# Patient Record
Sex: FEMALE | Race: ASIAN | NOT HISPANIC OR LATINO | Employment: STUDENT | URBAN - METROPOLITAN AREA
[De-identification: names, ages, dates, MRNs, and addresses within clinical notes are randomized per-mention and may not be internally consistent; named-entity substitution may affect disease eponyms.]

---

## 2017-02-03 ASSESSMENT — ENCOUNTER SYMPTOMS
HALLUCINATIONS: 0
WEIGHT LOSS: 0
POLYDIPSIA: 0
BLOATING: 1
HEMOPTYSIS: 0
COUGH DISTURBING SLEEP: 0
NAUSEA: 1
FEVER: 0
DYSURIA: 0
POSTURAL DYSPNEA: 0
RECTAL BLEEDING: 0
WHEEZING: 0
WEIGHT GAIN: 0
HEMATURIA: 0
VOMITING: 0
DYSPNEA ON EXERTION: 0
DECREASED LIBIDO: 0
RECTAL PAIN: 0
SNORES LOUDLY: 1
SHORTNESS OF BREATH: 0
BLOOD IN STOOL: 0
FLANK PAIN: 0
DECREASED APPETITE: 0
SPUTUM PRODUCTION: 0
FATIGUE: 1
BOWEL INCONTINENCE: 0
ABDOMINAL PAIN: 1
HOT FLASHES: 1
POLYPHAGIA: 0
COUGH: 0
INCREASED ENERGY: 0
NIGHT SWEATS: 1
ALTERED TEMPERATURE REGULATION: 1
HEARTBURN: 0
DIARRHEA: 0
CONSTIPATION: 1
CHILLS: 0
RESPIRATORY PAIN: 0
JAUNDICE: 0
DIFFICULTY URINATING: 0

## 2017-02-06 ENCOUNTER — OFFICE VISIT (OUTPATIENT)
Dept: GASTROENTEROLOGY | Facility: CLINIC | Age: 32
End: 2017-02-06

## 2017-02-06 VITALS
HEIGHT: 69 IN | SYSTOLIC BLOOD PRESSURE: 107 MMHG | WEIGHT: 150 LBS | DIASTOLIC BLOOD PRESSURE: 74 MMHG | BODY MASS INDEX: 22.22 KG/M2 | OXYGEN SATURATION: 98 % | HEART RATE: 77 BPM

## 2017-02-06 DIAGNOSIS — N80.9 ENDOMETRIOSIS: ICD-10-CM

## 2017-02-06 DIAGNOSIS — K58.9 IRRITABLE BOWEL SYNDROME, UNSPECIFIED TYPE: Primary | ICD-10-CM

## 2017-02-06 LAB
CRP SERPL-MCNC: <2.9 MG/L (ref 0–8)
DEPRECATED CALCIDIOL+CALCIFEROL SERPL-MC: 22 UG/L (ref 20–75)
FERRITIN SERPL-MCNC: 41 NG/ML (ref 12–150)
FOLATE SERPL-MCNC: 40.3 NG/ML
IRON SATN MFR SERPL: 29 % (ref 15–46)
IRON SERPL-MCNC: 98 UG/DL (ref 35–180)
TIBC SERPL-MCNC: 344 UG/DL (ref 240–430)
TSH SERPL DL<=0.05 MIU/L-ACNC: 1.72 MU/L (ref 0.4–4)
VIT B12 SERPL-MCNC: 650 PG/ML (ref 193–986)

## 2017-02-06 ASSESSMENT — PAIN SCALES - GENERAL: PAINLEVEL: NO PAIN (0)

## 2017-02-06 NOTE — NURSING NOTE
"Chief Complaint   Patient presents with     Consult     abd pain for over a  year       Filed Vitals:    02/06/17 1108   BP: 107/74   Pulse: 77   Height: 1.753 m (5' 9\")   Weight: 68.04 kg (150 lb)   SpO2: 98%       Body mass index is 22.14 kg/(m^2).                           "

## 2017-02-06 NOTE — PATIENT INSTRUCTIONS
1. Labs today to check nutritional markers    2. Get records from [place]. Colonoscopy and celiac blood work    3. Take Citrucel (fiber powder). 1 tablespoon daily for 1 week, then 2 tablespoons daily for 1 week then 3 tablespoons daily. If this does not work better than the pills you can switch to the pills    4. Take miralax 1 capful daily to have 1-2 BMs daily. You can increase up to 3 capfuls daily if needed. Make sure to increase or decrease dose as needed to have the desire effect. If you have diarrhea then decrease the dose.    5. You can take a probiotic daily - Culturelle or Align are good options    6. Keep a food and symptom diary for 2 weeks and see nutritionist to discuss LOW FODMAP diet    7. Schedule appointment with Amna Ashford.    Follow up in 4-5 months or sooner if needed    Call with questions

## 2017-02-06 NOTE — Clinical Note
2/6/2017       RE: Kasandra Lau  [address]    [facility] [address]     Dear Colleague,    Thank you for referring your patient, Kasandra Lau, to the [facility] at [facility]. Please see a copy of my visit note below.    REFERRING PROVIDER:  Tonja Ferrer of OB/GYN.      REASON FOR REFERRAL:  Abdominal pain and constipation.      CHIEF COMPLAINT:  Abdominal pain and constipation.      HISTORY OF PRESENT ILLNESS:  Kasandra Lau is a very pleasant 31-year-old female with a history of migraines, depression, and endometriosis who is here today to be evaluated for left-sided abdominal pain and constipation.  The patient reports she has had these symptoms on and off for years.  She has been followed by Gynecology for endometriosis.  In the fall she underwent an exploratory laparoscopy which found an endometrioma on her ovaries.  Reportedly there was some scar tissue on her colon as well from the endometriosis.  This was resected.  Since that surgery, she has had much improved abdominal discomfort.  Prior to that surgery, she was having constant abdominal pain despite going on birth control and stopping her cycles.  After this surgery, her pain has been more attributed and related to dietary changes.      The patient notes that she has definitely noticed that her symptoms are related to ingesting milk products and some legumes.  She has been trying to follow the low FODMAP diet on her own and this has made for definite improvement.  She also notes that she is definitely constipated.  She has about 3 bowel movements per week on average.  When she does not have a bowel movement, she notes that her abdominal discomfort is worse.  When she has a bowel movement, it is improved.  She has been taking some fiber.  She is taking 2 fiber pills (unknown type) a day and this has helped.  She tried taking some MiraLax a little bit in the past, but she did not  really like the taste.  She is willing to try this again.      She has no blood in her stool.  She has no weight loss.  She has no extraintestinal manifestations suggestive of Crohn's disease or ulcerative colitis.  She has no fevers, chills or sweats.  She underwent a colonoscopy in [place], which she states was normal.  We are working to get these records.  She also says that she was having some blood work for celiac disease.  She notes no changes in her symptoms.  Her blood tests were negative.  She is not aware of ever undergoing an upper endoscopy.      She is concerned she has IBS and she just wants to work at this.      REVIEW OF SYSTEMS:  A complete review of systems was performed.  Pertinent positives and negatives are as stated above in the HPI.  The remainder of a complete review of systems is unremarkable.      PAST MEDICAL HISTORY:   1.  Endometriosis.   2.  Depression and anxiety.  She is on Prozac with lorazepam as needed.  She is not currently following with any therapist at this time.   3.  Iron deficiency anemia.  She had some iron tests that could be consistent with mild iron deficiency with low sat.  Her ferritin was normal at 50.  She was put on iron supplement.  I do note that her iron studies were checked after her surgery and after she had vaginal bleeding for 1 week.  Her B12 was borderline low and her vitamin D was also low.   4.  Migraines.  She follows with Neurology.  She is on Topamax.      PAST SURGICAL HISTORY:  Exploratory laparotomy for endometriosis as above.      FAMILY HISTORY:  History of diabetes.  No history of Crohn's disease, ulcerative colitis, colon cancer or colon polyps.  No history of other GI illnesses or other issues.      SOCIAL HISTORY:  She is a PhD student, studying human geography.  She rarely smokes tobacco.  She has not smoked marijuana since leaving [place] where she was studying previously.  She does not drink alcohol as this makes her symptoms worse.       PHYSICAL EXAMINATION:   VITAL SIGNS:  Weight 150 pounds, height 5 feet 9 inches, blood pressure 107/74, pulse 77, satting at 98% on room air.   GENERAL:  She is pleasant, in no acute distress.   HEENT:  Head is atraumatic, normocephalic.  Sclerae are anicteric without injection.  Oropharynx is clear with moist mucous membranes.   NECK:  Supple.  There is no lymphadenopathy, no thyromegaly.   LUNGS:  Clear to auscultation.   HEART:  Normal rate.   ABDOMEN:  Soft, nontender, nondistended.  Well-healed laparoscopic abdominal scars.  No rebound or guarding.   EXTREMITIES:  No clubbing, cyanosis or edema.   SKIN:  No evidence of rash.   JOINTS:  No evidence of synovitis.   NEUROLOGIC:  Awake, alert and oriented x3 with no focal deficits.      LABORATORY DATA:  Reviewed in Epic.  Hemoglobin normal, nutritional markers as well as LFTs and basic metabolic are normal.      ASSESSMENT AND PLAN:  Kasandra Lau is a very pleasant 31-year-old female with a history of endometriosis and depression who is here today to discuss abdominal pain and constipation.       1.  I believe she has constipation predominant irritable bowel syndrome.  Her labs have been unremarkable.  She has had an outside colonoscopy (will work to get the records), but per her report this has been normal.  I appreciate no danger signs or red flags.  I do note that she was mildly iron deficient, but this was checked after her surgery.  We will recheck some of her nutritional markers here today.  She has already identified that the low FODMAP diet helps her.  We will refer her to Nutrition to discuss how to do this in an organized and safe manner.  We will also have her keep a food and symptom diary leading up to that appointment.  She will take some fiber and a probiotic.  She will titrate up the fiber as well.  In addition, she can titrate the MiraLax to help her have 1-2 soft bowel movements per day.  Finally, we discussed that depression and anxiety can  definitely contribute to GI symptoms and she is very open to this concept.  She will meet with our GI therapist, Dr. Arce, to talk about this interaction more and talk about potential coping mechanisms and therapy that can help.  I think the patient will do very well with these recommendations and she will follow up in 4-5 months to see how she is doing.  If there are any concerning findings on labs, we will consider further evaluation if needed.     2.  Endometriosis.  Certainly this could be contributing to some of her abdominal symptoms and she could have some scar tissue that is contributing as well.  Currently, this is well managed and I do not think she needs any further evaluation at this time.  She can continue to follow up with her gynecologist, Dr. Ferrer, for any needs regarding her endometriosis in the future.      Thank you very much for the opportunity to take part in the care of this patient.  Please do not hesitate to call with questions.      cc:  MARGARITO Dalal MD             D: 2017 11:45   T: 2017 13:03   MT: JULISA      Name:     HECTOR AVILES   MRN:      [id number]        Account:      [id number]   :      1985           Service Date: 2017      Document: [id number]      Note dictated. Job code [id number].

## 2017-02-06 NOTE — NURSING NOTE
After visit summary printed. Follow up scheduled. Will contact nutrition for an appointment. JARON signed.

## 2017-02-06 NOTE — MR AVS SNAPSHOT
After Visit Summary   2/6/2017    Kasandra Lau    MRN: [id number]           Patient Information     Date Of Birth          1985        Visit Information        Provider Department      2/6/2017 10:40 AM Sheldon Irene MD [facility] IBD        Today's Diagnoses     Irritable bowel syndrome, unspecified type    -  1       Care Instructions    1. Labs today to check nutritional markers    2. Get records from [place]. Colonoscopy and celiac blood work    3. Take Citrucel (fiber powder). 1 tablespoon daily for 1 week, then 2 tablespoons daily for 1 week then 3 tablespoons daily. If this does not work better than the pills you can switch to the pills    4. Take miralax 1 capful daily to have 1-2 BMs daily. You can increase up to 3 capfuls daily if needed. Make sure to increase or decrease dose as needed to have the desire effect. If you have diarrhea then decrease the dose.    5. You can take a probiotic daily - Culturelle or Align are good options    6. Keep a food and symptom diary for 2 weeks and see nutritionist to discuss LOW FODMAP diet    7. Schedule appointment with Amna Ashford.    Follow up in 4-5 months or sooner if needed    Call with questions        Follow-ups after your visit        Additional Services     NUTRITION REFERRAL       Your provider has referred you to: [facility]: [facility] (on call location)  - [place] [phone]   [email or web address]    Please be aware that coverage of these services is subject to the terms and limitations of your health insurance plan.  Call member services at your health plan with any benefit or coverage questions.      Please bring the following with you to your appointment:    (1) This referral request  (2) Any documents given to you regarding this referral  (3) Any specific questions you have about diet and/or food choices            NUTRITION REFERRAL       Your provider has  referred you to: [facility]: [facility] (on call location)  - [place] [phone]   [email or web address]    Please be aware that coverage of these services is subject to the terms and limitations of your health insurance plan.  Call member services at your health plan with any benefit or coverage questions.      Please bring the following to your appointment:      >>   This referral request   >>   Any documents given to you for this referral  >>   Any specific questions you have about diet or food choices                  Your next 10 appointments already scheduled     Feb 06, 2017 12:00 PM   LAB with  LAB   [facility] ([facility])    [address] [place] [address]  [address] [facility] [address] [phone]           Patient must bring picture ID.  Patient should be prepared to give a urine specimen  Please do not eat 10-12 hours before your appointment if you are coming in fasting for labs on lipids, cholesterol, or glucose (sugar).  Pregnant women should follow their Care Team instructions. Water with medications is okay. Do not drink coffee or other fluids.   If you have concerns about taking  your medications, please ask at office or if scheduling via [employer], send a message by clicking on Secure Messaging, Message Your Care Team.            May 19, 2017  1:45 PM   (Arrive by 1:30 PM)   New Patient Visit with Jevon Loyd MD   [facility]rology ([facility])    [address] [place] [address]  [address] [facility] [address] [phone]            May 23, 2017 11:20 AM   (Arrive by 11:05 AM)   Return Visit with Sheldon Irene MD   [facility]enter[facility] IBD ([facility])    [address] [place] [address]  [facility] [facility] [address] [phone]              Who to contact     Please call your clinic at [phone] to:    Ask questions about your health    Make or  "cancel appointments    Discuss your medicines    Learn about your test results    Speak to your doctor   If you have compliments or concerns about an experience at your clinic, or if you wish to file a complaint, please contact [facility] Patient Relations at [phone] or email us at [email or web address]         Additional Information About Your Visit        [employer] Information     [employer] gives you secure access to your electronic health record. If you see a primary care provider, you can also send messages to your care team and make appointments. If you have questions, please call your primary care clinic.  If you do not have a primary care provider, please call [phone] and they will assist you.      [employer] is an electronic gateway that provides easy, online access to your medical records. With [employer], you can request a clinic appointment, read your test results, renew a prescription or communicate with your care team.     To access your existing account, please contact your [facility] or call [phone] for assistance.        Care EveryWhere ID     This is your Care EveryWhere ID. This could be used by other organizations to access your [facility] medical records  [id number]        Your Vitals Were     Pulse Height BMI (Body Mass Index) Pulse Oximetry Last Period       77 1.753 m (5' 9\") 22.14 kg/m2 98% 09/13/2016        Blood Pressure from Last 3 Encounters:   02/06/17 107/74   11/23/16 110/77   11/19/16 108/68    Weight from Last 3 Encounters:   02/06/17 68.04 kg (150 lb)   11/23/16 68.992 kg (152 lb 1.6 oz)   11/09/16 66.4 kg (146 lb 6.2 oz)              We Performed the Following     Ferritin     Folate     Iron and iron binding capacity     NUTRITION REFERRAL     NUTRITION REFERRAL     TSH     Vitamin B12     Vitamin D Deficiency        Primary Care Provider Office Phone # Fax #    Roxy Rios [phone] " [phone]       [address] [facility] [address]        Thank you!     Thank you for choosing [facility] IBD  for your care. Our goal is always to provide you with excellent care. Hearing back from our patients is one way we can continue to improve our services. Please take a few minutes to complete the written survey that you may receive in the mail after your visit with us. Thank you!             Your Updated Medication List - Protect others around you: Learn how to safely use, store and throw away your medicines at www.disposemymeds.org.          This list is accurate as of: 2/6/17 11:36 AM.  Always use your most recent med list.                   Brand Name Dispense Instructions for use    FLUOXETINE HCL PO      Take 60 mg by mouth At Bedtime       leuprolide 11.25 MG kit    LUPRON DEPOT    1 each    Inject 11.25 mg into the muscle every 3 months       LORAZEPAM PO      Take 1 mg by mouth every 8 hours as needed       TOPIRAMATE PO      Take 100 mg by mouth every evening       TYLENOL PO      Take 650 mg by mouth every 4 hours as needed for mild pain or fever

## 2017-02-06 NOTE — PROGRESS NOTES
REFERRING PROVIDER:  Tonja Ferrer of OB/GYN.      REASON FOR REFERRAL:  Abdominal pain and constipation.      CHIEF COMPLAINT:  Abdominal pain and constipation.      HISTORY OF PRESENT ILLNESS:  Kasandra Lau is a very pleasant 31-year-old female with a history of migraines, depression, and endometriosis who is here today to be evaluated for left-sided abdominal pain and constipation.  The patient reports she has had these symptoms on and off for years.  She has been followed by Gynecology for endometriosis.  In the fall she underwent an exploratory laparoscopy which found an endometrioma on her ovaries.  Reportedly there was some scar tissue on her colon as well from the endometriosis.  This was resected.  Since that surgery, she has had much improved abdominal discomfort.  Prior to that surgery, she was having constant abdominal pain despite going on birth control and stopping her cycles.  After this surgery, her pain has been more attributed and related to dietary changes.      The patient notes that she has definitely noticed that her symptoms are related to ingesting milk products and some legumes.  She has been trying to follow the low FODMAP diet on her own and this has made for definite improvement.  She also notes that she is definitely constipated.  She has about 3 bowel movements per week on average.  When she does not have a bowel movement, she notes that her abdominal discomfort is worse.  When she has a bowel movement, it is improved.  She has been taking some fiber.  She is taking 2 fiber pills (unknown type) a day and this has helped.  She tried taking some MiraLax a little bit in the past, but she did not really like the taste.  She is willing to try this again.      She has no blood in her stool.  She has no weight loss.  She has no extraintestinal manifestations suggestive of Crohn's disease or ulcerative colitis.  She has no fevers, chills or sweats.  She underwent a colonoscopy in [place],  which she states was normal.  We are working to get these records.  She also says that she was having some blood work for celiac disease.  She notes no changes in her symptoms.  Her blood tests were negative.  She is not aware of ever undergoing an upper endoscopy.      She is concerned she has IBS and she just wants to work at this.      REVIEW OF SYSTEMS:  A complete review of systems was performed.  Pertinent positives and negatives are as stated above in the HPI.  The remainder of a complete review of systems is unremarkable.      PAST MEDICAL HISTORY:   1.  Endometriosis.   2.  Depression and anxiety.  She is on Prozac with lorazepam as needed.  She is not currently following with any therapist at this time.   3.  Iron deficiency anemia.  She had some iron tests that could be consistent with mild iron deficiency with low sat.  Her ferritin was normal at 50.  She was put on iron supplement.  I do note that her iron studies were checked after her surgery and after she had vaginal bleeding for 1 week.  Her B12 was borderline low and her vitamin D was also low.   4.  Migraines.  She follows with Neurology.  She is on Topamax.      PAST SURGICAL HISTORY:  Exploratory laparotomy for endometriosis as above.      FAMILY HISTORY:  History of diabetes.  No history of Crohn's disease, ulcerative colitis, colon cancer or colon polyps.  No history of other GI illnesses or other issues.      SOCIAL HISTORY:  She is a PhD student, studying human geography.  She rarely smokes tobacco.  She has not smoked marijuana since leaving [place] where she was studying previously.  She does not drink alcohol as this makes her symptoms worse.      PHYSICAL EXAMINATION:   VITAL SIGNS:  Weight 150 pounds, height 5 feet 9 inches, blood pressure 107/74, pulse 77, satting at 98% on room air.   GENERAL:  She is pleasant, in no acute distress.   HEENT:  Head is atraumatic, normocephalic.  Sclerae are anicteric without injection.  Oropharynx is  clear with moist mucous membranes.   NECK:  Supple.  There is no lymphadenopathy, no thyromegaly.   LUNGS:  Clear to auscultation.   HEART:  Normal rate.   ABDOMEN:  Soft, nontender, nondistended.  Well-healed laparoscopic abdominal scars.  No rebound or guarding.   EXTREMITIES:  No clubbing, cyanosis or edema.   SKIN:  No evidence of rash.   JOINTS:  No evidence of synovitis.   NEUROLOGIC:  Awake, alert and oriented x3 with no focal deficits.      LABORATORY DATA:  Reviewed in Epic.  Hemoglobin normal, nutritional markers as well as LFTs and basic metabolic are normal.      ASSESSMENT AND PLAN:  Kasandra Lau is a very pleasant 31-year-old female with a history of endometriosis and depression who is here today to discuss abdominal pain and constipation.       1.  I believe she has constipation predominant irritable bowel syndrome.  Her labs have been unremarkable.  She has had an outside colonoscopy (will work to get the records), but per her report this has been normal.  I appreciate no danger signs or red flags.  I do note that she was mildly iron deficient, but this was checked after her surgery.  We will recheck some of her nutritional markers here today.  She has already identified that the low FODMAP diet helps her.  We will refer her to Nutrition to discuss how to do this in an organized and safe manner.  We will also have her keep a food and symptom diary leading up to that appointment.  She will take some fiber and a probiotic.  She will titrate up the fiber as well.  In addition, she can titrate the MiraLax to help her have 1-2 soft bowel movements per day.  Finally, we discussed that depression and anxiety can definitely contribute to GI symptoms and she is very open to this concept.  She will meet with our GI therapist, Dr. Arce, to talk about this interaction more and talk about potential coping mechanisms and therapy that can help.  I think the patient will do very well with these recommendations and  she will follow up in 4-5 months to see how she is doing.  If there are any concerning findings on labs, we will consider further evaluation if needed.     2.  Endometriosis.  Certainly this could be contributing to some of her abdominal symptoms and she could have some scar tissue that is contributing as well.  Currently, this is well managed and I do not think she needs any further evaluation at this time.  She can continue to follow up with her gynecologist, Dr. Ferrer, for any needs regarding her endometriosis in the future.      Thank you very much for the opportunity to take part in the care of this patient.  Please do not hesitate to call with questions.      cc:  MARGARITO Dalal MD             D: 2017 11:45   T: 2017 13:03   MT: JULISA      Name:     HECTOR AVILES   MRN:      [id number]        Account:      [id number]   :      1985           Service Date: 2017      Document: [id number]

## 2017-02-06 NOTE — PROGRESS NOTES
Note dictated. Job code [id number].    Sheldon Irene MD    [facility]  Division of Gastroenterology, Hepatology and Nutrition    Answers for HPI/ROS submitted by the patient on 2/3/2017   General Symptoms: Yes  Skin Symptoms: No  HENT Symptoms: No  EYE SYMPTOMS: No  HEART SYMPTOMS: No  LUNG SYMPTOMS: Yes  INTESTINAL SYMPTOMS: Yes  URINARY SYMPTOMS: Yes  GYNECOLOGIC SYMPTOMS: Yes  BREAST SYMPTOMS: No  SKELETAL SYMPTOMS: No  BLOOD SYMPTOMS: No  NERVOUS SYSTEM SYMPTOMS: No  MENTAL HEALTH SYMPTOMS: No  Fever: No  Loss of appetite: No  Weight loss: No  Weight gain: No  Fatigue: Yes  Night sweats: Yes  Chills: No  Increased stress: Yes  Excessive hunger: No  Excessive thirst: No  Feeling hot or cold when others believe the temperature is normal: Yes  Loss of height: No  Post-operative complications: No  Surgical site pain: No  Hallucinations: No  Change in or Loss of Energy: No  Hyperactivity: No  Confusion: No  Cough: No  Sputum or phlegm: No  Coughing up blood: No  Difficulty breating or shortness of breath: No  Snoring: Yes  Wheezing: No  Difficulty breathing on exertion: No  Respiratory pain: No  Nighttime Cough: No  Difficulty breathing when lying flat: No  Heart burn or indigestion: No  Nausea: Yes  Vomiting: No  Abdominal pain: Yes  Bloating: Yes  Constipation: Yes  Diarrhea: No  Blood in stool: No  Black stools: No  Rectal or Anal pain: No  Fecal incontinence: No  Rectal bleeding: No  Yellowing of skin or eyes: No  Vomit with blood: No  Change in stools: No  Hemorrhoids: No  Trouble holding urine or incontinence: No  Pain or burning: No  Trouble starting or stopping: No  Increased frequency of urination: Yes  Blood in urine: No  Decreased frequency of urination: No  Frequent nighttime urination: Yes  Flank pain: No  Difficulty emptying bladder: No  Bleeding or spotting between periods: No  Heavy or painful periods: No  Irregular periods: No  Vaginal discharge: No  Hot flashes:  Yes  Vaginal dryness: Yes  Genital ulcers: No  Reduced libido: No  Painful intercourse: No  Difficulty with sexual arousal: No  Post-menopausal bleeding: No

## 2017-02-23 ENCOUNTER — DOCUMENTATION ONLY (OUTPATIENT)
Dept: GASTROENTEROLOGY | Facility: CLINIC | Age: 32
End: 2017-02-23

## 2017-02-27 ASSESSMENT — ENCOUNTER SYMPTOMS
JAUNDICE: 0
NAUSEA: 1
DIARRHEA: 0
RECTAL PAIN: 0
VOMITING: 0
CONSTIPATION: 1
RECTAL BLEEDING: 0
HEMATURIA: 0
BLOOD IN STOOL: 0
BLOATING: 1
DIFFICULTY URINATING: 0
FLANK PAIN: 0
BOWEL INCONTINENCE: 0
HEARTBURN: 0
DYSURIA: 0
ABDOMINAL PAIN: 1

## 2017-02-28 ENCOUNTER — OFFICE VISIT (OUTPATIENT)
Dept: GASTROENTEROLOGY | Facility: CLINIC | Age: 32
End: 2017-02-28

## 2017-02-28 DIAGNOSIS — K58.1 IRRITABLE BOWEL SYNDROME WITH CONSTIPATION: ICD-10-CM

## 2017-02-28 DIAGNOSIS — F41.1 GAD (GENERALIZED ANXIETY DISORDER): Primary | ICD-10-CM
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
## 2017-02-28 NOTE — MR AVS SNAPSHOT
After Visit Summary   2/28/2017    Kasandra Lau    MRN: [id number]           Patient Information     Date Of Birth          1985        Visit Information        Provider Department      2/28/2017 2:00 PM Amna Ashford, PhD  Health Gastroenterology and IBD        Today's Diagnoses     VIC (generalized anxiety disorder)    -  1    Irritable bowel syndrome with constipation           Follow-ups after your visit        Your next 10 appointments already scheduled     Mar 27, 2017  4:00 PM CDT   (Arrive by 3:45 PM)   Return Visit with Amna Ashford,    [facility] ([facility])    [address] [place] [address]  [address] [facility] [id number] [phone]            May 19, 2017  1:45 PM CDT   (Arrive by 1:30 PM)   New Patient Visit with Jevon Loyd MD   [facility] ([facility])    [address] [place] [address]  [address] [facility] [id number] [phone]            May 23, 2017 11:20 AM CDT   (Arrive by 11:05 AM)   Return Visit with Sheldon Irene MD   [facility] ([facility])    [address] [place] [address]  [address] [facility] [id number] [phone]              Who to contact     Please call your clinic at [phone] to:    Ask questions about your health    Make or cancel appointments    Discuss your medicines    Learn about your test results    Speak to your doctor   If you have compliments or concerns about an experience at your clinic, or if you wish to file a complaint, please contact [facility] Patient Relations at [phone] or email us at [email or web address]         Additional Information About Your Visit        MyChart Information     [employer] gives you secure access to your electronic health record. If you see a primary care provider, you can also send messages to your care team and make appointments.  If you have questions, please call your primary care clinic.  If you do not have a primary care provider, please call [phone] and they will assist you.      [employer] is an electronic gateway that provides easy, online access to your medical records. With [employer], you can request a clinic appointment, read your test results, renew a prescription or communicate with your care team.     To access your existing account, please contact your [facility] or call [phone] for assistance.        Care EveryWhere ID     This is your Care EveryWhere ID. This could be used by other organizations to access your [place] medical records  [id number]        Your Vitals Were     Last Period                   09/13/2016            Blood Pressure from Last 3 Encounters:   02/06/17 107/74   11/23/16 110/77   11/19/16 108/68    Weight from Last 3 Encounters:   02/06/17 68 kg (150 lb)   11/23/16 69 kg (152 lb 1.6 oz)   11/09/16 66.4 kg (146 lb 6.2 oz)              Today, you had the following     No orders found for display       Primary Care Provider Office Phone # Fax #    [facility] [phone] [address] [facility] [address]        Thank you!     Thank you for choosing [facility]  for your care. Our goal is always to provide you with excellent care. Hearing back from our patients is one way we can continue to improve our services. Please take a few minutes to complete the written survey that you may receive in the mail after your visit with us. Thank you!             Your Updated Medication List - Protect others around you: Learn how to safely use, store and throw away your medicines at www.disposemymeds.org.          This list is accurate as of: 2/28/17 11:59 PM.  Always use your most recent med list.                   Brand Name Dispense Instructions for use    FLUOXETINE HCL PO      Take 60 mg by mouth At Bedtime        leuprolide 11.25 MG kit    LUPRON DEPOT    1 each    Inject 11.25 mg into the muscle every 3 months       LORAZEPAM PO      Take 1 mg by mouth every 8 hours as needed       TOPIRAMATE PO      Take 100 mg by mouth every evening       TYLENOL PO      Take 650 mg by mouth every 4 hours as needed for mild pain or fever

## 2017-03-28 ENCOUNTER — OFFICE VISIT (OUTPATIENT)
Dept: OBGYN | Facility: CLINIC | Age: 32
End: 2017-03-28
Attending: OBSTETRICS & GYNECOLOGY
Payer: COMMERCIAL

## 2017-03-28 ENCOUNTER — TELEPHONE (OUTPATIENT)
Dept: GASTROENTEROLOGY | Facility: CLINIC | Age: 32
End: 2017-03-28

## 2017-03-28 VITALS — HEIGHT: 69 IN | HEART RATE: 68 BPM | SYSTOLIC BLOOD PRESSURE: 104 MMHG | DIASTOLIC BLOOD PRESSURE: 70 MMHG

## 2017-03-28 DIAGNOSIS — N80.9 ENDOMETRIOSIS: Primary | ICD-10-CM

## 2017-03-28 PROCEDURE — 25000128 H RX IP 250 OP 636: Mod: ZF

## 2017-03-28 PROCEDURE — 99212 OFFICE O/P EST SF 10 MIN: CPT | Mod: 25

## 2017-03-28 PROCEDURE — 96372 THER/PROPH/DIAG INJ SC/IM: CPT | Mod: ZF

## 2017-03-28 RX ORDER — ETONOGESTREL AND ETHINYL ESTRADIOL VAGINAL RING .015; .12 MG/D; MG/D
1 RING VAGINAL
COMMUNITY
End: 2017-12-12

## 2017-03-28 RX ORDER — ACETAMINOPHEN AND CODEINE PHOSPHATE 120; 12 MG/5ML; MG/5ML
1 SOLUTION ORAL DAILY
Qty: 84 TABLET | Refills: 0 | Status: SHIPPED | OUTPATIENT
Start: 2017-03-28 | End: 2017-07-14

## 2017-03-28 NOTE — PATIENT INSTRUCTIONS
Start progestin only pill now.  Take at same time daily.   On 7.1 Start Nuva Ring and use in continuous fashion.

## 2017-03-28 NOTE — PROGRESS NOTES
"32 yo P0 her for follow up after first does of Lupron on 11.23.      Hot flashes, uses astroglide for sexual activity, difficulty multi tasking, recall, foggy.  Interested in add back therapy.     Belly pain is a lot better. IBS- constipation sub type.  Following low FODMAP diet, Miralax    Diagnosed with endometriosis via lpsy 11/6  First Depo Lupron  GI consult. DX IBS- consitpation type, fiber, probiotics    Past Medical History:   Diagnosis Date     Depression (emotion)      Migraine      Panic disorder      Past Surgical History:   Procedure Laterality Date     COLONOSCOPY       LAPAROSCOPIC ABLATION ENDOMETRIOSIS N/A 11/9/2016    Procedure: LAPAROSCOPIC ABLATION ENDOMETRIOSIS;  Surgeon: Tonja Ferrer MD;  [facility]     LAPAROSCOPIC CYSTECTOMY OVARIAN (BENIGN) Left 11/9/2016    Procedure: LAPAROSCOPIC CYSTECTOMY OVARIAN (BENIGN);  Surgeon: Tonja Ferrer MD;  [facility]     LAPAROSCOPIC TUBAL DYE STUDY Left 11/9/2016    Procedure: LAPAROSCOPIC TUBAL DYE STUDY;  Surgeon: Tonja Ferrer MD;  [facility]     LAPAROSCOPY OPERATIVE ADULT N/A 11/9/2016    Procedure: LAPAROSCOPY OPERATIVE ADULT;  Surgeon: Tonja Ferrer MD;  [facility]     ORTHOPEDIC SURGERY      left wrist surgery     O: /70 (BP Location: Left arm, Patient Position: Chair, Cuff Size: Adult Large)  Pulse 68  Ht 1.753 m (5' 9\")  Breastfeeding? No   Gen:appears well    A:  Endometriosis- controlled with Lupron  P  Second dose Lupron 11.25 today.  In 3 months stop Jo.  And Resume Nuva Ring.    Michelle Gary MD          "

## 2017-03-28 NOTE — NURSING NOTE
Chief Complaint   Patient presents with     Follow Up For     Follow up 3 months Lupron injection. Pt states she stopped having abd pain; is having hot flashes, vaginal dryness, memory problems.       See KENNY Wu 3/28/2017

## 2017-03-28 NOTE — MR AVS SNAPSHOT
After Visit Summary   3/28/2017    Kasandra Lau    MRN: [id number]           Patient Information     Date Of Birth          1985        Visit Information        Provider Department      3/28/2017 12:45 PM Michelle Gary MD [facility]        Today's Diagnoses     Endometriosis    -  1      Care Instructions    Start progestin only pill now.  Take at same time daily.   On 7.1 Start Nuva Ring and us in continuous fashion.         Follow-ups after your visit        Your next 10 appointments already scheduled     May 19, 2017  1:45 PM CDT   (Arrive by 1:30 PM)   New Patient Visit with Jevon Loyd MD   [facility]rology ([facility])    [address] [place] [address]  3rd [facility] [address] [phone]            May 23, 2017 11:20 AM CDT   (Arrive by 11:05 AM)   Return Visit with Sheldon Irene MD   [facility] IBD ([facility])    [address] [place] [address]  4th [facility] [address] [phone]              Who to contact     Please call your clinic at [phone] to:    Ask questions about your health    Make or cancel appointments    Discuss your medicines    Learn about your test results    Speak to your doctor   If you have compliments or concerns about an experience at your clinic, or if you wish to file a complaint, please contact [facility] Patient Relations at [phone] or email us at [email or web address]         Additional Information About Your Visit        MyChart Information     [employer] gives you secure access to your electronic health record. If you see a primary care provider, you can also send messages to your care team and make appointments. If you have questions, please call your primary care clinic.  If you do not have a primary care provider, please call [phone] and they will assist you.      [employer] is  "an electronic gateway that provides easy, online access to your medical records. With [employer], you can request a clinic appointment, read your test results, renew a prescription or communicate with your care team.     To access your existing account, please contact your [facility] or call [phone] for assistance.        Care EveryWhere ID     This is your Care EveryWhere ID. This could be used by other organizations to access your [facility] medical records  [id number]        Your Vitals Were     Pulse Height Breastfeeding?             68 1.753 m (5' 9\") No          Blood Pressure from Last 3 Encounters:   03/28/17 104/70   02/06/17 107/74   11/23/16 110/77    Weight from Last 3 Encounters:   02/06/17 68 kg (150 lb)   11/23/16 69 kg (152 lb 1.6 oz)   11/09/16 66.4 kg (146 lb 6.2 oz)              Today, you had the following     No orders found for display         Today's Medication Changes          These changes are accurate as of: 3/28/17  1:20 PM.  If you have any questions, ask your nurse or doctor.               Start taking these medicines.        Dose/Directions    norethindrone 0.35 MG per tablet   Commonly known as:  MICRONOR   Used for:  Endometriosis   Started by:  Michelle Gary MD        Dose:  1 tablet   Take 1 tablet (0.35 mg) by mouth daily   Quantity:  84 tablet   Refills:  0         These medicines have changed or have updated prescriptions.        Dose/Directions    * leuprolide 11.25 MG kit   Commonly known as:  LUPRON DEPOT   This may have changed:  Another medication with the same name was added. Make sure you understand how and when to take each.   Used for:  Endometriosis   Changed by:  Tonja Ferrer MD        Dose:  11.25 mg   Inject 11.25 mg into the muscle every 3 months   Quantity:  1 each   Refills:  1       * leuprolide 11.25 MG kit   Commonly known as:  LUPRON DEPOT   This may have changed:  You were already taking a medication with " the same name, and this prescription was added. Make sure you understand how and when to take each.   Used for:  Endometriosis   Changed by:  Michelle Gary MD        Dose:  11.25 mg   Inject 11.25 mg into the muscle every 3 months   Quantity:  1 each   Refills:  1       * Notice:  This list has 2 medication(s) that are the same as other medications prescribed for you. Read the directions carefully, and ask your doctor or other care provider to review them with you.         Where to get your medicines      These medications were sent to [place] [address] [place] [address] [place] [address]  [address] [address]     Phone:  [phone]     norethindrone 0.35 MG per tablet         Some of these will need a paper prescription and others can be bought over the counter.  Ask your nurse if you have questions.     You don't need a prescription for these medications     leuprolide 11.25 MG kit                Primary Care Provider Office Phone # Fax #    [facility] [phone] [address] [facility] [address]        Thank you!     Thank you for choosing [facility]  for your care. Our goal is always to provide you with excellent care. Hearing back from our patients is one way we can continue to improve our services. Please take a few minutes to complete the written survey that you may receive in the mail after your visit with us. Thank you!             Your Updated Medication List - Protect others around you: Learn how to safely use, store and throw away your medicines at www.disposemymeds.org.          This list is accurate as of: 3/28/17  1:20 PM.  Always use your most recent med list.                   Brand Name Dispense Instructions for use    FLUOXETINE HCL PO      Take 60 mg by mouth At Bedtime       * leuprolide 11.25 MG kit    LUPRON DEPOT    1 each    Inject 11.25 mg into the muscle every 3 months       *  leuprolide 11.25 MG kit    LUPRON DEPOT    1 each    Inject 11.25 mg into the muscle every 3 months       LORAZEPAM PO      Take 1 mg by mouth every 8 hours as needed       norethindrone 0.35 MG per tablet    MICRONOR    84 tablet    Take 1 tablet (0.35 mg) by mouth daily       NUVARING 0.12-0.015 MG/24HR vaginal ring   Generic drug:  etonogestrel-ethinyl estradiol      Place 1 each vaginally every 28 days       TOPIRAMATE PO      Take 100 mg by mouth every evening       TYLENOL PO      Take 650 mg by mouth every 4 hours as needed for mild pain or fever       * Notice:  This list has 2 medication(s) that are the same as other medications prescribed for you. Read the directions carefully, and ask your doctor or other care provider to review them with you.

## 2017-03-28 NOTE — LETTER
"3/28/2017       RE: Kasandra Lau  [address]    [facility] [address]     Dear Colleague,    Thank you for referring your patient, Kasandra Lau, to the [facility] at [facility]. Please see a copy of my visit note below.    30 yo P0 her for follow up after first does of Lupron on 11.23.      Hot flashes, uses astroglide for sexual activity, difficulty multi tasking, recall, foggy.  Interested in add back therapy.     Belly pain is a lot better. IBS- constipation sub type.  Following low FODMAP diet, Miralax    Diagnosed with endometriosis via lpsy 11/6  First Depo Lupron  GI consult. DX IBS- consitpation type, fiber, probiotics    Past Medical History:   Diagnosis Date     Depression (emotion)      Migraine      Panic disorder      Past Surgical History:   Procedure Laterality Date     COLONOSCOPY       LAPAROSCOPIC ABLATION ENDOMETRIOSIS N/A 11/9/2016    Procedure: LAPAROSCOPIC ABLATION ENDOMETRIOSIS;  Surgeon: Tonja Ferrer MD;  Location: [facility] OR     LAPAROSCOPIC CYSTECTOMY OVARIAN (BENIGN) Left 11/9/2016    Procedure: LAPAROSCOPIC CYSTECTOMY OVARIAN (BENIGN);  Surgeon: Tonja Ferrer MD;  Location: [facility] OR     LAPAROSCOPIC TUBAL DYE STUDY Left 11/9/2016    Procedure: LAPAROSCOPIC TUBAL DYE STUDY;  Surgeon: Tonja Ferrer MD;  Location: [facility] OR     LAPAROSCOPY OPERATIVE ADULT N/A 11/9/2016    Procedure: LAPAROSCOPY OPERATIVE ADULT;  Surgeon: Tonja Ferrer MD;  Location: [facility] OR     ORTHOPEDIC SURGERY      left wrist surgery     O: /70 (BP Location: Left arm, Patient Position: Chair, Cuff Size: Adult Large)  Pulse 68  Ht 1.753 m (5' 9\")  Breastfeeding? No   Gen:appears well    A:  Endometriosis- controlled with Lupron  P  Second dose Lupron 11.25 today.  In 3 months stop Jo.  And Resume Nuva Ring.    Michelle Gary MD            "

## 2017-04-10 ENCOUNTER — OFFICE VISIT (OUTPATIENT)
Dept: SLEEP MEDICINE | Facility: CLINIC | Age: 32
End: 2017-04-10
Attending: NURSE PRACTITIONER
Payer: COMMERCIAL

## 2017-04-10 VITALS
OXYGEN SATURATION: 99 % | HEART RATE: 74 BPM | WEIGHT: 156 LBS | RESPIRATION RATE: 14 BRPM | SYSTOLIC BLOOD PRESSURE: 110 MMHG | BODY MASS INDEX: 23.11 KG/M2 | DIASTOLIC BLOOD PRESSURE: 74 MMHG | HEIGHT: 69 IN

## 2017-04-10 DIAGNOSIS — R19.8 TEETH CLENCHING: ICD-10-CM

## 2017-04-10 DIAGNOSIS — R06.83 SNORING: Primary | ICD-10-CM

## 2017-04-10 DIAGNOSIS — J31.0 OTHER RHINITIS: ICD-10-CM

## 2017-04-10 DIAGNOSIS — G47.8 UNHEALTHY SLEEP HABIT: ICD-10-CM

## 2017-04-10 DIAGNOSIS — G25.81 RESTLESS LEGS SYNDROME (RLS): ICD-10-CM

## 2017-04-10 PROCEDURE — 99211 OFF/OP EST MAY X REQ PHY/QHP: CPT | Mod: ZF

## 2017-04-10 RX ORDER — ZOLPIDEM TARTRATE 5 MG/1
TABLET ORAL
Qty: 1 TABLET | Refills: 0 | Status: SHIPPED | OUTPATIENT
Start: 2017-04-10 | End: 2017-07-14

## 2017-04-10 NOTE — NURSING NOTE
"Chief Complaint   Patient presents with     Consult     Discuss snoring       Initial Ht 1.753 m (5' 9\")  Wt 70.8 kg (156 lb)  BMI 23.04 kg/m2 Estimated body mass index is 23.04 kg/(m^2) as calculated from the following:    Height as of this encounter: 1.753 m (5' 9\").    Weight as of this encounter: 70.8 kg (156 lb).  Medication Reconciliation: complete     Neck circumference: 13 inches.  33 cm    ASH Cardenas          "

## 2017-04-10 NOTE — PATIENT INSTRUCTIONS
"Use saline spray product (ocean complete or arm and hammer are easy to use) in shower where nose naturally opens up. Use another time of day with continue congestion over the sink.    Consider flonase 2 sprays/nostril/day      If you need to cancel your sleep study, please call as soon as possible.  If you were prescribed a sleep aid, bring that to the sleep lab.    Avoid spending time in bed \"outside of sleep zone\"    Please remember: do not drive if sleepy  Prior to bedtime with sleepiness: to avoid sleeping prior to bedtime-mulitask, take short walk, drink water, etcc....      MY TREATMENT INFORMATION FOR SLEEP APNEA-  Kasandra Lau    NP: Rena Jannie [facility] :      MY CONTACT NUMBER: [phone]      If I haven't had a sleep study yet, what can I expect?  A personal story from [employer]  https://www.IronGate.com/watch?v=AxPLmlRpnCs    Am I having a home sleep study?  Here is a video in case you get home and want to make sure you have done it correctly  https://www.IronGate.com/watch?v=FWT8V5qBls5&feature=youtu.be    Frequently asked questions:  1. What is Obstructive Sleep Apnea (NIKOLAI)? NIKOLAI is the most common type of sleep apnea. Apnea literally means, \"without breath.\" It is characterized by repetitive pauses in breathing, despite continued effort to breathe, and is usually associated with a reduction in blood oxygen saturation. Apneas can last 10 to over 60 seconds. It is caused by narrowing or collapse of the upper airway as muscles relax during sleep. Severity of sleep apnea is determined by frequency of breathing events and their effect on your sleep and oxygen levels determined during sleep testing.   2. What are the consequences of NIKOLAI? Symptoms include: daytime sleepiness- possibly increasing the risk of falling asleep while driving, unrefreshing/restless sleep, snoring, insomnia, waking frequently to urinate, waking with heartburn or reflux, reduced concentration and memory, and morning " headaches. Other health consequences may include development of high blood pressure and other cardiovascular disease in persons who are susceptible. Untreated NIKOLAI  can contribute to heart disease, stroke and diabetes.   3. What are the treatment options? In most situations, sleep apnea is a lifelong disease that must be managed with daily therapy. Medications are not effective for sleep apnea and surgery is generally not performed until other therapies have been tried. Therapy is usually tailored to the individual patient based on many factors including your wishes as well as severity of sleep apnea and severity of obesity. Continuous Positive Airway (CPAP) is the most reliable treatment. An oral device to hold your jaw forward is usually the next most reliable option. Other options include postioning devices (to keep you off your back), weight loss, and surgery including a tongue pacing device. There is more detail about some of these options below.            1. CPAP-  WHAT DOES IT DO AND HOW CAN I LEARN TO WEAR IT?                               BEFORE I START, CAN I WATCH A MOVIE TO GET A PLAN ON HOW TO USE CPAP?  https://www.Nabto.com/watch?u=y5O62ba003S      Continuous positive airway pressure, or CPAP, is the most effective treatment for obstructive sleep apnea. It works by blowing room air, through a mask, to hold your throat open. A decision to use CPAP is a major step forward in the pursuit of a healthier life. The successful use of CPAP will help you breathe easier, sleep better and live healthier. You can choose CPAP equipment from any durable medical equipment provider that meets your needs.  Using CPAP can be a positive experience if you keep these cisse points in mind:  1. Commitment  CPAP is not a quick fix for your problem. It involves a long-term commitment to improve your sleep and your health.    2. Communication  Stay in close communication with both your sleep doctor and your CPAP supplier. Ask  "lots of questions and seek help when you need it.    3. Consistency  Use CPAP all night, every night and for every nap. You will receive the maximum health benefits from CPAP when you use it every time that you sleep. This will also make it easier for your body to adjust to the treatment.    4. Correction  The first machine and mask that you try may not be the best ones for you. Work with your sleep doctor and your CPAP supplier to make corrections to your equipment selection. Ask about trying a different type of machine or mask if you have ongoing problems. Make sure that your mask is a good fit and learn to use your equipment properly.    5. Challenge  Tell a family member or close friend to ask you each morning if you used your CPAP the previous night. Have someone to challenge you to give it your best effort.    6. Connection   Your adjustment to CPAP will be easier if you are able to connect with others who use the same treatment. Ask your sleep doctor if there is a support group in your area for people who have sleep apnea, or look for one on the Internet.  7. Comfort   Increase your level of comfort by using a saline spray, decongestant or heated humidifier if CPAP irritates your nose, mouth or throat. Use your unit's \"ramp\" setting to slowly get used to the air pressure level. There may be soft pads you can buy that will fit over your mask straps. Look on www.CPAP.com for accessories that can help make CPAP use more comfortable.  8. Cleaning   Clean your mask, tubing and headgear on a regular basis. Put this time in your schedule so that you don't forget to do it. Check and replace the filters for your CPAP unit and humidifier.    9. Completion   Although you are never finished with CPAP therapy, you should reward yourself by celebrating the completion of your first month of treatment. Expect this first month to be your hardest period of adjustment. It will involve some trial and error as you find the " machine, mask and pressure settings that are right for you.    10. Continuation  After your first month of treatment, continue to make a daily commitment to use your CPAP all night, every night and for every nap.    CPAP-Tips to starting with success:  Begin using your CPAP for short periods of time during the day while you watch TV or read.    Use CPAP every night and for every nap. Using it less often reduces the health benefits and makes it harder for your body to get used to it.    Make small adjustments to your mask, tubing, straps and headgear until you get the right fit. Tightening the mask may actually worsen the leak.  If it leaves significant marks on your face or irritates the bridge of your nose, it may not be the best mask for you.  Speak with the person who supplied the mask and consider trying other masks. Insurances will allow you to try different masks during the first month of starting CPAP.  Insurance also covers a new mask, hose and filter about every 6 months.    Use a saline nasal spray to ease mild nasal congestion. Neti-Pot or saline nasal rinses may also help. Nasal gel sprays can help reduce nasal dryness.  Biotene mouthwash can be helpful to protect your teeth if you experience frequent dry mouth.  Dry mouth may be a sign of air escaping out of your mouth or out of the mask in the case of a full face mask.  Speak with your provider if you expect that is the case.     Take a nasal decongestant to relieve more severe nasal or sinus congestion.  Do not use Afrin (oxymetazoline) nasal spray more than 3 days in a row.  Speak with your sleep doctor if your nasal congestion is chronic.    Use a heated humidifier that fits your CPAP model to enhance your breathing comfort. Adjust the heat setting up if you get a dry nose or throat, down if you get condensation in the hose or mask.  Position the CPAP lower than you so that any condensation in the hose drains back into the machine rather than  towards the mask.    Try a system that uses nasal pillows if traditional masks give you problems.    Clean your mask, tubing and headgear once a week. Make sure the equipment dries fully.    Regularly check and replace the filters for your CPAP unit and humidifier.    Work closely with your sleep provider and your CPAP supplier to make sure that you have the machine, mask and air pressure setting that works best for you. It is better to stop using it and call your provider to solve problems than to lay awake all night frustrated with the device.    BESIDES CPAP, WHAT OTHER THERAPIES ARE THERE?      Positioning Device  Positioning devices are generally used when sleep apnea is mild and only occurs on your back.This example shows a pillow that straps around the waist. It may be appropriate for those whose sleep study shows milder sleep apnea that occurs primarily when lying flat on one's back. Preliminary studies have shown benefit but effectiveness at home may need to be verified by a home sleep test. These devices are generally not covered by medical insurance.                      Oral Appliance  What is oral appliance therapy?  An oral appliance is a small acrylic device that fits over the upper and lower teeth or tongue (similar to an orthodontic retainer or a mouth guard). This device slightly advances the lower jaw or tongue, which moves the base of the tongue forward, opens the airway, improves breathing and can effectively treat snoring and obstructive sleep apnea sleep apnea. The appliance is fabricated and customized by a qualified dentist with experience in treating snoring and sleep apnea. Oral appliances are usually well tolerated and have relatively high compliance by patients1, 2, 3.  When is an oral appliance indicated?  Oral appliance therapy is recommended as a first-line treatment for patients with primary snoring, mild sleep apnea, and for patients with moderate sleep apnea who prefer appliance  therapy to use of CPAP4, 5. Severity of sleep apnea is determined by sleep testing and is based on the number of respiratory events per hour of sleep.   How successful is oral appliance therapy?  The success rate of oral appliance therapy in patients with mild sleep apnea is 75-80% while in patients with moderate sleep apnea it is 50-70%. The chance of success in patients with severe sleep apnea is 40-50%. The research also shows that oral appliances have a beneficial effect on the cardiovascular health of NIKOLAI patients at the same magnitude as CPAP therapy7.  Oral appliances should be a second-line treatment in cases of severe sleep apnea, but if not completely successful then a combination therapy utilizing CPAP plus oral appliance therapy may be effective. Oral appliances tend to be effective in a broad range of patients although studies show that the patients who have the highest success are females, younger patients, those with milder disease, and less severe obesity. 3, 6.   The chances of success are lower in patients who have more severe NIKOLAI, are older, and those who are morbidly obese.     Example of an oral appliance   Finding a dentist that practices dental sleep medicine  Specific training is available through the American Academy of Dental Sleep Medicine for dentists interested in working in the field of sleep. To find a dentist who is educated in the field of sleep and the use of oral appliances, near you, visit the Web site of the American Academy of Dental Sleep Medicine; also see   http://www.accpstorage.org/newOrganization/patients/oralAppliances.pdf  To search for a dentist certified in these practices:  Http://aadsm.org/FindADentist.aspx?1  1. Sarai et al. Objectively measured vs self-reported compliance during oral appliance therapy for sleep-disordered breathing. Chest 2013; 144(5): 7706-7880.  2. Rhianna et al. Objective measurement of compliance during oral appliance therapy for  sleep-disordered breathing. Thorax 2013; 68(1): 91-96.  3. Agnes et al. Mandibular advancement devices in 620 men and women with NIKOLAI and snoring: tolerability and predictors of treatment success. Chest 2004; 125: 2697-7440.  4. Candida et al. Oral appliances for snoring and NIKOLAI: a review. Sleep 2006; 29: 244-262.  5. Asia et al. Oral appliance treatment for NIKOLAI: an update. J Clin Sleep Med 2014; 10(2): 215-227.  6. Dominique et al. Predictors of OSAH treatment outcome. J Dent Res 2007; 86: 1423-9642.      Weight Loss:    Weight management is a personal decision.  If you are interested in exploring weight loss strategies, the following discussion covers the impact on weight loss on sleep apnea and the approaches that may be successful.    Weight loss decreases severity of sleep apnea in most people with obesity. For those with mild obesity who have developed snoring with weight gain, even 15-30 pound weight loss can improve and occasionally eliminate sleep apnea.  Structured and life-long dietary and health habits are necessary to lose weight and keep healthier weight levels.     Though there may be significant health benefits from weight loss, long-term weight loss is very difficult to achieve- studies show success with dietary management in less than 10% of people. In addition, substantial weight loss may require years of dietary control and may be difficult if patients have severe obesity. In these cases, surgical management may be considered.  Finally, older individuals who have tolerated obesity without health complications may be less likely to benefit from weight loss strategies.    Your BMI is Body mass index is 23.04 kg/(m^2).  Body mass index (BMI) is one way to tell whether you are at a healthy weight, overweight, or obese. It measures your weight in relation to your height.  A BMI of 18.5 to 24.9 is in the healthy range. A person with a BMI of 25 to 29.9 is considered overweight, and someone  with a BMI of 30 or greater is considered obese. More than two-thirds of American adults are considered overweight or obese.  Being overweight or obese increases the risk for further weight gain. Excess weight may lead to heart disease and diabetes.  Creating and following plans for healthy eating and physical activity may help you improve your health.  Weight control is part of healthy lifestyle and includes exercise, emotional health, and healthy eating habits. Careful eating habits lifelong are the mainstay of weight control. Though there are significant health benefits from weight loss, long-term weight loss with diet alone may be very difficult to achieve- studies show long-term success with dietary management in less than 10% of people. Attaining a healthy weight may be especially difficult to achieve in those with severe obesity. In some cases, medications, devices and surgical management might be considered.  What can you do?  If you are overweight or obese and are interested in methods for weight loss, you should discuss this with your provider.     Consider reducing daily calorie intake by 500 calories.     Keep a food journal.     Avoiding skipping meals, consider cutting portions instead.    Diet combined with exercise helps maintain muscle while optimizing fat loss. Strength training is particularly important for building and maintaining muscle mass. Exercise helps reduce stress, increase energy, and improves fitness. Increasing exercise without diet control, however, may not burn enough calories to loose weight.       Start walking three days a week 10-20 minutes at a time    Work towards walking thirty minutes five days a week     Eventually, increase the speed of your walking for 1-2 minutes at time    In addition, we recommend that you review healthy lifestyles and methods for weight loss available through the National Institutes of Health patient information  sites:  http://win.niddk.nih.gov/publications/index.htm    And look into health and wellness programs that may be available through your health insurance provider, employer, local community center, or garret club.        Surgery:    Upper Airway Surgery for NIKOLAI  Surgery for NIKOLAI is a second-line treatment option in the management of sleep apnea.  Surgery should be considered for patients who are having a difficult time tolerating CPAP.    Surgery for NIKOLAI is directed at areas that are responsible for narrowing or complete obstruction of the airway during sleep.  There are a wide range of procedures available to enlarge and/or stabilize the airway to prevent blockage of breathing in the three major areas where it can occur: the palate, tongue, and nasal regions.  Successful surgical treatment depends on the accurate identification of the factors responsible for obstructive sleep apnea in each person.  A personalized approach is required because there is no single treatment that works well for everyone.  Because of anatomic variation, consultation with an examination by a sleep surgeon is a critical first step in determining what surgical options are best for each patient.  In some cases, examination during sedation may be recommended in order to guide the selection of procedures.  Patients will be counseled about risks and benefits as well as the typical recovery course after surgery. Surgery is typically not a cure for a person s NIKOLAI.  However, surgery will often significantly improve one s NIKOLAI severity (termed  success rate ).  Even in the absence of a cure, surgery will decrease the cardiovascular risk associated with OSA7; improve overall quality of life8 (sleepiness, functionality, sleep quality, etc).          Palate Procedures:  Patients with NIKOLAI often have narrowing of their airway in the region of their tonsils and uvula.  The goals of palate procedures are to widen the airway in this region as well as to help  the tissues resist collapse.  Modern palate procedure techniques focus on tissue conservation and soft tissue rearrangement, rather than tissue removal.  Often the uvula is preserved in this procedure. Residual sleep apnea is common in patient after pharyngoplasty with an average reduction in sleep apnea events of 33%2.      Tongue Procedures:  While patients are awake, the muscles that surround the throat are active and keep this region open for breathing. These muscles relax during sleep, allowing the tongue and other structures to collapse and block breathing.  There are several different tongue procedures available.  Selection of a tongue base procedure depends on characteristics seen on physical exam.  Generally, procedures are aimed at removing bulky tissues in this area or preventing the back of the tongue from falling back during sleep.  Success rates for tongue surgery range from 50-62%3.    Hypoglossal Nerve Stimulation:  Hypoglossal nerve stimulation has recently received approval from the [place] Food and Drug Administration for the treatment of obstructive sleep apnea.  This is based on research showing that the system was safe and effective in treating sleep apnea6.  Results showed that the median AHI score decreased 68%, from 29.3 to 9.0. This therapy uses an implant system that senses breathing patterns and delivers mild stimulation to airway muscles, which keeps the airway open during sleep.  The system consists of three fully implanted components: a small generator (similar in size to a pacemaker), a breathing sensor, and a stimulation lead.  Using a small handheld remote, a patient turns the therapy on before bed and off upon awakening.    Candidates for this device must be greater than 22 years of age, have moderate to severe NIKOLAI (AHI between 20-65), BMI less than 32, have tried CPAP/oral appliance without success, and have appropriate upper airway anatomy (determined by a sleep endoscopy  performed by Dr. Sue).    Hypoglossal Nerve Stimulation Pathway:    The sleep surgeon s office will work with the patient through the insurance prior-authorization process (including communications and appeals).    Nasal Procedures:  Nasal obstruction can interfere with nasal breathing during the day and night.  Studies have shown that relief of nasal obstruction can improve the ability of some patients to tolerate positive airway pressure therapy for obstructive sleep apnea1.  Treatment options include medications such as nasal saline, topical corticosteroid and antihistamine sprays, and oral medications such as antihistamines or decongestants. Non-surgical treatments can include external nasal dilators for selected patients. If these are not successful by themselves, surgery can improve the nasal airway either alone or in combination with these other options.      Combination Procedures:  Combination of surgical procedures and other treatments may be recommended, particularly if patients have more than one area of narrowing or persistent positional disease.  The success rate of combination surgery ranges from 66-80%2,3.      1. Ana MERRILL. The Role of the Nose in Snoring and Obstructive Sleep Apnoea: An Update.  Eur Arch Otorhinolaryngol. 2011; 268: 1365-73.  2.  Gemma SM; Sravan JA; Delio JR; Pallanch JF; Eleonora MB; Aimee SG; Ava ALEXANDER. Surgical modifications of the upper airway for obstructive sleep apnea in adults: a systematic review and meta-analysis. SLEEP 2010;33(10):8706-6789. David COOL. Hypopharyngeal surgery in obstructive sleep apnea: an evidence-based medicine review.  Arch Otolaryngol Head Neck Surg. 2006 Feb;132(2):206-13.  3. Dao YH1, Marzena Y, Pranay HERMAN. The efficacy of anatomically based multilevel surgery for obstructive sleep apnea. Otolaryngol Head Neck Surg. 2003 Oct;129(4):327-35.  4. David COOL, Goldberg A. Hypopharyngeal Surgery in Obstructive Sleep Apnea: An Evidence-Based Medicine  Review. Arch Otolaryngol Head Neck Surg. 2006 Feb;132(2):206-13.  5. Vianca PJ et al. Upper-Airway Stimulation for Obstructive Sleep Apnea.  N Engl J Med. 2014 Jan 9;370(2):139-49.  6. Shelly Y et al. Increased Incidence of Cardiovascular Disease in Middle-aged Men with Obstructive Sleep Apnea. Am J Respir Crit Care Med; 2002 166: 159-165  7. Garcia EM et al. Studying Life Effects and Effectiveness of Palatopharyngoplasty (SLEEP) study: Subjective Outcomes of Isolated Uvulopalatopharyngoplasty. Otolaryngol Head Neck Surg. 2011; 144: 623-631.              Your BMI is Body mass index is 23.04 kg/(m^2).  Weight management is a personal decision.  If you are interested in exploring weight loss strategies, the following discussion covers the approaches that may be successful. Body mass index (BMI) is one way to tell whether you are at a healthy weight, overweight, or obese. It measures your weight in relation to your height.  A BMI of 18.5 to 24.9 is in the healthy range. A person with a BMI of 25 to 29.9 is considered overweight, and someone with a BMI of 30 or greater is considered obese. More than two-thirds of American adults are considered overweight or obese.  Being overweight or obese increases the risk for further weight gain. Excess weight may lead to heart disease and diabetes.  Creating and following plans for healthy eating and physical activity may help you improve your health.  Weight control is part of healthy lifestyle and includes exercise, emotional health, and healthy eating habits. Careful eating habits lifelong are the mainstay of weight control. Though there are significant health benefits from weight loss, long-term weight loss with diet alone may be very difficult to achieve- studies show long-term success with dietary management in less than 10% of people. Attaining a healthy weight may be especially difficult to achieve in those with severe obesity. In some cases, medications, devices and  surgical management might be considered.  What can you do?  If you are overweight or obese and are interested in methods for weight loss, you should discuss this with your provider.     Consider reducing daily calorie intake by 500 calories.     Keep a food journal.     Avoiding skipping meals, consider cutting portions instead.    Diet combined with exercise helps maintain muscle while optimizing fat loss. Strength training is particularly important for building and maintaining muscle mass. Exercise helps reduce stress, increase energy, and improves fitness. Increasing exercise without diet control, however, may not burn enough calories to loose weight.       Start walking three days a week 10-20 minutes at a time    Work towards walking thirty minutes five days a week     Eventually, increase the speed of your walking for 1-2 minutes at time    In addition, we recommend that you review healthy lifestyles and methods for weight loss available through the [employer] patient information sites:  http://win.niddk.nih.gov/publications/index.htm    And look into health and wellness programs that may be available through your health insurance provider, employer, local community center, or garret club.

## 2017-04-10 NOTE — MR AVS SNAPSHOT
"              After Visit Summary   4/10/2017    Kasandra Lau    MRN: [id number]           Patient Information     Date Of Birth          1985        Visit Information        Provider Department      4/10/2017 2:00 PM Rena Giraldo APRN [facility], [place], Sleep Study        Care Instructions    Use saline spray product (ocean complete or arm and hammer are easy to use) in shower where nose naturally opens up. Use another time of day with continue congestion over the sink.    Consider flonase 2 sprays/nostril/day      If you need to cancel your sleep study, please call as soon as possible.  If you were prescribed a sleep aid, bring that to the sleep lab.    Avoid spending time in bed \"outside of sleep zone\"    Please remember: do not drive if sleepy      MY TREATMENT INFORMATION FOR SLEEP APNEA-  Kasandra Judi    NP: Rena Giraldo  SLEEP CENTER :      MY CONTACT NUMBER: [phone]      If I haven't had a sleep study yet, what can I expect?  A personal story from [employer]  https://www.[employer].com/watch?v=AxPLmlRpnCs    Am I having a home sleep study?  Here is a video in case you get home and want to make sure you have done it correctly  https://www.[employer].com/watch?v=NZT8D6pFno6&feature=youtu.be    Frequently asked questions:  1. What is Obstructive Sleep Apnea (NIKOLAI)? NIKOLAI is the most common type of sleep apnea. Apnea literally means, \"without breath.\" It is characterized by repetitive pauses in breathing, despite continued effort to breathe, and is usually associated with a reduction in blood oxygen saturation. Apneas can last 10 to over 60 seconds. It is caused by narrowing or collapse of the upper airway as muscles relax during sleep. Severity of sleep apnea is determined by frequency of breathing events and their effect on your sleep and oxygen levels determined during sleep testing.   2. What are the consequences of NIKOLAI? Symptoms include: daytime sleepiness- possibly increasing the risk of " falling asleep while driving, unrefreshing/restless sleep, snoring, insomnia, waking frequently to urinate, waking with heartburn or reflux, reduced concentration and memory, and morning headaches. Other health consequences may include development of high blood pressure and other cardiovascular disease in persons who are susceptible. Untreated NIKOLAI  can contribute to heart disease, stroke and diabetes.   3. What are the treatment options? In most situations, sleep apnea is a lifelong disease that must be managed with daily therapy. Medications are not effective for sleep apnea and surgery is generally not performed until other therapies have been tried. Therapy is usually tailored to the individual patient based on many factors including your wishes as well as severity of sleep apnea and severity of obesity. Continuous Positive Airway (CPAP) is the most reliable treatment. An oral device to hold your jaw forward is usually the next most reliable option. Other options include postioning devices (to keep you off your back), weight loss, and surgery including a tongue pacing device. There is more detail about some of these options below.            1. CPAP-  WHAT DOES IT DO AND HOW CAN I LEARN TO WEAR IT?                               BEFORE I START, CAN I WATCH A MOVIE TO GET A PLAN ON HOW TO USE CPAP?  https://www.[employer].com/watch?r=u4I56be292A      Continuous positive airway pressure, or CPAP, is the most effective treatment for obstructive sleep apnea. It works by blowing room air, through a mask, to hold your throat open. A decision to use CPAP is a major step forward in the pursuit of a healthier life. The successful use of CPAP will help you breathe easier, sleep better and live healthier. You can choose CPAP equipment from any durable medical equipment provider that meets your needs.  Using CPAP can be a positive experience if you keep these cisse points in mind:  1. Commitment  CPAP is not a quick fix for your  "problem. It involves a long-term commitment to improve your sleep and your health.    2. Communication  Stay in close communication with both your sleep doctor and your CPAP supplier. Ask lots of questions and seek help when you need it.    3. Consistency  Use CPAP all night, every night and for every nap. You will receive the maximum health benefits from CPAP when you use it every time that you sleep. This will also make it easier for your body to adjust to the treatment.    4. Correction  The first machine and mask that you try may not be the best ones for you. Work with your sleep doctor and your CPAP supplier to make corrections to your equipment selection. Ask about trying a different type of machine or mask if you have ongoing problems. Make sure that your mask is a good fit and learn to use your equipment properly.    5. Challenge  Tell a family member or close friend to ask you each morning if you used your CPAP the previous night. Have someone to challenge you to give it your best effort.    6. Connection   Your adjustment to CPAP will be easier if you are able to connect with others who use the same treatment. Ask your sleep doctor if there is a support group in your area for people who have sleep apnea, or look for one on the Internet.  7. Comfort   Increase your level of comfort by using a saline spray, decongestant or heated humidifier if CPAP irritates your nose, mouth or throat. Use your unit's \"ramp\" setting to slowly get used to the air pressure level. There may be soft pads you can buy that will fit over your mask straps. Look on www.CPAP.com for accessories that can help make CPAP use more comfortable.  8. Cleaning   Clean your mask, tubing and headgear on a regular basis. Put this time in your schedule so that you don't forget to do it. Check and replace the filters for your CPAP unit and humidifier.    9. Completion   Although you are never finished with CPAP therapy, you should reward yourself " by celebrating the completion of your first month of treatment. Expect this first month to be your hardest period of adjustment. It will involve some trial and error as you find the machine, mask and pressure settings that are right for you.    10. Continuation  After your first month of treatment, continue to make a daily commitment to use your CPAP all night, every night and for every nap.    CPAP-Tips to starting with success:  Begin using your CPAP for short periods of time during the day while you watch TV or read.    Use CPAP every night and for every nap. Using it less often reduces the health benefits and makes it harder for your body to get used to it.    Make small adjustments to your mask, tubing, straps and headgear until you get the right fit. Tightening the mask may actually worsen the leak.  If it leaves significant marks on your face or irritates the bridge of your nose, it may not be the best mask for you.  Speak with the person who supplied the mask and consider trying other masks. Insurances will allow you to try different masks during the first month of starting CPAP.  Insurance also covers a new mask, hose and filter about every 6 months.    Use a saline nasal spray to ease mild nasal congestion. Neti-Pot or saline nasal rinses may also help. Nasal gel sprays can help reduce nasal dryness.  Biotene mouthwash can be helpful to protect your teeth if you experience frequent dry mouth.  Dry mouth may be a sign of air escaping out of your mouth or out of the mask in the case of a full face mask.  Speak with your provider if you expect that is the case.     Take a nasal decongestant to relieve more severe nasal or sinus congestion.  Do not use Afrin (oxymetazoline) nasal spray more than 3 days in a row.  Speak with your sleep doctor if your nasal congestion is chronic.    Use a heated humidifier that fits your CPAP model to enhance your breathing comfort. Adjust the heat setting up if you get a dry  nose or throat, down if you get condensation in the hose or mask.  Position the CPAP lower than you so that any condensation in the hose drains back into the machine rather than towards the mask.    Try a system that uses nasal pillows if traditional masks give you problems.    Clean your mask, tubing and headgear once a week. Make sure the equipment dries fully.    Regularly check and replace the filters for your CPAP unit and humidifier.    Work closely with your sleep provider and your CPAP supplier to make sure that you have the machine, mask and air pressure setting that works best for you. It is better to stop using it and call your provider to solve problems than to lay awake all night frustrated with the device.    BESIDES CPAP, WHAT OTHER THERAPIES ARE THERE?      Positioning Device  Positioning devices are generally used when sleep apnea is mild and only occurs on your back.This example shows a pillow that straps around the waist. It may be appropriate for those whose sleep study shows milder sleep apnea that occurs primarily when lying flat on one's back. Preliminary studies have shown benefit but effectiveness at home may need to be verified by a home sleep test. These devices are generally not covered by medical insurance.                      Oral Appliance  What is oral appliance therapy?  An oral appliance is a small acrylic device that fits over the upper and lower teeth or tongue (similar to an orthodontic retainer or a mouth guard). This device slightly advances the lower jaw or tongue, which moves the base of the tongue forward, opens the airway, improves breathing and can effectively treat snoring and obstructive sleep apnea sleep apnea. The appliance is fabricated and customized by a qualified dentist with experience in treating snoring and sleep apnea. Oral appliances are usually well tolerated and have relatively high compliance by patients1, 2, 3.  When is an oral appliance indicated?  Oral  appliance therapy is recommended as a first-line treatment for patients with primary snoring, mild sleep apnea, and for patients with moderate sleep apnea who prefer appliance therapy to use of CPAP4, 5. Severity of sleep apnea is determined by sleep testing and is based on the number of respiratory events per hour of sleep.   How successful is oral appliance therapy?  The success rate of oral appliance therapy in patients with mild sleep apnea is 75-80% while in patients with moderate sleep apnea it is 50-70%. The chance of success in patients with severe sleep apnea is 40-50%. The research also shows that oral appliances have a beneficial effect on the cardiovascular health of NIKOLAI patients at the same magnitude as CPAP therapy7.  Oral appliances should be a second-line treatment in cases of severe sleep apnea, but if not completely successful then a combination therapy utilizing CPAP plus oral appliance therapy may be effective. Oral appliances tend to be effective in a broad range of patients although studies show that the patients who have the highest success are females, younger patients, those with milder disease, and less severe obesity. 3, 6.   The chances of success are lower in patients who have more severe NIKOLAI, are older, and those who are morbidly obese.     Example of an oral appliance   Finding a dentist that practices dental sleep medicine  Specific training is available through the American Academy of Dental Sleep Medicine for dentists interested in working in the field of sleep. To find a dentist who is educated in the field of sleep and the use of oral appliances, near you, visit the Web site of the American Academy of Dental Sleep Medicine; also see   http://www.accpstorage.org/newOrganization/patients/oralAppliances.pdf  To search for a dentist certified in these practices:  Http://aadsm.org/FindADentist.aspx?1  1. Sarai et al. Objectively measured vs self-reported compliance during oral  appliance therapy for sleep-disordered breathing. Chest 2013; 144(5): 1011-4741.  2. Rhianna, et al. Objective measurement of compliance during oral appliance therapy for sleep-disordered breathing. Thorax 2013; 68(1): 91-96.  3. Agnes et al. Mandibular advancement devices in 620 men and women with NIKOLAI and snoring: tolerability and predictors of treatment success. Chest 2004; 125: 2317-7101.  4. Candida et al. Oral appliances for snoring and NIKOLAI: a review. Sleep 2006; 29: 244-262.  5. Asia et al. Oral appliance treatment for NIKOLAI: an update. J Clin Sleep Med 2014; 10(2): 215-227.  6. Dominique et al. Predictors of OSAH treatment outcome. J Dent Res 2007; 86: 4906-1880.      Weight Loss:    Weight management is a personal decision.  If you are interested in exploring weight loss strategies, the following discussion covers the impact on weight loss on sleep apnea and the approaches that may be successful.    Weight loss decreases severity of sleep apnea in most people with obesity. For those with mild obesity who have developed snoring with weight gain, even 15-30 pound weight loss can improve and occasionally eliminate sleep apnea.  Structured and life-long dietary and health habits are necessary to lose weight and keep healthier weight levels.     Though there may be significant health benefits from weight loss, long-term weight loss is very difficult to achieve- studies show success with dietary management in less than 10% of people. In addition, substantial weight loss may require years of dietary control and may be difficult if patients have severe obesity. In these cases, surgical management may be considered.  Finally, older individuals who have tolerated obesity without health complications may be less likely to benefit from weight loss strategies.    Your BMI is Body mass index is 23.04 kg/(m^2).  Body mass index (BMI) is one way to tell whether you are at a healthy weight, overweight, or  obese. It measures your weight in relation to your height.  A BMI of 18.5 to 24.9 is in the healthy range. A person with a BMI of 25 to 29.9 is considered overweight, and someone with a BMI of 30 or greater is considered obese. More than two-thirds of American adults are considered overweight or obese.  Being overweight or obese increases the risk for further weight gain. Excess weight may lead to heart disease and diabetes.  Creating and following plans for healthy eating and physical activity may help you improve your health.  Weight control is part of healthy lifestyle and includes exercise, emotional health, and healthy eating habits. Careful eating habits lifelong are the mainstay of weight control. Though there are significant health benefits from weight loss, long-term weight loss with diet alone may be very difficult to achieve- studies show long-term success with dietary management in less than 10% of people. Attaining a healthy weight may be especially difficult to achieve in those with severe obesity. In some cases, medications, devices and surgical management might be considered.  What can you do?  If you are overweight or obese and are interested in methods for weight loss, you should discuss this with your provider.     Consider reducing daily calorie intake by 500 calories.     Keep a food journal.     Avoiding skipping meals, consider cutting portions instead.    Diet combined with exercise helps maintain muscle while optimizing fat loss. Strength training is particularly important for building and maintaining muscle mass. Exercise helps reduce stress, increase energy, and improves fitness. Increasing exercise without diet control, however, may not burn enough calories to loose weight.       Start walking three days a week 10-20 minutes at a time    Work towards walking thirty minutes five days a week     Eventually, increase the speed of your walking for 1-2 minutes at time    In addition, we  recommend that you review healthy lifestyles and methods for weight loss available through the National Institutes of Health patient information sites:  http://win.niddk.nih.gov/publications/index.htm    And look into health and wellness programs that may be available through your health insurance provider, employer, local community center, or garret club.        Surgery:    Upper Airway Surgery for NIKOLAI  Surgery for NIKOLAI is a second-line treatment option in the management of sleep apnea.  Surgery should be considered for patients who are having a difficult time tolerating CPAP.    Surgery for NIKOLAI is directed at areas that are responsible for narrowing or complete obstruction of the airway during sleep.  There are a wide range of procedures available to enlarge and/or stabilize the airway to prevent blockage of breathing in the three major areas where it can occur: the palate, tongue, and nasal regions.  Successful surgical treatment depends on the accurate identification of the factors responsible for obstructive sleep apnea in each person.  A personalized approach is required because there is no single treatment that works well for everyone.  Because of anatomic variation, consultation with an examination by a sleep surgeon is a critical first step in determining what surgical options are best for each patient.  In some cases, examination during sedation may be recommended in order to guide the selection of procedures.  Patients will be counseled about risks and benefits as well as the typical recovery course after surgery. Surgery is typically not a cure for a person s NIKOLAI.  However, surgery will often significantly improve one s NIKOLAI severity (termed  success rate ).  Even in the absence of a cure, surgery will decrease the cardiovascular risk associated with OSA7; improve overall quality of life8 (sleepiness, functionality, sleep quality, etc).          Palate Procedures:  Patients with NIKOLAI often have narrowing of  their airway in the region of their tonsils and uvula.  The goals of palate procedures are to widen the airway in this region as well as to help the tissues resist collapse.  Modern palate procedure techniques focus on tissue conservation and soft tissue rearrangement, rather than tissue removal.  Often the uvula is preserved in this procedure. Residual sleep apnea is common in patient after pharyngoplasty with an average reduction in sleep apnea events of 33%2.      Tongue Procedures:  While patients are awake, the muscles that surround the throat are active and keep this region open for breathing. These muscles relax during sleep, allowing the tongue and other structures to collapse and block breathing.  There are several different tongue procedures available.  Selection of a tongue base procedure depends on characteristics seen on physical exam.  Generally, procedures are aimed at removing bulky tissues in this area or preventing the back of the tongue from falling back during sleep.  Success rates for tongue surgery range from 50-62%3.    Hypoglossal Nerve Stimulation:  Hypoglossal nerve stimulation has recently received approval from the [place] Food and Drug Administration for the treatment of obstructive sleep apnea.  This is based on research showing that the system was safe and effective in treating sleep apnea6.  Results showed that the median AHI score decreased 68%, from 29.3 to 9.0. This therapy uses an implant system that senses breathing patterns and delivers mild stimulation to airway muscles, which keeps the airway open during sleep.  The system consists of three fully implanted components: a small generator (similar in size to a pacemaker), a breathing sensor, and a stimulation lead.  Using a small handheld remote, a patient turns the therapy on before bed and off upon awakening.    Candidates for this device must be greater than 22 years of age, have moderate to severe NIKOLAI (AHI between  20-65), BMI less than 32, have tried CPAP/oral appliance without success, and have appropriate upper airway anatomy (determined by a sleep endoscopy performed by Dr. Sue).    Hypoglossal Nerve Stimulation Pathway:    The sleep surgeon s office will work with the patient through the insurance prior-authorization process (including communications and appeals).    Nasal Procedures:  Nasal obstruction can interfere with nasal breathing during the day and night.  Studies have shown that relief of nasal obstruction can improve the ability of some patients to tolerate positive airway pressure therapy for obstructive sleep apnea1.  Treatment options include medications such as nasal saline, topical corticosteroid and antihistamine sprays, and oral medications such as antihistamines or decongestants. Non-surgical treatments can include external nasal dilators for selected patients. If these are not successful by themselves, surgery can improve the nasal airway either alone or in combination with these other options.      Combination Procedures:  Combination of surgical procedures and other treatments may be recommended, particularly if patients have more than one area of narrowing or persistent positional disease.  The success rate of combination surgery ranges from 66-80%2,3.      1. Ana MERRILL. The Role of the Nose in Snoring and Obstructive Sleep Apnoea: An Update.  Eur Arch Otorhinolaryngol. 2011; 268: 1365-73.  2.  Capmily SM; Sravan JA; Delio JR; Pallanch JF; Eleonora MB; Aimee SG; Ava WASHINGTOND. Surgical modifications of the upper airway for obstructive sleep apnea in adults: a systematic review and meta-analysis. SLEEP 2010;33(10):7912-9012. David COOL. Hypopharyngeal surgery in obstructive sleep apnea: an evidence-based medicine review.  Arch Otolaryngol Head Neck Surg. 2006 Feb;132(2):206-13.  3. Dao YH1, Marzena Y, Pranay HERMAN. The efficacy of anatomically based multilevel surgery for obstructive sleep apnea.  Otolaryngol Head Neck Surg. 2003 Oct;129(4):327-35.  4. Kezirian E, Goldberg A. Hypopharyngeal Surgery in Obstructive Sleep Apnea: An Evidence-Based Medicine Review. Arch Otolaryngol Head Neck Surg. 2006 Feb;132(2):206-13.  5. Vianca POLK et al. Upper-Airway Stimulation for Obstructive Sleep Apnea.  N Engl J Med. 2014 Jan 9;370(2):139-49.  6. Shelly Y et al. Increased Incidence of Cardiovascular Disease in Middle-aged Men with Obstructive Sleep Apnea. Am J Respir Crit Care Med; 2002 166: 159-165  7. Garcia EM et al. Studying Life Effects and Effectiveness of Palatopharyngoplasty (SLEEP) study: Subjective Outcomes of Isolated Uvulopalatopharyngoplasty. Otolaryngol Head Neck Surg. 2011; 144: 623-631.              Your BMI is Body mass index is 23.04 kg/(m^2).  Weight management is a personal decision.  If you are interested in exploring weight loss strategies, the following discussion covers the approaches that may be successful. Body mass index (BMI) is one way to tell whether you are at a healthy weight, overweight, or obese. It measures your weight in relation to your height.  A BMI of 18.5 to 24.9 is in the healthy range. A person with a BMI of 25 to 29.9 is considered overweight, and someone with a BMI of 30 or greater is considered obese. More than two-thirds of American adults are considered overweight or obese.  Being overweight or obese increases the risk for further weight gain. Excess weight may lead to heart disease and diabetes.  Creating and following plans for healthy eating and physical activity may help you improve your health.  Weight control is part of healthy lifestyle and includes exercise, emotional health, and healthy eating habits. Careful eating habits lifelong are the mainstay of weight control. Though there are significant health benefits from weight loss, long-term weight loss with diet alone may be very difficult to achieve- studies show long-term success with dietary management in less than  10% of people. Attaining a healthy weight may be especially difficult to achieve in those with severe obesity. In some cases, medications, devices and surgical management might be considered.  What can you do?  If you are overweight or obese and are interested in methods for weight loss, you should discuss this with your provider.     Consider reducing daily calorie intake by 500 calories.     Keep a food journal.     Avoiding skipping meals, consider cutting portions instead.    Diet combined with exercise helps maintain muscle while optimizing fat loss. Strength training is particularly important for building and maintaining muscle mass. Exercise helps reduce stress, increase energy, and improves fitness. Increasing exercise without diet control, however, may not burn enough calories to loose weight.       Start walking three days a week 10-20 minutes at a time    Work towards walking thirty minutes five days a week     Eventually, increase the speed of your walking for 1-2 minutes at time    In addition, we recommend that you review healthy lifestyles and methods for weight loss available through the National Institutes of Health patient information sites:  http://win.niddk.nih.gov/publications/index.htm    And look into health and wellness programs that may be available through your health insurance provider, employer, local community center, or garret club.                Follow-ups after your visit        Your next 10 appointments already scheduled     May 19, 2017  1:45 PM CDT   (Arrive by 1:30 PM)   New Patient Visit with Jevon Loyd MD   [facility] Neurology ([facility])    [address] [place] [id number]   [phone]            May 23, 2017 11:20 AM CDT   (Arrive by 11:05 AM)   Return Visit with Sheldon Irene MD   [facility] Gastroenterology and IBD ([facility])    [address] [place] [address]  "[id number] [phone]              Who to contact     If you have questions or need follow up information about today's clinic visit or your schedule please [facility] directly at [phone].  Normal or non-critical lab and imaging results will be communicated to you by MyChart, letter or phone within 4 business days after the clinic has received the results. If you do not hear from us within 7 days, please contact the clinic through Tindiehart or phone. If you have a critical or abnormal lab result, we will notify you by phone as soon as possible.  Submit refill requests through Mango-Mate or call your pharmacy and they will forward the refill request to us. Please allow 3 business days for your refill to be completed.          Additional Information About Your Visit        [employer] Information     Mango-Mate gives you secure access to your electronic health record. If you see a primary care provider, you can also send messages to your care team and make appointments. If you have questions, please call your primary care clinic.  If you do not have a primary care provider, please call [phone] and they will assist you.        Care EveryWhere ID     This is your Care EveryWhere ID. This could be used by other organizations to access your [place] medical records  [id number]        Your Vitals Were     Pulse Respirations Height Pulse Oximetry BMI (Body Mass Index)       74 14 1.753 m (5' 9\") 99% 23.04 kg/m2        Blood Pressure from Last 3 Encounters:   04/10/17 110/74   03/28/17 104/70   02/06/17 107/74    Weight from Last 3 Encounters:   04/10/17 70.8 kg (156 lb)   02/06/17 68 kg (150 lb)   11/23/16 69 kg (152 lb 1.6 oz)              Today, you had the following     No orders found for display       Primary Care Provider Office Phone # Fax #    [facility] [phone] [address]  [facility] [address]        Thank you!     Thank you for choosing " [facility] [place], SLEEP STUDY  for your care. Our goal is always to provide you with excellent care. Hearing back from our patients is one way we can continue to improve our services. Please take a few minutes to complete the written survey that you may receive in the mail after your visit with us. Thank you!             Your Updated Medication List - Protect others around you: Learn how to safely use, store and throw away your medicines at www.disposemymeds.org.          This list is accurate as of: 4/10/17  3:12 PM.  Always use your most recent med list.                   Brand Name Dispense Instructions for use    FLUOXETINE HCL PO      Take 60 mg by mouth At Bedtime       leuprolide 11.25 MG kit    LUPRON DEPOT    1 each    Inject 11.25 mg into the muscle every 3 months       LORAZEPAM PO      Take 1 mg by mouth every 8 hours as needed       norethindrone 0.35 MG per tablet    MICRONOR    84 tablet    Take 1 tablet (0.35 mg) by mouth daily       NUVARING 0.12-0.015 MG/24HR vaginal ring   Generic drug:  etonogestrel-ethinyl estradiol      Place 1 each vaginally every 28 days       TOPIRAMATE PO      Take 100 mg by mouth every evening       TYLENOL PO      Take 650 mg by mouth every 4 hours as needed for mild pain or fever

## 2017-04-11 NOTE — PROGRESS NOTES
DATE OF VISIT:  04/10/2017.      [facility]      CHIEF COMPLAINT:  Kasandra Lau self-refers for problems with chronic fatigue, waking up with headaches, dizziness, migraines, taking naps in the middle of the day.  Bed partner reports snoring and witnessed apneas.  This has probably been going on a year or more.  She is also comorbid with IBS, migraines, depression/anxiety and panic attacks.      SLEEP SCHEDULE:  Work day enters bed somewhere between 7:00 and 8:00 at night with a bedtime anywhere between 11:00 and 12:00 on most nights, on a rare occasion 1:00 a.m.  Work day exits bed between 7:45Aor 9:00 or 10:00 a.m. on other days of the work week.  On weekends, enters bed between 10:00 and 11:00 p.m., asleep by 12:00, and exits bed somewhere between 10:00-11:00 a.m. and noon at the latest.  She often may doze before bedtime while she is reading, doing work from bed, and might be on a phone or computer.  When she is done with her work or reading, she will consider that period of time to be a downtime before she enters sleep.  It may take her about an hour to fall asleep.  She wakes up 2-3 times a night and may miss 30-40 minutes to refall back asleep.  She wakes up by her phone and does not check it throughout the night.  She wakes up to urinate, will lie in bed, go to the bathroom.  Total sleep time anywhere 8-9 hours on work nights and 10 hours on weekends.  Will nap 3-4 times a week for about 2 hours.  When she does awaken in the morning she will have a morning headache.  She feels that some strengths in her schedule are that, except for 1 day a week, she has a more relaxed schedule than in the past and she can schedule things on her own time.  However, naps do come unplanned and quality of sleep is not there.  She reports some restless legs 2-3 times a night.  She will get up, stretch, bathe them, and that usually helps.  A couple times a month some nightmares without  daytime consequences.      She is known to clench her teeth and has seen a dentist and an oral surgeon.  She wishes to have braces; however, a referral to an oral surgeon was recommended to see if there was a surgery that could be performed prior to braces being applied.  She does have a class III jaw with an underbite.  Insurance would not cover the potential surgery that she may need.  Snoring is present every night of the week, 2/7 nights it can be loud enough to be heard through doors.  She does wake up with snort or gasping arousals and witnessed apneas are present.  Has trouble breathing through her nose.  Has allergies year around, does use saline, used Flonase without success.  Sleeps on back and sides.  Did talk with bed partner and her apneas were worse supine.  No signs of orexin deficiency.      SOCIAL AND PERSONAL HISTORY:  She is , lives currently by herself, is working on her fellowship in human geography.  On 15/30 days mental health not good, 20/30 days tired and fatigued.  Sleep environment:  Noise and pets can awaken her from bed and on occasion her bed partner, if present.      FAMILY HISTORY:  Of snoring.  Her mother does have the same jaw alignment and has excessively loud snoring as well.      SUBSTANCES THAT AFFECT SLEEP:  Will have an occasional alcoholic drink, maybe 2 per week.  No marijuana use.  Does take melatonin 6 mg to assist with falling asleep.  Caffeine is in the morning and she does exercise.      Her [place] Sleepiness score today is 15/24.      She reports 20/30 days troubled by tiredness or fatigue, 15 out of 30 days depression, anxiety and panic are issues.  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      REVIEW OF SYSTEMS:  A 14-point review of systems completed and negative with the exception of the following:  Night sweats, she does find that when she wakes up with a gasping arousal she has broken into a  sweat.   NERVOUS SYSTEM:  Headaches, weakness in arms and legs, numbness in arms and legs.   DIGESTIVE:  Nausea, vomiting, diarrhea, constipation, and belly pain, recently got diagnosed with IBS.   URINARY TRACT:  Urinates more than normal.   GYNECOLOGIC:  Irregular periods.   MENTAL HEALTH:  Depression, anxiety and other mental health conditions.      Allergies:    No Known Allergies    Medications:    Current Outpatient Prescriptions   Medication Sig Dispense Refill     zolpidem (AMBIEN) 5 MG tablet Take tablet by mouth 15 minutes prior to sleep, for Sleep Study 1 tablet 0     etonogestrel-ethinyl estradiol (NUVARING) 0.12-0.015 MG/24HR vaginal ring Place 1 each vaginally every 28 days       norethindrone (MICRONOR) 0.35 MG per tablet Take 1 tablet (0.35 mg) by mouth daily 84 tablet 0     leuprolide (LUPRON DEPOT) 11.25 MG injection Inject 11.25 mg into the muscle every 3 months 1 each 1     Acetaminophen (TYLENOL PO) Take 650 mg by mouth every 4 hours as needed for mild pain or fever       LORAZEPAM PO Take 1 mg by mouth every 8 hours as needed        TOPIRAMATE PO Take 100 mg by mouth every evening        FLUOXETINE HCL PO Take 60 mg by mouth At Bedtime          Problem List:  Patient Active Problem List    Diagnosis Date Noted     Vitamin D deficiency 11/19/2016     Priority: Medium     Menorrhagia with regular cycle 11/19/2016     Priority: Medium     Migraine without aura and without status migrainosus, not intractable 11/19/2016     Priority: Medium     Iron deficiency anemia due to chronic blood loss 11/19/2016     Priority: Medium     Tired 11/19/2016     Priority: Medium     Pelvic pain in female 08/25/2016     Priority: Medium        Past Medical/Surgical History:  Past Medical History:   Diagnosis Date     Depression (emotion)      Migraine      Panic disorder      Past Surgical History:   Procedure Laterality Date     COLONOSCOPY       LAPAROSCOPIC ABLATION ENDOMETRIOSIS N/A 11/9/2016    Procedure:  "LAPAROSCOPIC ABLATION ENDOMETRIOSIS;  Surgeon: Tonja Ferrer MD;  [facility]: [facility]     LAPAROSCOPIC CYSTECTOMY OVARIAN (BENIGN) Left 11/9/2016    Procedure: LAPAROSCOPIC CYSTECTOMY OVARIAN (BENIGN);  Surgeon: Tonja Ferrer MD;  [facility] OR     LAPAROSCOPIC TUBAL DYE STUDY Left 11/9/2016    Procedure: LAPAROSCOPIC TUBAL DYE STUDY;  Surgeon: Tonja Ferrer MD;  [facility]     LAPAROSCOPY OPERATIVE ADULT N/A 11/9/2016    Procedure: LAPAROSCOPY OPERATIVE ADULT;  Surgeon: Tonja Ferrer MD;  [facility] OR     ORTHOPEDIC SURGERY      left wrist surgery           Physical Examination:  Vitals: /74 (BP Location: Right arm, Cuff Size: Adult Regular)  Pulse 74  Resp 14  Ht 1.753 m (5' 9\")  Wt 70.8 kg (156 lb)  SpO2 99%  BMI 23.04 kg/m2  BMI= Body mass index is 23.04 kg/(m^2).    Neck Cir (cm): 33 cm    [place] Total Score 4/10/2017   Total score - [place] 15       GENERAL APPEARANCE: alert and no distress  EYES: Eyes grossly normal to inspection  HENT: oropharynx crowded, uvula broad , soft palate dependent with redundant tissue, tongue base enlarged and nares mildly congested  NECK: thyroid normal to palpation  RESP: lungs clear to auscultation - no rales, rhonchi or wheezes  CV: regular rates and rhythm, normal S1 S2, no S3 or S4 and no murmur, click or rub  Extremities are without clubbing, edema  Musculoskeletal:Moves without difficulty  Mallampati Class: IV.  Tonsillar Stage: 1  hidden by pillars.  Dental: class III jawline with underbite  Skin: without facial rash     ASSESSMENT AND PLAN:  It is my impression that Ms. Lau has multiple concerns with regard to her sleep.  Not only does she have habits which likely do make sleep continuity and quality a concern, but with her class III jawline and a STOP-Bang score of 3, a low but present pretest probability for obstructive sleep apnea.  With her wish to have braces, she should be evaluated for sleep disordered " breathing. The following plan of care has been developed:   1.  Snoring with sleep fragmentation and witnessed apneas in the setting of an elevated [place] Sleepiness Score:  I have suggested she have an in-lab polysomnogram due to the improved sensitivity of this test in the setting of prolonged wakeups during the night limiting total sleep time.  Limited total sleep time would reduce identifying severity of possible sleep apnea.  Once she has had an evaluation, we will discuss options or modalities for treatment.  It would also be reasonable for her to see our sleep Ear, Nose and Throat Surgery for possible screening as to whether she is a surgical candidate.  She is open to most modalities of treatment.  I will see her back in clinic following her polysomnogram.   2.  Restless legs syndrome:  I have ordered a ferritin level.  We did discuss that this is likely related to her Prozac and she may benefit from doing nonpharmacologic measures on a regular basis most days so that she would have improved ability to initiate sleep.   3.  Sleep habits:  She is spending a significant amount of time in bed doing school work and also utilizing it as a place to relax and rest.  This likely has caused some confusion between the stimulus of the bed for sleep.  Until this behavior is corrected, it is difficult to determine whether other differential diagnosis are present such as insomnia or a circadian alteration.  Will do that down the road.  I have given her measures to improve this by being up and out of bed if she is not within her sleep zone.   4.  Rhinitis:  She is treating that and is happy with her response to saline.   5.  Teeth clenching:  Causing significant migraines and the plan for treating this likely will involve multiple providers to address both jaw and teeth alignment.      Thank you for allowing me to participate in this kind woman's care.      Time spent with patient 60 minutes, of which greater than 50% was  spent in counseling, education and coordination of care.         CARLEY ALBERT, JASVIR, CNP             D: 04/10/2017 16:07   T: 2017 04:51   MT: thong      Name:     HECTOR AVILES   MRN:      [id number]        Account:      [id number]   :      1985           Visit Date:   04/10/2017      Document: [id number]

## 2017-04-16 ENCOUNTER — THERAPY VISIT (OUTPATIENT)
Dept: SLEEP MEDICINE | Facility: CLINIC | Age: 32
End: 2017-04-16
Attending: NURSE PRACTITIONER
Payer: COMMERCIAL

## 2017-04-16 DIAGNOSIS — R06.83 SNORING: ICD-10-CM

## 2017-04-16 DIAGNOSIS — G25.81 RESTLESS LEGS SYNDROME (RLS): ICD-10-CM

## 2017-04-16 PROCEDURE — 95810 POLYSOM 6/> YRS 4/> PARAM: CPT | Mod: ZF

## 2017-04-16 NOTE — MR AVS SNAPSHOT
After Visit Summary   4/16/2017    Kasandra Lau    MRN: [id number]           Patient Information     Date Of Birth          1985        Visit Information        Provider Department      4/16/2017 8:00 PM SLEEP STUDY [facility]        Today's Diagnoses     Snoring        Restless legs syndrome (RLS)          Care Instructions    [facility]    1. Your sleep study will be reviewed by a sleep physician within the next few days.     2. Please follow up in the sleep clinic as scheduled, or, make an appointment with your sleep provider to be seen within two weeks to discuss the results of the sleep study.    3. If you have any questions or problems with your treatment plan, please contact your sleep clinic provider at [phone] to further manage your condition.    4. Please review your attached medication list, and, at your follow-up appointment advise your sleep clinic provider about any changes.    5. Go to [email or web address] for more information about your sleep problems.    MONE Barcenas  April 17, 2017                Follow-ups after your visit        Your next 10 appointments already scheduled     May 19, 2017  1:45 PM CDT   (Arrive by 1:30 PM)   New Patient Visit with Jevon Loyd MD   [facility] Neurology ([facility])    [address] [place] [id number] [phone]            May 23, 2017 11:20 AM CDT   (Arrive by 11:05 AM)   Return Visit with Sheldon Irene MD   [facility] Gastroenterology and IBD ([facility])    [address] [place] [id number] [phone]              Who to contact     If you have questions or need follow up information about today's clinic visit or your schedule please contact [facility] directly at [phone].  Normal or non-critical lab and imaging results will  be communicated to you by Digital Signal[employer], letter or phone within 4 business days after the clinic has received the results. If you do not hear from us within 7 days, please contact the clinic through [email or web address] or phone. If you have a critical or abnormal lab result, we will notify you by phone as soon as possible.  Submit refill requests through [email or web address] or call your pharmacy and they will forward the refill request to us. Please allow 3 business days for your refill to be completed.          Additional Information About Your Visit        [email or web address] Information     [email or web address] gives you secure access to your electronic health record. If you see a primary care provider, you can also send messages to your care team and make appointments. If you have questions, please call your primary care clinic.  If you do not have a primary care provider, please call [phone] and they will assist you.        Care EveryWhere ID     This is your Care EveryWhere ID. This could be used by other organizations to access your [place] medical records  [id number]         Blood Pressure from Last 3 Encounters:   04/10/17 110/74   03/28/17 104/70   02/06/17 107/74    Weight from Last 3 Encounters:   04/10/17 70.8 kg (156 lb)   02/06/17 68 kg (150 lb)   11/23/16 69 kg (152 lb 1.6 oz)              We Performed the Following     Comprehensive Sleep Study        Primary Care Provider Office Phone # Fax #    [facility] [phone] [address] [facility] [address]        Thank you!     Thank you for choosing [facility], SLEEP STUDY  for your care. Our goal is always to provide you with excellent care. Hearing back from our patients is one way we can continue to improve our services. Please take a few minutes to complete the written survey that you may receive in the mail after your visit with us. Thank you!             Your Updated Medication List - Protect others around you: Learn how to safely use, store and  throw away your medicines at www.disposemymeds.org.          This list is accurate as of: 4/16/17 11:59 PM.  Always use your most recent med list.                   Brand Name Dispense Instructions for use    FLUOXETINE HCL PO      Take 60 mg by mouth At Bedtime       leuprolide 11.25 MG kit    LUPRON DEPOT (3-MONTH)    1 each    Inject 11.25 mg into the muscle every 3 months       LORAZEPAM PO      Take 1 mg by mouth every 8 hours as needed       norethindrone 0.35 MG per tablet    MICRONOR    84 tablet    Take 1 tablet (0.35 mg) by mouth daily       NUVARING 0.12-0.015 MG/24HR vaginal ring   Generic drug:  etonogestrel-ethinyl estradiol      Place 1 each vaginally every 28 days       TOPIRAMATE PO      Take 100 mg by mouth every evening       TYLENOL PO      Take 650 mg by mouth every 4 hours as needed for mild pain or fever       zolpidem 5 MG tablet    AMBIEN    1 tablet    Take tablet by mouth 15 minutes prior to sleep, for Sleep Study

## 2017-04-17 NOTE — PROCEDURES
" SLEEP STUDY INTERPRETATION  POLYSOMNOGRAPHY REPORT      Patient: Kasandra Lau  Date of Birth: [birth year]  Study Date: 4/16/17  MRN: [id number]  Referring Provider: Self  Ordering Provider: Rena Giraldo CNP, APRN    Indications for Polysomnography: The patient is a 32 y old female who is 5' 9\" and weighs 156.0 lbs.  Her BMI is 23.1, [place] sleepiness scale 15.0 and neck size is 35cm.  Relevant medical history includes iron deficiency, migraines, fatigue. A diagnostic polysomnogram was performed to evaluate for sleep apnea.    Polysomnogram Data:  A full night polysomnogram recorded the standard physiologic parameters including EEG, EOG, EMG, ECG, nasal and oral airflow.  Respiratory parameters of chest and abdominal movements were recorded with respiratory inductance plethysmography.  Oxygen saturation was recorded by pulse oximetry.      Sleep Architecture: Prolonged wakefulness at 2am.   The total recording time of the polysomnogram was 481.3 minutes.  The total sleep time was 405.5 minutes.  Sleep latency was normal at 24.4 minutes with zolpidem.   REM latency was 327.0 minutes.  Arousal index was normal at 20.0 arousals per hour.  Sleep efficiency was mildly decreased at 84.3%.  Wake after sleep onset was 50.5 minutes.  The patient spent 8.8% of total sleep time in Stage N1, 71.6% in Stage N2, 12.9% in Stages N3, and 6.7% in REM.  Time in REM supine was 27.0 minutes.    Respiration: Snoring without significant sleep apnea.     Events - The polysomnogram revealed a presence of no apneas.   There were 1 hypopneas resulting in a hypopnea index of 0.1 events per hour.  The combined apnea/hypopnea index was 0.1 events per hour and included monitoring during supine stage R.  The REM AHI was 2.2 events per hour.  The supine AHI was 0.2 events per hour.  The RERA index was 0.3 events per hour.   The RDI was 0.4 events per hour.    Snoring - was reported as mild\moderate    Respiratory rate and pattern - was notable for " normal respiratory rate and pattern.    Sustained Sleep Associated Hypoventilation - Transcutaneous carbon dioxide monitoring was not used, however significant hypoventilation was not suggested by oximetry.    Sleep Associated Hypoxemia - (Greater than 5 minutes O2 sat below 89%) was not present.  Baseline oxygen saturation was 97.3%. Lowest oxygen saturation was 93.1%.  Time spent less than or equal to 88% was 0 minutes.  Time spent less than or equal to 89% was 0 minutes.  0.4 0.3 0.1     Movement Activity: No abnormal sleep behaviors.     Periodic Limb Activity - There were 82 PLMs during the entire study. The PLM index was 12.1 movements per hour.  The PLM Arousal Index was 1.9 per hour.    REM EMG Activity - Excessive transient / sustained muscle activity was / was not present.    Nocturnal Behavior - Abnormal sleep related behaviors were not noted.    Bruxism - None apparent.    Cardiac Summary: Normal sinus rhythm.   The average pulse rate was 62.7 bpm.  The minimum pulse rate was 48.9 bpm while the maximum pulse rate was 103.8 bpm. The rhythm is normal sinus. Arrhythmias were not noted.      Assessment:     Prolonged awakening.    Snoring without sleep apnea.     Subjective hypersomnolence without sleep disruption.     Recommendations:    Suggest optimizing sleep schedule and avoiding sleep deprivation.    Consider further evaluation hypersomnolence including sleep schedule and timing.                   _____________________________________   Electronically-Signed by Serjio Sears 17:10 4/17/17

## 2017-04-17 NOTE — PATIENT INSTRUCTIONS
[facility]    1. Your sleep study will be reviewed by a sleep physician within the next few days.     2. Please follow up in the sleep clinic as scheduled, or, make an appointment with your sleep provider to be seen within two weeks to discuss the results of the sleep study.    3. If you have any questions or problems with your treatment plan, please contact your sleep clinic provider at [phone] to further manage your condition.    4. Please review your attached medication list, and, at your follow-up appointment advise your sleep clinic provider about any changes.    5. Go to [email or web address] for more information about your sleep problems.    Susan Romero, RPSGT  April 17, 2017

## 2017-05-09 ENCOUNTER — OFFICE VISIT (OUTPATIENT)
Dept: SLEEP MEDICINE | Facility: CLINIC | Age: 32
End: 2017-05-09
Attending: NURSE PRACTITIONER
Payer: COMMERCIAL

## 2017-05-09 VITALS
OXYGEN SATURATION: 98 % | WEIGHT: 158 LBS | HEIGHT: 69 IN | RESPIRATION RATE: 16 BRPM | HEART RATE: 65 BPM | BODY MASS INDEX: 23.4 KG/M2

## 2017-05-09 DIAGNOSIS — G47.21 CIRCADIAN RHYTHM SLEEP DISORDER, DELAYED SLEEP PHASE TYPE: Primary | ICD-10-CM

## 2017-05-09 DIAGNOSIS — G47.8 UNHEALTHY SLEEP HABIT: ICD-10-CM

## 2017-05-09 PROCEDURE — 99211 OFF/OP EST MAY X REQ PHY/QHP: CPT | Mod: ZF

## 2017-05-09 NOTE — PATIENT INSTRUCTIONS
"Sleep habits: She is spending a significant amount of time in bed doing school work and also utilizing it as a place to relax and rest. Avoid spending time in bed not sleeping, especially > 30 mins.  If awake and not able to fall asleep for >30 mins, up and out of bed doing something \"boring\" in low light till drowsy again.    With napping, keep brief and early in day.    Sleep zone: wakeup time similar throughout the week    Call if you continue to have further problems or wish to work on insomnia if sleep quality remains low despite these measures above    Positioning Device  Positioning devices are generally used when sleep apnea is mild and only occurs on your back.This example shows a pillow that straps around the waist. It may be appropriate for those whose sleep study shows milder sleep apnea that occurs primarily when lying flat on one's back. Preliminary studies have shown benefit but effectiveness at home may need to be verified by a home sleep test. These devices are generally not covered by medical insurance.      Snore bumper pillow on amazon, ebay or backpack w/ chest strap stuffed w/ pillow or t shirt 2 / pockets sew in tennis balls      Your BMI is Body mass index is 23.33 kg/(m^2).  Weight management is a personal decision.  If you are interested in exploring weight loss strategies, the following discussion covers the approaches that may be successful. Body mass index (BMI) is one way to tell whether you are at a healthy weight, overweight, or obese. It measures your weight in relation to your height.  A BMI of 18.5 to 24.9 is in the healthy range. A person with a BMI of 25 to 29.9 is considered overweight, and someone with a BMI of 30 or greater is considered obese. More than two-thirds of American adults are considered overweight or obese.  Being overweight or obese increases the risk for further weight gain. Excess weight may lead to heart disease and diabetes.  Creating and following plans for " healthy eating and physical activity may help you improve your health.  Weight control is part of healthy lifestyle and includes exercise, emotional health, and healthy eating habits. Careful eating habits lifelong are the mainstay of weight control. Though there are significant health benefits from weight loss, long-term weight loss with diet alone may be very difficult to achieve- studies show long-term success with dietary management in less than 10% of people. Attaining a healthy weight may be especially difficult to achieve in those with severe obesity. In some cases, medications, devices and surgical management might be considered.  What can you do?  If you are overweight or obese and are interested in methods for weight loss, you should discuss this with your provider.     Consider reducing daily calorie intake by 500 calories.     Keep a food journal.     Avoiding skipping meals, consider cutting portions instead.    Diet combined with exercise helps maintain muscle while optimizing fat loss. Strength training is particularly important for building and maintaining muscle mass. Exercise helps reduce stress, increase energy, and improves fitness. Increasing exercise without diet control, however, may not burn enough calories to loose weight.       Start walking three days a week 10-20 minutes at a time    Work towards walking thirty minutes five days a week     Eventually, increase the speed of your walking for 1-2 minutes at time    In addition, we recommend that you review healthy lifestyles and methods for weight loss available through the National Institutes of Health patient information sites:  http://win.niddk.nih.gov/publications/index.htm    And look into health and wellness programs that may be available through your health insurance provider, employer, local community center, or [employer].

## 2017-05-09 NOTE — MR AVS SNAPSHOT
"              After Visit Summary   5/9/2017    Kasandra Lau    MRN: [id number]           Patient Information     Date Of Birth          1985        Visit Information        Provider Department      5/9/2017 1:00 PM Rena Giraldo APRN [facility], [place], Sleep Study        Care Instructions    Sleep habits: She is spending a significant amount of time in bed doing school work and also utilizing it as a place to relax and rest. Avoid spending time in bed not sleeping, especially > 30 mins.  If awake and not able to fall asleep for >30 mins, up and out of bed doing something \"boring\" in low light till drowsy again.    With napping, keep brief and early in day.    Sleep zone: wakeup time similar throughout the week    Call if you continue to have further problems or wish to work on insomnia if sleep quality remains low despite these measures above    Positioning Device  Positioning devices are generally used when sleep apnea is mild and only occurs on your back.This example shows a pillow that straps around the waist. It may be appropriate for those whose sleep study shows milder sleep apnea that occurs primarily when lying flat on one's back. Preliminary studies have shown benefit but effectiveness at home may need to be verified by a home sleep test. These devices are generally not covered by medical insurance.      Snore bumper pillow on amazon, ebay or backpack w/ chest strap stuffed w/ pillow or t shirt 2 / pockets sew in tennis balls      Your BMI is Body mass index is 23.33 kg/(m^2).  Weight management is a personal decision.  If you are interested in exploring weight loss strategies, the following discussion covers the approaches that may be successful. Body mass index (BMI) is one way to tell whether you are at a healthy weight, overweight, or obese. It measures your weight in relation to your height.  A BMI of 18.5 to 24.9 is in the healthy range. A person with a BMI of 25 to 29.9 is considered " overweight, and someone with a BMI of 30 or greater is considered obese. More than two-thirds of American adults are considered overweight or obese.  Being overweight or obese increases the risk for further weight gain. Excess weight may lead to heart disease and diabetes.  Creating and following plans for healthy eating and physical activity may help you improve your health.  Weight control is part of healthy lifestyle and includes exercise, emotional health, and healthy eating habits. Careful eating habits lifelong are the mainstay of weight control. Though there are significant health benefits from weight loss, long-term weight loss with diet alone may be very difficult to achieve- studies show long-term success with dietary management in less than 10% of people. Attaining a healthy weight may be especially difficult to achieve in those with severe obesity. In some cases, medications, devices and surgical management might be considered.  What can you do?  If you are overweight or obese and are interested in methods for weight loss, you should discuss this with your provider.     Consider reducing daily calorie intake by 500 calories.     Keep a food journal.     Avoiding skipping meals, consider cutting portions instead.    Diet combined with exercise helps maintain muscle while optimizing fat loss. Strength training is particularly important for building and maintaining muscle mass. Exercise helps reduce stress, increase energy, and improves fitness. Increasing exercise without diet control, however, may not burn enough calories to loose weight.       Start walking three days a week 10-20 minutes at a time    Work towards walking thirty minutes five days a week     Eventually, increase the speed of your walking for 1-2 minutes at time    In addition, we recommend that you review healthy lifestyles and methods for weight loss available through the National Institutes of Health patient information  sites:  http://win.niddk.nih.gov/publications/index.htm    And look into health and wellness programs that may be available through your health insurance provider, employer, local community center, or [employer].                Follow-ups after your visit        Follow-up notes from your care team     Return if symptoms worsen or fail to improve.      Your next 10 appointments already scheduled     May 19, 2017  1:45 PM CDT   (Arrive by 1:30 PM)   New Patient Visit with Jevon Loyd MD   [facility]    [address] [place] [id number] [phone]            May 23, 2017 11:20 AM CDT   (Arrive by 11:05 AM)   Return Visit with Sheldon Irene MD   [facility] Gastroenterology and IBD ([facility])    [address] [place] [id number] [phone]              Who to contact     If you have questions or need follow up information about today's clinic visit or your schedule please contact [facility], SLEEP STUDY directly at [phone].  Normal or non-critical lab and imaging results will be communicated to you by MyChart, letter or phone within 4 business days after the clinic has received the results. If you do not hear from us within 7 days, please contact the clinic through [email or web address] or phone. If you have a critical or abnormal lab result, we will notify you by phone as soon as possible.  Submit refill requests through [facility] or call your pharmacy and they will forward the refill request to us. Please allow 3 business days for your refill to be completed.          Additional Information About Your Visit        MyChart Information     [facility] gives you secure access to your electronic health record. If you see a primary care provider, you can also send messages to your care team and make appointments. If you have questions, please call your primary care clinic.  If you do  "not have a primary care provider, please call [phone] and they will assist you.        Care EveryWhere ID     This is your Care EveryWhere ID. This could be used by other organizations to access your [facility] medical records  [id number]        Your Vitals Were     Pulse Respirations Height Pulse Oximetry BMI (Body Mass Index)       65 16 1.753 m (5' 9\") 98% 23.33 kg/m2        Blood Pressure from Last 3 Encounters:   04/10/17 110/74   03/28/17 104/70   02/06/17 107/74    Weight from Last 3 Encounters:   05/09/17 71.7 kg (158 lb)   04/10/17 70.8 kg (156 lb)   02/06/17 68 kg (150 lb)              Today, you had the following     No orders found for display       Primary Care Provider Office Phone # Fax #    [facility] [phone] [address] [facility] [address]        Thank you!     Thank you for choosing [facility], SLEEP STUDY  for your care. Our goal is always to provide you with excellent care. Hearing back from our patients is one way we can continue to improve our services. Please take a few minutes to complete the written survey that you may receive in the mail after your visit with us. Thank you!             Your Updated Medication List - Protect others around you: Learn how to safely use, store and throw away your medicines at www.disposemymeds.org.          This list is accurate as of: 5/9/17  1:55 PM.  Always use your most recent med list.                   Brand Name Dispense Instructions for use    FLUOXETINE HCL PO      Take 60 mg by mouth At Bedtime       leuprolide 11.25 MG kit    LUPRON DEPOT (3-MONTH)    1 each    Inject 11.25 mg into the muscle every 3 months       LORAZEPAM PO      Take 1 mg by mouth every 8 hours as needed       norethindrone 0.35 MG per tablet    MICRONOR    84 tablet    Take 1 tablet (0.35 mg) by mouth daily       NUVARING 0.12-0.015 MG/24HR vaginal ring   Generic drug:  etonogestrel-ethinyl estradiol      Place 1 each " vaginally every 28 days       TOPIRAMATE PO      Take 100 mg by mouth every evening       TYLENOL PO      Take 650 mg by mouth every 4 hours as needed for mild pain or fever       zolpidem 5 MG tablet    AMBIEN    1 tablet    Take tablet by mouth 15 minutes prior to sleep, for Sleep Study

## 2017-05-10 PROBLEM — G47.21 CIRCADIAN RHYTHM SLEEP DISORDER, DELAYED SLEEP PHASE TYPE: Status: ACTIVE | Noted: 2017-05-10

## 2017-05-10 PROBLEM — G47.8 UNHEALTHY SLEEP HABIT: Status: ACTIVE | Noted: 2017-05-10

## 2017-05-10 NOTE — PROGRESS NOTES
"Chief Complaint   Patient presents with     RECHECK     Follow up PSG results       Kasandra Lau is a 32 year old female who returns to [facility], [place], SLEEP STUDY for review of sleep testing results. She presented with symptoms suggestive of obstructive sleep apnea.    Estimated body mass index is 23.33 kg/(m^2) as calculated from the following:    Height as of this encounter: 1.753 m (5' 9\").    Weight as of this encounter: 71.7 kg (158 lb).  [place] Sleepiness Scale: 9/24  No Data Recorded    Polysomnography - Test date 4/16/17      Sleep latency 24.4 minutes with Zolpidem.  REM achieved with latency of 327 minutes.  Sleep efficiency 84.3 %. Total sleep time 405.5 minutes.    AHI 0.1, REM AHI 2.2 without hypoxemia.        Kasandra Lau reports that she slept Fair .     Results were reviewed in detail today with Kasandra and a copy given to her for her records.    Reviewed by team: Allergies  Meds       Reviewed by provider:          Problem List:  Patient Active Problem List    Diagnosis Date Noted     Vitamin D deficiency 11/19/2016     Priority: Medium     Menorrhagia with regular cycle 11/19/2016     Priority: Medium     Migraine without aura and without status migrainosus, not intractable 11/19/2016     Priority: Medium     Iron deficiency anemia due to chronic blood loss 11/19/2016     Priority: Medium     Tired 11/19/2016     Priority: Medium     Pelvic pain in female 08/25/2016     Priority: Medium        Pulse 65  Resp 16  Ht 1.753 m (5' 9\")  Wt 71.7 kg (158 lb)  SpO2 98%  BMI 23.33 kg/m2    Impression/Plan:  No sleep apnea, fatigue, headaches largest complaint are likely due to irregular sleep schedule with PHD program in the setting of DSPD and likely related to stress.    1. Delayed sleep phase disorder  2. Sleep habits     Treatment options discussed today including Phase advancing sleep schedule to meet earliest wakeup requirements, sleep habits including spending time in bed doing school work to " occur elsewhere to improve sleep quality, and increasing sleep pressure to improve night time rest.  She will follow up with me as needed.     Twenty-five minutes spent with patient, all of which were spent face-to-face counseling, consulting, coordinating plan of care.      JASVIR Chavez CNP

## 2017-05-16 ENCOUNTER — TELEPHONE (OUTPATIENT)
Dept: GASTROENTEROLOGY | Facility: CLINIC | Age: 32
End: 2017-05-16

## 2017-05-23 ENCOUNTER — OFFICE VISIT (OUTPATIENT)
Dept: GASTROENTEROLOGY | Facility: CLINIC | Age: 32
End: 2017-05-23

## 2017-05-23 VITALS
BODY MASS INDEX: 24.41 KG/M2 | TEMPERATURE: 98 F | SYSTOLIC BLOOD PRESSURE: 110 MMHG | OXYGEN SATURATION: 97 % | HEIGHT: 68 IN | DIASTOLIC BLOOD PRESSURE: 71 MMHG | WEIGHT: 161.1 LBS | HEART RATE: 85 BPM

## 2017-05-23 DIAGNOSIS — K58.1 IRRITABLE BOWEL SYNDROME WITH CONSTIPATION: Primary | ICD-10-CM

## 2017-05-23 DIAGNOSIS — F43.20 ADJUSTMENT DISORDER, UNSPECIFIED TYPE: ICD-10-CM

## 2017-05-23 DIAGNOSIS — N80.9 ENDOMETRIOSIS: ICD-10-CM

## 2017-05-23 ASSESSMENT — ENCOUNTER SYMPTOMS
NERVOUS/ANXIOUS: 1
BOWEL INCONTINENCE: 0
JAUNDICE: 0
HEARTBURN: 0
DYSURIA: 0
BLOATING: 1
DEPRESSION: 0
BLOOD IN STOOL: 0
DIFFICULTY URINATING: 0
CONSTIPATION: 1
NAUSEA: 1
FLANK PAIN: 0
DIARRHEA: 1
INSOMNIA: 1
RECTAL BLEEDING: 0
DECREASED CONCENTRATION: 1
HEMATURIA: 0
VOMITING: 0
RECTAL PAIN: 0
PANIC: 1
ABDOMINAL PAIN: 1

## 2017-05-23 ASSESSMENT — PAIN SCALES - GENERAL: PAINLEVEL: MILD PAIN (2)

## 2017-05-23 NOTE — LETTER
5/23/2017       RE: Kasandra Lau  [address]     [facility] [address]     Dear Colleague,    Thank you for referring your patient, Kasandra Lau, to the [facility] GASTROENTEROLOGY AND IBD at [facility]. Please see a copy of my visit note below.    Note dictated. Job code [id number].    OUTPATIENT GI FOLLOWUP       REASON FOR VISIT:  Follow up for IBS with constipation.      CHIEF COMPLAINT:  The same.       HISTORY OF PRESENT ILLNESS:  Kasandra Lau is a very pleasant 32-year-old female with a history of migraines, depression and endometriosis, as well as years of IBS and constipation, who is here today to follow up in clinic.  I direct you to my initial consult note dated 02/06/2017 for full details.      At that visit, we discussed her history, and then discussed that much of her left lower quadrant abdominal pain was related to constipation and improved with bowel movements.  To that end, we reconciled to improve her bowel regimen and her regularity.  We discussed taking fiber regularly, and we talked about switching from a fiber pill to the Citrucel powder.  We also talked about titrating up the MiraLax.  We obtained her records from the colonoscopy done in [place], and this was normal.  Our hope was that if we could improve her constipation, it would improve her discomfort.      Today, the patient is here to follow up in clinic.  She reports overall she is doing improved.  She has not started the Citrucel, but is taking what she thinks is a fiber pill at night.  She is taking the MiraLax once to twice a day, and she is actually improved her bowel movements from every 3 days down to every 1-2 days.  She definitely thinks this has helped with her left lower quadrant abdominal pain.  She does get some left lower back pain when she has not had a bowel movement a while, and this also improves when she is having bowel movements.  She is hopeful that she can  have bowel movements even more regularly to try to help with this discomfort.  She is not having any blood in her stool.  Her weight is stable.      She did see Dr. Ashford, but did not follow up with her because of business with school.  Likewise, she missed her appointment with the nutritionist, and she is interested in doing this.  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      REVIEW OF SYSTEMS:  A complete review of systems is performed.  Pertinent positives and negatives are as stated above in the HPI.  The remainder of a complete review of systems is unremarkable.      PAST MEDICAL HISTORY:   1.  Endometriosis.   2.  Depression and anxiety.   3.  History of iron-deficiency anemia, which is thought to be related to heavy menses.   4.  Migraines.   5.  IBS with constipation.      FAMILY HISTORY:  History of diabetes.  No history of Crohn's, ulcerative colitis, colon cancer or colon polyps.  No history of other GI illnesses or other issues.      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  She does not smoke tobacco regularly.  She has not smoked marijuana since leaving [place].  She does not drink alcohol.      PHYSICAL EXAMINATION:   VITAL SIGNS:  Blood pressure 110/71, pulse 85, temperature 98, weight 161 pounds.  Height is 5 feet 8 inches.  BMI is 24, she is satting 97% on room air.   GENERAL:  She is pleasant, in no acute distress.   HEENT:  Head is atraumatic, normocephalic.  Sclerae are anicteric without injection.  Oropharynx is clear with moist mucous membranes.   NECK:  Supple.  There is no lymphadenopathy, no thyromegaly.   RESPIRATIONS:  Breathing comfortably.   HEART:  Normal rate.   ABDOMEN:  Soft, nontender and nondistended.  Well-healed laparoscopic abdominal scars.      LABORATORY DATA:  Reviewed in Epic.      ASSESSMENT AND PLAN:  Kasandra Lau is a very pleasant 32-year-old female with a  history of endometriosis, depression and anxiety, as well as constipation-predominant IBS.  Here today to follow up.     1.  Constipation-predominant IBS.  I do believe this is explaining much of her lower quadrant pain, as well as low back pain. This is supported by the fact that as we have improved her bowel regimen, these symptoms have improved.  I do think that there is room for further improvement.  I again reiterated that fiber is usually the backbone of treatment for this.  She has not been taking the fiber regularly.  I recommend she take the Citrucel 1 tablespoon and titrate up to 2-3 tablespoons per day.  On top of that, she can take the MiraLax twice a day with the goal of having 1-2 soft bowel movements per day.  If she is having diarrhea with this much MiraLax, she can back off.  If this much MiraLax is not effective, she can even go up to 3-4 capfuls of MiraLax daily.      She is interested in seeing the [facility] to be evaluated for pelvic floor dysfunction, and I think this is reasonable, so we will get this setup.  We will re-establish her with Dr. Ashford as she is interested in discussing potential cognitive behavioral therapies, and we will reschedule her for her appointment with the nutritionist to discuss a low-FODMAP diet.      We will follow up in 6 months or sooner if needed.     2.  Endometriosis.  She will continue to follow up with her OB/GYN as she has been.      Thank you very much for the opportunity to take part in the care of this patient.  Please do not hesitate to call with questions.  She will follow up in 6 months.         TIANNA GARCIA MD

## 2017-05-23 NOTE — PATIENT INSTRUCTIONS
1. Keep up the great work!    2. Take citrucel fiber 1 tablespoon in glass of water daily for 7 days. Then increase to 2 tablespoons daily    3. Take miralax twice daily. If this helps you have 1-2 soft BMs then continue. If still not having enough stools you can increase to 3 times daily. If you get diarrhea you can decrease the dose. The idea is to adjust the miralax to the desired effect.    4. Schedule nutrition appointment    5. Follow up with Dr. Ashford    6. Referral to pelvic floor center    Follow up in 6 months   Dr. Irene  Surgery Clinic  4th floor   [address] [place] [address]     For questions regarding your care Monday through Friday, contact the RN GI care coordinator,  Call Sandy Rodríguez at  [phone] option 3 and leave a voicemail. Your call will be  returned same day, or if consultation is needed with the provider, it may be following business day - or you may send a My Chart message.    For medication refills (prescribed by the GI clinic), contact your pharmacy.    For appointment rescheduling/cancellation, contact [phone]     After hours, or if you have an immediate GI concern and cannot wait for a return call, contact the GI Fellow at [phone] and select option #4.

## 2017-05-23 NOTE — PROGRESS NOTES
Note dictated. Job code [id number].    Sheldon Irene MD    [facility]  Division of Gastroenterology, Hepatology and Nutrition    Answers for HPI/ROS submitted by the patient on 5/23/2017   General Symptoms: No  Skin Symptoms: No  HENT Symptoms: No  EYE SYMPTOMS: No  HEART SYMPTOMS: No  LUNG SYMPTOMS: No  INTESTINAL SYMPTOMS: Yes  URINARY SYMPTOMS: Yes  GYNECOLOGIC SYMPTOMS: No  BREAST SYMPTOMS: No  SKELETAL SYMPTOMS: No  BLOOD SYMPTOMS: No  NERVOUS SYSTEM SYMPTOMS: No  MENTAL HEALTH SYMPTOMS: Yes  Heart burn or indigestion: No  Nausea: Yes  Vomiting: No  Abdominal pain: Yes  Bloating: Yes  Constipation: Yes  Diarrhea: Yes  Blood in stool: No  Black stools: No  Rectal or Anal pain: No  Fecal incontinence: No  Rectal bleeding: No  Yellowing of skin or eyes: No  Vomit with blood: No  Change in stools: No  Hemorrhoids: No  Trouble holding urine or incontinence: No  Pain or burning: No  Trouble starting or stopping: No  Increased frequency of urination: No  Blood in urine: No  Decreased frequency of urination: No  Frequent nighttime urination: Yes  Flank pain: No  Difficulty emptying bladder: No  Nervous or Anxious: Yes  Depression: No  Trouble sleeping: Yes  Trouble thinking or concentrating: Yes  Mood changes: No  Panic attacks: Yes

## 2017-05-23 NOTE — NURSING NOTE
"Chief Complaint   Patient presents with     RECHECK     f/u       Vitals:    05/23/17 1135   BP: 110/71   BP Location: Left arm   Patient Position: Chair   Cuff Size: Adult Regular   Pulse: 85   Temp: 98  F (36.7  C)   SpO2: 97%   Weight: 161 lb 1.6 oz   Height: 5' 8\"       Body mass index is 24.5 kg/(m^2).      Leon Ignacio MA                          "

## 2017-05-23 NOTE — NURSING NOTE
Printed after visit summary given to pt.  Pt has follow up appt with Dr. Irene, nutrition and Dr. Amna Ashford. Will send a  pelvic referral when progress notes are complete. i

## 2017-05-23 NOTE — PROGRESS NOTES
OUTPATIENT GI FOLLOWUP       REASON FOR VISIT:  Follow up for IBS with constipation.      CHIEF COMPLAINT:  The same.       HISTORY OF PRESENT ILLNESS:  Kasandra Lau is a very pleasant 32-year-old female with a history of migraines, depression and endometriosis, as well as years of IBS and constipation, who is here today to follow up in clinic.  I direct you to my initial consult note dated 02/06/2017 for full details.      At that visit, we discussed her history, and then discussed that much of her left lower quadrant abdominal pain was related to constipation and improved with bowel movements.  To that end, we reconciled to improve her bowel regimen and her regularity.  We discussed taking fiber regularly, and we talked about switching from a fiber pill to the Citrucel powder.  We also talked about titrating up the MiraLax.  We obtained her records from the colonoscopy done in [place], and this was normal.  Our hope was that if we could improve her constipation, it would improve her discomfort.      Today, the patient is here to follow up in clinic.  She reports overall she is doing improved.  She has not started the Citrucel, but is taking what she thinks is a fiber pill at night.  She is taking the MiraLax once to twice a day, and she is actually improved her bowel movements from every 3 days down to every 1-2 days.  She definitely thinks this has helped with her left lower quadrant abdominal pain.  She does get some left lower back pain when she has not had a bowel movement a while, and this also improves when she is having bowel movements.  She is hopeful that she can have bowel movements even more regularly to try to help with this discomfort.  She is not having any blood in her stool.  Her weight is stable.      She did see Dr. Ashford, but did not follow up with her because of business with school.  Likewise, she missed her appointment with the nutritionist, and she is interested in doing this.  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      REVIEW OF SYSTEMS:  A complete review of systems is performed.  Pertinent positives and negatives are as stated above in the HPI.  The remainder of a complete review of systems is unremarkable.      PAST MEDICAL HISTORY:   1.  Endometriosis.   2.  Depression and anxiety.   3.  History of iron-deficiency anemia, which is thought to be related to heavy menses.   4.  Migraines.   5.  IBS with constipation.      FAMILY HISTORY:  History of diabetes.  No history of Crohn's, ulcerative colitis, colon cancer or colon polyps.  No history of other GI illnesses or other issues.      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  She does not smoke tobacco regularly.  She has not smoked marijuana since leaving [place].  She does not drink alcohol.      PHYSICAL EXAMINATION:   VITAL SIGNS:  Blood pressure 110/71, pulse 85, temperature 98, weight 161 pounds.  Height is 5 feet 8 inches.  BMI is 24, she is satting 97% on room air.   GENERAL:  She is pleasant, in no acute distress.   HEENT:  Head is atraumatic, normocephalic.  Sclerae are anicteric without injection.  Oropharynx is clear with moist mucous membranes.   NECK:  Supple.  There is no lymphadenopathy, no thyromegaly.   RESPIRATIONS:  Breathing comfortably.   HEART:  Normal rate.   ABDOMEN:  Soft, nontender and nondistended.  Well-healed laparoscopic abdominal scars.      LABORATORY DATA:  Reviewed in Epic.      ASSESSMENT AND PLAN:  Kasandra Lau is a very pleasant 32-year-old female with a history of endometriosis, depression and anxiety, as well as constipation-predominant IBS.  Here today to follow up.     1.  Constipation-predominant IBS.  I do believe this is explaining much of her lower quadrant pain, as well as low back pain. This is supported by the fact that as we have improved her bowel regimen, these symptoms have improved.   I do think that there is room for further improvement.  I again reiterated that fiber is usually the backbone of treatment for this.  She has not been taking the fiber regularly.  I recommend she take the Citrucel 1 tablespoon and titrate up to 2-3 tablespoons per day.  On top of that, she can take the MiraLax twice a day with the goal of having 1-2 soft bowel movements per day.  If she is having diarrhea with this much MiraLax, she can back off.  If this much MiraLax is not effective, she can even go up to 3-4 capfuls of MiraLax daily.      She is interested in seeing the [facility] to be evaluated for pelvic floor dysfunction, and I think this is reasonable, so we will get this setup.  We will re-establish her with Dr. Ashford as she is interested in discussing potential cognitive behavioral therapies, and we will reschedule her for her appointment with the nutritionist to discuss a low-FODMAP diet.      We will follow up in 6 months or sooner if needed.     2.  Endometriosis.  She will continue to follow up with her OB/GYN as she has been.      Thank you very much for the opportunity to take part in the care of this patient.  Please do not hesitate to call with questions.  She will follow up in 6 months.         TIANNA GARCIA MD             D: 2017 12:27   T: 2017 16:17   MT: JADE      Name:     HECTOR AVILES   MRN:      [id number]        Account:      [id number]   :      1985           Service Date: 2017      Document: [id number]

## 2017-05-23 NOTE — MR AVS SNAPSHOT
After Visit Summary   5/23/2017    Kasandra Lau    MRN: [id number]           Patient Information     Date Of Birth          1985        Visit Information        Provider Department      5/23/2017 11:20 AM Sheldon Irene MD [facility] Gastroenterology and IBD        Care Instructions    1. Keep up the great work!    2. Take citrucel fiber 1 tablespoon in glass of water daily for 7 days. Then increase to 2 tablespoons daily    3. Take miralax twice daily. If this helps you have 1-2 soft BMs then continue. If still not having enough stools you can increase to 3 times daily. If you get diarrhea you can decrease the dose. The idea is to adjust the miralax to the desired effect.    4. Schedule nutrition appointment    5. Follow up with Dr. Ashford    6. Referral to pelvic floor center    Follow up in 6 months   Dr. Irene  Sur[facility] Clinic  4th floor   [address] [place] [address]     For questions regarding your care Monday through Friday, contact the RN GI care coordinator,  Call Sandy Rodríguez at  [phone] option 3 and leave a voicemail. Your call will be  returned same day, or if consultation is needed with the provider, it may be following business day - or you may send a My Chart message.    For medication refills (prescribed by the GI clinic), contact your pharmacy.    For appointment rescheduling/cancellation, contact [phone]     After hours, or if you have an immediate GI concern and cannot wait for a return call, contact the GI Fellow at [phone] and select option #4.              Follow-ups after your visit        Your next 10 appointments already scheduled     May 23, 2017  1:15 PM CDT   LAB with [facility] ([facility])    [address] [place] [address]  1st Floor  [facility] [address] [phone]           Patient must bring picture ID.  Patient should be prepared to give a urine specimen  Please do not eat 10-12 hours before your appointment if you  are coming in fasting for labs on lipids, cholesterol, or glucose (sugar).  Pregnant women should follow their Care Team instructions. Water with medications is okay. Do not drink coffee or other fluids.   If you have concerns about taking  your medications, please ask at office or if scheduling via [employer], send a message by clicking on Secure Messaging, Message Your Care Team.            Jun 02, 2017 11:30 AM CDT   NUTRITION VISIT with Pam Haney RD   [facility] Gastroenterology and IBD ([facility])    [address] [place] [id number] [phone]            Jun 05, 2017  5:00 PM CDT   (Arrive by 4:45 PM)   Return Visit with Amna Ashford PhD   [facility] Gastroenterology and IBD ([facility])    [address] [place] [id number] [phone]            Jul 14, 2017  1:45 PM CDT   (Arrive by 1:30 PM)   Return Visit with Jevon Loyd MD   [facility] Neurology ([facility])    [address] [place] [id number] [phone]            Nov 13, 2017 11:20 AM CST   (Arrive by 11:05 AM)   Return Visit with Shedlon Irene MD   [facility]enter[facility] IBD ([facility])    [address] [place] [id number] [phone]              Who to contact     Please call your clinic at [phone] to:    Ask questions about your health    Make or cancel appointments    Discuss your medicines    Learn about your test results    Speak to your doctor   If you have compliments or concerns about an experience at your clinic, or if you wish to file a complaint, please contact [facility] Patient Relations at [phone] or email us at [email or web address]         Additional Information About Your Visit        [employer] Information     [employer] gives you secure access to your  "electronic health record. If you see a primary care provider, you can also send messages to your care team and make appointments. If you have questions, please call your primary care clinic.  If you do not have a primary care provider, please call [phone] and they will assist you.      [employer] is an electronic gateway that provides easy, online access to your medical records. With [employer], you can request a clinic appointment, read your test results, renew a prescription or communicate with your care team.     To access your existing account, please contact your [facility] or call [phone] for assistance.        Care EveryWhere ID     This is your Care EveryWhere ID. This could be used by other organizations to access your [facility] medical records  [id number]        Your Vitals Were     Pulse Temperature Height Pulse Oximetry BMI (Body Mass Index)       85 98  F (36.7  C) 1.727 m (5' 8\") 97% 24.5 kg/m2        Blood Pressure from Last 3 Encounters:   05/23/17 110/71   04/10/17 110/74   03/28/17 104/70    Weight from Last 3 Encounters:   05/23/17 73.1 kg (161 lb 1.6 oz)   05/09/17 71.7 kg (158 lb)   04/10/17 70.8 kg (156 lb)              Today, you had the following     No orders found for display       Primary Care Provider Office Phone # Fax #    Churchillkerline Rios [phone] [address] [facility] [address]        Thank you!     Thank you for choosing [facility] GASTROENTEROLOGY [facility] IBD  for your care. Our goal is always to provide you with excellent care. Hearing back from our patients is one way we can continue to improve our services. Please take a few minutes to complete the written survey that you may receive in the mail after your visit with us. Thank you!             Your Updated Medication List - Protect others around you: Learn how to safely use, store and throw away your medicines at www.disposemymeds.org.          This list is " accurate as of: 5/23/17 12:11 PM.  Always use your most recent med list.                   Brand Name Dispense Instructions for use    FLUOXETINE HCL PO      Take 60 mg by mouth At Bedtime       leuprolide 11.25 MG kit    LUPRON DEPOT (3-MONTH)    1 each    Inject 11.25 mg into the muscle every 3 months       LORAZEPAM PO      Take 1 mg by mouth every 8 hours as needed       norethindrone 0.35 MG per tablet    MICRONOR    84 tablet    Take 1 tablet (0.35 mg) by mouth daily       NUVARING 0.12-0.015 MG/24HR vaginal ring   Generic drug:  etonogestrel-ethinyl estradiol      Place 1 each vaginally every 28 days       TOPIRAMATE PO      Take 100 mg by mouth every evening       TYLENOL PO      Take 650 mg by mouth every 4 hours as needed for mild pain or fever       zolpidem 5 MG tablet    AMBIEN    1 tablet    Take tablet by mouth 15 minutes prior to sleep, for Sleep Study

## 2017-06-02 ENCOUNTER — ALLIED HEALTH/NURSE VISIT (OUTPATIENT)
Dept: GASTROENTEROLOGY | Facility: CLINIC | Age: 32
End: 2017-06-02

## 2017-06-02 DIAGNOSIS — K58.9 IRRITABLE BOWEL SYNDROME: ICD-10-CM

## 2017-06-02 DIAGNOSIS — K58.1 IRRITABLE BOWEL SYNDROME WITH CONSTIPATION: Primary | ICD-10-CM

## 2017-06-02 NOTE — PATIENT INSTRUCTIONS
-Per Dr. Irene visit/instructions on 5/23, can do up to 3-4 capfuls of miralax per day if 1 capful is not effective with goal of having 1-2 soft bowel movements per day  -Follow low FODMAP diet more consistently for at least 2-3 weeks  -Recommend keeping daily food journals for at least those 2-3 weeks  -Recommend meal planning and preparing foods/meals ahead of time.to ensure you are eating low FODMAP foods and meals  -After the 2-3 weeks of following more strictly, can slowly add back in your favorite higher FODMAP foods to see if you tolerate them  -Continue to avoid those that you know cause IBS symptoms such as garlic, onions, avocado, mushrooms, milk, yogurt and ice cream.  -Recommend avoiding/limited carbonated water and change to plain (still) water. Can add Low FODMAP fruit to water to make homemade infused water.  -If you would like to schedule a follow up visit with the Registered Dietitian. Please call [phone] to schedule.

## 2017-06-02 NOTE — PROGRESS NOTES
"[facility] Outpatient Medical Nutrition Therapy     Time Spent:  60 minutes  Session Type:  Individual Session  Referring Physician:  Dr. Sheldon Irene  Reason for RD Visit:   IBS with constipation     Nutrition Assessment:  Pt here for initial visit with RD. Patient is a 32 y.o female with hx of IBS with constipation, vitamin D deficiency, iron deficiency anemia, endometriosis, depression and anxiety. Pt met with Dr. Irene on 5/23. Has been taking 2T citrucel and 1 capful of miralax twice per day in 8 oz of grapefruit or orange juice. Overall drinking ~32 oz juice per day with medications. 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 Does try to follow a lower FODMAP diet and does use a cell phone tomi that scans and alerts pt to high and low FODMAP foods after scanning. Reported that she does not follow this diet strictly but is aware of some of the foods that bother her such as mushrooms, avocado, garlic, onions, liquid milk, ice cream and yogurt. Does tolerate cheese. Stated that beer bothers her if she drinks too much. Will have a glass of wine or one beer twice per week.  Drinks hot tea daily without issues. Will have coffee only occasionally but if drinks regularly, then she does not tolerate. Currently taking 2T citrucel per day and 1 capful miralax twice per day. Has a BM every other day. On days where she does not have a BM does have gas/pain. Stated that she has ongoing IBS symptoms consistently each day. Stated that in the past she tried following a gluten free diet and did feel better. Pt stated that her celiac labs were negative. Per MD note/instructions on 5/23 visit, ok for pt to take 3-4 capfuls of miralax if 2 capfuls are not effective.     Height:   Ht Readings from Last 1 Encounters:   05/23/17 1.727 m (5' 8\")     Weight:   Wt Readings from Last 8 Encounters:   05/23/17 73.1 kg (161 lb 1.6 oz)   05/09/17 71.7 kg (158 lb)   04/10/17 70.8 kg " (156 lb)   02/06/17 68 kg (150 lb)   11/23/16 69 kg (152 lb 1.6 oz)   11/09/16 66.4 kg (146 lb 6.2 oz)   08/25/16 71.5 kg (157 lb 11.2 oz)      BMI: 24.55    Diet Recall:  (yesterday OR other usual meals)  Meal Food    Breakfast Leftover pasta with pesto, avocado, mushroom and tomatoes and corn and ground turkey OR usually 2 poptart   Lunch 2 poptarts OR salad with mozzarella OR roast beef or ham  Or egg salad sandwich OR leftover   Dinner Stir sanford rice with tofu, seaweed, carrots and edamame with tamari sauce and breaded eggplant   Snacks Orange OR does not usually snack   Beverages 4 c Grapefruit juice or orange pineapple juice, 24 oz unsweetened carbonated water and 40-48 oz water, 2-3 c hot tea with sugar. Cup coffee (but does not usually drink coffee)     Alcohol intake: 2x/week has 1 glass wine or beer  Frequency of eating/taking out meals: take out 2x/week Grenadian or Moroccan food or burger or fish and chips or meat and 2 veg servings     Labs:  On 11/19/2016 decreased vitamin D (13). Others reviewed.  Pertinent Medications/vitamin and mineral supplements:   Reviewed  Food Allergies:  NKFA. Pt reported does not tolerate liquid milk, ice cream or yogurt.  Physical Activity:  Climbing 1x/week and dog walking daily for a total of 30-60 minutes.  Nutrition Prescription:   1829 calories (~25 kcals/kg), ~73g protein (1g/kg), 0353-6697 (~25-30 ml/kg).    Nutrition Diagnosis:    Knowledge and beliefs deficit related to lack of complete recall of previous education as evidenced by pt reports and interest in diet education review.    Nutrition Intervention:    1. Nutrition Education: Provided nutrition education on general digestion of carbohydrates, FODMAP (Fermentable Oligo-saccharides, Di-saccharides, Mono-saccharides And Polyols) foods, impact these short chain carbohydrates have on GI tract, Gut microbiota and its effects on fiber. Reviewed FODMAP diet guidelines, including length of the diet,  the different phases  of the diet plan with elimination phase and discussed recommendation and schedule for adding foods back in. Discussed high and low FODMAP foods. Explained that wheat, barley and rye are higher FODMAP foods and these foods also contain gluten. Explained the difference between gluten and FODMAPs. Advised pt to keep daily food journal to identify the threshold for High FODMAP foods and/or tolerance or intolerance to foods. Suggested patient plan meals ahead of time and plan at least a 2-week meal plan. Discussed adequate hydration. Discussed potential issues of carbonated water causing gas. Recommended avoiding/limiting carbonated water and changing to plain non carbonated water. Can add Low FODMAP fruit to water to make homemade infused water. Provided diet education handouts for Low FODMAP. Patient expressed understanding of diet education and interested in focusing on highlighted goals below.    See all recommendations under Goals below.    Goals:  -Follow low FODMAP diet more consistently for at least 2-3 weeks  -Keep daily food journals for at least those 2-3 weeks  -Plan and prepare foods/meals ahead of time.to ensure you are eating low FODMAP foods and meals for at least those 2-3 weeks   -After the 2-3 weeks of following diet more strictly, slowly add back in your favorite higher FODMAP foods to see if you tolerate them  -Continue to avoid those that you know cause IBS symptoms such as garlic, onions, avocado, mushrooms, milk, yogurt and ice cream and cabbage.  -Recommend avoiding/limiting carbonated water and change to plain (still) water to get a total of at least 64 oz per day.    Nutrition Monitoring and Evaluation: Will monitor adherence to nutrition recommendations if any future RD visits.     Further Medical Nutrition Therapy:  PRN  Next Appointment (if applicable):  PRN  Patient was encouraged to call/contact RD with any further questions.    Pam Haney, MS, RD, LD

## 2017-06-02 NOTE — MR AVS SNAPSHOT
After Visit Summary   6/2/2017    Kasandra Lau    MRN: [id number]           Patient Information     Date Of Birth          1985        Visit Information        Provider Department      6/2/2017 11:30 AM Pam Haney RD [facility]        Care Instructions    -Per Dr. Irene visit on 5/23, can do up to 3-4 capfuls of miralax per day if 1 capful is not effective with goal of having 1-2 soft bowel movements per day  -Follow low FODMAP diet more consistently for at least 2-3 weeks  -Recommend keeping daily food journals for at least those 2-3 weeks  -Recommend meal planning and preparing foods/meals ahead of time.to ensure you are eating low FODMAP foods and meals  -After the 2-3 weeks of following more strictly, can slowly add back in your favorite higher FODMAP foods to see if you tolerate them  -Continue to avoid those that you know cause IBS symptoms such as garlic, onions, avocado, mushrooms, milk, yogurt and ice cream and cabbage.  -Recommend avoiding/limited carbonated water and change to plain (still) water. Can add Low FODMAP fruit to water to make homemade infused water.          Follow-ups after your visit        Your next 10 appointments already scheduled     Jun 05, 2017  5:00 PM CDT   (Arrive by 4:45 PM)   Return Visit with Amna Ashford, PhD   [facility] ([facility])    [address] [place] [id number]   [phone]            Jul 14, 2017  1:45 PM CDT   (Arrive by 1:30 PM)   Return Visit with Jevon Loyd MD   [facility] Neurology ([facility])    [address] [place] [id number]   [phone]            Nov 13, 2017 11:20 AM CST   (Arrive by 11:05 AM)   Return Visit with Sheldon Irene MD   [facility] ([facility])    [address] [place] [address]  [id number] [phone]              Who to contact     Please call your clinic at [phone] to:    Ask questions about your health    Make or cancel appointments    Discuss your medicines    Learn about your test results    Speak to your doctor   If you have compliments or concerns about an experience at your clinic, or if you wish to file a complaint, please contact [facility] Patient Relations at [phone] or email us at [email or web address]         Additional Information About Your Visit        RealOpshart Information     SLEDVisiont gives you secure access to your electronic health record. If you see a primary care provider, you can also send messages to your care team and make appointments. If you have questions, please call your primary care clinic.  If you do not have a primary care provider, please call [phone] and they will assist you.      Canatu is an electronic gateway that provides easy, online access to your medical records. With Canatu, you can request a clinic appointment, read your test results, renew a prescription or communicate with your care team.     To access your existing account, please contact your [facility] or call [phone] for assistance.        Care EveryWhere ID     This is your Care EveryWhere ID. This could be used by other organizations to access your [place] medical records  [id number]         Blood Pressure from Last 3 Encounters:   05/23/17 110/71   04/10/17 110/74   03/28/17 104/70    Weight from Last 3 Encounters:   05/23/17 73.1 kg (161 lb 1.6 oz)   05/09/17 71.7 kg (158 lb)   04/10/17 70.8 kg (156 lb)              Today, you had the following     No orders found for display       Primary Care Provider Office Phone # Fax #    [facility] [phone] [address] [facility] [address]        Thank you!     Thank you for choosing [facility] GASTRO[facility]OLOG[facility]  AND IBD  for your care. Our goal is always to provide you with excellent care. Hearing back from our patients is one way we can continue to improve our services. Please take a few minutes to complete the written survey that you may receive in the mail after your visit with us. Thank you!             Your Updated Medication List - Protect others around you: Learn how to safely use, store and throw away your medicines at www.disposemymeds.org.          This list is accurate as of: 6/2/17 12:37 PM.  Always use your most recent med list.                   Brand Name Dispense Instructions for use    FLUOXETINE HCL PO      Take 60 mg by mouth At Bedtime       leuprolide 11.25 MG kit    LUPRON DEPOT (3-MONTH)    1 each    Inject 11.25 mg into the muscle every 3 months       LORAZEPAM PO      Take 1 mg by mouth every 8 hours as needed       norethindrone 0.35 MG per tablet    MICRONOR    84 tablet    Take 1 tablet (0.35 mg) by mouth daily       NUVARING 0.12-0.015 MG/24HR vaginal ring   Generic drug:  etonogestrel-ethinyl estradiol      Place 1 each vaginally every 28 days       TOPIRAMATE PO      Take 100 mg by mouth every evening       TYLENOL PO      Take 650 mg by mouth every 4 hours as needed for mild pain or fever       zolpidem 5 MG tablet    AMBIEN    1 tablet    Take tablet by mouth 15 minutes prior to sleep, for Sleep Study

## 2017-07-14 ENCOUNTER — OFFICE VISIT (OUTPATIENT)
Dept: NEUROLOGY | Facility: CLINIC | Age: 32
End: 2017-07-14

## 2017-07-14 VITALS
HEART RATE: 79 BPM | HEIGHT: 68 IN | WEIGHT: 160.2 LBS | SYSTOLIC BLOOD PRESSURE: 103 MMHG | DIASTOLIC BLOOD PRESSURE: 71 MMHG | BODY MASS INDEX: 24.28 KG/M2

## 2017-07-14 DIAGNOSIS — G43.719 INTRACTABLE CHRONIC MIGRAINE WITHOUT AURA AND WITHOUT STATUS MIGRAINOSUS: Primary | ICD-10-CM

## 2017-07-14 RX ORDER — FEXOFENADINE HCL 180 MG/1
180 TABLET ORAL DAILY
COMMUNITY
Start: 2017-05-01 | End: 2019-01-31

## 2017-07-14 RX ORDER — VERAPAMIL HYDROCHLORIDE 120 MG/1
TABLET ORAL
COMMUNITY
Start: 2017-04-18 | End: 2017-10-05

## 2017-07-14 RX ORDER — METAPROTERENOL SULFATE 20 MG
TABLET ORAL
COMMUNITY
End: 2017-10-10

## 2017-07-14 ASSESSMENT — ENCOUNTER SYMPTOMS
CONSTIPATION: 1
DIZZINESS: 1
SPEECH CHANGE: 0
LOSS OF CONSCIOUSNESS: 0
DIARRHEA: 1
VOMITING: 0
RECTAL BLEEDING: 0
NERVOUS/ANXIOUS: 1
TINGLING: 1
TREMORS: 0
PARALYSIS: 0
INSOMNIA: 1
PANIC: 1
RECTAL PAIN: 0
MEMORY LOSS: 0
SEIZURES: 0
NAUSEA: 1
DEPRESSION: 1
BLOATING: 1
DISTURBANCES IN COORDINATION: 0
JAUNDICE: 0
WEAKNESS: 1
BLOOD IN STOOL: 0
HEARTBURN: 0
NUMBNESS: 1
BOWEL INCONTINENCE: 0
ABDOMINAL PAIN: 1
HEADACHES: 1
DECREASED CONCENTRATION: 1

## 2017-07-14 ASSESSMENT — PAIN SCALES - GENERAL: PAINLEVEL: MILD PAIN (2)

## 2017-07-14 NOTE — MR AVS SNAPSHOT
After Visit Summary   7/14/2017    Kasandra Lau    MRN: [id number]           Patient Information     Date Of Birth          1985        Visit Information        Provider Department      7/14/2017 1:45 PM Jevon Loyd MD [facility] Neurology         Follow-ups after your visit        Follow-up notes from your care team     Return in about 2 months (around 9/14/2017).      Your next 10 appointments already scheduled     Sep 16, 2017 11:40 AM CDT   (Arrive by 11:25 AM)   Return Visit with Jevon Loyd MD   [facility]rology ([facility])    [address] [place] [address]  3rd [facility] [id number] [phone]            Nov 13, 2017 11:20 AM CST   (Arrive by 11:05 AM)   Return Visit with Sheldon Irene MD   [facility]enter[facility] IBD ([facility])    [address] [place] [id number] [phone]              Who to contact     Please call your clinic at [phone] to:    Ask questions about your health    Make or cancel appointments    Discuss your medicines    Learn about your test results    Speak to your doctor   If you have compliments or concerns about an experience at your clinic, or if you wish to file a complaint, please contact [facility] Patient Relations at [phone] or email us at [email or web address]         Additional Information About Your Visit        MyChart Information     Whale Path gives you secure access to your electronic health record. If you see a primary care provider, you can also send messages to your care team and make appointments. If you have questions, please call your primary care clinic.  If you do not have a primary care provider, please call [phone] and they will assist you.      Whale Path is an electronic gateway that provides easy, online access to your medical records. With Wh[employer] Path, you can request a clinic  "appointment, read your test results, renew a prescription or communicate with your care team.     To access your existing account, please contact your [facility] or call [phone] for assistance.        Care EveryWhere ID     This is your Care EveryWhere ID. This could be used by other organizations to access your [place] medical records  [id number]        Your Vitals Were     Pulse Height BMI (Body Mass Index)             79 1.727 m (5' 8\") 24.36 kg/m2          Blood Pressure from Last 3 Encounters:   07/14/17 103/71   05/23/17 110/71   04/10/17 110/74    Weight from Last 3 Encounters:   07/14/17 72.7 kg (160 lb 3.2 oz)   05/23/17 73.1 kg (161 lb 1.6 oz)   05/09/17 71.7 kg (158 lb)              Today, you had the following     No orders found for display         Today's Medication Changes          These changes are accurate as of: 7/14/17  2:34 PM.  If you have any questions, ask your nurse or doctor.               Stop taking these medicines if you haven't already. Please contact your care team if you have questions.     leuprolide 11.25 MG kit   Commonly known as:  LUPRON DEPOT (3-MONTH)   Stopped by:  Jevon Loyd MD           norethindrone 0.35 MG per tablet   Commonly known as:  MICRONOR   Stopped by:  Jevon Loyd MD           zolpidem 5 MG tablet   Commonly known as:  AMBIEN   Stopped by:  Jevon Loyd MD                    Primary Care Provider Office Phone # Fax #    [facility] [phone] [address] [facility] [address]        Equal Access to Services     [facility] : Hadct Brunson, lois mejia, moe castle. So [facility] [phone].    ATENCIÓN: Si habla español, tiene a esteban disposición servicios gratuitos de asistencia lingüística. Llame al [phone].    We comply with applicable federal civil rights laws and " [place] laws. We do not discriminate on the basis of race, color, national origin, age, disability sex, sexual orientation or gender identity.            Thank you!     Thank you for choosing [facility] NEUROLOGY  for your care. Our goal is always to provide you with excellent care. Hearing back from our patients is one way we can continue to improve our services. Please take a few minutes to complete the written survey that you may receive in the mail after your visit with us. Thank you!             Your Updated Medication List - Protect others around you: Learn how to safely use, store and throw away your medicines at www.disposemymeds.org.          This list is accurate as of: 7/14/17  2:34 PM.  Always use your most recent med list.                   Brand Name Dispense Instructions for use Diagnosis    Acidophilus 90-25 MG Chew           fexofenadine 180 MG tablet    ALLEGRA          FLUOXETINE HCL PO      Take 60 mg by mouth At Bedtime        LORAZEPAM PO      Take 1 mg by mouth every 8 hours as needed        NUVARING 0.12-0.015 MG/24HR vaginal ring   Generic drug:  etonogestrel-ethinyl estradiol      Place 1 each vaginally every 28 days        TOPIRAMATE PO      Take 100 mg by mouth every evening        TYLENOL PO      Take 650 mg by mouth every 4 hours as needed for mild pain or fever        verapamil 120 MG tablet    CALAN

## 2017-07-14 NOTE — NURSING NOTE
Chief Complaint   Patient presents with     RECHECK     6 month f/u per migraines     Jackeline ALEMAN

## 2017-07-14 NOTE — LETTER
7/14/2017       RE: Kasandra Lau  [address]     [facility] [address]     Dear Colleague,    Thank you for referring your patient, Kasandra Lau, to the [facility] NEUROLOGY at [facility]. Please see a copy of my visit note below.      Sandy Aguilar    [facility]    Dear Dr. Aguilar:    This is in regard to followup on Kasandra Lau.  The patient returned today with chief complaint of headache as well as irritable bowel syndrome and restless leg syndrome.     The patient said that since she has been on iron supplement her restless leg symptoms have basically left.  She then discussed with me her evaluation through the gastroenterologist, and she has been diagnosed now with irritable bowel syndrome.  She has been taking a combination of MiraLAX and Citrucel, and this seems to help.  Unfortunately, her headaches started up again about 3 months ago.  She had been off Verapamil at my suggestion.  She went back on 120 milligrams dosage, and she does indicate they are better.  Instead of them being daily, she notes that she is getting headaches probably 2 times per week, but also waking up with headaches at times.  She said they are definitely less intense.  The patient has tapered her Prozac down to 40 milligrams.  She has continued the 100 milligram dose of topiramate, and she does note some tingling at times, especially in her hands and fingers.  She says this is annoying.  She has had some mild word finding problems.  She has been taking melatonin 5 milligram but has not really noticed that it has helped her headaches.  She has had not trouble taking it and no daytime sedation.  She is wanting to go to a higher dose.  She does [facility].  She is aware of risk of teratogenicity relation to medication.  She never did go ahead with riboflavin 20 milligrams.  She had documented vitamin D deficiency, and she said she never did  finish out her vitamin D tablets.  Her last level was 22.  Her vitamin B12 level after taking a multivitamin went from 289 picograms to 650.  She said she did have a sleep evaluation, and she was diagnosed with a delayed sleep phase disorder.  She does not think that is correct, and I encouraged her to go back to that person.  She was told she did not have sleep apnea.  She is drinking caffeinated coffee, 8 to 12 ounces, 2 to 3 times per week.  She does drink in some tea evidently.  She does rarely use chocolate, but she is eating foods with monosodium glutamate  every 2 weeks and does eat foods with nitrates.  She really has not watched her diet in that regard, except for her irritable bowel syndrome.  She was forthright with me, and her marriage evidently is breaking up.  I believe she has been involved in counseling separately from her , but she does not think it is going to work out.  He had moved here to be with her.  I encouraged her to continue counseling.  She denied any depression related to it.  I reviewed with her medicines that could be tried for her headache if she fails.  My current recommendations, including gabapentin and zonisamide.  She promised to try riboflavin, and she said she would go back to taking her vitamin D replacement.  I went over the reasons to continue vitamin D replacement and reasons that she would probably have low levels to begin with, including skin color and lack of sunlight exposure as well as diet.  The patient did tell me she may want to increase her melatonin up to 10 milligrams.  I warned her about daytime sedation, but it may be beneficial in terms of headache control.  I stressed the importance of limiting caffeinated beverage to no more than 6 ounces per day and to avoid other food and beverages that I reviewed with her to avoid, as it may be responsible for some increase in her headache.  She did have possibly some insight that stress could relate to her  headache.  She is involved in a PhD program here, and she said she will be in [place] for at least another 4 years.  She is not certain if she will ever have children.    Neurologic Evaluation:  Basically unremarkable.    Vital Signs:  Her blood pressure is 103/71, pulse 79.    Cardiac:  Regular rhythm without gallops or murmurs.  I can auscultate several bruits.  Neck:  Supple.    HEENT:  She had normal visual fields  and good extraocular movements.    Impression:  Chronic migraine.    The patient has chronic migraine.  She has had other comorbidities.  I talked to her with length about treating all these different issues.  I had encouraged also counseling.  She may go back to her sleep physician.  I have asked that she follow up with me in 2 months and on a p.r.n. basis.  If she is not better, I may entertain other medications.  I told her I did not feel that she would tolerate higher doses of topiramate.      I spent 30 minutes with the patient.  Over 50% of the time this involved counseling, coordination of care.     Thank you for allowing me to see this patient.    Sincerely yours,   Jevon Loyd MD

## 2017-07-20 NOTE — PROGRESS NOTES
Sandy Aguilar    [facility]    Dear Dr. Aguilar:    This is in regard to followup on Kasandra Lau.  The patient returned today with chief complaint of headache as well as irritable bowel syndrome and restless leg syndrome.     The patient said that since she has been on iron supplement her restless leg symptoms have basically left.  She then discussed with me her evaluation through the gastroenterologist, and she has been diagnosed now with irritable bowel syndrome.  She has been taking a combination of MiraLAX and Citrucel, and this seems to help.  Unfortunately, her headaches started up again about 3 months ago.  She had been off Verapamil at my suggestion.  She went back on 120 milligrams dosage, and she does indicate they are better.  Instead of them being daily, she notes that she is getting headaches probably 2 times per week, but also waking up with headaches at times.  She said they are definitely less intense.  The patient has tapered her Prozac down to 40 milligrams.  She has continued the 100 milligram dose of topiramate, and she does note some tingling at times, especially in her hands and fingers.  She says this is annoying.  She has had some mild word finding problems.  She has been taking melatonin 5 milligram but has not really noticed that it has helped her headaches.  She has had not trouble taking it and no daytime sedation.  She is wanting to go to a higher dose.  She does have an [facility].  She is aware of risk of teratogenicity relation to medication.  She never did go ahead with riboflavin 20 milligrams.  She had documented vitamin D deficiency, and she said she never did finish out her vitamin D tablets.  Her last level was 22.  Her vitamin B12 level after taking a multivitamin went from 289 picograms to 650.  She said she did have a sleep evaluation, and she was diagnosed with a delayed sleep phase disorder.  She does not think that is correct, and I  encouraged her to go back to that person.  She was told she did not have sleep apnea.  She is drinking caffeinated coffee, 8 to 12 ounces, 2 to 3 times per week.  She does drink in some tea evidently.  She does rarely use chocolate, but she is eating foods with monosodium glutamate  every 2 weeks and does eat foods with nitrates.  She really has not watched her diet in that regard, except for her irritable bowel syndrome.  She was forthright with me, and her marriage evidently is breaking up.  I believe she has been involved in counseling separately from her , but she does not think it is going to work out.  He had moved here to be with her.  I encouraged her to continue counseling.  She denied any depression related to it.  I reviewed with her medicines that could be tried for her headache if she fails.  My current recommendations, including gabapentin and zonisamide.  She promised to try riboflavin, and she said she would go back to taking her vitamin D replacement.  I went over the reasons to continue vitamin D replacement and reasons that she would probably have low levels to begin with, including skin color and lack of sunlight exposure as well as diet.  The patient did tell me she may want to increase her melatonin up to 10 milligrams.  I warned her about daytime sedation, but it may be beneficial in terms of headache control.  I stressed the importance of limiting caffeinated beverage to no more than 6 ounces per day and to avoid other food and beverages that I reviewed with her to avoid, as it may be responsible for some increase in her headache.  She did have possibly some insight that stress could relate to her headache.  She is involved in a PhD program here, and she said she will be in [place] for at least another 4 years.  She is not certain if she will ever have children.    Neurologic Evaluation:  Basically unremarkable.    Vital Signs:  Her blood pressure is 103/71, pulse 79.    Cardiac:   Regular rhythm without gallops or murmurs.  I can auscultate several bruits.  Neck:  Supple.    HEENT:  She had normal visual fields  and good extraocular movements.    Impression:  Chronic migraine.    The patient has chronic migraine.  She has had other comorbidities.  I talked to her with length about treating all these different issues.  I had encouraged also counseling.  She may go back to her sleep physician.  I have asked that she follow up with me in 2 months and on a p.r.n. basis.  If she is not better, I may entertain other medications.  I told her I did not feel that she would tolerate higher doses of topiramate.      I spent 30 minutes with the patient.  Over 50% of the time this involved counseling, coordination of care.     Thank you for allowing me to see this patient.    Sincerely yours,         Jevon Loyd MD    D:  07/15/2017 11:19 T:  07/20/2017 10:11  Document:  [id number] ND\RS

## 2017-10-05 ENCOUNTER — OFFICE VISIT (OUTPATIENT)
Dept: NEUROLOGY | Facility: CLINIC | Age: 32
End: 2017-10-05

## 2017-10-05 VITALS
HEART RATE: 67 BPM | DIASTOLIC BLOOD PRESSURE: 72 MMHG | BODY MASS INDEX: 22.7 KG/M2 | HEIGHT: 68 IN | SYSTOLIC BLOOD PRESSURE: 108 MMHG | WEIGHT: 149.8 LBS

## 2017-10-05 DIAGNOSIS — E55.9 VITAMIN D DEFICIENCY: ICD-10-CM

## 2017-10-05 DIAGNOSIS — E55.9 VITAMIN D DEFICIENCY: Primary | ICD-10-CM

## 2017-10-05 DIAGNOSIS — G43.009 MIGRAINE WITHOUT AURA AND WITHOUT STATUS MIGRAINOSUS, NOT INTRACTABLE: ICD-10-CM

## 2017-10-05 LAB
ALBUMIN SERPL-MCNC: 3.5 G/DL (ref 3.4–5)
ALP SERPL-CCNC: 57 U/L (ref 40–150)
ALT SERPL W P-5'-P-CCNC: 22 U/L (ref 0–50)
ANION GAP SERPL CALCULATED.3IONS-SCNC: 7 MMOL/L (ref 3–14)
AST SERPL W P-5'-P-CCNC: 12 U/L (ref 0–45)
BILIRUB SERPL-MCNC: 0.4 MG/DL (ref 0.2–1.3)
BUN SERPL-MCNC: 9 MG/DL (ref 7–30)
CALCIUM SERPL-MCNC: 8.8 MG/DL (ref 8.5–10.1)
CHLORIDE SERPL-SCNC: 110 MMOL/L (ref 94–109)
CO2 SERPL-SCNC: 22 MMOL/L (ref 20–32)
CREAT SERPL-MCNC: 0.75 MG/DL (ref 0.52–1.04)
DEPRECATED CALCIDIOL+CALCIFEROL SERPL-MC: 61 UG/L (ref 20–75)
GFR SERPL CREATININE-BSD FRML MDRD: >90 ML/MIN/1.7M2
GLUCOSE SERPL-MCNC: 95 MG/DL (ref 70–99)
POTASSIUM SERPL-SCNC: 3.6 MMOL/L (ref 3.4–5.3)
PROT SERPL-MCNC: 7.9 G/DL (ref 6.8–8.8)
SODIUM SERPL-SCNC: 140 MMOL/L (ref 133–144)

## 2017-10-05 RX ORDER — TOPIRAMATE 100 MG/1
100 TABLET, FILM COATED ORAL DAILY
Qty: 90 TABLET | Refills: 3 | Status: SHIPPED | OUTPATIENT
Start: 2017-10-05 | End: 2018-10-12

## 2017-10-05 RX ORDER — VERAPAMIL HYDROCHLORIDE 120 MG/1
120 TABLET ORAL DAILY
Qty: 90 TABLET | Refills: 3 | Status: SHIPPED | OUTPATIENT
Start: 2017-10-05 | End: 2018-10-24

## 2017-10-05 ASSESSMENT — PAIN SCALES - GENERAL: PAINLEVEL: NO PAIN (0)

## 2017-10-05 NOTE — MR AVS SNAPSHOT
After Visit Summary   10/5/2017    Kasandra Lau    MRN: [id number]           Patient Information     Date Of Birth          1985        Visit Information        Provider Department      10/5/2017 10:30 AM Jevon Loyd MD [facility]        Today's Diagnoses     Vitamin D deficiency    -  1    Migraine without aura and without status migrainosus, not intractable           Follow-ups after your visit        Follow-up notes from your care team     Return in about 1 year (around 10/5/2018).      Your next 10 appointments already scheduled     Oct 05, 2017 11:15 AM CDT   LAB with  LAB   [facility] ([facility])    [address]  [address] [facility] [address] [phone]           Patient must bring picture ID. Patient should be prepared to give a urine specimen  Please do not eat 10-12 hours before your appointment if you are coming in fasting for labs on lipids, cholesterol, or glucose (sugar). Pregnant women should follow their Care Team instructions. Water with medications is okay. Do not drink coffee or other fluids. If you have concerns about taking  your medications, please ask at office or if scheduling via [employer], send a message by clicking on Secure Messaging, Message Your Care Team.            Oct 10, 2017  8:45 AM CDT   Return Visit with Tonja Ferrer MD   [facility]    [facility] [address]  [address] [facility] [address]   [phone]            Oct 31, 2017  9:15 AM CDT   Adult General Eval with Boo Mercado MD   [facility] ([facility])    [address]  [address] [facility] [address]   [phone]            Nov 13, 2017 11:20 AM CST   (Arrive by 11:05 AM)   Return Visit with Sheldon Irene MD   [facility] ([facility])    [address]  4th Floor  [facility] [address]   [phone]            Oct 04, 2018 10:30 AM CDT   (Arrive by 10:15 AM)   Return Visit with Jevon Loyd MD   [facility] ([facility])    [address]  [address]rd [facility] [address] [phone]              Future tests that were ordered for you today     Open Future Orders        Priority Expected Expires Ordered    Comprehensive metabolic panel Routine  10/5/2018 10/5/2017    Vitamin D Deficiency Screening Routine  10/5/2018 10/5/2017            Who to contact     Please call your clinic at [phone] to:    Ask questions about your health    Make or cancel appointments    Discuss your medicines    Learn about your test results    Speak to your doctor   If you have compliments or concerns about an experience at your clinic, or if you wish to file a complaint, please contact [facility] Patient Relations at [phone] or email us at [email or web address]         Additional Information About Your Visit        [employer] Information     [employer] gives you secure access to your electronic health record. If you see a primary care provider, you can also send messages to your care team and make appointments. If you have questions, please call your primary care clinic.  If you do not have a primary care provider, please call [phone] and they will assist you.      [employer] is an electronic gateway that provides easy, online access to your medical records. With [employer], you can request a clinic appointment, read your test results, renew a prescription or communicate with your care team.     To access your existing account, please contact your [facility] or call [phone] for assistance.        Care EveryWhere ID     This is your Care EveryWhere ID. This could be used by other organizations to access your [facility]  "records  [id number]        Your Vitals Were     Pulse Height BMI (Body Mass Index)             67 1.727 m (5' 8\") 22.78 kg/m2          Blood Pressure from Last 3 Encounters:   10/05/17 108/72   07/14/17 103/71   05/23/17 110/71    Weight from Last 3 Encounters:   10/05/17 67.9 kg (149 lb 12.8 oz)   07/14/17 72.7 kg (160 lb 3.2 oz)   05/23/17 73.1 kg (161 lb 1.6 oz)                 Today's Medication Changes          These changes are accurate as of: 10/5/17 11:14 AM.  If you have any questions, ask your nurse or doctor.               These medicines have changed or have updated prescriptions.        Dose/Directions    * TOPIRAMATE PO   This may have changed:  Another medication with the same name was added. Make sure you understand how and when to take each.   Changed by:  Tonja Ferrer MD        Dose:  100 mg   Take 100 mg by mouth every evening   Refills:  0       * topiramate 100 MG tablet   Commonly known as:  TOPAMAX   This may have changed:  You were already taking a medication with the same name, and this prescription was added. Make sure you understand how and when to take each.   Used for:  Migraine without aura and without status migrainosus, not intractable   Changed by:  Jevon Loyd MD        Dose:  100 mg   Take 1 tablet (100 mg) by mouth daily   Quantity:  90 tablet   Refills:  3       verapamil 120 MG tablet   Commonly known as:  CALAN   This may have changed:    - how much to take  - how to take this  - when to take this   Used for:  Migraine without aura and without status migrainosus, not intractable   Changed by:  Jevon Loyd MD        Dose:  120 mg   Take 1 tablet (120 mg) by mouth daily   Quantity:  90 tablet   Refills:  3       * Notice:  This list has 2 medication(s) that are the same as other medications prescribed for you. Read the directions carefully, and ask your doctor or other care provider to review them with you.         Where to get your medicines "      These medications were sent to [facility] [address] [facility] - [place], [place] - [address] [place]  [address] [facility] [address]     Phone:  [phone]     topiramate 100 MG tablet    verapamil 120 MG tablet                Primary Care Provider Office Phone # Fax #    Lenexa Blanca [phone] [address] [facility] [address]        Equal Access to Services     MEE ESPARZA : Hadii aad ku hadasho Soomaali, waaxda luqadaha, qaybta kaalmada adeegyada, waxay idiin hayaan adeeg khhalleysh la'nanetten ah. So [facility] [phone].    ATENCIÓN: Si habla espjoanne, tiene a esteban disposición servicios gratuitos de asistencia lingüística. Lety al [phone].    We comply with applicable federal civil rights laws and [place] laws. We do not discriminate on the basis of race, color, national origin, age, disability, sex, sexual orientation, or gender identity.            Thank you!     Thank you for choosing [facility]  for your care. Our goal is always to provide you with excellent care. Hearing back from our patients is one way we can continue to improve our services. Please take a few minutes to complete the written survey that you may receive in the mail after your visit with us. Thank you!             Your Updated Medication List - Protect others around you: Learn how to safely use, store and throw away your medicines at www.disposemymeds.org.          This list is accurate as of: 10/5/17 11:14 AM.  Always use your most recent med list.                   Brand Name Dispense Instructions for use Diagnosis    Acidophilus 90-25 MG Chew           fexofenadine 180 MG tablet    ALLEGRA          FLUOXETINE HCL PO      Take 60 mg by mouth At Bedtime        LORAZEPAM PO      Take 1 mg by mouth every 8 hours as needed        NUVARING 0.12-0.015 MG/24HR vaginal ring   Generic drug:  etonogestrel-ethinyl estradiol      Place 1 each vaginally every 28 days        * TOPIRAMATE PO      Take 100 mg  by mouth every evening        * topiramate 100 MG tablet    TOPAMAX    90 tablet    Take 1 tablet (100 mg) by mouth daily    Migraine without aura and without status migrainosus, not intractable       TYLENOL PO      Take 650 mg by mouth every 4 hours as needed for mild pain or fever        verapamil 120 MG tablet    CALAN    90 tablet    Take 1 tablet (120 mg) by mouth daily    Migraine without aura and without status migrainosus, not intractable       * Notice:  This list has 2 medication(s) that are the same as other medications prescribed for you. Read the directions carefully, and ask your doctor or other care provider to review them with you.

## 2017-10-05 NOTE — LETTER
10/5/2017       RE: Kasandra Lau  [address] [place] [address]     Dear Colleague,    Thank you for referring your patient, Kasandra Lau, to the [facility] at [facility]. Please see a copy of my visit note below.    INTERVAL HISTORY:   Kasandra Lau returned for routine neurologic followup today on 10/05/2017.  Her chief complaint is that of headache and restless legs syndrome.      The patient has done quite well since I last saw her.  She now has  and is dating again.  She does have recent boyfriend.  The patient said that her separation will lead to divorce.  Since she has been treated for her low iron, she has had no further restless legs complaints.  She did note that she still has irritable bowel syndrome type of problems and she is trying to manage it with laxatives and fibers as well as an IBS diet.  She is going to have followup with the gastroenterologist in 6 months.  She did tell me she was off vitamin D for a while but is back on it.  The patient still notes that she can have a headache if she has to get up too early which she described as 5:00 or 6:00 a.m.  She is trying to avoid that.  She otherwise is not certain stress brings on her headache.  She does note that she is sensitive to glare and she does wonder whether this could trigger her headache.  She is certain that weather change will bring on a headache.  She has only had 2 migraines since I last saw her.  The patient did request refill of verapamil the 120 mg dose and also topiramate 100 mg dose.  She is aware that topiramate may make birth control pills not as effective and she is in the [facility] this [facility] and is going to switch from a Mirena ring to an IUD.  The patient does continue on Prozac 60 mg.  She said her mood is better.  She is followed by her psychiatrist.  She does take p.r.n. Allegra.  The patient denied any other problems.  She is aware  that she is supposed to drink 6-8 glasses of fluid a day to avoid issues with topiramate and she said she does so.  She said if she does have a headache she takes Tylenol and it seems to work well for her.  She has probably had 2 lesser headaches 1 or 2 times per month.      PHYSICAL EXAMINATION:   Neurologic examination showed that the patient had normal eyegrounds.  She had normal cardiac sounds without gallops or murmurs.  I could not auscultate cervical bruits.  Her lungs were clear to auscultation.      IMPRESSION:  Stable migraine.      The patient has stable migraine.  She is going to go ahead now today and have chemistry profile drawn as well as vitamin D level.  I did talk with her about routine followup in this office in 1 year and on a p.r.n. basis.  She requested that I refill her medications.  I went over side effects again of chronic verapamil use including watching for cardiac issues including block.  I also reviewed with her again significant problems that can occur with topiramate.  She said she is not going to become pregnant and is aware of teratogenicity related to the medicine.  She said she is trying to drink enough fluid per day to avoid metabolic acidosis and hyperthermia as well as renal lithiasis.      I spent 30 minutes with the patient today.  Over 50% of the time did involve counseling and coordination of care.        If you should have any questions, please feel free to contact me.      MD VANDANA Manzo MD             D: 10/10/2017 12:25   T: 10/10/2017 12:47   MT: AKA      Name:     HECTOR AVILES   MRN:      [id number]        Account:      [id number]   :      1985           Service Date: 10/05/2017      Document: [id number]       Again, thank you for allowing me to participate in the care of your patient.      Sincerely,    Vandana Loyd MD    cc:   [address]  [address]

## 2017-10-10 ENCOUNTER — OFFICE VISIT (OUTPATIENT)
Dept: OBGYN | Facility: CLINIC | Age: 32
End: 2017-10-10
Attending: OBSTETRICS & GYNECOLOGY
Payer: COMMERCIAL

## 2017-10-10 VITALS
HEART RATE: 83 BPM | DIASTOLIC BLOOD PRESSURE: 73 MMHG | BODY MASS INDEX: 22.73 KG/M2 | SYSTOLIC BLOOD PRESSURE: 112 MMHG | WEIGHT: 150 LBS | HEIGHT: 68 IN

## 2017-10-10 DIAGNOSIS — Z12.4 SCREENING FOR MALIGNANT NEOPLASM OF CERVIX: ICD-10-CM

## 2017-10-10 DIAGNOSIS — Z11.3 SCREENING EXAMINATION FOR VENEREAL DISEASE: Primary | ICD-10-CM

## 2017-10-10 LAB
HBV SURFACE AB SERPL IA-ACNC: 27.58 M[IU]/ML
HBV SURFACE AG SERPL QL IA: NONREACTIVE
HCV AB SERPL QL IA: NONREACTIVE
HIV 1+2 AB+HIV1 P24 AG SERPL QL IA: NONREACTIVE
T PALLIDUM IGG+IGM SER QL: NEGATIVE

## 2017-10-10 PROCEDURE — 36415 COLL VENOUS BLD VENIPUNCTURE: CPT | Performed by: OBSTETRICS & GYNECOLOGY

## 2017-10-10 PROCEDURE — 87491 CHLMYD TRACH DNA AMP PROBE: CPT | Performed by: OBSTETRICS & GYNECOLOGY

## 2017-10-10 PROCEDURE — G0145 SCR C/V CYTO,THINLAYER,RESCR: HCPCS | Performed by: OBSTETRICS & GYNECOLOGY

## 2017-10-10 PROCEDURE — 87389 HIV-1 AG W/HIV-1&-2 AB AG IA: CPT | Performed by: OBSTETRICS & GYNECOLOGY

## 2017-10-10 PROCEDURE — 86706 HEP B SURFACE ANTIBODY: CPT | Performed by: OBSTETRICS & GYNECOLOGY

## 2017-10-10 PROCEDURE — 87340 HEPATITIS B SURFACE AG IA: CPT | Performed by: OBSTETRICS & GYNECOLOGY

## 2017-10-10 PROCEDURE — 87624 HPV HI-RISK TYP POOLED RSLT: CPT | Performed by: OBSTETRICS & GYNECOLOGY

## 2017-10-10 PROCEDURE — 86780 TREPONEMA PALLIDUM: CPT | Performed by: OBSTETRICS & GYNECOLOGY

## 2017-10-10 PROCEDURE — 99212 OFFICE O/P EST SF 10 MIN: CPT | Mod: ZF

## 2017-10-10 PROCEDURE — 87591 N.GONORRHOEAE DNA AMP PROB: CPT | Performed by: OBSTETRICS & GYNECOLOGY

## 2017-10-10 PROCEDURE — 86803 HEPATITIS C AB TEST: CPT | Performed by: OBSTETRICS & GYNECOLOGY

## 2017-10-10 ASSESSMENT — PAIN SCALES - GENERAL: PAINLEVEL: NO PAIN (0)

## 2017-10-10 NOTE — PROGRESS NOTES
"CC: STD screening    Subjective:  Kasandra Lau is a 32 year old  with a hx of endometriosis who presents for STD screening. Pt reports she recently found out her long term partner was unfaithful and would like screening for STD. No known exposure. She denies any abnormal vaginal discharge, bleeding or abdominal pain. Using the Nuva Ring for contraception and endometriosis. Using Nuva Ring continuously. No significant abdominal pain. Reports some back pain, which she thinks is due to constipation. Using fiber and Miralax for this.     Last Pap: Unknown, in [place] (2-3 years ago?). No hx of abnormal Pap per pt.     Objective:  /73 (BP Location: Left arm, Patient Position: Chair)  Pulse 83  Ht 1.727 m (5' 8\")  Wt 68 kg (150 lb)  LMP 2017 (Approximate)  Breastfeeding? No  BMI 22.81 kg/m2   General: Well appearing, no acute distress.   Resp: Normal effort on room air.  Abdomen: Soft, non-tender, non-distended  Extremities: No edema  : Normal appearing external genitalia. Nulliparous cervix without lesions. No CMT. Normal white secretions present. NuvaRing present removed for exam and replaced.     A/P:   Kasandra Lau is a 32 year old  who presents for STD screening. No known exposure, no symptoms. GC/Chlamydia obtained today. HIV, Hep B, anti-Treponema, and Hep C ordered. Pap smear also obtained today due to unknown last Pap. Will send results via [employer]. Kasandra is doing well in terms of her endometriosis. Doing well s/p 6 months of Depo-Lupron, now on NuvaRing. Pt inquired about IUD, however discussed that this is not the best option for her due to her endometriosis. Given that she has good symptom management on NuvaRing, will continue this.     Follow up: As needed.     Ana Morrison  Ob/Gyn PGY-1  17 1:08 PM      Appreciate note by Dr. Morrison. Patient has been seen and examined by me with the resident, agree with above note.     Tonja Ferrer MD  9:00 AM      "

## 2017-10-10 NOTE — MR AVS SNAPSHOT
After Visit Summary   10/10/2017    Kasandra Lau    MRN: [id number]           Patient Information     Date Of Birth          1985        Visit Information        Provider Department      10/10/2017 8:45 AM Tonja Ferrer MD [facility]        Today's Diagnoses     Screening examination for venereal disease    -  1    Screening for malignant neoplasm of cervix           Follow-ups after your visit        Your next 10 appointments already scheduled     Oct 31, 2017  9:15 AM CDT   Adult General Eval with Boo Mercado MD   Psychiatry Clinic ([facility])    [place] [id number]  [address] [facility] [address] [phone]            Nov 13, 2017 11:20 AM CST   (Arrive by 11:05 AM)   Return Visit with Sheldon Irene MD   [facility] ([facility])    [address] [place] [address]  [address] [facility] [address] [phone]            Oct 04, 2018 10:30 AM CDT   (Arrive by 10:15 AM)   Return Visit with Jevon Loyd MD   [facility] ([facility])    [address] [place] [address]  [address] [facility] [address] [phone]              Who to contact     Please call your clinic at [phone] to:    Ask questions about your health    Make or cancel appointments    Discuss your medicines    Learn about your test results    Speak to your doctor   If you have compliments or concerns about an experience at your clinic, or if you wish to file a complaint, please contact [facility] Patient Relations at [phone] or email us at [email or web address]         Additional Information About Your Visit        MyChart Information     [employer] gives you secure access to your electronic health record. If you see a primary care provider, you can also send messages to your care team and make appointments. If  "you have questions, please call your primary care clinic.  If you do not have a primary care provider, please call [phone] and they will assist you.      [employer] is an electronic gateway that provides easy, online access to your medical records. With [employer], you can request a clinic appointment, read your test results, renew a prescription or communicate with your care team.     To access your existing account, please contact your [facility] or call [phone] for assistance.        Care EveryWhere ID     This is your Care EveryWhere ID. This could be used by other organizations to access your [place] medical records  [id number]        Your Vitals Were     Pulse Height Last Period Breastfeeding? BMI (Body Mass Index)       83 1.727 m (5' 8\") 09/01/2017 (Approximate) No 22.81 kg/m2        Blood Pressure from Last 3 Encounters:   10/10/17 112/73   10/05/17 108/72   07/14/17 103/71    Weight from Last 3 Encounters:   10/10/17 68 kg (150 lb)   10/05/17 67.9 kg (149 lb 12.8 oz)   07/14/17 72.7 kg (160 lb 3.2 oz)              We Performed the Following     Anti Treponema [FMP3583]     Chlamydia trachomatis PCR Swab      Hepatitis B Surface Antibody [NSP6755]     Hepatitis B surface antigen [NWU318]     Hepatitis C antibody [EAI321]     HIV Antigen Antibody Combo [XQV8091]     HPV High Risk Types DNA Cervical     Neisseria gonorrhoeae PCR Swab [ZOA3706]     Pap imaged thin layer screen with HPV - recommended age 30 - 65 years (select HPV order below)     Pap Smear Exam [] Do Not Remove        Primary Care Provider Office Phone # Fax #    [facility] [phone] [address] [facility] [address]        Equal Access to Services     MEE ESPARZA : Mariel Brunson, lois mejia, moe castle. So [facility] [phone].    ATENCIÓN: Si habla español, tiene a esteban disposición " servicios gratuitos de asistencia lingüística. Lety penny [phone].    We comply with applicable federal civil rights laws and [place] laws. We do not discriminate on the basis of race, color, national origin, age, disability, sex, sexual orientation, or gender identity.            Thank you!     Thank you for choosing [facility]  for your care. Our goal is always to provide you with excellent care. Hearing back from our patients is one way we can continue to improve our services. Please take a few minutes to complete the written survey that you may receive in the mail after your visit with us. Thank you!             Your Updated Medication List - Protect others around you: Learn how to safely use, store and throw away your medicines at www.disposemymeds.org.          This list is accurate as of: 10/10/17 11:59 PM.  Always use your most recent med list.                   Brand Name Dispense Instructions for use Diagnosis    fexofenadine 180 MG tablet    ALLEGRA          FLUOXETINE HCL PO      Take 60 mg by mouth At Bedtime        LORAZEPAM PO      Take 1 mg by mouth every 8 hours as needed        NUVARING 0.12-0.015 MG/24HR vaginal ring   Generic drug:  etonogestrel-ethinyl estradiol      Place 1 each vaginally every 28 days        * TOPIRAMATE PO      Take 100 mg by mouth every evening        * topiramate 100 MG tablet    TOPAMAX    90 tablet    Take 1 tablet (100 mg) by mouth daily    Migraine without aura and without status migrainosus, not intractable       TYLENOL PO      Take 650 mg by mouth every 4 hours as needed for mild pain or fever        verapamil 120 MG tablet    CALAN    90 tablet    Take 1 tablet (120 mg) by mouth daily    Migraine without aura and without status migrainosus, not intractable       * Notice:  This list has 2 medication(s) that are the same as other medications prescribed for you. Read the directions carefully, and ask your doctor or other care provider to  review them with you.

## 2017-10-10 NOTE — LETTER
"10/10/2017       RE: Kasandra Lau  [address] [place] [address]     Dear Colleague,    Thank you for referring your patient, Kasandra Lau, to the [facility] at [facility]. Please see a copy of my visit note below.    CC: STD screening    Subjective:  Kasandra Lau is a 32 year old  with a hx of endometriosis who presents for STD screening. Pt reports she recently found out her long term partner was unfaithful and would like screening for STD. No known exposure. She denies any abnormal vaginal discharge, bleeding or abdominal pain. Using the Nuva Ring for contraception and endometriosis. Using Nuva Ring continuously. No significant abdominal pain. Reports some back pain, which she thinks is due to constipation. Using fiber and Miralax for this.     Last Pap: Unknown, in [place] (2-3 years ago?). No hx of abnormal Pap per pt.     Objective:  /73 (BP Location: Left arm, Patient Position: Chair)  Pulse 83  Ht 1.727 m (5' 8\")  Wt 68 kg (150 lb)  LMP 2017 (Approximate)  Breastfeeding? No  BMI 22.81 kg/m2   General: Well appearing, no acute distress.   Resp: Normal effort on room air.  Abdomen: Soft, non-tender, non-distended  Extremities: No edema  : Normal appearing external genitalia. Nulliparous cervix without lesions. No CMT. Normal white secretions present. NuvaRing present removed for exam and replaced.     A/P:   Kasandra Lau is a 32 year old  who presents for STD screening. No known exposure, no symptoms. GC/Chlamydia obtained today. HIV, Hep B, anti-Treponema, and Hep C ordered. Pap smear also obtained today due to unknown last Pap. Will send results via [employer]. Kasandra is doing well in terms of her endometriosis. Doing well s/p 6 months of Depo-Lupron, now on NuvaRing. Pt inquired about IUD, however discussed that this is not the best option for her due to her endometriosis. Given that she has good symptom management " on NuvaRing, will continue this.     Follow up: As needed.     Ana Morrison  Ob/Gyn PGY-1  09/05/17 1:08 PM      Appreciate note by Dr. Morrison. Patient has been seen and examined by me with the resident, agree with above note.       Again, thank you for allowing me to participate in the care of your patient.      Sincerely,    Tonja Ferrer MD

## 2017-10-10 NOTE — PROGRESS NOTES
INTERVAL HISTORY:   Kasandra Lau returned for routine neurologic followup today on 10/05/2017.  Her chief complaint is that of headache and restless legs syndrome.      The patient has done quite well since I last saw her.  She now has  and is dating again.  She does have recent boyfriend.  The patient said that her separation will lead to divorce.  Since she has been treated for her low iron, she has had no further restless legs complaints.  She did note that she still has irritable bowel syndrome type of problems and she is trying to manage it with laxatives and fibers as well as an IBS diet.  She is going to have followup with the gastroenterologist in 6 months.  She did tell me she was off vitamin D for a while but is back on it.  The patient still notes that she can have a headache if she has to get up too early which she described as 5:00 or 6:00 a.m.  She is trying to avoid that.  She otherwise is not certain stress brings on her headache.  She does note that she is sensitive to glare and she does wonder whether this could trigger her headache.  She is certain that weather change will bring on a headache.  She has only had 2 migraines since I last saw her.  The patient did request refill of verapamil the 120 mg dose and also topiramate 100 mg dose.  She is aware that topiramate may make birth control pills not as effective and she is in the [facility] this [facility] and is going to switch from a Mirena ring to an IUD.  The patient does continue on Prozac 60 mg.  She said her mood is better.  She is followed by her psychiatrist.  She does take p.r.n. Allegra.  The patient denied any other problems.  She is aware that she is supposed to drink 6-8 glasses of fluid a day to avoid issues with topiramate and she said she does so.  She said if she does have a headache she takes Tylenol and it seems to work well for her.  She has probably had 2 lesser headaches 1 or 2 times per month.       PHYSICAL EXAMINATION:   Neurologic examination showed that the patient had normal eyegrounds.  She had normal cardiac sounds without gallops or murmurs.  I could not auscultate cervical bruits.  Her lungs were clear to auscultation.      IMPRESSION:  Stable migraine.      The patient has stable migraine.  She is going to go ahead now today and have chemistry profile drawn as well as vitamin D level.  I did talk with her about routine followup in this office in 1 year and on a p.r.n. basis.  She requested that I refill her medications.  I went over side effects again of chronic verapamil use including watching for cardiac issues including block.  I also reviewed with her again significant problems that can occur with topiramate.  She said she is not going to become pregnant and is aware of teratogenicity related to the medicine.  She said she is trying to drink enough fluid per day to avoid metabolic acidosis and hyperthermia as well as renal lithiasis.      I spent 30 minutes with the patient today.  Over 50% of the time did involve counseling and coordination of care.        If you should have any questions, please feel free to contact me.      Vandana Loyd MD       cc:   [address] [address]         VANDANA LOYD MD             D: 10/10/2017 12:25   T: 10/10/2017 12:47   MT: AKA      Name:     HECTOR AVILES   MRN:      [id number]        Account:      [id number]   :      1985           Service Date: 10/05/2017      Document: [id number]

## 2017-10-11 ENCOUNTER — CARE COORDINATION (OUTPATIENT)
Dept: NEUROLOGY | Facility: CLINIC | Age: 32
End: 2017-10-11

## 2017-10-11 LAB
C TRACH DNA SPEC QL NAA+PROBE: NEGATIVE
N GONORRHOEA DNA SPEC QL NAA+PROBE: NEGATIVE
SPECIMEN SOURCE: NORMAL
SPECIMEN SOURCE: NORMAL

## 2017-10-11 NOTE — PROGRESS NOTES
----- Message -----      From: Jevon Loyd MD      Sent: 10/11/2017   8:11 AM        To: Taylor Ulloa RN     Vit d 61[all blood tests also normal]-good news   ====================================================    10/11/17: called and left voicemail message for patient with above message from Dr Loyd. Left our contact info incase she has any questions or concerns.

## 2017-10-13 LAB
COPATH REPORT: NORMAL
PAP: NORMAL

## 2017-10-17 LAB
FINAL DIAGNOSIS: ABNORMAL
HPV HR 12 DNA CVX QL NAA+PROBE: POSITIVE
HPV16 DNA SPEC QL NAA+PROBE: NEGATIVE
HPV18 DNA SPEC QL NAA+PROBE: NEGATIVE
SPECIMEN DESCRIPTION: ABNORMAL

## 2017-10-31 ENCOUNTER — OFFICE VISIT (OUTPATIENT)
Dept: PSYCHIATRY | Facility: CLINIC | Age: 32
End: 2017-10-31
Attending: PSYCHIATRY & NEUROLOGY
Payer: COMMERCIAL

## 2017-10-31 VITALS
WEIGHT: 151 LBS | DIASTOLIC BLOOD PRESSURE: 72 MMHG | HEART RATE: 68 BPM | SYSTOLIC BLOOD PRESSURE: 105 MMHG | BODY MASS INDEX: 22.96 KG/M2

## 2017-10-31 DIAGNOSIS — F33.1 MODERATE EPISODE OF RECURRENT MAJOR DEPRESSIVE DISORDER (H): Primary | ICD-10-CM

## 2017-10-31 PROCEDURE — 99212 OFFICE O/P EST SF 10 MIN: CPT | Mod: ZF

## 2017-10-31 RX ORDER — LORAZEPAM 0.5 MG/1
1 TABLET ORAL 2 TIMES DAILY PRN
Qty: 60 TABLET | Refills: 0 | Status: SHIPPED | OUTPATIENT
Start: 2017-10-31 | End: 2017-11-15

## 2017-10-31 RX ORDER — ARIPIPRAZOLE 2 MG/1
2 TABLET ORAL DAILY
Qty: 30 TABLET | Refills: 0 | Status: SHIPPED | OUTPATIENT
Start: 2017-10-31 | End: 2017-11-15
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
## 2017-10-31 NOTE — PROGRESS NOTES
"PSYCHIATRY CLINIC DIAGNOSTIC EVALUATION   Kasandra Lau is a 32 year old female who was referred by *** for evaluation of {Psych Assess List:041633}.  History was provided by {PATIENT. FAMILY:132849} who was a {HIST:836599} historian.      CHIEF COMPLAINT         \" *** \"      HISTORY OF PRESENT ILLNESS     Pertinent Background:  This patient first experienced mental health issues *** and has received treatment for ***.  Notably, ***.       Psych critical item history includes {PSYCH CRITICAL:689143}.     MOST RECENT HISTORY began *** when ***.     RECENT SYMPTOMS:  {PSYROS:969415}  EATING DISORDER: {:270315}    RECENT SUBSTANCE USE:     ALCOHOL- {NONE DEFAULTED:730031::\"none\"}   TOBACCO- {NONE DEFAULTED:288357::\"none\"}          CAFFEINE- {CAFFEINE INTAKE:772684}        OPIOIDS- {NONE DEFAULTED:772566::\"none\"}       NARCAN KIT-  {Yes No NA 2:923575}             CANNABIS- {NONE DEFAULTED:948662::\"none\"}               OTHER ILLICIT DRUGS- {drugs:203515}    CURRENT SOCIAL HISTORY:  FINANCIAL SUPPORT- {Fin:219481}       CHILDREN- {NONE DEFAULTED:103585::\"none\"}       LIVING SITUATION- ***      SOCIAL/ SPIRITUAL SUPPORT- {UNKNOWN OB:376630}       FEELS SAFE AT HOME- {No Yes Crystal:747188}     MEDICAL ROS:  Reports {PSYCHMEDROS:283557}      Denies {PSYCHMEDROS:647098}    SUBSTANCE USE HISTORY                                                                 Past Use- {drugs:778500}  Treatment [#, most recent] - {NONE DEFAULTED:941852::\"none\"}  Medical Consequences [withdrawal, sz etc] - {NONE DEFAULTED:725709::\"none\"}  HIV/Hepatitis- {NONE DEFAULTED:997164::\"none\"}  Legal Consequences- {NONE DEFAULTED:409268::\"none\"}     PSYCHIATRIC HISTORY     SIB [method, most recent]- {NONE DEFAULTED:415017::\"none\"}  Suicidal Ideation Hx [passive, active]- {NONE DEFAULTED:519668::\"none\"}  Suicide Attempt [#, recent, method]:   #- {NOT APPLICABLE:136530::\"N/A\"}   Most Recent- {NOT APPLICABLE:126940::\"N/A\"}    Violence/Aggression Hx- {NONE " "DEFAULTED:538414::\"none\"}  Psychosis Hx- {NONE DEFAULTED:220305::\"none\"}  Psych Hosp [ #, most recent, committed]- {NONE DEFAULTED:976890::\"none\"}  ECT [#, most recent]- {NONE DEFAULTED:759206::\"none\"}    Eating Disorder: ***    Outpatient Programs [ DBT, Day Treatment, Eating Disorder Tx etc] : ***    SOCIAL and FAMILY HISTORY                                                                      patient reported   Financial Support- documented above  Living Situation/Family/Relationships- documented above  Trauma History (self-report)- {NONE:770834::\"None\"}  Legal- {NONE:088746::\"None\"}  Cultural/ Social/ Spiritual Support- {NONE DEFAULTED:259833::\"none\"}    Early History/Education- {UNKNOWN OB:356737}    Family Mental Health History-  {UNKNOWN OB:169453}    PAST PSYCH MED TRIALS    see EMR Problem List: Hx of psychiatric care     {NEWER ANTIDEP:350705}, {OLDER ANTIDEP:934392}, {MOOD STABILIZERS:577934}.  {NEWER ANTIPSYCH:591548}, {OLDER ANTIPSYCH:205100}.  {TRANQ:343400}, {SEDS / HYPNOTS:150401}.  {STIMULANTS / ADHD MEDS:296646}.            OR complete the table to the extent possible:    Drug /  Start Date Dose (mg) Helpful Adverse Effects   DC Reason / Date                          MEDICAL / SURGICAL HISTORY                                   CARE TEAM:          PCP- ***                    Therapist- ***    Pregnant or breastfeeding:  {Yes No NA 2:604641}   Contraception- ***    Neurologic Hx [head injury etc.]:  ***  Patient Active Problem List   Diagnosis     Pelvic pain in female     Vitamin D deficiency     Menorrhagia with regular cycle     Migraine without aura and without status migrainosus, not intractable     Iron deficiency anemia due to chronic blood loss     Tired     Circadian rhythm sleep disorder, delayed sleep phase type     Unhealthy sleep habit        ALLERGY                                Review of patient's allergies indicates no known allergies.  MEDICATIONS                           "     Current Outpatient Prescriptions   Medication Sig Dispense Refill     verapamil (CALAN) 120 MG tablet Take 1 tablet (120 mg) by mouth daily 90 tablet 3     topiramate (TOPAMAX) 100 MG tablet Take 1 tablet (100 mg) by mouth daily 90 tablet 3     fexofenadine (ALLEGRA) 180 MG tablet        etonogestrel-ethinyl estradiol (NUVARING) 0.12-0.015 MG/24HR vaginal ring Place 1 each vaginally every 28 days       Acetaminophen (TYLENOL PO) Take 650 mg by mouth every 4 hours as needed for mild pain or fever       LORAZEPAM PO Take 1 mg by mouth every 8 hours as needed        TOPIRAMATE PO Take 100 mg by mouth every evening        FLUOXETINE HCL PO Take 60 mg by mouth At Bedtime        VITALS   LMP 09/01/2017 (Approximate)   MENTAL STATUS EXAM                                                             Alertness: {ALERTNESS:212805}  Appearance: {APPEARANCE :633642}  Behavior/Demeanor: {BEHAVIOR :409105}, with {Desc; good/fair/poor:333406} eye contact   Speech: {SPEECH :231954}  Language: {LANGUAGE :475474}  Psychomotor: {PSYCHOMOTOR :408427}  Mood: {MOOD :208467}  Affect: {AFFECT :279459}; {was:737230} congruent to mood; {was:538152} congruent to content  Thought Process/Associations: {THOUGHT PROCESS:789068}  Thought Content:  Reports {THOUGHT CONTENT :991037};  Denies {THOUGHT CONTENT :315208}  Perception:  Reports {PERCEP:594062};  Denies {PERCEP:350145}  Insight: {INSIGHT :470078}  Judgment: {JUDGMENT :811582}  Cognition: {Does:651171} appear grossly intact; formal cognitive testing {was:503249} done    LABS and DATA   RATING SCALES:  {scales:152521}    PHQ9 TODAY = ***  No flowsheet data found.      Recent Labs   Lab Test  10/05/17   1133  11/19/16   1028   CR  0.75  0.68   GFRESTIMATED  >90  >90  Non African American GFR Calc       Recent Labs   Lab Test  10/05/17   1133  11/19/16   1028   AST  12  10   ALT  22  18   ALKPHOS  57  62       PSYCHIATRIC DIAGNOSES                                                              "                                       ***     ASSESSMENT                                                    This patient is a 32 year old female who provides a history supporting the diagnoses listed directly above.  Further diagnostic clarification {IS NOT/IS:932480::\"is not\"} needed. ***.      TODAY ***.    *** insert PSYCHRISK statements here OR delete this line and correct the [facility] PRESCRIPTION MONITORING PROGRAM [] {was:710308} checked today:  {:212615}.    PSYCHOTROPIC DRUG INTERACTIONS:   {NONE:222580::\"None\"}.  MANAGEMENT:  {INTXNMANAGE:341362}     PLAN                                                                                                       1) PSYCHOTROPIC MEDICATIONS:  - ***    2) THERAPY:    {CONTINUE:815291}  TREATMENT PLAN   Date revised  -  ***   Date next due- ***    3) NEXT DUE:     Labs- {NA/:296192}  EKG- { :777615}   Rating Scales- {NA/:503410}    4) REFERRALS:    {REF:738030}    5) RTC: ***    6) CRISIS NUMBERS:   Provided routinely in AVS.  Especially emphasized:  [facility] [phone] (clinic)    [phone] (after hours)  {CRISIS:101454}     TREATMENT RISK STATEMENT:  The risks, benefits, alternatives and potential adverse effects have been discussed and are understood by the pt. The pt understands the risks of using street drugs or alcohol. There are no medical contraindications, the pt agrees to treatment with the ability to do so. The pt knows to call the clinic for any problems or to access emergency care if needed.  Medical and substance use concerns are documented above.  Psychotropic drug interaction check was done, including changes made today.    WHODAS 2.0  TODAY total score = {NA:445704::\"N/A\"}; [a 12-item WHODAS 2.0 assessment {was:643397} completed by the pt today and/or recorded in EPIC].    RESIDENT:   Boo Mrecado MD    Patient was staffed in clinic with  *** who will sign the note.  Supervisor is  ***  "

## 2017-10-31 NOTE — MR AVS SNAPSHOT
After Visit Summary   10/31/2017    Kasandra Lau    MRN: [id number]           Patient Information     Date Of Birth          1985        Visit Information        Provider Department      10/31/2017 9:15 AM Boo Mercado MD Psychiatry Clinic        Today's Diagnoses     Moderate episode of recurrent major depressive disorder (H)    -  1      Care Instructions    2          Follow-ups after your visit        Follow-up notes from your care team     Return in about 2 weeks (around 11/14/2017).      Your next 10 appointments already scheduled     Nov 15, 2017  9:15 AM CST   Adult Med Follow UP with Boo Mercado MD   Psychiatry Clinic ([facility])    [address]  [address] [facility] [id number] [phone]            Oct 04, 2018 10:30 AM CDT   (Arrive by 10:15 AM)   Return Visit with Jevon Loyd MD   [facility] ([facility] [place])    [address] [id number] [phone]              Who to contact     Please call your clinic at [phone] to:    Ask questions about your health    Make or cancel appointments    Discuss your medicines    Learn about your test results    Speak to your doctor   If you have compliments or concerns about an experience at your clinic, or if you wish to file a complaint, please contact [facility] Patient Relations at [phone] or email us at [email or web address]         Additional Information About Your Visit        MyChart Information     [employer] gives you secure access to your electronic health record. If you see a primary care provider, you can also send messages to your care team and make appointments. If you have questions, please call your primary care clinic.  If you do not have a primary care provider, please call [phone] and they will assist you.      [employer] is an electronic gateway that  provides easy, online access to your medical records. With [employer], you can request a clinic appointment, read your test results, renew a prescription or communicate with your care team.     To access your existing account, please contact your [facility] or call [phone] for assistance.        Care EveryWhere ID     This is your Care EveryWhere ID. This could be used by other organizations to access your [place] medical records  [id number]        Your Vitals Were     Pulse Last Period BMI (Body Mass Index)             68 09/01/2017 (Approximate) 22.96 kg/m2          Blood Pressure from Last 3 Encounters:   10/31/17 105/72   10/10/17 112/73   10/05/17 108/72    Weight from Last 3 Encounters:   10/31/17 68.5 kg (151 lb)   10/10/17 68 kg (150 lb)   10/05/17 67.9 kg (149 lb 12.8 oz)              Today, you had the following     No orders found for display         Today's Medication Changes          These changes are accurate as of: 10/31/17 11:59 PM.  If you have any questions, ask your nurse or doctor.               Start taking these medicines.        Dose/Directions    ARIPiprazole 2 MG tablet   Commonly known as:  ABILIFY   Used for:  Moderate episode of recurrent major depressive disorder (H)   Started by:  Boo Mercado MD        Dose:  2 mg   Take 1 tablet (2 mg) by mouth daily   Quantity:  30 tablet   Refills:  0         These medicines have changed or have updated prescriptions.        Dose/Directions    * LORAZEPAM PO   This may have changed:  Another medication with the same name was added. Make sure you understand how and when to take each.   Changed by:  Tonja Ferrer MD        Dose:  1 mg   Take 1 mg by mouth every 8 hours as needed   Refills:  0       * LORazepam 0.5 MG tablet   Commonly known as:  ATIVAN   This may have changed:  You were already taking a medication with the same name, and this prescription was added. Make sure you understand how and  when to take each.   Used for:  Moderate episode of recurrent major depressive disorder (H)   Changed by:  Boo Mercado MD        Dose:  1 mg   Take 2 tablets (1 mg) by mouth 2 times daily as needed for anxiety   Quantity:  60 tablet   Refills:  0       * Notice:  This list has 2 medication(s) that are the same as other medications prescribed for you. Read the directions carefully, and ask your doctor or other care provider to review them with you.         Where to get your medicines      These medications were sent to [place] [address] [place], [place] - [address] [facility]  [address] [facility] [address]     Phone:  [phone]     ARIPiprazole 2 MG tablet         Some of these will need a paper prescription and others can be bought over the counter.  Ask your nurse if you have questions.     Bring a paper prescription for each of these medications     LORazepam 0.5 MG tablet                Primary Care Provider Office Phone # Fax #    [facility] [phone] [address] [facility] [address]        Equal Access to Services     MEE ESPARZA AH: Hadii emily ku hadasho Soomaali, waaxda luqadaha, qaybta kaalmada adeegyada, waxay rosain hayaylin rizo . So [facility] [phone].    ATENCIÓN: Si habla español, tiene a esteban disposición servicios gratuitos de asistencia lingüística. [facility] [phone].    We comply with applicable federal civil rights laws and [place] laws. We do not discriminate on the basis of race, color, national origin, age, disability, sex, sexual orientation, or gender identity.            Thank you!     Thank you for choosing PSYCHIATRY CLINIC  for your care. Our goal is always to provide you with excellent care. Hearing back from our patients is one way we can continue to improve our services. Please take a few minutes to complete the written survey that you may receive in the mail after your visit with us. Thank you!             Your  Updated Medication List - Protect others around you: Learn how to safely use, store and throw away your medicines at www.disposemymeds.org.          This list is accurate as of: 10/31/17 11:59 PM.  Always use your most recent med list.                   Brand Name Dispense Instructions for use Diagnosis    ARIPiprazole 2 MG tablet    ABILIFY    30 tablet    Take 1 tablet (2 mg) by mouth daily    Moderate episode of recurrent major depressive disorder (H)       fexofenadine 180 MG tablet    ALLEGRA          FLUOXETINE HCL PO      Take 60 mg by mouth At Bedtime        * LORAZEPAM PO      Take 1 mg by mouth every 8 hours as needed        * LORazepam 0.5 MG tablet    ATIVAN    60 tablet    Take 2 tablets (1 mg) by mouth 2 times daily as needed for anxiety    Moderate episode of recurrent major depressive disorder (H)       NUVARING 0.12-0.015 MG/24HR vaginal ring   Generic drug:  etonogestrel-ethinyl estradiol      Place 1 each vaginally every 28 days        * TOPIRAMATE PO      Take 100 mg by mouth every evening        * topiramate 100 MG tablet    TOPAMAX    90 tablet    Take 1 tablet (100 mg) by mouth daily    Migraine without aura and without status migrainosus, not intractable       TYLENOL PO      Take 650 mg by mouth every 4 hours as needed for mild pain or fever        verapamil 120 MG tablet    CALAN    90 tablet    Take 1 tablet (120 mg) by mouth daily    Migraine without aura and without status migrainosus, not intractable       * Notice:  This list has 4 medication(s) that are the same as other medications prescribed for you. Read the directions carefully, and ask your doctor or other care provider to review them with you.

## 2017-11-09 ENCOUNTER — TELEPHONE (OUTPATIENT)
Dept: GASTROENTEROLOGY | Facility: CLINIC | Age: 32
End: 2017-11-09

## 2017-11-15 ENCOUNTER — OFFICE VISIT (OUTPATIENT)
Dept: PSYCHIATRY | Facility: CLINIC | Age: 32
End: 2017-11-15
Attending: PSYCHIATRY & NEUROLOGY
Payer: COMMERCIAL

## 2017-11-15 VITALS
HEART RATE: 89 BPM | BODY MASS INDEX: 22.69 KG/M2 | DIASTOLIC BLOOD PRESSURE: 77 MMHG | SYSTOLIC BLOOD PRESSURE: 115 MMHG | WEIGHT: 149.2 LBS

## 2017-11-15 DIAGNOSIS — F33.1 MODERATE EPISODE OF RECURRENT MAJOR DEPRESSIVE DISORDER (H): ICD-10-CM

## 2017-11-15 DIAGNOSIS — F39 SEASONAL MOOD DISORDER (H): Primary | ICD-10-CM

## 2017-11-15 PROCEDURE — 99212 OFFICE O/P EST SF 10 MIN: CPT | Mod: ZF

## 2017-11-15 RX ORDER — ARIPIPRAZOLE 2 MG/1
2 TABLET ORAL DAILY
Qty: 30 TABLET | Refills: 3 | Status: SHIPPED | OUTPATIENT
Start: 2017-11-15 | End: 2018-01-24

## 2017-11-15 RX ORDER — LORAZEPAM 0.5 MG/1
1 TABLET ORAL 2 TIMES DAILY PRN
Qty: 60 TABLET | Refills: 0 | Status: SHIPPED | OUTPATIENT
Start: 2017-11-15 | End: 2018-01-04

## 2017-11-15 NOTE — NURSING NOTE
Chief Complaint   Patient presents with     Recheck Medication     Moderate episode of recurrent major depressive disorder      Reviewed allergies, smoking status, and pharmacy preference    Obtained weight, blood pressure and heart rate

## 2017-11-15 NOTE — PROGRESS NOTES
"  PSYCHIATRY CLINIC PROGRESS NOTE   The initial diagnostic evaluation was on 10/31/17.  Date of the most recent transfer of care paloma is NA.    Pertinent Background:  This patient first experienced mental health issues in childhood and has received treatment for depression and anxiety.  See transfer evaluation for detailed history.      Psych critical item history includes suicidal ideation and SIB [cutting in high school].     INTERIM HISTORY                                                 Kasandra Lau is a 32 year old female who was last seen on 10/31/17 at which time aripiprazole was started.  The patient reports good treatment adherence.  History was provided by the patient who was a good historian.  Since the last visit:  Feels more productive, getting more done. Less anxiety. Still some anxiety, particularly in morning. Not sleeping through the night as well. Feeling \"restless legs syndrome\" in the left leg.      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     Having cold sweats every night, has been happening for a month. No fevers, feels on cold side.     Taking medication in the morning.     Less paranoid.     Still seeing therapist regularly.     Social life: better, starting to see people and enjoy them more.     Mood: better    Described worsening seasonal component.       RECENT SYMPTOMS:   DEPRESSION:  reports-low energy, insomnia , weight/ appetite change (high) and poor concentration /memory;  DENIES- suicidal ideation   ANXIETY:  excessive worry  EATING DISORDER: none    RECENT SUBSTANCE USE:     DEPRESSION:  reports-anhedonia, low energy and poor concentration /memory;  DENIES- suicidal ideation , depressed mood, insomnia , hypersomnia, weight/ appetite change (none), excessive guilt, feeling worthless, psychomotor change observed by others (none), feeling hopeless and feeling trapped  EATING DISORDER: none    RECENT SUBSTANCE USE:   "   ALCOHOL- rare   TOBACCO- occasional cigarettes          CAFFEINE- no caffeine        OPIOIDS- none       NARCAN KIT-  N/A             CANNABIS- rare               OTHER ILLICIT DRUGS- none    CURRENT SOCIAL HISTORY:  FINANCIAL SUPPORT- working       CHILDREN- none       LIVING SITUATION- Housed with roommate in duplex      SOCIAL/ SPIRITUAL SUPPORT- none       FEELS SAFE AT HOME- Yes       MEDICAL ROS:  Reports night sweats, akathisia and none      Denies headaches, short term memory and/or word finding difficulty, wt gain and tremor and unusual movements, wt gain, sexual function problems [none] and Parkinsonian-type symptoms [shuffling gait, masked facies, stooped posture, speech change, writing change]    PSYCH and CD Critical Summary Points since July 2017           None    PAST PSYCH MED TRIALS   see EMR Problem List: Hx of psychiatric care    MEDICAL / SURGICAL HISTORY                                   CARE TEAM:          PCP- [facility]                 Therapist- Nancy Pineda since Sept.     Pregnant or breastfeeding:  No   Contraception- nuvaring    Neurologic Hx [head injury etc.]:  None known  Patient Active Problem List   Diagnosis     Pelvic pain in female     Vitamin D deficiency     Menorrhagia with regular cycle     Migraine without aura and without status migrainosus, not intractable     Iron deficiency anemia due to chronic blood loss     Tired     Circadian rhythm sleep disorder, delayed sleep phase type     Unhealthy sleep habit       ALLERGY                                Review of patient's allergies indicates no known allergies.  MEDICATIONS                               Current Outpatient Prescriptions   Medication Sig Dispense Refill     ARIPiprazole (ABILIFY) 2 MG tablet Take 1 tablet (2 mg) by mouth daily 30 tablet 0     LORazepam (ATIVAN) 0.5 MG tablet Take 2 tablets (1 mg) by mouth 2 times daily as needed for anxiety 60 tablet 0     verapamil (CALAN) 120 MG tablet Take 1 tablet  (120 mg) by mouth daily 90 tablet 3     topiramate (TOPAMAX) 100 MG tablet Take 1 tablet (100 mg) by mouth daily 90 tablet 3     fexofenadine (ALLEGRA) 180 MG tablet        etonogestrel-ethinyl estradiol (NUVARING) 0.12-0.015 MG/24HR vaginal ring Place 1 each vaginally every 28 days       Acetaminophen (TYLENOL PO) Take 650 mg by mouth every 4 hours as needed for mild pain or fever       LORAZEPAM PO Take 1 mg by mouth every 8 hours as needed        TOPIRAMATE PO Take 100 mg by mouth every evening        FLUOXETINE HCL PO Take 60 mg by mouth At Bedtime        VITALS   /77  Pulse 89  Wt 67.7 kg (149 lb 3.2 oz)  BMI 22.69 kg/m2   MENTAL STATUS EXAM                                                             Alertness: alert  and oriented  Appearance: well groomed  Behavior/Demeanor: cooperative, pleasant and calm, with good  eye contact   Speech: normal and regular rate and rhythm  Language: intact  Psychomotor: normal or unremarkable  Mood: description consistent with euthymia  Affect: full range; was congruent to mood; was congruent to content  Thought Process/Associations: unremarkable  Thought Content:  Reports none;  Denies suicidal and violent ideation  Perception:  Reports none;  Denies auditory hallucinations and visual hallucinations  Insight: good  Judgment: good  Cognition: does  appear grossly intact; formal cognitive testing was not done    LABS and DATA   RATING SCALES:  N/A    PHQ9 TODAY = 11  No flowsheet data found.          DIAGNOSIS        Moderate episode of recurrent major depressive disorder (H)  Seasonal mood disorder (H)    ASSESSMENT                                     32F h/o anxiety + depression previously well controlled on fluoxetine, childhood trauma, RLS, migraine, now on aripiprazole 2mg augmentation presenting for f/u.  Motivation, productivity improved, anxiety improved but not resolved. C/o some left sided restlessness worse in leg. VS wnl, exam improved with better  grooming, brighter more reactive affect. Electrolytes nl on 10.5, Iron studies, TSH, B12, Vit D nl in 2/2017.  Last CBC 11/2016 nl. PHQ 9 of 9. Differential for restlessness is worsening RLS vs akathesia from aripiprazole (but low dose). Regarding mood, impression of MDD  w/ anxious features + seasonal component w/ good response to aripiprazole. If fails or restlessness/akathesia worsens will switch augmentation to bupropion. Next step would be switch primary antidepressant vs consider neuromodulation (would likely benefit from combination HF L DLPFC + LF R DLPFC rTMS). No changes in plan today, CTM.     SUICIDE RISK ASSESSMENT [details described above]:  Today Kasandra Lau reports no suicidal thought, intent or plan.  In addition, she has notable risk factors for self-harm including relationship conflict and prior remote SIB, SI.  However, risk is mitigated by no h/o suicide attempt, no plan or intent, no h/o risky impulsive behavior, no access to lethal means, describes a safety plan, h/o seeking help when needed, future oriented, none to minimal alcohol use  and good job situation.  Based on all available evidence she does not appear to be at imminent risk for self-harm therefore does not meet criteria for a 72-hr hold/  involuntary hospitalization.  However, based on degree of symptoms close psych FU was recommended which the pt did agree to.      [place] PRESCRIPTION MONITORING PROGRAM [] was not checked today:  not using controlled substances.    PSYCHOTROPIC DRUG INTERACTIONS:   Concurrent use of ARIPIPRAZOLE and FLUOXETINE may result in increased aripiprazole exposure and increased risk for QT-interval prolongation. .  MANAGEMENT:  Monitoring for adverse effects and using lowest therapeutic dose of [aripiprazole and fluoxetine]         PLAN                                                                                                       1) PSYCHOTROPIC MEDICATIONS:  -COntinuearipiprazole 2mg  daily  -Continue fluoxetine 60mg daily.   -Continue lorazepam 0.5 mg PO PRN anxiety q12hrs  -Defer topiramate and verapamil for migraine to neurology    2) THERAPY:    Start  TREATMENT PLAN   Date revised  -  NA   Date next due- NA    3) NEXT DUE:     Labs- TSH, Vit D, CBC  EKG- N/A   Rating Scales- N/A    4) REFERRALS:    psychotherapy    5) RTC: 4wks    6) CRISIS NUMBERS:   Provided routinely in AVS.  Especially emphasized:  [facility] [phone] (clinic)    [phone] (after hours)  TREATMENT RISK STATEMENT:  The risks, benefits, alternatives and potential adverse effects have been discussed and are understood by the pt. The pt understands the risks of using street drugs or alcohol. There are no medical contraindications, the pt agrees to treatment with the ability to do so. The pt knows to call the clinic for any problems or to access emergency care if needed.  Medical and substance use concerns are documented above.  Psychotropic drug interaction check was done, including changes made today.    PSYCHIATRY CLINIC INDIVIDUAL PSYCHOTHERAPY NOTE                                    16   Start time: 0930  End time: 0950  Subjective: This supportive psychotherapy session addressed issues related to orientation to therapy and current stressors.  Patient's reaction: Contemplation in the context of mental status appropriate for ambulatory setting.  Progress: fair  Plan: RTC 6wks  Psychotherapy services during this visit included  myself and naye Lau  TREATMENT  PLAN          SYMPTOMS;PROBLEMS   MEASURABLE GOALS;    FUNCTIONAL IMPROVEMENT INTERVENTIONS;    GAINS MADE DISCHARGE CRITERIA   Depression: decreased motivation   report feeling more positive about self and abilities see above marked symptom improvement   Anxiety: anxious about school   decreased intensity in the mornings see above marked symptom improvement   Panic Attack: SOB   less frequent see above marked symptom improvement   RESIDENT:    Boo Mercado MD      Patient not staffed in clinic.  Note will be reviewed and signed by supervisor Dr. Lopez      I did not see this patient directly. I have reviewed the documentation and I agree with the resident's plan of care.     Marley Lopez MD

## 2017-11-15 NOTE — MR AVS SNAPSHOT
After Visit Summary   11/15/2017    Kasandra Lau    MRN: [id number]           Patient Information     Date Of Birth          1985        Visit Information        Provider Department      11/15/2017 9:15 AM Boo Mercado MD Psychiatry Clinic        Today's Diagnoses     Seasonal mood disorder (H)    -  1    Moderate episode of recurrent major depressive disorder (H)           Follow-ups after your visit        Your next 10 appointments already scheduled     Oct 04, 2018 10:30 AM CDT   (Arrive by 10:15 AM)   Return Visit with Jevon Loyd MD   [facility] ([facility])    [address] [place] [id number] [phone]              Who to contact     Please call your clinic at [phone] to:    Ask questions about your health    Make or cancel appointments    Discuss your medicines    Learn about your test results    Speak to your doctor   If you have compliments or concerns about an experience at your clinic, or if you wish to file a complaint, please contact [facility] Patient Relations at [phone] or email us at [email or web address]         Additional Information About Your Visit        MyChart Information     [employer] gives you secure access to your electronic health record. If you see a primary care provider, you can also send messages to your care team and make appointments. If you have questions, please call your primary care clinic.  If you do not have a primary care provider, please call [phone] and they will assist you.      [employer] is an electronic gateway that provides easy, online access to your medical records. With [employer], you can request a clinic appointment, read your test results, renew a prescription or communicate with your care team.     To access your existing account, please contact your [facility] or call [phone] for  assistance.        Care EveryWhere ID     This is your Care EveryWhere ID. This could be used by other organizations to access your [place] medical records  [id number]        Your Vitals Were     Pulse BMI (Body Mass Index)                89 22.69 kg/m2           Blood Pressure from Last 3 Encounters:   11/15/17 115/77   10/31/17 105/72   10/10/17 112/73    Weight from Last 3 Encounters:   11/15/17 67.7 kg (149 lb 3.2 oz)   10/31/17 68.5 kg (151 lb)   10/10/17 68 kg (150 lb)              Today, you had the following     No orders found for display         Today's Medication Changes          These changes are accurate as of: 11/15/17 11:59 PM.  If you have any questions, ask your nurse or doctor.               Start taking these medicines.        Dose/Directions    FLUoxetine 20 MG capsule   Commonly known as:  PROzac   Used for:  Moderate episode of recurrent major depressive disorder (H)   Started by:  Boo Mercado MD        Dose:  60 mg   Take 3 capsules (60 mg) by mouth At Bedtime   Quantity:  90 capsule   Refills:  3       order for DME   Used for:  Moderate episode of recurrent major depressive disorder (H)   Started by:  Boo Mercado MD        Use for 15-30 minutes every morning.   Quantity:  1 Units   Refills:  0            Where to get your medicines      These medications were sent to [place] [address] [place] [address] [address]     Phone:  [phone]     ARIPiprazole 2 MG tablet    FLUoxetine 20 MG capsule         Some of these will need a paper prescription and others can be bought over the counter.  Ask your nurse if you have questions.     Bring a paper prescription for each of these medications     LORazepam 0.5 MG tablet    order for DME                Primary Care Provider Office Phone # Fax #    [facility] [phone] [address] [facility] [address]        Equal Access to Services     MEE  GAAR : Hadii aad ku rafael Brunson, waaxda luqadaha, qaybta kaashutoshda bob, moe balaji marlynnicolette donahue chaparritamadan rizo . So [facility] [phone].    ATENCIÓN: Si habla cornelia, tiene a esteban disposición servicios gratuitos de asistencia lingüística. Llame al [phone].    We comply with applicable federal civil rights laws and [place] laws. We do not discriminate on the basis of race, color, national origin, age, disability, sex, sexual orientation, or gender identity.            Thank you!     Thank you for choosing PSYCHIATRY CLINIC  for your care. Our goal is always to provide you with excellent care. Hearing back from our patients is one way we can continue to improve our services. Please take a few minutes to complete the written survey that you may receive in the mail after your visit with us. Thank you!             Your Updated Medication List - Protect others around you: Learn how to safely use, store and throw away your medicines at www.disposemymeds.org.          This list is accurate as of: 11/15/17 11:59 PM.  Always use your most recent med list.                   Brand Name Dispense Instructions for use Diagnosis    ARIPiprazole 2 MG tablet    ABILIFY    30 tablet    Take 1 tablet (2 mg) by mouth daily    Moderate episode of recurrent major depressive disorder (H)       fexofenadine 180 MG tablet    ALLEGRA          FLUoxetine 20 MG capsule    PROzac    90 capsule    Take 3 capsules (60 mg) by mouth At Bedtime    Moderate episode of recurrent major depressive disorder (H)       FLUOXETINE HCL PO      Take 60 mg by mouth At Bedtime        * LORAZEPAM PO      Take 1 mg by mouth every 8 hours as needed        * LORazepam 0.5 MG tablet    ATIVAN    60 tablet    Take 2 tablets (1 mg) by mouth 2 times daily as needed for anxiety    Moderate episode of recurrent major depressive disorder (H)       NUVARING 0.12-0.015 MG/24HR vaginal ring   Generic drug:  etonogestrel-ethinyl estradiol      Place 1 each  vaginally every 28 days        order for DME     1 Units    Use for 15-30 minutes every morning.    Moderate episode of recurrent major depressive disorder (H)       * TOPIRAMATE PO      Take 100 mg by mouth every evening        * topiramate 100 MG tablet    TOPAMAX    90 tablet    Take 1 tablet (100 mg) by mouth daily    Migraine without aura and without status migrainosus, not intractable       TYLENOL PO      Take 650 mg by mouth every 4 hours as needed for mild pain or fever        verapamil 120 MG tablet    CALAN    90 tablet    Take 1 tablet (120 mg) by mouth daily    Migraine without aura and without status migrainosus, not intractable       * Notice:  This list has 4 medication(s) that are the same as other medications prescribed for you. Read the directions carefully, and ask your doctor or other care provider to review them with you.

## 2017-11-16 ASSESSMENT — PATIENT HEALTH QUESTIONNAIRE - PHQ9: SUM OF ALL RESPONSES TO PHQ QUESTIONS 1-9: 11

## 2017-11-20 ENCOUNTER — CARE COORDINATION (OUTPATIENT)
Dept: PSYCHIATRY | Facility: CLINIC | Age: 32
End: 2017-11-20

## 2017-11-20 DIAGNOSIS — F33.1 MAJOR DEPRESSIVE DISORDER, RECURRENT EPISODE, MODERATE (H): Primary | ICD-10-CM

## 2017-11-20 NOTE — PROGRESS NOTES
Oleg Moody Michelle, RN        Phone Number: [phone]                     Patient is wondering about switching Medications from Abilify to Wellbutrin, as discussed in her last appointment.       OK to leave detailed voicemail              Last appt: 11/15/17    M for pt requesting a c/b to discuss further.    Routed to provider as [place]

## 2017-11-22 RX ORDER — BUPROPION HYDROCHLORIDE 150 MG/1
150 TABLET ORAL EVERY MORNING
Qty: 30 TABLET | Refills: 0 | Status: SHIPPED | OUTPATIENT
Start: 2017-11-22 | End: 2017-12-22

## 2017-11-22 RX ORDER — BUPROPION HYDROCHLORIDE 150 MG/1
150 TABLET ORAL EVERY MORNING
Qty: 30 TABLET | Refills: 0 | Status: SHIPPED | OUTPATIENT
Start: 2017-11-22 | End: 2017-11-22

## 2017-12-12 DIAGNOSIS — Z30.44 ENCOUNTER FOR SURVEILLANCE OF VAGINAL RING HORMONAL CONTRACEPTIVE DEVICE: Primary | ICD-10-CM

## 2017-12-12 RX ORDER — ETONOGESTREL AND ETHINYL ESTRADIOL VAGINAL RING .015; .12 MG/D; MG/D
1 RING VAGINAL
Qty: 3 EACH | Refills: 3 | Status: SHIPPED | OUTPATIENT
Start: 2017-12-12 | End: 2018-11-27

## 2017-12-12 NOTE — TELEPHONE ENCOUNTER
Received refill request for nuvaring. Was not originally prescribed by  but it was discussed at her last appointment. Dr. Ferrer agreed to fill. Rx sent.

## 2017-12-22 DIAGNOSIS — F33.1 MAJOR DEPRESSIVE DISORDER, RECURRENT EPISODE, MODERATE (H): ICD-10-CM

## 2017-12-22 RX ORDER — BUPROPION HYDROCHLORIDE 150 MG/1
150 TABLET ORAL EVERY MORNING
Qty: 30 TABLET | Refills: 0 | Status: SHIPPED | OUTPATIENT
Start: 2017-12-22 | End: 2018-01-24

## 2017-12-22 NOTE — TELEPHONE ENCOUNTER
FW: Med Refill/Rogelio  Received: Today       Brooke Bernard, Omaira Welsh RN       Phone Number: [phone]                       Previous Messages       ----- Message -----      From: Lakisha Flowers      Sent: 12/22/2017  12:40 PM        To: Judy Abdul RN   Subject: Med Refill/Rogelio                                 Caller:  Kasandra   Medication:  Wellbutrin, Lorazepam   Pharmacy and location:  [facility] [address]   Have you contacted the pharmacy: no   How much of medication do you have left:  5 Wellbutrin, 3 Lorazepam   Pending appt: 1/24   Okay to leave VM:  yes

## 2017-12-22 NOTE — TELEPHONE ENCOUNTER
Last seen: 11/15/17  RTC: 4 weeks  Cancel: none  No-show: none  Next appt: 1/24/18    Incoming refill from pt via phone    Medication requested:   1.  Wellbutrin  mg daily   -refilled to pharmacy for 30 d/s per protocol (pt stated in msg she has 5 tabs remaining)    2.  Ativan 0.5 mg tabs #60   -last [place] 10[place]/17 per [place]    -per med tab a rx was printed at last appt   -spoke with pt who confirms she was given script at appt   -will check to see if she has this   -if not, agrees to call clinic back.

## 2017-12-29 ENCOUNTER — TELEPHONE (OUTPATIENT)
Dept: PSYCHIATRY | Facility: CLINIC | Age: 32
End: 2017-12-29

## 2017-12-29 DIAGNOSIS — F33.1 MODERATE EPISODE OF RECURRENT MAJOR DEPRESSIVE DISORDER (H): ICD-10-CM

## 2017-12-29 NOTE — TELEPHONE ENCOUNTER
----- Message from Flakita Salcedo sent at 12/29/2017  9:04 AM CST -----  Contact: [phone]  Pharmacy calling and location: [facility] in [place]  Name of person calling: Miranda  Medication: lorazepam  Reason: want to clarify the directions

## 2018-01-03 NOTE — TELEPHONE ENCOUNTER
Flakita Salcedo Michelle, RN        Phone Number: [phone]                     Pharmacy calling and location: [facility]   Name of person calling: Miranda (you can speak with any pharmacist)   Medication: lorazepam   Reason: they are still waiting for a call-back regarding dose clarification for the pt's lorazepam              Routed to Dr. Rogelio pierre

## 2018-01-04 RX ORDER — LORAZEPAM 0.5 MG/1
TABLET ORAL
Qty: 60 TABLET | Refills: 0
Start: 2018-01-04 | End: 2018-01-24

## 2018-01-04 NOTE — TELEPHONE ENCOUNTER
Boo Mercado MD Bove, Michelle, RN        Caller: Unspecified (6 days ago,  9:19 AM)                     This should be 0.5-1mg PO BID PRN anxiety, total daily dose not to exceed 2mg. Sorry for the confusion.       Writer left detailed VM on [facility] line as pharmacy was not open yet.  Provided clarification on dosing instructions and requested a c/b if there were further questions.

## 2018-01-24 ENCOUNTER — OFFICE VISIT (OUTPATIENT)
Dept: PSYCHIATRY | Facility: CLINIC | Age: 33
End: 2018-01-24
Attending: PSYCHIATRY & NEUROLOGY
Payer: COMMERCIAL

## 2018-01-24 VITALS
HEART RATE: 77 BPM | BODY MASS INDEX: 22.5 KG/M2 | SYSTOLIC BLOOD PRESSURE: 109 MMHG | DIASTOLIC BLOOD PRESSURE: 74 MMHG | WEIGHT: 148 LBS

## 2018-01-24 DIAGNOSIS — F33.1 MODERATE EPISODE OF RECURRENT MAJOR DEPRESSIVE DISORDER (H): ICD-10-CM

## 2018-01-24 DIAGNOSIS — F33.1 MAJOR DEPRESSIVE DISORDER, RECURRENT EPISODE, MODERATE (H): ICD-10-CM

## 2018-01-24 PROCEDURE — G0463 HOSPITAL OUTPT CLINIC VISIT: HCPCS | Mod: ZF

## 2018-01-24 RX ORDER — LORAZEPAM 0.5 MG/1
TABLET ORAL
Qty: 60 TABLET | Refills: 0 | Status: SHIPPED | OUTPATIENT
Start: 2018-01-24 | End: 2018-01-24

## 2018-01-24 RX ORDER — LORAZEPAM 0.5 MG/1
TABLET ORAL
Qty: 60 TABLET | Refills: 2 | Status: SHIPPED | OUTPATIENT
Start: 2018-01-24 | End: 2018-04-16

## 2018-01-24 RX ORDER — BUPROPION HYDROCHLORIDE 150 MG/1
150 TABLET ORAL EVERY MORNING
Qty: 30 TABLET | Refills: 3 | Status: SHIPPED | OUTPATIENT
Start: 2018-01-24 | End: 2018-04-16

## 2018-01-24 ASSESSMENT — PAIN SCALES - GENERAL: PAINLEVEL: NO PAIN (0)

## 2018-01-24 NOTE — NURSING NOTE
Chief Complaint   Patient presents with     Recheck Medication     Seasonal mood disorder      Reviewed allergies, medications, pharmacy and smoking status.  Administered abuse screening questions     Obtained weight, pain level, blood pressure and heart rate

## 2018-01-24 NOTE — TELEPHONE ENCOUNTER
Pt is here for an appt.  Main printer is not working.  Per request of Dr. Mercado a hard copy Rx is printed and Dr. Mercado will give it to pt.

## 2018-01-24 NOTE — PROGRESS NOTES
PSYCHIATRY CLINIC PROGRESS NOTE   The initial diagnostic evaluation was on 10/31/17.  Date of the most recent transfer of care paloma is NA.    Pertinent Background:  This patient first experienced mental health issues in childhood and has received treatment for depression and anxiety.  See transfer evaluation for detailed history.  Electrolytes nl on 10.5, Iron studies, TSH, B12, Vit D nl in 2/2017.  Last CBC 11/2016 nl    Psych critical item history includes suicidal ideation and SIB [cutting in high school].     INTERIM HISTORY                                                 Kasandra Lau is a 32 year old female who was last seen on 10/31/17 at which time aripiprazole was started.  The patient reports good treatment adherence.  History was provided by the patient who was a good historian.  Since the last visit:    Finished semester, feels a big relief to finish last semseter. Felt had too much on plate. Taking a lighter load of classes this semester. Preparing for prelims over next 2 semesters.     Anxiety: Feels in the morning, starting right after waking.     Social life: better, starting to see people and enjoy them more.     Mood: stable, feels depression is managed.     Sleep: goes to bed at midnight and sleeps until 9 or 10am.     Migraines once every other week, this is best controlled up until now.     Social circles are getting stronger, less worried about social environment within department although still doesn't feel trust.       RECENT SYMPTOMS:   DEPRESSION:  reports-low energy, insomnia , weight/ appetite change (high) and poor concentration /memory;  DENIES- suicidal ideation   ANXIETY:  excessive worry  EATING DISORDER: none    RECENT SUBSTANCE USE:     DEPRESSION:  reports-anhedonia, low energy and poor concentration /memory;  DENIES- suicidal ideation , depressed mood, insomnia , hypersomnia, weight/ appetite change (none), excessive guilt, feeling worthless, psychomotor change observed by  others (none), feeling hopeless and feeling trapped  EATING DISORDER: none    RECENT SUBSTANCE USE:     ALCOHOL- rare   TOBACCO- occasional cigarettes          CAFFEINE- no caffeine        OPIOIDS- none       NARCAN KIT-  N/A             CANNABIS- rare               OTHER ILLICIT DRUGS- none    CURRENT SOCIAL HISTORY:  FINANCIAL SUPPORT- working       CHILDREN- none       LIVING SITUATION- Housed with roommate in duplex      SOCIAL/ SPIRITUAL SUPPORT- none       FEELS SAFE AT HOME- Yes       MEDICAL ROS:  Reports night sweats, akathisia and none      Denies headaches, short term memory and/or word finding difficulty, wt gain and tremor and unusual movements, wt gain, sexual function problems [none] and Parkinsonian-type symptoms [shuffling gait, masked facies, stooped posture, speech change, writing change]    PSYCH and CD Critical Summary Points since July 2017           None    PAST PSYCH MED TRIALS   see EMR Problem List: Hx of psychiatric care    MEDICAL / SURGICAL HISTORY                                   CARE TEAM:          PCP- [facility]                 Therapist- Nancy Pineda since Sept.     Pregnant or breastfeeding:  No   Contraception- nuvaring    Neurologic Hx [head injury etc.]:  None known  Patient Active Problem List   Diagnosis     Pelvic pain in female     Vitamin D deficiency     Menorrhagia with regular cycle     Migraine without aura and without status migrainosus, not intractable     Iron deficiency anemia due to chronic blood loss     Tired     Circadian rhythm sleep disorder, delayed sleep phase type     Unhealthy sleep habit     Seasonal mood disorder (H)       ALLERGY                                Review of patient's allergies indicates no known allergies.  MEDICATIONS                               Current Outpatient Prescriptions   Medication Sig Dispense Refill     LORazepam (ATIVAN) 0.5 MG tablet 0.5-1 mg twice daily PRN anxiety.  Do not exceed 2 mg in 24 hours. 60 tablet 0      buPROPion (WELLBUTRIN XL) 150 MG 24 hr tablet Take 1 tablet (150 mg) by mouth every morning 30 tablet 0     etonogestrel-ethinyl estradiol (NUVARING) 0.12-0.015 MG/24HR vaginal ring Place 1 each vaginally every 28 days 3 each 3     ARIPiprazole (ABILIFY) 2 MG tablet Take 1 tablet (2 mg) by mouth daily 30 tablet 3     FLUoxetine (PROZAC) 20 MG capsule Take 3 capsules (60 mg) by mouth At Bedtime 90 capsule 3     order for DME Use for 15-30 minutes every morning. 1 Units 0     verapamil (CALAN) 120 MG tablet Take 1 tablet (120 mg) by mouth daily 90 tablet 3     topiramate (TOPAMAX) 100 MG tablet Take 1 tablet (100 mg) by mouth daily 90 tablet 3     fexofenadine (ALLEGRA) 180 MG tablet        Acetaminophen (TYLENOL PO) Take 650 mg by mouth every 4 hours as needed for mild pain or fever       LORAZEPAM PO Take 1 mg by mouth every 8 hours as needed        TOPIRAMATE PO Take 100 mg by mouth every evening        FLUOXETINE HCL PO Take 60 mg by mouth At Bedtime        VITALS   /74  Pulse 77  Wt 67.1 kg (148 lb)  BMI 22.5 kg/m2   MENTAL STATUS EXAM                                                             Alertness: alert  and oriented  Appearance: well groomed  Behavior/Demeanor: cooperative, pleasant and calm, with good  eye contact   Speech: normal and regular rate and rhythm  Language: intact  Psychomotor: normal or unremarkable  Mood: description consistent with euthymia  Affect: full range; was congruent to mood; was congruent to content  Thought Process/Associations: unremarkable  Thought Content:  Reports none;  Denies suicidal and violent ideation  Perception:  Reports none;  Denies auditory hallucinations and visual hallucinations  Insight: good  Judgment: good  Cognition: does  appear grossly intact; formal cognitive testing was not done    LABS and DATA   RATING SCALES:  N/A    PHQ9 TODAY = 5  PHQ-9 SCORE 11/15/2017   Total Score 11             DIAGNOSIS        Moderate episode of recurrent major  depressive disorder (H)  Seasonal mood disorder (H)    ASSESSMENT                                     32F h/o anxiety + depression previously well controlled on fluoxetine, childhood trauma, RLS, migraine, now on aripiprazole 2mg augmentation presenting for f/u.  Motivation, productivity continue improved, anxiety improved. Akathesia/RLS resolved w/ aripiprazole discontinuation. VS wnl, exam improved with better grooming, brighter more reactive affect. .PHQ 9 is improved at 9.  Impression of MDD  w/ anxious features + seasonal component w/ good response to fluoxetine w/ bupropion augmentation. Next step would be switch primary antidepressant vs consider neuromodulation (would likely benefit from combination HF L DLPFC + LF R DLPFC rTMS). No changes in plan today, CTM.     SUICIDE RISK ASSESSMENT [details described above]:  Today Kasandra Lau reports no suicidal thought, intent or plan.  In addition, she has notable risk factors for self-harm including relationship conflict and prior remote SIB, SI.  However, risk is mitigated by no h/o suicide attempt, no plan or intent, no h/o risky impulsive behavior, no access to lethal means, describes a safety plan, h/o seeking help when needed, future oriented, none to minimal alcohol use  and good job situation.  Based on all available evidence she does not appear to be at imminent risk for self-harm therefore does not meet criteria for a 72-hr hold/  involuntary hospitalization.  However, based on degree of symptoms close psych FU was recommended which the pt did agree to.      [place] PRESCRIPTION MONITORING PROGRAM [] was not checked today:  not using controlled substances.    PSYCHOTROPIC DRUG INTERACTIONS:   Concurrent use of ARIPIPRAZOLE and FLUOXETINE may result in increased aripiprazole exposure and increased risk for QT-interval prolongation. .  MANAGEMENT:  Monitoring for adverse effects and using lowest therapeutic dose of [aripiprazole and fluoxetine]         PLAN                                                                                                        1) PSYCHOTROPIC MEDICATIONS:    -Continue bupropion 150mg XL  -Continue fluoxetine 60mg daily.   -Continue lorazepam 0.5 mg PO PRN anxiety q12hrs  -Defer topiramate and verapamil for migraine to neurology    2) THERAPY:    Start  TREATMENT PLAN   Date revised  -  NA   Date next due- NA    3) NEXT DUE:     Labs- NA  EKG- N/A   Rating Scales- N/A    4) REFERRALS:    psychotherapy    5) RTC: 4wks    6) CRISIS NUMBERS:   Provided routinely in AVS.  Especially emphasized:  [facility] [phone] (clinic)    [phone] (after hours)  TREATMENT RISK STATEMENT:  The risks, benefits, alternatives and potential adverse effects have been discussed and are understood by the pt. The pt understands the risks of using street drugs or alcohol. There are no medical contraindications, the pt agrees to treatment with the ability to do so. The pt knows to call the clinic for any problems or to access emergency care if needed.  Medical and substance use concerns are documented above.  Psychotropic drug interaction check was done, including changes made today.    PSYCHIATRY CLINIC INDIVIDUAL PSYCHOTHERAPY NOTE                                    16   Start time: 0810  End time: 0830  Subjective: This supportive psychotherapy session addressed issues related to orientation to therapy and current stressors.  Patient's reaction: Contemplation in the context of mental status appropriate for ambulatory setting.  Progress: fair  Plan: RTC 3 mo  Psychotherapy services during this visit included  myspreston and naye Lau  TREATMENT  PLAN          SYMPTOMS;PROBLEMS   MEASURABLE GOALS;    FUNCTIONAL IMPROVEMENT INTERVENTIONS;    GAINS MADE DISCHARGE CRITERIA   Depression: decreased motivation   report feeling more positive about self and abilities Medication, therapy; improved productiviy marked symptom improvement   Anxiety: anxious about  school   decreased intensity in the mornings Medications, therapy, anxitey reduced marked symptom improvement   Panic Attack: SOB   less frequent Medications, therapy, no panic attacks marked symptom improvement   RESIDENT:   Boo Mercado MD      Patient not staffed in clinic.  Note will be reviewed and signed by supervisor Dr. Lopez          I did not see this patient directly. I have reviewed the documentation and I agree with the resident's plan of care.     Marley Lopez MD

## 2018-01-24 NOTE — MR AVS SNAPSHOT
After Visit Summary   1/24/2018    Kasandra Lau    MRN: [id number]           Patient Information     Date Of Birth          1985        Visit Information        Provider Department      1/24/2018 7:45 AM Boo Mercado MD Psychiatry Clinic        Today's Diagnoses     Major depressive disorder, recurrent episode, moderate (H)        Moderate episode of recurrent major depressive disorder (H)           Follow-ups after your visit        Follow-up notes from your care team     Return in about 3 months (around 4/24/2018).      Your next 10 appointments already scheduled     Apr 16, 2018  9:15 AM CDT   Adult Med Follow UP with Boo Mercado MD   Psychiatry Clinic ([facility])    [address]  [address] [address] [phone]            Oct 04, 2018 10:30 AM CDT   (Arrive by 10:15 AM)   Return Visit with Jevon Loyd MD   [facility] ([facility])    [address] [address] [phone]              Who to contact     Please call your clinic at [phone] to:    Ask questions about your health    Make or cancel appointments    Discuss your medicines    Learn about your test results    Speak to your doctor            Additional Information About Your Visit        [employer] Information     [employer] gives you secure access to your electronic health record. If you see a primary care provider, you can also send messages to your care team and make appointments. If you have questions, please call your primary care clinic.  If you do not have a primary care provider, please call [phone] and they will assist you.      [employer] is an electronic gateway that provides easy, online access to your medical records. With [employer], you can request a clinic appointment, read your test results, renew a prescription or communicate with your care team.     To access your  existing account, please contact your [facility] or call [phone] for assistance.        Care EveryWhere ID     This is your Care EveryWhere ID. This could be used by other organizations to access your [place] medical records  [id number]        Your Vitals Were     Pulse BMI (Body Mass Index)                77 22.5 kg/m2           Blood Pressure from Last 3 Encounters:   01/24/18 109/74   11/15/17 115/77   10/31/17 105/72    Weight from Last 3 Encounters:   01/24/18 67.1 kg (148 lb)   11/15/17 67.7 kg (149 lb 3.2 oz)   10/31/17 68.5 kg (151 lb)              Today, you had the following     No orders found for display         Today's Medication Changes          These changes are accurate as of 1/24/18 11:59 PM.  If you have any questions, ask your nurse or doctor.               These medicines have changed or have updated prescriptions.        Dose/Directions    * FLUoxetine 20 MG capsule   Commonly known as:  PROzac   This may have changed:  Another medication with the same name was added. Make sure you understand how and when to take each.   Used for:  Moderate episode of recurrent major depressive disorder (H)   Changed by:  Boo Mercado MD        Dose:  60 mg   Take 3 capsules (60 mg) by mouth At Bedtime   Quantity:  90 capsule   Refills:  3       * FLUoxetine 20 MG capsule   Commonly known as:  PROzac   This may have changed:  You were already taking a medication with the same name, and this prescription was added. Make sure you understand how and when to take each.   Used for:  Major depressive disorder, recurrent episode, moderate (H)   Changed by:  Boo Mercado MD        Dose:  60 mg   Take 3 capsules (60 mg) by mouth daily   Quantity:  90 capsule   Refills:  3       * LORAZEPAM PO   This may have changed:  Another medication with the same name was added. Make sure you understand how and when to take each.   Changed by:  Kathy Ceron RN        Dose:  1 mg    Take 1 mg by mouth every 8 hours as needed   Refills:  0       * LORazepam 0.5 MG tablet   Commonly known as:  ATIVAN   This may have changed:  You were already taking a medication with the same name, and this prescription was added. Make sure you understand how and when to take each.   Used for:  Moderate episode of recurrent major depressive disorder (H)   Changed by:  Kathy Ceron RN        0.5-1 mg twice daily PRN anxiety.  Do not exceed 2 mg in 24 hours.   Quantity:  60 tablet   Refills:  2       * Notice:  This list has 4 medication(s) that are the same as other medications prescribed for you. Read the directions carefully, and ask your doctor or other care provider to review them with you.      Stop taking these medicines if you haven't already. Please contact your care team if you have questions.     ARIPiprazole 2 MG tablet   Commonly known as:  ABILIFY   Stopped by:  Boo Mercado MD                Where to get your medicines      These medications were sent to [place] [address] [place] [address] [address]     Phone:  [phone]     buPROPion 150 MG 24 hr tablet    FLUoxetine 20 MG capsule         Some of these will need a paper prescription and others can be bought over the counter.  Ask your nurse if you have questions.     Bring a paper prescription for each of these medications     LORazepam 0.5 MG tablet                Primary Care Provider Office Phone # Fax #    [facility] access hospital [phone] [address] [facility] [address]        Equal Access to Services     ARMANI ESPARZA AH: Mariel Brunson, wadenisse mejia, qaybta kaalmada bob, moe redman. So [facility] [phone].    ATENCIÓN: Si habla español, tiene a esteban disposición servicios gratuitos de asistencia lingüística. Llame al [phone].    We comply with applicable federal civil rights laws and [place] laws.  We do not discriminate on the basis of race, color, national origin, age, disability, sex, sexual orientation, or gender identity.            Thank you!     Thank you for choosing PSYCHIATRY CLINIC  for your care. Our goal is always to provide you with excellent care. Hearing back from our patients is one way we can continue to improve our services. Please take a few minutes to complete the written survey that you may receive in the mail after your visit with us. Thank you!             Your Updated Medication List - Protect others around you: Learn how to safely use, store and throw away your medicines at www.disposemymeds.org.          This list is accurate as of 1/24/18 11:59 PM.  Always use your most recent med list.                   Brand Name Dispense Instructions for use Diagnosis    buPROPion 150 MG 24 hr tablet    WELLBUTRIN XL    30 tablet    Take 1 tablet (150 mg) by mouth every morning    Major depressive disorder, recurrent episode, moderate (H)       etonogestrel-ethinyl estradiol 0.12-0.015 MG/24HR vaginal ring    NUVARING    3 each    Place 1 each vaginally every 28 days    Encounter for surveillance of vaginal ring hormonal contraceptive device       fexofenadine 180 MG tablet    ALLEGRA          * FLUoxetine 20 MG capsule    PROzac    90 capsule    Take 3 capsules (60 mg) by mouth At Bedtime    Moderate episode of recurrent major depressive disorder (H)       * FLUoxetine 20 MG capsule    PROzac    90 capsule    Take 3 capsules (60 mg) by mouth daily    Major depressive disorder, recurrent episode, moderate (H)       FLUOXETINE HCL PO      Take 60 mg by mouth At Bedtime        * LORAZEPAM PO      Take 1 mg by mouth every 8 hours as needed        * LORazepam 0.5 MG tablet    ATIVAN    60 tablet    0.5-1 mg twice daily PRN anxiety.  Do not exceed 2 mg in 24 hours.    Moderate episode of recurrent major depressive disorder (H)       order for DME     1 Units    Use for 15-30 minutes every morning.     Moderate episode of recurrent major depressive disorder (H)       * TOPIRAMATE PO      Take 100 mg by mouth every evening        * topiramate 100 MG tablet    TOPAMAX    90 tablet    Take 1 tablet (100 mg) by mouth daily    Migraine without aura and without status migrainosus, not intractable       TYLENOL PO      Take 650 mg by mouth every 4 hours as needed for mild pain or fever        verapamil 120 MG tablet    CALAN    90 tablet    Take 1 tablet (120 mg) by mouth daily    Migraine without aura and without status migrainosus, not intractable       * Notice:  This list has 6 medication(s) that are the same as other medications prescribed for you. Read the directions carefully, and ask your doctor or other care provider to review them with you.

## 2018-01-25 ASSESSMENT — PATIENT HEALTH QUESTIONNAIRE - PHQ9: SUM OF ALL RESPONSES TO PHQ QUESTIONS 1-9: 5

## 2018-04-16 ENCOUNTER — OFFICE VISIT (OUTPATIENT)
Dept: PSYCHIATRY | Facility: CLINIC | Age: 33
End: 2018-04-16
Attending: PSYCHIATRY & NEUROLOGY
Payer: COMMERCIAL

## 2018-04-16 VITALS
WEIGHT: 152.8 LBS | BODY MASS INDEX: 23.23 KG/M2 | SYSTOLIC BLOOD PRESSURE: 116 MMHG | HEART RATE: 76 BPM | DIASTOLIC BLOOD PRESSURE: 78 MMHG

## 2018-04-16 DIAGNOSIS — F33.1 MAJOR DEPRESSIVE DISORDER, RECURRENT EPISODE, MODERATE (H): ICD-10-CM

## 2018-04-16 DIAGNOSIS — F33.1 MODERATE EPISODE OF RECURRENT MAJOR DEPRESSIVE DISORDER (H): ICD-10-CM

## 2018-04-16 PROCEDURE — G0463 HOSPITAL OUTPT CLINIC VISIT: HCPCS | Mod: ZF

## 2018-04-16 RX ORDER — LORAZEPAM 0.5 MG/1
TABLET ORAL
Qty: 90 TABLET | Refills: 1 | Status: SHIPPED | OUTPATIENT
Start: 2018-04-16 | End: 2018-10-24

## 2018-04-16 RX ORDER — BUPROPION HYDROCHLORIDE 150 MG/1
150 TABLET ORAL EVERY MORNING
Qty: 180 TABLET | Refills: 3 | Status: SHIPPED | OUTPATIENT
Start: 2018-04-16 | End: 2018-12-06

## 2018-04-16 ASSESSMENT — PAIN SCALES - GENERAL: PAINLEVEL: NO PAIN (0)

## 2018-04-16 NOTE — PROGRESS NOTES
"  PSYCHIATRY CLINIC PROGRESS NOTE   The initial diagnostic evaluation was on 10/31/17.  Date of the most recent transfer of care paloma is NA.    Pertinent Background:  This patient first experienced mental health issues in childhood and has received treatment for depression and anxiety.  See transfer evaluation for detailed history.  Electrolytes nl on 10.5, Iron studies, TSH, B12, Vit D nl in 2/2017.  Last CBC 11/2016 nl    Psych critical item history includes suicidal ideation and SIB [cutting in high school].     INTERIM HISTORY                                                 Kasandra Lau is a 33 year old female who was last seen on 10/31/17 at which time aripiprazole was started.  The patient reports good treatment adherence.  History was provided by the patient who was a good historian.  Since the last visit:    Just got back from [place] [employer] conference, gave presentation on [employer] in [place]. Has been busy. Going to [place] for one month this summer and [place] for two months.     Mood: \"pretty stable\", trying to get things done, \"more or less\" able to get things done although below personal expectations.     Anxiety: Taking lorazepam a couple days a week, finds it helpful.     Semester finished in mid-May. Has a lot of grading left to do. Has to workshop proposals.     Social life:\"ok\"    RECENT SYMPTOMS:   DEPRESSION:  reports-low energy, insomnia , weight/ appetite change (high) and poor concentration /memory;  DENIES- suicidal ideation   ANXIETY:  excessive worry  EATING DISORDER: none    RECENT SUBSTANCE USE:     DEPRESSION:  reports-anhedonia, low energy and poor concentration /memory;  DENIES- suicidal ideation , depressed mood, insomnia , hypersomnia, weight/ appetite change (none), excessive guilt, feeling worthless, psychomotor change observed by others (none), feeling hopeless and feeling trapped  EATING DISORDER: none    RECENT SUBSTANCE USE:     ALCOHOL- rare "   TOBACCO- occasional cigarettes          CAFFEINE- no caffeine        OPIOIDS- none       NARCAN KIT-  N/A             CANNABIS- rare               OTHER ILLICIT DRUGS- none    CURRENT SOCIAL HISTORY:  FINANCIAL SUPPORT- working       CHILDREN- none       LIVING SITUATION- Housed with roommate in duplex      SOCIAL/ SPIRITUAL SUPPORT- none       FEELS SAFE AT HOME- Yes       MEDICAL ROS:  Reports night sweats, akathisia and none      Denies headaches, short term memory and/or word finding difficulty, wt gain and tremor and unusual movements, wt gain, sexual function problems [none] and Parkinsonian-type symptoms [shuffling gait, masked facies, stooped posture, speech change, writing change]    PSYCH and CD Critical Summary Points since July 2017           None    PAST PSYCH MED TRIALS   see EMR Problem List: Hx of psychiatric care    MEDICAL / SURGICAL HISTORY                                   CARE TEAM:          PCP- [facility]                 Therapist- Nancy Pineda since Sept.     Pregnant or breastfeeding:  No   Contraception- nuvaring    Neurologic Hx [head injury etc.]:  None known  Patient Active Problem List   Diagnosis     Pelvic pain in female     Vitamin D deficiency     Menorrhagia with regular cycle     Migraine without aura and without status migrainosus, not intractable     Iron deficiency anemia due to chronic blood loss     Tired     Circadian rhythm sleep disorder, delayed sleep phase type     Unhealthy sleep habit     Seasonal mood disorder (H)       ALLERGY                                Review of patient's allergies indicates no known allergies.  MEDICATIONS                               Current Outpatient Prescriptions   Medication Sig Dispense Refill     buPROPion (WELLBUTRIN XL) 150 MG 24 hr tablet Take 1 tablet (150 mg) by mouth every morning 30 tablet 3     etonogestrel-ethinyl estradiol (NUVARING) 0.12-0.015 MG/24HR vaginal ring Place 1 each vaginally every 28 days 3 each 3      FLUoxetine (PROZAC) 20 MG capsule Take 3 capsules (60 mg) by mouth At Bedtime 90 capsule 3     order for DME Use for 15-30 minutes every morning. 1 Units 0     verapamil (CALAN) 120 MG tablet Take 1 tablet (120 mg) by mouth daily 90 tablet 3     topiramate (TOPAMAX) 100 MG tablet Take 1 tablet (100 mg) by mouth daily 90 tablet 3     fexofenadine (ALLEGRA) 180 MG tablet        Acetaminophen (TYLENOL PO) Take 650 mg by mouth every 4 hours as needed for mild pain or fever       LORAZEPAM PO Take 1 mg by mouth every 8 hours as needed        FLUoxetine (PROZAC) 20 MG capsule Take 3 capsules (60 mg) by mouth daily 90 capsule 3     LORazepam (ATIVAN) 0.5 MG tablet 0.5-1 mg twice daily PRN anxiety.  Do not exceed 2 mg in 24 hours. 60 tablet 2     TOPIRAMATE PO Take 100 mg by mouth every evening        FLUOXETINE HCL PO Take 60 mg by mouth At Bedtime        VITALS   /78  Pulse 76  Wt 69.3 kg (152 lb 12.8 oz)  BMI 23.23 kg/m2   Pulse Readings from Last 3 Encounters:   04/16/18 76   01/24/18 77   11/15/17 89     Wt Readings from Last 3 Encounters:   04/16/18 69.3 kg (152 lb 12.8 oz)   01/24/18 67.1 kg (148 lb)   11/15/17 67.7 kg (149 lb 3.2 oz)     BP Readings from Last 3 Encounters:   04/16/18 116/78   01/24/18 109/74   11/15/17 115/77       MENTAL STATUS EXAM                                                             Alertness: alert  and oriented  Appearance: well groomed  Behavior/Demeanor: cooperative, pleasant and calm, with good  eye contact   Speech: normal and regular rate and rhythm  Language: intact  Psychomotor: normal or unremarkable  Mood: description consistent with euthymia  Affect: full range; was congruent to mood; was congruent to content  Thought Process/Associations: unremarkable  Thought Content:  Reports none;  Denies suicidal and violent ideation  Perception:  Reports none;  Denies auditory hallucinations and visual hallucinations  Insight: good  Judgment: good  Cognition: does  appear grossly  "intact; formal cognitive testing was not done    LABS and DATA   RATING SCALES:  N/A    PHQ9 TODAY = 3  PHQ-9 SCORE 11/15/2017 1/24/2018   Total Score 11 5             DIAGNOSIS        Moderate episode of recurrent major depressive disorder (H)  Seasonal mood disorder (H)    ASSESSMENT                                     32F h/o anxiety + depression previously well controlled on fluoxetine, childhood trauma, RLS, migraine, presenting for f/u.  Motivation, productivity continue improved, remains with some anxiety about future and also \"out of the blue\". VS wnl, exam improved with better grooming, brighter more reactive affect. .PHQ 9 much improved at 3.  Impression of MDD in early remission. No changes in plan today, CTM. Given four months supply of meds given travel abroad over summer.     SUICIDE RISK ASSESSMENT [details described above]:  Today Kasandra Lau reports no suicidal thought, intent or plan.  In addition, she has notable risk factors for self-harm including relationship conflict and prior remote SIB, SI.  However, risk is mitigated by no h/o suicide attempt, no plan or intent, no h/o risky impulsive behavior, no access to lethal means, describes a safety plan, h/o seeking help when needed, future oriented, none to minimal alcohol use  and good job situation.  Based on all available evidence she does not appear to be at imminent risk for self-harm therefore does not meet criteria for a 72-hr hold/  involuntary hospitalization.  However, based on degree of symptoms close psych FU was recommended which the pt did agree to.      [place] PRESCRIPTION MONITORING PROGRAM [] was not checked today:  not using controlled substances.    PSYCHOTROPIC DRUG INTERACTIONS:   Concurrent use of ARIPIPRAZOLE and FLUOXETINE may result in increased aripiprazole exposure and increased risk for QT-interval prolongation. .  MANAGEMENT:  Monitoring for adverse effects and using lowest therapeutic dose of [aripiprazole and " fluoxetine]         PLAN                                                                                                       1) PSYCHOTROPIC MEDICATIONS:    -Continue bupropion 150mg XL  -Continue fluoxetine 60mg daily.   -Continue lorazepam 0.5 mg PO PRN anxiety q12hrs; currently using 2-3x/wk.   -Defer topiramate and verapamil for migraine to neurology    2) THERAPY:    Start  TREATMENT PLAN   Date revised  -  NA   Date next due- NA    3) NEXT DUE:     Labs- NA  EKG- N/A   Rating Scales- N/A    4) REFERRALS:    psychotherapy    5) RTC: 4wks    6) CRISIS NUMBERS:   Provided routinely in AVS.  Especially emphasized:  [facility] [phone] (clinic)    [phone] (after hours)  TREATMENT RISK STATEMENT:  The risks, benefits, alternatives and potential adverse effects have been discussed and are understood by the pt. The pt understands the risks of using street drugs or alcohol. There are no medical contraindications, the pt agrees to treatment with the ability to do so. The pt knows to call the clinic for any problems or to access emergency care if needed.  Medical and substance use concerns are documented above.  Psychotropic drug interaction check was done, including changes made today.    PSYCHIATRY CLINIC INDIVIDUAL PSYCHOTHERAPY NOTE                                    16   Start time: 0940  End time: 1000  Subjective: This supportive psychotherapy session addressed issues related to current stressors consisting of graduate school, relationship pending divorce and work increased work demands, anxiety about career future.  Patient's reaction: Contemplation in the context of mental status appropriate for ambulatory setting.  Progress: fair  Plan: RTC 3 mo  Psychotherapy services during this visit included  myself and naye Lau  TREATMENT  PLAN          SYMPTOMS;PROBLEMS   MEASURABLE GOALS;    FUNCTIONAL IMPROVEMENT INTERVENTIONS;    GAINS MADE DISCHARGE CRITERIA   Depression: decreased motivation    report feeling more positive about self and abilities Medication, therapy; improved productiviy marked symptom improvement   Anxiety: anxious about school   decreased intensity in the mornings Medications, therapy, anxitey reduced marked symptom improvement   Panic Attack: SOB   less frequent Medications, therapy, no panic attacks marked symptom improvement   RESIDENT:   Boo Mercado MD      Patient not staffed in clinic.  Note will be reviewed and signed by supervisor Dr. Lopez          I did not see this patient directly. I have reviewed the documentation and I agree with the resident's plan of care.     Marley Lopez MD

## 2018-04-16 NOTE — MR AVS SNAPSHOT
After Visit Summary   4/16/2018    Kasandra Lau    MRN: [id number]           Patient Information     Date Of Birth          1985        Visit Information        Provider Department      4/16/2018 9:15 AM Boo Mercado MD Psychiatry Clinic        Today's Diagnoses     Major depressive disorder, recurrent episode, moderate (H)        Moderate episode of recurrent major depressive disorder (H)           Follow-ups after your visit        Follow-up notes from your care team     Return in about 3 months (around 7/16/2018).      Your next 10 appointments already scheduled     Oct 04, 2018 10:30 AM CDT   (Arrive by 10:15 AM)   Return Visit with Jevon Loyd MD   [facility] Neu[facility] ([facility])    [address] [address] [phone]              Who to contact     Please call your clinic at [phone] to:    Ask questions about your health    Make or cancel appointments    Discuss your medicines    Learn about your test results    Speak to your doctor            Additional Information About Your Visit        [employer] Information     [employer] gives you secure access to your electronic health record. If you see a primary care provider, you can also send messages to your care team and make appointments. If you have questions, please call your primary care clinic.  If you do not have a primary care provider, please call [phone] and they will assist you.      [employer] is an electronic gateway that provides easy, online access to your medical records. With [employer], you can request a clinic appointment, read your test results, renew a prescription or communicate with your care team.     To access your existing account, please contact your [facility] or call [phone] for assistance.        Care EveryWhere ID     This is your Care EveryWhere ID. This could be used by other organizations to access  your [facility] medical records  [id number]        Your Vitals Were     Pulse BMI (Body Mass Index)                76 23.23 kg/m2           Blood Pressure from Last 3 Encounters:   04/16/18 116/78   01/24/18 109/74   11/15/17 115/77    Weight from Last 3 Encounters:   04/16/18 69.3 kg (152 lb 12.8 oz)   01/24/18 67.1 kg (148 lb)   11/15/17 67.7 kg (149 lb 3.2 oz)              Today, you had the following     No orders found for display         Today's Medication Changes          These changes are accurate as of 4/16/18 11:59 PM.  If you have any questions, ask your nurse or doctor.               These medicines have changed or have updated prescriptions.        Dose/Directions    * FLUoxetine 20 MG capsule   Commonly known as:  PROzac   This may have changed:  Another medication with the same name was changed. Make sure you understand how and when to take each.   Used for:  Major depressive disorder, recurrent episode, moderate (H)   Changed by:  Boo Mercado MD        Dose:  60 mg   Take 3 capsules (60 mg) by mouth daily   Quantity:  90 capsule   Refills:  3       * FLUoxetine 20 MG capsule   Commonly known as:  PROzac   This may have changed:  when to take this   Used for:  Moderate episode of recurrent major depressive disorder (H)   Changed by:  Boo Mercado MD        Dose:  60 mg   Take 3 capsules (60 mg) by mouth daily   Quantity:  540 capsule   Refills:  1       * Notice:  This list has 2 medication(s) that are the same as other medications prescribed for you. Read the directions carefully, and ask your doctor or other care provider to review them with you.         Where to get your medicines      These medications were sent to [facility] [id number] [place], [place] - [address] [place] [id number]     Phone:  [phone]     buPROPion 150 MG 24 hr tablet    FLUoxetine 20 MG capsule         Some of these will need a paper prescription and others can be bought over the  counter.  Ask your nurse if you have questions.     Bring a paper prescription for each of these medications     LORazepam 0.5 MG tablet                Primary Care Provider Office Phone # Fax #    [facility] [phone] [address] [facility] [address]        Equal Access to Services     MEE ESPARZA : Hadii aad ku hadevetteelsa Sonatalioali, waaxda luqadaha, qaybta kaalmada adeegyada, moe duronhalleymadan redman. So [facility] [phone].    ATENCIÓN: Si habla español, tiene a esteban disposición servicios gratuitos de asistencia lingüística. Lety al [phone].    We comply with applicable federal civil rights laws and [place] laws. We do not discriminate on the basis of race, color, national origin, age, disability, sex, sexual orientation, or gender identity.            Thank you!     Thank you for choosing PSYCHIATRY CLINIC  for your care. Our goal is always to provide you with excellent care. Hearing back from our patients is one way we can continue to improve our services. Please take a few minutes to complete the written survey that you may receive in the mail after your visit with us. Thank you!             Your Updated Medication List - Protect others around you: Learn how to safely use, store and throw away your medicines at www.disposemymeds.org.          This list is accurate as of 4/16/18 11:59 PM.  Always use your most recent med list.                   Brand Name Dispense Instructions for use Diagnosis    buPROPion 150 MG 24 hr tablet    WELLBUTRIN XL    180 tablet    Take 1 tablet (150 mg) by mouth every morning    Major depressive disorder, recurrent episode, moderate (H)       etonogestrel-ethinyl estradiol 0.12-0.015 MG/24HR vaginal ring    NUVARING    3 each    Place 1 each vaginally every 28 days    Encounter for surveillance of vaginal ring hormonal contraceptive device       fexofenadine 180 MG tablet    ALLEGRA          * FLUoxetine 20 MG capsule     PROzac    90 capsule    Take 3 capsules (60 mg) by mouth daily    Major depressive disorder, recurrent episode, moderate (H)       * FLUoxetine 20 MG capsule    PROzac    540 capsule    Take 3 capsules (60 mg) by mouth daily    Moderate episode of recurrent major depressive disorder (H)       FLUOXETINE HCL PO      Take 60 mg by mouth At Bedtime        * LORAZEPAM PO      Take 1 mg by mouth every 8 hours as needed        * LORazepam 0.5 MG tablet    ATIVAN    90 tablet    0.5-1 mg twice daily PRN anxiety.  Do not exceed 2 mg in 24 hours.    Moderate episode of recurrent major depressive disorder (H)       order for DME     1 Units    Use for 15-30 minutes every morning.    Moderate episode of recurrent major depressive disorder (H)       * TOPIRAMATE PO      Take 100 mg by mouth every evening        * topiramate 100 MG tablet    TOPAMAX    90 tablet    Take 1 tablet (100 mg) by mouth daily    Migraine without aura and without status migrainosus, not intractable       TYLENOL PO      Take 650 mg by mouth every 4 hours as needed for mild pain or fever        verapamil 120 MG tablet    CALAN    90 tablet    Take 1 tablet (120 mg) by mouth daily    Migraine without aura and without status migrainosus, not intractable       * Notice:  This list has 6 medication(s) that are the same as other medications prescribed for you. Read the directions carefully, and ask your doctor or other care provider to review them with you.

## 2018-04-17 ASSESSMENT — PATIENT HEALTH QUESTIONNAIRE - PHQ9: SUM OF ALL RESPONSES TO PHQ QUESTIONS 1-9: 3

## 2018-08-14 ENCOUNTER — TELEPHONE (OUTPATIENT)
Dept: PSYCHIATRY | Facility: CLINIC | Age: 33
End: 2018-08-14

## 2018-08-14 NOTE — LETTER
August 15, 2018      RE: Kasandra Lau  [address] [place] [address]         To Whom it May Concern,    Ms. Lau is a patient under my care. She would benefit from an accomodation for an emotional support dog for air travel. Please contact me if you have any further questions.       Sincerely,      Boo Mercado MD

## 2018-08-14 NOTE — TELEPHONE ENCOUNTER
[facility] Call Center    Phone Message    May a detailed message be left on voicemail: yes    Reason for Call: Form or Letter   Type or form/letter needing completion: Emotional Support Animal for overseas flight. Pt is already in [place].   Provider: Dr. Mercado (Will see  in Sept)  Date form needed: Within the next week  Once completed: Email at [email or web address]      Action Taken: Other: Angie Stokes

## 2018-08-15 NOTE — TELEPHONE ENCOUNTER
" Message  Received: Today       Boo Mercado MD Pratt, Laura, RN; Madison Ferrer MD       Caller: Unspecified (Yesterday, 10:58 AM)                     Angie please draft a letter saying \"Ms. Lau would benefit from an accomodation for an emotional support dog for air travel. Please contact me with any further questions.\"      -Writer drafted letter and routed letter to provider for review and completion.   "

## 2018-08-16 NOTE — TELEPHONE ENCOUNTER
-Received incoming email with JARON for letter to be sent to pt's email of [email or web address]. Copy made and held in clinic. Original sent to medical records.

## 2018-08-20 NOTE — TELEPHONE ENCOUNTER
-Received incoming phone call from pt at [phone]. Pt says she has still not received a signed letter via email for the emotional support animal. She will be leaving in 1 week and needs to hand in all forms by the end of this week. Writer agreed to reach out to Dr. Mercado as a reminder. Will call pt back with an update at the number provided. Requested she c/b tomorrow if she has not heard from writer with an update. Pt agreed to this.

## 2018-08-20 NOTE — TELEPHONE ENCOUNTER
Message  Received: Today       Boo Mercado MD Pratt, Laura, RN; Madison Ferrer MD       Caller: Unspecified (6 days ago, 10:58 AM)                     I completed the letter in [employer] but I don't come to [facility] until tomorrow afternoon. Can you print it for me?       -Writer printed letter per provider's request. Placed in provider's folder for signature. Message routed to provider. Called pt with update. Pt okay with waiting until tomorrow to received signed letter.

## 2018-08-22 ENCOUNTER — TELEPHONE (OUTPATIENT)
Dept: PSYCHIATRY | Facility: CLINIC | Age: 33
End: 2018-08-22

## 2018-08-22 NOTE — TELEPHONE ENCOUNTER
[facility] Call Center    Phone Message    May a detailed message be left on voicemail: yes    Reason for Call: Other: Pt is calling back on update for emotional support letter. Please call pt back w/ update.      Action Taken: Other: Angie Stokes

## 2018-08-22 NOTE — TELEPHONE ENCOUNTER
Message  Received: Today       Boo Mercado MD Pratt, Laura, RN; Madison Ferrer MD       Caller: Unspecified (Today,  9:40 AM)                     It's signed and in your folder.       -Writer received signed letter. Emailed to [email or web address]. Called pt and informed her that it will be emailed today.

## 2018-09-06 ENCOUNTER — TELEPHONE (OUTPATIENT)
Dept: PSYCHIATRY | Facility: CLINIC | Age: 33
End: 2018-09-06

## 2018-09-06 DIAGNOSIS — F33.1 MODERATE EPISODE OF RECURRENT MAJOR DEPRESSIVE DISORDER (H): ICD-10-CM

## 2018-09-06 NOTE — TELEPHONE ENCOUNTER
-Writer received incoming phone call from the pharmacist with [facility] in [place] requesting a new order for the pt's fluoxetine as the provider's license has /is no longer valid in the state of [place]. Writer agreed to send two week supply to get pt by to upcoming appt with Dr. Ferrer on . When reviewing chart, pt appears to be compliant with attending appts. Message routed to providers as FYI.

## 2018-10-02 DIAGNOSIS — F33.1 MODERATE EPISODE OF RECURRENT MAJOR DEPRESSIVE DISORDER (H): ICD-10-CM

## 2018-10-02 NOTE — TELEPHONE ENCOUNTER
Medication requested:  FLUoxetine (PROZAC) 20 MG   Last refilled: 9/6/18  Qty: 42    Last seen: 4/16/18  RTC: 4 WKS/3 MOS ( BOTH IN NOTE)       Cancel:  X 1  No-show: 0  Next appt: 10/26/18   * Routing refill request to provider for review/approval because:  Pt outside of RTC timeframe WITH CANCEL X 1    #

## 2018-10-12 ENCOUNTER — OFFICE VISIT (OUTPATIENT)
Dept: NEUROLOGY | Facility: CLINIC | Age: 33
End: 2018-10-12
Payer: COMMERCIAL

## 2018-10-12 VITALS
BODY MASS INDEX: 23.57 KG/M2 | HEART RATE: 86 BPM | OXYGEN SATURATION: 98 % | SYSTOLIC BLOOD PRESSURE: 109 MMHG | DIASTOLIC BLOOD PRESSURE: 77 MMHG | HEIGHT: 69 IN | WEIGHT: 159.1 LBS

## 2018-10-12 DIAGNOSIS — E55.9 VITAMIN D DEFICIENCY: ICD-10-CM

## 2018-10-12 DIAGNOSIS — R51.9 HEADACHE DISORDER: ICD-10-CM

## 2018-10-12 DIAGNOSIS — G43.009 MIGRAINE WITHOUT AURA AND WITHOUT STATUS MIGRAINOSUS, NOT INTRACTABLE: ICD-10-CM

## 2018-10-12 DIAGNOSIS — R51.9 HEADACHE DISORDER: Primary | ICD-10-CM

## 2018-10-12 LAB
ALBUMIN SERPL-MCNC: 3.5 G/DL (ref 3.4–5)
ALP SERPL-CCNC: 65 U/L (ref 40–150)
ALT SERPL W P-5'-P-CCNC: 22 U/L (ref 0–50)
ANION GAP SERPL CALCULATED.3IONS-SCNC: 8 MMOL/L (ref 3–14)
AST SERPL W P-5'-P-CCNC: 11 U/L (ref 0–45)
BILIRUB SERPL-MCNC: 0.2 MG/DL (ref 0.2–1.3)
BUN SERPL-MCNC: 10 MG/DL (ref 7–30)
CALCIUM SERPL-MCNC: 8.6 MG/DL (ref 8.5–10.1)
CHLORIDE SERPL-SCNC: 109 MMOL/L (ref 94–109)
CO2 SERPL-SCNC: 22 MMOL/L (ref 20–32)
CREAT SERPL-MCNC: 0.68 MG/DL (ref 0.52–1.04)
GFR SERPL CREATININE-BSD FRML MDRD: >90 ML/MIN/1.7M2
GLUCOSE SERPL-MCNC: 122 MG/DL (ref 70–99)
POTASSIUM SERPL-SCNC: 3.6 MMOL/L (ref 3.4–5.3)
PROT SERPL-MCNC: 7.8 G/DL (ref 6.8–8.8)
SODIUM SERPL-SCNC: 139 MMOL/L (ref 133–144)

## 2018-10-12 RX ORDER — TOPIRAMATE 100 MG/1
100 TABLET, FILM COATED ORAL DAILY
Qty: 90 TABLET | Refills: 3 | Status: SHIPPED | OUTPATIENT
Start: 2018-10-12 | End: 2020-04-27 | Stop reason: SINTOL

## 2018-10-12 RX ORDER — TOPIRAMATE 25 MG/1
TABLET, FILM COATED ORAL
Qty: 120 TABLET | Refills: 3 | Status: SHIPPED | OUTPATIENT
Start: 2018-10-12 | End: 2019-07-17

## 2018-10-12 ASSESSMENT — PAIN SCALES - GENERAL: PAINLEVEL: NO PAIN (0)

## 2018-10-12 NOTE — NURSING NOTE
Chief Complaint   Patient presents with     RECHECK     [facility] RETURN - ANNUAL F/U       Manish Amor, EMT

## 2018-10-12 NOTE — LETTER
10/12/2018       RE: Kasandra Lau  [address]  [place] [address]     Dear Colleague,    Thank you for referring your patient, Kasandra Lau, to the [facility] NEUROLOGY at [facility]. Please see a copy of my visit note below.    Service Date: 10/12/2018      Sandy Kruger MD   [facility]    [address] [place] [address]      RE: Kasandra Lau   MRN: [id number]   : 1985      Dear Dr. Kruger:      This is in regard to followup on Kasandra Lau.  The patient returned today with a chief complaint of headache.      The patient said she was well this summer when she was doing work in [place].  She said she enjoyed it.  When she returned to [place] to resume her studies here, her headaches returned.  She was forthright with me, and she really does not like the climate here and feels she [place].  She knows she has to stay here for at least 3 years to complete her course work and also her dissertation.  She reviewed with me her work in [place] and her enjoyment there.  The patient did tell me that she is now getting headaches at least 2 times per week.  She awakens with these.  She said they are dull and they go away over a number of hours.  At times she can feel dizzy with them.  She has used Tylenol for them.  Otherwise, the patient does continue back on Wellbutrin 150 mg.  She was forthright with me, and she said that her depression was better this summer but started again returning here.  She is not suicidal.  The medication was restarted then here.  She continues on p.r.n. Allegra, Prozac 60 mg, topiramate 100 mg, which she feels has helped in the past and verapamil 120 mg, which she thinks has helped but to a lesser extent for her headaches.  She does take 4000 international units of vitamin D.  She gained possibly some weight this summer.  She is 5 feet 8-1/2 inches tall and  "weighs 155 pounds.  She is aware that she needs to have eye grounds checked and tests for glaucoma at least yearly taking topiramate and will make arrangements for an eye exam.  She reviewed with me her use of caffeinated tea.  She probably has eaten some foods with monosodium glutamate.  She notes that high humidity also increases her headache tendency.  She did review with me seeing her prior neurologist and having an MRI scan done which evidently was unremarkable.      Neurologic examination showed that the patient's blood pressure was 109/77 with a pulse of 86.  She had normal eyegrounds, good extraocular movements.  Her lungs were clear to auscultation.  She did not have cervical bruits.  Neck was supple.  She had regular cardiac rhythm without gallops or murmurs.      IMPRESSION:  Common migraine.      The patient has common migraine and she understands that this condition is probably a so-called \"threshold one.\"  She understands that the combination of factors including returning here and decreased sunlight as well as stress probably have made her headaches worse.  She is interested in going to a higher dose of topiramate.  I have given her a prescription to go to 125 mg over the next few weeks, and then 250 mg.  She is going to return here in 2 months and on a p.r.n. basis.  If she is not better, I have talked with her about formal referral within the Department of Neurology here at [facility] to a headache specialist.  She is going to try to change her lifestyle.  She did assure me she is exercising when walking her dogs, but will try to drink less caffeinated beverage per day.      The patient did tell me that she is dating again and she has remained friends with her , although they are in the process of getting .      The patient did assure me she is drinking 6-8 glasses of fluid a day and is aware of the risk of renal lithiasis taking topiramate.  She is also aware of the risk of teratogenicity " on the medication and assured me she does have an IUD in place.  The patient denied to me any HIV risk factors.      Thank you for allowing me to see this patient.       Sincerely yours,       Vandana Loyd MD      I spent 25 minutes with the patient today.  Over 50% of the time this involved counseling and coordination of care.         VANDANA LOYD MD             D: 10/13/2018   T: 10/13/2018   MT: AKA      Name:     HECTOR AVILES   MRN:      [id number]        Account:      [id number]   :      1985           Service Date: 10/12/2018      Document: [id number]

## 2018-10-12 NOTE — MR AVS SNAPSHOT
After Visit Summary   10/12/2018    Kasandra Lau    MRN: [id number]           Patient Information     Date Of Birth          1985        Visit Information        Provider Department      10/12/2018 9:30 AM Jevon Loyd MD [facility] Neurology        Today's Diagnoses     Headache disorder    -  1    Migraine without aura and without status migrainosus, not intractable        Vitamin D deficiency           Follow-ups after your visit        Follow-up notes from your care team     Return in about 2 months (around 12/12/2018).      Your next 10 appointments already scheduled     Oct 12, 2018 11:15 AM CDT   LAB with  LAB   [facility] ([facility])    [address] [place] [address] [phone]           Please do not eat 10-12 hours before your appointment if you are coming in fasting for labs on lipids, cholesterol, or glucose (sugar). This does not apply to pregnant women. Water, hot tea and black coffee (with nothing added) are okay. Do not drink other fluids, diet soda or chew gum.            Oct 26, 2018  3:10 PM CDT   Adult Med Follow UP with Madison Ferrer MD   Psychiatry Clinic ([facility])    [place] [id number]  [address] [id number] [phone]            Dec 13, 2018  1:30 PM CST   (Arrive by 1:15 PM)   Return Visit with Jevon Loyd MD   [facility] Neurology ([facility])    [address] [place] [address] [phone]              Future tests that were ordered for you today     Open Future Orders        Priority Expected Expires Ordered    Vitamin D Deficiency Routine 10/12/2018 10/12/2019 10/12/2018    Comprehensive metabolic panel Routine 10/12/2018 10/12/2019 10/12/2018            Who to contact     Please call your clinic at [phone] to:    Ask questions about your health    Make or cancel  "appointments    Discuss your medicines    Learn about your test results    Speak to your doctor            Additional Information About Your Visit        [employer] Information     [employer] gives you secure access to your electronic health record. If you see a primary care provider, you can also send messages to your care team and make appointments. If you have questions, please call your primary care clinic.  If you do not have a primary care provider, please call [phone] and they will assist you.      [employer] is an electronic gateway that provides easy, online access to your medical records. With [employer], you can request a clinic appointment, read your test results, renew a prescription or communicate with your care team.     To access your existing account, please contact your [facility] or call [phone] for assistance.        Care EveryWhere ID     This is your Care EveryWhere ID. This could be used by other organizations to access your [place] medical records  [id number]        Your Vitals Were     Pulse Height Pulse Oximetry BMI (Body Mass Index)          86 1.74 m (5' 8.5\") 98% 23.84 kg/m2         Blood Pressure from Last 3 Encounters:   10/12/18 109/77   10/10/17 112/73   10/05/17 108/72    Weight from Last 3 Encounters:   10/12/18 72.2 kg (159 lb 1.6 oz)   10/10/17 68 kg (150 lb)   10/05/17 67.9 kg (149 lb 12.8 oz)                 Today's Medication Changes          These changes are accurate as of 10/12/18 10:33 AM.  If you have any questions, ask your nurse or doctor.               These medicines have changed or have updated prescriptions.        Dose/Directions    * TOPIRAMATE PO   This may have changed:  Another medication with the same name was added. Make sure you understand how and when to take each.   Changed by:  Jevon Loyd MD        Dose:  100 mg   Take 100 mg by mouth every evening   Refills:  0       * topiramate 25 MG tablet   Commonly known " as:  TOPAMAX   This may have changed:  You were already taking a medication with the same name, and this prescription was added. Make sure you understand how and when to take each.   Used for:  Headache disorder   Changed by:  Jevon Loyd MD        Add 25 mg po hs daily for 2 weeks to prior 100mg dose, then 50mg hs daily with 100mg dose   Quantity:  120 tablet   Refills:  3       * topiramate 100 MG tablet   Commonly known as:  TOPAMAX   This may have changed:  Another medication with the same name was added. Make sure you understand how and when to take each.   Used for:  Migraine without aura and without status migrainosus, not intractable   Changed by:  Jevon Loyd MD        Dose:  100 mg   Take 1 tablet (100 mg) by mouth daily   Quantity:  90 tablet   Refills:  3       * Notice:  This list has 3 medication(s) that are the same as other medications prescribed for you. Read the directions carefully, and ask your doctor or other care provider to review them with you.         Where to get your medicines      These medications were sent to [address] [address]     Phone:  [phone]     topiramate 100 MG tablet    topiramate 25 MG tablet                Primary Care Provider Office Phone # Fax #    [facility] [phone] [address] [facility] [address]        Equal Access to Services     MEE ESPARZA : Hadct Brunson, waaxda luqadaha, qaybta kaalmada bob, moe redman. So [facility] [phone].    ATENCIÓN: Si habla español, tiene a esteban disposición servicios gratuitos de asistencia lingüística. Lety al [phone].    We comply with applicable federal civil rights laws and [place] laws. We do not discriminate on the basis of race, color, national origin, age, disability, sex, sexual orientation, or gender identity.             Thank you!     Thank you for choosing [facility] NEUROLOGY  for your care. Our goal is always to provide you with excellent care. Hearing back from our patients is one way we can continue to improve our services. Please take a few minutes to complete the written survey that you may receive in the mail after your visit with us. Thank you!             Your Updated Medication List - Protect others around you: Learn how to safely use, store and throw away your medicines at www.disposemymeds.org.          This list is accurate as of 10/12/18 10:33 AM.  Always use your most recent med list.                   Brand Name Dispense Instructions for use Diagnosis    buPROPion 150 MG 24 hr tablet    WELLBUTRIN XL    180 tablet    Take 1 tablet (150 mg) by mouth every morning    Major depressive disorder, recurrent episode, moderate (H)       etonogestrel-ethinyl estradiol 0.12-0.015 MG/24HR vaginal ring    NUVARING    3 each    Place 1 each vaginally every 28 days    Encounter for surveillance of vaginal ring hormonal contraceptive device       fexofenadine 180 MG tablet    ALLEGRA     180 mg daily        FLUoxetine 20 MG capsule    PROzac    70 capsule    Take 3 capsules (60 mg) by mouth daily * RF UNTIL 10/26/18 APPT.    Moderate episode of recurrent major depressive disorder (H)       FLUOXETINE HCL PO      Take 60 mg by mouth At Bedtime        * LORAZEPAM PO      Take 1 mg by mouth every 8 hours as needed        * LORazepam 0.5 MG tablet    ATIVAN    90 tablet    0.5-1 mg twice daily PRN anxiety.  Do not exceed 2 mg in 24 hours.    Moderate episode of recurrent major depressive disorder (H)       order for DME     1 Units    Use for 15-30 minutes every morning.    Moderate episode of recurrent major depressive disorder (H)       * TOPIRAMATE PO      Take 100 mg by mouth every evening        * topiramate 25 MG tablet    TOPAMAX    120 tablet    Add 25 mg po hs daily for 2 weeks to prior 100mg dose, then 50mg hs daily with  100mg dose    Headache disorder       * topiramate 100 MG tablet    TOPAMAX    90 tablet    Take 1 tablet (100 mg) by mouth daily    Migraine without aura and without status migrainosus, not intractable       TYLENOL PO      Take 650 mg by mouth every 4 hours as needed for mild pain or fever        verapamil 120 MG tablet    CALAN    90 tablet    Take 1 tablet (120 mg) by mouth daily    Migraine without aura and without status migrainosus, not intractable       * Notice:  This list has 5 medication(s) that are the same as other medications prescribed for you. Read the directions carefully, and ask your doctor or other care provider to review them with you.

## 2018-10-13 LAB — DEPRECATED CALCIDIOL+CALCIFEROL SERPL-MC: 63 UG/L (ref 20–75)

## 2018-10-13 NOTE — PROGRESS NOTES
Service Date: 10/12/2018      Sandy Kruger MD   [address] [address]      RE: Kasandra Lau   MRN: [id number]   : 1985      Dear Dr. Kruger:      This is in regard to followup on Kasandra Lau.  The patient returned today with a chief complaint of headache.      The patient said she was well this summer when she was doing work in [place].  She said she enjoyed it.  When she returned to [place] to resume her studies here, her headaches returned.  She was forthright with me, and she really does not like the climate here and feels [place].  She knows she has to stay here for at least 3 years to complete her course work and also her dissertation.  She reviewed with me her work in [place] and her enjoyment there.  The patient did tell me that she is now getting headaches at least 2 times per week.  She awakens with these.  She said they are dull and they go away over a number of hours.  At times she can feel dizzy with them.  She has used Tylenol for them.  Otherwise, the patient does continue back on Wellbutrin 150 mg.  She was forthright with me, and she said that her depression was better this summer but started again returning here.  She is not suicidal.  The medication was restarted then here.  She continues on p.r.n. Allegra, Prozac 60 mg, topiramate 100 mg, which she feels has helped in the past and verapamil 120 mg, which she thinks has helped but to a lesser extent for her headaches.  She does take 4000 international units of vitamin D.  She gained possibly some weight this summer.  She is 5 feet 8-1/2 inches tall and weighs 155 pounds.  She is aware that she needs to have eye grounds checked and tests for glaucoma at least yearly taking topiramate and will make arrangements for an eye exam.  She reviewed with me her use of caffeinated tea.  She probably has eaten some foods with monosodium  "glutamate.  She notes that high humidity also increases her headache tendency.  She did review with me seeing her prior neurologist and having an MRI scan done which evidently was unremarkable.      Neurologic examination showed that the patient's blood pressure was 109/77 with a pulse of 86.  She had normal eyegrounds, good extraocular movements.  Her lungs were clear to auscultation.  She did not have cervical bruits.  Neck was supple.  She had regular cardiac rhythm without gallops or murmurs.      IMPRESSION:  Common migraine.      The patient has common migraine and she understands that this condition is probably a so-called \"threshold one.\"  She understands that the combination of factors including returning here and decreased sunlight as well as stress probably have made her headaches worse.  She is interested in going to a higher dose of topiramate.  I have given her a prescription to go to 125 mg over the next few weeks, and then 250 mg.  She is going to return here in 2 months and on a p.r.n. basis.  If she is not better, I have talked with her about formal referral within the Department of Neurology here at [facility] to a headache specialist.  She is going to try to change her lifestyle.  She did assure me she is exercising when walking her dogs, but will try to drink less caffeinated beverage per day.      The patient did tell me that she is dating again and she has remained friends with her , although they are in the process of getting .      The patient did assure me she is drinking 6-8 glasses of fluid a day and is aware of the risk of renal lithiasis taking topiramate.  She is also aware of the risk of teratogenicity on the medication and assured me she does have an IUD in place.  The patient denied to me any HIV risk factors.      Thank you for allowing me to see this patient.       Sincerely yours,       Jevon Loyd MD      I spent 25 minutes with the patient today.  Over 50% of the " time this involved counseling and coordination of care.         VANDANA LEON MD             D: 10/13/2018   T: 10/13/2018   MT: AKA      Name:     HECTOR AVILES   MRN:      [id number]        Account:      [id number]   :      1985           Service Date: 10/12/2018      Document: [id number]

## 2018-10-17 ENCOUNTER — TELEPHONE (OUTPATIENT)
Dept: NEUROLOGY | Facility: CLINIC | Age: 33
End: 2018-10-17

## 2018-10-17 NOTE — TELEPHONE ENCOUNTER
----- Message from Jevon Loyd MD sent at 10/17/2018  2:50 PM CDT -----  Blood tests fine ; glucose 126 ? nonfasting.

## 2018-10-17 NOTE — TELEPHONE ENCOUNTER
Left a voicemail with patient about results that Dr. Loyd sent for me to inform the patient, and if they have any questions to call us back at our clinic number, [phone] option #3.      I inquired in the voicemail, if she was fasting or not for the glucose test because this can change results since it was a little high. I also mentioned that she has an upcoming appt with Dr. Loyd on 12/13/0218 and she can ask him any questions she may have at this upcoming appt.

## 2018-10-18 ENCOUNTER — OFFICE VISIT (OUTPATIENT)
Dept: OPHTHALMOLOGY | Facility: CLINIC | Age: 33
End: 2018-10-18
Payer: COMMERCIAL

## 2018-10-18 DIAGNOSIS — H04.123 DRY EYE SYNDROME OF BOTH EYES: Primary | ICD-10-CM

## 2018-10-18 DIAGNOSIS — H52.13 MYOPIA OF BOTH EYES: ICD-10-CM

## 2018-10-18 ASSESSMENT — VISUAL ACUITY
METHOD: SNELLEN - LINEAR
OD_CC: 20/20
CORRECTION_TYPE: GLASSES
OS_CC: 20/20
OS_CC+: -1

## 2018-10-18 ASSESSMENT — EXTERNAL EXAM - LEFT EYE: OS_EXAM: NORMAL

## 2018-10-18 ASSESSMENT — REFRACTION_WEARINGRX
OD_SPHERE: -1.00
SPECS_TYPE: SVL
OS_AXIS: 080
OD_AXIS: 080
OS_SPHERE: -1.75
OS_CYLINDER: +1.00
OD_CYLINDER: +0.50

## 2018-10-18 ASSESSMENT — CONF VISUAL FIELD
OS_NORMAL: 1
OD_NORMAL: 1
METHOD: COUNTING FINGERS

## 2018-10-18 ASSESSMENT — TONOMETRY
IOP_METHOD: ICARE
OD_IOP_MMHG: 17
OS_IOP_MMHG: 18

## 2018-10-18 ASSESSMENT — SLIT LAMP EXAM - LIDS
COMMENTS: NORMAL
COMMENTS: NORMAL

## 2018-10-18 ASSESSMENT — REFRACTION_MANIFEST
OS_SPHERE: -2.00
OD_AXIS: 090
OD_CYLINDER: +0.75
OS_AXIS: 090
OS_CYLINDER: +1.25
OD_SPHERE: -1.25

## 2018-10-18 ASSESSMENT — CUP TO DISC RATIO
OS_RATIO: 0.55
OD_RATIO: 0.55

## 2018-10-18 ASSESSMENT — EXTERNAL EXAM - RIGHT EYE: OD_EXAM: NORMAL

## 2018-10-18 NOTE — MR AVS SNAPSHOT
After Visit Summary   10/18/2018    Kasandra Lau    MRN: [id number]           Patient Information     Date Of Birth          1985        Visit Information        Provider Department      10/18/2018 5:00 PM Nohemi Smith MD [facility] Ophthalmology        Today's Diagnoses     Dry eye syndrome of both eyes    -  1    Myopia of both eyes           Follow-ups after your visit        Your next 10 appointments already scheduled     Oct 24, 2018  8:00 AM CDT   Adult Med Follow UP with Jose Carlos Delcid MD   Psychiatry Clinic ([facility])    [address]  [address] [address]   [phone]            Dec 13, 2018  1:30 PM CST   (Arrive by 1:15 PM)   Return Visit with Jevon Loyd MD   [facility] Neurology ([facility])    [address] [address] [phone]              Who to contact     Please call your clinic at [phone] to:    Ask questions about your health    Make or cancel appointments    Discuss your medicines    Learn about your test results    Speak to your doctor            Additional Information About Your Visit        MyChart Information     [employer] gives you secure access to your electronic health record. If you see a primary care provider, you can also send messages to your care team and make appointments. If you have questions, please call your primary care clinic.  If you do not have a primary care provider, please call [phone] and they will assist you.      [employer] is an electronic gateway that provides easy, online access to your medical records. With [employer], you can request a clinic appointment, read your test results, renew a prescription or communicate with your care team.     To access your existing account, please contact your [facility] or call [phone] for assistance.        Care EveryWhere ID     This  is your Care EveryWhere ID. This could be used by other organizations to access your [place] medical records  [id number]         Blood Pressure from Last 3 Encounters:   10/12/18 109/77   10/10/17 112/73   10/05/17 108/72    Weight from Last 3 Encounters:   10/12/18 72.2 kg (159 lb 1.6 oz)   10/10/17 68 kg (150 lb)   10/05/17 67.9 kg (149 lb 12.8 oz)              Today, you had the following     No orders found for display       Primary Care Provider Office Phone # Fax #    [facility] [phone] [address] [facility] [address]        Equal Access to Services     MEE ESPARZA : Mariel Brunson, wadenisse mejia, qaybta kaalmada bob, moe redman. So [facility] [phone].    ATENCIÓN: Si habla español, tiene a esteban disposición servicios gratuitos de asistencia lingüística. Llame al [phone].    We comply with applicable federal civil rights laws and [place] laws. We do not discriminate on the basis of race, color, national origin, age, disability, sex, sexual orientation, or gender identity.            Thank you!     Thank you for choosing [facility]  for your care. Our goal is always to provide you with excellent care. Hearing back from our patients is one way we can continue to improve our services. Please take a few minutes to complete the written survey that you may receive in the mail after your visit with us. Thank you!             Your Updated Medication List - Protect others around you: Learn how to safely use, store and throw away your medicines at www.disposemymeds.org.          This list is accurate as of 10/18/18  5:32 PM.  Always use your most recent med list.                   Brand Name Dispense Instructions for use Diagnosis    buPROPion 150 MG 24 hr tablet    WELLBUTRIN XL    180 tablet    Take 1 tablet (150 mg) by mouth every morning    Major depressive disorder, recurrent episode, moderate (H)        etonogestrel-ethinyl estradiol 0.12-0.015 MG/24HR vaginal ring    NUVARING    3 each    Place 1 each vaginally every 28 days    Encounter for surveillance of vaginal ring hormonal contraceptive device       fexofenadine 180 MG tablet    ALLEGRA     180 mg daily        FLUoxetine 20 MG capsule    PROzac    70 capsule    Take 3 capsules (60 mg) by mouth daily * RF UNTIL 10/26/18 APPT.    Moderate episode of recurrent major depressive disorder (H)       FLUOXETINE HCL PO      Take 60 mg by mouth At Bedtime        * LORAZEPAM PO      Take 1 mg by mouth every 8 hours as needed        * LORazepam 0.5 MG tablet    ATIVAN    90 tablet    0.5-1 mg twice daily PRN anxiety.  Do not exceed 2 mg in 24 hours.    Moderate episode of recurrent major depressive disorder (H)       order for DME     1 Units    Use for 15-30 minutes every morning.    Moderate episode of recurrent major depressive disorder (H)       * TOPIRAMATE PO      Take 100 mg by mouth every evening        * topiramate 25 MG tablet    TOPAMAX    120 tablet    Add 25 mg po hs daily for 2 weeks to prior 100mg dose, then 50mg hs daily with 100mg dose    Headache disorder       * topiramate 100 MG tablet    TOPAMAX    90 tablet    Take 1 tablet (100 mg) by mouth daily    Migraine without aura and without status migrainosus, not intractable       TYLENOL PO      Take 650 mg by mouth every 4 hours as needed for mild pain or fever        verapamil 120 MG tablet    CALAN    90 tablet    Take 1 tablet (120 mg) by mouth daily    Migraine without aura and without status migrainosus, not intractable       * Notice:  This list has 5 medication(s) that are the same as other medications prescribed for you. Read the directions carefully, and ask your doctor or other care provider to review them with you.

## 2018-10-18 NOTE — NURSING NOTE
Chief Complaints and History of Present Illnesses   Patient presents with     Annual Eye Exam     routine eye care     HPI    Affected eye(s):  Both   Symptoms:     No floaters   No flashes   No redness   No tearing      Duration:  1 year   Frequency:  Constant       Do you have eye pain now?:  No      Comments:  Patient presents for annual eye exam. She is not sure how her vision is doing. Wears glasses PT for distance. No pain or discomfort, no redness itching or tears. No flashes or floaters. No eye drops. Jazzmine Cervantes COT 4:39 PM October 18, 2018

## 2018-10-18 NOTE — PROGRESS NOTES
33yoF here for annual exam:    HPI: Denies any vision complaints. States she hadn't had eye exam in 1-2 years so wanted to know how they are doing. Denies any pain, discomfort, redness, itching, or tears. Denies flashes or floaters. Patient also states her neurologist wanted to her to have eye exam as she has history of migraines, although he does not feel they are eye-related. Has some trouble seeing when she gets migraines. Recently neurology increased dose of topiramate. . No scintillating scotomas, etc. During migraines but is photophobic.    POH:  Glasses  No contacts   No eye lasers or surgeries    OphthoMeds:  None      A&P    1. Myopia:  -small adjustment to Mrx only, ok to continue current Rx. Patient would like MRx for new frames/lenses, given today.    2. Dry eye  -PFATs qid    3. Migraines  -with h/o topiramate  -Deep AC without narrow angles, nerves appear healthy  -warning signs/symptoms of angle closure given   -do not appear to have ocular etiology    RTC 1 year    Complete documentation of historical and exam elements from today's encounter can be found in the full encounter summary report (not reduplicated in this progress note). I personally obtained the chief complaint(s) and history of present illness.  I confirmed and edited as necessary the review of systems, past medical/surgical history, family history, social history, and examination findings as documented by others; and I examined the patient myself. I personally reviewed the relevant tests, images, and reports as documented above. I formulated and edited as necessary the assessment and plan and discussed the findings and management plan with the patient and family.     Nohemi Smith MD

## 2018-10-24 ENCOUNTER — OFFICE VISIT (OUTPATIENT)
Dept: PSYCHIATRY | Facility: CLINIC | Age: 33
End: 2018-10-24
Attending: PSYCHIATRY & NEUROLOGY
Payer: COMMERCIAL

## 2018-10-24 VITALS
HEART RATE: 91 BPM | SYSTOLIC BLOOD PRESSURE: 115 MMHG | DIASTOLIC BLOOD PRESSURE: 78 MMHG | BODY MASS INDEX: 23.79 KG/M2 | WEIGHT: 158.8 LBS

## 2018-10-24 DIAGNOSIS — F33.1 MODERATE EPISODE OF RECURRENT MAJOR DEPRESSIVE DISORDER (H): ICD-10-CM

## 2018-10-24 PROCEDURE — G0463 HOSPITAL OUTPT CLINIC VISIT: HCPCS | Mod: ZF

## 2018-10-24 ASSESSMENT — PAIN SCALES - GENERAL: PAINLEVEL: NO PAIN (0)

## 2018-10-24 NOTE — NURSING NOTE
Chief Complaint   Patient presents with     Recheck Medication     Major depressive disorder, recurrent episode, moderate (H)

## 2018-10-24 NOTE — MR AVS SNAPSHOT
After Visit Summary   10/24/2018    Kasandra Lau    MRN: [id number]           Patient Information     Date Of Birth          1985        Visit Information        Provider Department      10/24/2018 8:00 AM Jose Carlos Delcid MD [facility]        Today's Diagnoses     Moderate episode of recurrent major depressive disorder (H)          Care Instructions    Continue Prozac    Continue Bupropion    Consider handouts on Mirtazapine and Zoloft    Continue Therapy    CRISIS NUMBERS:   Provided routinely in AVS.   [facility] [phone] (clinic)    [phone] (after hours)  ONLY if a [facility] PT: [facility] [phone] (clinic), [phone] (after hours)           Follow-ups after your visit        Follow-up notes from your care team     Return in about 6 weeks (around 12/5/2018) for 60 MFU.      Your next 10 appointments already scheduled     Dec 06, 2018 10:00 AM CST   Adult Med Follow UP with Jose Carlos Delcid MD   Psychiatry Clinic ([facility])    [address]  [address] [place] [id number] [phone]            Dec 13, 2018  1:30 PM CST   (Arrive by 1:15 PM)   Return Visit with Jevon Loyd MD   [facility] Neurology ([facility])    [address] [place] [id number] [phone]            Oct 24, 2019  2:20 PM CDT   (Arrive by 2:05 PM)   RETURN GENERAL with Annika Wylie OD   [facility] Ophthalmology ([facility])    [address] [place] [id number] [phone]              Who to contact     Please call your clinic at [phone] to:    Ask questions about your health    Make or cancel appointments    Discuss your medicines    Learn about your test results    Speak to your doctor            Additional Information About Your Visit        MyChart Information     MyChart gives you secure access to your electronic  health record. If you see a primary care provider, you can also send messages to your care team and make appointments. If you have questions, please call your primary care clinic.  If you do not have a primary care provider, please call [phone] and they will assist you.      [employer] is an electronic gateway that provides easy, online access to your medical records. With [employer], you can request a clinic appointment, read your test results, renew a prescription or communicate with your care team.     To access your existing account, please contact your [facility] or call [phone] for assistance.        Care EveryWhere ID     This is your Care EveryWhere ID. This could be used by other organizations to access your [place] medical records  [id number]        Your Vitals Were     Pulse BMI (Body Mass Index)                91 23.79 kg/m2           Blood Pressure from Last 3 Encounters:   10/24/18 115/78   10/12/18 109/77   04/16/18 116/78    Weight from Last 3 Encounters:   10/24/18 72 kg (158 lb 12.8 oz)   10/12/18 72.2 kg (159 lb 1.6 oz)   04/16/18 69.3 kg (152 lb 12.8 oz)              Today, you had the following     No orders found for display         Where to get your medicines      These medications were sent to [place] [address] [place] [address] [address]     Phone:  [phone]     FLUoxetine 20 MG capsule          Primary Care Provider Office Phone # Fax #    [facility] [phone] [address] [facility] [address]        Equal Access to Services     [facility] : Hadii emily clark hadasho Sonatalioali, waaxda luqadaha, qaybta kaalmada moe rojas . So [facility] [phone].    ATENCIÓN: Si habla español, tiene a esteban disposición servicios gratuitos de asistencia lingüística. Llame al [phone].    We comply with applicable federal civil rights laws  and [place] laws. We do not discriminate on the basis of race, color, national origin, age, disability, sex, sexual orientation, or gender identity.            Thank you!     Thank you for choosing PSYCHIATRY CLINIC  for your care. Our goal is always to provide you with excellent care. Hearing back from our patients is one way we can continue to improve our services. Please take a few minutes to complete the written survey that you may receive in the mail after your visit with us. Thank you!             Your Updated Medication List - Protect others around you: Learn how to safely use, store and throw away your medicines at www.disposemymeds.org.          This list is accurate as of 10/24/18 11:59 PM.  Always use your most recent med list.                   Brand Name Dispense Instructions for use Diagnosis    buPROPion 150 MG 24 hr tablet    WELLBUTRIN XL    180 tablet    Take 1 tablet (150 mg) by mouth every morning    Major depressive disorder, recurrent episode, moderate (H)       etonogestrel-ethinyl estradiol 0.12-0.015 MG/24HR vaginal ring    NUVARING    3 each    Place 1 each vaginally every 28 days    Encounter for surveillance of vaginal ring hormonal contraceptive device       fexofenadine 180 MG tablet    ALLEGRA     180 mg daily        FLUoxetine 20 MG capsule    PROzac    90 capsule    Take 3 capsules (60 mg) by mouth daily * RF UNTIL 10/26/18 APPT.    Moderate episode of recurrent major depressive disorder (H)       order for DME     1 Units    Use for 15-30 minutes every morning.    Moderate episode of recurrent major depressive disorder (H)       * topiramate 25 MG tablet    TOPAMAX    120 tablet    Add 25 mg po hs daily for 2 weeks to prior 100mg dose, then 50mg hs daily with 100mg dose    Headache disorder       * topiramate 100 MG tablet    TOPAMAX    90 tablet    Take 1 tablet (100 mg) by mouth daily    Migraine without aura and without status migrainosus, not intractable       TYLENOL PO       Take 650 mg by mouth every 4 hours as needed for mild pain or fever        * Notice:  This list has 2 medication(s) that are the same as other medications prescribed for you. Read the directions carefully, and ask your doctor or other care provider to review them with you.

## 2018-10-24 NOTE — PATIENT INSTRUCTIONS
Continue Prozac    Continue Bupropion    Consider handouts on Mirtazapine and Zoloft    Continue Therapy    CRISIS NUMBERS:   Provided routinely in AVS.   [facility] [phone] (clinic)    [phone] (after hours)  ONLY if a [facility] PT: [facility] [phone] (clinic), [phone] (after hours)
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
## 2018-10-29 ASSESSMENT — PATIENT HEALTH QUESTIONNAIRE - PHQ9: SUM OF ALL RESPONSES TO PHQ QUESTIONS 1-9: 11

## 2018-11-27 DIAGNOSIS — Z30.44 ENCOUNTER FOR SURVEILLANCE OF VAGINAL RING HORMONAL CONTRACEPTIVE DEVICE: ICD-10-CM

## 2018-11-27 RX ORDER — ETONOGESTREL AND ETHINYL ESTRADIOL VAGINAL RING .015; .12 MG/D; MG/D
1 RING VAGINAL
Qty: 3 EACH | Refills: 3 | Status: SHIPPED | OUTPATIENT
Start: 2018-11-27 | End: 2019-10-02

## 2018-11-27 NOTE — TELEPHONE ENCOUNTER
Received refill request for Nuvaring.  Last in clinic October 2017.     Able to send one-year supply per protocol. Phone call to patient and voicemail left that she is due for annual exam.

## 2018-12-06 ENCOUNTER — OFFICE VISIT (OUTPATIENT)
Dept: PSYCHIATRY | Facility: CLINIC | Age: 33
End: 2018-12-06
Attending: PSYCHIATRY & NEUROLOGY
Payer: COMMERCIAL

## 2018-12-06 VITALS
SYSTOLIC BLOOD PRESSURE: 115 MMHG | WEIGHT: 154.6 LBS | BODY MASS INDEX: 23.16 KG/M2 | HEART RATE: 81 BPM | DIASTOLIC BLOOD PRESSURE: 78 MMHG

## 2018-12-06 DIAGNOSIS — F33.1 MODERATE EPISODE OF RECURRENT MAJOR DEPRESSIVE DISORDER (H): ICD-10-CM

## 2018-12-06 DIAGNOSIS — F33.1 MAJOR DEPRESSIVE DISORDER, RECURRENT EPISODE, MODERATE (H): ICD-10-CM

## 2018-12-06 DIAGNOSIS — F41.9 ANXIETY: Primary | ICD-10-CM

## 2018-12-06 PROCEDURE — G0463 HOSPITAL OUTPT CLINIC VISIT: HCPCS | Mod: ZF

## 2018-12-06 RX ORDER — LORAZEPAM 0.5 MG/1
TABLET ORAL
Qty: 35 TABLET | Refills: 0 | Status: SHIPPED | OUTPATIENT
Start: 2018-12-06 | End: 2019-01-31

## 2018-12-06 RX ORDER — BUPROPION HYDROCHLORIDE 150 MG/1
300 TABLET ORAL EVERY MORNING
Qty: 60 TABLET | Refills: 3 | Status: SHIPPED | OUTPATIENT
Start: 2018-12-06 | End: 2019-06-11

## 2018-12-06 ASSESSMENT — PATIENT HEALTH QUESTIONNAIRE - PHQ9: SUM OF ALL RESPONSES TO PHQ QUESTIONS 1-9: 8

## 2018-12-06 ASSESSMENT — PAIN SCALES - GENERAL: PAINLEVEL: NO PAIN (0)

## 2018-12-06 NOTE — PATIENT INSTRUCTIONS
Increase Wellbutrin to 300mg daily.  Call if anxiety increases on this dose.    Continue Prozac    Begin ativan as needed    CRISIS NUMBERS:   Provided routinely in [facility] [phone] (clinic)    [phone] (after hours)  [employer] Lifeline at [phone] or [employer] Lifeline [phone]    Thank you for coming to the PSYCHIATRY CLINIC.    Lab Testing:  If you had lab testing today and your results are reassuring or normal they will be mailed to you or sent through [email or web address] within 7 days.   If the lab tests need quick action we will call you with the results.  The phone number we will call with results is # [phone] (home) . If this is not the best number please call our clinic and change the number.    Medication Refills:  If you need any refills please call your pharmacy and they will contact us. Our fax number for refills is [phone]. Please allow three business for refill processing.   If you need to  your refill at a new pharmacy, please contact the new pharmacy directly. The new pharmacy will help you get your medications transferred.     Scheduling:  If you have any concerns about today's visit or wish to schedule another appointment please call our office during normal business hours [phone] (8-5:00 M-F)    Contact Us:  Please call [phone] during business hours (8-5:00 M-F).  If after clinic hours, or on the weekend, please call  [phone].    Financial Assistance [phone]  [facility] [phone]  [facility] [phone]  Medical Records [phone]      MENTAL HEALTH CRISIS NUMBERS:  [facility]:   [facility] - [phone]   Crisis Residence [facility] - [facility] - [phone]   Walk-In Counseling Center [facility] - [phone]   COPE 24/7 [facility] Mobile Team for Adults - [[phone]]; Child - [[phone]]     Crisis Connection - [phone]     [facility]:   [facility] - [phone]   Walk-in  counseling [facility] - [phone]   Walk-in counseling [facility] - [phone]   Crisis Residence [facility] - [phone]   Urgent Care Adult Mental Health:   --Drop-in, 24/7 crisis line, and [facility] Mobile Team [[phone]]    CRISIS TEXT LINE: Text [phone] from anywhere, anytime, any crisis 24/7;    OR SEE [email or web address]     Poison Control Center - [phone]    CHILD: [facility] needs assessment team - [phone]     [facility] Lifeline - [phone]; or [facility] - [phone]    If you have a medical emergency please call 911or go to the nearest ER.                    _____________________________________________    Again thank you for choosing PSYCHIATRY CLINIC and please let us know how we can best partner with you to improve you and your family's health.  You may be receiving a survey in the mail regarding this appointment. We would love to have your feedback, both positive and negative, so please fill out the survey and return it using the provided envelope. The survey is done by an external company, so your answers are anonymous.

## 2018-12-06 NOTE — NURSING NOTE
Chief Complaint   Patient presents with     Recheck Medication     Moderate episode of recurrent major depressive disorder

## 2018-12-06 NOTE — PROGRESS NOTES
[facility] PSYCHIATRY CLINIC PROGRESS NOTE     CARE TEAM:  PCP- Roxy Rios    Specialty Providers- Neuro Dr. Patiño    Therapist- MsAmira Edwin Lau is a 33 year old female who prefers the name Kasandra & pronouns she, her, hers. Date of initial diagnostic assessment is 10/31/17.  Date of most recent transfer of care assessment is 10/24/18.      Pertinent Background:  This patient first experienced mental health issues in childhood and has received treatment for MDD and Seasonal Mood DO.  History details in last diagnostic assessment    INTERIM HISTORY                                                                                                                 4, 4   The patient reports good treatment adherence.  History was provided by the patient who was a good historian.  The last visit ended with the following med change: Increase Wellbutrin XL to 300mg daily..   Since the last visit:   Pt feels somewhat 'uncared for' at her department at the [employer].  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  Feels that the department she is in is very well regarded and thus they feel justified in not giving as much support to students.  Is considering transferring to another department.  Is having anxious feelings around this.    Is dating a new paramour.  He has a history of alcohol use and has been sober since summer.  Early in the year she dated a different man for a few weeks who had problems with alcohol and it was problematic, so she has some concerns about dating this new gentleman.  She expresses feelings of anxiety, but says she feels confident she knows how to protect herself in the relationship should it take a turn for the worse.      Mood has been 'good.'  Not particularly depressed.  Sleep has been fine, still sleeping late in the mornings.    Continues to experience poor concentration and focus.  I have her take the ADHD ASRS  self report scale, although she says she did quite well in school as a child growing up.  She scores 4/6 in Part A and 5/12 in Part B.    Part A    + Difficulty wrapping up projects    + Difficulty ordering tasks    + Difficulty remembering appointments or obligations    + Fidgety    Part B    + Difficulty maintaining attention during boring or repetitive work    + Misplacing things at home or work    + Often leaving seat in meetings when expected to remain seated    + Finishing others' sentences    + Interrupting others when they are busy    She is willing to try a higher dose of Wellbutrin to see if it helps with these symptoms.  She has complained of some increased anxiety from Wellbutrin in the past.  She agrees to call the office and speak with me if this occurs.    She was also on Ativan in the past and would only use it approximately once or twince per week for anxiety symptoms; she is requesting refill.       RECENT SYMPTOMS:   DEPRESSION:  reports-hypersomnia and poor concentration /memory;  DENIES- suicidal ideation without plan, without intent, depressed mood, anhedonia and insomnia  MICHELLE/HYPOMANIA:  reports-none;  DENIES- increased energy, decreased sleep need, increased activity, grandiosity, distractibility , racing thoughts and pressured speech  PSYCHOSIS:  reports-none;  DENIES- auditory hallucinations and visual hallucinations  ANXIETY:  excessive worry and nervous/overwhelmed  ATTENTION:  difficulty paying attention, being easily distracted, sense of restlessness, inability to relax, chronic problem with procrastination and problems with organizing tasks/ time management   SLEEP:  Oversleeping   EATING DISORDER: no    RECENT SUBSTANCE USE:     ALCOHOL- Social drinking, occassionally a glass or two of wine at home 1-2 nights a week      TOBACCO- no     CAFFEINE- not often  OPIOIDS- no         NARCAN KIT- no        CANNABIS- no            OTHER ILLICIT DRUGS- none      CURRENT SOCIAL  HISTORY:  FINANCIAL SUPPORT- working       EDUCATION- [employer], studying for PhD in [employer]  CHILDREN- no      LIVING SITUATION- Lives alone in an apartment      SOCIAL/ SPIRITUAL SUPPORT- Friends through the university     FEELS SAFE AT HOME- yes     MEDICAL ROS (2,10):  Reports Sxs of endometriosis including abdominal pain and digestive symptoms; has an OBGYN who follows.  Gets tingling in hands or legs or feet 1-2x per week, lasts up to 2 minutes each time, not sure if its related to migraines, has told neurologist about it.   A comprehensive review of systems was performed and is negative other than noted in the HPI.   Denies A comprehensive review of systems was performed and is negative other than noted in the HPI.      PSYCH and CD Critical Summary Points since July 2018 12/18 Increase Wellbutrin  --> 300mg    PAST PSYCH MED TRIALS   Abilify adjunct for depression      MEDICAL / SURGICAL HISTORY                                   Pregnant or breastfeeding- no      Contraception- Not discussed    Neurologic Hx- Migraines  Patient Active Problem List   Diagnosis     Pelvic pain in female     Vitamin D deficiency     Menorrhagia with regular cycle     Migraine without aura and without status migrainosus, not intractable     Iron deficiency anemia due to chronic blood loss     Tired     Circadian rhythm sleep disorder, delayed sleep phase type     Unhealthy sleep habit     Seasonal mood disorder (H)       Major Surgery- Endometrial cystectomy, L wrist fracture repair    ALLERGY                                Review of patient's allergies indicates no known allergies.  MEDICATIONS                               Current Outpatient Prescriptions   Medication Sig Dispense Refill     Acetaminophen (TYLENOL PO) Take 650 mg by mouth every 4 hours as needed for mild pain or fever       buPROPion (WELLBUTRIN XL) 150 MG 24 hr tablet Take 1 tablet (150 mg) by mouth every morning 180 tablet 3      etonogestrel-ethinyl estradiol (NUVARING) 0.12-0.015 MG/24HR vaginal ring Place 1 each vaginally every 28 days 3 each 3     fexofenadine (ALLEGRA) 180 MG tablet 180 mg daily        FLUoxetine (PROZAC) 20 MG capsule Take 3 capsules (60 mg) by mouth daily * RF UNTIL 10/26/18 APPT. 90 capsule 1     order for DME Use for 15-30 minutes every morning. 1 Units 0     topiramate (TOPAMAX) 100 MG tablet Take 1 tablet (100 mg) by mouth daily 90 tablet 3     topiramate (TOPAMAX) 25 MG tablet Add 25 mg po hs daily for 2 weeks to prior 100mg dose, then 50mg hs daily with 100mg dose 120 tablet 3     VITALS                                                                                                                          3, 3   There were no vitals taken for this visit.   MENTAL STATUS EXAM                                                                                           9, 14 cog gs     Alertness: alert   Appearance: casually groomed  Behavior/Demeanor: cooperative, with good  eye contact   Speech: normal  Language: no problems  Psychomotor: normal or unremarkable  Mood: anxious  Affect: restricted; was congruent to mood; was congruent to content  Thought Process/Associations: unremarkable  Thought Content:  Reports none;  Denies suicidal ideation without plan; without intent [details in Interim History] and violent ideation without plan; without intent [details in Interim History]  Perception:  Reports none;  Denies auditory hallucinations and visual hallucinations  Insight: good  Judgment: good  Cognition: (6) does  appear grossly intact; formal cognitive testing was not done  Gait and Station: unremarkable    LABS and DATA     AIMS:  not needed    PHQ9 TODAY = 8  PHQ-9 SCORE 1/24/2018 4/16/2018 10/24/2018   PHQ-9 Total Score 5 3 11         DIAGNOSIS     Major Depressive Disorder, recurrent, mild to moderate  Seasonal Mood Disorder  Generalized Anxiety Disorder  R/o Attention Deficit Disorder    ASSESSMENT                                                                                                                    m2, h3     TODAY:    Kasandra Lau is a 32yo woman with MDD, Seasonal Mood DO, and Generalized Anxiety Disorder with some history of trauma in childhood who presents with ongoing anxiety symptoms.  She says her depressive symptoms are well controlled.  She does describe some problems with attention and concentration, and has a positive ASRS screen for ADHD, although this screen is not considered diagnostic. She wants to try going up to 300mg daily of the Wellbutrin XL to see if it helps with these symptoms.  Historically she believes the Wellbutrin has contributed to some anxiety symptoms, so she will call the office to speak with me if this occurs.          [place] PRESCRIPTION MONITORING PROGRAM [] was not checked today:  will check next session.     PSYCHOTROPIC DRUG INTERACTIONS:   Concurrent use of BUPROPION and SELECTED SEIZURE THRESHOLD LOWERING CYP2D6 SUBSTRATES (fluoxetine) may result in increased exposure of CYP2D6 substrates; increased risk of seizure. .  MANAGEMENT:  Monitoring for adverse effects and patient is aware of risks     PLAN                                                                                                                                m2, h3     1) PSYCHOTROPIC MEDICATIONS:  - Increase Wellbutrin [id number] daily to 300mg daily  - Continue Prozac 60mg daily  - Continue Ativan 0.5 to 1mg BID prn anxiety, max dose of 2mg per day, dispense 35 tabs, no refills  - Continue Topiramate for migraines, prescribed by neurology    2) THERAPY:    Continue    3) NEXT DUE:    Rating Scales- continue    4) REFERRALS:    No Referrals needed    5) RTC: 8 Weeks    6) CRISIS NUMBERS:   Provided routinely in [facility].   [facility] [phone] (clinic)    [phone] (after hours)  [facility] at [phone] or [facility] [phone]    TREATMENT RISK STATEMENT:  The risks, benefits,  alternatives and potential adverse effects have been discussed and are understood by the pt. The pt understands the risks of using street drugs or alcohol. There are no medical contraindications, the pt agrees to treatment with the ability to do so. The pt knows to call the clinic for any problems or to access emergency care if needed.  Medical and substance use concerns are documented above.  Psychotropic drug interaction check was done, including changes made today.     PROVIDER:  Jose Carlos Delcid MD    Patient not staffed in clinic.  Note will be reviewed and signed by supervisor Dr. Iniguez.    Attestation:  I did not see this patient directly. I have reviewed the documentation and I agree with the resident's plan of care.   Reynaldo Iniguez MD

## 2018-12-07 ENCOUNTER — TELEPHONE (OUTPATIENT)
Dept: PSYCHIATRY | Facility: CLINIC | Age: 33
End: 2018-12-07

## 2018-12-07 NOTE — TELEPHONE ENCOUNTER
On 12/6/2018  An ADHD Self-Report Scale was completed. I send one copy to scanning and I kept one copy in psychiatry until scanning is complete. Jackeline Swanson  12/7/2018

## 2019-01-30 ASSESSMENT — ENCOUNTER SYMPTOMS
DECREASED LIBIDO: 0
NIGHT SWEATS: 1
DECREASED CONCENTRATION: 1
HOT FLASHES: 1
ALTERED TEMPERATURE REGULATION: 1
PANIC: 1
POLYPHAGIA: 0
CHILLS: 1
INCREASED ENERGY: 0
DECREASED APPETITE: 1
HALLUCINATIONS: 0
NERVOUS/ANXIOUS: 1
POLYDIPSIA: 0
DEPRESSION: 0
FATIGUE: 1
WEIGHT LOSS: 0
INSOMNIA: 0
FEVER: 0
WEIGHT GAIN: 0

## 2019-01-30 ASSESSMENT — ANXIETY QUESTIONNAIRES
GAD7 TOTAL SCORE: 7
1. FEELING NERVOUS, ANXIOUS, OR ON EDGE: SEVERAL DAYS
5. BEING SO RESTLESS THAT IT IS HARD TO SIT STILL: SEVERAL DAYS
7. FEELING AFRAID AS IF SOMETHING AWFUL MIGHT HAPPEN: SEVERAL DAYS
2. NOT BEING ABLE TO STOP OR CONTROL WORRYING: SEVERAL DAYS
7. FEELING AFRAID AS IF SOMETHING AWFUL MIGHT HAPPEN: SEVERAL DAYS
4. TROUBLE RELAXING: SEVERAL DAYS
GAD7 TOTAL SCORE: 7
6. BECOMING EASILY ANNOYED OR IRRITABLE: SEVERAL DAYS
3. WORRYING TOO MUCH ABOUT DIFFERENT THINGS: SEVERAL DAYS

## 2019-01-31 ENCOUNTER — OFFICE VISIT (OUTPATIENT)
Dept: PSYCHIATRY | Facility: CLINIC | Age: 34
End: 2019-01-31
Attending: PSYCHIATRY & NEUROLOGY
Payer: COMMERCIAL

## 2019-01-31 ENCOUNTER — OFFICE VISIT (OUTPATIENT)
Dept: OBGYN | Facility: CLINIC | Age: 34
End: 2019-01-31
Attending: ADVANCED PRACTICE MIDWIFE
Payer: COMMERCIAL

## 2019-01-31 VITALS
DIASTOLIC BLOOD PRESSURE: 80 MMHG | HEART RATE: 94 BPM | WEIGHT: 151.7 LBS | BODY MASS INDEX: 22.99 KG/M2 | SYSTOLIC BLOOD PRESSURE: 118 MMHG | HEIGHT: 68 IN

## 2019-01-31 VITALS
DIASTOLIC BLOOD PRESSURE: 83 MMHG | SYSTOLIC BLOOD PRESSURE: 117 MMHG | WEIGHT: 155 LBS | HEART RATE: 90 BPM | BODY MASS INDEX: 23.57 KG/M2

## 2019-01-31 DIAGNOSIS — F33.1 MODERATE EPISODE OF RECURRENT MAJOR DEPRESSIVE DISORDER (H): ICD-10-CM

## 2019-01-31 DIAGNOSIS — Z12.4 SCREENING FOR MALIGNANT NEOPLASM OF CERVIX: ICD-10-CM

## 2019-01-31 DIAGNOSIS — Z11.3 SCREENING EXAMINATION FOR VENEREAL DISEASE: ICD-10-CM

## 2019-01-31 DIAGNOSIS — Z00.00 VISIT FOR PREVENTIVE HEALTH EXAMINATION: ICD-10-CM

## 2019-01-31 DIAGNOSIS — Z01.419 ENCOUNTER FOR GYNECOLOGICAL EXAMINATION WITHOUT ABNORMAL FINDING: ICD-10-CM

## 2019-01-31 LAB
HBV SURFACE AG SERPL QL IA: NONREACTIVE
HCV AB SERPL QL IA: NONREACTIVE
HIV 1+2 AB+HIV1 P24 AG SERPL QL IA: NONREACTIVE
T PALLIDUM AB SER QL: NONREACTIVE

## 2019-01-31 PROCEDURE — 86780 TREPONEMA PALLIDUM: CPT | Performed by: ADVANCED PRACTICE MIDWIFE

## 2019-01-31 PROCEDURE — 87624 HPV HI-RISK TYP POOLED RSLT: CPT | Performed by: ADVANCED PRACTICE MIDWIFE

## 2019-01-31 PROCEDURE — 87591 N.GONORRHOEAE DNA AMP PROB: CPT | Performed by: ADVANCED PRACTICE MIDWIFE

## 2019-01-31 PROCEDURE — G0145 SCR C/V CYTO,THINLAYER,RESCR: HCPCS | Performed by: ADVANCED PRACTICE MIDWIFE

## 2019-01-31 PROCEDURE — G0463 HOSPITAL OUTPT CLINIC VISIT: HCPCS | Mod: ZF,27

## 2019-01-31 PROCEDURE — G0463 HOSPITAL OUTPT CLINIC VISIT: HCPCS | Mod: ZF

## 2019-01-31 PROCEDURE — 36415 COLL VENOUS BLD VENIPUNCTURE: CPT | Performed by: ADVANCED PRACTICE MIDWIFE

## 2019-01-31 PROCEDURE — 87340 HEPATITIS B SURFACE AG IA: CPT | Performed by: ADVANCED PRACTICE MIDWIFE

## 2019-01-31 PROCEDURE — 87491 CHLMYD TRACH DNA AMP PROBE: CPT | Performed by: ADVANCED PRACTICE MIDWIFE

## 2019-01-31 PROCEDURE — 86803 HEPATITIS C AB TEST: CPT | Performed by: ADVANCED PRACTICE MIDWIFE

## 2019-01-31 PROCEDURE — 87389 HIV-1 AG W/HIV-1&-2 AB AG IA: CPT | Performed by: ADVANCED PRACTICE MIDWIFE

## 2019-01-31 RX ORDER — BUPROPION HYDROCHLORIDE 150 MG/1
150 TABLET, EXTENDED RELEASE ORAL
COMMUNITY
End: 2019-09-26

## 2019-01-31 RX ORDER — FLUTICASONE PROPIONATE 50 MCG
1-2 SPRAY, SUSPENSION (ML) NASAL
COMMUNITY
Start: 2018-11-19 | End: 2020-01-16

## 2019-01-31 RX ORDER — CETIRIZINE HYDROCHLORIDE 10 MG/1
10 TABLET ORAL DAILY
COMMUNITY
End: 2020-01-16

## 2019-01-31 RX ORDER — ALBUTEROL SULFATE 90 UG/1
1-2 AEROSOL, METERED RESPIRATORY (INHALATION) EVERY 4 HOURS PRN
COMMUNITY
End: 2021-07-29

## 2019-01-31 RX ORDER — LORAZEPAM 0.5 MG/1
TABLET ORAL
Qty: 35 TABLET | Refills: 0 | Status: SHIPPED | OUTPATIENT
Start: 2019-01-31 | End: 2019-03-21

## 2019-01-31 RX ORDER — FLUTICASONE PROPIONATE 50 MCG
SPRAY, SUSPENSION (ML) NASAL
Refills: 11 | COMMUNITY
Start: 2019-01-10 | End: 2020-01-16

## 2019-01-31 RX ORDER — VILAZODONE HYDROCHLORIDE 10 MG/1
TABLET ORAL
Qty: 30 TABLET | Refills: 0 | Status: SHIPPED | OUTPATIENT
Start: 2019-01-31 | End: 2019-02-22

## 2019-01-31 ASSESSMENT — PATIENT HEALTH QUESTIONNAIRE - PHQ9
SUM OF ALL RESPONSES TO PHQ QUESTIONS 1-9: 6
SUM OF ALL RESPONSES TO PHQ QUESTIONS 1-9: 6

## 2019-01-31 ASSESSMENT — ANXIETY QUESTIONNAIRES: GAD7 TOTAL SCORE: 7

## 2019-01-31 ASSESSMENT — MIFFLIN-ST. JEOR: SCORE: 1446.61

## 2019-01-31 ASSESSMENT — PAIN SCALES - GENERAL: PAINLEVEL: NO PAIN (0)

## 2019-01-31 NOTE — PROGRESS NOTES
Progress Note    SUBJECTIVE:  Kasandra Lau is an 33 year old, , who requests an Annual Preventive Exam.       Concerns today include: still having breakthrough bleeding w nuvaring, but VERY happy with control of endometriosis symptoms.  Also very happy with less impact on mood-depression as compared with OCPs. But, worried about effectiveness of nuvaring while on Topiramate.    Has been on wellbutrin x 3 months, having trouble coming to climax with new partner. This is a new concern. Is still able to have an orgasm with vibrator.  Has not brought vibrator into her sex life with new partner.  Will discuss this with psychiatry today as well.    Desires STI testing  Menstrual History:  Menstrual History 2017 10/10/2017 2019   LAST MENSTRUAL PERIOD 2016       Last    Lab Results   Component Value Date    PAP NIL 10/10/2017     History of abnormal Pap smear: NO - age 30-65 PAP every 5 years with negative HPV co-testing recommended    Last   Lab Results   Component Value Date    HPV16 Negative 10/10/2017     Last   Lab Results   Component Value Date    HPV18 Negative 10/10/2017     Last   Lab Results   Component Value Date    HRHPV Positive 10/10/2017       Mammogram current: not applicable  Last Mammogram:   No results found.     Last Colonoscopy:  No results found for this or any previous visit.      HISTORY:    Current Outpatient Medications on File Prior to Visit:  Acetaminophen (TYLENOL PO) Take 650 mg by mouth every 4 hours as needed for mild pain or fever   buPROPion (WELLBUTRIN XL) 150 MG 24 hr tablet Take 2 tablets (300 mg) by mouth every morning   cetirizine (ZYRTEC) 10 MG tablet Take 10 mg by mouth daily   etonogestrel-ethinyl estradiol (NUVARING) 0.12-0.015 MG/24HR vaginal ring Place 1 each vaginally every 28 days   FLUoxetine (PROZAC) 20 MG capsule Take 3 capsules (60 mg) by mouth daily * RF UNTIL 10/26/18 APPT.   fluticasone (FLONASE) 50 MCG/ACT nasal spray 1-2  sprays   LORazepam (ATIVAN) 0.5 MG tablet 1 or 2 tabs BID PRN anxiety.  Do not exceed 4 tabs in 24 hours.   mometasone (ASMANEX 30 METERED DOSES) 220 MCG/INH inhaler Inhale 1 puff into the lungs   topiramate (TOPAMAX) 100 MG tablet Take 1 tablet (100 mg) by mouth daily   topiramate (TOPAMAX) 25 MG tablet Add 25 mg po hs daily for 2 weeks to prior 100mg dose, then 50mg hs daily with 100mg dose   albuterol (PROVENTIL HFA) 108 (90 Base) MCG/ACT inhaler Inhale 1-2 puffs into the lungs   ASMANEX 30 METERED DOSES 220 MCG/INH inhaler INHALE 1 PUFF BY MOUTH DAILY. RINSE MOUTH OR GARGLE AFTER USE   buPROPion (WELLBUTRIN SR) 150 MG 12 hr tablet Take 150 mg by mouth   fluticasone (FLONASE) 50 MCG/ACT nasal spray INSTILL 1-2 SPRAYS INTO BOTH NOSTRILS DAILY.   order for DME Use for 15-30 minutes every morning.     No current facility-administered medications on file prior to visit.   Allergies   Allergen Reactions     Cats      Chest tightness, sinus irritation       There is no immunization history on file for this patient.    Obstetric History       T0      L0     SAB0   TAB0   Ectopic0   Multiple0   Live Births0      Past Medical History:   Diagnosis Date     Anemia 2016     Depression (emotion)     sees psych, on meds     Migraine     daily meds, 2x month, more mild on meds     Panic disorder      Uncomplicated asthma Spring 2018     Past Surgical History:   Procedure Laterality Date     COLONOSCOPY       LAPAROSCOPIC ABLATION ENDOMETRIOSIS N/A 2016    Procedure: LAPAROSCOPIC ABLATION ENDOMETRIOSIS;  Surgeon: Tonja Ferrer MD;  [facility]     LAPAROSCOPIC CYSTECTOMY OVARIAN (BENIGN) Left 2016    Procedure: LAPAROSCOPIC CYSTECTOMY OVARIAN (BENIGN);  Surgeon: Tonja Ferrer MD;  [facility]     LAPAROSCOPIC TUBAL DYE STUDY Left 2016    Procedure: LAPAROSCOPIC TUBAL DYE STUDY;  Surgeon: Tonja Ferrer MD;  [facility]     LAPAROSCOPY OPERATIVE ADULT N/A  11/9/2016    Procedure: LAPAROSCOPY OPERATIVE ADULT;  Surgeon: Tonja Ferrer MD;  [facility]     ORTHOPEDIC SURGERY      left wrist surgery     Family History   Problem Relation Age of Onset     Diabetes Maternal Grandfather      Diabetes No family hx of      Hypertension No family hx of      Coronary Artery Disease No family hx of      Hyperlipidemia No family hx of      Cerebrovascular Disease No family hx of      Breast Cancer No family hx of      Colon Cancer No family hx of      Prostate Cancer No family hx of      Other Cancer No family hx of      Glaucoma No family hx of      Macular Degeneration No family hx of      Social History     Socioeconomic History     Marital status: Single     Spouse name: None     Number of children: None     Years of education: None     Highest education level: None   Social Needs     Financial resource strain: None     Food insecurity - worry: None     Food insecurity - inability: None     Transportation needs - medical: None     Transportation needs - non-medical: None   Occupational History     None   Tobacco Use     Smoking status: Former Smoker     Years: 10.00     Types: Cigarettes     Smokeless tobacco: Never Used     Tobacco comment: smokes occasionally socially    Substance and Sexual Activity     Alcohol use: Yes     Alcohol/week: 0.0 oz     Comment: occasional      Drug use: Yes     Types: Marijuana     Comment: marijuana  none since May     Sexual activity: Yes     Partners: Male     Birth control/protection: Pull-out method, Inserts/Ring     Comment: Nuvaring   Other Topics Concern     None   Social History Narrative     None       Review of Systems     Constitutional:  Positive for chills, fatigue, decreased appetite and night sweats. Negative for fever, weight loss, weight gain, recent stressors, height loss, post-operative complications, incisional pain, hallucinations, increased energy, hyperactivity and confused.   HENT:  Negative for ear pain,  hearing loss, tinnitus, nosebleeds, trouble swallowing, hoarse voice, mouth sores, sore throat, ear discharge, tooth pain, gum tenderness, taste disturbance, smell disturbance, hearing aid, bleeding gums, dry mouth, sinus pain, sinus congestion and neck mass.    Eyes:  Negative for double vision, pain, redness, eye pain, decreased vision, eye watering, eye bulging, eye dryness, flashing lights, spots, floaters, strabismus, tunnel vision, jaundice and eye irritation.   Respiratory:   Negative for cough, hemoptysis, sputum production, shortness of breath, wheezing, sleep disturbances due to breathing, snores loudly, respiratory pain, dyspnea on exertion, cough disturbing sleep and postural dyspnea.    Cardiovascular:  Negative for chest pain, dyspnea on exertion, palpitations, orthopnea, claudication, leg swelling, fingers/toes turn blue, hypertension, hypotension, syncope, history of heart murmur, chest pain on exertion, chest pain at rest, pacemaker, few scattered varicosities, leg pain, sleep disturbances due to breathing, tachycardia, light-headedness, exercise intolerance and edema.   Gastrointestinal:  Negative for heartburn, nausea, vomiting, abdominal pain, diarrhea, constipation, blood in stool, melena, rectal pain, bloating, hemorrhoids, bowel incontinence, jaundice, rectal bleeding, coffee ground emesis and change in stool.   Genitourinary:  Positive for arousal difficulty, abnormal vaginal bleeding, menstrual changes and hot flashes. Negative for bladder incontinence, dysuria, urgency, hematuria, flank pain, vaginal discharge, difficulty urinating, genital sores, dyspareunia, decreased libido, nocturia, voiding less frequently, excessive menstruation, vaginal dryness and postmenopausal bleeding.   Musculoskeletal:  Negative for myalgias, back pain, joint swelling, arthralgias, stiffness, muscle cramps, neck pain, bone pain, muscle weakness and fracture.   Skin:  Negative for nail changes, itching, poor  "wound healing, rash, hair changes, skin changes, acne, warts, poor wound healing, scarring, flaky skin, Raynaud's phenomenon, sensitivity to sunlight and skin thickening.   Neurological:  Negative for dizziness, tingling, tremors, speech change, seizures, loss of consciousness, weakness, light-headedness, numbness, headaches, disturbances in coordination, extremity numbness, memory loss, difficulty walking and paralysis.   Endo/Heme:  Negative for anemia, swollen glands and bruises/bleeds easily.   Psychiatric/Behavioral:  Positive for decreased concentration and panic attacks. Negative for depression, hallucinations, memory loss and mood swings.    Breast:  Negative for breast discharge, breast mass, breast pain and nipple retraction.   Endocrine:  Positive for altered temperature regulation.Negative for polyphagia, polydipsia, unwanted hair growth and change in facial hair.    [unfilled]  No flowsheet data found.  VIC-7 SCORE 8/25/2016 1/30/2019   Total Score 5 (mild anxiety) 7 (mild anxiety)   Total Score - 7      ROS: 10 point ROS neg other than the symptoms noted above in the HPI.      EXAM:  Blood pressure 118/80, pulse 94, height 1.727 m (5' 8\"), weight 68.8 kg (151 lb 11.2 oz), last menstrual period 01/24/2019, not currently breastfeeding. Body mass index is 23.07 kg/m .  General - pleasant female in no acute distress.  Skin - no suspicious lesions or rashes  EENT-  PERRLA, euthyroid with out palpable nodules  Neck - supple without lymphadenopathy.  Lungs - clear to auscultation bilaterally.  Heart - regular rate and rhythm without murmur.  Abdomen - soft, nontender, nondistended, no masses or organomegaly noted.  Musculoskeletal - no gross deformities.  Neurological - normal strength, sensation, and mental status.    Breast Exam:  Breast: Without visible skin changes. No dimpling or lesions seen.   Breasts supple, non-tender with palpation, no dominant mass, nodularity, or nipple discharge noted bilaterally. " Axillary nodes negative.      Pelvic Exam:  EG/BUS: Normal genital architecture without lesions, erythema or abnormal secretions Bartholin's, Urethra, [place]'s normal   Urethral meatus: normal   Urethra: no masses, tenderness, or scarring   Bladder: no masses or tenderness   Vagina: moist, pink, rugae with creamy, white and odorless  secretions  Cervix: no lesions, pink, moist, closed, without lesion or CMT and deep in vagina to right side  Uterus: anteverted,  and small, smooth, firm, mobile w/o pain  Adnexa: Within normal limits and No masses, nodularity, tenderness  Rectum:anus normal       ASSESSMENT:  Encounter Diagnoses   Name Primary?     Visit for preventive health examination      Screening examination for venereal disease      Screening for malignant neoplasm of cervix      Encounter for gynecological examination without abnormal finding         PLAN:   Orders Placed This Encounter   Procedures     Pelvic and Breast Exam Procedure []     Pap Smear Exam [] Do Not Remove     Pap imaged thin layer screen with HPV - recommended age 30 - 65 years (select HPV order below)     HPV High Risk Types DNA Cervical     HIV Antigen Antibody Combo [[id number]]     Hepatitis B surface antigen [BMA429]     Hepatitis C antibody [AYB704]     Treponema Abs w Reflex to RPR and Titer [CRX1255]     Orders Placed This Encounter   Medications     cetirizine (ZYRTEC) 10 MG tablet     Sig: Take 10 mg by mouth daily     albuterol (PROVENTIL HFA) 108 (90 Base) MCG/ACT inhaler     Sig: Inhale 1-2 puffs into the lungs     fluticasone (FLONASE) 50 MCG/ACT nasal spray     Si-2 sprays     fluticasone (FLONASE) 50 MCG/ACT nasal spray     Sig: INSTILL 1-2 SPRAYS INTO BOTH NOSTRILS DAILY.     Refill:  11     mometasone (ASMANEX 30 METERED DOSES) 220 MCG/INH inhaler     Sig: Inhale 1 puff into the lungs     ASMANEX 30 METERED DOSES 220 MCG/INH inhaler     Sig: INHALE 1 PUFF BY MOUTH DAILY. RINSE MOUTH OR GARGLE AFTER USE     Refill:   6     buPROPion (WELLBUTRIN SR) 150 MG 12 hr tablet     Sig: Take 150 mg by mouth       Additional teaching done at this visit regarding calcium (1200 mg per day), self breast exam, exercise, birth control, mental health and will discussion med concern w Razia Phillips, PharmD and get back to patient.    Return to clinic in one year.  Follow-up as needed.    Answers for HPI/ROS submitted by the patient on 1/30/2019   VIC 7 TOTAL SCORE: 7

## 2019-01-31 NOTE — LETTER
RE: Kasandra Lau  [address]  [place] [address]     Dear Colleague,    Thank you for referring your patient, Kasandra Lau, to the [facility] at [facility]. Please see a copy of my visit note below.    Progress Note    SUBJECTIVE:  Kasandra Lau is an 33 year old, , who requests an Annual Preventive Exam.       Concerns today include: still having breakthrough bleeding w nuvaring, but VERY happy with control of endometriosis symptoms.  Also very happy with less impact on mood-depression as compared with OCPs. But, worried about effectiveness of nuvaring while on Topiramate.    Has been on wellbutrin x 3 months, having trouble coming to climax with new partner. This is a new concern. Is still able to have an orgasm with vibrator.  Has not brought vibrator into her sex life with new partner.  Will discuss this with psychiatry today as well.    Desires STI testing  Menstrual History:  Menstrual History 2017 10/10/2017 2019   LAST MENSTRUAL PERIOD 2016       Last    Lab Results   Component Value Date    PAP NIL 10/10/2017     History of abnormal Pap smear: NO - age 30-65 PAP every 5 years with negative HPV co-testing recommended    Last   Lab Results   Component Value Date    HPV16 Negative 10/10/2017     Last   Lab Results   Component Value Date    HPV18 Negative 10/10/2017     Last   Lab Results   Component Value Date    HRHPV Positive 10/10/2017       Mammogram current: not applicable  Last Mammogram:   No results found.     Last Colonoscopy:  No results found for this or any previous visit.      HISTORY:    Current Outpatient Medications on File Prior to Visit:  Acetaminophen (TYLENOL PO) Take 650 mg by mouth every 4 hours as needed for mild pain or fever   buPROPion (WELLBUTRIN XL) 150 MG 24 hr tablet Take 2 tablets (300 mg) by mouth every morning   cetirizine (ZYRTEC) 10 MG tablet Take 10 mg by mouth daily    etonogestrel-ethinyl estradiol (NUVARING) 0.12-0.015 MG/24HR vaginal ring Place 1 each vaginally every 28 days   FLUoxetine (PROZAC) 20 MG capsule Take 3 capsules (60 mg) by mouth daily * RF UNTIL 10/26/18 APPT.   fluticasone (FLONASE) 50 MCG/ACT nasal spray 1-2 sprays   LORazepam (ATIVAN) 0.5 MG tablet 1 or 2 tabs BID PRN anxiety.  Do not exceed 4 tabs in 24 hours.   mometasone (ASMANEX 30 METERED DOSES) 220 MCG/INH inhaler Inhale 1 puff into the lungs   topiramate (TOPAMAX) 100 MG tablet Take 1 tablet (100 mg) by mouth daily   topiramate (TOPAMAX) 25 MG tablet Add 25 mg po hs daily for 2 weeks to prior 100mg dose, then 50mg hs daily with 100mg dose   albuterol (PROVENTIL HFA) 108 (90 Base) MCG/ACT inhaler Inhale 1-2 puffs into the lungs   ASMANEX 30 METERED DOSES 220 MCG/INH inhaler INHALE 1 PUFF BY MOUTH DAILY. RINSE MOUTH OR GARGLE AFTER USE   buPROPion (WELLBUTRIN SR) 150 MG 12 hr tablet Take 150 mg by mouth   fluticasone (FLONASE) 50 MCG/ACT nasal spray INSTILL 1-2 SPRAYS INTO BOTH NOSTRILS DAILY.   order for DME Use for 15-30 minutes every morning.     No current facility-administered medications on file prior to visit.   Allergies   Allergen Reactions     Cats      Chest tightness, sinus irritation       There is no immunization history on file for this patient.    Obstetric History       T0      L0     SAB0   TAB0   Ectopic0   Multiple0   Live Births0      Past Medical History:   Diagnosis Date     Anemia 2016     Depression (emotion)     sees psych, on meds     Migraine     daily meds, 2x month, more mild on meds     Panic disorder      Uncomplicated asthma Spring 2018     Past Surgical History:   Procedure Laterality Date     COLONOSCOPY       LAPAROSCOPIC ABLATION ENDOMETRIOSIS N/A 2016    Procedure: LAPAROSCOPIC ABLATION ENDOMETRIOSIS;  Surgeon: Tonja Ferrer MD;  [facility]     LAPAROSCOPIC CYSTECTOMY OVARIAN (BENIGN) Left 2016    Procedure: LAPAROSCOPIC  CYSTECTOMY OVARIAN (BENIGN);  Surgeon: Tonja Ferrer MD;  [facility]     LAPAROSCOPIC TUBAL DYE STUDY Left 11/9/2016    Procedure: LAPAROSCOPIC TUBAL DYE STUDY;  Surgeon: Tonja Ferrer MD;  [facility]     LAPAROSCOPY OPERATIVE ADULT N/A 11/9/2016    Procedure: LAPAROSCOPY OPERATIVE ADULT;  Surgeon: Tonja Ferrer MD;  [facility]     ORTHOPEDIC SURGERY      left wrist surgery     Family History   Problem Relation Age of Onset     Diabetes Maternal Grandfather      Diabetes No family hx of      Hypertension No family hx of      Coronary Artery Disease No family hx of      Hyperlipidemia No family hx of      Cerebrovascular Disease No family hx of      Breast Cancer No family hx of      Colon Cancer No family hx of      Prostate Cancer No family hx of      Other Cancer No family hx of      Glaucoma No family hx of      Macular Degeneration No family hx of      Social History     Socioeconomic History     Marital status: Single     Spouse name: None     Number of children: None     Years of education: None     Highest education level: None   Social Needs     Financial resource strain: None     Food insecurity - worry: None     Food insecurity - inability: None     Transportation needs - medical: None     Transportation needs - non-medical: None   Occupational History     None   Tobacco Use     Smoking status: Former Smoker     Years: 10.00     Types: Cigarettes     Smokeless tobacco: Never Used     Tobacco comment: smokes occasionally socially    Substance and Sexual Activity     Alcohol use: Yes     Alcohol/week: 0.0 oz     Comment: occasional      Drug use: Yes     Types: Marijuana     Comment: marijuana  none since May     Sexual activity: Yes     Partners: Male     Birth control/protection: Pull-out method, Inserts/Ring     Comment: Nuvaring   Other Topics Concern     None   Social History Narrative     None     [unfilled]  No flowsheet data found.  VIC-7 SCORE 8/25/2016  "1/30/2019   Total Score 5 (mild anxiety) 7 (mild anxiety)   Total Score - 7     EXAM:  Blood pressure 118/80, pulse 94, height 1.727 m (5' 8\"), weight 68.8 kg (151 lb 11.2 oz), last menstrual period 01/24/2019, not currently breastfeeding. Body mass index is 23.07 kg/m .  General - pleasant female in no acute distress.  Skin - no suspicious lesions or rashes  EENT-  PERRLA, euthyroid with out palpable nodules  Neck - supple without lymphadenopathy.  Lungs - clear to auscultation bilaterally.  Heart - regular rate and rhythm without murmur.  Abdomen - soft, nontender, nondistended, no masses or organomegaly noted.  Musculoskeletal - no gross deformities.  Neurological - normal strength, sensation, and mental status.    Breast Exam:  Breast: Without visible skin changes. No dimpling or lesions seen.   Breasts supple, non-tender with palpation, no dominant mass, nodularity, or nipple discharge noted bilaterally. Axillary nodes negative.      Pelvic Exam:  EG/BUS: Normal genital architecture without lesions, erythema or abnormal secretions Bartholin's, Urethra, [place]'s normal   Urethral meatus: normal   Urethra: no masses, tenderness, or scarring   Bladder: no masses or tenderness   Vagina: moist, pink, rugae with creamy, white and odorless  secretions  Cervix: no lesions, pink, moist, closed, without lesion or CMT and deep in vagina to right side  Uterus: anteverted,  and small, smooth, firm, mobile w/o pain  Adnexa: Within normal limits and No masses, nodularity, tenderness  Rectum:anus normal       ASSESSMENT:  Encounter Diagnoses   Name Primary?     Visit for preventive health examination      Screening examination for venereal disease      Screening for malignant neoplasm of cervix      Encounter for gynecological examination without abnormal finding         PLAN:   Orders Placed This Encounter   Procedures     Pelvic and Breast Exam Procedure []     Pap Smear Exam [] Do Not Remove     Pap imaged thin " layer screen with HPV - recommended age 30 - 65 years (select HPV order below)     HPV High Risk Types DNA Cervical     HIV Antigen Antibody Combo [OBJ6419]     Hepatitis B surface antigen [PDI969]     Hepatitis C antibody [IHH416]     Treponema Abs w Reflex to RPR and Titer [[id number]]     Orders Placed This Encounter   Medications     cetirizine (ZYRTEC) 10 MG tablet     Sig: Take 10 mg by mouth daily     albuterol (PROVENTIL HFA) 108 (90 Base) MCG/ACT inhaler     Sig: Inhale 1-2 puffs into the lungs     fluticasone (FLONASE) 50 MCG/ACT nasal spray     Si-2 sprays     fluticasone (FLONASE) 50 MCG/ACT nasal spray     Sig: INSTILL 1-2 SPRAYS INTO BOTH NOSTRILS DAILY.     Refill:  11     mometasone (ASMANEX 30 METERED DOSES) 220 MCG/INH inhaler     Sig: Inhale 1 puff into the lungs     ASMANEX 30 METERED DOSES 220 MCG/INH inhaler     Sig: INHALE 1 PUFF BY MOUTH DAILY. RINSE MOUTH OR GARGLE AFTER USE     Refill:  6     buPROPion (WELLBUTRIN SR) 150 MG 12 hr tablet     Sig: Take 150 mg by mouth     Additional teaching done at this visit regarding calcium (1200 mg per day), self breast exam, exercise, birth control, mental health and will discussion med concern w Razia Phillips, PharmD and get back to patient.    Return to clinic in one year.  Follow-up as needed.    Again, thank you for allowing me to participate in the care of your patient.      Sincerely,    JASVIR Slaughter CNM

## 2019-01-31 NOTE — PATIENT INSTRUCTIONS

## 2019-01-31 NOTE — PROGRESS NOTES
"  [facility] PSYCHIATRY CLINIC PROGRESS NOTE     CARE TEAM:  PCP- Roxy Rios    Specialty Providers- Neuro Dr. Patiño    Therapist- Ms. Edwin Lau is a 33 year old female who prefers the name Kasandra & pronouns she, her, hers. Date of initial diagnostic assessment is 10/31/17.  Date of most recent transfer of care assessment is 10/24/18.      Pertinent Background:  This patient first experienced mental health issues in childhood and has received treatment for MDD and Seasonal Mood DO.  History details in last diagnostic assessment.    INTERIM HISTORY                                                                                                                 4, 4   The patient reports good treatment adherence.  History was provided by the patient who was a good historian.  The last visit ended with the following med change: Increase Wellbutrin XL to 300mg daily..   Since the last visit:     Feels her anxiety has gone away or is less noticeable, it is \"at a normal level, its not up and down.\"   Mood has been \"pretty consistent.\"   Can't think of any major mood fluctuations.  Feels motivated and focused.  Kasandra says ADHD symptoms are less.  She is able to get more done.    School is \"starting to come together.\"  Is starting prelims, has a meeting with committee next week to \"hammer out\" what she has to get done over the next year.  She made someone from another department her co-advisor, and thus they have more say in her academic development, which she thinks will be to her benefit.        Went to [facility] for a week to visit a friend.  Ate a lot of good food, enjoyed the warm weather.   Tried to hang out with friends around here while she was back during Winter break.  Wanted to catch up on work for school, had incompletes, had to write a paper and an annotated bibliography.    Seeing paramor who lives in [place] and who is recovering from alcohol use.  Having difficulty reaching climax " with her partner which is most likely from prozac.  We discuss options including cyproheptadine which I explain is very sedating, or switching to another anti depressant such as viibryd.  Pt would like to try switching to Viibryd.      RECENT SYMPTOMS:   DEPRESSION:  reports-none;  DENIES- suicidal ideation without plan, without intent, depressed mood, anhedonia and insomnia  MICHELLE/HYPOMANIA:  reports-none;  DENIES- increased energy, decreased sleep need, increased activity, grandiosity, distractibility , racing thoughts and pressured speech  PSYCHOSIS:  reports-none;  DENIES- auditory hallucinations and visual hallucinations  ANXIETY:  none  ATTENTION:  none  SLEEP:  Oversleeping   EATING DISORDER: no    RECENT SUBSTANCE USE:     ALCOHOL- Social drinking, occassionally a glass or two of wine at home 1-2 nights a week      TOBACCO- no     CAFFEINE- not often  OPIOIDS- no         NARCAN KIT- no        CANNABIS- no            OTHER ILLICIT DRUGS- none      CURRENT SOCIAL HISTORY:  FINANCIAL SUPPORT- working       EDUCATION- [employer], studying for PhD in Human Geography  CHILDREN- no      LIVING SITUATION- Lives alone in an apartment      SOCIAL/ SPIRITUAL SUPPORT- Friends through the [employer]     FEELS SAFE AT HOME- yes     MEDICAL ROS (2,10):  Reports some problems with anorgasmia.  A comprehensive review of systems was performed and is negative other than noted in the HPI.   Denies A comprehensive review of systems was performed and is negative other than noted in the HPI.      PSYCH and CD Critical Summary Points since July 2018 12/18 Increase Wellbutrin  --> 300mg    PAST PSYCH MED TRIALS   Abilify adjunct for depression      MEDICAL / SURGICAL HISTORY                                   Pregnant or breastfeeding- no      Contraception- Not discussed    Neurologic Hx- Migraines  Patient Active Problem List   Diagnosis     Pelvic pain in female     Vitamin D deficiency     Menorrhagia with regular  cycle     Migraine without aura and without status migrainosus, not intractable     Iron deficiency anemia due to chronic blood loss     Tired     Circadian rhythm sleep disorder, delayed sleep phase type     Unhealthy sleep habit     Seasonal mood disorder (H)       Major Surgery- Endometrial cystectomy, L wrist fracture repair    ALLERGY                                Cats  MEDICATIONS                               Current Outpatient Medications   Medication Sig Dispense Refill     Acetaminophen (TYLENOL PO) Take 650 mg by mouth every 4 hours as needed for mild pain or fever       albuterol (PROVENTIL HFA) 108 (90 Base) MCG/ACT inhaler Inhale 1-2 puffs into the lungs       ASMANEX 30 METERED DOSES 220 MCG/INH inhaler INHALE 1 PUFF BY MOUTH DAILY. RINSE MOUTH OR GARGLE AFTER USE  6     buPROPion (WELLBUTRIN SR) 150 MG 12 hr tablet Take 150 mg by mouth       buPROPion (WELLBUTRIN XL) 150 MG 24 hr tablet Take 2 tablets (300 mg) by mouth every morning 60 tablet 3     cetirizine (ZYRTEC) 10 MG tablet Take 10 mg by mouth daily       etonogestrel-ethinyl estradiol (NUVARING) 0.12-0.015 MG/24HR vaginal ring Place 1 each vaginally every 28 days 3 each 3     FLUoxetine (PROZAC) 20 MG capsule Take 3 capsules (60 mg) by mouth daily * RF UNTIL 10/26/18 APPT. 30 capsule 3     fluticasone (FLONASE) 50 MCG/ACT nasal spray 1-2 sprays       fluticasone (FLONASE) 50 MCG/ACT nasal spray INSTILL 1-2 SPRAYS INTO BOTH NOSTRILS DAILY.  11     LORazepam (ATIVAN) 0.5 MG tablet 1 or 2 tabs BID PRN anxiety.  Do not exceed 4 tabs in 24 hours. 35 tablet 0     mometasone (ASMANEX 30 METERED DOSES) 220 MCG/INH inhaler Inhale 1 puff into the lungs       order for DME Use for 15-30 minutes every morning. 1 Units 0     topiramate (TOPAMAX) 100 MG tablet Take 1 tablet (100 mg) by mouth daily 90 tablet 3     topiramate (TOPAMAX) 25 MG tablet Add 25 mg po hs daily for 2 weeks to prior 100mg dose, then 50mg hs daily with 100mg dose 120 tablet 3      VITALS                                                                                                                          3, 3   /83   Pulse 90   Wt 70.3 kg (155 lb)   LMP 01/24/2019   BMI 23.57 kg/m     MENTAL STATUS EXAM                                                                                           9, 14 cog gs     Alertness: alert   Appearance: casually groomed  Behavior/Demeanor: cooperative, with good  eye contact   Speech: normal  Language: no problems  Psychomotor: normal or unremarkable  Mood: description consistent with euthymia  Affect: less restricted; was congruent to mood; was congruent to content  Thought Process/Associations: unremarkable  Thought Content:  Reports none;  Denies suicidal ideation without plan; without intent [details in Interim History] and violent ideation without plan; without intent [details in Interim History]  Perception:  Reports none;  Denies auditory hallucinations and visual hallucinations  Insight: good  Judgment: good  Cognition: (6) does  appear grossly intact; formal cognitive testing was not done  Gait and Station: unremarkable    LABS and DATA     AIMS:  not needed    PHQ9 TODAY = 6  PHQ-9 SCORE 10/24/2018 12/6/2018 1/31/2019   PHQ-9 Total Score 11 8 6         DIAGNOSIS     Major Depressive Disorder, recurrent, mild to moderate  Seasonal Mood Disorder  Generalized Anxiety Disorder  R/o Attention Deficit Disorder    ASSESSMENT                                                                                                                   m2, h3     TODAY:    Kasandra Lau is a 32yo woman with MDD, Seasonal Mood DO, and Generalized Anxiety Disorder with some history of trauma in childhood who presents with improved anxiety and ADHD symptoms.  The increased dose of Wellbutrin (150mg --> 300mg) at last visit appears to be helping with her ADHD and has not bothered her anxiety symptoms, although she has attributed worsening anxiety symptoms to  increased doses of Wellbutrin in the past.  She is experiencing anorgasmia however, likely due to Prozac, and would like to try Viibryd if her insurance will pay for it.  She is willing to decrease Prozac from 60mg to 40mg daily and begin Viibryd 10mg daily.  She did not appear to be a safety risk to herself or others.        [place] PRESCRIPTION MONITORING PROGRAM [] was not checked today:  will check next session.     PSYCHOTROPIC DRUG INTERACTIONS:   Concurrent use of BUPROPION and SELECTED SEIZURE THRESHOLD LOWERING CYP2D6 SUBSTRATES (fluoxetine) may result in increased exposure of CYP2D6 substrates; increased risk of seizure. .  MANAGEMENT:  Monitoring for adverse effects and patient is aware of risks     PLAN                                                                                                                                m2, h3     1) PSYCHOTROPIC MEDICATIONS:  - Continue Wellbutrin XL 300mg daily  - Decrease Prozac 60mg to 40mg daily / cross taper with Viibryd  - Begin Viibryd 10mg daily   - Continue Ativan 0.5 to 1mg BID prn anxiety, max dose of 2mg per day, dispense 35 tabs, no refills  - Continue Topiramate for migraines, prescribed by neurology    2) THERAPY:    Continue    3) NEXT DUE:    Rating Scales- continue    4) REFERRALS:    No Referrals needed    5) RTC: 8 Weeks    6) CRISIS NUMBERS:   Provided routinely in [facility] [place] [facility] [phone] (clinic)    [phone] (after hours)  [facility] at [phone] or [facility] [phone]    TREATMENT RISK STATEMENT:  The risks, benefits, alternatives and potential adverse effects have been discussed and are understood by the pt. The pt understands the risks of using street drugs or alcohol. There are no medical contraindications, the pt agrees to treatment with the ability to do so. The pt knows to call the clinic for any problems or to access emergency care if needed.  Medical and substance use concerns are documented  above.  Psychotropic drug interaction check was done, including changes made today.     PROVIDER:  Jose Carlos Delcid MD    Patient not staffed in clinic.  Note will be reviewed and signed by supervisor Dr. Iniguez.    Attestation:  I did not see this patient directly. I have reviewed the documentation and I agree with the resident's plan of care.   Reynaldo Iniguez MD

## 2019-02-01 ASSESSMENT — ENCOUNTER SYMPTOMS
WHEEZING: 0
EYE REDNESS: 0
HOT FLASHES: 1
RECTAL BLEEDING: 0
SYNCOPE: 0
DOUBLE VISION: 0
LOSS OF CONSCIOUSNESS: 0
HYPERTENSION: 0
CLAUDICATION: 0
TACHYCARDIA: 0
SKIN CHANGES: 0
ALTERED TEMPERATURE REGULATION: 1
NAIL CHANGES: 0
DECREASED CONCENTRATION: 1
ARTHRALGIAS: 0
ABDOMINAL PAIN: 0
HEADACHES: 0
MEMORY LOSS: 0
BLOOD IN STOOL: 0
DECREASED APPETITE: 1
DECREASED LIBIDO: 0
TASTE DISTURBANCE: 0
TINGLING: 0
NUMBNESS: 0
WEAKNESS: 0
HOARSE VOICE: 0
MUSCLE WEAKNESS: 0
HEARTBURN: 0
POOR WOUND HEALING: 0
SINUS CONGESTION: 0
DEPRESSION: 0
SPUTUM PRODUCTION: 0
HEMATURIA: 0
NECK MASS: 0
INSOMNIA: 0
FLANK PAIN: 0
TREMORS: 0
PANIC: 1
PARALYSIS: 0
DISTURBANCES IN COORDINATION: 0
CHILLS: 1
BACK PAIN: 0
ORTHOPNEA: 0
WEIGHT LOSS: 0
HEMOPTYSIS: 0
NIGHT SWEATS: 1
SORE THROAT: 0
POSTURAL DYSPNEA: 0
BLOATING: 0
VOMITING: 0
DIFFICULTY URINATING: 0
MYALGIAS: 0
WEIGHT GAIN: 0
DIARRHEA: 0
FEVER: 0
HALLUCINATIONS: 0
JAUNDICE: 0
EYE IRRITATION: 0
DIZZINESS: 0
SWOLLEN GLANDS: 0
EYE PAIN: 0
EXERCISE INTOLERANCE: 0
SMELL DISTURBANCE: 0
LIGHT-HEADEDNESS: 0
POLYPHAGIA: 0
EYE WATERING: 0
FATIGUE: 1
LEG PAIN: 0
DYSPNEA ON EXERTION: 0
CONSTIPATION: 0
JOINT SWELLING: 0
RESPIRATORY PAIN: 0
NERVOUS/ANXIOUS: 1
INCREASED ENERGY: 0
BREAST MASS: 0
COUGH: 0
POLYDIPSIA: 0
BOWEL INCONTINENCE: 0
BREAST PAIN: 0
TROUBLE SWALLOWING: 0
BRUISES/BLEEDS EASILY: 0
EXTREMITY NUMBNESS: 0
NAUSEA: 0
SLEEP DISTURBANCES DUE TO BREATHING: 0
SINUS PAIN: 0
RECTAL PAIN: 0
MUSCLE CRAMPS: 0
NECK PAIN: 0
SHORTNESS OF BREATH: 0
SPEECH CHANGE: 0
COUGH DISTURBING SLEEP: 0
STIFFNESS: 0
LEG SWELLING: 0
SEIZURES: 0
SNORES LOUDLY: 0
HYPOTENSION: 0
DYSURIA: 0
PALPITATIONS: 0

## 2019-02-04 LAB
COPATH REPORT: NORMAL
PAP: NORMAL

## 2019-02-06 LAB
FINAL DIAGNOSIS: ABNORMAL
HPV HR 12 DNA CVX QL NAA+PROBE: POSITIVE
HPV16 DNA SPEC QL NAA+PROBE: NEGATIVE
HPV18 DNA SPEC QL NAA+PROBE: NEGATIVE
SPECIMEN DESCRIPTION: ABNORMAL
SPECIMEN SOURCE CVX/VAG CYTO: ABNORMAL

## 2019-02-07 ENCOUNTER — TELEPHONE (OUTPATIENT)
Dept: OBGYN | Facility: CLINIC | Age: 34
End: 2019-02-07

## 2019-02-07 NOTE — TELEPHONE ENCOUNTER
2/7/2019 - Left basic voicemail for pt to call back to discuss pap results.  Note NIL pap but continued + HR HPV neg 16/18 which per ASCCP guidelines recommends colpo follow up.    Routed to nursing staff and mychart sent.      Ghazal TAY, CNM

## 2019-02-21 ENCOUNTER — OFFICE VISIT (OUTPATIENT)
Dept: OBGYN | Facility: CLINIC | Age: 34
End: 2019-02-21
Attending: NURSE PRACTITIONER
Payer: COMMERCIAL

## 2019-02-21 VITALS
DIASTOLIC BLOOD PRESSURE: 80 MMHG | SYSTOLIC BLOOD PRESSURE: 113 MMHG | WEIGHT: 154 LBS | HEIGHT: 68 IN | HEART RATE: 88 BPM | BODY MASS INDEX: 23.34 KG/M2

## 2019-02-21 DIAGNOSIS — B97.7 HIGH RISK HPV INFECTION: ICD-10-CM

## 2019-02-21 DIAGNOSIS — Z01.812 PRE-PROCEDURE LAB EXAM: Primary | ICD-10-CM

## 2019-02-21 LAB
HCG UR QL: NEGATIVE
INTERNAL QC OK POCT: YES

## 2019-02-21 PROCEDURE — 57452 EXAM OF CERVIX W/SCOPE: CPT | Mod: ZF | Performed by: NURSE PRACTITIONER

## 2019-02-21 PROCEDURE — G0463 HOSPITAL OUTPT CLINIC VISIT: HCPCS | Mod: 25,ZF

## 2019-02-21 PROCEDURE — 81025 URINE PREGNANCY TEST: CPT | Mod: ZF | Performed by: NURSE PRACTITIONER

## 2019-02-21 PROCEDURE — 88305 TISSUE EXAM BY PATHOLOGIST: CPT | Performed by: NURSE PRACTITIONER

## 2019-02-21 PROCEDURE — 88305 TISSUE EXAM BY PATHOLOGIST: CPT | Mod: 26 | Performed by: NURSE PRACTITIONER

## 2019-02-21 ASSESSMENT — MIFFLIN-ST. JEOR: SCORE: 1457.04

## 2019-02-21 ASSESSMENT — PAIN SCALES - GENERAL: PAINLEVEL: NO PAIN (0)

## 2019-02-21 NOTE — PROGRESS NOTES
Colposcopy Visit/Procedure Note:    Kasandra Lau, Age 33 year old   with an  LMP: Patient's last menstrual period was 2019. 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    Contraception: Nuva Ring    Lab Results   Component Value Date    PAP NIL 2019    PAP NIL 10/10/2017     No prior colposcopy      History   Smoking Status     Former Smoker     Years: 10.00     Types: Cigarettes   Smokeless Tobacco     Never Used     Comment: smokes occasionally socially        Allergies as of 2019 - Reviewed 2019   Allergen Reaction Noted     Cats  2018          OBJECTIVE: LMP 2019   Pelvic Exam:  EG/BUS: Normal genital architecture without lesions, erythema or abnormal secretions. Bartholin's, Urethra, [place]'s glands are normal.   Vagina: moist, pink, rugae with creamy, white and odorlesssecretions  Cervix: no lesions  Rectum:anus normal      Colposcopy Procedure:  Verification of Procedure  Just before the procedure begins, through verbal and active participation of team members, I verified:   Initials   Patient Name DMD   Procedure to be performed DMD       Before the procedure consent was obtained.  The pathophysiology of HPV and cervical cell change was reviewed  Using pelvic model, illustrations, and sketches.  Squamous cell vs. endometrial cell pathology was discussed.     She was advised that evaluation of sexual contacts is NOT warranted and that she can not get the same virus again, only new ones should she be exposed via sexual contact.      Follow-up and treatment options were reviewed including: the role of HPV, the natural history of infection, ways to minimize her future risk, the effect of HPV on the cervix, and treatment recommendations / options were reviewed should they be indicated.     Details of the colposcopic procedure were reviewed, as well as the risks of missed diagnoses, pain, infection and bleeding. All questions were answered  "and verbal informed consent was obtained before proceeding.    UPT  negative    Colposcopic exam was conducted in the usual fashion.  Findings:  SCJ was  seen entirely and the exam was satisfactory.    There were  no visible lesions  Tissue samples were not  obtained from the cervix.                ECC: was  obtained and placed in labeled formalin Jar.    EBL:  minimal mL  Bleeding was controlled with Pressure and Silver Nitrate sticks. Monsel's  Complications none  Kasandra tolerated procedure with 0 reported pain 0  of 10 with biopsy and 0 of 10 following the procedure.    Assessment: Normal exam without visible pathology    Plan:   Orders Placed This Encounter   Procedures     hCG qual urine POCT       Specimen jars sent to Path.  Will contact results and recommended follow-up.    Written symptoms of complication to report reviewed and given.    Encouraged healthy lifestyle to promote strong immune system including: NO smoking or 2nd hand smoke exposure; a balanced, heart healthy diet ;  daily 30\" aerobic exercise; Vitamin D from 15' sun exposure w/o sunscreen or 2000 IU daily supplement ; identify stress and find best options to release it; and, STI protection and prevention.    Jami ALVAREZ        "

## 2019-02-21 NOTE — LETTER
2019       RE: Kasandra Lau  [address]  [place] [address]     Dear Colleague,    Thank you for referring your patient, Kasandra Lau, to the [facility] at [facility]. Please see a copy of my visit note below.    Colposcopy Visit/Procedure Note:    Kasandra Lau, Age 33 year old   with an  LMP: Patient's last menstrual period was 2019. 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    Contraception: Nuva Ring    Lab Results   Component Value Date    PAP NIL 2019    PAP NIL 10/10/2017     No prior colposcopy      History   Smoking Status     Former Smoker     Years: 10.00     Types: Cigarettes   Smokeless Tobacco     Never Used     Comment: smokes occasionally socially        Allergies as of 2019 - Reviewed 2019   Allergen Reaction Noted     Cats  2018          OBJECTIVE: LMP 2019   Pelvic Exam:  EG/BUS: Normal genital architecture without lesions, erythema or abnormal secretions. Bartholin's, Urethra, [place]'s glands are normal.   Vagina: moist, pink, rugae with creamy, white and odorlesssecretions  Cervix: no lesions  Rectum:anus normal      Colposcopy Procedure:  Verification of Procedure  Just before the procedure begins, through verbal and active participation of team members, I verified:   Initials   Patient Name DMD   Procedure to be performed DMD       Before the procedure consent was obtained.  The pathophysiology of HPV and cervical cell change was reviewed  Using pelvic model, illustrations, and sketches.  Squamous cell vs. endometrial cell pathology was discussed.     She was advised that evaluation of sexual contacts is NOT warranted and that she can not get the same virus again, only new ones should she be exposed via sexual contact.      Follow-up and treatment options were reviewed including: the role of HPV, the natural history of infection, ways to  "minimize her future risk, the effect of HPV on the cervix, and treatment recommendations / options were reviewed should they be indicated.     Details of the colposcopic procedure were reviewed, as well as the risks of missed diagnoses, pain, infection and bleeding. All questions were answered and verbal informed consent was obtained before proceeding.    UPT  negative    Colposcopic exam was conducted in the usual fashion.  Findings:  SCJ was  seen entirely and the exam was satisfactory.    There were  no visible lesions  Tissue samples were not  obtained from the cervix.                ECC: was  obtained and placed in labeled formalin Jar.    EBL:  minimal mL  Bleeding was controlled with Pressure and Silver Nitrate sticks. Monsel's  Complications none  Kaasndra tolerated procedure with 0 reported pain 0  of 10 with biopsy and 0 of 10 following the procedure.    Assessment: Normal exam without visible pathology    Plan:   Orders Placed This Encounter   Procedures     hCG qual urine POCT       Specimen jars sent to [facility].  Will contact results and recommended follow-up.    Written symptoms of complication to report reviewed and given.    Encouraged healthy lifestyle to promote strong immune system including: NO smoking or 2nd hand smoke exposure; a balanced, heart healthy diet ;  daily 30\" aerobic exercise; Vitamin D from 15' sun exposure w/o sunscreen or 2000 IU daily supplement ; identify stress and find best options to release it; and, STI protection and prevention.    Jami ALVAREZ          "

## 2019-02-22 ENCOUNTER — OFFICE VISIT (OUTPATIENT)
Dept: PSYCHIATRY | Facility: CLINIC | Age: 34
End: 2019-02-22
Attending: PSYCHIATRY & NEUROLOGY
Payer: COMMERCIAL

## 2019-02-22 VITALS
HEART RATE: 102 BPM | WEIGHT: 155.2 LBS | DIASTOLIC BLOOD PRESSURE: 84 MMHG | BODY MASS INDEX: 23.6 KG/M2 | SYSTOLIC BLOOD PRESSURE: 119 MMHG

## 2019-02-22 DIAGNOSIS — F33.1 MODERATE EPISODE OF RECURRENT MAJOR DEPRESSIVE DISORDER (H): Primary | ICD-10-CM

## 2019-02-22 RX ORDER — VILAZODONE HYDROCHLORIDE 10 MG/1
TABLET ORAL
Qty: 90 TABLET | Refills: 0 | Status: SHIPPED | OUTPATIENT
Start: 2019-02-22 | End: 2019-04-02

## 2019-02-22 ASSESSMENT — PAIN SCALES - GENERAL: PAINLEVEL: NO PAIN (0)

## 2019-02-22 NOTE — PROGRESS NOTES
[facility] PSYCHIATRY CLINIC PROGRESS NOTE     CARE TEAM:  PCP- Roxy Rios    Specialty Providers- Neuro Dr. Patiño    Therapist- Ms. Edwin Lau is a 33 year old female who prefers the name Kasandra & pronouns she, her, hers. Date of initial diagnostic assessment is 10/31/17.  Date of most recent transfer of care assessment is 10/24/18.      Pertinent Background:  This patient first experienced mental health issues in childhood and has received treatment for MDD and Seasonal Mood DO.  History details in last diagnostic assessment.    INTERIM HISTORY                                                                                                                 4, 4   The patient reports good treatment adherence.  History was provided by the patient who was a good historian.      Since the last visit:        She believes the lower dose of Prozac (60mg --> 40mg) and the start of Viibryd 10mg daily is helping with the sexual side effects she was experiencing.  She also notices improvement in her energy level and anxiety.  I explain to her that while adjusting these medications it is possible to get a rare complication called Serotonin Syndrome (Hot and cold flashes or sweats, fever, faintness/dizziness, flushed skin), if this occurs then go to the hospital immediately for treatment.  She expresses back to me her understanding.    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  She is trying to find some time for work/life balance.  She is working on getting funding for next school year and also working to determine the trajectory of her research.  She thinks she has a good committee and meets with them monthly.  She continues to see her paramour and says it is going well.    Denies SI/HI thoughts intents plans of harm.      RECENT SYMPTOMS:   DEPRESSION:  reports-none;  DENIES- suicidal ideation without plan,  without intent, depressed mood, anhedonia and insomnia  MICHELLE/HYPOMANIA:  reports-none;  DENIES- increased energy, decreased sleep need, increased activity, grandiosity, distractibility , racing thoughts and pressured speech  PSYCHOSIS:  reports-none;  DENIES- auditory hallucinations and visual hallucinations  ANXIETY:  improved  ATTENTION:  none  SLEEP:  Oversleeping   EATING DISORDER: no    RECENT SUBSTANCE USE:     ALCOHOL- Social drinking, occassionally a glass or two of wine at home 1-2 nights a week      TOBACCO- no     CAFFEINE- not often  OPIOIDS- no         NARCAN KIT- no        CANNABIS- no            OTHER ILLICIT DRUGS- none      CURRENT SOCIAL HISTORY:  FINANCIAL SUPPORT- working       EDUCATION- [employer], studying for PhD in Human Geography  CHILDREN- no      LIVING SITUATION- Lives alone in an apartment      SOCIAL/ SPIRITUAL SUPPORT- Friends through the university     FEELS SAFE AT HOME- yes     MEDICAL ROS (2,10):  Reports some problems with anorgasmia.  A comprehensive review of systems was performed and is negative other than noted in the HPI.   Denies A comprehensive review of systems was performed and is negative other than noted in the HPI.      PSYCH and CD Critical Summary Points since July 2018 12/18 Increase Wellbutrin  --> 300mg  1/31/19 Added Viibryd, with intention to cross taper off of fluoxetine - to potentially address anorgasmia    PAST PSYCH MED TRIALS   Abilify adjunct for depression      MEDICAL / SURGICAL HISTORY                                   Pregnant or breastfeeding- no      Contraception- Not discussed    Neurologic Hx- Migraines  Patient Active Problem List   Diagnosis     Pelvic pain in female     Vitamin D deficiency     Menorrhagia with regular cycle     Migraine without aura and without status migrainosus, not intractable     Iron deficiency anemia due to chronic blood loss     Tired     Circadian rhythm sleep disorder, delayed sleep phase type      Unhealthy sleep habit     Seasonal mood disorder (H)      *   Anorgasmia, thought to be medication side effect (likely secondary to serotonin specific reuptake inhibitor)    Major Surgery- Endometrial cystectomy, L wrist fracture repair    ALLERGY                                Cats  MEDICATIONS                               Current Outpatient Medications   Medication Sig Dispense Refill     Acetaminophen (TYLENOL PO) Take 650 mg by mouth every 4 hours as needed for mild pain or fever       albuterol (PROVENTIL HFA) 108 (90 Base) MCG/ACT inhaler Inhale 1-2 puffs into the lungs       ASMANEX 30 METERED DOSES 220 MCG/INH inhaler INHALE 1 PUFF BY MOUTH DAILY. RINSE MOUTH OR GARGLE AFTER USE  6     buPROPion (WELLBUTRIN SR) 150 MG 12 hr tablet Take 150 mg by mouth       buPROPion (WELLBUTRIN XL) 150 MG 24 hr tablet Take 2 tablets (300 mg) by mouth every morning 60 tablet 3     cetirizine (ZYRTEC) 10 MG tablet Take 10 mg by mouth daily       etonogestrel-ethinyl estradiol (NUVARING) 0.12-0.015 MG/24HR vaginal ring Place 1 each vaginally every 28 days 3 each 3     FLUoxetine (PROZAC) 20 MG capsule Take 3 capsules (60 mg) by mouth daily * RF UNTIL 10/26/18 APPT. 30 capsule 3     fluticasone (FLONASE) 50 MCG/ACT nasal spray 1-2 sprays       fluticasone (FLONASE) 50 MCG/ACT nasal spray INSTILL 1-2 SPRAYS INTO BOTH NOSTRILS DAILY.  11     LORazepam (ATIVAN) 0.5 MG tablet 1 or 2 tabs BID PRN anxiety.  Do not exceed 4 tabs in 24 hours. 35 tablet 0     mometasone (ASMANEX 30 METERED DOSES) 220 MCG/INH inhaler Inhale 1 puff into the lungs       order for DME Use for 15-30 minutes every morning. 1 Units 0     topiramate (TOPAMAX) 100 MG tablet Take 1 tablet (100 mg) by mouth daily 90 tablet 3     topiramate (TOPAMAX) 25 MG tablet Add 25 mg po hs daily for 2 weeks to prior 100mg dose, then 50mg hs daily with 100mg dose 120 tablet 3     vilazodone (VIIBRYD) 10 MG TABS tablet 1 tab po q day 30 tablet 0     VITALS                                                                                                                           3, 3   LMP 01/24/2019    MENTAL STATUS EXAM                                                                                           9, 14 cog gs     Alertness: alert   Appearance: casually groomed  Behavior/Demeanor: cooperative, with good  eye contact   Speech: normal  Language: no problems  Psychomotor: normal or unremarkable  Mood: description consistent with euthymia  Affect: less restricted; was congruent to mood; was congruent to content  Thought Process/Associations: unremarkable  Thought Content:  Reports none;  Denies suicidal ideation without plan; without intent [details in Interim History] and violent ideation without plan; without intent [details in Interim History]  Perception:  Reports none;  Denies auditory hallucinations and visual hallucinations  Insight: good  Judgment: good  Cognition: (6) does  appear grossly intact; formal cognitive testing was not done  Gait and Station: unremarkable    LABS and DATA     AIMS:  not needed      PHQ-9 SCORE 12/6/2018 1/31/2019 1/31/2019   PHQ-9 Total Score 8 6 6         DIAGNOSIS     Major Depressive Disorder, recurrent, mild to moderate  Seasonal Mood Disorder  Generalized Anxiety Disorder  R/o Attention Deficit Disorder    ASSESSMENT                                                                                                                   m2, h3     TODAY:    Kasandra Lau is a 32yo woman with MDD, Seasonal Mood DO, and Generalized Anxiety Disorder with some history of trauma in childhood who presents with improved anxiety and ADHD symptoms.  She was experiencing anorgasmia however, likely due to Prozac, which has improved with the beginning of a crosstaper of Prozac with Viibryd.  We will continue this cross titration.  She did not appear to be a safety risk to herself or others.        [place] PRESCRIPTION MONITORING PROGRAM [] was not checked today:   will check next session.     PSYCHOTROPIC DRUG INTERACTIONS:   Concurrent use of BUPROPION and SELECTED SEIZURE THRESHOLD LOWERING CYP2D6 SUBSTRATES (fluoxetine) may result in increased exposure of CYP2D6 substrates; increased risk of seizure. .  MANAGEMENT:  Monitoring for adverse effects and patient is aware of risks     PLAN                                                                                                                                m2, h3     1) PSYCHOTROPIC MEDICATIONS:  - Continue Wellbutrin XL 300mg daily  - Discontinue Prozac   - Increase Viibryd to 20mg per day x 14 days, then increase to 30mg per day  - Continue Ativan 0.5 to 1mg BID prn anxiety, max dose of 2mg per day, dispense 35 tabs, no additional refills prescribed at this time  - Continue Topiramate for migraines, prescribed by neurology    2) THERAPY:    Continue    3) NEXT DUE:    Rating Scales- continue    4) REFERRALS:    No Referrals needed    5) RTC: 8 Weeks    6) CRISIS NUMBERS:   Provided routinely in AVS.   [facility] [phone] (clinic)    [phone] (after hours)  ONLY if a [facility] PT: [facility] [phone] (clinic), [phone] (after hours)     TREATMENT RISK STATEMENT:  The risks, benefits, alternatives and potential adverse effects have been discussed and are understood by the pt. The pt understands the risks of using street drugs or alcohol. There are no medical contraindications, the pt agrees to treatment with the ability to do so. The pt knows to call the clinic for any problems or to access emergency care if needed.  Medical and substance use concerns are documented above.  Psychotropic drug interaction check was done, including changes made today.     PROVIDER:  Jose Carlos Delcid MD    Patient not staffed in clinic.  Note will be reviewed and signed by supervisor Dr. Iniguez.    Attestation:  I did not see this patient directly. I have reviewed the documentation and I agree with the resident's plan  of care.   Reynaldo Iniguez MD

## 2019-03-04 LAB — COPATH REPORT: NORMAL

## 2019-03-07 NOTE — RESULT ENCOUNTER NOTE
Dear Kasandra,    I have reviewed your pathology results from the colposcopy exam. The sample taken from the cervical canal was normal, benign. I recommend that we repeat the Pap smear and HPV testing in one year to follow you a little more closely.    As you know, a healthy immune system can limit the impact of HPV. A healthy diet, exercise, adequate sleep, decreasing stress levels, and not smoking are all helpful in maintaining a healthy immune system.    Please let me know if you have any further questions.    Sincerely,  Jami ALVAREZ

## 2019-03-08 ENCOUNTER — CARE COORDINATION (OUTPATIENT)
Dept: PSYCHIATRY | Facility: CLINIC | Age: 34
End: 2019-03-08

## 2019-03-08 NOTE — PROGRESS NOTES
Radhar received telephone call from Praneeth at [facility], who identified that their system was not accepting Dr. Delcid's APRIL number as it is a [place] APRIL number. Writer provided Dr. Iniguez's information.

## 2019-03-21 ENCOUNTER — OFFICE VISIT (OUTPATIENT)
Dept: PSYCHIATRY | Facility: CLINIC | Age: 34
End: 2019-03-21
Attending: PSYCHIATRY & NEUROLOGY
Payer: COMMERCIAL

## 2019-03-21 VITALS
DIASTOLIC BLOOD PRESSURE: 84 MMHG | BODY MASS INDEX: 23.9 KG/M2 | HEART RATE: 106 BPM | SYSTOLIC BLOOD PRESSURE: 120 MMHG | WEIGHT: 157.2 LBS

## 2019-03-21 DIAGNOSIS — F33.1 MODERATE EPISODE OF RECURRENT MAJOR DEPRESSIVE DISORDER (H): Primary | ICD-10-CM

## 2019-03-21 DIAGNOSIS — F41.9 ANXIETY: ICD-10-CM

## 2019-03-21 PROCEDURE — G0463 HOSPITAL OUTPT CLINIC VISIT: HCPCS | Mod: ZF

## 2019-03-21 RX ORDER — LORAZEPAM 0.5 MG/1
TABLET ORAL
Qty: 35 TABLET | Refills: 0 | Status: SHIPPED | OUTPATIENT
Start: 2019-03-21 | End: 2019-06-20

## 2019-03-21 RX ORDER — LORAZEPAM 0.5 MG/1
TABLET ORAL
Qty: 35 TABLET | Refills: 0 | Status: SHIPPED | OUTPATIENT
Start: 2019-03-21 | End: 2019-03-21

## 2019-03-21 ASSESSMENT — PATIENT HEALTH QUESTIONNAIRE - PHQ9: SUM OF ALL RESPONSES TO PHQ QUESTIONS 1-9: 5

## 2019-03-21 ASSESSMENT — PAIN SCALES - GENERAL: PAINLEVEL: NO PAIN (0)

## 2019-03-21 NOTE — PROGRESS NOTES
"  [facility] PSYCHIATRY CLINIC PROGRESS NOTE     CARE TEAM:  PCP- Roxy Rios    Specialty Providers- Neuro Dr. Patiño    Therapist- Ms. Edwin Lau is a 33 year old female who prefers the name Kasandra & pronouns she, her, hers. Date of initial diagnostic assessment is 10/31/17.  Date of most recent transfer of care assessment is 10/24/18.      Pertinent Background:  This patient first experienced mental health issues in childhood and has received treatment for MDD and Seasonal Mood DO.  History details in last diagnostic assessment.    INTERIM HISTORY                                                                                                                 4, 4   The patient reports good treatment adherence.  History was provided by the patient who was a good historian.      Since the last visit:     Has been on spring break, catching up with people, also doing reading so she can catch up on incomplete coursework at school.  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  Advisors are starting to see her weekly to make sure she is well supported moving forward with her dissertation work.    Things are going good with friends.      Mood is OK when she focuses on her work and school.  Romantic relationship has been difficult lately.  Relationship can take a lot of energy out of her.  She has been compartmentalizing it quite a bit.  She feels he \"bails on her\" as far as coming over for dates, and then doesn't care that she's upset he's not coming over.  Boyfriend has history of alcohol use issues, and he has been drinking more; it has been problematic for their relationship.  She is considering breaking up with him.  She has been emotional about it.  She has been using the lorazepam more frequently due to the turmoil in the relationship; she says once daily; I provide some education around this and explain that it is a medication with addictive potential.    She " feels the viibryd is working well overall.  She feels the 30mg is a good dose for her.    She denies SI/HI thoughts intents plans of harm.      RECENT SYMPTOMS:   DEPRESSION:  reports-none;  DENIES- suicidal ideation without plan, without intent, depressed mood, anhedonia and insomnia  MICHELLE/HYPOMANIA:  reports-none;  DENIES- increased energy, decreased sleep need, increased activity, grandiosity, distractibility , racing thoughts and pressured speech  PSYCHOSIS:  reports-none;  DENIES- auditory hallucinations and visual hallucinations  ANXIETY:  improved  ATTENTION:  none  SLEEP:  Oversleeping   EATING DISORDER: no    RECENT SUBSTANCE USE:     ALCOHOL- Social drinking, occassionally a glass or two of wine at home 1-2 nights a week      TOBACCO- no     CAFFEINE- not often  OPIOIDS- no         NARCAN KIT- no        CANNABIS- no            OTHER ILLICIT DRUGS- none      CURRENT SOCIAL HISTORY:  FINANCIAL SUPPORT- working       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  A comprehensive review of systems was performed and is negative other than noted in the HPI.   Denies A comprehensive review of systems was performed and is negative other than noted in the HPI.      PSYCH and CD Critical Summary Points since July 2018 12/18 Increase Wellbutrin  --> 300mg  1/31/19 Added Viibryd, with intention to cross taper off of fluoxetine - to potentially address anorgasmia    PAST PSYCH MED TRIALS   Abilify adjunct for depression      MEDICAL / SURGICAL HISTORY                                   Pregnant or breastfeeding- no      Contraception- Not discussed    Neurologic Hx- Migraines  Patient Active Problem List   Diagnosis     Pelvic pain in female     Vitamin D deficiency      Menorrhagia with regular cycle     Migraine without aura and without status migrainosus, not intractable     Iron deficiency anemia due to chronic blood loss     Tired     Circadian rhythm sleep disorder, delayed sleep phase type     Unhealthy sleep habit     Seasonal mood disorder (H)      *   Anorgasmia, thought to be medication side effect (likely secondary to serotonin specific reuptake inhibitor)    Major Surgery- Endometrial cystectomy, L wrist fracture repair    ALLERGY                                Cats  MEDICATIONS                               Current Outpatient Medications   Medication Sig Dispense Refill     Acetaminophen (TYLENOL PO) Take 650 mg by mouth every 4 hours as needed for mild pain or fever       albuterol (PROVENTIL HFA) 108 (90 Base) MCG/ACT inhaler Inhale 1-2 puffs into the lungs       ASMANEX 30 METERED DOSES 220 MCG/INH inhaler INHALE 1 PUFF BY MOUTH DAILY. RINSE MOUTH OR GARGLE AFTER USE  6     buPROPion (WELLBUTRIN SR) 150 MG 12 hr tablet Take 150 mg by mouth       buPROPion (WELLBUTRIN XL) 150 MG 24 hr tablet Take 2 tablets (300 mg) by mouth every morning 60 tablet 3     cetirizine (ZYRTEC) 10 MG tablet Take 10 mg by mouth daily       etonogestrel-ethinyl estradiol (NUVARING) 0.12-0.015 MG/24HR vaginal ring Place 1 each vaginally every 28 days 3 each 3     FLUoxetine (PROZAC) 20 MG capsule Take 3 capsules (60 mg) by mouth daily * RF UNTIL 10/26/18 APPT. 30 capsule 3     fluticasone (FLONASE) 50 MCG/ACT nasal spray 1-2 sprays       fluticasone (FLONASE) 50 MCG/ACT nasal spray INSTILL 1-2 SPRAYS INTO BOTH NOSTRILS DAILY.  11     LORazepam (ATIVAN) 0.5 MG tablet 1 or 2 tabs BID PRN anxiety.  Do not exceed 4 tabs in 24 hours. 35 tablet 0     mometasone (ASMANEX 30 METERED DOSES) 220 MCG/INH inhaler Inhale 1 puff into the lungs       order for DME Use for 15-30 minutes every morning. 1 Units 0     topiramate (TOPAMAX) 100 MG tablet Take 1 tablet (100 mg) by mouth daily 90 tablet 3      topiramate (TOPAMAX) 25 MG tablet Add 25 mg po hs daily for 2 weeks to prior 100mg dose, then 50mg hs daily with 100mg dose 120 tablet 3     vilazodone (VIIBRYD) 10 MG TABS tablet 2tabs po q day x14 days then 3tabs po q day 90 tablet 0     VITALS                                                                                                                          3, 3   /84   Pulse 106   Wt 71.3 kg (157 lb 3.2 oz)   BMI 23.90 kg/m     MENTAL STATUS EXAM                                                                                           9, 14 cog gs     Alertness: alert   Appearance: casually groomed  Behavior/Demeanor: cooperative, with good  eye contact   Speech: normal  Language: no problems  Psychomotor: normal or unremarkable  Mood: description consistent with euthymia  Affect: less restricted; was congruent to mood; was congruent to content  Thought Process/Associations: unremarkable  Thought Content:  Reports none;  Denies suicidal ideation without plan; without intent [details in Interim History] and violent ideation without plan; without intent [details in Interim History]  Perception:  Reports none;  Denies auditory hallucinations and visual hallucinations  Insight: good  Judgment: good  Cognition: (6) does  appear grossly intact; formal cognitive testing was not done  Gait and Station: unremarkable    LABS and DATA     AIMS:  not needed    PHQ9=5  PHQ-9 SCORE 12/6/2018 1/31/2019 1/31/2019   PHQ-9 Total Score 8 6 6         DIAGNOSIS     Major Depressive Disorder, recurrent, mild   Seasonal Mood Disorder  Generalized Anxiety Disorder  R/o Attention Deficit Disorder    ASSESSMENT                                                                                                                   m2, h3     TODAY:    Kasandra Lau is a 32yo woman with MDD, Seasonal Mood DO, and Generalized Anxiety Disorder with some history of trauma in childhood who presents with some relationship problems.  Her  sexual side effects have improved with the discontinuation of Prozac and the initiation of Viibryd.  She feels the Viibryd is effective at controlling her depression.  She did not appear to be a safety risk to herself or others.        [place] PRESCRIPTION MONITORING PROGRAM [] was not checked today:  will check next session.     PSYCHOTROPIC DRUG INTERACTIONS:   Concurrent use of BUPROPION and SELECTED SEIZURE THRESHOLD LOWERING CYP2D6 SUBSTRATES (fluoxetine) may result in increased exposure of CYP2D6 substrates; increased risk of seizure. .  MANAGEMENT:  Monitoring for adverse effects and patient is aware of risks     PLAN                                                                                                                                m2, h3     1) PSYCHOTROPIC MEDICATIONS:  - Continue Wellbutrin XL 300mg daily  - Continue Viibryd 30mg per day  - Continue Ativan 0.5 to 1mg BID prn anxiety, max dose of 2mg per day, dispense 35 tabs, no additional refills prescribed at this time  - Continue Topiramate for migraines, prescribed by neurology    2) THERAPY:    Continue    3) NEXT DUE:    Rating Scales- continue    4) REFERRALS:    No Referrals needed    5) RTC: 8-10 Weeks    6) CRISIS NUMBERS:   Provided routinely in AVS.   [facility] [phone] (clinic)    [phone] (after hours)  ONLY if a [facility] PT: [facility] [phone] (clinic), [phone] (after hours)     TREATMENT RISK STATEMENT:  The risks, benefits, alternatives and potential adverse effects have been discussed and are understood by the pt. The pt understands the risks of using street drugs or alcohol. There are no medical contraindications, the pt agrees to treatment with the ability to do so. The pt knows to call the clinic for any problems or to access emergency care if needed.  Medical and substance use concerns are documented above.  Psychotropic drug interaction check was done, including changes made today.      PROVIDER:  Jose Carlos Delcid MD    Patient not staffed in clinic.  Note will be reviewed and signed by supervisor Dr. Iniguez.    Attestation:  I did not see Kasandra Lau directly. I have reviewed the documentation and I agree with the resident's plan of care.   Reynaldo Iniguez MD

## 2019-04-02 DIAGNOSIS — F33.1 MODERATE EPISODE OF RECURRENT MAJOR DEPRESSIVE DISORDER (H): ICD-10-CM

## 2019-04-02 NOTE — TELEPHONE ENCOUNTER
Medication requested: vilazodone (VIIBRYD) 10 MG TABS tablet  Last refilled: 2/23/19  Qty: 90      Last seen: 3/21/19  RTC: 8-10 weeks  Cancel: 0  No-show: 0  Next appt: 5/30/19    Refill decision:   Refill pended and routed to the provider for review/determination due to last note from 3/21/19 is not signed

## 2019-04-03 RX ORDER — VILAZODONE HYDROCHLORIDE 10 MG/1
30 TABLET ORAL DAILY
Qty: 90 TABLET | Refills: 0 | Status: SHIPPED | OUTPATIENT
Start: 2019-04-03 | End: 2019-05-05

## 2019-05-05 DIAGNOSIS — F33.1 MODERATE EPISODE OF RECURRENT MAJOR DEPRESSIVE DISORDER (H): ICD-10-CM

## 2019-05-07 RX ORDER — VILAZODONE HYDROCHLORIDE 10 MG/1
30 TABLET ORAL DAILY
Qty: 90 TABLET | Refills: 0 | Status: SHIPPED | OUTPATIENT
Start: 2019-05-07 | End: 2019-06-20

## 2019-05-08 NOTE — TELEPHONE ENCOUNTER
Medication requested: vilazodone (VIIBRYD) 10 MG TABS tablet  Last refilled: 4/4/19  Qty: 90       Last seen: 3/21/19  RTC: 8-10 weeks  Cancel: 0  No-show: 0  Next appt: 5/30/19     Refill decision: Refilled for 30 days per protocol.

## 2019-06-10 DIAGNOSIS — F33.1 MAJOR DEPRESSIVE DISORDER, RECURRENT EPISODE, MODERATE (H): ICD-10-CM

## 2019-06-11 RX ORDER — BUPROPION HYDROCHLORIDE 150 MG/1
300 TABLET ORAL EVERY MORNING
Qty: 60 TABLET | Refills: 0 | Status: SHIPPED | OUTPATIENT
Start: 2019-06-11 | End: 2019-07-12

## 2019-06-11 NOTE — TELEPHONE ENCOUNTER
Medication requested: buPROPion (WELLBUTRIN XL) 150 MG 24 hr tablet  Last refilled: 3-16-19  Qty: 60      Last seen: 3-21-19  RTC: 8-10 weeks  Cancel: 2  No-show: 0  Next appt: 6-20-19    Refill decision:   Refill pended and routed to the provider for review/determination due to:  2 Cancel appt, gap in refill    30 day rx pending

## 2019-06-18 DIAGNOSIS — F33.1 MODERATE EPISODE OF RECURRENT MAJOR DEPRESSIVE DISORDER (H): ICD-10-CM

## 2019-06-20 ENCOUNTER — OFFICE VISIT (OUTPATIENT)
Dept: PSYCHIATRY | Facility: CLINIC | Age: 34
End: 2019-06-20
Attending: PSYCHIATRY & NEUROLOGY
Payer: COMMERCIAL

## 2019-06-20 VITALS
HEART RATE: 93 BPM | SYSTOLIC BLOOD PRESSURE: 114 MMHG | DIASTOLIC BLOOD PRESSURE: 79 MMHG | WEIGHT: 162.6 LBS | BODY MASS INDEX: 24.72 KG/M2

## 2019-06-20 DIAGNOSIS — F41.9 ANXIETY: ICD-10-CM

## 2019-06-20 DIAGNOSIS — F33.1 MODERATE EPISODE OF RECURRENT MAJOR DEPRESSIVE DISORDER (H): Primary | ICD-10-CM

## 2019-06-20 PROCEDURE — G0463 HOSPITAL OUTPT CLINIC VISIT: HCPCS | Mod: ZF

## 2019-06-20 RX ORDER — VILAZODONE HYDROCHLORIDE 20 MG/1
TABLET ORAL
Qty: 30 TABLET | Refills: 2 | Status: SHIPPED | OUTPATIENT
Start: 2019-06-20 | End: 2019-08-28

## 2019-06-20 RX ORDER — LORAZEPAM 0.5 MG/1
TABLET ORAL
Qty: 35 TABLET | Refills: 1 | Status: SHIPPED | OUTPATIENT
Start: 2019-06-20 | End: 2019-08-14

## 2019-06-20 ASSESSMENT — PATIENT HEALTH QUESTIONNAIRE - PHQ9: SUM OF ALL RESPONSES TO PHQ QUESTIONS 1-9: 6

## 2019-06-20 ASSESSMENT — PAIN SCALES - GENERAL: PAINLEVEL: NO PAIN (0)

## 2019-06-20 NOTE — PROGRESS NOTES
"  [facility] PSYCHIATRY CLINIC PROGRESS NOTE     CARE TEAM:  PCP- Roxy Rios    Specialty Providers- Neuro Dr. Patiño    Therapist- MsAmira Edwin Lau is a 33 year old female who prefers the name Kasandra & pronouns she, her, hers. Date of initial diagnostic assessment is 10/31/17.  Date of most recent transfer of care assessment is 10/24/18.      Pertinent Background:  This patient first experienced mental health issues in childhood and has received treatment for MDD and Seasonal Mood DO.  History details in last diagnostic assessment.    INTERIM HISTORY                                                                                                                 4, 4   The patient reports good treatment adherence.  History was provided by the patient who was a good historian.      Since the last visit:       Getting ready for grad school prelims in the fall.  Trying to finish incompletes.  Is presenting at a conference in [facility] in the fall, and is writing a paper for that.  Is nice not to have to be on campus everyday.  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      She feels \"alright, having a lot of anxiety.\"  Has been taking two tabs of the viibryd instead of three, and its been fine.  Felt like three of the viibryd got a little twitchy; sometimes \"feels like a surge of energy that makes you twitch.\"  It is better with the two tablets.  Mood has been \"alright.\"  Thinks she does get PMS, but otherwise mood is good. Doesn't feel any depressive symptoms.      Still some anxiety, ran out of the lorazepam a couple months ago.  Toward the end of the semester was taking it every other day.  Was running out around the end of the semester.      Relationship with gentleman she was seeing has been \"on and off.\"  She says its been frustrating.  She's \"shopping around\" for a new therapist.  She was seeing a different therapist previously; has been out of therapy for a month " "because felt like therapy came to a \"dead end.\"  Just started seeing a new therapist.      Sexual side effects have resolved since switching to viibryd.    She denies SI/HI thoughts intents plans of harm.    RECENT SYMPTOMS:   DEPRESSION:  reports-none;  DENIES- suicidal ideation without plan, without intent, depressed mood, anhedonia and insomnia  MICHELLE/HYPOMANIA:  reports-none;  DENIES- increased energy, decreased sleep need, increased activity, grandiosity, distractibility , racing thoughts and pressured speech  PSYCHOSIS:  reports-none;  DENIES- auditory hallucinations and visual hallucinations  ANXIETY:  improved  ATTENTION:  none  SLEEP:  Oversleeping   EATING DISORDER: no    RECENT SUBSTANCE USE:     ALCOHOL- Social drinking, occassionally a glass or two of wine at home 1-2 nights a week      TOBACCO- no     CAFFEINE- not often  OPIOIDS- no         NARCAN KIT- no        CANNABIS- no            OTHER ILLICIT DRUGS- none      CURRENT SOCIAL HISTORY:  FINANCIAL SUPPORT- working       EDUCATION- [employer], studying for PhD in Human Geography  CHILDREN- no      LIVING SITUATION- Lives alone in an apartment      SOCIAL/ SPIRITUAL SUPPORT- Friends through the [employer]     FEELS SAFE AT HOME- yes     MEDICAL ROS (2,10):  Reports some problems with anorgasmia.  A comprehensive review of systems was performed and is negative other than noted in the HPI.   Denies A comprehensive review of systems was performed and is negative other than noted in the HPI.      PSYCH and CD Critical Summary Points since July 2018 12/18 Increase Wellbutrin  --> 300mg  1/31/19 Added Viibryd, with intention to cross taper off of fluoxetine - to potentially address anorgasmia    PAST PSYCH MED TRIALS   Abilify adjunct for depression      MEDICAL / SURGICAL HISTORY                                   Pregnant or breastfeeding- no      Contraception- Not discussed    Neurologic Hx- Migraines  Patient Active Problem List "   Diagnosis     Pelvic pain in female     Vitamin D deficiency     Menorrhagia with regular cycle     Migraine without aura and without status migrainosus, not intractable     Iron deficiency anemia due to chronic blood loss     Tired     Circadian rhythm sleep disorder, delayed sleep phase type     Unhealthy sleep habit     Seasonal mood disorder (H)      *   Anorgasmia, thought to be medication side effect (likely secondary to serotonin specific reuptake inhibitor)    Major Surgery- Endometrial cystectomy, L wrist fracture repair    ALLERGY                                Cats  MEDICATIONS                               Current Outpatient Medications   Medication Sig Dispense Refill     Acetaminophen (TYLENOL PO) Take 650 mg by mouth every 4 hours as needed for mild pain or fever       albuterol (PROVENTIL HFA) 108 (90 Base) MCG/ACT inhaler Inhale 1-2 puffs into the lungs       ASMANEX 30 METERED DOSES 220 MCG/INH inhaler INHALE 1 PUFF BY MOUTH DAILY. RINSE MOUTH OR GARGLE AFTER USE  6     buPROPion (WELLBUTRIN SR) 150 MG 12 hr tablet Take 150 mg by mouth       buPROPion (WELLBUTRIN XL) 150 MG 24 hr tablet Take 2 tablets (300 mg) by mouth every morning Please keep 6-20-19 clinic appt 60 tablet 0     cetirizine (ZYRTEC) 10 MG tablet Take 10 mg by mouth daily       etonogestrel-ethinyl estradiol (NUVARING) 0.12-0.015 MG/24HR vaginal ring Place 1 each vaginally every 28 days 3 each 3     FLUoxetine (PROZAC) 20 MG capsule Take 3 capsules (60 mg) by mouth daily * RF UNTIL 10/26/18 APPT. 30 capsule 3     fluticasone (FLONASE) 50 MCG/ACT nasal spray 1-2 sprays       fluticasone (FLONASE) 50 MCG/ACT nasal spray INSTILL 1-2 SPRAYS INTO BOTH NOSTRILS DAILY.  11     LORazepam (ATIVAN) 0.5 MG tablet 1 or 2 tabs BID PRN anxiety.  Do not exceed 4 tabs in 24 hours. 35 tablet 0     mometasone (ASMANEX 30 METERED DOSES) 220 MCG/INH inhaler Inhale 1 puff into the lungs       order for DME Use for 15-30 minutes every morning. 1 Units  0     topiramate (TOPAMAX) 100 MG tablet Take 1 tablet (100 mg) by mouth daily 90 tablet 3     topiramate (TOPAMAX) 25 MG tablet Add 25 mg po hs daily for 2 weeks to prior 100mg dose, then 50mg hs daily with 100mg dose 120 tablet 3     vilazodone (VIIBRYD) 10 MG TABS tablet Take 3 tablets (30 mg) by mouth daily 90 tablet 0     VITALS                                                                                                                          3, 3   /79   Pulse 93   Wt 73.8 kg (162 lb 9.6 oz)   BMI 24.72 kg/m     MENTAL STATUS EXAM                                                                                           9, 14 cog gs     Alertness: alert   Appearance: casually groomed  Behavior/Demeanor: cooperative, with good  eye contact   Speech: normal  Language: no problems  Psychomotor: normal or unremarkable  Mood: description consistent with euthymia  Affect: less restricted; was congruent to mood; was congruent to content  Thought Process/Associations: unremarkable  Thought Content:  Reports none;  Denies suicidal ideation without plan; without intent [details in Interim History] and violent ideation without plan; without intent [details in Interim History]  Perception:  Reports none;  Denies auditory hallucinations and visual hallucinations  Insight: good  Judgment: good  Cognition: (6) does  appear grossly intact; formal cognitive testing was not done  Gait and Station: unremarkable    LABS and DATA     AIMS:  not needed    PHQ9=6  PHQ-9 SCORE 1/31/2019 1/31/2019 3/21/2019   PHQ-9 Total Score 6 6 5         DIAGNOSIS     Major Depressive Disorder, recurrent, mild   Seasonal Affective Disorder  Generalized Anxiety Disorder  R/o Attention Deficit Disorder    ASSESSMENT                                                                                                                   m2, h3     TODAY:    Kasandra Lau is a 34yo woman with MDD, Seasonal Affective DO, and Generalized Anxiety  Disorder with some history of trauma in childhood who presents with some relationship problems.  Her sexual side effects have improved with the discontinuation of Prozac and the initiation of Viibryd.  She feels the Viibryd is effective at controlling her depression.  She did not appear to be a safety risk to herself or others.        [place] PRESCRIPTION MONITORING PROGRAM [] was not checked today:  will check next session.     PSYCHOTROPIC DRUG INTERACTIONS:   Concurrent use of BUPROPION and SELECTED SEIZURE THRESHOLD LOWERING CYP2D6 SUBSTRATES (fluoxetine) may result in increased exposure of CYP2D6 substrates; increased risk of seizure. .  MANAGEMENT:  Monitoring for adverse effects and patient is aware of risks     PLAN                                                                                                                                m2, h3     1) PSYCHOTROPIC MEDICATIONS:  - Continue Wellbutrin SR 150mg, 2 tabs daily  - Pt self tapered Viibryd from 30mg daily to 20mg daily; continue at 20mg daily  - Continue Ativan 0.5 to 1mg BID prn anxiety, max dose of 2mg per day, dispense 35 tabs, no additional refills prescribed at this time  - Continue Topiramate for migraines, prescribed by neurology    2) THERAPY:    Continue    3) NEXT DUE:    Rating Scales- continue    4) REFERRALS:    No Referrals needed    5) RTC: 8-10 Weeks    6) CRISIS NUMBERS:   Provided routinely in AVS.   [facility] [phone] (clinic)    [phone] (after hours)  ONLY if a [facility] PT: [facility] [phone] (clinic), [phone] (after hours)     TREATMENT RISK STATEMENT:  The risks, benefits, alternatives and potential adverse effects have been discussed and are understood by the pt. The pt understands the risks of using street drugs or alcohol. There are no medical contraindications, the pt agrees to treatment with the ability to do so. The pt knows to call the clinic for any problems or to access emergency care  if needed.  Medical and substance use concerns are documented above.  Psychotropic drug interaction check was done, including changes made today.     PROVIDER:  Jose Carlos Delcid MD    Patient not staffed in clinic.  Note will be reviewed and signed by supervisor Dr. Iniguez.    Attestation:  I did not see Kasandra Lau directly. I have reviewed the documentation and I agree with the resident's plan of care.   Reynaldo Iniguez MD

## 2019-06-23 RX ORDER — VILAZODONE HYDROCHLORIDE 10 MG/1
TABLET ORAL
Qty: 90 TABLET | Refills: 0 | OUTPATIENT
Start: 2019-06-23

## 2019-07-08 DIAGNOSIS — F33.1 MODERATE EPISODE OF RECURRENT MAJOR DEPRESSIVE DISORDER (H): ICD-10-CM

## 2019-07-10 RX ORDER — VILAZODONE HYDROCHLORIDE 10 MG/1
TABLET ORAL
Qty: 90 TABLET | Refills: 0 | OUTPATIENT
Start: 2019-07-10

## 2019-07-12 DIAGNOSIS — F33.1 MAJOR DEPRESSIVE DISORDER, RECURRENT EPISODE, MODERATE (H): ICD-10-CM

## 2019-07-12 RX ORDER — BUPROPION HYDROCHLORIDE 150 MG/1
300 TABLET ORAL EVERY MORNING
Qty: 60 TABLET | Refills: 1 | Status: SHIPPED | OUTPATIENT
Start: 2019-07-12 | End: 2019-09-26

## 2019-07-12 NOTE — TELEPHONE ENCOUNTER
Medication requested: buPROPion (WELLBUTRIN XL) 150 MG 24 hr tablet  Last refilled: 6/11/19  Qty: 60      Last seen: 6/20/19  RTC: 8-10 weeks  Cancel: 0  No-show: 0  Next appt: 8/28/19    Refill decision:   Refilled for 30 days per protocol.

## 2019-07-15 ENCOUNTER — TELEPHONE (OUTPATIENT)
Dept: PSYCHIATRY | Facility: CLINIC | Age: 34
End: 2019-07-15

## 2019-07-15 DIAGNOSIS — R51.9 HEADACHE DISORDER: ICD-10-CM

## 2019-07-15 NOTE — TELEPHONE ENCOUNTER
Radhar called pharmacy ([phone]). Beba identified that the medication was billed successfully and would be ready in the morning.    Radhar left voice mail message for pt, requesting callback to identify when her last dose was. Writer prompted pt to request to speak to another nurse if writer was not available.

## 2019-07-15 NOTE — TELEPHONE ENCOUNTER
Kasandra Lau [phone]  [facility]    Phone Message    May a detailed message be left on voicemail: yes    Reason for Call: Medication Question or concern regarding medication   Prescription Clarification  Name of Medication: Viibryd   Prescribing Provider: Jose Carlos Delcid MD   Pharmacy: [facility]   What on the order needs clarification? Patient needs a prior authorization, and she has been out of her medication for several days          Action Taken: Message routed to:  Other: [facility] Psychiatry [facility]

## 2019-07-15 NOTE — TELEPHONE ENCOUNTER
Kasandra Lau [phone]  [facility]    Phone Message    May a detailed message be left on voicemail: yes    Reason for Call: Medication Refill Request    Has the patient contacted the pharmacy for the refill? Yes   Name of medication being requested: lorazepam  Provider who prescribed the medication: Jose Carlos Delcid MD  Pharmacy: [facility] [address]  Date medication is needed: 7/17         Action Taken: Message routed to:  Other: [facility] Psychiatry [facility]

## 2019-07-15 NOTE — TELEPHONE ENCOUNTER
Last Written Prescription Date: 10/12/18  Last Fill Quantity: 120 tabs  # refills: 3   Last office visit: 10/12/18  Future Office Visit: f/u 2 months

## 2019-07-15 NOTE — TELEPHONE ENCOUNTER
Writer called pharmacy ([phone]), confirmed that a lorazepam refill is available, and requested it be filled.

## 2019-07-17 RX ORDER — TOPIRAMATE 25 MG/1
TABLET, FILM COATED ORAL
Qty: 120 TABLET | Refills: 3 | Status: SHIPPED | OUTPATIENT
Start: 2019-07-17 | End: 2020-01-29

## 2019-08-02 ENCOUNTER — OFFICE VISIT (OUTPATIENT)
Dept: NEUROLOGY | Facility: CLINIC | Age: 34
End: 2019-08-02
Payer: COMMERCIAL

## 2019-08-02 VITALS
DIASTOLIC BLOOD PRESSURE: 80 MMHG | HEART RATE: 98 BPM | HEIGHT: 68 IN | SYSTOLIC BLOOD PRESSURE: 112 MMHG | WEIGHT: 162 LBS | BODY MASS INDEX: 24.55 KG/M2

## 2019-08-02 DIAGNOSIS — G43.009 MIGRAINE WITHOUT AURA AND WITHOUT STATUS MIGRAINOSUS, NOT INTRACTABLE: Primary | ICD-10-CM

## 2019-08-02 DIAGNOSIS — G43.009 MIGRAINE WITHOUT AURA AND WITHOUT STATUS MIGRAINOSUS, NOT INTRACTABLE: ICD-10-CM

## 2019-08-02 LAB
ALBUMIN SERPL-MCNC: 3.4 G/DL (ref 3.4–5)
ALP SERPL-CCNC: 76 U/L (ref 40–150)
ALT SERPL W P-5'-P-CCNC: 25 U/L (ref 0–50)
ANION GAP SERPL CALCULATED.3IONS-SCNC: 6 MMOL/L (ref 3–14)
AST SERPL W P-5'-P-CCNC: 20 U/L (ref 0–45)
BASOPHILS # BLD AUTO: 0 10E9/L (ref 0–0.2)
BASOPHILS NFR BLD AUTO: 0.4 %
BILIRUB SERPL-MCNC: 0.2 MG/DL (ref 0.2–1.3)
BUN SERPL-MCNC: 13 MG/DL (ref 7–30)
CALCIUM SERPL-MCNC: 8.6 MG/DL (ref 8.5–10.1)
CHLORIDE SERPL-SCNC: 111 MMOL/L (ref 94–109)
CO2 SERPL-SCNC: 22 MMOL/L (ref 20–32)
CREAT SERPL-MCNC: 0.8 MG/DL (ref 0.52–1.04)
DIFFERENTIAL METHOD BLD: NORMAL
EOSINOPHIL # BLD AUTO: 0.1 10E9/L (ref 0–0.7)
EOSINOPHIL NFR BLD AUTO: 0.7 %
ERYTHROCYTE [DISTWIDTH] IN BLOOD BY AUTOMATED COUNT: 12.5 % (ref 10–15)
GFR SERPL CREATININE-BSD FRML MDRD: >90 ML/MIN/{1.73_M2}
GLUCOSE SERPL-MCNC: 119 MG/DL (ref 70–99)
HCT VFR BLD AUTO: 37.8 % (ref 35–47)
HGB BLD-MCNC: 12 G/DL (ref 11.7–15.7)
IMM GRANULOCYTES # BLD: 0 10E9/L (ref 0–0.4)
IMM GRANULOCYTES NFR BLD: 0.1 %
LYMPHOCYTES # BLD AUTO: 2.8 10E9/L (ref 0.8–5.3)
LYMPHOCYTES NFR BLD AUTO: 34.3 %
MCH RBC QN AUTO: 29.5 PG (ref 26.5–33)
MCHC RBC AUTO-ENTMCNC: 31.7 G/DL (ref 31.5–36.5)
MCV RBC AUTO: 93 FL (ref 78–100)
MONOCYTES # BLD AUTO: 0.4 10E9/L (ref 0–1.3)
MONOCYTES NFR BLD AUTO: 4.7 %
NEUTROPHILS # BLD AUTO: 5 10E9/L (ref 1.6–8.3)
NEUTROPHILS NFR BLD AUTO: 59.8 %
NRBC # BLD AUTO: 0 10*3/UL
NRBC BLD AUTO-RTO: 0 /100
PLATELET # BLD AUTO: 363 10E9/L (ref 150–450)
POTASSIUM SERPL-SCNC: 3.5 MMOL/L (ref 3.4–5.3)
PROT SERPL-MCNC: 7.9 G/DL (ref 6.8–8.8)
RBC # BLD AUTO: 4.07 10E12/L (ref 3.8–5.2)
SODIUM SERPL-SCNC: 140 MMOL/L (ref 133–144)
WBC # BLD AUTO: 8.3 10E9/L (ref 4–11)

## 2019-08-02 RX ORDER — TOPIRAMATE 50 MG/1
TABLET, FILM COATED ORAL
Qty: 120 TABLET | Refills: 3 | Status: SHIPPED | OUTPATIENT
Start: 2019-08-02 | End: 2020-01-29

## 2019-08-02 ASSESSMENT — PAIN SCALES - GENERAL: PAINLEVEL: NO PAIN (0)

## 2019-08-02 ASSESSMENT — MIFFLIN-ST. JEOR: SCORE: 1488.33

## 2019-08-02 NOTE — NURSING NOTE
Chief Complaint   Patient presents with     RECHECK     F/U 1 YEAR     Elena López CMA  Patient Care Supervisor  Endocrinology & Diabetes   Neurology, Neurosurgery, PM&R

## 2019-08-02 NOTE — LETTER
RE: Kasandra Lau  [address]  [place] [address]       Service Date: 2019      Sandy Kruger MD   [address]  [address]      RE: Kasandra Lau   MRN: [id number]   : 1985      Dear Dr. Kruger:      This is in regard to followup on Kasandra Lau.  The patient returned today with a chief complaint of migraine.      The patient was able to ventilate about severe stress that she has been under.  This has influenced her headaches.  She went through the different issues she has had with her graduate program.  Hopefully, she will be able to take her tests in the next year.  She was just recently given reemployment as a .  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  The patient did tell me that she has most days a dull headache.  She then described having more severe headache 3-4 times per month.  She said that when she has the more severe headache she is nauseated and does feel dizzy.  This is not true vertigo.  She described this more as a lightheadedness.  She noted ibuprofen and Tylenol can lessen the headache.  She has not had any trouble at all taking the topiramate.  She may have had some word-finding issues in the past, but it has not been a major complaint.  She has had some rare paresthesias but not in a consistent manner.  The patient did tell me she is drinking 6-8 glasses of fluid a day.  She is aware of the risk of renal lithiasis.  She also does understand that topiramate may influence birth control.  She is using a type of IUD.  The patient said that the NuvaRing has been discussed with her gynecologist and she said it still is effective.  I did talk to her about the possibility of having her partner use condoms.  I also asked that she review with her gynecologist what higher dose of topiramate may do in terms of metabolism issues with the NuvaRing.  The patient said that  she is due for followup.  She did tell me she had her eye tensions checked in the last year.  She did not have any trouble identified.  Otherwise, the patient has been given p.r.n. lorazepam for stress 0.5 mg that she rarely takes and has taken Wellbutrin 300 mg daily as well as p.r.n. Zyrtec.  She was switched over to vilazodone, an SSRI, by her psychiatrist.  She said she gets along well with her psychiatrist.  She is aware that SSRIs may make headaches worse.  Her prior neurologist in [place] did give her verapamil, but it did not work.  She has been taking as a supplement riboflavin 400 mg and is not certain how effective that is.  She is also taking oral iron as well as vitamin D.  She has not been that faithful about taking vitamin D.  The patient has not had any recent blood work.  I reviewed with her higher doses of topiramate up to 200 mg to see if this would help.  I also reviewed gabapentin, valproic acid and zonisamide.  I think a tricyclic could be a good choice for her, but I did explain to her that she could not be on this medication with an SSRI because of the risk of QT interval abnormalities.  The patient denied to me any other current issues.  I went over again foods and beverages as well as lifestyle changes that could influence headaches.      Neurologic examination showed that the patient had normal eyegrounds.  She had no signs of papilledema.  Her lungs were clear to auscultation.  She had a regular cardiac rhythm without gallops or murmurs.  The patient had a blood pressure that was unremarkable at 106/72.  The patient did have a supple neck.        I hope this information is of use to you.  If you should have any questions, please feel free to contact me.  The patient is going to have follow up with me in 6-8 weeks to assess whether higher dose topiramate is helpful.  If not, I have talked with her also about the possibility of seeing one of our neurology headache experts here.       Sincerely,      Jevon Loyd MD      I spent 25 minutes with the patient today.  Over 50% of the time involved counseling and coordination of care.

## 2019-08-05 NOTE — PROGRESS NOTES
Service Date: 2019      Sandy Kruger MD   [address]  [address]      RE: Kasandra Lau   MRN: [id number]   : 1985      Dear Dr. Kruger:      This is in regard to followup on Kasandra Lau.  The patient returned today with a chief complaint of migraine.      The patient was able to ventilate about severe stress that she has been under.  This has influenced her headaches.  She went through the different issues she has had with her graduate program.  Hopefully, she will be able to take her tests in the next year.  She was just recently given reemployment as a .  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  The patient did tell me that she has most days a dull headache.  She then described having more severe headache 3-4 times per month.  She said that when she has the more severe headache she is nauseated and does feel dizzy.  This is not true vertigo.  She described this more as a lightheadedness.  She noted ibuprofen and Tylenol can lessen the headache.  She has not had any trouble at all taking the topiramate.  She may have had some word-finding issues in the past, but it has not been a major complaint.  She has had some rare paresthesias but not in a consistent manner.  The patient did tell me she is drinking 6-8 glasses of fluid a day.  She is aware of the risk of renal lithiasis.  She also does understand that topiramate may influence birth control.  She is using a type of IUD.  The patient said that the NuvaRing has been discussed with her gynecologist and she said it still is effective.  I did talk to her about the possibility of having her partner use condoms.  I also asked that she review with her gynecologist what higher dose of topiramate may do in terms of metabolism issues with the NuvaRing.  The patient said that she is due for followup.  She did tell me she had her eye tensions  checked in the last year.  She did not have any trouble identified.  Otherwise, the patient has been given p.r.n. lorazepam for stress 0.5 mg that she rarely takes and has taken Wellbutrin 300 mg daily as well as p.r.n. Zyrtec.  She was switched over to vilazodone, an SSRI, by her psychiatrist.  She said she gets along well with her psychiatrist.  She is aware that SSRIs may make headaches worse.  Her prior neurologist in [place] did give her verapamil, but it did not work.  She has been taking as a supplement riboflavin 400 mg and is not certain how effective that is.  She is also taking oral iron as well as vitamin D.  She has not been that faithful about taking vitamin D.  The patient has not had any recent blood work.  I reviewed with her higher doses of topiramate up to 200 mg to see if this would help.  I also reviewed gabapentin, valproic acid and zonisamide.  I think a tricyclic could be a good choice for her, but I did explain to her that she could not be on this medication with an SSRI because of the risk of QT interval abnormalities.  The patient denied to me any other current issues.  I went over again foods and beverages as well as lifestyle changes that could influence headaches.      Neurologic examination showed that the patient had normal eyegrounds.  She had no signs of papilledema.  Her lungs were clear to auscultation.  She had a regular cardiac rhythm without gallops or murmurs.  The patient had a blood pressure that was unremarkable at 112/80; pulse 98  The patient did have a supple neck.         I hope this information is of use to you.  If you should have any questions, please feel free to contact me.  The patient is going to have follow up with me in 6-8 weeks to assess whether higher dose topiramate is helpful.  If not, I have talked with her also about the possibility of seeing one of our neurology headache experts here.      Sincerely,      Jevon Loyd MD      I spent 25 minutes  with the patient today.  Over 50% of the time involved counseling and coordination of care.         VANDANA LEON MD             D: 2019   T: 2019   MT: sparkle      Name:     HECTOR AVILES   MRN:      [id number]        Account:      [id number]   :      1985           Service Date: 2019      Document: [id number]

## 2019-08-11 DIAGNOSIS — F33.1 MODERATE EPISODE OF RECURRENT MAJOR DEPRESSIVE DISORDER (H): ICD-10-CM

## 2019-08-13 ENCOUNTER — TELEPHONE (OUTPATIENT)
Dept: PSYCHIATRY | Facility: CLINIC | Age: 34
End: 2019-08-13

## 2019-08-13 DIAGNOSIS — F41.9 ANXIETY: ICD-10-CM

## 2019-08-13 NOTE — TELEPHONE ENCOUNTER
Last Seen 6/20/19  RTC 8-10 sonal  Cancel None  No-Show None    Next Appt 8/28/19    Incoming Refill From [facility] on [address] [place] via fax    Medication Requested LORazepam (ATIVAN) 0.5 MG tablet    Directions 1 or 2 tabs BID PRN anxiety.  Do not exceed 4 tabs in 24 hours.    Qty 35    Last Refill Unknown      Message routed to Scot Hernandez RN

## 2019-08-14 RX ORDER — VILAZODONE HYDROCHLORIDE 10 MG/1
TABLET ORAL
Qty: 90 TABLET | Refills: 0 | OUTPATIENT
Start: 2019-08-14

## 2019-08-14 RX ORDER — LORAZEPAM 0.5 MG/1
TABLET ORAL
Qty: 35 TABLET | Refills: 0
Start: 2019-08-14 | End: 2019-08-28

## 2019-08-14 NOTE — TELEPHONE ENCOUNTER
Writer called pharmacy ([phone]) to explore status of pt's 6/20/19 Viibryd order. Responder identified that the 10 mg tablet three times daily was an old order, as pt picked up 20 mg tablets at another store, so the request can be discarded.

## 2019-08-14 NOTE — TELEPHONE ENCOUNTER
Last refills per [place] : 7/15, 6/22, 3/8    Writer provided TORB to pharmacist iBlly at [facility] ([phone]). Qty 35, refills 0.    Writer called pt and identified that the refill had been called in.

## 2019-08-14 NOTE — TELEPHONE ENCOUNTER
Kasandra Lau [phone]  Bailey Barney   Patient called to check on this medication refill. She stated she has trouble getting refills each month. If the patient can be called with an update, her number is [phone].

## 2019-08-15 ENCOUNTER — TELEPHONE (OUTPATIENT)
Dept: NEUROLOGY | Facility: CLINIC | Age: 34
End: 2019-08-15

## 2019-08-15 NOTE — TELEPHONE ENCOUNTER
Spoke to patient about her elevated glucose and informed her to make appt with her PCP to review this and discuss glucose results. Patient confirmed her understanding.

## 2019-08-15 NOTE — TELEPHONE ENCOUNTER
----- Message from Jevon Loyd MD sent at 8/13/2019 12:17 PM CDT -----  Tell her blood tests are fine except glucose 119; slightly elevated

## 2019-08-28 ENCOUNTER — OFFICE VISIT (OUTPATIENT)
Dept: PSYCHIATRY | Facility: CLINIC | Age: 34
End: 2019-08-28
Attending: PSYCHIATRY & NEUROLOGY
Payer: COMMERCIAL

## 2019-08-28 VITALS
BODY MASS INDEX: 23.81 KG/M2 | SYSTOLIC BLOOD PRESSURE: 120 MMHG | WEIGHT: 156.6 LBS | HEART RATE: 101 BPM | DIASTOLIC BLOOD PRESSURE: 86 MMHG

## 2019-08-28 DIAGNOSIS — F41.9 ANXIETY: Primary | ICD-10-CM

## 2019-08-28 DIAGNOSIS — F33.1 MODERATE EPISODE OF RECURRENT MAJOR DEPRESSIVE DISORDER (H): ICD-10-CM

## 2019-08-28 PROCEDURE — G0463 HOSPITAL OUTPT CLINIC VISIT: HCPCS | Mod: ZF

## 2019-08-28 RX ORDER — LORAZEPAM 0.5 MG/1
TABLET ORAL
Qty: 60 TABLET | Refills: 2 | Status: SHIPPED | OUTPATIENT
Start: 2019-08-28 | End: 2019-09-26

## 2019-08-28 RX ORDER — VILAZODONE HYDROCHLORIDE 20 MG/1
TABLET ORAL
Qty: 45 TABLET | Refills: 2 | Status: SHIPPED | OUTPATIENT
Start: 2019-08-28 | End: 2019-10-14

## 2019-08-28 RX ORDER — GABAPENTIN 100 MG/1
CAPSULE ORAL
Qty: 180 CAPSULE | Refills: 2 | Status: SHIPPED | OUTPATIENT
Start: 2019-08-28 | End: 2019-12-06

## 2019-08-28 ASSESSMENT — PATIENT HEALTH QUESTIONNAIRE - PHQ9: SUM OF ALL RESPONSES TO PHQ QUESTIONS 1-9: 8

## 2019-08-28 ASSESSMENT — PAIN SCALES - GENERAL: PAINLEVEL: NO PAIN (0)

## 2019-08-28 NOTE — PATIENT INSTRUCTIONS
Thank you for coming to the PSYCHIATRY CLINIC.    Lab Testing:  If you had lab testing today and your results are reassuring or normal they will be mailed to you or sent through [employer] within 7 days.   If the lab tests need quick action we will call you with the results.  The phone number we will call with results is # [phone] (home) . If this is not the best number please call our clinic and change the number.    Medication Refills:  If you need any refills please call your pharmacy and they will contact us. Our fax number for refills is [phone]. Please allow three business for refill processing.   If you need to  your refill at a new pharmacy, please contact the new pharmacy directly. The new pharmacy will help you get your medications transferred.     Scheduling:  If you have any concerns about today's visit or wish to schedule another appointment please call our office during normal business hours [phone] (8-5:00 M-F)    Contact Us:  Please call [phone] during business hours (8-5:00 M-F).  If after clinic hours, or on the weekend, please call  [phone].    Financial Assistance [phone]  [facility] Billing [phone]  Central Billing Office, [facility]: [phone]  [facility] Billing [phone]  Medical Records [phone]      MENTAL HEALTH CRISIS NUMBERS:  [facility]:   [facility] - [phone]   Crisis Residence [facility] - [phone]   Walk-In Counseling [facility] [phone]   COPE 24/7 [place] Mobile Team for Adults - [[phone]]; Child - [[phone]]        [facility]:   [facility] - [phone]   Walk-in counseling [facility] - [phone]   Walk-in counseling [facility] - [phone]   Crisis Residence [facility] - [phone]   Urgent Care Adult Mental Health:   --Drop-in, 24/7 crisis line, and [facility] Mobile Team  [[phone]]    CRISIS TEXT LINE: Text [phone] from anywhere, anytime, any crisis 24/7;    OR SEE [email or web address]     Poison Control Center - [phone]    CHILD: [facility] needs assessment team - [phone]     [facility] - [phone]; or [facility] - [phone]    If you have a medical emergency please call 911or go to the nearest ER.                    _____________________________________________    Again thank you for choosing PSYCHIATRY CLINIC and please let us know how we can best partner with you to improve you and your family's health.  You may be receiving a survey in the mail regarding this appointment. We would love to have your feedback, both positive and negative, so please fill out the survey and return it using the provided envelope. The survey is done by an external company, so your answers are anonymous.

## 2019-08-28 NOTE — PROGRESS NOTES
"  [facility] PSYCHIATRY CLINIC PROGRESS NOTE     CARE TEAM:  PCP- Roxy Rios    Specialty Providers- Neuro Dr. Patiño    Therapist- MsAmira Edwin Lau is a 33 year old female who prefers the name Kasandra & pronouns she, her, hers. Date of initial diagnostic assessment is 10/31/17.  Date of most recent transfer of care assessment is 10/24/18.      Pertinent Background:  This patient first experienced mental health issues in childhood and has received treatment for MDD and Seasonal Mood DO.  History details in last diagnostic assessment.    INTERIM HISTORY                                                                                                                 4, 4   The patient reports good treatment adherence.  History was provided by the patient who was a good historian.      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  She had to get a  involved, and it has been very stressful.  Has had a lot of anxiety.  Waking up with anxiety in the middle of the night.  Using the lorazepam more.  Having anxiety episodes; feels chest tightness, shortness in breathing; not quite full on panic attacks, but thinks if she didn't have the lorazepam they could turn into full panic attacks.  These anxious episodes happen a couple times a day.  They can last a couple hours, but the lorazepam helps, is taking up to 2mg of lorazepam daily.  Has been taking lorazepam almost everyday the last couple weeks.    [employer] disability resource office had meeting with her dept about extending her funding for disability reasons.  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  She got in contact with accountability office and the disability resource office, and nothing really happened.  Then she " "got in contact with , and then the academic department finally got in touch with her about what was going on.  The dept had a meeting with the office of equal opportunity and they finally got a last minute TA-ship for her.  But Kasandra didn't know what would happen so she had been applying for jobs because she didn't know what her financial situation would be.  She thought it was going to be the end of her career.  She is still having to negotiate her contract with the academic program because they are putting \"weird language\" about future funding in her new contract.  She feels there is a lot of hostility.  She is feeling very isolated.      The incompletes she had \"are mostly finished.\"  She had hoped to have them finished sooner, but with all the stress, she didn't get them completed as quickly as she had hoped.      Her main supports have been a handful of friends.  She recently switched therapists and has been with the new one two months now; she says its different but its good.      Still in contact with gentleman she used to date \"but he isn't really emotionally available right now, he's trying to work on himself in recovery.\"  They are taking some \"time off\" right now.  She says they both love each other.  She is talking about it in therapy.      She feels like she's been \"pretty emotional.\"  Crying a lot, but not angry.  But sad.  Feels overwhelmed.  Some passive suicidal thoughts the past two months that she doesn't feel \"like all the pressure is worth it,\" like all the bad things are outweighing the good, like it would be easier if she were not around.  She denies thoughts of actively harming herself and denies intent or plan to harm herself.  She is agreeable to calling 911 or a crisis line or going to ED if intent or plan to kill herself develop.  She denies thoughts intents or plans to harm others.      We discuss the addictive potential of lorazepam and the option of introducing another " medication for anxiety with less addictive potential.  We discuss the risks, benefits, and side effects of gabapentin, and the pt would like to try the medication.  She will reserve the prn lorazepam for severe anxiety.      RECENT SYMPTOMS:   DEPRESSION:  reports-passive SI, depressed mood, feeling hopeless, feeling trapped, excessive crying and overwhelmed;  DENIES- self-destructive thoughts  MICHELLE/HYPOMANIA:  reports-none;  DENIES- increased energy, decreased sleep need, increased activity, grandiosity, distractibility , racing thoughts and pressured speech  PSYCHOSIS:  reports-none;  DENIES- auditory hallucinations and visual hallucinations  ANXIETY:  improved  ATTENTION:  Difficulty with focus and concentration, procrastination  SLEEP:  Oversleeping   EATING DISORDER: no    RECENT SUBSTANCE USE:     ALCOHOL- Social drinking, occassionally a glass or two of wine at home 1-2 nights a week      TOBACCO- no     CAFFEINE- not often  OPIOIDS- no         NARCAN KIT- no        CANNABIS- no            OTHER ILLICIT DRUGS- none      CURRENT SOCIAL HISTORY:  FINANCIAL SUPPORT- working       EDUCATION- [employer], studying for PhD in Human Geography  CHILDREN- no      LIVING SITUATION- Lives alone in an apartment      SOCIAL/ SPIRITUAL SUPPORT- Friends through the [employer]     FEELS SAFE AT HOME- yes     MEDICAL ROS (2,10):  Reports Migraines.  A comprehensive review of systems was performed and is negative other than noted in the HPI.   Denies A comprehensive review of systems was performed and is negative other than noted in the HPI.      PSYCH and CD Critical Summary Points since July 2018 12/18 Increase Wellbutrin  --> 300mg  1/31/19 Added Viibryd, with intention to cross taper off of fluoxetine - to potentially address anorgasmia    PAST PSYCH MED TRIALS   Abilify adjunct for depression      MEDICAL / SURGICAL HISTORY                                   Pregnant or breastfeeding- no      Contraception-  Not discussed    Neurologic Hx- Migraines  Patient Active Problem List   Diagnosis     Pelvic pain in female     Vitamin D deficiency     Menorrhagia with regular cycle     Migraine without aura and without status migrainosus, not intractable     Iron deficiency anemia due to chronic blood loss     Tired     Circadian rhythm sleep disorder, delayed sleep phase type     Unhealthy sleep habit     Seasonal mood disorder (H)      *   Anorgasmia, thought to be medication side effect (likely secondary to serotonin specific reuptake inhibitor)    Major Surgery- Endometrial cystectomy, L wrist fracture repair    ALLERGY                                Cats  MEDICATIONS                               Current Outpatient Medications   Medication Sig Dispense Refill     Acetaminophen (TYLENOL PO) Take 650 mg by mouth every 4 hours as needed for mild pain or fever       albuterol (PROVENTIL HFA) 108 (90 Base) MCG/ACT inhaler Inhale 1-2 puffs into the lungs       ASMANEX 30 METERED DOSES 220 MCG/INH inhaler INHALE 1 PUFF BY MOUTH DAILY. RINSE MOUTH OR GARGLE AFTER USE  6     buPROPion (WELLBUTRIN SR) 150 MG 12 hr tablet Take 150 mg by mouth       buPROPion (WELLBUTRIN XL) 150 MG 24 hr tablet Take 2 tablets (300 mg) by mouth every morning 60 tablet 1     cetirizine (ZYRTEC) 10 MG tablet Take 10 mg by mouth daily       etonogestrel-ethinyl estradiol (NUVARING) 0.12-0.015 MG/24HR vaginal ring Place 1 each vaginally every 28 days 3 each 3     FLUoxetine (PROZAC) 20 MG capsule Take 3 capsules (60 mg) by mouth daily * RF UNTIL 10/26/18 APPT. (Patient not taking: Reported on 8/2/2019) 30 capsule 3     fluticasone (FLONASE) 50 MCG/ACT nasal spray 1-2 sprays       fluticasone (FLONASE) 50 MCG/ACT nasal spray INSTILL 1-2 SPRAYS INTO BOTH NOSTRILS DAILY.  11     LORazepam (ATIVAN) 0.5 MG tablet 1 or 2 tabs BID PRN anxiety.  Do not exceed 4 tabs in 24 hours. 35 tablet 0     mometasone (ASMANEX 30 METERED DOSES) 220 MCG/INH inhaler Inhale 1  puff into the lungs       order for DME Use for 15-30 minutes every morning. 1 Units 0     topiramate (TOPAMAX) 100 MG tablet Take 1 tablet (100 mg) by mouth daily 90 tablet 3     topiramate (TOPAMAX) 25 MG tablet Add 25 mg po hs daily for 2 weeks to prior 100mg dose, then 50mg hs daily with 100mg dose (Patient taking differently: Take 50 mg by mouth daily Add 25 mg po hs daily for 2 weeks to prior 100mg dose, then 50mg hs daily with 100mg dose) 120 tablet 3     topiramate (TOPAMAX) 50 MG tablet Take bid 120 tablet 3     vilazodone (VIIBRYD) 20 MG TABS tablet 1 tab po daily 30 tablet 2     VITALS                                                                                                                          3, 3   There were no vitals taken for this visit.   MENTAL STATUS EXAM                                                                                           9, 14 cog gs     Alertness: alert   Appearance: casually groomed  Behavior/Demeanor: cooperative, with fair  eye contact   Speech: normal  Language: no problems  Psychomotor: normal or unremarkable  Mood: depressed and anxious  Affect:  restricted; was congruent to mood; was congruent to content  Thought Process/Associations: unremarkable  Thought Content:  Reports none;  Denies suicidal ideation without plan; without intent [details in Interim History] and violent ideation without plan; without intent [details in Interim History]  Perception:  Reports none;  Denies auditory hallucinations and visual hallucinations  Insight: good  Judgment: good  Cognition: (6) does  appear grossly intact; formal cognitive testing was not done  Gait and Station: unremarkable    LABS and DATA     AIMS:  not needed    PHQ9=8  PHQ-9 SCORE 1/31/2019 3/21/2019 6/20/2019   PHQ-9 Total Score 6 5 6         DIAGNOSIS     Major Depressive Disorder, recurrent, mild   Seasonal Affective Disorder  Generalized Anxiety Disorder  R/o Attention Deficit Disorder    ASSESSMENT                                                                                                                    m2, h3     TODAY:    Kasandra Lau is a 34yo woman with MDD, Seasonal Affective DO, and Generalized Anxiety Disorder with some history of trauma in childhood who presents with significant stressors in her academic program.  The stress has been contributing to her anxious and depressive symptoms.  She has been using more lorazepam as a result of her increased anxiety, and she is willing to try gabapentin to help with anxiety symptoms and reduce the risk of becoming tolerant to or dependent upon a benzodiazepine.  She is also willing to increase the dose of Viibryd to target her depression.  She did not appear to be a safety risk to herself or others.  Pt says [employer] office of disability was supposed to request information from our office about her diagnosis/performance ability, but as of today I have not been contacted by that office. I have offered to write a disability letter on Kasandra's behalf and give it directly to her; I will do that in the coming days.        [place] PRESCRIPTION MONITORING PROGRAM [] was not checked today:  will check next session.     PSYCHOTROPIC DRUG INTERACTIONS:   Concurrent use of BUPROPION and SELECTED SEIZURE THRESHOLD LOWERING CYP2D6 SUBSTRATES (fluoxetine) may result in increased exposure of CYP2D6 substrates; increased risk of seizure. .  MANAGEMENT:  Monitoring for adverse effects and patient is aware of risks     PLAN                                                                                                                                m2, h3     1) PSYCHOTROPIC MEDICATIONS:  - Continue Wellbutrin SR 150mg, 2 tabs daily  - Increase Viibryd from 20mg to 30mg daily  - Continue Ativan 0.5 to 1mg BID prn anxiety, max dose of 2mg per day  - Begin gabapentin 100-200mg po TID scheduled, pt to start at 100mg dose and if ineffective, increase to 200mg   - Continue Topiramate for  migraines, prescribed by neurology    2) THERAPY:    Continue    3) NEXT DUE:    Rating Scales- continue    4) REFERRALS:    No Referrals needed    5) RTC: 6 Weeks with Dr. Gamino    6) CRISIS NUMBERS:   Provided routinely in AVS.   [facility] [phone] (clinic)    [phone] (after hours)  ONLY if a [facility] PT: [facility] [phone] (clinic), [phone] (after hours)     TREATMENT RISK STATEMENT:  The risks, benefits, alternatives and potential adverse effects have been discussed and are understood by the pt. The pt understands the risks of using street drugs or alcohol. There are no medical contraindications, the pt agrees to treatment with the ability to do so. The pt knows to call the clinic for any problems or to access emergency care if needed.  Medical and substance use concerns are documented above.  Psychotropic drug interaction check was done, including changes made today.     PROVIDER:  Jose Carlos Delcid MD    Patient not staffed in clinic.  Note will be reviewed and signed by supervisor Dr. Iniguez.    Attestation:  I did not see Kasandra Lau directly. I have reviewed the documentation and I agree with the resident's plan of care.   Reynaldo Iniguez MD, MD

## 2019-09-02 RX ORDER — VILAZODONE HYDROCHLORIDE 10 MG/1
TABLET ORAL
Qty: 90 TABLET | Refills: 0 | OUTPATIENT
Start: 2019-09-02

## 2019-09-26 DIAGNOSIS — F33.1 MAJOR DEPRESSIVE DISORDER, RECURRENT EPISODE, MODERATE (H): ICD-10-CM

## 2019-09-26 DIAGNOSIS — F41.9 ANXIETY: ICD-10-CM

## 2019-09-26 RX ORDER — BUPROPION HYDROCHLORIDE 150 MG/1
300 TABLET ORAL EVERY MORNING
Qty: 60 TABLET | Refills: 0 | Status: SHIPPED | OUTPATIENT
Start: 2019-09-26 | End: 2019-10-14

## 2019-09-26 NOTE — TELEPHONE ENCOUNTER
[facility] Call Center    Phone Message    May a detailed message be left on voicemail: yes    Reason for Call: Medication Refill Request    Has the patient contacted the pharmacy for the refill? Yes   Name of medication being requested: lorazepam, buproprion  Provider who prescribed the medication: Bhavin  Pharmacy:      [facility]Y [id number] - [place], [place] - [address] AT [facility]    Date medication is needed: asap - pt is totally out of buproprion, only has two days of lorazepam left           Action Taken: Other: nurse pool

## 2019-09-26 NOTE — TELEPHONE ENCOUNTER
Last seen:   RTC: 6 weeks  Non-provider cancel: NOne  No-show: NOne  Next appt: 10/14 ([place])     Incoming refill from patient via telephone     Medication requested:   Disp Refills Start End ROSE   buPROPion (WELLBUTRIN XL) 150 MG 24 hr tablet 60 tablet 1 2019  No   Sig - Route: Take 2 tablets (300 mg) by mouth every morning      Disp Refills Start End ROSE   LORazepam (ATIVAN) 0.5 MG tablet 60 tablet 2 2019  No   Si or 2 tabs BID PRN anxiety.  Do not exceed 4 tabs in 24 hours.   Last refilled:  (60),  (35), 7/15 (35) per [place]      Medication refill approved per refill protocol.    Writer e-prescribed Wellbutrin to pharmacy.  Writer routed message to Dr. Gamino regarding lorazepam sign and send.

## 2019-09-27 RX ORDER — LORAZEPAM 0.5 MG/1
TABLET ORAL
Qty: 60 TABLET | Refills: 0 | Status: SHIPPED | OUTPATIENT
Start: 2019-09-27 | End: 2019-10-14

## 2019-09-27 NOTE — TELEPHONE ENCOUNTER
Prateek Peters MD Snyder, David J, RN   Caller: Unspecified (Yesterday, 10:42 AM)             Hi Marcus,   I'm covering Quinton's inbox today. I went ahead and signed this script. Thanks!   Prateek Peters        09/27/19 0743 Sign Prateek Peters MD   Class: Local Print    No printing information found on order.  Reprinted, voided, and placed in shred bin.    Writer provided TORB to autumn Cervantes at [facility] ([phone]). Qty 60, refills 0.  Writer left voice mail message for pt, identifying that the refills had been sent to the pharmacy.

## 2019-10-02 DIAGNOSIS — Z30.44 ENCOUNTER FOR SURVEILLANCE OF VAGINAL RING HORMONAL CONTRACEPTIVE DEVICE: ICD-10-CM

## 2019-10-02 RX ORDER — ETONOGESTREL AND ETHINYL ESTRADIOL VAGINAL RING .015; .12 MG/D; MG/D
1 RING VAGINAL
Qty: 3 EACH | Refills: 0 | Status: SHIPPED | OUTPATIENT
Start: 2019-10-02 | End: 2020-01-27

## 2019-10-02 NOTE — TELEPHONE ENCOUNTER
Received refill request for nuvaring.  Last in clinic 1/2019 for annual exam. Pap/hpv done at that time and needs repeat in 1/2020. Rx sent to cover until then.

## 2019-10-12 DIAGNOSIS — F33.1 MAJOR DEPRESSIVE DISORDER, RECURRENT EPISODE, MODERATE (H): ICD-10-CM

## 2019-10-12 RX ORDER — BUPROPION HYDROCHLORIDE 150 MG/1
300 TABLET ORAL EVERY MORNING
Qty: 60 TABLET | Refills: 0 | Status: CANCELLED | OUTPATIENT
Start: 2019-10-12

## 2019-10-14 ENCOUNTER — OFFICE VISIT (OUTPATIENT)
Dept: PSYCHIATRY | Facility: CLINIC | Age: 34
End: 2019-10-14
Attending: PSYCHIATRY & NEUROLOGY
Payer: COMMERCIAL

## 2019-10-14 VITALS
SYSTOLIC BLOOD PRESSURE: 117 MMHG | DIASTOLIC BLOOD PRESSURE: 80 MMHG | HEART RATE: 94 BPM | BODY MASS INDEX: 23.66 KG/M2 | WEIGHT: 155.6 LBS

## 2019-10-14 DIAGNOSIS — F33.1 MAJOR DEPRESSIVE DISORDER, RECURRENT EPISODE, MODERATE (H): ICD-10-CM

## 2019-10-14 DIAGNOSIS — F41.9 ANXIETY: ICD-10-CM

## 2019-10-14 DIAGNOSIS — F33.1 MODERATE EPISODE OF RECURRENT MAJOR DEPRESSIVE DISORDER (H): ICD-10-CM

## 2019-10-14 PROCEDURE — G0463 HOSPITAL OUTPT CLINIC VISIT: HCPCS | Mod: ZF

## 2019-10-14 RX ORDER — LORAZEPAM 0.5 MG/1
TABLET ORAL
Qty: 60 TABLET | Refills: 0 | Status: SHIPPED | OUTPATIENT
Start: 2019-10-14 | End: 2019-12-13

## 2019-10-14 RX ORDER — BUPROPION HYDROCHLORIDE 300 MG/1
300 TABLET ORAL EVERY MORNING
Qty: 30 TABLET | Refills: 2 | Status: SHIPPED | OUTPATIENT
Start: 2019-10-14 | End: 2020-01-30

## 2019-10-14 RX ORDER — VILAZODONE HYDROCHLORIDE 20 MG/1
20 TABLET ORAL DAILY
Qty: 30 TABLET | Refills: 2 | Status: SHIPPED | OUTPATIENT
Start: 2019-10-14 | End: 2020-01-13

## 2019-10-14 RX ORDER — VILAZODONE HYDROCHLORIDE 10 MG/1
10 TABLET ORAL DAILY
Qty: 30 TABLET | Refills: 2 | Status: SHIPPED | OUTPATIENT
Start: 2019-10-14 | End: 2020-01-15

## 2019-10-14 ASSESSMENT — PAIN SCALES - GENERAL: PAINLEVEL: NO PAIN (0)

## 2019-10-14 ASSESSMENT — PATIENT HEALTH QUESTIONNAIRE - PHQ9: SUM OF ALL RESPONSES TO PHQ QUESTIONS 1-9: 12

## 2019-10-14 NOTE — PATIENT INSTRUCTIONS
Thank you for coming to the PSYCHIATRY CLINIC.    Lab Testing:  If you had lab testing today and your results are reassuring or normal they will be mailed to you or sent through [email or web address] within 7 days.   If the lab tests need quick action we will call you with the results.  The phone number we will call with results is # [phone] (home) . If this is not the best number please call our clinic and change the number.    Medication Refills:  If you need any refills please call your pharmacy and they will contact us. Our fax number for refills is [phone]. Please allow three business for refill processing.   If you need to  your refill at a new pharmacy, please contact the new pharmacy directly. The new pharmacy will help you get your medications transferred.     Scheduling:  If you have any concerns about today's visit or wish to schedule another appointment please call our office during normal business hours [phone] (8-5:00 M-F)    Contact Us:  Please call [phone] during business hours (8-5:00 M-F).  If after clinic hours, or on the weekend, please call  [phone].    Financial Assistance [phone]  [facility] Billing [phone]  Central Billing Office, [facility]: [phone]  [facility] Billing [phone]  Medical Records [phone]      MENTAL HEALTH CRISIS NUMBERS:  [facility]:   [facility] - [phone]   Crisis Residence [facility] Residence - [phone]   Walk-In Counseling [facility] [phone]   COPE 24/7 [place] Mobile Team for Adults - [[phone]]; Child - [[phone]]        [facility]:   [facility] - [phone]   Walk-in counseling [facility] - [phone]   Walk-in counseling [facility] - [phone]   Crisis Residence [facility] - [phone]   Urgent Care Adult Mental Health:   --Drop-in, 24/7 crisis line, and [facility] Mobile Team  [[phone]]    CRISIS TEXT LINE: Text [phone] from anywhere, anytime, any crisis 24/7;    OR SEE [email or web address]     Poison Control Center - [phone]    CHILD: [facility] needs assessment team - [phone]     [facility] - [phone]; or [facility] - [phone]    If you have a medical emergency please call 911or go to the nearest ER.                    _____________________________________________    Again thank you for choosing PSYCHIATRY CLINIC and please let us know how we can best partner with you to improve you and your family's health.  You may be receiving a survey in the mail regarding this appointment. We would love to have your feedback, both positive and negative, so please fill out the survey and return it using the provided envelope. The survey is done by an external company, so your answers are anonymous.
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
## 2019-10-18 ENCOUNTER — OFFICE VISIT (OUTPATIENT)
Dept: NEUROLOGY | Facility: CLINIC | Age: 34
End: 2019-10-18
Payer: COMMERCIAL

## 2019-10-18 VITALS
BODY MASS INDEX: 23.86 KG/M2 | WEIGHT: 156.9 LBS | SYSTOLIC BLOOD PRESSURE: 114 MMHG | HEART RATE: 79 BPM | DIASTOLIC BLOOD PRESSURE: 73 MMHG | OXYGEN SATURATION: 99 %

## 2019-10-18 DIAGNOSIS — G43.009 MIGRAINE WITHOUT AURA AND WITHOUT STATUS MIGRAINOSUS, NOT INTRACTABLE: Primary | ICD-10-CM

## 2019-10-18 RX ORDER — AZELASTINE HYDROCHLORIDE 137 UG/1
1-2 SPRAY, METERED NASAL 2 TIMES DAILY
Refills: 11 | COMMUNITY
Start: 2019-07-08 | End: 2020-01-16

## 2019-10-18 RX ORDER — TOPIRAMATE 100 MG/1
100 TABLET, FILM COATED ORAL 2 TIMES DAILY
Qty: 120 TABLET | Refills: 3 | Status: SHIPPED | OUTPATIENT
Start: 2019-10-18 | End: 2020-04-27

## 2019-10-18 ASSESSMENT — PAIN SCALES - GENERAL: PAINLEVEL: NO PAIN (0)

## 2019-10-18 NOTE — LETTER
10/18/2019       RE: Kasandra Lau  [address]  [place] [address]     Dear Colleague,    Thank you for referring your patient, Kasandra Lau, to the [facility] NEUROLOGY at [facility]. Please see a copy of my visit note below.    Service Date: 10/18/2019      [facility]   [address] [place]  [address]      RE: Kasandra Lau   MRN: [id number]   : 1985      To Whom It May Concern:      Kasandra Lau returned for neurologic followup today on 10/18/2019.  Her chief complaint is that of migraine.      The patient is doing much better in terms of migraine since she increased her topiramate to 200 mg a day.  She has had no trouble taking the medicine.  She said that her word finding issues seem to have improved.  She has had much less paresthesias in her limbs and face.  She assured me she is drinking at least 8 glasses of fluid a day.  Her psychiatrist started her on gabapentin for anxiety and she is up to 200 mg t.i.d.  This too seems to be working well.  She is not depressed.  She has not had any swelling.  She has lost about 5 pounds.  The patient denied any sedative side effects.  I reviewed her prior blood tests that were done on 2019 with a normal chemistry profile as well as complete blood count.  She has an IUD in.  She is aware that topiramate is teratogenetic and also may make birth control pills not as effective.  She had reviewed this with her gynecologist.  The patient is scheduled to have eye tensions checked this fall.  She had been approached by her allergist to start a new medicine called Embrun.  I looked this up on the Internet and this does appear to be on marijuana type of derivative.  She would be enrolled in a study and be paid.  I told her I really did not think if this was marijuana that she should consider that.  I went over risk of marijuana in terms of memory, mood change, psychiatric issues, as well as rare  vasospastic events including stroke.  I counseled her against doing it.      The patient's blood pressure today was 114/73 with a pulse of 79.  Her cardiac rhythm was normal and she had no gallops or murmurs.  Her eyegrounds were totally normal.      The patient is going to continue to have followup here at [facility].  She understands I am going to retire at the end of the year.  I referred her to be seen by one of our headache specialists in 6-12 months and on a p.r.n. basis.  I reminded her that on topiramate she needed to have electrolytes checked as well as creatinine and CO2 at least yearly.      Thank you for allowing me to see this patient.      Sincerely,      Jevon Loyd MD      I spent 20 minutes with the patient.  Over 50% of the time involved counseling and coordination of care.      D: 10/19/2019   T: 10/20/2019   MT: sparkle      Name:     HECTOR AVILES   MRN:      [id number]        Account:      [id number]   :      1985           Service Date: 10/18/2019      Document: [id number]

## 2019-10-20 NOTE — PROGRESS NOTES
Service Date: 10/18/2019      [facility]   [address] [place]  [address]      RE: Kasandra Lau   MRN: [id number]   : 1985      To Whom It May Concern:      Kasandra Lau returned for neurologic followup today on 10/18/2019.  Her chief complaint is that of migraine.      The patient is doing much better in terms of migraine since she increased her topiramate to 200 mg a day.  She has had no trouble taking the medicine.  She said that her word finding issues seem to have improved.  She has had much less paresthesias in her limbs and face.  She assured me she is drinking at least 8 glasses of fluid a day.  Her psychiatrist started her on gabapentin for anxiety and she is up to 200 mg t.i.d.  This too seems to be working well.  She is not depressed.  She has not had any swelling.  She has lost about 5 pounds.  The patient denied any sedative side effects.  I reviewed her prior blood tests that were done on 2019 with a normal chemistry profile as well as complete blood count.  She has an IUD in.  She is aware that topiramate is teratogenetic and also may make birth control pills not as effective.  She had reviewed this with her gynecologist.  The patient is scheduled to have eye tensions checked this fall.  She had been approached by her allergist to start a new medicine called Embrun.  I looked this up on the Internet and this does appear to be on marijuana type of derivative.  She would be enrolled in a study and be paid.  I told her I really did not think if this was marijuana that she should consider that.  I went over risk of marijuana in terms of memory, mood change, psychiatric issues, as well as rare vasospastic events including stroke.  I counseled her against doing it.      The patient's blood pressure today was 114/73 with a pulse of 79.  Her cardiac rhythm was normal and she had no gallops or murmurs.  Her eyegrounds were totally normal.      The patient is going to  continue to have followup here at [facility].  She understands I am going to retire at the end of the year.  I referred her to be seen by one of our headache specialists in 6-12 months and on a p.r.n. basis.  I reminded her that on topiramate she needed to have electrolytes checked as well as creatinine and CO2 at least yearly.      Thank you for allowing me to see this patient.      Sincerely,      Vandana Loyd MD      I spent 20 minutes with the patient.  Over 50% of the time involved counseling and coordination of care.         VANDANA LOYD MD             D: 10/19/2019   T: 10/20/2019   MT: sparkle      Name:     HECTOR AVILES   MRN:      [id number]        Account:      [id number]   :      1985           Service Date: 10/18/2019      Document: [id number]

## 2019-10-24 ENCOUNTER — OFFICE VISIT (OUTPATIENT)
Dept: OPHTHALMOLOGY | Facility: CLINIC | Age: 34
End: 2019-10-24
Payer: COMMERCIAL

## 2019-10-24 DIAGNOSIS — H04.129 DRY EYE: Primary | ICD-10-CM

## 2019-10-24 DIAGNOSIS — H04.123 DRY EYE SYNDROME OF BOTH EYES: ICD-10-CM

## 2019-10-24 DIAGNOSIS — H52.13 MYOPIA OF BOTH EYES: ICD-10-CM

## 2019-10-24 RX ORDER — LOTEPREDNOL ETABONATE 5 MG/ML
1-2 SUSPENSION/ DROPS OPHTHALMIC 3 TIMES DAILY
Qty: 1 BOTTLE | Refills: 1 | Status: SHIPPED | OUTPATIENT
Start: 2019-10-24 | End: 2022-04-16

## 2019-10-24 ASSESSMENT — SLIT LAMP EXAM - LIDS
COMMENTS: MILD MGD
COMMENTS: MILD MGD

## 2019-10-24 ASSESSMENT — REFRACTION_WEARINGRX
OS_CYLINDER: +1.25
OS_SPHERE: -2.00
OS_AXIS: 090
OD_SPHERE: -1.25
OD_CYLINDER: +0.75
OD_AXIS: 090
SPECS_TYPE: SVL

## 2019-10-24 ASSESSMENT — CONF VISUAL FIELD
OD_NORMAL: 1
METHOD: COUNTING FINGERS
OS_NORMAL: 1

## 2019-10-24 ASSESSMENT — EXTERNAL EXAM - RIGHT EYE: OD_EXAM: NORMAL

## 2019-10-24 ASSESSMENT — REFRACTION_MANIFEST
OS_CYLINDER: +1.25
OS_AXIS: 085
OD_AXIS: 089
OS_SPHERE: -2.00
OD_SPHERE: -0.75
OD_CYLINDER: +1.00

## 2019-10-24 ASSESSMENT — VISUAL ACUITY
OD_CC: 20/20
METHOD: SNELLEN - LINEAR
OS_CC: 20/20
CORRECTION_TYPE: GLASSES
OS_CC+: -

## 2019-10-24 ASSESSMENT — CUP TO DISC RATIO
OS_RATIO: 0.60
OD_RATIO: 0.60

## 2019-10-24 ASSESSMENT — TONOMETRY
OD_IOP_MMHG: 18
IOP_METHOD: ICARE
OS_IOP_MMHG: 18

## 2019-10-24 ASSESSMENT — EXTERNAL EXAM - LEFT EYE: OS_EXAM: NORMAL

## 2019-10-24 NOTE — PROGRESS NOTES
A/P  1.) Myopia/Astigmatism  -Stable spec Rx, can continue with current glasses    2.) Dry Eye each eye   -Worsening lately, tied to allergies  -Start lotemax pulse each eye, warm compresses  -Continue AT  -Reviewed other options with dryness tx including punctal plugs or Restasis etc      RTC 2-3 months if symptoms persist for dryness f/u    I have confirmed the patient's CC, HPI and reviewed Past Medical History, Past Surgical History, Social History, Family History, Problem List, Medication List and agree with Tech note.     Annika Wylie, OD FAAO FSLS

## 2019-10-24 NOTE — NURSING NOTE
Chief Complaints and History of Present Illnesses   Patient presents with     Annual Eye Exam     Chief Complaint(s) and History of Present Illness(es)     Annual Eye Exam     Laterality: both eyes    Onset: years ago    Frequency: constantly    Timing: throughout the day    Course: stable    Associated symptoms: dryness.  Negative for eye pain    Treatments tried: eye drops    Pain scale: 0/10              Comments     Having eye irritation, dryness, and itching with allergies. Using OTC allergy drops which temporarily help, doing them BID OU.    No vision changes. Wears glasses for reading and distance activities.     Styes in the past, recently a few months ago.    Ailyn Gibson COT 12:25 PM October 24, 2019

## 2019-10-24 NOTE — PATIENT INSTRUCTIONS
Start medicated eye drop for about 2-3 weeks, about 2-3 times per day    Start warm compresses for the eyelids several times a week, even daily if you are able for 5-10 minutes    Continue using artificial tears several times a day to moisturize the eyes. Refresh Plus and Systane Ultra are good options    If your dryness symptoms continue after 1-2 months please follow up in clinic to evaluate other options

## 2019-11-13 DIAGNOSIS — F41.9 ANXIETY: ICD-10-CM

## 2019-11-13 RX ORDER — GABAPENTIN 100 MG/1
100-200 CAPSULE ORAL 3 TIMES DAILY
Qty: 180 CAPSULE | Refills: 2 | OUTPATIENT
Start: 2019-11-13

## 2019-12-06 DIAGNOSIS — F41.9 ANXIETY: ICD-10-CM

## 2019-12-06 RX ORDER — GABAPENTIN 300 MG/1
300 CAPSULE ORAL 3 TIMES DAILY
Qty: 90 CAPSULE | Refills: 0 | Status: SHIPPED | OUTPATIENT
Start: 2019-12-06 | End: 2019-12-30

## 2019-12-13 ENCOUNTER — OFFICE VISIT (OUTPATIENT)
Dept: PSYCHIATRY | Facility: CLINIC | Age: 34
End: 2019-12-13
Attending: PSYCHIATRY & NEUROLOGY
Payer: COMMERCIAL

## 2019-12-13 VITALS
WEIGHT: 163.6 LBS | DIASTOLIC BLOOD PRESSURE: 74 MMHG | BODY MASS INDEX: 24.88 KG/M2 | HEART RATE: 90 BPM | SYSTOLIC BLOOD PRESSURE: 111 MMHG

## 2019-12-13 DIAGNOSIS — F41.9 ANXIETY: ICD-10-CM

## 2019-12-13 PROCEDURE — G0463 HOSPITAL OUTPT CLINIC VISIT: HCPCS | Mod: ZF

## 2019-12-13 RX ORDER — LORAZEPAM 0.5 MG/1
0.5 TABLET ORAL DAILY PRN
Qty: 15 TABLET | Refills: 0 | Status: SHIPPED | OUTPATIENT
Start: 2019-12-13 | End: 2020-01-16

## 2019-12-13 ASSESSMENT — PAIN SCALES - GENERAL: PAINLEVEL: NO PAIN (0)

## 2019-12-13 NOTE — Clinical Note
Perhaps she should be referred for ADHD eval?  I see this is a consideration.  She may have some resources for that available through her therapist at [facility]. PHUONG

## 2019-12-13 NOTE — PROGRESS NOTES
"     [facility]  Psychiatry Clinic  PSYCHIATRIC PROGRESS NOTE     Date of initial Diagnostic Assessment (DA) is 10/31/17 and most recent Transfer of Care DA is 10/14/19.    CARE TEAM:  PCP- Roxy Rios    Specialty Providers- Neurology: Dr. Loyd (retiring) at [facility]   Allergy: Dr. Dale at [facility]   Therapist- Amelia SEALS at [facility] (weekly to twice weekly)       Kasandra Lau is a 33 year old female who prefers the name Kasandra & pronouns she, her, hers.      Pertinent Background:  Kasandra first experienced mental health issues in childhood and has received treatment for MDD and Seasonal Mood DO.  Her mood symptoms are worsened by chronic pain. She is currently a  at the [employer] and has requested accommodations through disability services for her mental health. A letter of support for accommodations was provided 9/12/19 (see chart) by Dr. Jose Carlos Delcid.    Psych critical item history includes suicidal ideation and trauma hx.    INTERIM HISTORY      [4, 4]   The patient reports good treatment adherence.  History was provided by the patient who was a good historian.    The last visit ended with the following med change: increase Viibryd from 20mg to 30mg daily; begin gabapentin 100-200mg po TID scheduled, pt to start at 100mg dose and if ineffective, increase to 200mg .   Since the last visit:   - She was on time for her appt today  - She is \"pretty good\" today  - She increased the gabapentin to 300mg TID on recommendation of her neurologist to help with the anxiety as well as her migraines - she has noticed improvement on both accounts - her anxiety has improved and she is no longer using lorazepam daily and her migraines have decreased to 3 a month  - She continues to have multiple ongoing stressors with her graduate program including learning in the last week that she will not be provided with " "financial support for next semester, however, notes that she \"should be more anxious than I am\"  - She is requesting a letter of support of reasonable accomodation today to present to her graduate program - she has struggled to meet the deadlines of her graduate program thus far due to her mental health and her chronic migraines - she has hired a  to help get an extension to her financial support due to this difficulties with completing coursework on the department's designated timeline  - She states Dr. Delcid wrote her a letter last year, but that her  thought it would be best to have a more updated letter to show that these are still issues she is dealing with   - She endorses ongoing intermittent passive SI, but denies any intent or plan to harm herself    RECENT PSYCH ROS:   Depression:  suicidal ideation, depressed mood, hypersomnia, feeling hopeless, feeling trapped and overwhelmed  Elevated:  none  Psychosis:  none  Anxiety:  excessive worry and nervous/overwhelmed  Panic Attack:  none  Trauma Related:  none  Dysregulation:  none  Eating Disorder: none     Pertinent Negative Symptoms:  NO self-injurious behavior/urges, psychosis and saulo    RECENT SUBSTANCE USE:     ALCOHOL- Social drinking, occassionally a glass or two of wine at home 1-2 nights a week.      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   EDUCATION- [employer], studying for PhD in [employer].  PARTNER/- , currently single.  CHILDREN- none      LIVING SITUATION- Lives alone in an apartment in [place] with her cat and 2 dogs.      SOCIAL/ SPIRITUAL SUPPORT- Friends through the university.     FEELS SAFE AT HOME- yes     PSYCH and CD Critical Summary Points since July 2019          10/14/19- resident " transition; increase Viibryd from 20mg to 30mg daily to target worsening depression/anxiety (did not occur after last visit due to unclear insurance/pharmacy issues); continue Ativan 0.5 TID prn anxiety - patient to try to reduce use to 2 doses daily (total of 1mg); increase gabapentin from 100mg to 200mg TID scheduled to target anxiety and help reduce lorazepam use  12/13/19- decrease lorazepam to 0.5mg daily PRN, increase gabapentin to 300mg tid for anxiety; provided her w/ letter for reasonable accommodations for graduate program    PAST MED TRIALS        See last Diagnostic Assessment  MEDICAL / SURGICAL HISTORY          Pregnant or breastfeeding- no      Contraception- Nuvaring    Patient Active Problem List   Diagnosis     Pelvic pain in female     Vitamin D deficiency     Menorrhagia with regular cycle     Migraine without aura and without status migrainosus, not intractable     Iron deficiency anemia due to chronic blood loss     Tired     Circadian rhythm sleep disorder, delayed sleep phase type     Unhealthy sleep habit     Seasonal mood disorder (H)       Major Surgery- Endometrial cystectomy, L wrist fracture repair    MEDICAL REVIEW OF SYSTEMS    [2, 10]     A comprehensive review of systems was performed and is negative other than noted in the HPI.    ALLERGY       Dust mites and Cats     MEDICATIONS        Current Outpatient Medications   Medication Sig Dispense Refill     Acetaminophen (TYLENOL PO) Take 650 mg by mouth every 4 hours as needed for mild pain or fever       albuterol (PROVENTIL HFA) 108 (90 Base) MCG/ACT inhaler Inhale 1-2 puffs into the lungs every 4 hours as needed        ASMANEX 30 METERED DOSES 220 MCG/INH inhaler INHALE 1 PUFF BY MOUTH DAILY. RINSE MOUTH OR GARGLE AFTER USE  6     Azelastine HCl 137 MCG/SPRAY SOLN [place] 1-2 sprays into both nostrils 2 times daily  11     buPROPion (WELLBUTRIN XL) 300 MG 24 hr tablet Take 1 tablet (300 mg) by mouth every morning 30 tablet 2      cetirizine (ZYRTEC) 10 MG tablet Take 10 mg by mouth daily       etonogestrel-ethinyl estradiol (NUVARING) 0.12-0.015 MG/24HR vaginal ring Place 1 each vaginally every 28 days 3 each 0     fluticasone (FLONASE) 50 MCG/ACT nasal spray 1-2 sprays       fluticasone (FLONASE) 50 MCG/ACT nasal spray INSTILL 1-2 SPRAYS INTO BOTH NOSTRILS DAILY.  11     gabapentin (NEURONTIN) 300 MG capsule Take 1 capsule (300 mg) by mouth 3 times daily for anxiety 90 capsule 0     LORazepam (ATIVAN) 0.5 MG tablet 1 or 2 tabs BID PRN anxiety.  Do not exceed 4 tabs in 24 hours. 60 tablet 0     loteprednol (LOTEMAX) 0.5 % ophthalmic suspension Place 1-2 drops into both eyes 3 times daily 1 Bottle 1     order for DME Use for 15-30 minutes every morning. 1 Units 0     topiramate (TOPAMAX) 100 MG tablet Take 1 tablet (100 mg) by mouth 2 times daily 120 tablet 3     topiramate (TOPAMAX) 100 MG tablet Take 1 tablet (100 mg) by mouth daily (Patient taking differently: Take 200 mg by mouth daily ) 90 tablet 3     topiramate (TOPAMAX) 25 MG tablet Add 25 mg po hs daily for 2 weeks to prior 100mg dose, then 50mg hs daily with 100mg dose 120 tablet 3     topiramate (TOPAMAX) 50 MG tablet Take bid 120 tablet 3     vilazodone (VIIBRYD) 10 MG TABS tablet Take 1 tablet (10 mg) by mouth daily with 20mg tab for total daily dose of 30mg. 30 tablet 2     vilazodone (VIIBRYD) 20 MG TABS tablet Take 1 tablet (20 mg) by mouth daily 1.5 tabs po daily 30 tablet 2     mometasone (ASMANEX 30 METERED DOSES) 220 MCG/INH inhaler Inhale 1 puff into the lungs       VITALS      [3, 3]   /74   Pulse 90   Wt 74.2 kg (163 lb 9.6 oz)   BMI 24.88 kg/m       MENTAL STATUS EXAM      [9, 14 cog gs]     Alertness: alert  and oriented  Appearance: well groomed, wearing lipstick  Behavior/Demeanor: cooperative, pleasant and calm, with good  eye contact   Speech: regular rate and rhythm, quiet  Language: intact  Psychomotor: fidgety  Mood: depressed and anxious  Affect:  blunted; was congruent to mood; was congruent to content  Thought Process/Associations: unremarkable  Thought Content:  Reports none;  Denies suicidal ideation  Perception:  Reports none;  Denies auditory hallucinations  Insight: good  Judgment: good  Cognition: does  appear grossly intact; formal cognitive testing was not done  Gait and Station: unremarkable    LABS and DATA     AIMS:  not needed    PHQ9 TODAY = 2  PHQ-9 SCORE 6/20/2019 8/28/2019 10/14/2019   PHQ-9 Total Score 6 8 12       Recent Labs   Lab Test 08/02/19  1711 10/12/18  1532 10/05/17  1133   CR 0.80 0.68 0.75   GFRESTIMATED >90 >90 >90     Recent Labs   Lab Test 08/02/19  1711 10/12/18  1532 10/05/17  1133   AST 20 11 12   ALT 25 22 22   ALKPHOS 76 65 57       PSYCHOTROPIC DRUG INTERACTIONS   LORAZEPAM + GABAPENTIN may increase risk of CNS depression.    VILAZODONE + BUPROPION may increase risk of seizure and serotonin syndrome.     MANAGEMENT:  Monitoring for adverse effects and patient is aware of risks    RISK STATEMENT for SAFETY     Kasandra Lau did not appear to be an imminent safety risk to self or others.     Today Kasandra Lau reports recent passive SI, but no intent or specific planning. She has notable risk factors for self-harm, including single status, anxiety, comorbid medical condition of chronic migraines, some social isolation, and ongoing psychosocial stressors. However, risk is mitigated by absence of past attempts, ability to volunteer a safety plan and lack of intent/planning. Therefore, based on all available evidence including the factors cited above, she does not appear to be at imminent risk for self-harm, does not meet criteria for a 72-hr hold, and therefore remains appropriate for ongoing outpatient level of care. Additional steps taken to mitigate risk include close follow up, medication changes, and regular psychotherapy visits. In the event of active planning or intent the patient agrees to call a crisis line, call  911, or go to the nearest ER.     PSYCHIATRIC DIAGNOSIS     Major Depressive Disorder, recurrent, mild   Seasonal Affective Disorder  Generalized Anxiety Disorder  R/o Attention Deficit Disorder    ASSESSMENT      [m2, h3]     TODAY Kasandra continues to struggle with both depression and high anxiety related to ongoing psychosocial stressors with her graduate program. She has been able to reduce her use of lorazepam a significant amount after increasing the gabapentin on recommendation of her neurologist to provide her with added coverage for her migraines. We discussed moving forward to only use the lorazepam for severe anxiety and she agrees. She is supplementing the medications with psychotherapy visits 1-2x per week and finding this helpful. She also requests a letter of support of reasonable accommodations for extending her graduate program due to difficulties meeting deadlines as a result of her mental health issues. In reviewing her chart and from what she has shared during our first couple of visits did agree to provide her with this letter.     Future considerations:  - Optimize gabapentin and vilazodone to target anxiety; then taper lorazepam due to risks of long term use     PLAN      [m2, h3]     1) PSYCHOTROPIC MEDICATIONS:  - Continue Wellbutrin SR 150mg, 2 tabs daily  - Continue Viibryd 30mg daily (depression/anxiety)   - Decrease Ativan from 0.5 TID to daily prn anxiety   - Increase gabapentin from 300mg TID scheduled to target anxiety and help reduce lorazepam use (already increased prior to visit on recommendation of neurologist for added migraine treatment)    Per Neurology:  - Continue Topiramate for migraines, prescribed by neurology    2) [place]  was checked today:  indicates she only receives lorazepam refills through this clinic..    3) THERAPY:    - Continue individual therapy    4) OTHER  - provided Kasandra with letter of support for reasonable accommodations in her graduate program (see letters  section)    5) NEXT DUE:    Labs- N/A  EKG- N/A  Rating Scales- PHQ9 at every visit    6) REFERRALS:    No Referrals needed     7) RTC: in 4-6 weeks    8) CRISIS NUMBERS:   Provided routinely in AVS   ONLY if a E[facility] PT: [facility] [phone] (clinic), [phone] (after hours)     TREATMENT RISK STATEMENT:  The risks, benefits, alternatives and potential adverse effects have been discussed and are understood by the pt. The pt understands the risks of using street drugs or alcohol. There are no medical contraindications, the pt agrees to treatment with the ability to do so. The pt knows to call the clinic for any problems or to access emergency care if needed.  Medical and substance use concerns are documented above.  Psychotropic drug interaction check was done, including changes made today.    PROVIDER: Melina Gamino MD    Patient not staffed in clinic.  Note will be reviewed and signed by supervisor Dr. Junior.  I did not see this patient directly. I have reviewed the documentation and I agree with the resident's plan of care.     Laurence Junior MD

## 2019-12-16 ENCOUNTER — CARE COORDINATION (OUTPATIENT)
Dept: PSYCHIATRY | Facility: CLINIC | Age: 34
End: 2019-12-16

## 2019-12-24 ENCOUNTER — TELEPHONE (OUTPATIENT)
Dept: NEUROLOGY | Facility: CLINIC | Age: 34
End: 2019-12-24

## 2019-12-24 NOTE — TELEPHONE ENCOUNTER
I called pt back regarding her chronic fatigue. She let me know she spoke with the women's health clinic and has an appointment with them. After talking with her she does not think her medication is responsible for her chronic fatigue. She has extensive medical history and thinks it is tied with that. I let her know if she needs further assistance from our office she can call us back.     Josey GARCIA   
[facility] Call Center    Phone Message    May a detailed message be left on voicemail: yes    Reason for Call: Symptoms or Concerns     If patient has red-flag symptoms, warm transfer to triage line    Current symptom or concern: Patient called and is experiencing chronic fatigue, she is wanting to know if it could be related to her medications       Action Taken: Message routed to:  [facility] ([facility] neurology    
DISPLAY PLAN FREE TEXT

## 2019-12-30 DIAGNOSIS — F41.9 ANXIETY: ICD-10-CM

## 2019-12-31 RX ORDER — GABAPENTIN 300 MG/1
300 CAPSULE ORAL 3 TIMES DAILY
Qty: 90 CAPSULE | Refills: 0 | Status: SHIPPED | OUTPATIENT
Start: 2019-12-31 | End: 2020-01-16 | Stop reason: DRUGHIGH

## 2019-12-31 NOTE — TELEPHONE ENCOUNTER
Medication requested: gabapentin (NEURONTIN) 300 MG capsule  Last refilled: 12/6/19  Qty: 90      Last seen: 12/13/19  RTC: 4-6 weeks  Cancel: 0  No-show: 0  Next appt: 0    Refill decision:   Refill pended and routed to the provider for review/determination due to   Gabapentin not on protocol..  Scheduling has been notified to contact the pt for appointment.

## 2020-01-13 ENCOUNTER — TELEPHONE (OUTPATIENT)
Dept: PSYCHIATRY | Facility: CLINIC | Age: 35
End: 2020-01-13

## 2020-01-13 ENCOUNTER — DOCUMENTATION ONLY (OUTPATIENT)
Dept: CARE COORDINATION | Facility: CLINIC | Age: 35
End: 2020-01-13

## 2020-01-13 DIAGNOSIS — F41.9 ANXIETY: Primary | ICD-10-CM

## 2020-01-13 DIAGNOSIS — F33.1 MAJOR DEPRESSIVE DISORDER, RECURRENT EPISODE, MODERATE (H): ICD-10-CM

## 2020-01-13 DIAGNOSIS — F33.1 MODERATE EPISODE OF RECURRENT MAJOR DEPRESSIVE DISORDER (H): ICD-10-CM

## 2020-01-13 DIAGNOSIS — F41.9 ANXIETY: ICD-10-CM

## 2020-01-13 RX ORDER — VILAZODONE HYDROCHLORIDE 20 MG/1
20 TABLET ORAL DAILY
Qty: 30 TABLET | Refills: 0 | Status: SHIPPED | OUTPATIENT
Start: 2020-01-13 | End: 2020-02-03

## 2020-01-13 NOTE — TELEPHONE ENCOUNTER
Writer called pt to gather more information. She was asked by Dr. Gamino to call this clinic if her anxiety worsened and she's using Ativan regularly. 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 She has been taking Ativan 0.5 mg daily and will run out in a few days. She's also taking gabapentin 300 mg TID, but does not find it effective. Her sleep hasn't been great and she's been on the edge of having a full blown panic attack multiple times. She denies any safety concerns and would like to know what the provider recommends. Writer agreed to forward this information to the provider and will then call her back with recommendations.

## 2020-01-13 NOTE — TELEPHONE ENCOUNTER
Angie Sims, RN  Angie Sims, RN          Previous Messages      ----- Message -----   From: Muhumed, Yasmin   Sent: 1/13/2020   2:41 PM CST   To: [facility]     Pt of Dr. Melina Gamino.     Pt wants to speak to nurse about increasing her dosage for gabapentin 300mg (3 times a day). Pt feels like her anxiety isn't being managed and needs an increase.     Please call her at : [phone]     Thank you,     Sarah

## 2020-01-14 DIAGNOSIS — F33.1 MAJOR DEPRESSIVE DISORDER, RECURRENT EPISODE, MODERATE (H): ICD-10-CM

## 2020-01-14 DIAGNOSIS — F41.9 ANXIETY: ICD-10-CM

## 2020-01-14 NOTE — TELEPHONE ENCOUNTER
Melina Gamino MD Labossiere, Laura, RN   Caller: Unspecified (Yesterday,  4:50 PM)             Yes, we can refill Ativan because she last filled a month ago. Let's keep it to the 15 pills of 0.5mg as I prescribed for her in December. Thank you!     Melina       Writer called pt. No answer. LVM requesting a c/b to discuss the provider's recommendations on gabapentin and to provide an Ativan RF.

## 2020-01-14 NOTE — TELEPHONE ENCOUNTER
Melina Gamino MD Labossiere, Laura, RN   Caller: Unspecified (Yesterday,  4:50 PM)             Stephanie Casiano for helping out with this patient! We can offer for Kasandra to increase the gabapentin to 400mg TID. She may need a new prescription. I would also recommend to her to start seeing her therapist twice weekly if possible as she has done this before.     Melina        Will also inquire about a refill of Ativan and will then call the pt back with the provider's recommendations.

## 2020-01-14 NOTE — TELEPHONE ENCOUNTER
Medication requested: vilazodone (VIIBRYD) 20 MG TABS tablet  Last refilled: 12/20/19  Qty: 30      Last seen: 12/13/19  RTC: 4-6 weeks  Cancel: 0  No-show: 0  Next appt: 2/3/2020    Refill decision:   Refilled for 30 days per protocol.

## 2020-01-15 ENCOUNTER — OFFICE VISIT (OUTPATIENT)
Dept: FAMILY MEDICINE | Facility: CLINIC | Age: 35
End: 2020-01-15
Attending: FAMILY MEDICINE
Payer: COMMERCIAL

## 2020-01-15 VITALS
BODY MASS INDEX: 25.46 KG/M2 | HEIGHT: 68 IN | DIASTOLIC BLOOD PRESSURE: 69 MMHG | WEIGHT: 168 LBS | SYSTOLIC BLOOD PRESSURE: 99 MMHG | HEART RATE: 96 BPM

## 2020-01-15 DIAGNOSIS — R53.82 CHRONIC FATIGUE: Primary | ICD-10-CM

## 2020-01-15 PROCEDURE — G0463 HOSPITAL OUTPT CLINIC VISIT: HCPCS | Mod: ZF

## 2020-01-15 RX ORDER — VILAZODONE HYDROCHLORIDE 10 MG/1
TABLET ORAL
Qty: 30 TABLET | Refills: 0 | Status: SHIPPED | OUTPATIENT
Start: 2020-01-15 | End: 2020-02-03 | Stop reason: DRUGHIGH

## 2020-01-15 ASSESSMENT — ANXIETY QUESTIONNAIRES
3. WORRYING TOO MUCH ABOUT DIFFERENT THINGS: SEVERAL DAYS
1. FEELING NERVOUS, ANXIOUS, OR ON EDGE: MORE THAN HALF THE DAYS
6. BECOMING EASILY ANNOYED OR IRRITABLE: MORE THAN HALF THE DAYS
2. NOT BEING ABLE TO STOP OR CONTROL WORRYING: MORE THAN HALF THE DAYS
7. FEELING AFRAID AS IF SOMETHING AWFUL MIGHT HAPPEN: SEVERAL DAYS
GAD7 TOTAL SCORE: 10
5. BEING SO RESTLESS THAT IT IS HARD TO SIT STILL: NOT AT ALL

## 2020-01-15 ASSESSMENT — MIFFLIN-ST. JEOR: SCORE: 1515.54

## 2020-01-15 ASSESSMENT — PATIENT HEALTH QUESTIONNAIRE - PHQ9
SUM OF ALL RESPONSES TO PHQ QUESTIONS 1-9: 7
5. POOR APPETITE OR OVEREATING: MORE THAN HALF THE DAYS

## 2020-01-15 NOTE — PROGRESS NOTES
"HPI    CC: Patient here for chronic fatigue since childhood  She would like to know if her fatigue is due a single underlying condition, particularly related to inflammation or any of her other ongoing health issues.    Counseled patient at start of session that chronic fatigue is often multifactorial in nature, and when this longstanding can take multiple visits to evaluate and develop treatment plan    CIRILO  Has  had low energy since childhood, but never diagnosed with a problem or condition  Describes being both tired and sleepy--lacks energy to do mental and physical activities,  affects mental function,has trouble concentrating.  States can do only up to 6 hours of any mental or physical activity per day, then feels \"foggy\",  easily distracted.  increased body aches if \"push body too hard\"    She can fall asleep but has early waking at 3 am. No snoring, has had normal sleep study  History of asthma, but currently denies dyspnea at rest or exertion  No chest pain, no history of cardiac conditions  Non smoker  In record review, was evaluated for 1 month chest pain and dyspnea 10/28/2019, CXR, EKG negative,     Current activities:  walk dog  X 1 hour daily, has no car, so able to use public transit, walks to and from bus/train stops  Did PT 5 years ago, none recently    Other Health problems:    1. Endometriosis  Diagnosed and treated with surgery 3 years ago, but supsects has had condition since menarche age 15, with symptoms of menorrhagia, dysmenorrhea and pelvic pain. She still has chronic abdominal and back pain, but unclear if due to this    2. IBS  X 8 years; with ongoing constipation, bloating, indigestion. Last seen by GI in 2017 per chart, trying FODMAP diet  3. Migraine--managed by neurology, now better controlled with headaches  3-4/month, on topamax prophylaxis and Axert for abortive therapy    4. Anxiety and depression  She has had mental health issues dating back to childhood; and has been working " with psychiatry in [facility] since at least 2017  Over 2019, has experienced multiple medication changes due to mental health symptoms  She is currently on bupropion 300 mg, Viibryd, gabapentin 1200 mg daily, ativan prn  In weekly therapy        PHQ-9 SCORE 8/28/2019 10/14/2019 1/15/2020   PHQ-9 Total Score 8 12 7     VIC-7 SCORE 8/25/2016 1/30/2019 1/15/2020   Total Score 5 (mild anxiety) 7 (mild anxiety) -   Total Score - 7 10       Social History  PHD student--lots stress, in exam stage,, 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   x 2 years  No good dating experiences  Estranged from family  No ETOH use, no recreational substances    Past labs reviewed  2019: CBC, Basic Metabolic Panel--normal  2018--Vitamin D,   2017--TSH, b12, folate, CRP normal      Past Medical History:   Diagnosis Date     Anemia fall 2016     Depression (emotion)     sees psych, on meds     Migraine     daily meds, 2x month, more mild on meds     Panic disorder      Uncomplicated asthma Spring 2018     Family History   Problem Relation Age of Onset     Diabetes Maternal Grandfather      Diabetes No family hx of      Hypertension No family hx of      Coronary Artery Disease No family hx of      Hyperlipidemia No family hx of      Cerebrovascular Disease No family hx of      Breast Cancer No family hx of      Colon Cancer No family hx of      Prostate Cancer No family hx of      Other Cancer No family hx of      Glaucoma No family hx of      Macular Degeneration No family hx of          Review of Systems   Constitutional: Positive for malaise/fatigue. Negative for chills and fever.   Respiratory: Negative for shortness of breath.    Cardiovascular: Negative for chest pain and palpitations.   Gastrointestinal: Positive for abdominal pain and constipation.   Musculoskeletal: Positive for back pain.   Neurological: Positive for headaches.         Physical Exam  Constitutional:       Appearance: Normal appearance.   Neurological:      Mental  "Status: She is alert.     Speech RRR,  Eye contact fair; mood is anxious, depressed,  no psychomotor changes, thought process is appropriate,   Affect is normal. No evidence of suicidality.     Blood pressure 99/69, pulse 96, height 1.727 m (5' 8\"), weight 76.2 kg (168 lb), not currently breastfeeding.      A/P  1. Chronic Fatigue  2. IBS  3. Migraines  4. Chronic back pain  5. Depression, anxiety    Counseled patient extensively on the mulitfactorial nature of fatigue; biological, psychological and life event factors  Counseled regarding the medical use of the term \"inflammation\"; discussed that IBS, migraines and endometriosis are not usually part of a single medical inflammatory condition, and can occur independently of each other.    Counseled patient on the strong relationship between mood, pain and fatigue; need to treat all elements to improve energy. Is on multiple medications that can be sedating  Counseled on the role of deconditioning  Education given on effect of ongoing life stressors, lack of support systems.     Recommend:  Refer PT for conditioning and energy  Recommend continue to improve IBS symptoms, consider follow-up with GI   Continue to work on mental health--release of communication  for  Psychiatry, Therapist--Amelia Rivera--  --strongly consider referral to mind-body therapy/mindfullness therapy in addition to CBT  Pt. Has no PCP    Follow-up 1 month  At that visit:   Talk about allergies, managing IBS better  Body pain  Review total medical history  --consider referral to mind-body therapy    Total time spent face to face with the patient was 45 minutes. More than 50% of the time spent with the patient involved counseling and/or coordinating care.on the issues documented above. Other items discussed included, but were not limited to, prognosis, differential diagnosis, risks/benefits of treatment, instructions regarding medication and importance of compliance, risk reduction, as " well as discussion and coordination of care with other health care providers and patient s care giver.

## 2020-01-15 NOTE — LETTER
"1/15/2020       RE: Kasandra Lau  [address]  [place] [address]     Dear Colleague,    Thank you for referring your patient, Kasandra Lau, to the [facility] at [facility]. Please see a copy of my visit note below.    HPI    CC: Patient here for chronic fatigue since childhood  She would like to know if her fatigue is due a single underlying condition, particularly related to inflammation or any of her other ongoing health issues.    Counseled patient at start of session that chronic fatigue is often multifactorial in nature, and when this longstanding can take multiple visits to evaluate and develop treatment plan    HPI  Has  had low energy since childhood, but never diagnosed with a problem or condition  Describes being both tired and sleepy--lacks energy to do mental and physical activities,  affects mental function,has trouble concentrating.  States can do only up to 6 hours of any mental or physical activity per day, then feels \"foggy\",  easily distracted.  increased body aches if \"push body too hard\"    She can fall asleep but has early waking at 3 am. No snoring, has had normal sleep study  History of asthma, but currently denies dyspnea at rest or exertion  No chest pain, no history of cardiac conditions  Non smoker  In record review, was evaluated for 1 month chest pain and dyspnea 10/28/2019, CXR, EKG negative,     Current activities:  walk dog  X 1 hour daily, has no car, so able to use public transit, walks to and from bus/train stops  Did PT 5 years ago, none recently    Other Health problems:    1. Endometriosis  Diagnosed and treated with surgery 3 years ago, but supsects has had condition since menarche age 15, with symptoms of menorrhagia, dysmenorrhea and pelvic pain. She still has chronic abdominal and back pain, but unclear if due to this    2. IBS  X 8 years; with ongoing constipation, bloating, indigestion. Last seen by GI " in 2017 per chart, trying FODMAP diet  3. Migraine--managed by neurology, now better controlled with headaches  3-4/month, on topamax prophylaxis and Axert for abortive therapy    4. Anxiety and depression  She has had mental health issues dating back to childhood; and has been working with psychiatry in [facility] since at least 2017  Over 2019, has experienced multiple medication changes due to mental health symptoms  She is currently on bupropion 300 mg, Viibryd, gabapentin 1200 mg daily, ativan prn  In weekly therapy        PHQ-9 SCORE 8/28/2019 10/14/2019 1/15/2020   PHQ-9 Total Score 8 12 7     VIC-7 SCORE 8/25/2016 1/30/2019 1/15/2020   Total Score 5 (mild anxiety) 7 (mild anxiety) -   Total Score - 7 10       Social History  PHD student--lots stress, in exam stage,, 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   x 2 years  No good dating experiences  Estranged from family  No ETOH use, no recreational substances    Past labs reviewed  2019: CBC, Basic Metabolic Panel--normal  2018--Vitamin D,   2017--TSH, b12, folate, CRP normal      Past Medical History:   Diagnosis Date     Anemia fall 2016     Depression (emotion)     sees psych, on meds     Migraine     daily meds, 2x month, more mild on meds     Panic disorder      Uncomplicated asthma Spring 2018     Family History   Problem Relation Age of Onset     Diabetes Maternal Grandfather      Diabetes No family hx of      Hypertension No family hx of      Coronary Artery Disease No family hx of      Hyperlipidemia No family hx of      Cerebrovascular Disease No family hx of      Breast Cancer No family hx of      Colon Cancer No family hx of      Prostate Cancer No family hx of      Other Cancer No family hx of      Glaucoma No family hx of      Macular Degeneration No family hx of          Review of Systems   Constitutional: Positive for malaise/fatigue. Negative for chills and fever.   Respiratory: Negative for shortness of breath.    Cardiovascular: Negative for  "chest pain and palpitations.   Gastrointestinal: Positive for abdominal pain and constipation.   Musculoskeletal: Positive for back pain.   Neurological: Positive for headaches.         Physical Exam  Constitutional:       Appearance: Normal appearance.   Neurological:      Mental Status: She is alert.     Speech RRR,  Eye contact fair; mood is anxious, depressed,  no psychomotor changes, thought process is appropriate,   Affect is normal. No evidence of suicidality.     Blood pressure 99/69, pulse 96, height 1.727 m (5' 8\"), weight 76.2 kg (168 lb), not currently breastfeeding.      A/P  1. Chronic Fatigue  2. IBS  3. Migraines  4. Chronic back pain  5. Depression, anxiety    Counseled patient extensively on the mulitfactorial nature of fatigue; biological, psychological and life event factors  Counseled regarding the medical use of the term \"inflammation\"; discussed that IBS, migraines and endometriosis are not usually part of a single medical inflammatory condition, and can occur independently of each other.    Counseled patient on the strong relationship between mood, pain and fatigue; need to treat all elements to improve energy. Is on multiple medications that can be sedating  Counseled on the role of deconditioning  Education given on effect of ongoing life stressors, lack of support systems.     Recommend:  Refer PT for conditioning and energy  Recommend continue to improve IBS symptoms, consider follow-up with GI   Continue to work on mental health--release of communication  for  Psychiatry, Therapist--Amelia Rivera--  --strongly consider referral to mind-body therapy/mindfullness therapy in addition to CBT  Pt. Has no PCP    Follow-up 1 month  At that visit:   Talk about allergies, managing IBS better  Body pain  Review total medical history  --consider referral to mind-body therapy    Total time spent face to face with the patient was 45 minutes. More than 50% of the time spent with the patient " involved counseling and/or coordinating care.on the issues documented above. Other items discussed included, but were not limited to, prognosis, differential diagnosis, risks/benefits of treatment, instructions regarding medication and importance of compliance, risk reduction, as well as discussion and coordination of care with other health care providers and patient s care giver.      Baljinder Sesay MD

## 2020-01-16 ENCOUNTER — OFFICE VISIT (OUTPATIENT)
Dept: OPHTHALMOLOGY | Facility: CLINIC | Age: 35
End: 2020-01-16
Payer: COMMERCIAL

## 2020-01-16 DIAGNOSIS — H04.129 DRY EYE: ICD-10-CM

## 2020-01-16 DIAGNOSIS — H10.13 ALLERGIC CONJUNCTIVITIS OF BOTH EYES: Primary | ICD-10-CM

## 2020-01-16 RX ORDER — LORAZEPAM 0.5 MG/1
0.5 TABLET ORAL DAILY PRN
Qty: 15 TABLET | Refills: 0
Start: 2020-01-16 | End: 2020-02-03

## 2020-01-16 RX ORDER — GABAPENTIN 400 MG/1
400 CAPSULE ORAL 3 TIMES DAILY
Qty: 90 CAPSULE | Refills: 0 | Status: SHIPPED | OUTPATIENT
Start: 2020-01-16 | End: 2020-02-03

## 2020-01-16 RX ORDER — NEOMYCIN SULFATE, POLYMYXIN B SULFATE, AND DEXAMETHASONE 3.5; 10000; 1 MG/G; [USP'U]/G; MG/G
0.5 OINTMENT OPHTHALMIC AT BEDTIME
Qty: 1 TUBE | Refills: 3 | Status: SHIPPED | OUTPATIENT
Start: 2020-01-16 | End: 2021-10-07

## 2020-01-16 RX ORDER — OLOPATADINE HYDROCHLORIDE 1 MG/ML
1 SOLUTION/ DROPS OPHTHALMIC 2 TIMES DAILY
Qty: 1 BOTTLE | Refills: 11 | Status: SHIPPED | OUTPATIENT
Start: 2020-01-16 | End: 2022-04-16

## 2020-01-16 ASSESSMENT — VISUAL ACUITY
CORRECTION_TYPE: GLASSES
OD_CC: 20/20
OD_CC+: -1
OS_CC: 20/20
METHOD: SNELLEN - LINEAR

## 2020-01-16 ASSESSMENT — TONOMETRY
OD_IOP_MMHG: 14
OS_IOP_MMHG: 15
IOP_METHOD: ICARE

## 2020-01-16 ASSESSMENT — CONF VISUAL FIELD
METHOD: COUNTING FINGERS
OS_NORMAL: 1
OD_NORMAL: 1

## 2020-01-16 ASSESSMENT — ANXIETY QUESTIONNAIRES: GAD7 TOTAL SCORE: 10

## 2020-01-16 NOTE — NURSING NOTE
Chief Complaints and History of Present Illnesses   Patient presents with     Dry Eye Syndrome Follow Up     Chief Complaint(s) and History of Present Illness(es)     Dry Eye Syndrome Follow Up     Laterality: both eyes    Associated symptoms: dryness, irritation and itching.  Negative for eye pain    Duration: months    Course: stable    Treatments tried: allergy drops    Pain scale: 0/10              Comments     Eyes are still dry and itchy. Ran out of the sample of ATs. Using allergy Visine drop which helps temporarily. Eyes get irritated with warm compresses so not doing them.    Ailyn Gibson COT 1:10 PM January 16, 2020

## 2020-01-16 NOTE — PROGRESS NOTES
A/P  1.) Allergic conjunctivitis each eye  -Indoor allergies, recently rehomed her cat but also severely allergic to dust. Due to start allergy shots soon  -Start patanol bid each eye, start maxitrol chelo at night  -Restart artificial tears during the day    Symptoms largely allergy related, but if dryness persists we can consider plugs or Restasis/Xiidra    RTC 2-3 months if symptoms persist for dryness f/u    I have confirmed the patient's CC, HPI and reviewed Past Medical History, Past Surgical History, Social History, Family History, Problem List, Medication List and agree with Tech note.     Annika Wylie, REMINGTON FAAO FSLS

## 2020-01-16 NOTE — TELEPHONE ENCOUNTER
Writer called pt again and relayed the provider's recommendations. Pt agreed with this plan and asked that prescriptions are sent to [facility] on [address].     30 d/s of gabapentin 400 mg caps sent to pharmacy.     Phoned Ativan 0.5 mg, #15 to pharmacistBilly with [facility] at [phone]. Med tab changed to reflect this. Billy also requested verbal approval of the Viibryd 10 mg tabs, as the e-prescription failed. Provided VORB for this as well.

## 2020-01-24 DIAGNOSIS — F41.9 ANXIETY: ICD-10-CM

## 2020-01-24 RX ORDER — GABAPENTIN 300 MG/1
300 CAPSULE ORAL 3 TIMES DAILY
Qty: 90 CAPSULE | Refills: 0 | Status: CANCELLED | OUTPATIENT
Start: 2020-01-24

## 2020-01-27 DIAGNOSIS — Z30.44 ENCOUNTER FOR SURVEILLANCE OF VAGINAL RING HORMONAL CONTRACEPTIVE DEVICE: ICD-10-CM

## 2020-01-27 RX ORDER — ETONOGESTREL AND ETHINYL ESTRADIOL VAGINAL RING .015; .12 MG/D; MG/D
1 RING VAGINAL
Qty: 3 EACH | Refills: 3 | Status: SHIPPED | OUTPATIENT
Start: 2020-01-27 | End: 2021-01-07

## 2020-01-27 NOTE — TELEPHONE ENCOUNTER
Patient calling for refill of nuvaring. She is due for a repeat pap this year 1/2020 due to +HPV last year. Sent refill, patient has upcoming appt with Dr. Sesay, will ask that this be done at that appt.

## 2020-01-28 ENCOUNTER — TELEPHONE (OUTPATIENT)
Dept: OBGYN | Facility: CLINIC | Age: 35
End: 2020-01-28

## 2020-01-28 NOTE — TELEPHONE ENCOUNTER
Returned call. Patient has upcoming visit with Dr. Sesay. Advised in a voicemail that this is an appropriate time to discuss this concern.

## 2020-01-28 NOTE — TELEPHONE ENCOUNTER
----- Message from Angieeren Fulton sent at 1/24/2020 12:57 PM CST -----  Regarding: Call back  Contact: [phone]  Hello!    Pt has been experiencing abdominal bloating for a few weeks. Her pregnancy test was negative. Pt unsure if it is connected with her endometriosis. Non-emergent line was busy. Please call patient back to discuss symptoms.    Thank you!  Angie        Please DO NOT send message and or reply back to sender. Call center Representatives DO NOT respond to Messages.

## 2020-01-29 DIAGNOSIS — F33.1 MAJOR DEPRESSIVE DISORDER, RECURRENT EPISODE, MODERATE (H): ICD-10-CM

## 2020-01-29 RX ORDER — AZELASTINE HYDROCHLORIDE 137 UG/1
SPRAY, METERED NASAL
COMMUNITY
Start: 2019-12-16 | End: 2021-07-29

## 2020-01-29 RX ORDER — ALMOTRIPTAN 12.5 MG/1
TABLET, FILM COATED ORAL
COMMUNITY
Start: 2020-01-09 | End: 2020-04-14

## 2020-01-29 RX ORDER — GABAPENTIN 300 MG/1
CAPSULE ORAL
COMMUNITY
Start: 2019-12-31 | End: 2020-02-03 | Stop reason: DRUGHIGH

## 2020-01-29 ASSESSMENT — ENCOUNTER SYMPTOMS
CHILLS: 0
BACK PAIN: 1
CONSTIPATION: 1
ABDOMINAL PAIN: 1
PALPITATIONS: 0
FEVER: 0
SHORTNESS OF BREATH: 0
HEADACHES: 1

## 2020-01-30 RX ORDER — BUPROPION HYDROCHLORIDE 300 MG/1
300 TABLET ORAL EVERY MORNING
Qty: 30 TABLET | Refills: 0 | Status: SHIPPED | OUTPATIENT
Start: 2020-01-30 | End: 2020-02-03

## 2020-01-30 NOTE — TELEPHONE ENCOUNTER
Medication requested:buPROPion (WELLBUTRIN XL) 300 MG 24 hr tablet  Take 1 tablet (300 mg) by mouth every morning  Last refilled: 10/14/2019  Qty: 30/2      Last seen: 12/13/2019   RTC:4-6 weeks  Cancel: 0  No-show: 0  Next appt:2/3/20    Refill decision:   Refilled for 30 days per protocol.

## 2020-02-02 NOTE — PROGRESS NOTES
"     [facility]  Psychiatry Clinic  PSYCHIATRIC PROGRESS NOTE     Date of initial Diagnostic Assessment (DA) is 10/31/17 and most recent Transfer of Care DA is 10/14/19.    CARE TEAM:  PCP- Roxy Rios    Specialty Providers- Neurology: Dr. Loyd (retiring) at [facility]   Allergy: Dr. Dale at [facility]   Therapist- Amelia SEALS at [facility] (weekly to twice weekly)       Kasandra Lau is a 33 year old female who prefers the name Kasandra & pronouns she, her, hers.      Pertinent Background:  Kasandra first experienced mental health issues in childhood and has received treatment for MDD and Seasonal Mood DO.  Her mood symptoms are worsened by chronic pain. She is currently a  at the [employer] and has requested accommodations through disability services for her mental health. A letter of support for accommodations was provided 9/12/19 (see chart) by Dr. Jose Carlos Delcid.    Psych critical item history includes suicidal ideation and trauma hx.    INTERIM HISTORY      [4, 4]   The patient reports good treatment adherence.  History was provided by the patient who was a good historian.    The last visit ended with the following med change: decrease Ativan from 0.5 TID to daily prn anxiety; increase gabapentin from 300mg TID scheduled to target anxiety and help reduce lorazepam use (already increased prior to visit on recommendation of neurologist for added migraine treatment).   Since the last visit:   - She was on time for her appt today  - She states \"I'm doing ok\" - reports experiencing ongoing anxiety and depression  - She finds the gabapentin partially helpful for anxiety, but is still taking lorazepam frequently - reviewed the short and long term risks of benzodiazepines and need to reduce her use further which she expressed an understanding of  - Discussed increasing Viibryd to help with the ongoing anxiety and she is " open to this  - She reports that she has gained 13lbs in the last month and has felt significantly more fatigued so she thinks she may have developed another large ovarian cyst (has a history of endometriosis) - per vitals today she has gained 8lbs since December - she had an ultrasound last week and will be seeing an OB/Gyn this week through [facility]    - She started seeing 2 physical therapists 2 weeks ago - one for chronic pain and one for pelvic floor and this has been helpful so far  - Her therapist is switching to a new position that does not involve clinical care so she needs to find a new therapist - is currently looking at therapists who focus on mind-body approaches due to her multiple chronic medical issues and highly somatic symptomology  - She has been struggling with sleep the past few months and reports frequently waking up early in the morning around 4 or 5am no matter what time she goes to sleep - she has seen a sleep specialist in the past, but for concern of NIKOLAI, but was found to have a normal sleep study  - She is currently on gabapentin and this is not helping the issue - has tried melatonin including high doses up to 20mg; it was initially effective at lower doses, but stopped working   - She has used a light box previously, but it did not help for mood and actually negatively affected her migraines  - She uses several herbal supplements documented below for her mental and overall health - she is interested in learning more about herbal and non-medication based treatments for her mental health - is open to ttrying acupuncture again, but had a bad experience with it when she tried it before because of a negative rapport with the practitioner  - She endorses ongoing intermittent passive SI, but denies any intent or plan to harm herself    RECENT PSYCH ROS:   Depression:  suicidal ideation, depressed mood, hypersomnia, feeling hopeless, feeling trapped and overwhelmed  Elevated:   none  Psychosis:  none  Anxiety:  excessive worry and nervous/overwhelmed  Panic Attack:  none  Trauma Related:  none  Dysregulation:  none  Eating Disorder: none     Pertinent Negative Symptoms:  NO self-injurious behavior/urges, psychosis and saulo    RECENT SUBSTANCE USE:     ALCOHOL- Social drinking, occassionally a glass or two of wine at home 1-2 nights a week.      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   EDUCATION- [employer], studying for PhD in Human Geography.  PARTNER/- , currently single.  CHILDREN- none      LIVING SITUATION- Lives alone in an apartment in [place] with her cat and 2 dogs.      SOCIAL/ SPIRITUAL SUPPORT- Friends through the university.     FEELS SAFE AT HOME- yes     PSYCH and CD Critical Summary Points since July 2019          10/14/19- resident transition; increase Viibryd from 20mg to 30mg daily to target worsening depression/anxiety (did not occur after last visit due to unclear insurance/pharmacy issues); continue Ativan 0.5 TID prn anxiety - patient to try to reduce use to 2 doses daily (total of 1mg); increase gabapentin from 100mg to 200mg TID scheduled to target anxiety and help reduce lorazepam use  12/13/19- decrease lorazepam to 0.5mg daily PRN, increase gabapentin to 300mg tid for anxiety; provided her w/ letter for reasonable accommodations for graduate program  2/3/20- increased Viibryd from 30mg to 40mg to target ongoing anxiety; referral for acupuncture    PAST MED TRIALS        See last Diagnostic Assessment  MEDICAL / SURGICAL HISTORY          Pregnant or breastfeeding- no      Contraception- Nuvaring    Patient Active Problem List   Diagnosis     Pelvic pain in female     Vitamin D deficiency     Menorrhagia with regular cycle     Migraine  without aura and without status migrainosus, not intractable     Iron deficiency anemia due to chronic blood loss     Tired     Circadian rhythm sleep disorder, delayed sleep phase type     Unhealthy sleep habit     Seasonal mood disorder (H)       Major Surgery- Endometrial cystectomy, L wrist fracture repair    MEDICAL REVIEW OF SYSTEMS    [2, 10]     A comprehensive review of systems was performed and is negative other than noted in the HPI.    ALLERGY       Dust mites and Cats     MEDICATIONS        Current Outpatient Medications   Medication Sig Dispense Refill     Acetaminophen (TYLENOL PO) Take 650 mg by mouth every 4 hours as needed for mild pain or fever       albuterol (PROVENTIL HFA) 108 (90 Base) MCG/ACT inhaler Inhale 1-2 puffs into the lungs every 4 hours as needed        almotriptan (AXERT) 12.5 MG tablet        ASMANEX, 30 METERED DOSES, 220 MCG/INH inhaler INHALE 1 PUFF BY MOUTH DAILY. RINSE MOUTH OR GARGLE AFTER USE  6     Azelastine HCl 137 MCG/SPRAY SOLN        buPROPion (WELLBUTRIN XL) 300 MG 24 hr tablet Take 1 tablet (300 mg) by mouth every morning 30 tablet 0     etonogestrel-ethinyl estradiol (NUVARING) 0.12-0.015 MG/24HR vaginal ring Place 1 each vaginally every 28 days 3 each 3     gabapentin (NEURONTIN) 300 MG capsule        gabapentin (NEURONTIN) 400 MG capsule Take 1 capsule (400 mg) by mouth 3 times daily 90 capsule 0     LORazepam (ATIVAN) 0.5 MG tablet Take 1 tablet (0.5 mg) by mouth daily as needed for anxiety 15 tablet 0     loteprednol (LOTEMAX) 0.5 % ophthalmic suspension Place 1-2 drops into both eyes 3 times daily 1 Bottle 1     neomycin-polymyxin-dexamethasone (MAXITROL) 3.5-84774-5.1 ophthalmic ointment Place 0.5 inches into both eyes At Bedtime 1 Tube 3     olopatadine (PATANOL) 0.1 % ophthalmic solution Place 1 drop into both eyes 2 times daily 1 Bottle 11     topiramate (TOPAMAX) 100 MG tablet Take 1 tablet (100 mg) by mouth 2 times daily 120 tablet 3     topiramate  (TOPAMAX) 100 MG tablet Take 1 tablet (100 mg) by mouth daily (Patient taking differently: Take 200 mg by mouth daily ) 90 tablet 3     VIIBRYD 10 MG TABS tablet TAKE 1 TABLET (10 MG) BY MOUTH DAILY WITH 20MG TAB FOR TOTAL DAILY DOSE OF 30MG. 30 tablet 0     vilazodone (VIIBRYD) 20 MG TABS tablet Take 1 tablet (20 mg) by mouth daily 1.5 tabs po daily 30 tablet 0     order for DME Use for 15-30 minutes every morning. 1 Units 0     VITALS      [3, 3]   /79   Pulse 90   Wt 77.6 kg (171 lb)   BMI 26.00 kg/m       MENTAL STATUS EXAM      [9, 14 cog gs]     Alertness: alert  and oriented  Appearance: well groomed, wearing lipstick  Behavior/Demeanor: cooperative, pleasant and calm, with good  eye contact   Speech: regular rate and rhythm, quiet  Language: intact  Psychomotor: fidgety  Mood: depressed and anxious  Affect: blunted; was congruent to mood; was congruent to content  Thought Process/Associations: unremarkable  Thought Content:  Reports none;  Denies suicidal ideation  Perception:  Reports none;  Denies auditory hallucinations  Insight: good  Judgment: good  Cognition: does  appear grossly intact; formal cognitive testing was not done  Gait and Station: unremarkable    LABS and DATA     AIMS:  not needed    PHQ9 TODAY = 9  PHQ-9 SCORE 8/28/2019 10/14/2019 1/15/2020   PHQ-9 Total Score 8 12 7       Recent Labs   Lab Test 08/02/19  1711 10/12/18  1532 10/05/17  1133   CR 0.80 0.68 0.75   GFRESTIMATED >90 >90 >90     Recent Labs   Lab Test 08/02/19  1711 10/12/18  1532 10/05/17  1133   AST 20 11 12   ALT 25 22 22   ALKPHOS 76 65 57       PSYCHOTROPIC DRUG INTERACTIONS   LORAZEPAM + GABAPENTIN may increase risk of CNS depression.    VILAZODONE + BUPROPION may increase risk of seizure and serotonin syndrome.     MANAGEMENT:  Monitoring for adverse effects and patient is aware of risks    RISK STATEMENT for SAFETY     Kasandra Lau did not appear to be an imminent safety risk to self or others.     Today Kasandra  Judi reports recent passive SI, but no intent or specific planning. She has notable risk factors for self-harm, including single status, anxiety, comorbid medical condition of chronic migraines, some social isolation, and ongoing psychosocial stressors. However, risk is mitigated by absence of past attempts, ability to volunteer a safety plan and lack of intent/planning. Therefore, based on all available evidence including the factors cited above, she does not appear to be at imminent risk for self-harm, does not meet criteria for a 72-hr hold, and therefore remains appropriate for ongoing outpatient level of care. Additional steps taken to mitigate risk include close follow up, medication changes, and regular psychotherapy visits. In the event of active planning or intent the patient agrees to call a crisis line, call 911, or go to the nearest ER.     PSYCHIATRIC DIAGNOSIS     Major Depressive Disorder, recurrent, mild   Seasonal Affective Disorder  Generalized Anxiety Disorder  R/o Attention Deficit Disorder    ASSESSMENT      [m2, h3]     TODAY Kasandra continues to struggle with both depression and high anxiety related to ongoing psychosocial stressors and chronic medical issues. She is open to increasing Viibryd today to address these ongoing symptoms. We discussed need to continue reducing her use of lorazepam and revisited the serious short and long term effects of this medication. She is understanding of the risks and in agreement with the plan to continue to reduce use. She was receiving 15 pills a month and so will reduce to 10 pills a month moving forward with a goal to use only 5 pills a month for very severe anxiety. She is also interested in herbal and non-medication based treatments for her mental health so provided her with a referral for acupuncture.     Future considerations:  - Optimize gabapentin and vilazodone to target anxiety; then taper lorazepam due to risks of long term use     PLAN       [m2, h3]     1) PSYCHOTROPIC MEDICATIONS:  - Continue Wellbutrin XL 300mg daily  - Increase Viibryd from 30mg ro 40mg daily for ongoing anxiety  - Continue Ativan from 0.5 daily prn anxiety (refill reduced from 15 to 10 pills per month)  - Continue gabapentin 400mg TID to target anxiety and help reduce lorazepam use     Supplements:  - Continue Ashwaganda daily   - Continue L-theanine 20mg daily; consider increasing dose - can take up to 400mg daily   - Continue fish oil 1g daily     Per Neurology:  - Continue Topiramate for migraines    2) [place] today:  indicates she only receives lorazepam refills through this clinic..    3) THERAPY:    - Continue individual therapy; pt currently looking into finding a new therapist    4) NEXT DUE:    Labs- N/A  EKG- N/A  Rating Scales- PHQ9 at every visit    5) REFERRALS:    - Acupuncture with Dr. Marky Yepez at [facility]     6) RTC: in 6 weeks    7) CRISIS NUMBERS:   Provided routinely in AVS   ONLY if a EYAD PT: [facility] [phone] (clinic), [phone] (after hours)     TREATMENT RISK STATEMENT:  The risks, benefits, alternatives and potential adverse effects have been discussed and are understood by the pt. The pt understands the risks of using street drugs or alcohol. There are no medical contraindications, the pt agrees to treatment with the ability to do so. The pt knows to call the clinic for any problems or to access emergency care if needed.  Medical and substance use concerns are documented above.  Psychotropic drug interaction check was done, including changes made today.    PROVIDER: Melina Gamino MD    Patient not staffed in clinic.  Note will be reviewed and signed by supervisor Dr. Junior.  I did not see this patient directly. I have reviewed the documentation and I agree with the resident's plan of care.     Laurence Junior MD

## 2020-02-03 ENCOUNTER — OFFICE VISIT (OUTPATIENT)
Dept: PSYCHIATRY | Facility: CLINIC | Age: 35
End: 2020-02-03
Attending: PSYCHIATRY & NEUROLOGY
Payer: COMMERCIAL

## 2020-02-03 VITALS
WEIGHT: 171 LBS | BODY MASS INDEX: 26 KG/M2 | SYSTOLIC BLOOD PRESSURE: 116 MMHG | DIASTOLIC BLOOD PRESSURE: 79 MMHG | HEART RATE: 90 BPM

## 2020-02-03 DIAGNOSIS — F33.1 MAJOR DEPRESSIVE DISORDER, RECURRENT EPISODE, MODERATE (H): ICD-10-CM

## 2020-02-03 DIAGNOSIS — F33.1 MODERATE EPISODE OF RECURRENT MAJOR DEPRESSIVE DISORDER (H): ICD-10-CM

## 2020-02-03 DIAGNOSIS — F41.9 ANXIETY: ICD-10-CM

## 2020-02-03 PROCEDURE — G0463 HOSPITAL OUTPT CLINIC VISIT: HCPCS | Mod: ZF

## 2020-02-03 RX ORDER — BUPROPION HYDROCHLORIDE 300 MG/1
300 TABLET ORAL EVERY MORNING
Qty: 30 TABLET | Refills: 2 | Status: SHIPPED | OUTPATIENT
Start: 2020-02-03 | End: 2020-04-27

## 2020-02-03 RX ORDER — VILAZODONE HYDROCHLORIDE 40 MG/1
40 TABLET ORAL DAILY
Qty: 30 TABLET | Refills: 2 | Status: SHIPPED | OUTPATIENT
Start: 2020-02-03 | End: 2020-04-27

## 2020-02-03 RX ORDER — LORAZEPAM 0.5 MG/1
0.5 TABLET ORAL DAILY PRN
Qty: 10 TABLET | Refills: 0 | Status: SHIPPED | OUTPATIENT
Start: 2020-02-13 | End: 2021-07-29

## 2020-02-03 RX ORDER — GABAPENTIN 400 MG/1
400 CAPSULE ORAL 3 TIMES DAILY
Qty: 90 CAPSULE | Refills: 2 | Status: SHIPPED | OUTPATIENT
Start: 2020-02-03 | End: 2020-04-27

## 2020-02-03 ASSESSMENT — PAIN SCALES - GENERAL: PAINLEVEL: NO PAIN (0)

## 2020-02-03 NOTE — PATIENT INSTRUCTIONS
Thank you for coming to the PSYCHIATRY CLINIC.    Lab Testing:  If you had lab testing today and your results are reassuring or normal they will be mailed to you or sent through [employer] within 7 days.   If the lab tests need quick action we will call you with the results.  The phone number we will call with results is # [phone] (home) . If this is not the best number please call our clinic and change the number.    Medication Refills:  If you need any refills please call your pharmacy and they will contact us. Our fax number for refills is [phone]. Please allow three business for refill processing.   If you need to  your refill at a new pharmacy, please contact the new pharmacy directly. The new pharmacy will help you get your medications transferred.     Scheduling:  If you have any concerns about today's visit or wish to schedule another appointment please call our office during normal business hours [phone] (8-5:00 M-F)    Contact Us:  Please call [phone] during business hours (8-5:00 M-F).  If after clinic hours, or on the weekend, please call  [phone].    Financial Assistance [phone]  [facility] Billing [phone]  Central Billing Office, [facility]: [phone]  [facility] Billing [phone]  Medical Records [phone]      MENTAL HEALTH CRISIS NUMBERS:  [facility]:   [facility] - [phone]   Crisis Residence [facility] - [phone]   Walk-In Counseling [facility] [phone]   COPE 24/7 [facility] Mobile Team for Adults - [[phone]]; Child - [[phone]]        [facility]:   [facility] - [phone]   Walk-in counseling [facility] - [phone]   Walk-in counseling [facility] - [phone]   Crisis Residence [facility] - [phone]   Urgent Care Adult Mental Health:   --Drop-in, 24/7 crisis line, and [facility] [place] Mobile Team  [[phone]]    CRISIS TEXT LINE: Text [phone] from anywhere, anytime, any crisis 24/7;    OR SEE www.crisistextline.org     National Suicide Prevention Lifeline: 673-659-ONZX (028-008-7792)    Poison Control Center - 4-107-351-1136    CHILD: [facility] needs assessment team - [phone]     [facility] Lifeline - 1-894.695.2680; or [facility] Lifeline - 1-798.957.5885    If you have a medical emergency please call 911or go to the nearest ER.                    _____________________________________________    Again thank you for choosing PSYCHIATRY CLINIC and please let us know how we can best partner with you to improve you and your family's health.  You may be receiving a survey regarding this appointment. We would love to have your feedback, both positive and negative. The survey is done by an external company, so your answers are anonymous.

## 2020-02-04 ENCOUNTER — TELEPHONE (OUTPATIENT)
Dept: BEHAVIORAL HEALTH | Facility: CLINIC | Age: 35
End: 2020-02-04

## 2020-02-12 ENCOUNTER — OFFICE VISIT (OUTPATIENT)
Dept: FAMILY MEDICINE | Facility: CLINIC | Age: 35
End: 2020-02-12
Attending: FAMILY MEDICINE
Payer: COMMERCIAL

## 2020-02-12 VITALS
SYSTOLIC BLOOD PRESSURE: 111 MMHG | WEIGHT: 174 LBS | BODY MASS INDEX: 26.46 KG/M2 | DIASTOLIC BLOOD PRESSURE: 75 MMHG | HEART RATE: 105 BPM

## 2020-02-12 DIAGNOSIS — K58.1 IRRITABLE BOWEL SYNDROME WITH CONSTIPATION: Primary | ICD-10-CM

## 2020-02-12 PROCEDURE — G0463 HOSPITAL OUTPT CLINIC VISIT: HCPCS | Mod: ZF

## 2020-02-12 RX ORDER — DICYCLOMINE HCL 20 MG
20 TABLET ORAL 4 TIMES DAILY PRN
Qty: 30 TABLET | Refills: 1 | Status: SHIPPED | OUTPATIENT
Start: 2020-02-12 | End: 2020-05-06

## 2020-02-12 ASSESSMENT — PAIN SCALES - GENERAL: PAINLEVEL: MILD PAIN (2)

## 2020-02-12 ASSESSMENT — ANXIETY QUESTIONNAIRES
3. WORRYING TOO MUCH ABOUT DIFFERENT THINGS: MORE THAN HALF THE DAYS
6. BECOMING EASILY ANNOYED OR IRRITABLE: MORE THAN HALF THE DAYS
1. FEELING NERVOUS, ANXIOUS, OR ON EDGE: MORE THAN HALF THE DAYS
GAD7 TOTAL SCORE: 9
2. NOT BEING ABLE TO STOP OR CONTROL WORRYING: SEVERAL DAYS
5. BEING SO RESTLESS THAT IT IS HARD TO SIT STILL: NOT AT ALL
7. FEELING AFRAID AS IF SOMETHING AWFUL MIGHT HAPPEN: SEVERAL DAYS

## 2020-02-12 ASSESSMENT — PATIENT HEALTH QUESTIONNAIRE - PHQ9
5. POOR APPETITE OR OVEREATING: SEVERAL DAYS
SUM OF ALL RESPONSES TO PHQ QUESTIONS 1-9: 8

## 2020-02-12 NOTE — PROGRESS NOTES
HPI  Pt here for follow-up chronic  Fatigue, and IBS    She has started with general PT and pelvic PT for both fatigue and IBS, and is finding them helpful  Helping with overall body aches  Her regular therapist has left, and interested in mind body therapy  IBS symptoms significant lately    1.  IBS x years, with increased bloating x 1 month. Constipation predominant.  Saw GI specialist at  in [place] recently, had XRay showing only constipation. . Started on Miralax 3x/day, and fiber supplement daily. (started 2/10/2020). Had 1 bm, which has helped but still bloating. Phone check in 3 weeks to see above. Cramping has prevented from doing more activity.     2. Weight gain. Has gained about 18 lbs since 10/2019, and feels like weight is all around the middle/abdomen area. Never weighed this much before. Reviewed medication history with patient  --October 2019: started increasing gabapentin  -Viibryd dose 30 mg daily until last week, when increased to 40 mg  --Started omeprazole around this time  --No major changes in diet or activity    Had started gabapentin at 100 mg tid--now 400 mg tid.  No edema  Has kidney stone as incidental finding on Xray    Current other medical problems:  History of Endometriosis--recent normal ultrasound  Migraines--start with new prophylaxis for migraines, self  Administered, prescribed by allergist. Also on 200 mg topmax,  Allergies, enviromental, no asthma    ROS  As noted above    Physical Exam  Blood pressure 111/75, pulse 105, weight 78.9 kg (174 lb), last menstrual period 02/01/2020, not currently breastfeeding.  Alert, NAD  Speech RRR, mood is anxious, no psychomotor changes, thought process is appropriate, Affect is normal. No evidence of suicidality.     Labs from 2/10/2020, 2/3/2020 outside providers reviewed  Reviewed GI note from 2/201/2020   A/P  1. IBS with constipation  Recommend continue work with gastroenterology and follow protocol  Discussed medication options to  give relief from cramping/bloating, and will do trial of Bentyl prn, reviewed potential side effects  Continue with PT    2. Weight gain  3. MDD and anxiety  Weight gain likely multifactorial, but given timing, gabapentin may be a factor. Will coordinate with psychiatry to see if other alternatives.  Recommend to continue to increase activity and movement, continue PT  Topamax may assist with weight loss    Names of Mind body, sensorimotor therapists given    Follow-up 2-3 months    Total time spent face to face with the patient was 40 minutes. More than 50% of the time spent with the patient involved counseling and/or coordinating care.on the issues documented above. Other items discussed included, but were not limited to, prognosis, differential diagnosis, risks/benefits of treatment, instructions regarding medication and importance of compliance, risk reduction, as well as discussion and coordination of care with other health care providers and patient s care giver.

## 2020-02-12 NOTE — PATIENT INSTRUCTIONS
[facility]  --Genesis Culp and others--    Sensorimotor therapy  --hunter Sanchez [phone]  --[facility] [phone]    Sary Sutton  [facility]  [address] [facility]    Mack Lockhart at [facility] [phone]     [facility]--

## 2020-02-12 NOTE — LETTER
2/12/2020       RE: Kasandra Lau  [address]  [place] [address]     Dear Colleague,    Thank you for referring your patient, Kasandra Lau, to the [facility] at [facility]. Please see a copy of my visit note below.    HPI  Pt here for follow-up chronic  Fatigue, and IBS    She has started with general PT and pelvic PT for both fatigue and IBS, and is finding them helpful  Helping with overall body aches  Her regular therapist has left, and interested in mind body therapy  IBS symptoms significant lately    1.  IBS x years, with increased bloating x 1 month. Constipation predominant.  Saw GI specialist at  in [place] recently, had XRay showing only constipation. . Started on Miralax 3x/day, and fiber supplement daily. (started 2/10/2020). Had 1 bm, which has helped but still bloating. Phone check in 3 weeks to see above. Cramping has prevented from doing more activity.     2. Weight gain. Has gained about 18 lbs since 10/2019, and feels like weight is all around the middle/abdomen area. Never weighed this much before. Reviewed medication history with patient  --October 2019: started increasing gabapentin  -Viibryd dose 30 mg daily until last week, when increased to 40 mg  --Started omeprazole around this time  --No major changes in diet or activity    Had started gabapentin at 100 mg tid--now 400 mg tid.  No edema  Has kidney stone as incidental finding on Xray    Current other medical problems:  History of Endometriosis--recent normal ultrasound  Migraines--start with new prophylaxis for migraines, self  Administered, prescribed by allergist. Also on 200 mg topmax,  Allergies, enviromental, no asthma    ROS  As noted above    Physical Exam  Blood pressure 111/75, pulse 105, weight 78.9 kg (174 lb), last menstrual period 02/01/2020, not currently breastfeeding.  Alert, NAD  Speech RRR, mood is anxious, no psychomotor changes, thought process is  appropriate, Affect is normal. No evidence of suicidality.     Labs from 2/10/2020, 2/3/2020 outside providers reviewed  Reviewed GI note from 2/201/2020   A/P  1. IBS with constipation  Recommend continue work with gastroenterology and follow protocol  Discussed medication options to give relief from cramping/bloating, and will do trial of Bentyl prn, reviewed potential side effects  Continue with PT    2. Weight gain  3. MDD and anxiety  Weight gain likely multifactorial, but given timing, gabapentin may be a factor. Will coordinate with psychiatry to see if other alternatives.  Recommend to continue to increase activity and movement, continue PT  Topamax may assist with weight loss    Names of Mind body, sensorimotor therapists given    Follow-up 2-3 months    Total time spent face to face with the patient was 40 minutes. More than 50% of the time spent with the patient involved counseling and/or coordinating care.on the issues documented above. Other items discussed included, but were not limited to, prognosis, differential diagnosis, risks/benefits of treatment, instructions regarding medication and importance of compliance, risk reduction, as well as discussion and coordination of care with other health care providers and patient s care giver.      Again, thank you for allowing me to participate in the care of your patient.      Sincerely,    Baljinder Sesay MD

## 2020-02-12 NOTE — Clinical Note
Pt. Has significant concern about ongoing weight gain, which may be partially related to gabapentin. Other alternatives? Beta blocker? Very low dose zyprexa or abilify? (patient is still very anxious and somatically focused, despite current meds.)

## 2020-02-13 ASSESSMENT — ANXIETY QUESTIONNAIRES: GAD7 TOTAL SCORE: 9

## 2020-02-24 ENCOUNTER — TELEPHONE (OUTPATIENT)
Dept: PSYCHIATRY | Facility: CLINIC | Age: 35
End: 2020-02-24

## 2020-02-24 NOTE — TELEPHONE ENCOUNTER
[facility] Call Center    Phone Message    May a detailed message be left on voicemail: yes     Reason for Call: Other: Pt wanted to discuss taking an alternative to gabapentin due to weight gain, and also wanted to discuss finding a medication that will help her with her focus.     Action Taken: Other: [facility]    Travel Screening: Not Applicable

## 2020-02-24 NOTE — TELEPHONE ENCOUNTER
Writer left voice mail message for pt, requesting callback. Writer prompted pt to request to speak to another nurse if writer was not available.

## 2020-02-26 NOTE — TELEPHONE ENCOUNTER
Muhumed, Yasmin Snyder, David J, RN             Lois Ellison,     I got a call from a pt of Dr. Gamino and she said she is returning a call and to give her a call back when you get the chance.     Thanks,     Sarah Bazan left voice mail message for pt, requesting callback. Writer prompted pt to request to speak to another nurse if writer was not available.        Writer received call from pt, who identified that she is working with a provider in women's specialty clinic for pain and fatigue and they are wondering if gabapentin might be contributing to pt's weight gain.    Pt identified having difficulty focusing in last month or so, and struggling to get things done. She has not been on a medication for this in the past.    Pt reported that she is OK with waiting until Stevenson is back next week and that using [employer] for communication works well for her.    Routed to provider.

## 2020-03-10 ENCOUNTER — TELEPHONE (OUTPATIENT)
Dept: OPTOMETRY | Facility: CLINIC | Age: 35
End: 2020-03-10

## 2020-03-10 ENCOUNTER — HEALTH MAINTENANCE LETTER (OUTPATIENT)
Age: 35
End: 2020-03-10

## 2020-03-10 NOTE — TELEPHONE ENCOUNTER
[facility] Call Center    Phone Message    May a detailed message be left on voicemail: yes     Reason for Call: Other: Pt is requesting her eye glass Rx but is also needing her pupillary distance, expiration date, and Dr. Wylie's signture sent to her home address please.       Action Taken: Message routed to:  [facility]    Travel Screening: Not Applicable

## 2020-03-13 ENCOUNTER — VIRTUAL VISIT (OUTPATIENT)
Dept: FAMILY MEDICINE | Facility: OTHER | Age: 35
End: 2020-03-13

## 2020-03-13 NOTE — PROGRESS NOTES
"Date: 2020 20:20:27  Clinician: Cristy Stevens  Clinician NPI: [id number]  Patient: Kasandra Lau  Patient : 1985  Patient Address: [address] [place] [address]  Patient Phone: [phone]  Visit Protocol: URI  Patient Summary:  Kasandra is a 34 year old ( : 1985 ) female who initiated a Visit for COVID-19 (Coronavirus) evaluation and screening. When asked the question \"Please sign me up to receive news, health information and promotions from [employer].\", Kasandra responded \"No\".    Kasandra states her symptoms started suddenly 3-6 days ago.   Her symptoms consist of malaise, a sore throat, a headache, enlarged lymph nodes, chills, facial pain or pressure, and myalgia. She is experiencing mild difficulty breathing with activities but can speak normally in full sentences. Kasandra also feels feverish but was unable to measure her temperature.   Symptom details     Sore throat: Kasanrda reports having mild throat pain (1-3 on a 10 point pain scale), does not have exudate on her tonsils, and can swallow liquids. The lymph nodes in her neck are enlarged. A rash has not appeared on the skin since the sore throat started.     Facial pain or pressure: The facial pain or pressure does not feel worse when bending or leaning forward.     Headache: She states the headache is moderate (4-6 on a 10 point pain scale).      Kasandra denies having rhinitis, wheezing, ear pain, cough, nasal congestion, and teeth pain. She also denies double sickening (worsening symptoms after initial improvement), taking antibiotic medication for the symptoms, having recent facial or sinus surgery in the past 60 days, and having a sinus infection within the past year.   Precipitating events  Within the past week, Kasandra has not been exposed to someone with strep throat. She has not recently been exposed to someone with influenza. Kasandra has not been in close contact with any high risk individuals.   Pertinent COVID-19 (Coronavirus) information  " Kasandra has not traveled internationally in the last 14 days before the start of her symptoms.   Kasandra has not had close contact with a laboratory confirmed positive COVID-19 patient within 14 days of symptom onset.   Pertinent medical history  Kasandra does not get yeast infections when she takes antibiotics.   Kasandra does not need a return to work/school note.   Weight: 164 lbs   Kasandra does not smoke or use smokeless tobacco.   She denies pregnancy and denies breastfeeding. She is currently menstruating.   Weight: 164 lbs    MEDICATIONS: Pepcid oral, omeprazole oral, Allegra-D 24 Hour oral, Xyzal oral, lorazepam oral, azelastine nasal, Flonase Allergy Relief nasal, Emgality Pen subcutaneous, NuvaRing vaginal, Topamax oral, gabapentin oral, Wellbutrin XL oral, Viibryd oral, ALLERGIES: NKDA  Clinician Response:  Dear Kasandra,  Based on the information provided, you have a viral upper respiratory infection, otherwise known as a cold. Symptoms vary from person to person, but can include sneezing, coughing, a runny nose, sore throat, and headache and range from mild to severe.  Unfortunately, there are no medications that can cure a cold, so treatment is focused on controlling symptoms as much as possible. Most people gradually feel better until symptoms are gone in 1-2 weeks.  Medication information  Because you have a viral infection, antibiotics will not help you get better. Treating a viral infection with antibiotics could actually make you feel worse.  Unless you are allergic to the over-the-counter medication(s) below, I recommend using:     A decongestant such as Sudafed PE or store brand.      Guaifenesin + dextromethorphan (Robitussin DM, Mucinex DM, or store brand).     Over-the-counter medications do not require a prescription. Ask the pharmacist if you have any questions.  Self care  The following tips will keep you as comfortable as possible while you recover:     Rest    Drink plenty of water and other liquids    Take  a hot shower to loosen congestion    Use throat lozenges    Gargle with warm salt water (1/4 teaspoon of salt per 8 ounce glass of water)    Suck on frozen items such as popsicles or ice cubes    Drink hot tea with lemon and honey     When to seek care  Please be seen in a clinic or urgent care if new symptoms develop, or symptoms become worse.  Call 911 or go to the emergency room if you feel that your throat is closing off, you suddenly develop a rash, you are unable to swallow fluids, you are drooling, or you are having difficulty breathing.  Additional treatment plan   Dear Kasandra,  Based on the information you have provided, it does not appear you need Coronavirus (COVID-19) testing.   At this time, we recommend testing primarily for those people who have symptoms of cough and fever and have either traveled to a known area of infection or have been exposed to someone with laboratory confirmed Coronavirus by close contact.   Coronavirus - General Information:   The coronavirus infection starts within 14 days of an exposure.  Symptoms are those of a respiratory infection (such as fever, cough).   If you have not had symptoms by day 15, you should be considered uninfected by coronavirus.   Coronavirus - Symptoms:    The coronavirus can cause a respiratory illness, such as bronchitis or pneumonia.  The most common symptoms are: cough, fever, and shortness of breath.   Other symptoms are: body aches, chills, diarrhea, fatigue, headache, runny nose, and sore throat   Coronavirus - Exposure Risk Factors:   Exposure to a person who has been diagnosed with coronavirus.  Travel from an area with recent local transmission of coronavirus.  The CDC (www.cdc.gov) has the most up-to-date list of where the coronavirus outbreak is occurring.   Coronavirus - Spreading:    The virus likely spreads through respiratory droplets produced when a person coughs or sneezes. These respiratory droplets can travel approximately 6 feet and  can remain on surfaces. Common disinfectants will kill the virus.  The CDC currently does not recommend healthy people wear masks.   Coronavirus - Protect Yourself:    Avoid close contact with people known to have this new coronavirus infection.  Wash hands often with soap and water or alcohol-based hand .  Avoid touching the eyes, nose or mouth.   Thank you for limiting contact with others, wearing a simple mask to cover your cough, practice good hand hygiene habits and accessing our virtual services where possible to limit the spread of this virus.  For more information about COVID19 and options for caring for yourself at home, please visit the CDC website at https://www.cdc.gov/coronavirus/2019-ncov/about/steps-when-sick.html   For more options for care at [facility], please visit our website at [email or web address]     Diagnosis: Cough  Diagnosis ICD: R05

## 2020-03-15 NOTE — PROGRESS NOTES
"     [facility]  Psychiatry Clinic  PSYCHIATRIC PROGRESS NOTE     Date of initial Diagnostic Assessment (DA) is 10/31/17 and most recent Transfer of Care DA is 10/14/19.    CARE TEAM:  PCP- Roxy Rios    Specialty Providers- Neurology: Dr. Loyd (retiring) at [facility]   Allergy: Dr. Dale at [facility]   Therapist- Amelia SEALS at [facility] (weekly to twice weekly)       Kasandra Lau is a 33 year old female who prefers the name 'Kasandra' & pronouns she, her, hers.      Pertinent Background:  Kasandra first experienced mental health issues in childhood and has received treatment for MDD and Seasonal Mood DO.  Her mood symptoms are worsened by chronic pain. She is currently a  at the [employer] and has requested accommodations through disability services for her mental health. A letter of support for accommodations was provided 9/12/19 (see chart) by Dr. Jose Carlos Delcid.    Psych critical item history includes suicidal ideation and trauma hx.    INTERIM HISTORY      [4, 4]   The patient reports good treatment adherence.  History was provided by the patient who was a good historian.    The last visit ended with the following med change: increase Viibryd from 30mg ro 40mg daily for ongoing anxiety.     This visit was conducted via telephone.    Since the last visit:   - She reports she is doing \"ok\" all things considered   - She recently saw her PCP and had gained weight - they were concerned that the gabapentin may have caused it as she started gaining weight around the time that she started taking it regularly - since then she has lost 8lbs without making any changes so she is unsure what caused the weight gain  - She would prefer to stay on the gabapentin for now as it helps with her anxiety  - She reports she has been experiencing intermittent hives which has been evaluated - she is looking into food allergies for " the cause; has had blood work done   - She is currently tapering off the Topamax under supervision of her neurologist due to her difficulty with focus and concentration and will be starting an injectable migraine medication instead (Emgality) - she is currently on a quarter of her previous dose of Topamax and  has not noticed a change in concentration thus far   - Things have gone well with increasing the Viibryd at last visit; denies side effects - feels her anxiety has improved some and she has been able to reduce her use of Ativan to 2-3 times per week   - She denies any interest in a medication change today  - She has been looking for a new therapist and has called a few people, but their wait lists are too long - offered her a referral to [facility] for now and she is agreeable to this - ideally she is looking for someone who works a lot with the mind-body connection as she has a lot of physical issues as well   - She is agreeable to telehealth visits in the future     RECENT PSYCH ROS:   Depression:  suicidal ideation, depressed mood, hypersomnia, feeling hopeless, feeling trapped and overwhelmed  Elevated:  none  Psychosis:  none  Anxiety:  excessive worry and nervous/overwhelmed  Panic Attack:  none  Trauma Related:  none  Dysregulation:  none  Eating Disorder: none     Pertinent Negative Symptoms:  NO self-injurious behavior/urges, psychosis and saulo    RECENT SUBSTANCE USE:     ALCOHOL- Social drinking, occassionally a glass or two of wine at home 1-2 nights a week.      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   EDUCATION- [employer], studying for PhD in Human Geography.  PARTNER/- , currently single.  CHILDREN- none      LIVING SITUATION- Lives alone in an  apartment in [place] with her cat and 2 dogs.      SOCIAL/ SPIRITUAL SUPPORT- Friends through the university.     FEELS SAFE AT HOME- yes     PSYCH and CD Critical Summary Points since July 2019          10/14/19- resident transition; increase Viibryd from 20mg to 30mg daily to target worsening depression/anxiety (did not occur after last visit due to unclear insurance/pharmacy issues); continue Ativan 0.5 TID prn anxiety - patient to try to reduce use to 2 doses daily (total of 1mg); increase gabapentin from 100mg to 200mg TID scheduled to target anxiety and help reduce lorazepam use  12/13/19- decrease lorazepam to 0.5mg daily PRN, increase gabapentin to 300mg tid for anxiety; provided her w/ letter for reasonable accommodations for graduate program  2/3/20- increased Viibryd from 30mg to 40mg to target ongoing anxiety; referral for acupuncture  3/16/20- therapy referral; no med changes    PAST MED TRIALS        See last Diagnostic Assessment  MEDICAL / SURGICAL HISTORY          Pregnant or breastfeeding- no      Contraception- Nuvaring    Patient Active Problem List   Diagnosis     Pelvic pain in female     Vitamin D deficiency     Menorrhagia with regular cycle     Migraine without aura and without status migrainosus, not intractable     Iron deficiency anemia due to chronic blood loss     Tired     Circadian rhythm sleep disorder, delayed sleep phase type     Unhealthy sleep habit     Seasonal mood disorder (H)       Major Surgery- Endometrial cystectomy, L wrist fracture repair    MEDICAL REVIEW OF SYSTEMS    [2, 10]     A comprehensive review of systems was performed and is negative other than noted in the HPI.    ALLERGY       Dust mites and Cats     MEDICATIONS        Current Outpatient Medications   Medication Sig Dispense Refill     Acetaminophen (TYLENOL PO) Take 650 mg by mouth every 4 hours as needed for mild pain or fever       albuterol (PROVENTIL HFA) 108 (90 Base) MCG/ACT inhaler Inhale 1-2 puffs  into the lungs every 4 hours as needed        almotriptan (AXERT) 12.5 MG tablet        ASMANEX, 30 METERED DOSES, 220 MCG/INH inhaler INHALE 1 PUFF BY MOUTH DAILY. RINSE MOUTH OR GARGLE AFTER USE  6     Azelastine HCl 137 MCG/SPRAY SOLN        buPROPion (WELLBUTRIN XL) 300 MG 24 hr tablet Take 1 tablet (300 mg) by mouth every morning 30 tablet 2     dicyclomine (BENTYL) 20 MG tablet Take 1 tablet (20 mg) by mouth 4 times daily as needed (cramping) 30 tablet 1     etonogestrel-ethinyl estradiol (NUVARING) 0.12-0.015 MG/24HR vaginal ring Place 1 each vaginally every 28 days 3 each 3     gabapentin (NEURONTIN) 400 MG capsule Take 1 capsule (400 mg) by mouth 3 times daily 90 capsule 2     LORazepam (ATIVAN) 0.5 MG tablet Take 1 tablet (0.5 mg) by mouth daily as needed for anxiety 10 tablet 0     loteprednol (LOTEMAX) 0.5 % ophthalmic suspension Place 1-2 drops into both eyes 3 times daily 1 Bottle 1     neomycin-polymyxin-dexamethasone (MAXITROL) 3.5-77099-1.1 ophthalmic ointment Place 0.5 inches into both eyes At Bedtime 1 Tube 3     olopatadine (PATANOL) 0.1 % ophthalmic solution Place 1 drop into both eyes 2 times daily 1 Bottle 11     order for DME Use for 15-30 minutes every morning. 1 Units 0     topiramate (TOPAMAX) 100 MG tablet Take 1 tablet (100 mg) by mouth 2 times daily 120 tablet 3     topiramate (TOPAMAX) 100 MG tablet Take 1 tablet (100 mg) by mouth daily (Patient taking differently: Take 200 mg by mouth daily ) 90 tablet 3     vilazodone (VIIBRYD) 40 MG TABS tablet Take 1 tablet (40 mg) by mouth daily 30 tablet 2     VITALS      [3, 3]   There were no vitals taken for this visit.     MENTAL STATUS EXAM      [9, 14 cog gs]     N/A - telephone visit    LABS and DATA     AIMS:  not needed    PHQ9 TODAY = did not complete today  PHQ-9 SCORE 10/14/2019 1/15/2020 2/12/2020   PHQ-9 Total Score 12 7 8       Recent Labs   Lab Test 08/02/19  1711 10/12/18  1532 10/05/17  1133   CR 0.80 0.68 0.75   GFRESTIMATED  >90 >90 >90     Recent Labs   Lab Test 08/02/19  1711 10/12/18  1532 10/05/17  1133   AST 20 11 12   ALT 25 22 22   ALKPHOS 76 65 57       PSYCHOTROPIC DRUG INTERACTIONS   LORAZEPAM + GABAPENTIN may increase risk of CNS depression.    VILAZODONE + BUPROPION may increase risk of seizure and serotonin syndrome.     MANAGEMENT:  Monitoring for adverse effects and patient is aware of risks    RISK STATEMENT for SAFETY     Kasandra Lau did not appear to be an imminent safety risk to self or others.     PSYCHIATRIC DIAGNOSIS     Major Depressive Disorder, recurrent, mild   Seasonal Affective Disorder  Generalized Anxiety Disorder  R/o Attention Deficit Disorder    ASSESSMENT      [m2, h3]     TODAY Kasandra reports ongoing anxiety related to acute and chronic stressors. Her symptoms are manageable with the current medication regimen and she did find the increase in Viibryd at last visit helpful. She messaged a few weeks ago with concerns that gabapentin had caused weight gain, however, has now lost 8 lbs without changing anything and so would like to continue on the medication. She continues to struggle with concentration and focus and is now tapering off topiramate with her neurologist in favor of an injectable migraine medication. She is using the lorazepam much less than she was several months ago, although, will need to continue tapering use over time. She is having some medical issues right now that are being worked up including chronic fatigue and hives. She is no longer seeing her therapist and has had trouble finding a new one. Did offer a referral to [facility] today and she accepted.     Future considerations:  - Optimize gabapentin and vilazodone to target anxiety; then taper lorazepam due to risks of long term use     PLAN      [m2, h3]     1) PSYCHOTROPIC MEDICATIONS:  - Continue bupropion XL 300mg daily  - Continue vilazodone 40mg daily for ongoing anxiety  - Continue lorazepam 0.5 daily prn  anxiety (pt provided with 10 pills per month)  - Continue gabapentin 400mg TID to target anxiety and help reduce lorazepam use     Supplements:  - Continue Ashwaganda daily   - Continue L-theanine 20mg daily; consider increasing dose - can take up to 400mg daily   - Continue fish oil 1g daily     Per Neurology:  - Currently tapering topiramate for migraines due to difficulties w/ memory and concentration    2) [place]  was [place] today:  indicates she only receives lorazepam refills through this clinic..    3) THERAPY:    - Recommended; pt currently looking into finding a new therapist - see referrals below    4) OTHER:   - Acupuncture w/ Dr. Marky Yepez - first appt scheduled 4/14/20; will discuss supplements that that visit as well    4) NEXT DUE:    Labs- N/A  EKG- N/A  Rating Scales- PHQ9 at every visit    5) REFERRALS:    Therapy- [facility] referral placed today     6) RTC: in 6 weeks via telehealth    7) CRISIS NUMBERS:   Provided routinely in AVS   ONLY if a [place] PT: [facility] [place] [phone] (clinic), [phone] (after hours)     TREATMENT RISK STATEMENT:  The risks, benefits, alternatives and potential adverse effects have been discussed and are understood by the pt. The pt understands the risks of using street drugs or alcohol. There are no medical contraindications, the pt agrees to treatment with the ability to do so. The pt knows to call the clinic for any problems or to access emergency care if needed.  Medical and substance use concerns are documented above.  Psychotropic drug interaction check was done, including changes made today.    PROVIDER: Melina Gamino MD    Patient not staffed in clinic.  Note will be reviewed and signed by supervisor Dr. Paz.  I did not see this patient directly. I have reviewed the documentation and I agree with the resident's plan of care.     Laurence Junior MD

## 2020-03-16 ENCOUNTER — VIRTUAL VISIT (OUTPATIENT)
Dept: PSYCHIATRY | Facility: CLINIC | Age: 35
End: 2020-03-16
Attending: PSYCHIATRY & NEUROLOGY
Payer: COMMERCIAL

## 2020-03-16 DIAGNOSIS — F41.9 ANXIETY: Primary | ICD-10-CM

## 2020-03-16 NOTE — TELEPHONE ENCOUNTER
I spoke to the patient who requests her eyeglass prescription.  The eyeglass prescription was sent to the patient.

## 2020-03-23 DIAGNOSIS — F41.1 GENERALIZED ANXIETY DISORDER: Primary | ICD-10-CM

## 2020-03-27 ENCOUNTER — TELEPHONE (OUTPATIENT)
Dept: BEHAVIORAL HEALTH | Facility: CLINIC | Age: 35
End: 2020-03-27

## 2020-04-14 ENCOUNTER — VIRTUAL VISIT (OUTPATIENT)
Dept: NEUROLOGY | Facility: CLINIC | Age: 35
End: 2020-04-14
Attending: PSYCHIATRY & NEUROLOGY
Payer: COMMERCIAL

## 2020-04-14 DIAGNOSIS — G43.709 CHRONIC MIGRAINE WITHOUT AURA WITHOUT STATUS MIGRAINOSUS, NOT INTRACTABLE: Primary | ICD-10-CM

## 2020-04-14 RX ORDER — ALMOTRIPTAN 12.5 MG/1
12.5 TABLET, FILM COATED ORAL DAILY PRN
Qty: 18 TABLET | Refills: 11 | Status: SHIPPED | OUTPATIENT
Start: 2020-04-14 | End: 2020-08-25

## 2020-04-14 RX ORDER — GALCANEZUMAB 120 MG/ML
INJECTION, SOLUTION SUBCUTANEOUS
COMMUNITY
Start: 2020-03-19 | End: 2020-04-14

## 2020-04-14 RX ORDER — GALCANEZUMAB 120 MG/ML
120 INJECTION, SOLUTION SUBCUTANEOUS
Qty: 1 PEN | Refills: 11 | Status: SHIPPED | OUTPATIENT
Start: 2020-04-14 | End: 2020-08-25

## 2020-04-14 ASSESSMENT — PAIN SCALES - GENERAL: PAINLEVEL: NO PAIN (0)

## 2020-04-14 NOTE — PROGRESS NOTES
"Kasandra Lau is a 35 year old female who is being evaluated via a billable video visit.      The patient has been notified of following:     \"This video visit will be conducted via a call between you and your physician/provider. We have found that certain health care needs can be provided without the need for an in-person physical exam.  This service lets us provide the care you need with a video conversation.  If a prescription is necessary we can send it directly to your pharmacy.  If lab work is needed we can place an order for that and you can then stop by our lab to have the test done at a later time.    Video visits are billed at different rates depending on your insurance coverage.  Please reach out to your insurance provider with any questions.    If during the course of the call the physician/provider feels a video visit is not appropriate, you will not be charged for this service.\"    Patient has given verbal consent for Video visit? YES    Patient would like the video invitation sent by: [email or web address]         Video Start Time: 2:07 PM    [facility]  Neurology Consult     Kasandra Lau MRN# [id number]   YOB: 1985 Age: 35 year old     Requesting physician: Dr. Loyd         Assessment and Recommendations:     Kasandra Lau is a 35 year old female who was referred to the neurology clinic for further evaluation of headaches.    Her headache presentation is consistent with a long history of chronic migraine without aura.  She does not have any red flags for secondary causes of headache.  There may be a genetic predisposition to migraine, as there is also a history of headaches in her mother.  She has reportedly had an MRI of her brain completed in [place] in the past.  I did not have access to this today.  The patient tells me that it was normal.    For treatment, she has had benefit since starting Emgality for migraine " "prevention.  Her headache attacks are now occurring less frequent and are less severe, and only last 1 day instead of 2 or 3.  For acute treatment of headache, she takes almotriptan 12.5 mg tablets, which are helpful.  -I offered to take over the prescriptions for these medications today, and reordered both for 1 year.    We reviewed her migraine diagnosis, her current and previous treatments, and decided to continue with her current treatment plan.  She will contact me if her headaches change in quality, frequency, or severity.  Otherwise, we made a plan to see each other in 6 to 12 months to monitor her progress.    I spent 25 minutes with the patient, over half of which was counseling.    Natividad Osuna MD  Neurology  Pager: [phone]            Chief Complaint:     Chief Complaint   Patient presents with     Consult     P VIDEO VISIT           History is obtained from the patient and medical record.  Patient was seen via a virtual visit in their home due to the Covid 19 global pandemic.      Kasandra Lau is a 35 year old female who has previously seen Dr. Loyd for migraine.    She has been living with migraine attacks for many years. She didn't seek care for them until 5 years ago. Her headaches are described as frontal pain that is dull, heavy, like something is pushing in her head, and intense. A typical headache attack rates 6/10. They used to be ~15 days/month with each lasting 2-3 days.    She gets a warning of feeling faint and dizzy. She has difficulty focusing during headache. Denies typical aura. No positional component to her pain. No unilateral autonomic features. Denies fevers or other illness associated.     They are associated with \"fuzziness\", photophobia. She has nausea from headaches. Denies vomiting.    She treats headaches with acetaminophen and almotriptan.      She has been on topiramate 200 mg daily for years, but recently stopped. She recently switched Emgality, which she has been " "taking for about 3 months. Her migraines have resolved, and are now \"bad headaches\". They now only last 1 day instead of several.    She also takes gabapentin 200 TID, Wellbutrin, for anxiety.            Past Medical History:     Past Medical History:   Diagnosis Date     Anemia fall 2016     Depression (emotion)     sees psych, on meds     Migraine     daily meds, 2x month, more mild on meds     Panic disorder      Uncomplicated asthma Spring 2018              Past Surgical History:     Past Surgical History:   Procedure Laterality Date     COLONOSCOPY       LAPAROSCOPIC ABLATION ENDOMETRIOSIS N/A 11/9/2016    Procedure: LAPAROSCOPIC ABLATION ENDOMETRIOSIS;  Surgeon: Tonja Ferrer MD;  Location: [facility] OR     LAPAROSCOPIC CYSTECTOMY OVARIAN (BENIGN) Left 11/9/2016    Procedure: LAPAROSCOPIC CYSTECTOMY OVARIAN (BENIGN);  Surgeon: Tonja Ferrer MD;  Location: [facility] OR     LAPAROSCOPIC TUBAL DYE STUDY Left 11/9/2016    Procedure: LAPAROSCOPIC TUBAL DYE STUDY;  Surgeon: Tonja Ferrer MD;  Location: [facility] OR     LAPAROSCOPY OPERATIVE ADULT N/A 11/9/2016    Procedure: LAPAROSCOPY OPERATIVE ADULT;  Surgeon: Tonja Ferrer MD;  Location: [facility] OR     ORTHOPEDIC SURGERY      left wrist surgery             Social History:   . 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 She drinks caffeine occasionally, black tea or coffee.  Social History     Socioeconomic History     Marital status: Single     Spouse name: Not on file     Number of children: Not on file     Years of education: Not on file     Highest education level: Not on file   Occupational History     Not on file   Social Needs     Financial resource strain: Not on file     Food insecurity     Worry: Not on file     Inability: Not on file     Transportation needs     Medical: Not on file     Non-medical: Not on file   Tobacco Use     Smoking status: Former Smoker     Packs/day: 0.00     Years: 10.00     Pack years: 0.00 "     Types: Cigarettes     Smokeless tobacco: Never Used     Tobacco comment: smokes occasionally socially    Substance and Sexual Activity     Alcohol use: Not Currently     Alcohol/week: 0.0 standard drinks     Comment: occasional      Drug use: Not Currently     Types: Marijuana     Comment: marijuana  none since May     Sexual activity: Yes     Partners: Male     Birth control/protection: Pull-out method, Inserts/Ring     Comment: Nuvaring   Lifestyle     Physical activity     Days per week: Not on file     Minutes per session: Not on file     Stress: Not on file   Relationships     Social connections     Talks on phone: Not on file     Gets together: Not on file     Attends Voodoo service: Not on file     Active member of club or organization: Not on file     Attends meetings of clubs or organizations: Not on file     Relationship status: Not on file     Intimate partner violence     Fear of current or ex partner: Not on file     Emotionally abused: Not on file     Physically abused: Not on file     Forced sexual activity: Not on file   Other Topics Concern     Not on file   Social History Narrative     Not on file             Family History:   There is a family history of headaches in her mother.  Family History   Problem Relation Age of Onset     Diabetes Maternal Grandfather      Diabetes No family hx of      Hypertension No family hx of      Coronary Artery Disease No family hx of      Hyperlipidemia No family hx of      Cerebrovascular Disease No family hx of      Breast Cancer No family hx of      Colon Cancer No family hx of      Prostate Cancer No family hx of      Other Cancer No family hx of      Glaucoma No family hx of      Macular Degeneration No family hx of              Allergies:      Allergies   Allergen Reactions     Dust Mites Cough, Difficulty breathing and Shortness Of Breath     Cats      Chest tightness, sinus irritation             Medications:     Current Outpatient Medications:       Acetaminophen (TYLENOL PO), Take 650 mg by mouth every 4 hours as needed for mild pain or fever, Disp: , Rfl:      albuterol (PROVENTIL HFA) 108 (90 Base) MCG/ACT inhaler, Inhale 1-2 puffs into the lungs every 4 hours as needed , Disp: , Rfl:      almotriptan (AXERT) 12.5 MG tablet, , Disp: , Rfl:      ASMANEX, 30 METERED DOSES, 220 MCG/INH inhaler, INHALE 1 PUFF BY MOUTH DAILY. RINSE MOUTH OR GARGLE AFTER USE, Disp: , Rfl: 6     Azelastine HCl 137 MCG/SPRAY SOLN, , Disp: , Rfl:      buPROPion (WELLBUTRIN XL) 300 MG 24 hr tablet, Take 1 tablet (300 mg) by mouth every morning, Disp: 30 tablet, Rfl: 2     dicyclomine (BENTYL) 20 MG tablet, Take 1 tablet (20 mg) by mouth 4 times daily as needed (cramping), Disp: 30 tablet, Rfl: 1     EMGALITY 120 MG/ML injection, INJECT 120 MG UNDER THE SKIN EVERY MONTH, Disp: , Rfl:      etonogestrel-ethinyl estradiol (NUVARING) 0.12-0.015 MG/24HR vaginal ring, Place 1 each vaginally every 28 days, Disp: 3 each, Rfl: 3     gabapentin (NEURONTIN) 400 MG capsule, Take 1 capsule (400 mg) by mouth 3 times daily, Disp: 90 capsule, Rfl: 2     LORazepam (ATIVAN) 0.5 MG tablet, Take 1 tablet (0.5 mg) by mouth daily as needed for anxiety, Disp: 10 tablet, Rfl: 0     loteprednol (LOTEMAX) 0.5 % ophthalmic suspension, Place 1-2 drops into both eyes 3 times daily, Disp: 1 Bottle, Rfl: 1     neomycin-polymyxin-dexamethasone (MAXITROL) 3.5-20959-2.1 ophthalmic ointment, Place 0.5 inches into both eyes At Bedtime, Disp: 1 Tube, Rfl: 3     olopatadine (PATANOL) 0.1 % ophthalmic solution, Place 1 drop into both eyes 2 times daily, Disp: 1 Bottle, Rfl: 11     order for DME, Use for 15-30 minutes every morning., Disp: 1 Units, Rfl: 0     topiramate (TOPAMAX) 100 MG tablet, Take 1 tablet (100 mg) by mouth 2 times daily, Disp: 120 tablet, Rfl: 3     vilazodone (VIIBRYD) 40 MG TABS tablet, Take 1 tablet (40 mg) by mouth daily, Disp: 30 tablet, Rfl: 2     topiramate (TOPAMAX) 100 MG tablet, Take 1 tablet  (100 mg) by mouth daily (Patient not taking: Reported on 4/14/2020), Disp: 90 tablet, Rfl: 3          Review of Systems:   Complete review of systems is negative, except as noted in the HPI.          Physical Exam:   There were no vitals taken for this visit.   Physical Exam:   General: NAD  Neurologic:   Mental Status Exam: Alert, awake and oriented to situation. No dysarthria. Speech of normal fluency.          Data:     No previous head imaging is immediately available for review. She thinks she had an MRI brain in [place] in the past, which was reportedly normal.      Video-Visit Details    Type of service:  Video Visit switched to telephone visit due to technical difficulties with [employer] software    Video End Time (time video stopped): 2:32pm    Originating Location (pt. Location): Home    Distant Location (provider location):  [facility] NEUROLOGY     Mode of Communication:  Video Conference via Jamie Al, EMT

## 2020-04-26 NOTE — PROGRESS NOTES
"TELEPHONE VISIT  Kasandra Lau is a 35 year old pt. who is being evaluated via a billable telephone visit.      The patient has been notified of the following:    We have found that certain health care needs can be provided without the need for a physical exam. This service lets us provide the care you need with a short phone conversation. If a prescription is necessary we can send it directly to your pharmacy. If lab work is needed we can place an order for that and you can then stop by our lab to have the test done at a later time.     Telephone visits are billed at different rates depending on your insurance coverage. During this emergency period, for some insurers they may be billed the same as an in-person visit. Please reach out to your insurance provider with any questions.   If during the course of the call the it appears that a telephone visit is not appropriate, you will not be charged for this service.\"    Patient has given verbal consent for a telephone visit?:  Yes   How would the pt like to obtain the AVS?:  [place]  AVS SmartPhrase [PsychAVS] has been placed in 'Patient Instructions':  Yes     Start Time:  8:55  AM          End Time:  9:15 AM       [facility]  Psychiatry Clinic  PSYCHIATRIC PROGRESS NOTE     Date of initial Diagnostic Assessment (DA) is 10/31/17 and most recent Transfer of Care DA is 10/14/19.    CARE TEAM:  PCP- Roxy Rios    Specialty Providers- Neurology: Dr. Loyd (retiring) at [facility]   Allergy: Dr. Dale at [facility]   Therapist- none      Kasandra Lau is a 33 year old female who prefers the name 'Kasandra' & pronouns she, her, herself.      Pertinent Background:  Kasandra first experienced mental health issues in childhood and has received treatment for MDD and Seasonal Mood DO.  Her mood symptoms are worsened by chronic pain. She is currently a  at the [employer] and has requested accommodations " "through disability services for her mental health. A letter of support for accommodations was provided 9/12/19 (see chart) by Dr. Jose Carlos Delcid.    Psych critical item history includes suicidal ideation and trauma hx.    INTERIM HISTORY      [4, 4]   The patient reports good treatment adherence.  History was provided by the patient who was a good historian.    The last visit ended with no change to the med regimen.    Since the last visit:   - She reports her mood has been \"alright\" - she notes that \"sometimes I will have anxiety\" and has been taking gabapentin 300mg when she feels it coming on - this dose is helpful and she takes it in place of lorazepam - she notes \"I think other people might be more depressed than I am right now\"    - She has not started therapy and reports not hearing from health psychology yet regarding an intake appointment - per chart review it looks like they tried to call her one time on 3/27, but no other attempts have been made   - Both her PT appointments and her intake appointment for acupuncture w/ Dr. 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 - she continues to prepare for her prelim exams for her graduate degree and has been told she can do these exams online   - She reports her allergist thought she had Lupus - she saw a rheumatologist and was tested which came up negative   - She continues to experience GI upset and has been diagnosed w/ IBS    RECENT PSYCH ROS:   Depression:  hypersomnia and overwhelmed  Elevated:  " none  Psychosis:  none  Anxiety:  excessive worry and nervous/overwhelmed  Panic Attack:  none  Trauma Related:  none  Dysregulation:  none  Eating Disorder: none     Pertinent Negative Symptoms:  NO self-injurious behavior/urges, psychosis and saulo    RECENT SUBSTANCE USE:     ALCOHOL- Social drinking, occassionally a glass or two of wine at home 1-2 nights a week.      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   EDUCATION- [employer], studying for PhD in Human Geography.  PARTNER/- , currently single.  CHILDREN- none      LIVING SITUATION- Lives alone in an apartment in [place] with her cat and 2 dogs.      SOCIAL/ SPIRITUAL SUPPORT- Friends through the university.     FEELS SAFE AT HOME- yes     PSYCH and CD Critical Summary Points since July 2019          10/14/19- resident transition; increase Viibryd from 20mg to 30mg daily to target worsening depression/anxiety (did not occur after last visit due to unclear insurance/pharmacy issues); continue Ativan 0.5 TID prn anxiety - patient to try to reduce use to 2 doses daily (total of 1mg); increase gabapentin from 100mg to 200mg TID scheduled to target anxiety and help reduce lorazepam use  12/13/19- decrease lorazepam to 0.5mg daily PRN, increase gabapentin to 300mg tid for anxiety; provided her w/ letter for reasonable accommodations for graduate program  2/3/20- increased Viibryd from 30mg to 40mg to target ongoing anxiety; referral for acupuncture  3/16/20- health psychology therapy referral placed; no med changes; pt tapering off Topamax w/ her neurologist in favor of alternative migraine med (Emgality) due to cloudy thinking  4/27/20- no med changes     PAST MED TRIALS        See last Diagnostic Assessment  MEDICAL / SURGICAL  HISTORY          Pregnant or breastfeeding- no      Contraception- Nuvaring    Patient Active Problem List   Diagnosis     Pelvic pain in female     Vitamin D deficiency     Menorrhagia with regular cycle     Migraine without aura and without status migrainosus, not intractable     Iron deficiency anemia due to chronic blood loss     Tired     Circadian rhythm sleep disorder, delayed sleep phase type     Unhealthy sleep habit     Seasonal mood disorder (H)       Major Surgery- Endometrial cystectomy, L wrist fracture repair    MEDICAL REVIEW OF SYSTEMS    [2, 10]     A comprehensive review of systems was performed and is negative other than noted in the HPI.    ALLERGY       Dust mites and Cats     MEDICATIONS        Current Outpatient Medications   Medication Sig Dispense Refill     Acetaminophen (TYLENOL PO) Take 650 mg by mouth every 4 hours as needed for mild pain or fever       albuterol (PROVENTIL HFA) 108 (90 Base) MCG/ACT inhaler Inhale 1-2 puffs into the lungs every 4 hours as needed        almotriptan (AXERT) 12.5 MG tablet Take 1 tablet (12.5 mg) by mouth daily as needed for migraine (repeat in 2 hours if needed) 18 tablet 11     ASMANEX, 30 METERED DOSES, 220 MCG/INH inhaler INHALE 1 PUFF BY MOUTH DAILY. RINSE MOUTH OR GARGLE AFTER USE  6     Azelastine HCl 137 MCG/SPRAY SOLN        buPROPion (WELLBUTRIN XL) 300 MG 24 hr tablet Take 1 tablet (300 mg) by mouth every morning 30 tablet 2     dicyclomine (BENTYL) 20 MG tablet Take 1 tablet (20 mg) by mouth 4 times daily as needed (cramping) 30 tablet 1     EMGALITY 120 MG/ML injection Inject 1 mL (120 mg) Subcutaneous every 28 days 1 pen 11     etonogestrel-ethinyl estradiol (NUVARING) 0.12-0.015 MG/24HR vaginal ring Place 1 each vaginally every 28 days 3 each 3     gabapentin (NEURONTIN) 400 MG capsule Take 1 capsule (400 mg) by mouth 3 times daily 90 capsule 2     LORazepam (ATIVAN) 0.5 MG tablet Take 1 tablet (0.5 mg) by mouth daily as needed for anxiety  "10 tablet 0     loteprednol (LOTEMAX) 0.5 % ophthalmic suspension Place 1-2 drops into both eyes 3 times daily 1 Bottle 1     neomycin-polymyxin-dexamethasone (MAXITROL) 3.5-77246-3.1 ophthalmic ointment Place 0.5 inches into both eyes At Bedtime 1 Tube 3     olopatadine (PATANOL) 0.1 % ophthalmic solution Place 1 drop into both eyes 2 times daily 1 Bottle 11     order for DME Use for 15-30 minutes every morning. 1 Units 0     topiramate (TOPAMAX) 100 MG tablet Take 1 tablet (100 mg) by mouth 2 times daily 120 tablet 3     topiramate (TOPAMAX) 100 MG tablet Take 1 tablet (100 mg) by mouth daily (Patient not taking: Reported on 4/14/2020) 90 tablet 3     vilazodone (VIIBRYD) 40 MG TABS tablet Take 1 tablet (40 mg) by mouth daily 30 tablet 2     VITALS      [3, 3]   There were no vitals taken for this visit.     MENTAL STATUS EXAM      [9, 14 cog gs]     Alertness: alert  and oriented  Appearance: could not assess  Behavior/Demeanor: cooperative, pleasant and calm  Speech: regular rate and rhythm, quiet  Language: intact  Psychomotor: could not assess  Mood: \"doing ok\"   Affect: blunted; was congruent to mood; was congruent to content  Thought Process/Associations: unremarkable  Thought Content:  Reports none;  Denies suicidal ideation  Perception:  Reports none;  Denies auditory hallucinations  Insight: good  Judgment: good  Cognition: does appear grossly intact; formal cognitive testing was not done  Gait and Station: could not assess    LABS and DATA     AIMS:  not needed    PHQ9 TODAY = did not complete today  PHQ-9 SCORE 10/14/2019 1/15/2020 2/12/2020   PHQ-9 Total Score 12 7 8       Recent Labs   Lab Test 08/02/19  1711 10/12/18  1532 10/05/17  1133   CR 0.80 0.68 0.75   GFRESTIMATED >90 >90 >90     Recent Labs   Lab Test 08/02/19  1711 10/12/18  1532 10/05/17  1133   AST 20 11 12   ALT 25 22 22   ALKPHOS 76 65 57       PSYCHOTROPIC DRUG INTERACTIONS     LORAZEPAM + GABAPENTIN may increase risk of CNS " depression.    VILAZODONE + BUPROPION may increase risk of seizure and serotonin syndrome.     MANAGEMENT:  Monitoring for adverse effects and patient is aware of risks    RISK STATEMENT for SAFETY     Kasandra Lau did not appear to be an imminent safety risk to self or others.     PSYCHIATRIC DIAGNOSIS     Major Depressive Disorder, recurrent, mild (h/o seasonal episodes)  Generalized Anxiety Disorder  R/o Attention Deficit Disorder     ASSESSMENT      [m2, h3]     TODAY Kasandra reports her mood is stable and she continues to experience intermittent periods of heightened anxiety. She has been utilizing gabapentin in place of lorazepam to treat the heightened anxiety and continue to reduce her use of benzodiazepines as recommended. She has decreased use of lorazepam significantly over the past few months through use of gabapentin as well as an increased dose of Viibryd. No indication for a medication change today. She is not currently in therapy despite being provided a referral for health psychology at last visit (referral due to having multiple chronic medical issues (migraines, fatigue, and IBS) in addition to depression and anxiety). Per chart review they did call her to schedule an intake, but she did not receive the message. Will message Dr. Corina Muhammad again to ask if they can reach out another time to get an intake scheduled.      Future considerations:  - Optimize gabapentin and vilazodone to target anxiety; then taper lorazepam due to risks of long term use     PLAN      [m2, h3]     1) PSYCHOTROPIC MEDICATIONS:  - Continue bupropion XL 300mg daily  - Continue vilazodone 40mg daily for ongoing anxiety  - Continue lorazepam 0.5 daily prn anxiety (pt provided with 10 pills per month)  - Continue gabapentin 400mg TID + 300mg daily PRN to target anxiety and help reduce lorazepam use     Supplements:  - Continue Ashwaganda daily   - Continue L-theanine 20mg daily; consider increasing dose - can take up to 400mg  daily   - Continue fish oil 1g daily     2) [place] checked today:  indicates she only receives lorazepam refills through this clinic..    3) THERAPY:    - Recommended; pt referred to health psychology at previous visit - will message Dr. Corina Muhammad again     4) OTHER:   - Acupuncture w/ Dr. Marky Yepez; will discuss supplements that that visit as well (deferred due to Covid pandemic)    4) NEXT DUE:    Labs- N/A  EKG- N/A  Rating Scales- PHQ9 at every visit    5) REFERRALS:    Therapy- [facility]     6) RTC: in 6 weeks via telehealth    7) CRISIS NUMBERS:   Provided routinely in AVS   ONLY if a [facility] PT: [place] [phone] (clinic), [phone] (after hours)     TREATMENT RISK STATEMENT:  The risks, benefits, alternatives and potential adverse effects have been discussed and are understood by the pt. The pt understands the risks of using street drugs or alcohol. There are no medical contraindications, the pt agrees to treatment with the ability to do so. The pt knows to call the clinic for any problems or to access emergency care if needed.  Medical and substance use concerns are documented above.  Psychotropic drug interaction check was done, including changes made today.    PROVIDER: Melina Gamino MD    Patient not staffed in clinic.  Note will be reviewed and signed by supervisor Dr. Junior.    Attestation:  I did not see Kasandra Lau directly. I have reviewed the documentation and I agree with the resident's plan of care. I am signing in lieu of Dr. Junior.  Reynaldo Iniguez MD    .

## 2020-04-27 ENCOUNTER — VIRTUAL VISIT (OUTPATIENT)
Dept: PSYCHIATRY | Facility: CLINIC | Age: 35
End: 2020-04-27
Attending: PSYCHIATRY & NEUROLOGY
Payer: COMMERCIAL

## 2020-04-27 DIAGNOSIS — F33.1 MAJOR DEPRESSIVE DISORDER, RECURRENT EPISODE, MODERATE (H): ICD-10-CM

## 2020-04-27 DIAGNOSIS — F41.9 ANXIETY: ICD-10-CM

## 2020-04-27 DIAGNOSIS — F33.1 MODERATE EPISODE OF RECURRENT MAJOR DEPRESSIVE DISORDER (H): ICD-10-CM

## 2020-04-27 RX ORDER — GABAPENTIN 400 MG/1
400 CAPSULE ORAL 3 TIMES DAILY
Qty: 90 CAPSULE | Refills: 2 | Status: SHIPPED | OUTPATIENT
Start: 2020-04-27 | End: 2020-06-04

## 2020-04-27 RX ORDER — BUPROPION HYDROCHLORIDE 300 MG/1
300 TABLET ORAL EVERY MORNING
Qty: 30 TABLET | Refills: 2 | Status: SHIPPED | OUTPATIENT
Start: 2020-04-27 | End: 2020-07-29

## 2020-04-27 RX ORDER — VILAZODONE HYDROCHLORIDE 40 MG/1
40 TABLET ORAL DAILY
Qty: 30 TABLET | Refills: 2 | Status: SHIPPED | OUTPATIENT
Start: 2020-04-27 | End: 2020-07-29

## 2020-04-29 ENCOUNTER — VIRTUAL VISIT (OUTPATIENT)
Dept: PSYCHOLOGY | Facility: CLINIC | Age: 35
End: 2020-04-29
Payer: COMMERCIAL

## 2020-04-29 DIAGNOSIS — F33.0 MDD (MAJOR DEPRESSIVE DISORDER), RECURRENT EPISODE, MILD (H): ICD-10-CM

## 2020-04-29 DIAGNOSIS — F43.23 ADJUSTMENT DISORDER WITH MIXED ANXIETY AND DEPRESSED MOOD: ICD-10-CM

## 2020-04-29 DIAGNOSIS — F43.12 CHRONIC POST-TRAUMATIC STRESS DISORDER (PTSD): Primary | ICD-10-CM

## 2020-05-06 DIAGNOSIS — K58.1 IRRITABLE BOWEL SYNDROME WITH CONSTIPATION: ICD-10-CM

## 2020-05-06 RX ORDER — DICYCLOMINE HCL 20 MG
20 TABLET ORAL 4 TIMES DAILY PRN
Qty: 30 TABLET | Refills: 0 | Status: SHIPPED | OUTPATIENT
Start: 2020-05-06 | End: 2021-10-07

## 2020-05-06 NOTE — TELEPHONE ENCOUNTER
Received refill request for bentyl.  Last in clinic 2/2020 where this medication was started to help with cramping/bloating pain from IBS. She was to follow-up in 2-3 months.      Forwarding to Dr. Sesay to review.

## 2020-05-07 NOTE — TELEPHONE ENCOUNTER
Per Dr. Sesay, patient needs to schedule a phone visit for follow up. Left message for patient to call and schedule.

## 2020-05-08 ENCOUNTER — VIRTUAL VISIT (OUTPATIENT)
Dept: PSYCHOLOGY | Facility: CLINIC | Age: 35
End: 2020-05-08
Payer: COMMERCIAL

## 2020-05-08 DIAGNOSIS — F34.1 PERSISTENT DEPRESSIVE DISORDER: Primary | ICD-10-CM

## 2020-05-08 DIAGNOSIS — F43.23 ADJUSTMENT DISORDER WITH MIXED ANXIETY AND DEPRESSED MOOD: ICD-10-CM

## 2020-05-08 DIAGNOSIS — F43.12 CHRONIC POST-TRAUMATIC STRESS DISORDER (PTSD): ICD-10-CM

## 2020-05-18 NOTE — PROGRESS NOTES
"Health Psychology - Follow up Visit  Confidential Summary*    DATE OF VISIT:  May 8, 2020    REFERRAL SOURCE:  Psychiatry    CHIEF COMPLAINT/REASON FOR VISIT  Psychotherapy in context of chronic PTSD and persistent depression.  Patient also complains of dissatisfaction with interpersonal relationships and challenges with emotion regulation.      Patient was seen today for a 60 minute individual psychotherapy session. Session facilitated via doxy.me with patient at her home and provider at her own home.       This telehealth service is appropriate and effective for delivering services in light of the necessity for social distancing to mitigate the COVID-19 epidemic. Patient has agreed to receiving telehealth services.    Subjective:  This is the second session with patient.  Began with a review of the last session.  Patient reported that she believed that it went well.      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  She again discussed her work and her perceptions that \"in order to be respected and not stepped on, she has to be mean\".  Discussed this assumption and how she might be able to maintain her self respect and to be more effective in her interpersonal communication.  She described a long pattern of feeling disrespected and assumes she is being racially judged and that microaggressions characterize many of her challenging interpersonal challenges.  She described feeling morally obligated to stand up for herself.  Validated patients experiences and her history of feeling misunderstood.      She described multiple traumas and that her mother had \"narcissistic tendencies\" and that she often took the blame for failures in her family.  She reported that she believes it is her responsibility to inform others when their perceptions of themselves do not match their self perception.  " "She described her marriage to her  and her frustration that he had an \"inflated idea of himself\".      Will begin pre-DBT orientation with patient.     Objective:  Patient was on time for today s session, appropriately groomed and dressed, and demonstrated good eye contact.  She appeared tired but was alert and oriented. Mood was dysphoric with appropriate range of affect. Patient denied suicidal or assaultive ideation, plan, or intent.        Assessment:  The patient has a longstanding history of interpersonal discord and challenges with emotion regulation.  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      Plan:  Patient may benefit from DBT skills training.     Time In: 12:00  Time Out: 1:00    Diagnosis:  Axis I PTSD, chronic, persistent depressive disorder, adjustment disorder with mixed   Axis II R/O BPD   [place] III please see medical records for details   [place] IV Psychosocial and Environmental Stressors: lock down and academic challenges and living alone with no family support         Corina Muhammad, PhD, LP    "

## 2020-05-20 ENCOUNTER — VIRTUAL VISIT (OUTPATIENT)
Dept: PSYCHOLOGY | Facility: CLINIC | Age: 35
End: 2020-05-20
Payer: COMMERCIAL

## 2020-05-20 DIAGNOSIS — F43.23 ADJUSTMENT DISORDER WITH MIXED ANXIETY AND DEPRESSED MOOD: ICD-10-CM

## 2020-05-20 DIAGNOSIS — F43.12 CHRONIC POST-TRAUMATIC STRESS DISORDER (PTSD): ICD-10-CM

## 2020-05-20 DIAGNOSIS — F34.1 PERSISTENT DEPRESSIVE DISORDER: Primary | ICD-10-CM
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
## 2020-05-29 ENCOUNTER — VIRTUAL VISIT (OUTPATIENT)
Dept: PSYCHOLOGY | Facility: CLINIC | Age: 35
End: 2020-05-29
Payer: COMMERCIAL

## 2020-05-29 DIAGNOSIS — F34.1 PERSISTENT DEPRESSIVE DISORDER: Primary | ICD-10-CM

## 2020-05-29 DIAGNOSIS — F33.0 MDD (MAJOR DEPRESSIVE DISORDER), RECURRENT EPISODE, MILD (H): ICD-10-CM

## 2020-05-29 DIAGNOSIS — F43.23 ADJUSTMENT DISORDER WITH MIXED ANXIETY AND DEPRESSED MOOD: ICD-10-CM

## 2020-05-29 DIAGNOSIS — F43.12 CHRONIC POST-TRAUMATIC STRESS DISORDER (PTSD): ICD-10-CM

## 2020-06-03 ENCOUNTER — TRANSFERRED RECORDS (OUTPATIENT)
Dept: HEALTH INFORMATION MANAGEMENT | Facility: CLINIC | Age: 35
End: 2020-06-03

## 2020-06-03 ENCOUNTER — VIRTUAL VISIT (OUTPATIENT)
Dept: PSYCHOLOGY | Facility: CLINIC | Age: 35
End: 2020-06-03
Payer: COMMERCIAL

## 2020-06-03 DIAGNOSIS — F43.12 CHRONIC POST-TRAUMATIC STRESS DISORDER (PTSD): Primary | ICD-10-CM

## 2020-06-03 DIAGNOSIS — F34.1 PERSISTENT DEPRESSIVE DISORDER: ICD-10-CM

## 2020-06-03 DIAGNOSIS — F33.0 MDD (MAJOR DEPRESSIVE DISORDER), RECURRENT EPISODE, MILD (H): ICD-10-CM

## 2020-06-03 DIAGNOSIS — F43.23 ADJUSTMENT DISORDER WITH MIXED ANXIETY AND DEPRESSED MOOD: ICD-10-CM

## 2020-06-04 ENCOUNTER — TELEPHONE (OUTPATIENT)
Dept: PSYCHIATRY | Facility: CLINIC | Age: 35
End: 2020-06-04

## 2020-06-04 DIAGNOSIS — F41.9 ANXIETY: ICD-10-CM

## 2020-06-04 RX ORDER — GABAPENTIN 400 MG/1
400 CAPSULE ORAL 3 TIMES DAILY
Qty: 90 CAPSULE | Refills: 0 | Status: SHIPPED | OUTPATIENT
Start: 2020-06-04 | End: 2020-07-03

## 2020-06-04 NOTE — TELEPHONE ENCOUNTER
[facility] Call Center    Phone Message    May a detailed message be left on voicemail: yes     Reason for Call: Medication Refill Request    Has the patient contacted the pharmacy for the refill? Yes   Name of medication being requested: gabbapentin 400 mg  Provider who prescribed the medication: Stevenson  Pharmacy: [facility] #[id number] - [place], [place] - [address] [facility]    Date medication is needed:    Pharmacy is closed, pt needs it transferred to [facility] on [address] [place] [address]      Action Taken: Other: nursing pool    Travel Screening: Negative

## 2020-06-04 NOTE — TELEPHONE ENCOUNTER
Last seen: 4/27  RTC: 6 weeks  Non-provider cancel: None  No-show: None  Next appt: 6/8    Incoming refill from patient via telephone     Medication requested:   Disp  Refills  Start  End  ROSE    gabapentin (NEURONTIN) 400 MG capsule  90 capsule  2  4/27/2020   No    Sig - Route: Take 1 capsule (400 mg) by mouth 3 times daily     Medication refill approved per refill protocol.  Writer e-prescribed 30-day supply to [facility] ([phone]). Qty 90, refills 0.  Writer called pt and identified the refill being sent.

## 2020-06-04 NOTE — TELEPHONE ENCOUNTER
[facility] Call Center    Phone Message    May a detailed message be left on voicemail: yes     Reason for Call: Medication Refill Request    Has the patient contacted the pharmacy for the refill? Yes   Name of medication being requested: Gabapentin  Provider who prescribed the medication: Dr. Gamino  Pharmacy: [id number] ([address])  Date medication is needed: 6/5/20. Patient is taking last dose today         Action Taken: Message routed to:  Other: [facility]    Travel Screening: Not Applicable

## 2020-06-08 ENCOUNTER — TELEPHONE (OUTPATIENT)
Dept: PSYCHIATRY | Facility: CLINIC | Age: 35
End: 2020-06-08

## 2020-06-08 ENCOUNTER — VIRTUAL VISIT (OUTPATIENT)
Dept: PSYCHIATRY | Facility: CLINIC | Age: 35
End: 2020-06-08
Attending: PSYCHIATRY & NEUROLOGY
Payer: COMMERCIAL

## 2020-06-08 DIAGNOSIS — F41.1 GENERALIZED ANXIETY DISORDER: Primary | ICD-10-CM

## 2020-06-08 RX ORDER — GABAPENTIN 300 MG/1
300 CAPSULE ORAL 2 TIMES DAILY PRN
Qty: 60 CAPSULE | Refills: 0 | Status: SHIPPED | OUTPATIENT
Start: 2020-06-08 | End: 2020-07-14

## 2020-06-08 ASSESSMENT — PAIN SCALES - GENERAL: PAINLEVEL: NO PAIN (0)

## 2020-06-08 NOTE — TELEPHONE ENCOUNTER
On 6/8/2020, at 0949, writer called patient at mobile to confirm Virtual Visit. Writer unable to make contact with patient. Writer left detailed voice message for callback. [phone], left as call back number. CATHERINE French, EMT
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
## 2020-06-08 NOTE — PROGRESS NOTES
"VIDEO VISIT  Kasandra Lau is a 35 year old patient who is being evaluated via a billable video visit.      The patient has been notified of following:   \"This video visit will be conducted via a call between you and your physician/provider. We have found that certain health care needs can be provided without the need for an in-person physical exam. This service lets us provide the care you need with a video conversation. If a prescription is necessary we can send it directly to your pharmacy. If lab work is needed we can place an order for that and you can then stop by our lab to have the test done at a later time. Insurers are generally covering virtual visits as they would in-office visits so billing should not be different than normal.  If for some reason you do get billed incorrectly, you should contact the billing office to correct it and that number is in the AVS .    Patient has given verbal consent for video visit?:  Yes     How would you like to obtain your AVS?:  alike    Video invitation for patient:  DOXY provider, invite not needed      AVS SmartPhrase [PsychAVS] has been placed in 'Patient Instructions':  Yes     "

## 2020-06-08 NOTE — PATIENT INSTRUCTIONS
Thank you for coming to the PSYCHIATRY CLINIC.    Lab Testing:  If you had lab testing today and your results are reassuring or normal they will be mailed to you or sent through [employer] within 7 days.   If the lab tests need quick action we will call you with the results.  The phone number we will call with results is # [phone] (home) . If this is not the best number please call our clinic and change the number.    Medication Refills:  If you need any refills please call your pharmacy and they will contact us. Our fax number for refills is [phone]. Please allow three business for refill processing.   If you need to  your refill at a new pharmacy, please contact the new pharmacy directly. The new pharmacy will help you get your medications transferred.     Scheduling:  If you have any concerns about today's visit or wish to schedule another appointment please call our office during normal business hours [phone] (8-5:00 M-F)    Contact Us:  Please call [phone] during business hours (8-5:00 M-F).  If after clinic hours, or on the weekend, please call [phone].    Financial Assistance: [phone]  [phone]  Central Billing Office, [facility]: [phone]  [phone]  Medical Records: [phone]    MENTAL HEALTH CRISIS NUMBERS:  [facility]:   [facility]: [phone]  Crisis Residence \[facility] [place] [facility]\"" [facility]: [phone]   Walk-In Counseling Center \[facility] [place] [facility]\"": [phone]   COPE  [place] Mobile Team: [phone] (adults) [phone] (child)     [facility]:   [facility]: [phone]   Walk-in counseling [facility]: [phone]   Walk-in counseling [facility]: [phone]   Crisis Residence [facility] Residence: [phone]   [facility] Adult Mental Health: [phone] Mobile team AND  crisis line    Other Crisis Numbers:   National Suicide  Prevention Lifeline: 041-308-UEOL (288-892-7016)  CRISIS TEXT LINE: Text to 030366 for any crisis 24/7; OR www.crisistextline.org   Poison Control Center: 2-835-547-8120  CHILD: [facility] needs assessment team: [phone]   Trans Lifeline: 1-413.230.5752  [facility] Lifeline: 1-973.772.6027    For a medical emergency please call 911 or go to the nearest ER.         _____________________________________________    Again thank you for choosing PSYCHIATRY CLINIC and please let us know how we can best partner with you to improve you and your family's health.    You may be receiving a survey regarding this appointment. We would love to have your feedback, both positive and negative. The survey is done by an external company, so your answers are anonymous.

## 2020-06-10 ENCOUNTER — VIRTUAL VISIT (OUTPATIENT)
Dept: PSYCHOLOGY | Facility: CLINIC | Age: 35
End: 2020-06-10
Payer: COMMERCIAL

## 2020-06-10 DIAGNOSIS — F34.1 PERSISTENT DEPRESSIVE DISORDER: Primary | ICD-10-CM

## 2020-06-10 DIAGNOSIS — F43.12 CHRONIC POST-TRAUMATIC STRESS DISORDER (PTSD): ICD-10-CM

## 2020-06-10 DIAGNOSIS — F33.0 MDD (MAJOR DEPRESSIVE DISORDER), RECURRENT EPISODE, MILD (H): ICD-10-CM

## 2020-06-14 NOTE — PROGRESS NOTES
"Health Psychology - Follow up Visit  Confidential Summary*      REFERRAL SOURCE:  Psychiatry    CHIEF COMPLAINT/REASON FOR VISIT  Psychotherapy in context of chronic PTSD and persistent depression.  Patient also complains of dissatisfaction with interpersonal relationships and challenges with emotion regulation.      Patient was seen today for a 60 minute individual psychotherapy session. Session facilitated via [email or web address] with patient at her home and provider at her own home.       This telehealth service is appropriate and effective for delivering services in light of the necessity for social distancing to mitigate the COVID-19 epidemic. Patient has agreed to receiving telehealth services.    The patient has been notified of following:   \"This video visit will be conducted via a call between you and your physician/provider. We have found that certain health care needs can be provided without the need for an in-person physical exam.   Video visits are billed at different rates depending on your insurance coverage.  Please reach out to your insurance provider with any questions. If during the course of the call the physician/provider feels a video visit is not appropriate, you will not be charged for this service.\"     Patient has given verbal consent for Video visit? Yes    Subjective:  Patient began with discussion of improvements in her hives.  She described receiving an injection of zolar and believes this medication has decreased the severity of both her itching and the appearance of bumps on her face.  She described details of the medication recommendations including her concern that she has to receive injections at an infusion center.  Patient also discussed her history of IBS and her belief that IBSc may potentially be adversely impacted by this new treatment as she has had decreased bowel movement frequency.  Discussed the impact on her anxiety and her chronic worry.  She described history of weight gain and " "bloating.  She also described migraines and treatment for this condition.      Much of session spent describing her history with perceived discrimination and academia as posturing.  Patient is observed to utilize intellectual descriptions of emotional responses.   However, patient was observed to make more positive comments about her time in [place].  When this was brought to her attention, she admitted that she is feeling better now that she has completed 2/3 papers required for progression toward ABD status.  She described an intention to move out of the country when she is on the job market.    Session concluded with discussion of \"confusing\" relationship with male friend.  Although she reported that she enjoys his company and would like to date him, he appears to be reluctant to meet in person secondary to COVID concerns.  He lives with his elderly parents.  Discussed how she might discuss her desire for clarification of his intentions but she reported that she is afraid of his response.     Objective:  Patient was on time for today s session, appropriately groomed and dressed, and demonstrated good eye contact.  She appeared tired but was alert and oriented. Mood was dysphoric with appropriate range of affect. Patient denied suicidal or assaultive ideation, plan, or intent.        Assessment:  The patient has a longstanding history of interpersonal discord and challenges with emotion regulation.  She is coping well with Covid lock down well.  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        Plan:  Patient may benefit from DBT skills training.     Time In: 2:30  Time Out: 3:30    Diagnosis:  Axis I PTSD, chronic, persistent depressive disorder, adjustment disorder with mixed   Axis II R/O BPD   [place] III please see medical records for details   [place] IV Psychosocial and Environmental Stressors: lock down and academic challenges and living alone " with no family support         Corina Muhammad PhD, LP

## 2020-06-15 ENCOUNTER — MEDICAL CORRESPONDENCE (OUTPATIENT)
Dept: HEALTH INFORMATION MANAGEMENT | Facility: CLINIC | Age: 35
End: 2020-06-15
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
## 2020-06-15 NOTE — PROGRESS NOTES
"Health Psychology - Follow up Visit  Confidential Summary*      REFERRAL SOURCE:  Psychiatry    CHIEF COMPLAINT/REASON FOR VISIT  Psychotherapy in context of chronic PTSD and persistent depression.  Patient also complains of dissatisfaction with interpersonal relationships and challenges with emotion regulation.      Patient was seen today for a 60 minute individual psychotherapy session. Session facilitated via [email or web address] with patient at her home and provider at her own home.       This telehealth service is appropriate and effective for delivering services in light of the necessity for social distancing to mitigate the COVID-19 epidemic. Patient has agreed to receiving telehealth services.    The patient has been notified of following:   \"This video visit will be conducted via a call between you and your physician/provider. We have found that certain health care needs can be provided without the need for an in-person physical exam.   Video visits are billed at different rates depending on your insurance coverage.  Please reach out to your insurance provider with any questions. If during the course of the call the physician/provider feels a video visit is not appropriate, you will not be charged for this service.\"     Patient has given verbal consent for Video visit? Yes    Subjective:  Patient began with report that she continues to struggle with perceived trauma over events related to hiring an  to help to support her claim for support during graduate school.  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  She acknowledged that she becomes frustrated when she evaluates a students test responses and the student pushes her for a better score.  She noted that white privilege and entitlement appear to play a prominent role and that she believes she has not had these rights. She reported that she believes that students are treated as customers " "and that the \"customer is always right\".      We discussed DBT philosophy which requires both acceptance and change.  She reported that she is aware that she has a problem accepting the apparent deflation of self awareness in students, that students have screamed at her and this is acceptble.  She also reported that they are encouraged for this bad behavior and that tenure portfolios include student evaluations and therefore, professors are encouraged to appease students.  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  She subsequently feels stuck.  She has noted this dynamic in a number of areas.      Discussed the areas that she might use radical acceptance to tolerate and those that she might work at increasing her effectiveness so that she might get better evaluations without sacrificing her own self respect.      Objective:  Patient was on time for today s session, appropriately groomed and dressed, and demonstrated good eye contact.  She appeared tired but was alert and oriented. Mood was dysphoric with appropriate range of affect. Patient denied suicidal or assaultive ideation, plan, or intent.        Assessment:  The patient has a longstanding history of interpersonal discord and challenges with emotion regulation.  She is coping well with Covid lock down well.  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        Plan:  Patient may benefit from DBT skills training.     Time In: 2:30  Time Out: 3:30    Diagnosis:  Axis I PTSD, chronic, persistent depressive disorder, adjustment disorder with mixed   Axis II R/O BPD   [place] III please see medical records for details   [place] IV Psychosocial and Environmental Stressors: lock down and academic challenges and living alone with no family support         Corina Muhammad, PhD, LP    "
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
## 2020-06-19 ENCOUNTER — VIRTUAL VISIT (OUTPATIENT)
Dept: PSYCHOLOGY | Facility: CLINIC | Age: 35
End: 2020-06-19
Payer: COMMERCIAL

## 2020-06-19 DIAGNOSIS — F34.1 PERSISTENT DEPRESSIVE DISORDER: Primary | ICD-10-CM

## 2020-06-19 DIAGNOSIS — F43.12 CHRONIC POST-TRAUMATIC STRESS DISORDER (PTSD): ICD-10-CM

## 2020-06-19 DIAGNOSIS — F43.23 ADJUSTMENT DISORDER WITH MIXED ANXIETY AND DEPRESSED MOOD: ICD-10-CM

## 2020-06-19 DIAGNOSIS — F33.0 MDD (MAJOR DEPRESSIVE DISORDER), RECURRENT EPISODE, MILD (H): ICD-10-CM

## 2020-06-22 DIAGNOSIS — L50.1 CHRONIC IDIOPATHIC URTICARIA: ICD-10-CM

## 2020-06-24 ENCOUNTER — VIRTUAL VISIT (OUTPATIENT)
Dept: PSYCHOLOGY | Facility: CLINIC | Age: 35
End: 2020-06-24
Payer: COMMERCIAL

## 2020-06-24 DIAGNOSIS — Z53.9 ERRONEOUS ENCOUNTER--DISREGARD: Primary | ICD-10-CM

## 2020-07-01 ENCOUNTER — VIRTUAL VISIT (OUTPATIENT)
Dept: PSYCHOLOGY | Facility: CLINIC | Age: 35
End: 2020-07-01
Payer: COMMERCIAL

## 2020-07-01 DIAGNOSIS — F41.9 ANXIETY: ICD-10-CM

## 2020-07-01 DIAGNOSIS — F34.1 PERSISTENT DEPRESSIVE DISORDER: Primary | ICD-10-CM

## 2020-07-01 DIAGNOSIS — F33.0 MDD (MAJOR DEPRESSIVE DISORDER), RECURRENT EPISODE, MILD (H): ICD-10-CM

## 2020-07-01 DIAGNOSIS — F43.23 ADJUSTMENT DISORDER WITH MIXED ANXIETY AND DEPRESSED MOOD: ICD-10-CM

## 2020-07-01 DIAGNOSIS — F43.12 CHRONIC POST-TRAUMATIC STRESS DISORDER (PTSD): ICD-10-CM

## 2020-07-02 NOTE — PROGRESS NOTES
"Tonja Aceves Michelle, RN        Phone Number: [phone]                     Pt said she received a phone call regarding a change in meds and she is returning your call.     Ok to leave detailed  VM: Yes       Returned call to pt. Confirmed that pt is requesting to change from Abilify augmentation to Wellbutrin.  Wrier inquired about restlessness/akathisia mentioned in last office note.  Pt reports that \"tension in my body\" persisted following last appt and was \"making me pretty uncomfortable\".  Pt reports that she self-discontinued the Abilify early last week and SE are \"completely gone\".  Is requesting to start Wellbutrin.  If authorized, would like this sent to [facility] on [address].  Writer stated would send msg to Dr. Mercado and c/b with recommendations.    Routed to Dr. Mercado  " · 

## 2020-07-03 RX ORDER — GABAPENTIN 400 MG/1
400 CAPSULE ORAL 3 TIMES DAILY
Qty: 90 CAPSULE | Refills: 0 | Status: SHIPPED | OUTPATIENT
Start: 2020-07-03 | End: 2020-07-29

## 2020-07-03 NOTE — TELEPHONE ENCOUNTER
"Medication requested:gabapentin (NEURONTIN) 400 MG capsule    Take 1 capsule (400 mg) by mouth 3 times daily   Last refilled: 6/4/20  Qty: 90/0      Last seen: 6/8/20 \" Continue gabapentin 400mg TID + 300mg daily PRN to target anxiety and help reduce lorazepam use.\"  RTC: 6 weeks with new PSY  Cancel: 0  No-show: 0  Next appt: 7/27/20    Refill decision:   Refilled for 30 days per protocol.      "
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
## 2020-07-05 NOTE — PROGRESS NOTES
"Health Psychology - Follow up Visit  Confidential Summary*      REFERRAL SOURCE:  Psychiatry    CHIEF COMPLAINT/REASON FOR VISIT  Psychotherapy in context of chronic PTSD and persistent depression.  Patient also complains of dissatisfaction with interpersonal relationships and challenges with emotion regulation.      Patient was seen today for a 60 minute individual psychotherapy session. Session facilitated via [email or web address] with patient at her home and provider at her own home.       This telehealth service is appropriate and effective for delivering services in light of the necessity for social distancing to mitigate the COVID-19 epidemic. Patient has agreed to receiving telehealth services.    The patient has been notified of following:   \"This video visit will be conducted via a call between you and your physician/provider. We have found that certain health care needs can be provided without the need for an in-person physical exam.   Video visits are billed at different rates depending on your insurance coverage.  Please reach out to your insurance provider with any questions. If during the course of the call the physician/provider feels a video visit is not appropriate, you will not be charged for this service.\"     Patient has given verbal consent for Video visit? Yes    Subjective:  Patient began with report that she has had an exacerbation in her IBS symptoms and that she also believes that she has gallstones.  She reported that she is aware of several factors that stress increases her IBS symptoms.  Spent time in session today educating patient about mindfulness and a review of the importance of observing her emotions and being able to describe them.  She reported that she believes that she is benefiting from the slower pace but that a lack of deadlines is increasing her procrastination.      She reported that she has experienced more pain in the past week and that her ability to focus and concentrate on her " papers.  She reported that she has made progress on one but that she has two remaining.  She reported that she is concerned that she may not be able to complete her papers and graduate on time.      Patient was noted to continue to express frustration that she maintains an online relationship with a man who does not appear to have any desire to see her in person.  Encouraged her to consider how this relationship is meeting her needs and to consider how she might utilize her JOI skills to increase her understanding of his intentions.  She described anxiety surrounding raising the questions and is confused as to whether she can trust her instincts.      Encouraged patient to consider DBT and underscored that DBT will allow her to work on increasing her self trust.    She asked that I send her information but informed her that there are multiple online resources and pointed her to [employer] online.      Will review with her psychiatrist.      Objective:  Patient was on time for today s session, appropriately groomed and dressed, and demonstrated good eye contact.  She appeared tired but was alert and oriented. Mood was dysphoric with appropriate range of affect. Patient denied suicidal or assaultive ideation, plan, or intent.        Assessment:  The patient has a longstanding history of interpersonal discord and challenges with emotion regulation.  She is coping well with Covid lock down well.  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        Plan:  Patient may benefit from DBT skills training.     Time In: 10:00  Time Out: 11:00    Diagnosis:  Axis I PTSD, chronic, persistent depressive disorder, adjustment disorder with mixed   Axis II R/O BPD   [place] III please see medical records for details   [place] IV Psychosocial and Environmental Stressors: lock down and academic challenges and living alone with no family support         Corina  Jb, PhD,

## 2020-07-13 DIAGNOSIS — F41.1 GENERALIZED ANXIETY DISORDER: ICD-10-CM

## 2020-07-14 RX ORDER — GABAPENTIN 300 MG/1
300 CAPSULE ORAL 2 TIMES DAILY PRN
Qty: 60 CAPSULE | Refills: 0 | Status: SHIPPED | OUTPATIENT
Start: 2020-07-14 | End: 2020-07-29

## 2020-07-14 NOTE — TELEPHONE ENCOUNTER
Last seen: 6/8  RTC: 6 weeks  Non-provider cancel: None  No-show: None  Next appt: 7/27 (Trina)    Incoming refill from [id number] - [place], [place] - [address] AT [facility] [address]  via fax     Medication requested:  Disp  Refills  Start  End  ROSE     gabapentin (NEURONTIN) 300 MG capsule  60 capsule  0  6/8/2020   --    Sig - Route: Take 1 capsule (300 mg) by mouth 2 times daily as needed for other (severe anxiety)     Medication refill approved per refill protocol.  Writer e-prescribed 30-day supply to [facility] ([phone]). Qty 60, refills 0.

## 2020-07-15 ENCOUNTER — VIRTUAL VISIT (OUTPATIENT)
Dept: PSYCHOLOGY | Facility: CLINIC | Age: 35
End: 2020-07-15
Payer: COMMERCIAL

## 2020-07-15 DIAGNOSIS — Z53.9 ERRONEOUS ENCOUNTER--DISREGARD: Primary | ICD-10-CM

## 2020-07-17 ENCOUNTER — VIRTUAL VISIT (OUTPATIENT)
Dept: PSYCHOLOGY | Facility: CLINIC | Age: 35
End: 2020-07-17
Payer: COMMERCIAL

## 2020-07-17 DIAGNOSIS — F43.23 ADJUSTMENT DISORDER WITH MIXED ANXIETY AND DEPRESSED MOOD: Primary | ICD-10-CM

## 2020-07-17 DIAGNOSIS — F34.1 PERSISTENT DEPRESSIVE DISORDER: ICD-10-CM

## 2020-07-17 DIAGNOSIS — F33.0 MDD (MAJOR DEPRESSIVE DISORDER), RECURRENT EPISODE, MILD (H): ICD-10-CM

## 2020-07-17 DIAGNOSIS — F43.12 CHRONIC POST-TRAUMATIC STRESS DISORDER (PTSD): ICD-10-CM

## 2020-07-24 ENCOUNTER — VIRTUAL VISIT (OUTPATIENT)
Dept: PSYCHOLOGY | Facility: CLINIC | Age: 35
End: 2020-07-24
Payer: COMMERCIAL

## 2020-07-24 DIAGNOSIS — F43.12 CHRONIC POST-TRAUMATIC STRESS DISORDER (PTSD): ICD-10-CM

## 2020-07-24 DIAGNOSIS — F34.1 PERSISTENT DEPRESSIVE DISORDER: Primary | ICD-10-CM

## 2020-07-24 DIAGNOSIS — F33.0 MDD (MAJOR DEPRESSIVE DISORDER), RECURRENT EPISODE, MILD (H): ICD-10-CM

## 2020-07-24 DIAGNOSIS — F43.23 ADJUSTMENT DISORDER WITH MIXED ANXIETY AND DEPRESSED MOOD: ICD-10-CM

## 2020-07-27 ENCOUNTER — VIRTUAL VISIT (OUTPATIENT)
Dept: PSYCHIATRY | Facility: CLINIC | Age: 35
End: 2020-07-27
Attending: PSYCHIATRY & NEUROLOGY
Payer: COMMERCIAL

## 2020-07-27 ENCOUNTER — TELEPHONE (OUTPATIENT)
Dept: PSYCHIATRY | Facility: CLINIC | Age: 35
End: 2020-07-27

## 2020-07-27 DIAGNOSIS — F33.1 MAJOR DEPRESSIVE DISORDER, RECURRENT EPISODE, MODERATE (H): Primary | ICD-10-CM

## 2020-07-27 DIAGNOSIS — F41.9 ANXIETY: ICD-10-CM

## 2020-07-27 DIAGNOSIS — F33.1 MODERATE EPISODE OF RECURRENT MAJOR DEPRESSIVE DISORDER (H): ICD-10-CM

## 2020-07-27 DIAGNOSIS — F41.1 GENERALIZED ANXIETY DISORDER: ICD-10-CM
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
## 2020-07-27 NOTE — TELEPHONE ENCOUNTER
On 7/27/2020, at 1238, writer called patient at mobile to confirm Virtual Visit. Writer unable to make contact with patient. Writer left detailed voice message for callback. [phone], left as call back number. CATHERINE French, EMT

## 2020-07-29 RX ORDER — VILAZODONE HYDROCHLORIDE 40 MG/1
40 TABLET ORAL DAILY
Qty: 30 TABLET | Refills: 3 | Status: SHIPPED | OUTPATIENT
Start: 2020-07-29 | End: 2021-01-07

## 2020-07-29 RX ORDER — GABAPENTIN 300 MG/1
300 CAPSULE ORAL 2 TIMES DAILY PRN
Qty: 60 CAPSULE | Refills: 3 | Status: SHIPPED | OUTPATIENT
Start: 2020-07-29 | End: 2021-10-07

## 2020-07-29 RX ORDER — GABAPENTIN 400 MG/1
400 CAPSULE ORAL 3 TIMES DAILY
Qty: 90 CAPSULE | Refills: 3 | Status: SHIPPED | OUTPATIENT
Start: 2020-07-29 | End: 2021-09-21

## 2020-07-29 RX ORDER — BUPROPION HYDROCHLORIDE 300 MG/1
300 TABLET ORAL EVERY MORNING
Qty: 30 TABLET | Refills: 3 | Status: SHIPPED | OUTPATIENT
Start: 2020-07-29 | End: 2021-01-07

## 2020-07-31 ENCOUNTER — VIRTUAL VISIT (OUTPATIENT)
Dept: PSYCHOLOGY | Facility: CLINIC | Age: 35
End: 2020-07-31
Payer: COMMERCIAL

## 2020-07-31 DIAGNOSIS — F43.12 CHRONIC POST-TRAUMATIC STRESS DISORDER (PTSD): ICD-10-CM

## 2020-07-31 DIAGNOSIS — F33.0 MDD (MAJOR DEPRESSIVE DISORDER), RECURRENT EPISODE, MILD (H): ICD-10-CM

## 2020-07-31 DIAGNOSIS — F43.23 ADJUSTMENT DISORDER WITH MIXED ANXIETY AND DEPRESSED MOOD: ICD-10-CM

## 2020-07-31 DIAGNOSIS — F34.1 PERSISTENT DEPRESSIVE DISORDER: Primary | ICD-10-CM

## 2020-08-01 DIAGNOSIS — F41.9 ANXIETY: ICD-10-CM

## 2020-08-03 NOTE — PROGRESS NOTES
"Health Psychology - Follow up Visit  Confidential Summary*      REFERRAL SOURCE:  Psychiatry    CHIEF COMPLAINT/REASON FOR VISIT  Psychotherapy in context of chronic PTSD and persistent depression.  Patient also complains of dissatisfaction with interpersonal relationships and challenges with emotion regulation.      Patient was seen today for a 60 minute individual psychotherapy session. Session facilitated via doxy.me with patient at her home and provider at her own home.       This telehealth service is appropriate and effective for delivering services in light of the necessity for social distancing to mitigate the COVID-19 epidemic. Patient has agreed to receiving telehealth services.    The patient has been notified of following:   \"This video visit will be conducted via a call between you and your physician/provider. We have found that certain health care needs can be provided without the need for an in-person physical exam.   Video visits are billed at different rates depending on your insurance coverage.  Please reach out to your insurance provider with any questions. If during the course of the call the physician/provider feels a video visit is not appropriate, you will not be charged for this service.\"     Patient has given verbal consent for Video visit? Yes    Subjective: Patient began with report that she is finding that she is adapting well to the social isolation associated with the pandemic.  She reported that she misses a sense of \"normalcy\", the ease of life before the shut down.  She described ongoing anxiety and frustration that some do not appear to believe that the virus is a serious crisis and that argue against protections.  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  She has not had a car and has functioned well using public transportation but now this is challenging because of her concern with the " proximity to riders.  Spent much of session discussing her feelings of isolation and aloneness in [place].  Validated her perceptions and her opinion that there is a Scandinavian cultural of exclusion unless there is a long history of shared experiences.  She reported that she has felt rejected and confused by a belief that she has made a connection with another but soon learns that the exchange was not to be repeated.  Discussed the observation that even though she may not have created the dynamic, it is still up to her to learn to make friends.  Discussed how she may benefit from attempting to match style and match level of disclosure.  She reported that she has made more friends who are not from the area and that are also away from family.      Began DBT orientation with a review of the structure of the program and group attendance.  She has approximately 7 weeks to become oriented.     Today she reported that she is noticing less GI upset and believes having her gall bladder removed will result in overall less pain and discomfort.      Objective:  Patient was on time for today s session, appropriately groomed and dressed, and demonstrated good eye contact.  She appeared tired and sad but was alert and oriented. Mood was euthymic with appropriate range of affect. Patient denied suicidal or assaultive ideation, plan, or intent.        Assessment:  The patient has a longstanding history of interpersonal discord and challenges with emotion regulation.  She is coping well with Covid lock down but admits to loneliness and an awareness that she is largely alone.  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        Plan:  Patient will begin full model DBT in approximately 6 weeks.  We are beginning DBT orientation.  She will be in a group led by Dr. Mackenzie Corbin.     Time In: 11:00  Time Out: 12:00    Diagnosis:  Axis I PTSD, chronic, persistent  depressive disorder, adjustment disorder with mixed   Axis II R/O BPD   Lorane III please see medical records for details   Lorane IV Psychosocial and Environmental Stressors: lock down and academic challenges and living alone with no family support         Corina Muhammad, PhD, LP

## 2020-08-04 RX ORDER — GABAPENTIN 400 MG/1
CAPSULE ORAL
Qty: 90 CAPSULE | Refills: 3 | OUTPATIENT
Start: 2020-08-04

## 2020-08-07 ENCOUNTER — ANCILLARY PROCEDURE (OUTPATIENT)
Dept: GENERAL RADIOLOGY | Facility: CLINIC | Age: 35
End: 2020-08-07
Attending: FAMILY MEDICINE
Payer: COMMERCIAL

## 2020-08-07 ENCOUNTER — VIRTUAL VISIT (OUTPATIENT)
Dept: PSYCHOLOGY | Facility: CLINIC | Age: 35
End: 2020-08-07
Payer: COMMERCIAL

## 2020-08-07 ENCOUNTER — OFFICE VISIT (OUTPATIENT)
Dept: ORTHOPEDICS | Facility: CLINIC | Age: 35
End: 2020-08-07
Payer: COMMERCIAL

## 2020-08-07 VITALS — BODY MASS INDEX: 26.37 KG/M2 | WEIGHT: 174 LBS | HEIGHT: 68 IN

## 2020-08-07 DIAGNOSIS — F43.12 CHRONIC POST-TRAUMATIC STRESS DISORDER (PTSD): ICD-10-CM

## 2020-08-07 DIAGNOSIS — M53.3 COCCYDYNIA: ICD-10-CM

## 2020-08-07 DIAGNOSIS — F34.1 PERSISTENT DEPRESSIVE DISORDER: Primary | ICD-10-CM

## 2020-08-07 DIAGNOSIS — F43.23 ADJUSTMENT DISORDER WITH MIXED ANXIETY AND DEPRESSED MOOD: ICD-10-CM

## 2020-08-07 DIAGNOSIS — M53.3 COCCYDYNIA: Primary | ICD-10-CM

## 2020-08-07 DIAGNOSIS — F33.0 MDD (MAJOR DEPRESSIVE DISORDER), RECURRENT EPISODE, MILD (H): ICD-10-CM

## 2020-08-07 ASSESSMENT — MIFFLIN-ST. JEOR: SCORE: 1537.76

## 2020-08-07 NOTE — PROGRESS NOTES
CHIEF COMPLAINT:  Pain of the Pelvis       HISTORY OF PRESENT ILLNESS  Ms. Lau is a pleasant 35 year old year old female who presents to clinic today with chronic pain of coccyx region.  Kasandra explains that pain of coccyx began around February 2020.  Fall on the ice, slipped landing on buttocks.  Immediate pain and swelling of coccyx region.  Seen at outside clinic where injury was evaluated. No xrays taken at that time.  Pain has improved overall but now intermittent pain, decreased sensation and tingling has persisted.  Pain with increased activity.  No increase in pain or tingling with prolonged sitting.      Treatment to date has included stretches, ice, ibuprofen.  This did not help substantially.  No lower extremity weakness. No tingling into lower extremities. No bowel or bladder dysfunction. No saddle anesthesia.    Additional history: as documented    MEDICAL HISTORY  Patient Active Problem List   Diagnosis     Pelvic pain in female     Vitamin D deficiency     Menorrhagia with regular cycle     Migraine without aura and without status migrainosus, not intractable     Iron deficiency anemia due to chronic blood loss     Tired     Circadian rhythm sleep disorder, delayed sleep phase type     Unhealthy sleep habit     Seasonal mood disorder (H)     Chronic idiopathic urticaria       Current Outpatient Medications   Medication Sig Dispense Refill     Acetaminophen (TYLENOL PO) Take 650 mg by mouth every 4 hours as needed for mild pain or fever       albuterol (PROVENTIL HFA) 108 (90 Base) MCG/ACT inhaler Inhale 1-2 puffs into the lungs every 4 hours as needed        almotriptan (AXERT) 12.5 MG tablet Take 1 tablet (12.5 mg) by mouth daily as needed for migraine (repeat in 2 hours if needed) 18 tablet 11     ASMANEX, 30 METERED DOSES, 220 MCG/INH inhaler INHALE 1 PUFF BY MOUTH DAILY. RINSE MOUTH OR GARGLE AFTER USE  6     Azelastine HCl 137 MCG/SPRAY SOLN        buPROPion (WELLBUTRIN XL) 300 MG 24 hr tablet  Take 1 tablet (300 mg) by mouth every morning 30 tablet 3     dicyclomine (BENTYL) 20 MG tablet Take 1 tablet (20 mg) by mouth 4 times daily as needed (cramping) 30 tablet 0     EMGALITY 120 MG/ML injection Inject 1 mL (120 mg) Subcutaneous every 28 days 1 pen 11     etonogestrel-ethinyl estradiol (NUVARING) 0.12-0.015 MG/24HR vaginal ring Place 1 each vaginally every 28 days 3 each 3     gabapentin (NEURONTIN) 300 MG capsule Take 1 capsule (300 mg) by mouth 2 times daily as needed for other (severe anxiety) 60 capsule 3     gabapentin (NEURONTIN) 400 MG capsule Take 1 capsule (400 mg) by mouth 3 times daily 90 capsule 3     LORazepam (ATIVAN) 0.5 MG tablet Take 1 tablet (0.5 mg) by mouth daily as needed for anxiety 10 tablet 0     loteprednol (LOTEMAX) 0.5 % ophthalmic suspension Place 1-2 drops into both eyes 3 times daily 1 Bottle 1     neomycin-polymyxin-dexamethasone (MAXITROL) 3.5-28224-3.1 ophthalmic ointment Place 0.5 inches into both eyes At Bedtime 1 Tube 3     olopatadine (PATANOL) 0.1 % ophthalmic solution Place 1 drop into both eyes 2 times daily 1 Bottle 11     omalizumab (XOLAIR) 150 MG injection Inject Subcutaneous every 28 days       order for DME Use for 15-30 minutes every morning. 1 Units 0     vilazodone (VIIBRYD) 40 MG TABS tablet Take 1 tablet (40 mg) by mouth daily 30 tablet 3       Allergies   Allergen Reactions     Dust Mites Cough, Difficulty breathing and Shortness Of Breath     Cats      Chest tightness, sinus irritation       Family History   Problem Relation Age of Onset     Diabetes Maternal Grandfather      Diabetes No family hx of      Hypertension No family hx of      Coronary Artery Disease No family hx of      Hyperlipidemia No family hx of      Cerebrovascular Disease No family hx of      Breast Cancer No family hx of      Colon Cancer No family hx of      Prostate Cancer No family hx of      Other Cancer No family hx of      Glaucoma No family hx of      Macular Degeneration No  "family hx of        Additional medical/Social/Surgical histories reviewed in [facility] and updated as appropriate.     REVIEW OF SYSTEMS (8/7/2020)  A 10-point review of systems was obtained and is negative except for as noted in the HPI.      PHYSICAL EXAM  Ht 1.727 m (5' 8\")   Wt 78.9 kg (174 lb)   BMI 26.46 kg/m      General  - normal appearance, in no obvious distress  HEENT  - conjunctivae not injected, moist mucous membranes  CV  - normal peripheral perfusion  Pulm  - normal respiratory pattern, non-labored  Musculoskeletal - lumbar spine  - stance: Normal gait and stance  - inspection: normal bone and joint alignment, no obvious scoliosis  - palpation: Acutely tender with decreased sensation at coccyx at superior gluteal cleft.  - ROM: Full ROM of lumbar spine and hips.  - strength: lower extremities 5/5 in all planes  - special tests:  (-) straight leg raise bilaterally  (-) slump test  Neuro  -Focal decreased sensation at superior gluteal cleft centrally, grossly normal coordination, normal muscle tone. Otherwise normal sensation of lower extremities.  Skin  - no ecchymosis, erythema, warmth, or induration, no obvious rash  Psych  - interactive, appropriate, normal mood and affect    IMAGING :XR Sacrum/Coccyx 2Views. Final results and radiologist's interpretation, available in the [facility] health record. Images were reviewed with the patient/family members in the office today. My personal interpretation of the performed imaging is no acute abnormality or misalignment of coccyx in relation to sacrum.     ASSESSMENT & PLAN  Ms. Lau is a 35 year old year old female who presents to clinic today with chronic pain, numbness and tingling of coccygeal region of spine over the past 6 months refractory to rest, ice, analgesics.  Reviewed possible pathology and MR warranted. Consideration for coccygeal injection.    Diagnosis: Coccydynia    -MRI sacrum ATTN Sacrococcygeal region  -Consider physical therapy; women's " health if negative  -Discussed consideration for corticosteroid injections as well.  -Follow up after MRI- virtual visit recommended    It was a pleasure seeing Kasandra today.    Scot Aoyn DO, CAQSM  Primary Care Sports Medicine

## 2020-08-07 NOTE — PROGRESS NOTES
SPORTS & ORTHOPEDIC WALK-IN VISIT 8/7/2020    Primary Care Physician:      In the Winter she had a bad fall on ice onto her tailbone. She occasionally gets numbness and tingling around tailbone. She also has constant pain along the tailbone as well. She typically notices the numbness more at night.    Reason for visit:     What part of your body is injured / painful?  tailbone    What caused the injury /pain? Fall    How long ago did your injury occur or pain begin? Feb. 2020    What are your most bothersome symptoms? Pain and Numbness    How would you characterize your symptom?  numb    What makes your symptoms better? Yoga    What makes your symptoms worse? Sitting    Have you been previously seen for this problem? No    Medical History:    Any recent changes to your medical history? No    Any new medication prescribed since last visit? No    Have you had surgery on this body part before? Yes, cystectomy 4 years     Social History:    Occupation: Phd student    Handedness: Right    Exercise: Most days/week walking    Review of Systems:    Do you have fever, chills, weight loss? Yes, weight changes    Do you have any vision problems? No    Do you have any chest pain or edema? No    Do you have any shortness of breath or wheezing?  No    Do you have stomach problems? Yes, IBS    Do you have any numbness or focal weakness? No    Do you have diabetes? No    Do you have problems with bleeding or clotting? No    Do you have an rashes or other skin lesions? No

## 2020-08-07 NOTE — LETTER
8/7/2020         RE: Kasandra Lau  [address]  [place] [address]        Dear Colleague,    Thank you for referring your patient, Kasandra Lau, to the [facility] SPORTS AND ORTHOPAEDIC WALK IN CLINIC. Please see a copy of my visit note below.          SPORTS & ORTHOPEDIC WALK-IN VISIT 8/7/2020    Primary Care Physician:      In the Winter she had a bad fall on ice onto her tailbone. She occasionally gets numbness and tingling around tailbone. She also has constant pain along the tailbone as well. She typically notices the numbness more at night.    Reason for visit:     What part of your body is injured / painful?  tailbone    What caused the injury /pain? Fall    How long ago did your injury occur or pain begin? Feb. 2020    What are your most bothersome symptoms? Pain and Numbness    How would you characterize your symptom?  numb    What makes your symptoms better? Yoga    What makes your symptoms worse? Sitting    Have you been previously seen for this problem? No    Medical History:    Any recent changes to your medical history? No    Any new medication prescribed since last visit? No    Have you had surgery on this body part before? Yes, cystectomy 4 years     Social History:    Occupation: Phd student    Handedness: Right    Exercise: Most days/week walking    Review of Systems:    Do you have fever, chills, weight loss? Yes, weight changes    Do you have any vision problems? No    Do you have any chest pain or edema? No    Do you have any shortness of breath or wheezing?  No    Do you have stomach problems? Yes, IBS    Do you have any numbness or focal weakness? No    Do you have diabetes? No    Do you have problems with bleeding or clotting? No    Do you have an rashes or other skin lesions? No           CHIEF COMPLAINT:  Pain of the Pelvis       HISTORY OF PRESENT ILLNESS  Ms. Lau is a pleasant 35 year old year old female who presents to clinic today with chronic pain of coccyx  region.  Kasandra explains that pain of coccyx began around February 2020.  Fall on the ice, slipped landing on buttocks.  Immediate pain and swelling of coccyx region.  Seen at outside clinic where injury was evaluated. No xrays taken at that time.  Pain has improved overall but now intermittent pain, decreased sensation and tingling has persisted.  Pain with increased activity.  No increase in pain or tingling with prolonged sitting.      Treatment to date has included stretches, ice, ibuprofen.  This did not help substantially.  No lower extremity weakness. No tingling into lower extremities. No bowel or bladder dysfunction. No saddle anesthesia.    Additional history: as documented    MEDICAL HISTORY  Patient Active Problem List   Diagnosis     Pelvic pain in female     Vitamin D deficiency     Menorrhagia with regular cycle     Migraine without aura and without status migrainosus, not intractable     Iron deficiency anemia due to chronic blood loss     Tired     Circadian rhythm sleep disorder, delayed sleep phase type     Unhealthy sleep habit     Seasonal mood disorder (H)     Chronic idiopathic urticaria       Current Outpatient Medications   Medication Sig Dispense Refill     Acetaminophen (TYLENOL PO) Take 650 mg by mouth every 4 hours as needed for mild pain or fever       albuterol (PROVENTIL HFA) 108 (90 Base) MCG/ACT inhaler Inhale 1-2 puffs into the lungs every 4 hours as needed        almotriptan (AXERT) 12.5 MG tablet Take 1 tablet (12.5 mg) by mouth daily as needed for migraine (repeat in 2 hours if needed) 18 tablet 11     ASMANEX, 30 METERED DOSES, 220 MCG/INH inhaler INHALE 1 PUFF BY MOUTH DAILY. RINSE MOUTH OR GARGLE AFTER USE  6     Azelastine HCl 137 MCG/SPRAY SOLN        buPROPion (WELLBUTRIN XL) 300 MG 24 hr tablet Take 1 tablet (300 mg) by mouth every morning 30 tablet 3     dicyclomine (BENTYL) 20 MG tablet Take 1 tablet (20 mg) by mouth 4 times daily as needed (cramping) 30 tablet 0      EMGALITY 120 MG/ML injection Inject 1 mL (120 mg) Subcutaneous every 28 days 1 pen 11     etonogestrel-ethinyl estradiol (NUVARING) 0.12-0.015 MG/24HR vaginal ring Place 1 each vaginally every 28 days 3 each 3     gabapentin (NEURONTIN) 300 MG capsule Take 1 capsule (300 mg) by mouth 2 times daily as needed for other (severe anxiety) 60 capsule 3     gabapentin (NEURONTIN) 400 MG capsule Take 1 capsule (400 mg) by mouth 3 times daily 90 capsule 3     LORazepam (ATIVAN) 0.5 MG tablet Take 1 tablet (0.5 mg) by mouth daily as needed for anxiety 10 tablet 0     loteprednol (LOTEMAX) 0.5 % ophthalmic suspension Place 1-2 drops into both eyes 3 times daily 1 Bottle 1     neomycin-polymyxin-dexamethasone (MAXITROL) 3.5-02339-4.1 ophthalmic ointment Place 0.5 inches into both eyes At Bedtime 1 Tube 3     olopatadine (PATANOL) 0.1 % ophthalmic solution Place 1 drop into both eyes 2 times daily 1 Bottle 11     omalizumab (XOLAIR) 150 MG injection Inject Subcutaneous every 28 days       order for DME Use for 15-30 minutes every morning. 1 Units 0     vilazodone (VIIBRYD) 40 MG TABS tablet Take 1 tablet (40 mg) by mouth daily 30 tablet 3       Allergies   Allergen Reactions     Dust Mites Cough, Difficulty breathing and Shortness Of Breath     Cats      Chest tightness, sinus irritation       Family History   Problem Relation Age of Onset     Diabetes Maternal Grandfather      Diabetes No family hx of      Hypertension No family hx of      Coronary Artery Disease No family hx of      Hyperlipidemia No family hx of      Cerebrovascular Disease No family hx of      Breast Cancer No family hx of      Colon Cancer No family hx of      Prostate Cancer No family hx of      Other Cancer No family hx of      Glaucoma No family hx of      Macular Degeneration No family hx of        Additional medical/Social/Surgical histories reviewed in [facility] and updated as appropriate.     REVIEW OF SYSTEMS (8/7/2020)  A 10-point review of systems was  "obtained and is negative except for as noted in the HPI.      PHYSICAL EXAM  Ht 1.727 m (5' 8\")   Wt 78.9 kg (174 lb)   BMI 26.46 kg/m      General  - normal appearance, in no obvious distress  HEENT  - conjunctivae not injected, moist mucous membranes  CV  - normal peripheral perfusion  Pulm  - normal respiratory pattern, non-labored  Musculoskeletal - lumbar spine  - stance: Normal gait and stance  - inspection: normal bone and joint alignment, no obvious scoliosis  - palpation: Acutely tender with decreased sensation at coccyx at superior gluteal cleft.  - ROM: Full ROM of lumbar spine and hips.  - strength: lower extremities 5/5 in all planes  - special tests:  (-) straight leg raise bilaterally  (-) slump test  Neuro  -Focal decreased sensation at superior gluteal cleft centrally, grossly normal coordination, normal muscle tone. Otherwise normal sensation of lower extremities.  Skin  - no ecchymosis, erythema, warmth, or induration, no obvious rash  Psych  - interactive, appropriate, normal mood and affect    IMAGING :XR Sacrum/Coccyx 2Views. Final results and radiologist's interpretation, available in the [facility] health record. Images were reviewed with the patient/family members in the office today. My personal interpretation of the performed imaging is no acute abnormality or misalignment of coccyx in relation to sacrum.     ASSESSMENT & PLAN  Ms. Lau is a 35 year old year old female who presents to clinic today with chronic pain, numbness and tingling of coccygeal region of spine over the past 6 months refractory to rest, ice, analgesics.  Reviewed possible pathology and MR warranted. Consideration for coccygeal injection.    Diagnosis: Coccydynia    -MRI sacrum ATTN Sacrococcygeal region  -Consider physical therapy; women's health if negative  -Discussed consideration for corticosteroid injections as well.  -Follow up after MRI- virtual visit recommended    It was a pleasure seeing Kasandra today.    Scot" DO Gabo, CAQSM  Primary Care Sports Medicine

## 2020-08-14 ENCOUNTER — VIRTUAL VISIT (OUTPATIENT)
Dept: PSYCHOLOGY | Facility: CLINIC | Age: 35
End: 2020-08-14
Payer: COMMERCIAL

## 2020-08-14 ENCOUNTER — TELEPHONE (OUTPATIENT)
Dept: ORTHOPEDICS | Facility: CLINIC | Age: 35
End: 2020-08-14

## 2020-08-14 DIAGNOSIS — F43.12 CHRONIC POST-TRAUMATIC STRESS DISORDER (PTSD): ICD-10-CM

## 2020-08-14 DIAGNOSIS — F43.23 ADJUSTMENT DISORDER WITH MIXED ANXIETY AND DEPRESSED MOOD: ICD-10-CM

## 2020-08-14 DIAGNOSIS — F34.1 PERSISTENT DEPRESSIVE DISORDER: Primary | ICD-10-CM

## 2020-08-14 NOTE — TELEPHONE ENCOUNTER
I called the patient to check her in for her video visit that was scheduled for today. The patient stated that she was unable to get her MRI due to being sick. I told her that we could still do the video visit to check in or we could reschedule to a later date after she gets the MRI. The patient agreed to cancel the appointment and will follow up after she gets the MRI. The patient was send a [employer] message with phone numbers to schedule the MRI as well as a follow up appointment. The patient understood and had no further questions at this time.     ALBA Goncalves

## 2020-08-16 NOTE — PROGRESS NOTES
"  Health Psychology - Follow up Visit  Confidential Summary*      REFERRAL SOURCE:  Psychiatry    CHIEF COMPLAINT/REASON FOR VISIT  Psychotherapy in context of chronic PTSD and persistent depression.  Patient also complains of dissatisfaction with interpersonal relationships and challenges with emotion regulation.      Patient was seen today for a 60 minute individual psychotherapy session. Session facilitated via [email or web address] with patient at her home and provider at her own home.       This telehealth service is appropriate and effective for delivering services in light of the necessity for social distancing to mitigate the COVID-19 epidemic. Patient has agreed to receiving telehealth services.    The patient has been notified of following:   \"This video visit will be conducted via a call between you and your physician/provider. We have found that certain health care needs can be provided without the need for an in-person physical exam.   Video visits are billed at different rates depending on your insurance coverage.  Please reach out to your insurance provider with any questions. If during the course of the call the physician/provider feels a video visit is not appropriate, you will not be charged for this service.\"     Patient has given verbal consent for Video visit? Yes    Subjective:  Patient begn with report that she is increasingly disappointed in the lack of engagement with a friend whom she had thought may be developing into a more romantic involvement.  She described the perception that [place] fraught with passive communication which she has difficulty understanding.  Discussed the role that she may play in these relationships and she admits that she hold out hope that relationships may change when there is no evidence to support.  Discussed her perception that she has to be useful to a partner in order to be appreciated.      Patient has decided that she is open to " participating in DBT.  Discussed the next steps which will include an orientation.  She will begin the group in no less than 8 weeks so we will have time to prepare the DBT orientation.  Encouraged her to identify the target behaviors she would like to target for treatment.      Today patient also discussed her anxiety surrounding completing school papers.  Encouraged her to utilize me as an accountability partner.  Encouraged her also to open up an email sent to her by her profession and together we read it and checked the facts surrounding her assumption that the paper is not good.  In fact, the email was quite the contrary.  He wanted her to add a few sections to summarize the work that he is most interested in however, he encouraged her that the paper was close to completion.  Discussed the possibility that she may have a filter that may distort what is said to her to convey disrespect, disappointment and judgement.  Encouraged her to consider how she might utilize the skill of check the facts when she notes that a default is to feel persecuted and unfairly treated because of her race, gender or other reasons beyond her control.        Objective:  Patient was on time for today s session, appropriately groomed and dressed, and demonstrated good eye contact.  She appeared tired and sad but was alert and oriented. Mood was dysphoric with appropriate range of affect. Patient denied suicidal or assaultive ideation, plan, or intent.        Assessment:  The patient has a longstanding history of interpersonal discord and challenges with emotion regulation.  She is coping well with Covid lock down well.  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        Plan:  Patient may benefit from DBT skills training.     Time In: 2:30  Time Out: 3:30    Diagnosis:  Axis I PTSD, chronic, persistent depressive disorder, adjustment disorder with mixed   Axis II R/O BPD    Axis III please see medical records for details   [place] IV Psychosocial and Environmental Stressors: lock down and academic challenges and living alone with no family support         Corina Muhammad PhD, LP

## 2020-08-21 ENCOUNTER — VIRTUAL VISIT (OUTPATIENT)
Dept: PSYCHOLOGY | Facility: CLINIC | Age: 35
End: 2020-08-21
Payer: COMMERCIAL

## 2020-08-21 DIAGNOSIS — F34.1 PERSISTENT DEPRESSIVE DISORDER: Primary | ICD-10-CM

## 2020-08-21 DIAGNOSIS — F43.23 ADJUSTMENT DISORDER WITH MIXED ANXIETY AND DEPRESSED MOOD: ICD-10-CM

## 2020-08-21 DIAGNOSIS — F33.0 MDD (MAJOR DEPRESSIVE DISORDER), RECURRENT EPISODE, MILD (H): ICD-10-CM

## 2020-08-21 DIAGNOSIS — F43.12 CHRONIC POST-TRAUMATIC STRESS DISORDER (PTSD): ICD-10-CM

## 2020-08-24 NOTE — PROGRESS NOTES
"  Health Psychology - Follow up Visit  Confidential Summary*      REFERRAL SOURCE:  Psychiatry    CHIEF COMPLAINT/REASON FOR VISIT  Psychotherapy in context of chronic PTSD and persistent depression.  Patient also complains of dissatisfaction with interpersonal relationships and challenges with emotion regulation.      Patient was seen today for a 60 minute individual psychotherapy session. Session facilitated via [email or web address] with patient at her home and provider at her own home.       This telehealth service is appropriate and effective for delivering services in light of the necessity for social distancing to mitigate the COVID-19 epidemic. Patient has agreed to receiving telehealth services.    The patient has been notified of following:   \"This video visit will be conducted via a call between you and your physician/provider. We have found that certain health care needs can be provided without the need for an in-person physical exam.   Video visits are billed at different rates depending on your insurance coverage.  Please reach out to your insurance provider with any questions. If during the course of the call the physician/provider feels a video visit is not appropriate, you will not be charged for this service.\"     Patient has given verbal consent for Video visit? Yes    Subjective:  Patient began with report that she continues to experience pain at night. She described a recent emergency situation in which she had increased pain and her friend took her to the ED in the evening.  She was found to be in crisis surrounding her gallbladder and had an emergency surgery.  She reported that she had an overnight hospital stay.  She described feelings of confusion that her providers had not identified that her gallbladder may have been the source of her pain.  She is now recovering at home and is optimistic that the surgery was both successful and that she may now experience fewer abdominal symptoms. Discussed the " dietary implications of surgery and she largely appears to not understand how she should change her intake.  Discussed making an appointment with GI nutrition for guidance.      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  Until this is complete, with 3 manuscripts, she is not in the ABD status and there are financial implications as well as enrolling into dissertation hours.  She has one paper completed and we discussed her challenges with emotion mind and getting overwhelmed with concerns about how her committee may perceive her readiness for moving forward on her training.  Discussed how she might focus on effectiveness and opposite to emotion action and the goal of DBT is decreased misery and achieving life goals.      She sent an email to her advisor.  She is noted to be competent and to have good use of language and skills to communicate.  She is noted to become defensive in preparation for criticism.      Will begin prep for DBT skills group.        Objective:  Patient was on time for today s session, appropriately groomed and dressed, and demonstrated good eye contact.  She appeared tired and sad but was alert and oriented. Mood was dysphoric with appropriate range of affect. Patient denied suicidal or assaultive ideation, plan, or intent.        Assessment:  The patient has a longstanding history of interpersonal discord and challenges with emotion regulation.  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     Plan:  Patient may benefit from DBT skills training and preparation has begun.     Time In: 2:30  Time Out: 3:30    Diagnosis:  Axis I PTSD, chronic, persistent depressive disorder, adjustment disorder with mixed   Axis II R/O BPD   [place] III please see medical records for details   [place] IV Psychosocial and Environmental Stressors: lock down and academic challenges and living alone with no family support         Corina  Jb, PhD,

## 2020-08-25 ENCOUNTER — MYC REFILL (OUTPATIENT)
Dept: NEUROLOGY | Facility: CLINIC | Age: 35
End: 2020-08-25

## 2020-08-25 DIAGNOSIS — G43.709 CHRONIC MIGRAINE WITHOUT AURA WITHOUT STATUS MIGRAINOSUS, NOT INTRACTABLE: ICD-10-CM

## 2020-08-26 RX ORDER — ALMOTRIPTAN 12.5 MG/1
12.5 TABLET, FILM COATED ORAL DAILY PRN
Qty: 18 TABLET | Refills: 11 | Status: SHIPPED | OUTPATIENT
Start: 2020-08-26 | End: 2021-06-09

## 2020-08-26 RX ORDER — GALCANEZUMAB 120 MG/ML
120 INJECTION, SOLUTION SUBCUTANEOUS
Qty: 1 PEN | Refills: 11 | Status: SHIPPED | OUTPATIENT
Start: 2020-08-26 | End: 2021-06-09

## 2020-08-26 NOTE — TELEPHONE ENCOUNTER
Rx Authorization:    Requested Medication/ Dose: Emgality 120MG/ML injections    Date last refill ordered: 4/14/20    Quantity ordered: 1 pen     # refills: 11    Date of last clinic visit with ordering provider: 4/14/20    Date of next clinic visit with ordering provider: 10/12/20    All pertinent protocol data (lab date/result):     Include pertinent information from patients message:     Rx Authorization:    Requested Medication/ Dose: Almotriptan (Axert) 12.5 MG Tabs    Date last refill ordered: 4/14/20    Quantity ordered: 18 tabs    # refills: 11    Date of last clinic visit with ordering provider: 4/14/20    Date of next clinic visit with ordering provider: 10/12/20    All pertinent protocol data (lab date/result):     Include pertinent information from patients message:

## 2020-09-03 PROBLEM — J45.20 MILD INTERMITTENT ASTHMA WITHOUT COMPLICATION: Status: ACTIVE | Noted: 2018-04-30

## 2020-09-03 PROBLEM — Z87.42 HX OF ABNORMAL CERVICAL PAP SMEAR: Status: ACTIVE | Noted: 2020-02-03

## 2020-09-03 PROBLEM — Z51.6 NEED FOR DESENSITIZATION TO ALLERGENS: Status: ACTIVE | Noted: 2019-06-06

## 2020-09-03 PROBLEM — K21.9 ACID REFLUX: Status: ACTIVE | Noted: 2019-08-25

## 2020-09-03 PROBLEM — K81.9 CHOLECYSTITIS: Status: ACTIVE | Noted: 2020-07-16

## 2020-09-04 ENCOUNTER — VIRTUAL VISIT (OUTPATIENT)
Dept: PSYCHOLOGY | Facility: CLINIC | Age: 35
End: 2020-09-04
Payer: COMMERCIAL

## 2020-09-04 DIAGNOSIS — F43.23 ADJUSTMENT DISORDER WITH MIXED ANXIETY AND DEPRESSED MOOD: ICD-10-CM

## 2020-09-04 DIAGNOSIS — F43.12 CHRONIC POST-TRAUMATIC STRESS DISORDER (PTSD): ICD-10-CM

## 2020-09-04 DIAGNOSIS — F34.1 PERSISTENT DEPRESSIVE DISORDER: Primary | ICD-10-CM

## 2020-09-08 ENCOUNTER — OFFICE VISIT (OUTPATIENT)
Dept: PLASTIC SURGERY | Facility: CLINIC | Age: 35
End: 2020-09-08
Attending: PLASTIC SURGERY
Payer: COMMERCIAL

## 2020-09-08 VITALS
SYSTOLIC BLOOD PRESSURE: 120 MMHG | HEIGHT: 68 IN | WEIGHT: 172.2 LBS | BODY MASS INDEX: 26.1 KG/M2 | HEART RATE: 101 BPM | DIASTOLIC BLOOD PRESSURE: 84 MMHG | OXYGEN SATURATION: 99 %

## 2020-09-08 DIAGNOSIS — N62 HYPERTROPHY OF BREAST: Primary | ICD-10-CM

## 2020-09-08 RX ORDER — CEFAZOLIN SODIUM 2 G/50ML
2 SOLUTION INTRAVENOUS
Status: CANCELLED | OUTPATIENT
Start: 2020-09-08

## 2020-09-08 RX ORDER — CEFAZOLIN SODIUM 1 G/50ML
1 INJECTION, SOLUTION INTRAVENOUS SEE ADMIN INSTRUCTIONS
Status: CANCELLED | OUTPATIENT
Start: 2020-09-08

## 2020-09-08 ASSESSMENT — MIFFLIN-ST. JEOR: SCORE: 1529.59

## 2020-09-08 ASSESSMENT — PAIN SCALES - GENERAL: PAINLEVEL: NO PAIN (0)

## 2020-09-08 NOTE — LETTER
9/8/2020     RE: Kasandra Lau  [address]  [place] [address]    Dear Colleague,    Thank you for referring your patient, Kasandra Lau, to the [facility]. Please see a copy of my visit note below.    PRESENTING COMPLAINT:  Breast reduction.      HISTORY OF PRESENTING COMPLAINT:  Ms. Lau is 35 years old.  She is interested in breast reduction.  She has been large breasted all of her adult and adolescent life.  She has been thinking about this for some time.  She has a lot of upper back, neck, shoulder pain, shoulder grooving from bra straps and inframammary fold rashes during summers.  She has used all sorts of over-the-counter as well as prescribed medications and supportive garments without continued relief.  She wears a K size bra.  She would like to be around a C cup.  She has no personal or family history of breast cancer.      PAST MEDICAL HISTORY:  Acid reflux, IBS, migraines, endometriosis, depression, anxiety.      PAST SURGICAL HISTORY:  Laparoscopic cholecystectomy, laparoscopic ovarian cyst removal and a left wrist fracture repair, endometriosis ablation.      MEDICATIONS:  Albuterol, bupropion, Emgality, gabapentin, Viibryd.      ALLERGIES:  Nil.      SOCIAL HISTORY:  Used to smoke in college off and on, quit 10 years ago, drinks socially.  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      REVIEW OF SYSTEMS:  Denies chest pain, shortness of breath, MI, CVA, DVT and PE.      PHYSICAL EXAMINATION:  Vital signs are stable.  She is afebrile, in no obvious distress.  She is 5 feet 8 inches, 172 pounds, BMI 26.1 kg/m2, body surface area 1.93 m2.  On examination of her breasts, she has grade 2/3 ptosis.  Right breast is longer and larger than the left.  She has sternal [id number] distance under 40 cm.  Right breast is about 5%-10% larger.      ASSESSMENT AND PLAN:  Based on above findings, a diagnosis of symptomatic bilateral breast hypertrophy was  made.  I had a brenden discussion with the patient about breast reduction.  I explained to her how it would be done, showed her where the scars would be.  I was very clear about expectation management in terms of asymmetries, nipple sensory loss, prominent permanent scarring, re-hypertrophy in the future, sensation deficits and inability to breastfeed.  All risks, benefits and alternatives of the procedure including pain, infection, bleeding, scarring, asymmetry, seromas, hematomas, wound breakdown, wound dehiscence, skin necrosis, fat necrosis, T-junction site necrosis, nipple necrosis, DVT, PE, MI, CVA, pneumonia, renal failure and death were all explained.  She understood everything and wants to proceed.  Consent obtained.  Photographs obtained.  Based on her Schnur scale, 550 grams needs to be removed from each breast.  I think that is easily a possibly with a size that she wants.  Prior authorization is required.  All questions were answered.  She was happy with the visit.  She will get a mammogram.      Total time spent with the patient was 30 minutes, more than half counseling.     Again, thank you for allowing me to participate in the care of your patient.      Sincerely,      ANTONIO Moreno MD

## 2020-09-08 NOTE — PROGRESS NOTES
PRESENTING COMPLAINT:  Breast reduction.      HISTORY OF PRESENTING COMPLAINT:  Ms. Lau is 35 years old.  She is interested in breast reduction.  She has been large breasted all of her adult and adolescent life.  She has been thinking about this for some time.  She has a lot of upper back, neck, shoulder pain, shoulder grooving from bra straps and inframammary fold rashes during summers.  She has used all sorts of over-the-counter as well as prescribed medications and supportive garments without continued relief.  She wears a K size bra.  She would like to be around a C cup.  She has no personal or family history of breast cancer.      PAST MEDICAL HISTORY:  Acid reflux, IBS, migraines, endometriosis, depression, anxiety.      PAST SURGICAL HISTORY:  Laparoscopic cholecystectomy, laparoscopic ovarian cyst removal and a left wrist fracture repair, endometriosis ablation.      MEDICATIONS:  Albuterol, bupropion, Emgality, gabapentin, Viibryd.      ALLERGIES:  Nil.      SOCIAL HISTORY:  Used to smoke in college off and on, quit 10 years ago, drinks socially.  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      REVIEW OF SYSTEMS:  Denies chest pain, shortness of breath, MI, CVA, DVT and PE.      PHYSICAL EXAMINATION:  Vital signs are stable.  She is afebrile, in no obvious distress.  She is 5 feet 8 inches, 172 pounds, BMI 26.1 kg/m2, body surface area 1.93 m2.  On examination of her breasts, she has grade 2/3 ptosis.  Right breast is longer and larger than the left.  She has sternal [id number] distance under 40 cm.  Right breast is about 5%-10% larger.      ASSESSMENT AND PLAN:  Based on above findings, a diagnosis of symptomatic bilateral breast hypertrophy was made.  I had a brenden discussion with the patient about breast reduction.  I explained to her how it would be done, showed her where the scars would be.  I was very clear about expectation management in terms of asymmetries, nipple  sensory loss, prominent permanent scarring, re-hypertrophy in the future, sensation deficits and inability to breastfeed.  All risks, benefits and alternatives of the procedure including pain, infection, bleeding, scarring, asymmetry, seromas, hematomas, wound breakdown, wound dehiscence, skin necrosis, fat necrosis, T-junction site necrosis, nipple necrosis, DVT, PE, MI, CVA, pneumonia, renal failure and death were all explained.  She understood everything and wants to proceed.  Consent obtained.  Photographs obtained.  Based on her Schnur scale, 550 grams needs to be removed from each breast.  I think that is easily a possibly with a size that she wants.  Prior authorization is required.  All questions were answered.  She was happy with the visit.  She will get a mammogram.      Total time spent with the patient was 30 minutes, more than half counseling.

## 2020-09-10 ENCOUNTER — ANCILLARY PROCEDURE (OUTPATIENT)
Dept: MAMMOGRAPHY | Facility: CLINIC | Age: 35
End: 2020-09-10
Attending: PLASTIC SURGERY
Payer: COMMERCIAL

## 2020-09-10 ENCOUNTER — TELEPHONE (OUTPATIENT)
Dept: SURGERY | Facility: CLINIC | Age: 35
End: 2020-09-10

## 2020-09-10 ENCOUNTER — HOSPITAL ENCOUNTER (OUTPATIENT)
Facility: AMBULATORY SURGERY CENTER | Age: 35
End: 2020-09-10
Attending: PLASTIC SURGERY
Payer: COMMERCIAL

## 2020-09-10 DIAGNOSIS — N62 HYPERTROPHY OF BREAST: ICD-10-CM

## 2020-09-10 DIAGNOSIS — Z11.59 ENCOUNTER FOR SCREENING FOR OTHER VIRAL DISEASES: Primary | ICD-10-CM

## 2020-09-10 NOTE — TELEPHONE ENCOUNTER
Called patient and offered 12/16 but patient is moving out of state on 10/15. Was able to schedule for 9/24 but need PA prior. Sent message to PA specialist to see if we can get PA by 9/24. Explained to patient I would call back once I had more information. Patient understood and had no further questions at this time.

## 2020-09-11 ENCOUNTER — TELEPHONE (OUTPATIENT)
Dept: SURGERY | Facility: CLINIC | Age: 35
End: 2020-09-11

## 2020-09-11 ENCOUNTER — VIRTUAL VISIT (OUTPATIENT)
Dept: PSYCHOLOGY | Facility: CLINIC | Age: 35
End: 2020-09-11
Payer: COMMERCIAL

## 2020-09-11 DIAGNOSIS — F43.12 CHRONIC POST-TRAUMATIC STRESS DISORDER (PTSD): ICD-10-CM

## 2020-09-11 DIAGNOSIS — F43.23 ADJUSTMENT DISORDER WITH MIXED ANXIETY AND DEPRESSED MOOD: ICD-10-CM

## 2020-09-11 DIAGNOSIS — F34.1 PERSISTENT DEPRESSIVE DISORDER: Primary | ICD-10-CM

## 2020-09-11 NOTE — TELEPHONE ENCOUNTER
left a vm with patient, need a phone call back today, need documentation for submitting a pa for [facility]

## 2020-09-11 NOTE — TELEPHONE ENCOUNTER
talked with patient in length, she will call dr Sesay's office for a letter stating issues and will call [facility] as well

## 2020-09-11 NOTE — TELEPHONE ENCOUNTER
Called patient and explained we are still working on PA. She let me know she goes to [facility] for her PCP. She would like Molly to call back and go over what else is needed for PA. Sending message to Molly.

## 2020-09-12 ENCOUNTER — ANCILLARY PROCEDURE (OUTPATIENT)
Dept: GENERAL RADIOLOGY | Facility: CLINIC | Age: 35
End: 2020-09-12
Attending: PREVENTIVE MEDICINE
Payer: COMMERCIAL

## 2020-09-12 ENCOUNTER — OFFICE VISIT (OUTPATIENT)
Dept: URGENT CARE | Facility: URGENT CARE | Age: 35
End: 2020-09-12
Payer: COMMERCIAL

## 2020-09-12 ENCOUNTER — VIRTUAL VISIT (OUTPATIENT)
Dept: FAMILY MEDICINE | Facility: OTHER | Age: 35
End: 2020-09-12

## 2020-09-12 VITALS
TEMPERATURE: 98.9 F | DIASTOLIC BLOOD PRESSURE: 80 MMHG | OXYGEN SATURATION: 98 % | WEIGHT: 172 LBS | SYSTOLIC BLOOD PRESSURE: 120 MMHG | BODY MASS INDEX: 26.07 KG/M2 | HEART RATE: 96 BPM | HEIGHT: 68 IN | RESPIRATION RATE: 14 BRPM

## 2020-09-12 DIAGNOSIS — J02.9 SORE THROAT: ICD-10-CM

## 2020-09-12 DIAGNOSIS — R07.9 CHEST PAIN, UNSPECIFIED TYPE: Primary | ICD-10-CM

## 2020-09-12 DIAGNOSIS — M79.10 MYALGIA: ICD-10-CM

## 2020-09-12 DIAGNOSIS — R30.0 DYSURIA: ICD-10-CM

## 2020-09-12 DIAGNOSIS — R06.02 SOB (SHORTNESS OF BREATH): ICD-10-CM

## 2020-09-12 DIAGNOSIS — R07.9 CHEST PAIN, UNSPECIFIED TYPE: ICD-10-CM

## 2020-09-12 LAB
ALBUMIN UR-MCNC: NEGATIVE MG/DL
AMORPH CRY #/AREA URNS HPF: ABNORMAL /HPF
APPEARANCE UR: CLEAR
BACTERIA #/AREA URNS HPF: ABNORMAL /HPF
BASOPHILS # BLD AUTO: 0 10E9/L (ref 0–0.2)
BASOPHILS NFR BLD AUTO: 0.3 %
BILIRUB UR QL STRIP: NEGATIVE
COLOR UR AUTO: YELLOW
DEPRECATED S PYO AG THROAT QL EIA: NEGATIVE
DIFFERENTIAL METHOD BLD: ABNORMAL
EOSINOPHIL # BLD AUTO: 0.1 10E9/L (ref 0–0.7)
EOSINOPHIL NFR BLD AUTO: 1.5 %
ERYTHROCYTE [DISTWIDTH] IN BLOOD BY AUTOMATED COUNT: 12.5 % (ref 10–15)
GLUCOSE UR STRIP-MCNC: NEGATIVE MG/DL
HCT VFR BLD AUTO: 35.7 % (ref 35–47)
HGB BLD-MCNC: 11.6 G/DL (ref 11.7–15.7)
HGB UR QL STRIP: ABNORMAL
KETONES UR STRIP-MCNC: NEGATIVE MG/DL
LEUKOCYTE ESTERASE UR QL STRIP: NEGATIVE
LYMPHOCYTES # BLD AUTO: 3.3 10E9/L (ref 0.8–5.3)
LYMPHOCYTES NFR BLD AUTO: 44.9 %
MCH RBC QN AUTO: 28.9 PG (ref 26.5–33)
MCHC RBC AUTO-ENTMCNC: 32.5 G/DL (ref 31.5–36.5)
MCV RBC AUTO: 89 FL (ref 78–100)
MONOCYTES # BLD AUTO: 0.5 10E9/L (ref 0–1.3)
MONOCYTES NFR BLD AUTO: 6.4 %
NEUTROPHILS # BLD AUTO: 3.4 10E9/L (ref 1.6–8.3)
NEUTROPHILS NFR BLD AUTO: 46.9 %
NITRATE UR QL: NEGATIVE
PH UR STRIP: 7.5 PH (ref 5–7)
PLATELET # BLD AUTO: 371 10E9/L (ref 150–450)
RBC # BLD AUTO: 4.01 10E12/L (ref 3.8–5.2)
RBC #/AREA URNS AUTO: ABNORMAL /HPF
SOURCE: ABNORMAL
SP GR UR STRIP: 1.02 (ref 1–1.03)
SPECIMEN SOURCE: NORMAL
UROBILINOGEN UR STRIP-ACNC: 0.2 EU/DL (ref 0.2–1)
WBC # BLD AUTO: 7.3 10E9/L (ref 4–11)
WBC #/AREA URNS AUTO: ABNORMAL /HPF

## 2020-09-12 PROCEDURE — 86140 C-REACTIVE PROTEIN: CPT | Performed by: PREVENTIVE MEDICINE

## 2020-09-12 PROCEDURE — 87651 STREP A DNA AMP PROBE: CPT | Performed by: PREVENTIVE MEDICINE

## 2020-09-12 PROCEDURE — 83690 ASSAY OF LIPASE: CPT | Performed by: PREVENTIVE MEDICINE

## 2020-09-12 PROCEDURE — U0003 INFECTIOUS AGENT DETECTION BY NUCLEIC ACID (DNA OR RNA); SEVERE ACUTE RESPIRATORY SYNDROME CORONAVIRUS 2 (SARS-COV-2) (CORONAVIRUS DISEASE [COVID-19]), AMPLIFIED PROBE TECHNIQUE, MAKING USE OF HIGH THROUGHPUT TECHNOLOGIES AS DESCRIBED BY CMS-2020-01-R: HCPCS | Performed by: PREVENTIVE MEDICINE

## 2020-09-12 PROCEDURE — 99214 OFFICE O/P EST MOD 30 MIN: CPT | Performed by: PREVENTIVE MEDICINE

## 2020-09-12 PROCEDURE — 81001 URINALYSIS AUTO W/SCOPE: CPT | Performed by: PREVENTIVE MEDICINE

## 2020-09-12 PROCEDURE — 36415 COLL VENOUS BLD VENIPUNCTURE: CPT | Performed by: PREVENTIVE MEDICINE

## 2020-09-12 PROCEDURE — 82550 ASSAY OF CK (CPK): CPT | Performed by: PREVENTIVE MEDICINE

## 2020-09-12 PROCEDURE — 93000 ELECTROCARDIOGRAM COMPLETE: CPT | Performed by: PREVENTIVE MEDICINE

## 2020-09-12 PROCEDURE — 71046 X-RAY EXAM CHEST 2 VIEWS: CPT

## 2020-09-12 PROCEDURE — 85025 COMPLETE CBC W/AUTO DIFF WBC: CPT | Performed by: PREVENTIVE MEDICINE

## 2020-09-12 PROCEDURE — 80053 COMPREHEN METABOLIC PANEL: CPT | Performed by: PREVENTIVE MEDICINE

## 2020-09-12 ASSESSMENT — MIFFLIN-ST. JEOR: SCORE: 1528.69

## 2020-09-13 LAB
ALBUMIN SERPL-MCNC: 3.3 G/DL (ref 3.4–5)
ALP SERPL-CCNC: 70 U/L (ref 40–150)
ALT SERPL W P-5'-P-CCNC: 36 U/L (ref 0–50)
ANION GAP SERPL CALCULATED.3IONS-SCNC: 6 MMOL/L (ref 3–14)
AST SERPL W P-5'-P-CCNC: 25 U/L (ref 0–45)
BILIRUB SERPL-MCNC: 0.2 MG/DL (ref 0.2–1.3)
BUN SERPL-MCNC: 13 MG/DL (ref 7–30)
CALCIUM SERPL-MCNC: 9 MG/DL (ref 8.5–10.1)
CHLORIDE SERPL-SCNC: 113 MMOL/L (ref 94–109)
CK SERPL-CCNC: 60 U/L (ref 30–225)
CO2 SERPL-SCNC: 21 MMOL/L (ref 20–32)
CREAT SERPL-MCNC: 0.77 MG/DL (ref 0.52–1.04)
CRP SERPL-MCNC: 11 MG/L (ref 0–8)
GFR SERPL CREATININE-BSD FRML MDRD: >90 ML/MIN/{1.73_M2}
GLUCOSE SERPL-MCNC: 95 MG/DL (ref 70–99)
LIPASE SERPL-CCNC: 128 U/L (ref 73–393)
POTASSIUM SERPL-SCNC: 3.9 MMOL/L (ref 3.4–5.3)
PROT SERPL-MCNC: 7.3 G/DL (ref 6.8–8.8)
SODIUM SERPL-SCNC: 140 MMOL/L (ref 133–144)
SPECIMEN SOURCE: NORMAL
STREP GROUP A PCR: NOT DETECTED

## 2020-09-13 NOTE — PROGRESS NOTES
"Date: 2020 19:09:03  Clinician: Fern Le  Clinician NPI: [id number]  Patient: Kasandra Lau  Patient : 1985  Patient Address: [address] [place] [address]  Patient Phone: [phone]  Visit Protocol: URI  Patient Summary:  Kasandra is a 35 year old ( : 1985 ) female who initiated a [facility] Visit for COVID-19 (Coronavirus) evaluation and screening. When asked the question \"Please sign me up to receive news, health information and promotions from [facility].\", Kasandra responded \"No\".    Kasandra states her symptoms started gradually 7-9 days ago.   Her symptoms consist of nausea, a sore throat, diarrhea, a headache, chills, malaise, enlarged lymph nodes, and myalgia. Kasandra also feels feverish but was unable to measure her temperature.   Symptom details     Sore throat: Kasandra reports having moderate throat pain (4-6 on a 10 point pain scale), does not have exudate on her tonsils, and can swallow liquids. The lymph nodes in her neck are enlarged. A rash has not appeared on the skin since the sore throat started.     Headache: She states the headache is moderate (4-6 on a 10 point pain scale).      Kasandra denies having ear pain, anosmia, facial pain or pressure, vomiting, rhinitis, wheezing, teeth pain, ageusia, cough, and nasal congestion. She also denies taking antibiotic medication in the past month, having recent facial or sinus surgery in the past 60 days, double sickening (worsening symptoms after initial improvement), and having a sinus infection within the past year.   Precipitating events  Kasandra is not sure if she has been exposed to someone with strep throat. She has not recently been exposed to someone with influenza. Kasandra has not been in close contact with any high risk individuals.   Pertinent COVID-19 (Coronavirus) information  In the past 14 days, Kasandra has not worked in a congregate living setting.   She does not work or volunteer as healthcare worker or a  and does not " work or volunteer in a healthcare facility.   Kasandra also has not lived in a congregate living setting in the past 14 days. She does not live with a healthcare worker.   Kasandra has not had a close contact with a laboratory-confirmed COVID-19 patient within 14 days of symptom onset.   Since December 2019, Kasandra and has not had upper respiratory infection or influenza-like illness. Has not been diagnosed with lab-confirmed COVID-19 test   Triage Point(s) temporarily suspended for COVID-19 (Coronavirus) screening  Kasandra reported the following symptoms which were previously protocol referral points. These protocol referral points have temporarily been removed for purposes of COVID-19 (Coronavirus) screening.   Difficulty breathing even when resting and can only speak in phrase(s)   Pertinent medical history  Kasandra does not get yeast infections when she takes antibiotics.   Kasandra needs a return to work/school note.   Weight: 168 lbs   Kasandra does not smoke or use smokeless tobacco.   She denies pregnancy and denies breastfeeding. She has menstruated in the past month.   Additional information as reported by the patient (free text): chest pain/ tightness   Weight: 168 lbs  A synchronous phone visit was initiated by the provider for the following reason: chest pain/tightness    MEDICATIONS: Viibryd oral, Wellbutrin XL oral, azelastine nasal, gabapentin oral, Pepcid oral, Xyzal oral, NuvaRing vaginal, lorazepam oral, Flonase Allergy Relief nasal, Allegra-D 24 Hour oral, Emgality Pen subcutaneous, ALLERGIES: NKDA  Clinician Response:  Dear Kasandra,  I am sorry you are not feeling well. Your health is our priority. Based on the information you have provided, we understand that you have COVID-19 (Coronavirus) concerns.  You will not be charged for this [facility] Visit.&nbsp;Thank you for trusting us with our care.  COVID-19 (Coronavirus) General Information  Because there is currently no vaccine to prevent infection, the best way to protect  yourself is to avoid being exposed to this virus. Common symptoms of COVID-19 include but are not limited to fever, cough, and shortness of breath. These symptoms appear 2-14 days after you are exposed to the virus that causes COVID-19. Click here for more information from the CDC on how to protect yourself.  If you are sick with COVID-19 or suspect you are infected with the virus that causes COVID-19, follow the steps here from the CDC to help prevent the disease from spreading to people in your home and community.  Click here for general information from the CDC on testing.  If you develop any of these emergency warning signs for COVID-19, get medical attention immediately:     Trouble breathing    Persistent pain or pressure in the chest    New confusion or inability to arouse    Bluish lips or face      Call your doctor or clinic before going in. Call 911 if you have a medical emergency and notify the  you have or think you may have COVID-19.  For more detailed and up to date information on COVID-19 (Coronavirus), please visit the CDC website.   Diagnosis: Refer for additional evaluation - COVID-19 (Coronavirus) concern  Diagnosis ICD: R69  Triage Notes: I reviewed the patient's history, verified their identity, and explained the [facility] Visit process.    stated would like Covid test.  flu-like sx past week,  body aches/fatigue  with sob past few days, hard to get full breaths,  able to walk dogs or doing dishes.  persistent chest tightness / more achiness in chest than rest of body.  not able to describe CP.  no wheezing.  states this is not like her previous flu encounters.    due to difficulty in obtaining description of CP with her breathing concerns, referred out to be seen in person.  Synchronous Triage: phone, status: completed, duration: 348 seconds

## 2020-09-13 NOTE — PROGRESS NOTES
SUBJECTIVE:  Kasandra Lau, a 35 year old female scheduled an appointment to discuss the following issues:     Chest pain, unspecified type  SOB (shortness of breath)  Sore throat  Dysuria  Myalgia  For the past week, 1  Body aches - arms and legs, fatigue. fever and chills, no cough, sob, chest pain  No rash, no ear pain, +sore throat, no loss of taste or smell  +nausea, no vomiting, no diarrhea, pain with urination.  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  Past Medical History:   Diagnosis Date     Anemia fall 2016     Depression (emotion)     sees psych, on meds     Migraine     daily meds, 2x month, more mild on meds     Panic disorder      Uncomplicated asthma Spring 2018     Social History     Socioeconomic History     Marital status: Single     Spouse name: Not on file     Number of children: Not on file     Years of education: Not on file     Highest education level: Not on file   Occupational History     Not on file   Social Needs     Financial resource strain: Not on file     Food insecurity     Worry: Not on file     Inability: Not on file     Transportation needs     Medical: Not on file     Non-medical: Not on file   Tobacco Use     Smoking status: Former Smoker     Packs/day: 0.00     Years: 10.00     Pack years: 0.00     Types: Cigarettes     Smokeless tobacco: Never Used     Tobacco comment: smokes occasionally socially    Substance and Sexual Activity     Alcohol use: Not Currently     Alcohol/week: 0.0 standard drinks     Comment: occasional      Drug use: Not Currently     Types: Marijuana     Comment: marijuana  none since May     Sexual activity: Yes      "Partners: Male     Birth control/protection: Pull-out method, Inserts/Ring     Comment: Nuvaring   Lifestyle     Physical activity     Days per week: Not on file     Minutes per session: Not on file     Stress: Not on file   Relationships     Social connections     Talks on phone: Not on file     Gets together: Not on file     Attends Holiness service: Not on file     Active member of club or organization: Not on file     Attends meetings of clubs or organizations: Not on file     Relationship status: Not on file     Intimate partner violence     Fear of current or ex partner: Not on file     Emotionally abused: Not on file     Physically abused: Not on file     Forced sexual activity: Not on file   Other Topics Concern     Not on file   Social History Narrative     Not on file     Family History   Problem Relation Age of Onset     Diabetes Maternal Grandfather      Diabetes No family hx of      Hypertension No family hx of      Coronary Artery Disease No family hx of      Hyperlipidemia No family hx of      Cerebrovascular Disease No family hx of      Breast Cancer No family hx of      Colon Cancer No family hx of      Prostate Cancer No family hx of      Other Cancer No family hx of      Glaucoma No family hx of      Macular Degeneration No family hx of          ROS: 12 pt ros negative other than what is described above  OBJECTIVE:  Ht 1.727 m (5' 8\")   Wt 78 kg (172 lb)   LMP 08/15/2020   BMI 26.15 kg/m      EXAM:  GENERAL APPEARANCE: healthy, alert and no distress  EYES: EOMI,  PERRL  HENT: ear canals and TM's normal and nose and mouth without ulcers or lesions  RESP: lungs clear to auscultation - no rales, rhonchi or wheezes  CV: regular rates and rhythm, normal S1 S2, no S3 or S4 and no murmur, click or rub, mild ttp chest, no rash  ABDOMEN:  soft, nontender, no HSM or masses and bowel sounds normal  EXTREMITIES:  Warm, well perfused, no edema  SKIN -no rashes    EKG - rate 81, nsr, no st or t wave " changes, no q waves, nl intervals, no comparisons available  CXR - no infiltrate, no edema, no effusion, normal cardiac silhouette  RST negative  CBC wnl  UA wnl  CMP, lipase, COVID, CRP all pending    ASSESSMENT/PLAN:  (R07.9) Chest pain, unspecified type  (primary encounter diagnosis)  Plan: CBC with platelets and differential, CRP,         inflammation, XR Chest 2 Views, EKG 12-lead         complete w/read - Clinics, Lipase  ttp on palpaiton of chest reproduces patients chest pain  Consistent with msk chest pain    (R06.02) SOB (shortness of breath)  Plan: CBC with platelets and differential, CRP,         inflammation, XR Chest 2 Views, EKG 12-lead         complete w/read - Clinics, Symptomatic COVID-19        Virus (Coronavirus) by PCR    (J02.9) Sore throat  Plan: Streptococcus A Rapid Scr w Reflx to PCR    (R30.0) Dysuria  Plan: UA reflex to Microscopic and Culture    (M79.10) Myalgia  Plan: CK total    Discussed all possible causes of symptoms, best initial treatment plan and follow up.    In sum, appears to be a viral syndrome - ?COVID.  Advise tylenol 500 mg three times per day, push fluids, rest, isolate until covid negative and symptoms improve not requiring antipyretics for 24 hours and are 10 days out from symptoms starting.    Follow up if symptoms don't resolve in next 10 days or sooner if develop worsening fever or chills, sob.

## 2020-09-13 NOTE — PATIENT INSTRUCTIONS
(R07.9) Chest pain, unspecified type  (primary encounter diagnosis)  Plan: CBC with platelets and differential, CRP,         inflammation, XR Chest 2 Views, EKG 12-lead         complete w/read - Clinics, Lipase  ttp on palpaiton of chest reproduces patients chest pain  Consistent with msk chest pain     (R06.02) SOB (shortness of breath)  Plan: CBC with platelets and differential, CRP,         inflammation, XR Chest 2 Views, EKG 12-lead         complete w/read - Clinics, Symptomatic COVID-19        Virus (Coronavirus) by PCR     (J02.9) Sore throat  Plan: Streptococcus A Rapid Scr w Reflx to PCR     (R30.0) Dysuria  Plan: UA reflex to Microscopic and Culture     (M79.10) Myalgia  Plan: CK total     Discussed all possible causes of symptoms, best initial treatment plan and follow up.     In sum, appears to be a viral syndrome - ?COVID.  Advise tylenol 500 mg three times per day, push fluids, rest, isolate until covid negative and symptoms improve not requiring antipyretics for 24 hours and are 10 days out from symptoms starting.

## 2020-09-14 ENCOUNTER — MEDICAL CORRESPONDENCE (OUTPATIENT)
Dept: HEALTH INFORMATION MANAGEMENT | Facility: CLINIC | Age: 35
End: 2020-09-14

## 2020-09-14 LAB
SARS-COV-2 RNA SPEC QL NAA+PROBE: NOT DETECTED
SPECIMEN SOURCE: NORMAL

## 2020-09-14 NOTE — PROGRESS NOTES
"Health Psychology - Follow up Visit  Confidential Summary*      REFERRAL SOURCE:  Psychiatry    CHIEF COMPLAINT/REASON FOR VISIT  Psychotherapy in context of chronic PTSD and persistent depression.  Patient also complains of dissatisfaction with interpersonal relationships and challenges with emotion regulation.      Patient was seen today for a 60 minute individual psychotherapy session. Session facilitated via [email or web address] with patient at her home and provider at her own home.       This telehealth service is appropriate and effective for delivering services in light of the necessity for social distancing to mitigate the COVID-19 epidemic. Patient has agreed to receiving telehealth services.    The patient has been notified of following:   \"This video visit will be conducted via a call between you and your physician/provider. We have found that certain health care needs can be provided without the need for an in-person physical exam.   Video visits are billed at different rates depending on your insurance coverage.  Please reach out to your insurance provider with any questions. If during the course of the call the physician/provider feels a video visit is not appropriate, you will not be charged for this service.\"     Patient has given verbal consent for Video visit? Yes    Subjective: Patient began with report that she continues to plan to relocate to [place] in October and has found an apartment.  Much of session spent reviewing how she would like to make this transition a fresh start.  Discussed how she would like to \"show up\" in her new setting. She described receiving feedback from some that she has been communicating with that she is negative about [place].  Validated the valid, that it has been hard for her to break into existing [place].  She also described chronic invalidation and noted that she enjoys sharing feelings and that she " "[place].  Encouraged her to practice DBT guidelines surrounding intimate sharing and that she \"match the level of disclosure\".  She reported that she is reluctant to do so and that she \"does not want to change myself to fit in\".  Confronted her on willingness and encouraged her to consider how the time in [place] may allow her to practice new skills.      Patient described chronic back pain and strain surrounding breast weight.  She discussed reduction with her provider and this surgery is scheduled for late September.      Overall, patient is doing better.  She is excited about her upcoming \"relocation\" and the possibilities that she will fit in better in the [facility].  She is aware that the small town she is moving to may not have a racially diverse community and she feels prepared to cope with this.  Discussed some stress coping tools she might use and encouraged \"cope ahead\".      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     Objective:  Patient was on time for today s session, appropriately groomed and dressed, and demonstrated good eye contact.  She appeared to be in a good mood and voiced excitement about her upcoming relocation.  She was alert and oriented. Mood was euthymic with appropriate range of affect. Patient denied suicidal or assaultive ideation, plan, or intent.        Assessment:  The patient has a longstanding history of interpersonal discord and challenges with emotion regulation.  She is coping well with Covid lock down but admits to loneliness and an awareness that she is largely alone.  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        Plan:  Patient will NOT begin full model DBT because she is moving to [place].  Will discuss termination since " patient will be moving out of state.     Time In: 11:00  Time Out: 12:00    Diagnosis:  Axis I PTSD, chronic, persistent depressive disorder, adjustment disorder with mixed   Axis II R/O BPD   [place] III please see medical records for details   [place] IV Psychosocial and Environmental Stressors: lock down and academic challenges and living alone with no family support         Corina Muhammad, PhD, LP

## 2020-09-14 NOTE — PROGRESS NOTES
"Health Psychology - Follow up Visit  Confidential Summary*      REFERRAL SOURCE:  Psychiatry    CHIEF COMPLAINT/REASON FOR VISIT  Psychotherapy in context of chronic PTSD and persistent depression.  Patient also complains of dissatisfaction with interpersonal relationships and challenges with emotion regulation.      Patient was seen today for a 60 minute individual psychotherapy session. Session facilitated via [email or web address] with patient at her home and provider at her own home.       This telehealth service is appropriate and effective for delivering services in light of the necessity for social distancing to mitigate the COVID-19 epidemic. Patient has agreed to receiving telehealth services.    The patient has been notified of following:   \"This video visit will be conducted via a call between you and your physician/provider. We have found that certain health care needs can be provided without the need for an in-person physical exam.   Video visits are billed at different rates depending on your insurance coverage.  Please reach out to your insurance provider with any questions. If during the course of the call the physician/provider feels a video visit is not appropriate, you will not be charged for this service.\"     Patient has given verbal consent for Video visit? Yes    Subjective: Patient seen for ongoing treatment.  Spent much of session reviewing DBT contract and describing the underlying theory behind the structure of DBT and the skill training.  Reviewed the diary card and encouraged her to consider the target behaviors she wants to work on.  We reviewed each module of DBT including mindfulness, interpersonal effectiveness, emotion regulation and distress tolerance.  We also reviewed the psychosocial model underlying DBT and how her personal situation reflects the challenges to trusting self that can occur in a chronically invalidating environment.  We also discussed her heighted emotional sensitivity and how " this may not have been an ideal fit with her parents.      The patient reported that she is looking forward to interpersonal effectiveness.  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  Her committee is not responding to her requests and she is interpreting this to indicate that they may not be interested in working with her.  She quickly translated their lack of response to her perceived racial discrimination.  Used as opportunity to describe check the facts and how considering alternate reasons may increase the opportunity to use problem solving skills.      Objective:  Patient was on time for today s session, appropriately groomed and dressed, and demonstrated good eye contact.  She appeared tired but was alert and oriented. Mood was euthymic with appropriate range of affect. Patient denied suicidal or assaultive ideation, plan, or intent.        Assessment:  The patient has a longstanding history of interpersonal discord and challenges with emotion regulation.  She is coping well with Covid lock down but admits to loneliness and an awareness that she is largely alone.  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        Plan:  Patient will begin full model DBT in approximately 6 weeks.  We are beginning DBT orientation.  She will be in a group led by Dr. Mackenzie Corbin.     Time In: 11:00  Time Out: 12:00    Diagnosis:  Axis I PTSD, chronic, persistent depressive disorder, adjustment disorder with mixed   Axis II R/O BPD   [place] III please see medical records for details   [place] IV Psychosocial and Environmental Stressors: lock down and academic challenges and living alone with no family support         Corina Muhammad, PhD, LP

## 2020-09-16 ENCOUNTER — TELEPHONE (OUTPATIENT)
Dept: SURGERY | Facility: CLINIC | Age: 35
End: 2020-09-16

## 2020-09-16 NOTE — TELEPHONE ENCOUNTER
----- Message from Mena Dowling sent at 9/16/2020 10:37 AM CDT -----  Regarding: letter  Contact: [phone]  Pt is calling again about the letter for a prior auth for her surgery, please call her with an update, thanks

## 2020-09-16 NOTE — TELEPHONE ENCOUNTER
Spoke with pt and explained that we have not received the letter from Dr. Sesay. We need this letter to request the PA. We will need to reschedule surgery for now since we will not be able to obtain the PA in time. Pt states understanding and denies any additional questions or concerns. Michelle CRAWFORD RNCC

## 2020-09-17 ENCOUNTER — TELEPHONE (OUTPATIENT)
Dept: SURGERY | Facility: CLINIC | Age: 35
End: 2020-09-17

## 2020-09-18 ENCOUNTER — VIRTUAL VISIT (OUTPATIENT)
Dept: PSYCHOLOGY | Facility: CLINIC | Age: 35
End: 2020-09-18
Payer: COMMERCIAL

## 2020-09-18 DIAGNOSIS — F34.1 PERSISTENT DEPRESSIVE DISORDER: Primary | ICD-10-CM

## 2020-09-18 DIAGNOSIS — F43.12 CHRONIC POST-TRAUMATIC STRESS DISORDER (PTSD): ICD-10-CM

## 2020-09-18 DIAGNOSIS — F43.23 ADJUSTMENT DISORDER WITH MIXED ANXIETY AND DEPRESSED MOOD: ICD-10-CM

## 2020-09-20 NOTE — PROGRESS NOTES
"Health Psychology - Follow up Visit  Confidential Summary*      REFERRAL SOURCE:  Psychiatry    CHIEF COMPLAINT/REASON FOR VISIT  Psychotherapy in context of chronic PTSD and persistent depression.  Patient also complains of dissatisfaction with interpersonal relationships and challenges with emotion regulation.      Patient was seen today for a 60 minute individual psychotherapy session. Session facilitated via [email or web address] with patient at her home and provider at her own home.       This telehealth service is appropriate and effective for delivering services in light of the necessity for social distancing to mitigate the COVID-19 epidemic. Patient has agreed to receiving telehealth services.    The patient has been notified of following:   \"This video visit will be conducted via a call between you and your physician/provider. We have found that certain health care needs can be provided without the need for an in-person physical exam.   Video visits are billed at different rates depending on your insurance coverage.  Please reach out to your insurance provider with any questions. If during the course of the call the physician/provider feels a video visit is not appropriate, you will not be charged for this service.\"     Patient has given verbal consent for Video visit? Yes    Subjective: Patient began with discussion that she is planning to officially become  from estranged .  She reported that they were  in [place] and she is considering how she might move forward on her plans to both separate permanently and how she might settle their estate in a way that feels fair to her. She reported that there was some shared credit card debt and she reported that he has been generous in his financial support of her but she believes a  would allocate more for her given his financial stability and her student status.      She reported that she is hoping that she might dedicate the next 6 months to " moving herself to her next step.  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  Discussed the three papers and her progress.  Patient was noted to be reluctant to push professors to secure correspondence with them. It appears that they do not email her back in a timely fashion and this is both frustrating and confusing to the patient.  She frequently assumes that their lack of response may reflect a racial/gender microaggression or that she has burned bridges.  She shared her emails with me and she appears to be respectful and assertive with no apparent aggression.  Encouraged her to phase emails in such a way that deadlines are clear and that she make concise, clear requests with dates specified.  Patient reported that she often feels afraid and vulnerable and she readily recalls incidents in which she believes she was punished for being assertive.      Encouraged her to check the facts by altering her behaviors.  Encouraged the pros and cons sheet.      Overall, patient reported decreased depression and attempts to experience the world using her wise mind versus emotion mind.      Objective:  Patient was on time for today s session, appropriately groomed and dressed, and demonstrated good eye contact.  She appeared tired but was alert and oriented. Mood was euthymic with appropriate range of affect. Patient denied suicidal or assaultive ideation, plan, or intent.        Assessment:  The patient has a longstanding history of interpersonal discord and challenges with emotion regulation.  She is coping well with Covid lock down but admits to loneliness and an awareness that she is largely alone.  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        Plan:  Patient will begin full model DBT in approximately 6 weeks.  We are  beginning DBT orientation.  She will be in a group led by Dr. Mackenzie Corbin.     Time In: 11:00  Time Out: 12:00    Diagnosis:  Axis I PTSD, chronic, persistent depressive disorder, adjustment disorder with mixed   Axis II R/O BPD   [place] III please see medical records for details   [place] IV Psychosocial and Environmental Stressors: lock down and academic challenges and living alone with no family support         Corina Muhammad, PhD, LP

## 2020-09-21 NOTE — PROGRESS NOTES
"Health Psychology - Follow up Visit  Confidential Summary*      REFERRAL SOURCE:  Psychiatry    CHIEF COMPLAINT/REASON FOR VISIT  Psychotherapy in context of chronic PTSD and persistent depression.  Patient also complains of dissatisfaction with interpersonal relationships and challenges with emotion regulation.      Patient was seen today for a 60 minute individual psychotherapy session. Session facilitated via doxy.me with patient at her home and provider at her own home.       This telehealth service is appropriate and effective for delivering services in light of the necessity for social distancing to mitigate the COVID-19 epidemic. Patient has agreed to receiving telehealth services.    The patient has been notified of following:   \"This video visit will be conducted via a call between you and your physician/provider. We have found that certain health care needs can be provided without the need for an in-person physical exam.   Video visits are billed at different rates depending on your insurance coverage.  Please reach out to your insurance provider with any questions. If during the course of the call the physician/provider feels a video visit is not appropriate, you will not be charged for this service.\"     Patient has given verbal consent for Video visit? Yes    Subjective:  Patient began with report that she has two papers to go.  Focused session on encouraging use of wise mind and that she identify emotion mind and determine when wise mind would serve her goals better. Patient continues to report that she believes that the professors on her committee are not respectful of her and therefore, do not have a problem ignoring her emails.  Encouraged her to consider other explanations and to use check the facts to determine how she can get her needs met.  Encouraged her to consider that she is paying to be a  in the program and that her professors have a legal, ethical and professional " "responsibility to address her need for feedback on her papers.  She reported that she becomes blinded by her emotions of shame and fear of retribution that she is hesitant to send second emails when she has not heard back.  Identifies how avoidance may interfere with problem solving.  Used much of session problem solving what remains to be done with her 2 papers and who and how she might get feedback.  Used this as an opportunity for patient to engage in the use of humor and sarcasm to lighten the anxiety she experiences about rejection and retaliation.      Patient was congratulated for the work that she has done. She was also reminded of how far she has come with limited support from professors and no support from family.      She reported that she attempts to function as independently as possible and is often disappointed in herself when she needs help. She was instructed on the terms \"apparent competence\" and how this might lead to \"active passivity\".  Encouraged her to take the middle path, that she is competent and that occasionally she also needs help.  She was also instructed on unrelenting crises that can come from avoidance and overwhelm.      Objective:  Patient was on time for today s session, appropriately groomed and dressed, and demonstrated good eye contact.  She appeared tired and sad but was alert and oriented. Mood was euthymic with appropriate range of affect. Patient denied suicidal or assaultive ideation, plan, or intent.        Assessment:  The patient has a longstanding history of interpersonal discord and challenges with emotion regulation.  She is coping well with Covid lock down but admits to loneliness and an awareness that she is largely alone.  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        Plan:  Patient will begin full model DBT in approximately 6 weeks.  We are beginning DBT orientation.  She will be in a group led " by Dr. Mackenzie Corbin.     Time In: 11:00  Time Out: 12:00    Diagnosis:  Axis I PTSD, chronic, persistent depressive disorder, adjustment disorder with mixed   Axis II R/O BPD   [place] III please see medical records for details   [place] IV Psychosocial and Environmental Stressors: lock down and academic challenges and living alone with no family support         Corina Muhammad, PhD, LP

## 2020-09-21 NOTE — PROGRESS NOTES
"Health Psychology - Follow up Visit  Confidential Summary*      REFERRAL SOURCE:  Psychiatry    CHIEF COMPLAINT/REASON FOR VISIT  Psychotherapy in context of chronic PTSD and persistent depression.  Patient also complains of dissatisfaction with interpersonal relationships and challenges with emotion regulation.      Patient was seen today for a 60 minute individual psychotherapy session. Session facilitated via doxy.me with patient at her home and provider at her own home.       This telehealth service is appropriate and effective for delivering services in light of the necessity for social distancing to mitigate the COVID-19 epidemic. Patient has agreed to receiving telehealth services.    The patient has been notified of following:   \"This video visit will be conducted via a call between you and your physician/provider. We have found that certain health care needs can be provided without the need for an in-person physical exam.   Video visits are billed at different rates depending on your insurance coverage.  Please reach out to your insurance provider with any questions. If during the course of the call the physician/provider feels a video visit is not appropriate, you will not be charged for this service.\"     Patient has given verbal consent for Video visit? Yes    Subjective: Patient began with report that she has decided that she is moving to [place].  She reported that she believes there is no good reason to remain in [place] when she is working from home and can do so in any state.  She reported that she has not lived in the [place] and is looking forward to the change.  She reported that she [place], [place] of [facility] that will allow her to live in a more natural setting that is quiet and will allow her the opportunity to hike with her dogs all year long.  She described a perception that she has tried her hardest to make her life in [place] that felt like home but she has not " felt welcomed or accepted.  She continues to assume she is negatively evaluated because she is a woman of color and that she now understands how systemic racism and microaggressions toward her have increased her stress level and her perceptions that she is not able to initiate relationships and each encounter is results in confusion about her treatment.  She again described her relationship with Alireza and her ongoing confusion that he appears to be romantically interested in her but their relationship has never progressed past online study partners.      Discussed how she might use this time away, plans to be gone for one year, to focus on how she wants her future to look.  Discussed how she might use her new skills to identify others that might reciprocate her friendship.  Discussed matching and the benefits of observing how others respond to her requests and what they share with her.      She reported that she is excited that she will be relocating in a city that her friend from [place] is also moving to, so she will have support from this friend and her  and their two children.  They are planning to pack their things into one [address] and move out together in late September.    Discussed the impact on our ongoing care and suggested we might begin to look for a therapist there.      Discussed the possibility that her new town may have similar racial discrimination given that it may be a small town with few people of color.  Discussed how she might cope ahead with this possibility.    Patient is planning to buy a car!    Objective:  Patient was on time for today s session, appropriately groomed and dressed, and demonstrated good eye contact.  She appeared tired but was alert and oriented. Mood was euthymic with appropriate range of affect. Patient denied suicidal or assaultive ideation, plan, or intent.      Assessment:  The patient has a longstanding history of interpersonal discord and challenges with emotion  regulation.  She is coping well with Covid lock down but admits to loneliness and an awareness that she is largely alone.  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    Patient now planning to move to [place].    Plan: All plans to begin DBT group halted since patient will be moving.  Will attempt to find DBT program out there or other therapist who might use a DBT informed therapy approach as patient will benefit from further development of DBT skills.      Time In: 11:00  Time Out: 12:00    Diagnosis:  Axis I PTSD, chronic, persistent depressive disorder, adjustment disorder with mixed   Axis II R/O BPD   [place] III please see medical records for details   [place] IV Psychosocial and Environmental Stressors: lock down and academic challenges and living alone with no family support         Corina Muhammad, PhD, LP

## 2020-09-21 NOTE — PROGRESS NOTES
"Health Psychology - Follow up Visit  Confidential Summary*      REFERRAL SOURCE:  Psychiatry    CHIEF COMPLAINT/REASON FOR VISIT  Psychotherapy in context of chronic PTSD and persistent depression.  Patient also complains of dissatisfaction with interpersonal relationships and challenges with emotion regulation.      Patient was seen today for a 60 minute individual psychotherapy session. Initially, the session was facilitated via doxy.me with patient at her home and provider at her own home but patient was not able to get her video to work so we completed the session with doxy.me but I could not see patient and she saw me.       This telehealth service is appropriate and effective for delivering services in light of the necessity for social distancing to mitigate the COVID-19 epidemic. Patient has agreed to receiving telehealth services.    The patient has been notified of following:   \"This video visit will be conducted via a call between you and your physician/provider. We have found that certain health care needs can be provided without the need for an in-person physical exam.   Video visits are billed at different rates depending on your insurance coverage.  Please reach out to your insurance provider with any questions. If during the course of the call the physician/provider feels a video visit is not appropriate, you will not be charged for this service.\"     Patient has given verbal consent for Video visit? Yes    Subjective: Patient began with report that she has already put many of he belongings into storage.  She is awaiting her friend, who has already relocated her family to [place], to decide when she can come back to retrieve her remaining belongings and then the patient will have a firm date to move.  She has found an apartment and has given notice to her current residence.  She has also communicated her plans to her committee, and has gotten support from 2/3.  She has not heard from her department " chair who is also the chair of her committee but is assuming he is busy and that he will support her.      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  She reported that she can do her work anywhere now that we are online and that the location in [place] will allow her access to hiking and better weather.  She is also hoping that she might fit in better with the people, although she has read that there are not many people of color in the small college town of [place], [place].  Discussed how she might cope with her perceptions of racial discrimination and microaggressions if they arise.  Discussed cope ahead strategies.      Patient also reported that she continues to be confused as to the intentions of others.  She described another situation in which a male engaged her to work with him as a study partner.  She reported that she is not clear if he is proposing a romantic relationship or if he is simply wanting to work with her.  If it is the latter, she can not understand why he chose her.  Encouraged her to inquire as to why he chose to ask her to partner with him.  She described anxiety surrounding being perceived as aggressive.  Encouraged her to role play and she was gentle and approachable in her manner.  Provided supportive feedback to her.  She described her long history of not being able to trust herself or how she comes across.    Objective:  Patient was on time for today s session, appropriately groomed and dressed, and demonstrated good eye contact.  She appeared to be in a good mood and voiced excitement about her upcoming relocation.  She was alert and oriented. Mood was euthymic with appropriate range of affect. Patient denied suicidal or assaultive ideation, plan, or intent.        Assessment:  The patient has a longstanding history of interpersonal discord and challenges with  emotion regulation.  She is coping well with Covid lock down but admits to loneliness and an awareness that she is largely alone.  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        Plan:  Patient will NOT begin full model DBT because she is moving to [place].  Will discuss termination since patient will be moving out of state.     Time In: 11:00  Time Out: 12:00    Diagnosis:  Axis I PTSD, chronic, persistent depressive disorder, adjustment disorder with mixed   Axis II R/O BPD   [place] III please see medical records for details   [place] IV Psychosocial and Environmental Stressors: lock down and academic challenges and living alone with no family support         Corina Muhammad, PhD, LP

## 2020-09-22 ENCOUNTER — DOCUMENTATION ONLY (OUTPATIENT)
Dept: SURGERY | Facility: CLINIC | Age: 35
End: 2020-09-22

## 2020-09-22 NOTE — PROGRESS NOTES
Unable to obtain PA prior to patient moving out of state. Patient is aware we will be canceling surgery as patient is moving mid October. Patient will get care in new home state.

## 2020-09-25 ENCOUNTER — VIRTUAL VISIT (OUTPATIENT)
Dept: PSYCHOLOGY | Facility: CLINIC | Age: 35
End: 2020-09-25
Payer: COMMERCIAL

## 2020-09-25 DIAGNOSIS — F34.1 PERSISTENT DEPRESSIVE DISORDER: Primary | ICD-10-CM

## 2020-09-25 DIAGNOSIS — F43.23 ADJUSTMENT DISORDER WITH MIXED ANXIETY AND DEPRESSED MOOD: ICD-10-CM

## 2020-09-25 DIAGNOSIS — F43.12 CHRONIC POST-TRAUMATIC STRESS DISORDER (PTSD): ICD-10-CM

## 2020-09-30 NOTE — PROGRESS NOTES
"Health Psychology - Follow up Visit  Confidential Summary*      REFERRAL SOURCE:  Psychiatry    CHIEF COMPLAINT/REASON FOR VISIT  Psychotherapy in context of chronic PTSD and persistent depression.  Patient also complains of dissatisfaction with interpersonal relationships and challenges with emotion regulation.      Patient was seen today for a 60 minute individual psychotherapy session. Initially, the session was facilitated via [email or web address] with patient at her home and provider at her own home but patient was not able to get her video to work so we completed the session with [email or web address] but I could not see patient and she saw me.       This telehealth service is appropriate and effective for delivering services in light of the necessity for social distancing to mitigate the COVID-19 epidemic. Patient has agreed to receiving telehealth services.    The patient has been notified of following:   \"This video visit will be conducted via a call between you and your physician/provider. We have found that certain health care needs can be provided without the need for an in-person physical exam.   Video visits are billed at different rates depending on your insurance coverage.  Please reach out to your insurance provider with any questions. If during the course of the call the physician/provider feels a video visit is not appropriate, you will not be charged for this service.\"     Patient has given verbal consent for Video visit? Yes    Subjective: Patient began session with report that she continues to have no response from her major professor and that without feedback from her committee, she will not be able to make progress in her program.  Confronted her about possible active passivity and encouraged her to reach out to the departmental administrative personnel and to her other committee members for guidance and assistance.  She was also encouraged to practice how she might leave a voice message or send another email detailing " a JOI in which she might state her concerns clearly.  At first patient was resistant and voiced fear that she is dependent on her professor approving of her and that she is concerned that should she come across as too aggressive, she might jeopardize her chances of graduating.  Confronted her on her lack of facts to back these fears. Also encouraged her that there are personnel in place at the [employer] to assist in these matters.  Encouraged her to consider how she might replace his position on her committee if these behaviors continue.    She voiced discouragement that she has been in her program for longer than most and reported that her health has interfered.  She is waiting feedback to move to next stage in her program and begin collecting dissertation data.  Patient is noted to continue to believe that her treatment is largely a function of her gender and racial discrimination.  Encouraged her to contact those in the university to assist her.      Objective:  Patient was on time for today s session, appropriately groomed and dressed, and demonstrated good eye contact.  She appeared to be in a good mood and voiced excitement about her upcoming relocation.  She was alert and oriented. Mood was euthymic with appropriate range of affect. Patient denied suicidal or assaultive ideation, plan, or intent.        Assessment:  The patient has a longstanding history of interpersonal discord and challenges with emotion regulation.  She is coping well with Covid lock down but admits to loneliness and an awareness that she is largely alone.  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        Plan:  Patient will NOT begin full model DBT because she is moving to [place].  Will discuss termination since patient will be moving out of state.     Time In: 11:00  Time Out: 12:00    Diagnosis:  Axis I PTSD, chronic, persistent depressive disorder, adjustment  disorder with mixed   Axis II R/O BPD   [place] III please see medical records for details   [place] IV Psychosocial and Environmental Stressors: lock down and academic challenges and living alone with no family support         Corina Muhammad, PhD, LP

## 2020-10-02 ENCOUNTER — VIRTUAL VISIT (OUTPATIENT)
Dept: PSYCHOLOGY | Facility: CLINIC | Age: 35
End: 2020-10-02
Payer: COMMERCIAL

## 2020-10-02 DIAGNOSIS — F43.12 CHRONIC POST-TRAUMATIC STRESS DISORDER (PTSD): ICD-10-CM

## 2020-10-02 DIAGNOSIS — F43.23 ADJUSTMENT DISORDER WITH MIXED ANXIETY AND DEPRESSED MOOD: ICD-10-CM

## 2020-10-02 DIAGNOSIS — F34.1 PERSISTENT DEPRESSIVE DISORDER: Primary | ICD-10-CM

## 2020-10-02 PROCEDURE — 90837 PSYTX W PT 60 MINUTES: CPT | Mod: 95 | Performed by: PSYCHOLOGIST

## 2020-10-09 ENCOUNTER — VIRTUAL VISIT (OUTPATIENT)
Dept: PSYCHOLOGY | Facility: CLINIC | Age: 35
End: 2020-10-09
Payer: COMMERCIAL

## 2020-10-09 DIAGNOSIS — F43.12 CHRONIC POST-TRAUMATIC STRESS DISORDER (PTSD): ICD-10-CM

## 2020-10-09 DIAGNOSIS — F34.1 PERSISTENT DEPRESSIVE DISORDER: Primary | ICD-10-CM

## 2020-10-09 DIAGNOSIS — F43.23 ADJUSTMENT DISORDER WITH MIXED ANXIETY AND DEPRESSED MOOD: ICD-10-CM

## 2020-10-09 PROCEDURE — 90837 PSYTX W PT 60 MINUTES: CPT | Mod: 95 | Performed by: PSYCHOLOGIST

## 2020-10-23 ENCOUNTER — VIRTUAL VISIT (OUTPATIENT)
Dept: PSYCHOLOGY | Facility: CLINIC | Age: 35
End: 2020-10-23
Payer: COMMERCIAL

## 2020-10-23 DIAGNOSIS — F43.12 CHRONIC POST-TRAUMATIC STRESS DISORDER (PTSD): ICD-10-CM

## 2020-10-23 DIAGNOSIS — F34.1 PERSISTENT DEPRESSIVE DISORDER: Primary | ICD-10-CM

## 2020-10-23 DIAGNOSIS — F43.23 ADJUSTMENT DISORDER WITH MIXED ANXIETY AND DEPRESSED MOOD: ICD-10-CM

## 2020-10-23 PROCEDURE — 90837 PSYTX W PT 60 MINUTES: CPT | Mod: 95 | Performed by: PSYCHOLOGIST

## 2020-10-30 ENCOUNTER — VIRTUAL VISIT (OUTPATIENT)
Dept: PSYCHOLOGY | Facility: CLINIC | Age: 35
End: 2020-10-30
Payer: COMMERCIAL

## 2020-10-30 DIAGNOSIS — F33.1 MAJOR DEPRESSIVE DISORDER, RECURRENT EPISODE, MODERATE (H): ICD-10-CM

## 2020-10-30 DIAGNOSIS — F43.12 CHRONIC POST-TRAUMATIC STRESS DISORDER (PTSD): ICD-10-CM

## 2020-10-30 DIAGNOSIS — F34.1 PERSISTENT DEPRESSIVE DISORDER: ICD-10-CM

## 2020-10-30 DIAGNOSIS — F43.23 ADJUSTMENT DISORDER WITH MIXED ANXIETY AND DEPRESSED MOOD: Primary | ICD-10-CM

## 2020-10-30 PROCEDURE — 90837 PSYTX W PT 60 MINUTES: CPT | Mod: 95 | Performed by: PSYCHOLOGIST

## 2020-10-30 RX ORDER — BUPROPION HYDROCHLORIDE 300 MG/1
300 TABLET ORAL EVERY MORNING
Qty: 30 TABLET | Refills: 3 | Status: CANCELLED | OUTPATIENT
Start: 2020-10-30

## 2020-10-30 NOTE — TELEPHONE ENCOUNTER
Last seen: 7/27  RTC: 2-3 months   Cancel: none   No-show: none   Next appt: none     Incoming refill from patient via phone      Disp Refills Start End ROSE   gabapentin (NEURONTIN) 400 MG capsule 90 capsule 3 7/29/2020  No   Sig - Route: Take 1 capsule (400 mg) by mouth 3 times daily - Oral   Sent to pharmacy as: Gabapentin 400 MG Oral Capsule (NEURONTIN)   Class: E-Prescribe   Order: [id number]   E-Prescribing Status: Receipt confirmed by pharmacy (7/29/2020 10:39 AM CDT)   - Should have one refill on file      Disp Refills Start End ROSE   gabapentin (NEURONTIN) 300 MG capsule 60 capsule 3 7/29/2020  No   Sig - Route: Take 1 capsule (300 mg) by mouth 2 times daily as needed for other (severe anxiety) - Oral   Sent to pharmacy as: Gabapentin 300 MG Oral Capsule (NEURONTIN)   Class: E-Prescribe   Order: [id number]   E-Prescribing Status: Receipt confirmed by pharmacy (7/29/2020 10:39 AM CDT)   - Should have refills on file      Disp Refills Start End ROSE   buPROPion (WELLBUTRIN XL) 300 MG 24 hr tablet 30 tablet 3 7/29/2020  No   Sig - Route: Take 1 tablet (300 mg) by mouth every morning - Oral   Sent to pharmacy as: buPROPion HCl ER (XL) 300 MG Oral Tablet Extended Release 24 Hour (WELLBUTRIN XL)   Class: E-Prescribe   Order: [id number]   E-Prescribing Status: Receipt confirmed by pharmacy (7/29/2020 10:39 AM CDT)   - Should have refills on file     Per chart review, patient should have one refill on file at the pharmacy for all medications. Placed a call to [facility][id number] - [place], [place] - [address] and confirmed refills on file for all 3 medications. Patient's Gabapentin is being refills at a [facility] in [facility]. Placed a call to patient. No answer at number provided. LVM, requesting a call back. Clinic number provided.

## 2020-10-30 NOTE — TELEPHONE ENCOUNTER
[facility] Call [facility]    Phone Message    May a detailed message be left on voicemail: yes     Reason for Call: Medication Refill Request    Has the patient contacted the pharmacy for the refill? Yes   Name of medication being requested: Wellbutrin and Gabapentin  Provider who prescribed the medication:   Pharmacy: Pt is currently in [facility] but is wondering if her medications can be sent to [facility] location on file and she can have them sent to her location in [facility], ph #[phone]. She had to move for family reasons, but won't be back to [place] for several months.     Date medication is needed: She'll be out soon.          Action Taken: Other: [facility]    Travel Screening: Not Applicable

## 2020-11-03 NOTE — PROGRESS NOTES
"Health Psychology - Follow up Visit  Confidential Summary*      REFERRAL SOURCE:  Psychiatry    CHIEF COMPLAINT/REASON FOR VISIT  Psychotherapy in context of chronic PTSD and persistent depression.  Patient also complains of dissatisfaction with interpersonal relationships and challenges with emotion regulation.      Patient was seen today for a 60 minute individual psychotherapy session. Initially, the session was facilitated via [email or web address] with patient at her home and provider at her own home but patient was not able to get her video to work so we completed the session with [email or web address] but I could not see patient and she saw me.       This telehealth service is appropriate and effective for delivering services in light of the necessity for social distancing to mitigate the COVID-19 epidemic. Patient has agreed to receiving telehealth services.    The patient has been notified of following:   \"This video visit will be conducted via a call between you and your physician/provider. We have found that certain health care needs can be provided without the need for an in-person physical exam.   Video visits are billed at different rates depending on your insurance coverage.  Please reach out to your insurance provider with any questions. If during the course of the call the physician/provider feels a video visit is not appropriate, you will not be charged for this service.\"     Patient has given verbal consent for Video visit? Yes    Subjective: Patient began session with report that she continues to prepare for her move to [place].  Discussed the fact that I will not be able to see her any longer after she relocates.  Offered to assist in helping her to locate a new provider, ideally one that might support her in receiving DBT treatment.  She is willing to invest the time to complete this intense treatment.      Session reviewed her willingness to address her interpersonal challenges and her assumptions that she is judged " negatively by others because of her gender, race and liberal beliefs.  Encouraged her to check the facts to determine other possible reasons for her repeat rejection by others.  Patient is noted to be reluctant to consider other reasons for repeated social rejection.      She reported that she has begun to date online in order to make friends for when she relocates.  She reported that she is concerned that those that she is meeting may not be as sophisticated as she has hoped for those residing in [place]. Encouraged her to keep an open mind and validated her interest in making closer relationships.  Discussed her relationship with the woman she is relocating with and she described details of their move. She is noted to voice some concerns about this relocation together.      Objective:  Patient was on time for today s session, appropriately groomed and dressed, and demonstrated good eye contact.  She appeared to be in a good mood and voiced excitement about her upcoming relocation.  She was alert and oriented. Mood was euthymic with appropriate range of affect. Patient denied suicidal or assaultive ideation, plan, or intent.        Assessment:  The patient has a longstanding history of interpersonal discord and challenges with emotion regulation.  She is coping well with Covid lock down but admits to loneliness and an awareness that she is largely alone.  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        Plan:  Patient will NOT begin full model DBT because she is moving to [place].  Will discuss termination since patient will be moving out of state.     Time In: 2:00  Time Out: 3:00    Diagnosis:  Axis I PTSD, chronic, persistent depressive disorder, adjustment disorder with mixed   Axis II R/O BPD   [place] III please see medical records for details   [place] IV Psychosocial and Environmental Stressors: lock down and academic challenges and living  alone with no family support         Corina Muhammad, PhD, LP

## 2020-11-04 NOTE — TELEPHONE ENCOUNTER
Writer placed a call to patient to check in regarding refills. No answer at number provided. LVM, requesting a call back. Clinic number provided.

## 2020-11-05 ENCOUNTER — TELEPHONE (OUTPATIENT)
Dept: OBGYN | Facility: CLINIC | Age: 35
End: 2020-11-05

## 2020-11-05 NOTE — TELEPHONE ENCOUNTER
----- Message from Ivanna Berry sent at 11/4/2020  5:00 PM CST -----  Regarding: PT order  [facility]    Phone Message    May a detailed message be left on voicemail: yes     Reason for Call: Other: Pt requesting that orders for Physical therapy be faxed to [facility] in [place] from Dr. Sesay. Pt would like a call back to discuss if this is possible. Fax: [phone]. Thank you.    Action Taken: Message routed to:  Other: [facility] RN    Travel Screening: Not Applicable

## 2020-11-05 NOTE — TELEPHONE ENCOUNTER
Referral faxed per patient request.    Called to [facility] and message left indicating referral sent. Referral will  2021. Unsure of her situation which necessitates referral being sent to [place] but advised referral will  in January.

## 2020-11-06 ENCOUNTER — VIRTUAL VISIT (OUTPATIENT)
Dept: PSYCHOLOGY | Facility: CLINIC | Age: 35
End: 2020-11-06
Payer: COMMERCIAL

## 2020-11-06 DIAGNOSIS — F43.12 CHRONIC POST-TRAUMATIC STRESS DISORDER (PTSD): ICD-10-CM

## 2020-11-06 DIAGNOSIS — F43.23 ADJUSTMENT DISORDER WITH MIXED ANXIETY AND DEPRESSED MOOD: ICD-10-CM

## 2020-11-06 DIAGNOSIS — F34.1 PERSISTENT DEPRESSIVE DISORDER: Primary | ICD-10-CM

## 2020-11-06 PROCEDURE — 90837 PSYTX W PT 60 MINUTES: CPT | Mod: 95 | Performed by: PSYCHOLOGIST

## 2020-11-10 NOTE — PROGRESS NOTES
"Health Psychology - Follow up Visit  Confidential Summary*      REFERRAL SOURCE:  Psychiatry    CHIEF COMPLAINT/REASON FOR VISIT  Psychotherapy in context of chronic PTSD and persistent depression.  Patient also complains of dissatisfaction with interpersonal relationships and challenges with emotion regulation.      Patient was seen today for a 60 minute individual psychotherapy session. Initially, the session was facilitated via [email or web address] with patient at her home and provider at her own home but patient was not able to get her video to work so we completed the session with [email or web address] but I could not see patient and she saw me.       This telehealth service is appropriate and effective for delivering services in light of the necessity for social distancing to mitigate the COVID-19 epidemic. Patient has agreed to receiving telehealth services.    The patient has been notified of following:   \"This video visit will be conducted via a call between you and your physician/provider. We have found that certain health care needs can be provided without the need for an in-person physical exam.   Video visits are billed at different rates depending on your insurance coverage.  Please reach out to your insurance provider with any questions. If during the course of the call the physician/provider feels a video visit is not appropriate, you will not be charged for this service.\"     Patient has given verbal consent for Video visit? Yes    Subjective: Patient began with report that she is looking forward to relocating to [place] and that she believes it will serve as an opportunity for a fresh start.  She also reported that she is experiencing increased financial stress. Encouraged her to consider the reality of her current situation in which she has been living on a very limited income for a long time while completing graduate studies.  She was supported to finish her program and was reminded that her ability to secure a position in " which she will make a sustainable income will begin once she completes her program.      Patient described increasing her contact with men in her new community and she is finding them to be less sophisticated than she had hoped.  Again, discussed how DBT might be a program that would help her to address her judgement and frustration and to identify how her expectations may be interfering with her happiness.  Also encouraged her to consider how she may increase her communication skills so that her encounters with others might be more fulfilling.      Objective:  Patient was on time for today s session, appropriately groomed and dressed, and demonstrated good eye contact.  She appeared to be in a good mood and voiced excitement about her upcoming relocation.  She was alert and oriented. Mood was euthymic with appropriate range of affect. Patient denied suicidal or assaultive ideation, plan, or intent.        Assessment:  The patient has a longstanding history of interpersonal discord and challenges with emotion regulation.  She is coping well with Covid lock down but admits to loneliness and an awareness that she is largely alone.  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        Plan:  Patient will NOT begin full model DBT because she is moving to [place].  Will discuss termination since patient will be moving out of state.     Time In: 2:00  Time Out: 3:00    Diagnosis:  Axis I PTSD, chronic, persistent depressive disorder, adjustment disorder with mixed   Axis II R/O BPD   [place] III please see medical records for details   [place] IV Psychosocial and Environmental Stressors: lock down and academic challenges and living alone with no family support         Corina Muhammad, PhD, LP

## 2020-11-12 ENCOUNTER — TRANSFERRED RECORDS (OUTPATIENT)
Dept: HEALTH INFORMATION MANAGEMENT | Facility: CLINIC | Age: 35
End: 2020-11-12

## 2020-11-13 ENCOUNTER — VIRTUAL VISIT (OUTPATIENT)
Dept: PSYCHOLOGY | Facility: CLINIC | Age: 35
End: 2020-11-13
Payer: COMMERCIAL

## 2020-11-13 DIAGNOSIS — F33.0 MDD (MAJOR DEPRESSIVE DISORDER), RECURRENT EPISODE, MILD (H): ICD-10-CM

## 2020-11-13 DIAGNOSIS — F34.1 PERSISTENT DEPRESSIVE DISORDER: Primary | ICD-10-CM

## 2020-11-13 DIAGNOSIS — F43.23 ADJUSTMENT DISORDER WITH MIXED ANXIETY AND DEPRESSED MOOD: ICD-10-CM

## 2020-11-13 DIAGNOSIS — F43.12 CHRONIC POST-TRAUMATIC STRESS DISORDER (PTSD): ICD-10-CM

## 2020-11-13 PROCEDURE — 90837 PSYTX W PT 60 MINUTES: CPT | Mod: 95 | Performed by: PSYCHOLOGIST

## 2020-11-16 NOTE — PROGRESS NOTES
"Health Psychology - Follow up Visit  Confidential Summary*    The author of this note documented a reason for not sharing it with the patient.  REFERRAL SOURCE:  Psychiatry    CHIEF COMPLAINT/REASON FOR VISIT  Psychotherapy in context of chronic PTSD and persistent depression.  Patient also complains of dissatisfaction with interpersonal relationships and challenges with emotion regulation.      Patient was seen today for a 60 minute individual psychotherapy session. Initially, the session was facilitated via [email or web address] with patient at her home and provider at her own home but patient was not able to get her video to work so we completed the session with [email or web address] but I could not see patient and she saw me.       This telehealth service is appropriate and effective for delivering services in light of the necessity for social distancing to mitigate the COVID-19 epidemic. Patient has agreed to receiving telehealth services.    The patient has been notified of following:   \"This video visit will be conducted via a call between you and your physician/provider. We have found that certain health care needs can be provided without the need for an in-person physical exam.   Video visits are billed at different rates depending on your insurance coverage.  Please reach out to your insurance provider with any questions. If during the course of the call the physician/provider feels a video visit is not appropriate, you will not be charged for this service.\"     Patient has given verbal consent for Video visit? Yes    Subjective: Patient began with report that she continues to be concerned about her finances.  She has correspondence with her ex  that he does not wish to continue to help to support her lifestyle since they do not have plans to reconcile.  She reported that her dog has recently experienced some health problems and she needed to use credit to pay for his care.  She reported that she has completed an application " to work for [employer].  We engaged in problem solving surrounding how she might use this side job to increase her financial security.    Discussed her ongoing belief that she is a victim of both racial stereotypes and gender inequality.  Discussed how radical acceptance can increase her ability to problem solve.      She was congratulated that she has been able to move forward on her dossier.      Discussed termination as patient is soon to relocate to [place].      Objective:  Patient was on time for today s session, appropriately groomed and dressed, and demonstrated good eye contact.  She appeared to be in a good mood. She was alert and oriented. Mood was euthymic with appropriate range of affect. Patient denied suicidal or assaultive ideation, plan, or intent.        Assessment:  The patient has a longstanding history of interpersonal discord and challenges with emotion regulation.  She is coping well with Covid lock down but admits to loneliness and an awareness that she is largely alone.  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        Plan:  Patient will NOT begin full model DBT because she is moving to [place].  Will discuss termination since patient will be moving out of state.     Time In: 2:00  Time Out: 3:00    Diagnosis:  Axis I PTSD, chronic, persistent depressive disorder, adjustment disorder with mixed   Axis II R/O BPD   [place] III please see medical records for details   [place] IV Psychosocial and Environmental Stressors: lock down and academic challenges and living alone with no family support         Corina Muhammad, PhD, LP

## 2020-11-17 NOTE — PROGRESS NOTES
"Health Psychology - Follow up Visit  Confidential Summary*      REFERRAL SOURCE:  Psychiatry    CHIEF COMPLAINT/REASON FOR VISIT  Psychotherapy in context of chronic PTSD and persistent depression.  Patient also complains of dissatisfaction with interpersonal relationships and challenges with emotion regulation.      Patient was seen today for a 60 minute individual psychotherapy session. Initially, the session was facilitated via [email or web address] with patient at her home and provider at her own home but patient was not able to get her video to work so we completed the session with [email or web address] but I could not see patient and she saw me.       This telehealth service is appropriate and effective for delivering services in light of the necessity for social distancing to mitigate the COVID-19 epidemic. Patient has agreed to receiving telehealth services.    The patient has been notified of following:   \"This video visit will be conducted via a call between you and your physician/provider. We have found that certain health care needs can be provided without the need for an in-person physical exam.   Video visits are billed at different rates depending on your insurance coverage.  Please reach out to your insurance provider with any questions. If during the course of the call the physician/provider feels a video visit is not appropriate, you will not be charged for this service.\"     Patient has given verbal consent for Video visit? Yes    Subjective: Patient began with report that she may soon be relocating to [place], [place].  She described challenging situation with her friend, who she thought she would relocate with.  Apparently this friend did not realize that she was going to have to pay rent to her family there and is stressed and focused on finances.  She has accused patient of taking she and her partner for granted.  Patient described frustration that she has been misjudged and that she has no defense.  She also reported " that she feels betrayed and that this is evidence that she may not be able to trust her intuition which was to trust her friend.  Discussed any redflags that she noted in her friend prior to this occurrence and patient sited multiple incidents in which patient observed her friend to act in a manner consistent to what she is observing now.  The difference was that she aimed this behavior at others and not the patient.  Encouraged her to engage in wise mind and to depersonalize the situation.  Offerred support and validated her disappointment.      Objective:  Patient was on time for today s session, appropriately groomed and dressed, and demonstrated good eye contact.  She was dysphoric and reported sadness and anxiety.  She was alert and oriented. Mood was euthymic with appropriate range of affect. Patient denied suicidal or assaultive ideation, plan, or intent.        Assessment:  The patient has a longstanding history of interpersonal discord and challenges with emotion regulation.  She is coping well with Covid lock down but admits to loneliness and an awareness that she is largely alone.  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        Plan:  Patient will NOT begin full model DBT because she is moving to [place].  Will discuss termination since patient will be moving out of state.     Time In: 2:00  Time Out: 3:00    Diagnosis:  Axis I PTSD, chronic, persistent depressive disorder, adjustment disorder with mixed   Axis II R/O BPD   [place] III please see medical records for details   [place] IV Psychosocial and Environmental Stressors: lock down and academic challenges and living alone with no family support         Corina Muhammad, PhD, LP

## 2020-11-20 ENCOUNTER — VIRTUAL VISIT (OUTPATIENT)
Dept: PSYCHOLOGY | Facility: CLINIC | Age: 35
End: 2020-11-20
Payer: COMMERCIAL

## 2020-11-20 DIAGNOSIS — F34.1 PERSISTENT DEPRESSIVE DISORDER: Primary | ICD-10-CM

## 2020-11-20 DIAGNOSIS — F43.12 CHRONIC POST-TRAUMATIC STRESS DISORDER (PTSD): ICD-10-CM

## 2020-11-20 DIAGNOSIS — F43.23 ADJUSTMENT DISORDER WITH MIXED ANXIETY AND DEPRESSED MOOD: ICD-10-CM

## 2020-11-20 PROCEDURE — 90837 PSYTX W PT 60 MINUTES: CPT | Mod: 95 | Performed by: PSYCHOLOGIST

## 2020-11-23 NOTE — PROGRESS NOTES
"Health Psychology - Follow up Visit  Confidential Summary*    The author of this note documented a reason for not sharing it with the patient.  REFERRAL SOURCE:  Psychiatry    CHIEF COMPLAINT/REASON FOR VISIT  Psychotherapy in context of chronic PTSD and persistent depression.  Patient also complains of dissatisfaction with interpersonal relationships and challenges with emotion regulation.      Patient was seen today for a 60 minute individual psychotherapy session. Initially, the session was facilitated via [email or web address] with patient at her home and provider at her own home but patient was not able to get her video to work so we completed the session with [email or web address] but I could not see patient and she saw me.       This telehealth service is appropriate and effective for delivering services in light of the necessity for social distancing to mitigate the COVID-19 epidemic. Patient has agreed to receiving telehealth services.    The patient has been notified of following:   \"This video visit will be conducted via a call between you and your physician/provider. We have found that certain health care needs can be provided without the need for an in-person physical exam.   Video visits are billed at different rates depending on your insurance coverage.  Please reach out to your insurance provider with any questions. If during the course of the call the physician/provider feels a video visit is not appropriate, you will not be charged for this service.\"     Patient has given verbal consent for Video visit? 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  She had done three deliveries and described being disappointed at how long each took and also that she did not make as much money as she had hoped.  Discussed details of each and she was supported in her desire to become more financially independent.  She described ongoing stress surrounding her finances.      She reported " that she has completed her dossier and has gotten feedback from several of her professors and her major professor informed her that he would be willing to sign off.  She discussed her dissertation idea with his and received criticism.  She believes she will be able to address his criticism and move forward.  Discussed her next steps. She would like to complete her exams by next May and is feeling overall more optimistic that she will complete her studies.      Patient today described a feeling of validation as she has been invited to serve on a departmental equity and diversity committee with a professor she admires.  She described a long conversation they had in which her life story and experiences with perceived racial and gender inequity were used as a teaching tool.  Discussed the possibility that her experiences may be used to help others.  Patient is beginning to envision a life outside of graduate school.      She continues to report that she and  have not made any progress toward divorce.  She reported that she does not have the time or resources to invest in this process.  She has not heard from her former friend.      Objective:  Patient was on time for today s session, appropriately groomed and dressed, and demonstrated good eye contact.  She appeared to be in a good mood. She was alert and oriented. Mood was euthymic with appropriate range of affect. Patient denied suicidal or assaultive ideation, plan, or intent.        Assessment:  The patient has a longstanding history of interpersonal discord and challenges with emotion regulation.  She is coping well with Covid lock down but admits to loneliness and an awareness that she is largely alone.  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        Plan:  Patient will NOT begin full model DBT because she is moving to [place].  Will discuss termination since patient will be moving out of  state.     Time In: 2:00  Time Out: 3:00    Diagnosis:  Axis I PTSD, chronic, persistent depressive disorder, adjustment disorder with mixed   Axis II R/O BPD   [facility] III please see medical records for details   [facility] IV Psychosocial and Environmental Stressors: lock down and academic challenges and living alone with no family support         Corina Muhammad, PhD, LP

## 2020-11-27 ENCOUNTER — VIRTUAL VISIT (OUTPATIENT)
Dept: PSYCHOLOGY | Facility: CLINIC | Age: 35
End: 2020-11-27
Payer: COMMERCIAL

## 2020-11-27 DIAGNOSIS — Z53.9 ERRONEOUS ENCOUNTER--DISREGARD: Primary | ICD-10-CM

## 2020-12-02 NOTE — PROGRESS NOTES
"Health Psychology - Follow up Visit  Confidential Summary*      REFERRAL SOURCE:  Psychiatry  The author of this note documented a reason for not sharing it with the patient.  CHIEF COMPLAINT/REASON FOR VISIT  Psychotherapy in context of chronic PTSD and persistent depression.  Patient also complains of dissatisfaction with interpersonal relationships and challenges with emotion regulation.      Patient was seen today for a 60 minute individual psychotherapy session. Initially, the session was facilitated via [email or web address] with patient at her home and provider at her own home but patient was not able to get her video to work so we completed the session with [email or web address] but I could not see patient and she saw me.       This telehealth service is appropriate and effective for delivering services in light of the necessity for social distancing to mitigate the COVID-19 epidemic. Patient has agreed to receiving telehealth services.    The patient has been notified of following:   \"This video visit will be conducted via a call between you and your physician/provider. We have found that certain health care needs can be provided without the need for an in-person physical exam.   Video visits are billed at different rates depending on your insurance coverage.  Please reach out to your insurance provider with any questions. If during the course of the call the physician/provider feels a video visit is not appropriate, you will not be charged for this service.\"     Patient has given verbal consent for Video visit? Yes    Subjective: Patient began with report that she has felt \"super anxious\" and would like to talk about how to address anxiety today. Introducted DBT concept of \"checking the facts\".  Asked her to determine if her feelings of anxiety were justified  (handout 8a).  Specifically, fear is justified if there is a threat to life or a threat to health or threat to well being and/or the intensity and duration of the emotion is in " "excess of what the expected outcome might bring and not effective.  Patient was able to use this information to identify that her anxiety was not justified.  Discussed how she might change her thoughts and use \"opposite action\".  She admitted that she feels better when she is addressing her school assignments and she is anxious that her dossier might not be accepted and that she will not be able to graduate from school.      Discussed the likelihood of these fears and discussed possible options.  Addressed patients catastrophizing and overestimating the likelihood of events.      Patient also reported use of edibles.  Discussed the possible impact of these on her daily energy level and motivation.  She reported that they help her to sleep.      Patient reported that fears that her ex might change their financial arrangement.  Discussed options, encouraged active problem solving vs active passivity.      Objective:  Patient was on time for today s session, appropriately groomed and dressed, and demonstrated good eye contact.  She was dysphoric and reported anxiety.  She was alert and oriented. Mood was euthymic with appropriate range of affect. Patient denied suicidal or assaultive ideation, plan, or intent.        Assessment:  The patient has a longstanding history of interpersonal discord and challenges with emotion regulation.  She is coping well with Covid lock down but admits to loneliness and an awareness that she is largely alone.  She has made recent progress toward completing her academic program.        Plan:  Patient will NOT begin full model DBT because she is moving to [place].  Will discuss termination since patient will be moving out of state.     Time In: 2:00  Time Out: 3:00    Diagnosis:  Axis I PTSD, chronic, persistent depressive disorder, adjustment disorder with mixed   Axis II R/O BPD   [facility] III please see medical records for details   [facility] IV Psychosocial and Environmental Stressors: lock down " and academic challenges and living alone with no family support         Corina Muhammad, PhD, LP

## 2020-12-02 NOTE — PROGRESS NOTES
The author of this note documented a reason for not sharing it with the patient.  This encounter was opened in error. Please disregard.

## 2020-12-04 ENCOUNTER — TELEPHONE (OUTPATIENT)
Dept: OBGYN | Facility: CLINIC | Age: 35
End: 2020-12-04

## 2020-12-04 ENCOUNTER — VIRTUAL VISIT (OUTPATIENT)
Dept: PSYCHOLOGY | Facility: CLINIC | Age: 35
End: 2020-12-04
Payer: COMMERCIAL

## 2020-12-04 DIAGNOSIS — Z53.9 ERRONEOUS ENCOUNTER--DISREGARD: Primary | ICD-10-CM

## 2020-12-04 NOTE — TELEPHONE ENCOUNTER
----- Message from Dacia Jon RN sent at 12/4/2020  8:28 AM CST -----  Regarding: PT referral  Dia from healthcare facility in [place] states patient has a PT referral from Dr. Sesay and that she has faxed orders that need to be signed multiple times with no fax back. Can reach Dia at [phone], secure line and can leave detailed message.

## 2020-12-07 NOTE — PROGRESS NOTES
"Health Psychology - Follow up Visit  Confidential Summary*      REFERRAL SOURCE:  Psychiatry  The author of this note documented a reason for not sharing it with the patient.  CHIEF COMPLAINT/REASON FOR VISIT  Psychotherapy in context of chronic PTSD and persistent depression.  Patient also complains of dissatisfaction with interpersonal relationships and challenges with emotion regulation.      Patient was seen today for a 60 minute individual psychotherapy session. Initially, the session was facilitated via [email or web address] with patient at her home and provider at her own home but patient was not able to get her video to work so we completed the session with [email or web address] but I could not see patient and she saw me.       This telehealth service is appropriate and effective for delivering services in light of the necessity for social distancing to mitigate the COVID-19 epidemic. Patient has agreed to receiving telehealth services.    The patient has been notified of following:   \"This video visit will be conducted via a call between you and your physician/provider. We have found that certain health care needs can be provided without the need for an in-person physical exam.   Video visits are billed at different rates depending on your insurance coverage.  Please reach out to your insurance provider with any questions. If during the course of the call the physician/provider feels a video visit is not appropriate, you will not be charged for this service.\"     Patient has given verbal consent for Video visit? Yes    Subjective: Patient reported that she has begun to explore online dating in an effort to find friends in her soon to be new home in [place].  She reported that she is noting that she \"might have the same problems there\" in that she is noting that she is more sophisticated and intellectual than those she is encountering.  Discussed how judgement can make forming new relationships challenging.  Encouraged her to more " objectively describe what she is noticing.  She reported that she is concerned that she will be judged based on stereotypes and not treated with respect.  Encouraged her to consider that she may not.      Patient described stress surrounding incurring a large vet bill following a crisis with her dog.  She reached out to her ex  for assistance and described frustration surrounding his response.  Patient is noted to be anxious about her ability to financially support herself without his assistance.      Discussed her spending.    Objective:  Patient was on time for today s session, appropriately groomed and dressed, and demonstrated good eye contact.  She was dysphoric and reported anxiety.  She was alert and oriented. Mood was euthymic with appropriate range of affect. Patient denied suicidal or assaultive ideation, plan, or intent.        Assessment:  The patient has a longstanding history of interpersonal discord and challenges with emotion regulation.  She is coping well with Covid lock down but admits to loneliness and an awareness that she is largely alone.  She has made recent progress toward completing her academic program.        Plan:  Patient will NOT begin full model DBT because she is moving to [place].  Will discuss termination since patient will be moving out of state.     Time In: 2:00  Time Out: 3:00    Diagnosis:  Axis I PTSD, chronic, persistent depressive disorder, adjustment disorder with mixed   Axis II R/O BPD   [facility] III please see medical records for details   [facility] IV Psychosocial and Environmental Stressors: lock down and academic challenges and living alone with no family support         Corina Muhammad, PhD, LP

## 2020-12-08 ENCOUNTER — TELEPHONE (OUTPATIENT)
Dept: OBGYN | Facility: CLINIC | Age: 35
End: 2020-12-08

## 2020-12-11 ENCOUNTER — VIRTUAL VISIT (OUTPATIENT)
Dept: PSYCHOLOGY | Facility: CLINIC | Age: 35
End: 2020-12-11
Payer: COMMERCIAL

## 2020-12-11 DIAGNOSIS — F43.12 CHRONIC POST-TRAUMATIC STRESS DISORDER (PTSD): ICD-10-CM

## 2020-12-11 DIAGNOSIS — F43.23 ADJUSTMENT DISORDER WITH MIXED ANXIETY AND DEPRESSED MOOD: ICD-10-CM

## 2020-12-11 DIAGNOSIS — F34.1 PERSISTENT DEPRESSIVE DISORDER: Primary | ICD-10-CM

## 2020-12-11 PROCEDURE — 90837 PSYTX W PT 60 MINUTES: CPT | Mod: 95 | Performed by: PSYCHOLOGIST

## 2020-12-12 NOTE — PROGRESS NOTES
"Health Psychology - Follow up Visit  Confidential Summary*    The author of this note documented a reason for not sharing it with the patient.  REFERRAL SOURCE:  Psychiatry    CHIEF COMPLAINT/REASON FOR VISIT  Psychotherapy in context of chronic PTSD and persistent depression.  Patient also complains of dissatisfaction with interpersonal relationships and challenges with emotion regulation.      Patient was seen today for a 60 minute individual psychotherapy session.  The session was facilitated via [email or web address] with patient at her home and provider at her own home.     This telehealth service is appropriate and effective for delivering services in light of the necessity for social distancing to mitigate the COVID-19 epidemic. Patient has agreed to receiving telehealth services.    The patient has been notified of following:   \"This video visit will be conducted via a call between you and your physician/provider. We have found that certain health care needs can be provided without the need for an in-person physical exam.   Video visits are billed at different rates depending on your insurance coverage.  Please reach out to your insurance provider with any questions. If during the course of the call the physician/provider feels a video visit is not appropriate, you will not be charged for this service.\"     Patient has given verbal consent for Video visit? Yes    Subjective:  Patient began with report that she has been given a diagnosis of systemic mastocytosis, an additional rheumatologic condition that may explain her multiple medical symptoms.  In an effort to further identify somatic symptoms of underlying psychiatric challenges, encouraged patient to detail her multiple symptoms.  She reported hives, migraines, IBS, endometriosis, allergies, facial inflammation, sinus congestion, tonsillitis, fatigue, nausea, gallbladder, feeling foggy, forgetfulness and depression, anxiety, faint, shivering, cold chills, and " perception of fainting.  She reported that her provider has been treating her for this but he had not told her of this diagnosis.  Discussed the impact of stress on these conditions and the possibility that stress management and in particular, mindfulness may help patient overall.    She reported that she went one week without use of THC edibles and that this experience did not result in any perceived improvement in her mood or energy level.  She reported that she believes that she needs THC to assist with sleep onset.  Discussed the possibility that use of THC may increase her daytime fatigue and lethargy.  She also reported that she has taken Ambien for sleep onset since approximately the age of 16 and that she doubts if she could ever sleep without this agent.  Encouraged her to consider talking to her provider about a plan to taper this medication to determine if reduction may improve her energy during the day.      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      Patient reported that she is writing her exams.  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      Objective:  Patient was on time for today s session, appropriately groomed and dressed, and demonstrated good eye contact.  She appeared to be in a good mood. She was alert and oriented. Mood was euthymic with appropriate range of affect. Patient denied suicidal or assaultive ideation, plan, or intent.        Assessment:  The patient has a longstanding history of interpersonal discord and challenges with emotion regulation.  She is coping well with Covid lock down but admits to loneliness and an awareness that she is largely alone.  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        Plan:  Patient will NOT begin full model DBT because  she is moving to [place].  Will discuss termination since patient will be moving out of state.     Time In: 1:00  Time Out: 2:00    Diagnosis:  Axis I PTSD, chronic, persistent depressive disorder, adjustment disorder with mixed   Axis II R/O BPD   [facility] III please see medical records for details   [facility] IV Psychosocial and Environmental Stressors: lock down and academic challenges and living alone with no family support         Corina Muhammad, PhD, LP

## 2020-12-18 ENCOUNTER — VIRTUAL VISIT (OUTPATIENT)
Dept: PSYCHOLOGY | Facility: CLINIC | Age: 35
End: 2020-12-18
Payer: COMMERCIAL

## 2020-12-18 DIAGNOSIS — Z53.9 ERRONEOUS ENCOUNTER--DISREGARD: Primary | ICD-10-CM

## 2020-12-21 ENCOUNTER — TRANSFERRED RECORDS (OUTPATIENT)
Dept: HEALTH INFORMATION MANAGEMENT | Facility: CLINIC | Age: 35
End: 2020-12-21

## 2020-12-27 ENCOUNTER — HEALTH MAINTENANCE LETTER (OUTPATIENT)
Age: 35
End: 2020-12-27

## 2021-01-07 ENCOUNTER — MYC MEDICAL ADVICE (OUTPATIENT)
Dept: PSYCHIATRY | Facility: CLINIC | Age: 36
End: 2021-01-07

## 2021-01-07 DIAGNOSIS — F33.1 MODERATE EPISODE OF RECURRENT MAJOR DEPRESSIVE DISORDER (H): ICD-10-CM

## 2021-01-07 DIAGNOSIS — Z30.44 ENCOUNTER FOR SURVEILLANCE OF VAGINAL RING HORMONAL CONTRACEPTIVE DEVICE: ICD-10-CM

## 2021-01-07 DIAGNOSIS — F33.1 MAJOR DEPRESSIVE DISORDER, RECURRENT EPISODE, MODERATE (H): ICD-10-CM

## 2021-01-07 RX ORDER — ETONOGESTREL AND ETHINYL ESTRADIOL VAGINAL RING .015; .12 MG/D; MG/D
1 RING VAGINAL
Qty: 3 EACH | Refills: 3 | Status: SHIPPED | OUTPATIENT
Start: 2021-01-07 | End: 2022-01-04

## 2021-01-07 RX ORDER — BUPROPION HYDROCHLORIDE 300 MG/1
300 TABLET ORAL EVERY MORNING
Qty: 30 TABLET | Refills: 0 | Status: SHIPPED | OUTPATIENT
Start: 2021-01-07 | End: 2021-11-16

## 2021-01-07 NOTE — TELEPHONE ENCOUNTER
Received refill request for Nuvaring. Patient had recent pap/hpv done at [facility]. Able to send per protocol. Rx sent.

## 2021-01-07 NOTE — TELEPHONE ENCOUNTER
Last Seen 07/27/2020    RTC 2-3 months    Cancel 0  No-Show 0    Next Appt Not Scheduled ([facility] APPOINTMENT REQUEST SENT TO SCHEDULING)    Incoming Refill From [facility] via Fax    Medication Requested Bupropion HCL  mg Tablets     Directions Take 1 Tablet by mouth every morning    Qty 30    Last Refill 11/30/2020 Qty 30 with no refills       Medication Refill Pended Per Refill Protocol and Routed to the Provider

## 2021-01-07 NOTE — TELEPHONE ENCOUNTER
Last seen: 7/27  RTC: 3 months   Cancel: none   No-show: none   Next appt: none     Incoming refill from patient via [employer]     Medication requested: vilazodone (VIIBRYD) 40 MG TABS tablet  Directions: Take 1 tablet (40 mg) by mouth daily - Oral  Qty: 30  Last refilled: 7/29    Will route to provider for approval.

## 2021-01-08 RX ORDER — VILAZODONE HYDROCHLORIDE 40 MG/1
40 TABLET ORAL DAILY
Qty: 30 TABLET | Refills: 0 | Status: SHIPPED | OUTPATIENT
Start: 2021-01-08 | End: 2022-01-18

## 2021-01-11 NOTE — TELEPHONE ENCOUNTER
Refill Request (Viibryd)    Dana Mena MD  You; Reynaldo Iniguez MD 4 days ago     Pritesh Merino and Dr. Iniguez,     It sounds like she has moved out of state.  I am okay sending one more month of Viibryd but do not think we should send further refills since I have not seen her since July and she has moved out of state and is looking for a new psychiatrist.  Dr. Iniguez can you please sign the prescription?     Thank you,   Dana    Message text      - Meds refilled by provider   - Med tab changed to reflect this   - Patient notified via [employer]

## 2021-04-24 ENCOUNTER — HEALTH MAINTENANCE LETTER (OUTPATIENT)
Age: 36
End: 2021-04-24

## 2021-06-02 ENCOUNTER — TELEPHONE (OUTPATIENT)
Dept: SURGERY | Facility: CLINIC | Age: 36
End: 2021-06-02

## 2021-06-02 NOTE — TELEPHONE ENCOUNTER
[facility] Call Center    Phone Message    May a detailed message be left on voicemail: yes     Reason for Call: Other: Kasandra is calling in asking for a call back. She states that she unexpectedly had to move, and found another doctor that she can get her breast reduction surgery done with. She is wondering if she would be able to see Dr. Moreno still for continued care if she were to move back after her procedure. Please call back as soon as possible to discuss.     Action Taken: Message routed to:  [facility] ([facility] Plastic Surg    Travel Screening: Not Applicable

## 2021-06-04 NOTE — TELEPHONE ENCOUNTER
Left message for pt with direct contact information and requested call back to discuss. Michelle CRAWFORD RN, BSN

## 2021-06-07 ENCOUNTER — PATIENT OUTREACH (OUTPATIENT)
Dept: PLASTIC SURGERY | Facility: CLINIC | Age: 36
End: 2021-06-07

## 2021-06-07 NOTE — PATIENT INSTRUCTIONS
Spoke with pt regarding breast reduction surgery. Pt states she currently lives in [place] and is planning to undergo surgery on 7/16/21. States she will likely move back to [place] in late August or early September. She was told by her surgeon that there is concern for possible keloid formation and is wondering if she can follow up with our clinic if this occurs or there is any issues with healing post op. Explained that pt can reach out if this occurs and we would be happy to see her. Michelle CRAWFORD RN, BSN

## 2021-06-08 ENCOUNTER — TRANSFERRED RECORDS (OUTPATIENT)
Dept: HEALTH INFORMATION MANAGEMENT | Facility: CLINIC | Age: 36
End: 2021-06-08

## 2021-06-09 ENCOUNTER — VIRTUAL VISIT (OUTPATIENT)
Dept: NEUROLOGY | Facility: CLINIC | Age: 36
End: 2021-06-09
Payer: COMMERCIAL

## 2021-06-09 DIAGNOSIS — G43.709 CHRONIC MIGRAINE WITHOUT AURA WITHOUT STATUS MIGRAINOSUS, NOT INTRACTABLE: ICD-10-CM

## 2021-06-09 PROCEDURE — 99214 OFFICE O/P EST MOD 30 MIN: CPT | Mod: 95 | Performed by: PSYCHIATRY & NEUROLOGY

## 2021-06-09 RX ORDER — FLUTICASONE PROPIONATE 50 MCG
SPRAY, SUSPENSION (ML) NASAL 2 TIMES DAILY PRN
COMMUNITY
Start: 2021-03-27 | End: 2024-01-30

## 2021-06-09 RX ORDER — LEVOCETIRIZINE DIHYDROCHLORIDE 5 MG/1
5 TABLET, FILM COATED ORAL EVERY EVENING
COMMUNITY
End: 2023-01-23

## 2021-06-09 RX ORDER — FAMOTIDINE 20 MG/1
20 TABLET, FILM COATED ORAL 2 TIMES DAILY
COMMUNITY
Start: 2021-04-05 | End: 2022-06-14

## 2021-06-09 RX ORDER — DOXEPIN HYDROCHLORIDE 10 MG/1
10 CAPSULE ORAL AT BEDTIME
Qty: 30 CAPSULE | Refills: 11 | Status: SHIPPED | OUTPATIENT
Start: 2021-06-09 | End: 2021-08-05

## 2021-06-09 RX ORDER — MONTELUKAST SODIUM 10 MG/1
TABLET ORAL AT BEDTIME
COMMUNITY
Start: 2021-05-21 | End: 2022-01-03

## 2021-06-09 RX ORDER — GALCANEZUMAB 120 MG/ML
120 INJECTION, SOLUTION SUBCUTANEOUS
Qty: 1 ML | Refills: 11 | Status: SHIPPED | OUTPATIENT
Start: 2021-06-09 | End: 2021-10-13

## 2021-06-09 RX ORDER — AMITRIPTYLINE HYDROCHLORIDE 10 MG/1
TABLET ORAL
COMMUNITY
Start: 2021-05-21 | End: 2021-07-29

## 2021-06-09 RX ORDER — FEXOFENADINE HCL 180 MG/1
180 TABLET ORAL EVERY MORNING
COMMUNITY
End: 2022-06-14

## 2021-06-09 RX ORDER — DOXEPIN HYDROCHLORIDE 10 MG/1
CAPSULE ORAL AT BEDTIME
COMMUNITY
Start: 2021-02-10 | End: 2021-08-05

## 2021-06-09 RX ORDER — PROPRANOLOL HYDROCHLORIDE 20 MG/1
TABLET ORAL DAILY PRN
COMMUNITY
Start: 2021-05-27 | End: 2021-09-21

## 2021-06-09 RX ORDER — ONDANSETRON 4 MG/1
TABLET, ORALLY DISINTEGRATING ORAL EVERY 6 HOURS PRN
COMMUNITY
Start: 2021-05-23 | End: 2021-08-05

## 2021-06-09 RX ORDER — ALMOTRIPTAN 12.5 MG/1
12.5 TABLET, FILM COATED ORAL DAILY PRN
Qty: 18 TABLET | Refills: 11 | Status: SHIPPED | OUTPATIENT
Start: 2021-06-09 | End: 2021-09-16

## 2021-06-09 NOTE — LETTER
6/9/2021       RE: Kasandra Lau  [address]  [place] [address]     Dear Colleague,    Thank you for referring your patient, Kasandra Lau, to the [facility] [place] at [facility]. Please see a copy of my visit note below.    Kasandra is a 36 year old who is being evaluated via a billable video visit.      How would you like to obtain your AVS? MyChart  If the video visit is dropped, the invitation should be resent by: Send to e-mail at: [email or web address]  Will anyone else be joining your video visit? No      Video Start Time: 2:04 PM  Video-Visit Details    Type of service:  Video Visit    Originating Location (pt. Location): Home    Distant Location (provider location):  [facility] [place]     Platform used for Video Visit: [employer]    [facility]  Neurology Progress Note    Subjective:    Ms. Lau returns for follow-up of chronic migraine without aura.  I last saw her April 14, 2020.  At that time, I recommended almotriptan for acute treatment of migraine, and Emgality for migraine prevention.    She reports that she has been on a roller coaster over the past year. Things are starting to even off.    Headache-wise, she is doing well. She has one migraine attack per week and mild headaches a few days per week. Emgality continues to be beneficial.     She has had some episodes of waking with vertigo. She goes back to sleep with some benefit. This can last up to a couple of hours, longer if she doesn't nap. This can be a precursor to migraine sometimes. This has been a chronic issue.     Almotriptan 12.5 mg remains effective, she uses a repeat dose infrequently. She denies side effects. She takes this once a week.     She took Tylenol for mild headaches, but stopped taking this. She reports GI/liver issues.     She has ondansetron for nausea.  She is using this more.    She has done PT for neck and shoulder pain and carpal tunnel syndrome before.    Today, she also reports other symptoms and wonders if they are neurological. She has paresthesias, a tingling/itching feeling all over her body. It has been going on for years. It is present in her lips and fingertips first, which is daily. She also has been having redness in her face, which is painful and tingling and hot to touch. These attacks last more than a day. She took doxepin for this, which was somewhat helpful.    She has associated muscle and joint pains, hives starting at her ears and working into the middle of her face, photophobia requiring sunglasses inside, intolerance to heat, brain fog, body shocks/jolts with paresthesias, and GI issues (diarrhea). She has almost fainted a few times with heat. She sometimes feels like her throat is closing up; she is using an epi pen for this.     She reports previous bloodwork has been unrevealing except for STEPHANIE. She has been told there is concern for CREST syndrome.     She has dry eye and dry mouth.     Objective:    Vitals: There were no vitals taken for this visit.  General: Cooperative, NAD  Neurologic:  Mental Status: Fully alert, attentive and oriented. Speech clear and fluent.   Cranial Nerves: Facial movements symmetric.   Motor: No abnormal movements.      Assessment/Plan:   Kasandra Lau is a 36-year-old woman who returns for follow-up of chronic migraine without aura.  She also reports other symptoms of paresthesias, tingling and itching that are chronic issues.  She also reports previous concern for crest syndrome, and has not established care with anyone regarding this.    For symptomatic management of chronic migraine, she will continue the following:  -For treatment, she has had benefit since starting Emgality for migraine prevention.  Her headache attacks are now occurring less frequent and are less severe, and only last 1 day instead of 2  or 3.    -For acute treatment of headache, she takes almotriptan 12.5 mg tablets, which are helpful.  -She is previously had benefit with doxepin 10 mg at bedtime for painful tingling, so we will restart this.  This may worsen dry eyes and dry mouth.    Regarding her paresthesias, itching, and tingling, I wonder about a small fiber neuropathy playing a role.  She is interested in further evaluation for this possibility.  I have written a referral for Dr. Desai at [facility] for her.     I placed referral for rheumatology for further evaluation and management for possible crest syndrome.    Natividad Osuna MD  Neurology         Again, thank you for allowing me to participate in the care of your patient.      Sincerely,    Natividad Osuna MD

## 2021-06-09 NOTE — PROGRESS NOTES
Kasandra is a 36 year old who is being evaluated via a billable video visit.      How would you like to obtain your AVS? MyChart  If the video visit is dropped, the invitation should be resent by: Send to e-mail at: [email or web address]  Will anyone else be joining your video visit? No      Video Start Time: 2:04 PM  Video-Visit Details    Type of service:  Video Visit    Originating Location (pt. Location): Home    Distant Location (provider location):  [facility] [place]     Platform used for Video Visit: [employer]    [facility]  Neurology Progress Note    Subjective:    Ms. Lau returns for follow-up of chronic migraine without aura.  I last saw her April 14, 2020.  At that time, I recommended almotriptan for acute treatment of migraine, and Emgality for migraine prevention.    She reports that she has been on a roller coaster over the past year. Things are starting to even off.    Headache-wise, she is doing well. She has one migraine attack per week and mild headaches a few days per week. Emgality continues to be beneficial.     She has had some episodes of waking with vertigo. She goes back to sleep with some benefit. This can last up to a couple of hours, longer if she doesn't nap. This can be a precursor to migraine sometimes. This has been a chronic issue.     Almotriptan 12.5 mg remains effective, she uses a repeat dose infrequently. She denies side effects. She takes this once a week.     She took Tylenol for mild headaches, but stopped taking this. She reports GI/liver issues.     She has ondansetron for nausea. She is using this more.    She has done PT for neck and shoulder pain and carpal tunnel syndrome before.    Today, she also reports other symptoms and wonders if they are neurological. She has paresthesias, a tingling/itching feeling all over her body. It has been going on for years. It is present in her lips and  fingertips first, which is daily. She also has been having redness in her face, which is painful and tingling and hot to touch. These attacks last more than a day. She took doxepin for this, which was somewhat helpful.    She has associated muscle and joint pains, hives starting at her ears and working into the middle of her face, photophobia requiring sunglasses inside, intolerance to heat, brain fog, body shocks/jolts with paresthesias, and GI issues (diarrhea). She has almost fainted a few times with heat. She sometimes feels like her throat is closing up; she is using an epi pen for this.     She reports previous bloodwork has been unrevealing except for STEPHANIE. She has been told there is concern for CREST syndrome.     She has dry eye and dry mouth.     Objective:    Vitals: There were no vitals taken for this visit.  General: Cooperative, NAD  Neurologic:  Mental Status: Fully alert, attentive and oriented. Speech clear and fluent.   Cranial Nerves: Facial movements symmetric.   Motor: No abnormal movements.      Assessment/Plan:   Kasandra Lau is a 36-year-old woman who returns for follow-up of chronic migraine without aura.  She also reports other symptoms of paresthesias, tingling and itching that are chronic issues.  She also reports previous concern for crest syndrome, and has not established care with anyone regarding this.    For symptomatic management of chronic migraine, she will continue the following:  -For treatment, she has had benefit since starting Emgality for migraine prevention.  Her headache attacks are now occurring less frequent and are less severe, and only last 1 day instead of 2 or 3.    -For acute treatment of headache, she takes almotriptan 12.5 mg tablets, which are helpful.  -She is previously had benefit with doxepin 10 mg at bedtime for painful tingling, so we will restart this.  This may worsen dry eyes and dry mouth.    Regarding her paresthesias, itching, and tingling, I wonder  about a small fiber neuropathy playing a role.  She is interested in further evaluation for this possibility.  I have written a referral for Dr. Desai at [facility] for her.     I placed referral for rheumatology for further evaluation and management for possible crest syndrome.    Natividad Osuna MD  Neurology

## 2021-06-16 ENCOUNTER — TELEPHONE (OUTPATIENT)
Dept: NEUROLOGY | Facility: CLINIC | Age: 36
End: 2021-06-16

## 2021-06-16 NOTE — TELEPHONE ENCOUNTER
Central Prior Authorization Team   Phone: [phone]      PA Initiation    Medication: EMGALITY 120 MG/ML injection  Insurance Company: [facility] - Phone [phone] Fax [phone]  Pharmacy Filling the Rx: [id number] [place] [address]  Filling Pharmacy Phone:    Filling Pharmacy Fax:    Start Date: 6/16/2021

## 2021-06-17 NOTE — TELEPHONE ENCOUNTER
Prior Authorization Approval    Authorization Effective Date: 6/16/2021  Authorization Expiration Date: 6/16/2022  Medication: EMGALITY 120 MG/ML injection  Approved Dose/Quantity: 1ml  Reference #:     Insurance Company: [employer] [place] - Phone [phone] Fax [phone]  Expected CoPay:       Which Pharmacy is filling the prescription (Not needed for infusion/clinic administered): [facility] [id number] [address]  Pharmacy Notified: Yes  Patient Notified: No

## 2021-06-17 NOTE — TELEPHONE ENCOUNTER
Rec'd denial from insurance for lack of documentation of benefit of medication. I have resubmitted with copy of progress notes from 06/09/2021 visit documenting benefit.

## 2021-07-13 DIAGNOSIS — N62 HYPERTROPHY OF BREAST: Primary | ICD-10-CM

## 2021-07-13 RX ORDER — CEFAZOLIN SODIUM 2 G/50ML
2 SOLUTION INTRAVENOUS
Status: CANCELLED | OUTPATIENT
Start: 2021-07-13

## 2021-07-13 RX ORDER — CEFAZOLIN SODIUM 2 G/50ML
2 SOLUTION INTRAVENOUS SEE ADMIN INSTRUCTIONS
Status: CANCELLED | OUTPATIENT
Start: 2021-07-13

## 2021-07-15 ENCOUNTER — TELEPHONE (OUTPATIENT)
Dept: SURGERY | Facility: CLINIC | Age: 36
End: 2021-07-15

## 2021-07-15 DIAGNOSIS — Z11.59 ENCOUNTER FOR SCREENING FOR OTHER VIRAL DISEASES: ICD-10-CM

## 2021-07-15 PROBLEM — N62 HYPERTROPHY OF BREAST: Status: ACTIVE | Noted: 2020-09-10

## 2021-07-15 NOTE — TELEPHONE ENCOUNTER
FUTURE VISIT INFORMATION      SURGERY INFORMATION:    Date: 21    Location:  OR    Surgeon:  ANTONIO Moreno MD    Anesthesia Type:  Combined General with Block    Procedure: MAMMOPLASTY, REDUCTION, Bilateral    RECORDS REQUESTED FROM:       Primary Care Provider: Oleg Johnson DO- [facility]    Most recent EKG+ Tracin21-[facility]    Most recent Sleep Study:  17

## 2021-07-15 NOTE — TELEPHONE ENCOUNTER
I contacted the patient via phone and spoke with the patient to confirm the scheduled dates and provide the following information:     Surgeon/surgery date/location:  Dr. Moreno on 8/5 at [facility].  Arrival:   11:45  AM  Pre-op consult:   Patient declined  Pre-op physical with:   PAC on 7/26.  COVID-19 test:   8/2.  Post-op:   8/17.    The surgery packet was provided via [employer] per patient request.

## 2021-07-24 NOTE — TELEPHONE ENCOUNTER
NOTES Status Details   OFFICE NOTE from referring provider Internal 06.09.2021 Natividad Osuna MD   OFFICE NOTE from other specialist N/A    DISCHARGE SUMMARY from hospital N/A    DISCHARGE REPORT from the ER N/A    MEDICATION LIST Care Everywhere /Internal    LABS (Any and all labs)      Internal /Care Everywhere    Biopsy/pathology (Anything related to diagnoses I.e. fluid aspirations, lip biopsy, muscle biopsy)      N/A     N/A    Imaging (All imaging related to diagnoses)     Echo N/A    HRCT N/A    CXR N/A    EMG N/A                   Scleroderma/Dermatomyositis diagnoses     Previous Cardiology notes  N/A    Previous Pulmonary notes N/A    Previous Dermatology notes N/A    Previous GI notes N/A    Lupus diagnoses     Previous Nephrology notes N/A    Previous Dermatology notes N/A    Previous Cardiology notes N/A

## 2021-07-26 ENCOUNTER — VIRTUAL VISIT (OUTPATIENT)
Dept: SURGERY | Facility: CLINIC | Age: 36
End: 2021-07-26
Payer: COMMERCIAL

## 2021-07-26 ENCOUNTER — PRE VISIT (OUTPATIENT)
Dept: SURGERY | Facility: CLINIC | Age: 36
End: 2021-07-26

## 2021-07-26 ENCOUNTER — PRE VISIT (OUTPATIENT)
Dept: RHEUMATOLOGY | Facility: CLINIC | Age: 36
End: 2021-07-26

## 2021-07-26 ENCOUNTER — ANESTHESIA EVENT (OUTPATIENT)
Dept: SURGERY | Facility: AMBULATORY SURGERY CENTER | Age: 36
End: 2021-07-26
Payer: COMMERCIAL

## 2021-07-26 VITALS — HEIGHT: 69 IN | BODY MASS INDEX: 25.18 KG/M2 | WEIGHT: 170 LBS

## 2021-07-26 DIAGNOSIS — Z01.818 PRE-OP EXAMINATION: Primary | ICD-10-CM

## 2021-07-26 PROCEDURE — 99204 OFFICE O/P NEW MOD 45 MIN: CPT | Mod: 95 | Performed by: NURSE PRACTITIONER

## 2021-07-26 ASSESSMENT — MIFFLIN-ST. JEOR: SCORE: 1530.49

## 2021-07-26 ASSESSMENT — PAIN SCALES - GENERAL: PAINLEVEL: NO PAIN (0)

## 2021-07-26 NOTE — H&P
Pre-Operative H & P     CC:  Preoperative exam to assess for increased cardiopulmonary risk while undergoing surgery and anesthesia.    Date of Encounter: 2021  Primary Care Physician:  Roxy Rios     Type of service:  Video Visit    Patient verbally consented to video service today: YES    Two identifiers used:  yes (name and )    Video Start Time: 13:40  Video End Time (time video stopped): 14:02    Originating Location (pt. Location): Home    Distant Location (provider location):  home    Mode of Communication:  Video Conference via Runner    Please note, because this was a virtual visit, a full physical could not be completed.  On the DOS, the OOD of anesthesia will complete the appropriate components of the physical exam.      CIRILO Lau is a 36 year old female who presents for pre-operative H & P in preparation for  MAMMOPLASTY, REDUCTION, Bilateral on 2021 with Dr. Moreno at [facility] under general anesthesia with block.     Ms. Lau was seen in plastic surgery consultation with Dr. Moreno on 2021 for evaluation of Breast reduction.  She has been large breasted all her adolescent and adult life.  She has a lot of upper back, neck, shoulder pain, shoulder grooving from bra straps and inframammary fold rashes during summers.  She has used all sorts of over the counter as well as prescribed medications and supportive garments without continued relief.  She wears a K size bra.  She would like to be around a C cup. Through Dr. Moreno's evaluation, she was diagnosed with symptomatic bilateral breast hypertrophy.  She was deemed an acceptable candidate for the above surgery.  Dr. Moreno counseled her on the procedure.  Ms. Lau presents to the PAC in virtual visit today in preparation for the above surgery.      Dx: Hypertrophy of breasts      History is obtained from the patient and electronic health record.     Past Medical History  Past Medical History:    Diagnosis Date     Anemia fall 2016     Depression (emotion)     sees psych, on meds     Gastroesophageal reflux disease      Migraine     daily meds, 2x month, more mild on meds     Panic disorder      Uncomplicated asthma Spring 2018       Past Surgical History  Past Surgical History:   Procedure Laterality Date     COLONOSCOPY       LAPAROSCOPIC ABLATION ENDOMETRIOSIS N/A 11/9/2016    Procedure: LAPAROSCOPIC ABLATION ENDOMETRIOSIS;  Surgeon: Tonja Ferrer MD;  [facility]     LAPAROSCOPIC CYSTECTOMY OVARIAN (BENIGN) Left 11/9/2016    Procedure: LAPAROSCOPIC CYSTECTOMY OVARIAN (BENIGN);  Surgeon: Tonja Ferrer MD;  [facility]     LAPAROSCOPIC TUBAL DYE STUDY Left 11/9/2016    Procedure: LAPAROSCOPIC TUBAL DYE STUDY;  Surgeon: Tonja Ferrer MD;  [facility]     LAPAROSCOPY OPERATIVE ADULT N/A 11/9/2016    Procedure: LAPAROSCOPY OPERATIVE ADULT;  Surgeon: Tonja Ferrer MD;  [facility]     ORTHOPEDIC SURGERY      left wrist surgery       Hx of Blood transfusions/reactions: denies      Hx of abnormal bleeding or anti-platelet use: denies    Menstrual history: Patient's last menstrual period was 06/29/2021 (approximate).:    Steroid use in the last year: denies     Personal or FH with difficulty with Anesthesia:  denies    Prior to Admission Medications  Current Outpatient Medications   Medication Sig Dispense Refill     Acetaminophen (TYLENOL PO) Take 650 mg by mouth every 4 hours as needed for mild pain or fever       almotriptan (AXERT) 12.5 MG tablet Take 1 tablet (12.5 mg) by mouth daily as needed for migraine (repeat in 2 hours if needed) 18 tablet 11     buPROPion (WELLBUTRIN XL) 300 MG 24 hr tablet Take 1 tablet (300 mg) by mouth every morning 30 tablet 0     cholecalciferol 50 MCG (2000 UT) CAPS 5,000 Units every evening        doxepin (SINEQUAN) 10 MG capsule At Bedtime        doxepin (SINEQUAN) 10 MG capsule Take 1 capsule (10 mg) by mouth At  Bedtime 30 capsule 11     EMGALITY 120 MG/ML injection Inject 1 mL (120 mg) Subcutaneous every 28 days 1 mL 11     etonogestrel-ethinyl estradiol (NUVARING) 0.12-0.015 MG/24HR vaginal ring Place 1 each vaginally every 28 days 3 each 3     famotidine (PEPCID) 20 MG tablet Take 20 mg by mouth 2 times daily       fexofenadine (ALLEGRA) 180 MG tablet Take 180 mg by mouth every morning        fluticasone (FLONASE) 50 MCG/ACT nasal spray 2 times daily as needed        gabapentin (NEURONTIN) 300 MG capsule Take 1 capsule (300 mg) by mouth 2 times daily as needed for other (severe anxiety) (Patient taking differently: Take 300 mg by mouth 3 times daily TAKING 700MG 3X DAILY) 60 capsule 3     gabapentin (NEURONTIN) 400 MG capsule Take 1 capsule (400 mg) by mouth 3 times daily 90 capsule 3     levocetirizine (XYZAL) 5 MG tablet Take 5 mg by mouth every evening        montelukast (SINGULAIR) 10 MG tablet At Bedtime        ondansetron (ZOFRAN-ODT) 4 MG ODT tab every 6 hours as needed        propranolol (INDERAL) 20 MG tablet daily as needed        vilazodone (VIIBRYD) 40 MG TABS tablet Take 1 tablet (40 mg) by mouth daily (Patient taking differently: Take 40 mg by mouth every morning ) 30 tablet 0     albuterol (PROVENTIL HFA) 108 (90 Base) MCG/ACT inhaler Inhale 1-2 puffs into the lungs every 4 hours as needed        amitriptyline (ELAVIL) 10 MG tablet        ASMANEX, 30 METERED DOSES, 220 MCG/INH inhaler INHALE 1 PUFF BY MOUTH DAILY. RINSE MOUTH OR GARGLE AFTER USE  6     Azelastine HCl 137 MCG/SPRAY SOLN        dicyclomine (BENTYL) 20 MG tablet Take 1 tablet (20 mg) by mouth 4 times daily as needed (cramping) (Patient not taking: Reported on 6/9/2021) 30 tablet 0     LORazepam (ATIVAN) 0.5 MG tablet Take 1 tablet (0.5 mg) by mouth daily as needed for anxiety 10 tablet 0     loteprednol (LOTEMAX) 0.5 % ophthalmic suspension Place 1-2 drops into both eyes 3 times daily (Patient not taking: Reported on 6/9/2021) 1 Bottle 1      neomycin-polymyxin-dexamethasone (MAXITROL) 3.5-81475-5.1 ophthalmic ointment Place 0.5 inches into both eyes At Bedtime (Patient not taking: Reported on 6/9/2021) 1 Tube 3     olopatadine (PATANOL) 0.1 % ophthalmic solution Place 1 drop into both eyes 2 times daily (Patient not taking: Reported on 6/9/2021) 1 Bottle 11     omalizumab (XOLAIR) 150 MG injection Inject Subcutaneous every 28 days       order for DME Use for 15-30 minutes every morning. 1 Units 0       Allergies  Allergies   Allergen Reactions     Dust Mites Cough, Difficulty breathing and Shortness Of Breath     Cats      Chest tightness, sinus irritation       Social History  Social History     Socioeconomic History     Marital status: Single     Spouse name: Not on file     Number of children: Not on file     Years of education: Not on file     Highest education level: Not on file   Occupational History     Not on file   Tobacco Use     Smoking status: Former Smoker     Packs/day: 0.00     Years: 10.00     Pack years: 0.00     Types: Cigarettes     Smokeless tobacco: Never Used     Tobacco comment: smokes occasionally socially    Substance and Sexual Activity     Alcohol use: Not Currently     Alcohol/week: 0.0 standard drinks     Comment: occasional      Drug use: Not Currently     Types: Marijuana     Comment: marijuana  none since May 2021     Sexual activity: Yes     Partners: Male     Birth control/protection: Pull-out method, Inserts/Ring     Comment: Nuvaring   Other Topics Concern     Not on file   Social History Narrative     Not on file     Social Determinants of Health     Financial Resource Strain:      Difficulty of Paying Living Expenses:    Food Insecurity:      Worried About Running Out of Food in the Last Year:      Ran Out of Food in the Last Year:    Transportation Needs:      Lack of Transportation (Medical):      Lack of Transportation (Non-Medical):    Physical Activity:      Days of Exercise per Week:      Minutes of Exercise  per Session:    Stress:      Feeling of Stress :    Social Connections:      Frequency of Communication with Friends and Family:      Frequency of Social Gatherings with Friends and Family:      Attends Jain Services:      Active Member of Clubs or Organizations:      Attends Club or Organization Meetings:      Marital Status:    Intimate Partner Violence:      Fear of Current or Ex-Partner:      Emotionally Abused:      Physically Abused:      Sexually Abused:        Family History  Family History   Problem Relation Age of Onset     Diabetes Maternal Grandfather      Hypertension No family hx of      Coronary Artery Disease No family hx of      Hyperlipidemia No family hx of      Cerebrovascular Disease No family hx of      Breast Cancer No family hx of      Colon Cancer No family hx of      Prostate Cancer No family hx of      Other Cancer No family hx of      Glaucoma No family hx of      Macular Degeneration No family hx of      ROS/MED HX  ENT/Pulmonary: Comment: Sleep study  a couple years ago that came back normal.     Evaluated for asthma and found to have GERD.  Symptoms dramatically improved s/p cholecystectomy.      Neurologic: Comment: Since starting Emgality, significant decrease in occurrence of migraines.      (+) peripheral neuropathy, - Will be seeing neurology for neuropathy in hands.. migraines (Occurrence: three times per month. ),     Cardiovascular: Comment: Denies cardiac symptoms including chest pain, SOB, palpitations, syncope, GRANGER, orthopnea, or PND.   - neg cardiovascular ROS   (+) -----Previous cardiac testing   Echo: Date: Results:    Stress Test: Date: Results:    ECG Reviewed: Date: 9/2020 Results:  NSR  Cath: Date: Results:      METS/Exercise Tolerance: >4 METS Comment: Walks her dog twice daily.    Hematologic:     (+) anemia,     Musculoskeletal: Comment: (+) Carpal tunnel with R>L.    Fractured left wrist s/p surgical intervention with hardware.       GI/Hepatic:     (+) GERD,  Asymptomatic on medication, cholecystitis/cholelithiasis (s/p cholecystectomy),     Renal/Genitourinary:     (+) Nephrolithiasis ,     Endo:       Psychiatric/Substance Use:     (+) psychiatric history anxiety and depression  (-) alcohol abuse history and chronic opioid use history   Infectious Disease:  - neg infectious disease ROS     Malignancy:  - neg malignancy ROS     Other: Comment: Will be seeing Rheumatology for further evaluation of CREST syndrome. Main symptom is swallowing issues.      (+) LMP: 6/26/2021, ,         No vital signs taken since this is a virtual visit.       Physical Exam  Constitutional: Awake, alert, cooperative, no apparent distress, and appears stated age.  HENT: Normocephalic   Respiratory: Regular respiratory rate.  Effort is non-labored, quiet, easy breathing.   Musculoskeletal:  Full ROM of neck.   Neurologic: Awake, alert, oriented to name, place and time.   Neuropsychiatric: Calm, cooperative. Normal affect.     **Please note, because this was a virtual visit due to COVID -19 pandemic, a full physical could not be completed.  On the DOS, the OOD of anesthesia will complete the appropriate components of the physical exam. Please refer to the physical examination documented by the anesthesiologist in the anesthesia record on the day of surgery. **      Labs: (personally reviewed)  Lab Results   Component Value Date    WBC 7.3 09/12/2020     Lab Results   Component Value Date    RBC 4.01 09/12/2020     Lab Results   Component Value Date    HGB 11.6 09/12/2020     Lab Results   Component Value Date    HCT 35.7 09/12/2020     Lab Results   Component Value Date    MCV 89 09/12/2020     Lab Results   Component Value Date    MCH 28.9 09/12/2020     Lab Results   Component Value Date    MCHC 32.5 09/12/2020     Lab Results   Component Value Date    RDW 12.5 09/12/2020     Lab Results   Component Value Date     09/12/2020       Last Comprehensive Metabolic Panel:  Sodium   Date Value  Ref Range Status   09/12/2020 140 133 - 144 mmol/L Final     Potassium   Date Value Ref Range Status   09/12/2020 3.9 3.4 - 5.3 mmol/L Final     Chloride   Date Value Ref Range Status   09/12/2020 113 (H) 94 - 109 mmol/L Final     Carbon Dioxide   Date Value Ref Range Status   09/12/2020 21 20 - 32 mmol/L Final     Anion Gap   Date Value Ref Range Status   09/12/2020 6 3 - 14 mmol/L Final     Glucose   Date Value Ref Range Status   09/12/2020 95 70 - 99 mg/dL Final     Urea Nitrogen   Date Value Ref Range Status   09/12/2020 13 7 - 30 mg/dL Final     Creatinine   Date Value Ref Range Status   09/12/2020 0.77 0.52 - 1.04 mg/dL Final     GFR Estimate   Date Value Ref Range Status   09/12/2020 >90 >60 mL/min/[1.73_m2] Final     Comment:     Non  GFR Calc  Starting 12/18/2018, serum creatinine based estimated GFR (eGFR) will be   calculated using the Chronic Kidney Disease Epidemiology Collaboration   (CKD-EPI) equation.       Calcium   Date Value Ref Range Status   09/12/2020 9.0 8.5 - 10.1 mg/dL Final     PROCEDURES  ECG 9/2020:  NSR   ASSESSMENT and PLAN  Kasandra Lau is a 36 year old female scheduled to undergo   MAMMOPLASTY, REDUCTION, Bilateral on 8/5/2021 with Dr. Moreno at [facility] under general anesthesia with block.       She has the following specific operative considerations:   - NIKOLAI # of risks 0/8   - VTE risk:  0.26%  - Risk of PONV score = 2.  If > 2, anti-emetic intervention recommended.      #  Cardiology   - Denies known coronary artery disease.  Denies cardiac symptoms. METS:  >4. RCRI : No serious cardiac risks.  0.4 % risk of major adverse cardiac event.         #  Pulmonary   - Remote social smoking  - Evaluated for asthma and found to have GERD.    - Allergic rhinitis, take Flonase, Xyzol, Allegra and montelukast as prescribed DOS.   - Denies pulmonary symptoms  - No inhalers      # Immune  - Idiopathic urticaria, treated with doxepin     # Heme  - Anemia, Hgb 11.6    #  GI   -  GERD, take H2B DOS   - s/p cholecystectomy  - h/o IBS, but now stable.  Not taking Bentyl    #  Neuro   - Migraines, on Emgality, almotriptan and Inderal. .   - Mood disorder, take gabapentin, bupropion and vilazodone as prescribed DOS.     # Symptomatic bilateral breast hypertrophy.  - above surgery with Dr. ludwig    #  ID  - COVID-19 testing per surgeon's office    #   Anesthesia considerations   -  Refer to PAC assessment in anesthesia records      Arrival time, NPO, shower and medication instructions provided by nursing staff today.    Patient is an acceptable candidate for the proposed procedure.  No further diagnostic evaluation is needed.         JASVIR Padilla CNP  Preoperative Assessment Center  [facility]  Phone: [phone]  Fax: [phone]

## 2021-07-26 NOTE — ANESTHESIA PREPROCEDURE EVALUATION
Anesthesia Pre-Procedure Evaluation    Patient: Kasandra Lau   MRN: [id number] : 1985        Preoperative Diagnosis: * No surgery found *   Procedure :      Past Medical History:   Diagnosis Date     Anemia 2016     Depression (emotion)     sees psych, on meds     Migraine     daily meds, 2x month, more mild on meds     Panic disorder      Uncomplicated asthma Spring 2018      Past Surgical History:   Procedure Laterality Date     COLONOSCOPY       LAPAROSCOPIC ABLATION ENDOMETRIOSIS N/A 2016    Procedure: LAPAROSCOPIC ABLATION ENDOMETRIOSIS;  Surgeon: Tonja Ferrer MD;  [facility]     LAPAROSCOPIC CYSTECTOMY OVARIAN (BENIGN) Left 2016    Procedure: LAPAROSCOPIC CYSTECTOMY OVARIAN (BENIGN);  Surgeon: Tonja Ferrer MD;  [facility]     LAPAROSCOPIC TUBAL DYE STUDY Left 2016    Procedure: LAPAROSCOPIC TUBAL DYE STUDY;  Surgeon: Tonja Ferrer MD;  [facility]     LAPAROSCOPY OPERATIVE ADULT N/A 2016    Procedure: LAPAROSCOPY OPERATIVE ADULT;  Surgeon: Tonja Ferrer MD;  [facility]     ORTHOPEDIC SURGERY      left wrist surgery      Allergies   Allergen Reactions     Dust Mites Cough, Difficulty breathing and Shortness Of Breath     Cats      Chest tightness, sinus irritation      Social History     Tobacco Use     Smoking status: Former Smoker     Packs/day: 0.00     Years: 10.00     Pack years: 0.00     Types: Cigarettes     Smokeless tobacco: Never Used     Tobacco comment: smokes occasionally socially    Substance Use Topics     Alcohol use: Not Currently     Alcohol/week: 0.0 standard drinks     Comment: occasional       Wt Readings from Last 1 Encounters:   20 78 kg (172 lb)        Anesthesia Evaluation   Pt has had prior anesthetic. Type: General and MAC.    No history of anesthetic complications       ROS/MED HX  ENT/Pulmonary: Comment: Sleep study  a couple years ago that came back normal.     Evaluated for asthma  and found to have GERD.  Symptoms dramatically improved s/p cholecystectomy.      Neurologic: Comment: Since starting Emgality, significant decrease in occurrence of migraines.      (+) peripheral neuropathy, - Will be seeing neurology for neuropathy in hands.. migraines (Occurrence: three times per month. ),     Cardiovascular: Comment: Denies cardiac symptoms including chest pain, SOB, palpitations, syncope, GRANGER, orthopnea, or PND.   - neg cardiovascular ROS   (+) -----Previous cardiac testing   Echo: Date: Results:    Stress Test: Date: Results:    ECG Reviewed: Date: 9/2020 Results:  NSR  Cath: Date: Results:      METS/Exercise Tolerance: >4 METS Comment: Walks her dog twice daily.    Hematologic:     (+) anemia,     Musculoskeletal: Comment: (+) Carpal tunnel with R>L.    Fractured left wrist s/p surgical intervention with hardware.       GI/Hepatic:     (+) GERD, Asymptomatic on medication, cholecystitis/cholelithiasis (s/p cholecystectomy),     Renal/Genitourinary:     (+) Nephrolithiasis ,     Endo:       Psychiatric/Substance Use:     (+) psychiatric history anxiety and depression  (-) alcohol abuse history and chronic opioid use history   Infectious Disease:  - neg infectious disease ROS     Malignancy:  - neg malignancy ROS     Other: Comment: Will be seeing Rheumatology for further evaluation of CREST syndrome. Main symptom is swallowing issues.      (+) LMP: 6/26/2021, ,         Physical Exam    Airway        Mallampati: I   TM distance: > 3 FB   Neck ROM: full   Mouth opening: > 3 cm    Respiratory Devices and Support         Dental         B=Bridge, C=Chipped, L=Loose, M=Missing    Cardiovascular   cardiovascular exam normal          Pulmonary   pulmonary exam normal                OUTSIDE LABS:  CBC:   Lab Results   Component Value Date    WBC 7.3 09/12/2020    WBC 8.3 08/02/2019    HGB 11.6 (L) 09/12/2020    HGB 12.0 08/02/2019    HCT 35.7 09/12/2020    HCT 37.8 08/02/2019     09/12/2020      08/02/2019     BMP:   Lab Results   Component Value Date     09/12/2020     08/02/2019    POTASSIUM 3.9 09/12/2020    POTASSIUM 3.5 08/02/2019    CHLORIDE 113 (H) 09/12/2020    CHLORIDE 111 (H) 08/02/2019    CO2 21 09/12/2020    CO2 22 08/02/2019    BUN 13 09/12/2020    BUN 13 08/02/2019    CR 0.77 09/12/2020    CR 0.80 08/02/2019    GLC 95 09/12/2020     (H) 08/02/2019     COAGS: No results found for: PTT, INR, FIBR  POC:   Lab Results   Component Value Date    HCG Negative 02/21/2019     HEPATIC:   Lab Results   Component Value Date    ALBUMIN 3.3 (L) 09/12/2020    PROTTOTAL 7.3 09/12/2020    ALT 36 09/12/2020    AST 25 09/12/2020    ALKPHOS 70 09/12/2020    BILITOTAL 0.2 09/12/2020     OTHER:   Lab Results   Component Value Date    FABIOLA 9.0 09/12/2020    LIPASE 128 09/12/2020    TSH 1.72 02/06/2017    CRP 11.0 (H) 09/12/2020       Anesthesia Plan    ASA Status:  2   NPO Status:  NPO Appropriate    Anesthesia Type: General.     - Airway: ETT   Induction: Intravenous.   Maintenance: TIVA.        Consents    Anesthesia Plan(s) and associated risks, benefits, and realistic alternatives discussed. Questions answered and patient/representative(s) expressed understanding.     - Discussed with:  Patient      - Extended Intubation/Ventilatory Support Discussed: No.      - Patient is DNR/DNI Status: No    Use of blood products discussed: No .     Postoperative Care    Pain management: Peripheral nerve block (Single Shot), Multi-modal analgesia, IV analgesics, Oral pain medications.   PONV prophylaxis: Background Propofol Infusion     Comments:              PAC Discussion and Assessment    ASA Classification: 2  Case is suitable for: ASC  Anesthetic techniques and relevant risks discussed: GA with regional block for post-op pain control                  PAC Resident/NP Anesthesia Assessment: Type of service:  Video Visit    Patient verbally consented to video service today: YES    Two identifiers  used:  yes (name and )    Video Start Time: 13:40  Video End Time (time video stopped): 14:02    Originating Location (pt. Location): Home    Distant Location (provider location):  home    Mode of Communication:  Video Conference via [employer]    Please note, because this was a virtual visit, a full physical could not be completed.  On the DOS, the OOD of anesthesia will complete the appropriate components of the physical exam.    --  Kasandra Lau is a 36-year-old female scheduled for MAMMOPLASTY, REDUCTION, Bilateral on 2021 with Dr. Moreno at [facility] under general anesthesia with block.     Ms. Lau was seen in plastic surgery consultation with Dr. Moreno on 2021 for evaluation of Breast reduction.  She has been large breasted all her adolescent and adult life.  She has a lot of upper back, neck, shoulder pain, shoulder grooving from bra straps and inframammary fold rashes during summers.  She has used all sorts of over the counter as well as prescribed medications and supportive garments without continued relief.  She wears a K size bra.  She would like to be around a C cup. Through Dr. Moreno's evaluation, she was diagnosed with symptomatic bilateral breast hypertrophy.  She was deemed an acceptable candidate for the above surgery.  Dr. Moreno counseled her on the procedure.  Ms. Lau presents to the PAC in virtual visit today in preparation for the above surgery.      Dx: Hypertrophy of breasts     She has the following specific operative considerations:   - NIKOLAI # of risks 0/8   - VTE risk:  0.26%  - Risk of PONV score = 2.  If > 2, anti-emetic intervention recommended.      #  Cardiology   - Denies known coronary artery disease.  Denies cardiac symptoms. METS:  >4. RCRI : No serious cardiac risks.  0.4 % risk of major adverse cardiac event.         #  Pulmonary   - Remote social smoking  - Evaluated for asthma and found to have GERD.    - Allergic rhinitis, take Flonase as  prescribed DOS.   - Denies pulmonary symptoms  - No inhalers     # Immune  - Idiopathic urticaria, treated with doxepin     # Heme  - Anemia, Hgb 11.6    #  GI   - GERD, take H2B DOS   - s/p cholecystectomy  - h/o IBS, but now stable.  Not taking Bentyl    #  Neuro   - Migraines, on Emgality and almotriptan.   - Mood disorder, take gabapentin, bupropion and vilazodone as prescribed DOS.     # Symptomatic bilateral breast hypertrophy.  - above surgery with Dr. ludwig    #  ID  - COVID-19 testing per surgeon's office    #   Anesthesia considerations   -  Refer to PAC assessment in anesthesia records      Arrival time, NPO, shower and medication instructions provided by nursing staff today.    Patient is an acceptable candidate for the proposed procedure.  No further diagnostic evaluation is needed.     **For further details of assessment, testing, and physical exam please see H and P completed on same date.      Reviewed and Signed by PAC Mid-Level Provider/Resident  Mid-Level Provider/Resident: Jennyfer James APRN CNP                                   JASVIR Padilla CNP

## 2021-07-26 NOTE — PROGRESS NOTES
Kasandra is a 36 year old who is being evaluated via a billable video visit.      How would you like to obtain your AVS? MyChart  If the video visit is dropped, the invitation should be resent by: Text to cell phone: [phone]    HPI         Review of Systems         Physical Exam

## 2021-07-26 NOTE — PATIENT INSTRUCTIONS
Preparing for Your Surgery      Name:  Kasandra Lau   MRN:  [id number]   :  1985   Today's Date:  2021         Arriving for surgery:  Surgery date:  21  Arrival time:  Noon    Restrictions due to COVID 19:  One consistent visitor is allowed per patient  No ill visitors  All visitors must wear face mask     parking is available for anyone with mobility limitations or disabilities. (Monday- Friday 7 am- 5 pm)    Please come to:    [facility]  [address] [place] [id number]    Please check in on the 5th floor at the [facility]       What can I eat or drink?    -  You may eat and drink normally until 8 hours before surgery. (Until 5:00 am)  -  You may have clear liquids up to 4 hours before surgery. (Until 9:00 am)    Examples of clear liquids:  Water  Clear broth  Juices (apple, white grape, white cranberry  and cider) without pulp  Noncarbonated, powder based beverages  (lemonade and Amor-Aid)  Sodas (Sprite, 7-Up, ginger ale and seltzer)  Coffee or tea (without milk or cream)  Gatorade    --No alcohol for at least 24 hours before surgery    Which medicines can I take?    Hold Aspirin for 7 days before surgery.   Hold Multivitamins for 7 days before surgery.  Hold Supplements for 7 days before surgery.  Hold Ibuprofen (Advil, Motrin) for 1 day before surgery--unless otherwise directed by surgeon.  Hold Naproxen (Aleve) for 4 days before surgery.    **Hold Almotriptan (Axert) 24 hours before surgery.**    **Bring inhaler(s) with you to surgery.**    -  PLEASE TAKE the following medications the day of surgery:   Acetaminophen (Tylenol)  Albuterol (Proventil) inhaler  Amitriptyline (Elavil)  Asmanex inhaler  Azelastine spray  Bupropion (Wellbutrin)  Dicyclomine (Bentyl)  Famotidine (Pepcid)  Fexofenadine (Allegra)  Fluticasone nasal spray  Gabapentin (Neurontin)  Lorazepam (Ativan)  Ondansetron (Zofran)  Propranolol (Inderal)  Vilazodone (Viibryd)        How do I prepare myself?  - Please take 2 showers before surgery using Scrubcare or Hibiclens soap.    Use this soap only from the neck to your toes.     Leave the soap on your skin for one minute--then rinse thoroughly.      You may use your own shampoo and conditioner; no other hair products.   - Please remove all jewelry and body piercings.  - No lotions, deodorants or fragrance.  - No makeup or fingernail polish.   - Bring your ID and insurance card.        - All patients are required to have a Covid-19 test within 4 days of surgery/procedure.      -Patients will be contacted by the [facility] scheduling team within 1 week of surgery to make an appointment.      - Patients may call the Scheduling team at [phone] if they have not been scheduled within 4 days of  surgery.      ALL PATIENTS ARE REQUIRED TO HAVE A RESPONSIBLE ADULT TO DRIVE AND BE IN ATTENDANCE WITH THEM FOR 24 HOURS FOLLOWING SURGERY        Questions or Concerns:    -For questions regarding the day of surgery please contact the Ambulatory Surgery Center at [phone].    -If you have health changes between today and your surgery please contact your surgeon.     For questions after surgery please call your surgeons office.

## 2021-07-26 NOTE — H&P (VIEW-ONLY)
Pre-Operative H & P     CC:  Preoperative exam to assess for increased cardiopulmonary risk while undergoing surgery and anesthesia.    Date of Encounter: 2021  Primary Care Physician:  Roxy Rios     Type of service:  Video Visit    Patient verbally consented to video service today: YES    Two identifiers used:  yes (name and )    Video Start Time: 13:40  Video End Time (time video stopped): 14:02    Originating Location (pt. Location): Home    Distant Location (provider location):  home    Mode of Communication:  Video Conference via [employer]    Please note, because this was a virtual visit, a full physical could not be completed.  On the DOS, the OOD of anesthesia will complete the appropriate components of the physical exam.      CIRILO Lau is a 36 year old female who presents for pre-operative H & P in preparation for  MAMMOPLASTY, REDUCTION, Bilateral on 2021 with Dr. Moreno at [facility] under general anesthesia with block.     Ms. Lau was seen in plastic surgery consultation with Dr. Moreno on 2021 for evaluation of Breast reduction.  She has been large breasted all her adolescent and adult life.  She has a lot of upper back, neck, shoulder pain, shoulder grooving from bra straps and inframammary fold rashes during summers.  She has used all sorts of over the counter as well as prescribed medications and supportive garments without continued relief.  She wears a K size bra.  She would like to be around a C cup. Through Dr. Moreno's evaluation, she was diagnosed with symptomatic bilateral breast hypertrophy.  She was deemed an acceptable candidate for the above surgery.  Dr. Moreno counseled her on the procedure.  Ms. Lau presents to the PAC in virtual visit today in preparation for the above surgery.      Dx: Hypertrophy of breasts      History is obtained from the patient and electronic health record.     Past Medical History  Past Medical History:    Diagnosis Date     Anemia fall 2016     Depression (emotion)     sees psych, on meds     Gastroesophageal reflux disease      Migraine     daily meds, 2x month, more mild on meds     Panic disorder      Uncomplicated asthma Spring 2018       Past Surgical History  Past Surgical History:   Procedure Laterality Date     COLONOSCOPY       LAPAROSCOPIC ABLATION ENDOMETRIOSIS N/A 11/9/2016    Procedure: LAPAROSCOPIC ABLATION ENDOMETRIOSIS;  Surgeon: Tonja Ferrer MD;  Location: [facility] OR     LAPAROSCOPIC CYSTECTOMY OVARIAN (BENIGN) Left 11/9/2016    Procedure: LAPAROSCOPIC CYSTECTOMY OVARIAN (BENIGN);  Surgeon: Tonja Ferrer MD;  Location: [facility] OR     LAPAROSCOPIC TUBAL DYE STUDY Left 11/9/2016    Procedure: LAPAROSCOPIC TUBAL DYE STUDY;  Surgeon: Tonja Ferrer MD;  Location: [facility] OR     LAPAROSCOPY OPERATIVE ADULT N/A 11/9/2016    Procedure: LAPAROSCOPY OPERATIVE ADULT;  Surgeon: Tonja Ferrer MD;  Location: [facility] OR     ORTHOPEDIC SURGERY      left wrist surgery       Hx of Blood transfusions/reactions: denies      Hx of abnormal bleeding or anti-platelet use: denies    Menstrual history: Patient's last menstrual period was 06/29/2021 (approximate).:    Steroid use in the last year: denies     Personal or FH with difficulty with Anesthesia:  denies    Prior to Admission Medications  Current Outpatient Medications   Medication Sig Dispense Refill     Acetaminophen (TYLENOL PO) Take 650 mg by mouth every 4 hours as needed for mild pain or fever       almotriptan (AXERT) 12.5 MG tablet Take 1 tablet (12.5 mg) by mouth daily as needed for migraine (repeat in 2 hours if needed) 18 tablet 11     buPROPion (WELLBUTRIN XL) 300 MG 24 hr tablet Take 1 tablet (300 mg) by mouth every morning 30 tablet 0     cholecalciferol 50 MCG (2000 UT) CAPS 5,000 Units every evening        doxepin (SINEQUAN) 10 MG capsule At Bedtime        doxepin (SINEQUAN) 10 MG capsule Take 1 capsule (10 mg) by mouth At  Bedtime 30 capsule 11     EMGALITY 120 MG/ML injection Inject 1 mL (120 mg) Subcutaneous every 28 days 1 mL 11     etonogestrel-ethinyl estradiol (NUVARING) 0.12-0.015 MG/24HR vaginal ring Place 1 each vaginally every 28 days 3 each 3     famotidine (PEPCID) 20 MG tablet Take 20 mg by mouth 2 times daily       fexofenadine (ALLEGRA) 180 MG tablet Take 180 mg by mouth every morning        fluticasone (FLONASE) 50 MCG/ACT nasal spray 2 times daily as needed        gabapentin (NEURONTIN) 300 MG capsule Take 1 capsule (300 mg) by mouth 2 times daily as needed for other (severe anxiety) (Patient taking differently: Take 300 mg by mouth 3 times daily TAKING 700MG 3X DAILY) 60 capsule 3     gabapentin (NEURONTIN) 400 MG capsule Take 1 capsule (400 mg) by mouth 3 times daily 90 capsule 3     levocetirizine (XYZAL) 5 MG tablet Take 5 mg by mouth every evening        montelukast (SINGULAIR) 10 MG tablet At Bedtime        ondansetron (ZOFRAN-ODT) 4 MG ODT tab every 6 hours as needed        propranolol (INDERAL) 20 MG tablet daily as needed        vilazodone (VIIBRYD) 40 MG TABS tablet Take 1 tablet (40 mg) by mouth daily (Patient taking differently: Take 40 mg by mouth every morning ) 30 tablet 0     albuterol (PROVENTIL HFA) 108 (90 Base) MCG/ACT inhaler Inhale 1-2 puffs into the lungs every 4 hours as needed        amitriptyline (ELAVIL) 10 MG tablet        ASMANEX, 30 METERED DOSES, 220 MCG/INH inhaler INHALE 1 PUFF BY MOUTH DAILY. RINSE MOUTH OR GARGLE AFTER USE  6     Azelastine HCl 137 MCG/SPRAY SOLN        dicyclomine (BENTYL) 20 MG tablet Take 1 tablet (20 mg) by mouth 4 times daily as needed (cramping) (Patient not taking: Reported on 6/9/2021) 30 tablet 0     LORazepam (ATIVAN) 0.5 MG tablet Take 1 tablet (0.5 mg) by mouth daily as needed for anxiety 10 tablet 0     loteprednol (LOTEMAX) 0.5 % ophthalmic suspension Place 1-2 drops into both eyes 3 times daily (Patient not taking: Reported on 6/9/2021) 1 Bottle 1      neomycin-polymyxin-dexamethasone (MAXITROL) 3.5-14806-6.1 ophthalmic ointment Place 0.5 inches into both eyes At Bedtime (Patient not taking: Reported on 6/9/2021) 1 Tube 3     olopatadine (PATANOL) 0.1 % ophthalmic solution Place 1 drop into both eyes 2 times daily (Patient not taking: Reported on 6/9/2021) 1 Bottle 11     omalizumab (XOLAIR) 150 MG injection Inject Subcutaneous every 28 days       order for DME Use for 15-30 minutes every morning. 1 Units 0       Allergies  Allergies   Allergen Reactions     Dust Mites Cough, Difficulty breathing and Shortness Of Breath     Cats      Chest tightness, sinus irritation       Social History  Social History     Socioeconomic History     Marital status: Single     Spouse name: Not on file     Number of children: Not on file     Years of education: Not on file     Highest education level: Not on file   Occupational History     Not on file   Tobacco Use     Smoking status: Former Smoker     Packs/day: 0.00     Years: 10.00     Pack years: 0.00     Types: Cigarettes     Smokeless tobacco: Never Used     Tobacco comment: smokes occasionally socially    Substance and Sexual Activity     Alcohol use: Not Currently     Alcohol/week: 0.0 standard drinks     Comment: occasional      Drug use: Not Currently     Types: Marijuana     Comment: marijuana  none since May 2021     Sexual activity: Yes     Partners: Male     Birth control/protection: Pull-out method, Inserts/Ring     Comment: Nuvaring   Other Topics Concern     Not on file   Social History Narrative     Not on file     Social Determinants of Health     Financial Resource Strain:      Difficulty of Paying Living Expenses:    Food Insecurity:      Worried About Running Out of Food in the Last Year:      Ran Out of Food in the Last Year:    Transportation Needs:      Lack of Transportation (Medical):      Lack of Transportation (Non-Medical):    Physical Activity:      Days of Exercise per Week:      Minutes of Exercise  per Session:    Stress:      Feeling of Stress :    Social Connections:      Frequency of Communication with Friends and Family:      Frequency of Social Gatherings with Friends and Family:      Attends Sikhism Services:      Active Member of Clubs or Organizations:      Attends Club or Organization Meetings:      Marital Status:    Intimate Partner Violence:      Fear of Current or Ex-Partner:      Emotionally Abused:      Physically Abused:      Sexually Abused:        Family History  Family History   Problem Relation Age of Onset     Diabetes Maternal Grandfather      Hypertension No family hx of      Coronary Artery Disease No family hx of      Hyperlipidemia No family hx of      Cerebrovascular Disease No family hx of      Breast Cancer No family hx of      Colon Cancer No family hx of      Prostate Cancer No family hx of      Other Cancer No family hx of      Glaucoma No family hx of      Macular Degeneration No family hx of      ROS/MED HX  ENT/Pulmonary: Comment: Sleep study  a couple years ago that came back normal.     Evaluated for asthma and found to have GERD.  Symptoms dramatically improved s/p cholecystectomy.      Neurologic: Comment: Since starting Emgality, significant decrease in occurrence of migraines.      (+) peripheral neuropathy, - Will be seeing neurology for neuropathy in hands.. migraines (Occurrence: three times per month. ),     Cardiovascular: Comment: Denies cardiac symptoms including chest pain, SOB, palpitations, syncope, GRANGER, orthopnea, or PND.   - neg cardiovascular ROS   (+) -----Previous cardiac testing   Echo: Date: Results:    Stress Test: Date: Results:    ECG Reviewed: Date: 9/2020 Results:  NSR  Cath: Date: Results:      METS/Exercise Tolerance: >4 METS Comment: Walks her dog twice daily.    Hematologic:     (+) anemia,     Musculoskeletal: Comment: (+) Carpal tunnel with R>L.    Fractured left wrist s/p surgical intervention with hardware.       GI/Hepatic:     (+) GERD,  Asymptomatic on medication, cholecystitis/cholelithiasis (s/p cholecystectomy),     Renal/Genitourinary:     (+) Nephrolithiasis ,     Endo:       Psychiatric/Substance Use:     (+) psychiatric history anxiety and depression  (-) alcohol abuse history and chronic opioid use history   Infectious Disease:  - neg infectious disease ROS     Malignancy:  - neg malignancy ROS     Other: Comment: Will be seeing Rheumatology for further evaluation of CREST syndrome. Main symptom is swallowing issues.      (+) LMP: 6/26/2021, ,         No vital signs taken since this is a virtual visit.       Physical Exam  Constitutional: Awake, alert, cooperative, no apparent distress, and appears stated age.  HENT: Normocephalic   Respiratory: Regular respiratory rate.  Effort is non-labored, quiet, easy breathing.   Musculoskeletal:  Full ROM of neck.   Neurologic: Awake, alert, oriented to name, place and time.   Neuropsychiatric: Calm, cooperative. Normal affect.     **Please note, because this was a virtual visit due to COVID -19 pandemic, a full physical could not be completed.  On the DOS, the OOD of anesthesia will complete the appropriate components of the physical exam. Please refer to the physical examination documented by the anesthesiologist in the anesthesia record on the day of surgery. **      Labs: (personally reviewed)  Lab Results   Component Value Date    WBC 7.3 09/12/2020     Lab Results   Component Value Date    RBC 4.01 09/12/2020     Lab Results   Component Value Date    HGB 11.6 09/12/2020     Lab Results   Component Value Date    HCT 35.7 09/12/2020     Lab Results   Component Value Date    MCV 89 09/12/2020     Lab Results   Component Value Date    MCH 28.9 09/12/2020     Lab Results   Component Value Date    MCHC 32.5 09/12/2020     Lab Results   Component Value Date    RDW 12.5 09/12/2020     Lab Results   Component Value Date     09/12/2020       Last Comprehensive Metabolic Panel:  Sodium   Date Value  Ref Range Status   09/12/2020 140 133 - 144 mmol/L Final     Potassium   Date Value Ref Range Status   09/12/2020 3.9 3.4 - 5.3 mmol/L Final     Chloride   Date Value Ref Range Status   09/12/2020 113 (H) 94 - 109 mmol/L Final     Carbon Dioxide   Date Value Ref Range Status   09/12/2020 21 20 - 32 mmol/L Final     Anion Gap   Date Value Ref Range Status   09/12/2020 6 3 - 14 mmol/L Final     Glucose   Date Value Ref Range Status   09/12/2020 95 70 - 99 mg/dL Final     Urea Nitrogen   Date Value Ref Range Status   09/12/2020 13 7 - 30 mg/dL Final     Creatinine   Date Value Ref Range Status   09/12/2020 0.77 0.52 - 1.04 mg/dL Final     GFR Estimate   Date Value Ref Range Status   09/12/2020 >90 >60 mL/min/[1.73_m2] Final     Comment:     Non  GFR Calc  Starting 12/18/2018, serum creatinine based estimated GFR (eGFR) will be   calculated using the Chronic Kidney Disease Epidemiology Collaboration   (CKD-EPI) equation.       Calcium   Date Value Ref Range Status   09/12/2020 9.0 8.5 - 10.1 mg/dL Final     PROCEDURES  ECG 9/2020:  NSR   ASSESSMENT and PLAN  Kasandra Lau is a 36 year old female scheduled to undergo   MAMMOPLASTY, REDUCTION, Bilateral on 8/5/2021 with Dr. Moreno at [facility] under general anesthesia with block.       She has the following specific operative considerations:   - NIKOLAI # of risks 0/8   - VTE risk:  0.26%  - Risk of PONV score = 2.  If > 2, anti-emetic intervention recommended.      #  Cardiology   - Denies known coronary artery disease.  Denies cardiac symptoms. METS:  >4. RCRI : No serious cardiac risks.  0.4 % risk of major adverse cardiac event.         #  Pulmonary   - Remote social smoking  - Evaluated for asthma and found to have GERD.    - Allergic rhinitis, take Flonase, Xyzol, Allegra and montelukast as prescribed DOS.   - Denies pulmonary symptoms  - No inhalers      # Immune  - Idiopathic urticaria, treated with doxepin     # Heme  - Anemia, Hgb 11.6    #  GI   -  GERD, take H2B DOS   - s/p cholecystectomy  - h/o IBS, but now stable.  Not taking Bentyl    #  Neuro   - Migraines, on Emgality, almotriptan and Inderal. .   - Mood disorder, take gabapentin, bupropion and vilazodone as prescribed DOS.     # Symptomatic bilateral breast hypertrophy.  - above surgery with Dr. ludwig    #  ID  - COVID-19 testing per surgeon's office    #   Anesthesia considerations   -  Refer to PAC assessment in anesthesia records      Arrival time, NPO, shower and medication instructions provided by nursing staff today.    Patient is an acceptable candidate for the proposed procedure.  No further diagnostic evaluation is needed.         JASVIR Padilla CNP  Preoperative Assessment Center  [facility]  Phone: [phone]  Fax: [phone]

## 2021-07-29 ENCOUNTER — OFFICE VISIT (OUTPATIENT)
Dept: RHEUMATOLOGY | Facility: CLINIC | Age: 36
End: 2021-07-29
Attending: PSYCHIATRY & NEUROLOGY
Payer: COMMERCIAL

## 2021-07-29 VITALS
SYSTOLIC BLOOD PRESSURE: 111 MMHG | BODY MASS INDEX: 26.42 KG/M2 | DIASTOLIC BLOOD PRESSURE: 78 MMHG | WEIGHT: 178.9 LBS | HEART RATE: 78 BPM | OXYGEN SATURATION: 98 %

## 2021-07-29 DIAGNOSIS — G43.709 CHRONIC MIGRAINE WITHOUT AURA WITHOUT STATUS MIGRAINOSUS, NOT INTRACTABLE: ICD-10-CM

## 2021-07-29 PROCEDURE — G0463 HOSPITAL OUTPT CLINIC VISIT: HCPCS

## 2021-07-29 PROCEDURE — 99205 OFFICE O/P NEW HI 60 MIN: CPT | Performed by: INTERNAL MEDICINE

## 2021-07-29 ASSESSMENT — PAIN SCALES - GENERAL: PAINLEVEL: NO PAIN (0)

## 2021-07-29 NOTE — PROGRESS NOTES
Rheumatology consultation note    CC: Question of CREST syndrome    HPI: This is 36 years old and she was found to have a positive STEPHANIE 1:160, with a centromere pattern.  She does have some mild Raynaud's symptoms, mild esophageal symptoms.  She is evaluated by GI but did not find any inflammatory disorder and she may have IBS.  She also has diagnosis of migraines.    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  Because of Covid she has had difficulty gathering data which is stressful.    Past medical history includes migraines, GERD, cholecystectomy, idiopathic urticaria, depression.    Family history: There is no autoimmune disease in the family as far as we know.    ROS: Raynaud's, no joint pain or synovitis, no malar rash no dry eyes or dry mouth, she is able to go up 2 flights of stairs.    Physical examination  I reviewed her vital signs are essentially normal with a BMI of 26  There is no skin rash, there is no Raynaud's, there are no calcifications in the skin of the 4 extremities, there is no sclerodactyly and there is no telangiectasias.  Specifically skin exam is normal.    I have reviewed her laboratory tests in epic that include positive STEPHANIE centromere pattern.  I have also reviewed her medication list as listed in epic.    Impression/plan  1.  There is no evidence for CREST.  There is no evidence for scleroderma or lupus.  2.  She does have a low positive STEPHANIE, which is common even in the healthy population.  The STEPHANIE pattern is centromere which is seen mostly in scleroderma/crest.  However, she does not have the diagnosis of scleroderma/crest as she has clinically no features of this condition and the positive test is not sufficient for the diagnosis.  Begin unpredicted future but if she will develop this condition.  At this point I cannot attribute any of her symptoms to a rheumatologic condition.  3.   She has symptoms of IBS, joint and muscle pain, but no clear diagnosis.  4.  I did not schedule follow-up visit but she is welcome to return for reevaluation if she has developed new symptoms or worsening of her current symptoms.    I spent 60 minutes face to face with the patient, with 35 minutes on counseling and coordination of care.

## 2021-07-29 NOTE — LETTER
7/29/2021       RE: Kasandra Lau  [address]  [place] [address]     Dear Colleague,    Thank you for referring your patient, Kasandra Lau, to the [facility] [place] at [facility]. Please see a copy of my visit note below.    Rheumatology consultation note    CC: Question of CREST syndrome    HPI: This is 36 years old and she was found to have a positive STEPHANIE 1:160, with a centromere pattern.  She does have some mild Raynaud's symptoms, mild esophageal symptoms.  She is evaluated by GI but did not find any inflammatory disorder and she may have IBS.  She also has diagnosis of migraines.    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  Because of Covid she has had difficulty gathering data which is stressful.    Past medical history includes migraines, GERD, cholecystectomy, idiopathic urticaria, depression.    Family history: There is no autoimmune disease in the family as far as we know.    ROS: Raynaud's, no joint pain or synovitis, no malar rash no dry eyes or dry mouth, she is able to go up 2 flights of stairs.    Physical examination  I reviewed her vital signs are essentially normal with a BMI of 26  There is no skin rash, there is no Raynaud's, there are no calcifications in the skin of the 4 extremities, there is no sclerodactyly and there is no telangiectasias.  Specifically skin exam is normal.    I have reviewed her laboratory tests in epic that include positive STEPHANIE centromere pattern.  I have also reviewed her medication list as listed in epic.    Impression/plan  1.  There is no evidence for CREST.  There is no evidence for scleroderma or lupus.  2.  She does have a low positive STEPHANIE, which is common even in the healthy population.  The STEPHANIE pattern is centromere which is seen mostly in scleroderma/crest.  However, she  does not have the diagnosis of scleroderma/crest as she has clinically no features of this condition and the positive test is not sufficient for the diagnosis.  Begin unpredicted future but if she will develop this condition.  At this point I cannot attribute any of her symptoms to a rheumatologic condition.  3.  She has symptoms of IBS, joint and muscle pain, but no clear diagnosis.  4.  I did not schedule follow-up visit but she is welcome to return for reevaluation if she has developed new symptoms or worsening of her current symptoms.    I spent 60 minutes face to face with the patient, with 35 minutes on counseling and coordination of care.    Again, thank you for allowing me to participate in the care of your patient.      Sincerely,    Michael Atwood MD

## 2021-08-02 ENCOUNTER — LAB (OUTPATIENT)
Dept: LAB | Facility: CLINIC | Age: 36
End: 2021-08-02
Attending: PLASTIC SURGERY
Payer: COMMERCIAL

## 2021-08-02 DIAGNOSIS — Z11.59 ENCOUNTER FOR SCREENING FOR OTHER VIRAL DISEASES: ICD-10-CM

## 2021-08-02 PROCEDURE — U0005 INFEC AGEN DETEC AMPLI PROBE: HCPCS | Mod: 90 | Performed by: PATHOLOGY

## 2021-08-02 PROCEDURE — U0003 INFECTIOUS AGENT DETECTION BY NUCLEIC ACID (DNA OR RNA); SEVERE ACUTE RESPIRATORY SYNDROME CORONAVIRUS 2 (SARS-COV-2) (CORONAVIRUS DISEASE [COVID-19]), AMPLIFIED PROBE TECHNIQUE, MAKING USE OF HIGH THROUGHPUT TECHNOLOGIES AS DESCRIBED BY CMS-2020-01-R: HCPCS | Mod: 90 | Performed by: PATHOLOGY

## 2021-08-03 LAB — SARS-COV-2 RNA RESP QL NAA+PROBE: NEGATIVE

## 2021-08-05 ENCOUNTER — ANESTHESIA (OUTPATIENT)
Dept: SURGERY | Facility: AMBULATORY SURGERY CENTER | Age: 36
End: 2021-08-05
Payer: COMMERCIAL

## 2021-08-05 ENCOUNTER — HOSPITAL ENCOUNTER (OUTPATIENT)
Facility: AMBULATORY SURGERY CENTER | Age: 36
End: 2021-08-05
Attending: PLASTIC SURGERY
Payer: COMMERCIAL

## 2021-08-05 VITALS
DIASTOLIC BLOOD PRESSURE: 81 MMHG | SYSTOLIC BLOOD PRESSURE: 120 MMHG | HEART RATE: 82 BPM | TEMPERATURE: 99.1 F | WEIGHT: 175 LBS | BODY MASS INDEX: 25.92 KG/M2 | OXYGEN SATURATION: 97 % | HEIGHT: 69 IN | RESPIRATION RATE: 16 BRPM

## 2021-08-05 DIAGNOSIS — N62 HYPERTROPHY OF BREAST: ICD-10-CM

## 2021-08-05 DIAGNOSIS — G43.709 CHRONIC MIGRAINE WITHOUT AURA WITHOUT STATUS MIGRAINOSUS, NOT INTRACTABLE: ICD-10-CM

## 2021-08-05 LAB
HCG UR QL: NEGATIVE
INTERNAL QC OK POCT: NORMAL

## 2021-08-05 PROCEDURE — 19318 BREAST REDUCTION: CPT | Mod: 50 | Performed by: PLASTIC SURGERY

## 2021-08-05 PROCEDURE — 19318 BREAST REDUCTION: CPT | Mod: 50

## 2021-08-05 PROCEDURE — 88305 TISSUE EXAM BY PATHOLOGIST: CPT | Performed by: PATHOLOGY

## 2021-08-05 PROCEDURE — 81025 URINE PREGNANCY TEST: CPT | Performed by: PATHOLOGY

## 2021-08-05 RX ORDER — DOXEPIN HYDROCHLORIDE 10 MG/1
30 CAPSULE ORAL AT BEDTIME
Qty: 90 CAPSULE | Refills: 11 | Status: SHIPPED | OUTPATIENT
Start: 2021-08-05 | End: 2021-09-16

## 2021-08-05 RX ORDER — SODIUM CHLORIDE, SODIUM LACTATE, POTASSIUM CHLORIDE, CALCIUM CHLORIDE 600; 310; 30; 20 MG/100ML; MG/100ML; MG/100ML; MG/100ML
INJECTION, SOLUTION INTRAVENOUS CONTINUOUS
Status: DISCONTINUED | OUTPATIENT
Start: 2021-08-05 | End: 2021-08-05 | Stop reason: HOSPADM

## 2021-08-05 RX ORDER — BUPIVACAINE HYDROCHLORIDE 2.5 MG/ML
INJECTION, SOLUTION EPIDURAL; INFILTRATION; INTRACAUDAL
Status: COMPLETED | OUTPATIENT
Start: 2021-08-05 | End: 2021-08-05

## 2021-08-05 RX ORDER — NALOXONE HYDROCHLORIDE 0.4 MG/ML
0.4 INJECTION, SOLUTION INTRAMUSCULAR; INTRAVENOUS; SUBCUTANEOUS
Status: DISCONTINUED | OUTPATIENT
Start: 2021-08-05 | End: 2021-08-06 | Stop reason: HOSPADM

## 2021-08-05 RX ORDER — ONDANSETRON 2 MG/ML
INJECTION INTRAMUSCULAR; INTRAVENOUS PRN
Status: DISCONTINUED | OUTPATIENT
Start: 2021-08-05 | End: 2021-08-05

## 2021-08-05 RX ORDER — GLYCOPYRROLATE 0.2 MG/ML
INJECTION, SOLUTION INTRAMUSCULAR; INTRAVENOUS PRN
Status: DISCONTINUED | OUTPATIENT
Start: 2021-08-05 | End: 2021-08-05

## 2021-08-05 RX ORDER — CEFAZOLIN SODIUM 2 G/50ML
2 SOLUTION INTRAVENOUS SEE ADMIN INSTRUCTIONS
Status: DISCONTINUED | OUTPATIENT
Start: 2021-08-05 | End: 2021-08-05 | Stop reason: HOSPADM

## 2021-08-05 RX ORDER — SODIUM CHLORIDE, SODIUM LACTATE, POTASSIUM CHLORIDE, CALCIUM CHLORIDE 600; 310; 30; 20 MG/100ML; MG/100ML; MG/100ML; MG/100ML
INJECTION, SOLUTION INTRAVENOUS CONTINUOUS
Status: DISCONTINUED | OUTPATIENT
Start: 2021-08-05 | End: 2021-08-06 | Stop reason: HOSPADM

## 2021-08-05 RX ORDER — PROPOFOL 10 MG/ML
INJECTION, EMULSION INTRAVENOUS CONTINUOUS PRN
Status: DISCONTINUED | OUTPATIENT
Start: 2021-08-05 | End: 2021-08-05

## 2021-08-05 RX ORDER — CEFAZOLIN SODIUM 2 G/50ML
2 SOLUTION INTRAVENOUS
Status: COMPLETED | OUTPATIENT
Start: 2021-08-05 | End: 2021-08-05

## 2021-08-05 RX ORDER — OXYCODONE HYDROCHLORIDE 5 MG/1
5 TABLET ORAL EVERY 4 HOURS PRN
Status: DISCONTINUED | OUTPATIENT
Start: 2021-08-05 | End: 2021-08-06 | Stop reason: HOSPADM

## 2021-08-05 RX ORDER — KETAMINE HYDROCHLORIDE 10 MG/ML
INJECTION, SOLUTION INTRAMUSCULAR; INTRAVENOUS PRN
Status: DISCONTINUED | OUTPATIENT
Start: 2021-08-05 | End: 2021-08-05

## 2021-08-05 RX ORDER — FLUMAZENIL 0.1 MG/ML
0.2 INJECTION, SOLUTION INTRAVENOUS
Status: DISCONTINUED | OUTPATIENT
Start: 2021-08-05 | End: 2021-08-05 | Stop reason: HOSPADM

## 2021-08-05 RX ORDER — HYDROMORPHONE HYDROCHLORIDE 1 MG/ML
0.2 INJECTION, SOLUTION INTRAMUSCULAR; INTRAVENOUS; SUBCUTANEOUS EVERY 5 MIN PRN
Status: DISCONTINUED | OUTPATIENT
Start: 2021-08-05 | End: 2021-08-05 | Stop reason: HOSPADM

## 2021-08-05 RX ORDER — ONDANSETRON 4 MG/1
4-8 TABLET, ORALLY DISINTEGRATING ORAL EVERY 8 HOURS PRN
Qty: 4 TABLET | Refills: 0 | Status: SHIPPED | OUTPATIENT
Start: 2021-08-05 | End: 2022-04-16

## 2021-08-05 RX ORDER — PROPOFOL 10 MG/ML
INJECTION, EMULSION INTRAVENOUS PRN
Status: DISCONTINUED | OUTPATIENT
Start: 2021-08-05 | End: 2021-08-05

## 2021-08-05 RX ORDER — FENTANYL CITRATE 50 UG/ML
25-50 INJECTION, SOLUTION INTRAMUSCULAR; INTRAVENOUS
Status: DISCONTINUED | OUTPATIENT
Start: 2021-08-05 | End: 2021-08-05 | Stop reason: HOSPADM

## 2021-08-05 RX ORDER — OXYCODONE HYDROCHLORIDE 5 MG/1
5-10 TABLET ORAL EVERY 6 HOURS PRN
Qty: 20 TABLET | Refills: 0 | Status: SHIPPED | OUTPATIENT
Start: 2021-08-05 | End: 2021-08-08

## 2021-08-05 RX ORDER — ONDANSETRON 2 MG/ML
4 INJECTION INTRAMUSCULAR; INTRAVENOUS EVERY 30 MIN PRN
Status: DISCONTINUED | OUTPATIENT
Start: 2021-08-05 | End: 2021-08-06 | Stop reason: HOSPADM

## 2021-08-05 RX ORDER — LIDOCAINE 40 MG/G
CREAM TOPICAL
Status: DISCONTINUED | OUTPATIENT
Start: 2021-08-05 | End: 2021-08-05 | Stop reason: HOSPADM

## 2021-08-05 RX ORDER — NALOXONE HYDROCHLORIDE 0.4 MG/ML
0.2 INJECTION, SOLUTION INTRAMUSCULAR; INTRAVENOUS; SUBCUTANEOUS
Status: DISCONTINUED | OUTPATIENT
Start: 2021-08-05 | End: 2021-08-06 | Stop reason: HOSPADM

## 2021-08-05 RX ORDER — ACETAMINOPHEN 325 MG/1
975 TABLET ORAL ONCE
Status: COMPLETED | OUTPATIENT
Start: 2021-08-05 | End: 2021-08-05

## 2021-08-05 RX ORDER — ONDANSETRON 4 MG/1
4 TABLET, ORALLY DISINTEGRATING ORAL EVERY 30 MIN PRN
Status: DISCONTINUED | OUTPATIENT
Start: 2021-08-05 | End: 2021-08-06 | Stop reason: HOSPADM

## 2021-08-05 RX ORDER — FENTANYL CITRATE 50 UG/ML
25 INJECTION, SOLUTION INTRAMUSCULAR; INTRAVENOUS EVERY 5 MIN PRN
Status: DISCONTINUED | OUTPATIENT
Start: 2021-08-05 | End: 2021-08-05 | Stop reason: HOSPADM

## 2021-08-05 RX ORDER — DEXAMETHASONE SODIUM PHOSPHATE 4 MG/ML
INJECTION, SOLUTION INTRA-ARTICULAR; INTRALESIONAL; INTRAMUSCULAR; INTRAVENOUS; SOFT TISSUE PRN
Status: DISCONTINUED | OUTPATIENT
Start: 2021-08-05 | End: 2021-08-05

## 2021-08-05 RX ORDER — MEPERIDINE HYDROCHLORIDE 25 MG/ML
12.5 INJECTION INTRAMUSCULAR; INTRAVENOUS; SUBCUTANEOUS
Status: DISCONTINUED | OUTPATIENT
Start: 2021-08-05 | End: 2021-08-06 | Stop reason: HOSPADM

## 2021-08-05 RX ORDER — LIDOCAINE HYDROCHLORIDE 20 MG/ML
INJECTION, SOLUTION INFILTRATION; PERINEURAL PRN
Status: DISCONTINUED | OUTPATIENT
Start: 2021-08-05 | End: 2021-08-05

## 2021-08-05 RX ORDER — EPHEDRINE SULFATE 50 MG/ML
INJECTION, SOLUTION INTRAMUSCULAR; INTRAVENOUS; SUBCUTANEOUS PRN
Status: DISCONTINUED | OUTPATIENT
Start: 2021-08-05 | End: 2021-08-05

## 2021-08-05 RX ORDER — GABAPENTIN 300 MG/1
300 CAPSULE ORAL
Status: COMPLETED | OUTPATIENT
Start: 2021-08-05 | End: 2021-08-05

## 2021-08-05 RX ORDER — AMOXICILLIN 250 MG
1-2 CAPSULE ORAL 2 TIMES DAILY
Qty: 30 TABLET | Refills: 0 | Status: SHIPPED | OUTPATIENT
Start: 2021-08-05 | End: 2021-10-07

## 2021-08-05 RX ORDER — FENTANYL CITRATE 50 UG/ML
INJECTION, SOLUTION INTRAMUSCULAR; INTRAVENOUS PRN
Status: DISCONTINUED | OUTPATIENT
Start: 2021-08-05 | End: 2021-08-05

## 2021-08-05 RX ADMIN — PROPOFOL 200 MG: 10 INJECTION, EMULSION INTRAVENOUS at 14:04

## 2021-08-05 RX ADMIN — GLYCOPYRROLATE 0.2 MG: 0.2 INJECTION, SOLUTION INTRAMUSCULAR; INTRAVENOUS at 14:12

## 2021-08-05 RX ADMIN — PROPOFOL 100 MG: 10 INJECTION, EMULSION INTRAVENOUS at 14:38

## 2021-08-05 RX ADMIN — PROPOFOL 150 MCG/KG/MIN: 10 INJECTION, EMULSION INTRAVENOUS at 14:04

## 2021-08-05 RX ADMIN — KETAMINE HYDROCHLORIDE 20 MG: 10 INJECTION, SOLUTION INTRAMUSCULAR; INTRAVENOUS at 14:39

## 2021-08-05 RX ADMIN — SODIUM CHLORIDE, SODIUM LACTATE, POTASSIUM CHLORIDE, CALCIUM CHLORIDE: 600; 310; 30; 20 INJECTION, SOLUTION INTRAVENOUS at 12:37

## 2021-08-05 RX ADMIN — EPHEDRINE SULFATE 5 MG: 50 INJECTION, SOLUTION INTRAMUSCULAR; INTRAVENOUS; SUBCUTANEOUS at 14:58

## 2021-08-05 RX ADMIN — FENTANYL CITRATE 50 MCG: 50 INJECTION, SOLUTION INTRAMUSCULAR; INTRAVENOUS at 13:37

## 2021-08-05 RX ADMIN — FENTANYL CITRATE 50 MCG: 50 INJECTION, SOLUTION INTRAMUSCULAR; INTRAVENOUS at 14:38

## 2021-08-05 RX ADMIN — Medication 0.5 MG: at 15:12

## 2021-08-05 RX ADMIN — GABAPENTIN 300 MG: 300 CAPSULE ORAL at 12:37

## 2021-08-05 RX ADMIN — Medication 100 MCG: at 15:02

## 2021-08-05 RX ADMIN — EPHEDRINE SULFATE 5 MG: 50 INJECTION, SOLUTION INTRAMUSCULAR; INTRAVENOUS; SUBCUTANEOUS at 14:59

## 2021-08-05 RX ADMIN — KETAMINE HYDROCHLORIDE 30 MG: 10 INJECTION, SOLUTION INTRAMUSCULAR; INTRAVENOUS at 14:13

## 2021-08-05 RX ADMIN — CEFAZOLIN SODIUM 2 G: 2 SOLUTION INTRAVENOUS at 13:54

## 2021-08-05 RX ADMIN — DEXAMETHASONE SODIUM PHOSPHATE 4 MG: 4 INJECTION, SOLUTION INTRA-ARTICULAR; INTRALESIONAL; INTRAMUSCULAR; INTRAVENOUS; SOFT TISSUE at 14:15

## 2021-08-05 RX ADMIN — FENTANYL CITRATE 50 MCG: 50 INJECTION, SOLUTION INTRAMUSCULAR; INTRAVENOUS at 14:27

## 2021-08-05 RX ADMIN — ACETAMINOPHEN 975 MG: 325 TABLET ORAL at 12:36

## 2021-08-05 RX ADMIN — FENTANYL CITRATE 25 MCG: 50 INJECTION, SOLUTION INTRAMUSCULAR; INTRAVENOUS at 16:30

## 2021-08-05 RX ADMIN — EPHEDRINE SULFATE 5 MG: 50 INJECTION, SOLUTION INTRAMUSCULAR; INTRAVENOUS; SUBCUTANEOUS at 15:02

## 2021-08-05 RX ADMIN — ONDANSETRON 4 MG: 2 INJECTION INTRAMUSCULAR; INTRAVENOUS at 15:38

## 2021-08-05 RX ADMIN — BUPIVACAINE HYDROCHLORIDE 30 ML: 2.5 INJECTION, SOLUTION EPIDURAL; INFILTRATION; INTRACAUDAL at 13:26

## 2021-08-05 RX ADMIN — LIDOCAINE HYDROCHLORIDE 100 MG: 20 INJECTION, SOLUTION INFILTRATION; PERINEURAL at 14:04

## 2021-08-05 RX ADMIN — OXYCODONE HYDROCHLORIDE 5 MG: 5 TABLET ORAL at 16:32

## 2021-08-05 ASSESSMENT — MIFFLIN-ST. JEOR: SCORE: 1553.17

## 2021-08-05 NOTE — DISCHARGE INSTRUCTIONS
Post Operative Instructions: Regional Anesthetic for Chest and Abdominal Surgery    General Information:   Regional anesthesia is when local anesthetic or  numbing  medication is injected around the nerves to anesthetize or  numb  the area supplied by that set of nerves.     Types of Regional Blocks:  Transversus Abdominis Plane (TAP): A block injected beneath the covering of a muscle layer of the abdomen for abdominal surgery  Pectoral: A block injected near the breast for surgery on the breast and armpit  Paravertebral: A block injected in the back for surgery on the chest, ribs, and breast    Procedure:  The type of anesthesia your doctor used to numb your chest or abdomen will usually not wear off for 6-18 hours, but may last as long as 24 hours.     Diet:  There are no restrictions on your diet. You should drink plenty of fluids.     Discomfort:  You will have a tingling and prickly sensation in your chest or abdomen as the feeling begins to return. You can also expect some discomfort. The amount of discomfort is unpredictable, but if you have more pain than can be controlled with pain medication you should notify your physician.     Pain Medicine:   Begin taking your oral pain pills (if you have not already done so) before bedtime and during the night to avoid a sudden onset of pain as the block wears off.  Do not engage in drinking, driving, or hazardous occupations while taking pain medication.     Stitches:   You may have stitches or special skin closures. You doctor will inform you when to return to the office to have them removed. \      BREAST REDUCTION POST-OPERATIVE INSTRUCTIONS    Instructions       Have someone drive you home after surgery and help you at home for 1-2      days.      Get plenty of rest.      Follow balanced diet.      Decreased activity may promote constipation, so you may want to add      more raw fruit to your diet, and be sure to increase fluid intake. Your doctor       may also  order a stool softener.      Take pain medication as prescribed. Do not take aspirin or any products      containing aspirin.      Do not drink alcohol when taking pain medications.      Even when not taking pain medications, no alcohol for 3 weeks as it      causes fluid retention.      If you are taking vitamins with iron, resume these as tolerated.      Do not smoke, as smoking delays healing and increases the risk of      complications.    Activities      Do not drive fpr 10 days following your procedure and until you are no longer taking        any pain medications (narcotics).      Do not drive until you have full range of motion with your arms and can stop the car       or swerve in an emergency.      Start walking the evening of surgery, this helps to reduce swelling and       lowers the chance of blood clots.      Refrain from vigorous activities for 2-6 weeks.  Increase activity gradually as tolerated.      After 2 weeks, you may perform lower body exercise, but must wait the full 6 weeks       prior to performing upper body exercise      Avoid lifting anything over 5 pounds for 2 weeks.      Resume social and employment activities in about 2 weeks (if not too strenuous).    Incision Care      You may shower in 48 hours.  If drainage tubes have been used, you may shower       48 hours after removal of the drains. The ACE wrap (if used) may be rewrapped as needed (if too tight or loose). Use it for support and may subtitute with a sports bra if preferred.       Avoid exposing scars to sun for at least 12 months.      Always use a strong sunblock, if sun exposure is unavoidable (SPF 50 or      greater).      Keep the tape on until it starts to curl up on the ends, and then gently remove them.        You may use moisturizing cream at 10 days to help the tape come off. By two weeks,      you may pull off the tape if still in place.      Wear your surgical bra/wrap 24/7 as directed for 4 weeks.      Avoid bras  "with stays and underwires for 4-6 weeks.      You may pad the incisions with gauze for comfort (panty liners also work well as they        are absorbent and inexpensive).      Inspect daily for signs of infection.      No tub soaking, bathing, or swimming until wounds are healed.      If your breast skin is dry after surgery, you can apply a moisturizer several times a       day.     What to Expect      Despite the three layers of sutures closing your incisions, there will be some oozing       of tissue fluid from them for 2 days or so.  This will soak up on the gauze and the bra       to look like more than it really is.  Report any significant drainage to the clinic.      Most of the higher discomfort will subside after the first few days.      You may experience temporary soreness, bruising, swelling and tightnessin the       breasts as well as discomfort in the incision area.      You may have have normal sensation in the nipples.  This may be more or less than       usual, and usually returns over a couple of months.      Your first menstruation following surgery may cause your breasts to swell and hurt.      You may have random shooting pains, tingling, or other strange sensations in the            skin for a few months.  These will subside.    Appearance      Most of the discoloration and swelling will subside in 2-4 weeks.      Your breasts will feel firm to the touch initially, but will soften with time.      A more natural shape will occur as the breasts \"settle\" in a slightly lower position over     the first few months.      Scars may be red and thick for 6-12 months (longer in lighter-skinned patients).  IN          time, these usually soften and fade.    Follow-Up Care      Typically, you will have a post op check at 1-2 weeks, and again with your surgeon in      another month.      If drainage tubes have been used, will be removed when the drainage is less than 30      ml per day for 1-2 days.  This " usually happens in 1-3 weeks.    When to Call      If you have increased swelling or bruising, particular one side greater than the other.      If swelling and redness persist after a few days.      If you have increased redness along the incision.      If you have severe or increased pain not relieved by medication.      If you have any side effects to medications; such as, rash, nausea,      headache, vomiting, or constipation.      If you have an oral temperature over 100.4 degrees.      If you have any yellowish or greenish drainage from the incisions or      notice a foul odor.      If you have bleeding from the incisions that is difficult to control with      light pressure.      If you have loss of feeling or motion.      Any unanswered concern.    For Medical Questions, Please Call:      [phone], Monday - Friday, 8 a.m. - 4:30 p.m.      After hours and on weekends, call Hospital Paging at [phone] and      ask for the Plastic Surgeon on call.    [facility]  Home Care Following Anesthesia  For 24 hours after surgery:  1. Get plenty of rest.  A responsible adult must stay with you for at least 24 hours after you leave the surgery center.  2. Do not drive or use heavy equipment.  If you have weakness or tingling, don't drive or use heavy equipment until this feeling goes away.   3. Do not drink alcohol.   4. Avoid strenuous or risky activities.  Ask for help when climbing stairs.  5. You may feel lightheaded.  IF so, sit for a few minutes before standing.  Have someone help you get up.   6. If you have nausea (feel sick to your stomach): Drink only clear liquids such as apple juice, ginger ale, broth or 7-Up.  Rest may also help.  Be sure to drink enough fluids.  Move to a regular diet as you feel able.   7. You may have a slight fever.  Call the doctor if your fever is over 100 F (37.7 C) (taken under the tongue) or lasts longer than 24 hours.  8. You may have a  "dry mouth, a sore throat, muscle aches or trouble sleeping. These should go away after 24 hours.  9. Do not make important or legal decisions.   10. It is recommended to avoid smoking.        Today you received an Exparel block to numb the nerves near your surgery site.  This is a block using local anesthetic or \"numbing\" medication injected around the nerves to anesthetize or \"numb\" the area supplied by those nerves.  This block is injected into the muscle layer near your surgical site.  This medication may numb the location where you had surgery up to 72 hours.  If your surgical site is an arm or leg you should be careful with your affected limb, since it is possible to injure your limb without being aware of it due to the numbing.  Until full feeling returns, you should guard against bumping or hitting your limb, and avoid extreme hot or cold temperatures on the skin.  As the block wears off, the feeling will return as a tingling or prickly sensation near your surgical site.  You will experince more discomfort from your incision as the feeling returns.  You may want to take a pain pill (a narcotic or Tylenol if this was prescribed by your surgeon) when you start to experience mild pain before the pain beomes more severe.  If your pain medications do not control your pain, you should notify your surgeon.    Tips for taking pain medications  To get the best pain relief possible, remember these points:    Take pain medications as directed, before pain becomes severe.    Pain medication can upset your stomach: taking it with food may help.    Constipation is a common side effect of pain medication. Drink plenty of  fluids.    Eat foods high in fiber. Take a stool softener if recommended by your doctor or pharmacist.    Do not drink alcohol, drive or operate machinery while taking pain medications.    Ask about other ways to control pain, such as with heat, ice or relaxation.    Tylenol/Acetaminophen Consumption  To " help encourage the safe use of acetaminophen, the makers of TYLENOL  have lowered the maximum daily dose for single-ingredient Extra Strength TYLENOL  (acetaminophen) products sold in the [place] from 8 pills per day (4,000 mg) to 6 pills per day (3,000 mg). The dosing interval has also changed from 2 pills every 4-6 hours to 2 pills every 6 hours.    If you feel your pain relief is insufficient, you may take Tylenol/Acetaminophen in addition to your narcotic pain medication.     Be careful not to exceed 3,000 mg of Tylenol/Acetaminophen in a 24 hour period from all sources.    If you are taking extra strength Tylenol/acetaminophen (500 mg), the maximum dose is 6 tablets in 24 hours.    If you are taking regular strength acetaminophen (325 mg), the maximum dose is 9 tablets in 24 hours.    Call a doctor for any of the followin. Signs of infection (fever, growing tenderness at the surgery site, a large amount of drainage or bleeding, severe pain, foul-smelling drainage, redness, swelling).  2. It has been over 8 to 10 hours since surgery and you are still not able to urinate (pass water).  3. Headache for over 24 hours.  4. Numbness, tingling or weakness the day after surgery (if you had spinal anesthesia).  5. Signs of Covid-19 infection (temperature over 100 degrees, shortness of breath, cough, loss of taste/smell, generalized body aches, persistent headache, chills, sore throat, nausea/vomiting/diarrhea)  Your doctor is:  Dr. Alia Moreno, Plastic Surgery: [phone]                    Or dial [phone] and ask for the resident on call for:  Plastics  For emergency care, call the:  [facility]:  [phone] (TTY for hearing impaired: [phone])

## 2021-08-05 NOTE — ANESTHESIA POSTPROCEDURE EVALUATION
Patient: Kasandra Lau    Procedure(s):  MAMMOPLASTY, REDUCTION, Bilateral    Diagnosis:Hypertrophy of breast [N62]  Diagnosis Additional Information: No value filed.    Anesthesia Type:  General    Note:  Disposition: Outpatient   Postop Pain Control: Uneventful            Sign Out: Well controlled pain   PONV: No   Neuro/Psych: Uneventful            Sign Out: Acceptable/Baseline neuro status   Airway/Respiratory: Uneventful            Sign Out: Acceptable/Baseline resp. status   CV/Hemodynamics: Uneventful            Sign Out: Acceptable CV status; No obvious hypovolemia; No obvious fluid overload   Other NRE: NONE   DID A NON-ROUTINE EVENT OCCUR? No           Last vitals:  Vitals Value Taken Time   /85 08/05/21 1616   Temp     Pulse 89 08/05/21 1628   Resp 45 08/05/21 1628   SpO2 96 % 08/05/21 1628   Vitals shown include unvalidated device data.    Electronically Signed By: ELZA PHILLIPS MD  August 5, 2021  4:29 PM

## 2021-08-05 NOTE — ANESTHESIA PROCEDURE NOTES
Pectoralis Procedure Note  Pre-Procedure   Staff -        Anesthesiologist:  Amna Perez MD       Performed By: anesthesiologist       Location: pre-op       Procedure Start/Stop Times: 8/5/2021 1:17 PM and 8/5/2021 1:26 PM       Pre-Anesthestic Checklist: patient identified, IV checked, site marked, risks and benefits discussed, informed consent, monitors and equipment checked, pre-op evaluation, at physician/surgeon's request and post-op pain management  Timeout:       Correct Patient: Yes        Correct Procedure: Yes        Correct Site: Yes        Correct Position: Yes        Correct Laterality: Yes        Site Marked: Yes  Procedure Documentation  Procedure: Pectoralis       Diagnosis: POST OPERATIVE PAIN       Laterality: bilateral       Patient Position: sitting       Patient Prep/Sterile Barriers: sterile gloves, mask       Skin prep: Chloraprep       Needle Type: short bevel       Needle Gauge: 21.        Needle Length (Inches): 4        Needle Length (millimeters): 110        Ultrasound guided       1. Ultrasound was used to identify targeted nerve, plexus, vascular marker, or fascial plane and place a needle adjacent to it in real-time.       2. Ultrasound was used to visualize the spread of anesthetic in close proximity to the above referenced structure.       3. A permanent image is entered into the patient's record.    Assessment/Narrative         The placement was negative for: blood aspirated, painful injection and site bleeding       Paresthesias: No.     Bolus given via needle..        Secured via.        Insertion/Infusion Method: Single Shot       Complications: none       Injection made incrementally with aspirations every 5 mL.    Medication(s) Administered   Bupivacaine 0.25% PF (Infiltration), 30 mL  Bupivacaine liposome (Exparel) 1.3% LA inj susp (Infiltration), 20 mL  Medication Administration Time: 8/5/2021 1:26 PM    Comments:  266mg liposomal bupivacaine injected  incrementally

## 2021-08-05 NOTE — ANESTHESIA CARE TRANSFER NOTE
Patient: Kasandra Lau    Procedure(s):  MAMMOPLASTY, REDUCTION, Bilateral    Diagnosis: Hypertrophy of breast [N62]  Diagnosis Additional Information: No value filed.    Anesthesia Type:   General     Note:    Oropharynx: oropharynx clear of all foreign objects  Level of Consciousness: awake  Oxygen Supplementation: room air    Independent Airway: airway patency satisfactory and stable  Dentition: dentition unchanged  Vital Signs Stable: post-procedure vital signs reviewed and stable  Report to RN Given: handoff report given  Patient transferred to: PACU  Comments: VSS and WNL, comfortable, no PONV, report to Tara GARCIA  Handoff Report: Identifed the Patient, Identified the Reponsible Provider, Reviewed the pertinent medical history, Discussed the surgical course, Reviewed Intra-OP anesthesia mangement and issues during anesthesia, Set expectations for post-procedure period and Allowed opportunity for questions and acknowledgement of understanding      Vitals:  Vitals Value Taken Time   BP     Temp     Pulse     Resp     SpO2         Electronically Signed By: JASVIR Floyd CRNA  August 5, 2021  4:14 PM

## 2021-08-05 NOTE — OP NOTE
PREOPERATIVE DIAGNOSIS: Symptomatic bilateral breast hypertrophy.     POSTOPERATIVE DIAGNOSIS: Symptomatic bilateral breast hypertrophy.     PROCEDURES: Bilateral superomedial pedicle inverted T skin closure breast reduction.     SURGEON: Alia Moreno MD.     RESIDENT: Dorian Pizano MD.     ANESTHESIA: General anesthesia with endotracheal intubation.     COMPLICATIONS: Nil.     DRAINS: Nil.     Blood Loss: 150 mL    SPECIMENS: Skin and breast tissue from right breast measuring about 958 g, left breast measuring about 753 g.     DESCRIPTION OF PROCEDURE: After informed consent was taken, the proper site and procedure was ascertained with the patient and was appropriately marked and taken to in the operating room.  She was placed in supine position with the knees comfortably flexed with pillows underneath them, and pneumoboots placed and running prior to induction of anesthesia. Preoperative antibiotics given in the OR. All pressure points were appropriately padded. General anesthesia was administered without any complications. She was placed in such a position that she could be flexed to about 50 degrees. Her arms were padded and abducted to about 50 degrees. She was prepped and draped in a standard surgical fashion. I began by first remarking the preop markings and marking a 42 mm areola on each side. I then marked out a superior medial pedicle on each side. I then de-epithelialized the pedicle on each side. I then dissected out the pedicle on each side without actually seeing the deep fascia and also released the pedicle such that it could rotate into its new nipple position without any tension. I then went ahead and on each side excised the inferomedial, inferior, inferolateral and lateral aspects of the breast according to a vertical pattern reduction. Strict hemostasis was ensured during this entire part of the case. Once this was done, I then temporarily closed the patient's breast in an inverted T fashion,  sat the patient up ensured symmetry and then marked out the new areolar opening symmetrically on each side. I then went ahead and closed the horizontal incision using 2-0 Monocryl suture in a deep dermal layer. Then I made sure that the nipple areolar complex could be retrieved without any tension, which is could on each side. I then checked that they were both pink and viable, which they were. I then went ahead and excised the skin of the new areolar opening and de-epithelialized epithelialized the portion that was involved in the pedicle on each side. I then sutured in the nipple areolar complex using 2-0 Monocryl suture in a deep dermal fashion circumferentially. I then closed the vertical incision using 2-0 Monocryl suture to approximate the medial and lateral pillars, and then the deep dermis. I then ran all the incisions with 3-0 Strattafix suture in a running intracuticular manner followed by placement of Prineo, and then followed by an ACE wrap. At the end of the case, the patient's breasts were soft, nipples were pink, and breasts were symmetric. The patient tolerated the procedure well. All counts correct at the end of the case. The patient was extubated and sent to recovery room in a stable condition.

## 2021-08-05 NOTE — OR NURSING
Patient received bilateral Pectoralis I and II nerve block  with Exparel.  Fentanyl 50mcg and Versed 2mg given. Tolerated procedure well.

## 2021-08-06 ENCOUNTER — PATIENT OUTREACH (OUTPATIENT)
Dept: PLASTIC SURGERY | Facility: CLINIC | Age: 36
End: 2021-08-06

## 2021-08-06 NOTE — PATIENT INSTRUCTIONS
Left message for pt regarding post op concerns. Provided direct contact information and requested call back to discuss. Michelle CRAWFORD RN, BSN  Left message x 2 for pt regarding post op concerns. Provided direct contact information and requested call back to discuss. Michelle CRAWFORD RN, BSN  Spoke with pt regarding post op concerns. She states last night she began to have bleeding that was soaking through her ACE bandage. Stats she contacted the on call provider and was told to apply pressure to the area. This did stop the bleeding and she has not had any further bleeding or drainage. Recommended pt change the gauze and ACE bandage in order to better monitor any potential future drainage. Pt states understanding and denies any additional questions or concerns. Michelle CRAWFORD RN, BSN

## 2021-08-11 LAB
PATH REPORT.COMMENTS IMP SPEC: NORMAL
PATH REPORT.COMMENTS IMP SPEC: NORMAL
PATH REPORT.FINAL DX SPEC: NORMAL
PATH REPORT.GROSS SPEC: NORMAL
PATH REPORT.MICROSCOPIC SPEC OTHER STN: NORMAL
PATH REPORT.RELEVANT HX SPEC: NORMAL
PHOTO IMAGE: NORMAL

## 2021-08-12 ENCOUNTER — PATIENT OUTREACH (OUTPATIENT)
Dept: PLASTIC SURGERY | Facility: CLINIC | Age: 36
End: 2021-08-12

## 2021-08-12 ENCOUNTER — HOSPITAL ENCOUNTER (EMERGENCY)
Facility: CLINIC | Age: 36
Discharge: HOME | End: 2021-08-12
Attending: EMERGENCY MEDICINE | Admitting: EMERGENCY MEDICINE
Payer: COMMERCIAL

## 2021-08-12 VITALS
DIASTOLIC BLOOD PRESSURE: 85 MMHG | HEIGHT: 69 IN | WEIGHT: 175 LBS | SYSTOLIC BLOOD PRESSURE: 127 MMHG | RESPIRATION RATE: 16 BRPM | HEART RATE: 82 BPM | OXYGEN SATURATION: 98 % | TEMPERATURE: 98.3 F | BODY MASS INDEX: 25.92 KG/M2

## 2021-08-12 DIAGNOSIS — Z48.89 ENCOUNTER FOR POST SURGICAL WOUND CHECK: ICD-10-CM

## 2021-08-12 LAB
ALBUMIN SERPL-MCNC: 2.9 G/DL (ref 3.4–5)
ALP SERPL-CCNC: 90 U/L (ref 40–150)
ALT SERPL W P-5'-P-CCNC: 33 U/L (ref 0–50)
ANION GAP SERPL CALCULATED.3IONS-SCNC: 9 MMOL/L (ref 3–14)
AST SERPL W P-5'-P-CCNC: 24 U/L (ref 0–45)
BASOPHILS # BLD AUTO: 0 10E3/UL (ref 0–0.2)
BASOPHILS NFR BLD AUTO: 0 %
BILIRUB SERPL-MCNC: 0.2 MG/DL (ref 0.2–1.3)
BUN SERPL-MCNC: 10 MG/DL (ref 7–30)
CALCIUM SERPL-MCNC: 9.4 MG/DL (ref 8.5–10.1)
CHLORIDE BLD-SCNC: 107 MMOL/L (ref 94–109)
CO2 SERPL-SCNC: 22 MMOL/L (ref 20–32)
CREAT SERPL-MCNC: 0.68 MG/DL (ref 0.52–1.04)
CRP SERPL-MCNC: 44 MG/L (ref 0–8)
EOSINOPHIL # BLD AUTO: 0.3 10E3/UL (ref 0–0.7)
EOSINOPHIL NFR BLD AUTO: 3 %
ERYTHROCYTE [DISTWIDTH] IN BLOOD BY AUTOMATED COUNT: 12.8 % (ref 10–15)
ERYTHROCYTE [SEDIMENTATION RATE] IN BLOOD BY WESTERGREN METHOD: 41 MM/HR (ref 0–20)
GFR SERPL CREATININE-BSD FRML MDRD: >90 ML/MIN/1.73M2
GLUCOSE BLD-MCNC: 156 MG/DL (ref 70–99)
HCT VFR BLD AUTO: 36 % (ref 35–47)
HGB BLD-MCNC: 11.6 G/DL (ref 11.7–15.7)
HOLD SPECIMEN: NORMAL
HOLD SPECIMEN: NORMAL
IMM GRANULOCYTES # BLD: 0 10E3/UL
IMM GRANULOCYTES NFR BLD: 0 %
LYMPHOCYTES # BLD AUTO: 3 10E3/UL (ref 0.8–5.3)
LYMPHOCYTES NFR BLD AUTO: 30 %
MCH RBC QN AUTO: 29.2 PG (ref 26.5–33)
MCHC RBC AUTO-ENTMCNC: 32.2 G/DL (ref 31.5–36.5)
MCV RBC AUTO: 91 FL (ref 78–100)
MONOCYTES # BLD AUTO: 0.5 10E3/UL (ref 0–1.3)
MONOCYTES NFR BLD AUTO: 5 %
NEUTROPHILS # BLD AUTO: 6.3 10E3/UL (ref 1.6–8.3)
NEUTROPHILS NFR BLD AUTO: 62 %
NRBC # BLD AUTO: 0 10E3/UL
NRBC BLD AUTO-RTO: 0 /100
PLATELET # BLD AUTO: 508 10E3/UL (ref 150–450)
POTASSIUM BLD-SCNC: 3.4 MMOL/L (ref 3.4–5.3)
PROT SERPL-MCNC: 7.5 G/DL (ref 6.8–8.8)
RBC # BLD AUTO: 3.97 10E6/UL (ref 3.8–5.2)
SODIUM SERPL-SCNC: 138 MMOL/L (ref 133–144)
WBC # BLD AUTO: 10.2 10E3/UL (ref 4–11)

## 2021-08-12 PROCEDURE — 86140 C-REACTIVE PROTEIN: CPT | Performed by: EMERGENCY MEDICINE

## 2021-08-12 PROCEDURE — 99284 EMERGENCY DEPT VISIT MOD MDM: CPT | Performed by: EMERGENCY MEDICINE

## 2021-08-12 PROCEDURE — 80053 COMPREHEN METABOLIC PANEL: CPT | Performed by: EMERGENCY MEDICINE

## 2021-08-12 PROCEDURE — 250N000013 HC RX MED GY IP 250 OP 250 PS 637: Performed by: EMERGENCY MEDICINE

## 2021-08-12 PROCEDURE — 85652 RBC SED RATE AUTOMATED: CPT | Performed by: EMERGENCY MEDICINE

## 2021-08-12 PROCEDURE — 85025 COMPLETE CBC W/AUTO DIFF WBC: CPT | Performed by: EMERGENCY MEDICINE

## 2021-08-12 PROCEDURE — 99283 EMERGENCY DEPT VISIT LOW MDM: CPT

## 2021-08-12 PROCEDURE — 36415 COLL VENOUS BLD VENIPUNCTURE: CPT | Performed by: EMERGENCY MEDICINE

## 2021-08-12 RX ORDER — SULFAMETHOXAZOLE/TRIMETHOPRIM 800-160 MG
1 TABLET ORAL 2 TIMES DAILY
Qty: 14 TABLET | Refills: 0 | Status: SHIPPED | OUTPATIENT
Start: 2021-08-12 | End: 2021-08-19

## 2021-08-12 RX ORDER — SULFAMETHOXAZOLE/TRIMETHOPRIM 800-160 MG
1 TABLET ORAL ONCE
Status: COMPLETED | OUTPATIENT
Start: 2021-08-12 | End: 2021-08-12

## 2021-08-12 RX ADMIN — SULFAMETHOXAZOLE AND TRIMETHOPRIM 1 TABLET: 800; 160 TABLET ORAL at 20:29

## 2021-08-12 ASSESSMENT — MIFFLIN-ST. JEOR: SCORE: 1553.17

## 2021-08-12 ASSESSMENT — ENCOUNTER SYMPTOMS
GASTROINTESTINAL NEGATIVE: 1
RESPIRATORY NEGATIVE: 1
CARDIOVASCULAR NEGATIVE: 1
COLOR CHANGE: 1
FEVER: 0
MUSCULOSKELETAL NEGATIVE: 1

## 2021-08-12 NOTE — ED TRIAGE NOTES
Pt. Presents to ED with complaints of tightness, heat and redness of her breasts after her reduction surgery a week ago. Pt. Denies fevers, chills, drainage from site. AVSS on RA. Afebrile. A & O x 4, independent.

## 2021-08-12 NOTE — ED PROVIDER NOTES
[facility] EMERGENCY DEPARTMENT ([facility])  8/12/21    History     Chief Complaint   Patient presents with     Post-op Problem     Wound Check     The history is provided by the patient and medical records.     Kasandra Lau is a 36 year old female with a history of breast hypertrophy s/p bilateral reduction (8/5/2021) who presents to the ED today complaining of breast redness and tightness after undergoing a bilateral breast reduction on 8/5/2021. Patient reports surgical site seemed to be ok the first couple of day but then her breasts started becoming itchy and hot to the touch. She states it also felt like her breasts were becoming rogers. She has been using aloe and hydrocortisone cream with temporary relief. Patient notes she called plastic surgery nurse and was advised to come in. Patient reports she has been feeling intermittently lightheaded. She notes she is keeping hydrated. Patient is not currently on antibiotics. She denies fevers or drainage.    Past Medical History  Past Medical History:   Diagnosis Date     Anemia fall 2016     Depression (emotion)     sees psych, on meds     Gastroesophageal reflux disease      Migraine     daily meds, 2x month, more mild on meds     Panic disorder      Uncomplicated asthma Spring 2018     Past Surgical History:   Procedure Laterality Date     COLONOSCOPY       LAPAROSCOPIC ABLATION ENDOMETRIOSIS N/A 11/9/2016    Procedure: LAPAROSCOPIC ABLATION ENDOMETRIOSIS;  Surgeon: Tonja Ferrer MD;  Location: [facility] OR     LAPAROSCOPIC CYSTECTOMY OVARIAN (BENIGN) Left 11/9/2016    Procedure: LAPAROSCOPIC CYSTECTOMY OVARIAN (BENIGN);  Surgeon: Tonja Ferrer MD;  [facility]     LAPAROSCOPIC TUBAL DYE STUDY Left 11/9/2016    Procedure: LAPAROSCOPIC TUBAL DYE STUDY;  Surgeon: Tonja Ferrer MD;  [facility]     LAPAROSCOPY OPERATIVE ADULT N/A 11/9/2016    Procedure: LAPAROSCOPY OPERATIVE ADULT;  Surgeon: Tonja Ferrer MD;   [facility] OR     MAMMOPLASTY REDUCTION Bilateral 8/5/2021    Procedure: MAMMOPLASTY, REDUCTION, Bilateral;  Surgeon: ANTONIO Moreno MD;  Location: [facility] OR     ORTHOPEDIC SURGERY      left wrist surgery     sulfamethoxazole-trimethoprim (BACTRIM DS) 800-160 MG tablet  Acetaminophen (TYLENOL PO)  almotriptan (AXERT) 12.5 MG tablet  buPROPion (WELLBUTRIN XL) 300 MG 24 hr tablet  cholecalciferol 50 MCG (2000 UT) CAPS  dicyclomine (BENTYL) 20 MG tablet  doxepin (SINEQUAN) 10 MG capsule  EMGALITY 120 MG/ML injection  etonogestrel-ethinyl estradiol (NUVARING) 0.12-0.015 MG/24HR vaginal ring  famotidine (PEPCID) 20 MG tablet  fexofenadine (ALLEGRA) 180 MG tablet  fluticasone (FLONASE) 50 MCG/ACT nasal spray  gabapentin (NEURONTIN) 300 MG capsule  gabapentin (NEURONTIN) 400 MG capsule  levocetirizine (XYZAL) 5 MG tablet  loteprednol (LOTEMAX) 0.5 % ophthalmic suspension  montelukast (SINGULAIR) 10 MG tablet  neomycin-polymyxin-dexamethasone (MAXITROL) 3.5-47608-3.1 ophthalmic ointment  olopatadine (PATANOL) 0.1 % ophthalmic solution  ondansetron (ZOFRAN-ODT) 4 MG ODT tab  order for DME  propranolol (INDERAL) 20 MG tablet  senna-docusate (SENOKOT-S/PERICOLACE) 8.6-50 MG tablet  vilazodone (VIIBRYD) 40 MG TABS tablet      Allergies   Allergen Reactions     Dust Mites Cough, Difficulty breathing and Shortness Of Breath     Cats      Chest tightness, sinus irritation     Family History  Family History   Problem Relation Age of Onset     Diabetes Maternal Grandfather      Hypertension No family hx of      Coronary Artery Disease No family hx of      Hyperlipidemia No family hx of      Cerebrovascular Disease No family hx of      Breast Cancer No family hx of      Colon Cancer No family hx of      Prostate Cancer No family hx of      Other Cancer No family hx of      Glaucoma No family hx of      Macular Degeneration No family hx of      Social History   Social History     Tobacco Use     Smoking status: Former Smoker  "    Packs/day: 0.00     Years: 10.00     Pack years: 0.00     Types: Cigarettes     Smokeless tobacco: Never Used     Tobacco comment: smokes occasionally socially    Substance Use Topics     Alcohol use: Not Currently     Alcohol/week: 0.0 standard drinks     Comment: occasional      Drug use: Not Currently     Types: Marijuana     Comment: marijuana  none since May 2021      Past medical history, past surgical history, medications, allergies, family history, and social history were reviewed with the patient. No additional pertinent items.       Review of Systems   Constitutional: Negative for fever.   Respiratory: Negative.    Cardiovascular: Negative.    Gastrointestinal: Negative.    Genitourinary: Negative.    Musculoskeletal: Negative.    Skin: Positive for color change and rash.   All other systems reviewed and are negative.    A complete review of systems was performed with pertinent positives and negatives noted in the HPI, and all other systems negative.    Physical Exam   BP: 111/81  Pulse: 82  Temp: 98.3  F (36.8  C)  Resp: 16  Height: 175.3 cm (5' 9\")  Weight: 79.4 kg (175 lb)  SpO2: 97 %  Physical Exam  Vitals and nursing note reviewed.   Constitutional:       General: She is not in acute distress.     Appearance: She is well-developed.   HENT:      Head: Normocephalic and atraumatic.      Mouth/Throat:      Mouth: Mucous membranes are moist.   Eyes:      General: No scleral icterus.     Conjunctiva/sclera: Conjunctivae normal.      Pupils: Pupils are equal, round, and reactive to light.   Cardiovascular:      Rate and Rhythm: Normal rate and regular rhythm.      Heart sounds: Normal heart sounds.   Pulmonary:      Effort: Pulmonary effort is normal. No respiratory distress.      Breath sounds: Normal breath sounds. No wheezing.   Chest:      Comments: See photographs below demonstrating bilateral breast erythema, inflammation versus cellulitis.  The wounds are intact without any spontaneous " drainage.  Musculoskeletal:      Cervical back: Neck supple.   Skin:     General: Skin is warm and dry.   Neurological:      General: No focal deficit present.      Mental Status: She is alert and oriented to person, place, and time.      Cranial Nerves: No cranial nerve deficit.   Psychiatric:         Mood and Affect: Mood normal.         Behavior: Behavior normal.         ED Course   7:07 PM  The patient was seen and examined by Neil Guerrero MD in Room ED16.      Procedures                      Results for orders placed or performed during the hospital encounter of 08/12/21   CBC with platelets differential     Status: Abnormal    Narrative    The following orders were created for panel order CBC with platelets differential.  Procedure                               Abnormality         Status                     ---------                               -----------         ------                     CBC with platelets and d...[[id number]]  Abnormal            Final result                 Please view results for these tests on the individual orders.   Comprehensive metabolic panel     Status: Abnormal   Result Value Ref Range    Sodium 138 133 - 144 mmol/L    Potassium 3.4 3.4 - 5.3 mmol/L    Chloride 107 94 - 109 mmol/L    Carbon Dioxide (CO2) 22 20 - 32 mmol/L    Anion Gap 9 3 - 14 mmol/L    Urea Nitrogen 10 7 - 30 mg/dL    Creatinine 0.68 0.52 - 1.04 mg/dL    Calcium 9.4 8.5 - 10.1 mg/dL    Glucose 156 (H) 70 - 99 mg/dL    Alkaline Phosphatase 90 40 - 150 U/L    AST 24 0 - 45 U/L    ALT 33 0 - 50 U/L    Protein Total 7.5 6.8 - 8.8 g/dL    Albumin 2.9 (L) 3.4 - 5.0 g/dL    Bilirubin Total 0.2 0.2 - 1.3 mg/dL    GFR Estimate >90 >60 mL/min/1.73m2   Extra Blue Top Tube     Status: None   Result Value Ref Range    Hold Specimen JIC    Extra Red Top Tube     Status: None   Result Value Ref Range    Hold Specimen JIC    CBC with platelets and differential     Status: Abnormal   Result Value Ref Range    WBC Count  10.2 4.0 - 11.0 10e3/uL    RBC Count 3.97 3.80 - 5.20 10e6/uL    Hemoglobin 11.6 (L) 11.7 - 15.7 g/dL    Hematocrit 36.0 35.0 - 47.0 %    MCV 91 78 - 100 fL    MCH 29.2 26.5 - 33.0 pg    MCHC 32.2 31.5 - 36.5 g/dL    RDW 12.8 10.0 - 15.0 %    Platelet Count 508 (H) 150 - 450 10e3/uL    % Neutrophils 62 %    % Lymphocytes 30 %    % Monocytes 5 %    % Eosinophils 3 %    % Basophils 0 %    % Immature Granulocytes 0 %    NRBCs per 100 WBC 0 <1 /100    Absolute Neutrophils 6.3 1.6 - 8.3 10e3/uL    Absolute Lymphocytes 3.0 0.8 - 5.3 10e3/uL    Absolute Monocytes 0.5 0.0 - 1.3 10e3/uL    Absolute Eosinophils 0.3 0.0 - 0.7 10e3/uL    Absolute Basophils 0.0 0.0 - 0.2 10e3/uL    Absolute Immature Granulocytes 0.0 <=0.0 10e3/uL    Absolute NRBCs 0.0 10e3/uL   CRP inflammation     Status: Abnormal   Result Value Ref Range    CRP Inflammation 44.0 (H) 0.0 - 8.0 mg/L   Erythrocyte sedimentation rate auto     Status: Abnormal   Result Value Ref Range    Erythrocyte Sedimentation Rate 41 (H) 0 - 20 mm/hr   [place] Draw     Status: None    Narrative    The following orders were created for panel order [place] Draw.  Procedure                               Abnormality         Status                     ---------                               -----------         ------                     Extra Blue Top Tube[[id number]]                              Final result               Extra Red Top Tube[[id number]]                               Final result                 Please view results for these tests on the individual orders.     Medications   sulfamethoxazole-trimethoprim (BACTRIM DS) 800-160 MG per tablet 1 tablet (has no administration in time range)        Assessments & Plan (with Medical Decision Making)   Patient with postoperative erythema concerning for possible wound infection there is no drainage the wounds are intact.  She does not have fevers is not systemically ill, the case discussed with Dr. Navas will start on Bactrim  twice daily and he will contact her regarding add-on to his clinic tomorrow afternoon in [place].  Patient understands the plan she was given the first dose in the department    I have reviewed the nursing notes. I have reviewed the findings, diagnosis, plan and need for follow up with the patient.    New Prescriptions    SULFAMETHOXAZOLE-TRIMETHOPRIM (BACTRIM DS) 800-160 MG TABLET    Take 1 tablet by mouth 2 times daily for 7 days       Final diagnoses:   Encounter for post surgical wound check     I, Lakisha Krishnan, am serving as a trained medical scribe to document services personally performed by Neil Guerrero MD, based on the provider's statements to me.      I, Neil Guerrero MD, was physically present and have reviewed and verified the accuracy of this note documented by Lakisha Krishnan.     --  Neil Guerrero MD  [facility] EMERGENCY DEPARTMENT  8/12/2021     Neil Guerrero MD  08/12/21 2022

## 2021-08-13 ENCOUNTER — OFFICE VISIT (OUTPATIENT)
Dept: SURGERY | Facility: CLINIC | Age: 36
End: 2021-08-13
Payer: COMMERCIAL

## 2021-08-13 ENCOUNTER — MYC MEDICAL ADVICE (OUTPATIENT)
Dept: DERMATOLOGY | Facility: CLINIC | Age: 36
End: 2021-08-13

## 2021-08-13 DIAGNOSIS — N62 HYPERTROPHY OF BREAST: Primary | ICD-10-CM

## 2021-08-13 PROCEDURE — 99024 POSTOP FOLLOW-UP VISIT: CPT | Performed by: PLASTIC SURGERY

## 2021-08-13 NOTE — DISCHARGE INSTRUCTIONS
Start Bactrim twice a day as directed  Dr. Mosqueda is going to get you into his clinic tomorrow afternoon, expect a phone call

## 2021-08-13 NOTE — PROGRESS NOTES
PRESENTING COMPLAINT:  Postoperative visit status post bilateral breast reduction done on 08/05/2021.    HISTORY OF PRESENTING COMPLAINT:  Ms. Lau is 36 years old.  She is a week out from surgery.  I got a call yesterday from the Emergency Room that she was in the Emergency Room with some redness in both lower poles of the breasts.  No fevers.  She felt well and was started on Bactrim.    PHYSICAL EXAMINATION:  Vital signs stable.  She is afebrile, in no obvious distress.  Both breasts are healing in well.  There is minimal erythema in both lower poles.    ASSESSMENT AND PLAN:  Based on the above findings, a diagnosis of bilateral breast reduction was made.  She will finish her course of antibiotics.  I will see her back in a week's time to monitor her progress.  Her pathology showed benign breast tissue.  All questions answered.  She was happy with the visit.

## 2021-08-13 NOTE — LETTER
8/13/2021         RE: Kasandra Lau  [address]  [place] [address]        Dear Colleague,    Thank you for referring your patient, Kasandra Lau, to the [facility]. Please see a copy of my visit note below.    PRESENTING COMPLAINT:  Postoperative visit status post bilateral breast reduction done on 08/05/2021.    HISTORY OF PRESENTING COMPLAINT:  Ms. Lau is 36 years old.  She is a week out from surgery.  I got a call yesterday from the Emergency Room that she was in the Emergency Room with some redness in both lower poles of the breasts.  No fevers.  She felt well and was started on Bactrim.    PHYSICAL EXAMINATION:  Vital signs stable.  She is afebrile, in no obvious distress.  Both breasts are healing in well.  There is minimal erythema in both lower poles.    ASSESSMENT AND PLAN:  Based on the above findings, a diagnosis of bilateral breast reduction was made.  She will finish her course of antibiotics.  I will see her back in a week's time to monitor her progress.  Her pathology showed benign breast tissue.  All questions answered.  She was happy with the visit.          Again, thank you for allowing me to participate in the care of your patient.        Sincerely,        ANTONIO Moreno MD

## 2021-08-13 NOTE — TELEPHONE ENCOUNTER
Pt called, No answer.  does not identify pt. Left general message for pt to call the [facility] back at [phone]. [employer] message also sent to the patient...ANTONIO Christensen RN, MD Eastman, Colleen E, RN; Jo Morgan LPN Hi     Please add her on for today. Went to ER with redness and started on Abx and I need to check on her. S/p breast reduction from 1 week ago.     Thanks     Alia

## 2021-08-13 NOTE — NURSING NOTE
Kasandra Lau's goals for this visit include:   Chief Complaint   Patient presents with     Surgical Followup     s/p breast reduction, was seen in ER for infection       She requests these members of her care team be copied on today's visit information: no    PCP: Roxy Rios    Referring Provider:  No referring provider defined for this encounter.    There were no vitals taken for this visit.    Do you need any medication refills at today's visit? No    Jo Morgan LPN

## 2021-08-17 ENCOUNTER — OFFICE VISIT (OUTPATIENT)
Dept: PLASTIC SURGERY | Facility: CLINIC | Age: 36
End: 2021-08-17
Attending: PLASTIC SURGERY
Payer: COMMERCIAL

## 2021-08-17 VITALS
OXYGEN SATURATION: 99 % | HEART RATE: 89 BPM | BODY MASS INDEX: 25.9 KG/M2 | DIASTOLIC BLOOD PRESSURE: 81 MMHG | WEIGHT: 175.4 LBS | RESPIRATION RATE: 18 BRPM | SYSTOLIC BLOOD PRESSURE: 113 MMHG | TEMPERATURE: 98.7 F

## 2021-08-17 DIAGNOSIS — N62 HYPERTROPHY OF BREAST: Primary | ICD-10-CM

## 2021-08-17 PROCEDURE — 99024 POSTOP FOLLOW-UP VISIT: CPT | Performed by: PLASTIC SURGERY

## 2021-08-17 PROCEDURE — G0463 HOSPITAL OUTPT CLINIC VISIT: HCPCS

## 2021-08-17 RX ORDER — HYDROCORTISONE VALERATE CREAM 2 MG/G
CREAM TOPICAL 2 TIMES DAILY
Qty: 15 G | Refills: 0 | Status: SHIPPED | OUTPATIENT
Start: 2021-08-17 | End: 2021-08-24

## 2021-08-17 ASSESSMENT — PAIN SCALES - GENERAL: PAINLEVEL: MILD PAIN (3)

## 2021-08-17 NOTE — PROGRESS NOTES
POSTOPERATIVE VISIT NOTE    PRESENTING COMPLAINT:  Postoperative visit status post bilateral breast reduction on 08/05/2021.    HISTORY OF PRESENTING COMPLAINT:  Ms. Lau is 36 years old.  She is about 2 weeks out from her surgery.  Last saw me a few days ago because of possible infection of her breasts but she has been doing well in the interim.    PHYSICAL EXAMINATION:    VITAL SIGNS:  Stable.  She is afebrile, in no obvious distress.    SKIN:  She has no more erythema.    ASSESSMENT AND PLAN:  Based on the above findings, a diagnosis of bilateral breast reduction was made.  She may have had an early cellulitis.  Is doing well on her antibiotics.  She finished the course.  She does have some allergic reaction to the tape.  I have asked her to place hydrocortisone cream on it.  However, she said she has attempted that for the last day or so and it has not been working, so we gave her a prescription for Westcort.  I will see her back in a week's time.

## 2021-08-17 NOTE — LETTER
8/17/2021         RE: Kasandra Lau  [address]  [place] [address]        Dear Colleague,    Thank you for referring your patient, Kasandra Lau, to the [facility] BREAST [facility]. Please see a copy of my visit note below.    POSTOPERATIVE VISIT NOTE    PRESENTING COMPLAINT:  Postoperative visit status post bilateral breast reduction on 08/05/2021.    HISTORY OF PRESENTING COMPLAINT:  Ms. Lau is 36 years old.  She is about 2 weeks out from her surgery.  Last saw me a few days ago because of possible infection of her breasts but she has been doing well in the interim.    PHYSICAL EXAMINATION:    VITAL SIGNS:  Stable.  She is afebrile, in no obvious distress.    SKIN:  She has no more erythema.    ASSESSMENT AND PLAN:  Based on the above findings, a diagnosis of bilateral breast reduction was made.  She may have had an early cellulitis.  Is doing well on her antibiotics.  She finished the course.  She does have some allergic reaction to the tape.  I have asked her to place hydrocortisone cream on it.  However, she said she has attempted that for the last day or so and it has not been working, so we gave her a prescription for Westcort.  I will see her back in a week's time.          Again, thank you for allowing me to participate in the care of your patient.        Sincerely,        ANTONIO Moreno MD

## 2021-08-17 NOTE — NURSING NOTE
"Oncology Rooming Note    August 17, 2021 9:09 AM   Kasandra Lau is a 36 year old female who presents for:    Chief Complaint   Patient presents with     Oncology Clinic Visit     Patient with hypertrophy of breast here for provider visit      Initial Vitals: /81 (BP Location: Right arm, Patient Position: Sitting, Cuff Size: Adult Regular)   Pulse 89   Temp 98.7  F (37.1  C) (Tympanic)   Resp 18   Wt 79.6 kg (175 lb 6.4 oz)   SpO2 99%   BMI 25.90 kg/m   Estimated body mass index is 25.9 kg/m  as calculated from the following:    Height as of 8/12/21: 1.753 m (5' 9\").    Weight as of this encounter: 79.6 kg (175 lb 6.4 oz). Body surface area is 1.97 meters squared.  Mild Pain (3) Comment: Data Unavailable   No LMP recorded.  Allergies reviewed: Yes  Medications reviewed: Yes    Medications: Medication refills not needed today.  Pharmacy name entered into [employer]:    [id number] [place] [facility] - [place], [place] - [address] [place] [address] PHARMACY [place], [place] -  [facility] [phone]    Clinical concerns:     Marzena Geiger CMA              "

## 2021-08-31 ENCOUNTER — HOSPITAL ENCOUNTER (EMERGENCY)
Facility: CLINIC | Age: 36
Discharge: HOME | End: 2021-08-31
Attending: FAMILY MEDICINE | Admitting: FAMILY MEDICINE
Payer: COMMERCIAL

## 2021-08-31 ENCOUNTER — APPOINTMENT (OUTPATIENT)
Dept: GENERAL RADIOLOGY | Facility: CLINIC | Age: 36
End: 2021-08-31
Attending: FAMILY MEDICINE
Payer: COMMERCIAL

## 2021-08-31 VITALS
WEIGHT: 175 LBS | OXYGEN SATURATION: 99 % | SYSTOLIC BLOOD PRESSURE: 120 MMHG | BODY MASS INDEX: 25.92 KG/M2 | DIASTOLIC BLOOD PRESSURE: 82 MMHG | TEMPERATURE: 98.5 F | HEIGHT: 69 IN | RESPIRATION RATE: 18 BRPM | HEART RATE: 81 BPM

## 2021-08-31 DIAGNOSIS — E86.0 DEHYDRATION: ICD-10-CM

## 2021-08-31 DIAGNOSIS — R11.2 NAUSEA VOMITING AND DIARRHEA: ICD-10-CM

## 2021-08-31 DIAGNOSIS — R19.7 NAUSEA VOMITING AND DIARRHEA: ICD-10-CM

## 2021-08-31 LAB
ALBUMIN SERPL-MCNC: 3.2 G/DL (ref 3.4–5)
ALP SERPL-CCNC: 82 U/L (ref 40–150)
ALT SERPL W P-5'-P-CCNC: 41 U/L (ref 0–50)
ANION GAP SERPL CALCULATED.3IONS-SCNC: 7 MMOL/L (ref 3–14)
APTT PPP: 30 SECONDS (ref 22–38)
AST SERPL W P-5'-P-CCNC: 44 U/L (ref 0–45)
ATRIAL RATE - MUSE: 66 BPM
BASOPHILS # BLD AUTO: 0.1 10E3/UL (ref 0–0.2)
BASOPHILS NFR BLD AUTO: 1 %
BILIRUB SERPL-MCNC: 0.5 MG/DL (ref 0.2–1.3)
BUN SERPL-MCNC: 14 MG/DL (ref 7–30)
CALCIUM SERPL-MCNC: 8.6 MG/DL (ref 8.5–10.1)
CHLORIDE BLD-SCNC: 111 MMOL/L (ref 94–109)
CO2 SERPL-SCNC: 20 MMOL/L (ref 20–32)
CREAT SERPL-MCNC: 0.69 MG/DL (ref 0.52–1.04)
DIASTOLIC BLOOD PRESSURE - MUSE: NORMAL MMHG
EOSINOPHIL # BLD AUTO: 0.1 10E3/UL (ref 0–0.7)
EOSINOPHIL NFR BLD AUTO: 1 %
ERYTHROCYTE [DISTWIDTH] IN BLOOD BY AUTOMATED COUNT: 12.9 % (ref 10–15)
GFR SERPL CREATININE-BSD FRML MDRD: >90 ML/MIN/1.73M2
GLUCOSE BLD-MCNC: 97 MG/DL (ref 70–99)
HCT VFR BLD AUTO: 35.6 % (ref 35–47)
HGB BLD-MCNC: 11.5 G/DL (ref 11.7–15.7)
HOLD SPECIMEN: NORMAL
IMM GRANULOCYTES # BLD: 0 10E3/UL
IMM GRANULOCYTES NFR BLD: 0 %
INR PPP: 1.12 (ref 0.85–1.15)
INTERPRETATION ECG - MUSE: NORMAL
LIPASE SERPL-CCNC: 75 U/L (ref 73–393)
LYMPHOCYTES # BLD AUTO: 2.7 10E3/UL (ref 0.8–5.3)
LYMPHOCYTES NFR BLD AUTO: 30 %
MCH RBC QN AUTO: 28.8 PG (ref 26.5–33)
MCHC RBC AUTO-ENTMCNC: 32.3 G/DL (ref 31.5–36.5)
MCV RBC AUTO: 89 FL (ref 78–100)
MONOCYTES # BLD AUTO: 0.4 10E3/UL (ref 0–1.3)
MONOCYTES NFR BLD AUTO: 5 %
NEUTROPHILS # BLD AUTO: 5.8 10E3/UL (ref 1.6–8.3)
NEUTROPHILS NFR BLD AUTO: 63 %
NRBC # BLD AUTO: 0 10E3/UL
NRBC BLD AUTO-RTO: 0 /100
NT-PROBNP SERPL-MCNC: 107 PG/ML (ref 0–450)
P AXIS - MUSE: 55 DEGREES
PLATELET # BLD AUTO: 408 10E3/UL (ref 150–450)
POTASSIUM BLD-SCNC: 3.8 MMOL/L (ref 3.4–5.3)
PR INTERVAL - MUSE: 142 MS
PROT SERPL-MCNC: 7.5 G/DL (ref 6.8–8.8)
QRS DURATION - MUSE: 94 MS
QT - MUSE: 396 MS
QTC - MUSE: 415 MS
R AXIS - MUSE: 37 DEGREES
RBC # BLD AUTO: 3.99 10E6/UL (ref 3.8–5.2)
SODIUM SERPL-SCNC: 138 MMOL/L (ref 133–144)
SYSTOLIC BLOOD PRESSURE - MUSE: NORMAL MMHG
T AXIS - MUSE: 24 DEGREES
TROPONIN I SERPL-MCNC: <0.015 UG/L (ref 0–0.04)
VENTRICULAR RATE- MUSE: 66 BPM
WBC # BLD AUTO: 9.2 10E3/UL (ref 4–11)

## 2021-08-31 PROCEDURE — 258N000003 HC RX IP 258 OP 636: Performed by: FAMILY MEDICINE

## 2021-08-31 PROCEDURE — 99285 EMERGENCY DEPT VISIT HI MDM: CPT | Mod: 25 | Performed by: FAMILY MEDICINE

## 2021-08-31 PROCEDURE — 71045 X-RAY EXAM CHEST 1 VIEW: CPT | Mod: 26 | Performed by: RADIOLOGY

## 2021-08-31 PROCEDURE — 84484 ASSAY OF TROPONIN QUANT: CPT | Performed by: FAMILY MEDICINE

## 2021-08-31 PROCEDURE — 85730 THROMBOPLASTIN TIME PARTIAL: CPT | Performed by: FAMILY MEDICINE

## 2021-08-31 PROCEDURE — 93005 ELECTROCARDIOGRAM TRACING: CPT | Performed by: FAMILY MEDICINE

## 2021-08-31 PROCEDURE — 82040 ASSAY OF SERUM ALBUMIN: CPT | Performed by: FAMILY MEDICINE

## 2021-08-31 PROCEDURE — 96374 THER/PROPH/DIAG INJ IV PUSH: CPT | Performed by: FAMILY MEDICINE

## 2021-08-31 PROCEDURE — 250N000011 HC RX IP 250 OP 636: Performed by: FAMILY MEDICINE

## 2021-08-31 PROCEDURE — 93010 ELECTROCARDIOGRAM REPORT: CPT | Performed by: FAMILY MEDICINE

## 2021-08-31 PROCEDURE — 85025 COMPLETE CBC W/AUTO DIFF WBC: CPT | Performed by: FAMILY MEDICINE

## 2021-08-31 PROCEDURE — 36415 COLL VENOUS BLD VENIPUNCTURE: CPT | Performed by: FAMILY MEDICINE

## 2021-08-31 PROCEDURE — 71045 X-RAY EXAM CHEST 1 VIEW: CPT

## 2021-08-31 PROCEDURE — 83880 ASSAY OF NATRIURETIC PEPTIDE: CPT | Performed by: FAMILY MEDICINE

## 2021-08-31 PROCEDURE — 96361 HYDRATE IV INFUSION ADD-ON: CPT | Performed by: FAMILY MEDICINE

## 2021-08-31 PROCEDURE — 85610 PROTHROMBIN TIME: CPT | Performed by: FAMILY MEDICINE

## 2021-08-31 PROCEDURE — 83690 ASSAY OF LIPASE: CPT | Performed by: FAMILY MEDICINE

## 2021-08-31 RX ORDER — LIDOCAINE 40 MG/G
CREAM TOPICAL
Status: DISCONTINUED | OUTPATIENT
Start: 2021-08-31 | End: 2021-08-31 | Stop reason: HOSPADM

## 2021-08-31 RX ORDER — GABAPENTIN 400 MG/1
400 CAPSULE ORAL 3 TIMES DAILY
Qty: 60 CAPSULE | Refills: 0 | Status: SHIPPED | OUTPATIENT
Start: 2021-08-31 | End: 2021-10-07

## 2021-08-31 RX ORDER — PROMETHAZINE HYDROCHLORIDE 25 MG/1
25 TABLET ORAL EVERY 8 HOURS PRN
Qty: 12 TABLET | Refills: 0 | Status: SHIPPED | OUTPATIENT
Start: 2021-08-31 | End: 2022-04-16

## 2021-08-31 RX ORDER — ONDANSETRON 2 MG/ML
4 INJECTION INTRAMUSCULAR; INTRAVENOUS EVERY 30 MIN PRN
Status: DISCONTINUED | OUTPATIENT
Start: 2021-08-31 | End: 2021-08-31 | Stop reason: HOSPADM

## 2021-08-31 RX ORDER — GABAPENTIN 300 MG/1
300 CAPSULE ORAL 3 TIMES DAILY
Qty: 60 CAPSULE | Refills: 0 | Status: SHIPPED | OUTPATIENT
Start: 2021-08-31 | End: 2021-09-21

## 2021-08-31 RX ORDER — ONDANSETRON 2 MG/ML
4 INJECTION INTRAMUSCULAR; INTRAVENOUS ONCE
Status: COMPLETED | OUTPATIENT
Start: 2021-08-31 | End: 2021-08-31

## 2021-08-31 RX ADMIN — SODIUM CHLORIDE 1000 ML: 9 INJECTION, SOLUTION INTRAVENOUS at 11:45

## 2021-08-31 RX ADMIN — SODIUM CHLORIDE 1000 ML: 9 INJECTION, SOLUTION INTRAVENOUS at 13:27

## 2021-08-31 RX ADMIN — ONDANSETRON 4 MG: 2 INJECTION INTRAMUSCULAR; INTRAVENOUS at 13:25

## 2021-08-31 ASSESSMENT — ENCOUNTER SYMPTOMS
FATIGUE: 1
DIARRHEA: 1
DIFFICULTY URINATING: 0
SHORTNESS OF BREATH: 1
CONFUSION: 0
NECK STIFFNESS: 0
DECREASED CONCENTRATION: 1
DYSPHORIC MOOD: 1
COLOR CHANGE: 1
HALLUCINATIONS: 0
FEVER: 0
COUGH: 0
DIAPHORESIS: 0
WOUND: 1
EYE REDNESS: 0
BLOOD IN STOOL: 0
VOMITING: 0
LIGHT-HEADEDNESS: 1
HEADACHES: 0
TROUBLE SWALLOWING: 0
BRUISES/BLEEDS EASILY: 0
ABDOMINAL PAIN: 1
WEAKNESS: 1
ARTHRALGIAS: 0
ACTIVITY CHANGE: 1
SINUS PRESSURE: 0
NAUSEA: 1
APPETITE CHANGE: 1

## 2021-08-31 ASSESSMENT — MIFFLIN-ST. JEOR: SCORE: 1553.17

## 2021-08-31 NOTE — ED TRIAGE NOTES
[facility], [facility]. Also, chest pain, abd cramping x 2 day, Hx of IBS, gallbladder removal one year ago. All VSS in route and .

## 2021-08-31 NOTE — CONSULTS
"Plastic Surgery Consult    Kasandra Lau MRN# [id number]   YOB: 1985 Age: 36 year old      Date of Admission:  8/31/2021        Consult for: Breast infection     Consulting physician/team: ED          Assessment/Plan:       36 year old female with a h/o breast reduction on 8/5/2021 with Dr Moreno necessitating Bactrim for 1 week on 8/12 for possible cellulitis here in the ED with nausea/ vomiting/ and diarrhea. The patient has not had issues with her breasts since stopping the abx 1 week ago, clinical exam and laboratory findings are not indicative of a soft tissue breast infection. The patient does not need antibiotics from a breast standpoint. We recommend that the keep her follow up with Dr. Moreno.     Case discussed with Dr. Moreno who agrees with the plan    Delmar Collier MD  Plastic Surgery         History of Present Illness:    Kasandra Lau is a 36 year old female h/o breast reduction on 8/5/2021 with Dr Moreno necessitating Bactrim for 1 week on 8/12 for possible cellulitis here in the ED with nausea/ vomiting/ and diarrhea. She reports that the redness and irritation of her breasts has subsided she was last seen by Dr. Moreno. She is in the ED today for \"bowel\" issues, she has a history of IBS.    In the ED she was afebrile with no leukocytosis.    Past Medical History:  Past Medical History:   Diagnosis Date     Anemia fall 2016     Depression (emotion)     sees psych, on meds     Gastroesophageal reflux disease      Migraine     daily meds, 2x month, more mild on meds     Panic disorder      Uncomplicated asthma Spring 2018       Past Surgical History:  Past Surgical History:   Procedure Laterality Date     COLONOSCOPY       LAPAROSCOPIC ABLATION ENDOMETRIOSIS N/A 11/9/2016    Procedure: LAPAROSCOPIC ABLATION ENDOMETRIOSIS;  Surgeon: Tonja Ferrer MD;  Location: [facility]     LAPAROSCOPIC CYSTECTOMY OVARIAN (BENIGN) Left 11/9/2016    Procedure: LAPAROSCOPIC CYSTECTOMY OVARIAN " (BENIGN);  Surgeon: Tonja Ferrer MD;  Location: [facility]     LAPAROSCOPIC TUBAL DYE STUDY Left 11/9/2016    Procedure: LAPAROSCOPIC TUBAL DYE STUDY;  Surgeon: Tonja Ferrer MD;  Location: [facility]     LAPAROSCOPY OPERATIVE ADULT N/A 11/9/2016    Procedure: LAPAROSCOPY OPERATIVE ADULT;  Surgeon: Tonja Ferrer MD;  Location: [facility]     MAMMOPLASTY REDUCTION Bilateral 8/5/2021    Procedure: MAMMOPLASTY, REDUCTION, Bilateral;  Surgeon: ANTONIO Moreno MD;  Location: [facility] OR     ORTHOPEDIC SURGERY      left wrist surgery       Allergies:     Allergies   Allergen Reactions     Dust Mites Cough, Difficulty breathing and Shortness Of Breath     Cats      Chest tightness, sinus irritation       Medications:  No current facility-administered medications on file prior to encounter.  Acetaminophen (TYLENOL PO), Take 650 mg by mouth every 4 hours as needed for mild pain or fever  almotriptan (AXERT) 12.5 MG tablet, Take 1 tablet (12.5 mg) by mouth daily as needed for migraine (repeat in 2 hours if needed)  buPROPion (WELLBUTRIN XL) 300 MG 24 hr tablet, Take 1 tablet (300 mg) by mouth every morning  cholecalciferol 50 MCG (2000 UT) CAPS, 5,000 Units every evening   dicyclomine (BENTYL) 20 MG tablet, Take 1 tablet (20 mg) by mouth 4 times daily as needed (cramping)  doxepin (SINEQUAN) 10 MG capsule, Take 3 capsules (30 mg) by mouth At Bedtime  EMGALITY 120 MG/ML injection, Inject 1 mL (120 mg) Subcutaneous every 28 days  etonogestrel-ethinyl estradiol (NUVARING) 0.12-0.015 MG/24HR vaginal ring, Place 1 each vaginally every 28 days  famotidine (PEPCID) 20 MG tablet, Take 20 mg by mouth 2 times daily  fexofenadine (ALLEGRA) 180 MG tablet, Take 180 mg by mouth every morning   fluticasone (FLONASE) 50 MCG/ACT nasal spray, 2 times daily as needed   gabapentin (NEURONTIN) 300 MG capsule, Take 1 capsule (300 mg) by mouth 2 times daily as needed for other (severe anxiety) (Patient taking differently: Take  300 mg by mouth 3 times daily TAKING 700MG 3X DAILY)  gabapentin (NEURONTIN) 400 MG capsule, Take 1 capsule (400 mg) by mouth 3 times daily  levocetirizine (XYZAL) 5 MG tablet, Take 5 mg by mouth every evening   loteprednol (LOTEMAX) 0.5 % ophthalmic suspension, Place 1-2 drops into both eyes 3 times daily (Patient not taking: Reported on 8/17/2021)  montelukast (SINGULAIR) 10 MG tablet, At Bedtime   neomycin-polymyxin-dexamethasone (MAXITROL) 3.5-32526-2.1 ophthalmic ointment, Place 0.5 inches into both eyes At Bedtime (Patient not taking: Reported on 8/17/2021)  olopatadine (PATANOL) 0.1 % ophthalmic solution, Place 1 drop into both eyes 2 times daily (Patient not taking: Reported on 8/17/2021)  ondansetron (ZOFRAN-ODT) 4 MG ODT tab, Take 1-2 tablets (4-8 mg) by mouth every 8 hours as needed for nausea  order for DME, Use for 15-30 minutes every morning.  propranolol (INDERAL) 20 MG tablet, daily as needed   senna-docusate (SENOKOT-S/PERICOLACE) 8.6-50 MG tablet, Take 1-2 tablets by mouth 2 times daily  vilazodone (VIIBRYD) 40 MG TABS tablet, Take 1 tablet (40 mg) by mouth daily (Patient taking differently: Take 40 mg by mouth every morning )        Social History:  Social History     Socioeconomic History     Marital status: Single     Spouse name: Not on file     Number of children: Not on file     Years of education: Not on file     Highest education level: Not on file   Occupational History     Not on file   Tobacco Use     Smoking status: Former Smoker     Packs/day: 0.00     Years: 10.00     Pack years: 0.00     Types: Cigarettes     Smokeless tobacco: Never Used     Tobacco comment: smokes occasionally socially    Substance and Sexual Activity     Alcohol use: Not Currently     Alcohol/week: 0.0 standard drinks     Comment: occasional      Drug use: Not Currently     Types: Marijuana     Comment: marijuana  none since May 2021     Sexual activity: Yes     Partners: Male     Birth control/protection:  "Pull-out method, Inserts/Ring     Comment: Nuvaring   Other Topics Concern     Not on file   Social History Narrative     Not on file     Social Determinants of Health     Financial Resource Strain:      Difficulty of Paying Living Expenses:    Food Insecurity:      Worried About Running Out of Food in the Last Year:      Ran Out of Food in the Last Year:    Transportation Needs:      Lack of Transportation (Medical):      Lack of Transportation (Non-Medical):    Physical Activity:      Days of Exercise per Week:      Minutes of Exercise per Session:    Stress:      Feeling of Stress :    Social Connections:      Frequency of Communication with Friends and Family:      Frequency of Social Gatherings with Friends and Family:      Attends Voodoo Services:      Active Member of Clubs or Organizations:      Attends Club or Organization Meetings:      Marital Status:    Intimate Partner Violence:      Fear of Current or Ex-Partner:      Emotionally Abused:      Physically Abused:      Sexually Abused:        Family History:  Family History   Problem Relation Age of Onset     Diabetes Maternal Grandfather      Hypertension No family hx of      Coronary Artery Disease No family hx of      Hyperlipidemia No family hx of      Cerebrovascular Disease No family hx of      Breast Cancer No family hx of      Colon Cancer No family hx of      Prostate Cancer No family hx of      Other Cancer No family hx of      Glaucoma No family hx of      Macular Degeneration No family hx of        ROS:  REVIEW OF SYSTEMS    The remainder of the complete ROS was negative unless noted in the HPI.    Exam:  /74   Pulse 70   Temp 98.5  F (36.9  C) (Oral)   Resp 28   Ht 1.753 m (5' 9\")   Wt 79.4 kg (175 lb)   LMP 08/23/2021 (Approximate)   SpO2 100%   BMI 25.84 kg/m    General: Alert and oriented with appropriate responses to questions, in NAD.  HEENT: NC/AT, sclera anicteric, PERRL, EOMI  Resp: Non-labored on room air  Wound: " Breast incisions are c/d/i with no surrounding erythema, the T point on the left breast has a small area of scabbing, there is no TTP in the breasts, no induration, no fluctuance    Labs/ imaging:  Results for orders placed or performed during the hospital encounter of 08/31/21 (from the past 24 hour(s))   [place] Draw    Narrative    The following orders were created for panel order [place] Draw.  Procedure                               Abnormality         Status                     ---------                               -----------         ------                     Extra Blue Top Tube[[id number]]                              Final result               Extra Red Top Tube[[id number]]                               Final result               Extra Green Top (Lithium...[[id number]]                      Final result               Extra Purple Top Tube[[id number]]                            Final result                 Please view results for these tests on the individual orders.   Extra Blue Top Tube   Result Value Ref Range    Hold Specimen JIC    Extra Red Top Tube   Result Value Ref Range    Hold Specimen JIC    Extra Green Top (Lithium Heparin) Tube   Result Value Ref Range    Hold Specimen JIC    Extra Purple Top Tube   Result Value Ref Range    Hold Specimen JIC    CBC with platelets differential    Narrative    The following orders were created for panel order CBC with platelets differential.  Procedure                               Abnormality         Status                     ---------                               -----------         ------                     CBC with platelets and d...[[id number]]  Abnormal            Final result                 Please view results for these tests on the individual orders.   Partial thromboplastin time   Result Value Ref Range    aPTT 30 22 - 38 Seconds   INR   Result Value Ref Range    INR 1.12 0.85 - 1.15   Comprehensive metabolic panel   Result Value Ref Range    Sodium  138 133 - 144 mmol/L    Potassium 3.8 3.4 - 5.3 mmol/L    Chloride 111 (H) 94 - 109 mmol/L    Carbon Dioxide (CO2) 20 20 - 32 mmol/L    Anion Gap 7 3 - 14 mmol/L    Urea Nitrogen 14 7 - 30 mg/dL    Creatinine 0.69 0.52 - 1.04 mg/dL    Calcium 8.6 8.5 - 10.1 mg/dL    Glucose 97 70 - 99 mg/dL    Alkaline Phosphatase 82 40 - 150 U/L    AST 44 0 - 45 U/L    ALT 41 0 - 50 U/L    Protein Total 7.5 6.8 - 8.8 g/dL    Albumin 3.2 (L) 3.4 - 5.0 g/dL    Bilirubin Total 0.5 0.2 - 1.3 mg/dL    GFR Estimate >90 >60 mL/min/1.73m2   Lipase   Result Value Ref Range    Lipase 75 73 - 393 U/L   Troponin I   Result Value Ref Range    Troponin I <0.015 0.000 - 0.045 ug/L   Nt probnp inpatient (BNP)   Result Value Ref Range    N terminal Pro BNP Inpatient 107 0 - 450 pg/mL   CBC with platelets and differential   Result Value Ref Range    WBC Count 9.2 4.0 - 11.0 10e3/uL    RBC Count 3.99 3.80 - 5.20 10e6/uL    Hemoglobin 11.5 (L) 11.7 - 15.7 g/dL    Hematocrit 35.6 35.0 - 47.0 %    MCV 89 78 - 100 fL    MCH 28.8 26.5 - 33.0 pg    MCHC 32.3 31.5 - 36.5 g/dL    RDW 12.9 10.0 - 15.0 %    Platelet Count 408 150 - 450 10e3/uL    % Neutrophils 63 %    % Lymphocytes 30 %    % Monocytes 5 %    % Eosinophils 1 %    % Basophils 1 %    % Immature Granulocytes 0 %    NRBCs per 100 WBC 0 <1 /100    Absolute Neutrophils 5.8 1.6 - 8.3 10e3/uL    Absolute Lymphocytes 2.7 0.8 - 5.3 10e3/uL    Absolute Monocytes 0.4 0.0 - 1.3 10e3/uL    Absolute Eosinophils 0.1 0.0 - 0.7 10e3/uL    Absolute Basophils 0.1 0.0 - 0.2 10e3/uL    Absolute Immature Granulocytes 0.0 <=0.0 10e3/uL    Absolute NRBCs 0.0 10e3/uL   EKG 12 lead   Result Value Ref Range    Systolic Blood Pressure  mmHg    Diastolic Blood Pressure  mmHg    Ventricular Rate 66 BPM    Atrial Rate 66 BPM    NC Interval 142 ms    QRS Duration 94 ms     ms    QTc 415 ms    P Axis 55 degrees    R AXIS 37 degrees    T Axis 24 degrees    Interpretation ECG Sinus rhythm  Normal ECG      XR Chest Port 1  View    Impression    RESIDENT PRELIMINARY INTERPRETATION  IMPRESSION: No acute airspace opacities.

## 2021-08-31 NOTE — ED PROVIDER NOTES
[place] EMERGENCY DEPARTMENT ([facility])  8/31/21  History     Chief Complaint   Patient presents with     Fatigue     Chest Pain     The history is provided by the patient and medical records.     Kasandra Lau is a 36 year old female with a history notable for breast hypertrophy s/p bilateral reduction (8/5/2021), s/p laparoscopic cystectomy (2016), and s/p cholecystectomy (07/2020) who presents to the ED via EMS for evaluation of chest pain, nausea and diarrhea.  Yesterday morning the patient began to feel faint, experience cold sweats and had some difficulty breathing which she states was through her esophagus versus through her nostrils.  Patient endorses slight chest discomfort which she describes as dull tightness.  She denies history of cardiac disease.  The patient became nauseous yesterday which has persisted throughout today.  She has never had an episode of emesis.  Patient has had several episodes of diarrhea as well as abdominal cramping.  Patient denies hematochezia.  The patient was evaluated in the ED on 8/12 and was placed on a 7-day course of Bactrim 800-160 mg twice daily.  Patient denies history of C. difficile otherwise.  No history of inflammatory bowel but does have irritable bowel syndrome.  No bleeding reported.  Generalized weakness without syncope.  Patient notes that her incisions are somewhat reddened she had contacted plastic surgery.    Past Medical History:   Diagnosis Date     Anemia fall 2016     Depression (emotion)     sees psych, on meds     Gastroesophageal reflux disease      Migraine     daily meds, 2x month, more mild on meds     Panic disorder      Uncomplicated asthma Spring 2018       Past Surgical History:   Procedure Laterality Date     COLONOSCOPY       LAPAROSCOPIC ABLATION ENDOMETRIOSIS N/A 11/9/2016    Procedure: LAPAROSCOPIC ABLATION ENDOMETRIOSIS;  Surgeon: Tonja Ferrer MD;  Location: [facility] OR     LAPAROSCOPIC CYSTECTOMY OVARIAN (BENIGN) Left  11/9/2016    Procedure: LAPAROSCOPIC CYSTECTOMY OVARIAN (BENIGN);  Surgeon: Tonja Ferrer MD;  Location: [facility] OR     LAPAROSCOPIC TUBAL DYE STUDY Left 11/9/2016    Procedure: LAPAROSCOPIC TUBAL DYE STUDY;  Surgeon: Tonja Ferrer MD;  Location: [facility] OR     LAPAROSCOPY OPERATIVE ADULT N/A 11/9/2016    Procedure: LAPAROSCOPY OPERATIVE ADULT;  Surgeon: Tonja Ferrer MD;  Location: [facility] OR     MAMMOPLASTY REDUCTION Bilateral 8/5/2021    Procedure: MAMMOPLASTY, REDUCTION, Bilateral;  Surgeon: ANTONIO Moreno MD;  Location: [facility] OR     ORTHOPEDIC SURGERY      left wrist surgery       Family History   Problem Relation Age of Onset     Diabetes Maternal Grandfather      Hypertension No family hx of      Coronary Artery Disease No family hx of      Hyperlipidemia No family hx of      Cerebrovascular Disease No family hx of      Breast Cancer No family hx of      Colon Cancer No family hx of      Prostate Cancer No family hx of      Other Cancer No family hx of      Glaucoma No family hx of      Macular Degeneration No family hx of        Social History     Tobacco Use     Smoking status: Former Smoker     Packs/day: 0.00     Years: 10.00     Pack years: 0.00     Types: Cigarettes     Smokeless tobacco: Never Used     Tobacco comment: smokes occasionally socially    Substance Use Topics     Alcohol use: Not Currently     Alcohol/week: 0.0 standard drinks     Comment: occasional      No current facility-administered medications for this encounter.     Current Outpatient Medications   Medication     gabapentin (NEURONTIN) 300 MG capsule     gabapentin (NEURONTIN) 400 MG capsule     promethazine (PHENERGAN) 25 MG tablet     Acetaminophen (TYLENOL PO)     almotriptan (AXERT) 12.5 MG tablet     buPROPion (WELLBUTRIN XL) 300 MG 24 hr tablet     cholecalciferol 50 MCG (2000 UT) CAPS     dicyclomine (BENTYL) 20 MG tablet     doxepin (SINEQUAN) 10 MG capsule     EMGALITY 120 MG/ML injection      etonogestrel-ethinyl estradiol (NUVARING) 0.12-0.015 MG/24HR vaginal ring     famotidine (PEPCID) 20 MG tablet     fexofenadine (ALLEGRA) 180 MG tablet     fluticasone (FLONASE) 50 MCG/ACT nasal spray     gabapentin (NEURONTIN) 300 MG capsule     gabapentin (NEURONTIN) 400 MG capsule     levocetirizine (XYZAL) 5 MG tablet     loteprednol (LOTEMAX) 0.5 % ophthalmic suspension     montelukast (SINGULAIR) 10 MG tablet     neomycin-polymyxin-dexamethasone (MAXITROL) 3.5-30972-8.1 ophthalmic ointment     olopatadine (PATANOL) 0.1 % ophthalmic solution     ondansetron (ZOFRAN-ODT) 4 MG ODT tab     order for DME     propranolol (INDERAL) 20 MG tablet     senna-docusate (SENOKOT-S/PERICOLACE) 8.6-50 MG tablet     vilazodone (VIIBRYD) 40 MG TABS tablet        Allergies   Allergen Reactions     Dust Mites Cough, Difficulty breathing and Shortness Of Breath     Cats      Chest tightness, sinus irritation         Review of Systems   Constitutional: Positive for activity change, appetite change and fatigue. Negative for diaphoresis and fever.   HENT: Negative for congestion, sinus pressure and trouble swallowing.    Eyes: Negative for redness and visual disturbance.   Respiratory: Positive for shortness of breath. Negative for cough.    Cardiovascular: Positive for chest pain.   Gastrointestinal: Positive for abdominal pain, diarrhea and nausea. Negative for blood in stool and vomiting.   Genitourinary: Negative for difficulty urinating.   Musculoskeletal: Negative for arthralgias and neck stiffness.   Skin: Positive for color change and wound.        ? reddness of bilateral breast wounds     Allergic/Immunologic: Negative for immunocompromised state.   Neurological: Positive for weakness and light-headedness. Negative for syncope and headaches.   Hematological: Does not bruise/bleed easily.   Psychiatric/Behavioral: Positive for decreased concentration and dysphoric mood. Negative for confusion and hallucinations.   All other  "systems reviewed and are negative.    A complete review of systems was performed with pertinent positives and negatives noted in the HPI, and all other systems negative.       Physical Exam   BP: 105/71  Pulse: 67  Temp: 98.5  F (36.9  C)  Resp: 18  Height: 175.3 cm (5' 9\")  Weight: 79.4 kg (175 lb)  SpO2: 95 %  Physical Exam  Vitals and nursing note reviewed.   Constitutional:       General: She is in acute distress.      Appearance: She is well-developed. She is ill-appearing. She is not toxic-appearing or diaphoretic.      Comments: Patient nontoxic in the ER alert and orient x3.  Generalized weakness noted without focal findings no respiratory distress.   HENT:      Head: Normocephalic and atraumatic.      Nose: Nose normal.      Mouth/Throat:      Mouth: Mucous membranes are dry.   Eyes:      General: No scleral icterus.     Extraocular Movements: Extraocular movements intact.      Conjunctiva/sclera: Conjunctivae normal.      Pupils: Pupils are equal, round, and reactive to light.   Cardiovascular:      Rate and Rhythm: Normal rate and regular rhythm.   Pulmonary:      Effort: No respiratory distress.      Breath sounds: No stridor. No wheezing.   Abdominal:      General: There is no distension.      Palpations: Abdomen is soft. There is no mass.      Tenderness: There is abdominal tenderness. There is no guarding or rebound.      Hernia: No hernia is present.      Comments: Abdomen is soft no rebound or guarding.   Musculoskeletal:      Cervical back: Normal range of motion and neck supple.   Skin:     General: Skin is warm and dry.      Capillary Refill: Capillary refill takes less than 2 seconds.      Coloration: Skin is not pale.      Findings: Erythema present. No rash.      Comments: Patient reports mild erythema of the breast incisions bilateral this was evaluated by plastic surgery refer to their note for description and recommendation   Neurological:      General: No focal deficit present.      Mental " Status: She is alert and oriented to person, place, and time. Mental status is at baseline.      Comments: Nonfocal   Psychiatric:      Comments: Flattened affect otherwise appropriate         ED Course     11:05 AM  The patient was seen and examined by Dr. Viral Quesada in Room ED 21.   EKG be done without hyperacute changes.  Troponin was negative BNP negative.  Lipase 75.  INR 1.12.  Sodium 138 potassium 3.8.  Bicarb is 20 BUN is 14 creatinine is 0.69.  Glucose 97.  Liver function test within normal limits.  White count 9.2 hemoglobin 1.5.  Platelets 408    Patient received Zofran along with IV fluids here in the ER 2 L given.  Patient feeling better here in the ER along with repeat dose of Zofran.  Plastic surgery did see the patient and assessed incisions felt they were fine medications for antibiotics etc.  Patient reassured at this point.    Discussed with patient regarding staying overnight or going home patient feels much better like to go home at this point there is no significant findings otherwise to say patient needs to be admitted she certainly is improved with IV fluids with some dehydration etc.  Unable to produce any diarrhea stools here although we did order stool studies.  Chest x-ray been done also without any acute findings noted.  EKG without hyperacute otherwise.    Patient is in the process of finding a new physician normally takes 7 mg of gabapentin 3 times a day I did write a prescription for this for next 20 days and have her follow-up with the primary care team I did give her the primary care center number also.  Patient at this point will go home with Phenergan also she has Zofran at home also.  Will encourage fluids monitor symptoms return if any problems at all at this point patient feels much better.  Procedures            EKG Interpretation:      Interpreted by Viral Quesada MD  Time reviewed: 11:10  Symptoms at time of EKG: Chest pain, nausea and diarrhea   Rhythm: normal sinus   Rate:  66 bpm  Axis: normal  Ectopy: none  Conduction: normal  ST Segments/ T Waves: No ST-T wave changes  Q Waves: none  Comparison to prior: Unchanged    Clinical Impression: normal EKG    This part of the document was transcribed by Edelmira oMta [employer].         Results for orders placed or performed during the hospital encounter of 08/31/21   XR Chest Port 1 View     Status: None    Narrative    XR CHEST PORT 1 VIEW  8/31/2021 1:39 PM      HISTORY: cp and sob    COMPARISON: 9/12/2020    FINDINGS: AP view of the chest. The cardiac silhouette is within  normal limits. No acute air space opacities. No pleural effusion or  pneumothorax. Degenerative changes in the thoracic spine.      Impression    IMPRESSION: No acute airspace opacities.    I have personally reviewed the examination and initial interpretation  and I agree with the findings.    KYLE AGUIRRE MD         SYSTEM ID:  [id number]   Extra Blue Top Tube     Status: None   Result Value Ref Range    Hold Specimen JIC    Extra Red Top Tube     Status: None   Result Value Ref Range    Hold Specimen JIC    Extra Green Top (Lithium Heparin) Tube     Status: None   Result Value Ref Range    Hold Specimen JIC    Extra Purple Top Tube     Status: None   Result Value Ref Range    Hold Specimen JIC    CBC with platelets differential     Status: Abnormal    Narrative    The following orders were created for panel order CBC with platelets differential.  Procedure                               Abnormality         Status                     ---------                               -----------         ------                     CBC with platelets and d...[[id number]]  Abnormal            Final result                 Please view results for these tests on the individual orders.   Partial thromboplastin time     Status: Normal   Result Value Ref Range    aPTT 30 22 - 38 Seconds   INR     Status: Normal   Result Value Ref Range    INR 1.12 0.85 - 1.15   Comprehensive  metabolic panel     Status: Abnormal   Result Value Ref Range    Sodium 138 133 - 144 mmol/L    Potassium 3.8 3.4 - 5.3 mmol/L    Chloride 111 (H) 94 - 109 mmol/L    Carbon Dioxide (CO2) 20 20 - 32 mmol/L    Anion Gap 7 3 - 14 mmol/L    Urea Nitrogen 14 7 - 30 mg/dL    Creatinine 0.69 0.52 - 1.04 mg/dL    Calcium 8.6 8.5 - 10.1 mg/dL    Glucose 97 70 - 99 mg/dL    Alkaline Phosphatase 82 40 - 150 U/L    AST 44 0 - 45 U/L    ALT 41 0 - 50 U/L    Protein Total 7.5 6.8 - 8.8 g/dL    Albumin 3.2 (L) 3.4 - 5.0 g/dL    Bilirubin Total 0.5 0.2 - 1.3 mg/dL    GFR Estimate >90 >60 mL/min/1.73m2   Lipase     Status: Normal   Result Value Ref Range    Lipase 75 73 - 393 U/L   Troponin I     Status: Normal   Result Value Ref Range    Troponin I <0.015 0.000 - 0.045 ug/L   Nt probnp inpatient (BNP)     Status: Normal   Result Value Ref Range    N terminal Pro BNP Inpatient 107 0 - 450 pg/mL   CBC with platelets and differential     Status: Abnormal   Result Value Ref Range    WBC Count 9.2 4.0 - 11.0 10e3/uL    RBC Count 3.99 3.80 - 5.20 10e6/uL    Hemoglobin 11.5 (L) 11.7 - 15.7 g/dL    Hematocrit 35.6 35.0 - 47.0 %    MCV 89 78 - 100 fL    MCH 28.8 26.5 - 33.0 pg    MCHC 32.3 31.5 - 36.5 g/dL    RDW 12.9 10.0 - 15.0 %    Platelet Count 408 150 - 450 10e3/uL    % Neutrophils 63 %    % Lymphocytes 30 %    % Monocytes 5 %    % Eosinophils 1 %    % Basophils 1 %    % Immature Granulocytes 0 %    NRBCs per 100 WBC 0 <1 /100    Absolute Neutrophils 5.8 1.6 - 8.3 10e3/uL    Absolute Lymphocytes 2.7 0.8 - 5.3 10e3/uL    Absolute Monocytes 0.4 0.0 - 1.3 10e3/uL    Absolute Eosinophils 0.1 0.0 - 0.7 10e3/uL    Absolute Basophils 0.1 0.0 - 0.2 10e3/uL    Absolute Immature Granulocytes 0.0 <=0.0 10e3/uL    Absolute NRBCs 0.0 10e3/uL   EKG 12 lead     Status: None   Result Value Ref Range    Systolic Blood Pressure  mmHg    Diastolic Blood Pressure  mmHg    Ventricular Rate 66 BPM    Atrial Rate 66 BPM    LA Interval 142 ms    QRS Duration  94 ms     ms    QTc 415 ms    P Axis 55 degrees    R AXIS 37 degrees    T Axis 24 degrees    Interpretation ECG       Sinus rhythm  Normal ECG  Unconfirmed report - interpretation of this ECG is computer generated - see medical record for final interpretation  Confirmed by - EMERGENCY ROOM, PHYSICIAN ([id number]),  MANUEL WEBB ([id number]) on 8/31/2021 3:09:02 PM     [place] Draw     Status: None    Narrative    The following orders were created for panel order [place] Draw.  Procedure                               Abnormality         Status                     ---------                               -----------         ------                     Extra Blue Top Tube[[id number]]                              Final result               Extra Red Top Tube[[id number]]                               Final result               Extra Green Top (Lithium...[[id number]]                      Final result               Extra Purple Top Tube[[id number]]                            Final result                 Please view results for these tests on the individual orders.     Medications   0.9% sodium chloride BOLUS (0 mLs Intravenous Stopped 8/31/21 1329)   0.9% sodium chloride BOLUS (0 mLs Intravenous Stopped 8/31/21 1552)   ondansetron (ZOFRAN) injection 4 mg (4 mg Intravenous Given 8/31/21 1325)        Assessments & Plan (with Medical Decision Making)  36-year-old female who has history of bilateral mastectomies done that got slightly infected was treated with Bactrim now is discontinued presented to ER with few days of nausea vomiting some diarrhea no bleeding reported.  Patient does have irritable bowel syndrome also.  Some generalized weakness fatigue dehydration noted labs stable otherwise cardiac work-up negative also.  IV fluids given Zofran given patient feeling much better rehydrated seen by plastic surgery also and felt incisions are healing well without any indications for any more antibiotics.  Patient offered  observation but feels comfortable going home was sent out with Phenergan also did write her prescription for gabapentin 700 mg 3 times a day which she does take and referral to clinic to follow-up with.  Patient to return if any concerns at all agrees with this plan comfortable being discharged.         I have reviewed the nursing notes. I have reviewed the findings, diagnosis, plan and need for follow up with the patient.    Discharge Medication List as of 8/31/2021  3:53 PM      START taking these medications    Details   !! gabapentin (NEURONTIN) 300 MG capsule Take 1 capsule (300 mg) by mouth 3 times daily Take with 400mg capsule, Disp-60 capsule, R-0, Local Print      !! gabapentin (NEURONTIN) 400 MG capsule Take 1 capsule (400 mg) by mouth 3 times daily (take with 300mg capsule), Disp-60 capsule, R-0, Local Print      promethazine (PHENERGAN) 25 MG tablet Take 1 tablet (25 mg) by mouth every 8 hours as needed for nausea or vomiting, Disp-12 tablet, R-0, Local Print       !! - Potential duplicate medications found. Please discuss with provider.          Final diagnoses:   Nausea vomiting and diarrhea   Dehydration     IDenys, am serving as a trained medical scribe to document services personally performed by Viral Quesada MD, based on the provider's statements to me.      IViral MD, was physically present and have reviewed and verified the accuracy of this note documented by Denys Romo.     [facility] EMERGENCY DEPARTMENT  8/31/2021    This note was created at least in part by the use of dragon voice dictation system. Inadvertent typographical errors may still exist.  Viral Quesada MD.    Patient evaluated in the emergency department during the COVID-19 pandemic period. Careful attention to patients safety was addressed throughout the evaluation. Evaluation and treatment management was initiated with disposition made efficiently and appropriate as possible to minimize any risk  of potential exposure to patient during this evaluation.       Viral Quesada MD  08/31/21 1953

## 2021-08-31 NOTE — ED NOTES
Bed: ED21  Expected date: 8/31/21  Expected time: 10:28 AM  Means of arrival: Ambulance  Comments:  [facility] with a 35 yo F reports of irritable bowel syndrome. Triaged yellow in 4 mins

## 2021-08-31 NOTE — DISCHARGE INSTRUCTIONS
Home.  Your labs are stable.  You were seen by plastic surgery who felt incisions look good.  Push fluids.  Take your gabapentin 700mg 3 times a day as you have been.  Follow up with primary MD in the next few weeks.  Return if any concerns  Phenergan for nausea if zofran does not help.    Please make an appointment to follow up with [facility] (phone: [phone]) as soon as possible.

## 2021-09-10 ENCOUNTER — VIRTUAL VISIT (OUTPATIENT)
Dept: PSYCHOLOGY | Facility: CLINIC | Age: 36
End: 2021-09-10
Payer: COMMERCIAL

## 2021-09-10 DIAGNOSIS — F43.12 CHRONIC POST-TRAUMATIC STRESS DISORDER (PTSD): ICD-10-CM

## 2021-09-10 DIAGNOSIS — F43.23 ADJUSTMENT DISORDER WITH MIXED ANXIETY AND DEPRESSED MOOD: ICD-10-CM

## 2021-09-10 DIAGNOSIS — F34.1 PERSISTENT DEPRESSIVE DISORDER: Primary | ICD-10-CM

## 2021-09-10 PROCEDURE — 90837 PSYTX W PT 60 MINUTES: CPT | Mod: 95 | Performed by: PSYCHOLOGIST

## 2021-09-15 NOTE — PROGRESS NOTES
"  Health Psychology - Follow up Visit  Confidential Summary*    The author of this note documented a reason for not sharing it with the patient.  REFERRAL SOURCE:  Psychiatry    CHIEF COMPLAINT/REASON FOR VISIT  Psychotherapy in context of chronic PTSD and persistent depression.  Patient also complains of dissatisfaction with interpersonal relationships and challenges with emotion regulation.      Patient was seen today for a 60 minute individual psychotherapy session.  The session was facilitated via [email or web address] with patient at her home and provider at her own home.     This telehealth service is appropriate and effective for delivering services in light of the necessity for social distancing to mitigate the COVID-19 epidemic. Patient has agreed to receiving telehealth services.    The patient has been notified of following:   \"This video visit will be conducted via a call between you and your physician/provider. We have found that certain health care needs can be provided without the need for an in-person physical exam.   Video visits are billed at different rates depending on your insurance coverage.  Please reach out to your insurance provider with any questions. If during the course of the call the physician/provider feels a video visit is not appropriate, you will not be charged for this service.\"     Patient has given verbal consent for Video visit? Yes    Subjective:  Patient began with discussion of her interest in beginning DBT as soon as possible.  She was encouraged to describe her life goals and how she would like DBT to help her both increase and decrease behaviors.      Spent much of session with DBT preparation materials including reviewing the main aspects of treatment.  She was observed to describe her belief that many of her interpersonal problems are a result of discrimination and societal expectations of women.  Will review with Mackenzie Corbin, .      Objective:  Patient was on time for " today s session, appropriately groomed and dressed, and demonstrated good eye contact.  She appeared to be in a good mood. She was alert and oriented. Mood was euthymic with appropriate range of affect. Patient denied suicidal or assaultive ideation, plan, or intent.        Assessment:  The patient has a longstanding history of interpersonal discord and challenges with emotion regulation.  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        Plan:  Patient will begin full model DBT next cycle.  .     Time In: 1:00  Time Out: 2:00    Diagnosis:  Axis I PTSD, chronic, persistent depressive disorder, adjustment disorder with mixed   Axis II  BPD   Axis III please see medical records for details   [place] IV Psychosocial and Environmental Stressors: academic challenges and living alone with no family support         Corina Muhammad, PhD, LP

## 2021-09-16 DIAGNOSIS — G43.709 CHRONIC MIGRAINE WITHOUT AURA WITHOUT STATUS MIGRAINOSUS, NOT INTRACTABLE: ICD-10-CM

## 2021-09-16 RX ORDER — DOXEPIN HYDROCHLORIDE 10 MG/1
30 CAPSULE ORAL AT BEDTIME
Qty: 90 CAPSULE | Refills: 11 | Status: SHIPPED | OUTPATIENT
Start: 2021-09-16 | End: 2021-10-13

## 2021-09-16 RX ORDER — ALMOTRIPTAN 12.5 MG/1
12.5 TABLET, FILM COATED ORAL DAILY PRN
Qty: 18 TABLET | Refills: 11 | Status: SHIPPED | OUTPATIENT
Start: 2021-09-16 | End: 2021-10-13

## 2021-09-17 ENCOUNTER — VIRTUAL VISIT (OUTPATIENT)
Dept: PSYCHOLOGY | Facility: CLINIC | Age: 36
End: 2021-09-17
Payer: COMMERCIAL

## 2021-09-17 DIAGNOSIS — F60.9 PERSONALITY DISORDER (H): ICD-10-CM

## 2021-09-17 DIAGNOSIS — F34.1 PERSISTENT DEPRESSIVE DISORDER: Primary | ICD-10-CM

## 2021-09-17 DIAGNOSIS — F43.12 CHRONIC POST-TRAUMATIC STRESS DISORDER (PTSD): ICD-10-CM

## 2021-09-17 PROCEDURE — 90837 PSYTX W PT 60 MINUTES: CPT | Mod: 95 | Performed by: PSYCHOLOGIST

## 2021-09-18 NOTE — PROGRESS NOTES
"  Health Psychology - Follow up Visit  Confidential Summary*    The author of this note documented a reason for not sharing it with the patient.  REFERRAL SOURCE:  Psychiatry    CHIEF COMPLAINT/REASON FOR VISIT  Psychotherapy in context of chronic PTSD and persistent depression.  Patient also complains of dissatisfaction with interpersonal relationships and challenges with emotion regulation.      Patient was seen today for a 60 minute individual psychotherapy session.  The session was facilitated via doxy.me with patient at her home and provider at her own home.     This telehealth service is appropriate and effective for delivering services in light of the necessity for social distancing to mitigate the COVID-19 epidemic. Patient has agreed to receiving telehealth services.    The patient has been notified of following:   \"This video visit will be conducted via a call between you and your physician/provider. We have found that certain health care needs can be provided without the need for an in-person physical exam.   Video visits are billed at different rates depending on your insurance coverage.  Please reach out to your insurance provider with any questions. If during the course of the call the physician/provider feels a video visit is not appropriate, you will not be charged for this service.\"     Patient has given verbal consent for Video visit? Yes    Subjective:  Patient began with description of her challenges in securing a time that her committee can meet for her test so that she can complete coursework toward her PhD.  After she passes, she will be ABD and she described the belief that this designation will enable her to relax as she will be less dependent on others to complete her dissertation.  She reported that she continues to experience some anxiety associated with passing her test.  She was noted to be effective in her decision to meet with each professor independently to discuss any concerns they " might have.     Spent much of the session covering the remaining DBT prep materials, these included by biosocial model and discussing how she might choose her target behaviors.  These may include some of her behaviors to increase and others to decrease in order that she might establish a life worth living.  Reviewed her goals for her life in each area of the wellness wheel.  She expressed a desire to be  and have a family, have a tenure track position at a university and a [place] of friends.  She was encouraged to consider the role that her ongoing beliefs that she does not fit into the [facility] society because of her [place] history and because of her up front manner.  Reviewed radical acceptance and willing hands.     Will ask that materials be sent to her and that she plan to begin on Sept 27.    Objective:  Patient was on time for today s session, appropriately groomed and dressed, and demonstrated good eye contact.  She appeared to be in a good mood. She was alert and oriented. Mood was euthymic with appropriate range of affect. Patient denied suicidal or assaultive ideation, plan, or intent.        Assessment:  The patient has a longstanding history of interpersonal discord and challenges with emotion regulation.  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        Plan:  Patient will begin full model DBT next cycle.  .     Time In: 1:00  Time Out: 2:00    Diagnosis:  Axis I PTSD, chronic, persistent depressive disorder, adjustment disorder with mixed   Axis II  BPD   Axis III please see medical records for details   [place] IV Psychosocial and Environmental Stressors: academic challenges and living alone with no family support         Corina Muhammad, PhD, LP

## 2021-09-21 ENCOUNTER — OFFICE VISIT (OUTPATIENT)
Dept: FAMILY MEDICINE | Facility: CLINIC | Age: 36
End: 2021-09-21
Payer: COMMERCIAL

## 2021-09-21 VITALS
DIASTOLIC BLOOD PRESSURE: 79 MMHG | OXYGEN SATURATION: 97 % | BODY MASS INDEX: 26.92 KG/M2 | WEIGHT: 177.6 LBS | SYSTOLIC BLOOD PRESSURE: 117 MMHG | HEART RATE: 74 BPM | HEIGHT: 68 IN | TEMPERATURE: 99.1 F

## 2021-09-21 DIAGNOSIS — J32.9 CHRONIC SINUSITIS, UNSPECIFIED LOCATION: ICD-10-CM

## 2021-09-21 DIAGNOSIS — J32.0 CHRONIC MAXILLARY SINUSITIS: ICD-10-CM

## 2021-09-21 DIAGNOSIS — F41.9 ANXIETY: ICD-10-CM

## 2021-09-21 DIAGNOSIS — F41.8 MIXED ANXIETY AND DEPRESSIVE DISORDER: Primary | ICD-10-CM

## 2021-09-21 DIAGNOSIS — M79.2 NEUROPATHIC PAIN: ICD-10-CM

## 2021-09-21 RX ORDER — GABAPENTIN 400 MG/1
400 CAPSULE ORAL 3 TIMES DAILY
Qty: 90 CAPSULE | Refills: 3 | Status: SHIPPED | OUTPATIENT
Start: 2021-09-21 | End: 2022-01-03

## 2021-09-21 RX ORDER — GABAPENTIN 300 MG/1
300 CAPSULE ORAL 3 TIMES DAILY
Qty: 60 CAPSULE | Refills: 0 | Status: SHIPPED | OUTPATIENT
Start: 2021-09-21 | End: 2021-10-15

## 2021-09-21 RX ORDER — PROPRANOLOL HYDROCHLORIDE 20 MG/1
20 TABLET ORAL DAILY PRN
Qty: 30 TABLET | Refills: 0 | Status: SHIPPED | OUTPATIENT
Start: 2021-09-21 | End: 2021-10-15

## 2021-09-21 ASSESSMENT — ANXIETY QUESTIONNAIRES
1. FEELING NERVOUS, ANXIOUS, OR ON EDGE: MORE THAN HALF THE DAYS
7. FEELING AFRAID AS IF SOMETHING AWFUL MIGHT HAPPEN: SEVERAL DAYS
IF YOU CHECKED OFF ANY PROBLEMS ON THIS QUESTIONNAIRE, HOW DIFFICULT HAVE THESE PROBLEMS MADE IT FOR YOU TO DO YOUR WORK, TAKE CARE OF THINGS AT HOME, OR GET ALONG WITH OTHER PEOPLE: VERY DIFFICULT
5. BEING SO RESTLESS THAT IT IS HARD TO SIT STILL: MORE THAN HALF THE DAYS
3. WORRYING TOO MUCH ABOUT DIFFERENT THINGS: MORE THAN HALF THE DAYS
6. BECOMING EASILY ANNOYED OR IRRITABLE: MORE THAN HALF THE DAYS
GAD7 TOTAL SCORE: 13
2. NOT BEING ABLE TO STOP OR CONTROL WORRYING: MORE THAN HALF THE DAYS

## 2021-09-21 ASSESSMENT — PATIENT HEALTH QUESTIONNAIRE - PHQ9
5. POOR APPETITE OR OVEREATING: MORE THAN HALF THE DAYS
SUM OF ALL RESPONSES TO PHQ QUESTIONS 1-9: 9

## 2021-09-21 ASSESSMENT — MIFFLIN-ST. JEOR: SCORE: 1545.92

## 2021-09-21 NOTE — PROGRESS NOTES
"       HPI       Kasandra Lau is a 36 year old  who presents for   Chief Complaint   Patient presents with     Recheck Medication     Referral       36 year old female presents for med refill and for referral to ENT. Primary concern is recurrent sinus congestion without fever,  no response to allergy meds, sensation that something is interfering with swallowing. She has complicated history of chronic fatigue without diagnosis, positive STEPHANIE with negative work up for CREST syndrome, persistent elevation of inflammatory markers: ESR and CRP, migraine headaches,joint pain and neuropathic pain, and chronic depression and anxiety. 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 She indicates that she has generalized anxiety with breakthrough anxiety \"attacks\". She is seeing a counselor and reports that she is \"between psychiatrists\". She has been followed by neurology, rheumatology, GI.     Recently had breast reduction and developed slight infection which has healed.       [facility] is unremarkable for autoimmune disorder,mentalhealth issues, although she suspects family members may have depression.     Habits are variable. She does not use tobacco currently, but has in the past. Has used marijuana in the past but not currently.     Problem, Medication and Allergy Lists were reviewed and updated if needed.   Current Outpatient Medications   Medication     Acetaminophen (TYLENOL PO)     almotriptan (AXERT) 12.5 MG tablet     buPROPion (WELLBUTRIN XL) 300 MG 24 hr tablet     cholecalciferol 50 MCG (2000 UT) CAPS     doxepin (SINEQUAN) 10 MG capsule     EMGALITY 120 MG/ML injection     etonogestrel-ethinyl estradiol (NUVARING) 0.12-0.015 MG/24HR vaginal ring     famotidine (PEPCID) 20 MG tablet     fexofenadine (ALLEGRA) 180 MG tablet     fluticasone (FLONASE) 50 MCG/ACT nasal spray     gabapentin (NEURONTIN) 300 MG capsule     gabapentin (NEURONTIN) 400 MG capsule     levocetirizine (XYZAL) 5 MG tablet     " "montelukast (SINGULAIR) 10 MG tablet     ondansetron (ZOFRAN-ODT) 4 MG ODT tab     promethazine (PHENERGAN) 25 MG tablet     propranolol (INDERAL) 20 MG tablet     senna-docusate (SENOKOT-S/PERICOLACE) 8.6-50 MG tablet     vilazodone (VIIBRYD) 40 MG TABS tablet     dicyclomine (BENTYL) 20 MG tablet     gabapentin (NEURONTIN) 300 MG capsule     gabapentin (NEURONTIN) 400 MG capsule     loteprednol (LOTEMAX) 0.5 % ophthalmic suspension     neomycin-polymyxin-dexamethasone (MAXITROL) 3.5-82747-9.1 ophthalmic ointment     olopatadine (PATANOL) 0.1 % ophthalmic solution     order for DME     No current facility-administered medications for this visit.         Patient is a new patient to this clinic and so  I reviewed/updated the Past Medical History, the Family History and the Social History .         Review of Systems:   Review of Systems  GEN: denies fever, fatigue, weight changes  HEENT: Headaches: migraines currently well controlled.  Eyes: occasional watery eyes, less itching.  Ears: denies concerns. Nose: nasal congestion; runny nose, post nasal drainage common. No current sinus tenderness. Throat often feels sore. Allergy meds do not help.   ENDO: negative for weight gain, temp intolerance, has history of constipation. No DM in family  RESP: negative for SOB, coughs periodically, worse in am. Able to do usual activities\  MOOD: ongoing depression and anxiety. Not currently worse. Denies suicidality             Physical Exam:     Vitals:    09/21/21 1614   BP: 117/79   Pulse: 74   Temp: 99.1  F (37.3  C)   TempSrc: Oral   SpO2: 97%   Weight: 80.6 kg (177 lb 9.6 oz)   Height: 1.722 m (5' 7.8\")     Body mass index is 27.16 kg/m .  Vitals were reviewed and were normal     Physical Exam  GEN: alert female in NAD  HEENT: Eyes clear, NEGRO. Ears: Tms gray with LR. Nose: edematous erythematous turbinates. OP clear, no tonsillar enlargement, erythema or exudate.  No post nasal drainage visualized. No oral lesions.   NECK: " Supple, thyroid smooth and not enlarged  LUNGS: CTA with air entry throughout  CV: HRRR, S1, S2, no MRG  MOOD: appropriate, somewhat flat affect  PHQ 1/15/2020 2/12/2020 9/21/2021   PHQ-9 Total Score 7 8 9   Q9: Thoughts of better off dead/self-harm past 2 weeks Not at all Not at all Not at all   Some encounter information is confidential and restricted. Go to Review Flowsheets activity to see all data.     VIC-7 SCORE 2/12/2020 4/29/2020 9/21/2021   Total Score - 10 (moderate anxiety) -   Total Score 9 - 13   Some encounter information is confidential and restricted. Go to Review Flowsheets activity to see all data.           Results:   No testing ordered today    Assessment and Plan        1. Mixed anxiety and depressive disorder  Continue following with counselor. Psychiatry for med consult.   - MENTAL HEALTH REFERRAL  - Adult; Psychiatry; Psychiatry; [facility] [phone]; We will contact you to schedule the appointment or please call with any questions; Future    2. Anxiety  Continue following with counseling. Try relaxation. Discussed gabapentin is not best choice for anxiety. Discussed potential for other medications, but will defer to psychiatry.   - gabapentin (NEURONTIN) 300 MG capsule; Take 1 capsule (300 mg) by mouth 3 times daily Take with 400mg capsule  Dispense: 60 capsule; Refill: 0  - gabapentin (NEURONTIN) 400 MG capsule; Take 1 capsule (400 mg) by mouth 3 times daily  Dispense: 90 capsule; Refill: 3  - propranolol (INDERAL) 20 MG tablet; Take 1 tablet (20 mg) by mouth daily as needed (anxiety)  Dispense: 30 tablet; Refill: 0    3. Neuropathic pain by report  Discussed gabapentin dose which is higher than usual. Reports neurologist put her on this in the past.  Will request Pharm D to follow up with her re: medications.   - gabapentin (NEURONTIN) 300 MG capsule; Take 1 capsule (300 mg) by mouth 3 times daily Take with 400mg capsule  Dispense: 60 capsule; Refill: 0  - gabapentin  (NEURONTIN) 400 MG capsule; Take 1 capsule (400 mg) by mouth 3 times daily  Dispense: 90 capsule; Refill: 3    4. Chronic maxillary sinusitis  Difficult to discern etiology. Sounds allergic, but does not respond to allergy medications.  Refer to ENT for further evaluation and treatment.   - Otolaryngology Referral    Plan return 1 month: follow up on mood, recheck CBC given history of mild anemia, TSH with T4.     There are no discontinued medications.    Options for treatment and follow-up care were reviewed with the patient. Kasandra Lau  engaged in the decision making process and verbalized understanding of the options discussed and agreed with the final plan.    JASVIR Ashley CNP

## 2021-09-21 NOTE — PATIENT INSTRUCTIONS
Anxiety and Depression  Refilled gabapentin at current dosage. Discussed individual dose is higher than usual  Referred to Psychiatry  Continue with counseling    Chronic sinus symptoms; treated for AR without improvement.   Referred to ENT    Chronic fatigue: not formally diagnosed  STEPHANIE positive but other markers for autoimmune negative.   Chronically elevated CRP and ESR    History anemia  Plan return to clinic 1month for CBC and TSH with reflex T4

## 2021-09-21 NOTE — NURSING NOTE
"ROOM:1    Preferred Name: Kasandra     36 year old  Chief Complaint   Patient presents with     Recheck Medication     Referral       Blood pressure 117/79, pulse 74, temperature 99.1  F (37.3  C), temperature source Oral, height 1.722 m (5' 7.8\"), weight 80.6 kg (177 lb 9.6 oz), last menstrual period 08/23/2021, SpO2 97 %, not currently breastfeeding. Body mass index is 27.16 kg/m .      Patient Active Problem List   Diagnosis     Pelvic pain in female     Vitamin D deficiency     Menorrhagia with regular cycle     Migraine without aura and without status migrainosus, not intractable     Iron deficiency anemia due to chronic blood loss     Tired     Circadian rhythm sleep disorder, delayed sleep phase type     Unhealthy sleep habit     Seasonal mood disorder (H)     Chronic idiopathic urticaria     Acid reflux     Cholecystitis     Depression     Hx of abnormal cervical Pap smear     Irritable bowel syndrome with constipation     Migraine headache     Mild intermittent asthma without complication     Need for desensitization to allergens     Panic disorder     Pap smear abnormality of cervix/human papillomavirus (HPV) positive     Recurrent major depressive disorder, in remission (H)     Hypertrophy of breast       Wt Readings from Last 2 Encounters:   09/21/21 80.6 kg (177 lb 9.6 oz)   08/31/21 79.4 kg (175 lb)     BP Readings from Last 3 Encounters:   09/21/21 117/79   08/31/21 120/82   08/17/21 113/81       Allergies   Allergen Reactions     Dust Mites Cough, Difficulty breathing and Shortness Of Breath     Cats      Chest tightness, sinus irritation       Current Outpatient Medications   Medication     Acetaminophen (TYLENOL PO)     almotriptan (AXERT) 12.5 MG tablet     buPROPion (WELLBUTRIN XL) 300 MG 24 hr tablet     cholecalciferol 50 MCG (2000 UT) CAPS     doxepin (SINEQUAN) 10 MG capsule     EMGALITY 120 MG/ML injection     etonogestrel-ethinyl estradiol (NUVARING) 0.12-0.015 MG/24HR vaginal ring     " famotidine (PEPCID) 20 MG tablet     fexofenadine (ALLEGRA) 180 MG tablet     fluticasone (FLONASE) 50 MCG/ACT nasal spray     gabapentin (NEURONTIN) 300 MG capsule     gabapentin (NEURONTIN) 400 MG capsule     levocetirizine (XYZAL) 5 MG tablet     montelukast (SINGULAIR) 10 MG tablet     ondansetron (ZOFRAN-ODT) 4 MG ODT tab     promethazine (PHENERGAN) 25 MG tablet     propranolol (INDERAL) 20 MG tablet     senna-docusate (SENOKOT-S/PERICOLACE) 8.6-50 MG tablet     vilazodone (VIIBRYD) 40 MG TABS tablet     dicyclomine (BENTYL) 20 MG tablet     gabapentin (NEURONTIN) 300 MG capsule     gabapentin (NEURONTIN) 400 MG capsule     loteprednol (LOTEMAX) 0.5 % ophthalmic suspension     neomycin-polymyxin-dexamethasone (MAXITROL) 3.5-47729-1.1 ophthalmic ointment     olopatadine (PATANOL) 0.1 % ophthalmic solution     order for DME     No current facility-administered medications for this visit.       Social History     Tobacco Use     Smoking status: Former Smoker     Packs/day: 0.00     Years: 10.00     Pack years: 0.00     Types: Cigarettes     Smokeless tobacco: Never Used     Tobacco comment: smokes occasionally socially    Substance Use Topics     Alcohol use: Not Currently     Alcohol/week: 0.0 standard drinks     Comment: occasional      Drug use: Not Currently     Types: Marijuana     Comment: marijuana  none since May 2021       Honoring Choices - Health Care Directive Guide offered to patient at time of visit.    Health Maintenance Due   Topic Date Due     ASTHMA ACTION PLAN  Never done     ASTHMA CONTROL TEST  Never done     ADVANCE CARE PLANNING  Never done     PREVENTIVE CARE VISIT  01/31/2020     HPV TEST  02/21/2020     PAP  02/21/2020     INFLUENZA VACCINE (1) 09/01/2021       Immunization History   Administered Date(s) Administered     COVID-19,PF,Pfizer 03/19/2021, 04/09/2021     DTaP, Unspecified 05/22/2019     Flu, Unspecified 09/23/2016     Influenza Vaccine IM > 6 months Valent IIV4  (Alfuria,Fluzone) 09/23/2016, 09/07/2018, 08/28/2019, 09/02/2020     Pneumococcal 23 valent 05/22/2019     Tdap (Adacel,Boostrix) 05/22/2019       Lab Results   Component Value Date    PAP NIL 01/31/2019         Recent Labs   Lab Test 08/31/21  1057 08/12/21  1535 09/12/20  2058 08/02/19  1711 08/02/19  1711 10/05/17  1133 02/06/17  1200   ALT 41 33 36   < > 25   < >  --    CR 0.69 0.68 0.77   < > 0.80   < >  --    GFRESTIMATED >90 >90 >90   < > >90   < >  --    GFRESTBLACK  --   --  >90  --  >90   < >  --    ALBUMIN 3.2* 2.9* 3.3*   < > 3.4   < >  --    POTASSIUM 3.8 3.4 3.9   < > 3.5   < >  --    TSH  --   --   --   --   --   --  1.72    < > = values in this interval not displayed.       PHQ-2 ( 1999 Pfizer) 7/26/2021 8/7/2020   Q1: Little interest or pleasure in doing things 0 0   Q2: Feeling down, depressed or hopeless 0 0   PHQ-2 Score 0 0   Q1: Little interest or pleasure in doing things - -   Q2: Feeling down, depressed or hopeless - -   PHQ-2 Score - -       PHQ-9 SCORE 1/15/2020 2/12/2020 4/29/2020 9/21/2021   PHQ-9 Total Score [place] - - 9 (Mild depression) -   PHQ-9 Total Score 7 8 9 9       VIC-7 SCORE 2/12/2020 4/29/2020 9/21/2021   Total Score - 10 (moderate anxiety) -   Total Score 9 10 13       No flowsheet data found.      Lakisha Yu [facility]  September 21, 2021 4:16 PM

## 2021-09-22 ASSESSMENT — ANXIETY QUESTIONNAIRES: GAD7 TOTAL SCORE: 13

## 2021-09-22 NOTE — TELEPHONE ENCOUNTER
FUTURE VISIT INFORMATION      FUTURE VISIT INFORMATION:    Date: 10/20/21    Time: 2 PM    [facility]  REFERRAL INFORMATION:    Referring provider: Michelle Aguirre NP    Referring providers clinic: [facility]    Reason for visit/diagnosis: Chronic Maxillary Sinusitis    RECORDS REQUESTED FROM:       Clinic name Comments Records Status Imaging Status   ealth ([facility]) 10/13/21 - NEURO OV with Dr. Osuna  9/21/21 - PCC OV with Michelle Aguirre NP  10/18/19 - NEURO OV with Dr. Loyd [facility] 9/12/20 - UC OV with Dr. Celestin [facility] 5/9/21 - ED [facility] Padilla [facility]

## 2021-09-24 ENCOUNTER — VIRTUAL VISIT (OUTPATIENT)
Dept: PSYCHOLOGY | Facility: CLINIC | Age: 36
End: 2021-09-24
Payer: COMMERCIAL

## 2021-09-24 DIAGNOSIS — F34.1 PERSISTENT DEPRESSIVE DISORDER: Primary | ICD-10-CM

## 2021-09-24 DIAGNOSIS — F60.9 PERSONALITY DISORDER (H): ICD-10-CM

## 2021-09-24 DIAGNOSIS — F43.12 CHRONIC POST-TRAUMATIC STRESS DISORDER (PTSD): ICD-10-CM

## 2021-09-24 DIAGNOSIS — F54 PSYCHOLOGICAL FACTORS AFFECTING MEDICAL CONDITION: ICD-10-CM

## 2021-09-24 PROCEDURE — 90837 PSYTX W PT 60 MINUTES: CPT | Mod: 95 | Performed by: PSYCHOLOGIST

## 2021-09-27 ENCOUNTER — VIRTUAL VISIT (OUTPATIENT)
Dept: PSYCHIATRY | Facility: CLINIC | Age: 36
End: 2021-09-27
Attending: PSYCHOLOGIST
Payer: COMMERCIAL

## 2021-09-27 DIAGNOSIS — F33.1 MODERATE EPISODE OF RECURRENT MAJOR DEPRESSIVE DISORDER (H): Primary | ICD-10-CM

## 2021-09-27 DIAGNOSIS — F41.9 ANXIETY DISORDER, UNSPECIFIED TYPE: ICD-10-CM

## 2021-09-27 PROCEDURE — 90853 GROUP PSYCHOTHERAPY: CPT | Mod: 95 | Performed by: PSYCHOLOGIST

## 2021-10-01 ENCOUNTER — VIRTUAL VISIT (OUTPATIENT)
Dept: PSYCHOLOGY | Facility: CLINIC | Age: 36
End: 2021-10-01
Payer: COMMERCIAL

## 2021-10-01 DIAGNOSIS — F43.12 CHRONIC POST-TRAUMATIC STRESS DISORDER (PTSD): ICD-10-CM

## 2021-10-01 DIAGNOSIS — F60.9 PERSONALITY DISORDER (H): ICD-10-CM

## 2021-10-01 DIAGNOSIS — F34.1 PERSISTENT DEPRESSIVE DISORDER: Primary | ICD-10-CM

## 2021-10-01 PROCEDURE — 90837 PSYTX W PT 60 MINUTES: CPT | Mod: 95 | Performed by: PSYCHOLOGIST

## 2021-10-04 ENCOUNTER — VIRTUAL VISIT (OUTPATIENT)
Dept: PSYCHIATRY | Facility: CLINIC | Age: 36
End: 2021-10-04
Attending: PSYCHOLOGIST
Payer: COMMERCIAL

## 2021-10-04 DIAGNOSIS — F33.1 MODERATE EPISODE OF RECURRENT MAJOR DEPRESSIVE DISORDER (H): Primary | ICD-10-CM

## 2021-10-04 DIAGNOSIS — F41.1 GENERALIZED ANXIETY DISORDER: ICD-10-CM

## 2021-10-04 PROCEDURE — 90853 GROUP PSYCHOTHERAPY: CPT | Mod: 95 | Performed by: PSYCHOLOGIST

## 2021-10-04 PROCEDURE — 99207 PR NON-BILLABLE SERV PER CHARTING: CPT | Mod: 95 | Performed by: PSYCHOLOGIST

## 2021-10-05 NOTE — PROGRESS NOTES
[facility]    Dialectic Behavior Therapy Clinic     Adult DBT Skills Group     DBT (Dialectic Behavior Therapy) is a cognitive behavioral therapy that includes skills group, which uses didactics, modeling, behavior rehearsal, and homework exercises to aid patients in acquiring new skills and the opportunity to practice new behaviors.    Date of Service: Oct 4, 2021  Group Length: 120 minutes  Start time: 2:30   End time: 4:30  Number of Participants: 2  (one missing)  Group Facilitators: Mackenzie Corbin, PhD, LP and Miesha Stephens MA    Client: Kasandra Lau  Pronouns: She/Her/Hers/Herself  YOB: 1985  MRN: [id number]    Type of service:  video visit for group therapy  Reason for video visit:  Patient unable to travel due to Covid-19  Originating Site (patient location):  [facility]   Location- Patient's home  Distant Site (provider location):  Remote location  Mode of Communication:  Video Conference via Zoom  Consent:  Patient has given verbal consent for video visit?: Yes     Mindfulness: bilateral neurobic breathing exercise  Homework Reviewed: emotion regulation worksheet 2 and 3  Group Topic for Today: model for describing emotion    Homework Assigned: review ER handout 6, complete ER worksheet 4 and 4a    Assessment: Patient was on time for group. Patient did complete the homework assigned the previous week prior to arriving at group. Patient was engaged in homework review and was engaged in the didactic portion of group. Mood appeared Euthymic, though affect was generally flat. She provided many examples and engaged in back and forth conversation while applying an example to the model for describing emotion.    Diagnosis:   Encounter Diagnosis   Name Primary?     Moderate episode of recurrent major depressive disorder (H) Yes       Plan: Continue in full-model outpatient DBT program.     Group Treatment Plan Reviewed: 10/4/21  Group Treatment Plan  Due for Review: 1/4/21    I was present for and actively participated in the entire group therapy session, my observations of Kasandra Lau s progress and participation are taht Kasandra seems engaged in the session. Very serous demeanor.     Mackenzie Corbin, PhD LP

## 2021-10-05 NOTE — PROGRESS NOTES
"  Health Psychology - Follow up Visit  Confidential Summary*    The author of this note documented a reason for not sharing it with the patient.  REFERRAL SOURCE:  Psychiatry    CHIEF COMPLAINT/REASON FOR VISIT  Psychotherapy in context of chronic PTSD and persistent depression.  Patient also complains of dissatisfaction with interpersonal relationships and challenges with emotion regulation.      Patient was seen today for a 60 minute individual psychotherapy session.  The session was facilitated via doxy.me with patient at her home and provider at her own home.     This telehealth service is appropriate and effective for delivering services in light of the necessity for social distancing to mitigate the COVID-19 epidemic. Patient has agreed to receiving telehealth services.    The patient has been notified of following:   \"This video visit will be conducted via a call between you and your physician/provider. We have found that certain health care needs can be provided without the need for an in-person physical exam.   Video visits are billed at different rates depending on your insurance coverage.  Please reach out to your insurance provider with any questions. If during the course of the call the physician/provider feels a video visit is not appropriate, you will not be charged for this service.\"     Patient has given verbal consent for Video visit? Yes    Subjective:  Patient began with review of homework for DBT.  Discussed her first time attending group and congratulated her for taking this important step in her treatment.  She reported that she is encouraged and believes that she will benefit from a focus on learning skills so that se might be more effective in her communication and so that her emotional responses do not continue to interfere with her life goals.  Spent session reviewing diary card and identified her targets as the following:  Retail therapy napping, irritability, judgmental and social " isolation and napping during the day as a way to escape.  Discussed the importance of allowing herself to experience her emotions and the observe and describe skill was illustrated in an example that she provided.    She continues to deny suicidal ideation, plan or intent and denies self injurious behaviors.      Objective:  Patient was on time for today s session, appropriately groomed and dressed, and demonstrated good eye contact.  She appeared to be in a good mood. She was alert and oriented. Mood was euthymic with appropriate range of affect. Patient denied suicidal or assaultive ideation, plan, or intent.        Assessment:  The patient has a longstanding history of interpersonal discord and challenges with emotion regulation.  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    She is living in  housing with her two dogs.  She is in need of a document to support her need for these emotional support animals.    Plan:  Patient is now in full model DBT at [facility] and is attending skills group on  at 2.  .  .     Time In: 1:00  Time Out: 2:00    Diagnosis:  Axis I PTSD, chronic, persistent depressive disorder, adjustment disorder with mixed, r/o somatization disorder   Axis II  BPD   Axis III please see medical records for details   [place] IV Psychosocial and Environmental Stressors: academic challenges and living alone with no family support         Corina Muhammad PhD, LP

## 2021-10-07 ENCOUNTER — VIRTUAL VISIT (OUTPATIENT)
Dept: PHARMACY | Facility: CLINIC | Age: 36
End: 2021-10-07
Payer: COMMERCIAL

## 2021-10-07 DIAGNOSIS — L50.1 CHRONIC IDIOPATHIC URTICARIA: ICD-10-CM

## 2021-10-07 DIAGNOSIS — F32.A DEPRESSION: Primary | ICD-10-CM

## 2021-10-07 RX ORDER — PYRIDOSTIGMINE BROMIDE 60 MG/1
60 TABLET ORAL 4 TIMES DAILY PRN
COMMUNITY
End: 2022-04-16

## 2021-10-07 NOTE — Clinical Note
Pritesh Martinez,  I had a phone call with Kasandra yesterday.  I took a first run through of her medications.  The thing that most stuck out to me is that her medications are likely contributing to the intense fatigue she feels.  Maybe we can touch base on Tuesday and then I can follow back up with Kasandra.  Thank you for the referral! - Brittany

## 2021-10-07 NOTE — PROGRESS NOTES
Telephone/video visits are billed at different rates depending on your insurance coverage. During this emergency period, for some insurers they may be billed the same as an in-person visit.  Please reach out to your insurance provider with any questions.  If during the course of the call the physician/provider feels a telephone visit is not appropriate, you will not be charged for this service.    SUBJECTIVE: Kasandra is a 36 year old female who was referred by Michelle Aguirre for [facility] services for medication review.      Depression/anxiety: Taking vilazodone and wellbutrin for depression. She has taken these two for three or four years. She thinks they work well.  She thinks her current issues are more around anxiety.  She takes gabapentin 700 mg TID for anxiety. She has propranolol that she takes when something triggers her anxiety. She needs to take propranolol a few days a week.      Idiopathic hives: she is prescribed doxepin, famotidine, fexofenadine, levocetirizine as a preventative measure for hives. She takes doxepin 10 mg BID as discussed with her neurologist (instead of 30 mg at night, a note was added to her order to clarify this). She also take singulair nightly. She notes that her hives can be crippling and would be very cautious in changing medications related to hives.     Fatigue: she notes that she gets tired from basic tasks like showering, doing dishes, shopping etc. She has seen physical therpaists for strength training. She didn't notice a huge difference with this. She was not aware that gabapentin or doxepin could lead to fatigue, but at the same time she notes that both of these medications are effective for the conditions she is using them for and so would be hesitant to make any changes in these medications.    Nausea: She has ondansetron and promethazine. Currently, she is taking these medications once or twice a week. She discusses that after she eats, she can get nauseated. She generally  starts with zofran and then takes promethazine if zofran does not work.    Joint pain: She previously was taking acetaminophen twice daily, but is now taking it sparingly. She notes that gabapentin helps with this pain.     Migraines: Emgality once monthly. She has been taking this for two years. She also has almotriptan and takes it a couple of days a month.     Sinus congestion: she has flonase, allegra, and, Xyzal  but feels this doesn't work.  She has been taking sudafed daily and this helps. Additionally, it was thought that an antihistamine would help with hives, but she didn't feel that they helped with that. Her allergist recommended to take both Allegra and Xyzal.     Contraception: Uses nuvaring.  She notes that it has helped to lighten her period. For the past 30 days, pharmacy is out of nuvaring. She has tried oral contraceptives before and notes unpleasant side effects including listlessness, hunger, low motivation, irritable, slugish. She previously attempted an IUD and there was difficulty inserting the IUD. She has noticed recently that her periods are becoming worse.     Dry eyes: She is taking lotemax and then will also take Patanol if she feels Lotemax is not working.    Preventative health: Taking vitamin D daily.       Environmental factors that impact patient: Per past notes, is  which can be stressful      Patient Active Problem List   Diagnosis     Pelvic pain in female     Vitamin D deficiency     Menorrhagia with regular cycle     Migraine without aura and without status migrainosus, not intractable     Iron deficiency anemia due to chronic blood loss     Tired     Circadian rhythm sleep disorder, delayed sleep phase type     Unhealthy sleep habit     Seasonal mood disorder (H)     Chronic idiopathic urticaria     Acid reflux     Cholecystitis     Depression     Hx of abnormal cervical Pap smear     Irritable bowel syndrome with constipation     Migraine headache     Mild  "intermittent asthma without complication     Need for desensitization to allergens     Panic disorder     Pap smear abnormality of cervix/human papillomavirus (HPV) positive     Recurrent major depressive disorder, in remission (H)     Hypertrophy of breast     Chronic sinusitis, unspecified location        OBJECTIVE:     VIC-7 SCORE 2/12/2020 4/29/2020 9/21/2021   Total Score - 10 (moderate anxiety) -   Total Score 9 10 13       PHQ-9 SCORE 2/12/2020 4/29/2020 9/21/2021   PHQ-9 Total Score MyChart - 9 (Mild depression) -   PHQ-9 Total Score 8 9 9         VITALS:  BP Readings from Last 3 Encounters:   09/21/21 117/79   08/31/21 120/82   08/17/21 113/81           Weight:   Wt Readings from Last 1 Encounters:   09/21/21 80.6 kg (177 lb 9.6 oz)       Height:   Ht Readings from Last 1 Encounters:   09/21/21 1.722 m (5' 7.8\")       LABORATORY VALUES:   Last A1C:  No results found for: A1C.    Last Basic Metabolic Panel:  Lab Results   Component Value Date     08/31/2021     09/12/2020      Lab Results   Component Value Date    POTASSIUM 3.8 08/31/2021    POTASSIUM 3.9 09/12/2020     Lab Results   Component Value Date    CHLORIDE 111 08/31/2021    CHLORIDE 113 09/12/2020     Lab Results   Component Value Date    FABIOLA 8.6 08/31/2021    FABIOLA 9.0 09/12/2020     Lab Results   Component Value Date    CO2 20 08/31/2021    CO2 21 09/12/2020     Lab Results   Component Value Date    BUN 14 08/31/2021    BUN 13 09/12/2020     Lab Results   Component Value Date    CR 0.69 08/31/2021    CR 0.77 09/12/2020     Lab Results   Component Value Date    GLC 97 08/31/2021    GLC 95 09/12/2020       Lipid Panel Labs  No results found for: CHOL  No results found for: TRIG  No results found for: HDL  No results found for: LDL    Hepatic Panel Labs  Lab Results   Component Value Date    AST 44 08/31/2021    AST 25 09/12/2020     Lab Results   Component Value Date    ALT 41 08/31/2021    ALT 36 09/12/2020         SOCIAL AND FAMILY " HISTORY  Social History     Tobacco Use     Smoking status: Former Smoker     Packs/day: 0.00     Years: 10.00     Pack years: 0.00     Types: Cigarettes     Smokeless tobacco: Never Used     Tobacco comment: smokes occasionally socially    Substance Use Topics     Alcohol use: Not Currently     Alcohol/week: 0.0 standard drinks     Comment: occasional     .  Most Recent Immunizations   Administered Date(s) Administered     COVID-19,PF,Pfizer 04/09/2021     DTaP, Unspecified 05/22/2019     Flu, Unspecified 09/23/2016     Influenza Vaccine IM > 6 months Valent IIV4 (Alfuria,Fluzone) 09/02/2020     Pneumococcal 23 valent 05/22/2019     Tdap (Adacel,Boostrix) 05/22/2019       CURRENT MEDICATIONS:   Current Outpatient Medications   Medication Sig Dispense Refill     Acetaminophen (TYLENOL PO) Take 650 mg by mouth every 4 hours as needed for mild pain or fever       almotriptan (AXERT) 12.5 MG tablet Take 1 tablet (12.5 mg) by mouth daily as needed for migraine (repeat in 2 hours if needed) 18 tablet 11     buPROPion (WELLBUTRIN XL) 300 MG 24 hr tablet Take 1 tablet (300 mg) by mouth every morning 30 tablet 0     cholecalciferol 50 MCG (2000 UT) CAPS 5,000 Units every evening        dicyclomine (BENTYL) 20 MG tablet Take 1 tablet (20 mg) by mouth 4 times daily as needed (cramping) (Patient not taking: Reported on 9/21/2021) 30 tablet 0     doxepin (SINEQUAN) 10 MG capsule Take 3 capsules (30 mg) by mouth At Bedtime 90 capsule 11     EMGALITY 120 MG/ML injection Inject 1 mL (120 mg) Subcutaneous every 28 days 1 mL 11     etonogestrel-ethinyl estradiol (NUVARING) 0.12-0.015 MG/24HR vaginal ring Place 1 each vaginally every 28 days 3 each 3     famotidine (PEPCID) 20 MG tablet Take 20 mg by mouth 2 times daily       fexofenadine (ALLEGRA) 180 MG tablet Take 180 mg by mouth every morning        fluticasone (FLONASE) 50 MCG/ACT nasal spray 2 times daily as needed        gabapentin (NEURONTIN) 300 MG capsule Take 1 capsule  (300 mg) by mouth 3 times daily Take with 400mg capsule 60 capsule 0     gabapentin (NEURONTIN) 300 MG capsule Take 1 capsule (300 mg) by mouth 2 times daily as needed for other (severe anxiety) (Patient not taking: Reported on 9/21/2021) 60 capsule 3     gabapentin (NEURONTIN) 400 MG capsule Take 1 capsule (400 mg) by mouth 3 times daily 90 capsule 3     gabapentin (NEURONTIN) 400 MG capsule Take 1 capsule (400 mg) by mouth 3 times daily (take with 300mg capsule) (Patient not taking: Reported on 9/21/2021) 60 capsule 0     levocetirizine (XYZAL) 5 MG tablet Take 5 mg by mouth every evening        loteprednol (LOTEMAX) 0.5 % ophthalmic suspension Place 1-2 drops into both eyes 3 times daily (Patient not taking: Reported on 8/17/2021) 1 Bottle 1     montelukast (SINGULAIR) 10 MG tablet At Bedtime        neomycin-polymyxin-dexamethasone (MAXITROL) 3.5-13501-5.1 ophthalmic ointment Place 0.5 inches into both eyes At Bedtime (Patient not taking: Reported on 8/17/2021) 1 Tube 3     olopatadine (PATANOL) 0.1 % ophthalmic solution Place 1 drop into both eyes 2 times daily (Patient not taking: Reported on 8/17/2021) 1 Bottle 11     ondansetron (ZOFRAN-ODT) 4 MG ODT tab Take 1-2 tablets (4-8 mg) by mouth every 8 hours as needed for nausea 4 tablet 0     order for DME Use for 15-30 minutes every morning. 1 Units 0     promethazine (PHENERGAN) 25 MG tablet Take 1 tablet (25 mg) by mouth every 8 hours as needed for nausea or vomiting 12 tablet 0     propranolol (INDERAL) 20 MG tablet Take 1 tablet (20 mg) by mouth daily as needed (anxiety) 30 tablet 0     senna-docusate (SENOKOT-S/PERICOLACE) 8.6-50 MG tablet Take 1-2 tablets by mouth 2 times daily 30 tablet 0     vilazodone (VIIBRYD) 40 MG TABS tablet Take 1 tablet (40 mg) by mouth daily (Patient taking differently: Take 40 mg by mouth every morning ) 30 tablet 0       ALLERGIES:     Allergies   Allergen Reactions     Dust Mites Cough, Difficulty breathing and Shortness Of  Breath     Cats      Chest tightness, sinus irritation          ASSESSMENT:    Depression/anxiety: At goal per patient.  Gabapentin dose is higher than may be used for other conditions, but is at a dose commonly used for anxiety.  It is likely that gabapentin and to a lesser extent, doxepin, are contributing to Kasandra's fatigue.  Medication therapy problem: adverse medication effect       Hives: Although use of two antihistamines is traditionally unconventional, it is often recommended in the case of idiopathic hives. she is interested in thinking about other options. Guidance typically recommends dosing doxepin at night (although for this condition it is sometimes given during the day as well) and it might be worth investigating if the BID dosing that the patient is doing is contributing to her fatigue. Additionally, since the fatigue greatly impacts her day and may be related to doxepin, consideration to ask her pre scriber about alternative treatments for hives including omalizumab.  Medication therapy problem: adverse medication effect    Fatigue: See above discussion about fatigue potentially related to gabapentin and doxepin.    Nausea:Currently at goal with only a few episodes of nausea.    Joint pain: at goal, improved with gabapentin    Migraines: At goal on Emgality    Sinus congestion: Not at goal. ENT appointment next week    Contraception: Not at goal currently since pharmacy out of nuvaring.  Kasandra has noticed her periods becoming more heavy, she is not sexually active at the moment.  If nuvaring remains backordered, will be best to explore alternatives.      All medications were reviewed and found to be indicated, effective, safe and convenient unless drug therapy problem identified as described above.    PLAN:     - Will discuss the following with Kasandra:     - Discussion with Dr. Osuna about potential fatigue associated with doxepin and alternative approaches (such as taking doxepin dose daily instead  of BID or trying an alternative like omalizumab)  - Will continue to discuss contraceptive alternatives to Nuvaring, if needed.    Options for treatment and/or follow-up care were reviewed with the patient. Kasandra Lau was engaged and actively involved in the decision making process. He/She verbalized understanding of the options discussed and was satisfied with the final plan.    Patient was provided with written instructions/medication list via AVS.       Medical conditions reviewed: 8    Medications reviewed: 21    MTP identified: 2    Time spent: 40 minutes    Level of service: 3, nc

## 2021-10-08 ENCOUNTER — VIRTUAL VISIT (OUTPATIENT)
Dept: PSYCHOLOGY | Facility: CLINIC | Age: 36
End: 2021-10-08
Payer: COMMERCIAL

## 2021-10-08 DIAGNOSIS — F43.12 CHRONIC POST-TRAUMATIC STRESS DISORDER (PTSD): ICD-10-CM

## 2021-10-08 DIAGNOSIS — F60.9 PERSONALITY DISORDER (H): ICD-10-CM

## 2021-10-08 DIAGNOSIS — F34.1 PERSISTENT DEPRESSIVE DISORDER: Primary | ICD-10-CM

## 2021-10-08 PROCEDURE — 90837 PSYTX W PT 60 MINUTES: CPT | Mod: 95 | Performed by: PSYCHOLOGIST

## 2021-10-09 ENCOUNTER — HEALTH MAINTENANCE LETTER (OUTPATIENT)
Age: 36
End: 2021-10-09

## 2021-10-11 ENCOUNTER — VIRTUAL VISIT (OUTPATIENT)
Dept: PSYCHIATRY | Facility: CLINIC | Age: 36
End: 2021-10-11
Attending: PSYCHOLOGIST
Payer: COMMERCIAL

## 2021-10-11 DIAGNOSIS — F32.1 CURRENT MODERATE EPISODE OF MAJOR DEPRESSIVE DISORDER, UNSPECIFIED WHETHER RECURRENT (H): Primary | ICD-10-CM

## 2021-10-11 DIAGNOSIS — F41.9 ANXIETY DISORDER, UNSPECIFIED TYPE: ICD-10-CM

## 2021-10-11 PROCEDURE — 90853 GROUP PSYCHOTHERAPY: CPT | Mod: 95 | Performed by: PSYCHOLOGIST

## 2021-10-11 NOTE — PROGRESS NOTES
"  Health Psychology - Follow up Visit  Confidential Summary*    The author of this note documented a reason for not sharing it with the patient.  REFERRAL SOURCE:  Psychiatry    CHIEF COMPLAINT/REASON FOR VISIT  Psychotherapy in context of chronic PTSD and persistent depression.  Patient also complains of dissatisfaction with interpersonal relationships and challenges with emotion regulation.      Patient was seen today for a 60 minute individual psychotherapy session.  The session was facilitated via doxy.me with patient at her home and provider at her own home.     This telehealth service is appropriate and effective for delivering services in light of the necessity for social distancing to mitigate the COVID-19 epidemic. Patient has agreed to receiving telehealth services.    The patient has been notified of following:   \"This video visit will be conducted via a call between you and your physician/provider. We have found that certain health care needs can be provided without the need for an in-person physical exam.   Video visits are billed at different rates depending on your insurance coverage.  Please reach out to your insurance provider with any questions. If during the course of the call the physician/provider feels a video visit is not appropriate, you will not be charged for this service.\"     Patient has given verbal consent for Video visit? Yes    Subjective:  Patient began with review of diary card which she had not yet completed.  Used session time to offer opportunity to complete one day.  Patient was noted to have a challenging time determining which value to place on each emotion.  Encouraged her to consider how perfectionism may interfere with completion of tasks.  Encouraged her to continue to add to her diary card and she has determined that she would like to add judgement.  Discussed her recent altercation with her professor when she was arrived to class (online) late and appeared to be " unprepared.  She asserted that she was offered the opportunity to attend the language class online because of her accomodations because of her mental health disability.  Discussed how her anxiety, depression and feelings of overwhelm would prevent her from being able to attend class each day of this daily 5 credit course.  She described weekly migraine headaches and frequent GI distress and pain. She also described working late into the night and being too fatigued to attend the 11am class in person.  She also described a brain fog that interferes with her ability to focus on challenging content.  Discussed the impact of medications on her affect and energy.      Will discuss with consultation group to ensure that decision to write letter allowing her to continue to attend course online is in line with overall treatment goals. Given that she does not yet have DBT skills (recently started group), it is likely that she does not yet have the skills that would enable her to attend the class daily.    She discussed her perception that her challenges in dating are due to the men in [place].  Encouraged her to consider how to take the middle path and to work on changing what she can while accepting that some men will not appreciate her while others may.     Objective:  Patient was on time for today s session, appropriately groomed and dressed, and demonstrated good eye contact.  She appeared to be in a good mood. She was alert and oriented. Mood was euthymic with appropriate range of affect. Patient denied suicidal or assaultive ideation, plan, or intent.        Assessment:  The patient has a longstanding history of interpersonal discord and challenges with emotion regulation.  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    She is living in  housing with  her two dogs.  She is in need of a document to support her need for these emotional support animals.    Plan:  Patient is now in full model DBT at [facility] and is attending skills group on M at 2.  .  .     Treatment plan completed:  10/8/21    Time In: 1:00  Time Out: 2:00    Diagnosis:  Axis I PTSD, chronic, persistent depressive disorder, adjustment disorder with mixed, r/o somatization disorder   Axis II  BPD   Axis III please see medical records for details   [place] IV Psychosocial and Environmental Stressors: academic challenges and living alone with no family support         Corina Muhammad, PhD, LP

## 2021-10-12 ENCOUNTER — TELEPHONE (OUTPATIENT)
Dept: PHARMACY | Facility: CLINIC | Age: 36
End: 2021-10-12

## 2021-10-12 NOTE — PROGRESS NOTES
"     [facility]    Dialectic Behavior Therapy Clinic   Individual session-group intake      Individual session, Group intake-60 min  Referred by Corina Muhammad Phd--indaretha DBT therapist  Date: 10/4/21    Current Symptoms/Presenting Problem:  Kasandra is a  who recently relocated back from [place] where she has been working on her prelims for her Doctorate [employer] in Human Keldealgraphy She describes herself as having intense emotions, some struggles dealing with certain people and faculty within  Her dept and feels like she has been given a \"reputation\" unfairly which has impacted her \"not getting certain TA posiitons.\"  She is  from her   With some confusions around the timeline. They have described \"amiable \" relationship and he continues to help her out a bit financially and emotionally.  Kasandra described her major depression and high anxiety.  She described how she is \"exhausted a lot of the time.\"  She reported that she \"does the bare minimal to get by.\" Her advisor has encouraged her to take a Medical SARAH--Kasandra described that she has no financial safety net.\" --she needs the health insurance also.  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   She also described how many people in her department do not want her to do some of the work/teaching--that she is treated different now. She feels that she doesn't \"fit in\" because she is a woman of color.' She has some accommodations due to her disabilities. Kasandra is a [employer]--which is very impressive--but with no family resources. Her goal is to \"move to [place] b/c I don't feel like I fit in here in the [place].\" She reported she \"studied abroad at age 16 yrs--she was isolated      Education/Employment History:  Kasandra graduated from  in [employer], then  completed her undergraduate degree in [place]. She is  currently " "working on her PhD--studying for her prelims.  at the [employer]--in Human Geography. She was 2 years in [place] working on her Phd and then transferred. Kasandra described how she did her research data collection in [place]--pre-dissertation She is currently writing a book chapter. She hopes to teach/do research at a University. She is a 2 yrs plus 6 years student.     Medical/Therapy History:  Kasandra sees Corina Estrada for Individual DBT weekly adherrant therapy. Kasandra reported that \" in  College I started to self-harm.\" She reported that the triggers were thoughts around being cut off from her family.  She reports that she \"misses the feelings of having a family.\" Kasandra endorse major depression and anxiety.  She also has medical conditions including: Endometriosis, IBS and is \"exhausted a lot of the time.\"  She reported that she \"does the bare minimal to get by.\" Her advisor has encouraged her to take a Medical SARAH--Kasandra described that she has no financial safety net.\" --she needs the health insurance also.  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         Family Of Origin/Support System:  Kasandra is a 36 year old woman who reported that  Her  parents never . Her mother (about 55 yrs) is from [place] where you are \"given a name\" not that there is a family name for everyone. She was given the last name of  \"Judi\" which was \"the last name of my Uncle's--she described her mother as a \"narcissist\" and a \"compulsive liar.\" \"She takes no responsibility and focuses on her looks and men.\" Kasandra reported that her mother has been with \"many questionable men.\"   Her mother comes from a family of 10 children--she is 9th/10 children she thinks--The youngest maternal uncle is a doctor at [employer] and is \"wreckless like my mother.\" \"He hoards, overspends and has lots of anger.--and he is elaine.\"     Kasandra did not know the name of her  " "biological father but she did have a step father \"Miky\" who was described as \"white and a redneck.\"  He was around when she was 6 years old. Her mother and Miky had a son--Stephan (30) who is \"white passing, a redneck too and lives in [place].\"  He had 2 daughters from different women who were adopted out. At age 15 there was a divorce and she lived in 3 different families. Reportedly.     Kasandra is currently --planning on getting  from her  Kuldip who Moved from [place] to [place] to work on his aerospace career. She described the relationship with her  as \"co-dependent, he would never stand up to me.\" He was submissive.  She reported that they have been  10 years but estranged for 7 of these years.  He still reportedly helps her financially and she can call him when she is having problems.  He reportedly \"moved to [place] with me and couldn't find work--he is an . They lived with [place] family. She described how \"I pushed him away to his career.' JUAN LUIS Slaughter reported that she wants to have a family and kids. Kasandra does have 2 emotional support dogs:   Husky mix    Friends:  Kasandra reported that she has \"I close friend here in [place] but they recently discharge talking due to a conflict.\"  \"I have friends all over the country.\" Currently dating--but feels a lot of abandonment in the process.     Mental Status Exam (MSE)::  Pt was oriented x3. Verbal and engaging. Quite articulate--normal tone, frequency and pacing.      Assessment:  Kasandra came on time to the tele-med intake session for the Monday DBT group. She was oriented to DBT, I oriented her to group and the forma. Kasandra meets criteria for emotional dysregulation disorder which appears to interfere with interpersonal relationship at the University as well as with colleagues and potential friends. She is estranged from her who family--not see mother in 5 years so she can not rely on them for emotional or financial support. She is first " generational college so family may not understand what she is going through. It appears that her academic program are pushing her to take a medical SARAH for her mental health issues but she can not afford to looser her medical or any financial. It appears that Kasandra still cares a lot for her estrange wife--exploration regarding   Her marital relationship and DBT skills to how to develop a close relationship may be important given all of the abandonment  issues in her family of origin.    Plan:  Continue Indiv DBT therapy with Corina Muhammad, Phd                Start the Monday, DBT group at 2:30-4:30.

## 2021-10-12 NOTE — TELEPHONE ENCOUNTER
Called patient to follow up after medication management visit on October 7. Left message to call back for follow up.

## 2021-10-13 ENCOUNTER — VIRTUAL VISIT (OUTPATIENT)
Dept: NEUROLOGY | Facility: CLINIC | Age: 36
End: 2021-10-13
Payer: COMMERCIAL

## 2021-10-13 DIAGNOSIS — G43.709 CHRONIC MIGRAINE WITHOUT AURA WITHOUT STATUS MIGRAINOSUS, NOT INTRACTABLE: ICD-10-CM

## 2021-10-13 PROCEDURE — 99212 OFFICE O/P EST SF 10 MIN: CPT | Mod: 95 | Performed by: PSYCHIATRY & NEUROLOGY

## 2021-10-13 RX ORDER — GALCANEZUMAB 120 MG/ML
120 INJECTION, SOLUTION SUBCUTANEOUS
Qty: 1 ML | Refills: 11 | Status: SHIPPED | OUTPATIENT
Start: 2021-10-13 | End: 2022-07-20

## 2021-10-13 RX ORDER — DOXEPIN HYDROCHLORIDE 10 MG/1
30 CAPSULE ORAL AT BEDTIME
Qty: 90 CAPSULE | Refills: 11 | Status: SHIPPED | OUTPATIENT
Start: 2021-10-13 | End: 2022-08-02

## 2021-10-13 RX ORDER — ALMOTRIPTAN 12.5 MG/1
12.5 TABLET, FILM COATED ORAL DAILY PRN
Qty: 18 TABLET | Refills: 11 | Status: SHIPPED | OUTPATIENT
Start: 2021-10-13 | End: 2022-04-16

## 2021-10-13 ASSESSMENT — HEADACHE IMPACT TEST (HIT 6)
HIT6 TOTAL SCORE: 60
HOW OFTEN HAVE YOU FELT FED UP OR IRRITATED BECAUSE OF YOUR HEADACHES: SOMETIMES
HOW OFTEN DO HEADACHES LIMIT YOUR DAILY ACTIVITIES: SOMETIMES
HOW OFTEN HAVE YOU FELT TOO TIRED TO WORK BECAUSE OF YOUR HEADACHES: SOMETIMES
WHEN YOU HAVE A HEADACHE HOW OFTEN DO YOU WISH YOU COULD LIE DOWN: SOMETIMES
HOW OFTEN DID HEADACHS LIMIT CONCENTRATION ON WORK OR DAILY ACTIVITY: SOMETIMES
WHEN YOU HAVE HEADACHES HOW OFTEN IS THE PAIN SEVERE: SOMETIMES

## 2021-10-13 NOTE — PROGRESS NOTES
Kasandra is a 36 year old who is being evaluated via a billable video visit.      How would you like to obtain your AVS? Sarenza      Video Start Time: 3:11 PM  Video-Visit Details    Type of service:  Video Visit    Video End Time:3:28 PM    Originating Location (pt. Location): Home    Distant Location (provider location):  [facility] NEUROLOGY [facility]     Platform used for Video Visit: RichieWell     Last Patient-Answered HIT-6 Questionnaire  HIT-6 10/13/2021   When you have headaches, how often is the pain severe 10   How often do headaches limit your ability to do usual daily activities including household work, work, school, or social activities? 10   When you have a headache, how often do you wish you could lie down? 10   In the past 4 weeks, how often have you felt too tired to do work or daily activities because of your headaches 10   In the past 4 weeks, how often have you felt fed up or irritated because of your headaches 10   In the past 4 weeks, how often did headaches limit your ability to concentrate on work or daily activities 10   HIT-6 Total Score 60       MIGRAINE DISABILITY ASSESSMENT (MIDAS)    On how many days in the last 3 months did you miss work or school because of your headaches?  10-15    How many days in the last 3 months was your productivity at work or school reduced by half or more because of your headaches? (Do not include days you counted in question 1 where you missed work or school.)  10    On how many days in the last 3 months did you not do household work (such as housework, home repairs and maintenance, shopping, caring for children and relatives) because of your headaches?  7    How many days in the last 3 months was your productivity in household work reduced by half or more because of your headaches? (Do not include days you counted in question 3 where you did not do household work).  15    On how many days in the last 3 months did you miss family, social, or  lesiure activities because of your headaches?  5    MIDAS Total Score:     On how many days in the last 3 months did you have a headache? (If a headache lasted more than 1 day, count each day.)   20    On a scale of 0 - 10, on average how painful were these headaches (where 0 = no pain at all, and 10 = pain as bad as it can be.)  6    [facility]  Neurology Progress Note    Subjective:    Ms. Lau returns for follow up of chronic migraine.     She reports less migraines, with some dull headaches a few times per week.     She continues on Emgality for migraine, and takes doxepin, Singulair, for hives, itching.    For acute treatment of migraine, Tylenol is helpful, as is almotriptan.     She saw Dr. Desai and completed autonomic testing. She reports that the results returned normal; she mentioned that she completed the testing on her current medications. She may get a second opinion at [facility].     She saw a rheumatologist and did not meet criteria for CREST syndrome in regards to current symptoms.     She currently takes gabapentin 700 mg TID, which helps anxiety and nerve pain.    Objective:    Vitals: There were no vitals taken for this visit.  General: Cooperative, NAD  Neurologic:  Mental Status: Fully alert, attentive and oriented. Speech clear and fluent.   Cranial Nerves: Facial movements symmetric.   Motor: No abnormal movements.      Assessment/Plan:   Kasandra Lau is a 36-year-old woman who returns for follow-up of chronic migraine without aura.  She also reports other symptoms of paresthesias, tingling and itching that are chronic issues.     For symptomatic management of chronic migraine, she will continue the following:  -For treatment, she has had benefit since starting Emgality for migraine prevention.  Her headache attacks are now occurring less frequent and are less severe, and only last 1 day instead of 2 or 3.    -For acute treatment  of headache, she takes almotriptan 12.5 mg tablets, which are helpful.  -She has benefit with doxepin 30 mg at bedtime;  we will continue this.  This may worsen dry eyes and dry mouth.    She has started gabapentin 700 mg 3 times per day for other medical issues.  She reports this is beneficial for nerve pain as well.     Regarding her paresthesias, itching, and tingling, she may be pursuing a second opinion in the future, after previous testing was unrevealing, she reports.     I will to see her back in 6 to 12 months to monitor her progress.    Natividad Osuna MD  Neurology

## 2021-10-13 NOTE — LETTER
10/13/2021       RE: Kasandra Lau  [address]  [place] [address]     Dear Colleague,    Thank you for referring your patient, Kasandra Lau, to the [facility] NEUROLOG[facility] [place] [facility]. Please see a copy of my visit note below.    Last Patient-Answered HIT-6 Questionnaire  HIT-6 10/13/2021   When you have headaches, how often is the pain severe 10   How often do headaches limit your ability to do usual daily activities including household work, work, school, or social activities? 10   When you have a headache, how often do you wish you could lie down? 10   In the past 4 weeks, how often have you felt too tired to do work or daily activities because of your headaches 10   In the past 4 weeks, how often have you felt fed up or irritated because of your headaches 10   In the past 4 weeks, how often did headaches limit your ability to concentrate on work or daily activities 10   HIT-6 Total Score 60       MIGRAINE DISABILITY ASSESSMENT (MIDAS)    On how many days in the last 3 months did you miss work or school because of your headaches?  10-15    How many days in the last 3 months was your productivity at work or school reduced by half or more because of your headaches? (Do not include days you counted in question 1 where you missed work or school.)  10    On how many days in the last 3 months did you not do household work (such as housework, home repairs and maintenance, shopping, caring for children and relatives) because of your headaches?  7    How many days in the last 3 months was your productivity in household work reduced by half or more because of your headaches? (Do not include days you counted in question 3 where you did not do household work).  15    On how many days in the last 3 months did you miss family, social, or lesiure activities because of your headaches?  5    MIDAS Total Score:     On how many days in the last 3  months did you have a headache? (If a headache lasted more than 1 day, count each day.)   20    On a scale of 0 - 10, on average how painful were these headaches (where 0 = no pain at all, and 10 = pain as bad as it can be.)  6    [facility]  Neurology Progress Note    Subjective:    Ms. Lau returns for follow up of chronic migraine.     She reports less migraines, with some dull headaches a few times per week.     She continues on Emgality for migraine, and takes doxepin, Singulair, for hives, itching.    For acute treatment of migraine, Tylenol is helpful, as is almotriptan.     She saw Dr. Desai and completed autonomic testing. She reports that the results returned normal; she mentioned that she completed the testing on her current medications. She may get a second opinion at [facility].     She saw a rheumatologist and did not meet criteria for CREST syndrome in regards to current symptoms.     She currently takes gabapentin 700 mg TID, which helps anxiety and nerve pain.    Objective:    Vitals: There were no vitals taken for this visit.  General: Cooperative, NAD  Neurologic:  Mental Status: Fully alert, attentive and oriented. Speech clear and fluent.   Cranial Nerves: Facial movements symmetric.   Motor: No abnormal movements.      Assessment/Plan:   Kasandra Lau is a 36-year-old woman who returns for follow-up of chronic migraine without aura.  She also reports other symptoms of paresthesias, tingling and itching that are chronic issues.     For symptomatic management of chronic migraine, she will continue the following:  -For treatment, she has had benefit since starting Emgality for migraine prevention.  Her headache attacks are now occurring less frequent and are less severe, and only last 1 day instead of 2 or 3.    -For acute treatment of headache, she takes almotriptan 12.5 mg tablets, which are helpful.  -She has benefit with doxepin 30  mg at bedtime;  we will continue this.  This may worsen dry eyes and dry mouth.    She has started gabapentin 700 mg 3 times per day for other medical issues.  She reports this is beneficial for nerve pain as well.     Regarding her paresthesias, itching, and tingling, she may be pursuing a second opinion in the future, after previous testing was unrevealing, she reports.     I will to see her back in 6 to 12 months to monitor her progress.    Natividad Osuna MD  Neurology         Again, thank you for allowing me to participate in the care of your patient.      Sincerely,    Natividad Osuna MD

## 2021-10-15 ENCOUNTER — VIRTUAL VISIT (OUTPATIENT)
Dept: PSYCHOLOGY | Facility: CLINIC | Age: 36
End: 2021-10-15
Payer: COMMERCIAL

## 2021-10-15 DIAGNOSIS — F43.23 ADJUSTMENT DISORDER WITH MIXED ANXIETY AND DEPRESSED MOOD: ICD-10-CM

## 2021-10-15 DIAGNOSIS — F34.1 PERSISTENT DEPRESSIVE DISORDER: Primary | ICD-10-CM

## 2021-10-15 DIAGNOSIS — F43.12 CHRONIC POST-TRAUMATIC STRESS DISORDER (PTSD): ICD-10-CM

## 2021-10-15 PROCEDURE — 90837 PSYTX W PT 60 MINUTES: CPT | Mod: 95 | Performed by: PSYCHOLOGIST

## 2021-10-18 ENCOUNTER — VIRTUAL VISIT (OUTPATIENT)
Dept: PSYCHIATRY | Facility: CLINIC | Age: 36
End: 2021-10-18
Attending: PSYCHOLOGIST
Payer: COMMERCIAL

## 2021-10-18 DIAGNOSIS — F41.1 GENERALIZED ANXIETY DISORDER: ICD-10-CM

## 2021-10-18 DIAGNOSIS — F33.1 MODERATE EPISODE OF RECURRENT MAJOR DEPRESSIVE DISORDER (H): Primary | ICD-10-CM

## 2021-10-18 PROCEDURE — 90853 GROUP PSYCHOTHERAPY: CPT | Mod: 95 | Performed by: PSYCHOLOGIST

## 2021-10-18 NOTE — PATIENT INSTRUCTIONS
"1. You were seen in the clinic today by Dr. Neves. If you have any questions or concerns after your appointment, please call the clinic at [phone]. Press \"1\" for scheduling, press \"3\" for nurse advice.    2.   The following has been recommended for you based upon your appointment today:   -Please complete CT scan today. Head back down to first floor across from the entrance and check in with the radiology department. They will get you in as soon as possible.    3.   Someone from the ENT clinic will call you to follow up with you regarding your results once your provider has had a chance to review them. We will discuss a plan of care and follow up with your provider at that time.       Razia Green LPN  [facility]  Department of Otolaryngology  [phone]      "

## 2021-10-19 NOTE — PROGRESS NOTES
[facility]iatry [facility]    Dialectic Behavior Therapy Clinic     Adult DBT Skills Group     DBT (Dialectic Behavior Therapy) is a cognitive behavioral therapy that includes skills group, which uses didactics, modeling, behavior rehearsal, and homework exercises to aid patients in acquiring new skills and the opportunity to practice new behaviors.    Date of Service: Oct 18, 2021  Group Length: 120 minutes  Start time: 2:30   End time: 4:30  Number of Participants: 2  (one missing)  Group Facilitators: Mackenzie Corbin, PhD, LP and Miesha Stephens MA    Client: Kasandra Lau  Pronouns: She/Her/Hers/Herself  YOB: 1985  MRN: [id number]    Type of service:  video visit for group therapy  Reason for video visit:  Patient unable to travel due to Covid-19  Originating Site (patient location):  [facility]   Location- Patient's home  Distant Site (provider location):  Remote location  Mode of Communication:  Video Conference via Zoom  Consent:  Patient has given verbal consent for video visit?: Yes     Mindfulness: paced breathing  Homework Reviewed: emotion regulation worksheet 5  Group Topic for Today: emotion regulation handout 10-11 (opposite action to emotion)  Homework Assigned: review ER handout 7    Assessment: Patient was on time for group. Patient completed the homework assigned the previous week prior to arriving at group. She noted that she has difficulty completing the diary card but shared she has problem-solved a new way to keep herself on track. Patient was engaged in homework review and was engaged in the didactic portion of group. Mood appeared Euthymic, though affect was generally flat. She reflected on one incident where she was able to check the facts effectively.    Diagnosis:   Encounter Diagnoses   Name Primary?     Moderate episode of recurrent major depressive disorder (H) Yes     Generalized anxiety disorder        Plan: Continue in full-model  outpatient DBT program.     Group Treatment Plan Reviewed: 10/4/21  Group Treatment Plan Due for Review: 1/4/21    Miesha Stephens MA  Psychology Intern

## 2021-10-20 ENCOUNTER — OFFICE VISIT (OUTPATIENT)
Dept: OTOLARYNGOLOGY | Facility: CLINIC | Age: 36
End: 2021-10-20
Attending: NURSE PRACTITIONER
Payer: COMMERCIAL

## 2021-10-20 ENCOUNTER — ANCILLARY PROCEDURE (OUTPATIENT)
Dept: CT IMAGING | Facility: CLINIC | Age: 36
End: 2021-10-20
Attending: OTOLARYNGOLOGY
Payer: COMMERCIAL

## 2021-10-20 ENCOUNTER — PRE VISIT (OUTPATIENT)
Dept: OTOLARYNGOLOGY | Facility: CLINIC | Age: 36
End: 2021-10-20

## 2021-10-20 VITALS — HEIGHT: 68 IN | WEIGHT: 186 LBS | BODY MASS INDEX: 28.19 KG/M2

## 2021-10-20 DIAGNOSIS — J34.3 HYPERTROPHY OF INFERIOR NASAL TURBINATE: ICD-10-CM

## 2021-10-20 DIAGNOSIS — J34.2 DEVIATED NASAL SEPTUM: ICD-10-CM

## 2021-10-20 DIAGNOSIS — J32.0 CHRONIC MAXILLARY SINUSITIS: ICD-10-CM

## 2021-10-20 DIAGNOSIS — R09.81 NASAL CONGESTION: ICD-10-CM

## 2021-10-20 DIAGNOSIS — J32.0 CHRONIC MAXILLARY SINUSITIS: Primary | ICD-10-CM

## 2021-10-20 PROCEDURE — 31231 NASAL ENDOSCOPY DX: CPT | Performed by: OTOLARYNGOLOGY

## 2021-10-20 PROCEDURE — 70486 CT MAXILLOFACIAL W/O DYE: CPT | Mod: GC | Performed by: RADIOLOGY

## 2021-10-20 PROCEDURE — 99203 OFFICE O/P NEW LOW 30 MIN: CPT | Mod: 25 | Performed by: OTOLARYNGOLOGY

## 2021-10-20 ASSESSMENT — MIFFLIN-ST. JEOR: SCORE: 1587.19

## 2021-10-20 ASSESSMENT — PAIN SCALES - GENERAL: PAINLEVEL: NO PAIN (0)

## 2021-10-20 NOTE — PROGRESS NOTES
[facility]  New Patient Visit      Encounter date:   October 20, 2021    Referring Provider:   Michelle Aguirre, JASVIR CNP  [address] [place] [address]    Chief Complaint: Chronic maxillary sinustis    History of Present Illness:   Kasandra Lau is a 36 year old female who presents for consultation regarding chronic maxillary sinusitis. The patient was seen by Michelle Aguirre on 09/21/2021 and complained of sinusitis-type symptoms that do not respond to allergy medications. She was referred here for further evaluation.     Today, the patient endorses inflammation, congestion, facial pressure, postnasal drip, rhinorrhea, and dryness for years. She states it is sometimes easier to breathe through her mouth. She also endorses trouble swallowing and the sensation that something is stuck in her throat. The patient notes she has chronic migraines. The patient denies needing antibiotics, steroids, or history of sinus surgery for her symptoms. She reports she takes Flonase and allergy medications without relief.     Of note, the patient states she is allergic to dust mites and cats.     [place] [facility] History:  No history of sinus surgery.     Review of systems: A 14-point review of systems has been conducted and was negative for any notable symptoms, except as dictated in the history of present illness.     Medical History:  Past Medical History:   Diagnosis Date     Anemia fall 2016     Chronic fatigue      Depression (emotion)     sees psych, on meds     Gastroesophageal reflux disease      Migraine     daily meds, 2x month, more mild on meds     Neuropathic pain      Panic disorder      Uncomplicated asthma Spring 2018        Surgical History:   Past Surgical History:   Procedure Laterality Date     COLONOSCOPY       LAPAROSCOPIC ABLATION ENDOMETRIOSIS N/A 11/9/2016    Procedure: LAPAROSCOPIC ABLATION ENDOMETRIOSIS;  Surgeon: Tonja Ferrer MD;  Location:  OR      LAPAROSCOPIC CYSTECTOMY OVARIAN (BENIGN) Left 11/9/2016    Procedure: LAPAROSCOPIC CYSTECTOMY OVARIAN (BENIGN);  Surgeon: Tonja Ferrer MD;  Location: [facility] OR     LAPAROSCOPIC TUBAL DYE STUDY Left 11/9/2016    Procedure: LAPAROSCOPIC TUBAL DYE STUDY;  Surgeon: Tonja Ferrer MD;  Location: [facility] OR     LAPAROSCOPY OPERATIVE ADULT N/A 11/9/2016    Procedure: LAPAROSCOPY OPERATIVE ADULT;  Surgeon: Tonja Ferrer MD;  [facility]: [facility] OR     MAMMOPLASTY REDUCTION Bilateral 8/5/2021    Procedure: MAMMOPLASTY, REDUCTION, Bilateral;  Surgeon: ANTONIO Moreno MD;  Location: [facility] OR     ORTHOPEDIC SURGERY      left wrist surgery        Family History:  Family History   Problem Relation Age of Onset     Diabetes Maternal Grandfather      Hypertension No family hx of      Coronary Artery Disease No family hx of      Hyperlipidemia No family hx of      Cerebrovascular Disease No family hx of      Breast Cancer No family hx of      Colon Cancer No family hx of      Prostate Cancer No family hx of      Other Cancer No family hx of      Glaucoma No family hx of      Macular Degeneration No family hx of         Social History:   Social History     Socioeconomic History     Marital status: Single     Spouse name: Not on file     Number of children: Not on file     Years of education: Not on file     Highest education level: Not on file   Occupational History     Not on file   Tobacco Use     Smoking status: Former Smoker     Packs/day: 0.00     Years: 10.00     Pack years: 0.00     Types: Cigarettes     Smokeless tobacco: Never Used     Tobacco comment: smokes occasionally socially    Vaping Use     Vaping Use: Never used   Substance and Sexual Activity     Alcohol use: Not Currently     Alcohol/week: 0.0 standard drinks     Comment: occasional      Drug use: Not Currently     Types: Marijuana     Comment: marijuana  none since May 2021     Sexual activity: Yes     Partners: Male     Birth  "control/protection: Pull-out method, Inserts/Ring     Comment: Nuvaring   Other Topics Concern     Not on file   Social History Narrative    Grad student in geography     Social Determinants of Health     Financial Resource Strain:      Difficulty of Paying Living Expenses:    Food Insecurity:      Worried About Running Out of Food in the Last Year:      Ran Out of Food in the Last Year:    Transportation Needs:      Lack of Transportation (Medical):      Lack of Transportation (Non-Medical):    Physical Activity:      Days of Exercise per Week:      Minutes of Exercise per Session:    Stress:      Feeling of Stress :    Social Connections:      Frequency of Communication with Friends and Family:      Frequency of Social Gatherings with Friends and Family:      Attends [employer] Services:      Active Member of Clubs or Organizations:      Attends Club or Organization Meetings:      Marital Status:    Intimate Partner Violence:      Fear of Current or Ex-Partner:      Emotionally Abused:      Physically Abused:      Sexually Abused:         Physical Exam:  Vital signs: Ht 1.727 m (5' 8\")   Wt 84.4 kg (186 lb)   BMI 28.28 kg/m     General Appearance: No acute distress, appropriate demeanor, conversant  Eyes: moist conjunctivae; EOMI; pupils symmetric; visual acuity grossly intact; no proptosis  Head: normocephalic; overall symmetric appearance without deformity  Face: overall symmetric without deformity; HB I/VI  Ears: Normal appearance of external ear; external meatus normal in appearance; TMs intact without perforation bilaterally;   Nose: No external deformity; septum deviated to left causing greater than 70% obstruction; inferior turbinates with significant hypertrophy  Oral Cavity/oropharynx: Normal appearance of mucosa; tongue midline; no mass or lesions; tonsils (3+); oropharynx without obvious mucosal abnormality  Neck: no palpable lymphadenopathy; thyroid without palpable nodules  Lungs: symmetric chest " rise; no wheezing  CV: Good distal perfusion; normal hear rate  Extremities: No deformity  Neurologic Exam: Cranial nerves II-XII are grossly intact; no focal deficit      Procedure Note  Procedure performed: Rigid nasal endoscopy  Indication: To evaluate for sinonasal pathology not visualized on routine anterior rhinoscopy  Anesthesia: 4% topical lidocaine with 0.05% oxymetazoline  Description of procedure: A 30 degree, 3 mm rigid endoscope was inserted into bilateral nasal cavities and the nasal valves, nasal cavity, middle meatus, sphenoethmoid recess, and nasopharynx were thoroughly evaluated for evidence of obstruction, edema, purulence, polyps and/or mass/lesion.     [place] Endoscopic Scoring System  Endoscopic observation Right Left   Polyps in middle meatus (0 = absent, 1 = restricted to middle meatus, 2 = Beyond middle meatus) 0 0   Discharge (0 = absent, 1 = thin and clear, 2 = thick, purulent) 1 1   Edema (0 = absent, 1 = mild-moderate, 2 = moderate-severe) 2 2   Crusting (0 = absent, 1 = mild-moderate, 2 = moderate-severe) 0 0   Scarring (0= absent, 1 = mild-moderate, 2 = moderate-severe) 0 0   Total 3 3     Findings  RT: Substantial turbinate hypertrophy. Edema of the MM. Nasopharynx is clear.  LT: Leftward septal deviation. Substantial turbinate hypertrophy. Edema of the MM.     The patient tolerated the procedure well without complication.     Laboratory Review:  n/a    Imaging Review:  n/a    Pathology Review:  n/a    Assessment/Medical Decision Making/Plan:  Nasal obstruction  Inferior turbinate hypertrophy  Deviated nasal septum  Allergic rhinitis      Nasal endoscopy today - suggestive of some smoldering sinusitis, so will check CT    If neg, would consider topical budesonide irrigations    Follow up pending CT results    eDlmar Neves MD    [facility]  Rhinology  Endoscopic Skull Base Surgery  [facility]  Department of Otolaryngology - Head  & Neck Surgery    Scribe Disclosure:  I, Aydee Hairston, am serving as a scribe to document services personally performed by Delmar Neves MD at this visit, based upon the provider's statements to me. All documentation has been reviewed by the aforementioned provider prior to being entered into the official medical record.

## 2021-10-20 NOTE — LETTER
10/20/2021       RE: Kasandra Lau  [address] [facility] [address]     Dear Colleague,    Thank you for referring your patient, Kasandra Lau, to the [facility] [place] [facility]. Please see a copy of my visit note below.      [facility]  New Patient Visit      Encounter date:   October 20, 2021    Referring Provider:   Michelle Aguirre, JASVIR CNP  [address] [place] [id number]    Chief Complaint: Chronic maxillary sinustis    History of Present Illness:   Kasandra Lau is a 36 year old female who presents for consultation regarding chronic maxillary sinusitis. The patient was seen by Michelle Aguirre on 09/21/2021 and complained of sinusitis-type symptoms that do not respond to allergy medications. She was referred here for further evaluation.     Today, the patient endorses inflammation, congestion, facial pressure, postnasal drip, rhinorrhea, and dryness for years. She states it is sometimes easier to breathe through her mouth. She also endorses trouble swallowing and the sensation that something is stuck in her throat. The patient notes she has chronic migraines. The patient denies needing antibiotics, steroids, or history of sinus surgery for her symptoms. She reports she takes Flonase and allergy medications without relief.     Of note, the patient states she is allergic to dust mites and cats.     [place] [facility]:  No history of sinus surgery.     Review of systems: A 14-point review of systems has been conducted and was negative for any notable symptoms, except as dictated in the history of present illness.     Medical History:  Past Medical History:   Diagnosis Date     Anemia fall 2016     Chronic fatigue      Depression (emotion)     sees psych, on meds     Gastroesophageal reflux disease      Migraine     daily meds, 2x month, more mild on meds     Neuropathic pain       Panic disorder      Uncomplicated asthma Spring 2018        Surgical History:   Past Surgical History:   Procedure Laterality Date     COLONOSCOPY       LAPAROSCOPIC ABLATION ENDOMETRIOSIS N/A 11/9/2016    Procedure: LAPAROSCOPIC ABLATION ENDOMETRIOSIS;  Surgeon: Tonja Ferrer MD;  Location: [facility] OR     LAPAROSCOPIC CYSTECTOMY OVARIAN (BENIGN) Left 11/9/2016    Procedure: LAPAROSCOPIC CYSTECTOMY OVARIAN (BENIGN);  Surgeon: Tonja Ferrer MD;  Location: [facility] OR     LAPAROSCOPIC TUBAL DYE STUDY Left 11/9/2016    Procedure: LAPAROSCOPIC TUBAL DYE STUDY;  Surgeon: Tojna Ferrer MD;  Location: [facility] OR     LAPAROSCOPY OPERATIVE ADULT N/A 11/9/2016    Procedure: LAPAROSCOPY OPERATIVE ADULT;  Surgeon: Tonja Ferrer MD;  Location: [facility] OR     MAMMOPLASTY REDUCTION Bilateral 8/5/2021    Procedure: MAMMOPLASTY, REDUCTION, Bilateral;  Surgeon: ANTONIO Moreno MD;  Location: [facility] OR     ORTHOPEDIC SURGERY      left wrist surgery        Family History:  Family History   Problem Relation Age of Onset     Diabetes Maternal Grandfather      Hypertension No family hx of      Coronary Artery Disease No family hx of      Hyperlipidemia No family hx of      Cerebrovascular Disease No family hx of      Breast Cancer No family hx of      Colon Cancer No family hx of      Prostate Cancer No family hx of      Other Cancer No family hx of      Glaucoma No family hx of      Macular Degeneration No family hx of         Social History:   Social History     Socioeconomic History     Marital status: Single     Spouse name: Not on file     Number of children: Not on file     Years of education: Not on file     Highest education level: Not on file   Occupational History     Not on file   Tobacco Use     Smoking status: Former Smoker     Packs/day: 0.00     Years: 10.00     Pack years: 0.00     Types: Cigarettes     Smokeless tobacco: Never Used     Tobacco comment: smokes occasionally socially    Vaping Use      "Vaping Use: Never used   Substance and Sexual Activity     Alcohol use: Not Currently     Alcohol/week: 0.0 standard drinks     Comment: occasional      Drug use: Not Currently     Types: Marijuana     Comment: marijuana  none since May 2021     Sexual activity: Yes     Partners: Male     Birth control/protection: Pull-out method, Inserts/Ring     Comment: Nuvaring   Other Topics Concern     Not on file   Social History Narrative    Grad student in [employer]     Social Determinants of Health     Financial Resource Strain:      Difficulty of Paying Living Expenses:    Food Insecurity:      Worried About Running Out of Food in the Last Year:      Ran Out of Food in the Last Year:    Transportation Needs:      Lack of Transportation (Medical):      Lack of Transportation (Non-Medical):    Physical Activity:      Days of Exercise per Week:      Minutes of Exercise per Session:    Stress:      Feeling of Stress :    Social Connections:      Frequency of Communication with Friends and Family:      Frequency of Social Gatherings with Friends and Family:      Attends Buddhist Services:      Active Member of Clubs or Organizations:      Attends Club or Organization Meetings:      Marital Status:    Intimate Partner Violence:      Fear of Current or Ex-Partner:      Emotionally Abused:      Physically Abused:      Sexually Abused:         Physical Exam:  Vital signs: Ht 1.727 m (5' 8\")   Wt 84.4 kg (186 lb)   BMI 28.28 kg/m     General Appearance: No acute distress, appropriate demeanor, conversant  Eyes: moist conjunctivae; EOMI; pupils symmetric; visual acuity grossly intact; no proptosis  Head: normocephalic; overall symmetric appearance without deformity  Face: overall symmetric without deformity; HB I/VI  Ears: Normal appearance of external ear; external meatus normal in appearance; TMs intact without perforation bilaterally;   Nose: No external deformity; septum deviated to left causing greater than 70% obstruction; " inferior turbinates with significant hypertrophy  Oral Cavity/oropharynx: Normal appearance of mucosa; tongue midline; no mass or lesions; tonsils (3+); oropharynx without obvious mucosal abnormality  Neck: no palpable lymphadenopathy; thyroid without palpable nodules  Lungs: symmetric chest rise; no wheezing  CV: Good distal perfusion; normal hear rate  Extremities: No deformity  Neurologic Exam: Cranial nerves II-XII are grossly intact; no focal deficit      Procedure Note  Procedure performed: Rigid nasal endoscopy  Indication: To evaluate for sinonasal pathology not visualized on routine anterior rhinoscopy  Anesthesia: 4% topical lidocaine with 0.05% oxymetazoline  Description of procedure: A 30 degree, 3 mm rigid endoscope was inserted into bilateral nasal cavities and the nasal valves, nasal cavity, middle meatus, sphenoethmoid recess, and nasopharynx were thoroughly evaluated for evidence of obstruction, edema, purulence, polyps and/or mass/lesion.     Dhiraj-Leonel Endoscopic Scoring System  Endoscopic observation Right Left   Polyps in middle meatus (0 = absent, 1 = restricted to middle meatus, 2 = Beyond middle meatus) 0 0   Discharge (0 = absent, 1 = thin and clear, 2 = thick, purulent) 1 1   Edema (0 = absent, 1 = mild-moderate, 2 = moderate-severe) 2 2   Crusting (0 = absent, 1 = mild-moderate, 2 = moderate-severe) 0 0   Scarring (0= absent, 1 = mild-moderate, 2 = moderate-severe) 0 0   Total 3 3     Findings  RT: Substantial turbinate hypertrophy. Edema of the MM. Nasopharynx is clear.  LT: Leftward septal deviation. Substantial turbinate hypertrophy. Edema of the MM.     The patient tolerated the procedure well without complication.     Laboratory Review:  n/a    Imaging Review:  n/a    Pathology Review:  n/a    Assessment/Medical Decision Making/Plan:  Nasal obstruction  Inferior turbinate hypertrophy  Deviated nasal septum  Allergic rhinitis      Nasal endoscopy today - suggestive of some smoldering  sinusitis, so will check CT    If neg, would consider topical budesonide irrigations    Follow up pending CT results    Delmar Neves MD    [facility]  Rhinology  Endoscopic Skull Base Surgery  [facility]  Department of Otolaryngology - Head & Neck Surgery    Scribe Disclosure:  I, Aydee Hairston, am serving as a scribe to document services personally performed by Delmar Neves MD at this visit, based upon the provider's statements to me. All documentation has been reviewed by the aforementioned provider prior to being entered into the official medical record.       Again, thank you for allowing me to participate in the care of your patient.      Sincerely,    Delmar Neves MD

## 2021-10-21 ENCOUNTER — TELEPHONE (OUTPATIENT)
Dept: OTOLARYNGOLOGY | Facility: CLINIC | Age: 36
End: 2021-10-21

## 2021-10-21 DIAGNOSIS — R09.81 NASAL CONGESTION: ICD-10-CM

## 2021-10-21 DIAGNOSIS — J68.3 MODERATE PERSISTENT REACTIVE AIRWAYS DYSFUNCTION SYNDROME WITHOUT COMPLICATION (H): ICD-10-CM

## 2021-10-21 DIAGNOSIS — J32.0 CHRONIC MAXILLARY SINUSITIS: Primary | ICD-10-CM

## 2021-10-21 RX ORDER — BUDESONIDE 0.5 MG/2ML
INHALANT ORAL
Qty: 2 ML | Refills: 3 | Status: SHIPPED | OUTPATIENT
Start: 2021-10-21 | End: 2022-01-06

## 2021-10-21 RX ORDER — AZELASTINE 1 MG/ML
1 SPRAY, METERED NASAL 2 TIMES DAILY
Qty: 30 ML | Refills: 3 | Status: SHIPPED | OUTPATIENT
Start: 2021-10-21 | End: 2022-04-16

## 2021-10-21 NOTE — RESULT ENCOUNTER NOTE
Pritesh Dowling,     I tried to call Kasandra about her CT results, but only got VM.     Can you call her and let her know that her CT does not show any signs of sinusitis.  I did suspect some sinus inflammation based on my endoscopy, but this does not seem to be the case.      For her symptoms of nasal congestion, drainage, etc, I would like for her to try BID budesonide followed by astelin nasal spray.  I've already sent this scripts to [facility] in [place] in [place].     For nasal dryness in AM, she can use aquaphor or vaseline on q-tip.      She can follow up with me in 2 months, or sooner as needed.     Thanks and let me know if she has any questions, issues.     Jon

## 2021-10-22 ENCOUNTER — TELEPHONE (OUTPATIENT)
Dept: OTOLARYNGOLOGY | Facility: CLINIC | Age: 36
End: 2021-10-22

## 2021-10-22 NOTE — TELEPHONE ENCOUNTER
----- Message from Delmar Neves MD sent at 10/21/2021  8:20 AM CDT -----  Pritesh Dowling,     I tried to call Kasandra about her CT results, but only got VM.     Can you call her and let her know that her CT does not show any signs of sinusitis.  I did suspect some sinus inflammation based on my endoscopy, but this does not seem to be the case.      For her symptoms of nasal congestion, drainage, etc, I would like for her to try BID budesonide followed by astelin nasal spray.  I've already sent this scripts to [facility] in [place] in [place].     For nasal dryness in AM, she can use aquaphor or vaseline on q-tip.      She can follow up with me in 2 months, or sooner as needed.     Thanks and let me know if she has any questions, issues.     Jon

## 2021-10-22 NOTE — TELEPHONE ENCOUNTER
Writer called patient to relay CT results and go over medications. Requested patient either call back or check my chart.     Josey Munoz RN on 10/22/2021 at 8:44 AM

## 2021-10-25 ENCOUNTER — VIRTUAL VISIT (OUTPATIENT)
Dept: PSYCHIATRY | Facility: CLINIC | Age: 36
End: 2021-10-25
Attending: PSYCHOLOGIST
Payer: COMMERCIAL

## 2021-10-25 DIAGNOSIS — F41.1 GENERALIZED ANXIETY DISORDER: ICD-10-CM

## 2021-10-25 DIAGNOSIS — F33.1 MODERATE EPISODE OF RECURRENT MAJOR DEPRESSIVE DISORDER (H): Primary | ICD-10-CM

## 2021-10-25 PROCEDURE — 90853 GROUP PSYCHOTHERAPY: CPT | Mod: U7

## 2021-10-25 NOTE — PROGRESS NOTES
"  Health Psychology - Follow up Visit  Confidential Summary*    The author of this note documented a reason for not sharing it with the patient.  REFERRAL SOURCE:  Psychiatry    CHIEF COMPLAINT/REASON FOR VISIT  Psychotherapy in context of chronic PTSD and persistent depression.  Patient also complains of dissatisfaction with interpersonal relationships and challenges with emotion regulation.      Patient was seen today for a 60 minute individual psychotherapy session.  The session was facilitated via doxy.me with patient at her home and provider at her own home.     This telehealth service is appropriate and effective for delivering services in light of the necessity for social distancing to mitigate the COVID-19 epidemic. Patient has agreed to receiving telehealth services.    The patient has been notified of following:   \"This video visit will be conducted via a call between you and your physician/provider. We have found that certain health care needs can be provided without the need for an in-person physical exam.   Video visits are billed at different rates depending on your insurance coverage.  Please reach out to your insurance provider with any questions. If during the course of the call the physician/provider feels a video visit is not appropriate, you will not be charged for this service.\"     Patient has given verbal consent for Video visit? Yes    Subjective:  Patient described anxiety associated with upcoming test and her excitement that she may soon be ABD.  Discussed what this means to her and how she has overcome so much to achieve this impressive goal. We reviewed her multiple strengths and my observation that she has a lot of life ahead of her.  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  She was encouraged to use session time to set agenda for session.  She reported that she has noticed that she continues to fear dating because of her underlying assumption that men are \"afraid of women like me\".  Discussed in detail that her past history of trauma may increase her need for a defensive stance when encountering men.      Objective:  Patient was on time for today s session, appropriately groomed and dressed, and demonstrated good eye contact.  She appeared to be in a good mood. She was alert and oriented. Mood was euthymic with appropriate range of affect. Patient denied suicidal or assaultive ideation, plan, or intent.        Assessment:  The patient has a longstanding history of interpersonal discord and challenges with emotion regulation.  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        Plan:  Patient will begin full model DBT next cycle.       Time In: 1:00  Time Out: 2:00    Diagnosis:  Axis I PTSD, chronic, persistent depressive disorder, adjustment disorder with mixed   Axis II  BPD   Axis III please see medical records for details   [place] IV Psychosocial and Environmental Stressors: academic challenges and living alone with no family support         Corina Muhammad, PhD, LP    "

## 2021-10-25 NOTE — PROGRESS NOTES
[facility]    Dialectic Behavior Therapy Clinic     Adult DBT Skills Group     DBT (Dialectic Behavior Therapy) is a cognitive behavioral therapy that includes skills group, which uses didactics, modeling, behavior rehearsal, and homework exercises to aid patients in acquiring new skills and the opportunity to practice new behaviors.    Date of Service: Sep 27, 2021  Group Length: 120 minutes  Start time2:30  End time: 4:30pm  Number of Participants: 3          Group Facilitators: Mackenzie Corbin, PhD, LP and Miesha Stephens MA    Client: Kasandra Lau  Pronouns: She/Her/Hers/Herself  YOB: 1985  MRN: [id number]    Type of service:  video visit for group therapy  Reason for video visit:  Patient unable to travel due to Covid-19  Originating Site (patient location):  [facility]   Location- Patient's home  Distant Site (provider location):  Remote location  Mode of Communication:  Video Conference via Zoom  Consent:  Patient has given verbal consent for video visit?: Yes     Mindfulness: Observe (observing sounds in environment with eyes closed)  Homework Reviewed:   Mindfulness wkst 4b (do one of each)   Group Topic for Today: Introduction to Emotion Regulation,  Function of emotions and Myth of Emotions  Homework Assigned:Emotion Regulation Worksheets  2 & 3      Assessment: Patient was on time for group. Patient did complete the homework assigned the previous week prior to arriving at group. Patient was engaged in homework review and was engaged in the didactic portion of group. Mood appeared Anxious.     Diagnosis: Major Depression, Generalized anxiety, PTSD  Plan: Continue in full-model outpatient DBT program.     Group Treatment Plan Reviewed:   Group Treatment Plan Due for Review:

## 2021-10-25 NOTE — PROGRESS NOTES
"  Health Psychology - Follow up Visit  Confidential Summary*    The author of this note documented a reason for not sharing it with the patient.  REFERRAL SOURCE:  Psychiatry    CHIEF COMPLAINT/REASON FOR VISIT  Psychotherapy in context of chronic PTSD and persistent depression.  Patient also complains of dissatisfaction with interpersonal relationships and challenges with emotion regulation.      Patient was seen today for a 60 minute individual psychotherapy session.  The session was facilitated via doxy.me with patient at her home and provider at her own home.     This telehealth service is appropriate and effective for delivering services in light of the necessity for social distancing to mitigate the COVID-19 epidemic. Patient has agreed to receiving telehealth services.    The patient has been notified of following:   \"This video visit will be conducted via a call between you and your physician/provider. We have found that certain health care needs can be provided without the need for an in-person physical exam.   Video visits are billed at different rates depending on your insurance coverage.  Please reach out to your insurance provider with any questions. If during the course of the call the physician/provider feels a video visit is not appropriate, you will not be charged for this service.\"     Patient has given verbal consent for Video visit? Yes    Subjective:  Patient began with a review of her diary card.  Used time in session to complete diary card for yesterday.  She reported that she noted that one of her emotional vulnerability factors includes feeling ill.  She reported that she has had congestion and that she is taking sudafed prn which includes agents which may increase her anxiety. She also reported taking gabapentin, and propranolol daily which may also increase her fatigue.  She reported that she would prefer to avoid dealing with the world and that she believes that she does best when she can " be at home.  Because of her fibromyalgia, she describes low energy and pain.  She reported that she feels more anxious when she is on campus and therefore, she believes she is better off being given permission to participate in classes online.  Discussed my support of her, my understanding of her logic and validation of her experience.  In addition, pointed out that the treatment for PTSD is exposure and that she is not helping herself by her avoidance.  Discussed the possible use of medications to increase avoidance of affect and to induce sleepiness.    She reported that she is having trouble justifying calling me for coaching and that she believes that she can work out her problems without my coaching.  Discussed the importance of learning to apply skills she is learning in DBT to all situations and that I might help to increase motivation to target avoidance so that she might practice skills.  Agreed that I would reach out to her for reverse coaching until she is more comfortable.      Objective:  Patient was on time for today s session, appropriately groomed and dressed, and demonstrated good eye contact.  She appeared to be in a good mood. She was alert and oriented. Mood was euthymic with appropriate range of affect. Patient denied suicidal or assaultive ideation, plan, or intent.        Assessment:  The patient has a longstanding history of interpersonal discord and challenges with emotion regulation.  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    She is living in  housing with her two dogs.  She is in need of a document to support her need for these emotional support animals.    Plan:  Patient is now in full model DBT at [facility] and is attending skills group on M at 2.      Treatment plan completed:  10/8/21    Time In: 1:00  Time Out:  2:00    Diagnosis:  Axis I PTSD, chronic, persistent depressive disorder, adjustment disorder with mixed, r/o somatization disorder   Axis II  BPD   Axis III please see medical records for details   [place] IV Psychosocial and Environmental Stressors: academic challenges and living alone with no family support         Corina Muhammad, PhD, LP

## 2021-10-29 ENCOUNTER — VIRTUAL VISIT (OUTPATIENT)
Dept: PSYCHOLOGY | Facility: CLINIC | Age: 36
End: 2021-10-29
Payer: COMMERCIAL

## 2021-10-29 DIAGNOSIS — F60.9 PERSONALITY DISORDER (H): ICD-10-CM

## 2021-10-29 DIAGNOSIS — F43.12 CHRONIC POST-TRAUMATIC STRESS DISORDER (PTSD): Primary | ICD-10-CM

## 2021-10-29 DIAGNOSIS — F34.1 PERSISTENT DEPRESSIVE DISORDER: ICD-10-CM

## 2021-10-29 PROCEDURE — 90837 PSYTX W PT 60 MINUTES: CPT | Mod: 95 | Performed by: PSYCHOLOGIST

## 2021-11-01 ENCOUNTER — VIRTUAL VISIT (OUTPATIENT)
Dept: PSYCHIATRY | Facility: CLINIC | Age: 36
End: 2021-11-01
Attending: PSYCHOLOGIST
Payer: COMMERCIAL

## 2021-11-01 DIAGNOSIS — F33.1 MODERATE EPISODE OF RECURRENT MAJOR DEPRESSIVE DISORDER (H): Primary | ICD-10-CM

## 2021-11-01 DIAGNOSIS — F41.1 GENERALIZED ANXIETY DISORDER: ICD-10-CM

## 2021-11-01 PROCEDURE — 90853 GROUP PSYCHOTHERAPY: CPT | Mod: 95 | Performed by: PSYCHOLOGIST

## 2021-11-02 ENCOUNTER — OFFICE VISIT (OUTPATIENT)
Dept: FAMILY MEDICINE | Facility: CLINIC | Age: 36
End: 2021-11-02
Payer: COMMERCIAL

## 2021-11-02 VITALS
BODY MASS INDEX: 27.74 KG/M2 | HEIGHT: 68 IN | SYSTOLIC BLOOD PRESSURE: 113 MMHG | TEMPERATURE: 98.2 F | OXYGEN SATURATION: 95 % | DIASTOLIC BLOOD PRESSURE: 78 MMHG | HEART RATE: 73 BPM | WEIGHT: 183 LBS

## 2021-11-02 DIAGNOSIS — Z30.9 ENCOUNTER FOR CONTRACEPTIVE MANAGEMENT, UNSPECIFIED TYPE: Primary | ICD-10-CM

## 2021-11-02 DIAGNOSIS — Z23 FLU VACCINE NEED: ICD-10-CM

## 2021-11-02 DIAGNOSIS — F41.9 ANXIETY: ICD-10-CM

## 2021-11-02 DIAGNOSIS — M79.2 NEUROPATHIC PAIN: ICD-10-CM

## 2021-11-02 RX ORDER — GABAPENTIN 300 MG/1
300 CAPSULE ORAL 3 TIMES DAILY
Qty: 90 CAPSULE | Refills: 3 | Status: SHIPPED | OUTPATIENT
Start: 2021-11-02 | End: 2022-01-03

## 2021-11-02 ASSESSMENT — MIFFLIN-ST. JEOR: SCORE: 1567.23

## 2021-11-02 NOTE — NURSING NOTE
"ROOM:3    Preferred Name: Kasandra     36 year old  Chief Complaint   Patient presents with     Fibromyalgia     Refill Request     Contraception       Blood pressure 113/78, pulse 73, temperature 98.2  F (36.8  C), temperature source Oral, height 1.717 m (5' 7.6\"), weight 83 kg (183 lb), last menstrual period 10/26/2021, SpO2 95 %, not currently breastfeeding. Body mass index is 28.16 kg/m .      Patient Active Problem List   Diagnosis     Pelvic pain in female     Vitamin D deficiency     Menorrhagia with regular cycle     Migraine without aura and without status migrainosus, not intractable     Iron deficiency anemia due to chronic blood loss     Tired     Circadian rhythm sleep disorder, delayed sleep phase type     Unhealthy sleep habit     Seasonal mood disorder (H)     Chronic idiopathic urticaria     Acid reflux     Cholecystitis     Depression     Hx of abnormal cervical Pap smear     Irritable bowel syndrome with constipation     Migraine headache     Mild intermittent asthma without complication     Need for desensitization to allergens     Panic disorder     Pap smear abnormality of cervix/human papillomavirus (HPV) positive     Recurrent major depressive disorder, in remission (H)     Hypertrophy of breast     Chronic sinusitis, unspecified location       Wt Readings from Last 2 Encounters:   11/02/21 83 kg (183 lb)   10/20/21 84.4 kg (186 lb)     BP Readings from Last 3 Encounters:   11/02/21 113/78   09/21/21 117/79   08/31/21 120/82       Allergies   Allergen Reactions     Dust Mites Cough, Difficulty breathing and Shortness Of Breath     Cats      Chest tightness, sinus irritation       Current Outpatient Medications   Medication     Acetaminophen (TYLENOL PO)     almotriptan (AXERT) 12.5 MG tablet     budesonide (PULMICORT) 0.5 MG/2ML neb solution     buPROPion (WELLBUTRIN XL) 300 MG 24 hr tablet     cholecalciferol 50 MCG (2000 UT) CAPS     doxepin (SINEQUAN) 10 MG capsule     EMGALITY 120 MG/ML " injection     famotidine (PEPCID) 20 MG tablet     fexofenadine (ALLEGRA) 180 MG tablet     fluticasone (FLONASE) 50 MCG/ACT nasal spray     gabapentin (NEURONTIN) 300 MG capsule     gabapentin (NEURONTIN) 400 MG capsule     levocetirizine (XYZAL) 5 MG tablet     montelukast (SINGULAIR) 10 MG tablet     ondansetron (ZOFRAN-ODT) 4 MG ODT tab     order for DME     promethazine (PHENERGAN) 25 MG tablet     propranolol (INDERAL) 20 MG tablet     pyridostigmine (MESTINON) 60 MG tablet     vilazodone (VIIBRYD) 40 MG TABS tablet     azelastine (ASTELIN) 0.1 % nasal spray     etonogestrel-ethinyl estradiol (NUVARING) 0.12-0.015 MG/24HR vaginal ring     loteprednol (LOTEMAX) 0.5 % ophthalmic suspension     olopatadine (PATANOL) 0.1 % ophthalmic solution     No current facility-administered medications for this visit.       Social History     Tobacco Use     Smoking status: Former Smoker     Packs/day: 0.00     Years: 10.00     Pack years: 0.00     Types: Cigarettes     Smokeless tobacco: Never Used     Tobacco comment: smokes occasionally socially    Vaping Use     Vaping Use: Never used   Substance Use Topics     Alcohol use: Not Currently     Alcohol/week: 0.0 standard drinks     Comment: occasional      Drug use: Not Currently     Types: Marijuana     Comment: marijuana  none since May 2021       Honoring Choices - Health Care Directive Guide offered to patient at time of visit.    Health Maintenance Due   Topic Date Due     ASTHMA ACTION PLAN  Never done     ASTHMA CONTROL TEST  Never done     ADVANCE CARE PLANNING  Never done     PREVENTIVE CARE VISIT  01/31/2020     HPV TEST  02/21/2020     PAP  02/21/2020     INFLUENZA VACCINE (1) 09/01/2021       Immunization History   Administered Date(s) Administered     COVID-19,PF,Pfizer (12+ Yrs) 03/19/2021, 04/09/2021     DTaP, Unspecified 05/22/2019     Flu, Unspecified 09/23/2016     Influenza Vaccine IM > 6 months Valent IIV4 (Alfuria,Fluzone) 09/23/2016, 09/07/2018,  08/28/2019, 09/02/2020     Pneumococcal 23 valent 05/22/2019     Tdap (Adacel,Boostrix) 05/22/2019       Lab Results   Component Value Date    PAP NIL 01/31/2019         Recent Labs   Lab Test 08/31/21  1057 08/12/21  1535 09/12/20  2058 08/02/19  1711 08/02/19  1711 10/05/17  1133 02/06/17  1200   ALT 41 33 36   < > 25   < >  --    CR 0.69 0.68 0.77   < > 0.80   < >  --    GFRESTIMATED >90 >90 >90   < > >90   < >  --    GFRESTBLACK  --   --  >90  --  >90   < >  --    ALBUMIN 3.2* 2.9* 3.3*   < > 3.4   < >  --    POTASSIUM 3.8 3.4 3.9   < > 3.5   < >  --    TSH  --   --   --   --   --   --  1.72    < > = values in this interval not displayed.       PHQ-2 ( 1999 Pfizer) 11/2/2021 10/20/2021   Q1: Little interest or pleasure in doing things 0 0   Q2: Feeling down, depressed or hopeless 1 0   PHQ-2 Score 1 0   Q1: Little interest or pleasure in doing things - -   Q2: Feeling down, depressed or hopeless - -   PHQ-2 Score - -       PHQ-9 SCORE 1/15/2020 2/12/2020 4/29/2020 9/21/2021   PHQ-9 Total Score MyChart - - 9 (Mild depression) -   PHQ-9 Total Score 7 8 9 9       VIC-7 SCORE 2/12/2020 4/29/2020 9/21/2021   Total Score - 10 (moderate anxiety) -   Total Score 9 10 13       No flowsheet data found.      Lakisha Yu, [facility]  November 2, 2021 1:36 PM

## 2021-11-02 NOTE — PROGRESS NOTES
"  Health Psychology - Follow up Visit  Confidential Summary*    The author of this note documented a reason for not sharing it with the patient.  REFERRAL SOURCE:  Psychiatry    CHIEF COMPLAINT/REASON FOR VISIT  Psychotherapy in context of chronic PTSD and persistent depression.  Patient also complains of dissatisfaction with interpersonal relationships and challenges with emotion regulation.      Patient was seen today for a 60 minute individual psychotherapy session.  The session was facilitated via doxy.me with patient at her home and provider at her own home.     This telehealth service is appropriate and effective for delivering services in light of the necessity for social distancing to mitigate the COVID-19 epidemic. Patient has agreed to receiving telehealth services.    The patient has been notified of following:   \"This video visit will be conducted via a call between you and your physician/provider. We have found that certain health care needs can be provided without the need for an in-person physical exam.   Video visits are billed at different rates depending on your insurance coverage.  Please reach out to your insurance provider with any questions. If during the course of the call the physician/provider feels a video visit is not appropriate, you will not be charged for this service.\"     Patient has given verbal consent for Video visit? Yes    Subjective:  Patient began with a review of her diary card.  She reported that she is more aware that feelings of loneliness are part of the behavior chain that contribute to episodes of retail therapy or overspending.  She reported that the feelings of loneliness are overwhelming and painful.  She also described an awareness that she may not be able to form a close relationship and that she has been rejected, betrayed and abandoned by both men and female friends.  Discussed her upbringing which was characterized by instability and favoritism shown to her " "brother.  She reported that her father was not interested in getting to know her and that her mother did not understand her.  She was repeatedly encouraged to speak about her feelings versus her intellectual discourse surrounding her research which identifies sources of racial stereotypes and differential treatment that she is likely shown because she is not \"white\".  She was noted to become frustrated and defensive.  Discussed the importance of our relationship and that I convey that I validate her knowledge and that there is not much we can do about societal challenges but there may be choices she can make that would improve her feelings of belonging.      Objective:  Patient was on time for today s session, appropriately groomed and dressed, and demonstrated good eye contact.  She appeared to be in a sad mood. She was alert and oriented. Mood was dysphoric with appropriate range of affect. Patient denied suicidal or assaultive ideation, plan, or intent.        Assessment:  The patient has a longstanding history of interpersonal discord and challenges with emotion regulation.  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  She has made recent progress toward completing her academic program.l.    She is living in  housing with her two dogs.  She is in need of a document to support her need for these emotional support animals and another documenting the importance of her attending classes online versus in person.  Will review with my consultation team.    Plan:  Patient is now in full model DBT at [facility] and is attending skills group on  at 2.      Treatment plan completed:  10/8/21    Time In: 1:00  Time Out: 2:00    Diagnosis:  Axis I PTSD, chronic, persistent depressive disorder, adjustment disorder with mixed, r/o somatization disorder   Axis II  BPD   Axis III please see medical records for details   [place] IV Psychosocial and Environmental Stressors: " academic challenges and living alone with no family support         Corina Muhammad, PhD, LP

## 2021-11-02 NOTE — PROGRESS NOTES
[facility]    Dialectic Behavior Therapy Clinic     Adult DBT Skills Group     DBT (Dialectic Behavior Therapy) is a cognitive behavioral therapy that includes skills group, which uses didactics, modeling, behavior rehearsal, and homework exercises to aid patients in acquiring new skills and the opportunity to practice new behaviors.    Date of Service: Nov 1, 2021  Group Length: 120 minutes  Start time: 2:30   End time: 4:30  Number of Participants: 2  (one missing)  Group Facilitators: Dilcia Plummer, PhD, LP; Miesha Stephens MA    Client: Kasandra Lau  Pronouns: She/Her/Hers/Herself  YOB: 1985  MRN: [id number]    Type of service:  video visit for group therapy  Reason for video visit:  Patient unable to travel due to Covid-19  Originating Site (patient location):  [facility]   Location- Patient's home  Distant Site (provider location):  Remote location  Mode of Communication:  Video Conference via Zoom  Consent:  Patient has given verbal consent for video visit?: Yes     Mindfulness: Mindful eating  Homework Reviewed:  ER handout 8 and 10 (positive experience and problem solving to change emotions)  Group Topic for Today ER handout 17 (accumulating positive experience) and ER handout 18 (values and priorities)  Homework Assigned: Emotional regulation worksheet 11 or 11a    Assessment: Patient was on time for group. Patient completed the homework assigned the previous week prior to arriving at group. She noted that she continues to have difficulty completing the diary card. Patient was engaged in homework review and was engaged in the didactic portion of group. Mood appeared Euthymic. She engaged in with other groups members as well. She described ongoing difficulties with acquiring interpersonal relationships.     Diagnosis:   Encounter Diagnoses   Name Primary?     Moderate episode of recurrent major depressive disorder (H) Yes     Generalized  anxiety disorder        Plan: Continue in full-model outpatient DBT program.     Group Treatment Plan Reviewed: 10/4/21  Group Treatment Plan Due for Review: 1/4/21    Miesha Stephens MA  Psychology Intern    -----  I was present for the entirety of this psychotherapy group and I agree with the above progress note and plan.    Dilcia Plummer, PhD,   Clinical Psychologist  Nov 1, 2021

## 2021-11-02 NOTE — PROGRESS NOTES
"       HPI       Kasandra Lau is a 36 year old  who presents for   Chief Complaint   Patient presents with     Fibromyalgia     Refill Request     Contraception   36 year-old female presents for follow up and refill of medications.  1. Refills: last refill of gabapentin only provided 60 tablets of 300mg dose.  Needs additional quantity as taking 700mg every 8hours.   2. Pain: continues to have muscle aches. Has seen neurology, rheumatology. Neurology has treated with gabapentin and doxepin with she thinks has helped overall. She has not bee diagnosed with fibromyalgia but suspects that is what she has.    Mood is variable.   3. Contraception: she was on Nuvaring but it has been out of stock. She would like to try Annovera: continuous contraceptive ring. She does have history of migraine with aura (vertigo). She is willing to consider other options.   4. Weight gain. Has been gaining weight despite not eating much. Worse since off Nuvaring. Worries about BS    Problem, Medication and Allergy Lists were reviewed and updated if needed..    Patient is an established patient of this clinic..         Review of Systems:   Review of Systems  GEN; negative for fever. Has gained weight. Worried about blood sugar, DM as DM runs in family.   CV: negative for CP  RESP: negative for SOB  MSK: back pain: low and upper, UE pain, neck and shoulder pain points.  Feels pain every day.   NEURO: numbness and tingling in LE at times. Periodic migraine headaches with aura. Emgality helps  GI: abdominal pain off and on.   : desiring contraception as cramping with menses.   See scanned Widespread Pain Index and Symptom Severity Scale          Physical Exam:     Vitals:    11/02/21 1334   BP: 113/78   Pulse: 73   Temp: 98.2  F (36.8  C)   TempSrc: Oral   SpO2: 95%   Weight: 83 kg (183 lb)   Height: 1.717 m (5' 7.6\")     Body mass index is 28.16 kg/m .  Vitals were reviewed and were normal     Physical Exam  GEN: alert talkative female in " no acute distress.   Eyes clear.   Lungs: respirations unlabored.   MSK: trigger points positive for pain at base of skull, shoulder blades, low back, UE, hips and knee.      Results:   No testing ordered today    Assessment and Plan     1. Encounter for contraceptive management, unspecified type  Discussed at length. Given migraine with aura, would not recommend combined contraceptive, although unclear if Annovera has potential risk. Request pharmacist to evaluate. Will refer to OBGYN to review options.   - Ob/Gyn Referral    2. Anxiety  Update gabapentin RX; sent to pharmacy. Continue at current dose.   - gabapentin (NEURONTIN) 300 MG capsule; Take 1 capsule (300 mg) by mouth 3 times daily Take with 400mg capsule  Dispense: 90 capsule; Refill: 3    3. Neuropathic pain  Discussed other potential treatments for fibromyalgia type pain which is likely given results if Widespread pain Index and symptom severity scale. PRovided information on exercises, dietary and sleep. Discussed PT. Continue doxepin as ordered as appears to provide some relief.  - gabapentin (NEURONTIN) 300 MG capsule; Take 1 capsule (300 mg) by mouth 3 times daily Take with 400mg capsule  Dispense: 90 capsule; Refill: 3    4. Flu vaccine need  Given today.   - INFLUENZA VACCINE IM >6 MO VALENT IIV4 (ALFURIA/FLUZONE)    Patient concerned about recent weight gain, will ask pharmacist to review medications for potential impact on weight. Weight today is down slightly from previous, but overall increase in past 1 year.        There are no discontinued medications.    Options for treatment and follow-up care were reviewed with the patient. Kasandra Lau  engaged in the decision making process and verbalized understanding of the options discussed and agreed with the final plan.    JASVIR Ashley CNP

## 2021-11-02 NOTE — PATIENT INSTRUCTIONS
Med refill  Changed Gabapentin RX:  300mg three times daily #90  with 3 refills and sent to pharmacy    Referred to GYN to discuss contraceptive options; specifically Annovera ring given history of migraine with aura    Request pharmacist to review meds for those that might promote weight gain, CV risk of Annovera given stroke, potential risks of taking doxepin (Sinequan) as needed for breakthrough pain    Widespread Pain Index and Symptom Severity score indicative of fibromyalgia  Discussed regular exercise with variety, use of heat. Consider PT.      Flu vaccine

## 2021-11-04 ENCOUNTER — VIRTUAL VISIT (OUTPATIENT)
Dept: PHARMACY | Facility: CLINIC | Age: 36
End: 2021-11-04
Payer: COMMERCIAL

## 2021-11-04 DIAGNOSIS — L50.1 CHRONIC IDIOPATHIC URTICARIA: Primary | ICD-10-CM

## 2021-11-04 NOTE — Clinical Note
Talked to Kasandra - after I talked with her, she is interested in nexplanon.  I think this could be a good option for her, but wanted to get your thoughts.  Also, do you have an idea of who she could see to get an assessment on her chronic hives? She is interested in exploring different treatment options.  Thank you! - Brittany

## 2021-11-04 NOTE — PROGRESS NOTES
Follow up with patient about Contraception and hives management    Contraception: Kasandra's goal from contraception is to decrease side effects of endometriosis - bad periods, cramping, fatigue.  Kasandra was interested in Annovera at her last appointment.  However, since she experiences migraines with aura, I noted that it is not safe to take this medication.  Additionally, NuvaRing (her past medication that is currently on back order) is also not safe. We discussed IUD insertion versus nexplanon.  In the past, IUD insertion was unsuccessful due tot he shape of Kasandra's uterus.  Kasandra would consider an ultrasound guided insertion, but is more interested in nexplanon.  After looking at the website, she was concerned about nexplanon given her history of depression and given that she has had her gallbladder removed    Hives: I discussed the possibility of an alternative to doxepin since doxepin is likely contributing to Kasandra's fatigue.  In particular, omalizumab might be an option.  Kasandra is unsure who would prescribe this for her.  Right now, her neurologist is prescribing doxepin.  She thinks, however, that her hives could be better managed and is interested in a referral to someone who might specialize in this area.    Plan  - Will discuss with patient's PCP, Michelle Aguirre, about the possibility of nexplanon  - Will also discuss with Michelle about the best specialist to help manage Kasandra's hives.

## 2021-11-05 ENCOUNTER — VIRTUAL VISIT (OUTPATIENT)
Dept: PSYCHOLOGY | Facility: CLINIC | Age: 36
End: 2021-11-05
Payer: COMMERCIAL

## 2021-11-05 DIAGNOSIS — F60.9 PERSONALITY DISORDER (H): ICD-10-CM

## 2021-11-05 DIAGNOSIS — F34.1 PERSISTENT DEPRESSIVE DISORDER: Primary | ICD-10-CM

## 2021-11-05 DIAGNOSIS — F43.23 ADJUSTMENT DISORDER WITH MIXED ANXIETY AND DEPRESSED MOOD: ICD-10-CM

## 2021-11-05 DIAGNOSIS — F43.12 CHRONIC POST-TRAUMATIC STRESS DISORDER (PTSD): ICD-10-CM

## 2021-11-05 PROCEDURE — 90837 PSYTX W PT 60 MINUTES: CPT | Mod: 95 | Performed by: PSYCHOLOGIST

## 2021-11-10 DIAGNOSIS — F41.9 ANXIETY: ICD-10-CM

## 2021-11-11 NOTE — TELEPHONE ENCOUNTER
propranolol (INDERAL) 20 MG tablet   Take 1 tablet (20 mg) by mouth daily as needed (anxiety) Last Written Prescription Date:  10/19/21  Last Fill Quantity: 30,   # refills: 0  Last Office Visit : 11/2/21  Future Office visit:  none    Routing refill request to provider for review/approval because:  Medication not on protocol for associated diagnosis.

## 2021-11-12 ENCOUNTER — VIRTUAL VISIT (OUTPATIENT)
Dept: PSYCHOLOGY | Facility: CLINIC | Age: 36
End: 2021-11-12
Payer: COMMERCIAL

## 2021-11-12 DIAGNOSIS — F34.1 PERSISTENT DEPRESSIVE DISORDER: Primary | ICD-10-CM

## 2021-11-12 DIAGNOSIS — F43.12 CHRONIC POST-TRAUMATIC STRESS DISORDER (PTSD): ICD-10-CM

## 2021-11-12 DIAGNOSIS — F54 PSYCHOLOGICAL FACTORS AFFECTING MEDICAL CONDITION: ICD-10-CM

## 2021-11-12 DIAGNOSIS — F60.9 PERSONALITY DISORDER (H): ICD-10-CM

## 2021-11-12 DIAGNOSIS — F43.23 ADJUSTMENT DISORDER WITH MIXED ANXIETY AND DEPRESSED MOOD: ICD-10-CM

## 2021-11-12 PROCEDURE — 90837 PSYTX W PT 60 MINUTES: CPT | Mod: 95 | Performed by: PSYCHOLOGIST

## 2021-11-15 ENCOUNTER — VIRTUAL VISIT (OUTPATIENT)
Dept: PSYCHIATRY | Facility: CLINIC | Age: 36
End: 2021-11-15
Attending: PSYCHOLOGIST
Payer: COMMERCIAL

## 2021-11-15 DIAGNOSIS — F33.1 MAJOR DEPRESSIVE DISORDER, RECURRENT EPISODE, MODERATE (H): ICD-10-CM

## 2021-11-15 DIAGNOSIS — F41.1 GENERALIZED ANXIETY DISORDER: Primary | ICD-10-CM

## 2021-11-15 PROCEDURE — 90853 GROUP PSYCHOTHERAPY: CPT | Mod: 52 | Performed by: PSYCHOLOGIST

## 2021-11-15 RX ORDER — PROPRANOLOL HYDROCHLORIDE 20 MG/1
20 TABLET ORAL DAILY PRN
Qty: 30 TABLET | Refills: 1 | Status: SHIPPED | OUTPATIENT
Start: 2021-11-15 | End: 2022-01-10

## 2021-11-15 NOTE — PROGRESS NOTES
[facility]    Dialectic Behavior Therapy Clinic     Adult DBT Skills Group     DBT (Dialectic Behavior Therapy) is a cognitive behavioral therapy that includes skills group, which uses didactics, modeling, behavior rehearsal, and homework exercises to aid patients in acquiring new skills and the opportunity to practice new behaviors.    Date of Service: Oct 25, 2021  Group Length: 120 minutes  Start time: 2:30   End time: 4:30  Number of Participants: 2  (one missing)  Group Facilitators: Mackenzie Corbin, PhD, LP and Miesha Stephens MA    Client: Kasandra Lau  Pronouns: She/Her/Hers/Herself  YOB: 1985  MRN: [id number]    Type of service:  video visit for group therapy  Reason for video visit:  Patient unable to travel due to Covid-19  Originating Site (patient location):  [facility]   Location- Patient's home  Distant Site (provider location):  Remote location  Mode of Communication:  Video Conference via Zoom  Consent:  Patient has given verbal consent for video visit?: Yes   Mindfulness: paced breathing  Homework Reviewed: review ER handout 7  Group Topic for Today: emotion regulation handout ER14,15 &16--Reducing vulnerability, Accumulating positive--Pleasant Events  Homework Assigned: Wk sheet 10 Pleasant Events    Assessment: Patient was on time for group. Patient completed the homework assigned the previous week prior to arriving at group. She noted that she has difficulty completing the diary card but shared she has problem-solved a new way to keep herself on track. Patient was engaged in homework review and was engaged in the didactic portion of group. Mood appeared Euthymic, though affect was generally flat. She reflected on one incident where she was able to check the facts effectively.    Diagnosis:   Major Depression, PTSD    Plan: Continue in full-model outpatient DBT program.     Group Treatment Plan Reviewed: 10/4/21  Group Treatment Plan  "Due for Review: 1/4/21    Miesha Stephens MA  Psychology Intern    I was present for and actively participated in the entire group therapy session, my observations of Kasandra Lau s progress and participation are that Kasandra appeared to really engage around the topic of having not \"family\" and needing a \"chosen family\" as we discussed the upcoming holidays and using our skills.    Mackenzie Corbin, PhD LP  "

## 2021-11-16 DIAGNOSIS — F33.1 MAJOR DEPRESSIVE DISORDER, RECURRENT EPISODE, MODERATE (H): ICD-10-CM

## 2021-11-16 RX ORDER — BUPROPION HYDROCHLORIDE 300 MG/1
300 TABLET ORAL EVERY MORNING
Qty: 30 TABLET | Refills: 1 | Status: SHIPPED | OUTPATIENT
Start: 2021-11-16 | End: 2022-01-10

## 2021-11-16 NOTE — PROGRESS NOTES
"  Health Psychology - Follow up Visit  Confidential Summary*    The author of this note documented a reason for not sharing it with the patient.  REFERRAL SOURCE:  Psychiatry    CHIEF COMPLAINT/REASON FOR VISIT  Psychotherapy in context of chronic PTSD and persistent depression.  Patient also complains of dissatisfaction with interpersonal relationships and challenges with emotion regulation.      Patient was seen today for a 60 minute individual psychotherapy session.  The session was facilitated via doxy.me with patient at her home and provider at her own home.     This telehealth service is appropriate and effective for delivering services in light of the necessity for social distancing to mitigate the COVID-19 epidemic. Patient has agreed to receiving telehealth services.    The patient has been notified of following:   \"This video visit will be conducted via a call between you and your physician/provider. We have found that certain health care needs can be provided without the need for an in-person physical exam.   Video visits are billed at different rates depending on your insurance coverage.  Please reach out to your insurance provider with any questions. If during the course of the call the physician/provider feels a video visit is not appropriate, you will not be charged for this service.\"     Patient has given verbal consent for Video visit? Yes    Subjective:  Patient began with discussion of diary card.  She was congratulated for completing it and encouraged to discuss what she has learned from completing.  She described awareness that loneliness contributes to feelings of overwhelm and overwhelm increases her feelings of shame and she rukhsana with shame by avoidance (sleeping).  Discussed hw she might use problem solving to increase feelings of competence.  She was encouraged to complete several activities that she has been avoiding while in session.  She applied for licenses for her dogs and sent an " email that she has been avoiding.     Discussed her concern that she may have to start attending [place] language class in person because her instructor has requested this.  Discussed my inability to support her ongoing avoidance and my belief that she will increase her exposure to possible pleasurable activities and meeting others if she attends class in person.  She responded that she believes she may be able to secure a letter from her primary.      Patient described recent dating experience.  She was encouraged to engage her wise mind in her decision about him.    She was also confronted about her decision making and how her choices may not reflect her values.      Discussed challenges pertaining to her perceived unacceptability to those who live in this part of the country.  Discussed how she might radically accept while noticing how her own behavior may be altered.  She was noted to be more open in session today.      Objective:  Patient was on time for today s session, appropriately groomed and dressed, and demonstrated good eye contact.  She appeared to be in a sad mood. She was alert and oriented. Mood was dysphoric with appropriate range of affect. Patient denied suicidal or assaultive ideation, plan, or intent.        Assessment:  The patient has a longstanding history of interpersonal discord and challenges with emotion regulation.  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  She has made recent progress toward completing her academic program and is now ABD.  She is living in  housing with her two dogs.  She was provided with documentation to support her need for these emotional support animals.      Plan:  Patient is now in full model DBT at [facility] and is attending skills group on M at 2.      Treatment plan completed:  10/8/21    Time In: 12:30  Time Out: 1:30    Diagnosis:  Axis I PTSD, chronic, persistent depressive disorder, adjustment  disorder with mixed, r/o somatization disorder   Axis II  BPD   Axis III please see medical records for details   [place] IV Psychosocial and Environmental Stressors: academic challenges and living alone with no family support         Corina Muhammad, PhD, LP

## 2021-11-19 ENCOUNTER — VIRTUAL VISIT (OUTPATIENT)
Dept: PSYCHOLOGY | Facility: CLINIC | Age: 36
End: 2021-11-19
Payer: COMMERCIAL

## 2021-11-19 DIAGNOSIS — F34.1 PERSISTENT DEPRESSIVE DISORDER: Primary | ICD-10-CM

## 2021-11-19 DIAGNOSIS — F60.9 PERSONALITY DISORDER (H): ICD-10-CM

## 2021-11-19 DIAGNOSIS — F43.12 CHRONIC POST-TRAUMATIC STRESS DISORDER (PTSD): ICD-10-CM

## 2021-11-19 DIAGNOSIS — F43.23 ADJUSTMENT DISORDER WITH MIXED ANXIETY AND DEPRESSED MOOD: ICD-10-CM

## 2021-11-19 PROCEDURE — 90837 PSYTX W PT 60 MINUTES: CPT | Mod: 95 | Performed by: PSYCHOLOGIST

## 2021-11-22 ENCOUNTER — VIRTUAL VISIT (OUTPATIENT)
Dept: PSYCHIATRY | Facility: CLINIC | Age: 36
End: 2021-11-22
Attending: PSYCHOLOGIST
Payer: COMMERCIAL

## 2021-11-22 DIAGNOSIS — F33.40 RECURRENT MAJOR DEPRESSIVE DISORDER, IN REMISSION (H): Primary | ICD-10-CM

## 2021-11-22 PROCEDURE — 90832 PSYTX W PT 30 MINUTES: CPT | Mod: 95 | Performed by: PSYCHOLOGIST

## 2021-11-22 NOTE — PROGRESS NOTES
[facility]    Dialectic Behavior Therapy Clinic     Adult DBT Skills Group     DBT (Dialectic Behavior Therapy) is a cognitive behavioral therapy that includes skills group, which uses didactics, modeling, behavior rehearsal, and homework exercises to aid patients in acquiring new skills and the opportunity to practice new behaviors.    Date of Service: Nov 15, 2021  Group Length: 120 minutes  Start time: 2:30   End time: 4:30  Number of Participants: 2  (one missing)  Group Facilitators: Mackenzie Corbin, PhD, LP; Miesha Stephens MA    Client: Kasandra Lau  Pronouns: She/Her/Hers/Herself  YOB: 1985  MRN: [id number]    Type of service:  video visit for group therapy  Reason for video visit:  Patient unable to travel due to Covid-19  Originating Site (patient location):  [facility]   Location- Patient's home  Distant Site (provider location):  Remote location  Mode of Communication:  Video Conference via Zoom  Consent:  Patient has given verbal consent for video visit?: Yes     Mindfulness: I;m going on a Picnic and this is what I am bringing. (memory--say everyone w  Homework Reviewed:   Emotional regulation worksheet 11 or 11a  Group Topic for Today ER handouts 19 and 20   Mastery and coping ahead:PLEASE Skills   Homework Assigned: Emotional regulation worksheets 12 and 14    Assessment: Patient was on time for group. Patient completed the homework assigned the previous week prior to arriving at group. She noted that she continues to have difficulty completing the diary card. Patient was engaged in homework review and was engaged in the didactic portion of group. Mood appeared Euthymic. She engaged in with other groups members as well. She described ongoing difficulties with acquiring interpersonal relationships.     Diagnosis: Major Depression, moderate, Generalized anxeity  Plan: Continue in full-model outpatient DBT program.     Group Treatment Plan  Reviewed: 10/4/21  Group Treatment Plan Due for Review: 1/4/21

## 2021-11-29 ENCOUNTER — VIRTUAL VISIT (OUTPATIENT)
Dept: PSYCHIATRY | Facility: CLINIC | Age: 36
End: 2021-11-29
Attending: PSYCHOLOGIST
Payer: COMMERCIAL

## 2021-11-29 DIAGNOSIS — F41.1 GENERALIZED ANXIETY DISORDER: Primary | ICD-10-CM

## 2021-11-29 DIAGNOSIS — F33.1 MAJOR DEPRESSIVE DISORDER, RECURRENT EPISODE, MODERATE (H): ICD-10-CM

## 2021-11-29 PROCEDURE — 90853 GROUP PSYCHOTHERAPY: CPT | Mod: 95 | Performed by: PSYCHOLOGIST

## 2021-11-30 NOTE — PROGRESS NOTES
"     [facility]    Dialectic Behavior Therapy Clinic     Adult DBT Skills Group     DBT (Dialectic Behavior Therapy) is a cognitive behavioral therapy that includes skills group, which uses didactics, modeling, behavior rehearsal, and homework exercises to aid patients in acquiring new skills and the opportunity to practice new behaviors.    Date of Service: Nov 29, 2021  Group Length: 75 minutes  Start time: 2:30   End time: 3:45pm  Number of Participants: 2 ( other group member couldn't make it today)  Group Facilitators: Mackenzie Corbin, PhD, LP; Miesha Stephens MA    Client: Kasandra Lau  Pronouns: She/Her/Hers/Herself  YOB: 1985  MRN: [id number]    Type of service:  video visit for group therapy  Reason for video visit:  Patient unable to travel due to Covid-19  Originating Site (patient location):  [facility]   Location- Patient's home  Distant Site (provider location):  Remote location  Mode of Communication:  Video Conference via Zoom  Consent:  Patient has given verbal consent for video visit?: Yes     Mindfulness: focused breathing on word \"calm\"  Homework Reviewed:  ER worksheet 12 (build mastery), worksheet 14 (please skills, at least 3 days)  Group Topic for Today Mindfulness Wrksht 1 and 3 (Wise mind)  Homework Assigned: [employer] tomi for at least 3 days      Assessment: Patient was on time for group. Patient shared she had a difficult time completing her diary card and the homework assigned as she had a busy school week.Patient was engaged in homework review and was engaged in the didactic portion of group. Namely, she openly participated and provided examples when discussing wise mind in group. Mood appeared Euthymic. She engaged in with other groups members as well. She described ongoing difficulties with acquiring interpersonal relationships and noted the holidays were difficult for her.    Diagnosis: Major Depression, moderate, Generalized " anxeity  Plan: Continue in full-model outpatient DBT program.     Group Treatment Plan Reviewed: 10/4/21  Group Treatment Plan Due for Review: 1/4/21

## 2021-12-03 ENCOUNTER — VIRTUAL VISIT (OUTPATIENT)
Dept: PSYCHOLOGY | Facility: CLINIC | Age: 36
End: 2021-12-03
Payer: COMMERCIAL

## 2021-12-03 DIAGNOSIS — F43.12 CHRONIC POST-TRAUMATIC STRESS DISORDER (PTSD): Primary | ICD-10-CM

## 2021-12-03 DIAGNOSIS — F34.1 PERSISTENT DEPRESSIVE DISORDER: ICD-10-CM

## 2021-12-03 DIAGNOSIS — F43.23 ADJUSTMENT DISORDER WITH MIXED ANXIETY AND DEPRESSED MOOD: ICD-10-CM

## 2021-12-03 PROCEDURE — 90837 PSYTX W PT 60 MINUTES: CPT | Mod: 95 | Performed by: PSYCHOLOGIST

## 2021-12-06 ENCOUNTER — VIRTUAL VISIT (OUTPATIENT)
Dept: PSYCHIATRY | Facility: CLINIC | Age: 36
End: 2021-12-06
Attending: PSYCHOLOGIST
Payer: COMMERCIAL

## 2021-12-06 DIAGNOSIS — F33.1 MAJOR DEPRESSIVE DISORDER, RECURRENT EPISODE, MODERATE (H): Primary | ICD-10-CM

## 2021-12-06 DIAGNOSIS — F41.1 GENERALIZED ANXIETY DISORDER: ICD-10-CM

## 2021-12-06 PROCEDURE — 90853 GROUP PSYCHOTHERAPY: CPT | Mod: 95 | Performed by: PSYCHOLOGIST

## 2021-12-09 NOTE — PROGRESS NOTES
"  Health Psychology - Follow up Visit  Confidential Summary*    The author of this note documented a reason for not sharing it with the patient.  REFERRAL SOURCE:  Psychiatry    CHIEF COMPLAINT/REASON FOR VISIT  Psychotherapy in context of chronic PTSD and persistent depression.  Patient also complains of dissatisfaction with interpersonal relationships and challenges with emotion regulation.      Patient was seen today for a 60 minute individual psychotherapy session.  The session was facilitated via doxy.me with patient at her home and provider at her own home.     This telehealth service is appropriate and effective for delivering services in light of the necessity for social distancing to mitigate the COVID-19 epidemic. Patient has agreed to receiving telehealth services.    The patient has been notified of following:   \"This video visit will be conducted via a call between you and your physician/provider. We have found that certain health care needs can be provided without the need for an in-person physical exam.   Video visits are billed at different rates depending on your insurance coverage.  Please reach out to your insurance provider with any questions. If during the course of the call the physician/provider feels a video visit is not appropriate, you will not be charged for this service.\"     Patient has given verbal consent for Video visit? Yes    Subjective:  Patient began with report report that she continues to be challenged in her dating circumstances.  She described ongoing frustration with racial stereotypes and expectations of [place] men[place]  Discussed how these social dynamics impact her ease in dating and that radical acceptance can lead to grieving these challenges.  Following this acceptance, problem solving can begin.  Patient was encouraged to describe her emotions behind her difficulty connecting with men.  She reported that she has had less difficulty when " "living abroad but she also reported that she believes that she is able to be more relaxed when dating there.      Today, we spent much of the session reviewing the criteria she needs to have in her letter for her  animal. Sh was encouraged to use session time as accountability for registering her two dogs.  In addition, she is being asked to contact her vet in [place] for evidence of spay/neuter.  She denied reluctance in making this phone call but was confronted on her delay in doing so.  She described having trouble prioritizing these items because they are \"annoying\".  Discussed opposite to emotion action and using wise mind.  She was also encouraged to review requirements that she needs in order to maintain her two dogs in her building.      Reviewed her DBT homework.  Encouraged her to consider how she might use the describe skill to increase awareness of her emotions.      Objective:  Patient was on time for today s session, appropriately groomed and dressed, and demonstrated good eye contact.  She appeared to be in a sad mood. She was alert and oriented. Mood was dysphoric with appropriate range of affect. Patient denied suicidal or assaultive ideation, plan, or intent.        Assessment:  The patient has a longstanding history of interpersonal discord and challenges with emotion regulation.  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  She has made recent progress toward completing her academic program and is not ABD.   She is living in  housing with her two dogs although this living situation is currently in jeopardy as she needs documentation that these animals are necessary for emotional support.      Plan:  Patient is now in full model DBT at [facility] and is attending skills group on M at 2.      Treatment plan completed:  10/8/21    Time In: 1:00  Time Out: 2:00    Diagnosis:  Axis I PTSD, chronic, persistent depressive disorder, " adjustment disorder with mixed, r/o somatization disorder   Axis II  BPD   Axis III please see medical records for details   [place] IV Psychosocial and Environmental Stressors: academic challenges and living alone with no family support         Corina Muhammad, PhD, LP

## 2021-12-10 ENCOUNTER — VIRTUAL VISIT (OUTPATIENT)
Dept: PSYCHOLOGY | Facility: CLINIC | Age: 36
End: 2021-12-10
Payer: COMMERCIAL

## 2021-12-10 DIAGNOSIS — F34.1 PERSISTENT DEPRESSIVE DISORDER: Primary | ICD-10-CM

## 2021-12-10 DIAGNOSIS — F43.12 CHRONIC POST-TRAUMATIC STRESS DISORDER (PTSD): ICD-10-CM

## 2021-12-10 DIAGNOSIS — F43.23 ADJUSTMENT DISORDER WITH MIXED ANXIETY AND DEPRESSED MOOD: ICD-10-CM

## 2021-12-10 DIAGNOSIS — F60.9 PERSONALITY DISORDER (H): ICD-10-CM

## 2021-12-10 PROCEDURE — 90837 PSYTX W PT 60 MINUTES: CPT | Mod: 95 | Performed by: PSYCHOLOGIST

## 2021-12-13 ENCOUNTER — VIRTUAL VISIT (OUTPATIENT)
Dept: PSYCHIATRY | Facility: CLINIC | Age: 36
End: 2021-12-13
Attending: PSYCHOLOGIST
Payer: COMMERCIAL

## 2021-12-13 DIAGNOSIS — F41.1 GENERALIZED ANXIETY DISORDER: ICD-10-CM

## 2021-12-13 DIAGNOSIS — F33.1 MAJOR DEPRESSIVE DISORDER, RECURRENT EPISODE, MODERATE (H): Primary | ICD-10-CM

## 2021-12-13 PROCEDURE — 90853 GROUP PSYCHOTHERAPY: CPT | Mod: 95 | Performed by: PSYCHOLOGIST

## 2021-12-13 NOTE — PROGRESS NOTES
"Individual Session, 30 minutes. Group was cancelled due to low census so I asked Kasandra to meet for 30 min instead of group due to high stress on housing and keeping her 2 dogs.  Date:  11/22/21  Dx: MDD, moderate-chronic, VIC, PTSD          Video-Visit Details    Type of service:  Video Visit    Video Start Time (time video started):3pm     Video End Time (time video stopped): 3:30pm    Originating Location (pt. Location):yes    Distant Location (provider location):  providers home     Mode of Communication:  Video Conference via zoom    Physician has received verbal consent for a Video Visit from the patient? yes    Mackenzie Corbin, PhD LP    S/O:  \"I am trying to get an exemption with the letter I got from Corina Muhammad my therapist that justifies why I need 2 support animals.\" Kasandra came in and stated above. We discussed the skills she can use to st up ffor herself.  Kasandra reported that she really [place] and [place] not feel \"like I fit in.\" It has been hard for her to develop friendships that last here over the last 4  Years.  [place]--  She believes she will live I in another image to others/ nontraditional.     Other issues discussed:  1. Maintaining individual therapy to get the coaching on DBT  2, coaching calls as needed with Corina.  3. Adding people to the group--the transitions She reports that she is handling well. Therapist praised her for her increased involvement  4. Mindfulness is being present moment.    Assessment: Kasandra came on time to group--and it was cancelled due to low census so therapist asked to meet 1;1 with her instead of group.   Kasandra appeared to be more hopeful and less stressed out.Using lots of skills to manage the change and uncertainty. Kasandra reported that she felt good that we met indiv so   So she could  Process.    Plan: continue in the weekly Monday 2:30-4:30pm DBT group.   "

## 2021-12-13 NOTE — PROGRESS NOTES
"     [facility]    Dialectic Behavior Therapy Clinic     Adult DBT Skills Group     DBT (Dialectic Behavior Therapy) is a cognitive behavioral therapy that includes skills group, which uses didactics, modeling, behavior rehearsal, and homework exercises to aid patients in acquiring new skills and the opportunity to practice new behaviors.    Date of Service: Dec 6, 2021  Group Length: 120 minutes  Start time: 2:30   End time: 4:30  Number of Participants: 3  Group Facilitators: Mackenzie Corbin, PhD, LP; Miesha Stephens MA  And  Malia Lopez ,Social work intern    Client: Kasandra Lau  Pronouns: She/Her/Hers/Herself  YOB: 1985  MRN: [id number]    Type of service:  video visit for group therapy  Reason for video visit:  Patient unable to travel due to Covid-19  Originating Site (patient location):  [facility]   Location- Patient's home  Distant Site (provider location):  Remote location  Mode of Communication:  Video Conference via Zoom  Consent:  Patient has given verbal consent for video visit?: Yes     Mindfulness: Body Scan  Homework Reviewed:   Davis--do a mindfulness tomi 3x during the week.  Group Topic for Today- Mindfulness #2 session--\"What\" and \"how\" skills  Homework Assigned: Mindfulness worksheet 2A    Assessment: Patient was on time for group. Kasandra completed the homework assigned the previous week prior to arriving at group. She did NOT do her diary card reportedly due intense school work . she continues to have difficulty completing the diary card. Patient was engaged in homework review and was engaged in the didactic portion of group. Mood appeared Euthymic. Sheseemed engaged in with other groups members .    Diagnosis: Major Depression, moderate, Generalized anxeity  Plan: Continue in full-model outpatient DBT program. Do diary card and complete the homework.     Group Treatment Plan Reviewed: 10/4/21  Group Treatment Plan Due for Review: " 1/4/21

## 2021-12-14 NOTE — PROGRESS NOTES
[facility]    Dialectic Behavior Therapy Clinic     Adult DBT Skills Group     DBT (Dialectic Behavior Therapy) is a cognitive behavioral therapy that includes skills group, which uses didactics, modeling, behavior rehearsal, and homework exercises to aid patients in acquiring new skills and the opportunity to practice new behaviors.    Date of Service: Dec 13, 2021  Group Length: 120 minutes  Start time: 2:30pm   End time: 4:30pm  Number of Participants: 4  Group Facilitators: Mackenzie Corbin, PhD, LP and Miesha Stephens MA, Malia Lopez BAmiraAAmira    Client: Kasandra Lau  Pronouns: She/Her/Hers/Herself  YOB: 1985  MRN: [id number]    Type of service:  video visit for group therapy  Reason for video visit:  Patient unable to travel due to Covid-19  Originating Site (patient location):  [facility]   Location- Patient's home  Distant Site (provider location):  Remote location  Mode of Communication:  Video Conference via Zoom  Consent:  Patient has given verbal consent for video visit?: Yes     Mindfulness: 2 minute focused writing.  Homework Reviewed: Mindfulness worksheet 2A (mindfulness core skills practice).  Group Topic for Today: Intro to IE (handout 1-4).  Homework Assigned: IE worksheet 3.     Assessment: Patient was on time for group. Patient did complete the homework assigned the previous week prior to arriving at group. Patient was engaged in homework review and was engaged in the didactic portion of group. Mood appeared Euthymic throughout session.     Diagnosis: Major Depression, moderate, Generalized anxiety.     Plan: Continue in full-model outpatient DBT program.     Group Treatment Plan Reviewed: 10/4/21  Group Treatment Plan Due for Review: 1/4/22

## 2021-12-17 ENCOUNTER — VIRTUAL VISIT (OUTPATIENT)
Dept: PSYCHOLOGY | Facility: CLINIC | Age: 36
End: 2021-12-17
Payer: COMMERCIAL

## 2021-12-17 DIAGNOSIS — F43.23 ADJUSTMENT DISORDER WITH MIXED ANXIETY AND DEPRESSED MOOD: ICD-10-CM

## 2021-12-17 DIAGNOSIS — F43.12 CHRONIC POST-TRAUMATIC STRESS DISORDER (PTSD): ICD-10-CM

## 2021-12-17 DIAGNOSIS — F60.9 PERSONALITY DISORDER (H): ICD-10-CM

## 2021-12-17 DIAGNOSIS — F34.1 PERSISTENT DEPRESSIVE DISORDER: Primary | ICD-10-CM

## 2021-12-17 PROCEDURE — 90837 PSYTX W PT 60 MINUTES: CPT | Mod: 95 | Performed by: PSYCHOLOGIST

## 2021-12-20 ENCOUNTER — VIRTUAL VISIT (OUTPATIENT)
Dept: PSYCHIATRY | Facility: CLINIC | Age: 36
End: 2021-12-20
Attending: PSYCHOLOGIST
Payer: COMMERCIAL

## 2021-12-20 DIAGNOSIS — F33.1 MAJOR DEPRESSIVE DISORDER, RECURRENT EPISODE, MODERATE (H): ICD-10-CM

## 2021-12-20 DIAGNOSIS — F41.1 GENERALIZED ANXIETY DISORDER: Primary | ICD-10-CM

## 2021-12-20 PROCEDURE — 90853 GROUP PSYCHOTHERAPY: CPT | Mod: 95 | Performed by: PSYCHOLOGIST

## 2021-12-20 NOTE — PROGRESS NOTES
[facility]    Dialectic Behavior Therapy Clinic     Adult DBT Skills Group     DBT (Dialectic Behavior Therapy) is a cognitive behavioral therapy that includes skills group, which uses didactics, modeling, behavior rehearsal, and homework exercises to aid patients in acquiring new skills and the opportunity to practice new behaviors.    Date of Service: Dec 20, 2021  Group Length: Start time: 2:50pm, Kasandra joined 3:45pm   End time:4:27pm  Number of Participants: 3          Group Facilitators: Lynette Thomas, PhD, Malia Lopez BAmiraAAmira    Client: Kasandra Lau  Pronouns: She/Her/Hers/Herself  YOB: 1985  MRN: [id number]    Type of service:  video visit for group therapy  Reason for video visit:  Patient unable to travel due to Covid-19  Originating Site (patient location):  [facility]   Location- Patient's home  Distant Site (provider location):  Remote location  Mode of Communication:  Video Conference via Zoom  Consent:  Patient has given verbal consent for video visit?: Yes     Mindfulness: 3 minute guided meditation, focus on body sensations (Kasandra was not present for mindfulness activity)  Homework Reviewed: Interpersonal effectiveness worksheet 3 (Kasandra was not present for homework review)  Group Topic for Today: Interpersonal effectiveness handout 5: JOI  Homework Assigned: Interpersonal effectiveness worksheet 4    Assessment: Patient was not on time for group, but did alert the group facilitators that she would be late to the group. It is unknown if the patient completed her homework assignment, as she joined the group after the homework review. Patient was engaged in the didactic portion of group. When JOI was presented, Kasandra provided a personal situation to discuss related to JOI. Mood appeared Euthymic.     Diagnosis: Major Depression, moderate, Generalized anxeity  Plan: Continue in full-model outpatient DBT program. Do diary card and  complete the homework.     Group Treatment Plan Reviewed: 10/4/2021  Group Treatment Plan Due for Review: 1/4/2022    SHAUNNA Quinonez, Social Work Intern    I saw the patient with the psychotherapy trainee and participated in the service.  I agree with the findings and plan as documented in this note.    Lynette Thomas, PhD LP

## 2021-12-22 NOTE — PROGRESS NOTES
"  Health Psychology - Follow up Visit  Confidential Summary*    The author of this note documented a reason for not sharing it with the patient.  REFERRAL SOURCE:  Psychiatry    CHIEF COMPLAINT/REASON FOR VISIT  Psychotherapy in context of chronic PTSD and persistent depression.  Patient also complains of dissatisfaction with interpersonal relationships and challenges with emotion regulation.      Patient was seen today for a 60 minute individual psychotherapy session.  The session was facilitated via doxy.me with patient at her home and provider at her own home.     This telehealth service is appropriate and effective for delivering services in light of the necessity for social distancing to mitigate the COVID-19 epidemic. Patient has agreed to receiving telehealth services.    The patient has been notified of following:   \"This video visit will be conducted via a call between you and your physician/provider. We have found that certain health care needs can be provided without the need for an in-person physical exam.   Video visits are billed at different rates depending on your insurance coverage.  Please reach out to your insurance provider with any questions. If during the course of the call the physician/provider feels a video visit is not appropriate, you will not be charged for this service.\"     Patient has given verbal consent for Video visit? Yes    Subjective:  Patient began with discussion of diary card.  She reported that she has not been consistent with her diary card because she often does not want to focus on her feelings.  We reviewed the importance of the diary card in DBT treatment.  Explained that the underlying purpose is to DBT is to observe and describe the emotion, determine whether the emotion is justified and if not, to use skills to cope or change and if justified, to use problem solving.  She continues to report recent challenges with \"American Men\" and she is hoping to now date more " " men because she believes that they do not perceive her as threatening in the way that [place] men do.  She was encouraged to use her diary card to report her emotions during the prior day when she had a difficult interaction with someone on a dating site.  She was able to describe a long history of \"feeling like I am too much for people\".  She was encouraged to consider how her perceptions might impact the way in which she becomes defensive when she perceives that others may reject her and then she rejects them first.  She was encouraged to check the facts before she assumes that others believe she is \"too much\".      Objective:  Patient was on time for today s session, appropriately groomed and dressed, and demonstrated good eye contact.  She appeared to be in a sad mood. She was alert and oriented. Mood was dysphoric with appropriate range of affect. Patient denied suicidal or assaultive ideation, plan, or intent.        Assessment:  The patient has a longstanding history of interpersonal discord and challenges with emotion regulation.  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  She has made recent progress toward completing her academic program and is now ABD.  She is living in  housing with her two dogs.  She was provided with documentation to support her need for these emotional support animals.      Plan:  Patient is now in full model DBT at [facility] and is attending skills group on M at 2.      Treatment plan completed:  10/8/21    Time In: 12:30  Time Out: 1:30    Diagnosis:  Axis I PTSD, chronic, persistent depressive disorder, adjustment disorder with mixed, r/o somatization disorder   Axis II  BPD   Axis III please see medical records for details   [place] IV Psychosocial and Environmental Stressors: academic challenges and living alone with no family support         Corina Muhammad, PhD, LP    "

## 2021-12-24 NOTE — PROGRESS NOTES
"  Health Psychology - Follow up Visit  Confidential Summary*    The author of this note documented a reason for not sharing it with the patient.  REFERRAL SOURCE:  Psychiatry    CHIEF COMPLAINT/REASON FOR VISIT  Psychotherapy in context of chronic PTSD and persistent depression.  Patient also complains of dissatisfaction with interpersonal relationships and challenges with emotion regulation.      Patient was seen today for a 60 minute individual psychotherapy session.  The session was facilitated via doxy.me with patient at her home and provider at her own home.     This telehealth service is appropriate and effective for delivering services in light of the necessity for social distancing to mitigate the COVID-19 epidemic. Patient has agreed to receiving telehealth services.    The patient has been notified of following:   \"This video visit will be conducted via a call between you and your physician/provider. We have found that certain health care needs can be provided without the need for an in-person physical exam.   Video visits are billed at different rates depending on your insurance coverage.  Please reach out to your insurance provider with any questions. If during the course of the call the physician/provider feels a video visit is not appropriate, you will not be charged for this service.\"     Patient has given verbal consent for Video visit? Yes    Subjective:  Patient began with discussion of diary card.  She again reported that she is having trouble completing it.  Again, reviewed purpose of diary card and strongly encouraged completion.  Spent time in session reviewing today and having her complete during session time before we can discuss topics of concern to her.  Intention is to reinforce completion of diary card prior to session so that session time can be spent in problem solving.      She again described her belief that her recurrent challenges in interpersonal effectiveness stem from societal " "expectations of those of women of color and systemic racism.  She described perception of negative stereotypes that do not allow her to be seen by others.  In response, she is increasingly acknowledging that she may become defensive and that she may become more rigid and intellectual and confrontational in her responses.  She described frustration with judgements of others. She reported that she believes that she attempts to compensate to make others comfortable so that they might experience her with more positivity and that overcompensating may increase her irritation when their responses are not as she hoped.      Confronted her on her judgements and encouraged her to note each time she makes a negative judgement.  It may be that the judgements that she imposes on others predisposes her to assume others have similar judgements of her.  Patient is noted to frequently describe intellectual concepts that she supports through her academic achievements and that are difficult to use the check the facts skill to access justifiable emotions.  She is encouraged to continue to describe her feelings.      She was encouraged to describe her hobbies and interests and to resist discussing politics and societal racism and her perception that \"Americans are all..., Men in [place] are all .....\" (negative judgements that may offend others).  She reported that she can not discuss her research or her intellectual interests.  Attempted to convey that others may not understand the details of her work and that she may connect by understanding her audience first.  Attempted to practice a social encounter that avoids her providing her opinions on the negative aspects of society.    Objective:  Patient was on time for today s session, appropriately groomed and dressed, and demonstrated good eye contact.  She appeared to be in a sad mood. She was alert and oriented. Mood was dysphoric with appropriate range of affect. Patient denied " suicidal or assaultive ideation, plan, or intent.        Assessment:  The patient has a longstanding history of interpersonal discord and challenges with emotion regulation.  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  She has made recent progress toward completing her academic program and is now ABD.  She is living in  housing with her two dogs.  She was provided with documentation to support her need for these emotional support animals.      Plan:  Patient is now in full model DBT at [facility] and is attending skills group on  at 2.      Treatment plan completed:  10/8/21    Time In: 1:00  Time Out: 2:00    Diagnosis:  Axis I PTSD, chronic, persistent depressive disorder, adjustment disorder with mixed, r/o somatization disorder   Axis II  BPD   Axis III please see medical records for details   [place] IV Psychosocial and Environmental Stressors: academic challenges and living alone with no family support         Corina Muhammad, PhD, LP

## 2021-12-24 NOTE — PROGRESS NOTES
"  Health Psychology - Follow up Visit  Confidential Summary*    The author of this note documented a reason for not sharing it with the patient.  REFERRAL SOURCE:  Psychiatry    CHIEF COMPLAINT/REASON FOR VISIT  Psychotherapy in context of chronic PTSD and persistent depression.  Patient also complains of dissatisfaction with interpersonal relationships and challenges with emotion regulation.      Patient was seen today for a 60 minute individual psychotherapy session.  The session was facilitated via doxy.me with patient at her home and provider at her own home.     This telehealth service is appropriate and effective for delivering services in light of the necessity for social distancing to mitigate the COVID-19 epidemic. Patient has agreed to receiving telehealth services.    The patient has been notified of following:   \"This video visit will be conducted via a call between you and your physician/provider. We have found that certain health care needs can be provided without the need for an in-person physical exam.   Video visits are billed at different rates depending on your insurance coverage.  Please reach out to your insurance provider with any questions. If during the course of the call the physician/provider feels a video visit is not appropriate, you will not be charged for this service.\"     Patient has given verbal consent for Video visit? Yes    Subjective:  Began with review of diary card.  Patient was congratulated for completing diary card and she was encouraged to continue daily recording of mood and target behaviors.  Much of session was spent discussing how she might increase her hobbies so that she might have improved social skills when engaging others.  Discussed the importance of \"matching\" in self revelation.  Encouraged her to monitor her negative expressions and criticism and judgement of others including men and Americans and expression of criticism.      Discussed her use of observe, " describe and checking the facts before making judgements.      Objective:  Patient was on time for today s session, appropriately groomed and dressed, and demonstrated good eye contact.  She appeared to be in a sad mood. She was alert and oriented. Mood was dysphoric with appropriate range of affect. Patient denied suicidal or assaultive ideation, plan, or intent.        Assessment:  The patient has a longstanding history of interpersonal discord and challenges with emotion regulation.  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  She has made recent progress toward completing her academic program and is now ABD.  She is living in  housing with her two dogs.  She was provided with documentation to support her need for these emotional support animals.      Plan:  Patient is now in full model DBT at [facility] and is attending skills group on M at 2.      Treatment plan completed:  10/8/21    Time In: 1:00  Time Out: 2:00    Diagnosis:  Axis I PTSD, chronic, persistent depressive disorder, adjustment disorder with mixed, r/o somatization disorder   Axis II  BPD   Axis III please see medical records for details   [place] IV Psychosocial and Environmental Stressors: academic challenges and living alone with no family support         Corina Muhammad, PhD, LP

## 2022-01-01 ENCOUNTER — MYC MEDICAL ADVICE (OUTPATIENT)
Dept: OTOLARYNGOLOGY | Facility: CLINIC | Age: 37
End: 2022-01-01
Payer: COMMERCIAL

## 2022-01-03 DIAGNOSIS — F41.9 ANXIETY: ICD-10-CM

## 2022-01-03 DIAGNOSIS — J45.30 MILD PERSISTENT ASTHMA, UNSPECIFIED WHETHER COMPLICATED: Primary | ICD-10-CM

## 2022-01-03 DIAGNOSIS — M79.2 NEUROPATHIC PAIN: ICD-10-CM

## 2022-01-03 DIAGNOSIS — J68.3 MODERATE PERSISTENT REACTIVE AIRWAYS DYSFUNCTION SYNDROME WITHOUT COMPLICATION (H): ICD-10-CM

## 2022-01-03 DIAGNOSIS — J32.0 CHRONIC MAXILLARY SINUSITIS: ICD-10-CM

## 2022-01-03 DIAGNOSIS — R09.81 NASAL CONGESTION: ICD-10-CM

## 2022-01-03 RX ORDER — GABAPENTIN 400 MG/1
800 CAPSULE ORAL 3 TIMES DAILY
Qty: 180 CAPSULE | Refills: 5 | Status: SHIPPED | OUTPATIENT
Start: 2022-01-03 | End: 2022-06-14

## 2022-01-03 RX ORDER — MONTELUKAST SODIUM 10 MG/1
10 TABLET ORAL AT BEDTIME
Qty: 90 TABLET | Refills: 1 | Status: SHIPPED | OUTPATIENT
Start: 2022-01-03 | End: 2022-06-14

## 2022-01-04 ENCOUNTER — VIRTUAL VISIT (OUTPATIENT)
Dept: FAMILY MEDICINE | Facility: CLINIC | Age: 37
End: 2022-01-04
Payer: COMMERCIAL

## 2022-01-04 ENCOUNTER — LAB (OUTPATIENT)
Dept: LAB | Facility: CLINIC | Age: 37
End: 2022-01-04
Payer: COMMERCIAL

## 2022-01-04 DIAGNOSIS — Z11.3 ROUTINE SCREENING FOR STI (SEXUALLY TRANSMITTED INFECTION): ICD-10-CM

## 2022-01-04 DIAGNOSIS — J02.9 ACUTE PHARYNGITIS, UNSPECIFIED ETIOLOGY: ICD-10-CM

## 2022-01-04 DIAGNOSIS — R53.83 FATIGUE, UNSPECIFIED TYPE: ICD-10-CM

## 2022-01-04 DIAGNOSIS — F41.9 ANXIETY DISORDER, UNSPECIFIED TYPE: ICD-10-CM

## 2022-01-04 DIAGNOSIS — F33.1 MODERATE EPISODE OF RECURRENT MAJOR DEPRESSIVE DISORDER (H): ICD-10-CM

## 2022-01-04 DIAGNOSIS — R53.83 FATIGUE, UNSPECIFIED TYPE: Primary | ICD-10-CM

## 2022-01-04 LAB
BASOPHILS # BLD AUTO: 0.1 10E3/UL (ref 0–0.2)
BASOPHILS NFR BLD AUTO: 1 %
EOSINOPHIL # BLD AUTO: 0.1 10E3/UL (ref 0–0.7)
EOSINOPHIL NFR BLD AUTO: 1 %
ERYTHROCYTE [DISTWIDTH] IN BLOOD BY AUTOMATED COUNT: 13.7 % (ref 10–15)
HCT VFR BLD AUTO: 41.9 % (ref 35–47)
HGB BLD-MCNC: 13.5 G/DL (ref 11.7–15.7)
IMM GRANULOCYTES # BLD: 0 10E3/UL
IMM GRANULOCYTES NFR BLD: 0 %
LYMPHOCYTES # BLD AUTO: 3.3 10E3/UL (ref 0.8–5.3)
LYMPHOCYTES NFR BLD AUTO: 32 %
MCH RBC QN AUTO: 28.1 PG (ref 26.5–33)
MCHC RBC AUTO-ENTMCNC: 32.2 G/DL (ref 31.5–36.5)
MCV RBC AUTO: 87 FL (ref 78–100)
MONOCYTES # BLD AUTO: 0.6 10E3/UL (ref 0–1.3)
MONOCYTES NFR BLD AUTO: 6 %
MONOCYTES NFR BLD AUTO: NEGATIVE %
NEUTROPHILS # BLD AUTO: 6.3 10E3/UL (ref 1.6–8.3)
NEUTROPHILS NFR BLD AUTO: 60 %
NRBC # BLD AUTO: 0 10E3/UL
NRBC BLD AUTO-RTO: 0 /100
PLATELET # BLD AUTO: 437 10E3/UL (ref 150–450)
RBC # BLD AUTO: 4.8 10E6/UL (ref 3.8–5.2)
TSH SERPL DL<=0.005 MIU/L-ACNC: 1.81 MU/L (ref 0.4–4)
WBC # BLD AUTO: 10.3 10E3/UL (ref 4–11)

## 2022-01-04 PROCEDURE — 86308 HETEROPHILE ANTIBODY SCREEN: CPT | Mod: 90 | Performed by: PATHOLOGY

## 2022-01-04 PROCEDURE — 87389 HIV-1 AG W/HIV-1&-2 AB AG IA: CPT | Mod: 90 | Performed by: PATHOLOGY

## 2022-01-04 PROCEDURE — U0003 INFECTIOUS AGENT DETECTION BY NUCLEIC ACID (DNA OR RNA); SEVERE ACUTE RESPIRATORY SYNDROME CORONAVIRUS 2 (SARS-COV-2) (CORONAVIRUS DISEASE [COVID-19]), AMPLIFIED PROBE TECHNIQUE, MAKING USE OF HIGH THROUGHPUT TECHNOLOGIES AS DESCRIBED BY CMS-2020-01-R: HCPCS | Mod: 90 | Performed by: PATHOLOGY

## 2022-01-04 PROCEDURE — 87491 CHLMYD TRACH DNA AMP PROBE: CPT | Mod: 90 | Performed by: PATHOLOGY

## 2022-01-04 PROCEDURE — 99000 SPECIMEN HANDLING OFFICE-LAB: CPT | Performed by: PATHOLOGY

## 2022-01-04 PROCEDURE — 84443 ASSAY THYROID STIM HORMONE: CPT | Performed by: PATHOLOGY

## 2022-01-04 PROCEDURE — 86780 TREPONEMA PALLIDUM: CPT | Mod: 90 | Performed by: PATHOLOGY

## 2022-01-04 PROCEDURE — 85025 COMPLETE CBC W/AUTO DIFF WBC: CPT | Performed by: PATHOLOGY

## 2022-01-04 PROCEDURE — U0005 INFEC AGEN DETEC AMPLI PROBE: HCPCS | Mod: 90 | Performed by: PATHOLOGY

## 2022-01-04 PROCEDURE — 87591 N.GONORRHOEAE DNA AMP PROB: CPT | Mod: 90 | Performed by: PATHOLOGY

## 2022-01-04 NOTE — PROGRESS NOTES
"36 year-old femeren;Rufina is a 36 year old who is being evaluated via a billable video visit.      How would you like to obtain your AVS? MyChart  If the video visit is dropped, the invitation should be resent by: Send to e-mail at: [email or web address]  Will anyone else be joining your video visit? No      Video Start Time: 3:10PM   Assessment & Plan     1. Fatigue, unspecified type  Recent viral like illness with multiple symptoms. Fatigue persists, no recent TSH.   - Symptomatic; Yes; 12/25/2021 COVID-19 Virus (Coronavirus) by PCR Nasopharyngeal; Future  - Mononucleosis screen; Future  - TSH with free T4 reflex; Future    2. Acute pharyngitis, unspecified etiology  - CBC with platelets differential; Future  - Strep Screen Rapid (Group) ([facility]); Future    3. Routine screening for STI (sexually transmitted infection)  Discussed that perhaps this could wait given patient is feeling unwell currently, but would like to get done as she is having other labs drawn. Reinforced condom use. Stopped  Contraceptive 11/21. Is at risk for unplanned pregnancy.   - NEISSERIA GONORRHOEA PCR; Future  - CHLAMYDIA TRACHOMATIS PCR; Future  - Treponema Abs w Reflex to RPR and Titer; Future  - HIV Antigen Antibody Combo; Future  Added UPT but late, unclear whether lab can add.     4. Anxiety disorder, unspecified type  Requested patient follow with MH providers for pyschiatric medications as they are managing. Referred to psychiatry for med evaluation and stabilization  - Adult Mental Health Referral; Future    5. Moderate episode of recurrent major depressive disorder (H)  As above.  - Adult Mental Health Referral; Future  956}     BMI:   Estimated body mass index is 28.16 kg/m  as calculated from the following:    Height as of 11/2/21: 1.717 m (5' 7.6\").    Weight as of 11/2/21: 83 kg (183 lb).   Weight management plan: Not addressed today    Follow up if persistent symptoms. Will notify of labs. Will need to address " contraception, pregnancy/STI risk.     No follow-ups on file.    Michelle Santillan Carla, JASVIR CNP  [facility]    Subjective   Kasandra is a 36 year old who presents for the following health issues     HPI     36 year-old female presents for video visit with multiple concerns:   1)   fatigue and achiness with sore throat, some diarrhea, gas and abdominal cramping. Denies fever, Reports runny nose but not unchanged from baseline. Loss of appetite. This all started after getting together with friends over holidays. Others in group with similar symptoms, but they tested negative for COVID. She is feeling somewhat better today, but would like COVID test. Denies SOB, lost of taste or smell.   2) requesting STI testing. Not having any symptoms but reports 7 partners this year and since will do labs, would like these tests done also.    3) Also concerned that she has not had recent TSH  4) Would like refill on Viibryd but looks like it was refilled by psychology.  She has not been able to see psychiatry there only counselor and would like referral to psychiatry.     Review of Systems   GEN: fever, fatigue as per HPI  HEENT: denies ear pain, but rhinorrhea, sore throat.   RESP: No cough or SOB  CV: negative for chestpain  GI: negative for current N/V. Some diarrhea with illness  : recent LMP on time. Stopped vaginal contraceptive 11/21; has affected mood somewhat.  MSK: Muscle aches and pains  MOOD: would like to restart Viibryd. Has been off for awhile. Recent increase in gabapentin.       Objective           Vitals:  No vitals were obtained today due to virtual visit.    Physical Exam   GENERAL: Healthy, alert and no distress  EYES: Eyes grossly normal to inspection.  No discharge or erythema, or obvious scleral/conjunctival abnormalities.  RESP: No audible wheeze, cough, or visible cyanosis.  No visible retractions or increased work of breathing.    SKIN: Visible skin clear. No significant rash, abnormal  pigmentation or lesions.  NEURO: Cranial nerves grossly intact.  Mentation and speech appropriate for age.  PSYCH: Mentation appears normal, affect normal/bright, judgement and insight intact, normal speech and appearance well-groomed.    Labs ordered and pending.           Video-Visit Details    Type of service:  Video Visit    Video End Time:3:30PM    Originating Location (pt. Location): Home    Distant Location (provider location):  [facility]     Platform used for Video Visit: Chad

## 2022-01-05 LAB
C TRACH DNA SPEC QL NAA+PROBE: NEGATIVE
HIV 1+2 AB+HIV1 P24 AG SERPL QL IA: NONREACTIVE
N GONORRHOEA DNA SPEC QL NAA+PROBE: NEGATIVE
SARS-COV-2 RNA RESP QL NAA+PROBE: NORMAL
T PALLIDUM AB SER QL: NONREACTIVE

## 2022-01-06 DIAGNOSIS — F33.1 MAJOR DEPRESSIVE DISORDER, RECURRENT EPISODE, MODERATE (H): ICD-10-CM

## 2022-01-06 DIAGNOSIS — F41.9 ANXIETY: ICD-10-CM

## 2022-01-06 LAB — SARS-COV-2 RNA RESP QL NAA+PROBE: NOT DETECTED

## 2022-01-06 RX ORDER — BUDESONIDE 0.5 MG/2ML
INHALANT ORAL
Qty: 2 ML | Refills: 3 | Status: SHIPPED | OUTPATIENT
Start: 2022-01-06 | End: 2022-04-16

## 2022-01-07 ENCOUNTER — VIRTUAL VISIT (OUTPATIENT)
Dept: PSYCHOLOGY | Facility: CLINIC | Age: 37
End: 2022-01-07
Payer: COMMERCIAL

## 2022-01-07 DIAGNOSIS — F34.1 PERSISTENT DEPRESSIVE DISORDER: ICD-10-CM

## 2022-01-07 DIAGNOSIS — F43.12 CHRONIC POST-TRAUMATIC STRESS DISORDER (PTSD): Primary | ICD-10-CM

## 2022-01-07 DIAGNOSIS — F43.23 ADJUSTMENT DISORDER WITH MIXED ANXIETY AND DEPRESSED MOOD: ICD-10-CM

## 2022-01-07 PROCEDURE — 90837 PSYTX W PT 60 MINUTES: CPT | Mod: 95 | Performed by: PSYCHOLOGIST

## 2022-01-10 ENCOUNTER — VIRTUAL VISIT (OUTPATIENT)
Dept: PSYCHIATRY | Facility: CLINIC | Age: 37
End: 2022-01-10
Attending: PSYCHOLOGIST
Payer: COMMERCIAL

## 2022-01-10 ENCOUNTER — IMMUNIZATION (OUTPATIENT)
Dept: NURSING | Facility: CLINIC | Age: 37
End: 2022-01-10
Payer: COMMERCIAL

## 2022-01-10 DIAGNOSIS — F33.1 MAJOR DEPRESSIVE DISORDER, RECURRENT EPISODE, MODERATE (H): ICD-10-CM

## 2022-01-10 DIAGNOSIS — F41.1 GENERALIZED ANXIETY DISORDER: Primary | ICD-10-CM

## 2022-01-10 PROCEDURE — 0054A COVID-19,PF,PFIZER (12+ YRS): CPT

## 2022-01-10 PROCEDURE — 91305 COVID-19,PF,PFIZER (12+ YRS): CPT

## 2022-01-10 PROCEDURE — 90853 GROUP PSYCHOTHERAPY: CPT | Mod: 95

## 2022-01-10 RX ORDER — PROPRANOLOL HYDROCHLORIDE 20 MG/1
20 TABLET ORAL DAILY PRN
Qty: 30 TABLET | Refills: 1 | Status: SHIPPED | OUTPATIENT
Start: 2022-01-10 | End: 2022-03-02

## 2022-01-10 RX ORDER — BUPROPION HYDROCHLORIDE 300 MG/1
TABLET ORAL
Qty: 30 TABLET | Refills: 1 | Status: SHIPPED | OUTPATIENT
Start: 2022-01-10 | End: 2022-03-11

## 2022-01-10 NOTE — TELEPHONE ENCOUNTER
PROPRANOLOL 20 MG TABLET      Last Written Prescription Date:  11-15-21  Last Fill Quantity: 30,   # refills: 1  Last Office Visit : 1-4-22  Future Office visit:  none      Last clinic note: 4. Anxiety disorder, unspecified type  Requested patient follow with MH providers for pyschiatric medications as they are managing. Referred to psychiatry for med evaluation and stabilization  - Adult Mental Health Referral; Future    Routing refill request to provider for review/approval because:  Pt has not established care w/ Psychiatry, referral states pending review.  Associated diagnosis not on protocol Anxiety [F41.9]   All previous fills by Carla    BUPROPION HCL  MG TABLET      Last Written Prescription Date:  11-16-21  Last Fill Quantity: 30,   # refills: 1      Routing refill request to provider for review/approval because:  Pt has not established care w/ Psychiatry, referral states pending  review.  Abnormal PHQ9 noted in 9-21-21 clinic note    FYI:  Last psych RF note:1-11-21 refill note:  Pritesh Slaughter,   Thanks again for your patience.   Dr. Mena has approved one last refill of your Viibryd to [facility] [id number] [place] [address]. Please let me know if you run into any issues. Also, you will have to establish care with a new provider to obtain additional refills.    Thanks,   Angelique

## 2022-01-10 NOTE — PROGRESS NOTES
[facility]    Dialectic Behavior Therapy Clinic     Adult DBT Skills Group     DBT (Dialectic Behavior Therapy) is a cognitive behavioral therapy that includes skills group, which uses didactics, modeling, behavior rehearsal, and homework exercises to aid patients in acquiring new skills and the opportunity to practice new behaviors.    Date of Service: Williams 10, 2022  Group Length: 120 minutes  Start time: 2:32pm   End time: 4:36pm  Number of Participants: 2  (other 2 group members could not make it today)        Group Facilitators: Mackenzie Corbin, PhD, LP, SHAUNNA Drake, and Curt Garrison MD    Client: Kasandra Lau  Pronouns: She/Her/Hers/Herself  YOB: 1985  MRN: [id number]    Type of service:  video visit for group therapy  Reason for video visit:  Patient unable to travel due to Covid-19  Originating Site (patient location):  [facility]   Location- Patient's home  Distant Site (provider location):  Remote location  Mode of Communication:  Video Conference via Zoom  Consent:  Patient has given verbal consent for video visit?: Yes     Mindfulness: Sounds of the rainforest- focusing on the sounds, recognizing when mind is wandering and bringing mind back to the sounds.   Homework Reviewed: IE Worksheet 4: Writing out interpersonal effectiveness scripts.  Group Topic for Today: IE Handout 8: Evaluating options to whether or how intensely to ask for something to say no and factors to consider.   Homework Assigned: IE Worksheet 6: The dime game: figuring out how strongly to ask or say no.    Assessment: Patient was on time for group. Patient did not complete their diary card due to illness the past week. Patient did complete the homework assigned the previous week prior to arriving at group. Patient created a JOI script about an email she needs to send to her professor about an incomplete grade. Patient was engaged in homework review and was  engaged in the didactic portion of group. Patient interacted with other group member as well as facilitators. Mood appeared euthymic.     Diagnosis: Major Depression, moderate, Generalized anxeity  Plan: Continue in full-model outpatient DBT program. Do diary card and complete the homework each week.      Group Treatment Plan Reviewed: 10/4/2021  Group Treatment Plan Due for Review: 1/4/2022     SHAUNNA Quinonez, Social Work Intern

## 2022-01-14 ENCOUNTER — VIRTUAL VISIT (OUTPATIENT)
Dept: PSYCHOLOGY | Facility: CLINIC | Age: 37
End: 2022-01-14
Payer: COMMERCIAL

## 2022-01-14 DIAGNOSIS — F43.12 CHRONIC POST-TRAUMATIC STRESS DISORDER (PTSD): Primary | ICD-10-CM

## 2022-01-14 DIAGNOSIS — F34.1 PERSISTENT DEPRESSIVE DISORDER: ICD-10-CM

## 2022-01-14 DIAGNOSIS — F43.23 ADJUSTMENT DISORDER WITH MIXED ANXIETY AND DEPRESSED MOOD: ICD-10-CM

## 2022-01-14 PROCEDURE — 90837 PSYTX W PT 60 MINUTES: CPT | Mod: 95 | Performed by: PSYCHOLOGIST
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
## 2022-01-17 NOTE — PROGRESS NOTES
"  Health Psychology - Follow up Visit  Confidential Summary*    The author of this note documented a reason for not sharing it with the patient.  REFERRAL SOURCE:  Psychiatry    CHIEF COMPLAINT/REASON FOR VISIT  Psychotherapy in context of chronic PTSD and persistent depression.  Patient also complains of dissatisfaction with interpersonal relationships and challenges with emotion regulation.      Patient was seen today for a 60 minute individual psychotherapy session.  The session was facilitated via doxy.me with patient at her home and provider at her own home.     This telehealth service is appropriate and effective for delivering services in light of the necessity for social distancing to mitigate the COVID-19 epidemic. Patient has agreed to receiving telehealth services.    The patient has been notified of following:   \"This video visit will be conducted via a call between you and your physician/provider. We have found that certain health care needs can be provided without the need for an in-person physical exam.   Video visits are billed at different rates depending on your insurance coverage.  Please reach out to your insurance provider with any questions. If during the course of the call the physician/provider feels a video visit is not appropriate, you will not be charged for this service.\"     Patient has given verbal consent for Video visit? Yes    Subjective:  Began with review of diary card.  Patient was encouraged to discuss her recent dating experience.  She was informed that this provider was concerned about her decision making in inviting someone she had never seen to come into her home.  Although she maintains that her dogs would protect her, she was gently confronted on the possible limitations of this assumption.  She was informed that a \"video date\" might be an option before she pursues an in person meeting.  Also discussed COVID precautions.  She openly acknowledged fear that she does not " present well during video sessions and that she becomes more anxious when she does not have the benefit of in person energy.  Validated her perceptions and disappointment that we are in a pandemic and that a first meeting in a private space may place her at risk.      Discussed her ongoing anxiety and chaos associated with an unresolved grade on her transcripts.  She is not able to become ABD and without this, she is not able to receive her fellowship funds.  Without the fellowship funds, she will not be able to pay the $2K she owes in back fees and then she will no longer be considered a student and may have to move out of student housing.  Encouraged her to pursue the options provided by the admissions office.  She also reported that her  ( for past 5 years) may be able to help her out of this situation, if she agrees to pay him back asap.  Discussed the pros and cons of this option.     Objective:  Patient was on time for today s session, appropriately groomed and dressed, and demonstrated good eye contact.  She appeared to be in a sad mood. She was alert and oriented. Mood was dysphoric with appropriate range of affect. Patient denied suicidal or assaultive ideation, plan, or intent.        Assessment:  The patient has a longstanding history of interpersonal discord and challenges with emotion regulation.  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  She has made recent progress toward completing her academic program and is now ABD, although she has recently learned that she is at risk of losing this status..  She is living in  housing with her two dogs.      Plan:  Patient is now in full model DBT at [facility] and is attending skills group on  at 2.      Treatment plan completed:  10/8/21    Time In: 1:00  Time Out: 2:00    Diagnosis:  Axis I PTSD, chronic, persistent depressive disorder, adjustment disorder with mixed, r/o somatization  disorder   Axis II  BPD   Axis III please see medical records for details   [place] IV Psychosocial and Environmental Stressors: academic challenges and living alone with no family support         Corina Muhammad PhD, LP

## 2022-01-18 ENCOUNTER — OFFICE VISIT (OUTPATIENT)
Dept: FAMILY MEDICINE | Facility: CLINIC | Age: 37
End: 2022-01-18
Payer: COMMERCIAL

## 2022-01-18 VITALS
TEMPERATURE: 98.9 F | HEIGHT: 68 IN | DIASTOLIC BLOOD PRESSURE: 80 MMHG | BODY MASS INDEX: 28.05 KG/M2 | HEART RATE: 94 BPM | SYSTOLIC BLOOD PRESSURE: 121 MMHG | OXYGEN SATURATION: 96 % | WEIGHT: 185.1 LBS

## 2022-01-18 DIAGNOSIS — J35.1 TONSILLAR HYPERTROPHY: Primary | ICD-10-CM

## 2022-01-18 DIAGNOSIS — F33.1 MODERATE EPISODE OF RECURRENT MAJOR DEPRESSIVE DISORDER (H): ICD-10-CM

## 2022-01-18 RX ORDER — VILAZODONE HYDROCHLORIDE 40 MG/1
40 TABLET ORAL EVERY MORNING
Qty: 90 TABLET | Refills: 0 | Status: SHIPPED | OUTPATIENT
Start: 2022-01-18 | End: 2022-04-07

## 2022-01-18 ASSESSMENT — MIFFLIN-ST. JEOR: SCORE: 1576.76

## 2022-01-18 NOTE — PROGRESS NOTES
Patient presents to the clinic today with concern of tonsillary enlargement.    HPI: She reports that it has made it difficult for her to swallow and interrferes with her breathing at night. She has noted that this has been going on intermittently for years and that she has mentioned it to doctors and nothing has been done up until recently when she was seen at the ENT. She notices the discomfort most during the  Morning, and it is accompanied by syptoms of dry throat and a feeling that there is something stuck in her throat.The ENT prescribed irrigation therapy with steroids which she states has been helping but is fleeting and doesn't last long. She reports that she has not been getting frequent infections such as strep throat or viral infections. She has had a sleep study done and they reported she does not have NIKOLAI.

## 2022-01-18 NOTE — NURSING NOTE
"ROOM:2    Preferred Name: Kasandra     36 year old  Chief Complaint   Patient presents with     Ent Problem     Still having issues with swollowing and breathing       Blood pressure 121/80, pulse 94, temperature 98.9  F (37.2  C), temperature source Oral, height 1.717 m (5' 7.6\"), weight 84 kg (185 lb 1.6 oz), last menstrual period 01/13/2022, SpO2 96 %, not currently breastfeeding. Body mass index is 28.48 kg/m .      Patient Active Problem List   Diagnosis     Pelvic pain in female     Vitamin D deficiency     Menorrhagia with regular cycle     Migraine without aura and without status migrainosus, not intractable     Iron deficiency anemia due to chronic blood loss     Tired     Circadian rhythm sleep disorder, delayed sleep phase type     Unhealthy sleep habit     Seasonal mood disorder (H)     Chronic idiopathic urticaria     Acid reflux     Cholecystitis     Depression     Hx of abnormal cervical Pap smear     Irritable bowel syndrome with constipation     Migraine headache     Mild intermittent asthma without complication     Need for desensitization to allergens     Panic disorder     Pap smear abnormality of cervix/human papillomavirus (HPV) positive     Recurrent major depressive disorder, in remission (H)     Hypertrophy of breast     Chronic sinusitis, unspecified location       Wt Readings from Last 2 Encounters:   01/18/22 84 kg (185 lb 1.6 oz)   11/02/21 83 kg (183 lb)     BP Readings from Last 3 Encounters:   01/18/22 121/80   11/02/21 113/78   09/21/21 117/79       Allergies   Allergen Reactions     Dust Mites Cough, Difficulty breathing and Shortness Of Breath     Cats      Chest tightness, sinus irritation       Current Outpatient Medications   Medication     Acetaminophen (TYLENOL PO)     almotriptan (AXERT) 12.5 MG tablet     budesonide (PULMICORT) 0.5 MG/2ML neb solution     buPROPion (WELLBUTRIN XL) 300 MG 24 hr tablet     cholecalciferol 50 MCG (2000 UT) CAPS     doxepin (SINEQUAN) 10 MG capsule "     EMGALITY 120 MG/ML injection     famotidine (PEPCID) 20 MG tablet     fexofenadine (ALLEGRA) 180 MG tablet     fluticasone (FLONASE) 50 MCG/ACT nasal spray     gabapentin (NEURONTIN) 400 MG capsule     levocetirizine (XYZAL) 5 MG tablet     montelukast (SINGULAIR) 10 MG tablet     order for DME     propranolol (INDERAL) 20 MG tablet     vilazodone (VIIBRYD) 40 MG TABS tablet     azelastine (ASTELIN) 0.1 % nasal spray     loteprednol (LOTEMAX) 0.5 % ophthalmic suspension     olopatadine (PATANOL) 0.1 % ophthalmic solution     ondansetron (ZOFRAN-ODT) 4 MG ODT tab     promethazine (PHENERGAN) 25 MG tablet     pyridostigmine (MESTINON) 60 MG tablet     No current facility-administered medications for this visit.       Social History     Tobacco Use     Smoking status: Former Smoker     Packs/day: 0.00     Years: 10.00     Pack years: 0.00     Types: Cigarettes     Smokeless tobacco: Never Used     Tobacco comment: smokes occasionally socially    Vaping Use     Vaping Use: Never used   Substance Use Topics     Alcohol use: Not Currently     Alcohol/week: 0.0 standard drinks     Comment: occasional      Drug use: Not Currently     Types: Marijuana     Comment: marijuana  none since May 2021       Honoring Choices - Health Care Directive Guide offered to patient at time of visit.    Health Maintenance Due   Topic Date Due     ASTHMA ACTION PLAN  Never done     ASTHMA CONTROL TEST  Never done     ADVANCE CARE PLANNING  Never done     PREVENTIVE CARE VISIT  01/31/2020     HPV TEST  02/21/2020     PAP  02/21/2020       Immunization History   Administered Date(s) Administered     COVID-19,PF,Pfizer (12+ Yrs) 03/19/2021, 04/09/2021, 01/10/2022     DTaP, Unspecified 05/22/2019     Flu, Unspecified 09/23/2016     Influenza Vaccine IM > 6 months Valent IIV4 (Alfuria,Fluzone) 09/23/2016, 09/07/2018, 08/28/2019, 09/02/2020, 11/02/2021     Pneumococcal 23 valent 05/22/2019     Tdap (Adacel,Boostrix) 05/22/2019       Lab Results    Component Value Date    PAP NIL 01/31/2019         Recent Labs   Lab Test 01/04/22  1732 08/31/21  1057 08/12/21  1535 09/12/20  2058 08/02/19  1711 10/05/17  1133 02/06/17  1200   ALT  --  41 33 36 25   < >  --    CR  --  0.69 0.68 0.77 0.80   < >  --    GFRESTIMATED  --  >90 >90 >90 >90   < >  --    GFRESTBLACK  --   --   --  >90 >90   < >  --    ALBUMIN  --  3.2* 2.9* 3.3* 3.4   < >  --    POTASSIUM  --  3.8 3.4 3.9 3.5   < >  --    TSH 1.81  --   --   --   --   --  1.72    < > = values in this interval not displayed.       PHQ-2 ( 1999 Pfizer) 1/18/2022 1/4/2022   Q1: Little interest or pleasure in doing things 0 0   Q2: Feeling down, depressed or hopeless 0 0   PHQ-2 Score 0 0   PHQ-2 Total Score (12-17 Years)- Positive if 3 or more points; Administer PHQ-A if positive 0 0   Q1: Little interest or pleasure in doing things - -   Q2: Feeling down, depressed or hopeless - -   PHQ-2 Score - -       PHQ-9 SCORE 1/15/2020 2/12/2020 4/29/2020 9/21/2021   PHQ-9 Total Score MyChart - - 9 (Mild depression) -   PHQ-9 Total Score 7 8 9 9       VIC-7 SCORE 2/12/2020 4/29/2020 9/21/2021   Total Score - 10 (moderate anxiety) -   Total Score 9 10 13       No flowsheet data found.      Lakisha Yu, [facility]  January 18, 2022 1:08 PM

## 2022-01-18 NOTE — PROGRESS NOTES
"       HPI       Kasandra Lau is a 36 year old who presents for swollen tonsils.   Chief Complaint   Patient presents with     Ent Problem     Still having issues with swollowing and breathing       HPI: Kasandra Lau reports that her enlarged tonsils have made it difficult for her to swallow and breathe at night. She has had issues with her tonsils intermittently for years. She notices the discomfort most during the morning when she wakes up. She has been experiencing related syptoms including dry throat and a sensation that there is something stuck in her throat. She has expressed this concern over the years to her care providers and recently was seen by an ENT doctor.The ENT prescribed irrigation therapy with steroids which she states has been helping but is fleeting and does not last long. She denies history of  frequent infenections. She has had a sleep study done and she recalls that her results were negative for NIKOLAI.       Problem, Medication and Allergy Lists were reviewed.    Patient is an established patient of this clinic..         Review of Systems:   Review of Systems  GEN: negative for fever  HEENT: as per HPI. Notes feeling of congestion in nose and ears.   RESP: negative cough  MOOD: would like Viibryd refilled. Could not get into psychiatry. Would like new referral to psychiatry.  Continues with psychologist       Physical Exam:     Vitals:    01/18/22 1307   BP: 121/80   Pulse: 94   Temp: 98.9  F (37.2  C)   TempSrc: Oral   SpO2: 96%   Weight: 84 kg (185 lb 1.6 oz)   Height: 1.717 m (5' 7.6\")     Body mass index is 28.48 kg/m .  Vitals were reviewed and were normal    Physical Exam  Head: Normocephalic, eyes symmetrical.  Eyes: EOM intact, PERRLA, sclarea clear without drainage, no lesions present.   Ears: Gray tympanic membranes bilaterally with light reflex, slight bilateral retraction of the tympanic membranes bilaterally.   Nose: Bilateral boggy erythematous turbinates without drainage. "   Throat: Oral mucosa dry, +3 tonsil right and +4 left visualized with no exudate. No lymphadenopathy.         Results:   No testing ordered today    Assessment and Plan        (J35.1) Tonsillar hypertrophy  (primary encounter diagnosis)  Comment: The patient has been seen by ENT for this concern and was told she had enlarged tonsils. They treated her with budesonide nasal irrigations twice daily. Since then her symptoms have not improved and continue to interfere with her quality of life. She requested to be considered for a tonsillectomy by the ENT that saw her, but they reported none of their doctors preform the procedure at that clinic and was referred back to primary care. Upon exam I noted tonsillar hypertrophy and boggy erythematous turbinates. Considered NIKOLAI as a cause but unlikely due to past sleep study per patient being negative. I have placed a referral in for [facility]. Recommended continued treatment with the nasal irrigation and reducing exposure to allergens by keeping spaces in her house clean and free of dust. She was also directed to increase intake of water as her oral mucus membranes were dry upon examination.   Plan: Otolaryngology Referral      (F33.1) Moderate episode of recurrent major depressive disorder (H)  Comment: Seasonal pattern. The provider that prescribed her medications in the past was in [place] and she has not established care with a new provider in the area. Discussed establishing care with a psychiatrist locally to managing her psych medications. She was given a referral at a recent visit but they are not taking new patients. Gave her contact information for [facility]. Refilled her Viibryd and educated her on the potential interaction with Doxepin including elevated BP, racing heart and sweating, and directed her to seek medical care if she experiences any of these symptoms.   Plan: vilazodone (VIIBRYD) 40 MG  TABS tablet         There are no discontinued medications.    Options for treatment and follow-up care were reviewed with the patient. Kasandra Lau  engaged in the decision making process and verbalized understanding of the options discussed and agreed with the final plan.    Kiley Bey NP student  I was present with the NPP student who participated in the service and in the documentation of the services provided. I have verified the history and personally performed the physical exam and medical decision making, as documented by the student and edited by me    JASVIR Ashley CNP  01/18/22  5:12 PM

## 2022-01-18 NOTE — PATIENT INSTRUCTIONS
You were seen today for  Enlarged tonsils. We have recommended that you to continue with your nasal irrigations and interventions to reduce exposure to allergens including cleaning common areas to remove dust.   We spoke about increasing fluid intake as your mucus membranes appeared dry upon examination.     We have placed a ENT referral for a opinion regarding next steps to address your concern with Jose Bingham  [phone]. Please call for an appointment. Please be sure to check with your insurance for coverage.    We have also refilled your Viibryd. Please be aware that there is potential for Viibryd to interact with your Doxepin. If you develop elevated BP, racing heart, sweating you should seek medical care     We recommend that you establish care with psychiatry. You should check with your insurance for coverage.   Options:  [facility]  [phone]  [address] [place] [address] Psychiatry [phone]

## 2022-01-21 ENCOUNTER — VIRTUAL VISIT (OUTPATIENT)
Dept: PSYCHOLOGY | Facility: CLINIC | Age: 37
End: 2022-01-21
Payer: COMMERCIAL

## 2022-01-21 DIAGNOSIS — F43.23 ADJUSTMENT DISORDER WITH MIXED ANXIETY AND DEPRESSED MOOD: ICD-10-CM

## 2022-01-21 DIAGNOSIS — F43.12 CHRONIC POST-TRAUMATIC STRESS DISORDER (PTSD): Primary | ICD-10-CM

## 2022-01-21 DIAGNOSIS — F60.9 PERSONALITY DISORDER (H): ICD-10-CM

## 2022-01-21 PROCEDURE — 90837 PSYTX W PT 60 MINUTES: CPT | Mod: 95 | Performed by: PSYCHOLOGIST
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
## 2022-01-24 ENCOUNTER — VIRTUAL VISIT (OUTPATIENT)
Dept: PSYCHIATRY | Facility: CLINIC | Age: 37
End: 2022-01-24
Attending: PSYCHOLOGIST
Payer: COMMERCIAL

## 2022-01-24 DIAGNOSIS — F41.1 GENERALIZED ANXIETY DISORDER: ICD-10-CM

## 2022-01-24 DIAGNOSIS — F33.1 MAJOR DEPRESSIVE DISORDER, RECURRENT EPISODE, MODERATE (H): Primary | ICD-10-CM

## 2022-01-24 PROCEDURE — 90853 GROUP PSYCHOTHERAPY: CPT | Mod: U7

## 2022-01-24 NOTE — PROGRESS NOTES
[facility]    Dialectic Behavior Therapy Clinic     Adult DBT Skills Group     DBT (Dialectic Behavior Therapy) is a cognitive behavioral therapy that includes skills group, which uses didactics, modeling, behavior rehearsal, and homework exercises to aid patients in acquiring new skills and the opportunity to practice new behaviors.    Date of Service: Jan 24, 2022  Group Length: 120 minutes  Start time: 2:30pm   End time: 4:30pm  Number of Participants: 4  Group Facilitators: Mackenzie Corbin, PhD, LP and Curt Garrison MD, SHAUNNA Drake    Client: Kasandra Lau  Pronouns: She/Her/Hers/Herself  YOB: 1985  MRN: [id number]    Type of service:  video visit for group therapy  Reason for video visit:  Patient unable to travel due to Covid-19  Originating Site (patient location):  [facility]   Location- Patient's home  Distant Site (provider location):  Remote location  Mode of Communication:  Video Conference via Zoom  Consent:  Patient has given verbal consent for video visit?: Yes     Mindfulness: 3 minute body scan   Homework Reviewed: IE Worksheet 6: the "[employer] game  Group Topic for Today: IE Handout 10: Building relationships  Homework Assigned: IE Handout 8: finding and getting people to like you    Assessment: Patient was on time for group. Patient did complete the homework assigned the previous week prior to arriving at group.  Patient was engaged in homework review. Patient shared the situation she wrote about in her homework assignment with the group. Patient was engaged in the didactic portion of group. Patient offered to read parts of the didactic topic and engaged with the group by sharing personal examples. Mood appeared Euthymic.    Diagnosis: Major Depression, moderate, Generalized anxeity  Plan: Continue in full-model outpatient DBT program. Do diary card and complete the homework each week.      Group Treatment Plan Reviewed:  10/8/2021  Group Treatment Plan Due for Review: 1/8/2022      Malia Lopez  Supervisor: Mackenzie Corbin, PhD, LP

## 2022-01-28 ENCOUNTER — VIRTUAL VISIT (OUTPATIENT)
Dept: PSYCHOLOGY | Facility: CLINIC | Age: 37
End: 2022-01-28
Payer: COMMERCIAL

## 2022-01-28 DIAGNOSIS — F34.1 PERSISTENT DEPRESSIVE DISORDER: ICD-10-CM

## 2022-01-28 DIAGNOSIS — F43.12 CHRONIC POST-TRAUMATIC STRESS DISORDER (PTSD): Primary | ICD-10-CM

## 2022-01-28 DIAGNOSIS — F60.9 PERSONALITY DISORDER (H): ICD-10-CM

## 2022-01-28 DIAGNOSIS — F43.23 ADJUSTMENT DISORDER WITH MIXED ANXIETY AND DEPRESSED MOOD: ICD-10-CM

## 2022-01-28 PROCEDURE — 90837 PSYTX W PT 60 MINUTES: CPT | Mod: 95 | Performed by: PSYCHOLOGIST
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
## 2022-01-31 ENCOUNTER — VIRTUAL VISIT (OUTPATIENT)
Dept: PSYCHIATRY | Facility: CLINIC | Age: 37
End: 2022-01-31
Attending: PSYCHOLOGIST
Payer: COMMERCIAL

## 2022-01-31 DIAGNOSIS — F41.1 GENERALIZED ANXIETY DISORDER: ICD-10-CM

## 2022-01-31 DIAGNOSIS — F33.1 MAJOR DEPRESSIVE DISORDER, RECURRENT EPISODE, MODERATE (H): Primary | ICD-10-CM

## 2022-01-31 PROCEDURE — 90853 GROUP PSYCHOTHERAPY: CPT | Mod: 95 | Performed by: PSYCHOLOGIST

## 2022-01-31 NOTE — PROGRESS NOTES
"     [facility]    Dialectic Behavior Therapy Clinic     Adult DBT Skills Group     DBT (Dialectic Behavior Therapy) is a cognitive behavioral therapy that includes skills group, which uses didactics, modeling, behavior rehearsal, and homework exercises to aid patients in acquiring new skills and the opportunity to practice new behaviors.    Date of Service: Jan 31, 2022  Group Length: 120 minutes  Start time: 2:30pm   End time: 4:30pm  Number of Participants: 5  Group Facilitators: Mackenzie Corbin, PhD, LP and Curt Garrison MD, Malia Lopez BA    Client: Kasandra Lau  Pronouns: She/Her/Hers/Herself  YOB: 1985  MRN: [id number]    Type of service:  video visit for group therapy  Reason for video visit:  Patient unable to travel due to Covid-19  Originating Site (patient location):  [facility]   Location- Patient's home  Distant Site (provider location):  Remote location  Mode of Communication:  Video Conference via Zoom  Consent:  Patient has given verbal consent for video visit?: Yes     Mindfulness:Stop,Feel & Think youTube video- (Curt)  Homework Reviewed:  IE Handout 8: finding and getting people to like you  Group Topic for Today: Introductions to Mindfulness, Wise Mind and the \"What Skills\"--Handouts 1-4  Homework Assigned: Mindfulness WS #2 and then download one mindfulness Tiffanie and listen 1x: Insight-timer, Calm     Assessment: Patient was on time for group. Patient did complete about half of the  homework assigned the previous week prior to arriving at group.  Patient was engaged in homework review. Patient shared the situation she wrote about in her homework assignment with the group. Patient was engaged in the didactic portion of group. Pt offered to share personal examples. Mood appeared Euthymic.    Diagnosis: Major Depression, moderate, Generalized anxeity  Plan: Continue in full-model outpatient DBT program. Do diary card and complete the " homework each week.      Group Treatment Plan Reviewed: 10/8/2021  Group Treatment Plan Due for Review: 1/8/2022

## 2022-02-04 ENCOUNTER — VIRTUAL VISIT (OUTPATIENT)
Dept: PSYCHOLOGY | Facility: CLINIC | Age: 37
End: 2022-02-04
Payer: COMMERCIAL

## 2022-02-04 DIAGNOSIS — F43.12 CHRONIC POST-TRAUMATIC STRESS DISORDER (PTSD): Primary | ICD-10-CM

## 2022-02-04 DIAGNOSIS — F34.1 PERSISTENT DEPRESSIVE DISORDER: ICD-10-CM

## 2022-02-04 DIAGNOSIS — F43.23 ADJUSTMENT DISORDER WITH MIXED ANXIETY AND DEPRESSED MOOD: ICD-10-CM

## 2022-02-04 PROCEDURE — 90837 PSYTX W PT 60 MINUTES: CPT | Mod: 95 | Performed by: PSYCHOLOGIST

## 2022-02-05 NOTE — PROGRESS NOTES
"  Health Psychology - Follow up Visit  Confidential Summary*    The author of this note documented a reason for not sharing it with the patient.  REFERRAL SOURCE:  Psychiatry    CHIEF COMPLAINT/REASON FOR VISIT  Psychotherapy in context of chronic PTSD and persistent depression.  Patient also complains of dissatisfaction with interpersonal relationships and challenges with emotion regulation.      Patient was seen today for a 60 minute individual psychotherapy session.  The session was facilitated via doxy.me with patient at her home and provider at her own home.     This telehealth service is appropriate and effective for delivering services in light of the necessity for social distancing to mitigate the COVID-19 epidemic. Patient has agreed to receiving telehealth services.    The patient has been notified of following:   \"This video visit will be conducted via a call between you and your physician/provider. We have found that certain health care needs can be provided without the need for an in-person physical exam.   Video visits are billed at different rates depending on your insurance coverage.  Please reach out to your insurance provider with any questions. If during the course of the call the physician/provider feels a video visit is not appropriate, you will not be charged for this service.\"     Patient has given verbal consent for Video visit? Yes    Subjective:  Began with an invitation to review diary card but patient openly reported that she had not done it.  Conducted a missing links analysis.  She reported that she is not certain of the purpose of the diary card, other than to to track progress toward her goals.  She was invited to answer several other questions about the diary card and it appears that she was not aware that it was an expectation of DBT and that she should complete daily.  Discussed the possibility of \"willfulness\" and discussed the purpose of completing the diary card which included " "increasing awareness of her emotions and to connect with what is important to her.  Discussed her limited time in DBT and that without the diary card, the identification of emotions driving some of her target behaviors will not be possible.  Encouraged her to complete the diary card in session.  It was evident that the patient is challenged by perfectionism that interferes with completion.      She reported that she continues to \"online date\" .  Confronted about her agreement to pause on dating online and to dedicate this energy to exploring new relationships outside of her apartment.  She defended her decision, reporting that she is only continuing to date those she knows and that she has not begun any new relationships.  Encouraged her to consider the impact that online dating is having on her self esteem and her ongoing loneliness.      In completing her diary card, she described confusion in identifying her emotions.  She readily reported loneliness and was able to indicate that this is a combination of shame, sadness and anxiety.  Will work to encourage her to continue to label and to \"notice\" her emotions.  She was noted to continue to describe belief that \"being a woman of color\" is the reason for her ongoing loneliness.      Objective:  Patient was on time for today s session, appropriately groomed and dressed, and demonstrated good eye contact.  She appeared to be in a sad mood. She was alert and oriented. Mood was dysphoric with appropriate range of affect. Patient denied suicidal or assaultive ideation, plan, or intent.        Assessment:  The patient has a longstanding history of interpersonal discord and challenges with emotion regulation.  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  She has made recent progress toward completing her academic program and is now ABD.  She is living in  housing with her two dogs.      Plan:  Patient is now in " full model DBT at [facility] and is attending skills group on M at 2.      Treatment plan completed:  10/8/21    Time In: 1:00  Time Out: 2:00    Diagnosis:  Axis I PTSD, chronic, persistent depressive disorder, adjustment disorder with mixed, r/o somatization disorder   Axis II  BPD   Axis III please see medical records for details   [place] IV Psychosocial and Environmental Stressors: academic challenges and living alone with no family support         Corina Muhammad, PhD, LP

## 2022-02-07 ENCOUNTER — VIRTUAL VISIT (OUTPATIENT)
Dept: PSYCHIATRY | Facility: CLINIC | Age: 37
End: 2022-02-07
Attending: PSYCHOLOGIST
Payer: COMMERCIAL

## 2022-02-07 DIAGNOSIS — F33.1 MAJOR DEPRESSIVE DISORDER, RECURRENT EPISODE, MODERATE (H): Primary | ICD-10-CM

## 2022-02-07 PROCEDURE — 90853 GROUP PSYCHOTHERAPY: CPT | Mod: 95 | Performed by: PSYCHOLOGIST

## 2022-02-07 NOTE — PROGRESS NOTES
"     [facility]    Dialectic Behavior Therapy Clinic     Adult DBT Skills Group     DBT (Dialectic Behavior Therapy) is a cognitive behavioral therapy that includes skills group, which uses didactics, modeling, behavior rehearsal, and homework exercises to aid patients in acquiring new skills and the opportunity to practice new behaviors.    Date of Service: Feb 7, 2022  Group Length: 120 minutes  Start time: 2:30pm   End time: 4:30pm  Number of Participants: 8  Group Facilitators: Mackenzie Corbin, PhD, LP, Malia Lopez BA    Client: Kasandra Lau  Pronouns: She/Her/Hers/Herself  YOB: 1985  MRN: [id number]    Type of service:  video visit for group therapy  Reason for video visit:  Patient unable to travel due to Covid-19  Originating Site (patient location):  [facility]   Location- Patient's home  Distant Site (provider location):  Remote location  Mode of Communication:  Video Conference via Zoom  Consent:  Patient has given verbal consent for video visit?: Yes     Mindfulness: Imagine being a stone flake on a lake (from Mindfulness handout 3A- ideas for practicing wise mind).   Homework Reviewed: Mindfulness Worksheet 2 & downloading a mindfulness tomi  Group Topic for Today: Mindfulness Handout 5- \"How\" skills  Homework Assigned: Mindfulness Worksheet 2A- mindfulness core skills practice    Assessment: Patient was on time for group. Patient did complete the homework assigned the previous week prior to arriving at group and shared her personal situation and \"what\" skills she used in the situation. Patient was engaged in homework review and was engaged in the didactic portion of group. Mood appeared Euthymic.     Diagnosis:   Encounter Diagnosis   Name Primary?     Major depressive disorder, recurrent episode, moderate (H) Yes       Plan: Continue in full-model outpatient DBT program. Complete assigned homework and diary card.     Group Treatment Plan " Reviewed: 10/8/2022  Group Treatment Plan Due for Review: 1/8/2022    SHAUNNA Quinonez  Social Work Student  Supervised by Mackenzie Corbin, PhD, LP

## 2022-02-11 ENCOUNTER — VIRTUAL VISIT (OUTPATIENT)
Dept: PSYCHOLOGY | Facility: CLINIC | Age: 37
End: 2022-02-11
Payer: COMMERCIAL

## 2022-02-11 DIAGNOSIS — Z53.9 ERRONEOUS ENCOUNTER--DISREGARD: Primary | ICD-10-CM

## 2022-02-14 ENCOUNTER — VIRTUAL VISIT (OUTPATIENT)
Dept: PSYCHIATRY | Facility: CLINIC | Age: 37
End: 2022-02-14
Attending: PSYCHOLOGIST
Payer: COMMERCIAL

## 2022-02-14 DIAGNOSIS — F33.1 MAJOR DEPRESSIVE DISORDER, RECURRENT EPISODE, MODERATE (H): Primary | ICD-10-CM

## 2022-02-14 DIAGNOSIS — F41.1 GENERALIZED ANXIETY DISORDER: ICD-10-CM

## 2022-02-14 PROCEDURE — 90853 GROUP PSYCHOTHERAPY: CPT | Mod: 95 | Performed by: PSYCHOLOGIST

## 2022-02-16 ENCOUNTER — OFFICE VISIT (OUTPATIENT)
Dept: PLASTIC SURGERY | Facility: CLINIC | Age: 37
End: 2022-02-16
Payer: COMMERCIAL

## 2022-02-16 ENCOUNTER — TELEPHONE (OUTPATIENT)
Dept: DERMATOLOGY | Facility: CLINIC | Age: 37
End: 2022-02-16

## 2022-02-16 VITALS
OXYGEN SATURATION: 97 % | SYSTOLIC BLOOD PRESSURE: 110 MMHG | HEART RATE: 72 BPM | WEIGHT: 189 LBS | DIASTOLIC BLOOD PRESSURE: 76 MMHG | BODY MASS INDEX: 28.64 KG/M2 | HEIGHT: 68 IN

## 2022-02-16 DIAGNOSIS — N62 HYPERTROPHY OF BREAST: Primary | ICD-10-CM

## 2022-02-16 PROCEDURE — 99213 OFFICE O/P EST LOW 20 MIN: CPT | Performed by: PLASTIC SURGERY

## 2022-02-16 ASSESSMENT — PAIN SCALES - GENERAL: PAINLEVEL: NO PAIN (0)

## 2022-02-16 NOTE — TELEPHONE ENCOUNTER
2/16 Provided phone number [phone] to schedule an appointment with Dr. Montgomery for laser consult.     Latasha lopez Procedure   Orthopedics, Podiatry, Sports Medicine, ENT/Eye Specialties  [facility]   [phone]

## 2022-02-16 NOTE — NURSING NOTE
"Chief Complaint   Patient presents with     RECHECK     Follow up for scars        Vitals:    02/16/22 1026   BP: 110/76   BP Location: Left arm   Patient Position: Chair   Cuff Size: Adult Large   Pulse: 72   SpO2: 97%   Weight: 85.7 kg (189 lb)   Height: 1.717 m (5' 7.6\")       Body mass index is 29.08 kg/m .      Jackeline Ferrer LPN    "

## 2022-02-16 NOTE — LETTER
2022       RE: Kasandra Aviles  [address]  [facility] [address]     Dear Colleague,    Thank you for referring your patient, Kasandra Aviles, to the [facility] [place] at [facility]. Please see a copy of my visit note below.    Service Date: 2022    PRESENTING COMPLAINT:  Postoperative visit status post bilateral breast reduction done on 2021.    HISTORY OF PRESENTING COMPLAINT:  Ms. Aviles is 36 years old and underwent a breast reduction 6 months ago.  It has healed completely, has some hypertrophic scarring and wanted my recommendations.  She has no itching.    PHYSICAL EXAMINATION:  Vital signs stable.  She is afebrile, in no obvious distress.  Both breasts are healed, aesthetic, symmetric.  On examination of her scars, they are slightly hypertrophic in the inframammary fold area, less so on the vertical incision and around the areolas.    ASSESSMENT AND PLAN:  Based on the above findings, a diagnosis of bilateral breast reduction with hypertrophic scarring was made.  I explained to the patient that options include over-the-counter silicone gel sheets versus silicone gel/moisturizing creams versus laser therapy versus steroid injection.  The disadvantage of steroid injection would be hypopigmentation of the skin color and thinning of the skin, widening of the scars.  Additionally, she has no itching, which is what the steroid injections really help with.  We will refer her to my partner in Dermatology, Dr. Alexa Montgomery, to discuss potential laser therapy.  All her questions were answered.  She was happy with the visit.  All exam and discussion done in presence of my nurse, Savannah Celestin, who was present from beginning to end.    ANTONIO Moreno MD        D: 2022   T: 2022   MT: al    Name:     KASANDRA AVILES  MRN:      [id number]        Account:      [id number]   :       1985           Service Date: 02/16/2022       Document: [id number]      Again, thank you for allowing me to participate in the care of your patient.      Sincerely,    ANTONIO Moreno MD

## 2022-02-16 NOTE — LETTER
Date:February 19, 2022      Patient was self referred, no letter generated. Do not send.        [facility] Health Information       Needs PPI bid- has erosive gastritis. Would treat with 40 mg bid protonix x 12 weeks and then decrease to daily. Avoid NSAIDS.

## 2022-02-16 NOTE — PROGRESS NOTES
Service Date: 2022    PRESENTING COMPLAINT:  Postoperative visit status post bilateral breast reduction done on 2021.    HISTORY OF PRESENTING COMPLAINT:  Ms. Aviles is 36 years old and underwent a breast reduction 6 months ago.  It has healed completely, has some hypertrophic scarring and wanted my recommendations.  She has no itching.    PHYSICAL EXAMINATION:  Vital signs stable.  She is afebrile, in no obvious distress.  Both breasts are healed, aesthetic, symmetric.  On examination of her scars, they are slightly hypertrophic in the inframammary fold area, less so on the vertical incision and around the areolas.    ASSESSMENT AND PLAN:  Based on the above findings, a diagnosis of bilateral breast reduction with hypertrophic scarring was made.  I explained to the patient that options include over-the-counter silicone gel sheets versus silicone gel/moisturizing creams versus laser therapy versus steroid injection.  The disadvantage of steroid injection would be hypopigmentation of the skin color and thinning of the skin, widening of the scars.  Additionally, she has no itching, which is what the steroid injections really help with.  We will refer her to my partner in Dermatology, Dr. Alexa Montgomery, to discuss potential laser therapy.  All her questions were answered.  She was happy with the visit.  All exam and discussion done in presence of my nurse, Savannah Celestin, who was present from beginning to end.    ANTONIO Moreno MD        D: 2022   T: 2022   MT: al    Name:     HECTOR AVILES  MRN:      [id number]        Account:      [id number]   :      1985           Service Date: 2022       Document: [id number]

## 2022-02-18 ENCOUNTER — VIRTUAL VISIT (OUTPATIENT)
Dept: PSYCHOLOGY | Facility: CLINIC | Age: 37
End: 2022-02-18
Payer: COMMERCIAL

## 2022-02-18 DIAGNOSIS — F34.1 PERSISTENT DEPRESSIVE DISORDER: Primary | ICD-10-CM

## 2022-02-18 DIAGNOSIS — F54 PSYCHOLOGICAL FACTORS AFFECTING MEDICAL CONDITION: ICD-10-CM

## 2022-02-18 DIAGNOSIS — F60.9 PERSONALITY DISORDER (H): ICD-10-CM

## 2022-02-18 DIAGNOSIS — F43.12 CHRONIC POST-TRAUMATIC STRESS DISORDER (PTSD): ICD-10-CM

## 2022-02-18 DIAGNOSIS — F43.23 ADJUSTMENT DISORDER WITH MIXED ANXIETY AND DEPRESSED MOOD: ICD-10-CM

## 2022-02-18 PROCEDURE — 90837 PSYTX W PT 60 MINUTES: CPT | Mod: 95 | Performed by: PSYCHOLOGIST

## 2022-02-18 NOTE — PROGRESS NOTES
"     [facility]    Dialectic Behavior Therapy Clinic     Adult DBT Skills Group     DBT (Dialectic Behavior Therapy) is a cognitive behavioral therapy that includes skills group, which uses didactics, modeling, behavior rehearsal, and homework exercises to aid patients in acquiring new skills and the opportunity to practice new behaviors.    Date of Service: Feb 14, 2022  Group Length: 120 minutes  Start time: 2:30pm   End time: 4:30pm  Number of Participants: 7  Group Facilitators: Mackenzie Corbin, PhD, LP, Malia Lopez BA    Client: Kasandra Lau  Pronouns: She/Her/Hers/Herself  YOB: 1985  MRN: [id number]    Type of service:  video visit for group therapy  Reason for video visit:  Patient unable to travel due to Covid-19  Originating Site (patient location):  [facility]   Location- Patient's home  Distant Site (provider location):  Remote location  Mode of Communication:  Video Conference via Zoom  Consent:  Patient has given verbal consent for video visit?: Yes     Mindfulness: Imagine being a stone flake on a lake (from Mindfulness handout 3A- ideas for practicing wise mind).   Homework Reviewed: Mindfulness Worksheet 2 & downloading a mindfulness tomi  Group Topic for Today: Mindfulness Handout 5- \"How\" skills  Homework Assigned: Mindfulness Worksheet 2A- mindfulness core skills practice    Assessment: Patient was on time for group. Patient did not complete the homework assigned the previous week prior to arriving at group and Patient was asked to not participate in homework review or diary card review since she had neither done. She reported that she has been \"skeptical\" of the diary card, discussed as a group--and the research on \"adherent dbt which included completing the diary card. She reported that she would try to do better.  She was engaged in the didactic portion of group. Mood appeared Euthymic.     Diagnosis:   Major Depression, Generalized " Anxiety    Plan: Continue in full-model outpatient DBT program. Complete assigned homework and diary card.     Group Treatment Plan Reviewed: 10/8/2022  Group Treatment Plan Due for Review: 1/8/2022

## 2022-02-19 NOTE — PROGRESS NOTES
"  Health Psychology - Follow up Visit  Confidential Summary*    The author of this note documented a reason for not sharing it with the patient.  REFERRAL SOURCE:  Psychiatry    CHIEF COMPLAINT/REASON FOR VISIT  Psychotherapy in context of chronic PTSD and persistent depression.  Patient also complains of dissatisfaction with interpersonal relationships and challenges with emotion regulation.      Patient was seen today for a 60 minute individual psychotherapy session.  The session was facilitated via doxy.me with patient at her home and provider at her own home.     This telehealth service is appropriate and effective for delivering services in light of the necessity for social distancing to mitigate the COVID-19 epidemic. Patient has agreed to receiving telehealth services.    The patient has been notified of following:   \"This video visit will be conducted via a call between you and your physician/provider. We have found that certain health care needs can be provided without the need for an in-person physical exam.   Video visits are billed at different rates depending on your insurance coverage.  Please reach out to your insurance provider with any questions. If during the course of the call the physician/provider feels a video visit is not appropriate, you will not be charged for this service.\"     Patient has given verbal consent for Video visit? Yes    Subjective: Began with request that patient send me picture of diary card.  She reported that she found an on-line form that she is finding ease in using.  She sent a picture of it to my cell.  Unfortunately, she had only one day completed.  Discussed in detail what she noticed in completing the diary card on this date. Patient is noted to use more \"dbt language' when describing skill use.  Discussed her use of wise mind.  Patient appears confused as to when she is in \"emotion mind\".  Discussed how she might notice and use check the facts to determine whether " "her emotion is justified by the facts.  She is noted to describe some confusion surrounding her emotions and is having difficulty labeling.  She was noted to describe disappointment and to quickly use humor to deflect her emotion.  She continues to describe her frustration with societal racism and the impact this has on her challenges in dating.  She was noted to continue to use \"online dating apps\" and invited someone over whom she had never spoken to or video dated.  She had agreed to this safety move in a prior session.  Confronted on this target behavior.  She reported her concern that possible suitors may not want to video date and that she believes that she is better in person.  Discussed her anxiety and she was able to use the check the facts skill to examine whether her emotions are justified by the facts that she knows.  Since they are not, she was encouraged to problem solve.      Patient reported that she is having trouble distinguishing when she is experiencing fatigue associated with fibromyalgia and fatigue associated with procrastination, anxiety and depressed mood.  Discussed the use of diary card and how using a behavior chain can help to illuminate.    She continues to describe her opinion that she will be able to find a partner when she begins dating men from [place].  Discussed how she might use radical acceptance that racism exists all over the world and that she did not cause this problem but it is still up to her to navigate it.  Discussed the role her beliefs might play in the way she behaves when meeting new person.      Patient was congratulated for two recent social outings, she has joined an [employer] class and that she is also planning to take a snow shoeing class so that she might join a snow shoe club.  Discussed the positive impact of exercise on her physical and psychiatric problems.    Patient was noted to report that she is scheduled for a tonsillectomy.        Objective:  Patient was " on time for today s session, appropriately groomed and dressed, and demonstrated good eye contact.   She was alert and oriented. Mood was euthymic with appropriate range of affect. Patient denied suicidal or assaultive ideation, plan, or intent.        Assessment:  The patient has a longstanding history of interpersonal discord and challenges with emotion regulation.  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  She has completed all requirements for her PhD and is now ABD.  She is living in  housing with her two dogs.      Plan:  Patient is now in full model DBT at [facility] and is attending skills group on M at 2.      Treatment plan completed:  10/8/21    Time In: 2:00  Time Out: 3:00    Diagnosis:  Axis I PTSD, chronic, persistent depressive disorder, adjustment disorder with mixed, r/o somatization disorder, psychological factors associated with physical (fibromyalgia)   Axis II  BPD   Axis III please see medical records for details   [place] IV Psychosocial and Environmental Stressors: living alone with no family support, pandemic stress, chronic illness         Corina Muhammad, PhD, LP

## 2022-02-21 ENCOUNTER — VIRTUAL VISIT (OUTPATIENT)
Dept: PSYCHIATRY | Facility: CLINIC | Age: 37
End: 2022-02-21
Attending: PSYCHOLOGIST
Payer: COMMERCIAL

## 2022-02-21 DIAGNOSIS — F33.1 MAJOR DEPRESSIVE DISORDER, RECURRENT EPISODE, MODERATE (H): Primary | ICD-10-CM

## 2022-02-21 DIAGNOSIS — F41.1 GENERALIZED ANXIETY DISORDER: ICD-10-CM

## 2022-02-21 PROCEDURE — 90853 GROUP PSYCHOTHERAPY: CPT | Mod: 95 | Performed by: STUDENT IN AN ORGANIZED HEALTH CARE EDUCATION/TRAINING PROGRAM

## 2022-02-22 NOTE — PROGRESS NOTES
This encounter was opened in error. Please disregard.  Patient called, too ill to conduct session today

## 2022-02-23 NOTE — TELEPHONE ENCOUNTER
2/23 2nd attempt.  Provided phone number [phone] to schedule an appointment with Dr. Montgomery for laser consult.      Latasha lopez Procedure   Orthopedics, Podiatry, Sports Medicine, ENT/Eye Specialties  [facility]   [phone]

## 2022-02-25 ENCOUNTER — VIRTUAL VISIT (OUTPATIENT)
Dept: PSYCHOLOGY | Facility: CLINIC | Age: 37
End: 2022-02-25
Payer: COMMERCIAL

## 2022-02-25 DIAGNOSIS — F34.1 PERSISTENT DEPRESSIVE DISORDER: ICD-10-CM

## 2022-02-25 DIAGNOSIS — F43.23 ADJUSTMENT DISORDER WITH MIXED ANXIETY AND DEPRESSED MOOD: ICD-10-CM

## 2022-02-25 DIAGNOSIS — F54 PSYCHOLOGICAL AND BEHAVIORAL FACTORS ASSOCIATED WITH DISORDERS OR DISEASES CLASSIFIED ELSEWHERE: ICD-10-CM

## 2022-02-25 DIAGNOSIS — F43.12 CHRONIC POST-TRAUMATIC STRESS DISORDER (PTSD): Primary | ICD-10-CM

## 2022-02-25 PROCEDURE — 90837 PSYTX W PT 60 MINUTES: CPT | Mod: 95 | Performed by: PSYCHOLOGIST

## 2022-02-26 DIAGNOSIS — F41.9 ANXIETY: ICD-10-CM

## 2022-02-26 NOTE — PROGRESS NOTES
"  Health Psychology - Follow up Visit  Confidential Summary*    The author of this note documented a reason for not sharing it with the patient.  REFERRAL SOURCE:  Psychiatry    CHIEF COMPLAINT/REASON FOR VISIT  Psychotherapy in context of chronic PTSD and persistent depression.  Patient also complains of dissatisfaction with interpersonal relationships and challenges with emotion regulation.      Patient was seen today for a 60 minute individual psychotherapy session.  The session was facilitated via doxy.me with patient at her home and provider at her own home.     This telehealth service is appropriate and effective for delivering services in light of the necessity for social distancing to mitigate the COVID-19 epidemic. Patient has agreed to receiving telehealth services.    The patient has been notified of following:   \"This video visit will be conducted via a call between you and your physician/provider. We have found that certain health care needs can be provided without the need for an in-person physical exam.   Video visits are billed at different rates depending on your insurance coverage.  Please reach out to your insurance provider with any questions. If during the course of the call the physician/provider feels a video visit is not appropriate, you will not be charged for this service.\"     Patient has given verbal consent for Video visit? Yes    Subjective:  Patient began with sending diary card.  She reported that she only completed the diary card one time during the past week.  She was reminded of the reasons the diary card is a foundational aspect of DBT.  She noted that she continues to feel bored, lonely and anxious.  She noted that she has the urge to date online when she experiences these feelings.  We completed her homework and she chose to review the pros and cons of intimacy on the first meeting with someone.  She was noted to be able to identify that se becomes more anxious when she shares " her thoughts with others and that she is more in control during intimacy.  Encouraged her to consider how allowing herself to be interested in what others are saying might decrease her anxiety and judgement.      She reported that she plans to move to [facility] in the summer.  Problem solved ideas for how she might have her dogs watches while she is away.      She was noted to describe taking care of herself through spending.      Objective:  Patient was on time for today s session, appropriately groomed and dressed, and demonstrated good eye contact.   She was alert and oriented. Mood was euthymic with appropriate range of affect. Patient denied suicidal or assaultive ideation, plan, or intent.        Assessment:  The patient has a longstanding history of interpersonal discord and challenges with emotion regulation.  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  She has completed all requirements for her PhD and is now ABD.  She is living in  housing with her two dogs.      Plan:  Patient is now in full model DBT at [facility] and is attending skills group on M at 2.  See in one week.      Treatment plan completed:  10/8/21    Time In: 1:00  Time Out: 2:00    Diagnosis:  Axis I PTSD, chronic, persistent depressive disorder, adjustment disorder with mixed, r/o somatization disorder, psychological factors associated with physical (fibromyalgia)   Axis II  BPD   Axis III please see medical records for details   [place] IV Psychosocial and Environmental Stressors: living alone with no family support, pandemic stress, chronic illness         Corina Muhammad, PhD, LP

## 2022-02-28 ENCOUNTER — VIRTUAL VISIT (OUTPATIENT)
Dept: PSYCHIATRY | Facility: CLINIC | Age: 37
End: 2022-02-28
Attending: PSYCHOLOGIST
Payer: COMMERCIAL

## 2022-02-28 DIAGNOSIS — F41.1 GENERALIZED ANXIETY DISORDER: ICD-10-CM

## 2022-02-28 DIAGNOSIS — F33.1 MAJOR DEPRESSIVE DISORDER, RECURRENT EPISODE, MODERATE (H): Primary | ICD-10-CM

## 2022-02-28 PROCEDURE — 90853 GROUP PSYCHOTHERAPY: CPT | Mod: U7

## 2022-02-28 NOTE — PROGRESS NOTES
[facility]    Dialectic Behavior Therapy Clinic     Adult DBT Skills Group     DBT (Dialectic Behavior Therapy) is a cognitive behavioral therapy that includes skills group, which uses didactics, modeling, behavior rehearsal, and homework exercises to aid patients in acquiring new skills and the opportunity to practice new behaviors.    Date of Service: Feb 28, 2022  Group Length: 110 minutes  Start time: 2:35pm   End time: 4:25pm  Number of Participants: 7  Group Facilitators: Mackenzie Corbin, PhD, LP, Curt Garrison MD and SHAUNNA Drake    Client: Kasandra Lau  Pronouns: She/Her/Hers/Herself  YOB: 1985  MRN: [id number]    Type of service:  video visit for group therapy  Reason for video visit:  Patient unable to travel due to Covid-19  Originating Site (patient location):  [facility]   Location- Patient's home  Distant Site (provider location):  Remote location  Mode of Communication:  Video Conference via Zoom  Consent:  Patient has given verbal consent for video visit?: Yes     Mindfulness: Paired muscle relaxation  Homework Reviewed: Distress Tolerance Worksheet 3 and 4: Pros and Cons of Acting on Crisis Urges & TIP skill practice  Group Topic for Today: Distress Tolerance Handout 7 (Distracting skills), 8 (Self-soothing skills), and 9 (improving the moment skills)  Homework Assigned: Distress Tolerance Worksheet 6 (Self-soothing) & 7 (Improving the Moment)    Assessment: Patient was on time for group. Patient completed at least one day of their diary card and they shared a personal situation and skill they used with the group. Patient reported that completing their diary card via electronic copy has been working better for her than completing a physical copy. Patient did complete the homework assigned the previous week prior to arriving at group & shared with the group. Patient was engaged in homework review and was engaged in the didactic  portion of group.  Mood appeared Euthymic.     Diagnosis:   Encounter Diagnoses   Name Primary?     Major depressive disorder, recurrent episode, moderate (H) Yes     Generalized anxiety disorder        Plan: Continue in full-model outpatient DBT program.     Group Treatment Plan Reviewed: 10/8/2022  Group Treatment Plan Due for Review: 1/8/2022    SHAUNNA Quinonez  Social Work Student  Supervised by Mackenzie Corbin, PhD,

## 2022-03-02 RX ORDER — PROPRANOLOL HYDROCHLORIDE 20 MG/1
20 TABLET ORAL DAILY PRN
Qty: 30 TABLET | Refills: 1 | Status: SHIPPED | OUTPATIENT
Start: 2022-03-02 | End: 2022-04-25

## 2022-03-02 NOTE — TELEPHONE ENCOUNTER
PROPRANOLOL 20 MG TABLET      Last Written Prescription Date:  1-10-22  Last Fill Quantity: 30,   # refills: 1  Last Office Visit : 1-18-22  Future Office visit:  none    Routing refill request to provider for review/approval because:  Associated diagnosis not on protocol               Anxiety [F41.9]   Last rx: 30 t:1 RF

## 2022-03-06 ENCOUNTER — OFFICE VISIT (OUTPATIENT)
Dept: URGENT CARE | Facility: URGENT CARE | Age: 37
End: 2022-03-06
Payer: COMMERCIAL

## 2022-03-06 VITALS
TEMPERATURE: 99.7 F | OXYGEN SATURATION: 98 % | BODY MASS INDEX: 28.66 KG/M2 | HEART RATE: 102 BPM | RESPIRATION RATE: 16 BRPM | DIASTOLIC BLOOD PRESSURE: 78 MMHG | WEIGHT: 186.31 LBS | SYSTOLIC BLOOD PRESSURE: 108 MMHG

## 2022-03-06 DIAGNOSIS — S61.452A DOG BITE OF LEFT HAND, INITIAL ENCOUNTER: Primary | ICD-10-CM
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
## 2022-03-06 DIAGNOSIS — L03.114 CELLULITIS OF HAND, LEFT: ICD-10-CM

## 2022-03-06 PROCEDURE — 99213 OFFICE O/P EST LOW 20 MIN: CPT | Performed by: FAMILY MEDICINE

## 2022-03-06 NOTE — PROGRESS NOTES
"     [facility]    Dialectic Behavior Therapy Clinic     Adult DBT Skills Group     DBT (Dialectic Behavior Therapy) is a cognitive behavioral therapy that includes skills group, which uses didactics, modeling, behavior rehearsal, and homework exercises to aid patients in acquiring new skills and the opportunity to practice new behaviors.    Date of Service: Feb 21, 2022  Group Length: 120 minutes  Start time: 2:30pm   End time: 4:30pm  Number of Participants: 8  Group Facilitators: Mackenzie Corbin, PhD, LP, SHAUNNA Drake, Curt Garrison MD    Client: Kasandra Lau  Pronouns: She/Her/Hers/Herself  YOB: 1985  MRN: [id number]    Type of service:  video visit for group therapy  Reason for video visit:  Patient unable to travel due to Covid-19  Originating Site (patient location):  [facility]   Location- Patient's home  Distant Site (provider location):  Remote location  Mode of Communication:  Video Conference via Zoom  Consent:  Patient has given verbal consent for video visit?: Yes     Mindfulness: Body scan meditation  Homework Reviewed: Distress Tolerance worksheet 2, STOP skill  Group Topic for Today: Distress tolerance handout 5 Pro/Con and 6 TIPP  Homework Assigned: Distress Tolerance WS 3 and 4 (Pro/Con and TIPP)    Assessment: Patient was on time for group. Patient completed her diary card and shared a personal situation and skill she used with the group. Patient did complete the homework assigned the previous week prior to arriving at group. Patient was engaged in homework review and provided a personal situation she experienced the past week and the \"STOP\" skills she used in that situation. Patient did not speak up much in the didactic portion of group. Affect blunted and mood was euthymic.     Diagnosis:   Major Depression, Generalized Anxiety    Plan: Continue in full-model outpatient DBT program. Complete assigned homework and diary card. "     Group Treatment Plan Reviewed: 10/8/2022  Group Treatment Plan Due for Review: 1/8/2022

## 2022-03-06 NOTE — PROGRESS NOTES
SUBJECTIVE:  Chief Complaint   Patient presents with     Dog Bite     x2 days, pt dog, left hand, swollen, painful, Tdap 5/22/19     Kasandra Lau is a 36 year old female presents with a chief complaint of left hand swelling and pain.    Her dogs were fighting so she tried to intervene and ended up getting bitten, injury was on 3/4.  Tried to clean wound but developed more swelling and redness.  No fever.    No concerns for rabies, dogs vaccinations UTD.    Past Medical History:   Diagnosis Date     Anemia fall 2016     Chronic fatigue      Depression (emotion)     sees psych, on meds     Gastroesophageal reflux disease      Migraine     daily meds, 2x month, more mild on meds     Neuropathic pain      Panic disorder      Uncomplicated asthma Spring 2018     Current Outpatient Medications   Medication Sig Dispense Refill     Acetaminophen (TYLENOL PO) Take 650 mg by mouth every 4 hours as needed for mild pain or fever       almotriptan (AXERT) 12.5 MG tablet Take 1 tablet (12.5 mg) by mouth daily as needed for migraine (repeat in 2 hours if needed) 18 tablet 11     budesonide (PULMICORT) 0.5 MG/2ML neb solution Empty contents of one ampule into 240mL of saline solution and rinse both nasal cavities as instructed. Perform this twice daily. 2 mL 3     buPROPion (WELLBUTRIN XL) 300 MG 24 hr tablet TAKE 1 TABLET BY MOUTH EVERY MORNING 30 tablet 1     cholecalciferol 50 MCG (2000 UT) CAPS 5,000 Units every evening        doxepin (SINEQUAN) 10 MG capsule Take 3 capsules (30 mg) by mouth At Bedtime 90 capsule 11     EMGALITY 120 MG/ML injection Inject 1 mL (120 mg) Subcutaneous every 28 days 1 mL 11     famotidine (PEPCID) 20 MG tablet Take 20 mg by mouth 2 times daily       fexofenadine (ALLEGRA) 180 MG tablet Take 180 mg by mouth every morning        fluticasone (FLONASE) 50 MCG/ACT nasal spray 2 times daily as needed        gabapentin (NEURONTIN) 400 MG capsule Take 2 capsules (800 mg) by mouth 3 times daily 180 capsule  5     levocetirizine (XYZAL) 5 MG tablet Take 5 mg by mouth every evening        montelukast (SINGULAIR) 10 MG tablet Take 1 tablet (10 mg) by mouth At Bedtime 90 tablet 1     order for DME Use for 15-30 minutes every morning. 1 Units 0     propranolol (INDERAL) 20 MG tablet TAKE 1 TABLET (20 MG) BY MOUTH DAILY AS NEEDED (ANXIETY) 30 tablet 1     vilazodone (VIIBRYD) 40 MG TABS tablet Take 1 tablet (40 mg) by mouth every morning 90 tablet 0     azelastine (ASTELIN) 0.1 % nasal spray Spray 1 spray into both nostrils 2 times daily (Patient not taking: Reported on 11/2/2021) 30 mL 3     loteprednol (LOTEMAX) 0.5 % ophthalmic suspension Place 1-2 drops into both eyes 3 times daily (Patient not taking: Reported on 11/2/2021) 1 Bottle 1     olopatadine (PATANOL) 0.1 % ophthalmic solution Place 1 drop into both eyes 2 times daily (Patient not taking: Reported on 11/2/2021) 1 Bottle 11     ondansetron (ZOFRAN-ODT) 4 MG ODT tab Take 1-2 tablets (4-8 mg) by mouth every 8 hours as needed for nausea (Patient not taking: Reported on 1/4/2022) 4 tablet 0     promethazine (PHENERGAN) 25 MG tablet Take 1 tablet (25 mg) by mouth every 8 hours as needed for nausea or vomiting (Patient not taking: Reported on 1/4/2022) 12 tablet 0     pyridostigmine (MESTINON) 60 MG tablet Take 60 mg by mouth 4 times daily as needed for constipation (Patient not taking: Reported on 1/4/2022)       Social History     Tobacco Use     Smoking status: Former Smoker     Packs/day: 0.00     Years: 10.00     Pack years: 0.00     Types: Cigarettes     Smokeless tobacco: Never Used     Tobacco comment: smokes occasionally socially    Substance Use Topics     Alcohol use: Not Currently     Alcohol/week: 0.0 standard drinks     Comment: occasional        ROS:  Review of systems negative except as stated above.    EXAM:   /78 (BP Location: Right arm, Patient Position: Sitting, Cuff Size: Adult Regular)   Pulse 102   Temp 99.7  F (37.6  C) (Tympanic)    Resp 16   Wt 84.5 kg (186 lb 5 oz)   SpO2 98%   BMI 28.66 kg/m    Gen: healthy,alert,no distress  Extremity: left hand - dorsum aspect has bite puncture wound which has scabbed over, has swelling and light redden patch of hand, fingers without swelling, intact movement. has.   There is not compromise to the distal circulation.  Pulses are +2 and CRT is brisk  PSYCH: alert, affect bright    X-RAY was not done.    ASSESSMENT/PLAN:   (S61.452A,  W54.0XXA) Dog bite of left hand, initial encounter  (primary encounter diagnosis)  Comment: infected  Plan: amoxicillin-clavulanate (AUGMENTIN) 875-125 MG         tablet            (L03.114) Cellulitis of hand, left  Comment: s/p dog bite  Plan: amoxicillin-clavulanate (AUGMENTIN) 875-125 MG         tablet            RX Augmentin given for bite wound infection on left hand.  Encourage tylenol, ibuprofen, warm compress and elevation.    Follow up with primary provider if no improvement of symptoms within 2-3 days    Theodore Chakraborty MD  March 6, 2022 12:59 PM

## 2022-03-06 NOTE — PATIENT INSTRUCTIONS
Patient Education     Dog Bite  A dog bite can cause a wound deep enough to break the skin. In such cases, the wound is cleaned and sometimes closed. Wounds would be closed if they are gaping open or in cosmetically important areas such as the face. If the wound is closed, it is usually not completely closed. This is so that fluid can drain if the wound becomes infected. Often, wounds will be left open to heal. In addition to wound care, a tetanus shot (injection) may be given, if needed.     Home care    Wash your hands well with soap and warm water before and after caring for the wound. This helps lower the risk of infection.    Care for the wound as directed. If a dressing was applied to the wound, be sure to change it as directed.    If the wound bleeds, place a clean, soft cloth on the wound. Then firmly apply pressure until the bleeding stops. This may take up to 5 minutes. Don't release the pressure and look at the wound during this time.    Most wounds heal within 10 days. But an infection can occur even with correct treatment. So be sure to check the wound daily for signs of infection (see below).    Antibiotics may be prescribed. These help prevent or treat infection. If you re given antibiotics, take them as directed. Also be sure to complete the medicines.  Rabies prevention  Rabies is a virus that can be carried in certain animals. These can include domestic animals such as dogs and cats. Pets fully vaccinated against rabies (2 shots) are at very low risk of infection. But because human rabies is almost always fatal, any biting pet should be confined for 10 days as an extra precaution. In general, if there is a risk for rabies, the following steps may need to be taken:     If someone s pet dog has bitten you, it should be kept in a secure area for the next 10 days to watch for signs of illness. (If the pet owner won t allow this, contact your local animal control center.) Ask to see the pet's  vaccination history records. If the dog becomes ill or dies during that time, contact your local animal control center at once so the animal may be tested for rabies. If the dog stays healthy for the next 10 days, there is no danger of rabies in the animal or you.  ? If a stray dog bit you, contact your local animal control center. They can give information on capture, quarantine, and animal rabies testing.  ? If you can t find the animal that bit you in the next 2 days, and if rabies exists in your area, you may need to receive the rabies vaccine series. Call your healthcare provider right away. Or return to the emergency department promptly.  Follow-up care  Follow up with your healthcare provider, or as directed.  When to get medical advice  Call your healthcare provider or get medical attention right away if any of these occur:     Signs of infection:  ? Spreading redness or warmth from the wound  ? Increased pain or swelling  ? Fever of 100.4 F (38 C) or higher, or as directed by your healthcare provider  ? Colored fluid or pus draining from the wound    Signs of rabies infection. Don't wait for any of these symptoms to occur! If you suspect that the dog that bit you is rabid, or if the dog is lost and can't be found, you should get the vaccine series.  ? Headache  ? Confusion  ? Strange behavior  ? Increased salivating and drooling  ? Seizure  ? Hallucination, anxiety, or agitation  ? Fever    Decreased ability to move any body part near the wound    Bleeding that can't be stopped after 5 minutes of firm pressure  [employer] last reviewed this educational content on 8/1/2019 2000-2020 [employer]. [address] [place] [address], [place] [address]. All rights reserved. This information is not intended as a substitute for professional medical care. Always follow your healthcare professional's instructions.  This information has been modified by your health care provider with permission from the  publisher.           Patient Education     Cellulitis  Cellulitis is an infection of the deep layers of skin. A break in the skin, such as a cut or scratch, can let bacteria under the skin. If the bacteria get to deep layers of the skin, it can be serious. If not treated, cellulitis can get into the bloodstream and lymph nodes. The infection can then spread throughout the body. This causes serious illness.   Cellulitis causes the affected skin to become red, swollen, warm, and sore. The reddened areas have a visible border. An open sore may leak fluid (pus). You may have a fever, chills, and pain.   Cellulitis is treated with antibiotics taken for 7 to 10 days. An open sore may be cleaned and covered with cool wet gauze. Symptoms should get better 1 to 2 days after treatment is started. Make sure to take all the antibiotics for the full number of days until they are gone. Keep taking the medicine even if your symptoms go away.   Home care  Follow these tips:    Limit the use of the part of your body with cellulitis.     If the infection is on your leg, keep your leg raised while sitting. This helps reduce swelling.    Take all of the antibiotic medicine exactly as directed until it is gone. Don't miss any doses, especially during the first 7 days. Don t stop taking the medicine when your symptoms get better.    Keep the affected area clean and dry.    Wash your hands with soap and clean, running water before and after touching your skin. Anyone else who touches your skin should also wash his or her hands. Don't share towels.  Follow-up care  Follow up with your healthcare provider, or as advised. If your infection doesn't go away on the first antibiotic, your healthcare provider will prescribe a different one.   When to seek medical advice  Call your healthcare provider right away if any of these occur:    Red areas that spread    Swelling or pain that gets worse    Fluid leaking from the skin (pus)    Fever higher  of 100.4  F (38.0  C) or higher after 2 days on antibiotics  [employer] last reviewed this educational content on 8/1/2019 2000-2021 [employer]. All rights reserved. This information is not intended as a substitute for professional medical care. Always follow your healthcare professional's instructions.

## 2022-03-07 ENCOUNTER — VIRTUAL VISIT (OUTPATIENT)
Dept: PSYCHIATRY | Facility: CLINIC | Age: 37
End: 2022-03-07
Attending: PSYCHOLOGIST
Payer: COMMERCIAL

## 2022-03-07 DIAGNOSIS — F33.1 MAJOR DEPRESSIVE DISORDER, RECURRENT EPISODE, MODERATE (H): Primary | ICD-10-CM

## 2022-03-07 DIAGNOSIS — F41.1 GENERALIZED ANXIETY DISORDER: ICD-10-CM

## 2022-03-07 PROCEDURE — 90853 GROUP PSYCHOTHERAPY: CPT | Mod: 95 | Performed by: PSYCHOLOGIST

## 2022-03-09 DIAGNOSIS — F33.1 MAJOR DEPRESSIVE DISORDER, RECURRENT EPISODE, MODERATE (H): ICD-10-CM

## 2022-03-09 NOTE — PROGRESS NOTES
[facility]    Dialectic Behavior Therapy Clinic     Adult DBT Skills Group     DBT (Dialectic Behavior Therapy) is a cognitive behavioral therapy that includes skills group, which uses didactics, modeling, behavior rehearsal, and homework exercises to aid patients in acquiring new skills and the opportunity to practice new behaviors.    Date of Service: Mar 7, 2022  Group Length: 120 minutes  Start time: 2:30pm   End time: 4:30pm  Number of Participants: 7  Group Facilitators: Mackenzie Corbin, PhD, LP, Curt Garrison MD and SHAUNNA Drake    Client: Kasandra Lau  Pronouns: She/Her/Hers/Herself  YOB: 1985  MRN: [id number]    Type of service:  video visit for group therapy  Reason for video visit:  Patient unable to travel due to Covid-19  Originating Site (patient location):  [facility]   Location- Patient's home  Distant Site (provider location):  Remote location  Mode of Communication:  Video Conference via Zoom  Consent:  Patient has given verbal consent for video visit?: Yes     Mindfulness: Mindful breathing  Homework Reviewed: Distress Tolerance Worksheet 6 and 7: Self Soothing and Improve the Moment  Group Topic for Today: Reality acceptance and Radical acceptance (DT handouts 10 and 11)  Homework Assigned: Distress Tolerance Worksheet 9, Radical Acceptance    Assessment: Patient was on time for group. Patient completed at least one day of her diary card the previous week and she shared a personal situation and skill she used with the group. Patient did complete the homework assigned the previous week prior to arriving at group & shared her homework with the group. Patient was engaged in homework review and was engaged in the didactic portion of group, providing many personal examples. Mood appeared euthymic.    Diagnosis:   Encounter Diagnoses   Name Primary?     Major depressive disorder, recurrent episode, moderate (H) Yes     Generalized  anxiety disorder        Plan: Continue in full-model outpatient DBT program.     Group Treatment Plan Reviewed: 10/8/2022  Group Treatment Plan Due for Review: 1/8/2022    Curt Garrison MD  Psychiatry PGY-4

## 2022-03-11 ENCOUNTER — VIRTUAL VISIT (OUTPATIENT)
Dept: PSYCHOLOGY | Facility: CLINIC | Age: 37
End: 2022-03-11
Payer: COMMERCIAL

## 2022-03-11 DIAGNOSIS — F34.1 PERSISTENT DEPRESSIVE DISORDER: Primary | ICD-10-CM

## 2022-03-11 DIAGNOSIS — F54 PSYCHOLOGICAL AND BEHAVIORAL FACTORS ASSOCIATED WITH DISORDERS OR DISEASES CLASSIFIED ELSEWHERE: ICD-10-CM

## 2022-03-11 DIAGNOSIS — F60.9 PERSONALITY DISORDER (H): ICD-10-CM

## 2022-03-11 DIAGNOSIS — F43.12 CHRONIC POST-TRAUMATIC STRESS DISORDER (PTSD): ICD-10-CM

## 2022-03-11 PROCEDURE — 90837 PSYTX W PT 60 MINUTES: CPT | Mod: 95 | Performed by: PSYCHOLOGIST

## 2022-03-11 NOTE — TELEPHONE ENCOUNTER
BUPROPION HCL  MG TABLET      Last Written Prescription Date:  1/10/22  Last Fill Quantity: 30,   # refills: 1  Last Office Visit : 1/18/22  Future Office visit:  None scheduled    Routing refill request to provider for review/approval because:  Last PHQ-9 high score  PHQ-9 and GAD7 Scores  6/20/2019   8/28/2019   10/14/2019   1/15/2020   2/12/2020   4/29/2020   9/21/2021    PHQ9 TOTAL SCORE - - - - - 9 (Mild depression) -   PHQ-9 Total Score 6 8 12 7 8 9 9   VIC-7 Total Score - - - 10 9 10 13    6/20/2019 8/28/2019 10/14/2019 1/15/2020 2/12/2020 4/29/2020 9/21/2021       Last prescribed limited quantity with 1 refill

## 2022-03-13 NOTE — PROGRESS NOTES
"  Health Psychology - Follow up Visit  Confidential Summary*    The author of this note documented a reason for not sharing it with the patient.  REFERRAL SOURCE:  Psychiatry    CHIEF COMPLAINT/REASON FOR VISIT  Psychotherapy in context of chronic PTSD and persistent depression.  Patient also complains of dissatisfaction with interpersonal relationships and challenges with emotion regulation.      Patient was seen today for a 60 minute individual psychotherapy session.  The session was facilitated via PHONE with patient at her home and provider at her own home.     This telehealth service is appropriate and effective for delivering services in light of the necessity for social distancing to mitigate the COVID-19 epidemic. Patient has agreed to receiving telehealth services.    The patient has been notified of following:   \"This PHONE visit will be conducted via a call between you and your physician/provider. We have found that certain health care needs can be provided without the need for an in-person physical exam.  PHONE visits are billed at different rates depending on your insurance coverage.  Please reach out to your insurance provider with any questions. If during the course of the call the physician/provider feels a video visit is not appropriate, you will not be charged for this service.\"     Patient has given verbal consent for PHONE visit? Yes    Subjective:  Patient seen for ongoing DBT treatment.  Patient did not have diary card complete.  Completed diary card in session. Patient confronted on her ongoing use of online platform to meet possible romantic partners.  She reviewed in detail her interpersonal communication challenges and perceived disrespect associated with recent man that she met online.  Discussed her reliance on online dating versus meeting in person.  She described anxiety associated with possible rejection.  Discussed the possibility that her trauma history may be influencing her self " perception and her challenges with emotional intimacy.  Encouraged her to expand her interpersonal skills to get to know her possible partners.  She was openly rebellious to these suggestions.  Discussed the use of willing hands and being effective versus using emotion mind in decision making.      She reported that her computer is broken and that she is concerned about the loss of information and that she may be financially responsible for recovery of data from the computer.      Objective:  Patient was on time for today s session. She was alert and oriented. Mood was euthymic with appropriate range of affect. Patient denied suicidal or assaultive ideation, plan, or intent.        Assessment:  The patient has a longstanding history of interpersonal discord and challenges with emotion regulation.  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  She has completed all requirements for her PhD and is now ABD.  She is living in  housing with her two dogs.      Plan:  Patient is now in full model DBT at [facility] and is attending skills group on M at 2.  See in one week.      Treatment plan completed:  10/8/21    Time In: 1:00  Time Out: 2:00    Diagnosis:  Axis I PTSD, chronic, persistent depressive disorder, adjustment disorder with mixed, r/o somatization disorder, psychological factors associated with physical (fibromyalgia)   Axis II  BPD   Axis III please see medical records for details   [place] IV Psychosocial and Environmental Stressors: living alone with no family support, pandemic stress, chronic illness         Corina Muhammad, PhD, LP

## 2022-03-14 ENCOUNTER — VIRTUAL VISIT (OUTPATIENT)
Dept: PSYCHIATRY | Facility: CLINIC | Age: 37
End: 2022-03-14
Attending: PSYCHOLOGIST
Payer: COMMERCIAL

## 2022-03-14 DIAGNOSIS — F33.1 MAJOR DEPRESSIVE DISORDER, RECURRENT EPISODE, MODERATE (H): Primary | ICD-10-CM

## 2022-03-14 PROCEDURE — 90853 GROUP PSYCHOTHERAPY: CPT | Mod: 95 | Performed by: PSYCHOLOGIST

## 2022-03-14 RX ORDER — BUPROPION HYDROCHLORIDE 300 MG/1
300 TABLET ORAL EVERY MORNING
Qty: 30 TABLET | Refills: 1 | Status: SHIPPED | OUTPATIENT
Start: 2022-03-14 | End: 2022-05-06

## 2022-03-14 NOTE — PROGRESS NOTES
"     [facility]    Dialectic Behavior Therapy Clinic     Adult DBT Skills Group     DBT (Dialectic Behavior Therapy) is a cognitive behavioral therapy that includes skills group, which uses didactics, modeling, behavior rehearsal, and homework exercises to aid patients in acquiring new skills and the opportunity to practice new behaviors.    Date of Service: Mar 14, 2022  Group Length: 110 minutes  Start time: 2:40pm   End time: 4:30pm  Number of Participants: 6  Group Facilitators: Mackenzie Corbin, PhD, LP, Curt Garrison MD and SHAUNNA Drake    Client: Kasandra Lau  Pronouns: She/Her/Hers/Herself  YOB: 1985  MRN: [id number]    Type of service:  video visit for group therapy  Reason for video visit:  Patient unable to travel due to Covid-19  Originating Site (patient location):  [facility]   Location- Patient's home  Distant Site (provider location):  Remote location  Mode of Communication:  Video Conference via Zoom  Consent:  Patient has given verbal consent for video visit?: Yes     Mindfulness: Participation activity: celebrity name game (share the name of a celebrity that starts with the same initial as the last name of the celebrity that the person before shared).   Homework Reviewed: Distress Tolerance Worksheet 9- radical acceptance  Group Topic for Today: Distress Tolerance Handouts 12, 13, and 14: Turning the Mind, Willingness and Half-Smiling & Willing Hands.  Homework Assigned: Distress Tolerance Worksheet 10 and 11: Turning the Mind, Willingness, Willfulness & Half-Smiling and Willing Hands.     Assessment: Patient was not on time for group and was late by a few minutes. Patient did not complete their diary card the past week and explained that it was because they \"were overwhelmed.\" Patient did complete their homework, but they completed the wrong worksheet. However, patient did prepare their distress tolerance kit and shared the items " that they had in their kit with the rest of the group. Patient shared that smell/scents are a big component of their distress tolerance kit. Patient was not engaged in homework review and was engaged in the didactic portion of group.  Mood appeared Euthymic.     Diagnosis:   Encounter Diagnosis   Name Primary?     Major depressive disorder, recurrent episode, moderate (H) Yes       Plan: Continue in full-model outpatient DBT program.     Group Treatment Plan Reviewed: 10/8/2022  Group Treatment Plan Due for Review: 1/8/2022    SHAUNNA Quinonez  Social Work Student  Supervised by Mackenzie Corbin, PhD, LP

## 2022-03-18 ENCOUNTER — VIRTUAL VISIT (OUTPATIENT)
Dept: PSYCHOLOGY | Facility: CLINIC | Age: 37
End: 2022-03-18
Payer: COMMERCIAL

## 2022-03-18 DIAGNOSIS — F60.9 PERSONALITY DISORDER (H): ICD-10-CM

## 2022-03-18 DIAGNOSIS — F54 PSYCHOLOGICAL AND BEHAVIORAL FACTORS ASSOCIATED WITH DISORDERS OR DISEASES CLASSIFIED ELSEWHERE: ICD-10-CM

## 2022-03-18 DIAGNOSIS — F43.12 CHRONIC POST-TRAUMATIC STRESS DISORDER (PTSD): Primary | ICD-10-CM

## 2022-03-18 PROCEDURE — 90837 PSYTX W PT 60 MINUTES: CPT | Mod: 95 | Performed by: PSYCHOLOGIST

## 2022-03-21 ENCOUNTER — VIRTUAL VISIT (OUTPATIENT)
Dept: PSYCHIATRY | Facility: CLINIC | Age: 37
End: 2022-03-21
Attending: PSYCHOLOGIST
Payer: COMMERCIAL

## 2022-03-21 DIAGNOSIS — F33.40 RECURRENT MAJOR DEPRESSIVE DISORDER, IN REMISSION (H): Primary | ICD-10-CM

## 2022-03-21 DIAGNOSIS — F41.0 PANIC DISORDER: ICD-10-CM

## 2022-03-21 PROCEDURE — 90853 GROUP PSYCHOTHERAPY: CPT | Mod: 95 | Performed by: STUDENT IN AN ORGANIZED HEALTH CARE EDUCATION/TRAINING PROGRAM

## 2022-03-28 ENCOUNTER — VIRTUAL VISIT (OUTPATIENT)
Dept: PSYCHIATRY | Facility: CLINIC | Age: 37
End: 2022-03-28
Attending: PSYCHOLOGIST
Payer: COMMERCIAL

## 2022-03-28 DIAGNOSIS — F33.1 MAJOR DEPRESSIVE DISORDER, RECURRENT EPISODE, MODERATE (H): Primary | ICD-10-CM

## 2022-03-28 DIAGNOSIS — F41.1 GENERALIZED ANXIETY DISORDER: ICD-10-CM

## 2022-03-28 PROCEDURE — 90853 GROUP PSYCHOTHERAPY: CPT | Mod: U7

## 2022-03-28 NOTE — PROGRESS NOTES
"     [facility]    Dialectic Behavior Therapy Clinic     Adult DBT Skills Group     DBT (Dialectic Behavior Therapy) is a cognitive behavioral therapy that includes skills group, which uses didactics, modeling, behavior rehearsal, and homework exercises to aid patients in acquiring new skills and the opportunity to practice new behaviors.    Date of Service: Mar 28, 2022  Group Length: 120 minutes  Start time: 2:30pm   End time: 4:30pm  Number of Participants: 7  Group Facilitators: Mackenzie Corbin, PhD, LP and SHAUNNA Drake    Client: Kasandra Lau  Pronouns: She/Her/Hers/Herself  YOB: 1985  MRN: [id number]    Type of service:  video visit for group therapy  Reason for video visit:  Patient unable to travel due to Covid-19  Originating Site (patient location):  [facility]   Location- Patient's home  Distant Site (provider location):  Remote location  Mode of Communication:  Video Conference via Zoom  Consent:  Patient has given verbal consent for video visit?: Yes     Mindfulness: Progressive Muscle Relaxation  Homework Reviewed: Distress Tolerance Worksheet  12- Mindfulness of Current Thoughts  Group Topic for Today: Mindfulness Handout 1: Goals of Mindfulness Practice, 1A: Mindfulness Definitions, 3: Wise Mind- States of Mind & 4: \"What\" Skills.  Homework Assigned: Mindfulness Worksheet 2B- Mindfulness Core Skills Practice    Assessment: Patient was on time for group. Patient did not complete their diary card or homework assigned the previous week prior to arriving at group because they were busy working on something for school for an upcoming deadline and that the diary card \"skipped my mind.\" Patient was not engaged in homework review and was engaged in the didactic portion of group. Mood appeared Euthymic.     Diagnosis:   Encounter Diagnoses   Name Primary?     Major depressive disorder, recurrent episode, moderate (H) Yes     Generalized anxiety " disorder        Plan: Continue in full-model outpatient DBT program.     Group Treatment Plan Reviewed: 10/8/2021  Group Treatment Plan Due for Review: 1/8/2022    Malia Lopez BA  Social Work Student  Supervised by Mackenzie Corbin, PhD, LP

## 2022-04-04 ENCOUNTER — VIRTUAL VISIT (OUTPATIENT)
Dept: PSYCHIATRY | Facility: CLINIC | Age: 37
End: 2022-04-04
Attending: PSYCHOLOGIST
Payer: COMMERCIAL

## 2022-04-04 DIAGNOSIS — F41.9 ANXIETY DISORDER, UNSPECIFIED TYPE: ICD-10-CM

## 2022-04-04 DIAGNOSIS — F33.1 MODERATE EPISODE OF RECURRENT MAJOR DEPRESSIVE DISORDER (H): ICD-10-CM

## 2022-04-04 PROCEDURE — 90853 GROUP PSYCHOTHERAPY: CPT | Mod: U7 | Performed by: STUDENT IN AN ORGANIZED HEALTH CARE EDUCATION/TRAINING PROGRAM

## 2022-04-07 RX ORDER — VILAZODONE HYDROCHLORIDE 40 MG/1
TABLET ORAL
Qty: 90 TABLET | Refills: 0 | Status: SHIPPED | OUTPATIENT
Start: 2022-04-07 | End: 2022-06-14

## 2022-04-07 NOTE — TELEPHONE ENCOUNTER
VIIBRYD 40 MG TABLET  Last Written Prescription Date:  1/18/22  Last Fill Quantity: 90,   # refills: 0  Last Office Visit : 1/18/22  Future Office visit:  None    Routing refill request to provider for review/approval because:  Drug not on the PCC/ NP  Health refill protocol.    Psychiatry referral in place, no psychiatry appt noted.( seeing psychologist Jb, adult DBT group also)    Per NP note 1/18/22  (F33.1) Moderate episode of recurrent major depressive disorder (H)  Comment: Seasonal pattern. The provider that prescribed her medications in the past was in [place] and she has not established care with a new provider in the area. Discussed establishing care with a psychiatrist locally to managing her psych medications. She was given a referral at a recent visit but they are not taking new patients. Gave her contact information for [facility]. Refilled her Viibryd and educated her on the potential interaction with Doxepin including elevated BP, racing heart and sweating, and directed her to seek medical care if she experiences any of these symptoms.   Plan: vilazodone (VIIBRYD) 40 MG TABS tablet

## 2022-04-08 ENCOUNTER — VIRTUAL VISIT (OUTPATIENT)
Dept: PSYCHOLOGY | Facility: CLINIC | Age: 37
End: 2022-04-08
Payer: COMMERCIAL

## 2022-04-08 DIAGNOSIS — F60.9 PERSONALITY DISORDER (H): ICD-10-CM

## 2022-04-08 DIAGNOSIS — F43.12 CHRONIC POST-TRAUMATIC STRESS DISORDER (PTSD): Primary | ICD-10-CM

## 2022-04-08 DIAGNOSIS — F34.1 PERSISTENT DEPRESSIVE DISORDER: ICD-10-CM

## 2022-04-08 DIAGNOSIS — F54 PSYCHOLOGICAL AND BEHAVIORAL FACTORS ASSOCIATED WITH DISORDERS OR DISEASES CLASSIFIED ELSEWHERE: ICD-10-CM

## 2022-04-08 PROCEDURE — 90837 PSYTX W PT 60 MINUTES: CPT | Mod: 95 | Performed by: PSYCHOLOGIST

## 2022-04-11 ENCOUNTER — VIRTUAL VISIT (OUTPATIENT)
Dept: PSYCHIATRY | Facility: CLINIC | Age: 37
End: 2022-04-11
Attending: PSYCHOLOGIST
Payer: COMMERCIAL

## 2022-04-11 DIAGNOSIS — F60.3 BORDERLINE PERSONALITY DISORDER (H): ICD-10-CM

## 2022-04-11 DIAGNOSIS — F33.1 MODERATE EPISODE OF RECURRENT MAJOR DEPRESSIVE DISORDER (H): Primary | ICD-10-CM

## 2022-04-11 DIAGNOSIS — F33.1 MAJOR DEPRESSIVE DISORDER, RECURRENT EPISODE, MODERATE (H): ICD-10-CM

## 2022-04-11 PROCEDURE — 90853 GROUP PSYCHOTHERAPY: CPT | Mod: U7

## 2022-04-11 NOTE — PROGRESS NOTES
"  Health Psychology - Follow up Visit  Confidential Summary*    The author of this note documented a reason for not sharing it with the patient.  REFERRAL SOURCE:  Psychiatry    CHIEF COMPLAINT/REASON FOR VISIT  Psychotherapy in context of chronic PTSD and persistent depression.  Patient also complains of dissatisfaction with interpersonal relationships and challenges with emotion regulation.      Patient was seen today for a 60 minute individual psychotherapy session.  The session was facilitated via PHONE with patient at her home and provider at her own home.     This telehealth service is appropriate and effective for delivering services in light of the necessity for social distancing to mitigate the COVID-19 epidemic. Patient has agreed to receiving telehealth services.    The patient has been notified of following:   \"This PHONE visit will be conducted via a call between you and your physician/provider. We have found that certain health care needs can be provided without the need for an in-person physical exam.  PHONE visits are billed at different rates depending on your insurance coverage.  Please reach out to your insurance provider with any questions. If during the course of the call the physician/provider feels a video visit is not appropriate, you will not be charged for this service.\"     Patient has given verbal consent for PHONE visit? Yes    Subjective:  Patient seen for ongoing treatment using DBT.  She began with report that she does not have her diary card completed.  Encouraged her to take time during session to complete diary card.  Once she completed one day of diary card, we reviewed,  Patient admitted that she continues to have trouble focusing on her emotions.  She was encouraged to consider that her emotions can not hurt her and to check the facts surrounding whether the facts support her feelings.  She reported that she has had a recent incident in which she met someone on social media that " resulted in another episode of disappointment and rejection.  She was strongly encouraged to discontinue online dating for one month so that she might learn to sit with her emotions.  Encouraged her to add online dating to her diary card.      She was confronted about her unrelenting crises surrounding her apartment rent.  She is behind in her payments and an eviction warning has been issued.  She is in the process of speaking to an .  Discussed how procrastination and overspending has resulted in the current situation she is in.  Provided validation of her justified anxiety and depression and encouraged her to problem solve.      Objective:  Patient was on time for today s session. She was alert and oriented. Mood was euthymic with appropriate range of affect. Patient denied suicidal or assaultive ideation, plan, or intent.        Assessment:  The patient has a longstanding history of interpersonal discord and challenges with emotion regulation.  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  She has completed all requirements for her PhD and is now ABD.  She is living in  housing with her two dogs.      Plan:  Patient is now in full model DBT at [facility] and is attending skills group on  at 2.  See in one week.      Treatment plan completed:  10/8/21    Time In: 1:00  Time Out: 2:00    Diagnosis:  Axis I PTSD, chronic, persistent depressive disorder, adjustment disorder with mixed, r/o somatization disorder, psychological factors associated with physical (fibromyalgia)   Axis II  BPD   Axis III please see medical records for details   [place] IV Psychosocial and Environmental Stressors: living alone with no family support, pandemic stress, chronic illness         Corina Muhammad, PhD, LP

## 2022-04-12 NOTE — PROGRESS NOTES
"     [facility]    Dialectic Behavior Therapy Clinic     Adult DBT Skills Group     DBT (Dialectic Behavior Therapy) is a cognitive behavioral therapy that includes skills group, which uses didactics, modeling, behavior rehearsal, and homework exercises to aid patients in acquiring new skills and the opportunity to practice new behaviors.    Date of Service: Apr 11, 2022  Group Length: 60 minutes  Start time: 3:30pm   End time: 4:30pm  Number of Participants: 7  Group Facilitators: Mackenzie Corbin, PhD, LP and SHAUNNA Drake    Client: Kasandra Lau  Pronouns: She/Her/Hers/Herself  YOB: 1985  MRN: [id number]    Type of service:  video visit for group therapy  Reason for video visit:  Patient unable to travel due to Covid-19  Originating Site (patient location):  [facility]   Location- Patient's home  Distant Site (provider location):  Remote location  Mode of Communication:  Video Conference via Zoom  Consent:  Patient has given verbal consent for video visit?: Yes     Mindfulness: [place] mindfulness  Homework Reviewed: Mindfulness Worksheeet 5- the \"How\" skills  Group Topic for Today: Emotion Regulation Handouts 1-4A: Goals of Emotion regulation, What emotions do for you, what makes it hard to regulate your emotions & emotion myths.   Homework Assigned: Emotion Regulation Worksheet 2- Figuring out what my emotions are doing for me    Assessment: Patient joined group during the skills portion of group at 3:30pm. Patient did make lead co-facilitator aware that she would not be able to join group until the skills portion. Therefore, provides were unable to follow-up with patient about completion of the diary card or homework. Patient was not engaged in homework review due to not being present in group during homework review. Patient was engaged in the didactic portion of group. Mood appeared Euthymic.     Diagnosis:   Encounter Diagnoses   Name Primary? "     Moderate episode of recurrent major depressive disorder (H) Yes     Major depressive disorder, recurrent episode, moderate (H)      Borderline personality disorder (H)        Plan: Continue in full-model outpatient DBT program.     Group Treatment Plan Reviewed: 10/8/2021  Group Treatment Plan Due for Review: 1/8/2022    Malia Lopez BA  Clinical Social Work Student  Supervised by Mackenzie Corbin, PhD,

## 2022-04-13 ENCOUNTER — OFFICE VISIT (OUTPATIENT)
Dept: FAMILY MEDICINE | Facility: CLINIC | Age: 37
End: 2022-04-13
Payer: COMMERCIAL

## 2022-04-13 VITALS
SYSTOLIC BLOOD PRESSURE: 116 MMHG | WEIGHT: 185 LBS | HEART RATE: 88 BPM | TEMPERATURE: 98.7 F | BODY MASS INDEX: 27.4 KG/M2 | DIASTOLIC BLOOD PRESSURE: 82 MMHG | HEIGHT: 69 IN | OXYGEN SATURATION: 96 %

## 2022-04-13 DIAGNOSIS — Z20.2 POTENTIAL EXPOSURE TO STD: ICD-10-CM

## 2022-04-13 DIAGNOSIS — Z01.818 PRE-OP EXAM: Primary | ICD-10-CM

## 2022-04-13 LAB
ALBUMIN SERPL-MCNC: 3.9 G/DL (ref 3.4–5)
ALP SERPL-CCNC: 110 U/L (ref 40–150)
ALT SERPL W P-5'-P-CCNC: 63 U/L (ref 0–50)
ANION GAP SERPL CALCULATED.3IONS-SCNC: 7 MMOL/L (ref 3–14)
AST SERPL W P-5'-P-CCNC: 36 U/L (ref 0–45)
BASOPHILS # BLD AUTO: 0.1 10E3/UL (ref 0–0.2)
BASOPHILS NFR BLD AUTO: 1 %
BILIRUB SERPL-MCNC: 0.5 MG/DL (ref 0.2–1.3)
BUN SERPL-MCNC: 13 MG/DL (ref 7–30)
CALCIUM SERPL-MCNC: 9.2 MG/DL (ref 8.5–10.1)
CHLORIDE BLD-SCNC: 109 MMOL/L (ref 94–109)
CO2 SERPL-SCNC: 23 MMOL/L (ref 20–32)
CREAT SERPL-MCNC: 0.63 MG/DL (ref 0.52–1.04)
EOSINOPHIL # BLD AUTO: 0.1 10E3/UL (ref 0–0.7)
EOSINOPHIL NFR BLD AUTO: 1 %
ERYTHROCYTE [DISTWIDTH] IN BLOOD BY AUTOMATED COUNT: 13 % (ref 10–15)
GFR SERPL CREATININE-BSD FRML MDRD: >90 ML/MIN/1.73M2
GLUCOSE BLD-MCNC: 88 MG/DL (ref 70–99)
HCT VFR BLD AUTO: 40 % (ref 35–47)
HGB BLD-MCNC: 13 G/DL (ref 11.7–15.7)
IMM GRANULOCYTES # BLD: 0 10E3/UL
IMM GRANULOCYTES NFR BLD: 0 %
LYMPHOCYTES # BLD AUTO: 3.2 10E3/UL (ref 0.8–5.3)
LYMPHOCYTES NFR BLD AUTO: 43 %
MCH RBC QN AUTO: 29.4 PG (ref 26.5–33)
MCHC RBC AUTO-ENTMCNC: 32.5 G/DL (ref 31.5–36.5)
MCV RBC AUTO: 91 FL (ref 78–100)
MONOCYTES # BLD AUTO: 0.3 10E3/UL (ref 0–1.3)
MONOCYTES NFR BLD AUTO: 4 %
NEUTROPHILS # BLD AUTO: 3.8 10E3/UL (ref 1.6–8.3)
NEUTROPHILS NFR BLD AUTO: 51 %
NRBC # BLD AUTO: 0 10E3/UL
NRBC BLD AUTO-RTO: 0 /100
PLATELET # BLD AUTO: 403 10E3/UL (ref 150–450)
POTASSIUM BLD-SCNC: 3.8 MMOL/L (ref 3.4–5.3)
PROT SERPL-MCNC: 8.2 G/DL (ref 6.8–8.8)
RBC # BLD AUTO: 4.42 10E6/UL (ref 3.8–5.2)
SODIUM SERPL-SCNC: 139 MMOL/L (ref 133–144)
WBC # BLD AUTO: 7.4 10E3/UL (ref 4–11)

## 2022-04-13 PROCEDURE — 85025 COMPLETE CBC W/AUTO DIFF WBC: CPT | Performed by: NURSE PRACTITIONER

## 2022-04-13 PROCEDURE — 86780 TREPONEMA PALLIDUM: CPT | Performed by: NURSE PRACTITIONER

## 2022-04-13 PROCEDURE — 87491 CHLMYD TRACH DNA AMP PROBE: CPT | Performed by: NURSE PRACTITIONER

## 2022-04-13 PROCEDURE — 87389 HIV-1 AG W/HIV-1&-2 AB AG IA: CPT | Performed by: NURSE PRACTITIONER

## 2022-04-13 PROCEDURE — 80053 COMPREHEN METABOLIC PANEL: CPT | Performed by: NURSE PRACTITIONER

## 2022-04-13 PROCEDURE — 87591 N.GONORRHOEAE DNA AMP PROB: CPT | Performed by: NURSE PRACTITIONER

## 2022-04-13 PROCEDURE — U0003 INFECTIOUS AGENT DETECTION BY NUCLEIC ACID (DNA OR RNA); SEVERE ACUTE RESPIRATORY SYNDROME CORONAVIRUS 2 (SARS-COV-2) (CORONAVIRUS DISEASE [COVID-19]), AMPLIFIED PROBE TECHNIQUE, MAKING USE OF HIGH THROUGHPUT TECHNOLOGIES AS DESCRIBED BY CMS-2020-01-R: HCPCS | Performed by: NURSE PRACTITIONER

## 2022-04-13 NOTE — NURSING NOTE
"ROOM:2    Preferred Name: Kasandra     37 year old  Chief Complaint   Patient presents with     Pre-Op Exam     Pcr Test        Blood pressure 116/82, pulse 88, temperature 98.7  F (37.1  C), temperature source Oral, height 1.74 m (5' 8.5\"), weight 83.9 kg (185 lb), SpO2 96 %, not currently breastfeeding. Body mass index is 27.72 kg/m .  BP completed using cuff size:    Patient Active Problem List   Diagnosis     Pelvic pain in female     Vitamin D deficiency     Menorrhagia with regular cycle     Migraine without aura and without status migrainosus, not intractable     Iron deficiency anemia due to chronic blood loss     Tired     Circadian rhythm sleep disorder, delayed sleep phase type     Unhealthy sleep habit     Seasonal mood disorder (H)     Chronic idiopathic urticaria     Acid reflux     Cholecystitis     Depression     Hx of abnormal cervical Pap smear     Irritable bowel syndrome with constipation     Migraine headache     Mild intermittent asthma without complication     Need for desensitization to allergens     Panic disorder     Pap smear abnormality of cervix/human papillomavirus (HPV) positive     Recurrent major depressive disorder, in remission (H)     Hypertrophy of breast     Chronic sinusitis, unspecified location       Wt Readings from Last 2 Encounters:   04/13/22 83.9 kg (185 lb)   03/06/22 84.5 kg (186 lb 5 oz)     BP Readings from Last 3 Encounters:   04/13/22 116/82   03/06/22 108/78   02/16/22 110/76       Allergies   Allergen Reactions     Dust Mites Cough, Difficulty breathing and Shortness Of Breath     Cats      Chest tightness, sinus irritation       Current Outpatient Medications   Medication     Acetaminophen (TYLENOL PO)     almotriptan (AXERT) 12.5 MG tablet     budesonide (PULMICORT) 0.5 MG/2ML neb solution     buPROPion (WELLBUTRIN XL) 300 MG 24 hr tablet     cholecalciferol 50 MCG (2000 UT) CAPS     doxepin (SINEQUAN) 10 MG capsule     EMGALITY 120 MG/ML injection     famotidine " (PEPCID) 20 MG tablet     fexofenadine (ALLEGRA) 180 MG tablet     fluticasone (FLONASE) 50 MCG/ACT nasal spray     gabapentin (NEURONTIN) 400 MG capsule     levocetirizine (XYZAL) 5 MG tablet     montelukast (SINGULAIR) 10 MG tablet     ondansetron (ZOFRAN-ODT) 4 MG ODT tab     propranolol (INDERAL) 20 MG tablet     VIIBRYD 40 MG TABS tablet     azelastine (ASTELIN) 0.1 % nasal spray     loteprednol (LOTEMAX) 0.5 % ophthalmic suspension     olopatadine (PATANOL) 0.1 % ophthalmic solution     order for DME     promethazine (PHENERGAN) 25 MG tablet     pyridostigmine (MESTINON) 60 MG tablet     No current facility-administered medications for this visit.       Social History     Tobacco Use     Smoking status: Former Smoker     Packs/day: 0.00     Years: 10.00     Pack years: 0.00     Types: Cigarettes     Smokeless tobacco: Never Used     Tobacco comment: smokes occasionally socially    Vaping Use     Vaping Use: Never used   Substance Use Topics     Alcohol use: Not Currently     Alcohol/week: 0.0 standard drinks     Comment: occasional      Drug use: Not Currently     Types: Marijuana     Comment: marijuana  none since May 2021       Honoring Choices - Health Care Directive Guide offered to patient at time of visit.    Health Maintenance Due   Topic Date Due     ASTHMA ACTION PLAN  Never done     ASTHMA CONTROL TEST  Never done     ADVANCE CARE PLANNING  Never done     PREVENTIVE CARE VISIT  01/31/2020     HPV TEST  02/21/2020     PAP  02/21/2020       Immunization History   Administered Date(s) Administered     COVID-19,PF,Pfizer (12+ Yrs) 03/19/2021, 04/09/2021     COVID-19,PF,Pfizer 12+ Yrs (2022 and After) 01/10/2022     DTaP, Unspecified 05/22/2019     Flu, Unspecified 09/23/2016     Influenza Vaccine IM > 6 months Valent IIV4 (Alfuria,Fluzone) 09/23/2016, 09/07/2018, 08/28/2019, 09/02/2020, 11/02/2021     Pneumococcal 23 valent 05/22/2019     Tdap (Adacel,Boostrix) 05/22/2019       Lab Results   Component  Value Date    PAP NIL 01/31/2019       Recent Labs   Lab Test 01/04/22  1732 08/31/21  1057 08/12/21  1535 09/12/20  2058 08/02/19  1711 10/05/17  1133 02/06/17  1200   ALT  --  41 33 36 25   < >  --    CR  --  0.69 0.68 0.77 0.80   < >  --    GFRESTIMATED  --  >90 >90 >90 >90   < >  --    GFRESTBLACK  --   --   --  >90 >90   < >  --    ALBUMIN  --  3.2* 2.9* 3.3* 3.4   < >  --    POTASSIUM  --  3.8 3.4 3.9 3.5   < >  --    TSH 1.81  --   --   --   --   --  1.72    < > = values in this interval not displayed.       PHQ-2 ( 1999 Pfizer) 4/13/2022 1/18/2022   Q1: Little interest or pleasure in doing things 0 0   Q2: Feeling down, depressed or hopeless 0 0   PHQ-2 Score 0 0   PHQ-2 Total Score (12-17 Years)- Positive if 3 or more points; Administer PHQ-A if positive - 0   Q1: Little interest or pleasure in doing things - -   Q2: Feeling down, depressed or hopeless - -   PHQ-2 Score - -   Some encounter information is confidential and restricted. Go to Review FlowsQuickGifts activity to see all data.       PHQ-9 SCORE 1/15/2020 2/12/2020 4/29/2020 9/21/2021   PHQ-9 Total Score MyChart - - 9 (Mild depression) -   PHQ-9 Total Score 7 8 - 9   Some encounter information is confidential and restricted. Go to Review Flowsheets activity to see all data.       VIC-7 SCORE 2/12/2020 4/29/2020 9/21/2021   Total Score - 10 (moderate anxiety) -   Total Score 9 - 13   Some encounter information is confidential and restricted. Go to Review Flowsheets activity to see all data.       No flowsheet data found.    Diogo Hardin    April 13, 2022 3:24 PM

## 2022-04-13 NOTE — PROGRESS NOTES
[facility]  [address] [address]  Phone: [phone]  Fax: [phone]  Primary Provider: Roxy Rios  Pre-op Performing Provider: JADE CAIN    PREOPERATIVE EVALUATION:  Today's date: 4/13/2022    Kasandra Lau is a 37 year old female who presents for a preoperative evaluation.    Surgical Information:  Surgery/Procedure: Tonsillectomy  Surgery Location: [facility]  Surgeon: Dante Almanza  Surgery Date: 04/18/2022  Time of Surgery: TBD  Where patient plans to recover: At home alone  I discussed having someone with her after surgery for a minimum of 24 hours  Fax number for surgical facility: Note does not need to be faxed, will be available electronically in Epic.    Type of Anesthesia Anticipated: General    Subjective     HPI related to upcoming procedure: Kasandra has had enlarged tonsils for many years, she has chronic sinus concerns and over the past years she has had increasing issues with breathing associated with her enlarged tonsils.  She does not have frequent throat infections.      Kasandra has been anemic in the recent past, she states due to endometriosis.  She is on iron supplementation, she states for the past 2 weeks.     Kasandra is requesting sti testing, she was last tested in January of 2022, negative, she has had unprotected sex since that time and wants to be retested.        Preop Questions 4/13/2022   1. Have you ever had a heart attack or stroke? No   2. Have you ever had surgery on your heart or blood vessels, such as a stent placement, a coronary artery bypass, or surgery on an artery in your head, neck, heart, or legs? No   3. Do you have chest pain with activity? No   4. Do you have a history of  heart failure? No   5. Do you currently have a cold, bronchitis or symptoms of other infection? No   6. Do you have a cough, shortness of breath, or wheezing? No   7. Do you or anyone in your family have previous history of  blood clots? No   8. Do you or does anyone in your family have a serious bleeding problem such as prolonged bleeding following surgeries or cuts? No   9. Have you ever had problems with anemia or been told to take iron pills? YES - history of anemia associated with endometriosis   10. Have you had any abnormal blood loss such as black, tarry or bloody stools, or abnormal vaginal bleeding? No   11. Have you ever had a blood transfusion? No   12. Are you willing to have a blood transfusion if it is medically needed before, during, or after your surgery? Yes   13. Have you or any of your relatives ever had problems with anesthesia? No   14. Do you have sleep apnea, excessive snoring or daytime drowsiness? No   15. Do you have any artifical heart valves or other implanted medical devices like a pacemaker, defibrillator, or continuous glucose monitor? No   16. Do you have artificial joints? No   17. Are you allergic to latex? No   18. Is there any chance that you may be pregnant? No     Health Care Directive:  Patient does not have a Health Care Directive or Living Will: Discussed advance care planning with patient; information given to patient to review.    Review of Systems  CONSTITUTIONAL: NEGATIVE for fever, chills, change in weight  INTEGUMENTARY/SKIN: NEGATIVE for worrisome rashes, moles or lesions  EYES: NEGATIVE for vision changes or irritation  ENT/MOUTH: NEGATIVE for ear, mouth and throat problems  RESP: NEGATIVE for significant cough or SOB  CV: NEGATIVE for chest pain, palpitations or peripheral edema  GI: NEGATIVE for nausea, abdominal pain, heartburn, or change in bowel habits  : NEGATIVE for frequency, dysuria, or hematuria  MUSCULOSKELETAL: NEGATIVE for significant arthralgias or myalgia  NEURO: NEGATIVE for weakness, dizziness or paresthesias  ENDOCRINE: NEGATIVE for temperature intolerance, skin/hair changes  HEME: NEGATIVE for bleeding problems  PSYCHIATRIC: NEGATIVE for changes in mood or  affect    Patient Active Problem List    Diagnosis Date Noted     Chronic sinusitis, unspecified location 09/21/2021     Priority: Medium     Hypertrophy of breast 09/10/2020     Priority: Medium     Added automatically from request for surgery [id number]       Pap smear abnormality of cervix/human papillomavirus (HPV) positive 09/03/2020     Priority: Medium     2252-1130 NILM HPV  Positive for High Risk HPV types other than 16 or 18 (Care Everywhere)  2020 NILM HPV negative 34 y.o.  PLAN, per ASCCP Guidelines: cotest 1/2023       Cholecystitis 07/16/2020     Priority: Medium     Added automatically from request for surgery [id number]       Chronic idiopathic urticaria 06/22/2020     Priority: Medium     Hx of abnormal cervical Pap smear 02/03/2020     Priority: Medium     Acid reflux 08/25/2019     Priority: Medium     Need for desensitization to allergens 06/06/2019     Priority: Medium     Formatting of this note might be different from the original.    Allergy Shot Care Plan for Kasandra Lau  Date of Order: June 6 2019  Ordering Provider: Dr. Martin Arreaga 1: Cat  Date Shots Started:  Start Dose: 0.1 mL of 1:100,000 - GREEN  Date Maintenance Reached:  Maintenance Dose: 0.3 mL of 1:100 - RED    Serum 2: Mite DF and Mite DP  Date Shots Started:  Start Dose: 0.1 mL of 1:100,000 - GREEN  Date Maintenance Reached:  Maintenance Dose: 0.1 mL of 1:100 - RED    BUILDING SCHEDULE FOR POLLEN AND NONPOLLEN   IMMUNOTHERAPY  EXCEPT VENOM AND CENTER AL    3 - 14 days Build per schedule.   15 - 21 days Repeat previous dose.   22 - 28 days Decrease by one dose.   29 - 35 days Decrease by two doses.   36 - 42 days Decrease by three doses.   Over 42 days Continue to decrease by one dose for each additional week.     1:100,000 (green)  1:10,000 (blue)  1:1000 (yellow)  1:100 (red)     1. 0.05 ml  7. 0.05 ml  13. 0.05 ml  19. 0.05 ml   2. 0.1 ml  8. 0.1 ml  14. 0.1 ml  20. 0.1 ml   3. 0.2 ml  9. 0.2 ml  15. 0.2 ml  21. 0.15 ml    4. 0.3 ml  10. 0.3 ml  16. 0.3 ml  22. 0.2 ml   5. 0.4 ml  11. 0.4 ml  17. 0.4 ml  23. 0.25 ml   6. 0.45 ml  12. 0.45 ml  18. 0.45 ml  24. 0.3 ml         25   0.35 ml         26   0.4 ml         27. 0.45 ml         28. 0.5 ml     MAINTENANCE SCHEDULE FOR POLLENS, NONPOLLENS (MITES,MOLD,CAT,DOG)  (not used for Center-[facility] Pollens)    ? When reaching maintenance dose for the first time, gradually stretch by weekly intervals to every 4 weeks (e.g., every 2 weeks, then 3 weeks, then 4 weeks).   ? Patient may receive their injections (patient choice) at 2-4 week intervals     Maintenance Repeat 1 Dose 2 Dose 3 Dose    Q2 weeks  (10-14 days)  3 weeks  (15-21 days) 4 weeks  (22-28 days) 5 weeks  (29-35 days) 6 weeks  (36-42 days) Decrease by 1 Dose for each additional week   Q3 weeks  (15-21 days)  4 weeks  (22-28 days) 5 weeks  (29-35 days) 6 weeks  (36-42 days) 7 weeks  (43-49 days)    Q4 weeks  (22-28 days)  5 weeks  (29-35 days) 6 weeks  (36-42 days) 7 weeks  (43-49 days) 8 weeks  (50-56 days)      Susanna Ambrose RN 6/6/2019 4:49 PM       Mild intermittent asthma without complication 04/30/2018     Priority: Medium     Seasonal mood disorder (H) 11/15/2017     Priority: Medium     Circadian rhythm sleep disorder, delayed sleep phase type 05/10/2017     Priority: Medium     Unhealthy sleep habit 05/10/2017     Priority: Medium     Vitamin D deficiency 11/19/2016     Priority: Medium     Menorrhagia with regular cycle 11/19/2016     Priority: Medium     Migraine without aura and without status migrainosus, not intractable 11/19/2016     Priority: Medium     Iron deficiency anemia due to chronic blood loss 11/19/2016     Priority: Medium     Tired 11/19/2016     Priority: Medium     Irritable bowel syndrome with constipation 10/25/2016     Priority: Medium     Recurrent major depressive disorder, in remission (H) 10/25/2016     Priority: Medium     Pelvic pain in female 08/25/2016     Priority: Medium     Migraine  headache 01/01/2012     Priority: Medium     Panic disorder 01/01/2007     Priority: Medium     Depression 01/01/2004     Priority: Medium      Past Medical History:   Diagnosis Date     Anemia fall 2016     Chronic fatigue      Depression (emotion)     sees psych, on meds     Gastroesophageal reflux disease      Migraine     daily meds, 2x month, more mild on meds     Neuropathic pain      Panic disorder      Uncomplicated asthma Spring 2018     Past Surgical History:   Procedure Laterality Date     COLONOSCOPY       LAPAROSCOPIC ABLATION ENDOMETRIOSIS N/A 11/9/2016    Procedure: LAPAROSCOPIC ABLATION ENDOMETRIOSIS;  Surgeon: Tonja Ferrer MD;  Location: [facility]     LAPAROSCOPIC CYSTECTOMY OVARIAN (BENIGN) Left 11/9/2016    Procedure: LAPAROSCOPIC CYSTECTOMY OVARIAN (BENIGN);  Surgeon: Tonja Ferrer MD;  Location: [facility]     LAPAROSCOPIC TUBAL DYE STUDY Left 11/9/2016    Procedure: LAPAROSCOPIC TUBAL DYE STUDY;  Surgeon: Tonja Ferrer MD;  Location: [facility] OR     LAPAROSCOPY OPERATIVE ADULT N/A 11/9/2016    Procedure: LAPAROSCOPY OPERATIVE ADULT;  Surgeon: Tonja Ferrer MD;  Location: [facility] OR     MAMMOPLASTY REDUCTION Bilateral 8/5/2021    Procedure: MAMMOPLASTY, REDUCTION, Bilateral;  Surgeon: ANTONIO Moreno MD;  Location: [facility] OR     ORTHOPEDIC SURGERY      left wrist surgery     Current Outpatient Medications   Medication Sig Dispense Refill     Acetaminophen (TYLENOL PO) Take 650 mg by mouth every 4 hours as needed for mild pain or fever       almotriptan (AXERT) 12.5 MG tablet Take 1 tablet (12.5 mg) by mouth daily as needed for migraine (repeat in 2 hours if needed) 18 tablet 11     budesonide (PULMICORT) 0.5 MG/2ML neb solution Empty contents of one ampule into 240mL of saline solution and rinse both nasal cavities as instructed. Perform this twice daily. 2 mL 3     buPROPion (WELLBUTRIN XL) 300 MG 24 hr tablet Take 1 tablet (300 mg) by mouth every morning 30 tablet 1      cholecalciferol 50 MCG (2000 UT) CAPS 5,000 Units every evening        doxepin (SINEQUAN) 10 MG capsule Take 3 capsules (30 mg) by mouth At Bedtime 90 capsule 11     EMGALITY 120 MG/ML injection Inject 1 mL (120 mg) Subcutaneous every 28 days 1 mL 11     famotidine (PEPCID) 20 MG tablet Take 20 mg by mouth 2 times daily       fexofenadine (ALLEGRA) 180 MG tablet Take 180 mg by mouth every morning        fluticasone (FLONASE) 50 MCG/ACT nasal spray 2 times daily as needed        gabapentin (NEURONTIN) 400 MG capsule Take 2 capsules (800 mg) by mouth 3 times daily 180 capsule 5     levocetirizine (XYZAL) 5 MG tablet Take 5 mg by mouth every evening        montelukast (SINGULAIR) 10 MG tablet Take 1 tablet (10 mg) by mouth At Bedtime 90 tablet 1     ondansetron (ZOFRAN-ODT) 4 MG ODT tab Take 1-2 tablets (4-8 mg) by mouth every 8 hours as needed for nausea 4 tablet 0     propranolol (INDERAL) 20 MG tablet TAKE 1 TABLET (20 MG) BY MOUTH DAILY AS NEEDED (ANXIETY) 30 tablet 1     VIIBRYD 40 MG TABS tablet TAKE 1 TABLET BY MOUTH EVERY MORNING. 90 tablet 0     azelastine (ASTELIN) 0.1 % nasal spray Spray 1 spray into both nostrils 2 times daily (Patient not taking: No sig reported) 30 mL 3     loteprednol (LOTEMAX) 0.5 % ophthalmic suspension Place 1-2 drops into both eyes 3 times daily (Patient not taking: No sig reported) 1 Bottle 1     olopatadine (PATANOL) 0.1 % ophthalmic solution Place 1 drop into both eyes 2 times daily (Patient not taking: No sig reported) 1 Bottle 11     order for DME Use for 15-30 minutes every morning. (Patient not taking: Reported on 4/13/2022) 1 Units 0     promethazine (PHENERGAN) 25 MG tablet Take 1 tablet (25 mg) by mouth every 8 hours as needed for nausea or vomiting (Patient not taking: No sig reported) 12 tablet 0     pyridostigmine (MESTINON) 60 MG tablet Take 60 mg by mouth 4 times daily as needed for constipation (Patient not taking: No sig reported)         Allergies   Allergen  "Reactions     Dust Mites Cough, Difficulty breathing and Shortness Of Breath     Cats      Chest tightness, sinus irritation        Social History     Tobacco Use     Smoking status: Former Smoker     Packs/day: 0.00     Years: 10.00     Pack years: 0.00     Types: Cigarettes     Smokeless tobacco: Never Used     Tobacco comment: smokes occasionally socially    Substance Use Topics     Alcohol use: Not Currently     Alcohol/week: 0.0 standard drinks     Comment: occasional      Family History   Problem Relation Age of Onset     Diabetes Maternal Grandfather      Hypertension No family hx of      Coronary Artery Disease No family hx of      Hyperlipidemia No family hx of      Cerebrovascular Disease No family hx of      Breast Cancer No family hx of      Colon Cancer No family hx of      Prostate Cancer No family hx of      Other Cancer No family hx of      Glaucoma No family hx of      Macular Degeneration No family hx of      History   Drug Use Unknown     Comment: marijuana  none since May 2021         Objective     /82   Pulse 88   Temp 98.7  F (37.1  C) (Oral)   Ht 1.74 m (5' 8.5\")   Wt 83.9 kg (185 lb)   SpO2 96%   BMI 27.72 kg/m      Physical Exam    GENERAL APPEARANCE: healthy, alert and no distress     EYES: EOMI, PERRL     HENT: ear canals and TM's normal and nose and mouth without ulcers or lesions  Both tonsils large and not infectious appearing     NECK: no adenopathy, no asymmetry, masses, or scars and thyroid normal to palpation     RESP: lungs clear to auscultation - no rales, rhonchi or wheezes     CV: regular rates and rhythm, normal S1 S2, no S3 or S4 and no murmur, click or rub     ABDOMEN:  soft, nontender, no HSM or masses and bowel sounds normal     MS: extremities normal- no gross deformities noted, no evidence of inflammation in joints, FROM in all extremities.     SKIN: no suspicious lesions or rashes     NEURO: Normal strength and tone, sensory exam grossly normal, mentation " intact and speech normal     PSYCH: mentation appears normal. and affect normal/bright     LYMPHATICS: No cervical adenopathy    Recent Labs   Lab Test 01/04/22  1732 08/31/21  1057 08/12/21  1535   HGB 13.5 11.5* 11.6*    408 508*   INR  --  1.12  --    NA  --  138 138   POTASSIUM  --  3.8 3.4   CR  --  0.69 0.68        Diagnostics:  Labs pending at this time.  Results will be reviewed when available.   No EKG required, no history of coronary heart disease, significant arrhythmia, peripheral arterial disease or other structural heart disease.    Revised Cardiac Risk Index (RCRI):  The patient has the following serious cardiovascular risks for perioperative complications:   - No serious cardiac risks = 0 points     RCRI Interpretation: 0 points: Class I (very low risk - 0.4% complication rate)    We discussed all medications, she can take her meds the day of surgery.      (Z01.818) Pre-op exam  (primary encounter diagnosis)    Plan: CBC with platelets differential, Comprehensive         metabolic panel, Asymptomatic COVID-19 Virus         (Coronavirus) by PCR      (Z20.2) Potential exposure to STD    Plan: NEISSERIA GONORRHOEA PCR, CHLAMYDIA TRACHOMATIS        PCR, Treponema Abs w Reflex to RPR and Titer,         HIV Antigen Antibody Combo      Follow up prn.    At the end of the pre op visit, Kasandra stated that she has been having a lot of wrist and back pain, primarily associated with carpal tunnel syndrome.  She wears wrist braces on both wrists each day, she is wondering if she should have a physical therapy referral.  I suggested that she come in to see someone here at the [facility] to evaluate that thoroughly, when she has recovered from her tonsillectomy.  She was in agreement with that.         Signed Electronically by: Sammi Morgan NP  Copy of this evaluation report is provided to requesting physician.

## 2022-04-14 LAB
C TRACH DNA SPEC QL NAA+PROBE: NEGATIVE
HIV 1+2 AB+HIV1 P24 AG SERPL QL IA: NONREACTIVE
N GONORRHOEA DNA SPEC QL NAA+PROBE: NEGATIVE
SARS-COV-2 RNA RESP QL NAA+PROBE: NEGATIVE
T PALLIDUM AB SER QL: NONREACTIVE

## 2022-04-14 NOTE — PROGRESS NOTES
[facility]    Dialectic Behavior Therapy Clinic     Adult DBT Skills Group     DBT (Dialectic Behavior Therapy) is a cognitive behavioral therapy that includes skills group, which uses didactics, modeling, behavior rehearsal, and homework exercises to aid patients in acquiring new skills and the opportunity to practice new behaviors.    Date of Service: Mar 21, 2022  Group Length: 120 minutes  Start time: 2:30pm   End time: 4:30pm  Number of Participants: 6  Group Facilitators: Curt Garrison MD and SHAUNNA Drake, Dr Landauer    Client: Kasandra Lau  Pronouns: She/Her/Hers/Herself  YOB: 1985  MRN: [id number]    Type of service:  video visit for group therapy  Reason for video visit:  Patient unable to travel due to Covid-19  Originating Site (patient location):  [facility]   Location- Patient's home  Distant Site (provider location):  Remote location  Mode of Communication:  Video Conference via Zoom  Consent:  Patient has given verbal consent for video visit?: Yes     Mindfulness: Body Scan  Homework Reviewed: Distress Tolerance Worksheet 10 and 11: Turning the Mind, Willingness, Willfulness & Half-Smiling and Willing Hands.   Group Topic for Today: Distress Tolerance 15 and 15a, Mindfulness of Thoughts  Homework Assigned: Distress Tolerance Worksheet 12, Mindfulness of Thoughts    Assessment: Patient was on time for group. Patient completed at least one day of her diary card the previous week and she shared a personal situation and skill she used with the group. Patient did complete the homework assigned the previous week prior to arriving at group & shared her homework with the group. Patient was engaged in homework review and was engaged in the didactic portion of group, providing personal examples. Mood appeared euthymic.    Diagnosis:   Encounter Diagnoses   Name Primary?     Recurrent major depressive disorder, in remission (H) Yes      Panic disorder        Plan: Continue in full-model outpatient DBT program.     Group Treatment Plan Reviewed: 10/8/2022  Group Treatment Plan Due for Review: 1/8/2022    Curt Celestin MD

## 2022-04-14 NOTE — PROGRESS NOTES
"     [facility]    Dialectic Behavior Therapy Clinic     Adult DBT Skills Group     DBT (Dialectic Behavior Therapy) is a cognitive behavioral therapy that includes skills group, which uses didactics, modeling, behavior rehearsal, and homework exercises to aid patients in acquiring new skills and the opportunity to practice new behaviors.    Date of Service: Apr 4, 2022  Group Length: 120 minutes  Start time: 2:30pm   End time: 4:30pm  Number of Participants: 9  Group Facilitators: Mackenzie Corbin, PhD, LP, Curt Garrison MD and SHAUNNA Drake    Client: Kasandra Lau  Pronouns: She/Her/Hers/Herself  YOB: 1985  MRN: [id number]    Type of service:  video visit for group therapy  Reason for video visit:  Patient unable to travel due to Covid-19  Originating Site (patient location):  [facility]   Location- Patient's home  Distant Site (provider location):  Remote location  Mode of Communication:  Video Conference via Zoom  Consent:  Patient has given verbal consent for video visit?: Yes     Mindfulness: Body scan audio script  Homework Reviewed: Mindfulness Worksheet 2B- Mindfulness Core Skills Practice  Group Topic for Today: Mindfulness Handout 5: Mindfulness \"How\" skills  Homework Assigned:  Mindfulness worksheet 5: Practicing \"how\" skills     Assessment: Patient was on time for group. Patient did complete their diary card and participated in diary card review. Patient did complete her homework and participated in review. During didactic portion patient appeared engaged and offered some personal examples.     Diagnosis:   Encounter Diagnoses   Name Primary?     Anxiety disorder, unspecified type      Moderate episode of recurrent major depressive disorder (H)        Plan: Continue in full-model outpatient DBT program.     Group Treatment Plan Reviewed: 10/8/2021  Group Treatment Plan Due for Review: 1/8/2022    Curt Celestin MD  "

## 2022-04-16 ENCOUNTER — OFFICE VISIT (OUTPATIENT)
Dept: URGENT CARE | Facility: URGENT CARE | Age: 37
End: 2022-04-16
Payer: COMMERCIAL

## 2022-04-16 ENCOUNTER — NURSE TRIAGE (OUTPATIENT)
Dept: NURSING | Facility: CLINIC | Age: 37
End: 2022-04-16
Payer: COMMERCIAL

## 2022-04-16 VITALS
OXYGEN SATURATION: 99 % | WEIGHT: 185.44 LBS | TEMPERATURE: 100.1 F | DIASTOLIC BLOOD PRESSURE: 80 MMHG | BODY MASS INDEX: 27.79 KG/M2 | SYSTOLIC BLOOD PRESSURE: 112 MMHG | RESPIRATION RATE: 20 BRPM | HEART RATE: 114 BPM

## 2022-04-16 DIAGNOSIS — N30.01 ACUTE CYSTITIS WITH HEMATURIA: Primary | ICD-10-CM

## 2022-04-16 DIAGNOSIS — R30.0 DYSURIA: ICD-10-CM

## 2022-04-16 LAB
ALBUMIN UR-MCNC: >=300 MG/DL
APPEARANCE UR: ABNORMAL
BACTERIA #/AREA URNS HPF: ABNORMAL /HPF
BILIRUB UR QL STRIP: NEGATIVE
CLUE CELLS: ABNORMAL
COLOR UR AUTO: ABNORMAL
GLUCOSE UR STRIP-MCNC: NEGATIVE MG/DL
HGB UR QL STRIP: ABNORMAL
KETONES UR STRIP-MCNC: NEGATIVE MG/DL
LEUKOCYTE ESTERASE UR QL STRIP: ABNORMAL
NITRATE UR QL: NEGATIVE
PH UR STRIP: 6.5 [PH] (ref 5–7)
RBC #/AREA URNS AUTO: >100 /HPF
SP GR UR STRIP: >=1.03 (ref 1–1.03)
SQUAMOUS #/AREA URNS AUTO: ABNORMAL /LPF
TRICHOMONAS, WET PREP: ABNORMAL
UROBILINOGEN UR STRIP-ACNC: 0.2 E.U./DL
WBC #/AREA URNS AUTO: ABNORMAL /HPF
WBC'S/HIGH POWER FIELD, WET PREP: ABNORMAL
YEAST, WET PREP: ABNORMAL

## 2022-04-16 PROCEDURE — 99213 OFFICE O/P EST LOW 20 MIN: CPT | Performed by: PHYSICIAN ASSISTANT

## 2022-04-16 PROCEDURE — 87210 SMEAR WET MOUNT SALINE/INK: CPT | Performed by: PHYSICIAN ASSISTANT

## 2022-04-16 PROCEDURE — 87086 URINE CULTURE/COLONY COUNT: CPT | Performed by: PHYSICIAN ASSISTANT

## 2022-04-16 PROCEDURE — 81001 URINALYSIS AUTO W/SCOPE: CPT | Performed by: PHYSICIAN ASSISTANT

## 2022-04-16 PROCEDURE — 87186 SC STD MICRODIL/AGAR DIL: CPT | Performed by: PHYSICIAN ASSISTANT

## 2022-04-16 RX ORDER — CIPROFLOXACIN 500 MG/1
500 TABLET, FILM COATED ORAL 2 TIMES DAILY
Qty: 10 TABLET | Refills: 0 | Status: SHIPPED | OUTPATIENT
Start: 2022-04-16 | End: 2022-04-21

## 2022-04-17 NOTE — PROGRESS NOTES
SUBJECTIVE:   Kasandra Lau is a 37 year old female who  presents today for a possible UTI. Symptoms of dysuria, urgency, frequency and burning have been going on for 1day(s).  Hematuria yes .  sudden onsetand moderate.  There is no history of fever, chills, nausea or vomiting.  No history of vaginal  discharge. This patient does  have a history of urinary tract infections. Patient denies long duration, rigors, flank pain, temperature > 101 degrees F., Vomiting, significant nausea or diarrhea, taking Coumadin and GFR less than 30 within the last year or vaginal discharge, vaginal odor and vaginal itching     Past Medical History:   Diagnosis Date     Anemia fall 2016     Chronic fatigue      Depression (emotion)     sees psych, on meds     Gastroesophageal reflux disease      Migraine     daily meds, 2x month, more mild on meds     Neuropathic pain      Panic disorder      Uncomplicated asthma Spring 2018     Current Outpatient Medications   Medication Sig Dispense Refill     buPROPion (WELLBUTRIN XL) 300 MG 24 hr tablet Take 1 tablet (300 mg) by mouth every morning 30 tablet 1     cholecalciferol 50 MCG (2000 UT) CAPS 5,000 Units every evening        ciprofloxacin (CIPRO) 500 MG tablet Take 1 tablet (500 mg) by mouth 2 times daily for 5 days 10 tablet 0     doxepin (SINEQUAN) 10 MG capsule Take 3 capsules (30 mg) by mouth At Bedtime 90 capsule 11     EMGALITY 120 MG/ML injection Inject 1 mL (120 mg) Subcutaneous every 28 days 1 mL 11     famotidine (PEPCID) 20 MG tablet Take 20 mg by mouth 2 times daily       fexofenadine (ALLEGRA) 180 MG tablet Take 180 mg by mouth every morning        fluticasone (FLONASE) 50 MCG/ACT nasal spray 2 times daily as needed        gabapentin (NEURONTIN) 400 MG capsule Take 2 capsules (800 mg) by mouth 3 times daily 180 capsule 5     levocetirizine (XYZAL) 5 MG tablet Take 5 mg by mouth every evening        montelukast (SINGULAIR) 10 MG tablet Take 1 tablet (10 mg) by mouth At Bedtime  90 tablet 1     propranolol (INDERAL) 20 MG tablet TAKE 1 TABLET (20 MG) BY MOUTH DAILY AS NEEDED (ANXIETY) 30 tablet 1     VIIBRYD 40 MG TABS tablet TAKE 1 TABLET BY MOUTH EVERY MORNING. 90 tablet 0     Social History     Tobacco Use     Smoking status: Former Smoker     Packs/day: 0.00     Years: 10.00     Pack years: 0.00     Types: Cigarettes     Smokeless tobacco: Never Used     Tobacco comment: smokes occasionally socially    Substance Use Topics     Alcohol use: Not Currently     Alcohol/week: 0.0 standard drinks     Comment: occasional        ROS:   Review of systems negative except as stated above.    OBJECTIVE:  /80 (BP Location: Right arm, Patient Position: Sitting, Cuff Size: Adult Large)   Pulse 114   Temp 100.1  F (37.8  C) (Tympanic)   Resp 20   Wt 84.1 kg (185 lb 7 oz)   LMP 04/03/2022   SpO2 99%   BMI 27.79 kg/m    GENERAL APPEARANCE: healthy, alert and no distress  RESP: lungs clear to auscultation - no rales, rhonchi or wheezes  CV: regular rates and rhythm, normal S1 S2, no murmur noted  ABDOMEN:  soft, nontender, no HSM or masses and bowel sounds normal  BACK: No CVA tenderness  SKIN: no suspicious lesions or rashes    Results for orders placed or performed in visit on 04/16/22   UA macro with reflex to Microscopic and Culture - Clinc Collect     Status: Abnormal    Specimen: Urine, Midstream   Result Value Ref Range    Color Urine Red (A) Colorless, Straw, Light Yellow, Yellow    Appearance Urine Turbid (A) Clear    Glucose Urine Negative Negative mg/dL    Bilirubin Urine Negative Negative    Ketones Urine Negative Negative mg/dL    Specific Gravity Urine >=1.030 1.003 - 1.035    Blood Urine Large (A) Negative    pH Urine 6.5 5.0 - 7.0    Protein Albumin Urine >=300 (A) Negative mg/dL    Urobilinogen Urine 0.2 0.2, 1.0 E.U./dL    Nitrite Urine Negative Negative    Leukocyte Esterase Urine Large (A) Negative   Urine Microscopic Exam     Status: Abnormal   Result Value Ref Range     Bacteria Urine Moderate (A) None Seen /HPF    RBC Urine >100 (A) 0-2 /HPF /HPF    WBC Urine  (A) 0-5 /HPF /HPF    Squamous Epithelials Urine Few (A) None Seen /LPF   Wet prep - Clinic Collect     Status: Abnormal    Specimen: Vagina; Swab   Result Value Ref Range    Trichomonas Absent Absent    Yeast Absent Absent    Clue Cells Absent Absent    WBCs/high power field 1+ (A) None             ASSESSMENT:   Lower, uncomplicated urinary tract infection.    PLAN:  Culture as above.  Cipro as directed.    Drink plenty of fluids.  Prevention and treatment of UTI's discussed.Signs and symptoms of pyelonephritis mentioned.  Follow up with primary care physician if not improving

## 2022-04-18 LAB — BACTERIA UR CULT: ABNORMAL

## 2022-04-19 NOTE — TELEPHONE ENCOUNTER
DIAGNOSIS: bilat carpal tunnel   APPOINTMENT DATE:4.26.22   NOTES STATUS DETAILS   OFFICE NOTE from referring provider SELF    OFFICE NOTE from other specialist Care Everywhere 6.8.21, 5.18.21, 4.1.21 [facility], [place]  3.15.21 [facility]    MEDICATION LIST Internal    LABS     CBC/DIFF Internal 4.13.22   XRAYS (IMAGES & REPORTS) req img disc 4/21- [facility] 4.1.21 XR wrist LT     Action 4.19.22 MJ 7:35 AM    Action Taken Called [facility] at [phone]- Not open yet, they open at 8 AM.      Action April 21, 2022 2:18 PM ABT   Action Taken Called and spoke to Arnie at [facility], Phone for [facility] library [phone] & Fax: [phone]     Action April 22, 2022 11:13 AM ABT   Action Taken Called [facility] File Room and unable to speak to team, [facility] for call back for confirmation of request.     Action April 25, 2022 4:32 PM ABT   Action Taken Called and unable to speak to [facility] Film Room, [facility] for callback regarding img disc request

## 2022-04-20 DIAGNOSIS — F41.9 ANXIETY: ICD-10-CM

## 2022-04-22 NOTE — TELEPHONE ENCOUNTER
PROPRANOLOL 20 MG TABLET      Last Written Prescription Date:  3-2-22  Last Fill Quantity: 30,   # refills: 1  Last Office Visit : 4-13-22  Future Office visit:  none    Routing refill request to provider for review/approval because:  Associated diagnosis not on protocol              Anxiety [F41.9]    last fill 30 tab :1,  ? increase dips amt/ # RF

## 2022-04-25 ENCOUNTER — TELEPHONE (OUTPATIENT)
Dept: PSYCHIATRY | Facility: CLINIC | Age: 37
End: 2022-04-25
Payer: COMMERCIAL

## 2022-04-25 DIAGNOSIS — F32.0 CURRENT MILD EPISODE OF MAJOR DEPRESSIVE DISORDER WITHOUT PRIOR EPISODE (H): Primary | ICD-10-CM

## 2022-04-25 RX ORDER — PROPRANOLOL HYDROCHLORIDE 20 MG/1
20 TABLET ORAL DAILY PRN
Qty: 30 TABLET | Refills: 1 | Status: SHIPPED | OUTPATIENT
Start: 2022-04-25 | End: 2022-06-14

## 2022-04-26 ENCOUNTER — PRE VISIT (OUTPATIENT)
Dept: ORTHOPEDICS | Facility: CLINIC | Age: 37
End: 2022-04-26
Payer: COMMERCIAL

## 2022-04-27 ENCOUNTER — HOSPITAL ENCOUNTER (EMERGENCY)
Facility: CLINIC | Age: 37
Discharge: HOME | End: 2022-04-27
Attending: EMERGENCY MEDICINE | Admitting: EMERGENCY MEDICINE
Payer: COMMERCIAL

## 2022-04-27 VITALS
SYSTOLIC BLOOD PRESSURE: 120 MMHG | HEART RATE: 85 BPM | DIASTOLIC BLOOD PRESSURE: 80 MMHG | TEMPERATURE: 98.2 F | RESPIRATION RATE: 18 BRPM | OXYGEN SATURATION: 100 %

## 2022-04-27 DIAGNOSIS — R06.02 SHORTNESS OF BREATH: ICD-10-CM

## 2022-04-27 LAB
ANION GAP SERPL CALCULATED.3IONS-SCNC: 10 MMOL/L (ref 3–14)
BASOPHILS # BLD AUTO: 0.1 10E3/UL (ref 0–0.2)
BASOPHILS NFR BLD AUTO: 0 %
BUN SERPL-MCNC: 9 MG/DL (ref 7–30)
CALCIUM SERPL-MCNC: 10 MG/DL (ref 8.5–10.1)
CHLORIDE BLD-SCNC: 106 MMOL/L (ref 94–109)
CO2 SERPL-SCNC: 22 MMOL/L (ref 20–32)
CREAT SERPL-MCNC: 0.66 MG/DL (ref 0.52–1.04)
EOSINOPHIL # BLD AUTO: 0.1 10E3/UL (ref 0–0.7)
EOSINOPHIL NFR BLD AUTO: 0 %
ERYTHROCYTE [DISTWIDTH] IN BLOOD BY AUTOMATED COUNT: 13 % (ref 10–15)
GFR SERPL CREATININE-BSD FRML MDRD: >90 ML/MIN/1.73M2
GLUCOSE BLD-MCNC: 81 MG/DL (ref 70–99)
GLUCOSE BLDC GLUCOMTR-MCNC: 84 MG/DL (ref 70–99)
HCT VFR BLD AUTO: 44.1 % (ref 35–47)
HGB BLD-MCNC: 14.4 G/DL (ref 11.7–15.7)
HOLD SPECIMEN: NORMAL
IMM GRANULOCYTES # BLD: 0 10E3/UL
IMM GRANULOCYTES NFR BLD: 0 %
LYMPHOCYTES # BLD AUTO: 3.4 10E3/UL (ref 0.8–5.3)
LYMPHOCYTES NFR BLD AUTO: 27 %
MCH RBC QN AUTO: 29.3 PG (ref 26.5–33)
MCHC RBC AUTO-ENTMCNC: 32.7 G/DL (ref 31.5–36.5)
MCV RBC AUTO: 90 FL (ref 78–100)
MONOCYTES # BLD AUTO: 0.8 10E3/UL (ref 0–1.3)
MONOCYTES NFR BLD AUTO: 7 %
NEUTROPHILS # BLD AUTO: 8 10E3/UL (ref 1.6–8.3)
NEUTROPHILS NFR BLD AUTO: 66 %
NRBC # BLD AUTO: 0 10E3/UL
NRBC BLD AUTO-RTO: 0 /100
PLATELET # BLD AUTO: 460 10E3/UL (ref 150–450)
POTASSIUM BLD-SCNC: 3.7 MMOL/L (ref 3.4–5.3)
RBC # BLD AUTO: 4.91 10E6/UL (ref 3.8–5.2)
SODIUM SERPL-SCNC: 138 MMOL/L (ref 133–144)
WBC # BLD AUTO: 12.4 10E3/UL (ref 4–11)

## 2022-04-27 PROCEDURE — 258N000003 HC RX IP 258 OP 636: Performed by: EMERGENCY MEDICINE

## 2022-04-27 PROCEDURE — 96360 HYDRATION IV INFUSION INIT: CPT | Performed by: EMERGENCY MEDICINE

## 2022-04-27 PROCEDURE — 99283 EMERGENCY DEPT VISIT LOW MDM: CPT | Mod: 25 | Performed by: EMERGENCY MEDICINE

## 2022-04-27 PROCEDURE — 36415 COLL VENOUS BLD VENIPUNCTURE: CPT | Performed by: EMERGENCY MEDICINE

## 2022-04-27 PROCEDURE — 80048 BASIC METABOLIC PNL TOTAL CA: CPT | Performed by: EMERGENCY MEDICINE

## 2022-04-27 PROCEDURE — 85004 AUTOMATED DIFF WBC COUNT: CPT | Performed by: EMERGENCY MEDICINE

## 2022-04-27 PROCEDURE — 99282 EMERGENCY DEPT VISIT SF MDM: CPT | Performed by: EMERGENCY MEDICINE

## 2022-04-27 RX ADMIN — SODIUM CHLORIDE 1000 ML: 9 INJECTION, SOLUTION INTRAVENOUS at 17:22

## 2022-04-27 NOTE — ED PROVIDER NOTES
ED Provider Note  [facility]      History     Chief Complaint   Patient presents with     Shortness of Breath     The history is provided by the patient, medical records and the EMS personnel.     Kasandra Lau is a 37 year old female with past medical history significant for tonsillar hypertrophy s/p tonsillectomy on 4/17/22, anemia, mild intermittent asthma, and MDD presenting to the ED with shortness of breath. Per EMS patient was short of breath, diaphoretic, and clammy upon their arrival. She has since returned to baseline. Sats 100% on RA upon arrival. She denies pain now, but reports she has not been eating or drinking d/t throat pain. For the past few days she has felt as though she cannot breathe through her nose or mouth. She called EMS today because she was starting to lose consciousness. She is afebrile here, but reports fevers at home. She endorses dry cough. No chest pain, abdominal pain, nausea, vomiting, diarrhea.     Past Medical History  Past Medical History:   Diagnosis Date     Anemia fall 2016     Chronic fatigue      Depression (emotion)     sees psych, on meds     Gastroesophageal reflux disease      Migraine     daily meds, 2x month, more mild on meds     Neuropathic pain      Panic disorder      Uncomplicated asthma Spring 2018     Past Surgical History:   Procedure Laterality Date     COLONOSCOPY       LAPAROSCOPIC ABLATION ENDOMETRIOSIS N/A 11/9/2016    Procedure: LAPAROSCOPIC ABLATION ENDOMETRIOSIS;  Surgeon: Tonja Ferrer MD;  Location: [facility] OR     LAPAROSCOPIC CYSTECTOMY OVARIAN (BENIGN) Left 11/9/2016    Procedure: LAPAROSCOPIC CYSTECTOMY OVARIAN (BENIGN);  Surgeon: Tonja Ferrer MD;  Location: [facility] OR     LAPAROSCOPIC TUBAL DYE STUDY Left 11/9/2016    Procedure: LAPAROSCOPIC TUBAL DYE STUDY;  Surgeon: Tonja Ferrer MD;  Location: [facility] OR     LAPAROSCOPY OPERATIVE ADULT N/A 11/9/2016    Procedure: LAPAROSCOPY OPERATIVE ADULT;  Surgeon:  Tonja Ferrer MD;  Location: [facility] OR     MAMMOPLASTY REDUCTION Bilateral 8/5/2021    Procedure: MAMMOPLASTY, REDUCTION, Bilateral;  Surgeon: ANTONIO Moreno MD;  Location: [facility] OR     ORTHOPEDIC SURGERY      left wrist surgery     buPROPion (WELLBUTRIN XL) 300 MG 24 hr tablet  cholecalciferol 50 MCG (2000 UT) CAPS  doxepin (SINEQUAN) 10 MG capsule  EMGALITY 120 MG/ML injection  famotidine (PEPCID) 20 MG tablet  fexofenadine (ALLEGRA) 180 MG tablet  fluticasone (FLONASE) 50 MCG/ACT nasal spray  gabapentin (NEURONTIN) 400 MG capsule  levocetirizine (XYZAL) 5 MG tablet  montelukast (SINGULAIR) 10 MG tablet  propranolol (INDERAL) 20 MG tablet  VIIBRYD 40 MG TABS tablet      Allergies   Allergen Reactions     Dust Mites Cough, Difficulty breathing and Shortness Of Breath     Cats      Chest tightness, sinus irritation     Family History  Family History   Problem Relation Age of Onset     Diabetes Maternal Grandfather      Hypertension No family hx of      Coronary Artery Disease No family hx of      Hyperlipidemia No family hx of      Cerebrovascular Disease No family hx of      Breast Cancer No family hx of      Colon Cancer No family hx of      Prostate Cancer No family hx of      Other Cancer No family hx of      Glaucoma No family hx of      Macular Degeneration No family hx of      Social History   Social History     Tobacco Use     Smoking status: Former Smoker     Packs/day: 0.00     Years: 10.00     Pack years: 0.00     Types: Cigarettes     Smokeless tobacco: Never Used     Tobacco comment: smokes occasionally socially    Vaping Use     Vaping Use: Never used   Substance Use Topics     Alcohol use: Not Currently     Alcohol/week: 0.0 standard drinks     Comment: occasional      Drug use: Not Currently     Types: Marijuana     Comment: marijuana  none since May 2021      Past medical history, past surgical history, medications, allergies, family history, and social history were reviewed with the  patient. No additional pertinent items.       Review of Systems  A complete review of systems was performed with pertinent positives and negatives noted in the HPI, and all other systems negative.    Physical Exam   BP: 123/85  Pulse: 103  Temp: 98.2  F (36.8  C)  Resp: 18  SpO2: 100 %  Physical Exam  Constitutional:       General: She is not in acute distress.     Appearance: She is well-developed.   HENT:      Head: Normocephalic and atraumatic.      Mouth/Throat:      Mouth: Mucous membranes are moist.      Comments: Airway is patent there is no stridor, sublingual space is soft, scarring of the bilateral tonsillar area no signs for airway compromise uvula is normal size and midline.  Nose has minimal bilateral congestion    Eyes:      Extraocular Movements: Extraocular movements intact.      Pupils: Pupils are equal, round, and reactive to light.   Cardiovascular:      Rate and Rhythm: Normal rate and regular rhythm.   Pulmonary:      Effort: Pulmonary effort is normal.      Breath sounds: No wheezing, rhonchi or rales.   Abdominal:      Palpations: Abdomen is soft.      Tenderness: There is no abdominal tenderness. There is no guarding or rebound.   Musculoskeletal:         General: Normal range of motion.      Cervical back: Normal range of motion and neck supple.   Skin:     General: Skin is warm.   Neurological:      General: No focal deficit present.      Mental Status: She is alert and oriented to person, place, and time.       ED Course      Procedures          Results for orders placed or performed during the hospital encounter of 04/27/22   [place] Draw     Status: None    Narrative    The following orders were created for panel order [place] Draw.  Procedure                               Abnormality         Status                     ---------                               -----------         ------                     Extra Blue Top Tube[[id number]]                              Final result                Extra Red Top Tube[[id number]]                               Final result               Extra Green Top (Lithium...[[id number]]                      Final result               Extra Purple Top Tube[[id number]]                            Final result                 Please view results for these tests on the individual orders.   Extra Blue Top Tube     Status: None   Result Value Ref Range    Hold Specimen JIC    Extra Red Top Tube     Status: None   Result Value Ref Range    Hold Specimen JIC    Extra Green Top (Lithium Heparin) Tube     Status: None   Result Value Ref Range    Hold Specimen JIC    Extra Purple Top Tube     Status: None   Result Value Ref Range    Hold Specimen JIC    Basic metabolic panel     Status: Normal   Result Value Ref Range    Sodium 138 133 - 144 mmol/L    Potassium 3.7 3.4 - 5.3 mmol/L    Chloride 106 94 - 109 mmol/L    Carbon Dioxide (CO2) 22 20 - 32 mmol/L    Anion Gap 10 3 - 14 mmol/L    Urea Nitrogen 9 7 - 30 mg/dL    Creatinine 0.66 0.52 - 1.04 mg/dL    Calcium 10.0 8.5 - 10.1 mg/dL    Glucose 81 70 - 99 mg/dL    GFR Estimate >90 >60 mL/min/1.73m2   Glucose by meter     Status: Normal   Result Value Ref Range    GLUCOSE BY METER POCT 84 70 - 99 mg/dL   CBC with platelets and differential     Status: Abnormal   Result Value Ref Range    WBC Count 12.4 (H) 4.0 - 11.0 10e3/uL    RBC Count 4.91 3.80 - 5.20 10e6/uL    Hemoglobin 14.4 11.7 - 15.7 g/dL    Hematocrit 44.1 35.0 - 47.0 %    MCV 90 78 - 100 fL    MCH 29.3 26.5 - 33.0 pg    MCHC 32.7 31.5 - 36.5 g/dL    RDW 13.0 10.0 - 15.0 %    Platelet Count 460 (H) 150 - 450 10e3/uL    % Neutrophils 66 %    % Lymphocytes 27 %    % Monocytes 7 %    % Eosinophils 0 %    % Basophils 0 %    % Immature Granulocytes 0 %    NRBCs per 100 WBC 0 <1 /100    Absolute Neutrophils 8.0 1.6 - 8.3 10e3/uL    Absolute Lymphocytes 3.4 0.8 - 5.3 10e3/uL    Absolute Monocytes 0.8 0.0 - 1.3 10e3/uL    Absolute Eosinophils 0.1 0.0 - 0.7 10e3/uL    Absolute  Basophils 0.1 0.0 - 0.2 10e3/uL    Absolute Immature Granulocytes 0.0 <=0.4 10e3/uL    Absolute NRBCs 0.0 10e3/uL   CBC with platelets differential     Status: Abnormal    Narrative    The following orders were created for panel order CBC with platelets differential.  Procedure                               Abnormality         Status                     ---------                               -----------         ------                     CBC with platelets and d...[[id number]]  Abnormal            Final result                 Please view results for these tests on the individual orders.     Medications   0.9% sodium chloride BOLUS (0 mLs Intravenous Stopped 4/27/22 1840)        Assessments & Plan (with Medical Decision Making)   Patient nontoxic in no acute distress arrives from home for an episode of shortness of breath.  Over the past couple of days she has felt that she cannot breathe well through her nose and mouth.  She had tonsillectomy about a week prior.  She has no signs of airway compromise she is vitally stable 100% on room air.  She has no stridor exam findings for airway compromise and no wheezing.  Since she has not been eating and drinking well due to throat pain from her recent surgery she was given IV fluids laboratory work was obtained to evaluate for any electrolyte abnormalities dehydration or anemia.  These were all unremarkable.  Suspect that she may also have been hyperventilating at that time to cause some of her symptoms.  Patient has been stable throughout her evaluation with no other signs of difficulty breathing.  She is feeling better.  She is stable for discharge and outpatient follow-up.    I have reviewed the nursing notes. I have reviewed the findings, diagnosis, plan and need for follow up with the patient.    Discharge Medication List as of 4/27/2022  7:02 PM          Final diagnoses:   Shortness of breath - resolved   INatividad, am serving as a trained medical scribe to  document services personally performed by Patricio Crowder MD, based on the provider's statements to me.     I, Patricio Crowder MD, was physically present and have reviewed and verified the accuracy of this note documented by Natividad Cordero.      --  Patricio Crowder MD  [facility] EMERGENCY DEPARTMENT  4/27/2022     Patricio Crowder MD  04/27/22 1911

## 2022-04-27 NOTE — DISCHARGE INSTRUCTIONS
Please make an appointment to follow up with Your Primary Care Provider and Primary Care Center (phone: [phone]) as soon as possible as needed.

## 2022-04-27 NOTE — ED TRIAGE NOTES
Patient BIBA [id number] due to shortness of breath and feeling diaphoretic and clammy. Patient has since returned to normal breathing, 100% on room air. Patient slightly clammy. Reports not eating or drinking. Patient had tonsillectomy on 4/18.

## 2022-05-02 ENCOUNTER — VIRTUAL VISIT (OUTPATIENT)
Dept: PSYCHIATRY | Facility: CLINIC | Age: 37
End: 2022-05-02
Attending: PSYCHOLOGIST
Payer: COMMERCIAL

## 2022-05-02 DIAGNOSIS — F41.9 ANXIETY DISORDER, UNSPECIFIED TYPE: ICD-10-CM

## 2022-05-02 DIAGNOSIS — F33.1 MODERATE EPISODE OF RECURRENT MAJOR DEPRESSIVE DISORDER (H): Primary | ICD-10-CM

## 2022-05-02 PROCEDURE — 90853 GROUP PSYCHOTHERAPY: CPT | Mod: U7

## 2022-05-02 NOTE — PROGRESS NOTES
"  Health Psychology - Follow up Visit  Confidential Summary*    The author of this note documented a reason for not sharing it with the patient.  REFERRAL SOURCE:  Psychiatry    CHIEF COMPLAINT/REASON FOR VISIT  Psychotherapy in context of chronic PTSD and persistent depression.  Patient also complains of dissatisfaction with interpersonal relationships and challenges with emotion regulation.      Patient was seen today for a 60 minute individual psychotherapy session.  The session was facilitated via PHONE with patient at her home and provider at her own home.     This telehealth service is appropriate and effective for delivering services in light of the necessity for social distancing to mitigate the COVID-19 epidemic. Patient has agreed to receiving telehealth services.    The patient has been notified of following:   \"This PHONE visit will be conducted via a call between you and your physician/provider. We have found that certain health care needs can be provided without the need for an in-person physical exam.  PHONE visits are billed at different rates depending on your insurance coverage.  Please reach out to your insurance provider with any questions. If during the course of the call the physician/provider feels a video visit is not appropriate, you will not be charged for this service.\"     Patient has given verbal consent for PHONE visit? Yes    Subjective:  Patient seen for ongoing treatment using DBT.  We began with a review of her diary card which she had completed one day.  We discussed her ongoing concern surrounding the possibility that she may be evicted from her apartment because of failure to pay.  She described frustration that the management does not appear to be understanding that she has multiple extenuating circumstances related to her employment and her academic status.  She and I discussed how she might communicate her concerns to them using the JOI strategies.  She was also encouraged " to complete a behavior chain in order to identify ways that she might budget her funds in order to avoid ongoing or unrelenting crises associated with financial difficulties.  She reported that she would like to avoid asking her estranged  for financial assistance but that she believes that she will not be able to afford ongoing expenses without his help. We discussed how she might use skills to generate a list of money generating options including working part time positions.     Objective:  Patient was on time for today s session. She was alert and oriented. Mood was euthymic with appropriate range of affect. Patient denied suicidal or assaultive ideation, plan, or intent.        Assessment:  The patient has a longstanding history of interpersonal discord and challenges with emotion regulation.  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  She has completed all requirements for her PhD and is now ABD.  She is living in  housing with her two dogs.      Plan:  Patient is now in full model DBT at [facility] and is attending skills group on M at 2.  See in one week.      Treatment plan completed:  10/8/21    Time In: 1:00  Time Out: 2:00    Diagnosis:  Axis I PTSD, chronic, persistent depressive disorder, adjustment disorder with mixed, r/o somatization disorder, psychological factors associated with physical (fibromyalgia)   Axis II  BPD   Axis III please see medical records for details   [place] IV Psychosocial and Environmental Stressors: living alone with no family support, pandemic stress, chronic illness         Corina Muhammad, PhD,

## 2022-05-03 NOTE — PROGRESS NOTES
[facility]    Dialectic Behavior Therapy Clinic     Adult DBT Skills Group     DBT (Dialectic Behavior Therapy) is a cognitive behavioral therapy that includes skills group, which uses didactics, modeling, behavior rehearsal, and homework exercises to aid patients in acquiring new skills and the opportunity to practice new behaviors.    Date of Service: May 2, 2022  Group Length: 115 minutes  Start time: 2:30pm   End time: 4:25pm  Number of Participants: 7  Group Facilitators: Mackenzie Corbin, PhD, LP, Curt Garrison MD and SHAUNNA Drake    Client: Kasandra Lau  Pronouns: She/Her/Hers/Herself  YOB: 1985  MRN: [id number]    Type of service:  video visit for group therapy  Reason for video visit:  Patient unable to travel due to Covid-19  Originating Site (patient location):  [facility]   Location- Patient's home  Distant Site (provider location):  Remote location  Mode of Communication:  Video Conference via Zoom  Consent:  Patient has given verbal consent for video visit?: Yes     Mindfulness: 2 minute focused breathing  Homework Reviewed: Emotion Regulation Worksheet 5- Check the Facts  Group Topic for Today: Emotion Regulation Handout 10 (Opposite Action) and 11 (Figuring out Opposite Actions)  Homework Assigned: Emotion Regulation Worksheet 7- Opposite Action to Change Emotions    Assessment: Patient was on time for group. Patient did not complete their diary card prior to group. Patient did not complete the homework assigned the previous week prior to arriving at group. Patient reported that they were not able to complete their diary card or homework due to having their tonsils taken out recently and experiencing pain and fatigue from the recovery. Patient was not engaged in homework review and was not engaged in the didactic portion of group. Mood appeared Euthymic.     Diagnosis:   Encounter Diagnoses   Name Primary?     Moderate episode of  recurrent major depressive disorder (H) Yes     Anxiety disorder, unspecified type        Plan: Continue in full-model outpatient DBT program.     Group Treatment Plan Reviewed: 10/8/2021  Group Treatment Plan Due for Review: 4/8/2022    SHAUNNA Quinonez  Clinical Social Work Student  Supervised by Mackenzie Corbin, PhD,

## 2022-05-04 DIAGNOSIS — F33.1 MAJOR DEPRESSIVE DISORDER, RECURRENT EPISODE, MODERATE (H): ICD-10-CM

## 2022-05-06 ENCOUNTER — VIRTUAL VISIT (OUTPATIENT)
Dept: PSYCHOLOGY | Facility: CLINIC | Age: 37
End: 2022-05-06
Payer: COMMERCIAL

## 2022-05-06 DIAGNOSIS — F60.9 PERSONALITY DISORDER (H): ICD-10-CM

## 2022-05-06 DIAGNOSIS — F54 PSYCHOLOGICAL AND BEHAVIORAL FACTORS ASSOCIATED WITH DISORDERS OR DISEASES CLASSIFIED ELSEWHERE: ICD-10-CM

## 2022-05-06 DIAGNOSIS — F43.23 ADJUSTMENT DISORDER WITH MIXED ANXIETY AND DEPRESSED MOOD: ICD-10-CM

## 2022-05-06 DIAGNOSIS — F34.1 PERSISTENT DEPRESSIVE DISORDER: Primary | ICD-10-CM

## 2022-05-06 DIAGNOSIS — F43.12 CHRONIC POST-TRAUMATIC STRESS DISORDER (PTSD): ICD-10-CM

## 2022-05-06 PROCEDURE — 90837 PSYTX W PT 60 MINUTES: CPT | Mod: 95 | Performed by: PSYCHOLOGIST

## 2022-05-06 NOTE — TELEPHONE ENCOUNTER
[facility]  MG TABLET      Last Written Prescription Date:  3-14-22  Last Fill Quantity: 30,   # refills: 1  Last Office Visit : 4-13-22, pre op appt       Clinic appt:  1-18-22  Future Office visit:  none    Routing refill request to provider for review/approval because:  Overdue PHQ9,  -- FYI to clinic   last fill 30 tab/1 RF,  ? Increase disp amt/#RF

## 2022-05-09 ENCOUNTER — VIRTUAL VISIT (OUTPATIENT)
Dept: PSYCHIATRY | Facility: CLINIC | Age: 37
End: 2022-05-09
Attending: PSYCHOLOGIST
Payer: COMMERCIAL

## 2022-05-09 DIAGNOSIS — F33.1 MODERATE EPISODE OF RECURRENT MAJOR DEPRESSIVE DISORDER (H): Primary | ICD-10-CM

## 2022-05-09 DIAGNOSIS — F41.9 ANXIETY: ICD-10-CM

## 2022-05-09 DIAGNOSIS — F41.9 ANXIETY DISORDER, UNSPECIFIED TYPE: ICD-10-CM

## 2022-05-09 PROCEDURE — 90853 GROUP PSYCHOTHERAPY: CPT | Mod: 95 | Performed by: PSYCHOLOGIST

## 2022-05-09 RX ORDER — BUPROPION HYDROCHLORIDE 300 MG/1
TABLET ORAL
Qty: 30 TABLET | Refills: 0 | Status: SHIPPED | OUTPATIENT
Start: 2022-05-09 | End: 2022-06-14

## 2022-05-10 NOTE — PROGRESS NOTES
"  Health Psychology - Follow up Visit  Confidential Summary*    The author of this note documented a reason for not sharing it with the patient.  REFERRAL SOURCE:  Psychiatry    CHIEF COMPLAINT/REASON FOR VISIT  Psychotherapy in context of chronic PTSD and persistent depression.  Patient also complains of dissatisfaction with interpersonal relationships and challenges with emotion regulation.      Patient was seen today for a 60 minute individual psychotherapy session.  The session was facilitated via VIDEO with patient at her home and provider at her own home.  We used [email or web address] platform.       This telehealth service is appropriate and effective for delivering services in light of the necessity for social distancing to mitigate the COVID-19 epidemic. Patient has agreed to receiving telehealth services.    The patient has been notified of following:   \"This VIDEO visit will be conducted via a call between you and your physician/provider. We have found that certain health care needs can be provided without the need for an in-person physical exam.  VIDEO visits are billed at different rates depending on your insurance coverage.  Please reach out to your insurance provider with any questions. If during the course of the call the physician/provider feels a video visit is not appropriate, you will not be charged for this service.\"     Patient has given verbal consent for VIDEO visit? Yes    Subjective:  Patient began with review of the past 2 weeks.  She again has not completed her diary card because she reported that she has been in a great deal of pain because of her surgery to have her tonsils removed.  Discussed the procedure and how it came about that she would have this completed.  Patient reported that she is hoping that her symptoms will be alleviated by the surgery as they also removed polyps from inside of her nose.      She reported that she is enjoying participating in a [employer] and [employer] improv group.  Although " "she has yet to make a close friend in this group, she is enjoying he time and feels that she has \"found a [place]\" of people who understand her, accept her and that she can laugh with.      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  She reported that the store has been a favorite of hers and that she is looking forward to more socializing and financially getting herself caught up with her unpaid rent.  She was supported for her willingness to work hard to improve her situation.  She described the stress of her current financial situation.     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  She described anger, grief, sadness and ongoing frustration that those with disabilities are not given the same options as others.      Patient was encouraged to practice radical acceptance and to consider the 4 ways that we have to cope with a situation including living in misery. Patient was supported and encouraged to change.     Objective:  Patient was on time for today s session. She was alert and oriented. Mood was euthymic with appropriate range of affect. Patient denied suicidal or assaultive ideation, plan, or intent.        Assessment:  The patient has a longstanding history of interpersonal discord and challenges with emotion regulation.  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  She has completed all requirements for her PhD and is now ABD.  She is living in  housing with her two dogs.      Plan:  Patient is now in full model DBT at [facility] and is attending skills group on M at 2.  See in one week.      Treatment plan completed:  10/8/21    Time In: 1:00  Time Out: 2:00    Diagnosis:  Axis I PTSD, chronic, persistent depressive disorder, adjustment " disorder with mixed, r/o somatization disorder, psychological factors associated with physical (fibromyalgia)   Axis II  BPD   Axis III please see medical records for details   [place] IV Psychosocial and Environmental Stressors: living alone with no family support, pandemic stress, chronic illness         Corina Muhammad, PhD, LP

## 2022-05-13 ENCOUNTER — VIRTUAL VISIT (OUTPATIENT)
Dept: PSYCHOLOGY | Facility: CLINIC | Age: 37
End: 2022-05-13
Payer: COMMERCIAL

## 2022-05-13 DIAGNOSIS — F34.1 PERSISTENT DEPRESSIVE DISORDER: Primary | ICD-10-CM

## 2022-05-13 DIAGNOSIS — F43.12 CHRONIC POST-TRAUMATIC STRESS DISORDER (PTSD): ICD-10-CM

## 2022-05-13 DIAGNOSIS — F54 PSYCHOLOGICAL AND BEHAVIORAL FACTORS ASSOCIATED WITH DISORDERS OR DISEASES CLASSIFIED ELSEWHERE: ICD-10-CM

## 2022-05-13 PROCEDURE — 90837 PSYTX W PT 60 MINUTES: CPT | Mod: 95 | Performed by: PSYCHOLOGIST

## 2022-05-16 ENCOUNTER — VIRTUAL VISIT (OUTPATIENT)
Dept: PSYCHIATRY | Facility: CLINIC | Age: 37
End: 2022-05-16
Attending: PSYCHOLOGIST
Payer: COMMERCIAL

## 2022-05-16 ENCOUNTER — HEALTH MAINTENANCE LETTER (OUTPATIENT)
Age: 37
End: 2022-05-16

## 2022-05-16 DIAGNOSIS — F43.12 CHRONIC POST-TRAUMATIC STRESS DISORDER (PTSD): ICD-10-CM

## 2022-05-16 DIAGNOSIS — F34.1 PERSISTENT DEPRESSIVE DISORDER: Primary | ICD-10-CM

## 2022-05-16 DIAGNOSIS — F60.3 BORDERLINE PERSONALITY DISORDER (H): ICD-10-CM

## 2022-05-16 PROCEDURE — 90853 GROUP PSYCHOTHERAPY: CPT | Mod: U7

## 2022-05-17 NOTE — PROGRESS NOTES
[facility]    Dialectic Behavior Therapy Clinic     Adult DBT Skills Group     DBT (Dialectic Behavior Therapy) is a cognitive behavioral therapy that includes skills group, which uses didactics, modeling, behavior rehearsal, and homework exercises to aid patients in acquiring new skills and the opportunity to practice new behaviors.    Date of Service: May 16, 2022  Group Length: 120 minutes  Start time: 2:30pm   End time: 4:30pm  Number of Participants: 6  Group Facilitators: Mackenzie Corbin, PhD, LP, Curt Garrison MD and SHAUNNA Drake    Client: Kasandra Lau  Pronouns: She/Her/Hers/Herself  YOB: 1985  MRN: [id number]    Type of service:  video visit for group therapy  Reason for video visit:  Patient unable to travel due to Covid-19  Originating Site (patient location):  [facility]   Location- Patient's home  Distant Site (provider location):  Remote location  Mode of Communication:  Video Conference via Zoom  Consent:  Patient has given verbal consent for video visit?: Yes     Mindfulness: 2 minute grounding mindfulness using bilateral movements.   Homework Reviewed: Emotion Regulation Worksheet 8: problem-solving to change emotions.   Group Topic for Today: Emotion Regulation Handout 14:Overview: Reducing Vulnerability to Emotion Mind & Building a Life [place] Living, 15: Accumulating Positive Emotions: Short-Term, & 16: Pleasant Events List.   Homework Assigned: Emotion Regulation Worksheet 10: Pleasant Events Diary    Assessment: Patient was on time for group. Patient completed at least one day of their diary card prior to arriving to group. Patient shared that they used the skills of mindfulness and interpersonal effectiveness for starting their new part-time job. Patient did complete the homework assigned the previous week prior to arriving at group. Patient shared with the group that they wrote about their emotion of sadness due to close  friends not reaching out and reported that they would continue to engage with others in new environments (at improv and at new job) to attempt to meet new friends. Patient was engaged in homework review and was engaged in the didactic portion of group. Mood appeared Euthymic.     Diagnosis:   Encounter Diagnoses   Name Primary?     Persistent depressive disorder Yes     Chronic post-traumatic stress disorder (PTSD)      Borderline personality disorder (H)        Plan: Continue in full-model outpatient DBT program.     Group Treatment Plan Reviewed: 10/8/2021  Group Treatment Plan Due for Review: 4/8/2022    SHAUNNA Quinonez  Clinical Social Work Student  Supervised by Mackenzie Corbin, PhD, LP

## 2022-05-20 ENCOUNTER — VIRTUAL VISIT (OUTPATIENT)
Dept: PSYCHOLOGY | Facility: CLINIC | Age: 37
End: 2022-05-20
Payer: COMMERCIAL

## 2022-05-20 DIAGNOSIS — F54 PSYCHOLOGICAL AND BEHAVIORAL FACTORS ASSOCIATED WITH DISORDERS OR DISEASES CLASSIFIED ELSEWHERE: ICD-10-CM

## 2022-05-20 DIAGNOSIS — F34.1 PERSISTENT DEPRESSIVE DISORDER: Primary | ICD-10-CM

## 2022-05-20 DIAGNOSIS — F43.12 CHRONIC POST-TRAUMATIC STRESS DISORDER (PTSD): ICD-10-CM

## 2022-05-20 PROCEDURE — 90837 PSYTX W PT 60 MINUTES: CPT | Mod: 95 | Performed by: PSYCHOLOGIST

## 2022-05-23 ENCOUNTER — VIRTUAL VISIT (OUTPATIENT)
Dept: PSYCHIATRY | Facility: CLINIC | Age: 37
End: 2022-05-23
Attending: PSYCHOLOGIST
Payer: COMMERCIAL

## 2022-05-23 DIAGNOSIS — F60.3 BORDERLINE PERSONALITY DISORDER (H): Primary | ICD-10-CM

## 2022-05-23 DIAGNOSIS — F43.12 CHRONIC POST-TRAUMATIC STRESS DISORDER (PTSD): ICD-10-CM

## 2022-05-23 DIAGNOSIS — F34.1 PERSISTENT DEPRESSIVE DISORDER: ICD-10-CM

## 2022-05-23 PROCEDURE — 90853 GROUP PSYCHOTHERAPY: CPT | Mod: U7

## 2022-05-23 NOTE — PROGRESS NOTES
"  Health Psychology - Follow up Visit  Confidential Summary*    The author of this note documented a reason for not sharing it with the patient.  REFERRAL SOURCE:  Psychiatry    CHIEF COMPLAINT/REASON FOR VISIT  Psychotherapy in context of chronic PTSD and persistent depression.  Patient also complains of dissatisfaction with interpersonal relationships and challenges with emotion regulation.      Patient was seen today for a 60 minute individual psychotherapy session.  The session was facilitated via VIDEO with patient at her home and provider at her own home.  We used [email or web address] platform.       This telehealth service is appropriate and effective for delivering services in light of the necessity for social distancing to mitigate the COVID-19 epidemic. Patient has agreed to receiving telehealth services.    The patient has been notified of following:   \"This VIDEO visit will be conducted via a call between you and your physician/provider. We have found that certain health care needs can be provided without the need for an in-person physical exam.  VIDEO visits are billed at different rates depending on your insurance coverage.  Please reach out to your insurance provider with any questions. If during the course of the call the physician/provider feels a video visit is not appropriate, you will not be charged for this service.\"     Patient has given verbal consent for VIDEO visit? Yes    Subjective:  Patient seen for ongoing DBT treatment.  She began with report that she continues to feel anxious surrounding her progress in her graduate program.  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  Encouraged her to \"check the facts\" and to contact the journal  before assuming.  She reported her concerns surrounding retaliation should her email not be received positively.  Discussed " "her assumption in association with her trauma history and her core beliefs that she will not be received positively.  She also reported that she believes that she struggles with an \"imposter syndrome\".  She reported that she recognizes that she has been struggling with limited finances associated with being a  for over 10 years.  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      She reported that she has been asked by the manager of her apartment building to provide documentation about her expected future funding so that they can refrain from initiating eviction proceedings.  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  She described not wanting to \"give in\" to unrealistic requests.  \"I don't want to send it to her because she shouldn't want it\".    Discussed how she might use this as an opportunity to better problem solve.  She was encouraged to consider the reasons that her paperwork documenting that she registered her dogs was not supplied on time.  She described feeling overwhelmed with the responsibilities of her work coupled with her underlying physical and psychiatric disorders and therefore, completing time intensive tasks is difficult for her.      Confronted patient with the observation that radical acceptance might lead to increased engagement of problem solving skills.      Objective:  Patient was on time for today s session. She was alert and oriented. Mood was euthymic with appropriate range of affect. Patient denied suicidal or assaultive ideation, plan, or intent.        Assessment:  The patient has a longstanding history of interpersonal discord and challenges with emotion regulation.  She has relocated back to the [facility] and is completing her graduate school studies.  She has completed all requirements for her PhD and is now ABD.  She is living in  housing with her two dogs.      Plan:  Patient is now in full model DBT at [facility] and is attending skills group on M at 2.  See in one week.      Treatment plan completed:  10/8/21    Time In: 1:00  Time Out: 2:00    Diagnosis:  Axis I PTSD, chronic, persistent depressive disorder, adjustment disorder with mixed, r/o somatization disorder, psychological factors associated with physical (fibromyalgia)   Axis II  BPD   Axis III please see medical records for details   [place] IV Psychosocial and Environmental Stressors: living alone with no family support, pandemic stress, chronic illness         Corina Muhammad, PhD, LP

## 2022-05-23 NOTE — PROGRESS NOTES
[facility]    Dialectic Behavior Therapy Clinic     Adult DBT Skills Group     DBT (Dialectic Behavior Therapy) is a cognitive behavioral therapy that includes skills group, which uses didactics, modeling, behavior rehearsal, and homework exercises to aid patients in acquiring new skills and the opportunity to practice new behaviors.    Date of Service: May 9, 2022  Group Length: 115 minutes  Start time: 2:30pm   End time: 4:25pm  Number of Participants: 5  Group Facilitators: Mackenzie Corbin, PhD, LP, Curt Garrison MD and SHAUNNA Drake    Client: Kasandra Lau  Pronouns: She/Her/Hers/Herself  YOB: 1985  MRN: [id number]    Type of service:  video visit for group therapy  Reason for video visit:  Patient unable to travel due to Covid-19  Originating Site (patient location):  [facility]   Location- Patient's home  Distant Site (provider location):  Remote location  Mode of Communication:  Video Conference via Zoom  Consent:  Patient has given verbal consent for video visit?: Yes     Mindfulness: YouTube video  Homework Reviewed:  Emotion Regulation Worksheet 7- Opposite Action to Change Emotions  Group Topic for Today: Emotion Regulation 12 &13 problem-solving & reviewing Opp to Emotion Action  Homework Assigned: Emotion Regulation Worksheet 8 Opposite Action to Change Emotions      Assessment: Patient was on time for group. Patient did not complete their diary card prior to group. Patient did not complete the homework assigned last week nor this past week prior to arriving at group. Patient reported that they were not able to complete their diary card or homework due to rev=covering from  Tonsils surgery  recently and experiencing pain and fatigue from the recovery. Patient was not engaged in homework review and was not engaged in the didactic portion of group. Coodinate with her indiv therapist due to lack of engagement. Mood appeared Euthymic.      Diagnosis:   Major depression, PTSD, VIC    Plan: Continue in full-model outpatient DBT program.     Group Treatment Plan Reviewed: 10/8/2021  Group Treatment Plan Due for Review: 4/8/2022

## 2022-05-23 NOTE — PROGRESS NOTES
[facility]    Dialectic Behavior Therapy Clinic     Adult DBT Skills Group     DBT (Dialectic Behavior Therapy) is a cognitive behavioral therapy that includes skills group, which uses didactics, modeling, behavior rehearsal, and homework exercises to aid patients in acquiring new skills and the opportunity to practice new behaviors.    Date of Service: May 23, 2022  Group Length: 105 minutes  Start time: 2:30pm   End time: 4:15pm  Number of Participants: 4  Group Facilitators: Mackenzie Corbin, PhD, LP and SHAUNNA Drake    Client: Kasandra Lau  Pronouns: She/Her/Hers/Herself  YOB: 1985  MRN: [id number]    Type of service:  video visit for group therapy  Reason for video visit:  Patient unable to travel due to Covid-19  Originating Site (patient location):  [facility]   Location- Patient's home  Distant Site (provider location):  Remote location  Mode of Communication:  Video Conference via Zoom  Consent:  Patient has given verbal consent for video visit?: Yes     Mindfulness: 2 minute observe and describe one item in the room   Homework Reviewed: Emotion Regulation Worksheet 10: Pleasant Events Diary  Group Topic for Today: Emotion Regulation Handout 17 & 18: Accumulating Positive Emotions Long-term & Values and Priorities List  Homework Assigned: Emotion Regulation Worksheet 11: Getting from values to specific action steps    Assessment: Patient was on time for group. Patient completed at least one day of their diary card prior to arriving to group. Patient shared with the group that she was tired from physical activity at her new job and felt overwhelmed and she practiced mindfulness of staying the present moment. Patient did not complete the homework assigned the previous week prior to arriving at group and explained that she did not write down the homework assignment last week. Patient was not engaged in homework review and was engaged in the  didactic portion of group. Mood appeared Euthymic.     Diagnosis:   Encounter Diagnoses   Name Primary?     Borderline personality disorder (H) Yes     Persistent depressive disorder      Chronic post-traumatic stress disorder (PTSD)        Plan: Continue in full-model outpatient DBT program.     Group Treatment Plan Reviewed: 10/8/2021  Group Treatment Plan Due for Review: 4/8/2022    SHAUNNA Quinonez  Clinical Social Work Student  Supervised by Mackenzie Corbin, PhD,

## 2022-05-23 NOTE — PROGRESS NOTES
"  Health Psychology - Follow up Visit  Confidential Summary*    The author of this note documented a reason for not sharing it with the patient.  REFERRAL SOURCE:  Psychiatry    CHIEF COMPLAINT/REASON FOR VISIT  Psychotherapy in context of chronic PTSD and persistent depression.  Patient also complains of dissatisfaction with interpersonal relationships and challenges with emotion regulation.      Patient was seen today for a 60 minute individual psychotherapy session.  The session was facilitated via VIDEO with patient at her home and provider at her own home.  We used [email or web address] platform.       This telehealth service is appropriate and effective for delivering services in light of the necessity for social distancing to mitigate the COVID-19 epidemic. Patient has agreed to receiving telehealth services.    The patient has been notified of following:   \"This VIDEO visit will be conducted via a call between you and your physician/provider. We have found that certain health care needs can be provided without the need for an in-person physical exam.  VIDEO visits are billed at different rates depending on your insurance coverage.  Please reach out to your insurance provider with any questions. If during the course of the call the physician/provider feels a video visit is not appropriate, you will not be charged for this service.\"     Patient has given verbal consent for VIDEO visit? Yes    Subjective:  Patient began with review of the past week and patient did not have diary card completed.  Encouraged her to complete for the past week.  Again, reviewed the importance of the diary card in observing and describing emotions.  Discussed the corner stone of mindfulness in DBT and the important function of the diary card.  She reported that she is reluctant to continue DBT treatment as she believes that practicing radical acceptance in the place of societal injustice and she reported that she believes that she has an " "obligation, based on her academic work and on her personal experience to push back on these structural issues.  Attempted to encourage patient to discuss the possibility that radical acceptance may allow her to move forward on her work with a wise mind but she argued against this approach.  She agreed to complete the current module of DBT to allow her to graduate, she will complete almost one year.  She described her notice that she appears to be rebellious; however she believes that it is important that she do her part to stand up to systemic injustice.  Will communicate with Dr. Corbin.     In session, she sent the email documenting that she will have funds coming in from her academic program to her .  She described her observation that she uses rebellion as a coping tool.  Discussed how she might be more skillful and effective in order that she avoid unrelenting crises.    Patient reported that she is hoping to identify a therapist who is skilled in \"discourse analysis\" however, she would like to continue seeing me.      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 Discussed past and upcoming performances.      Overall, patient is describing feeling more grounded.  She has received positive news of a recent publication and is preparing for her dissertation proposal meeting.      She described a recent online dating experience and the disappointment that resulted and \"ghosting\".      Objective:  Patient was on time for today s session. She was alert and oriented. Mood was euthymic with appropriate range of affect. Patient denied suicidal or assaultive ideation, plan, or intent.        Assessment:  The patient has a longstanding history of interpersonal discord and challenges with emotion regulation.  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  She has completed all " requirements for her PhD and is now ABD.  She is living in  housing with her two dogs.      Plan:  Patient is now in full model DBT at [facility] and is attending skills group on M at 2.  See in one week.      Treatment plan completed:  10/8/21    Time In: 1:00  Time Out: 2:00    Diagnosis:  Axis I PTSD, chronic, persistent depressive disorder, adjustment disorder with mixed, r/o somatization disorder, psychological factors associated with physical (fibromyalgia)   Axis II  BPD   Axis III please see medical records for details   [place] IV Psychosocial and Environmental Stressors: living alone with no family support, pandemic stress, chronic illness         Corina Muhammad, PhD, LP

## 2022-05-27 ENCOUNTER — VIRTUAL VISIT (OUTPATIENT)
Dept: PSYCHOLOGY | Facility: CLINIC | Age: 37
End: 2022-05-27
Payer: COMMERCIAL

## 2022-05-27 DIAGNOSIS — F43.12 CHRONIC POST-TRAUMATIC STRESS DISORDER (PTSD): ICD-10-CM

## 2022-05-27 DIAGNOSIS — F60.9 PERSONALITY DISORDER (H): ICD-10-CM

## 2022-05-27 DIAGNOSIS — F34.1 PERSISTENT DEPRESSIVE DISORDER: Primary | ICD-10-CM

## 2022-05-27 DIAGNOSIS — F54 PSYCHOLOGICAL AND BEHAVIORAL FACTORS ASSOCIATED WITH DISORDERS OR DISEASES CLASSIFIED ELSEWHERE: ICD-10-CM

## 2022-05-27 PROCEDURE — 90837 PSYTX W PT 60 MINUTES: CPT | Mod: 95 | Performed by: PSYCHOLOGIST

## 2022-06-03 ENCOUNTER — VIRTUAL VISIT (OUTPATIENT)
Dept: PSYCHOLOGY | Facility: CLINIC | Age: 37
End: 2022-06-03
Payer: COMMERCIAL

## 2022-06-03 DIAGNOSIS — F54 PSYCHOLOGICAL AND BEHAVIORAL FACTORS ASSOCIATED WITH DISORDERS OR DISEASES CLASSIFIED ELSEWHERE: ICD-10-CM

## 2022-06-03 DIAGNOSIS — F43.12 CHRONIC POST-TRAUMATIC STRESS DISORDER (PTSD): ICD-10-CM

## 2022-06-03 DIAGNOSIS — F34.1 PERSISTENT DEPRESSIVE DISORDER: Primary | ICD-10-CM

## 2022-06-03 PROCEDURE — 90837 PSYTX W PT 60 MINUTES: CPT | Mod: 95 | Performed by: PSYCHOLOGIST

## 2022-06-06 ENCOUNTER — VIRTUAL VISIT (OUTPATIENT)
Dept: PSYCHIATRY | Facility: CLINIC | Age: 37
End: 2022-06-06
Attending: PSYCHOLOGIST
Payer: COMMERCIAL

## 2022-06-06 DIAGNOSIS — F33.40 RECURRENT MAJOR DEPRESSIVE DISORDER, IN REMISSION (H): Primary | ICD-10-CM

## 2022-06-06 PROCEDURE — 90853 GROUP PSYCHOTHERAPY: CPT | Mod: U7 | Performed by: STUDENT IN AN ORGANIZED HEALTH CARE EDUCATION/TRAINING PROGRAM

## 2022-06-10 ENCOUNTER — VIRTUAL VISIT (OUTPATIENT)
Dept: PSYCHOLOGY | Facility: CLINIC | Age: 37
End: 2022-06-10
Payer: COMMERCIAL

## 2022-06-10 DIAGNOSIS — F43.23 ADJUSTMENT DISORDER WITH MIXED ANXIETY AND DEPRESSED MOOD: ICD-10-CM

## 2022-06-10 DIAGNOSIS — F43.12 CHRONIC POST-TRAUMATIC STRESS DISORDER (PTSD): ICD-10-CM

## 2022-06-10 DIAGNOSIS — F34.1 PERSISTENT DEPRESSIVE DISORDER: Primary | ICD-10-CM

## 2022-06-10 DIAGNOSIS — F54 PSYCHOLOGICAL AND BEHAVIORAL FACTORS ASSOCIATED WITH DISORDERS OR DISEASES CLASSIFIED ELSEWHERE: ICD-10-CM

## 2022-06-10 PROCEDURE — 90837 PSYTX W PT 60 MINUTES: CPT | Mod: 95 | Performed by: PSYCHOLOGIST

## 2022-06-10 NOTE — PROGRESS NOTES
[facility]    Dialectic Behavior Therapy Clinic     Adult DBT Skills Group     DBT (Dialectic Behavior Therapy) is a cognitive behavioral therapy that includes skills group, which uses didactics, modeling, behavior rehearsal, and homework exercises to aid patients in acquiring new skills and the opportunity to practice new behaviors.    Date of Service: Jun 6, 2022  Group Length: 120 minutes  Start time: 2:30pm   End time: 4:30pm  Number of Participants: 5  Group Facilitators: Mackenzie Corbin, PhD, LP, Curt Celestin MD    Client: Kasandra Lau  Pronouns: She/Her/Hers/Herself  YOB: 1985  MRN: [id number]    Type of service:  video visit for group therapy  Reason for video visit:  Patient unable to travel due to Covid-19  Originating Site (patient location):  [facility]   Location- Patient's home  Distant Site (provider location):  Remote location  Mode of Communication:  Video Conference via Zoom  Consent:  Patient has given verbal consent for video visit?: Yes     Mindfulness: 2 minute paced breathing  Homework Reviewed: Emotion Regulation Worksheet 11: Getting from Values to Specific Action Steps  Group Topic for Today: Emotion Regulation Handout: Build Mastery and [place] Ahead  Homework Assigned: Emotion Regulation Worksheet 12: Build Mastery and [place] Ahead     Assessment: Patient was on time for group. Patient completed at least one day of their diary card prior to arriving at group, noting use of Dear Man and Problem solving. Patient did not complete the homework assigned the previous week prior to arriving at group.  Patient was engaged in the didactic portion of group. Mood appeared Euthymic.     Diagnosis:   Encounter Diagnosis   Name Primary?     Recurrent major depressive disorder, in remission (H) Yes       Plan: Continue in full-model outpatient DBT program.     Group Treatment Plan Reviewed: 10/8/2021  Group Treatment Plan Due for Review:  4/8/2022    Curt Celestin MD  Supervised by Mackenzie Corbin, PhD,

## 2022-06-13 ENCOUNTER — VIRTUAL VISIT (OUTPATIENT)
Dept: PSYCHIATRY | Facility: CLINIC | Age: 37
End: 2022-06-13
Attending: PSYCHOLOGIST
Payer: COMMERCIAL

## 2022-06-13 DIAGNOSIS — F41.9 ANXIETY DISORDER, UNSPECIFIED TYPE: ICD-10-CM

## 2022-06-13 DIAGNOSIS — F33.40 RECURRENT MAJOR DEPRESSIVE DISORDER, IN REMISSION (H): Primary | ICD-10-CM

## 2022-06-13 PROCEDURE — 90853 GROUP PSYCHOTHERAPY: CPT | Mod: 95 | Performed by: PSYCHOLOGIST

## 2022-06-14 ENCOUNTER — OFFICE VISIT (OUTPATIENT)
Dept: FAMILY MEDICINE | Facility: CLINIC | Age: 37
End: 2022-06-14
Payer: COMMERCIAL

## 2022-06-14 VITALS
OXYGEN SATURATION: 98 % | DIASTOLIC BLOOD PRESSURE: 77 MMHG | HEIGHT: 69 IN | WEIGHT: 188.5 LBS | SYSTOLIC BLOOD PRESSURE: 113 MMHG | BODY MASS INDEX: 27.92 KG/M2 | HEART RATE: 78 BPM | TEMPERATURE: 98.4 F

## 2022-06-14 DIAGNOSIS — F90.9 ATTENTION DEFICIT HYPERACTIVITY DISORDER (ADHD), UNSPECIFIED ADHD TYPE: ICD-10-CM

## 2022-06-14 DIAGNOSIS — J30.9 ALLERGIC RHINITIS, UNSPECIFIED SEASONALITY, UNSPECIFIED TRIGGER: ICD-10-CM

## 2022-06-14 DIAGNOSIS — L85.8 KERATOSIS PILARIS: ICD-10-CM

## 2022-06-14 DIAGNOSIS — F33.1 MAJOR DEPRESSIVE DISORDER, RECURRENT EPISODE, MODERATE (H): ICD-10-CM

## 2022-06-14 DIAGNOSIS — F41.9 ANXIETY: ICD-10-CM

## 2022-06-14 DIAGNOSIS — J45.30 MILD PERSISTENT ASTHMA, UNSPECIFIED WHETHER COMPLICATED: ICD-10-CM

## 2022-06-14 DIAGNOSIS — K21.00 GASTROESOPHAGEAL REFLUX DISEASE WITH ESOPHAGITIS WITHOUT HEMORRHAGE: Primary | ICD-10-CM

## 2022-06-14 DIAGNOSIS — F33.1 MODERATE EPISODE OF RECURRENT MAJOR DEPRESSIVE DISORDER (H): ICD-10-CM

## 2022-06-14 DIAGNOSIS — M79.2 NEUROPATHIC PAIN: ICD-10-CM

## 2022-06-14 RX ORDER — BUPROPION HYDROCHLORIDE 300 MG/1
300 TABLET ORAL EVERY MORNING
Qty: 90 TABLET | Refills: 1 | Status: SHIPPED | OUTPATIENT
Start: 2022-06-14 | End: 2022-12-07

## 2022-06-14 RX ORDER — FAMOTIDINE 20 MG/1
20 TABLET, FILM COATED ORAL 2 TIMES DAILY
Qty: 90 TABLET | Refills: 1 | Status: SHIPPED | OUTPATIENT
Start: 2022-06-14 | End: 2022-09-12

## 2022-06-14 RX ORDER — DIAPER,BRIEF,INFANT-TODD,DISP
EACH MISCELLANEOUS
Qty: 30 G | Refills: 0 | Status: SHIPPED | OUTPATIENT
Start: 2022-06-14 | End: 2023-08-03

## 2022-06-14 RX ORDER — GABAPENTIN 400 MG/1
800 CAPSULE ORAL 3 TIMES DAILY
Qty: 180 CAPSULE | Refills: 5 | Status: SHIPPED | OUTPATIENT
Start: 2022-06-14 | End: 2023-01-01

## 2022-06-14 RX ORDER — VILAZODONE HYDROCHLORIDE 40 MG/1
40 TABLET ORAL EVERY MORNING
Qty: 90 TABLET | Refills: 1 | Status: SHIPPED | OUTPATIENT
Start: 2022-06-14 | End: 2023-01-12

## 2022-06-14 RX ORDER — MONTELUKAST SODIUM 10 MG/1
10 TABLET ORAL AT BEDTIME
Qty: 90 TABLET | Refills: 1 | Status: SHIPPED | OUTPATIENT
Start: 2022-06-14 | End: 2022-12-13

## 2022-06-14 RX ORDER — FEXOFENADINE HCL 180 MG/1
180 TABLET ORAL EVERY MORNING
Qty: 90 TABLET | Refills: 1 | Status: SHIPPED | OUTPATIENT
Start: 2022-06-14 | End: 2022-12-07

## 2022-06-14 RX ORDER — PROPRANOLOL HYDROCHLORIDE 20 MG/1
20 TABLET ORAL DAILY PRN
Qty: 30 TABLET | Refills: 1 | Status: SHIPPED | OUTPATIENT
Start: 2022-06-14 | End: 2022-08-04

## 2022-06-14 ASSESSMENT — PATIENT HEALTH QUESTIONNAIRE - PHQ9
SUM OF ALL RESPONSES TO PHQ QUESTIONS 1-9: 5
5. POOR APPETITE OR OVEREATING: SEVERAL DAYS

## 2022-06-14 ASSESSMENT — ANXIETY QUESTIONNAIRES
3. WORRYING TOO MUCH ABOUT DIFFERENT THINGS: SEVERAL DAYS
GAD7 TOTAL SCORE: 10
1. FEELING NERVOUS, ANXIOUS, OR ON EDGE: NEARLY EVERY DAY
7. FEELING AFRAID AS IF SOMETHING AWFUL MIGHT HAPPEN: SEVERAL DAYS
GAD7 TOTAL SCORE: 10
6. BECOMING EASILY ANNOYED OR IRRITABLE: MORE THAN HALF THE DAYS
5. BEING SO RESTLESS THAT IT IS HARD TO SIT STILL: SEVERAL DAYS
IF YOU CHECKED OFF ANY PROBLEMS ON THIS QUESTIONNAIRE, HOW DIFFICULT HAVE THESE PROBLEMS MADE IT FOR YOU TO DO YOUR WORK, TAKE CARE OF THINGS AT HOME, OR GET ALONG WITH OTHER PEOPLE: VERY DIFFICULT
2. NOT BEING ABLE TO STOP OR CONTROL WORRYING: SEVERAL DAYS

## 2022-06-14 ASSESSMENT — ASTHMA QUESTIONNAIRES: ACT_TOTALSCORE: 24

## 2022-06-14 NOTE — PATIENT INSTRUCTIONS
"Med refill  Refilled all medications    Depression and anxiety improved from previous but still moderate   Referred to Mental health Psychiatry for med review, assessment of ADHD  Follow up here in 3 months  Continue with twice weekly counseling  Seek care if worsening symptoms  Discussed [facility] resources    Keratosis Pilaris rash  \"Free \"soaps and detergents without fragrance: Dove unscented, Cetaphil soap, CeraVe lotion, Eucerin  Sunscreen when out doors   Singulair and Allegra might help.   Apply HC 1% to rash 2-3 times daily for 2 weeks.    "

## 2022-06-14 NOTE — PROGRESS NOTES
HPI       Kasandra Lau is a 37 year old  who presents for   Chief Complaint   Patient presents with     Depression     Anxiety            Derm Problem     Bilateral upper arm, small red bumps, no itch or burn     37 year-old  here for med refills and evaluation of rash. History mental health disorders: anxiety, depression, ADHD. Has had difficult time recently with school, financial concerns. Finishing school. Got 1 article published and demarcus is in.  Notes that many individuals in her support group have graduated. She also has less energy for social gatherings. She is working part time currently due to financial constraints. Denies thoughts of harming self or others. Continues in twice weekly counseling.     Needs most medications refilled today. Taking all as prescribed.     Rash: onset past few weeks, dry raised bumps upper arms. Has applied hydrating oil. Taking Singulair, occasional Allegra, famotidine. Using gentle liquid soap. Wears sunscreen outside      Problem, Medication and Allergy Lists were   reviewed and updated if needed.     Patient Active Problem List    Diagnosis Date Noted     Chronic sinusitis, unspecified location 09/21/2021     Priority: Medium     Hypertrophy of breast 09/10/2020     Priority: Medium     Added automatically from request for surgery [id number]       Pap smear abnormality of cervix/human papillomavirus (HPV) positive 09/03/2020     Priority: Medium     [id number] NILM HPV  Positive for High Risk HPV types other than 16 or 18 (Care Everywhere)  2020 NILM HPV negative 34 y.o.  PLAN, per ASCCP Guidelines: cotest 1/2023       Cholecystitis 07/16/2020     Priority: Medium     Added automatically from request for surgery [id number]       Chronic idiopathic urticaria 06/22/2020     Priority: Medium     Hx of abnormal cervical Pap smear 02/03/2020     Priority: Medium     Acid reflux 08/25/2019     Priority: Medium     Need for desensitization to allergens  06/06/2019     Priority: Medium     Formatting of this note might be different from the original.    Allergy Shot Care Plan for Kasandra Lau  Date of Order: June 6 2019  Ordering Provider: Dr. Martin Cervantes     Serum 1: Cat  Date Shots Started:  Start Dose: 0.1 mL of 1:100,000 - GREEN  Date Maintenance Reached:  Maintenance Dose: 0.3 mL of 1:100 - RED    Serum 2: Mite DF and Mite DP  Date Shots Started:  Start Dose: 0.1 mL of 1:100,000 - GREEN  Date Maintenance Reached:  Maintenance Dose: 0.1 mL of 1:100 - RED    BUILDING SCHEDULE FOR POLLEN AND NONPOLLEN   IMMUNOTHERAPY  EXCEPT VENOM AND CENTER AL    3 - 14 days Build per schedule.   15 - 21 days Repeat previous dose.   22 - 28 days Decrease by one dose.   29 - 35 days Decrease by two doses.   36 - 42 days Decrease by three doses.   Over 42 days Continue to decrease by one dose for each additional week.     1:100,000 (green)  1:10,000 (blue)  1:1000 (yellow)  1:100 (red)     1. 0.05 ml  7. 0.05 ml  13. 0.05 ml  19. 0.05 ml   2. 0.1 ml  8. 0.1 ml  14. 0.1 ml  20. 0.1 ml   3. 0.2 ml  9. 0.2 ml  15. 0.2 ml  21. 0.15 ml   4. 0.3 ml  10. 0.3 ml  16. 0.3 ml  22. 0.2 ml   5. 0.4 ml  11. 0.4 ml  17. 0.4 ml  23. 0.25 ml   6. 0.45 ml  12. 0.45 ml  18. 0.45 ml  24. 0.3 ml         25   0.35 ml         26   0.4 ml         27. 0.45 ml         28. 0.5 ml     MAINTENANCE SCHEDULE FOR POLLENS, NONPOLLENS (MITES,MOLD,CAT,DOG)  (not used for Center-[facility] Pollens)    ? When reaching maintenance dose for the first time, gradually stretch by weekly intervals to every 4 weeks (e.g., every 2 weeks, then 3 weeks, then 4 weeks).   ? Patient may receive their injections (patient choice) at 2-4 week intervals     Maintenance Repeat 1 Dose 2 Dose 3 Dose    Q2 weeks  (10-14 days)  3 weeks  (15-21 days) 4 weeks  (22-28 days) 5 weeks  (29-35 days) 6 weeks  (36-42 days) Decrease by 1 Dose for each additional week   Q3 weeks  (15-21 days)  4 weeks  (22-28 days) 5 weeks  (29-35 days) 6 weeks  (36-42  days) 7 weeks  (43-49 days)    Q4 weeks  (22-28 days)  5 weeks  (29-35 days) 6 weeks  (36-42 days) 7 weeks  (43-49 days) 8 weeks  (50-56 days)      Susanna Ambrose RN 6/6/2019 4:49 PM       Mild intermittent asthma without complication 04/30/2018     Priority: Medium     Seasonal mood disorder (H) 11/15/2017     Priority: Medium     Circadian rhythm sleep disorder, delayed sleep phase type 05/10/2017     Priority: Medium     Unhealthy sleep habit 05/10/2017     Priority: Medium     Vitamin D deficiency 11/19/2016     Priority: Medium     Menorrhagia with regular cycle 11/19/2016     Priority: Medium     Migraine without aura and without status migrainosus, not intractable 11/19/2016     Priority: Medium     Iron deficiency anemia due to chronic blood loss 11/19/2016     Priority: Medium     Tired 11/19/2016     Priority: Medium     Irritable bowel syndrome with constipation 10/25/2016     Priority: Medium     Recurrent major depressive disorder, in remission (H) 10/25/2016     Priority: Medium     Pelvic pain in female 08/25/2016     Priority: Medium     Migraine headache 01/01/2012     Priority: Medium     Panic disorder 01/01/2007     Priority: Medium     Depression 01/01/2004     Priority: Medium         Current Outpatient Medications   Medication Sig Dispense Refill     buPROPion (WELLBUTRIN XL) 300 MG 24 hr tablet Take 1 tablet (300 mg) by mouth every morning 90 tablet 1     cholecalciferol 50 MCG (2000 UT) CAPS 5,000 Units every evening        doxepin (SINEQUAN) 10 MG capsule Take 3 capsules (30 mg) by mouth At Bedtime 90 capsule 11     EMGALITY 120 MG/ML injection Inject 1 mL (120 mg) Subcutaneous every 28 days 1 mL 11     famotidine (PEPCID) 20 MG tablet Take 1 tablet (20 mg) by mouth 2 times daily 90 tablet 1     fexofenadine (ALLEGRA) 180 MG tablet Take 1 tablet (180 mg) by mouth every morning 90 tablet 1     fluticasone (FLONASE) 50 MCG/ACT nasal spray 2 times daily as needed        gabapentin  "(NEURONTIN) 400 MG capsule Take 2 capsules (800 mg) by mouth 3 times daily 180 capsule 5     hydrocortisone (CORTAID) 1 % external ointment Apply sparingly to affected area three times daily for 14 days. 30 g 0     levocetirizine (XYZAL) 5 MG tablet Take 5 mg by mouth every evening        montelukast (SINGULAIR) 10 MG tablet Take 1 tablet (10 mg) by mouth At Bedtime 90 tablet 1     propranolol (INDERAL) 20 MG tablet Take 1 tablet (20 mg) by mouth daily as needed (anxiety) 30 tablet 1     vilazodone (VIIBRYD) 40 MG TABS tablet Take 1 tablet (40 mg) by mouth every morning 90 tablet 1         Allergies   Allergen Reactions     Dust Mites Cough, Difficulty breathing and Shortness Of Breath     Cats      Chest tightness, sinus irritation   .    Patient is an established patient of this clinic.Reviewed and updated history as needed         Review of Systems:   Review of Systems  GEN: negative for fever, chills. Has shown weight gain.   DERM: as per HPI  CV: negative for chest pain, irregular heartbeat  RESP: negative for cough, wheeze, SOB.Allergies variable.  MOOD: as per HPI       Physical Exam:     Vitals:    06/14/22 1111   BP: 113/77   Pulse: 78   Temp: 98.4  F (36.9  C)   TempSrc: Oral   SpO2: 98%   Weight: 85.5 kg (188 lb 8 oz)   Height: 1.74 m (5' 8.5\")     Body mass index is 28.24 kg/m .  Vital signs normal except weight up 3 pounds from previous. BMI 28.24     Physical Exam  GEN: alert female, NAD  CV: HRRR, S1, S2, no MRG  RESP: CTA with air entry throughout  DERM: fine dry papular rash moderate density upper anterior arms bilaterally; slight erythema. No other rash noted.   MOOD: somewhat flat affect.   PHQ 4/29/2020 9/21/2021 6/14/2022   PHQ-9 Total Score 9 9 5   Q9: Thoughts of better off dead/self-harm past 2 weeks Not at all Not at all Not at all     VIC-7 SCORE 4/29/2020 9/21/2021 6/14/2022   Total Score 10 (moderate anxiety) - -   Total Score 10 13 10           Results:   No testing ordered " today    Assessment and Plan        1. Major depressive disorder, recurrent episode, moderate (H)  Some improvement in PHQ-9 but still feeling overwhelmed at times. Not suicidal.  Continue twice weekly counseling and reviewed [facility] resources.   - buPROPion (WELLBUTRIN XL) 300 MG 24 hr tablet; Take 1 tablet (300 mg) by mouth every morning  Dispense: 90 tablet; Refill: 1    2. Gastroesophageal reflux disease with esophagitis without hemorrhage  Refilled. Some overlap famotodine and Allegra  - famotidine (PEPCID) 20 MG tablet; Take 1 tablet (20 mg) by mouth 2 times daily  Dispense: 90 tablet; Refill: 1    3. Allergic rhinitis, unspecified seasonality, unspecified trigger  Refilled.   - fexofenadine (ALLEGRA) 180 MG tablet; Take 1 tablet (180 mg) by mouth every morning  Dispense: 90 tablet; Refill: 1    4. Anxiety  Despite medical therapy and counseling, continues to exhibit moderate anxiety. Request med review and treatment from Psychiatry  - gabapentin (NEURONTIN) 400 MG capsule; Take 2 capsules (800 mg) by mouth 3 times daily  Dispense: 180 capsule; Refill: 5  - propranolol (INDERAL) 20 MG tablet; Take 1 tablet (20 mg) by mouth daily as needed (anxiety)  Dispense: 30 tablet; Refill: 1  - Adult Mental Health  Referral; Future    5. Neuropathic pain  Refilled  - gabapentin (NEURONTIN) 400 MG capsule; Take 2 capsules (800 mg) by mouth 3 times daily  Dispense: 180 capsule; Refill: 5    6. Mild persistent asthma, unspecified whether complicated  Refilled  - montelukast (SINGULAIR) 10 MG tablet; Take 1 tablet (10 mg) by mouth At Bedtime  Dispense: 90 tablet; Refill: 1    7. Moderate episode of recurrent major depressive disorder (H)  Refilled. Has tolerated doxepin and vilazodone previously  - vilazodone (VIIBRYD) 40 MG TABS tablet; Take 1 tablet (40 mg) by mouth every morning  Dispense: 90 tablet; Refill: 1  - Adult Mental Health  Referral; Future    8. Attention deficit hyperactivity disorder (ADHD),  unspecified ADHD type  Referred to  for review of meds.  - Adult Mental Health  Referral; Future    9. Keratosis pilaris  Reviewed environmental measures; soaps and soap products to minimize irritation. Wear sunscreen. Given patient ed handout. Continue with antihistamines.   - hydrocortisone (CORTAID) 1 % external ointment; Apply sparingly to affected area three times daily for 14 days.  Dispense: 30 g; Refill: 0         Medications Discontinued During This Encounter   Medication Reason     buPROPion (WELLBUTRIN XL) 300 MG 24 hr tablet Reorder     famotidine (PEPCID) 20 MG tablet Reorder     fexofenadine (ALLEGRA) 180 MG tablet Reorder     gabapentin (NEURONTIN) 400 MG capsule Reorder     montelukast (SINGULAIR) 10 MG tablet Reorder     propranolol (INDERAL) 20 MG tablet Reorder     VIIBRYD 40 MG TABS tablet Reorder     Follow up 3 months.   Options for treatment and follow-up care were reviewed with the patient. Kasandra Lau  engaged in the decision making process and verbalized understanding of the options discussed and agreed with the final plan.    JASVIR Ashley CNP
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
## 2022-06-14 NOTE — NURSING NOTE
"ROOM:1  [facility]    Preferred Name: Kasandra     37 year old  Chief Complaint   Patient presents with     Depression     Anxiety            Derm Problem     Bilateral upper arm, small red bumps, no itch or burn       Blood pressure 113/77, pulse 78, temperature 98.4  F (36.9  C), temperature source Oral, height 1.74 m (5' 8.5\"), weight 85.5 kg (188 lb 8 oz), SpO2 98 %, not currently breastfeeding. Body mass index is 28.24 kg/m .  BP completed using cuff size:    Patient Active Problem List   Diagnosis     Pelvic pain in female     Vitamin D deficiency     Menorrhagia with regular cycle     Migraine without aura and without status migrainosus, not intractable     Iron deficiency anemia due to chronic blood loss     Tired     Circadian rhythm sleep disorder, delayed sleep phase type     Unhealthy sleep habit     Seasonal mood disorder (H)     Chronic idiopathic urticaria     Acid reflux     Cholecystitis     Depression     Hx of abnormal cervical Pap smear     Irritable bowel syndrome with constipation     Migraine headache     Mild intermittent asthma without complication     Need for desensitization to allergens     Panic disorder     Pap smear abnormality of cervix/human papillomavirus (HPV) positive     Recurrent major depressive disorder, in remission (H)     Hypertrophy of breast     Chronic sinusitis, unspecified location       Wt Readings from Last 2 Encounters:   06/14/22 85.5 kg (188 lb 8 oz)   04/16/22 84.1 kg (185 lb 7 oz)     BP Readings from Last 3 Encounters:   06/14/22 113/77   04/27/22 120/80   04/16/22 112/80       Allergies   Allergen Reactions     Dust Mites Cough, Difficulty breathing and Shortness Of Breath     Cats      Chest tightness, sinus irritation       Current Outpatient Medications   Medication     buPROPion (WELLBUTRIN XL) 300 MG 24 hr tablet     cholecalciferol 50 MCG (2000 UT) CAPS     doxepin (SINEQUAN) 10 MG capsule     EMGALITY 120 MG/ML injection     famotidine (PEPCID) " 20 MG tablet     fexofenadine (ALLEGRA) 180 MG tablet     fluticasone (FLONASE) 50 MCG/ACT nasal spray     gabapentin (NEURONTIN) 400 MG capsule     levocetirizine (XYZAL) 5 MG tablet     montelukast (SINGULAIR) 10 MG tablet     propranolol (INDERAL) 20 MG tablet     VIIBRYD 40 MG TABS tablet     No current facility-administered medications for this visit.       Social History     Tobacco Use     Smoking status: Former Smoker     Packs/day: 0.00     Years: 10.00     Pack years: 0.00     Types: Cigarettes     Smokeless tobacco: Never Used     Tobacco comment: smokes occasionally socially    Vaping Use     Vaping Use: Never used   Substance Use Topics     Alcohol use: Not Currently     Alcohol/week: 0.0 standard drinks     Comment: occasional      Drug use: Not Currently     Types: Marijuana     Comment: marijuana  none since May 2021       Honoring Choices - Health Care Directive Guide offered to patient at time of visit.    Health Maintenance Due   Topic Date Due     ASTHMA ACTION PLAN  Never done     ADVANCE CARE PLANNING  Never done     PREVENTIVE CARE VISIT  01/31/2020     HPV TEST  02/21/2020     PAP  02/21/2020       Immunization History   Administered Date(s) Administered     COVID-19,PF,Pfizer (12+ Yrs) 03/19/2021, 04/09/2021     COVID-19,PF,Pfizer 12+ Yrs (2022 and After) 01/10/2022     DTaP, Unspecified 05/22/2019     Flu, Unspecified 09/23/2016     Influenza Vaccine IM > 6 months Valent IIV4 (Alfuria,Fluzone) 09/23/2016, 09/07/2018, 08/28/2019, 09/02/2020, 11/02/2021     Pneumococcal 23 valent 05/22/2019     Tdap (Adacel,Boostrix) 05/22/2019       Lab Results   Component Value Date    PAP NIL 01/31/2019       Recent Labs   Lab Test 04/27/22  1717 04/13/22  1600 01/04/22  1732 08/31/21  1057 08/12/21  1535 08/12/21  1535 09/12/20  2058 08/02/19  1711 10/05/17  1133 02/06/17  1200   ALT  --  63*  --  41  --  33 36 25   < >  --    CR 0.66 0.63  --  0.69   < > 0.68 0.77 0.80   < >  --    GFRESTIMATED >90 >90   --  >90   < > >90 >90 >90   < >  --    GFRESTBLACK  --   --   --   --   --   --  >90 >90   < >  --    ALBUMIN  --  3.9  --  3.2*   < > 2.9* 3.3* 3.4   < >  --    POTASSIUM 3.7 3.8  --  3.8   < > 3.4 3.9 3.5   < >  --    TSH  --   --  1.81  --   --   --   --   --   --  1.72    < > = values in this interval not displayed.       PHQ-2 ( 1999 Pfizer) 6/14/2022 4/13/2022   Q1: Little interest or pleasure in doing things 0 0   Q2: Feeling down, depressed or hopeless 1 0   PHQ-2 Score 1 0   PHQ-2 Total Score (12-17 Years)- Positive if 3 or more points; Administer PHQ-A if positive - -   Q1: Little interest or pleasure in doing things - -   Q2: Feeling down, depressed or hopeless - -   PHQ-2 Score - -       PHQ-9 SCORE 2/12/2020 4/29/2020 9/21/2021 6/14/2022   PHQ-9 Total Score MyChart - 9 (Mild depression) - -   PHQ-9 Total Score 8 9 9 5       VIC-7 SCORE 4/29/2020 9/21/2021 6/14/2022   Total Score 10 (moderate anxiety) - -   Total Score 10 13 10       ACT Total Scores 6/14/2022   ACT TOTAL SCORE (Goal Greater than or Equal to 20) 24   In the past 12 months, how many times did you visit the emergency room for your asthma without being admitted to the hospital? 2   In the past 12 months, how many times were you hospitalized overnight because of your asthma? 0       Fartun Moreno    June 14, 2022 11:13 AM

## 2022-06-17 ENCOUNTER — VIRTUAL VISIT (OUTPATIENT)
Dept: PSYCHOLOGY | Facility: CLINIC | Age: 37
End: 2022-06-17
Payer: COMMERCIAL

## 2022-06-17 DIAGNOSIS — F34.1 PERSISTENT DEPRESSIVE DISORDER: Primary | ICD-10-CM

## 2022-06-17 DIAGNOSIS — F60.9 PERSONALITY DISORDER (H): ICD-10-CM

## 2022-06-17 DIAGNOSIS — F54 PSYCHOLOGICAL AND BEHAVIORAL FACTORS ASSOCIATED WITH DISORDERS OR DISEASES CLASSIFIED ELSEWHERE: ICD-10-CM

## 2022-06-17 DIAGNOSIS — F43.12 CHRONIC POST-TRAUMATIC STRESS DISORDER (PTSD): ICD-10-CM

## 2022-06-17 PROCEDURE — 90837 PSYTX W PT 60 MINUTES: CPT | Mod: 95 | Performed by: PSYCHOLOGIST

## 2022-06-22 NOTE — PROGRESS NOTES
[facility]    Dialectic Behavior Therapy Clinic     Adult DBT Skills Group     DBT (Dialectic Behavior Therapy) is a cognitive behavioral therapy that includes skills group, which uses didactics, modeling, behavior rehearsal, and homework exercises to aid patients in acquiring new skills and the opportunity to practice new behaviors.    Date of Service: Jun 13, 2022  Group Length: 120 minutes  Start time: 2:30pm   End time: 4:30pm  Number of Participants: 5  Group Facilitators: Mackenzie Corbin, PhD, LP, Curt Celestin MD    Client: Kasandra Lau  Pronouns: She/Her/Hers/Herself  YOB: 1985  MRN: [id number]    Type of service:  video visit for group therapy  Reason for video visit:  Patient unable to travel due to Covid-19  Originating Site (patient location):  [facility]   Location- Patient's home  Distant Site (provider location):  Remote location  Mode of Communication:  Video Conference via Zoom  Consent:  Patient has given verbal consent for video visit?: Yes     Mindfulness:Breathing mindfulness  Homework Reviewed: Emotion Regulation Worksheet 12: Build Mastery and Honaker Ahead  Group Topic for Today: Mindfulness, Observe, Describe and participate. And wisemind  Homework Assigned: Mindfulness--first half of 2B      Assessment: Patient was on time for group. Patient completed at least one day of their diary card prior to arriving at group, noting use of Dear Man and Problem solving. Patient did not complete the homework assigned the previous week prior to arriving at group.  Patient was engaged in the didactic portion of group. Mood appeared Euthymic.     Diagnosis:   Major Depression, Generalized anxiety      Plan: Continue in full-model outpatient DBT program.     Group Treatment Plan Reviewed: 10/8/2021  Group Treatment Plan Due for Review: 4/8/2022    Curt Celestin MD  Supervised by Mackenzie Corbin, PhD, LP

## 2022-06-24 NOTE — PROGRESS NOTES
"  Health Psychology - Follow up Visit  Confidential Summary*    The author of this note documented a reason for not sharing it with the patient.  REFERRAL SOURCE:  Psychiatry    CHIEF COMPLAINT/REASON FOR VISIT  Psychotherapy in context of chronic PTSD and persistent depression.  Patient also complains of dissatisfaction with interpersonal relationships and challenges with emotion regulation.      Patient was seen today for a 60 minute individual psychotherapy session.  The session was facilitated via VIDEO with patient at her home and provider at her own home.  We used [email or web address] platform.       This telehealth service is appropriate and effective for delivering services in light of the necessity for social distancing to mitigate the COVID-19 epidemic. Patient has agreed to receiving telehealth services.    The patient has been notified of following:   \"This VIDEO visit will be conducted via a call between you and your physician/provider. We have found that certain health care needs can be provided without the need for an in-person physical exam.  VIDEO visits are billed at different rates depending on your insurance coverage.  Please reach out to your insurance provider with any questions. If during the course of the call the physician/provider feels a video visit is not appropriate, you will not be charged for this service.\"     Patient has given verbal consent for VIDEO visit? Yes    Subjective:  Patient began with report that she has decided that she would like to discontinue DBT at the end of the current module.  She was congratulated on this decision and was supported in the fact that she completed 6 months of treatment.  She voiced discontent with DBT stating that she is not sure if she learned what she was intended to learn.  Discussed the importance of applying the skills to her day to day life.  She voiced concern that DBT emphasizes the skill of radical acceptance and that this skill is not consistent with " her desire to use her passion to support societal change.  We discussed that she might use the DBT skills in other ways and that her ongoing distress may interfere with her ability to effectively navigate settings in which she would like to be an agent for societal change.      We discussed her ongoing trauma and her desire to engage in trauma work.  Completed the initial trauma record today and will plan to begin ART treatment when patient is back from her  trip.      She described ongoing frustration surrounding her upcoming trip.  She had met a man there whom she had hoped to spend time with and is disappointed that this may not work out as planned.  She used this experience as evidence to support her view of men as being non committal and rejecting her because of multiple reasons that she can not change.      Objective:  Patient was on time for today s session. She was alert and oriented. Mood was euthymic with appropriate range of affect. Patient denied suicidal or assaultive ideation, plan, or intent.        Assessment:  The patient has a longstanding history of interpersonal discord and challenges with emotion regulation.  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  She has completed all requirements for her PhD and is now ABD.  She is living in  housing with her two dogs.      Plan:  Patient will soon be graduating from full model DBT at [facility].  See in one week.      Treatment plan completed:  10/8/21    Time In: 1:00  Time Out: 2:00    Diagnosis:  Axis I PTSD, chronic, persistent depressive disorder, adjustment disorder with mixed, psychological factors associated with physical (fibromyalgia)   Axis II  BPD   Axis III please see medical records for details   [place] IV Psychosocial and Environmental Stressors: living alone with no family support, pandemic stress, chronic illness         Corina Muhammad, PhD, LP

## 2022-06-27 NOTE — PROGRESS NOTES
"  Health Psychology - Follow up Visit  Confidential Summary*    The author of this note documented a reason for not sharing it with the patient.  REFERRAL SOURCE:  Psychiatry    CHIEF COMPLAINT/REASON FOR VISIT  Psychotherapy in context of chronic PTSD and persistent depression.  Patient also complains of dissatisfaction with interpersonal relationships and challenges with emotion regulation.      Patient was seen today for a 60 minute individual psychotherapy session.  The session was facilitated via VIDEO with patient at her home and provider at her own home.  We used [email or web address] platform.       This telehealth service is appropriate and effective for delivering services in light of the necessity for social distancing to mitigate the COVID-19 epidemic. Patient has agreed to receiving telehealth services.    The patient has been notified of following:   \"This VIDEO visit will be conducted via a call between you and your physician/provider. We have found that certain health care needs can be provided without the need for an in-person physical exam.  VIDEO visits are billed at different rates depending on your insurance coverage.  Please reach out to your insurance provider with any questions. If during the course of the call the physician/provider feels a video visit is not appropriate, you will not be charged for this service.\"     Patient has given verbal consent for VIDEO visit? Yes    Subjective:  Patient began with report that she is looking forward to seeing \"Topmarta\" during her visit to [facility] for a work conference.  She reported that she is concerned that in relationships, she either feels smothered or that she might smother who she is with.  She also described being over accomodating and that she fears abandonment and that she may \"juan\" (in addition to flee and fight).  She reported that she believes that she may be poor at reading the environment and that she does not fit in with any group.  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  She reported that she has trouble identifying where she fits.  She reported that she has been searching for someone that can accept her but that she identifies that she is not entirely sure how to reconcile the different aspects of her past with her present day.      She was encouraged to consider who she would like to be and that it is possible that the past has informed who she is today.      Objective:  Patient was on time for today s session. She was alert and oriented. Mood was euthymic with appropriate range of affect. Patient denied suicidal or assaultive ideation, plan, or intent.        Assessment:  The patient has a longstanding history of interpersonal discord and challenges with emotion regulation.  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  She has completed all requirements for her PhD and is now ABD.  She is living in  housing with her two dogs.      Plan:  Patient will soon be graduating from full model DBT at [facility].  See in one week.      Treatment plan completed:  10/8/21    Time In: 1:00  Time Out: 2:00    Diagnosis:  Axis I PTSD, chronic, persistent depressive disorder, adjustment disorder with mixed, psychological factors associated with physical (fibromyalgia)   Axis II  BPD   Axis III please see medical records for details   [place] IV Psychosocial and Environmental Stressors: living alone with no family support, pandemic stress, chronic illness         Corina Muhammad, PhD, LP

## 2022-06-30 ENCOUNTER — ALLIED HEALTH/NURSE VISIT (OUTPATIENT)
Dept: FAMILY MEDICINE | Facility: CLINIC | Age: 37
End: 2022-06-30
Payer: COMMERCIAL

## 2022-06-30 DIAGNOSIS — Z20.822 SUSPECTED 2019 NOVEL CORONAVIRUS INFECTION: Primary | ICD-10-CM

## 2022-06-30 PROCEDURE — U0003 INFECTIOUS AGENT DETECTION BY NUCLEIC ACID (DNA OR RNA); SEVERE ACUTE RESPIRATORY SYNDROME CORONAVIRUS 2 (SARS-COV-2) (CORONAVIRUS DISEASE [COVID-19]), AMPLIFIED PROBE TECHNIQUE, MAKING USE OF HIGH THROUGHPUT TECHNOLOGIES AS DESCRIBED BY CMS-2020-01-R: HCPCS | Performed by: NURSE PRACTITIONER

## 2022-07-01 ENCOUNTER — VIRTUAL VISIT (OUTPATIENT)
Dept: PSYCHOLOGY | Facility: CLINIC | Age: 37
End: 2022-07-01
Payer: COMMERCIAL

## 2022-07-01 DIAGNOSIS — F54 PSYCHOLOGICAL AND BEHAVIORAL FACTORS ASSOCIATED WITH DISORDERS OR DISEASES CLASSIFIED ELSEWHERE: ICD-10-CM

## 2022-07-01 DIAGNOSIS — F43.23 ADJUSTMENT DISORDER WITH MIXED ANXIETY AND DEPRESSED MOOD: ICD-10-CM

## 2022-07-01 DIAGNOSIS — F43.12 CHRONIC POST-TRAUMATIC STRESS DISORDER (PTSD): ICD-10-CM

## 2022-07-01 DIAGNOSIS — F34.1 PERSISTENT DEPRESSIVE DISORDER: Primary | ICD-10-CM

## 2022-07-01 LAB — SARS-COV-2 RNA RESP QL NAA+PROBE: NEGATIVE

## 2022-07-01 PROCEDURE — 90837 PSYTX W PT 60 MINUTES: CPT | Mod: 95 | Performed by: PSYCHOLOGIST

## 2022-07-08 ENCOUNTER — VIRTUAL VISIT (OUTPATIENT)
Dept: PSYCHOLOGY | Facility: CLINIC | Age: 37
End: 2022-07-08
Payer: COMMERCIAL

## 2022-07-08 DIAGNOSIS — F54 PSYCHOLOGICAL AND BEHAVIORAL FACTORS ASSOCIATED WITH DISORDERS OR DISEASES CLASSIFIED ELSEWHERE: ICD-10-CM

## 2022-07-08 DIAGNOSIS — F34.1 PERSISTENT DEPRESSIVE DISORDER: Primary | ICD-10-CM

## 2022-07-08 DIAGNOSIS — F43.12 CHRONIC POST-TRAUMATIC STRESS DISORDER (PTSD): ICD-10-CM

## 2022-07-08 PROCEDURE — 90837 PSYTX W PT 60 MINUTES: CPT | Mod: 95 | Performed by: PSYCHOLOGIST

## 2022-07-11 ENCOUNTER — VIRTUAL VISIT (OUTPATIENT)
Dept: DERMATOLOGY | Facility: CLINIC | Age: 37
End: 2022-07-11
Payer: COMMERCIAL

## 2022-07-11 DIAGNOSIS — L65.9 LOSS OF HAIR: Primary | ICD-10-CM

## 2022-07-11 PROCEDURE — 99202 OFFICE O/P NEW SF 15 MIN: CPT | Mod: 95 | Performed by: DERMATOLOGY

## 2022-07-11 NOTE — PATIENT INSTRUCTIONS
[facility] Dermatology Visit    Thank you for allowing us to participate in your care. Your findings, instructions and follow-up plan are as follows:     For presume keratosis pilaris  -try over the counter amlactin for rough feeling  -for redness hydrocortisone 1 week prior to event (use twice daily)    SCARS BREAST:  For scars 1550nm laser 8 treatments, improve scars but will not clear  At night use differin on the scar if not irritating(see photos below)              Screen Shot 2022-05-16 at 8.09.26 AM        When should I call my doctor?  If you are worsening or not improving, please, contact us or seek urgent care as noted below.     Who should I call with questions (adults)?  [facility] (adult and pediatric): [phone]  [facility] (adult): [phone]  For urgent needs outside of business hours call the [facility] at [phone] and ask for the dermatology resident on call  If this is a medical emergency and you are unable to reach an ER, Call [phone]    Who should I call with questions (pediatric)?  [facility]- Pediatric Dermatology  Dr. Nasima Perdue, Dr. Lavern Farah, Dr. Cary Nation, Maritza Oviedo, PA  Dr. Ninfa Tang, Dr. Yolie Palma & Dr. Scot Villagomez  Non Urgent  Nurse Triage Line; [phone]- Emilia and Destiny GARCIA Care Coordinators   Martina (/Complex ) [phone]    If you need a prescription refill, please contact your pharmacy. Refills are approved or denied by our physicians during normal business hours, Monday through Fridays  Per office policy, refills will not be granted if you have not been seen within the past year (or sooner depending on your child's condition).    Scheduling Information:  Pediatric Appointment Scheduling and Call Center [phone]  Radiology Scheduling- [phone]  Sedation Unit Scheduling-  [phone]  [facility] [phone]; Pediatric Dermatology [phone]  Main  Services: [phone]  Welsh: [phone]  Gibraltarian: [phone]  Hmong/[place] Helen[place]/Occitan: [phone]  Preadmission Nursing Department Fax Number: [phone] (fax all pre-operative paperwork to this number)    For urgent matters arising during evenings, weekends, or holidays that cannot wait for normal business hours please call [phone] and ask for the dermatology resident on call to be paged.

## 2022-07-11 NOTE — NURSING NOTE
Chief Complaint   Patient presents with     Consult     Laser consult and hair loss     Teledermatology Nurse Call Patients:     Are you in the [facility] at the time of the encounter? yes    Today's visit will be billed to you and your insurance.    A teledermatology visit is not as thorough as an in-person visit and the quality of the photograph sent may not be of the same quality as that taken by the dermatology clinic.    Medications and allergies verified with patient. Patient has seen her NP regarding bumps on arms, diagnosed as keratosis.     LILIYA Carrera

## 2022-07-11 NOTE — Clinical Note
Add on at [facility] on wed, lambert, medical 15 minutes within 12 weeks

## 2022-07-11 NOTE — PROGRESS NOTES
"  Health Psychology - Follow up Visit  Confidential Summary*    The author of this note documented a reason for not sharing it with the patient.  REFERRAL SOURCE:  Psychiatry    CHIEF COMPLAINT/REASON FOR VISIT  Psychotherapy in context of chronic PTSD and persistent depression.  Patient also complains of dissatisfaction with interpersonal relationships and challenges with emotion regulation.      Patient was seen today for a 60 minute individual psychotherapy session.  The session was facilitated via VIDEO with patient at her home and provider at her own home.  We used [email or web address] platform.       This telehealth service is appropriate and effective for delivering services in light of the necessity for social distancing to mitigate the COVID-19 epidemic. Patient has agreed to receiving telehealth services.    The patient has been notified of following:   \"This VIDEO visit will be conducted via a call between you and your physician/provider. We have found that certain health care needs can be provided without the need for an in-person physical exam.  VIDEO visits are billed at different rates depending on your insurance coverage.  Please reach out to your insurance provider with any questions. If during the course of the call the physician/provider feels a video visit is not appropriate, you will not be charged for this service.\"     Patient has given verbal consent for VIDEO visit? Yes    Subjective:  Patient began with report that she is planning to attend a conference in [place] and that she is looking for someone to watch her dogs while she is gone.  She reported that her dogs are not easily left alone with others and that she has some anxiety about leaving them.  She described her desire to meet someone while away and is looking forward to talking with colleagues who might appreciate her work. She described her ongoing belief that men in the [place] are not attracted to her and that she does not fit with others here " "because she [facility] and that she has a graduate degree.  She was encouraged to use her skills of checking to facts to support whether her assumptions are true.  She is noted to continue to engage in emotion mind.  She reported that her ex  \"did not see her\" and that he did not want a woman who was independent.  She reported that she was often disappointed that he did not care about her research and that he preferred the company of his sister to her.      She reported that she is not afraid to be critical.    Patient was encouraged to use skills from the interpersonal effectiveness module.      Objective:  Patient was on time for today s session. She was alert and oriented. Mood was euthymic with appropriate range of affect. Patient denied suicidal or assaultive ideation, plan, or intent.        Assessment:  The patient has a longstanding history of interpersonal discord and challenges with emotion regulation.  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  She has completed all requirements for her PhD and is now ABD.  She is living in  housing with her two dogs.      Plan:  Patient will soon be graduating from full model DBT at [facility].  See in two weeks if she is on trip next week.      Treatment plan completed:  10/8/21    Time In: 1:00  Time Out: 2:00    Diagnosis:  Axis I PTSD, chronic, persistent depressive disorder, adjustment disorder with mixed, psychological factors associated with physical (fibromyalgia)   Axis II  BPD   Axis III please see medical records for details   [place] IV Psychosocial and Environmental Stressors: living alone with no family support, pandemic stress, chronic illness         Corina Muhammad, PhD, LP    "

## 2022-07-11 NOTE — LETTER
7/11/2022         RE: Kasandra Lau  [address] [facility] [address]        Dear Colleague,    Thank you for referring your patient, Kasandra Lau, to the [facility]. Please see a copy of my visit note below.    [facility] Dermatology Note  Encounter Date: Jul 11, 2022  Store-and-Forward and Telephone ([phone]). Location of teledermatologist: [facility].  Start time:3:44pm End time: 3:58pm.    Dermatology Problem List:  1. Hair loss- AGA and TE most likely    Never had skin cancer. 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     Staff:     Alexa Montgomery MD    Department of Dermatology  [facility]: Phone: [phone], Fax:[phone]  [facility]: Phone: [phone], Fax: [phone]      ____________________________________________    CC: Consult (Laser consult and hair loss)    HPI:  Ms. Kasandra Lau is a(n) 37 year old female who presents today as a new patient for evaluation. She reports  1- breast reduction surgery done a few months ago and wonders about scarring.Lasts surgery was September. Feels scars are not improving.  Linear scars on inferior breasts and also perioral. Feels the scars are raised  2. Feels had hair loss after birth control stop but now hair is growing back  3. Reports outside diagnosis of keratosis pilaris, not itchy or painful. Feels spreading    Not pregnant or breastfeeding. Never had skin cancer. No family hx of skin cancer  Patient is otherwise feeling well, without additional skin concerns.    Labs Reviewed:  NA    Physical Exam:  Vitals: There were no vitals taken for this visit.  SKIN: Teledermatology photos were reviewed; image quality and interpretability:   acceptable. Image date: today  - spiny papules on the arm  -thinning on the crown  -tattoo  --reviewed with patient that pigmented lesions are not assessable via photography and would need in person visit  - No other lesions of concern on areas examined.     Medications:  Current Outpatient Medications   Medication     buPROPion (WELLBUTRIN XL) 300 MG 24 hr tablet     cholecalciferol 50 MCG (2000 UT) CAPS     doxepin (SINEQUAN) 10 MG capsule     EMGALITY 120 MG/ML injection     famotidine (PEPCID) 20 MG tablet     fexofenadine (ALLEGRA) 180 MG tablet     fluticasone (FLONASE) 50 MCG/ACT nasal spray     gabapentin (NEURONTIN) 400 MG capsule     hydrocortisone (CORTAID) 1 % external ointment     levocetirizine (XYZAL) 5 MG tablet     montelukast (SINGULAIR) 10 MG tablet     propranolol (INDERAL) 20 MG tablet     vilazodone (VIIBRYD) 40 MG TABS tablet     No current facility-administered medications for this visit.      Past Medical/Surgical History:   Patient Active Problem List   Diagnosis     Pelvic pain in female     Vitamin D deficiency     Menorrhagia with regular cycle     Migraine without aura and without status migrainosus, not intractable     Iron deficiency anemia due to chronic blood loss     Tired     Circadian rhythm sleep disorder, delayed sleep phase type     Unhealthy sleep habit     Seasonal mood disorder (H)     Chronic idiopathic  urticaria     Acid reflux     Cholecystitis     Depression     Hx of abnormal cervical Pap smear     Irritable bowel syndrome with constipation     Migraine headache     Mild intermittent asthma without complication     Need for desensitization to allergens     Panic disorder     Pap smear abnormality of cervix/human papillomavirus (HPV) positive     Recurrent major depressive disorder, in remission (H)     Hypertrophy of breast     Chronic sinusitis, unspecified location     Keratosis pilaris     Past Medical History:   Diagnosis Date     Anemia fall 2016     Chronic fatigue      Depression (emotion)     sees psych, on meds     Gastroesophageal reflux disease      Migraine     daily meds, 2x month, more mild on meds     Neuropathic pain      Panic disorder      Uncomplicated asthma Spring 2018       CC No referring provider defined for this encounter. on close of this encounter.    Quote sent  Kaley Che RN        Again, thank you for allowing me to participate in the care of your patient.        Sincerely,        Alexa Montgomery MD

## 2022-07-11 NOTE — PROGRESS NOTES
[facility] Dermatology Note  Encounter Date: Jul 11, 2022  Store-and-Forward and Telephone ([phone]). Location of teledermatologist: [facility].  Start time:3:44pm End time: 3:58pm.    Dermatology Problem List:  1. Hair loss- AGA and TE most likely    Never had skin cancer. 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     Staff:     Alexa Montgomery MD    Department of Dermatology  [facility]: Phone: [phone], Fax:[phone]  [facility]: Phone: [phone], Fax: [phone]      ____________________________________________    CC: Consult (Laser consult and hair loss)    HPI:  Ms. Kasandra Lau is a(n) 37 year old female who presents today as a new patient for evaluation. She reports  1- breast reduction surgery done a few months ago and wonders about scarring.Lasts surgery was September. Feels scars are not improving. Linear scars on inferior breasts and also perioral. Feels the scars are raised  2. Feels had hair loss after birth control stop but now hair is growing back  3. Reports outside diagnosis of keratosis pilaris, not itchy or painful. Feels  spreading    Not pregnant or breastfeeding. Never had skin cancer. No family hx of skin cancer  Patient is otherwise feeling well, without additional skin concerns.    Labs Reviewed:  NA    Physical Exam:  Vitals: There were no vitals taken for this visit.  SKIN: Teledermatology photos were reviewed; image quality and interpretability:   acceptable. Image date: today  - spiny papules on the arm  -thinning on the crown  -tattoo  --reviewed with patient that pigmented lesions are not assessable via photography and would need in person visit  - No other lesions of concern on areas examined.     Medications:  Current Outpatient Medications   Medication     buPROPion (WELLBUTRIN XL) 300 MG 24 hr tablet     cholecalciferol 50 MCG (2000 UT) CAPS     doxepin (SINEQUAN) 10 MG capsule     EMGALITY 120 MG/ML injection     famotidine (PEPCID) 20 MG tablet     fexofenadine (ALLEGRA) 180 MG tablet     fluticasone (FLONASE) 50 MCG/ACT nasal spray     gabapentin (NEURONTIN) 400 MG capsule     hydrocortisone (CORTAID) 1 % external ointment     levocetirizine (XYZAL) 5 MG tablet     montelukast (SINGULAIR) 10 MG tablet     propranolol (INDERAL) 20 MG tablet     vilazodone (VIIBRYD) 40 MG TABS tablet     No current facility-administered medications for this visit.      Past Medical/Surgical History:   Patient Active Problem List   Diagnosis     Pelvic pain in female     Vitamin D deficiency     Menorrhagia with regular cycle     Migraine without aura and without status migrainosus, not intractable     Iron deficiency anemia due to chronic blood loss     Tired     Circadian rhythm sleep disorder, delayed sleep phase type     Unhealthy sleep habit     Seasonal mood disorder (H)     Chronic idiopathic urticaria     Acid reflux     Cholecystitis     Depression     Hx of abnormal cervical Pap smear     Irritable bowel syndrome with constipation     Migraine headache     Mild intermittent asthma without complication     Need for  desensitization to allergens     Panic disorder     Pap smear abnormality of cervix/human papillomavirus (HPV) positive     Recurrent major depressive disorder, in remission (H)     Hypertrophy of breast     Chronic sinusitis, unspecified location     Keratosis pilaris     Past Medical History:   Diagnosis Date     Anemia fall 2016     Chronic fatigue      Depression (emotion)     sees psych, on meds     Gastroesophageal reflux disease      Migraine     daily meds, 2x month, more mild on meds     Neuropathic pain      Panic disorder      Uncomplicated asthma Spring 2018       CC No referring provider defined for this encounter. on close of this encounter.

## 2022-07-13 ENCOUNTER — TELEPHONE (OUTPATIENT)
Dept: DERMATOLOGY | Facility: CLINIC | Age: 37
End: 2022-07-13

## 2022-07-13 NOTE — TELEPHONE ENCOUNTER
Attempted to reach patient to schedule follow up in the Dermatology Clinic.  No answer,  LM on VM to call office.    Schedule with Dr. Alexa Montgomery on or around 10/11/22 for hair loss.

## 2022-07-14 ENCOUNTER — TELEPHONE (OUTPATIENT)
Dept: NEUROLOGY | Facility: CLINIC | Age: 37
End: 2022-07-14

## 2022-07-14 ENCOUNTER — TELEPHONE (OUTPATIENT)
Dept: DERMATOLOGY | Facility: CLINIC | Age: 37
End: 2022-07-14

## 2022-07-14 NOTE — TELEPHONE ENCOUNTER
I left a message for patient to call [employer].    Dr. Montgomery has openings at the [facility] [place].  Ok to schedule there for possible steroid injection into scar.    Schedule Fraxel for next available in [place]  (Scars s/p breast reduction).  No tan/sunburn.  Patient was sent quote via [employer].    Review Fraxel prep:  PRECAUTIONS TO CONSIDER BEFORE FRAXEL  TREATMENTS  SIX TO TWELVE MONTHS BEFORE TREATMENT    Stop use of Accutane    TWO WEEKS BEFORE TREATMENT    Stop use of all Retinols   o Retin-A (tretinoin), Tazorac, Differin (adapalene),  anti-aging  products    Stop use of all glycolic acid treatments (longer if deeper peel)    Stop use of all salicylic acid products    Stop excessive sun exposure 2-4 weeks prior to treatment    Stop waxing    Stop abrasive scrubs    Stop microdermabrasion treatments    Talk to your doctor if you have ever had a cold sore    OTHER PRE-TREATMENT CONSIDERATIONS    Are you pregnant or breastfeeding?   o If yes, you are not a candidate at this time.       Stay Hydrated!  o Drink plenty of water before, during and after your treatment.  This will help improve your healing process.        Tendency to hyperpigment?   o Talk to your doctor about starting a bleaching regiment four to six weeks before your treatment series      Be prepared!  o Two to three weeks pre and post treatment, it is a good idea to avoid excessive sun exposure.  o Broad spectrum (UVA/UVB) sunblock must be worn to protect from sun exposure.      Patients with history of auto immune disease (such as lupus and rheumatoid arthritis) are not candidates for treatment.      Patients with history of keloid formation, active bacterial, viral or fungal infections are not candidates for treatment.    Shannon Aponte RN

## 2022-07-14 NOTE — TELEPHONE ENCOUNTER
Health Call Center    Phone Message    May a detailed message be left on voicemail: yes     Reason for Call: Medication Question or concern regarding medication   Prescription Clarification  Name of Medication:  doxepin (SINEQUAN) 10 MG capsule  Prescribing Provider:        What on the order needs clarification?     Patient would like to increase her dose to 40-50, per pt she is having issues with the heat triggering her hives and higher dosage has been helping. Per patient increased it herself about 2 months ago that last time she filled her medication. Please call back to discuss      Action Taken: Message routed to:  [facility] ([facility]): [facility] neurology    Travel Screening: Not Applicable

## 2022-07-14 NOTE — TELEPHONE ENCOUNTER
[facility] Call [facility]    Phone Message    May a detailed message be left on voicemail: yes     Reason for Call: Other: . pt is calling, was told to follow up with  in Oct, writer had no appts until Dec, also pt would like to proceed with the treatment recommended by , please call Kasandra, thank you    Action Taken: Message routed to:  Adult Clinics: Dermatology p [id number]    Travel Screening: Not Applicable

## 2022-07-15 ENCOUNTER — TELEPHONE (OUTPATIENT)
Dept: DERMATOLOGY | Facility: CLINIC | Age: 37
End: 2022-07-15

## 2022-07-15 ENCOUNTER — TELEPHONE (OUTPATIENT)
Dept: NEUROLOGY | Facility: CLINIC | Age: 37
End: 2022-07-15

## 2022-07-15 NOTE — TELEPHONE ENCOUNTER
[facility] Call Center    Phone Message    May a detailed message be left on voicemail: yes     Reason for Call: Other: Patient is returning a call from Roscoe Dowling in regards to an appt that's help at [facility] for Dr. Osuna. Writer reaching out to confirm this is ok to schedule. Please call patient back to confirm at # [phone]     Action Taken: Message routed to:  [facility] ([facility]): Neurology     Travel Screening: Not Applicable

## 2022-07-15 NOTE — TELEPHONE ENCOUNTER
Return in about 3 months (around 10/11/2022    Delmi lopez Procedure   Dermatology, General and Plastic Surgery, Urology Specialties   [facility] and Surgery [place]- [address] [address]

## 2022-07-15 NOTE — TELEPHONE ENCOUNTER
I called pt back to discuss her message. I left a voicemail that Dr. Osuna reviewed her message.     She would like to see her back before potentially changing her dose; as Dr. Osuna had prescribed the doxepin for sleep, not hives. Patient could also see her PCP or other treating physician for hives, and see if this is appropriate dosing.    I offered video visit on 8/4 at 4p to discuss if she likes. [employer] (on [place] template).     Josey GARCIA

## 2022-07-15 NOTE — TELEPHONE ENCOUNTER
I returned pt call to confirm we can schedule her on August 4th at 4p for video visit with Dr. Osuna to review medications.     Josey GARCIA

## 2022-07-17 NOTE — NURSING NOTE
Chief Complaint   Patient presents with     New Patient     Crest syndrome         /78 (BP Location: Right arm, Patient Position: Sitting, Cuff Size: Adult Regular)   Pulse 78   Wt 81.1 kg (178 lb 14.4 oz)   LMP 06/29/2021 (Approximate)   SpO2 98%   BMI 26.42 kg/m        Gildardo Mcginnis, EMT     No

## 2022-07-18 NOTE — TELEPHONE ENCOUNTER
Left message to call back clinic regarding scheduling.  sent as well.   Jackeline Coronado CMA on 7/18/2022 at 10:37 AM

## 2022-07-20 DIAGNOSIS — G43.709 CHRONIC MIGRAINE WITHOUT AURA WITHOUT STATUS MIGRAINOSUS, NOT INTRACTABLE: ICD-10-CM

## 2022-07-20 RX ORDER — GALCANEZUMAB 120 MG/ML
120 INJECTION, SOLUTION SUBCUTANEOUS
Qty: 1 ML | Refills: 11 | Status: SHIPPED | OUTPATIENT
Start: 2022-07-20 | End: 2023-05-25

## 2022-07-20 NOTE — TELEPHONE ENCOUNTER
Rx Authorization:    Requested Medication/ Dose EMGALITY 120 MG/ML injection    Date last refill ordered: 10/13/21    Quantity ordered: 1mL    # refills: 11    Date of last clinic visit with ordering provider: 10/13/21    Date of next clinic visit with ordering provider: 8/4/22    All pertinent protocol data (lab date/result):     Include pertinent information from patients message:

## 2022-07-20 NOTE — TELEPHONE ENCOUNTER
I left a message for patient to call [facility].  Three attempts to reach patient.  Closing encounter.  Shannon Aponte RN

## 2022-07-22 ENCOUNTER — VIRTUAL VISIT (OUTPATIENT)
Dept: PSYCHOLOGY | Facility: CLINIC | Age: 37
End: 2022-07-22
Payer: COMMERCIAL

## 2022-07-22 DIAGNOSIS — F60.9 PERSONALITY DISORDER (H): ICD-10-CM

## 2022-07-22 DIAGNOSIS — F34.1 PERSISTENT DEPRESSIVE DISORDER: Primary | ICD-10-CM

## 2022-07-22 DIAGNOSIS — F43.12 CHRONIC POST-TRAUMATIC STRESS DISORDER (PTSD): ICD-10-CM

## 2022-07-22 PROCEDURE — 90837 PSYTX W PT 60 MINUTES: CPT | Mod: 95 | Performed by: PSYCHOLOGIST

## 2022-07-24 NOTE — PROGRESS NOTES
"  Health Psychology - Follow up Visit  Confidential Summary*    The author of this note documented a reason for not sharing it with the patient.  REFERRAL SOURCE:  Psychiatry    CHIEF COMPLAINT/REASON FOR VISIT  Psychotherapy in context of chronic PTSD and persistent depression.  Patient also complains of dissatisfaction with interpersonal relationships and challenges with emotion regulation.      Patient was seen today for a 60 minute individual psychotherapy session.  The session was facilitated via VIDEO with patient at her home and provider at her own home.  We used [email or web address] platform.       This telehealth service is appropriate and effective for delivering services in light of the necessity for social distancing to mitigate the COVID-19 epidemic. Patient has agreed to receiving telehealth services.    The patient has been notified of following:   \"This VIDEO visit will be conducted via a call between you and your physician/provider. We have found that certain health care needs can be provided without the need for an in-person physical exam.  VIDEO visits are billed at different rates depending on your insurance coverage.  Please reach out to your insurance provider with any questions. If during the course of the call the physician/provider feels a video visit is not appropriate, you will not be charged for this service.\"     Patient has given verbal consent for VIDEO visit? Yes    Subjective:  Patient began with report that she has decided to discontinue any online dating.  She reported that she believes this decision was influenced by her recent trip to [facility] for a conference and came back feeling more optimistic about her career opportunities and in general, her self efficacy increased about her likeability since she was well received.  She continues to make statements indicating that she is concerned that cultural values may hamper he ability of [place] men to bond and that she maintains that she will not be " able to find a lasting, intimate [place]  She described an attraction to men from blue collar backgrounds but admits that this may make intellectual and cultural sophistication compatibility more challenging.  She instead, will focus on her academic progress and is hopeful that she is coming close to being able to propose her dissertation. She also reported that she would like to make more female friends.  Discussed her ongoing commitment to [employer] but this opportunity has decreased since the show has ended.   She was encouraged to discuss alternate stress coping tools since she has used online dating as both a way to connect and a stress coping strategy.      She was encouraged to discuss her current financial stress.  She is working her position at a grocery store 2 shift per week, down from 3.  She reported that she may have plantar fascitis and has been having pain stemming from standing at job.  She will use this income to supplement her school loan and she reported that she needs the extra time to work on her academic progress.      She reported that her  is putting more pressure on her to complete divorce but that he is asking her to complete the paperwork and she is reluctant to dedicate time to this.      Objective:  Patient was on time for today s session. She was alert and oriented. Mood was euthymic with appropriate range of affect. Patient denied suicidal or assaultive ideation, plan, or intent.        Assessment:  The patient has a longstanding history of interpersonal discord and challenges with emotion regulation.  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  She has completed all requirements for her PhD and is now ABD.  She is living in  housing with her two dogs.      Plan:  Patient will soon be graduating from full model DBT at [employer].  See in two weeks if she is on trip next week.      Treatment  plan completed:  10/8/21    Time In: 1:00  Time Out: 2:00    Diagnosis:  Axis I PTSD, chronic, persistent depressive disorder, adjustment disorder with mixed, psychological factors associated with physical (fibromyalgia)   Axis II  BPD   Axis III please see medical records for details   [place] IV Psychosocial and Environmental Stressors: living alone with no family support, pandemic stress, chronic illness         Corina Muhammad, PhD, LP

## 2022-07-25 NOTE — PROGRESS NOTES
"  Health Psychology - Follow up Visit  Confidential Summary*    The author of this note documented a reason for not sharing it with the patient.  REFERRAL SOURCE:  Psychiatry    CHIEF COMPLAINT/REASON FOR VISIT  Psychotherapy in context of chronic PTSD and persistent depression.  Patient also complains of dissatisfaction with interpersonal relationships and challenges with emotion regulation.      Patient was seen today for a 60 minute individual psychotherapy session.  The session was facilitated via VIDEO with patient at her home and provider at her own home.  We used [email or web address] platform.       This telehealth service is appropriate and effective for delivering services in light of the necessity for social distancing to mitigate the COVID-19 epidemic. Patient has agreed to receiving telehealth services.    The patient has been notified of following:   \"This VIDEO visit will be conducted via a call between you and your physician/provider. We have found that certain health care needs can be provided without the need for an in-person physical exam.  VIDEO visits are billed at different rates depending on your insurance coverage.  Please reach out to your insurance provider with any questions. If during the course of the call the physician/provider feels a video visit is not appropriate, you will not be charged for this service.\"     Patient has given verbal consent for VIDEO visit? Yes    Subjective:  Patient seen for ongoing treatment of persistent depression with associated challenges with interpersonal, academic and work related challenges.  She began with a discussion that she has been under a lot of stress associated with financial situations.  She described an observation that each time she attempts to do something, she encounters \"a hitch\".  She described an observation that she does not believe that she is in the control of the outcome of her life because of multiple external factors including stereotypes and " "factors related to societal expectations.  She described challenges in paying for her next months rent.  She reported that there is a financial aide application that she might be eligible for but that she \"does not energy to put the application in\".  She was encouraged to use her \"wise mind\" and consider whether her emotion mind is giving her information that will allow her to be more effective or if her emotions of overwhelm and anxiety are justified by the facts.  She reported that she continues to feel like an imposter and she would like to have a partner or friend or parent that she can rely on to support her and to have her back when she needs help but that she does not have this type of support.      She was encouraged to consider the criteria she would like to find in a friend and how she might identify those who may be able to support her.  She was noted to describe her emotion mind as a huge trigger for overwhelm.  Discussed skills that she can use to decrease distress so that she might be able to access wise mind.      Objective:  Patient was on time for today s session. She was alert and oriented. Mood was euthymic with appropriate range of affect. Patient denied suicidal or assaultive ideation, plan, or intent.        Assessment:  The patient has a longstanding history of interpersonal discord and challenges with emotion regulation.  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  She has completed all requirements for her PhD and is now ABD.  She is living in  housing with her two dogs.      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  See in two weeks if she is on trip next week.      Treatment plan completed:  10/8/21    Time In: 1:00  Time Out: 2:00    Diagnosis:  Axis I PTSD, chronic, persistent depressive disorder, adjustment disorder with mixed, psychological factors associated with physical (fibromyalgia)   Axis " II  BPD   Axis III please see medical records for details   [place] IV Psychosocial and Environmental Stressors: living alone with no family support, pandemic stress, chronic illness         Corina Muhammad, PhD, LP

## 2022-07-25 NOTE — PROGRESS NOTES
"  Health Psychology - Follow up Visit  Confidential Summary*    The author of this note documented a reason for not sharing it with the patient.  REFERRAL SOURCE:  Psychiatry    CHIEF COMPLAINT/REASON FOR VISIT  Psychotherapy in context of chronic PTSD and persistent depression.  Patient also complains of dissatisfaction with interpersonal relationships and challenges with emotion regulation.      Patient was seen today for a 60 minute individual psychotherapy session.  The session was facilitated via VIDEO with patient at her home and provider at her own home.  We used [email or web address] platform.       This telehealth service is appropriate and effective for delivering services in light of the necessity for social distancing to mitigate the COVID-19 epidemic. Patient has agreed to receiving telehealth services.    The patient has been notified of following:   \"This VIDEO visit will be conducted via a call between you and your physician/provider. We have found that certain health care needs can be provided without the need for an in-person physical exam.  VIDEO visits are billed at different rates depending on your insurance coverage.  Please reach out to your insurance provider with any questions. If during the course of the call the physician/provider feels a video visit is not appropriate, you will not be charged for this service.\"     Patient has given verbal consent for VIDEO visit? Yes    Subjective:  Patient began with report that she is experiencing more anxiety and believes this is related to increased sunshine.  She reported that more sunshine has always been associated with worsening mood.  She reported that she found that her time in [place] was enhanced by less sunshine and feelings of calm.  She reported that she is trying to get a job in [place].  Spent time discussing possible options for employment.  In addition, she met someone during her time there and discussed her comfort with being with someone who is " not from the .  However, she also reported some disappointment.  Patient was encouraged to describe a life worth living and what she can do to work toward progress in this direction.     She reported that she is eager to work toward decreasing her financial debt and to working toward finishing her dissertation.  She is hoping that she might be able to relocate to [facility] and that living there would allow her to attract those with similar values and that the men there might be more appreciative of her.  She was encouraged to consider how she might make changes in herself that might allow her to find a partner that is appreciative of her.  She has admitted that she may have a role to play in her challenges in relationships.  Discussed how she can use interpersonal effectiveness skills.    Objective:  Patient was on time for today s session. She was alert and oriented. Mood was euthymic with appropriate range of affect. Patient denied suicidal or assaultive ideation, plan, or intent.        Assessment:  The patient has a longstanding history of interpersonal discord and challenges with emotion regulation.  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  She has completed all requirements for her PhD and is now ABD.  She is living in  housing with her two dogs.      Plan:  Patient will soon be graduating from full model DBT at [facility] [place].  See in two weeks if she is on trip next week.      Treatment plan completed:  10/8/21    Time In: 1:00  Time Out: 2:00    Diagnosis:  Axis I PTSD, chronic, persistent depressive disorder, adjustment disorder with mixed, psychological factors associated with physical (fibromyalgia)   Axis II  BPD   Axis III please see medical records for details   [place] IV Psychosocial and Environmental Stressors: living alone with no family support, pandemic stress, chronic illness         Corina Muhammad, PhD, LP

## 2022-07-27 ENCOUNTER — TELEPHONE (OUTPATIENT)
Dept: NEUROLOGY | Facility: CLINIC | Age: 37
End: 2022-07-27

## 2022-07-27 NOTE — TELEPHONE ENCOUNTER
Central Prior Authorization Team   Phone: [phone]      PA Initiation    Medication: EMGALITY 120 MG/ML injection  Insurance Company: [employer] - Phone [phone] Fax [phone]  [id number] [place] [place] [facility] [address]  Filling Pharmacy Phone: [phone]  Filling Pharmacy Fax:    Start Date: 7/27/2022

## 2022-07-27 NOTE — TELEPHONE ENCOUNTER
Prior Authorization Retail Medication Request    Medication/Dose: EMGALITY 120 MG/ML injection; Inject 1 mL (120 mg) Subcutaneous every 28 days - Subcutaneous  ICD code (if different than what is on RX):      Previously Tried and Failed:    Rationale:  Chronic migraine;  she has had benefit since starting Emgality for migraine prevention.  Her headache attacks are now occurring less frequent and are less severe, and only last 1 day instead of 2 or 3.      Insurance Name:  [facility]  Insurance ID:  [id number]       Pharmacy Information (if different than what is on RX)  Name:    Phone:

## 2022-07-28 ENCOUNTER — TELEPHONE (OUTPATIENT)
Dept: DERMATOLOGY | Facility: CLINIC | Age: 37
End: 2022-07-28

## 2022-07-28 NOTE — TELEPHONE ENCOUNTER
[facility] Call Center    Phone Message    May a detailed message be left on voicemail: yes     Reason for Call: Other: Pt states she is returning a missed call. Please call Pt back. Thank you.      Action Taken: Message routed to:  Adult Clinics: Dermatology p [id number]    Travel Screening: Not Applicable

## 2022-07-29 ENCOUNTER — VIRTUAL VISIT (OUTPATIENT)
Dept: PSYCHOLOGY | Facility: CLINIC | Age: 37
End: 2022-07-29
Payer: COMMERCIAL

## 2022-07-29 DIAGNOSIS — F54 PSYCHOLOGICAL AND BEHAVIORAL FACTORS ASSOCIATED WITH DISORDERS OR DISEASES CLASSIFIED ELSEWHERE: ICD-10-CM

## 2022-07-29 DIAGNOSIS — F43.12 CHRONIC POST-TRAUMATIC STRESS DISORDER (PTSD): Primary | ICD-10-CM

## 2022-07-29 DIAGNOSIS — F60.9 PERSONALITY DISORDER (H): ICD-10-CM

## 2022-07-29 DIAGNOSIS — F34.1 PERSISTENT DEPRESSIVE DISORDER: ICD-10-CM

## 2022-07-29 PROCEDURE — 90837 PSYTX W PT 60 MINUTES: CPT | Mod: 95 | Performed by: PSYCHOLOGIST

## 2022-08-01 NOTE — TELEPHONE ENCOUNTER
We have attempted to contact patient several times via [employer] and by telephone for scheduling several things.    Per 7/14 TE:    Dr. Montgomery [place] [facility] [place]  Ok to schedule there for possible steroid injection into scar.     Schedule Fraxel for next available in [place]  (Scars s/p breast reduction).  No tan/sunburn.  Patient was sent quote via [employer].    Left message to call back clinic regarding scheduling hairloss f/u in 12 weeks from 7/11 VV, Fraxel appt for scars & ILK scar appt @ [facility] per message above.  as well.     Jackeline Coronado, KENNY on 8/1/2022 at 4:43 PM

## 2022-08-01 NOTE — PROGRESS NOTES
"  Health Psychology - Follow up Visit  Confidential Summary*    The author of this note documented a reason for not sharing it with the patient.  REFERRAL SOURCE:  Psychiatry    CHIEF COMPLAINT/REASON FOR VISIT  Psychotherapy in context of chronic PTSD and persistent depression.  Patient also complains of dissatisfaction with interpersonal relationships and challenges with emotion regulation.      Patient was seen today for a 60 minute individual psychotherapy session.  The session was facilitated via VIDEO with patient at her home and provider at her own home.  We used [email or web address] platform.       This telehealth service is appropriate and effective for delivering services in light of the necessity for social distancing to mitigate the COVID-19 epidemic. Patient has agreed to receiving telehealth services.    The patient has been notified of following:   \"This VIDEO visit will be conducted via a call between you and your physician/provider. We have found that certain health care needs can be provided without the need for an in-person physical exam.  VIDEO visits are billed at different rates depending on your insurance coverage.  Please reach out to your insurance provider with any questions. If during the course of the call the physician/provider feels a video visit is not appropriate, you will not be charged for this service.\"     Patient has given verbal consent for VIDEO visit? Yes    Subjective:  Patient began with report that she initially wanted to discuss weight loss with me but that she has been able to decrease sugar consumption and believes that she has already lost weight.  She reported that she believes that she put on more weight when she began birth control to address endometriosis and that she has not been on birth control now and believes that her feelings of irritability and fatigue may be hormonal in nature.  She reported that she has begun to take over the counter supplements.      Patient reported " that she is continuing to date, long distance, a an that she met with [facility].  She reported that she believes that they are in a good place and that she is enjoying the contact.  She again discussed societal and racial underpinnings of sexuality and gender.  She reported that she believes she is both invisible and hypervisible and that being a woman of [place] decent causes others to see her as an object and fetish.  Validated her feelings that she does not belong in any groups and that she believes that she may find a sense of belonging in another country.  Introduced the possibility that she may have to accept herself with her complexities before she will find others that will value her.      Objective:  Patient was on time for today s session. She was alert and oriented. Mood was euthymic with appropriate range of affect. Patient denied suicidal or assaultive ideation, plan, or intent.        Assessment:  The patient has a longstanding history of interpersonal discord and challenges with emotion regulation.  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  She has completed all requirements for her PhD and is now ABD.  She is living in  housing with her two dogs.      Plan:  Patient graduated from full model DBT at [facility] and is now completing phase 2 work.      Treatment plan completed:  10/8/21    Time In: 1:00  Time Out: 2:00    Diagnosis:  Axis I PTSD, chronic, persistent depressive disorder, adjustment disorder with mixed, psychological factors associated with physical (fibromyalgia)   Axis II  BPD   Axis III please see medical records for details   [place]lin[place] IV Psychosocial and Environmental Stressors: living alone with no family support, pandemic stress, chronic illness         Corina Muhammad, PhD, LP

## 2022-08-01 NOTE — TELEPHONE ENCOUNTER
Prior Authorization Approval    Authorization Effective Date: 7/29/2022  Authorization Expiration Date: 7/29/2023  Medication: EMGALITY 120 MG/ML injection-APPROVED  Approved Dose/Quantity:   Reference #:     Insurance Company: [employer] - Phone [phone] Fax [phone]  Expected CoPay:       CoPay Card Available:      Foundation Assistance Needed:    Which Pharmacy is filling the prescription (Not needed for infusion/clinic administered): [id number] [place], [place] - [address] [place] [address]  Pharmacy Notified: Yes  Patient Notified: No

## 2022-08-01 NOTE — TELEPHONE ENCOUNTER
[facility] Call Center    Phone Message    May a detailed message be left on voicemail: yes     Reason for Call: Pt is still waiting to hear back from Dr. Montgomery about testing with Rogaine and also laser treatment. Please call pt to discuss. Thanks     Action Taken: Message routed to:  [facility] Derm    Travel Screening: Not Applicable                                                                       Spontaneous, unlabored and symmetrical

## 2022-08-02 ENCOUNTER — VIRTUAL VISIT (OUTPATIENT)
Dept: FAMILY MEDICINE | Facility: CLINIC | Age: 37
End: 2022-08-02
Payer: COMMERCIAL

## 2022-08-02 DIAGNOSIS — G62.9 NEUROPATHY: ICD-10-CM

## 2022-08-02 DIAGNOSIS — R79.89 ELEVATED LIVER FUNCTION TESTS: ICD-10-CM

## 2022-08-02 DIAGNOSIS — G43.709 CHRONIC MIGRAINE WITHOUT AURA WITHOUT STATUS MIGRAINOSUS, NOT INTRACTABLE: ICD-10-CM

## 2022-08-02 DIAGNOSIS — L50.9 HIVES: Primary | ICD-10-CM

## 2022-08-02 RX ORDER — DOXEPIN HYDROCHLORIDE 10 MG/1
40 CAPSULE ORAL AT BEDTIME
Qty: 120 CAPSULE | Refills: 2 | Status: SHIPPED | OUTPATIENT
Start: 2022-08-02 | End: 2022-08-04

## 2022-08-02 NOTE — PROGRESS NOTES
Kasandra is a 37 year old who is being evaluated via a billable telephone visit.      What phone number would you like to be contacted at? [phone]  [facility]    Assessment & Plan     1. Chronic migraine without aura without status migrainosus, not intractable  Neuro has refilled this in the past. She has used previously for sleep and hives. Neuro refused refill. She has been taking increased amounts (40mg-50mg) due to hives this summer. Refilled at higher dose for 1 mo with 2 refills. Referred to allergy for additional evaluation and treatment. Counseled on side effects,  Caution with driving and interactions with other meds. Consulted with pharmacist, Brittany Henson who was present for the encounter.   - doxepin (SINEQUAN) 10 MG capsule; Take 4 capsules (40 mg) by mouth At Bedtime  Dispense: 120 capsule; Refill: 2    2. Hives  Uncertain etiology. On large amounts of antihistamine. Referred to pharmacist for med rec to see if current meds could be contributing. Last saw allergy when in [place], referred to allergy for evaluation. Doxepin refilled as above.    - Adult Allergy/Asthma Referral    3. Neuropathy  New onset over summer.  Not related to headaches. Referred to neurology for evaluation and treatment. Consider med related. Check labs belwo.   - Magnesium; Future  - Zinc; Future  - Vitamin B12; Future  - Adult Neurology  Referral; Future    4. Elevated liver function tests  ALT slightly elevated 4/2022. Will recheck and Lipids as well, suspect fatty liver. Asymptomatic.   - Hepatic panel; Future  - Lipid panel reflex to direct LDL Fasting; Future      Review of external notes as documented elsewhere in note  Review of the result(s) of each unique test - CMP, CBC  Ordering of each unique test  Prescription drug management  25 minutes spent on the date of the encounter doing chart review, history and exam, documentation and further activities per the note       BMI:  "  Estimated body mass index is 28.24 kg/m  as calculated from the following:    Height as of 6/14/22: 1.74 m (5' 8.5\").    Weight as of 6/14/22: 85.5 kg (188 lb 8 oz).   Weight not addressed this visit    Has never had PE    No follow-ups on file.    Michelle Santillan Carla, JASVIR CNP  [facility]    Subjective   Kasandra is a 37 year old, presenting for the following health issues:  Hives (ONGOING, WORSE WITH HEAT)      HPI     37 year-old female presents for:  1. Med refill and hives: has been experiencing increased hives since June 2022 when heat started. Has been taking doxepin 40-50mg at HS for hives with some relief. On numerous other antihistamines. Taking all other meds listed. Does not feel sedation has been too bad since increase in Doxepin. Doxepin previously refilled by neuro but they declined. Feels frustrated that no answers for hives. Has seen allergy in [facility] and rheumatology but no consensus on etiology and treatments have not been effective.     2. Neuropathy. Has noted neuropathy in extremities this summer. Started in June with hives. Describes tingling in extremities. Symptoms not related to headaches. Followed by neuro for headaches, but is headache specialty and will not address hives.     3.  Elevated ALT in 4/2022. Reports she has had a history of this although chart review does not show increase here in past 4 years.  Has been told in the past, might be NAFLD. No recent lipids.   Review of Systems     Reviewed history and updated as needed.     ROS  GEN: negative fever, chills, malaise  DERM: Hives overall as per HPI  RSP: negative for SOB  CV: Negative for CP  NEURO: as per HPI  MOOD: denies sedation      Objective         Vitals:  No vitals were obtained today due to virtual visit.    Physical Exam   healthy, alert and no distress  PSYCH: Alert and oriented times 3; coherent speech, normal   rate and volume, able to articulate logical thoughts, able   to abstract reason, no " tangential thoughts, no hallucinations   or delusions  Her affect is normal  RESP: No cough, no audible wheezing, able to talk in full sentences  Remainder of exam unable to be completed due to telephone visits    Lab test recommended and ordered. Patient will return for lab visit            Phone call duration: 20 minutes    .Michelle TAY CNP  ..

## 2022-08-04 ENCOUNTER — VIRTUAL VISIT (OUTPATIENT)
Dept: NEUROLOGY | Facility: CLINIC | Age: 37
End: 2022-08-04
Payer: COMMERCIAL

## 2022-08-04 DIAGNOSIS — G43.709 CHRONIC MIGRAINE WITHOUT AURA WITHOUT STATUS MIGRAINOSUS, NOT INTRACTABLE: ICD-10-CM

## 2022-08-04 DIAGNOSIS — F41.9 ANXIETY: ICD-10-CM

## 2022-08-04 PROCEDURE — 99214 OFFICE O/P EST MOD 30 MIN: CPT | Mod: 95 | Performed by: PSYCHIATRY & NEUROLOGY

## 2022-08-04 RX ORDER — DOXEPIN HYDROCHLORIDE 10 MG/1
40 CAPSULE ORAL AT BEDTIME
Qty: 120 CAPSULE | Refills: 11 | Status: SHIPPED | OUTPATIENT
Start: 2022-08-04 | End: 2023-01-12

## 2022-08-04 RX ORDER — ALMOTRIPTAN 12.5 MG/1
12.5 TABLET, FILM COATED ORAL
Qty: 18 TABLET | Refills: 11 | Status: SHIPPED | OUTPATIENT
Start: 2022-08-04 | End: 2023-05-25

## 2022-08-04 RX ORDER — PROPRANOLOL HYDROCHLORIDE 20 MG/1
20 TABLET ORAL DAILY PRN
Qty: 30 TABLET | Refills: 11 | Status: SHIPPED | OUTPATIENT
Start: 2022-08-04 | End: 2023-07-28

## 2022-08-04 ASSESSMENT — HEADACHE IMPACT TEST (HIT 6)
HOW OFTEN HAVE YOU FELT TOO TIRED TO WORK BECAUSE OF YOUR HEADACHES: SOMETIMES
WHEN YOU HAVE A HEADACHE HOW OFTEN DO YOU WISH YOU COULD LIE DOWN: VERY OFTEN
HOW OFTEN HAVE YOU FELT FED UP OR IRRITATED BECAUSE OF YOUR HEADACHES: SOMETIMES
HOW OFTEN DID HEADACHS LIMIT CONCENTRATION ON WORK OR DAILY ACTIVITY: VERY OFTEN
WHEN YOU HAVE HEADACHES HOW OFTEN IS THE PAIN SEVERE: RARELY
WHEN YOU HAVE A HEADACHE HOW OFTEN DO YOU WISH YOU COULD LIE DOWN: VERY OFTEN
HOW OFTEN HAVE YOU FELT FED UP OR IRRITATED BECAUSE OF YOUR HEADACHES: SOMETIMES
HOW OFTEN DO HEADACHES LIMIT YOUR DAILY ACTIVITIES: SOMETIMES
WHEN YOU HAVE HEADACHES HOW OFTEN IS THE PAIN SEVERE: RARELY
HIT6 TOTAL SCORE: 60
HOW OFTEN DO HEADACHES LIMIT YOUR DAILY ACTIVITIES: SOMETIMES
HOW OFTEN DID HEADACHS LIMIT CONCENTRATION ON WORK OR DAILY ACTIVITY: VERY OFTEN
HOW OFTEN HAVE YOU FELT TOO TIRED TO WORK BECAUSE OF YOUR HEADACHES: SOMETIMES
HIT6 TOTAL SCORE: 60

## 2022-08-04 ASSESSMENT — MIGRAINE DISABILITY ASSESSMENT (MIDAS)
HOW MANY DAYS WAS HOUSEWORK PRODUCTIVITY CUT IN HALF DUE TO HEADACHES: 0
HOW MANY DAYS IN THE PAST 3 MONTHS HAVE YOU HAD A HEADACHE: 25
HOW OFTEN WERE SOCIAL ACTIVITIES MISSED DUE TO HEADACHES: 0
HOW MANY DAYS DID YOU MISS WORK OR SCHOOL BECAUSE OF HEADACHES: 15
HOW MANY DAYS DID YOU NOT DO HOUSEWORK BECAUSE OF HEADACHES: 15
HOW MANY DAYS WAS YOUR PRODUCTIVITY CUT IN HALF BECAUSE OF HEADACHES: 0
ON A SCALE FROM 0-10 ON AVERAGE HOW PAINFUL WERE HEADACHES: 3
TOTAL SCORE: 30

## 2022-08-04 NOTE — LETTER
8/4/2022         RE: Kasandra Lau  [address] [facility] [address]        Dear Colleague,    Thank you for referring your patient, Kasandra Lau, to the [facility] NEUROLOGY [facility]. Please see a copy of my visit note below.    Kasandra is a 37 year old who is being evaluated via a billable video visit.      How would you like to obtain your AVS? MyChart  If the video visit is dropped, the invitation should be resent by: Send to e-mail at: [email or web address]  Will anyone else be joining your video visit? No        Video-Visit Details    Video Start Time: 4:11 PM    Type of service:  Video Visit    Video End Time:4:25 PM    Originating Location (pt. Location): Home    Distant Location (provider location):  [facility] NEUROLOGY [facility]     Platform used for Video Visit: [facility]  Neurology Progress Note    Subjective:    Ms. Lau returns for follow up of chronic migraine.  She contacted the clinic after not being able to obtain Emgality.  An updated order and preauthorization was completed.    She was able to get Emgality, but it was 2 weeks late.  The preauthorization took time, and then the pharmacy to have Emgality in stock.  She had significant worsening of migraine as Emgality wore off.    She gets vertigo with migraine.  Vertigo also worsened.    Now that she is back on treatment, she is doing much better.  We reviewed her treatment strategy.    Propranolol 20 mg PRN helps her sleep. Doxepin 40 mg PM helps with hives.    She is planning to see a neuropathy specialist for tingling.     She takes gabapentin 800 mg TID, doxepin 40 mg PM, propranolol 20 mg PRN.    She takes almotriptan 12.5 mg as needed.     She has stopped taking birth control for about a year. Nausea improved with stopping this.     She is having migraine attacks about once a month.    Objective:    Vitals: There were no vitals taken  for this visit.  General: Cooperative, NAD  Neurologic:  Mental Status: Fully alert, attentive and oriented. Speech clear and fluent.   Cranial Nerves: Facial movements symmetric.   Motor: No abnormal movements.      Assessment/Plan:   Kasandra Lau is a 37-year-old woman who returns for follow-up of chronic migraine.  She is doing well after significant worsening in the setting of Emgality wearing off.    I recommend that she continue with her successful symptomatic treatment strategy, with Emgality for headache prevention.  She also takes doxepin 40 mg nightly and propranolol 20 mg nightly.  -I have refilled these medications for her.    For acute treatment, almotriptan 12.5 mg at the onset of headache is helpful.  -I have refilled this for her.  -She previously had nausea associated with migraine attacks but this has not been a feature lately.  She would not choose to take a nausea medication.  She will let me know if this changes in the future.    I will plan to see her back in 10 to 12 months, prior to Emgality preauthorization ending, so we can start this early.    Natividad Osuna MD  Neurology         Again, thank you for allowing me to participate in the care of your patient.        Sincerely,        Natividad Osuna MD

## 2022-08-04 NOTE — TELEPHONE ENCOUNTER
Patient has read [employer] message- closing encounter as patient has not called back to schedule.    Jackeline Coronado, [facility] on 8/4/2022 at 9:00 AM

## 2022-08-04 NOTE — PROGRESS NOTES
Kasandra is a 37 year old who is being evaluated via a billable video visit.      How would you like to obtain your AVS? MyChart  If the video visit is dropped, the invitation should be resent by: Send to e-mail at: [email or web address]  Will anyone else be joining your video visit? No        Video-Visit Details    Video Start Time: 4:11 PM    Type of service:  Video Visit    Video End Time:4:25 PM    Originating Location (pt. Location): Home    Distant Location (provider location):  [facility] NEUROLOGY CLINICS [facility]     Platform used for Video Visit: [facility]  Neurology Progress Note    Subjective:    Ms. Lau returns for follow up of chronic migraine.  She contacted the clinic after not being able to obtain Emgality.  An updated order and preauthorization was completed.    She was able to get Emgality, but it was 2 weeks late.  The preauthorization took time, and then the pharmacy to have Emgality in stock.  She had significant worsening of migraine as Emgality wore off.    She gets vertigo with migraine.  Vertigo also worsened.    Now that she is back on treatment, she is doing much better.  We reviewed her treatment strategy.    Propranolol 20 mg PRN helps her sleep. Doxepin 40 mg PM helps with hives.    She is planning to see a neuropathy specialist for tingling.     She takes gabapentin 800 mg TID, doxepin 40 mg PM, propranolol 20 mg PRN.    She takes almotriptan 12.5 mg as needed.     She has stopped taking birth control for about a year. Nausea improved with stopping this.     She is having migraine attacks about once a month.    Objective:    Vitals: There were no vitals taken for this visit.  General: Cooperative, NAD  Neurologic:  Mental Status: Fully alert, attentive and oriented. Speech clear and fluent.   Cranial Nerves: Facial movements symmetric.   Motor: No abnormal movements.      Assessment/Plan:   Kasandra Lau is a  37-year-old woman who returns for follow-up of chronic migraine.  She is doing well after significant worsening in the setting of Emgality wearing off.    I recommend that she continue with her successful symptomatic treatment strategy, with Emgality for headache prevention.  She also takes doxepin 40 mg nightly and propranolol 20 mg nightly.  -I have refilled these medications for her.    For acute treatment, almotriptan 12.5 mg at the onset of headache is helpful.  -I have refilled this for her.  -She previously had nausea associated with migraine attacks but this has not been a feature lately.  She would not choose to take a nausea medication.  She will let me know if this changes in the future.    I will plan to see her back in 10 to 12 months, prior to Emgality preauthorization ending, so we can start this early.    Natividad Osuna MD  Neurology

## 2022-08-05 ENCOUNTER — VIRTUAL VISIT (OUTPATIENT)
Dept: PSYCHOLOGY | Facility: CLINIC | Age: 37
End: 2022-08-05
Payer: COMMERCIAL

## 2022-08-05 DIAGNOSIS — F60.9 PERSONALITY DISORDER (H): ICD-10-CM

## 2022-08-05 DIAGNOSIS — F34.1 PERSISTENT DEPRESSIVE DISORDER: Primary | ICD-10-CM

## 2022-08-05 DIAGNOSIS — F43.12 CHRONIC POST-TRAUMATIC STRESS DISORDER (PTSD): ICD-10-CM

## 2022-08-05 PROCEDURE — 90837 PSYTX W PT 60 MINUTES: CPT | Mod: 95 | Performed by: PSYCHOLOGIST

## 2022-08-06 NOTE — PROGRESS NOTES
"  Health Psychology - Follow up Visit  Confidential Summary*    The author of this note documented a reason for not sharing it with the patient.  REFERRAL SOURCE:  Psychiatry    CHIEF COMPLAINT/REASON FOR VISIT  Psychotherapy in context of chronic PTSD and persistent depression.  Patient also complains of dissatisfaction with interpersonal relationships and challenges with emotion regulation.      Patient was seen today for a 60 minute individual psychotherapy session.  The session was facilitated via VIDEO with patient at her home and provider at her own home.  We used [email or web address] platform.       This telehealth service is appropriate and effective for delivering services in light of the necessity for social distancing to mitigate the COVID-19 epidemic. Patient has agreed to receiving telehealth services.    The patient has been notified of following:   \"This VIDEO visit will be conducted via a call between you and your physician/provider. We have found that certain health care needs can be provided without the need for an in-person physical exam.  VIDEO visits are billed at different rates depending on your insurance coverage.  Please reach out to your insurance provider with any questions. If during the course of the call the physician/provider feels a video visit is not appropriate, you will not be charged for this service.\"     Patient has given verbal consent for VIDEO visit? Yes    Subjective:  Patient seen for ongoing treatment of persistent depression, PTSD and chronic pain.  She began with report that she feels relieved that she has secured a 25%.  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  She reported that her own department \"pushes back on accomodations for gradate students\".  Discussed how these accomodations have had an impact on her.  Of note, she reported that she often turns in assignments late because of her ADHD and " medical health disabilities.  Discussed how she might plan ahead with any assignments so that she does not need to use her accomodations.  She reported that she was not super prepared for her discussion sections.  Patient was encouraged to consider that this position may be renewed in the next semester and that if her performance is satisfactory, she may have the position renewed.      She reported that she is considering bankrupcy and has received a summons.  She secured the assistance of an  who assisted her in responding to the summons.  Discussed her perceptions and knowledge of the impact of bankrupcy on her overall life goals.  She reported that women of color are at increased likelihood of eventual bankrupcy and that she is a victim of societal injustice.  She reported that she may lose the financial support of her  that she has come to rely on.    Discussed the challenges of dating men who work in blue collar positions vs intellectual academics.  She reported that she does not feel that she fits in anywhere and that she is lonely much of the time.  Discussed how she might insert herself into situations in which she might meet men that are appreciative of her cultural diversity and intellectual ambitions and her disadvantaged background.    Objective:  Patient was on time for today s session. She was alert and oriented. Mood was euthymic with appropriate range of affect. Patient denied suicidal or assaultive ideation, plan, or intent.        Assessment:  The patient has a longstanding history of interpersonal discord and challenges with emotion regulation.  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  She has completed all requirements for her PhD and is now ABD.  She is living in  housing with her two dogs.  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    Plan:  Patient graduated from full  model DBT at [facility] and is now completing phase 2 work.      Treatment plan completed:  10/8/21    Time In: 1:00  Time Out: 2:00    Diagnosis:  Axis I PTSD, chronic, persistent depressive disorder, adjustment disorder with mixed, psychological factors associated with physical (fibromyalgia)   Axis II  BPD   Axis III please see medical records for details   [place] IV Psychosocial and Environmental Stressors: living alone with no family support, pandemic stress, chronic illness         Corina Muhammad, PhD, LP

## 2022-08-12 ENCOUNTER — VIRTUAL VISIT (OUTPATIENT)
Dept: PSYCHOLOGY | Facility: CLINIC | Age: 37
End: 2022-08-12
Payer: COMMERCIAL

## 2022-08-12 DIAGNOSIS — F34.1 PERSISTENT DEPRESSIVE DISORDER: Primary | ICD-10-CM

## 2022-08-12 DIAGNOSIS — F60.9 PERSONALITY DISORDER (H): ICD-10-CM

## 2022-08-12 DIAGNOSIS — F43.12 CHRONIC POST-TRAUMATIC STRESS DISORDER (PTSD): ICD-10-CM

## 2022-08-12 PROCEDURE — 90837 PSYTX W PT 60 MINUTES: CPT | Mod: 95 | Performed by: PSYCHOLOGIST

## 2022-08-12 NOTE — PROGRESS NOTES
"  Health Psychology - Follow up Visit  Confidential Summary*    The author of this note documented a reason for not sharing it with the patient.  REFERRAL SOURCE:  Psychiatry    CHIEF COMPLAINT/REASON FOR VISIT  Psychotherapy in context of chronic PTSD and persistent depression.  Patient also complains of dissatisfaction with interpersonal relationships and challenges with emotion regulation.      Patient was seen today for a 60 minute individual psychotherapy session.  The session was facilitated via VIDEO with patient at her home and provider at her own home.  We used [email or web address] platform.       This telehealth service is appropriate and effective for delivering services in light of the necessity for social distancing to mitigate the COVID-19 epidemic. Patient has agreed to receiving telehealth services.    The patient has been notified of following:   \"This VIDEO visit will be conducted via a call between you and your physician/provider. We have found that certain health care needs can be provided without the need for an in-person physical exam.  VIDEO visits are billed at different rates depending on your insurance coverage.  Please reach out to your insurance provider with any questions. If during the course of the call the physician/provider feels a video visit is not appropriate, you will not be charged for this service.\"     Patient has given verbal consent for VIDEO visit? Yes    Subjective:  Patient began with report that she has had positive news surrounding the possibility that she may secure a postdoc in [facility].  She reported that she is looking forward to taking a position where she might be able to leave the [place].  She reported that she is more motivated to complete her PhD so that she might be able to move forward on her plans to pursue a postdoc outside of the [place].    She was encouraged to consider that she may be interested in partners who are not interested in her and this is why she has experienced " chronic rejection from men in the [place].  She was encouraged to list the criteria she is interested in:  Educated (at least a BA), smart, socially aware, sensitive, curious, sophisticated, validating, liberal, socially skilled/interested in others, genuine, humble, good listener, sense of humor, quick witted, wants kids.  She reported that deal breakers include no degree, conservative politically, open minded, does not want children, less than age 28 but younger than 55, not excessively thin and not from [place].      Discussed sterotypes and the impact of judgement and negative assumptions on her ability to trust others.  Discussed her history of trauma and the possibility that she may select men who appear to accept her without questioning whether they possess the values and characteristics that she wants in a partner.  Encouraged her to consider that she is not choosing men that will be interested in her.      Objective:  Patient was on time for today s session. She was alert and oriented. Mood was euthymic with appropriate range of affect. Patient denied suicidal or assaultive ideation, plan, or intent.        Assessment:  The patient has a longstanding history of interpersonal discord and challenges with emotion regulation.  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  She has completed all requirements for her PhD and is now ABD.  She is living in  housing with her two dogs.  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    Plan:  Patient graduated from full model DBT at [facility] and is now completing phase 2 work.      Treatment plan completed:  10/8/21    Time In: 1:00  Time Out: 2:00    Diagnosis:  Axis I PTSD, chronic, persistent depressive disorder, adjustment disorder with mixed, psychological factors associated with physical (fibromyalgia)   Axis II  BPD   Axis III please see medical records for details    Axis IV Psychosocial and Environmental Stressors: living alone with no family support, pandemic stress, chronic illness         Corina Muhammad, PhD, LP

## 2022-08-18 NOTE — PROGRESS NOTES
"Rheumatology Clinic Visit  [facility]  Arnie Bennett M.D.     Kasandra Lau MRN# [id number]   YOB: 1985 Age: 37 year old   Date of Visit: 08/19/2022  Primary care provider: Roxy Rios          Assessment and Plan:     #Positive STEPHANIE, +centromere, progressive metrical small joint predominant polyarthralgia; fatigue, morning stiffness: Physical exam today shows tenderness at multiple PIPs and MCPs without altered range of motion or gross synovitis.    Laboratory evaluation in April 2022 showed basic metabolic panel and CBC normal except for mildly elevated platelets and white count.  Urinalysis showed increased white and red blood cell counts with large leukocyte esterase positivity.  In May 2021, STEPHANIE was positive at a titer of 1: 160.  Hepatitis B and C were negative.  Antigliadin and antitissue transglutaminase antibodies were negative.  Celiac panel was negative.    Discussion: Clinical picture is consistent with inflammatory or autoimmune arthritis.  Systemic lupus erythematosus or other connective tissue disease, rheumatoid arthritis are on the differential diagnosis.  I recommend a further work-up for inflammatory and autoimmune disorders in the blood and urine.  In addition, I recommend a \"diagnostic\" trial of prednisone at low-dose for 2 weeks.  I requested that patient follow-up by telephone or MyChart at the conclusion of the prednisone trial.  We discussed potential long-acting disease modifying agents depending on results of the work-up and of the prednisone trial.    Plan:  1.  Check lupus markers, inflammation markers, blood cells, kidney function, and urinalysis as well as markers of rheumatoid arthritis.  2.  Prednisone 15 mg once daily for a week, then 10 mg daily for a week, then off.  3.  Alert rheumatology by MyChart or telephone after completion of prednisone trial  4.  After completing prednisone, if symptoms recur, use ibuprofen 600 mg 3 times daily as " "needed with food for joint pain.  5.  If prednisone is helpful, inflammatory arthritis may be contributing to the symptoms and longer-term treatment may be helpful.  1 medication to consider is hydroxychloroquine.    RTC 3-4 mos    Orders Placed This Encounter   Procedures     Complement C4     Complement C3     CRP inflammation     Rheumatoid factor     Cyclic Citrullinated Peptide Antibody IgG     Erythrocyte sedimentation rate auto     IAN Panel (RNP, SMITH, Scleroderma, SSAB, SSBB); IAN, Antibodies, Panel     Creatinine     Routine UA with Micro Reflex to Culture         Arnie Bennett MD  Staff Rheumatologist, [facility]           History of Present Illness:   Kasandra Lau presents for evaluation of positive STEPHANIE. Last seen by Dr. Atwood in 7-2021.     She notes increasing \"carpal tunnel\" symptoms; also increased pain in her hands and other joints. There is \"diffuse achiness\", especially wrists, ankles, fingers. Hard to type, hard to hold things. There has been puffiness in L > R hands. Symptoms are progressing, especially hands.    Mornings are \"rough\" with stiffness, brain fog, fatigue. Early morning stiffness is ~ 1 hour. Toes/forefeet are stiff and can cramp. Moist heat helps. 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    Tylenol and ibuprofen help with morning pain; menthol creams also help.    Muscle pain also is prominent.         Review of Systems:     Constitutional: negative  Skin: \"bumps\" on upper arms, not itchy; Dermatology told her \"genetic\" cause; rec'd moisturizing lotion.  Eyes: negative  Ears/Nose/Throat: s/p tonsillectomy  Respiratory: No shortness of breath, dyspnea on exertion, cough, or hemoptysis; uses mucinex to help breathing.  Cardiovascular: negative  Gastrointestinal: IBS sx  Genitourinary: negative  Musculoskeletal: negative  Neurologic: negative  Psychiatric: negative  Hematologic/Lymphatic/Immunologic: negative  Endocrine: negative         Active " Problem List:     Patient Active Problem List    Diagnosis Date Noted     Keratosis pilaris 06/14/2022     Priority: Medium     Chronic sinusitis, unspecified location 09/21/2021     Priority: Medium     Hypertrophy of breast 09/10/2020     Priority: Medium     Added automatically from request for surgery [id number]       Pap smear abnormality of cervix/human papillomavirus (HPV) positive 09/03/2020     Priority: Medium     [id number] NILM HPV  Positive for High Risk HPV types other than 16 or 18 (Care Everywhere)  2020 NILM HPV negative 34 y.o.  PLAN, per ASCCP Guidelines: cotest 1/2023       Cholecystitis 07/16/2020     Priority: Medium     Added automatically from request for surgery [id number]       Chronic idiopathic urticaria 06/22/2020     Priority: Medium     Hx of abnormal cervical Pap smear 02/03/2020     Priority: Medium     Acid reflux 08/25/2019     Priority: Medium     Need for desensitization to allergens 06/06/2019     Priority: Medium     Formatting of this note might be different from the original.    Allergy Shot Care Plan for Kasandra Lau  Date of Order: June 6 2019  Ordering Provider: Dr. Martin Cervantes     Serum 1: Cat  Date Shots Started:  Start Dose: 0.1 mL of 1:100,000 - GREEN  Date Maintenance Reached:  Maintenance Dose: 0.3 mL of 1:100 - RED    Serum 2: Mite DF and Mite DP  Date Shots Started:  Start Dose: 0.1 mL of 1:100,000 - GREEN  Date Maintenance Reached:  Maintenance Dose: 0.1 mL of 1:100 - RED    BUILDING SCHEDULE FOR POLLEN AND NONPOLLEN   IMMUNOTHERAPY  EXCEPT VENOM AND CENTER AL    3 - 14 days Build per schedule.   15 - 21 days Repeat previous dose.   22 - 28 days Decrease by one dose.   29 - 35 days Decrease by two doses.   36 - 42 days Decrease by three doses.   Over 42 days Continue to decrease by one dose for each additional week.     1:100,000 (green)  1:10,000 (blue)  1:1000 (yellow)  1:100 (red)     1. 0.05 ml  7. 0.05 ml  13. 0.05 ml  19. 0.05 ml   2. 0.1 ml  8. 0.1 ml  14. 0.1 ml   20. 0.1 ml   3. 0.2 ml  9. 0.2 ml  15. 0.2 ml  21. 0.15 ml   4. 0.3 ml  10. 0.3 ml  16. 0.3 ml  22. 0.2 ml   5. 0.4 ml  11. 0.4 ml  17. 0.4 ml  23. 0.25 ml   6. 0.45 ml  12. 0.45 ml  18. 0.45 ml  24. 0.3 ml         25   0.35 ml         26   0.4 ml         27. 0.45 ml         28. 0.5 ml     MAINTENANCE SCHEDULE FOR POLLENS, NONPOLLENS (MITES,MOLD,CAT,DOG)  (not used for Center-AL Pollens)    ? When reaching maintenance dose for the first time, gradually stretch by weekly intervals to every 4 weeks (e.g., every 2 weeks, then 3 weeks, then 4 weeks).   ? Patient may receive their injections (patient choice) at 2-4 week intervals     Maintenance Repeat 1 Dose 2 Dose 3 Dose    Q2 weeks  (10-14 days)  3 weeks  (15-21 days) 4 weeks  (22-28 days) 5 weeks  (29-35 days) 6 weeks  (36-42 days) Decrease by 1 Dose for each additional week   Q3 weeks  (15-21 days)  4 weeks  (22-28 days) 5 weeks  (29-35 days) 6 weeks  (36-42 days) 7 weeks  (43-49 days)    Q4 weeks  (22-28 days)  5 weeks  (29-35 days) 6 weeks  (36-42 days) 7 weeks  (43-49 days) 8 weeks  (50-56 days)      Susanna Ambrose RN 6/6/2019 4:49 PM       Mild intermittent asthma without complication 04/30/2018     Priority: Medium     Seasonal mood disorder (H) 11/15/2017     Priority: Medium     Circadian rhythm sleep disorder, delayed sleep phase type 05/10/2017     Priority: Medium     Unhealthy sleep habit 05/10/2017     Priority: Medium     Vitamin D deficiency 11/19/2016     Priority: Medium     Menorrhagia with regular cycle 11/19/2016     Priority: Medium     Migraine without aura and without status migrainosus, not intractable 11/19/2016     Priority: Medium     Iron deficiency anemia due to chronic blood loss 11/19/2016     Priority: Medium     Tired 11/19/2016     Priority: Medium     Irritable bowel syndrome with constipation 10/25/2016     Priority: Medium     Recurrent major depressive disorder, in remission (H) 10/25/2016     Priority: Medium     Pelvic  pain in female 08/25/2016     Priority: Medium     Migraine headache 01/01/2012     Priority: Medium     Panic disorder 01/01/2007     Priority: Medium     Depression 01/01/2004     Priority: Medium            Past Medical History:     Past Medical History:   Diagnosis Date     Anemia fall 2016     Chronic fatigue      Depression (emotion)     sees psych, on meds     Gastroesophageal reflux disease      Migraine     daily meds, 2x month, more mild on meds     Neuropathic pain      Panic disorder      Uncomplicated asthma Spring 2018     Past Surgical History:   Procedure Laterality Date     COLONOSCOPY       LAPAROSCOPIC ABLATION ENDOMETRIOSIS N/A 11/09/2016    Procedure: LAPAROSCOPIC ABLATION ENDOMETRIOSIS;  Surgeon: Tonja Ferrer MD;  Location: [facility] OR     LAPAROSCOPIC CYSTECTOMY OVARIAN (BENIGN) Left 11/09/2016    Procedure: LAPAROSCOPIC CYSTECTOMY OVARIAN (BENIGN);  Surgeon: Tonja Ferrer MD;  Location: [facility] OR     LAPAROSCOPIC TUBAL DYE STUDY Left 11/09/2016    Procedure: LAPAROSCOPIC TUBAL DYE STUDY;  Surgeon: Tonja Ferrer MD;  Location: [facility] OR     LAPAROSCOPY OPERATIVE ADULT N/A 11/09/2016    Procedure: LAPAROSCOPY OPERATIVE ADULT;  Surgeon: Tonja Ferrer MD;  Location: [facility]     MAMMOPLASTY REDUCTION Bilateral 08/05/2021    Procedure: MAMMOPLASTY, REDUCTION, Bilateral;  Surgeon: ANTONIO Moreno MD;  Location: [facility] OR     ORTHOPEDIC SURGERY      left wrist surgery     TONSILLECTOMY & ADENOIDECTOMY Bilateral     cauterized turbinates also            Social History:     Social History     Socioeconomic History     Marital status: Single     Spouse name: Not on file     Number of children: Not on file     Years of education: Not on file     Highest education level: Not on file   Occupational History     Not on file   Tobacco Use     Smoking status: Former Smoker     Packs/day: 0.00     Years: 10.00     Pack years: 0.00     Types: Cigarettes     Smokeless tobacco: Never  Used     Tobacco comment: smokes occasionally socially    Vaping Use     Vaping Use: Never used   Substance and Sexual Activity     Alcohol use: Not Currently     Alcohol/week: 0.0 standard drinks     Comment: occasional      Drug use: Not Currently     Types: Marijuana     Comment: marijuana  none since May 2021     Sexual activity: Yes     Partners: Male     Birth control/protection: Pull-out method, Inserts/Ring     Comment: Nuvaring   Other Topics Concern     Not on file   Social History Narrative    Grad student in [employer]     Social Determinants of Health     Financial Resource Strain: Not on file   Food Insecurity: Not on file   Transportation Needs: Not on file   Physical Activity: Not on file   Stress: Not on file   Social Connections: Not on file   Intimate Partner Violence: Not on file   Housing Stability: Not on file          Family History:     Family History   Problem Relation Age of Onset     Diabetes Maternal Grandfather      Hypertension No family hx of      Coronary Artery Disease No family hx of      Hyperlipidemia No family hx of      Cerebrovascular Disease No family hx of      Breast Cancer No family hx of      Colon Cancer No family hx of      Prostate Cancer No family hx of      Other Cancer No family hx of      Glaucoma No family hx of      Macular Degeneration No family hx of             Allergies:     Allergies   Allergen Reactions     Dust Mites Cough, Difficulty breathing and Shortness Of Breath     Cats      Chest tightness, sinus irritation            Medications:     Current Outpatient Medications   Medication Sig Dispense Refill     almotriptan (AXERT) 12.5 MG tablet Take 1 tablet (12.5 mg) by mouth at onset of headache for migraine (repeat in 2 hours if needed) May repeat in 2 hours. Max 2 tablets/24 hours. 18 tablet 11     buPROPion (WELLBUTRIN XL) 300 MG 24 hr tablet Take 1 tablet (300 mg) by mouth every morning 90 tablet 1     cholecalciferol 50 MCG (2000 UT) CAPS 5,000 Units  every evening        doxepin (SINEQUAN) 10 MG capsule Take 4 capsules (40 mg) by mouth At Bedtime 120 capsule 11     EMGALITY 120 MG/ML injection Inject 1 mL (120 mg) Subcutaneous every 28 days 1 mL 11     famotidine (PEPCID) 20 MG tablet Take 1 tablet (20 mg) by mouth 2 times daily 90 tablet 1     fexofenadine (ALLEGRA) 180 MG tablet Take 1 tablet (180 mg) by mouth every morning 90 tablet 1     fluticasone (FLONASE) 50 MCG/ACT nasal spray 2 times daily as needed        gabapentin (NEURONTIN) 400 MG capsule Take 2 capsules (800 mg) by mouth 3 times daily 180 capsule 5     hydrocortisone (CORTAID) 1 % external ointment Apply sparingly to affected area three times daily for 14 days. 30 g 0     levocetirizine (XYZAL) 5 MG tablet Take 5 mg by mouth every evening        montelukast (SINGULAIR) 10 MG tablet Take 1 tablet (10 mg) by mouth At Bedtime 90 tablet 1     propranolol (INDERAL) 20 MG tablet Take 1 tablet (20 mg) by mouth daily as needed (anxiety) 30 tablet 11     Pseudoephedrine HCl (SUDAFED SINUS CONGESTION PO)        vilazodone (VIIBRYD) 40 MG TABS tablet Take 1 tablet (40 mg) by mouth every morning 90 tablet 1            Physical Exam:   Blood pressure 116/79, pulse 74, weight 88.9 kg (196 lb), SpO2 97 %, not currently breastfeeding.  Wt Readings from Last 6 Encounters:   08/19/22 88.9 kg (196 lb)   06/14/22 85.5 kg (188 lb 8 oz)   04/16/22 84.1 kg (185 lb 7 oz)   04/13/22 83.9 kg (185 lb)   03/06/22 84.5 kg (186 lb 5 oz)   02/16/22 85.7 kg (189 lb)     Constitutional: well-developed, appearing stated age; cooperative  Eyes: nl EOM, PERRLA, vision, conjunctiva, sclera  ENT: nl external ears, nose, hearing, lips, teeth, gums, throat  No mucous membrane lesions, normal saliva pool  Neck: no mass or thyroid enlargement  Resp: l breathing unlabored  Lymph: no cervical, supraclavicular, inguinal or epitrochlear nodes  MS: Tenderness at multiple PIPs and MCPs with subtle puffiness at the hands.  No hidebound or bound  down skin; no gross synovitis.  No tenderness at MTPs midfoot and ankle range of motion full and ankles nontender.  Skin: no nail pitting, alopecia, rash, nodules or lesions  Neuro: nl cranial nerves, strength, sensation, DTRs.   Psych: nl judgement, orientation, memory, affect.         Data:     @  RHEUM RESULTS Latest Ref Rng & Units 11/19/2016 2/6/2017 10/5/2017   ALBUMIN 3.4 - 5.0 g/dL 3.7 - 3.5   ALT 0 - 50 U/L 18 - 22   AST 0 - 45 U/L 10 - 12   CK TOTAL 30 - 225 U/L - - -   CREATININE 0.52 - 1.04 mg/dL 0.68 - 0.75   CRP 0.0 - 8.0 mg/L - <2.9 -   GFR ESTIMATE, IF BLACK >60 mL/min/[1.73:m2] >90  African American GFR Calc   - >90   GFR ESTIMATE >60 mL/min/1.73m2 >90  Non African American GFR Calc   - >90   HEMATOCRIT 35.0 - 47.0 % 37.8 - -   HEMOGLOBIN 11.7 - 15.7 g/dL 12.1 - -   HEPBANG NR:Nonreactive - - -   HCVAB NR:Nonreactive - - -   WBC 4.0 - 11.0 10e3/uL 8.9 - -   RBC 3.80 - 5.20 10e6/uL 4.12 - -   RDW 10.0 - 15.0 % 12.1 - -   MCHC 31.5 - 36.5 g/dL 32.0 - -   MCV 78 - 100 fL 92 - -   PLATELET COUNT 150 - 450 10e3/uL 334 - -   ESR 0 - 20 mm/hr - - -        ,  ,  ,  ,  ,  ,  ,  ,  ,  ,  ,  ,  ,  ,  ,   Hep B Surface Agn   Date Value Ref Range Status   01/31/2019 Nonreactive NR^Nonreactive Final   ,  ,  ,  ,  ,  ,  ,  ,  ,  ,  ,  ,  ,  ,  ,  ,  ,  ,  ,  ,  ,  ,  ,  ,  ,  ,  ,  ,  ,

## 2022-08-19 ENCOUNTER — LAB (OUTPATIENT)
Dept: LAB | Facility: CLINIC | Age: 37
End: 2022-08-19
Payer: COMMERCIAL

## 2022-08-19 ENCOUNTER — OFFICE VISIT (OUTPATIENT)
Dept: RHEUMATOLOGY | Facility: CLINIC | Age: 37
End: 2022-08-19
Attending: INTERNAL MEDICINE
Payer: COMMERCIAL

## 2022-08-19 VITALS
BODY MASS INDEX: 29.37 KG/M2 | OXYGEN SATURATION: 97 % | WEIGHT: 196 LBS | SYSTOLIC BLOOD PRESSURE: 116 MMHG | DIASTOLIC BLOOD PRESSURE: 79 MMHG | HEART RATE: 74 BPM

## 2022-08-19 DIAGNOSIS — R79.89 ELEVATED LIVER FUNCTION TESTS: ICD-10-CM

## 2022-08-19 DIAGNOSIS — M25.542 ARTHRALGIA OF BOTH HANDS: Primary | ICD-10-CM

## 2022-08-19 DIAGNOSIS — M25.541 ARTHRALGIA OF BOTH HANDS: ICD-10-CM

## 2022-08-19 DIAGNOSIS — G62.9 NEUROPATHY: ICD-10-CM

## 2022-08-19 DIAGNOSIS — M25.541 ARTHRALGIA OF BOTH HANDS: Primary | ICD-10-CM

## 2022-08-19 DIAGNOSIS — M25.542 ARTHRALGIA OF BOTH HANDS: ICD-10-CM

## 2022-08-19 LAB
ALBUMIN SERPL-MCNC: 3.6 G/DL (ref 3.4–5)
ALBUMIN UR-MCNC: NEGATIVE MG/DL
ALP SERPL-CCNC: 95 U/L (ref 40–150)
ALT SERPL W P-5'-P-CCNC: 60 U/L (ref 0–50)
APPEARANCE UR: CLEAR
AST SERPL W P-5'-P-CCNC: 28 U/L (ref 0–45)
BACTERIA #/AREA URNS HPF: ABNORMAL /HPF
BILIRUB DIRECT SERPL-MCNC: 0.1 MG/DL (ref 0–0.2)
BILIRUB SERPL-MCNC: 0.3 MG/DL (ref 0.2–1.3)
BILIRUB UR QL STRIP: NEGATIVE
C3 SERPL-MCNC: 140 MG/DL (ref 81–157)
C4 SERPL-MCNC: 41 MG/DL (ref 13–39)
CCP AB SER IA-ACNC: 1.3 U/ML
CHOLEST SERPL-MCNC: 180 MG/DL
COLOR UR AUTO: YELLOW
CREAT SERPL-MCNC: 0.69 MG/DL (ref 0.52–1.04)
CRP SERPL-MCNC: 3.4 MG/L (ref 0–8)
ENA SM IGG SER IA-ACNC: <1.6 U/ML
ENA SM IGG SER IA-ACNC: NEGATIVE
ENA SS-A AB SER IA-ACNC: <0.5 U/ML
ENA SS-A AB SER IA-ACNC: NEGATIVE
ENA SS-B IGG SER IA-ACNC: <0.6 U/ML
ENA SS-B IGG SER IA-ACNC: NEGATIVE
ERYTHROCYTE [SEDIMENTATION RATE] IN BLOOD BY WESTERGREN METHOD: 10 MM/HR (ref 0–20)
FASTING STATUS PATIENT QL REPORTED: YES
GFR SERPL CREATININE-BSD FRML MDRD: >90 ML/MIN/1.73M2
GLUCOSE UR STRIP-MCNC: NEGATIVE MG/DL
HDLC SERPL-MCNC: 43 MG/DL
HGB UR QL STRIP: NEGATIVE
HYALINE CASTS: 1 /LPF
KETONES UR STRIP-MCNC: NEGATIVE MG/DL
LDLC SERPL CALC-MCNC: 100 MG/DL
LEUKOCYTE ESTERASE UR QL STRIP: NEGATIVE
MAGNESIUM SERPL-MCNC: 2.2 MG/DL (ref 1.6–2.3)
MUCOUS THREADS #/AREA URNS LPF: PRESENT /LPF
NITRATE UR QL: NEGATIVE
NONHDLC SERPL-MCNC: 137 MG/DL
PH UR STRIP: 5 [PH] (ref 5–7)
PROT SERPL-MCNC: 7.5 G/DL (ref 6.8–8.8)
RBC URINE: 1 /HPF
RHEUMATOID FACT SER NEPH-ACNC: 7 IU/ML
SP GR UR STRIP: 1.01 (ref 1–1.03)
SQUAMOUS EPITHELIAL: 1 /HPF
TRIGL SERPL-MCNC: 184 MG/DL
U1 SNRNP IGG SER IA-ACNC: 1.4 U/ML
U1 SNRNP IGG SER IA-ACNC: NEGATIVE
UROBILINOGEN UR STRIP-MCNC: NORMAL MG/DL
VIT B12 SERPL-MCNC: 325 PG/ML (ref 193–986)
WBC URINE: 3 /HPF

## 2022-08-19 PROCEDURE — 86140 C-REACTIVE PROTEIN: CPT | Performed by: PATHOLOGY

## 2022-08-19 PROCEDURE — 99000 SPECIMEN HANDLING OFFICE-LAB: CPT | Performed by: PATHOLOGY

## 2022-08-19 PROCEDURE — 83735 ASSAY OF MAGNESIUM: CPT | Performed by: PATHOLOGY

## 2022-08-19 PROCEDURE — 99214 OFFICE O/P EST MOD 30 MIN: CPT | Performed by: INTERNAL MEDICINE

## 2022-08-19 PROCEDURE — 86431 RHEUMATOID FACTOR QUANT: CPT | Performed by: PATHOLOGY

## 2022-08-19 PROCEDURE — 84630 ASSAY OF ZINC: CPT | Mod: 90 | Performed by: PATHOLOGY

## 2022-08-19 PROCEDURE — 36415 COLL VENOUS BLD VENIPUNCTURE: CPT | Performed by: PATHOLOGY

## 2022-08-19 PROCEDURE — 80076 HEPATIC FUNCTION PANEL: CPT | Performed by: PATHOLOGY

## 2022-08-19 PROCEDURE — 86160 COMPLEMENT ANTIGEN: CPT | Performed by: PATHOLOGY

## 2022-08-19 PROCEDURE — 81001 URINALYSIS AUTO W/SCOPE: CPT | Performed by: PATHOLOGY

## 2022-08-19 PROCEDURE — 85652 RBC SED RATE AUTOMATED: CPT | Performed by: PATHOLOGY

## 2022-08-19 PROCEDURE — 82565 ASSAY OF CREATININE: CPT | Performed by: PATHOLOGY

## 2022-08-19 PROCEDURE — 86200 CCP ANTIBODY: CPT | Performed by: PATHOLOGY

## 2022-08-19 PROCEDURE — 80061 LIPID PANEL: CPT | Performed by: PATHOLOGY

## 2022-08-19 PROCEDURE — 82607 VITAMIN B-12: CPT | Performed by: PATHOLOGY

## 2022-08-19 PROCEDURE — 86235 NUCLEAR ANTIGEN ANTIBODY: CPT | Performed by: PATHOLOGY

## 2022-08-19 PROCEDURE — G0463 HOSPITAL OUTPT CLINIC VISIT: HCPCS

## 2022-08-19 RX ORDER — PREDNISONE 5 MG/1
5 TABLET ORAL DAILY
Qty: 35 TABLET | Refills: 2 | Status: SHIPPED | OUTPATIENT
Start: 2022-08-19 | End: 2022-09-30

## 2022-08-19 NOTE — LETTER
"8/19/2022       RE: Kasandra Lau  [address]  [facility] [address]     Dear Colleague,    Thank you for referring your patient, Kasandra Lau, to the [facility] RHEUMATOLOGY CLINIC [place] at [facility]. Please see a copy of my visit note below.    Rheumatology Clinic Visit  [facility]  Arnie Bennett M.D.     Kasandra Lau MRN# [id number]   YOB: 1985 Age: 37 year old   Date of Visit: 08/19/2022  Primary care provider: Roxy Rios          Assessment and Plan:     #Positive STEPHANIE, +centromere, progressive metrical small joint predominant polyarthralgia; fatigue, morning stiffness: Physical exam today shows tenderness at multiple PIPs and MCPs without altered range of motion or gross synovitis.    Laboratory evaluation in April 2022 showed basic metabolic panel and CBC normal except for mildly elevated platelets and white count.  Urinalysis showed increased white and red blood cell counts with large leukocyte esterase positivity.  In May 2021, STEPHANIE was positive at a titer of 1: 160.  Hepatitis B and C were negative.  Antigliadin and antitissue transglutaminase antibodies were negative.  Celiac panel was negative.    Discussion: Clinical picture is consistent with inflammatory or autoimmune arthritis.  Systemic lupus erythematosus or other connective tissue disease, rheumatoid arthritis are on the differential diagnosis.  I recommend a further work-up for inflammatory and autoimmune disorders in the blood and urine.  In addition, I recommend a \"diagnostic\" trial of prednisone at low-dose for 2 weeks.  I requested that patient follow-up by telephone or MyChart at the conclusion of the prednisone trial.  We discussed potential long-acting disease modifying agents depending on results of the work-up and of the prednisone trial.    Plan:  1.  Check lupus markers, inflammation markers, blood cells, kidney function, and " "urinalysis as well as markers of rheumatoid arthritis.  2.  Prednisone 15 mg once daily for a week, then 10 mg daily for a week, then off.  3.  Alert rheumatology by MyChart or telephone after completion of prednisone trial  4.  After completing prednisone, if symptoms recur, use ibuprofen 600 mg 3 times daily as needed with food for joint pain.  5.  If prednisone is helpful, inflammatory arthritis may be contributing to the symptoms and longer-term treatment may be helpful.  1 medication to consider is hydroxychloroquine.    RTC 3-4 mos    Orders Placed This Encounter   Procedures     Complement C4     Complement C3     CRP inflammation     Rheumatoid factor     Cyclic Citrullinated Peptide Antibody IgG     Erythrocyte sedimentation rate auto     IAN Panel (RNP, SMITH, Scleroderma, SSAB, SSBB); IAN, Antibodies, Panel     Creatinine     Routine UA with Micro Reflex to Culture         Arnie Bennett MD  Staff Rheumatologist, [facility]           History of Present Illness:   Kasandra Lau presents for evaluation of positive STEPHANIE. Last seen by Dr. Atwood in 7-2021.     She notes increasing \"carpal tunnel\" symptoms; also increased pain in her hands and other joints. There is \"diffuse achiness\", especially wrists, ankles, fingers. Hard to type, hard to hold things. There has been puffiness in L > R hands. Symptoms are progressing, especially hands.    Mornings are \"rough\" with stiffness, brain fog, fatigue. Early morning stiffness is ~ 1 hour. Toes/forefeet are stiff and can cramp. Moist heat helps. 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    Tylenol and ibuprofen help with morning pain; menthol creams also help.    Muscle pain also is prominent.         Review of Systems:     Constitutional: negative  Skin: \"bumps\" on upper arms, not itchy; Dermatology told her \"genetic\" cause; rec'd moisturizing lotion.  Eyes: negative  Ears/Nose/Throat: s/p tonsillectomy  Respiratory: No shortness of " breath, dyspnea on exertion, cough, or hemoptysis; uses mucinex to help breathing.  Cardiovascular: negative  Gastrointestinal: IBS sx  Genitourinary: negative  Musculoskeletal: negative  Neurologic: negative  Psychiatric: negative  Hematologic/Lymphatic/Immunologic: negative  Endocrine: negative         Active Problem List:     Patient Active Problem List    Diagnosis Date Noted     Keratosis pilaris 06/14/2022     Priority: Medium     Chronic sinusitis, unspecified location 09/21/2021     Priority: Medium     Hypertrophy of breast 09/10/2020     Priority: Medium     Added automatically from request for surgery [id number]       Pap smear abnormality of cervix/human papillomavirus (HPV) positive 09/03/2020     Priority: Medium     [id number] NILM HPV  Positive for High Risk HPV types other than 16 or 18 (Care Everywhere)  2020 NILM HPV negative 34 y.o.  PLAN, per ASCCP Guidelines: cotest 1/2023       Cholecystitis 07/16/2020     Priority: Medium     Added automatically from request for surgery [id number]       Chronic idiopathic urticaria 06/22/2020     Priority: Medium     Hx of abnormal cervical Pap smear 02/03/2020     Priority: Medium     Acid reflux 08/25/2019     Priority: Medium     Need for desensitization to allergens 06/06/2019     Priority: Medium     Formatting of this note might be different from the original.    Allergy Shot Care Plan for Kasandra Lau  Date of Order: June 6 2019  Ordering Provider: Dr. Martin Cervantes     Serum 1: Cat  Date Shots Started:  Start Dose: 0.1 mL of 1:100,000 - GREEN  Date Maintenance Reached:  Maintenance Dose: 0.3 mL of 1:100 - RED    Serum 2: Mite DF and Mite DP  Date Shots Started:  Start Dose: 0.1 mL of 1:100,000 - GREEN  Date Maintenance Reached:  Maintenance Dose: 0.1 mL of 1:100 - RED    BUILDING SCHEDULE FOR POLLEN AND NONPOLLEN   IMMUNOTHERAPY  EXCEPT VENOM AND CENTER AL    3 - 14 days Build per schedule.   15 - 21 days Repeat previous dose.   22 - 28 days Decrease by one  dose.   29 - 35 days Decrease by two doses.   36 - 42 days Decrease by three doses.   Over 42 days Continue to decrease by one dose for each additional week.     1:100,000 (green)  1:10,000 (blue)  1:1000 (yellow)  1:100 (red)     1. 0.05 ml  7. 0.05 ml  13. 0.05 ml  19. 0.05 ml   2. 0.1 ml  8. 0.1 ml  14. 0.1 ml  20. 0.1 ml   3. 0.2 ml  9. 0.2 ml  15. 0.2 ml  21. 0.15 ml   4. 0.3 ml  10. 0.3 ml  16. 0.3 ml  22. 0.2 ml   5. 0.4 ml  11. 0.4 ml  17. 0.4 ml  23. 0.25 ml   6. 0.45 ml  12. 0.45 ml  18. 0.45 ml  24. 0.3 ml         25   0.35 ml         26   0.4 ml         27. 0.45 ml         28. 0.5 ml     MAINTENANCE SCHEDULE FOR POLLENS, NONPOLLENS (MITES,MOLD,CAT,DOG)  (not used for Center-[facility] Pollens)    ? When reaching maintenance dose for the first time, gradually stretch by weekly intervals to every 4 weeks (e.g., every 2 weeks, then 3 weeks, then 4 weeks).   ? Patient may receive their injections (patient choice) at 2-4 week intervals     Maintenance Repeat 1 Dose 2 Dose 3 Dose    Q2 weeks  (10-14 days)  3 weeks  (15-21 days) 4 weeks  (22-28 days) 5 weeks  (29-35 days) 6 weeks  (36-42 days) Decrease by 1 Dose for each additional week   Q3 weeks  (15-21 days)  4 weeks  (22-28 days) 5 weeks  (29-35 days) 6 weeks  (36-42 days) 7 weeks  (43-49 days)    Q4 weeks  (22-28 days)  5 weeks  (29-35 days) 6 weeks  (36-42 days) 7 weeks  (43-49 days) 8 weeks  (50-56 days)      Susanna Ambrose RN 6/6/2019 4:49 PM       Mild intermittent asthma without complication 04/30/2018     Priority: Medium     Seasonal mood disorder (H) 11/15/2017     Priority: Medium     Circadian rhythm sleep disorder, delayed sleep phase type 05/10/2017     Priority: Medium     Unhealthy sleep habit 05/10/2017     Priority: Medium     Vitamin D deficiency 11/19/2016     Priority: Medium     Menorrhagia with regular cycle 11/19/2016     Priority: Medium     Migraine without aura and without status migrainosus, not intractable 11/19/2016     Priority:  Medium     Iron deficiency anemia due to chronic blood loss 11/19/2016     Priority: Medium     Tired 11/19/2016     Priority: Medium     Irritable bowel syndrome with constipation 10/25/2016     Priority: Medium     Recurrent major depressive disorder, in remission (H) 10/25/2016     Priority: Medium     Pelvic pain in female 08/25/2016     Priority: Medium     Migraine headache 01/01/2012     Priority: Medium     Panic disorder 01/01/2007     Priority: Medium     Depression 01/01/2004     Priority: Medium            Past Medical History:     Past Medical History:   Diagnosis Date     Anemia fall 2016     Chronic fatigue      Depression (emotion)     sees psych, on meds     Gastroesophageal reflux disease      Migraine     daily meds, 2x month, more mild on meds     Neuropathic pain      Panic disorder      Uncomplicated asthma Spring 2018     Past Surgical History:   Procedure Laterality Date     COLONOSCOPY       LAPAROSCOPIC ABLATION ENDOMETRIOSIS N/A 11/09/2016    Procedure: LAPAROSCOPIC ABLATION ENDOMETRIOSIS;  Surgeon: Tonja Ferrer MD;  Location: [facility] OR     LAPAROSCOPIC CYSTECTOMY OVARIAN (BENIGN) Left 11/09/2016    Procedure: LAPAROSCOPIC CYSTECTOMY OVARIAN (BENIGN);  Surgeon: Tonja Ferrer MD;  Location: [facility] OR     LAPAROSCOPIC TUBAL DYE STUDY Left 11/09/2016    Procedure: LAPAROSCOPIC TUBAL DYE STUDY;  Surgeon: Tonja Ferrer MD;  Location: [facility] OR     LAPAROSCOPY OPERATIVE ADULT N/A 11/09/2016    Procedure: LAPAROSCOPY OPERATIVE ADULT;  Surgeon: Tonja Ferrer MD;  Location: [facility]     MAMMOPLASTY REDUCTION Bilateral 08/05/2021    Procedure: MAMMOPLASTY, REDUCTION, Bilateral;  Surgeon: ANTONIO Moreno MD;  Location: [facility] OR     ORTHOPEDIC SURGERY      left wrist surgery     TONSILLECTOMY & ADENOIDECTOMY Bilateral     cauterized turbinates also            Social History:     Social History     Socioeconomic History     Marital status: Single     Spouse name: Not on  file     Number of children: Not on file     Years of education: Not on file     Highest education level: Not on file   Occupational History     Not on file   Tobacco Use     Smoking status: Former Smoker     Packs/day: 0.00     Years: 10.00     Pack years: 0.00     Types: Cigarettes     Smokeless tobacco: Never Used     Tobacco comment: smokes occasionally socially    Vaping Use     Vaping Use: Never used   Substance and Sexual Activity     Alcohol use: Not Currently     Alcohol/week: 0.0 standard drinks     Comment: occasional      Drug use: Not Currently     Types: Marijuana     Comment: marijuana  none since May 2021     Sexual activity: Yes     Partners: Male     Birth control/protection: Pull-out method, Inserts/Ring     Comment: Nuvaring   Other Topics Concern     Not on file   Social History Narrative    Grad student in geography     Social Determinants of Health     Financial Resource Strain: Not on file   Food Insecurity: Not on file   Transportation Needs: Not on file   Physical Activity: Not on file   Stress: Not on file   Social Connections: Not on file   Intimate Partner Violence: Not on file   Housing Stability: Not on file          Family History:     Family History   Problem Relation Age of Onset     Diabetes Maternal Grandfather      Hypertension No family hx of      Coronary Artery Disease No family hx of      Hyperlipidemia No family hx of      Cerebrovascular Disease No family hx of      Breast Cancer No family hx of      Colon Cancer No family hx of      Prostate Cancer No family hx of      Other Cancer No family hx of      Glaucoma No family hx of      Macular Degeneration No family hx of             Allergies:     Allergies   Allergen Reactions     Dust Mites Cough, Difficulty breathing and Shortness Of Breath     Cats      Chest tightness, sinus irritation            Medications:     Current Outpatient Medications   Medication Sig Dispense Refill     almotriptan (AXERT) 12.5 MG tablet Take 1  tablet (12.5 mg) by mouth at onset of headache for migraine (repeat in 2 hours if needed) May repeat in 2 hours. Max 2 tablets/24 hours. 18 tablet 11     buPROPion (WELLBUTRIN XL) 300 MG 24 hr tablet Take 1 tablet (300 mg) by mouth every morning 90 tablet 1     cholecalciferol 50 MCG (2000 UT) CAPS 5,000 Units every evening        doxepin (SINEQUAN) 10 MG capsule Take 4 capsules (40 mg) by mouth At Bedtime 120 capsule 11     EMGALITY 120 MG/ML injection Inject 1 mL (120 mg) Subcutaneous every 28 days 1 mL 11     famotidine (PEPCID) 20 MG tablet Take 1 tablet (20 mg) by mouth 2 times daily 90 tablet 1     fexofenadine (ALLEGRA) 180 MG tablet Take 1 tablet (180 mg) by mouth every morning 90 tablet 1     fluticasone (FLONASE) 50 MCG/ACT nasal spray 2 times daily as needed        gabapentin (NEURONTIN) 400 MG capsule Take 2 capsules (800 mg) by mouth 3 times daily 180 capsule 5     hydrocortisone (CORTAID) 1 % external ointment Apply sparingly to affected area three times daily for 14 days. 30 g 0     levocetirizine (XYZAL) 5 MG tablet Take 5 mg by mouth every evening        montelukast (SINGULAIR) 10 MG tablet Take 1 tablet (10 mg) by mouth At Bedtime 90 tablet 1     propranolol (INDERAL) 20 MG tablet Take 1 tablet (20 mg) by mouth daily as needed (anxiety) 30 tablet 11     Pseudoephedrine HCl (SUDAFED SINUS CONGESTION PO)        vilazodone (VIIBRYD) 40 MG TABS tablet Take 1 tablet (40 mg) by mouth every morning 90 tablet 1            Physical Exam:   Blood pressure 116/79, pulse 74, weight 88.9 kg (196 lb), SpO2 97 %, not currently breastfeeding.  Wt Readings from Last 6 Encounters:   08/19/22 88.9 kg (196 lb)   06/14/22 85.5 kg (188 lb 8 oz)   04/16/22 84.1 kg (185 lb 7 oz)   04/13/22 83.9 kg (185 lb)   03/06/22 84.5 kg (186 lb 5 oz)   02/16/22 85.7 kg (189 lb)     Constitutional: well-developed, appearing stated age; cooperative  Eyes: nl EOM, PERRLA, vision, conjunctiva, sclera  ENT: nl external ears, nose,  hearing, lips, teeth, gums, throat  No mucous membrane lesions, normal saliva pool  Neck: no mass or thyroid enlargement  Resp: l breathing unlabored  Lymph: no cervical, supraclavicular, inguinal or epitrochlear nodes  MS: Tenderness at multiple PIPs and MCPs with subtle puffiness at the hands.  No hidebound or bound down skin; no gross synovitis.  No tenderness at MTPs midfoot and ankle range of motion full and ankles nontender.  Skin: no nail pitting, alopecia, rash, nodules or lesions  Neuro: nl cranial nerves, strength, sensation, DTRs.   Psych: nl judgement, orientation, memory, affect.         Data:     @  RHEUM RESULTS Latest Ref Rng & Units 11/19/2016 2/6/2017 10/5/2017   ALBUMIN 3.4 - 5.0 g/dL 3.7 - 3.5   ALT 0 - 50 U/L 18 - 22   AST 0 - 45 U/L 10 - 12   CK TOTAL 30 - 225 U/L - - -   CREATININE 0.52 - 1.04 mg/dL 0.68 - 0.75   CRP 0.0 - 8.0 mg/L - <2.9 -   GFR ESTIMATE, IF BLACK >60 mL/min/[1.73:m2] >90  African American GFR Calc   - >90   GFR ESTIMATE >60 mL/min/1.73m2 >90  Non African American GFR Calc   - >90   HEMATOCRIT 35.0 - 47.0 % 37.8 - -   HEMOGLOBIN 11.7 - 15.7 g/dL 12.1 - -   HEPBANG NR:Nonreactive - - -   HCVAB NR:Nonreactive - - -   WBC 4.0 - 11.0 10e3/uL 8.9 - -   RBC 3.80 - 5.20 10e6/uL 4.12 - -   RDW 10.0 - 15.0 % 12.1 - -   MCHC 31.5 - 36.5 g/dL 32.0 - -   MCV 78 - 100 fL 92 - -   PLATELET COUNT 150 - 450 10e3/uL 334 - -   ESR 0 - 20 mm/hr - - -        ,  ,  ,  ,  ,  ,  ,  ,  ,  ,  ,  ,  ,  ,  ,   Hep B Surface Agn   Date Value Ref Range Status   01/31/2019 Nonreactive NR^Nonreactive Final     Again, thank you for allowing me to participate in the care of your patient.      Sincerely,    Arnie Bennett MD

## 2022-08-19 NOTE — PATIENT INSTRUCTIONS
Diagnosis:  1.  Positive STEPHANIE, hand predominant morning stiffness and joint pain: Concern is raised over inflammatory or autoimmune arthritis.  I recommend further blood work-up and a diagnostic trial of prednisone.    Plan:  1.  Check lupus markers, inflammation markers, blood cells, kidney function, and urinalysis as well as markers of rheumatoid arthritis.  2.  Prednisone 15 mg once daily for a week, then 10 mg daily for a week, then off.  3.  Alert rheumatology by MyChart or telephone after completion of prednisone trial  4.  After completing prednisone, if symptoms recur, use ibuprofen 600 mg 3 times daily as needed with food for joint pain.  5.  If prednisone is helpful, inflammatory arthritis may be contributing to the symptoms and longer-term treatment may be helpful.  1 medication to consider is hydroxychloroquine.

## 2022-08-19 NOTE — NURSING NOTE
Chief Complaint   Patient presents with     Follow Up     For carpal tunnel and joint pain     Blood pressure 116/79, pulse 74, weight 88.9 kg (196 lb), SpO2 97 %, not currently breastfeeding.    Miesha Perez, CMA

## 2022-08-22 LAB — ZINC SERPL-MCNC: 59 UG/DL

## 2022-08-26 ENCOUNTER — VIRTUAL VISIT (OUTPATIENT)
Dept: PSYCHOLOGY | Facility: CLINIC | Age: 37
End: 2022-08-26
Payer: COMMERCIAL

## 2022-08-26 DIAGNOSIS — F54 PSYCHOLOGICAL AND BEHAVIORAL FACTORS ASSOCIATED WITH DISORDERS OR DISEASES CLASSIFIED ELSEWHERE: ICD-10-CM

## 2022-08-26 DIAGNOSIS — F34.1 PERSISTENT DEPRESSIVE DISORDER: Primary | ICD-10-CM

## 2022-08-26 DIAGNOSIS — F43.12 CHRONIC POST-TRAUMATIC STRESS DISORDER (PTSD): ICD-10-CM

## 2022-08-26 DIAGNOSIS — F60.9 PERSONALITY DISORDER (H): ICD-10-CM

## 2022-08-26 PROCEDURE — 90837 PSYTX W PT 60 MINUTES: CPT | Mod: 95 | Performed by: PSYCHOLOGIST

## 2022-08-30 ENCOUNTER — MYC MEDICAL ADVICE (OUTPATIENT)
Dept: RHEUMATOLOGY | Facility: CLINIC | Age: 37
End: 2022-08-30

## 2022-08-30 DIAGNOSIS — M19.90 INFLAMMATORY ARTHRITIS: Primary | ICD-10-CM

## 2022-08-30 NOTE — TELEPHONE ENCOUNTER
Thank you for the update.  I am glad that the prednisone has been associated with improvement in joint pain.  Along with the laboratory results, the results of the prednisone trial suggest joint pain is due to an inflammatory process (arthritis).  I recommend finishing the current prednisone course, but starting new medication to help take the place of the corticosteroids over time and relieve joint pain.  Medication I recommend is hydroxychloroquine, and I recommend starting 400 mg once daily for 30 days, 4 refills.  I expect benefit from hydroxychloroquine to occur gradually over the course of 2 to 4 months.    I attempted to call patient twice and left messages for her to contact Rheumatology for discussion of the results and the plan.  The above synopsis is the summary of my thinking, if patient is comfortable with going ahead and starting the hydroxychloroquine right away.

## 2022-08-31 RX ORDER — HYDROXYCHLOROQUINE SULFATE 200 MG/1
400 TABLET, FILM COATED ORAL DAILY
Qty: 30 TABLET | Refills: 4 | Status: SHIPPED | OUTPATIENT
Start: 2022-08-31 | End: 2022-11-15

## 2022-08-31 NOTE — TELEPHONE ENCOUNTER
Received MyChart response from patient agreeing to start hydroxychloroquine. Orders entered per Dr. Bennett's instructions and sent to pharmacy of patient's choice.  Shanon Clemons RN  Adult Rheumatology Clinic

## 2022-09-02 ENCOUNTER — VIRTUAL VISIT (OUTPATIENT)
Dept: PSYCHOLOGY | Facility: CLINIC | Age: 37
End: 2022-09-02
Payer: COMMERCIAL

## 2022-09-02 DIAGNOSIS — F34.1 PERSISTENT DEPRESSIVE DISORDER: ICD-10-CM

## 2022-09-02 DIAGNOSIS — F43.12 CHRONIC POST-TRAUMATIC STRESS DISORDER (PTSD): Primary | ICD-10-CM

## 2022-09-02 PROCEDURE — 90837 PSYTX W PT 60 MINUTES: CPT | Mod: 95 | Performed by: PSYCHOLOGIST

## 2022-09-03 NOTE — CONFIDENTIAL NOTE
"  Health Psychology - Follow up Visit  Confidential Summary*    The author of this note documented a reason for not sharing it with the patient.  REFERRAL SOURCE:  Psychiatry    CHIEF COMPLAINT/REASON FOR VISIT  Psychotherapy in context of chronic PTSD and persistent depression.  Patient also complains of dissatisfaction with interpersonal relationships and challenges with emotion regulation.      Patient was seen today for a 60 minute individual psychotherapy session.  The session was facilitated via VIDEO with patient at her home and provider at her own home.  We used [email or web address] platform.       This telehealth service is appropriate and effective for delivering services in light of the necessity for social distancing to mitigate the COVID-19 epidemic. Patient has agreed to receiving telehealth services.    The patient has been notified of following:   \"This VIDEO visit will be conducted via a call between you and your physician/provider. We have found that certain health care needs can be provided without the need for an in-person physical exam.  VIDEO visits are billed at different rates depending on your insurance coverage.  Please reach out to your insurance provider with any questions. If during the course of the call the physician/provider feels a video visit is not appropriate, you will not be charged for this service.\"     Patient has given verbal consent for VIDEO visit? Yes    Subjective:  Patient began with report that she feels defeated having had her car towed when she parked in another resident parking lot while visitig a friend for about 3 hours.  She described frustration that the college administration does not seem to care that the impact of her having to spend over $500 on impound and himanshu fees will severely impact her ability to pay her car insurance.  She was observed to spiral into a challenging cognitive myth that the [place] does not support those of lower income and that the [place] favors those of " privilege. She was encouraged to check the facts, that her emotions of shame, sadness and anxiety are justified and that she might consider how she might problem solve the situation. She reported that she plans to work during this holiday weekend and she may contact her car insurance to see if there may be coverage to reimburse her.  We discussed the challenges in her retrieving her car and her feelings of being alone balanced with gratefulness that she can continue to rely on her ex  for financial support.  She also reported that she was is aware that there were signs posted and her friend may have received alerts about more rigid observation of parking restrictions during the [place].    She also reported that she has trouble following rules when she believes they do not make sensor or that they do not apply to her unique situation.  We discussed practicing willing hands and to observe her willfulness as she maintains that she sometimes does not follow rules when she believes they do not make sense.      She expressed some concern that moving to [place] may be more difficult than she had hoped.      She is looking forward to [email or web address] starting in Global Studies but maintains that next semester, she may have difficulty regulating interpersonal challenges next semester when the professor will be one from her own department.  She continues to describe a belief that she does not fit with others in her department because she is not from privilege.    Objective:  Patient was on time for today s session. She was alert and oriented. Mood was euthymic with appropriate range of affect. Patient denied suicidal or assaultive ideation, plan, or intent.        Assessment:  The patient has a longstanding history of interpersonal discord and challenges with emotion regulation.  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  She has completed all requirements for her PhD and is now  ABD.  She is living in  housing with her two dogs.  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    Plan:  Patient graduated from full model DBT at [employer] and is now completing phase 2 work.      Treatment plan completed:  10/8/21    Time In: 1:00  Time Out: 2:00    Diagnosis:  Axis I PTSD, chronic, persistent depressive disorder, adjustment disorder with mixed, psychological factors associated with physical (fibromyalgia)   Axis II  BPD   Axis III please see medical records for details   Wayne IV Psychosocial and Environmental Stressors: living alone with no family support, pandemic stress, chronic illness         Corina Muhammad, PhD, LP

## 2022-09-06 NOTE — CONFIDENTIAL NOTE
"  Health Psychology - Follow up Visit  Confidential Summary*    The author of this note documented a reason for not sharing it with the patient.  REFERRAL SOURCE:  Psychiatry    CHIEF COMPLAINT/REASON FOR VISIT  Psychotherapy in context of chronic PTSD and persistent depression.  Patient also complains of dissatisfaction with interpersonal relationships and challenges with emotion regulation.      Patient was seen today for a 60 minute individual psychotherapy session.  The session was facilitated via VIDEO with patient at her home and provider at her own home.  We used [email or web address] platform.       This telehealth service is appropriate and effective for delivering services in light of the necessity for social distancing to mitigate the COVID-19 epidemic. Patient has agreed to receiving telehealth services.    The patient has been notified of following:   \"This VIDEO visit will be conducted via a call between you and your physician/provider. We have found that certain health care needs can be provided without the need for an in-person physical exam.  VIDEO visits are billed at different rates depending on your insurance coverage.  Please reach out to your insurance provider with any questions. If during the course of the call the physician/provider feels a video visit is not appropriate, you will not be charged for this service.\"     Patient has given verbal consent for VIDEO visit? Yes    Subjective:  Patient began with report that she continues to be challenged by financial stressors.  She reported that she has been saving to pay her car insurance and that with working at  [employer], she is making ends meet.      She continues to work toward applying for a  postdoctoral fellowship and has had good response from some academics in [place].  She is looking forward to ending her program at the [employer] and moving toward the next chapter in her training.  We discussed how she might use her emotion regulation skills to " focus on completing her dissertation so that she can graduate and begin her next chapter.      She shared her enthusiasm surrounding starting her  position.    Objective:  Patient was on time for today s session. She was alert and oriented. Mood was euthymic with appropriate range of affect. Patient denied suicidal or assaultive ideation, plan, or intent.        Assessment:  The patient has a longstanding history of interpersonal discord and challenges with emotion regulation.  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  She has completed all requirements for her PhD and is now ABD.  She is living in  housing with her two dogs.  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    Plan:  Patient graduated from full model DBT at [facility] and is now completing phase 2 work.      Treatment plan completed:  10/8/21    Time In: 1:00  Time Out: 2:00    Diagnosis:  Axis I PTSD, chronic, persistent depressive disorder, adjustment disorder with mixed, psychological factors associated with physical (fibromyalgia)   Axis II  BPD   Axis III please see medical records for details   [place] IV Psychosocial and Environmental Stressors: living alone with no family support, pandemic stress, chronic illness         Corina Muhammad, PhD, LP

## 2022-09-08 DIAGNOSIS — K21.00 GASTROESOPHAGEAL REFLUX DISEASE WITH ESOPHAGITIS WITHOUT HEMORRHAGE: ICD-10-CM

## 2022-09-09 ENCOUNTER — VIRTUAL VISIT (OUTPATIENT)
Dept: PSYCHOLOGY | Facility: CLINIC | Age: 37
End: 2022-09-09
Payer: COMMERCIAL

## 2022-09-09 DIAGNOSIS — F34.1 PERSISTENT DEPRESSIVE DISORDER: ICD-10-CM

## 2022-09-09 DIAGNOSIS — F60.9 PERSONALITY DISORDER (H): ICD-10-CM

## 2022-09-09 DIAGNOSIS — F43.12 CHRONIC POST-TRAUMATIC STRESS DISORDER (PTSD): Primary | ICD-10-CM

## 2022-09-09 PROCEDURE — 90837 PSYTX W PT 60 MINUTES: CPT | Mod: 95 | Performed by: PSYCHOLOGIST

## 2022-09-11 ENCOUNTER — HEALTH MAINTENANCE LETTER (OUTPATIENT)
Age: 37
End: 2022-09-11

## 2022-09-12 RX ORDER — FAMOTIDINE 20 MG/1
20 TABLET, FILM COATED ORAL 2 TIMES DAILY
Qty: 180 TABLET | Refills: 3 | Status: SHIPPED | OUTPATIENT
Start: 2022-09-12 | End: 2023-08-13

## 2022-09-16 ENCOUNTER — VIRTUAL VISIT (OUTPATIENT)
Dept: PSYCHOLOGY | Facility: CLINIC | Age: 37
End: 2022-09-16
Payer: COMMERCIAL

## 2022-09-16 DIAGNOSIS — F60.9 PERSONALITY DISORDER (H): ICD-10-CM

## 2022-09-16 DIAGNOSIS — F34.1 PERSISTENT DEPRESSIVE DISORDER: ICD-10-CM

## 2022-09-16 DIAGNOSIS — F43.12 CHRONIC POST-TRAUMATIC STRESS DISORDER (PTSD): Primary | ICD-10-CM

## 2022-09-16 DIAGNOSIS — F54 PSYCHOLOGICAL AND BEHAVIORAL FACTORS ASSOCIATED WITH DISORDERS OR DISEASES CLASSIFIED ELSEWHERE: ICD-10-CM

## 2022-09-16 PROCEDURE — 90837 PSYTX W PT 60 MINUTES: CPT | Mod: 95 | Performed by: PSYCHOLOGIST

## 2022-09-19 NOTE — CONFIDENTIAL NOTE
"  Health Psychology - Follow up Visit  Confidential Summary*    The author of this note documented a reason for not sharing it with the patient.  REFERRAL SOURCE:  Psychiatry    CHIEF COMPLAINT/REASON FOR VISIT  Psychotherapy in context of chronic PTSD and persistent depression.  Patient also complains of dissatisfaction with interpersonal relationships and challenges with emotion regulation.      Patient was seen today for a 60 minute individual psychotherapy session.  The session was facilitated via VIDEO with patient at her home and provider at her own home.  We used [email or web address] platform.       This telehealth service is appropriate and effective for delivering services in light of the necessity for social distancing to mitigate the COVID-19 epidemic. Patient has agreed to receiving telehealth services.    The patient has been notified of following:   \"This VIDEO visit will be conducted via a call between you and your physician/provider. We have found that certain health care needs can be provided without the need for an in-person physical exam.  VIDEO visits are billed at different rates depending on your insurance coverage.  Please reach out to your insurance provider with any questions. If during the course of the call the physician/provider feels a video visit is not appropriate, you will not be charged for this service.\"     Patient has given verbal consent for VIDEO visit? Yes    Subjective:  Patient began with report that she continues to be disappointed that her friend has not offered to compensate her for the parking ticket she received while parking in friend's lot.  She requested that friend send her the email she received and friend informed her that she does not read these.  Patient also reported that this friend owes her money (upwards of $500) but that she has told the friend that she does not have to repay it.  Discussed her motivations in loaning money.  Discussed her perception that this friend " "comes from a privileged background but that since she is not from the , she is also without a safety net.  She was confronted with the reality that she has been struggling financially and that she does not have the funds to pay for rent this month and car insurance too.  Discussed \"apparent competence\" and inhibited grieving, DBT concepts.      Of note, patient does not appear to be using her DBT skills.  She was encouraged to again begin using diary card.  She denied SIB.  She is again online dating.     Objective:  Patient was on time for today s session. She was alert and oriented. Mood was euthymic with appropriate range of affect. Patient denied suicidal or assaultive ideation, plan, or intent.        Assessment:  The patient has a longstanding history of interpersonal discord and challenges with emotion regulation.  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  She has completed all requirements for her PhD and is now ABD.  She is living in  housing with her two dogs.  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    Plan:  Patient graduated from full model DBT at [facility] and is now completing phase 2 work but her DBT skill use is minimal and she is struggling with interpersonal effectiveness.      Treatment plan completed:  10/8/21    Time In: 1:00  Time Out: 2:00    Diagnosis:  Axis I PTSD, chronic, persistent depressive disorder, adjustment disorder with mixed, psychological factors associated with physical (fibromyalgia)   Axis II  BPD   Axis III please see medical records for details   [place] IV Psychosocial and Environmental Stressors: living alone with no family support, pandemic stress, chronic illness         Corina Muhammad, PhD, LP    "

## 2022-09-23 ENCOUNTER — VIRTUAL VISIT (OUTPATIENT)
Dept: PSYCHOLOGY | Facility: CLINIC | Age: 37
End: 2022-09-23
Payer: COMMERCIAL

## 2022-09-23 DIAGNOSIS — F54 PSYCHOLOGICAL AND BEHAVIORAL FACTORS ASSOCIATED WITH DISORDERS OR DISEASES CLASSIFIED ELSEWHERE: ICD-10-CM

## 2022-09-23 DIAGNOSIS — F34.1 PERSISTENT DEPRESSIVE DISORDER: Primary | ICD-10-CM

## 2022-09-23 DIAGNOSIS — F43.12 CHRONIC POST-TRAUMATIC STRESS DISORDER (PTSD): ICD-10-CM

## 2022-09-23 PROCEDURE — 90837 PSYTX W PT 60 MINUTES: CPT | Mod: 95 | Performed by: PSYCHOLOGIST

## 2022-09-26 NOTE — CONFIDENTIAL NOTE
"  Health Psychology - Follow up Visit  Confidential Summary*    The author of this note documented a reason for not sharing it with the patient.  REFERRAL SOURCE:  Psychiatry    CHIEF COMPLAINT/REASON FOR VISIT  Psychotherapy in context of chronic PTSD and persistent depression.  Patient also complains of dissatisfaction with interpersonal relationships and challenges with emotion regulation.      Patient was seen today for a 60 minute individual psychotherapy session.  The session was facilitated via VIDEO with patient at her home and provider at her own home.  We used [email or web address] platform.       This telehealth service is appropriate and effective for delivering services in light of the necessity for social distancing to mitigate the COVID-19 epidemic. Patient has agreed to receiving telehealth services.    The patient has been notified of following:   \"This VIDEO visit will be conducted via a call between you and your physician/provider. We have found that certain health care needs can be provided without the need for an in-person physical exam.  VIDEO visits are billed at different rates depending on your insurance coverage.  Please reach out to your insurance provider with any questions. If during the course of the call the physician/provider feels a video visit is not appropriate, you will not be charged for this service.\"     Patient has given verbal consent for VIDEO visit? Yes    Subjective:  Patient began with report that she is more aware that her choices of friends may match her expectation for how she should be treated by others.  Specifically, she felt \"disposible\" as a child with her family and that her needs were not considered.  She now finds that she finds friends who are not very caring about her.  She was encouraged to consider that she may not necessarily attract these friends but that she does not set limits.  She was encouraged to consider the other friends that she has that do treat her well so " that she can identify the characteristics she wants in a friend.      She reported that she plans to go to [facility] on Oct 21 and will be gone for 2 weeks.  She will be attending a writing school with her [place], the friend who recently disappointed her.  She reported that this friend owes her money and that she is reluctant to ask for this money back since her friend is currently medically ill.  Reviewed JOI skills and encouraged her to move forward so that she can decrease her feelings of resentment toward her friend and increase her self respect.  Discussed how she can set boundaries with her friend around loaning her money.      She hopes to vacation while she is in [facility], after her meeting.  Discussed her finances.  Patient continues to practice avoidance pertaining to her financial situation and that she continues to borrow from her ex  in order to make ends meet.  She reported that she anticipates being in a more solvent financial situation in the future and that her ex does not mind helping her out.      Objective:  Patient was on time for today s session. She was alert and oriented. Mood was euthymic with appropriate range of affect. Patient denied suicidal or assaultive ideation, plan, or intent.        Assessment:  The patient has a longstanding history of interpersonal discord and challenges with emotion regulation.   She has completed all requirements for her PhD and is now ABD.  She is living in  housing with her two dogs.  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    Plan:  Patient graduated from full model DBT at [facility] and is now completing phase 2 work but her DBT skill use is minimal and she is struggling with interpersonal effectiveness.      Treatment plan completed:  10/8/21    Time In: 1:00  Time Out: 2:00    Diagnosis:  Axis I PTSD, chronic, persistent depressive disorder, adjustment disorder with mixed, psychological  factors associated with physical (fibromyalgia)   Axis II  BPD   Axis III please see medical records for details   [place] IV Psychosocial and Environmental Stressors: living alone with no family support, pandemic stress, chronic illness         Corina Muhammad, PhD, LP

## 2022-09-29 ASSESSMENT — PATIENT HEALTH QUESTIONNAIRE - PHQ9
SUM OF ALL RESPONSES TO PHQ QUESTIONS 1-9: 6
10. IF YOU CHECKED OFF ANY PROBLEMS, HOW DIFFICULT HAVE THESE PROBLEMS MADE IT FOR YOU TO DO YOUR WORK, TAKE CARE OF THINGS AT HOME, OR GET ALONG WITH OTHER PEOPLE: SOMEWHAT DIFFICULT
10. IF YOU CHECKED OFF ANY PROBLEMS, HOW DIFFICULT HAVE THESE PROBLEMS MADE IT FOR YOU TO DO YOUR WORK, TAKE CARE OF THINGS AT HOME, OR GET ALONG WITH OTHER PEOPLE: SOMEWHAT DIFFICULT
SUM OF ALL RESPONSES TO PHQ QUESTIONS 1-9: 6
SUM OF ALL RESPONSES TO PHQ QUESTIONS 1-9: 6
10. IF YOU CHECKED OFF ANY PROBLEMS, HOW DIFFICULT HAVE THESE PROBLEMS MADE IT FOR YOU TO DO YOUR WORK, TAKE CARE OF THINGS AT HOME, OR GET ALONG WITH OTHER PEOPLE: SOMEWHAT DIFFICULT

## 2022-09-30 ENCOUNTER — VIRTUAL VISIT (OUTPATIENT)
Dept: PSYCHIATRY | Facility: CLINIC | Age: 37
End: 2022-09-30
Attending: NURSE PRACTITIONER
Payer: COMMERCIAL

## 2022-09-30 ENCOUNTER — VIRTUAL VISIT (OUTPATIENT)
Dept: PSYCHOLOGY | Facility: CLINIC | Age: 37
End: 2022-09-30
Payer: COMMERCIAL

## 2022-09-30 ENCOUNTER — VIRTUAL VISIT (OUTPATIENT)
Dept: BEHAVIORAL HEALTH | Facility: CLINIC | Age: 37
End: 2022-09-30
Payer: COMMERCIAL

## 2022-09-30 DIAGNOSIS — Z53.9 ERRONEOUS ENCOUNTER--DISREGARD: Primary | ICD-10-CM

## 2022-09-30 DIAGNOSIS — F33.1 MODERATE EPISODE OF RECURRENT MAJOR DEPRESSIVE DISORDER (H): Primary | ICD-10-CM

## 2022-09-30 DIAGNOSIS — F41.1 GENERALIZED ANXIETY DISORDER: Primary | ICD-10-CM

## 2022-09-30 DIAGNOSIS — F34.1 DYSTHYMIA: ICD-10-CM

## 2022-09-30 DIAGNOSIS — F90.8 ATTENTION DEFICIT HYPERACTIVITY DISORDER (ADHD), OTHER TYPE: ICD-10-CM

## 2022-09-30 PROCEDURE — 99214 OFFICE O/P EST MOD 30 MIN: CPT | Mod: 95 | Performed by: NURSE PRACTITIONER

## 2022-09-30 PROCEDURE — 90791 PSYCH DIAGNOSTIC EVALUATION: CPT | Mod: 95 | Performed by: COUNSELOR

## 2022-09-30 RX ORDER — ATOMOXETINE 25 MG/1
25 CAPSULE ORAL DAILY
Qty: 30 CAPSULE | Refills: 1 | Status: SHIPPED | OUTPATIENT
Start: 2022-09-30 | End: 2022-10-18

## 2022-09-30 ASSESSMENT — ANXIETY QUESTIONNAIRES
6. BECOMING EASILY ANNOYED OR IRRITABLE: SEVERAL DAYS
8. IF YOU CHECKED OFF ANY PROBLEMS, HOW DIFFICULT HAVE THESE MADE IT FOR YOU TO DO YOUR WORK, TAKE CARE OF THINGS AT HOME, OR GET ALONG WITH OTHER PEOPLE?: SOMEWHAT DIFFICULT
1. FEELING NERVOUS, ANXIOUS, OR ON EDGE: MORE THAN HALF THE DAYS
6. BECOMING EASILY ANNOYED OR IRRITABLE: SEVERAL DAYS
GAD7 TOTAL SCORE: 8
3. WORRYING TOO MUCH ABOUT DIFFERENT THINGS: SEVERAL DAYS
7. FEELING AFRAID AS IF SOMETHING AWFUL MIGHT HAPPEN: SEVERAL DAYS
IF YOU CHECKED OFF ANY PROBLEMS ON THIS QUESTIONNAIRE, HOW DIFFICULT HAVE THESE PROBLEMS MADE IT FOR YOU TO DO YOUR WORK, TAKE CARE OF THINGS AT HOME, OR GET ALONG WITH OTHER PEOPLE: SOMEWHAT DIFFICULT
2. NOT BEING ABLE TO STOP OR CONTROL WORRYING: SEVERAL DAYS
5. BEING SO RESTLESS THAT IT IS HARD TO SIT STILL: SEVERAL DAYS
IF YOU CHECKED OFF ANY PROBLEMS ON THIS QUESTIONNAIRE, HOW DIFFICULT HAVE THESE PROBLEMS MADE IT FOR YOU TO DO YOUR WORK, TAKE CARE OF THINGS AT HOME, OR GET ALONG WITH OTHER PEOPLE: SOMEWHAT DIFFICULT
GAD7 TOTAL SCORE: 8
3. WORRYING TOO MUCH ABOUT DIFFERENT THINGS: SEVERAL DAYS
7. FEELING AFRAID AS IF SOMETHING AWFUL MIGHT HAPPEN: SEVERAL DAYS
4. TROUBLE RELAXING: SEVERAL DAYS
8. IF YOU CHECKED OFF ANY PROBLEMS, HOW DIFFICULT HAVE THESE MADE IT FOR YOU TO DO YOUR WORK, TAKE CARE OF THINGS AT HOME, OR GET ALONG WITH OTHER PEOPLE?: SOMEWHAT DIFFICULT
7. FEELING AFRAID AS IF SOMETHING AWFUL MIGHT HAPPEN: SEVERAL DAYS
GAD7 TOTAL SCORE: 8
7. FEELING AFRAID AS IF SOMETHING AWFUL MIGHT HAPPEN: SEVERAL DAYS
2. NOT BEING ABLE TO STOP OR CONTROL WORRYING: SEVERAL DAYS
GAD7 TOTAL SCORE: 8
5. BEING SO RESTLESS THAT IT IS HARD TO SIT STILL: SEVERAL DAYS
1. FEELING NERVOUS, ANXIOUS, OR ON EDGE: MORE THAN HALF THE DAYS
GAD7 TOTAL SCORE: 8
GAD7 TOTAL SCORE: 8
4. TROUBLE RELAXING: SEVERAL DAYS

## 2022-09-30 NOTE — Clinical Note
Hello, in visiting with Kasandra today, I started her on Strattera 25 mg daily.  She will keep the Wellbutrin and Viibryd going for now.  I will visit with her again to monitor her response.  Thank you for the referral.  Geovanna

## 2022-09-30 NOTE — PROGRESS NOTES
"[facility] Psychiatry Service       Provider Name: Sarika Haq   Credentials:  MSW, [facility]    PATIENT'S NAME: Kasandra Lau  PREFERRED NAME: Kasandra  PRONOUNS:       MRN: [id number]  : 1985  ADDRESS: [address] [place] [id number]  ACCT. NUMBER:  [id number]  DATE OF SERVICE: 22  START TIME: 849am  END TIME: 909am  PREFERRED PHONE: [phone]  May we leave a program related message: Yes  SERVICE MODALITY:  Video Visit:      Provider verified identity through the following two step process.  Patient provided:  Patient photo and Patient was verified at admission/transfer    Telemedicine Visit: The patient's condition can be safely assessed and treated via synchronous audio and visual telemedicine encounter.      Reason for Telemedicine Visit: Services only offered telehealth    Originating Site (Patient Location): Patient's home    Distant Site (Provider Location): Provider Remote Setting- Home Office    Consent:  The patient/guardian has verbally consented to: the potential risks and benefits of telemedicine (video visit) versus in person care; bill my insurance or make self-payment for services provided; and responsibility for payment of non-covered services.     Patient would like the video invitation sent by:  My Chart    Mode of Communication:  Video Conference via [employer]    As the provider I attest to compliance with applicable laws and regulations related to telemedicine.    UNIVERSAL ADULT Mental Health DIAGNOSTIC ASSESSMENT    Identifying Information:  Patient is a 37 year old,   other individual.    Patient was referred for an assessment by  primary care clinic.  Patient attended the session alone.    Chief Complaint:   The reason for seeking services at this time is: \"continued maintenance of meds and adhd\".  The problem(s) began 2015.    Patient has attempted to resolve these concerns in the past through medication, PCP, psychaitrist, therapy.    Reason " "for [facility]: been on meds for a decade, people on and off help manage it  Pt moved and didn't have anyone follow-up and then moved back  Pt says that they are having issues with ADHD and to see if medication is working as well as it should, anxiety is not great lately    ADHD- since childhood and wasn't dx until college and then started at the college here the psychiatrist here suggested they also have ADHD    Anxiety and Depression- since a child, dx in 2008  Pt's family does not believe in medication.     Social/Family History:  Patient reported they grew up in other [facility].  They were raised by biological mother  .  Parents  / .  Patient reported that their childhood was \"not very stable\".  Patient described their current relationships with family of origin as \"I don't talk to them.\"  Pt has a half-brother.     The patient describes their cultural background as other.  Cultural influences and impact on patient's life structure, values, norms, and healthcare: some exposure to SE /Chinese food and cultural norms.  Contextual influences on patient's health include: Family Factors pt does not talk with family and Health- Seeking Factors pt would like to find medication that helps them manage symptoms. These factors will be addressed in the Preliminary Treatment plan. Patient identified their preferred language to be English. Patient reported they does not need the assistance of an  or other support involved in therapy.     Patient reported had no significant delays in developmental tasks.   Patient's highest education level was graduate school  .  Patient identified the following learning problems: attention and concentration. Pt did not report any issues with grades in school. No services received in school.  Modifications will not be used to assist communication in therapy. Patient reports they are not  able to understand written materials.     No childhood illnesses. "     Patient reported the following relationship history 1 times.  Patient's current relationship status is single for 5 years.   Patient identified their sexual orientation as heterosexual.  Patient reported having   child(maricruz). Patient identified pets; friends; therapist as part of their support system.  Patient identified the quality of these relationships as poor,  .  2 dogs emotional support animals, Pickles and Liaka.     Patient's current living/housing situation involves staying in own home/apartment.  The immediate members of family and household include Kasandra Lau, 37,self and they report that housing is stable.    Patient is currently student. Studying , human geography. Patient reports their finances are obtained through employment. Patient does identify finances as a current stressor.  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     Patient reported that they have not been involved with the legal system.    Patient does not report being under probation/ parole/ jurisdiction. They are not under any current court jurisdiction.     Patient's Strengths and Limitations:  Patient identified the following strengths or resources that will help them succeed in treatment: commitment to health and well being, friends / good social support, insight, intelligence, motivation, sense of humor and work ethic. Things that may interfere with the patient's success in treatment include: lack of family support and physical health concerns.     Assessments:  The following assessments were completed by patient for this visit:  PHQ9:   PHQ-9 SCORE 2/12/2020 4/29/2020 9/21/2021 6/14/2022 9/29/2022 9/29/2022 9/29/2022   PHQ-9 Total Score MyChart - 9 (Mild depression) - - - - 6 (Mild depression)   PHQ-9 Total Score 8 9 9 5 6 6 6     GAD7:   VIC-7 SCORE 1/15/2020 2/12/2020 4/29/2020 9/21/2021 6/14/2022 9/30/2022 9/30/2022   Total Score - - 10 (moderate anxiety) - - - 8 (mild anxiety)   Total Score 10 9 10 13 10  8 8     CAGE-AID:   CAGE-AID Total Score 4/29/2020 9/29/2022 9/29/2022 9/29/2022   Total Score 0 0 0 0   Total Score MyChart 0 (A total score of 2 or greater is considered clinically significant) - - 0 (A total score of 2 or greater is considered clinically significant)     PROMIS 10-Global Health (all questions and answers displayed):   PROMIS 10 9/30/2022 9/30/2022   In general, would you say your health is: - Good   In general, would you say your quality of life is: - Fair   In general, how would you rate your physical health? - Fair   In general, how would you rate your mental health, including your mood and your ability to think? - Good   In general, how would you rate your satisfaction with your social activities and relationships? - Poor   In general, please rate how well you carry out your usual social activities and roles - Fair   To what extent are you able to carry out your everyday physical activities such as walking, climbing stairs, carrying groceries, or moving a chair? - Moderately   How often have you been bothered by emotional problems such as feeling anxious, depressed or irritable? - Often   How would you rate your fatigue on average? - Moderate   How would you rate your pain on average?   0 = No Pain  to  10 = Worst Imaginable Pain - 4   In general, would you say your health is: 3 3   In general, would you say your quality of life is: 2 2   In general, how would you rate your physical health? 2 2   In general, how would you rate your mental health, including your mood and your ability to think? 3 3   In general, how would you rate your satisfaction with your social activities and relationships? 1 1   In general, please rate how well you carry out your usual social activities and roles. (This includes activities at home, at work and in your community, and responsibilities as a parent, child, spouse, employee, friend, etc.) 2 2   To what extent are you able to carry out your everyday physical  activities such as walking, climbing stairs, carrying groceries, or moving a chair? 3 3   In the past 7 days, how often have you been bothered by emotional problems such as feeling anxious, depressed, or irritable? 4 4   In the past 7 days, how would you rate your fatigue on average? 3 3   In the past 7 days, how would you rate your pain on average, where 0 means no pain, and 10 means worst imaginable pain? 4 4   Global Mental Health Score 8 8   Global Physical Health Score 11 11   PROMIS TOTAL - SUBSCORES 19 19   Some recent data might be hidden     Baldwin Suicide Severity Rating Scale (Lifetime/Recent)No flowsheet data found.  Baldwin Suicide Severity Rating Scale (Short Version)  Baldwin Suicide Severity Rating (Short Version) 8/5/2021 8/12/2021 8/31/2021 4/27/2022   Over the past 2 weeks have you felt down, depressed, or hopeless? no no no no   Over the past 2 weeks have you had thoughts of killing yourself? no no no no   Have you ever attempted to kill yourself? no no no no       Personal and Family Medical History:  Patient does report a family history of mental health concerns.  Patient reports family history includes Diabetes in her maternal grandfather..     Patient does report Mental Health Diagnosis and/or Treatment.  Patient Patient reported the following previous diagnoses which include(s): ADHD; an anxiety disorder; depression; PTSD .  Patient reported symptoms began childhood.  Patient has received mental health services in the past:  therapy; psychiatry  .  Psychiatric Hospitalizations: none  .  Patient denies a history of civil commitment.  Currently, patient psychotherapy  receiving other mental health services.  These include none.         Patient has had a physical exam to rule out medical causes for current symptoms.  Date of last physical exam was within the past year. Client was encouraged to follow up with PCP if symptoms were to develop. The patient has [facility] Patient reports the following current medical concerns: fibromyalgia, rheumatoid athreitis.  Patient reports pain concerns including fibro, auto amminue.  Patient does not want help addressing pain concerns. There are not significant appetite / nutritional concerns / weight changes.  Patient does report a history of head injury / trauma / cognitive impairment.  Migraines, no concussions     Patient reports current meds as:   Outpatient Medications Marked as Taking for the 9/30/22 encounter (Appointment) with Sarika Haq LICSW   Medication Sig     buPROPion (WELLBUTRIN XL) 300 MG 24 hr tablet Take 1 tablet (300 mg) by mouth every morning     gabapentin (NEURONTIN) 400 MG capsule Take 2 capsules (800 mg) by mouth 3 times daily       Medication Adherence:  Patient reports taking.      Patient Allergies:    Allergies   Allergen Reactions     Dust Mites Cough, Difficulty breathing and Shortness Of Breath     Cats      Chest tightness, sinus irritation       Medical History:    Past Medical History:   Diagnosis Date     Anemia fall 2016     Chronic fatigue      Depression (emotion)     sees psych, on meds     Gastroesophageal reflux disease      Migraine     daily meds, 2x month, more mild on meds     Neuropathic pain      Panic disorder      Uncomplicated asthma Spring 2018         Current Mental Status Exam:   Appearance:  Appropriate    Eye Contact:  Good   Psychomotor:  Normal       Gait / station:  no problem  Attitude / Demeanor: Cooperative  Interested Pleasant  Speech      Rate / Production: Normal/ Responsive      Volume:  Normal  volume      Language:  intact  Mood:   Anxious  Depressed   Affect:   Subdued  Worrisome    Thought Content: Clear   Thought Process: Coherent  Logical       Associations: No loosening of associations  Insight:   Good   Judgment:  Intact   Orientation:  All  Attention/concentration: Good      Substance Use:  Patient did not report a family  history of substance use concerns; see medical history section for details.  Patient has not received chemical dependency treatment in the past.  Patient has not ever been to detox.      Patient is not currently receiving any chemical dependency treatment.           Substance History of use Age of first use Date of last use     Pattern and duration of use (include amounts and frequency)   Alcohol used in the past   17 9/1/2018 REPORTS SUBSTANCE USE: N/A   Cannabis   used in the past 21 9/1/2020 REPORTS SUBSTANCE USE: N/A     Amphetamines   never used     REPORTS SUBSTANCE USE: N/A   Cocaine/crack    never used       REPORTS SUBSTANCE USE: N/A   Hallucinogens never used         REPORTS SUBSTANCE USE: N/A   Inhalants never used         REPORTS SUBSTANCE USE: N/A   Heroin never used         REPORTS SUBSTANCE USE: N/A   Other Opiates used in the past 26 6/30/2022 REPORTS SUBSTANCE USE: N/A   Benzodiazepine   used in the past 31 9/1/2018 REPORTS SUBSTANCE USE: N/A   Barbiturates never used     REPORTS SUBSTANCE USE: N/A   Over the counter meds used in the past 7 9/30/2022 REPORTS SUBSTANCE USE: N/A   Caffeine currently use 11   Don't drink a lot, cup or 2 a day   Nicotine  used in the past 21 9/1/2015 REPORTS SUBSTANCE USE: N/A   Other substances not listed above:  Identify:  never used     REPORTS SUBSTANCE USE: N/A     Patient reported the following problems as a result of their substance use: no problems, not applicable.    Substance Use: No symptoms    Based on the negative CAGE score and clinical interview there  are not indications of drug or alcohol abuse.      Significant Losses / Trauma / Abuse / Neglect Issues:   Patient did not serve in the .  There are indications or report of significant loss, trauma, abuse or neglect issues related to: are indications or report of significant loss, trauma, abuse or neglect issues related to. Pt did not go into detail at [facility] request to reduce triggering and reliving  trauma.   Concerns for possible neglect are not present.     Safety Assessment:   Patient denies current homicidal ideation and behaviors.  Patient denies current self-injurious ideation and behaviors.    Patient denied risk behaviors associated with substance use.  Patient denies any high risk behaviors associated with mental health symptoms.  Patient reports the following current concerns for their personal safety: None.  Patient reports there are not firearms in the house.           History of Safety Concerns:  Patient denied a history of homicidal ideation.     Patient denied a history of personal safety concerns.    Patient denied a history of assaultive behaviors.    Patient denied a history of sexual assault behaviors.     Patient denied a history of risk behaviors associated with substance use.  Patient denies any history of high risk behaviors associated with mental health symptoms.  Patient reports the following protective factors: purpose; help seeking behaviors when distressed; adherence with prescribed medication; daily obligations; effective problem solving skills; access to a variety of clinical interventions and pets    Risk Plan:  See Recommendations for Safety and Risk Management Plan    Review of Symptoms per patient report:  Depression: Change in sleep, Change in energy level, Difficulties concentrating, Change in appetite, Irritability and Feeling sad, down, or depressed  Renay:  No Symptoms  Psychosis: No Symptoms  Anxiety: Excessive worry, Nervousness, Physical complaints, such as headaches, stomachaches, muscle tension, Sleep disturbance, Poor concentration, Irritability and stomach issues, IBS, fibromyalgia , rhumetoid arthreitis, and senesory over load with crowds of people    Panic:  No symptoms  Post Traumatic Stress Disorder:  Experienced traumatic event hx of trauma and No Symptoms   Eating Disorder: No Symptoms  ADD / ADHD:  Inattentive, Poor task completion, Poor organizational  skills, Distractibility, Forgetful, Restlessness/fidgety and Hyperactive  Conduct Disorder: No symptoms  Autism Spectrum Disorder: No symptoms  Obsessive Compulsive Disorder: No Symptoms    Patient reports the following compulsive behaviors and treatment history: none.      Diagnostic Criteria:   Generalized Anxiety Disorder  A. Excessive anxiety and worry about a number of events or activities (such as work or school performance).   B. The person finds it difficult to control the worry.  C. Select 3 or more symptoms (required for diagnosis). Only one item is required in children.   - Restlessness or feeling keyed up or on edge.    - Being easily fatigued.    - Difficulty concentrating or mind going blank.    - Irritability.    - Sleep disturbance (difficulty falling or staying asleep, or restless unsatisfying sleep).   D. The focus of the anxiety and worry is not confined to features of an Axis I disorder.  E. The anxiety, worry, or physical symptoms cause clinically significant distress or impairment in social, occupational, or other important areas of functioning.   F. The disturbance is not due to the direct physiological effects of a substance (e.g., a drug of abuse, a medication) or a general medical condition (e.g., hyperthyroidism) and does not occur exclusively during a Mood Disorder, a Psychotic Disorder, or a Pervasive Developmental Disorder. Persistent Depressive Disorder  A. Depressed mood for most of the day, for more days than not, as indicated either by subjective account or observation by others, for at least 2 years. Note: In children and adolescents, mood can be irritable and duration must be at least 1 year.   B. Presence, while depressed, of two (or more) of the following:        - poor appetite or overeating        - insomnia or hypersomnia       - low energy or fatigue        - poor concentration or difficulty making decisions   C. During the 2-year period (1 year for children or adolescents)  of the disturbance, the person has never been without the symptoms in Criteria A and B for more than 2 months at a time.  D. Criteria for a major depressive disorder may be continously present for 2 years  E. There has never been a Manic Episode, a Mixed Episode, or a Hypomanic Episode, and criteria have never been met for Cyclothymic Disorder.   F. The disturbance is not better explained by a persistent schizoaffective disorder, schizophrenia, delusional disorder, or other specified or unspecified schizophrenia spectrum and other psychotic disorder  G. The symptoms are not attributable to the physiological effects of a substance (e.g., a drug of abuse, a medication) or another medical condition (e.g., hypothyroidism).   H. The symptoms cause clinically significant distress or impairment in social, occupational, or other important areas of functioning.        - Early Onset: onset is before age 21 years       Functional Status:  Patient reports the following functional impairments:  academic performance, chronic disease management, management of the household and or completion of tasks and organization.         Clinical Summary:  1. Reason for assessment:  Depression and anxiety.  2. Psychosocial, Cultural and Contextual Factors: pt's family does not believe in medication, pt is a , has an assistantship, health concerns and past trauma.  3. Principal DSM5 Diagnoses  (Sustained by DSM5 Criteria Listed Above):   300.4 (F34.1) Persistent Depressive Disorder, With persistent major depressive episode  300.02 (F41.1) Generalized Anxiety Disorder.  4. Other Diagnoses that is relevant to services:  Rule out ADHD  5. Provisional Diagnosis:  300.4 (F34.1) Persistent Depressive Disorder, With persistent major depressive episode  300.02 (F41.1) Generalized Anxiety Disorder as evidenced by PHQ9, VIC&, and clinical interview.  6. Prognosis: Expect Improvement and Relieve Acute Symptoms.  7. Likely consequences of  symptoms if not treated: worsening mental health.  8. Client strengths include:  caring, educated, employed, goal-focused, good listener, has a previous history of therapy, insightful, intelligent and motivated .     Recommendations:     1. Plan for Safety and Risk Management:   Safety and Risk: Recommended that patient call 911 or go to the local ED should there be a change in any of these risk factors.          Report to child / adult protection services was NA.     2. Patient's identified mental health concerns with a cultural influence will be addressed by CCPS staff.     3. Initial Treatment will focus on:    Depressed Mood - feeling down, low energy, low appetite  Anxiety - worry, stomach problems, trouble concentrating, sleep trouble  Attentional Problems - trouble focusing and staying organized.     4. Resources/Service Plan:    services are not indicated.   Modifications to assist communication are not indicated.   Additional disability accommodations are not indicated.      5. Collaboration:   Collaboration / coordination of treatment will be initiated with the following support professionals:   [facility]      6.  Referrals:   The following referral(s) will be initiated: TBD. Next Scheduled Appointment: TBD.     A Release of Information has been obtained for the following: N/A.     Emergency Contact is listed.     7. CALVIN:    CALVIN:  Discussed the general effects of drugs and alcohol on health and well-being. Provider gave patient printed information about the effects of chemical use on their health and well being. Recommendations:  Continue no use.     8. Records:   These were reviewed at time of assessment.   Information in this assessment was obtained from the medical record and  provided by patient who is a good historian.    Patient will have open access to their mental health medical record.        Provider Name/ Credentials:  Sarika  PAWAN Haq[facility] [place]  September 30, 2022          Answers for HPI/ROS submitted by the patient on 9/29/2022  If you checked off any problems, how difficult have these problems made it for you to do your work, take care of things at home, or get along with other people?: Somewhat difficult  PHQ9 TOTAL SCORE: 6

## 2022-09-30 NOTE — PROGRESS NOTES
Kasandra is a 37 year old who is being evaluated via a billable video visit.      How would you like to obtain your AVS? MyChart  If the video visit is dropped, the invitation should be resent by: Text to cell phone: [phone]  Will anyone else be joining your video visit? No    Briana Loomis VF    Video-Visit Details    Video Start Time: 9:20 AM    Type of service:  Video Visit    Video End Time:9:56 AM    Originating Location (pt. Location): Home    Distant Location (provider location):  [facility] [place] [facility]     Platform used for Video Visit: [facility]                                                              Outpatient Psychiatric Evaluation - Standard Adult    Name:  Kasandra Lau  : 1985    Source of Referral:  Primary Care Provider: JASVIR Mg   Last visit: 2022  Current Psychotherapist: Yes    Identifying Data:  Patient is a 37 year old,    Not  or  female  who presents for initial visit with me.  Patient is currently employed part time and a student. Patient attended the session alone. Consent to communicate signed for no one. Consent for treatment signed and included in electronic medical record. Discussed limits of confidentiality today. My Practice Policy was reviewed and signed.     Patient prefers to be called: Kasandra     Chief Complaint:    Chief Complaint   Patient presents with     MH Treatment Plan         HPI:      Kasandra is a 37-year-old female visiting with me today to talk about medication options for her mental health symptoms.  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  It is hard for her to attend to her writing and she develops anticipatory anxiety around this activity.  She finds it difficult to get started with her projects as well as staying on task.  In 2016 she was diagnosed with ADHD by a resident at the [facility] in [place].  As she could not get into see the  "providers at that clinic she scheduled an appointment with me to review her options.  She currently is taking Wellbutrin and has not tried any other medications.  Taking the Wellbutrin, she feels has helped her depression but not her ADHD symptoms.    With regards to her depression she tells me it is \"always there\".  She denies ever having any low moods that develop into suicide thinking.  She remembers 1 time in college that she could not get out of bed but no other deep depressive episodes.  She is taking Viibryd right now for the depression and she tells me it seems to be working well for her.    With regards to her anxiety she tells me it is \"not stable\".  She identifies antecedents to this as financial stress, not having a social network, and not talking to her family.  She remembers growing up experiencing emotional trauma with constant criticism towards her.  Kasandra sees a therapist regularly to process this.    Kasandra tells me she does not have a lot of leisure time.  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.  She works at  [employer] on the weekends.  Kasandra lives in  housing on campus and lives alone.  She is try to engage herself in [employer] activities but has yet to be selected to be in one of their productions.    Kasandra tells me she does not feel rested when she wakes up from sleeping.  She has a tendency to stay up late as noise during the day as a distraction to her.  She tells me she has a systemic autoimmune disorder with nonalcoholic fatty liver disease and rheumatoid arthritis.  She also suffers from fibromyalgia and endometriosis.  She reports having a chronic hives disorder of unknown etiology but takes doxepin to treat this.    Psychiatric Review of Symptoms:  Depression: Interest: Decrease  Depressed Mood  Sleep: Decrease   Concentration: Decrease  Suicide: No  Ruminations: Increase    PHQ-9 scores:   PHQ-9 SCORE 6/14/2022 " 9/29/2022 9/29/2022   PHQ-9 Total Score MyChart - - 6 (Mild depression)   PHQ-9 Total Score 5 6 6   Some encounter information is confidential and restricted. Go to Review Flowsheets activity to see all data.     Renay:  No symptoms   MDQ Score: Negative Screen  Anxiety: Feeling nervous, anxious, or on edge  Worrying too much about different things    VIC-7 scores:    VIC-7 SCORE 9/21/2021 6/14/2022 9/30/2022   Total Score - - 8 (mild anxiety)   Total Score 13 10 8   Some encounter information is confidential and restricted. Go to Review Flowsheets activity to see all data.     Panic:  No symptoms   Agoraphobia:  No   PTSD:  History of Trauma   OCD:  No symptoms   Psychosis: No symptoms   ADD / ADHD: Task Completion Difficulties  Distractible  Previous Diagnosis  Previous Treatment: Wellbutrin  Gambling or shoplifting: No   Eating Disorder:  No symptoms  Sleep:   Trouble falling asleep     Psychiatric History:   Hospitalizations: None  History of Commitment? No   Past Treatment: counseling, medication(s) from physician / PCP and psychiatry  Suicide Attempts: None  Self-injurious Behavior: Denies  Electroconvulsive Therapy (ECT) or Transcranial Magnetic Stimulation (TMS): No   Genetic Testing: No     Substance Use History:  Current use of drugs or alcohol: Denies   Patient reports no problems as a result of their drinking / drug use.   Based on the clinical interview, there  are not indications of drug or alcohol abuse.  Tobacco use: No  Caffeine: No  Patient has not received chemical dependency treatment in the past  Recovery Programming Involvement: None    Past Medical History:  Past Medical History:   Diagnosis Date     Anemia fall 2016     Chronic fatigue      Depression (emotion)     sees psych, on meds     Gastroesophageal reflux disease      Migraine     daily meds, 2x month, more mild on meds     Neuropathic pain      Panic disorder      Uncomplicated asthma Spring 2018      Surgery:   Past Surgical History:    Procedure Laterality Date     COLONOSCOPY       LAPAROSCOPIC ABLATION ENDOMETRIOSIS N/A 11/09/2016    Procedure: LAPAROSCOPIC ABLATION ENDOMETRIOSIS;  Surgeon: Tonja Ferrer MD;  [facility]: [facility]     LAPAROSCOPIC CYSTECTOMY OVARIAN (BENIGN) Left 11/09/2016    Procedure: LAPAROSCOPIC CYSTECTOMY OVARIAN (BENIGN);  Surgeon: Tonja Ferrer MD;  [facility]     LAPAROSCOPIC TUBAL DYE STUDY Left 11/09/2016    Procedure: LAPAROSCOPIC TUBAL DYE STUDY;  Surgeon: Tonja Ferrer MD;  [facility]     LAPAROSCOPY OPERATIVE ADULT N/A 11/09/2016    Procedure: LAPAROSCOPY OPERATIVE ADULT;  Surgeon: Tonja Ferrer MD;  [facility]     MAMMOPLASTY REDUCTION Bilateral 08/05/2021    Procedure: MAMMOPLASTY, REDUCTION, Bilateral;  Surgeon: ANTONIO Moreno MD;  Location: [facility] OR     ORTHOPEDIC SURGERY      left wrist surgery     TONSILLECTOMY & ADENOIDECTOMY Bilateral     cauterized turbinates also     Allergies:     Allergies   Allergen Reactions     Dust Mites Cough, Difficulty breathing and Shortness Of Breath     Cats      Chest tightness, sinus irritation     Primary Care Provider: [facility]  Seizures or Head Injury: No  Diet: No Restrictions  Food Allergies: No   Exercise: No regular exercise program  Supplements: Reviewed per Electronic Medical Record Today    almotriptan (AXERT) 12.5 MG tablet, Take 1 tablet (12.5 mg) by mouth at onset of headache for migraine (repeat in 2 hours if needed) May repeat in 2 hours. Max 2 tablets/24 hours.  buPROPion (WELLBUTRIN XL) 300 MG 24 hr tablet, Take 1 tablet (300 mg) by mouth every morning  cholecalciferol 50 MCG (2000 UT) CAPS, 5,000 Units every evening   doxepin (SINEQUAN) 10 MG capsule, Take 4 capsules (40 mg) by mouth At Bedtime  EMGALITY 120 MG/ML injection, Inject 1 mL (120 mg) Subcutaneous every 28 days  famotidine (PEPCID) 20 MG tablet, Take 1 tablet (20 mg) by mouth 2 times daily  fexofenadine (ALLEGRA) 180 MG  tablet, Take 1 tablet (180 mg) by mouth every morning  fluticasone (FLONASE) 50 MCG/ACT nasal spray, 2 times daily as needed   gabapentin (NEURONTIN) 400 MG capsule, Take 2 capsules (800 mg) by mouth 3 times daily  hydrocortisone (CORTAID) 1 % external ointment, Apply sparingly to affected area three times daily for 14 days.  hydroxychloroquine (PLAQUENIL) 200 MG tablet, Take 2 tablets (400 mg) by mouth daily Annual Plaquenil toxicity eye screening required.  levocetirizine (XYZAL) 5 MG tablet, Take 5 mg by mouth every evening   montelukast (SINGULAIR) 10 MG tablet, Take 1 tablet (10 mg) by mouth At Bedtime  propranolol (INDERAL) 20 MG tablet, Take 1 tablet (20 mg) by mouth daily as needed (anxiety)  Pseudoephedrine HCl (SUDAFED SINUS CONGESTION PO),   vilazodone (VIIBRYD) 40 MG TABS tablet, Take 1 tablet (40 mg) by mouth every morning  predniSONE (DELTASONE) 5 MG tablet, Take 1 tablet (5 mg) by mouth daily Take 3 tablets by mouth daily for 7 days, then take 2 tablets by mouth daily for 7 days    No current facility-administered medications on file prior to visit.       The [place] Prescription Monitoring Program has been reviewed and there are no concerns about diversionary activity for controlled substances at this time.     Vital Signs:  Vitals: There were no vitals taken for this visit.    Labs:  Most recent laboratory results reviewed and the pertinent results include: ALT 60    Most recent EKG from August 2021 reviewed. QTc interval 415.     Review of Systems:  10 systems (general, cardiovascular, respiratory, eyes, ENT, endocrine, GI, , M/S, neurological) were reviewed. Most pertinent finding(s) is/are: General malaise, no chest pain, being treated for hives condition, some headache pain. The remaining systems are all unremarkable.  Family History:   Patient reported family history includes:   Family History   Problem Relation Age of Onset     Diabetes Maternal Grandfather      Hypertension No family hx  of      Coronary Artery Disease No family hx of      Hyperlipidemia No family hx of      Cerebrovascular Disease No family hx of      Breast Cancer No family hx of      Colon Cancer No family hx of      Prostate Cancer No family hx of      Other Cancer No family hx of      Glaucoma No family hx of      Macular Degeneration No family hx of      Mental Illness History: Unknown  Substance Abuse History: Unknown  Suicide History: Unknown  Medications: Unknown     Social History:     See [facility] assessment for further information    Childhood: never felt safe, fear and anxiety with living situation, [place]   School easy.    Siblings: Yes  Highest education level was graduate school.   Employment History: Yes  Childhood illnesses: Denies  Current Living situation: [place], [place] with her dog. Feels safe at home.  Children: None  Firearms/Weapons Access: No: Patient denies   Service: No    Mental Status Examination:     Appearance:  awake, alert and casually dressed  Attitude:  cooperative   Eye Contact:  adequate  Gait and Station: No assistive Devices used and No dizziness or falls  Psychomotor Behavior:  intact station, gait and muscle tone  Oriented to:  time, person, and place  Attention Span and Concentration:  Normal  Speech:  clear, coherent and Speaks: English  Mood:  anxious and depressed  Affect:  mood congruent  Associations:  no loose associations  Thought Process:  goal oriented  Thought Content:  no evidence of suicidal ideation or homicidal ideation, no auditory hallucinations present and no visual hallucinations present  Recent and Remote Memory:  intact Not formally assessed. No amnesia.  Fund of Knowledge: appropriate  Insight:  good  Judgment:  intact  Impulse Control:  intact    Suicide Risk Assessment:  Today Kasandra Lau reports no thoughts to harm self or others. In addition, there are notable risk factors for self-harm, including single status,  anxiety and withdrawing. However, risk is mitigated by history of seeking help when needed, future oriented, denies suicidal intent or plan and denies homicidal ideation, intent, or plan. Therefore, based on all available evidence including the factors cited above, Kasandra Lau does not appear to be at imminent risk for self-harm, does not meet criteria for a 72-hr hold, and therefore remains appropriate for ongoing outpatient level of care.  A thorough assessment of risk factors related to suicide and self-harm have been reviewed and are noted above. The patient convincingly denies acute suicidality on several occasions. Local community safety resources reviewed and printed for patient to use if needed. There was no deceit detected, and the patient presented in a manner that was believable.     DSM5  Diagnosis:  Attention-Deficit/Hyperactivity Disorder  314.01 (F90.8) Other Specified Attention-Deficit / Hyperactiity Disorder  296.32 (F33.1) Major Depressive Disorder, Recurrent Episode, Moderate _, With mixed features and With seasonal pattern    Medical Comorbidities Include:   Patient Active Problem List    Diagnosis Date Noted     Keratosis pilaris 06/14/2022     Priority: Medium     Chronic sinusitis, unspecified location 09/21/2021     Priority: Medium     Hypertrophy of breast 09/10/2020     Priority: Medium     Added automatically from request for surgery [id number]       Pap smear abnormality of cervix/human papillomavirus (HPV) positive 09/03/2020     Priority: Medium     [id number] NILM HPV  Positive for High Risk HPV types other than 16 or 18 (Care Everywhere)  2020 NILM HPV negative 34 y.o.  PLAN, per ASCCP Guidelines: cotest 1/2023       Cholecystitis 07/16/2020     Priority: Medium     Added automatically from request for surgery [id number]       Chronic idiopathic urticaria 06/22/2020     Priority: Medium     Hx of abnormal cervical Pap smear 02/03/2020     Priority: Medium     Acid reflux 08/25/2019      Priority: Medium     Need for desensitization to allergens 06/06/2019     Priority: Medium     Formatting of this note might be different from the original.    Allergy Shot Care Plan for Kasandra Lau  Date of Order: June 6 2019  Ordering Provider: Dr. Martin Cervantes     Serum 1: Cat  Date Shots Started:  Start Dose: 0.1 mL of 1:100,000 - GREEN  Date Maintenance Reached:  Maintenance Dose: 0.3 mL of 1:100 - RED    Serum 2: Mite DF and Mite DP  Date Shots Started:  Start Dose: 0.1 mL of 1:100,000 - GREEN  Date Maintenance Reached:  Maintenance Dose: 0.1 mL of 1:100 - RED    BUILDING SCHEDULE FOR POLLEN AND NONPOLLEN   IMMUNOTHERAPY  EXCEPT VENOM AND CENTER AL    3 - 14 days Build per schedule.   15 - 21 days Repeat previous dose.   22 - 28 days Decrease by one dose.   29 - 35 days Decrease by two doses.   36 - 42 days Decrease by three doses.   Over 42 days Continue to decrease by one dose for each additional week.     1:100,000 (green)  1:10,000 (blue)  1:1000 (yellow)  1:100 (red)     1. 0.05 ml  7. 0.05 ml  13. 0.05 ml  19. 0.05 ml   2. 0.1 ml  8. 0.1 ml  14. 0.1 ml  20. 0.1 ml   3. 0.2 ml  9. 0.2 ml  15. 0.2 ml  21. 0.15 ml   4. 0.3 ml  10. 0.3 ml  16. 0.3 ml  22. 0.2 ml   5. 0.4 ml  11. 0.4 ml  17. 0.4 ml  23. 0.25 ml   6. 0.45 ml  12. 0.45 ml  18. 0.45 ml  24. 0.3 ml         25   0.35 ml         26   0.4 ml         27. 0.45 ml         28. 0.5 ml     MAINTENANCE SCHEDULE FOR POLLENS, NONPOLLENS (MITES,MOLD,CAT,DOG)  (not used for Center-AL Pollens)    ? When reaching maintenance dose for the first time, gradually stretch by weekly intervals to every 4 weeks (e.g., every 2 weeks, then 3 weeks, then 4 weeks).   ? Patient may receive their injections (patient choice) at 2-4 week intervals     Maintenance Repeat 1 Dose 2 Dose 3 Dose    Q2 weeks  (10-14 days)  3 weeks  (15-21 days) 4 weeks  (22-28 days) 5 weeks  (29-35 days) 6 weeks  (36-42 days) Decrease by 1 Dose for each additional week   Q3 weeks  (15-21 days)  4  weeks  (22-28 days) 5 weeks  (29-35 days) 6 weeks  (36-42 days) 7 weeks  (43-49 days)    Q4 weeks  (22-28 days)  5 weeks  (29-35 days) 6 weeks  (36-42 days) 7 weeks  (43-49 days) 8 weeks  (50-56 days)      Susanna Ambrose RN 6/6/2019 4:49 PM       Mild intermittent asthma without complication 04/30/2018     Priority: Medium     Seasonal mood disorder (H) 11/15/2017     Priority: Medium     Circadian rhythm sleep disorder, delayed sleep phase type 05/10/2017     Priority: Medium     Unhealthy sleep habit 05/10/2017     Priority: Medium     Vitamin D deficiency 11/19/2016     Priority: Medium     Menorrhagia with regular cycle 11/19/2016     Priority: Medium     Migraine without aura and without status migrainosus, not intractable 11/19/2016     Priority: Medium     Iron deficiency anemia due to chronic blood loss 11/19/2016     Priority: Medium     Tired 11/19/2016     Priority: Medium     Irritable bowel syndrome with constipation 10/25/2016     Priority: Medium     Recurrent major depressive disorder, in remission (H) 10/25/2016     Priority: Medium     Pelvic pain in female 08/25/2016     Priority: Medium     Migraine headache 01/01/2012     Priority: Medium     Panic disorder 01/01/2007     Priority: Medium     Depression 01/01/2004     Priority: Medium       The Patient Activation Measure (BRYSON) score was completed and recorded in EPIC. This assesses patient knowledge, skill, and confidence for self-management. No flowsheet data found.             Impression:  Kasandra Lau met with me today to discuss medication options to better manage her ADHD symptoms.  She had been diagnosed in 2016 and was started on Wellbutrin which she continues today.  She reports this helps manage her depression symptoms but does not seem to decrease her poor focus and concentration.  She also reported becoming easily distracted.  I added Strattera 25 mg daily to try and address the symptoms.  She finds the Viibryd helpful in  managing her depression symptoms, something she has had for many years.  Doxepin is available at bedtime to help alleviate a hive condition that she has which is of unknown etiology.  Gabapentin has been ordered at 800 mg 3 times daily to help with her chronic pain symptoms as well as anxiety.    Medication side effects and alternatives reviewed. Health promotion activities recommended and reviewed today. All questions addressed. Education and counseling completed regarding risks and benefits of medications and psychotherapy options. Collaborative Care Psychiatry Service model reviewed today. Recommend therapy for additional support.     Treatment Plan:     1.  Add Strattera 25 mg daily  2.  Wellbutrin  mg daily  3.  Viibryd 40 mg daily  4.  Doxepin 10 mg at bedtime for hives  5.  Continue talk therapy to help you process life adjustments and changes  6.  Gabapentin 800 mg 3 times daily      Continue all other medical directions per primary care provider.     Continue all other medications as reviewed per electronic medical record today.     Safety plan reviewed. To the Emergency Department as needed or call after hours crisis line at [phone] or [phone]. [place] Crisis Text Line: Text [place] to 921514  or  Suicide LifeLine Chat: suicidepreventionlifeline.org/chat/    To schedule individual or family therapy, call [facility] at [phone].     Schedule an appointment with me in November or sooner as needed.  Call [facility] at [phone] to schedule.    Follow up with primary care provider as planned or for acute medical concerns.    Call the psychiatric nurse line with medication questions or concerns at [phone].    [employer] may be used to communicate with your provider, but this is not intended to be used for emergencies.    Crisis Resources:    National Suicide Prevention Lifeline: 925.329.8833 (TTY: 239.711.4727). Call anytime for help.   (www.suicidepreventionlifeline.org)  National [place] on Mental Illness (www.elver.org): 232-766-9308 or 683-336-3468.   Mental Health Association (www.mentalhealth.org): 978.655.2165 or 072-823-9299.  [place] Crisis Text Line: Text [place] to 460406  Suicide LifeLine Chat: suicidepreMediaCrossing Inc..org/chat    Administrative Billing:   Time spent with patient included counseling and coordination of care regarding above diagnoses and treatment plan.    Patient Status:  Patient will continue to be seen for ongoing consultation and stabilization.    Signed:   OUSMANE Fitzgerald-BC   Psychiatry

## 2022-09-30 NOTE — PATIENT INSTRUCTIONS
1.  Add Strattera 25 mg daily  2.  Wellbutrin  mg daily  3.  Viibryd 40 mg daily  4.  Doxepin 10 mg at bedtime for hives  5.  Continue talk therapy to help you process life adjustments and changes  6.  Gabapentin 800 mg 3 times daily    Continue all other medical directions per primary care provider.   Continue all other medications as reviewed per electronic medical record today.   Safety plan reviewed. To the Emergency Department as needed or call after hours crisis line at [phone] or [phone]. [place] Crisis Text Line: Text [place] to 323430  or  Suicide LifeLine Chat: suicideCOARE Biotechnology.org/chat/  To schedule individual or family therapy, call [facility] at [phone].   Schedule an appointment with me in November or sooner as needed.  Call [facility] at [phone] to schedule.  Follow up with primary care provider as planned or for acute medical concerns.  Call the psychiatric nurse line with medication questions or concerns at [phone].  [employer] may be used to communicate with your provider, but this is not intended to be used for emergencies.    Crisis Resources:    National Suicide Prevention Lifeline: 726.913.6945 (TTY: 559.449.1541). Call anytime for help.  (www.suicidepreventionlifeline.org)  National [place] on Mental Illness (www.elver.org): 254.448.8259 or 576-346-3675.   Mental Health Association (www.mentalhealth.org): 522.773.4036 or 892-794-1969.  [place] Crisis Text Line: Text [place] to 017920  Suicide LifeLine Chat: suicideCOARE Biotechnology.org/chat

## 2022-10-04 ENCOUNTER — VIRTUAL VISIT (OUTPATIENT)
Dept: ENDOCRINOLOGY | Facility: CLINIC | Age: 37
End: 2022-10-04
Payer: COMMERCIAL

## 2022-10-04 VITALS — BODY MASS INDEX: 28.88 KG/M2 | HEIGHT: 69 IN | WEIGHT: 195 LBS

## 2022-10-04 DIAGNOSIS — E66.09 CLASS 1 OBESITY DUE TO EXCESS CALORIES WITH SERIOUS COMORBIDITY AND BODY MASS INDEX (BMI) OF 30.0 TO 30.9 IN ADULT: Primary | ICD-10-CM

## 2022-10-04 DIAGNOSIS — E66.09 CLASS 1 OBESITY DUE TO EXCESS CALORIES WITH SERIOUS COMORBIDITY AND BODY MASS INDEX (BMI) OF 30.0 TO 30.9 IN ADULT: ICD-10-CM

## 2022-10-04 DIAGNOSIS — Z71.3 NUTRITIONAL COUNSELING: Primary | ICD-10-CM

## 2022-10-04 DIAGNOSIS — E66.811 CLASS 1 OBESITY DUE TO EXCESS CALORIES WITH SERIOUS COMORBIDITY AND BODY MASS INDEX (BMI) OF 30.0 TO 30.9 IN ADULT: ICD-10-CM

## 2022-10-04 DIAGNOSIS — E66.811 CLASS 1 OBESITY DUE TO EXCESS CALORIES WITH SERIOUS COMORBIDITY AND BODY MASS INDEX (BMI) OF 30.0 TO 30.9 IN ADULT: Primary | ICD-10-CM

## 2022-10-04 PROCEDURE — 99203 OFFICE O/P NEW LOW 30 MIN: CPT | Mod: 95 | Performed by: INTERNAL MEDICINE

## 2022-10-04 PROCEDURE — 97802 MEDICAL NUTRITION INDIV IN: CPT | Mod: 95 | Performed by: DIETITIAN, REGISTERED
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
## 2022-10-04 NOTE — PATIENT INSTRUCTIONS
"Pritesh Slaughter,    Follow-up with RD in 1 month.    Thank you,    Ana Pillai, RD, LD  If you would like to schedule or reschedule an appointment with the RD, please call [phone]    Nutrition Goals  1) Increase lean protein with lunch - palm-size pc of lean meat, tuna salad, 2 cheese sticks, serving of yogurt, 2 hard boiled egg, etc.    2) Increase fiber:    - Add fruit and nuts/seeds to yogurt with breakfast   - Add serving of veggies to lunch     Part-skim cheese sticks  Low-fat Greek/Pakistani yogurt  Lean deli meat  Hard boiled eggs   Canned beans   Pre-cooked meat      The Plate Method  Http://www.basno/[email or web address]    https://www.cdc.gov/diabetes/images/managing/Diabetes-Manage-Eat-Well-Plate-Graphic_600px.jpg    Protein Sources   http://basno/[email or web address]     Carbohydrates  http://fvfiles.[email or web address]     Summary of Volumetrics Eating Plan  [email or web address]     Healthy Recipe Resources:  \"The Volumetrics Eating Plan\" by Lizzette Robles, Ph.D.  https://www.GraffitiGeo/  www.PhotoShelter  www.[employer]Touch [employer].org  Dash Diet Recipes [facility]  www.extension.[facility] - the recipe box  \"Cooking that Counts\" by editors of [employer]  https://www.[facility]/communityculinary/[facility]_Cookbook_KT_Final_11-6_NoCrops-compressed.pdf  Https://www.choosemyplate.gov/myplatekitchen  https://snaped.fns.usda.gov/recipes-menus - SNAP recipes  https://www.diabetesfoodhub.org/all-recipes.html  Https://www.mealime.com/  \"Calorie Smart Meals\" cookbook by [employer] (200-500 calorie meals)  https://www.[employer].com/recipes/20715/cuisines-regions//  Budget friendly high-protein recipes: https://www.Serviceful/gallery/6997906/dietitian-budget-high-protein-dinner-recipes/?nqsoj=96i9b873-[id number]#[id number]  https://www.[facility]itt.[facility].org/knowledge-center/recipes/    Interested in working with a health ?  Do you know what you are supposed " to do, but you just aren't doing it?  Then, HEALTH COACHING may help you!   Get unstuck and move forward with the support of a specially trained, National Board Certified Health  who uses evidence based approaches to help you move forward with healthy lifestyle changes.    Health Coaches help you identify goals that will work best for you. Health Coaches provide support and encouragement with overcoming barriers and help you to find inspiration and motivation to lead a healthy lifestyle.    Book a Health Coaching 3 Pack; Three 30 minute Health Coaching Visits, for $99  Visits are done virtually (phone or video)  This is a self pay service; we do not accept insurance for garret coaching.    To learn more about Health Coaching and if it's a good fit for you schedule a free Health  Q&A appointment - call [phone]      COMPREHENSIVE WEIGHT MANAGEMENT PROGRAM  VIRTUAL SUPPORT GROUPS    For Support Group Information:      We offer support groups for patients who are working on weight loss and considering, preparing for or have had weight loss surgery.   There is no cost for this opportunity.  You are invited to attend the?Virtual Support Groups?provided by any of the following locations:    [facility] via [employer] Teams with Frannie Bowman RN  2.   [place] via [employer] Teams with Errol Boss, PhD, LP  3.   [place] [employer] with Marilu Wild RN  4.   [facility] via [employer] Teams with Marilu Leonardo [facility]    The following Support Group information can also be found on our website:  https://www.[facility].org/treatments/weight-loss-surgery-support-groups      [facility] Weight Loss Surgery Support Group    [facility] Weight Loss Surgery Support Group  The support group is a patient-lead forum that meets monthly to share experiences, encouragement and education. It is open to those who have had weight loss surgery, are scheduled for surgery, and those  "who are considering surgery.   WHEN: This group meets on the 3rd Wednesday of each month from 5:00PM - 6:00PM virtually using Microsoft Teams.   FACILITATOR: Led by Frannie Bowman RD, LD, RN, the program's Clinical Coordinator.   TO REGISTER: Please contact the clinic via [employer] or call the nurse line directly at [phone] to inform our staff that you would like an invite sent to you and to let us know the email you would like the invite sent to. Prior to the meeting, a link with directions on how to join the meeting will be sent to you.    2022 Meetings  January 19: \"Let's Talk\" a time for the group to share.  February 16: \"Let's Talk\" a time for the group to share.  March 16: Guest Speakers: Psychologists, Lorna Lucero, PhD,LP and Alexa Duff PsyD,  April 20: Guest Speaker: Health , Sheri Moctezuma, [facility],CHES, [facility]  May 18: Guest Speaker: DietitianDennys, TIMBO, LP  Josiane 15: \"Let's Talk\" a time for the group to share.  July 20: \"Let's Talk\" a time for the group to share.  August 17: TBA  September 21: TBA  October 19: Guest Speaker: Dr Olivier Edmonds MD Pulmonologist and Sleep Medicine Physician, \"Getting a Good Night's Sleep\".  November 16: TBA  December 21: TBA    [facility] Support Groups    Connections: Bariatric Care Support Group?  This is open to all [facility] (and those external to this program) pre- and post- operative bariatric surgery patients as well as their support system.   WHEN: This group meets the 2nd Tuesday of each month from 6:30 PM - 8:00 PM virtually using Microsoft Teams.   FACILITATOR: Led by Errol Boss, Ph.D who is a Licensed Psychologist with the [facility] Comprehensive Weight Management Program.   TO REGISTER: Please send an email to Errol Boss, Ph.D., LP at?[email or web address]?if you would like an invitation to the group and to learn about using Microsoft Teams.    2022 Meetings January 11: [place] " "Bartolome, PharmD, Pharmacy Resident at [facility], \"Medications and Bariatric Surgery\".  February 8: Open Forum  March 8  April 12  May 10  Josiane 14    Connections: Post-Operative Bariatric Surgery Support Group  This is a support group for [facility] bariatric patients (and those external to [facility]) who have had bariatric surgery and are at least 3 months post-surgery.  WHEN: This support group meets the 4th Wednesday of the month from 11:00 AM - 12:00 PM virtually using Microsoft Teams.   FACILITATOR: Led by Certified Bariatric Nurse, Marilu Wild RN.   TO REGISTER: Please send an email to Marilu at [email or web address] if you would like an invitation to the group and to learn about using Microsoft Teams.    2022 Meetings  January 26  February 23  March 23  April 27  May 25  Josiane 22    [facility] Healthy Lifestyle Virtual Support Group    Healthy Lifestyle Virtual Support Group?  This is 60 minutes of small group guided discussion, support and resources. All are welcome who want a healthy lifestyle.  WHEN: This group meets monthly on a Friday from 12:30 PM - 1:30 PM virtually using Microsoft Teams.   FACILITATOR: Led by National Board Certified Health and , Marilu Leonardo [facility].   TO REGISTER: Please send an email to Marilu [email or web address] to receive monthly invites to the group or if you have any questions about having a health .  Prior to the meeting, a link with directions on how to join the meeting will be sent to you.    2022 Meetings  MAY 20: OPEN FORUM  JUNE: 24:  Setting Limits and BoundariesMarilu  July 29: OPEN FORUM  August 26: Special Guest Registered Dietician Sosa Gillette  Sept 30: OPEN FORUM  Oct 28, Kasandra Moreland National Board Certified Health   Nov 18: Navigating How to Eat Around the Holidays with TIMBO Magaña  Dec 16: Changing your relationship with movement with  Sheri, " National Board Certified Health
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
## 2022-10-04 NOTE — NURSING NOTE
Chief Complaint   Patient presents with     Consult     New consultation for weight management.         Vitals:    10/04/22 0906   Weight: 195 lb       Body mass index is 29.22 kg/m .      Moni Robles, EMT  [facility]

## 2022-10-04 NOTE — PROGRESS NOTES
"Kasandra Lau is a 37 year old female who is being evaluated via a billable video visit.      The patient has been notified of following:     \"This video visit will be conducted via a call between you and your physician/provider. We have found that certain health care needs can be provided without the need for an in-person physical exam.  This service lets us provide the care you need with a video conversation.  If a prescription is necessary we can send it directly to your pharmacy.  If lab work is needed we can place an order for that and you can then stop by our lab to have the test done at a later time.    Video visits are billed at different rates depending on your insurance coverage.  Please reach out to your insurance provider with any questions.    If during the course of the call the physician/provider feels a video visit is not appropriate, you will not be charged for this service.\"    Patient has given verbal consent for Video visit? Yes  How would you like to obtain your AVS? MyChart  Will anyone else be joining your video visit? No  {If patient encounters technical issues they should call [phone]      Video-Visit Details    Type of service:  Video Visit    Video Start Time: 9:58 AM  Video End Time: 10:34 AM    Originating Location (pt. Location): Home    Distant Location (provider location):  [facility] [place]     Platform used for Video Visit: MyFeelBack    During this virtual visit the patient is located in [place], patient verifies this as the location during the entirety of this visit.     New Weight Management Nutrition Consultation    Kasandra Lau is a 37 year old female presents today for new weight management nutrition consultation.  Patient referred by Dr. Kerri Evans on October 4, 2022.    Patient with Co-morbidities of obesity including:  Type II DM no  Renal Failure no  Sleep apnea no  Hypertension no   Dyslipidemia yes  Joint pain no  Back pain " "yes  GERD yes   Fatty liver yes    PMH also significant for anxiety, depression, ADHD, PTSD, rheumatoid arthritis, and IBS-C.  H/o cholcystectomy     Anthropometrics:  Estimated body mass index is 29.22 kg/m  as calculated from the following:    Height as of an earlier encounter on 10/4/22: 1.74 m (5' 8.5\").    Weight as of an earlier encounter on 10/4/22: 88.5 kg (195 lb).    Medications for Weight Loss:  Ozempic prescribed by MD today    NUTRITION HISTORY  Food allergies: None  Food intolerances: Too much dairy. Peppers, onions and garlic.   Supplements: Vitamin D, daily multivitamin   Previous methods of diet modification for weight loss: Cutting down high-sugar (baked goods, ice cream) and high-fat foods, cutting 500 calories per day. Tried Noom, did not like it.   Currently she is keeping track of nutritional intake via [employer], originally aimed for 1650 calories/day, however after a few weeks of not losing weight, decreased goal to 1500 calories/day. Has been following this goal for a couple weeks and has not seen any weight loss.     Recent food recall:  If you do eat breakfast, what types of food do you eat? fruit, pb&j, tea/coffee, yogurt   Do you typically eat lunch? Yes   If you do eat lunch, what types of food do you typically eat?  sandwich, frozen meals, salads, sweet potatoes   Do you typically eat supper? No   If you do eat supper, what types of food do you typically eat?    Do you typically eat snacks? Yes   If you do snack, what types of food do you typically eat? protein bars, fruit, lentil rings, plantain chips, chocolate     Beverages: Water, tea (oolong or herbal), drinking 2+L/day  Alcohol: Rarely      Physical Activity:  Walking dog 30 mins per day.    Nutrition Prescription  Recommended energy/nutrient modification.  5745-6479 calories/day    Nutrition Diagnosis  Obesity r/t long history of positive energy balance aeb BMI >30.    Nutrition Intervention  Materials/education provided on " "hypocaloric diet for weight loss. Discussed 9416-7844 calorie/day diet (based on [place] Jeor estimate for REE and estimated 1 lb wt loss per week), Volumetric eating to help satiety level on fewer calories; portion control and healthy food choices (Plate Method and Volumetrics handouts),  meal and snack planning and websites, and mindful eating habits.  Discussed healthy eating habits for mgmt of high cholesterol and fatty liver.  Discussed quick/convenient meal ideas.   Co-developed goals to work towards.   Provided pt with list of goals and resources below via Corporam[employer]art.    Patient demonstrates understanding.    Expected Engagement: good  Follow-Up Plans: Calorie tracking     Nutrition Goals  1) Increase lean protein with lunch - palm-size pc of lean meat, tuna salad, 2 cheese sticks, serving of yogurt, 2 hard boiled egg, etc.    2) Increase fiber:    - Add fruit and nuts/seeds to yogurt with breakfast   - Add serving of veggies to lunch     Part-skim cheese sticks  Low-fat Greek/Mauritanian yogurt  Lean deli meat  Hard boiled eggs   Canned beans   Pre-cooked meat      The Plate Method  Http://www.Datactics/[email or web address]    https://www.cdc.gov/diabetes/images/managing/Diabetes-Manage-Eat-Well-Plate-Graphic_600px.jpg    Protein Sources   http://[email or web address]     Carbohydrates  http://[email or web address]     Summary of Volumetrics Eating Plan  [email or web address]://[email or web address]     Healthy Recipe Resources:  \"The Volumetrics Eating Plan\" by Lizzette Robles, Ph.D.  https://www.[place].Who Works Around You/  www.[employer].Who Works Around You  www.Run My Errands.org  Dash Diet Recipes [facility]  www.extension.[facility] - the recipe box  \"Cooking that Counts\" by editors of CookingLight  https://www.[facility]/communityculinary/[facility]_Cookbook_KT_Final_11-6_NoCrops-compressed.pdf  Https://www.Obviousideamyplate.gov/myplatekitchen  https://snaped.fns.usda.gov/recipes-menus - SNAP " "recipes  https://www.diabetesfoodhub.org/all-recipes.html  Https://www.fivesquids.co.uk.com/  \"Calorie Smart Meals\" cookbook by [employer] (200-500 calorie meals)  https://www.Coinfloor/recipes/12990/cuisines-regions//  Budget friendly high-protein recipes: https://www.Coinfloor/gallery/5477729/dietitian-budget-high-protein-dinner-recipes/?sfxvd=63s1n860-yy03-4o74-uf36-u08mp05186p4#06s4o779-bi11-7k34-[id number]  https://www.firstnations.org/knowledge-center/recipes/    Follow-Up:  1 month, PRN    Time spent with patient: 36 minutes.  Ana Pillai RD, LD        "

## 2022-10-04 NOTE — PATIENT INSTRUCTIONS
-Start Ozempic 0.25 mg weekly for 2 weeks then increase to 0.5 mg weekly for 1 month and then increase to 1.0 mg weekly thereafter      See Lauren Bloch, PharmD in 6 week(s)  See Dr.Tasma VALADEZ in 3 month(s)    If you have any questions, please do not hesitate to call Weight management clinic at [phone] or [phone].  If you need to fax, please fax to [phone].    Sincerely,    Kerri Evans MD  Endocrinology

## 2022-10-04 NOTE — PROGRESS NOTES
Virtual Visit Check-In    During this virtual visit the patient is located in [place], patient verifies this as the location during the entirety of this visit.     Kasandra is a 37 year old who is being evaluated via a billable video visit.      How would you like to obtain your AVS? MyChart  If the video visit is dropped, the invitation should be resent by: Text to cell phone: [phone]  Will anyone else be joining your video visit? No        Video-Visit Details    Type of service:  Video Visit    Originating Location (pt. Location): Home    Distant Location (provider location):  [facility] [place]     Platform used for Video Visit: Chad Robles NREMT

## 2022-10-04 NOTE — LETTER
"10/4/2022       RE: Kasandra Lau  [address]  [facility] [address]     Dear Colleague,    Thank you for referring your patient, Kasandra Lau, to the [facility] [place] at [facility]. Please see a copy of my visit note below.    Kasandra Lau is a 37 year old female who is being evaluated via a billable video visit.      The patient has been notified of following:     \"This video visit will be conducted via a call between you and your physician/provider. We have found that certain health care needs can be provided without the need for an in-person physical exam.  This service lets us provide the care you need with a video conversation.  If a prescription is necessary we can send it directly to your pharmacy.  If lab work is needed we can place an order for that and you can then stop by our lab to have the test done at a later time.    Video visits are billed at different rates depending on your insurance coverage.  Please reach out to your insurance provider with any questions.    If during the course of the call the physician/provider feels a video visit is not appropriate, you will not be charged for this service.\"    Patient has given verbal consent for Video visit? Yes  How would you like to obtain your AVS? MyChart  Will anyone else be joining your video visit? No  {If patient encounters technical issues they should call [phone]      Video-Visit Details    Type of service:  Video Visit    Video Start Time: 9:58 AM  Video End Time: 10:34 AM    Originating Location (pt. Location): Home    Distant Location (provider location):  [facility] [place]     Platform used for Video Visit: WiLinx    During this virtual visit the patient is located in [place], patient verifies this as the location during the entirety of this visit.     New Weight Management Nutrition Consultation    Kasandra Lau is a 37 " "year old female presents today for new weight management nutrition consultation.  Patient referred by Dr. Kerri Evans on October 4, 2022.    Patient with Co-morbidities of obesity including:  Type II DM no  Renal Failure no  Sleep apnea no  Hypertension no   Dyslipidemia yes  Joint pain no  Back pain yes  GERD yes   Fatty liver yes    PMH also significant for anxiety, depression, ADHD, PTSD, rheumatoid arthritis, and IBS-C.  H/o cholcystectomy     Anthropometrics:  Estimated body mass index is 29.22 kg/m  as calculated from the following:    Height as of an earlier encounter on 10/4/22: 1.74 m (5' 8.5\").    Weight as of an earlier encounter on 10/4/22: 88.5 kg (195 lb).    Medications for Weight Loss:  Ozempic prescribed by MD today    NUTRITION HISTORY  Food allergies: None  Food intolerances: Too much dairy. Peppers, onions and garlic.   Supplements: Vitamin D, daily multivitamin   Previous methods of diet modification for weight loss: Cutting down high-sugar (baked goods, ice cream) and high-fat foods, cutting 500 calories per day. Tried Noom, did not like it.   Currently she is keeping track of nutritional intake via [employer], originally aimed for 1650 calories/day, however after a few weeks of not losing weight, decreased goal to 1500 calories/day. Has been following this goal for a couple weeks and has not seen any weight loss.     Recent food recall:  If you do eat breakfast, what types of food do you eat? fruit, pb&j, tea/coffee, yogurt   Do you typically eat lunch? Yes   If you do eat lunch, what types of food do you typically eat?  sandwich, frozen meals, salads, sweet potatoes   Do you typically eat supper? No   If you do eat supper, what types of food do you typically eat?    Do you typically eat snacks? Yes   If you do snack, what types of food do you typically eat? protein bars, fruit, lentil rings, plantain chips, chocolate     Beverages: Water, tea (oolong or herbal), drinking " "2+L/day  Alcohol: Rarely      Physical Activity:  Walking dog 30 mins per day.    Nutrition Prescription  Recommended energy/nutrient modification.  5108-0002 calories/day    Nutrition Diagnosis  Obesity r/t long history of positive energy balance aeb BMI >30.    Nutrition Intervention  Materials/education provided on hypocaloric diet for weight loss. Discussed 1060-8268 calorie/day diet (based on [place] Jeor estimate for REE and estimated 1 lb wt loss per week), Volumetric eating to help satiety level on fewer calories; portion control and healthy food choices (Plate Method and Volumetrics handouts),  meal and snack planning and websites, and mindful eating habits.  Discussed healthy eating habits for mgmt of high cholesterol and fatty liver.  Discussed quick/convenient meal ideas.   Co-developed goals to work towards.   Provided pt with list of goals and resources below via [employer].    Patient demonstrates understanding.    Expected Engagement: good  Follow-Up Plans: Calorie tracking     Nutrition Goals  1) Increase lean protein with lunch - palm-size pc of lean meat, tuna salad, 2 cheese sticks, serving of yogurt, 2 hard boiled egg, etc.    2) Increase fiber:    - Add fruit and nuts/seeds to yogurt with breakfast   - Add serving of veggies to lunch     Part-skim cheese sticks  Low-fat Greek/Pashto yogurt  Lean deli meat  Hard boiled eggs   Canned beans   Pre-cooked meat      The Plate Method  Http://www.Buzzmetrics/485394.pdf    https://www.cdc.gov/diabetes/images/managing/Diabetes-Manage-Eat-Well-Plate-Graphic_600px.jpg    Protein Sources   http://Buzzmetrics/506117.pdf     Carbohydrates  http://fvfiles.com/006592.pdf     Summary of Volumetrics Eating Plan  http://[email or web address].[email or web address]     Healthy Recipe Resources:  \"The Volumetrics Eating Plan\" by Lizzette Robles, Ph.D.  https://www.[employer].Flaviar/  www.SKURA  www.Modria.org  Dash Diet Recipes [facility]  www.extension.[facility] - the " "recipe box  \"Cooking that Counts\" by editors of CookingLight  https://www.[facility] CenterculinCambridge/[facility]_Cookbook_KT_Final_11-6_NoCrops-compressed.pdf  Https://www.Medallion Analytics Softwaremyplate.gov/myplatekitchen  https://snaped.fns.usda.gov/recipes-menus - SNAP recipes  https://www.diabetesfoodhub.org/all-recipes.html  Https://www.mealime.com/  \"Calorie Smart Meals\" cookbook by [employer] (200-500 calorie meals)  https://www.kabuku.[employer]/recipes/06575/cuisines-regions//  Budget friendly high-protein recipes: https://www.Positron/gallery/0427333/dietitian-budget-high-protein-dinner-recipes/?qbhtf=[id number]  https://www.[facility].org/knowledge-center/recipes/    Follow-Up:  1 month, PRN    Time spent with patient: 36 minutes.  Ana Pillai RD, LD      "

## 2022-10-05 ENCOUNTER — TELEPHONE (OUTPATIENT)
Dept: ENDOCRINOLOGY | Facility: CLINIC | Age: 37
End: 2022-10-05

## 2022-10-05 NOTE — TELEPHONE ENCOUNTER
Pt called and stated her insurance told her no weight loss medications are covered under her plan. Would like to know what to do going forward. Requested Goodzert message.

## 2022-10-06 ENCOUNTER — TELEPHONE (OUTPATIENT)
Dept: ENDOCRINOLOGY | Facility: CLINIC | Age: 37
End: 2022-10-06

## 2022-10-06 ENCOUNTER — VIRTUAL VISIT (OUTPATIENT)
Dept: PSYCHOLOGY | Facility: CLINIC | Age: 37
End: 2022-10-06
Payer: COMMERCIAL

## 2022-10-06 DIAGNOSIS — F54 PSYCHOLOGICAL AND BEHAVIORAL FACTORS ASSOCIATED WITH DISORDERS OR DISEASES CLASSIFIED ELSEWHERE: ICD-10-CM

## 2022-10-06 DIAGNOSIS — F34.1 PERSISTENT DEPRESSIVE DISORDER: ICD-10-CM

## 2022-10-06 DIAGNOSIS — F43.12 CHRONIC POST-TRAUMATIC STRESS DISORDER (PTSD): Primary | ICD-10-CM

## 2022-10-06 DIAGNOSIS — F43.23 ADJUSTMENT DISORDER WITH MIXED ANXIETY AND DEPRESSED MOOD: ICD-10-CM

## 2022-10-06 PROCEDURE — 90837 PSYTX W PT 60 MINUTES: CPT | Mod: 95 | Performed by: PSYCHOLOGIST

## 2022-10-06 NOTE — TELEPHONE ENCOUNTER
"Prior Authorization Retail Medication Request    Medication/Dose: Ozempic  ICD code (if different than what is on RX):  Class 1 obesity due to excess calories with serious comorbidity and body mass index (BMI) of 30.0 to 30.9 in adult [E66.09, Z68.30]  - Primary     Previously Tried and Failed:  Watching Portions or Calories, Exercise, Fasting, Topiramate  Rationale:  I had the pleasure of seeing your patient, Kasandra Lau. Full intake/assessment was done to determine barriers to weight loss success and develop a treatment plan. Kasandra Lau is a 37 year old female interested in treatment of medical problems associated with excess weight. She has a height of 5' 8.5\", a weight of 195 lbs 0 oz, and the calculated Body mass index is 29.22 kg/m .     She has the following co-morbidities: anxiety, depression, ADHD, PTSD, rheumatoid arthritis, hyperlipidemia, GERD, fatty liver, back pain     She reports baseline weight 150-160 lbs. A couple of years ago, she gained 17 lbs in a month despite no change in diet and exercise. Since then she continued to gain weight in spurt and then plateau out and gained again. Was on topiramate for migraine 0936-0953 -- had brain fog and difficulty with word recall.     Insurance Name:    Insurance ID:        Pharmacy Information (if different than what is on RX)  Name:  [id number] [address]  Phone:  [phone]  "

## 2022-10-06 NOTE — TELEPHONE ENCOUNTER
Prior Authorization Not Needed per Insurance    Medication: Ozempic - PA Not Needed  Insurance Company: [employer] [place] - Phone [phone] Fax [phone]  Expected CoPay:      Pharmacy Filling the Rx:    Pharmacy Notified:    Patient Notified:      Prior authorization is not needed at this time. Thank you!

## 2022-10-07 NOTE — CONFIDENTIAL NOTE
"  Health Psychology - Follow up Visit  Confidential Summary*    The author of this note documented a reason for not sharing it with the patient.  REFERRAL SOURCE:  Psychiatry    CHIEF COMPLAINT/REASON FOR VISIT  Psychotherapy in context of chronic PTSD and persistent depression.  Patient also complains of dissatisfaction with interpersonal relationships and challenges with emotion regulation.      Patient was seen today for a 60 minute individual psychotherapy session.  The session was facilitated via VIDEO with patient at her home and provider at her own home.  We used doxy.me platform.       This telehealth service is appropriate and effective for delivering services in light of the necessity for social distancing to mitigate the COVID-19 epidemic. Patient has agreed to receiving telehealth services.    The patient has been notified of following:   \"This VIDEO visit will be conducted via a call between you and your physician/provider. We have found that certain health care needs can be provided without the need for an in-person physical exam.  VIDEO visits are billed at different rates depending on your insurance coverage.  Please reach out to your insurance provider with any questions. If during the course of the call the physician/provider feels a video visit is not appropriate, you will not be charged for this service.\"     Patient has given verbal consent for VIDEO visit? Yes    Subjective:  Patient began with report that she has decided to pursue bankrupcy.  She detailed that she has approximately $15K in credit card debt and another 1K in and that creditors have started to call her and she believes this financial stress is decreasing her quality of life and that she will not be able to pay it back anytime in the near future.  Discussed the impact of bankrupcy on her .  Although they have lived separately for multiple years, they continue to file taxes together.  Encouraged her to explore the impact of " lilianay on their relationship and encouraged open communication.  She voiced appreciation     She reported that she continues to experience challenges with her ability to focus at completing her work.  She has been diagnosed with ADHD and has been started on a medication, atomoxetine.  She has taken it for approximately one week with no noticeable improvement in her attention.  She described ongoing challenges with feeling overwhelmed and disorganized.      Encouraged patient to put to use her DBT skills.  Provided psychoeducation that DBT skills can be implemented to target symptoms of overwhelm and disorganization often encountered by those with ADHD.  Spent time in session encouraging her use of DBT skills including opposite to emotion action.  Patient reported that she continues to procrastinate working on her dissertation secondary to identified anxiety that she is missing some of the key literature.  Encouraged her to move to problem solving and used time in session to assist her in identifying possible solutions.  She was noted to report some anxiety surrounding completing her dissertation and applications for fellowship.  Continued to encourage her use of wise mind vs emotion mind to cope with feelings of overwhelm.      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  She reported enjoying this position.      Objective:  Patient was on time for today s session. She was alert and oriented. Mood was euthymic with appropriate range of affect. Patient denied suicidal or assaultive ideation, plan, or intent.        Assessment:  The patient has a longstanding history of interpersonal discord and challenges with emotion regulation.   She has completed all requirements for her PhD and is now ABD.  She is living in  housing with her two dogs.  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    Plan:  Patient graduated from full model DBT at [facility] [place] and is now completing phase 2 work but her DBT skill use is minimal and she is struggling with interpersonal effectiveness.      Treatment plan completed:  10/6/22 updated    Time In: 1:00  Time Out: 2:00    Diagnosis:  Axis I PTSD, chronic, persistent depressive disorder, adjustment disorder with mixed, psychological factors associated with physical (fibromyalgia), self reported dx of ADHD   Axis II  BPD   Axis III please see medical records for details   [place] IV Psychosocial and Environmental Stressors: living alone with no family support, pandemic stress, chronic illness         Corina Muhammad, PhD, LP

## 2022-10-08 NOTE — CONFIDENTIAL NOTE
"  Health Psychology - Follow up Visit  Confidential Summary*    The author of this note documented a reason for not sharing it with the patient.  REFERRAL SOURCE:  Psychiatry    CHIEF COMPLAINT/REASON FOR VISIT  Psychotherapy in context of chronic PTSD and persistent depression.  Patient also complains of dissatisfaction with interpersonal relationships and challenges with emotion regulation.      Patient was seen today for a 60 minute individual psychotherapy session.  The session was facilitated via VIDEO with patient at her home and provider at her own home.  We used doxy.me platform.       This telehealth service is appropriate and effective for delivering services in light of the necessity for social distancing to mitigate the COVID-19 epidemic. Patient has agreed to receiving telehealth services.    The patient has been notified of following:   \"This VIDEO visit will be conducted via a call between you and your physician/provider. We have found that certain health care needs can be provided without the need for an in-person physical exam.  VIDEO visits are billed at different rates depending on your insurance coverage.  Please reach out to your insurance provider with any questions. If during the course of the call the physician/provider feels a video visit is not appropriate, you will not be charged for this service.\"     Patient has given verbal consent for VIDEO visit? Yes    Subjective:  Patient began with report that she is planning for her upcoming visit to the [facility] weight loss clinic. She reported that she is hoping that she will be eligible for medication as she believes that the reason for her weight gain over the past year has beem due to issues related to her hormones and other medical challenges.  Encouraged her to consider that her weight gain may also be associated with pandemic related decreased activity and increased dietary intake.  She voiced disbelief in this observation given that she does " "not believe that her intake has changed.  Encouraged her to self monitor her dietary intake and to use the myfitnesspal or other methods to record her daily intake with portion size and caloric value.  Also encouraged doing same for liquids and snacks.  She reported that she believes that her intake of nuts is \"healthy\" and that she does not believe the caloric value of foods has the same impact on body weight depending on the type of food.  Encourraged her to consider whether seeing a nutrition expert might be helpful.      Patient continues to describe her belief that she might be intimidating to men in [place] and that she may experience more success in dating when she moves abroad.  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      Objective:  Patient was on time for today s session. She was alert and oriented. Mood was euthymic with appropriate range of affect. Patient denied suicidal or assaultive ideation, plan, or intent.        Assessment:  The patient has a longstanding history of interpersonal discord and challenges with emotion regulation.   She has completed all requirements for her PhD and is now ABD.  She is living in  housing with her two dogs.  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    Plan:  Patient graduated from full model DBT at [facility] and is now completing phase 2 work but her DBT skill use is minimal and she is struggling with interpersonal effectiveness.      Treatment plan completed:  10/8/21    Time In: 1:00  Time Out: 2:00    Diagnosis:  Axis I PTSD, chronic, persistent depressive disorder, adjustment disorder with mixed, psychological factors associated with physical (fibromyalgia)   Axis II  BPD   Axis III please see medical records for details   Axis IV " Psychosocial and Environmental Stressors: living alone with no family support, pandemic stress, chronic illness         Corina Muhammad, PhD, LP

## 2022-10-10 NOTE — PROGRESS NOTES
SUBJECTIVE:                                                   Kasandra Lau is a 37 year old female who presents to clinic today for the following health issue(s):  Patient presents with:  Other: endometirosis      HPI:  New patient to me here today to discuss endometriosis management.  She had a diagnostic laparoscopy and left ovarian cystectomy in 2016.  She has not had pelvic imaging since that year.  She did 3 months of Lupron Depo shots with side effects including brain fog and menopausal hot flashes.  She was then transitioned over to her NuvaRing using it in a continuous fashion but had worsening migraines on the method.  She has been off the NuvaRing for a year in a couple of months.  She has noticed that her cycles are getting increasingly heavier and more painful.  She is sexually active with no pain.  She does have chronic constipation but no pain with bladder function.    The patient was noted to have 1 block tube during this procedure in .  She also had an IUD placement attempt years ago but was not successful.    She does have future fertility desires.    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  She has hopes to do postdoc work in [facility].    Patient's last menstrual period was 2022..     Patient is sexually active, .  Using condoms for contraception.    reports that she has quit smoking. Her smoking use included cigarettes. She has never used smokeless tobacco.    STD testing offered?  Declined    Health maintenance updated:  yes    Today's PHQ-2 Score:   PHQ-2 (  [employer]) 10/11/2022   Q1: Little interest or pleasure in doing things 0   Q2: Feeling down, depressed or hopeless 1   PHQ-2 Score 1   PHQ-2 Total Score (12-17 Years)- Positive if 3 or more points; Administer PHQ-A if positive -   Q1: Little interest or pleasure in doing things -   Q2: Feeling down, depressed or hopeless -   PHQ-2 Score -   Some encounter information is  confidential and restricted. Go to Review Flowsheets activity to see all data.     Today's PHQ-9 Score:   PHQ-9 SCORE 10/11/2022   PHQ-9 Total Score MyChart -   PHQ-9 Total Score 5   Some encounter information is confidential and restricted. Go to Review Flowsheets activity to see all data.     Today's VIC-7 Score:   VIC-7 SCORE 10/11/2022   Total Score -   Total Score 9   Some encounter information is confidential and restricted. Go to Review Flowsheets activity to see all data.       Problem list and histories reviewed & adjusted, as indicated.  Additional history: as documented.    Patient Active Problem List   Diagnosis     Pelvic pain in female     Vitamin D deficiency     Menorrhagia with regular cycle     Migraine without aura and without status migrainosus, not intractable     Iron deficiency anemia due to chronic blood loss     Tired     Circadian rhythm sleep disorder, delayed sleep phase type     Unhealthy sleep habit     Seasonal mood disorder (H)     Chronic idiopathic urticaria     Acid reflux     Cholecystitis     Depression     Hx of abnormal cervical Pap smear     Irritable bowel syndrome with constipation     Migraine headache     Mild intermittent asthma without complication     Need for desensitization to allergens     Panic disorder     Pap smear abnormality of cervix/human papillomavirus (HPV) positive     Recurrent major depressive disorder, in remission (H)     Hypertrophy of breast     Chronic sinusitis, unspecified location     Keratosis pilaris     Endometriosis     Past Surgical History:   Procedure Laterality Date     COLONOSCOPY       LAPAROSCOPIC ABLATION ENDOMETRIOSIS N/A 11/09/2016    Procedure: LAPAROSCOPIC ABLATION ENDOMETRIOSIS;  Surgeon: Tonja Ferrer MD;  Location: [facility]     LAPAROSCOPIC CYSTECTOMY OVARIAN (BENIGN) Left 11/09/2016    Procedure: LAPAROSCOPIC CYSTECTOMY OVARIAN (BENIGN);  Surgeon: Tonja Ferrer MD;  [facility]     LAPAROSCOPIC TUBAL DYE  STUDY Left 11/09/2016    Procedure: LAPAROSCOPIC TUBAL DYE STUDY;  Surgeon: Tonja Ferrer MD;  [facility]: [facility]     LAPAROSCOPY OPERATIVE ADULT N/A 11/09/2016    Procedure: LAPAROSCOPY OPERATIVE ADULT;  Surgeon: Tonja Ferrer MD;  [facility]     MAMMOPLASTY REDUCTION Bilateral 08/05/2021    Procedure: MAMMOPLASTY, REDUCTION, Bilateral;  Surgeon: ANTONIO Moreno MD;  Location: [facility] OR     ORTHOPEDIC SURGERY      left wrist surgery     TONSILLECTOMY & ADENOIDECTOMY Bilateral     cauterized turbinates also      Social History     Tobacco Use     Smoking status: Former     Packs/day: 0.00     Years: 10.00     Pack years: 0.00     Types: Cigarettes     Smokeless tobacco: Never     Tobacco comments:     smokes occasionally socially    Substance Use Topics     Alcohol use: Not Currently     Alcohol/week: 0.0 standard drinks     Comment: occasional       Problem (# of Occurrences) Relation (Name,Age of Onset)    Diabetes (1) Maternal Grandfather (Mothers father)       Negative family history of: Hypertension, Coronary Artery Disease, Hyperlipidemia, Cerebrovascular Disease, Breast Cancer, Colon Cancer, Prostate Cancer, Other Cancer, Glaucoma, Macular Degeneration            Current Outpatient Medications   Medication Sig     almotriptan (AXERT) 12.5 MG tablet Take 1 tablet (12.5 mg) by mouth at onset of headache for migraine (repeat in 2 hours if needed) May repeat in 2 hours. Max 2 tablets/24 hours.     atomoxetine (STRATTERA) 25 MG capsule Take 1 capsule (25 mg) by mouth daily     buPROPion (WELLBUTRIN XL) 300 MG 24 hr tablet Take 1 tablet (300 mg) by mouth every morning     cholecalciferol 50 MCG (2000 UT) CAPS 5,000 Units every evening      doxepin (SINEQUAN) 10 MG capsule Take 4 capsules (40 mg) by mouth At Bedtime     EMGALITY 120 MG/ML injection Inject 1 mL (120 mg) Subcutaneous every 28 days     famotidine (PEPCID) 20 MG tablet Take 1 tablet (20 mg) by mouth 2 times daily      "fexofenadine (ALLEGRA) 180 MG tablet Take 1 tablet (180 mg) by mouth every morning     fluticasone (FLONASE) 50 MCG/ACT nasal spray 2 times daily as needed      gabapentin (NEURONTIN) 400 MG capsule Take 2 capsules (800 mg) by mouth 3 times daily     hydrocortisone (CORTAID) 1 % external ointment Apply sparingly to affected area three times daily for 14 days.     hydroxychloroquine (PLAQUENIL) 200 MG tablet Take 2 tablets (400 mg) by mouth daily Annual Plaquenil toxicity eye screening required.     levocetirizine (XYZAL) 5 MG tablet Take 5 mg by mouth every evening      montelukast (SINGULAIR) 10 MG tablet Take 1 tablet (10 mg) by mouth At Bedtime     propranolol (INDERAL) 20 MG tablet Take 1 tablet (20 mg) by mouth daily as needed (anxiety)     Pseudoephedrine HCl (SUDAFED SINUS CONGESTION PO)      semaglutide (OZEMPIC) 2 MG/1.5ML SOPN pen Start Ozempic 0.25 mg weekly for 2 weeks then increase to 0.5 mg weekly for 1 month and then increase to 1.0 mg weekly thereafter     Semaglutide, 1 MG/DOSE, 4 MG/3ML SOPN Inject 1 mg Subcutaneous once a week To be started after finishing 0.5 mg weekly     vilazodone (VIIBRYD) 40 MG TABS tablet Take 1 tablet (40 mg) by mouth every morning     No current facility-administered medications for this visit.     Allergies   Allergen Reactions     Dust Mites Cough, Difficulty breathing and Shortness Of Breath     Cats      Chest tightness, sinus irritation       ROS:  12 point review of systems negative other than symptoms noted below or in the HPI.  No urinary frequency or dysuria, bladder or kidney problems      OBJECTIVE:     /66   Pulse 68   Ht 1.753 m (5' 9\")   Wt 86.2 kg (190 lb)   LMP 09/09/2022   BMI 28.06 kg/m    Body mass index is 28.06 kg/m .    Exam:  Constitutional:  Appearance: Well nourished, well developed alert, in no acute distress  Psychiatric:  Mentation appears normal and affect normal/bright.     In-Clinic Test Results:  No results found for this or any " previous visit (from the past 24 hour(s)).    ASSESSMENT/PLAN:                                                        ICD-10-CM    1. Migraine without aura and without status migrainosus, not intractable  G43.009       2. Menorrhagia with regular cycle  N92.0       3. Endometriosis  N80.9 US Transvaginal Pelvic Non-OB          There are no Patient Instructions on file for this visit.    37-year-old female with a history of endometriosis.  She is open to restarting some hormonal suppression.  We have asked her to repeat a transvaginal ultrasound.  With her history of migraines we did discuss both Depo-Provera and hormonal laden IUD.  The patient is currently working with a weight loss clinic so I do not feel as if Depo-Provera would be a good fit for her given the side effects.  We did discuss with her previous attempt at an IUD insertion that it may be better that she has is done under ultrasound guidance or with some sedative on board.     (15 minutes was spent on the date of the encounter doing chart review, review of outside records, review and interpretation of pertinent test results, history and exam, documentation, patient counseling, and further activities as noted above.)      JASVIR Thomas CNP  [facility] [place]

## 2022-10-11 ENCOUNTER — OFFICE VISIT (OUTPATIENT)
Dept: OBGYN | Facility: CLINIC | Age: 37
End: 2022-10-11
Payer: COMMERCIAL

## 2022-10-11 VITALS
HEIGHT: 69 IN | WEIGHT: 190 LBS | BODY MASS INDEX: 28.14 KG/M2 | HEART RATE: 68 BPM | DIASTOLIC BLOOD PRESSURE: 66 MMHG | SYSTOLIC BLOOD PRESSURE: 106 MMHG

## 2022-10-11 DIAGNOSIS — N80.9 ENDOMETRIOSIS: ICD-10-CM

## 2022-10-11 DIAGNOSIS — G43.009 MIGRAINE WITHOUT AURA AND WITHOUT STATUS MIGRAINOSUS, NOT INTRACTABLE: Primary | ICD-10-CM

## 2022-10-11 DIAGNOSIS — N92.0 MENORRHAGIA WITH REGULAR CYCLE: ICD-10-CM

## 2022-10-11 PROCEDURE — 99203 OFFICE O/P NEW LOW 30 MIN: CPT | Performed by: NURSE PRACTITIONER

## 2022-10-11 ASSESSMENT — ANXIETY QUESTIONNAIRES
5. BEING SO RESTLESS THAT IT IS HARD TO SIT STILL: SEVERAL DAYS
6. BECOMING EASILY ANNOYED OR IRRITABLE: SEVERAL DAYS
7. FEELING AFRAID AS IF SOMETHING AWFUL MIGHT HAPPEN: SEVERAL DAYS
GAD7 TOTAL SCORE: 9
2. NOT BEING ABLE TO STOP OR CONTROL WORRYING: SEVERAL DAYS
IF YOU CHECKED OFF ANY PROBLEMS ON THIS QUESTIONNAIRE, HOW DIFFICULT HAVE THESE PROBLEMS MADE IT FOR YOU TO DO YOUR WORK, TAKE CARE OF THINGS AT HOME, OR GET ALONG WITH OTHER PEOPLE: SOMEWHAT DIFFICULT
1. FEELING NERVOUS, ANXIOUS, OR ON EDGE: MORE THAN HALF THE DAYS
3. WORRYING TOO MUCH ABOUT DIFFERENT THINGS: MORE THAN HALF THE DAYS
GAD7 TOTAL SCORE: 9

## 2022-10-11 ASSESSMENT — PATIENT HEALTH QUESTIONNAIRE - PHQ9
SUM OF ALL RESPONSES TO PHQ QUESTIONS 1-9: 5
5. POOR APPETITE OR OVEREATING: SEVERAL DAYS

## 2022-10-13 ENCOUNTER — VIRTUAL VISIT (OUTPATIENT)
Dept: PSYCHOLOGY | Facility: CLINIC | Age: 37
End: 2022-10-13
Payer: COMMERCIAL

## 2022-10-13 DIAGNOSIS — F43.12 CHRONIC POST-TRAUMATIC STRESS DISORDER (PTSD): ICD-10-CM

## 2022-10-13 DIAGNOSIS — F34.1 PERSISTENT DEPRESSIVE DISORDER: Primary | ICD-10-CM

## 2022-10-13 DIAGNOSIS — F54 PSYCHOLOGICAL AND BEHAVIORAL FACTORS ASSOCIATED WITH DISORDERS OR DISEASES CLASSIFIED ELSEWHERE: ICD-10-CM

## 2022-10-13 PROCEDURE — 90837 PSYTX W PT 60 MINUTES: CPT | Mod: 95 | Performed by: PSYCHOLOGIST

## 2022-10-17 ENCOUNTER — NURSE TRIAGE (OUTPATIENT)
Dept: NURSING | Facility: CLINIC | Age: 37
End: 2022-10-17

## 2022-10-17 NOTE — TELEPHONE ENCOUNTER
Patient accidentally took 0.5 mg on Friday.  Started having cramping and nausea.  Discussed going back down to 0.25 mg on Friday.  For 4th dose can retry 0.5 mg.  If she experiences the same side effects she is to notify the office.  Encouraged patient to stay hydrated.  Has used Zofran with some relief.

## 2022-10-17 NOTE — TELEPHONE ENCOUNTER
Call from Kasandra, see triage note  Best number to contact her this afternoon [phone]  Nurse Triage SBAR    Is this a 2nd Level Triage? YES, LICENSED PRACTITIONER REVIEW IS REQUIRED    Situation:   Friday accidentally took 0.5 mg Ozempic.  She didn't think the first 0.25 mg was correct, gave a 2nd dose.  Saturday began experiencing abdominal cramping and nausea.  No vomiting, fever, distention or firmness.  Denies diarrhea, admits may be a little constipated    Background:   Had no symptoms when taking Ozempic 0.25 mg dose weekly  Is to start 0.5 mg this Friday  Asking what she should do next    Assessment:   follow up per weight management team today    Protocol Recommended Disposition:   Discuss With PCP And Callback By Nurse Within 1 Hour            Reason for Disposition    Caller has URGENT medicine question about med that PCP or specialist prescribed and triager unable to answer question    Additional Information    Negative: Intentional drug overdose and suicidal thoughts or ideas    Negative: Drug overdose and triager unable to answer question    Negative: Caller requesting a renewal or refill of a medicine patient is currently taking    Negative: Caller requesting information unrelated to medicine    Negative: Caller requesting information about COVID-19 Vaccine    Negative: Caller requesting information about Emergency Contraception    Negative: Caller requesting information about Combined Birth Control Pills    Negative: Caller requesting information about Progestin Birth Control Pills    Negative: Caller requesting information about Post-Op pain or medicines    Negative: Caller requesting a prescription antibiotic (such as penicillin) for Strep throat and has a positive culture result    Negative: Caller requesting a prescription anti-viral med (such as Tamiflu) and has influenza (flu) symptoms    Negative: Immunization reaction suspected    Negative: Rash while taking a medicine or within 3 days of  "stopping it    Negative: Asthma and having symptoms of asthma (cough, wheezing, etc.)    Negative: Symptom of illness (e.g., headache, abdominal pain, earache, vomiting) that are more than mild    Negative: Breastfeeding questions about mother's medicines and diet    Negative: MORE THAN A DOUBLE DOSE of a prescription or over-the-counter (OTC) drug    Negative: DOUBLE DOSE (an extra dose or lesser amount) of prescription drug and any symptoms (e.g., dizziness, nausea, pain, sleepiness)    Negative: DOUBLE DOSE (an extra dose or lesser amount) of over-the-counter (OTC) drug and any symptoms (e.g., dizziness, nausea, pain, sleepiness)    Negative: Took another person's prescription drug    Negative: DOUBLE DOSE (an extra dose or lesser amount) of prescription drug and NO symptoms  (Exception: A double dose of antibiotics.)    Negative: Diabetes drug error or overdose (e.g., took wrong type of insulin or took extra dose)    Negative: Caller has medication question about med NOT prescribed by PCP and triager unable to answer question (e.g., compatibility with other med, storage)    Answer Assessment - Initial Assessment Questions  1. NAME of MEDICATION: \"What medicine are you calling about?\"      Ozempic  2. QUESTION: \"What is your question?\" (e.g., double dose of medicine, side effect)      Accidentally took 0.5 mg dose of ozempic Friday last week, Starting Saturday has develop abdominal cramping and nausea.  Did not have symptoms when taking 0.25 mg dose  3. PRESCRIBING HCP: \"Who prescribed it?\" Reason: if prescribed by specialist, call should be referred to that group.      Dr Livingston  4. SYMPTOMS: \"Do you have any symptoms?\"      Yes as above.  Denies fever, diarrhea,, may be a little constipated.  Abdomen feels bloated, no distention, firmness, fever  5. SEVERITY: If symptoms are present, ask \"Are they mild, moderate or severe?\"      moderate  6. PREGNANCY:  \"Is there any chance that you are pregnant?\" " "\"When was your last menstrual period?\"      N/A    Protocols used: MEDICATION QUESTION CALL-A-OH      "

## 2022-10-19 NOTE — TELEPHONE ENCOUNTER
DIAGNOSIS:  fatty liver    Appt Date: 11.09.2022   NOTES STATUS DETAILS   OFFICE NOTE from referring provider     OFFICE NOTES from other specialists Internal 10.04.2022 Ana Pilali, TIMBO    10.04.2022 Kerri Evans MD    09.30.2022 Geovanna Owens NP   DISCHARGE SUMMARY from hospital     MEDICATION LIST Internal    LIVER BIOSPY (IF APPLICABLE)      PATHOLOGY REPORTS      IMAGING     ENDOSCOPY (IF AVAILABLE)     COLONOSCOPY (IF AVAILABLE)     ULTRASOUND LIVER     CT OF ABDOMEN     MRI OF LIVER     FIBROSCAN, US ELASTOGRAPHY, FIBROSIS SCAN, MR ELASTOGRAPHY     LABS     HEPATIC PANEL (LIVER PANEL) Internal 08.19.2022   BASIC METABOLIC PANEL Internal 04.27.2022   COMPLETE METABOLIC PANEL Internal 04.27.2022   COMPLETE BLOOD COUNT (CBC) Internal 04.27.2022   INTERNATIONAL NORMALIZED RATIO (INR) Internal 08.31.2021   HEPATITIS C ANTIBODY Internal 01.31.2019   HEPATITIS C VIRAL LOAD/PCR     HEPATITIS C GENOTYPE     HEPATITIS B SURFACE ANTIGEN Internal 01.31.2019   HEPATITIS B SURFACE ANTIBODY     HEPATITIS B DNA QUANT LEVEL     HEPATITIS B CORE ANTIBODY

## 2022-10-24 NOTE — PROGRESS NOTES
"    Health Psychology - Follow up Visit  Confidential Summary*    The author of this note documented a reason for not sharing it with the patient.  REFERRAL SOURCE:  Psychiatry    CHIEF COMPLAINT/REASON FOR VISIT  Psychotherapy in context of chronic PTSD and persistent depression.  Patient also complains of dissatisfaction with interpersonal relationships and challenges with emotion regulation.      Patient was seen today for a 60 minute individual psychotherapy session.  The session was facilitated via VIDEO with patient at her home and provider at her own home.  We used [email or web address] platform.       This telehealth service is appropriate and effective for delivering services in light of the necessity for social distancing to mitigate the COVID-19 epidemic. Patient has agreed to receiving telehealth services.    The patient has been notified of following:   \"This VIDEO visit will be conducted via a call between you and your physician/provider. We have found that certain health care needs can be provided without the need for an in-person physical exam.  VIDEO visits are billed at different rates depending on your insurance coverage.  Please reach out to your insurance provider with any questions. If during the course of the call the physician/provider feels a video visit is not appropriate, you will not be charged for this service.\"     Patient has given verbal consent for VIDEO visit? Yes    Subjective:  Patient began with discussion of endometriosis and that she also has migraines.  Today patient detailed her many physical complaints/pains which include obesity, bumps on her skin and hair loss, migraines, chronic sinus infections, depression and anxiety, whole body and muscle and joint aches, GI pains including reflux and endometriosis, hormonal imbalances, muscle pain, high arches and plantar fascitis, chronic fatigue and seasonal allergies.  In addition, she described back pain.  Today she also detailed feeling light " headed.  She has recently started taking ozempic to assist in weight loss and is considering the impact.      She reported that she is able to continue walking her dogs daily.  She is looking forward to an upcoming ENT appointment and is anticipating an ultrasound to determine cause of postnasal drip.      Discussed her coping strategies for these many somatic complaints and the interference resulting from these ailments.  Discussed the impact of her emotional health on these symptoms and vice versa.      Patient is looking forward to upcoming trip to [facility] for a conference and some vacation time.  She detailed looking forward to time with those who appear to appreciate her intellect, share common values and those she can relax with.     Objective:  Patient was on time for today s session. She was alert and oriented. Mood was euthymic with appropriate range of affect. Patient denied suicidal or assaultive ideation, plan, or intent.        Assessment:  The patient has a longstanding history of interpersonal discord and challenges with emotion regulation.  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  She has completed all requirements for her PhD and is now ABD.  She is living in  housing with her two dogs.  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  She is hoping to present her dissertation proposal in the upcoming weeks.      Plan:  Patient graduated from full model DBT at [employer] [employer] and is now completing phase 2 work.      Treatment plan completed:  10/13/22 [email or web address]    Time In: 1:00  Time Out: 2:00    Diagnosis:  Axis I PTSD, chronic, persistent depressive disorder, adjustment disorder with mixed, psychological factors associated with physical (fibromyalgia)   Axis II  BPD   Axis III please see medical records for details   Dewey IV Psychosocial and Environmental Stressors: living alone with no family support,  pandemic stress, chronic illness         Corina Muhammad, PhD, LP

## 2022-10-31 DIAGNOSIS — K76.0 HEPATIC STEATOSIS: Primary | ICD-10-CM

## 2022-11-07 ENCOUNTER — VIRTUAL VISIT (OUTPATIENT)
Dept: PSYCHOLOGY | Facility: CLINIC | Age: 37
End: 2022-11-07
Payer: COMMERCIAL

## 2022-11-07 ENCOUNTER — LAB (OUTPATIENT)
Dept: LAB | Facility: CLINIC | Age: 37
End: 2022-11-07
Payer: COMMERCIAL

## 2022-11-07 DIAGNOSIS — F34.1 PERSISTENT DEPRESSIVE DISORDER: ICD-10-CM

## 2022-11-07 DIAGNOSIS — F54 PSYCHOLOGICAL AND BEHAVIORAL FACTORS ASSOCIATED WITH DISORDERS OR DISEASES CLASSIFIED ELSEWHERE: ICD-10-CM

## 2022-11-07 DIAGNOSIS — K76.0 HEPATIC STEATOSIS: ICD-10-CM

## 2022-11-07 DIAGNOSIS — F43.12 CHRONIC POST-TRAUMATIC STRESS DISORDER (PTSD): Primary | ICD-10-CM

## 2022-11-07 LAB
ALBUMIN SERPL BCG-MCNC: 4.4 G/DL (ref 3.5–5.2)
ALP SERPL-CCNC: 109 U/L (ref 35–104)
ALT SERPL W P-5'-P-CCNC: 134 U/L (ref 10–35)
ANION GAP SERPL CALCULATED.3IONS-SCNC: 10 MMOL/L (ref 7–15)
AST SERPL W P-5'-P-CCNC: 31 U/L (ref 10–35)
BILIRUB DIRECT SERPL-MCNC: <0.2 MG/DL (ref 0–0.3)
BILIRUB SERPL-MCNC: 0.3 MG/DL
BUN SERPL-MCNC: 8.9 MG/DL (ref 6–20)
CALCIUM SERPL-MCNC: 9.4 MG/DL (ref 8.6–10)
CHLORIDE SERPL-SCNC: 105 MMOL/L (ref 98–107)
CREAT SERPL-MCNC: 0.83 MG/DL (ref 0.51–0.95)
DEPRECATED HCO3 PLAS-SCNC: 24 MMOL/L (ref 22–29)
ERYTHROCYTE [DISTWIDTH] IN BLOOD BY AUTOMATED COUNT: 12.6 % (ref 10–15)
GFR SERPL CREATININE-BSD FRML MDRD: >90 ML/MIN/1.73M2
GLUCOSE SERPL-MCNC: 106 MG/DL (ref 70–99)
HCT VFR BLD AUTO: 40.4 % (ref 35–47)
HGB BLD-MCNC: 13.1 G/DL (ref 11.7–15.7)
INR PPP: 1.09 (ref 0.85–1.15)
MCH RBC QN AUTO: 29.4 PG (ref 26.5–33)
MCHC RBC AUTO-ENTMCNC: 32.4 G/DL (ref 31.5–36.5)
MCV RBC AUTO: 91 FL (ref 78–100)
PLATELET # BLD AUTO: 372 10E3/UL (ref 150–450)
POTASSIUM SERPL-SCNC: 3.8 MMOL/L (ref 3.4–5.3)
PROT SERPL-MCNC: 7.6 G/DL (ref 6.4–8.3)
RBC # BLD AUTO: 4.46 10E6/UL (ref 3.8–5.2)
SODIUM SERPL-SCNC: 139 MMOL/L (ref 136–145)
WBC # BLD AUTO: 8.7 10E3/UL (ref 4–11)

## 2022-11-07 PROCEDURE — 80053 COMPREHEN METABOLIC PANEL: CPT | Performed by: PATHOLOGY

## 2022-11-07 PROCEDURE — 82248 BILIRUBIN DIRECT: CPT | Performed by: PATHOLOGY

## 2022-11-07 PROCEDURE — 85610 PROTHROMBIN TIME: CPT | Performed by: PATHOLOGY

## 2022-11-07 PROCEDURE — 90837 PSYTX W PT 60 MINUTES: CPT | Mod: 95 | Performed by: PSYCHOLOGIST

## 2022-11-07 PROCEDURE — 85027 COMPLETE CBC AUTOMATED: CPT | Performed by: PATHOLOGY

## 2022-11-07 PROCEDURE — 36415 COLL VENOUS BLD VENIPUNCTURE: CPT | Performed by: PATHOLOGY

## 2022-11-08 ENCOUNTER — ANCILLARY PROCEDURE (OUTPATIENT)
Dept: ULTRASOUND IMAGING | Facility: CLINIC | Age: 37
End: 2022-11-08
Payer: COMMERCIAL

## 2022-11-08 ENCOUNTER — OFFICE VISIT (OUTPATIENT)
Dept: OBGYN | Facility: CLINIC | Age: 37
End: 2022-11-08
Payer: COMMERCIAL

## 2022-11-08 VITALS — DIASTOLIC BLOOD PRESSURE: 62 MMHG | BODY MASS INDEX: 27.17 KG/M2 | WEIGHT: 184 LBS | SYSTOLIC BLOOD PRESSURE: 110 MMHG

## 2022-11-08 DIAGNOSIS — Z30.09 GENERAL COUNSELING AND ADVICE ON CONTRACEPTIVE MANAGEMENT: ICD-10-CM

## 2022-11-08 DIAGNOSIS — N80.9 ENDOMETRIOSIS: Primary | ICD-10-CM

## 2022-11-08 DIAGNOSIS — N80.9 ENDOMETRIOSIS: ICD-10-CM

## 2022-11-08 PROCEDURE — 76830 TRANSVAGINAL US NON-OB: CPT | Performed by: OBSTETRICS & GYNECOLOGY

## 2022-11-08 PROCEDURE — 99213 OFFICE O/P EST LOW 20 MIN: CPT | Performed by: NURSE PRACTITIONER

## 2022-11-08 NOTE — PROGRESS NOTES
SUBJECTIVE:                                                   Kasandra Lau is a 37 year old female who presents to clinic today for the following health issue(s):  Patient presents with:  Follow Up: Follow up ultrasound        HPI:  Patient here today for ultrasound evaluation of endometriosis.  She has a past history of being diagnosed surgically with endometriosis and a blocked fallopian tube.  She will be studying abroad in either [place] or [facility] postdoc.  She and her partner are not ready to try for pregnancy quite yet.  She does have normal menstrual cycles and they are not debilitating at this time.    She is mostly interested in an IUD as she does have a history of migraines.  She has tolerated NuvaRing in the past.  She has attempted an I[facility] placement at [facility] many years ago but they were unsuccessful.  We discussed ultrasound-guided IUD placement.    Patient's last menstrual period was 2022..     Patient is sexually active, .  Using none for contraception.    reports that she has quit smoking. Her smoking use included cigarettes. She has never used smokeless tobacco.    STD testing offered?  Declined    Health maintenance updated:  yes    Today's PHQ-2 Score:   PHQ-2 (  [employer]) 10/11/2022   Q1: Little interest or pleasure in doing things 0   Q2: Feeling down, depressed or hopeless 1   PHQ-2 Score 1   PHQ-2 Total Score (12-17 Years)- Positive if 3 or more points; Administer PHQ-A if positive -   Q1: Little interest or pleasure in doing things -   Q2: Feeling down, depressed or hopeless -   PHQ-2 Score -   Some encounter information is confidential and restricted. Go to Review Flowsheets activity to see all data.     Today's PHQ-9 Score:   PHQ-9 SCORE 10/11/2022   PHQ-9 Total Score MyChart -   PHQ-9 Total Score 5   Some encounter information is confidential and restricted. Go to Review Flowsheets activity to see all data.     Today's VIC-7 Score:   VIC-7 SCORE 10/11/2022    Total Score -   Total Score 9   Some encounter information is confidential and restricted. Go to Review Flowsheets activity to see all data.       Problem list and histories reviewed & adjusted, as indicated.  Additional history: as documented.    Patient Active Problem List   Diagnosis     Pelvic pain in female     Vitamin D deficiency     Menorrhagia with regular cycle     Migraine without aura and without status migrainosus, not intractable     Iron deficiency anemia due to chronic blood loss     Tired     Circadian rhythm sleep disorder, delayed sleep phase type     Unhealthy sleep habit     Seasonal mood disorder (H)     Chronic idiopathic urticaria     Acid reflux     Cholecystitis     Depression     Hx of abnormal cervical Pap smear     Irritable bowel syndrome with constipation     Migraine headache     Mild intermittent asthma without complication     Need for desensitization to allergens     Panic disorder     Pap smear abnormality of cervix/human papillomavirus (HPV) positive     Recurrent major depressive disorder, in remission (H)     Hypertrophy of breast     Chronic sinusitis, unspecified location     Keratosis pilaris     Endometriosis     Past Surgical History:   Procedure Laterality Date     COLONOSCOPY       LAPAROSCOPIC ABLATION ENDOMETRIOSIS N/A 11/09/2016    Procedure: LAPAROSCOPIC ABLATION ENDOMETRIOSIS;  Surgeon: Tonja Ferrer MD;  [facility]     LAPAROSCOPIC CYSTECTOMY OVARIAN (BENIGN) Left 11/09/2016    Procedure: LAPAROSCOPIC CYSTECTOMY OVARIAN (BENIGN);  Surgeon: Tonja Ferrer MD;  [facility]     LAPAROSCOPIC TUBAL DYE STUDY Left 11/09/2016    Procedure: LAPAROSCOPIC TUBAL DYE STUDY;  Surgeon: Tonja Ferrer MD;  [facility]     LAPAROSCOPY OPERATIVE ADULT N/A 11/09/2016    Procedure: LAPAROSCOPY OPERATIVE ADULT;  Surgeon: Tonja Ferrer MD;  [facility]     MAMMOPLASTY REDUCTION Bilateral 08/05/2021    Procedure: MAMMOPLASTY,  REDUCTION, Bilateral;  Surgeon: ANTONIO Moreno MD;  Location: [facility] OR     ORTHOPEDIC SURGERY      left wrist surgery     TONSILLECTOMY & ADENOIDECTOMY Bilateral     cauterized turbinates also      Social History     Tobacco Use     Smoking status: Former     Packs/day: 0.00     Years: 10.00     Pack years: 0.00     Types: Cigarettes     Smokeless tobacco: Never     Tobacco comments:     smokes occasionally socially    Substance Use Topics     Alcohol use: Not Currently     Alcohol/week: 0.0 standard drinks     Comment: occasional       Problem (# of Occurrences) Relation (Name,Age of Onset)    Diabetes (1) Maternal Grandfather (Mothers father)       Negative family history of: Hypertension, Coronary Artery Disease, Hyperlipidemia, Cerebrovascular Disease, Breast Cancer, Colon Cancer, Prostate Cancer, Other Cancer, Glaucoma, Macular Degeneration            Current Outpatient Medications   Medication Sig     almotriptan (AXERT) 12.5 MG tablet Take 1 tablet (12.5 mg) by mouth at onset of headache for migraine (repeat in 2 hours if needed) May repeat in 2 hours. Max 2 tablets/24 hours.     atomoxetine (STRATTERA) 25 MG capsule Take 1 capsule (25 mg) by mouth daily     buPROPion (WELLBUTRIN XL) 300 MG 24 hr tablet Take 1 tablet (300 mg) by mouth every morning     cholecalciferol 50 MCG (2000 UT) CAPS 5,000 Units every evening      doxepin (SINEQUAN) 10 MG capsule Take 4 capsules (40 mg) by mouth At Bedtime     EMGALITY 120 MG/ML injection Inject 1 mL (120 mg) Subcutaneous every 28 days     famotidine (PEPCID) 20 MG tablet Take 1 tablet (20 mg) by mouth 2 times daily     fexofenadine (ALLEGRA) 180 MG tablet Take 1 tablet (180 mg) by mouth every morning     fluticasone (FLONASE) 50 MCG/ACT nasal spray 2 times daily as needed      gabapentin (NEURONTIN) 400 MG capsule Take 2 capsules (800 mg) by mouth 3 times daily     hydroxychloroquine (PLAQUENIL) 200 MG tablet Take 2 tablets (400 mg) by mouth daily Annual  Plaquenil toxicity eye screening required.     levocetirizine (XYZAL) 5 MG tablet Take 5 mg by mouth every evening      montelukast (SINGULAIR) 10 MG tablet Take 1 tablet (10 mg) by mouth At Bedtime     propranolol (INDERAL) 20 MG tablet Take 1 tablet (20 mg) by mouth daily as needed (anxiety)     Pseudoephedrine HCl (SUDAFED SINUS CONGESTION PO)      semaglutide (OZEMPIC) 2 MG/1.5ML SOPN pen Start Ozempic 0.25 mg weekly for 2 weeks then increase to 0.5 mg weekly for 1 month and then increase to 1.0 mg weekly thereafter     Semaglutide, 1 MG/DOSE, 4 MG/3ML SOPN Inject 1 mg Subcutaneous once a week To be started after finishing 0.5 mg weekly     vilazodone (VIIBRYD) 40 MG TABS tablet Take 1 tablet (40 mg) by mouth every morning     hydrocortisone (CORTAID) 1 % external ointment Apply sparingly to affected area three times daily for 14 days. (Patient not taking: Reported on 11/8/2022)     No current facility-administered medications for this visit.     Allergies   Allergen Reactions     Dust Mites Cough, Difficulty breathing and Shortness Of Breath     Cats      Chest tightness, sinus irritation       ROS:  12 point review of systems negative other than symptoms noted below or in the HPI.  No urinary frequency or dysuria, bladder or kidney problems, Normal menstrual cycles      OBJECTIVE:     /62   Wt 83.5 kg (184 lb)   LMP 11/05/2022   BMI 27.17 kg/m    Body mass index is 27.17 kg/m .    Exam:  Constitutional:  Appearance: Well nourished, well developed alert, in no acute distress  Psychiatric:  Mentation appears normal and affect normal/bright.     In-Clinic Test Results:  Results for orders placed or performed in visit on 11/08/22 (from the past 24 hour(s))   US Transvaginal Pelvic Non-OB    Narrative    Gynecological Ultrasound Report  Pelvic U/S - Transvaginal  [facility]  Referring Provider: Razia Mcdermott NP  Sonographer:  Nicole Chavarria RDMS  Indication: Endometriosis  LMP:  11/05/22  History:   Gynecological Ultrasonography:   Uterus: anteverted. Contour is smooth/regular.  Size: 6.77 x 4.87 x 3.81 cm  Endometrium: Thickness Total 1.18 mm  Findings: Small amount of fluid in uterine cavity due to menses.  Right Ovary: 2.99 x 2.47 x 1.74 cm. Wnl  Left Ovary: 3.12 x 2.53 x 1.75 cm. Wnl  Cul de Sac Free Fluid: No free fluid     Impression:        The uterus and ovaries were visualized and no abnormalities were seen.  The endometrium appeared normal.    Daysi Randle MD         ASSESSMENT/PLAN:                                                        ICD-10-CM    1. Endometriosis  N80.9 US Guided IUD Placement      2. General counseling and advice on contraceptive management  Z30.09 US Guided IUD Placement          There are no Patient Instructions on file for this visit.    37-year-old female with a known history of endometriosis.  With her education plans and plans for future fertility we discussed hormonal suppression.  Ultrasound guided placement of a Mirena IUD was discussed at length with her.  She will schedule with her next cycle.    (10 minutes was spent on the date of the encounter doing chart review, review of outside records, review and interpretation of pertinent test results, history and exam, documentation, patient counseling, and further activities as noted above.)      JASVIR Thomas CNP  [facility] [place]

## 2022-11-08 NOTE — PROGRESS NOTES
"    Health Psychology - Follow up Visit  Confidential Summary*    The author of this note documented a reason for not sharing it with the patient.  REFERRAL SOURCE:  Psychiatry    CHIEF COMPLAINT/REASON FOR VISIT  Psychotherapy in context of chronic PTSD and persistent depression.  Patient also complains of dissatisfaction with interpersonal relationships and challenges with emotion regulation.      Patient was seen today for a 60 minute individual psychotherapy session.  The session was facilitated via VIDEO with patient at her home and provider at her own home.  We used doxy.me platform.       This telehealth service is appropriate and effective for delivering services in light of the necessity for social distancing to mitigate the COVID-19 epidemic. Patient has agreed to receiving telehealth services.    The patient has been notified of following:   \"This VIDEO visit will be conducted via a call between you and your physician/provider. We have found that certain health care needs can be provided without the need for an in-person physical exam.  VIDEO visits are billed at different rates depending on your insurance coverage.  Please reach out to your insurance provider with any questions. If during the course of the call the physician/provider feels a video visit is not appropriate, you will not be charged for this service.\"     Patient has given verbal consent for VIDEO visit? Yes    Subjective:  Patient began with description of time in [facility] for a conference and to pursue her graduate research for her dissertation.  She reported that she both enjoyed the professional encounters she had and the social contacts with those she has known for years.  She described being invited to participate in an activist group meeting and she will be able to assist a group attempting to change housing authority laws.  She also described meeting with a friend, Jose Elias, She described feeling more at ease and welcomed by the " academic community in [facility] as well as in [place].  She reported that she sent her professors a draft of her dissertation proposal and is meeting with this group later this week.  She reported that her trip abroad has increased her motivation to finish her program so that she might be able to apply for a postdoctoral position.      She reported that she continues to take weight loss medication and that she believes she has now lost 10 pounds.  She reported that she has noticed decreased appetite and decreased cravings.      She asked to discuss her current  position and a recent ethical dilemma she has encountered.  Discussed the pros and cons of each option she has considered.  She was invited to consider the impact of each option on her ongoing role and problem solved who she might be able to consult.      Objective:  Patient was on time for today s session. She was alert and oriented. Mood was euthymic with appropriate range of affect. Patient denied suicidal or assaultive ideation, plan, or intent.        Assessment:  The patient has a longstanding history of interpersonal discord and challenges with emotion regulation.  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  She has completed all requirements for her PhD and is now ABD.  She is living in  housing with her two dogs.  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  She is hoping to present her dissertation proposal in the upcoming weeks.      Plan:  Patient graduated from full model DBT at [facility] [employer] and is now completing phase 2 work.      Treatment plan completed:  10/13/22     Time In: 2:00  Time Out: 3:00    Diagnosis:  Axis I PTSD, chronic, persistent depressive disorder, adjustment disorder with mixed, psychological factors associated with physical (fibromyalgia)   Axis II  BPD   Axis III please see medical records for details   Axis IV  Psychosocial and Environmental Stressors: living alone with no family support, pandemic stress, chronic illness         Corina Muhammad, PhD, LP

## 2022-11-08 NOTE — PROGRESS NOTES
"Kasandra Lau is a 37 year old female who is being evaluated via a billable video visit.      The patient has been notified of following:     \"This video visit will be conducted via a call between you and your physician/provider. We have found that certain health care needs can be provided without the need for an in-person physical exam.  This service lets us provide the care you need with a video conversation.  If a prescription is necessary we can send it directly to your pharmacy.  If lab work is needed we can place an order for that and you can then stop by our lab to have the test done at a later time.    Video visits are billed at different rates depending on your insurance coverage.  Please reach out to your insurance provider with any questions.    If during the course of the call the physician/provider feels a video visit is not appropriate, you will not be charged for this service.\"    Patient has given verbal consent for Video visit? Yes  How would you like to obtain your AVS? MyChart  Will anyone else be joining your video visit? No  {If patient encounters technical issues they should call [phone]      Video-Visit Details    Type of service:  Video Visit    Video Start Time: 1:00 PM  Video End Time: 1:18 PM    Originating Location (pt. Location): Home    Distant Location (provider location):  [facility] [place]     Platform used for Video Visit: RebelMouse    During this virtual visit the patient is located in [place], patient verifies this as the location during the entirety of this visit.     Weight Management Nutrition Consultation    Kasandra Lau is a 37 year old female presents today for return weight management nutrition consultation.  Patient referred by Dr. Kerri Evans on October 4, 2022.    Patient with Co-morbidities of obesity including:  Type II DM no  Renal Failure no  Sleep apnea no  Hypertension no   Dyslipidemia yes  Joint pain no  Back pain " "yes  GERD yes   Fatty liver yes    PMH also significant for anxiety, depression, ADHD, PTSD, rheumatoid arthritis, and IBS-C.  H/o cholcystectomy     Anthropometrics:  Estimated body mass index is 29.22 kg/m  as calculated from the following:    Height as of an earlier encounter on 10/4/22: 1.74 m (5' 8.5\").    Weight as of an earlier encounter on 10/4/22: 88.5 kg (195 lb).    Current weight: 183 lbs with BMI 27 (-12 lbs from initial)      Pt goal: Aiming for 160s, was at this weight for many years.      Medications for Weight Loss:  Ozempic     NUTRITION HISTORY  Food allergies: None  Food intolerances: Too much dairy. Peppers, onions and garlic.   Supplements: Vitamin D, daily multivitamin   Previous methods of diet modification for weight loss: Cutting down high-sugar (baked goods, ice cream) and high-fat foods, cutting 500 calories per day. Tried Noom, did not like it.     She is keeping track of nutritional intake via MyFitnessPal, originally aimed for 1650 calories/day, however after a few weeks of not losing weight, decreased goal to 1500 calories/day. Has been following this goal for a couple weeks and has not seen any weight loss.     Today - Continue nutritional tracking. Pt estimates she is consuming ~1200 calories/day. She feels comfortable with this calorie level, especially since being more intentional about food choices, focusing on protein, more produce, whole grains.    She reports having some nausea and cramping when starting Ozempic, but has reduced over time. She has noticed increased reflux as well.     Recent Diet Recall:  Breakfast: english muffin with butter and jam and fruit   Lunch: bigger meal - sandwich; take-out ([employer] - toppoki); grilled cheese; soup and salad    Dinner: something smaller, maybe a protein bar  Beverages: tea     Progress Towards Previous Goals:  1) Increase lean protein with lunch - palm-size pc of lean meat, tuna salad, 2 cheese sticks, serving of yogurt, 2 hard " boiled egg, etc.  - Improving  2) Increase fiber:  - Improving    - Add fruit and nuts/seeds to yogurt with breakfast   - Add serving of veggies to lunch    Physical Activity:  Walking dog 30 mins per day. Working at  [employer] on the weekends. Bought a kettle bell to start exercise.     Nutrition Prescription  Recommended energy/nutrient modification.  7351-9052 calories/day    Nutrition Diagnosis  Obesity r/t long history of positive energy balance aeb BMI >30. - resolved    Current: Overweight r/t long history of positive energy balance aeb BMI >25    Nutrition Intervention  Materials/education provided on hypocaloric diet for weight loss. Discussed continuing 2227-6027 calorie/day diet, Volumetric eating to help satiety level on fewer calories  Provided tips to help with reflux  Provided specific protein goals to aim for.   Discussed importance of increased exercise, specifically resistance/strengthening over time for wt mgmt, bone health and constipation.   Co-developed goals to work towards.   Provided pt with list of goals and resources below via Quanttust.    Patient demonstrates understanding.    Expected Engagement: good  Follow-Up Plans: Nutritional tracking     Nutrition Goals  1) Increase lean protein. Aim for 65-90 gm protein daily.    - 20-30 gm protein = palm-size pc of lean meat/seafood, 4-5 tuna salad, 3 cheese sticks, 3/4 c serving of Greek yogurt, 3 hard boiled eggs, 1 protein bar/shake, 3 slice deli meat (or 2 slice deli meat + 1 slice cheese)  2) Increase activity as able    Tips to help reduce reflux:  - Eat slowly (20-30 minutes per meal), chewing foods well (25 chews per bite/applesauce consistency)  - Wait 3 hours after eating before lying down.  - Eat in a calm, relaxed place. Sit down while you eat.  - Avoid intake of high-fat foods (fried foods, processed meats (sausages, hot dogs, salami), high-fat meat, pastries, cookies, candy, full-fat dairy, nuts/nut butter, added oils/butter),  "alcohol, caffeine, peppermint, spicy foods, chocolate   - Try  out your beverage intake from eating.       The Plate Method  Http://[email or web address]    https://www.cdc.gov/diabetes/images/managing/Diabetes-Manage-Eat-Well-Plate-Graphic_600px.jpg    Protein Sources   [email or web address]     Carbohydrates  [email or web address]     Summary of Volumetrics Eating Plan  [email or web address]     Healthy Recipe Resources:  \"The Volumetrics Eating Plan\" by Lizzette Robles, Ph.D.  https://www.[employer]/  www.[employer]  www.[employer].org  Dash Diet Recipes [facility]  www.extension.[facility] - the recipe box  \"Cooking that Counts\" by editors of [employer]  https://www.[facility]/communityculinary/[facility]_Cookbook_KT_Final_11-6_NoCrops-compressed.pdf  Https://www.choosemyplate.gov/myplatekitchen  https://snaped.fns.usda.gov/recipes-menus - SNAP recipes  https://www.diabetesfoodhub.org/all-recipes.html  Https://www.[employer].com/  \"Calorie Smart Meals\" cookbook by [employer] (200-500 calorie meals)  https://www.[employer]/recipes/86774/cuisines-regions//  Budget friendly high-protein recipes: https://www.[employer]/gallery/1939698/dietitian-budget-[email or web address]-dinner-recipes/?zibno=[email or web address]  https://www.[employer].com.org/knowledge-center/recipes/    Follow-Up:  1 month, PRN    Time spent with patient: 18 minutes.  Ana Pillai RD, LD      "

## 2022-11-08 NOTE — PROGRESS NOTES
Kasandra Lau is a 37 year old female who is being evaluated via a billable telephone visit.      What phone number would you like to be contacted at? ***  How would you like to obtain your AVS? {AVS Preference:[id number]}      SUBJECTIVE:                                                   Kasandra Lau is a 37 year old female who presents for virtual visit today for the following health issue(s):  No chief complaint on file.      Additional information:     HPI:  ***    Patient's last menstrual period was 2022..     Patient is sexually active, .  Using condoms for contraception.    reports that she has quit smoking. Her smoking use included cigarettes. She has never used smokeless tobacco.      Health maintenance updated:  yes    Today's PHQ-2 Score:   PHQ-2 (  [employer]) 10/11/2022   Q1: Little interest or pleasure in doing things 0   Q2: Feeling down, depressed or hopeless 1   PHQ-2 Score 1   PHQ-2 Total Score (12-17 Years)- Positive if 3 or more points; Administer PHQ-A if positive -   Q1: Little interest or pleasure in doing things -   Q2: Feeling down, depressed or hopeless -   PHQ-2 Score -   Some encounter information is confidential and restricted. Go to Review Flowsheets activity to see all data.     Today's PHQ-9 Score:   PHQ-9 SCORE 10/11/2022   PHQ-9 Total Score MyChart -   PHQ-9 Total Score 5   Some encounter information is confidential and restricted. Go to Review Flowsheets activity to see all data.     Today's VIC-7 Score:   VIC-7 SCORE 10/11/2022   Total Score -   Total Score 9   Some encounter information is confidential and restricted. Go to Review Flowsheets activity to see all data.       Problem list and histories reviewed & adjusted, as indicated.  Additional history: as documented.    Patient Active Problem List   Diagnosis     Pelvic pain in female     Vitamin D deficiency     Menorrhagia with regular cycle     Migraine without aura and without status migrainosus, not intractable      Iron deficiency anemia due to chronic blood loss     Tired     Circadian rhythm sleep disorder, delayed sleep phase type     Unhealthy sleep habit     Seasonal mood disorder (H)     Chronic idiopathic urticaria     Acid reflux     Cholecystitis     Depression     Hx of abnormal cervical Pap smear     Irritable bowel syndrome with constipation     Migraine headache     Mild intermittent asthma without complication     Need for desensitization to allergens     Panic disorder     Pap smear abnormality of cervix/human papillomavirus (HPV) positive     Recurrent major depressive disorder, in remission (H)     Hypertrophy of breast     Chronic sinusitis, unspecified location     Keratosis pilaris     Endometriosis     Past Surgical History:   Procedure Laterality Date     COLONOSCOPY       LAPAROSCOPIC ABLATION ENDOMETRIOSIS N/A 11/09/2016    Procedure: LAPAROSCOPIC ABLATION ENDOMETRIOSIS;  Surgeon: Tonja Ferrer MD;  Location: [facility]     LAPAROSCOPIC CYSTECTOMY OVARIAN (BENIGN) Left 11/09/2016    Procedure: LAPAROSCOPIC CYSTECTOMY OVARIAN (BENIGN);  Surgeon: Tonja Ferrer MD;  [facility]: [facility]     LAPAROSCOPIC TUBAL DYE STUDY Left 11/09/2016    Procedure: LAPAROSCOPIC TUBAL DYE STUDY;  Surgeon: Tonja Ferrer MD;  [facility]: [facility]     LAPAROSCOPY OPERATIVE ADULT N/A 11/09/2016    Procedure: LAPAROSCOPY OPERATIVE ADULT;  Surgeon: Tonja Ferrer MD;  [facility]: [facility]     MAMMOPLASTY REDUCTION Bilateral 08/05/2021    Procedure: MAMMOPLASTY, REDUCTION, Bilateral;  Surgeon: ANTONIO Moreno MD;  Location: [facility] OR     ORTHOPEDIC SURGERY      left wrist surgery     TONSILLECTOMY & ADENOIDECTOMY Bilateral     cauterized turbinates also      Social History     Tobacco Use     Smoking status: Former     Packs/day: 0.00     Years: 10.00     Pack years: 0.00     Types: Cigarettes     Smokeless tobacco: Never     Tobacco comments:     smokes occasionally socially    Substance Use Topics      Alcohol use: Not Currently     Alcohol/week: 0.0 standard drinks     Comment: occasional       Problem (# of Occurrences) Relation (Name,Age of Onset)    Diabetes (1) Maternal Grandfather (Mothers father)       Negative family history of: Hypertension, Coronary Artery Disease, Hyperlipidemia, Cerebrovascular Disease, Breast Cancer, Colon Cancer, Prostate Cancer, Other Cancer, Glaucoma, Macular Degeneration            Current Outpatient Medications   Medication Sig     almotriptan (AXERT) 12.5 MG tablet Take 1 tablet (12.5 mg) by mouth at onset of headache for migraine (repeat in 2 hours if needed) May repeat in 2 hours. Max 2 tablets/24 hours.     atomoxetine (STRATTERA) 25 MG capsule Take 1 capsule (25 mg) by mouth daily     buPROPion (WELLBUTRIN XL) 300 MG 24 hr tablet Take 1 tablet (300 mg) by mouth every morning     cholecalciferol 50 MCG (2000 UT) CAPS 5,000 Units every evening      doxepin (SINEQUAN) 10 MG capsule Take 4 capsules (40 mg) by mouth At Bedtime     EMGALITY 120 MG/ML injection Inject 1 mL (120 mg) Subcutaneous every 28 days     famotidine (PEPCID) 20 MG tablet Take 1 tablet (20 mg) by mouth 2 times daily     fexofenadine (ALLEGRA) 180 MG tablet Take 1 tablet (180 mg) by mouth every morning     fluticasone (FLONASE) 50 MCG/ACT nasal spray 2 times daily as needed      gabapentin (NEURONTIN) 400 MG capsule Take 2 capsules (800 mg) by mouth 3 times daily     hydrocortisone (CORTAID) 1 % external ointment Apply sparingly to affected area three times daily for 14 days.     hydroxychloroquine (PLAQUENIL) 200 MG tablet Take 2 tablets (400 mg) by mouth daily Annual Plaquenil toxicity eye screening required.     levocetirizine (XYZAL) 5 MG tablet Take 5 mg by mouth every evening      montelukast (SINGULAIR) 10 MG tablet Take 1 tablet (10 mg) by mouth At Bedtime     propranolol (INDERAL) 20 MG tablet Take 1 tablet (20 mg) by mouth daily as needed (anxiety)     Pseudoephedrine HCl (SUDAFED SINUS CONGESTION  "PO)      semaglutide (OZEMPIC) 2 MG/1.5ML SOPN pen Start Ozempic 0.25 mg weekly for 2 weeks then increase to 0.5 mg weekly for 1 month and then increase to 1.0 mg weekly thereafter     Semaglutide, 1 MG/DOSE, 4 MG/3ML SOPN Inject 1 mg Subcutaneous once a week To be started after finishing 0.5 mg weekly     vilazodone (VIIBRYD) 40 MG TABS tablet Take 1 tablet (40 mg) by mouth every morning     No current facility-administered medications for this visit.     Allergies   Allergen Reactions     Dust Mites Cough, Difficulty breathing and Shortness Of Breath     Cats      Chest tightness, sinus irritation         OBJECTIVE:     No vitals were obtained today due to virtual visit.    Physical Exam  {video visit exam brief selected:[id number]::\"GENERAL: Healthy, alert and no distress\",\"EYES: Eyes grossly normal to inspection.  No discharge or erythema, or obvious scleral/conjunctival abnormalities.\",\"RESP: No audible wheeze, cough, or visible cyanosis.  No visible retractions or increased work of breathing.  \",\"SKIN: Visible skin clear. No significant rash, abnormal pigmentation or lesions.\",\"NEURO: Cranial nerves grossly intact.  Mentation and speech appropriate for age.\",\"PSYCH: Mentation appears normal, affect normal/bright, judgement and insight intact, normal speech and appearance well-groomed.\"}          ASSESSMENT/PLAN:                                                      Phone call duration: *** minutes    No diagnosis found.    There are no Patient Instructions on file for this visit.    ***    Razia Mcdermott, APRN [facility]    "

## 2022-11-08 NOTE — CONFIDENTIAL NOTE
"    Health Psychology - Follow up Visit  Confidential Summary*    The author of this note documented a reason for not sharing it with the patient.  REFERRAL SOURCE:  Psychiatry    CHIEF COMPLAINT/REASON FOR VISIT  Psychotherapy in context of chronic PTSD and persistent depression.  Patient also complains of dissatisfaction with interpersonal relationships and challenges with emotion regulation.      Patient was seen today for a 60 minute individual psychotherapy session.  The session was facilitated via VIDEO with patient at her home and provider at her own home.  We used [email or web address] platform.       This telehealth service is appropriate and effective for delivering services in light of the necessity for social distancing to mitigate the COVID-19 epidemic. Patient has agreed to receiving telehealth services.    The patient has been notified of following:   \"This VIDEO visit will be conducted via a call between you and your physician/provider. We have found that certain health care needs can be provided without the need for an in-person physical exam.  VIDEO visits are billed at different rates depending on your insurance coverage.  Please reach out to your insurance provider with any questions. If during the course of the call the physician/provider feels a video visit is not appropriate, you will not be charged for this service.\"     Patient has given verbal consent for VIDEO visit? Yes    Subjective:  Patient began with discussion of endometriosis and that she also has migraines.  Today patient detailed her many physical complaints/pains which include obesity, bumps on her skin and hair loss, migraines, chronic sinus infections, depression and anxiety, whole body and muscle and joint aches, GI pains including reflux and endometriosis, hormonal imbalances, muscle pain, high arches and plantar fascitis, chronic fatigue and seasonal allergies.  In addition, she described back pain.  Today she also detailed feeling light " headed.  She has recently started taking ozempic to assist in weight loss and is considering the impact.      She reported that she is able to continue walking her dogs daily.  She is looking forward to an upcoming ENT appointment and is anticipating an ultrasound to determine cause of postnasal drip.      Discussed her coping strategies for these many somatic complaints and the interference resulting from these ailments.  Discussed the impact of her emotional health on these symptoms and vice versa.      Patient is looking forward to upcoming trip to [facility] for a conference and some vacation time.  She detailed looking forward to time with those who appear to appreciate her intellect, share common values and those she can relax with.     Objective:  Patient was on time for today s session. She was alert and oriented. Mood was euthymic with appropriate range of affect. Patient denied suicidal or assaultive ideation, plan, or intent.        Assessment:  The patient has a longstanding history of interpersonal discord and challenges with emotion regulation.  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  She has completed all requirements for her PhD and is now ABD.  She is living in  housing with her two dogs.  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  She is hoping to present her dissertation proposal in the upcoming weeks.      Plan:  Patient graduated from full model DBT at [facility] and is now completing phase 2 work.      Treatment plan completed:  10/13/22 upfsyr    Time In: 1:00  Time Out: 2:00    Diagnosis:  Axis I PTSD, chronic, persistent depressive disorder, adjustment disorder with mixed, psychological factors associated with physical (fibromyalgia)   Axis II  BPD   Axis III please see medical records for details   [place] IV Psychosocial and Environmental Stressors: living alone with no family support,  pandemic stress, chronic illness         Corina Muhammad, PhD, LP

## 2022-11-09 ENCOUNTER — PRE VISIT (OUTPATIENT)
Dept: GASTROENTEROLOGY | Facility: CLINIC | Age: 37
End: 2022-11-09

## 2022-11-09 ENCOUNTER — OFFICE VISIT (OUTPATIENT)
Dept: FAMILY MEDICINE | Facility: CLINIC | Age: 37
End: 2022-11-09
Payer: COMMERCIAL

## 2022-11-09 VITALS
BODY MASS INDEX: 28.11 KG/M2 | SYSTOLIC BLOOD PRESSURE: 113 MMHG | HEART RATE: 87 BPM | TEMPERATURE: 98.1 F | HEIGHT: 68 IN | WEIGHT: 185.5 LBS | DIASTOLIC BLOOD PRESSURE: 78 MMHG | OXYGEN SATURATION: 97 %

## 2022-11-09 DIAGNOSIS — J02.9 SORE THROAT: ICD-10-CM

## 2022-11-09 DIAGNOSIS — R07.89 CHEST TIGHTNESS: Primary | ICD-10-CM

## 2022-11-09 LAB
FLUAV RNA SPEC QL NAA+PROBE: NEGATIVE
FLUBV RNA RESP QL NAA+PROBE: NEGATIVE
RSV RNA SPEC NAA+PROBE: NEGATIVE
SARS-COV-2 RNA RESP QL NAA+PROBE: NEGATIVE

## 2022-11-09 PROCEDURE — 87637 SARSCOV2&INF A&B&RSV AMP PRB: CPT

## 2022-11-09 NOTE — NURSING NOTE
"ROOM:1  [facility]    Preferred Name: Kasandra SMITH AGREED:              LUIS DECLINED:              37 year old  Chief Complaint   Patient presents with     Sick     Head feels stuffy like a migraine, fatigue, chest tightness, sore throat, flushed       Blood pressure 113/78, pulse 87, temperature 98.1  F (36.7  C), temperature source Oral, height 1.735 m (5' 8.3\"), weight 84.1 kg (185 lb 8 oz), last menstrual period 11/05/2022, SpO2 97 %, not currently breastfeeding. Body mass index is 27.96 kg/m .  BP completed using cuff size:        Patient Active Problem List   Diagnosis     Pelvic pain in female     Vitamin D deficiency     Menorrhagia with regular cycle     Migraine without aura and without status migrainosus, not intractable     Iron deficiency anemia due to chronic blood loss     Tired     Circadian rhythm sleep disorder, delayed sleep phase type     Unhealthy sleep habit     Seasonal mood disorder (H)     Chronic idiopathic urticaria     Acid reflux     Cholecystitis     Depression     Hx of abnormal cervical Pap smear     Irritable bowel syndrome with constipation     Migraine headache     Mild intermittent asthma without complication     Need for desensitization to allergens     Panic disorder     Pap smear abnormality of cervix/human papillomavirus (HPV) positive     Recurrent major depressive disorder, in remission (H)     Hypertrophy of breast     Chronic sinusitis, unspecified location     Keratosis pilaris     Endometriosis       Wt Readings from Last 2 Encounters:   11/09/22 84.1 kg (185 lb 8 oz)   11/08/22 83.5 kg (184 lb)     BP Readings from Last 3 Encounters:   11/09/22 113/78   11/08/22 110/62   10/11/22 106/66       Allergies   Allergen Reactions     Dust Mites Cough, Difficulty breathing and Shortness Of Breath     Cats      Chest tightness, sinus irritation       Current Outpatient Medications   Medication     almotriptan (AXERT) 12.5 MG tablet     atomoxetine (STRATTERA) 25 MG capsule "     buPROPion (WELLBUTRIN XL) 300 MG 24 hr tablet     cholecalciferol 50 MCG (2000 UT) CAPS     doxepin (SINEQUAN) 10 MG capsule     EMGALITY 120 MG/ML injection     famotidine (PEPCID) 20 MG tablet     fexofenadine (ALLEGRA) 180 MG tablet     fluticasone (FLONASE) 50 MCG/ACT nasal spray     gabapentin (NEURONTIN) 400 MG capsule     hydrocortisone (CORTAID) 1 % external ointment     hydroxychloroquine (PLAQUENIL) 200 MG tablet     levocetirizine (XYZAL) 5 MG tablet     montelukast (SINGULAIR) 10 MG tablet     propranolol (INDERAL) 20 MG tablet     Pseudoephedrine HCl (SUDAFED SINUS CONGESTION PO)     semaglutide (OZEMPIC) 2 MG/1.5ML SOPN pen     Semaglutide, 1 MG/DOSE, 4 MG/3ML SOPN     vilazodone (VIIBRYD) 40 MG TABS tablet     No current facility-administered medications for this visit.       Social History     Tobacco Use     Smoking status: Former     Packs/day: 0.00     Years: 10.00     Pack years: 0.00     Types: Cigarettes     Smokeless tobacco: Never     Tobacco comments:     smokes occasionally socially    Vaping Use     Vaping Use: Never used   Substance Use Topics     Alcohol use: Not Currently     Alcohol/week: 0.0 standard drinks     Comment: occasional      Drug use: Not Currently     Types: Marijuana     Comment: marijuana  none since May 2021       Honoring Choices - Health Care Directive Guide offered to patient at time of visit.    Health Maintenance Due   Topic Date Due     ASTHMA ACTION PLAN  Never done     ADVANCE CARE PLANNING  Never done     YEARLY PREVENTIVE VISIT  01/31/2020     HPV TEST  02/21/2020     PAP  02/21/2020       Immunization History   Administered Date(s) Administered     COVID-19,PF,Pfizer (12+ Yrs) 03/19/2021, 04/09/2021     COVID-19,PF,Pfizer 12+ YRS BIVALENT Booster 09/19/2022     COVID-19,PF,Pfizer 12+ Yrs (2022 and After) 01/10/2022     DTaP, Unspecified 05/22/2019     Flu, Unspecified 09/23/2016     Influenza Vaccine IM > 6 months Valent IIV4 (Alfuria,Fluzone) 09/23/2016,  09/07/2018, 08/28/2019, 09/02/2020, 11/02/2021     Pneumococcal 23 valent 05/22/2019     Tdap (Adacel,Boostrix) 05/22/2019       Lab Results   Component Value Date    PAP NIL 01/31/2019       Recent Labs   Lab Test 11/07/22  1241 08/19/22  0835 04/27/22  1717 04/13/22  1600 01/04/22  1732 08/12/21  1535 09/12/20 2058 08/02/19  1711 10/05/17  1133 02/06/17  1200   LDL  --  100  --   --   --   --   --   --   --   --    HDL  --  43*  --   --   --   --   --   --   --   --    TRIG  --  184*  --   --   --   --   --   --   --   --    * 60*  --  63*  --    < > 36 25   < >  --    CR 0.83 0.69 0.66 0.63  --    < > 0.77 0.80   < >  --    GFRESTIMATED >90 >90 >90 >90  --    < > >90 >90   < >  --    GFRESTBLACK  --   --   --   --   --   --  >90 >90   < >  --    ALBUMIN 4.4 3.6  --  3.9  --    < > 3.3* 3.4   < >  --    POTASSIUM 3.8  --  3.7 3.8  --    < > 3.9 3.5   < >  --    TSH  --   --   --   --  1.81  --   --   --   --  1.72    < > = values in this interval not displayed.       PHQ-2 ( 1999 Pfizer) 10/11/2022 8/4/2022   Q1: Little interest or pleasure in doing things 0 0   Q2: Feeling down, depressed or hopeless 1 0   PHQ-2 Score 1 0   PHQ-2 Total Score (12-17 Years)- Positive if 3 or more points; Administer PHQ-A if positive - -   Q1: Little interest or pleasure in doing things - Not at all   Q2: Feeling down, depressed or hopeless - Not at all   PHQ-2 Score - 0       PHQ-9 SCORE 9/29/2022 9/29/2022 9/29/2022 10/11/2022   PHQ-9 Total Score MyChart - - 6 (Mild depression) -   PHQ-9 Total Score 6 6 6 5       VIC-7 SCORE 9/30/2022 9/30/2022 10/11/2022   Total Score - 8 (mild anxiety) -   Total Score 8 8 9       ACT Total Scores 6/14/2022   ACT TOTAL SCORE (Goal Greater than or Equal to 20) 24   In the past 12 months, how many times did you visit the emergency room for your asthma without being admitted to the hospital? 2   In the past 12 months, how many times were you hospitalized overnight because of your asthma? 0        Fartun Moreno    November 9, 2022 1:01 PM

## 2022-11-09 NOTE — PROGRESS NOTES
"Assessment & Plan   Kasandra was seen today for sick.    Diagnoses and all orders for this visit:    Chest tightness  Sore throat  -     Symptomatic; Yes; 11/6/2022 Influenza A/B & SARS-CoV2 (COVID-19) Virus PCR Multiplex; Future  -     Symptomatic; Yes; 11/6/2022 Influenza A/B & SARS-CoV2 (COVID-19) Virus PCR Multiplex Nose  DDX includes influenza, COVID-19, among other viral URIs. Discussed isolation precautions until COVID-19 test results come back. Otherwise discussed supportive measures (rest, ibuprofen/tylenol, fluids). Seek care if worsening SOB.     RTC PRN    Charis King, NP   ______________________________________    Subjective   Kasandra is a 37 year old patient here today for Sick (Head feels stuffy like a migraine, fatigue, chest tightness, sore throat, flushed)    Returned from [place] on 11/4  Started to not feel well on 11/6   Fatigue, sore throat, flushed  Feels hot, no chills  No cough  Migraine- feels similar to past migraines, photosensitivity  Chest tightness, feels more short of breath  Able comfortably walk 1-2 blocks without getting out of breath or having to rest  Had a hx of possible asthma, but PFTs determined it was not asthma. More likely reflux sx  No other lung conditions. No cardiac hx  Uses the emgality and almotriptan for migraine- helpful.   No recent COVID-19 tests  Up to date with COVID-19 vaccines and boosters  Vaccinated against influenza     Do you need any refills on your Medications today? No      Medical, surgical, family and social histories reviewed and updated as indicated.   Medications and allergies reviewed and updated as indicated.       ROS  Review Of Systems  See HPI    General Physical Exam:  Vitals: /78   Pulse 87   Temp 98.1  F (36.7  C) (Oral)   Ht 1.735 m (5' 8.3\")   Wt 84.1 kg (185 lb 8 oz)   LMP 11/05/2022   SpO2 97%   BMI 27.96 kg/m    Physical Exam  Vitals and nursing note reviewed.   Constitutional:       General: She is not in acute " distress.     Appearance: Normal appearance. She is not ill-appearing.   HENT:      Head: Normocephalic and atraumatic.      Right Ear: Tympanic membrane, ear canal and external ear normal.      Left Ear: Tympanic membrane, ear canal and external ear normal.      Nose: Congestion and rhinorrhea present.      Mouth/Throat:      Mouth: Mucous membranes are moist.      Pharynx: No oropharyngeal exudate or posterior oropharyngeal erythema.   Eyes:      Extraocular Movements: Extraocular movements intact.      Conjunctiva/sclera: Conjunctivae normal.      Pupils: Pupils are equal, round, and reactive to light.   Cardiovascular:      Rate and Rhythm: Normal rate and regular rhythm.      Pulses: Normal pulses.      Heart sounds: Normal heart sounds.   Pulmonary:      Effort: Pulmonary effort is normal. No accessory muscle usage or respiratory distress.      Breath sounds: Normal breath sounds. No stridor. No wheezing, rhonchi or rales.   Chest:      Chest wall: No tenderness.   Abdominal:      General: Abdomen is flat. Bowel sounds are normal.      Palpations: Abdomen is soft.   Musculoskeletal:      Cervical back: Normal range of motion and neck supple.   Lymphadenopathy:      Cervical: No cervical adenopathy.   Skin:     General: Skin is warm and dry.      Capillary Refill: Capillary refill takes less than 2 seconds.      Coloration: Skin is not cyanotic.      Nails: There is no clubbing.   Neurological:      General: No focal deficit present.      Mental Status: She is alert and oriented to person, place, and time.      Gait: Gait is intact.   Psychiatric:         Mood and Affect: Mood normal.         Behavior: Behavior normal.         Thought Content: Thought content normal.         Judgment: Judgment normal.

## 2022-11-10 ENCOUNTER — TELEPHONE (OUTPATIENT)
Dept: GASTROENTEROLOGY | Facility: CLINIC | Age: 37
End: 2022-11-10

## 2022-11-10 ENCOUNTER — VIRTUAL VISIT (OUTPATIENT)
Dept: GASTROENTEROLOGY | Facility: CLINIC | Age: 37
End: 2022-11-10
Payer: COMMERCIAL

## 2022-11-10 ENCOUNTER — VIRTUAL VISIT (OUTPATIENT)
Dept: ENDOCRINOLOGY | Facility: CLINIC | Age: 37
End: 2022-11-10
Payer: COMMERCIAL

## 2022-11-10 DIAGNOSIS — Z71.3 NUTRITIONAL COUNSELING: Primary | ICD-10-CM

## 2022-11-10 DIAGNOSIS — M19.90 INFLAMMATORY ARTHRITIS: ICD-10-CM

## 2022-11-10 DIAGNOSIS — K76.0 HEPATIC STEATOSIS: Primary | ICD-10-CM

## 2022-11-10 DIAGNOSIS — R79.89 ELEVATED LFTS: ICD-10-CM

## 2022-11-10 DIAGNOSIS — E66.3 OVERWEIGHT (BMI 25.0-29.9): ICD-10-CM

## 2022-11-10 PROCEDURE — 99204 OFFICE O/P NEW MOD 45 MIN: CPT | Mod: 95 | Performed by: PHYSICIAN ASSISTANT

## 2022-11-10 PROCEDURE — 97803 MED NUTRITION INDIV SUBSEQ: CPT | Mod: 95 | Performed by: DIETITIAN, REGISTERED

## 2022-11-10 NOTE — PROGRESS NOTES
Kasandra Lau is a 37 year old female who is being evaluated via a billable video visit.      How would you like to obtain your AVS? MyChart  If the video visit is dropped, the invitation should be resent by: Text to cell phone: [phone]  Will anyone else be joining your video visit? No    Joined the call at 11/10/2022, 10:52:32 am.  Left the call at 11/10/2022, 11:14:13 am.    Location of patient:   [address] [place] [id number]    Provider: On-site    Hepatology Clinic note  Kasandra Lau   Date of Birth 1985  Date of Service 11/10/2022    REASON FOR CONSULTATION: elevated LFTs  REFERRING PROVIDER: Roxy ROTH          Assessment/plan:   Kasandra Lau is a 37 year old female with history of elevated LFT's over the past 6-7 months, most recently  and AST 31.   She does have some risk factors for fatty liver disease includes: obesity and weight gain. She has made some positive lifestyle changes, started Ozempic and weight is trending down over the past month. We discussed the pathophysiology and natural history of nonalcoholic fatty liver disease . We discussed NAFLD treatment includes slow gradual weight loss, as well as optimization of risk factors including hyperlipidemia/blood glucose. Will also rule out genetic and autoimmune causes of hepatobiliary disease.      - US abdomen in the near future   - Continue slow gradual weight loss   - Maintain good control of cholesterol and optimize blood glucose as needed.   - Recommend limiting carbohydrates, especially simple carbohydrates.  - Increase physical activity: maintain physical activity more than 150 minutes a week  - Labs: AMA Iron panel, alpha-1-antitrypsin deficiency, STEPHANIE, F-actin, TTG   - Follow up in six months     Tejas Fernandes PA-C   [facility]     Total time for E/M services performed on the date of the encounter 45 minutes.  This included review of previous: clinic visits, hospital records, lab results,  imaging studies, and procedural documentation. Time also includes patient visit, documentation and discussion with other providers.  The findings from this review are summarized in the above note.     -----------------------------------------------------       HPI:   Kasandra Lau is a 37 year old female  presenting for the evaluation of elevated LFT's.     LFT's were first noticed to be abnormal within the last 7 months, mostly recently  and AST 31.     Ozempic recently started within the past month. Historicaly weight has been around 155-165, then gained 17 lbs (peaked at 196 lbs) and then slow gradual weight loss afterwards. Not sure the cause of quick weight gain. Currently eating a lot of fruits and more sources of protein (yogurt, meat).     In terms of exercise, work at  [employer] on the weekends, which is labor intensive. Otherwise, no other routine exercise. Gets bloated a lot, has IBS-C. Typically take ex-lax at night.     Patient denies jaundice, lower extremity edema, abdominal distension or confusion.  Patient also denies melena, hematochezia or hematemesis. Patient denies  fevers, sweats or chills.    PMH: endometriosis, migraines, circadian rhythm sleep disorder, depression, acid reflux, PTSD    SMH: tonsillectomy, adenoidectomy, Laparascopic ablation endometriosis, laporscopic cystectomy, mammoplasty reduction, ADHD,     Medications:   See below     No previous tobacco use. Does not drink alcohol. No previous IV/IN drug use. 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 Patient currently lives alone with two dogs ([facility] + husky mix). Diabetes runs on mother's side of family. No family history of liver disease or liver cancer.     Previous work-up:  HCV antibody  HBV SAb: 27  HBV SAg: nonreactive  HBV CAb:   STEPHANIE:   F-actin  AMA:   Iron panel:   Ferritin: 17  Iron Sats: 29%  TTG   Alpha-1-antripsin  Ceruloplasmin   TSH 1.81  Cholesterol  Total 180      Triglycerides 184  Hemoglobin A1c    Medical hx Surgical hx   Past Medical History:   Diagnosis Date     Anemia fall 2016     Anxiety      Arthritis      Chronic fatigue      Depression (emotion)      Gastroesophageal reflux disease      Migraine      Neuropathic pain      Panic disorder      Uncomplicated asthma Spring 2018    Past Surgical History:   Procedure Laterality Date     COLONOSCOPY       LAPAROSCOPIC ABLATION ENDOMETRIOSIS N/A 11/09/2016    Procedure: LAPAROSCOPIC ABLATION ENDOMETRIOSIS;  Surgeon: Tonja Ferrer MD;  Location: [facility]     LAPAROSCOPIC CYSTECTOMY OVARIAN (BENIGN) Left 11/09/2016    Procedure: LAPAROSCOPIC CYSTECTOMY OVARIAN (BENIGN);  Surgeon: Tonja Ferrer MD;  [facility]     LAPAROSCOPIC TUBAL DYE STUDY Left 11/09/2016    Procedure: LAPAROSCOPIC TUBAL DYE STUDY;  Surgeon: Tonja Ferrer MD;  [facility]     LAPAROSCOPY OPERATIVE ADULT N/A 11/09/2016    Procedure: LAPAROSCOPY OPERATIVE ADULT;  Surgeon: Tonja Ferrer MD;  [facility]     MAMMOPLASTY REDUCTION Bilateral 08/05/2021    Procedure: MAMMOPLASTY, REDUCTION, Bilateral;  Surgeon: ANTONIO Moreno MD;  Location: [facility] OR     ORTHOPEDIC SURGERY      left wrist surgery     TONSILLECTOMY & ADENOIDECTOMY Bilateral     cauterized turbinates also                 Medications:     Current Outpatient Medications   Medication     almotriptan (AXERT) 12.5 MG tablet     atomoxetine (STRATTERA) 25 MG capsule     buPROPion (WELLBUTRIN XL) 300 MG 24 hr tablet     cholecalciferol 50 MCG (2000 UT) CAPS     doxepin (SINEQUAN) 10 MG capsule     EMGALITY 120 MG/ML injection     famotidine (PEPCID) 20 MG tablet     fexofenadine (ALLEGRA) 180 MG tablet     fluticasone (FLONASE) 50 MCG/ACT nasal spray     gabapentin (NEURONTIN) 400 MG capsule     hydrocortisone (CORTAID) 1 % external ointment     hydroxychloroquine (PLAQUENIL) 200 MG tablet     levocetirizine (XYZAL) 5 MG tablet      montelukast (SINGULAIR) 10 MG tablet     propranolol (INDERAL) 20 MG tablet     Pseudoephedrine HCl (SUDAFED SINUS CONGESTION PO)     semaglutide (OZEMPIC) 2 MG/1.5ML SOPN pen     Semaglutide, 1 MG/DOSE, 4 MG/3ML SOPN     vilazodone (VIIBRYD) 40 MG TABS tablet     No current facility-administered medications for this visit.            Allergies:     Allergies   Allergen Reactions     Dust Mites Cough, Difficulty breathing and Shortness Of Breath     Cats      Chest tightness, sinus irritation            Social History:     Social History     Socioeconomic History     Marital status: Single     Spouse name: Not on file     Number of children: Not on file     Years of education: Not on file     Highest education level: Not on file   Occupational History     Not on file   Tobacco Use     Smoking status: Former     Packs/day: 0.00     Years: 10.00     Pack years: 0.00     Types: Cigarettes     Smokeless tobacco: Never     Tobacco comments:     smokes occasionally socially    Vaping Use     Vaping Use: Never used   Substance and Sexual Activity     Alcohol use: Not Currently     Alcohol/week: 0.0 standard drinks     Comment: occasional      Drug use: Not Currently     Types: Marijuana     Comment: marijuana  none since May 2021     Sexual activity: Yes     Partners: Male     Birth control/protection: Pull-out method, Inserts/Ring     Comment: Nuvaring   Other Topics Concern     Not on file   Social History Narrative    Grad student in geography     Social Determinants of Health     Financial Resource Strain: Not on file   Food Insecurity: Not on file   Transportation Needs: Not on file   Physical Activity: Not on file   Stress: Not on file   Social Connections: Not on file   Intimate Partner Violence: Not on file   Housing Stability: Not on file            Family History:     Family History   Problem Relation Age of Onset     Diabetes Maternal Grandfather      Hypertension No family hx of      Coronary Artery Disease  No family hx of      Hyperlipidemia No family hx of      Cerebrovascular Disease No family hx of      Breast Cancer No family hx of      Colon Cancer No family hx of      Prostate Cancer No family hx of      Other Cancer No family hx of      Glaucoma No family hx of      Macular Degeneration No family hx of               Review of Systems:   Gen: See HPI     HEENT: No change in vision or hearing, mouth sores, dysphagia, lymph nodes  Resp: No shortness of breath, coughing, hx of asthma  CV: No chest pain, palpitations, syncope   GI: See HPI  : No dysuria, history of stones, urine color    Skin: No rash; no pruritus or psoriasis  MS: No arthralgias, myalgias, joint swelling  Neuro: No memory changes, confusion, numbness    Heme: No difficulty clotting, bruising, bleeding  Psych:  No anxiety, depression, agitation          Physical Exam:     GENERAL: healthy, alert and no distress  EYES: Eyes grossly normal to inspection, conjunctivae and sclerae normal  RESP: no audible wheeze, cough, or visible cyanosis.  No visible retractions or increased work of breathing.  Able to speak fully in complete sentences  NEURO: Cranial nerves grossly intact, mentation intact and speech normal  PSYCH: mentation appears normal, affect normal/bright, judgement and insight intact, normal speech and appearance well-groomed         Data:   Reviewed in person and significant for:    Lab Results   Component Value Date     11/07/2022     09/12/2020      Lab Results   Component Value Date    POTASSIUM 3.8 11/07/2022    POTASSIUM 3.7 04/27/2022    POTASSIUM 3.9 09/12/2020     Lab Results   Component Value Date    CHLORIDE 105 11/07/2022    CHLORIDE 106 04/27/2022    CHLORIDE 113 09/12/2020     Lab Results   Component Value Date    CO2 24 11/07/2022    CO2 22 04/27/2022    CO2 21 09/12/2020     Lab Results   Component Value Date    BUN 8.9 11/07/2022    BUN 9 04/27/2022    BUN 13 09/12/2020     Lab Results   Component Value Date     CR 0.83 11/07/2022    CR 0.77 09/12/2020       Lab Results   Component Value Date    WBC 8.7 11/07/2022    WBC 7.3 09/12/2020     Lab Results   Component Value Date    HGB 13.1 11/07/2022    HGB 11.6 09/12/2020     Lab Results   Component Value Date    HCT 40.4 11/07/2022    HCT 35.7 09/12/2020     Lab Results   Component Value Date    MCV 91 11/07/2022    MCV 89 09/12/2020     Lab Results   Component Value Date     11/07/2022     09/12/2020       Lab Results   Component Value Date    AST 31 11/07/2022    AST 25 09/12/2020     Lab Results   Component Value Date     11/07/2022    ALT 36 09/12/2020     No results found for: BILICONJ   Lab Results   Component Value Date    BILITOTAL 0.3 11/07/2022    BILITOTAL 0.2 09/12/2020       Lab Results   Component Value Date    ALBUMIN 4.4 11/07/2022    ALBUMIN 3.6 08/19/2022    ALBUMIN 3.3 09/12/2020     Lab Results   Component Value Date    PROTTOTAL 7.6 11/07/2022    PROTTOTAL 7.3 09/12/2020      Lab Results   Component Value Date    ALKPHOS 109 11/07/2022    ALKPHOS 70 09/12/2020       Lab Results   Component Value Date    INR 1.09 11/07/2022         Imaging:      EXAM: US ABD LIMITED   LOCATION: [facility]   DATE/TIME: 7/16/2020 12:34 PM     INDICATION: Right upper quadrant pain   COMPARISON: 06/15/2020   TECHNIQUE: Limited abdominal ultrasound.     FINDINGS:     GALLBLADDER: Wall thickening, cholelithiasis, and positive sonographic Lacey's sign consistent with acute cholecystitis.     BILE DUCTS: No biliary dilatation. The common duct measures 4 mm.     LIVER: Normal parenchyma with smooth contour. Stable 5 mm hyperechoic lesion in the right hepatic lobe. The portal vein is patent with flow in the normal direction.     RIGHT KIDNEY: No hydronephrosis. Benign-appearing cysts measuring up to 1.3 cm for which no follow-up is needed.     PANCREAS: The visualized portions are normal.     No ascites.     IMPRESSION:   1.  Gallbladder wall thickening,  cholelithiasis, and positive sonographic Lacey sign consistent with acute cholecystitis.

## 2022-11-10 NOTE — LETTER
"11/10/2022       RE: Kasandra Lau  [address]  [facility] [address]     Dear Colleague,    Thank you for referring your patient, Kasandra Lau, to the [facility] [place] at [facility]. Please see a copy of my visit note below.    Kasandra Lau is a 37 year old female who is being evaluated via a billable video visit.      The patient has been notified of following:     \"This video visit will be conducted via a call between you and your physician/provider. We have found that certain health care needs can be provided without the need for an in-person physical exam.  This service lets us provide the care you need with a video conversation.  If a prescription is necessary we can send it directly to your pharmacy.  If lab work is needed we can place an order for that and you can then stop by our lab to have the test done at a later time.    Video visits are billed at different rates depending on your insurance coverage.  Please reach out to your insurance provider with any questions.    If during the course of the call the physician/provider feels a video visit is not appropriate, you will not be charged for this service.\"    Patient has given verbal consent for Video visit? Yes  How would you like to obtain your AVS? MyChart  Will anyone else be joining your video visit? No  {If patient encounters technical issues they should call [phone]      Video-Visit Details    Type of service:  Video Visit    Video Start Time: 1:00 PM  Video End Time: 1:18 PM    Originating Location (pt. Location): Home    Distant Location (provider location):  [facility] [place]     Platform used for Video Visit: Nomanini    During this virtual visit the patient is located in [place], patient verifies this as the location during the entirety of this visit.     Weight Management Nutrition Consultation    Kasandra Lau is a 37 year " "old female presents today for return weight management nutrition consultation.  Patient referred by Dr. Kerri Evans on October 4, 2022.    Patient with Co-morbidities of obesity including:  Type II DM no  Renal Failure no  Sleep apnea no  Hypertension no   Dyslipidemia yes  Joint pain no  Back pain yes  GERD yes   Fatty liver yes    PMH also significant for anxiety, depression, ADHD, PTSD, rheumatoid arthritis, and IBS-C.  H/o cholcystectomy     Anthropometrics:  Estimated body mass index is 29.22 kg/m  as calculated from the following:    Height as of an earlier encounter on 10/4/22: 1.74 m (5' 8.5\").    Weight as of an earlier encounter on 10/4/22: 88.5 kg (195 lb).    Current weight: 183 lbs with BMI 27 (-12 lbs from initial)      Pt goal: Aiming for 160s, was at this weight for many years.      Medications for Weight Loss:  Ozempic     NUTRITION HISTORY  Food allergies: None  Food intolerances: Too much dairy. Peppers, onions and garlic.   Supplements: Vitamin D, daily multivitamin   Previous methods of diet modification for weight loss: Cutting down high-sugar (baked goods, ice cream) and high-fat foods, cutting 500 calories per day. Tried Noom, did not like it.     She is keeping track of nutritional intake via MyFitnessPal, originally aimed for 1650 calories/day, however after a few weeks of not losing weight, decreased goal to 1500 calories/day. Has been following this goal for a couple weeks and has not seen any weight loss.     Today - Continue nutritional tracking. Pt estimates she is consuming ~1200 calories/day. She feels comfortable with this calorie level, especially since being more intentional about food choices, focusing on protein, more produce, whole grains.    She reports having some nausea and cramping when starting Ozempic, but has reduced over time. She has noticed increased reflux as well.     Recent Diet Recall:  Breakfast: english muffin with butter and jam and fruit   Lunch: " bigger meal - sandwich; take-out ([employer]); grilled cheese; soup and salad    Dinner: something smaller, maybe a protein bar  Beverages: tea     Progress Towards Previous Goals:  1) Increase lean protein with lunch - palm-size pc of lean meat, tuna salad, 2 cheese sticks, serving of yogurt, 2 hard boiled egg, etc.  - Improving  2) Increase fiber:  - Improving    - Add fruit and nuts/seeds to yogurt with breakfast   - Add serving of veggies to lunch    Physical Activity:  Walking dog 30 mins per day. Working at  [employer] on the weekends. Bought a Xplornettle bell to start exercise.     Nutrition Prescription  Recommended energy/nutrient modification.  [id number] calories/day    Nutrition Diagnosis  Obesity r/t long history of positive energy balance aeb BMI >30. - resolved    Current: Overweight r/t long history of positive energy balance aeb BMI >25    Nutrition Intervention  Materials/education provided on hypocaloric diet for weight loss. Discussed continuing 2961-3706 calorie/day diet, Volumetric eating to help satiety level on fewer calories  Provided tips to help with reflux  Provided specific protein goals to aim for.   Discussed importance of increased exercise, specifically resistance/strengthening over time for wt mgmt, bone health and constipation.   Co-developed goals to work towards.   Provided pt with list of goals and resources below via Transceptat.    Patient demonstrates understanding.    Expected Engagement: good  Follow-Up Plans: Nutritional tracking     Nutrition Goals  1) Increase lean protein. Aim for 65-90 gm protein daily.    - 20-30 gm protein = palm-size pc of lean meat/seafood, 4-5 tuna salad, 3 cheese sticks, 3/4 c serving of Greek yogurt, 3 hard boiled eggs, 1 protein bar/shake, 3 slice deli meat (or 2 slice deli meat + 1 slice cheese)  2) Increase activity as able    Tips to help reduce reflux:  - Eat slowly (20-30 minutes per meal), chewing foods well (25 chews per  "bite/applesauce consistency)  - Wait 3 hours after eating before lying down.  - Eat in a calm, relaxed place. Sit down while you eat.  - Avoid intake of high-fat foods (fried foods, processed meats (sausages, hot dogs, salami), high-fat meat, pastries, cookies, candy, full-fat dairy, nuts/nut butter, added oils/butter), alcohol, caffeine, peppermint, spicy foods, chocolate   - Try  out your beverage intake from eating.       The Plate Method  Http://www."[employer]"/906979.pdf    https://www.cdc.gov/diabetes/images/managing/Diabetes-Manage-Eat-Well-Plate-Graphic_600px.jpg    Protein Sources   http://"[employer]"/052473.pdf     Carbohydrates  http://fvfiles.com/887210.pdf     Summary of Volumetrics Eating Plan  http://[email or web address].pdf     Healthy Recipe Resources:  \"The Volumetrics Eating Plan\" by Lizzette Robles, Ph.D.  https://www.[employer].bunkersofa/  www.Joota.bunkersofa  www.Wedit.org  Dash Diet Recipes [facility]  www.extension.[facility] - the recipe box  \"Cooking that Counts\" by editors of [employer]  https://www.[facility]/communityculinary/[facility]_Cookbook_KT_Final_11-6_NoCrops-compressed.pdf  Https://www.choosemyplate.gov/myplatekitchen  https://snaped.fns.usda.gov/recipes-menus - SNAP recipes  https://www.diabetesfoodhub.org/all-recipes.html  Https://www.mealime.com/  \"Calorie Smart Meals\" cookbook by [employer] (200-500 calorie meals)  https://www.[employer]bunkersofa/recipes/55843/cuisines-regions//  Budget friendly high-protein recipes: https://www.Hypios/amy/7604374/dietitian-budget-high-protein-dinner-recipes/?djsxx=66d3a896-lb81-4m11-vg17-g75le63232w6#10t1s982-hg35-3n04-ew39-n37ux19747w6  https://www.[facility].org/knowledge-center/recipes/    Follow-Up:  1 month, PRN    Time spent with patient: 18 minutes.  Ana Pillai RD, LD      "

## 2022-11-10 NOTE — TELEPHONE ENCOUNTER
JD and Britany message to schedule with Tejas Fernandes in 6 months (April), in person.  Call back number [phone].

## 2022-11-10 NOTE — PATIENT INSTRUCTIONS
"Pritesh Slaughter,    Follow-up with RD in 1 month.     Thank you,    Ana Pillai, RD, LD  If you would like to schedule or reschedule an appointment with the RD, please call [phone]    Nutrition Goals  1) Increase lean protein. Aim for 65-90 gm protein daily.    - 20-30 gm protein = palm-size pc of lean meat/seafood, 4-5 tuna salad, 3 cheese sticks, 3/4 c serving of Greek yogurt, 3 hard boiled eggs, 1 protein bar/shake, 3 slice deli meat (or 2 slice deli meat + 1 slice cheese)  2) Increase activity as able    Tips to help reduce reflux:  - Eat slowly (20-30 minutes per meal), chewing foods well (25 chews per bite/applesauce consistency)  - Wait 3 hours after eating before lying down.  - Eat in a calm, relaxed place. Sit down while you eat.  - Avoid intake of high-fat foods (fried foods, processed meats (sausages, hot dogs, salami), high-fat meat, pastries, cookies, candy, full-fat dairy, nuts/nut butter, added oils/butter), alcohol, caffeine, peppermint, spicy foods, chocolate   - Try  out your beverage intake from eating.       The Plate Method  Http://www.[employer] Networks/492116.pdf    https://www.cdc.gov/diabetes/images/managing/Diabetes-Manage-Eat-Well-Plate-Graphic_600px.jpg    Protein Sources   http://Stream TV Networks/799339.pdf     Carbohydrates  http://fvfiles.com/554874.pdf     Summary of Volumetrics Eating Plan  http://fvfiles.com/392584.pdf     Healthy Recipe Resources:  \"The Volumetrics Eating Plan\" by Lizzette Robles, Ph.D.  https://www.Jingdong.rSmart/  www.EnWave  www.Bromium.org  Dash Diet Recipes [facility]  www.extension.[facility] - the recipe box  \"Cooking that Counts\" by editors of CookingLight  https://www.[facility]/communityculinary/[facility]_Cookbook_KT_Final_11-6_NoCrops-compressed.pdf  Https://www.choosemyplate.gov/myplatekitchen  https://snaped.fns.usda.gov/recipes-menus - SNAP recipes  https://www.diabetesfoodhub.org/all-recipes.html  Https://www.mealime.com/  \"Calorie Smart Meals\" " cookbook by [employer] (200-500 calorie meals)  https://www.[employer].Optasite/recipes/51459/cuisines-regions//  Budget friendly high-protein recipes: https://www.[employer]/gallery/6010241/dietitian-budget-high-protein-dinner-recipes/?qonws=32n8o544-ux27-2x56-ne72-l31te17245x4#[id number]-[id number]  https://www.My Point...Exactly.StoreFront.net/knowledge-center/recipes/    Interested in working with a health ?  Do you know what you are supposed to do, but you just aren't doing it?  Then, HEALTH COACHING may help you!   Get unstuck and move forward with the support of a specially trained, National Board Certified Health  who uses evidence based approaches to help you move forward with healthy lifestyle changes.    Health Coaches help you identify goals that will work best for you. Health Coaches provide support and encouragement with overcoming barriers and help you to find inspiration and motivation to lead a healthy lifestyle.    Book a Health Coaching 3 Pack; Three 30 minute Health Coaching Visits, for $99  Visits are done virtually (phone or video)  This is a self pay service; we do not accept insurance for garret coaching.    To learn more about Health Coaching and if it's a good fit for you schedule a free Health  Q&A appointment - call [phone]      COMPREHENSIVE WEIGHT MANAGEMENT PROGRAM  VIRTUAL SUPPORT GROUPS    For Support Group Information:      We offer support groups for patients who are working on weight loss and considering, preparing for or have had weight loss surgery.   There is no cost for this opportunity.  You are invited to attend the?Virtual Support Groups?provided by any of the following locations:    [facility] via [employer] Teams with Frannie Bowman RN  2.   [place] via [employer] Teams with Errol Boss, PhD, LP  3.   [place] via [employer] Teams with Marilu Wild RN  4.   [facility] via [employer] Teams with Marilu Leonardo, [facility]    The  "following Support Group information can also be found on our website:  https://www.[facility].org/treatments/weight-loss-surgery-support-groups      [facility] Weight Loss Surgery Support Group    [facility] Weight Loss Surgery Support Group  The support group is a patient-lead forum that meets monthly to share experiences, encouragement and education. It is open to those who have had weight loss surgery, are scheduled for surgery, and those who are considering surgery.   WHEN: This group meets on the 3rd Wednesday of each month from 5:00PM - 6:00PM virtually using Microsoft Teams.   FACILITATOR: Led by Frannie Bowman RD, LD, RN, the program's Clinical Coordinator.   TO REGISTER: Please contact the clinic via [employer] or call the nurse line directly at [phone] to inform our staff that you would like an invite sent to you and to let us know the email you would like the invite sent to. Prior to the meeting, a link with directions on how to join the meeting will be sent to you.    2022 Meetings  January 19: \"Let's Talk\" a time for the group to share.  February 16: \"Let's Talk\" a time for the group to share.  March 16: Guest Speakers: Psychologists, Lorna Lucero, PhD,LP and Alexa Duff PsyD,  April 20: Guest Speaker: Health , Sheri Moctezuma, [facility],CHES, CPT  May 18: Guest Speaker: DietitianDennys, TIMBO, LP  Josiane 15: \"Let's Talk\" a time for the group to share.  July 20: \"Let's Talk\" a time for the group to share.  August 17: TBA  September 21: TBA  October 19: Guest Speaker: Dr Olivier Edmonds MD Pulmonologist and Sleep Medicine Physician, \"Getting a Good Night's Sleep\".  November 16: TBA  December 21: TBA    [facility] Support Groups    Connections: Bariatric Care Support Group?  This is open to all [facility] (and those external to this program) pre- and post- operative bariatric surgery patients as well as their support " "system.   WHEN: This group meets the 2nd Tuesday of each month from 6:30 PM - 8:00 PM virtually using Microsoft Teams.   FACILITATOR: Led by Errol Boss, Ph.D who is a Licensed Psychologist with the [facility] Comprehensive Weight Management Program.   TO REGISTER: Please send an email to Errol Boss, Ph.D., LP at?[email or web address]?if you would like an invitation to the group and to learn about using Microsoft Teams.    2022 Meetings  January 11: Paris Mancuso, PharmD, Pharmacy Resident at [facility], \"Medications and Bariatric Surgery\".  February 8: Open Forum  March 8  April 12  May 10  Josiane 14    Connections: Post-Operative Bariatric Surgery Support Group  This is a support group for [facility] bariatric patients (and those external to [facility]) who have had bariatric surgery and are at least 3 months post-surgery.  WHEN: This support group meets the 4th Wednesday of the month from 11:00 AM - 12:00 PM virtually using Microsoft Teams.   FACILITATOR: Led by Certified Bariatric Nurse, Marilu Wild RN.   TO REGISTER: Please send an email to Marilu at [email or web address] if you would like an invitation to the group and to learn about using Microsoft Teams.    2022 Meetings  January 26  February 23  March 23  April 27  May 25  Josiane 22    [facility] Healthy Lifestyle Virtual Support Group    Healthy Lifestyle Virtual Support Group?  This is 60 minutes of small group guided discussion, support and resources. All are welcome who want a healthy lifestyle.  WHEN: This group meets monthly on a Friday from 12:30 PM - 1:30 PM virtually using Microsoft Teams.   FACILITATOR: Led by National Board Certified Health and , Marilu Leonardo [facility].   TO REGISTER: Please send an email to Marilu [email or web address] to receive monthly invites to the group or if you have any questions about having a health .  Prior to the " meeting, a link with directions on how to join the meeting will be sent to you.    2022 Meetings  MAY 20: OPEN FORUM  JUNE: 24:  Setting Limits and BoundariesMarilu  July 29: OPEN FORUM  August 26: Special Guest Registered Dietician Sosa Gillette  Sept 30: OPEN FORUM  Oct 28, Gratitude Kasandra Lucas National Board Certified Health   Nov 18: Navigating How to Eat Around the Holidays with TIMBO Magaña  Dec 16: Changing your relationship with movement with  Sheri National Board Certified Health

## 2022-11-12 NOTE — ADDENDUM NOTE
Addended by: ARETHA KAUR on: 10/30/2020 02:30 PM     Modules accepted: Orders     4 = No assist / stand by assistance

## 2022-11-15 ENCOUNTER — VIRTUAL VISIT (OUTPATIENT)
Dept: PHARMACY | Facility: CLINIC | Age: 37
End: 2022-11-15
Attending: INTERNAL MEDICINE
Payer: COMMERCIAL

## 2022-11-15 DIAGNOSIS — L50.1 CHRONIC IDIOPATHIC URTICARIA: ICD-10-CM

## 2022-11-15 DIAGNOSIS — E55.9 VITAMIN D DEFICIENCY: ICD-10-CM

## 2022-11-15 DIAGNOSIS — M06.9 RHEUMATOID ARTHRITIS (H): ICD-10-CM

## 2022-11-15 DIAGNOSIS — K21.9 GASTROESOPHAGEAL REFLUX DISEASE WITHOUT ESOPHAGITIS: ICD-10-CM

## 2022-11-15 DIAGNOSIS — G43.119 INTRACTABLE MIGRAINE WITH AURA WITHOUT STATUS MIGRAINOSUS: ICD-10-CM

## 2022-11-15 DIAGNOSIS — K58.1 IRRITABLE BOWEL SYNDROME WITH CONSTIPATION: ICD-10-CM

## 2022-11-15 DIAGNOSIS — E66.09 OBESITY DUE TO EXCESS CALORIES WITHOUT SERIOUS COMORBIDITY, UNSPECIFIED CLASSIFICATION: ICD-10-CM

## 2022-11-15 DIAGNOSIS — E66.9 OBESITY: Primary | ICD-10-CM

## 2022-11-15 DIAGNOSIS — N80.9 ENDOMETRIOSIS: ICD-10-CM

## 2022-11-15 DIAGNOSIS — M06.9 RHEUMATOID ARTHRITIS, INVOLVING UNSPECIFIED SITE, UNSPECIFIED WHETHER RHEUMATOID FACTOR PRESENT (H): ICD-10-CM

## 2022-11-15 DIAGNOSIS — F39 SEASONAL MOOD DISORDER (H): ICD-10-CM

## 2022-11-15 PROCEDURE — 99607 MTMS BY PHARM ADDL 15 MIN: CPT | Performed by: PHARMACIST

## 2022-11-15 PROCEDURE — 99605 MTMS BY PHARM NP 15 MIN: CPT | Performed by: PHARMACIST

## 2022-11-15 RX ORDER — EPINEPHRINE 0.3 MG/.3ML
0.3 INJECTION SUBCUTANEOUS PRN
COMMUNITY

## 2022-11-15 RX ORDER — GUAIFENESIN 600 MG/1
600 TABLET, EXTENDED RELEASE ORAL DAILY PRN
COMMUNITY

## 2022-11-15 RX ORDER — POLYETHYLENE GLYCOL 3350 17 G/17G
1 POWDER, FOR SOLUTION ORAL DAILY
Qty: 238 G | Refills: 3 | Status: SHIPPED | OUTPATIENT
Start: 2022-11-15 | End: 2023-07-13

## 2022-11-15 RX ORDER — CALCIUM CARBONATE 500 MG/1
1 TABLET, CHEWABLE ORAL 3 TIMES DAILY
Qty: 90 TABLET | Refills: 11 | Status: SHIPPED | OUTPATIENT
Start: 2022-11-15 | End: 2023-08-03

## 2022-11-15 RX ORDER — HYDROXYCHLOROQUINE SULFATE 200 MG/1
400 TABLET, FILM COATED ORAL DAILY
Qty: 60 TABLET | Refills: 3 | Status: SHIPPED | OUTPATIENT
Start: 2022-11-15 | End: 2022-12-22

## 2022-11-15 NOTE — PROGRESS NOTES
Medication Therapy Management (MTM) Encounter    ASSESSMENT:                            Medication Adherence/Access: No issues identified    Obesity: Needs improvement.  Expect full response from Ozempic within 4 weeks so recommended to continue current dose for at least next 2 weeks.  As patient experiencing some acid reflux and constipation with increased dose, will continue to hold at 1 mg dose for additional 4 weeks to evaluate side effects.  Recommend MiraLAX for constipation and as needed Tums for GERD.  Discussed PPI today, however since patient on famotidine expect this to help with GERD chronically.  With less side effects as compared to PPI which may cause risk of bone mineral depletion, hypomagnesemia, and C. difficile if used long-term.    Migraine: Stable.    Rheumatoid arthritis/Pain: Stable.  Defer to rheumatology    Vitamin D Deficiency: Needs improvement.  Most recent vitamin D level was in the high range of normal.  Recommend follow-up vitamin D lab as patient on high-dose vitamin D.  Do not recommend decreasing at this point due to no signs or symptoms of toxicity and moving into.  Of season with low daylight. However important to monitor to prevent toxicity.  Deferred to PCP.    Irregular Menses/Fe Anemia/Endometriosis: Stable.      Depression/Panic disorder/Insomnia: Stable.    Chronic Idiopathic Urticaria/Allergies: Needs improvement.  Educated patient to move doxepin to bedtime to help with daytime fatigue.  Duration of action expected to be greater than 15 hours and will expect to help with urticaria if taken at bedtime.  Educated patient to continue to monitor blood pressure while on chronic Sudafed as may cause hypertension.        PLAN:                            1) use Tums as needed for acid reflux.  If this is not helpful, contact pharmacist and we will consider starting alternative like omeprazole.  2) start MiraLAX 1 capful daily for constipation.  May taper down to half capful daily  if stools are too loose.  3) take entire daily dose doxepin (40 mg) at bedtime to help with daytime drowsiness.  4) follow with PCP for vitamin D level to make sure vitamin D dose is not too high.    Follow-up: 12/20 at 11 am with Video Visit with Sandy Pharmacist.    SUBJECTIVE/OBJECTIVE:                          Kasandra Lau is a 37 year old female called for an initial visit. She was referred to me from Dr. Kerri Evans.      Reason for visit: Medication Therapy Management - Weight Mangaement.    Allergies/ADRs: Reviewed in chart  Past Medical History: Reviewed in chart  Tobacco: She reports that she has quit smoking. Her smoking use included cigarettes. She has never used smokeless tobacco.  Alcohol: not currently using - very sensitive to hang overs, does not use  Other Substance Use: THC/CBD only 1-2 times  Caffeine: Not using coffee or black tea due to nausea and GERD. Oolong tea 17 oz daily decaff does not cause nausea.    Medication Adherence/Access: no issues reported    Obesity:   Ozempic 1 mg weekly for 2 weeks    Followed by Dr. Kerri Evans, seen 10/4/22 for [facility]. Patient is experiencing the follow side effects: nausea and cramping on early doses of Ozempic -resolved with current 1 mg dose. Now having some constipation and heart burn on higher dose 1mg Ozempic. Heartburn better over past couple days.    Diet/Eating Habits: Patient reports not as hungry after starting Ozepmic. Not eating at night due to heart burn. Average size breakfast, larger lunch late afternoon, small dinner - this is typical for patient before Ozempic.   Exercise/Activity: Patient reports limitations to activity due to chronic pain and rheumatoid arthritis possible fibromyalgia diagnosis being worked up.   Tried/Failed/Contraindicated Medications: None noted.  Recent increase in weight.    Current weight this week: 179 lb  Initial Consult Weight 10/4/22: 195 lb   Cumulative Weight Loss:  "  Wt Readings from Last 4 Encounters:   11/09/22 185 lb 8 oz (84.1 kg)   11/08/22 184 lb (83.5 kg)   10/11/22 190 lb (86.2 kg)   10/04/22 195 lb (88.5 kg)     Estimated body mass index is 27.96 kg/m  as calculated from the following:    Height as of 11/9/22: 5' 8.3\" (1.735 m).    Weight as of 11/9/22: 185 lb 8 oz (84.1 kg).       Migraine:   Emgality 120 mg/ml - 120 mg injection every 28 days  Almotriptan 12.5 mg - 1 tab as needed for migraine  Acetaminophen 500 mg - 1 tab every 4-6 hours.     Emgality decreased the frequency of migraine significantly.  Now requires abortive therapy only a few times per month (estimated 3 times) down from daily migraines.      Rheumatoid arthritis/Pain:   Plaquenil 200 mg - 400 mg once daily    Follows closely with rheum for labs.  Has referral to neurology to work-up suspected fibromyalgia diagnosis as well.  Finds current therapy works well, but wonders if fibromyalgia diagnosis will help with fatigue.  Denies side effects.    Vitamin D Deficiency:   VItamin D 5000 international units - 1 once daily     Patient has been instructed in past by specialist to maintain high-dose vitamin D for pain.  Patient endorses feeling better on high-dose vitamin D.  Denies side effects.    Vitamin D Deficiency Screening Results:  Lab Results   Component Value Date    VITDT 63 10/12/2018    VITDT 61 10/05/2017    VITDT 22 02/06/2017    VITDT 13 (L) 11/19/2016         Irregular Menses/Fe Anemia/Endometriosis:   Mirena IUD - to start in January    Follows with OB/GYN as significant history of anemia related to irregular menses and endometriosis.  Has migraine with aura, so progesterone only recommended and what led patient and care team to Mirena IUD.  Patient hopeful this will help once placed in January.    Depression/Panic disorder/Insomnia:   Atomoxetine 25 mg - 1 once daily   Bupropion  mg - 1 every morning  Vybriid 40 mg once daily  Propranolol 20 mg as needed more for sleep  Gabapentin " 400 mg - 2 caps three times daily     IF misses Vybriid, notices increases hives (see below). Last used propranolol several weeks ago, but does not use regularly.  Finds current regimen helps mental health very well.  Denies side effects.    Chronic Idiopathic Urticaria/Allergies:   Pepcid (famotidine) 20mg twice daily.  Montelukast 10 mg every night at bedtime   Hydrocortisone 1% - three times daily as needed   Allegra 180 mg once daily in the morning  Xyzal 5 mg every night at bedtime   Flonase 50 mcg - 1 spray twice daily as needed  Pseudoephedrine 5 mg twice daily  Mucinex 600 mg - 1-2 tabs daily  Doxepin 10 mg - 20 mg - twice daily  -->SI mg every night at bedtime    Follows with allergist.  Has found this current regimen to be most helpful in helping to treat idiopathic urticaria.  Finds some daily fatigue, but overall denies side effects and finds very effective.    BP Readings from Last 3 Encounters:   22 113/78   22 110/62   10/11/22 106/66       Today's Vitals: LMP 2022  -Virtual visit  ----------------      I spent 41 minutes with this patient today.  All changes were made via collaborative practice agreement with Kerri Evans. A copy of the visit note was provided to the patient's provider(s).    The patient was sent via [employer] a summary of these recommendations.     Sandy Vila, ReneD, MPH  Medication Therapy Management Pharmacist  [facility]    Secure Voicemail: [phone]      Telemedicine Visit Details  Type of service:  Video Conference via [employer]  Start Time: 11:01 AM  End Time: 11:42 AM  Originating Location (pt. Location): Home      Distant Location (provider location):  Off-site  Provider has received verbal consent for a visit from the patient? Yes     Medication Therapy Recommendations  Chronic idiopathic urticaria    Current Medication: doxepin (SINEQUAN) 10 MG capsule   Rationale: Does not understand instructions  - Adherence - Adherence   Recommendation: Provide Education - Educate to take doxepin at bedtime to prevent daytime drowsiness   Status: Patient Agreed - Adherence/Education         Obesity    Current Medication: semaglutide (OZEMPIC) 2 MG/1.5ML SOPN pen (Discontinued)   Rationale: Undesirable effect - Adverse medication event - Safety   Recommendation: Provide Education - Start Tums for GERD and MiraLAX for constipation expect to improve with body adjusting to new dose   Status: Accepted per CPA         Vitamin D deficiency    Current Medication: cholecalciferol 125 MCG (5000 UT) CAPS   Rationale: Medication requires monitoring - Needs additional monitoring - Safety   Recommendation: Order Lab - Recommend vitamin D level with PCP   Status: Patient Agreed - Adherence/Education

## 2022-11-15 NOTE — Clinical Note
Patient having some acid reflux and constipation with higher Ozempic dose.  Recommended using Tums and MiraLAX to help as body adjust to higher dose Ozempic.  We will continue on current dose following up in 4 weeks with pharmacist.  Sandy

## 2022-11-16 NOTE — PATIENT INSTRUCTIONS
"Recommendations from today's MTM visit:                                                    MTM (medication therapy management) is a service provided by a clinical pharmacist designed to help you get the most of out of your medicines.      1) use Tums as needed for acid reflux.  If this is not helpful, contact pharmacist and we will consider starting alternative like omeprazole.    2) start MiraLAX 1 capful daily for constipation.  May taper down to half capful daily if stools are too loose.    3) take entire daily dose doxepin (40 mg) at bedtime to help with daytime drowsiness.    4) follow with PCP for vitamin D level to make sure vitamin D dose is not too high.    Follow-up: 12/20 at 11 am with Video Visit with Barbara Delgado. Call [phone] if you need to reschedule.     It was great speaking with you today.  I value your experience and would be very thankful for your time in providing feedback in our clinic survey. In the next few days, you may receive an email or text message from [employer] with a link to a survey related to your  clinical pharmacist.\"     My Clinical Pharmacist's contact information:                                                      Please feel free to contact me with any questions or concerns you have.      Sandy Vila, PharmD, MPH  Medication Therapy Management Pharmacist  [facility]    Secure Voicemail: [phone]                "

## 2022-11-17 NOTE — TELEPHONE ENCOUNTER
JD (2nd) and another [employer] message to schedule a f/u with Tejas Fernandes in April. [phone]

## 2022-11-25 ENCOUNTER — OFFICE VISIT (OUTPATIENT)
Dept: FAMILY MEDICINE | Facility: CLINIC | Age: 37
End: 2022-11-25
Payer: COMMERCIAL

## 2022-11-25 VITALS
HEIGHT: 69 IN | HEART RATE: 94 BPM | DIASTOLIC BLOOD PRESSURE: 81 MMHG | BODY MASS INDEX: 26.51 KG/M2 | WEIGHT: 179 LBS | TEMPERATURE: 98.6 F | SYSTOLIC BLOOD PRESSURE: 117 MMHG | OXYGEN SATURATION: 99 %

## 2022-11-25 DIAGNOSIS — R30.0 DYSURIA: Primary | ICD-10-CM

## 2022-11-25 LAB
ALBUMIN UR-MCNC: ABNORMAL MG/DL
APPEARANCE UR: ABNORMAL
BILIRUB UR QL STRIP: NEGATIVE
COLOR UR AUTO: ABNORMAL
GLUCOSE UR STRIP-MCNC: NEGATIVE MG/DL
HGB UR QL STRIP: NEGATIVE
KETONES UR STRIP-MCNC: NEGATIVE MG/DL
LEUKOCYTE ESTERASE UR QL STRIP: NEGATIVE
NITRATE UR QL: NEGATIVE
PH UR STRIP: 7 [PH] (ref 5–7)
SP GR UR STRIP: 1.02 (ref 1–1.03)
UROBILINOGEN UR STRIP-ACNC: 0.2 E.U./DL

## 2022-11-25 PROCEDURE — 87086 URINE CULTURE/COLONY COUNT: CPT | Performed by: NURSE PRACTITIONER

## 2022-11-25 RX ORDER — NITROFURANTOIN 25; 75 MG/1; MG/1
100 CAPSULE ORAL 2 TIMES DAILY
Qty: 14 CAPSULE | Refills: 0 | Status: SHIPPED | OUTPATIENT
Start: 2022-11-25 | End: 2023-01-23

## 2022-11-25 NOTE — PROGRESS NOTES
Kasandra Lau is a 37 year old female who presents today with a 2 day history of a feeling of pressure and sometimes irritation when she urinates.  She has a history of pelvic pain, this is associated with Ozempic, she also is following with gynecology for endometriosis, these symptoms do not follow those patterns.  She is not sexually active at this time, there is not a possibility of pregnancy.      Kasandra denies fever, hematuria, she does not void in small amounts, she does not have urinary urgency.  She has no vaginal discharge or odor.    Review Of Systems  See PMH   ROS: 10 point ROS neg other than the symptoms noted above in the HPI.      Past Medical History:   Diagnosis Date     Anemia fall 2016     Anxiety      Arthritis      Chronic fatigue      Depression (emotion)     sees psych, on meds     Gastroesophageal reflux disease      Migraine     daily meds, 2x month, more mild on meds     Neuropathic pain      Panic disorder      Uncomplicated asthma Spring 2018     Past Surgical History:   Procedure Laterality Date     COLONOSCOPY       LAPAROSCOPIC ABLATION ENDOMETRIOSIS N/A 11/09/2016    Procedure: LAPAROSCOPIC ABLATION ENDOMETRIOSIS;  Surgeon: Tonja Ferrer MD;  Location: [facility] OR     LAPAROSCOPIC CYSTECTOMY OVARIAN (BENIGN) Left 11/09/2016    Procedure: LAPAROSCOPIC CYSTECTOMY OVARIAN (BENIGN);  Surgeon: Tonja Ferrer MD;  Location: [facility] OR     LAPAROSCOPIC TUBAL DYE STUDY Left 11/09/2016    Procedure: LAPAROSCOPIC TUBAL DYE STUDY;  Surgeon: Tonja Ferrer MD;  Location: [facility] OR     LAPAROSCOPY OPERATIVE ADULT N/A 11/09/2016    Procedure: LAPAROSCOPY OPERATIVE ADULT;  Surgeon: Tonja Ferrer MD;  Location: [facility] OR     MAMMOPLASTY REDUCTION Bilateral 08/05/2021    Procedure: MAMMOPLASTY, REDUCTION, Bilateral;  Surgeon: ANTONIO Moreno MD;  Location: [facility] OR     ORTHOPEDIC SURGERY      left wrist surgery     TONSILLECTOMY & ADENOIDECTOMY Bilateral     cauterized turbinates  "also     Social History     Socioeconomic History     Marital status: Single     Spouse name: Not on file     Number of children: Not on file     Years of education: Not on file     Highest education level: Not on file   Occupational History     Not on file   Tobacco Use     Smoking status: Former     Packs/day: 0.00     Years: 10.00     Pack years: 0.00     Types: Cigarettes     Smokeless tobacco: Never     Tobacco comments:     smokes occasionally socially    Vaping Use     Vaping Use: Never used   Substance and Sexual Activity     Alcohol use: Not Currently     Alcohol/week: 0.0 standard drinks     Comment: occasional      Drug use: Not Currently     Types: Marijuana     Comment: marijuana  none since May 2021     Sexual activity: Yes     Partners: Male     Birth control/protection: Pull-out method, Inserts/Ring     Comment: Nuvaring   Other Topics Concern     Not on file   Social History Narrative    Grad student in geography     Social Determinants of Health     Financial Resource Strain: Not on file   Food Insecurity: Not on file   Transportation Needs: Not on file   Physical Activity: Not on file   Stress: Not on file   Social Connections: Not on file   Intimate Partner Violence: Not on file   Housing Stability: Not on file     Family History   Problem Relation Age of Onset     Diabetes Maternal Grandfather      Hypertension No family hx of      Coronary Artery Disease No family hx of      Hyperlipidemia No family hx of      Cerebrovascular Disease No family hx of      Breast Cancer No family hx of      Colon Cancer No family hx of      Prostate Cancer No family hx of      Other Cancer No family hx of      Glaucoma No family hx of      Macular Degeneration No family hx of        /81   Pulse 94   Temp 98.6  F (37  C) (Oral)   Ht 1.758 m (5' 9.2\")   Wt 81.2 kg (179 lb)   LMP 11/05/2022   SpO2 99%   BMI 26.28 kg/m      Exam:  Constitutional: healthy, alert and mild distress  : UA:  Trace protein " otherwise negative  Psychiatric: mentation appears normal and affect normal/bright    Assessment/Plan:  (R30.0) Dysuria  (primary encounter diagnosis)  Comment: we will also send Kasandra's urine for culture  Plan: UA without Microscopic,   Kasandra will increase fluids, potentially use AZO and if symptoms progress over the weekend she will use the ABX  nitroFURantoin macrocrystal-monohydrate (MACROBID) 100 MG         capsule,   Urine Culture Aerobic Bacterial          Options for treatment and follow-up care were reviewed with the patient. Patient engaged in the decision making process and verbalized understanding of the options discussed and agreed with the final plan.

## 2022-11-25 NOTE — NURSING NOTE
"ROOM:1  [facility]    Preferred Name: Kasandra SMITH AGREED:              LUIS DECLINED:          Flu    37 year old  Chief Complaint   Patient presents with     UTI     Pressure to urinate   For the past day or 2   Irritation when urinating        Blood pressure 117/81, pulse 94, temperature 98.6  F (37  C), temperature source Oral, height 1.758 m (5' 9.2\"), weight 81.2 kg (179 lb), last menstrual period 11/05/2022, SpO2 99 %, not currently breastfeeding. Body mass index is 26.28 kg/m .  BP completed using cuff size:        Patient Active Problem List   Diagnosis     Pelvic pain in female     Vitamin D deficiency     Menorrhagia with regular cycle     Migraine without aura and without status migrainosus, not intractable     Iron deficiency anemia due to chronic blood loss     Tired     Circadian rhythm sleep disorder, delayed sleep phase type     Unhealthy sleep habit     Seasonal mood disorder (H)     Chronic idiopathic urticaria     Acid reflux     Cholecystitis     Depression     Hx of abnormal cervical Pap smear     Irritable bowel syndrome with constipation     Migraine headache     Mild intermittent asthma without complication     Need for desensitization to allergens     Panic disorder     Pap smear abnormality of cervix/human papillomavirus (HPV) positive     Recurrent major depressive disorder, in remission (H)     Hypertrophy of breast     Chronic sinusitis, unspecified location     Keratosis pilaris     Endometriosis       Wt Readings from Last 2 Encounters:   11/25/22 81.2 kg (179 lb)   11/09/22 84.1 kg (185 lb 8 oz)     BP Readings from Last 3 Encounters:   11/25/22 117/81   11/09/22 113/78   11/08/22 110/62       Allergies   Allergen Reactions     Dust Mites Cough, Difficulty breathing and Shortness Of Breath     Cats      Chest tightness, sinus irritation       Current Outpatient Medications   Medication     almotriptan (AXERT) 12.5 MG tablet     atomoxetine (STRATTERA) 25 MG capsule     buPROPion " (WELLBUTRIN XL) 300 MG 24 hr tablet     calcium carbonate (TUMS) 500 MG chewable tablet     cholecalciferol 125 MCG (5000 UT) CAPS     doxepin (SINEQUAN) 10 MG capsule     EMGALITY 120 MG/ML injection     EPINEPHrine (ANY BX GENERIC EQUIV) 0.3 MG/0.3ML injection 2-pack     famotidine (PEPCID) 20 MG tablet     fexofenadine (ALLEGRA) 180 MG tablet     fluticasone (FLONASE) 50 MCG/ACT nasal spray     gabapentin (NEURONTIN) 400 MG capsule     guaiFENesin (MUCINEX) 600 MG 12 hr tablet     hydroxychloroquine (PLAQUENIL) 200 MG tablet     levocetirizine (XYZAL) 5 MG tablet     montelukast (SINGULAIR) 10 MG tablet     Multiple Vitamins-Minerals (MULTIVITAMIN ADULTS) TABS     polyethylene glycol (MIRALAX) 17 GM/Dose powder     propranolol (INDERAL) 20 MG tablet     Pseudoephedrine HCl (SUDAFED SINUS CONGESTION PO)     Semaglutide, 1 MG/DOSE, 4 MG/3ML SOPN     vilazodone (VIIBRYD) 40 MG TABS tablet     hydrocortisone (CORTAID) 1 % external ointment     No current facility-administered medications for this visit.       Social History     Tobacco Use     Smoking status: Former     Packs/day: 0.00     Years: 10.00     Pack years: 0.00     Types: Cigarettes     Smokeless tobacco: Never     Tobacco comments:     smokes occasionally socially    Vaping Use     Vaping Use: Never used   Substance Use Topics     Alcohol use: Not Currently     Alcohol/week: 0.0 standard drinks     Comment: occasional      Drug use: Not Currently     Types: Marijuana     Comment: marijuana  none since May 2021       Honoring Choices - Health Care Directive Guide offered to patient at time of visit.    Health Maintenance Due   Topic Date Due     ASTHMA ACTION PLAN  Never done     ADVANCE CARE PLANNING  Never done     HEPATITIS B IMMUNIZATION (1 of 3 - 3-dose series) Never done     YEARLY PREVENTIVE VISIT  01/31/2020     HPV TEST  02/21/2020     PAP  02/21/2020     Pneumococcal Vaccine: Pediatrics (0 to 5 Years) and At-Risk Patients (6 to 64 Years) (2 -  PCV) 05/22/2020     ASTHMA CONTROL TEST  12/14/2022       Immunization History   Administered Date(s) Administered     COVID-19 Vaccine 12+ (Pfizer 2022) 01/10/2022     COVID-19 Vaccine 12+ (Pfizer) 03/19/2021, 04/09/2021     COVID-19 Vaccine Bivalent Booster 12+ (Pfizer) 09/19/2022     DTaP, Unspecified 05/22/2019     Flu, Unspecified 09/23/2016     Influenza Vaccine >6 months (Alfuria,Fluzone) 09/23/2016, 09/07/2018, 08/28/2019, 09/02/2020, 11/02/2021     Pneumococcal 23 valent 05/22/2019     Tdap (Adacel,Boostrix) 05/22/2019       Lab Results   Component Value Date    PAP NIL 01/31/2019       Recent Labs   Lab Test 11/07/22  1241 08/19/22  0835 04/27/22  1717 04/13/22  1600 01/04/22  1732 08/12/21  1535 09/12/20 2058 08/02/19  1711 10/05/17  1133 02/06/17  1200   LDL  --  100  --   --   --   --   --   --   --   --    HDL  --  43*  --   --   --   --   --   --   --   --    TRIG  --  184*  --   --   --   --   --   --   --   --    * 60*  --  63*  --    < > 36 25   < >  --    CR 0.83 0.69 0.66 0.63  --    < > 0.77 0.80   < >  --    GFRESTIMATED >90 >90 >90 >90  --    < > >90 >90   < >  --    GFRESTBLACK  --   --   --   --   --   --  >90 >90   < >  --    ALBUMIN 4.4 3.6  --  3.9  --    < > 3.3* 3.4   < >  --    POTASSIUM 3.8  --  3.7 3.8  --    < > 3.9 3.5   < >  --    TSH  --   --   --   --  1.81  --   --   --   --  1.72    < > = values in this interval not displayed.       PHQ-2 ( 1999 Pfizer) 11/10/2022 10/11/2022   Q1: Little interest or pleasure in doing things 0 0   Q2: Feeling down, depressed or hopeless 0 1   PHQ-2 Score 0 1   PHQ-2 Total Score (12-17 Years)- Positive if 3 or more points; Administer PHQ-A if positive - -   Q1: Little interest or pleasure in doing things - -   Q2: Feeling down, depressed or hopeless - -   PHQ-2 Score - -   Some encounter information is confidential and restricted. Go to Review Flowsheets activity to see all data.       PHQ-9 SCORE 6/14/2022 9/29/2022 9/29/2022 10/11/2022    PHQ-9 Total Score MyChart - - 6 (Mild depression) -   PHQ-9 Total Score 5 6 6 5   Some encounter information is confidential and restricted. Go to Review Flowsheets activity to see all data.       VIC-7 SCORE 6/14/2022 9/30/2022 10/11/2022   Total Score - 8 (mild anxiety) -   Total Score 10 8 9   Some encounter information is confidential and restricted. Go to Review Flowsheets activity to see all data.       ACT Total Scores 6/14/2022   ACT TOTAL SCORE (Goal Greater than or Equal to 20) 24   In the past 12 months, how many times did you visit the emergency room for your asthma without being admitted to the hospital? 2   In the past 12 months, how many times were you hospitalized overnight because of your asthma? 0       Diogo Hardin    November 25, 2022 2:57 PM

## 2022-11-26 ENCOUNTER — HOSPITAL ENCOUNTER (EMERGENCY)
Facility: CLINIC | Age: 37
Discharge: HOME | End: 2022-11-26
Attending: FAMILY MEDICINE | Admitting: FAMILY MEDICINE
Payer: COMMERCIAL

## 2022-11-26 ENCOUNTER — APPOINTMENT (OUTPATIENT)
Dept: CT IMAGING | Facility: CLINIC | Age: 37
End: 2022-11-26
Attending: FAMILY MEDICINE
Payer: COMMERCIAL

## 2022-11-26 ENCOUNTER — APPOINTMENT (OUTPATIENT)
Dept: GENERAL RADIOLOGY | Facility: CLINIC | Age: 37
End: 2022-11-26
Attending: FAMILY MEDICINE
Payer: COMMERCIAL

## 2022-11-26 VITALS
HEIGHT: 69 IN | WEIGHT: 180 LBS | DIASTOLIC BLOOD PRESSURE: 74 MMHG | RESPIRATION RATE: 18 BRPM | OXYGEN SATURATION: 100 % | HEART RATE: 82 BPM | SYSTOLIC BLOOD PRESSURE: 113 MMHG | BODY MASS INDEX: 26.66 KG/M2 | TEMPERATURE: 99.5 F

## 2022-11-26 DIAGNOSIS — K59.00 CONSTIPATION, UNSPECIFIED CONSTIPATION TYPE: ICD-10-CM

## 2022-11-26 PROCEDURE — 99283 EMERGENCY DEPT VISIT LOW MDM: CPT | Performed by: FAMILY MEDICINE

## 2022-11-26 PROCEDURE — 250N000013 HC RX MED GY IP 250 OP 250 PS 637: Performed by: FAMILY MEDICINE

## 2022-11-26 PROCEDURE — 99284 EMERGENCY DEPT VISIT MOD MDM: CPT | Mod: 25 | Performed by: FAMILY MEDICINE

## 2022-11-26 PROCEDURE — 250N000013 HC RX MED GY IP 250 OP 250 PS 637: Performed by: EMERGENCY MEDICINE

## 2022-11-26 PROCEDURE — 74176 CT ABD & PELVIS W/O CONTRAST: CPT | Mod: 26 | Performed by: RADIOLOGY

## 2022-11-26 PROCEDURE — 74019 RADEX ABDOMEN 2 VIEWS: CPT | Mod: 26 | Performed by: RADIOLOGY

## 2022-11-26 PROCEDURE — 74176 CT ABD & PELVIS W/O CONTRAST: CPT

## 2022-11-26 PROCEDURE — 74019 RADEX ABDOMEN 2 VIEWS: CPT

## 2022-11-26 RX ADMIN — DOCUSATE SODIUM 226 ML: 50 LIQUID ORAL at 20:01

## 2022-11-26 RX ADMIN — DOCUSATE SODIUM 226 ML: 50 LIQUID ORAL at 15:46

## 2022-11-26 RX ADMIN — POLYETHYLENE GLYCOL 3350, SODIUM SULFATE ANHYDROUS, SODIUM BICARBONATE, SODIUM CHLORIDE, POTASSIUM CHLORIDE 4000 ML: 236; 22.74; 6.74; 5.86; 2.97 POWDER, FOR SOLUTION ORAL at 21:48

## 2022-11-26 ASSESSMENT — ACTIVITIES OF DAILY LIVING (ADL)
ADLS_ACUITY_SCORE: 35

## 2022-11-26 ASSESSMENT — ENCOUNTER SYMPTOMS
DYSPHORIC MOOD: 0
CHILLS: 0
ABDOMINAL DISTENTION: 1
FEVER: 0
DYSURIA: 0
FREQUENCY: 0
BRUISES/BLEEDS EASILY: 0
CONSTIPATION: 1
TROUBLE SWALLOWING: 0
VOMITING: 0
APPETITE CHANGE: 0
DIARRHEA: 0
COUGH: 0
ACTIVITY CHANGE: 0
ABDOMINAL PAIN: 1
WOUND: 0
WEAKNESS: 0
BLOOD IN STOOL: 0
NAUSEA: 0
SHORTNESS OF BREATH: 0
DECREASED CONCENTRATION: 0
BACK PAIN: 0

## 2022-11-26 NOTE — ED PROVIDER NOTES
ED Provider Note  [facility]      History     Chief Complaint   Patient presents with     Constipation     HPI  Kasandra Lau is a 37 year old female who presents emergency room with abdominal pain constipation.  Patient has a bowel movement last week or so.  She is had some previous laparoscopic surgeries along with cholecystectomy no history of bowel obstructions otherwise noted.  Patient was seen yesterday and ruled out UTI.  Is continued to try over-the-counter medications including enema MiraLAX Colace etc.  Patient now presents here no nausea vomiting no fevers or chills no other vaginal bleeding last menstrual period was about a month ago patient states she is not pregnant and is comfortable getting an x-ray without checking this.    Past Medical History  Past Medical History:   Diagnosis Date     Anemia fall 2016     Anxiety      Arthritis      Chronic fatigue      Depression (emotion)     sees psych, on meds     Gastroesophageal reflux disease      Migraine     daily meds, 2x month, more mild on meds     Neuropathic pain      Panic disorder      Uncomplicated asthma Spring 2018     Past Surgical History:   Procedure Laterality Date     COLONOSCOPY       LAPAROSCOPIC ABLATION ENDOMETRIOSIS N/A 11/09/2016    Procedure: LAPAROSCOPIC ABLATION ENDOMETRIOSIS;  Surgeon: Tonja Ferrer MD;  Location: [facility] OR     LAPAROSCOPIC CYSTECTOMY OVARIAN (BENIGN) Left 11/09/2016    Procedure: LAPAROSCOPIC CYSTECTOMY OVARIAN (BENIGN);  Surgeon: Tonja Ferrer MD;  Location: [facility] OR     LAPAROSCOPIC TUBAL DYE STUDY Left 11/09/2016    Procedure: LAPAROSCOPIC TUBAL DYE STUDY;  Surgeon: Tonja Ferrer MD;  Location: [facility] OR     LAPAROSCOPY OPERATIVE ADULT N/A 11/09/2016    Procedure: LAPAROSCOPY OPERATIVE ADULT;  Surgeon: Tonja Ferrer MD;  Location: [facility]     MAMMOPLASTY REDUCTION Bilateral 08/05/2021    Procedure: MAMMOPLASTY, REDUCTION, Bilateral;  Surgeon: ANTONIO Moreno  Alia Oliver MD;  Location: [facility] OR     ORTHOPEDIC SURGERY      left wrist surgery     TONSILLECTOMY & ADENOIDECTOMY Bilateral     cauterized turbinates also     almotriptan (AXERT) 12.5 MG tablet  atomoxetine (STRATTERA) 25 MG capsule  buPROPion (WELLBUTRIN XL) 300 MG 24 hr tablet  calcium carbonate (TUMS) 500 MG chewable tablet  cholecalciferol 125 MCG (5000 UT) CAPS  doxepin (SINEQUAN) 10 MG capsule  EMGALITY 120 MG/ML injection  EPINEPHrine (ANY BX GENERIC EQUIV) 0.3 MG/0.3ML injection 2-pack  famotidine (PEPCID) 20 MG tablet  fexofenadine (ALLEGRA) 180 MG tablet  fluticasone (FLONASE) 50 MCG/ACT nasal spray  gabapentin (NEURONTIN) 400 MG capsule  guaiFENesin (MUCINEX) 600 MG 12 hr tablet  hydrocortisone (CORTAID) 1 % external ointment  hydroxychloroquine (PLAQUENIL) 200 MG tablet  levocetirizine (XYZAL) 5 MG tablet  montelukast (SINGULAIR) 10 MG tablet  Multiple Vitamins-Minerals (MULTIVITAMIN ADULTS) TABS  nitroFURantoin macrocrystal-monohydrate (MACROBID) 100 MG capsule  polyethylene glycol (MIRALAX) 17 GM/Dose powder  propranolol (INDERAL) 20 MG tablet  Pseudoephedrine HCl (SUDAFED SINUS CONGESTION PO)  Semaglutide, 1 MG/DOSE, 4 MG/3ML SOPN  vilazodone (VIIBRYD) 40 MG TABS tablet      Allergies   Allergen Reactions     Dust Mites Cough, Difficulty breathing and Shortness Of Breath     Cats      Chest tightness, sinus irritation     Family History  Family History   Problem Relation Age of Onset     Diabetes Maternal Grandfather      Hypertension No family hx of      Coronary Artery Disease No family hx of      Hyperlipidemia No family hx of      Cerebrovascular Disease No family hx of      Breast Cancer No family hx of      Colon Cancer No family hx of      Prostate Cancer No family hx of      Other Cancer No family hx of      Glaucoma No family hx of      Macular Degeneration No family hx of      Social History   Social History     Tobacco Use     Smoking status: Former     Packs/day: 0.00     Years: 10.00     " Pack years: 0.00     Types: Cigarettes     Smokeless tobacco: Never     Tobacco comments:     smokes occasionally socially    Vaping Use     Vaping Use: Never used   Substance Use Topics     Alcohol use: Not Currently     Alcohol/week: 0.0 standard drinks     Comment: occasional      Drug use: Not Currently     Types: Marijuana     Comment: marijuana  none since May 2021      Past medical history, past surgical history, medications, allergies, family history, and social history were reviewed with the patient. No additional pertinent items.       Review of Systems   Constitutional: Negative for activity change, appetite change, chills and fever.   HENT: Negative for trouble swallowing.    Eyes: Negative for visual disturbance.   Respiratory: Negative for cough and shortness of breath.    Cardiovascular: Negative for chest pain.   Gastrointestinal: Positive for abdominal distention, abdominal pain and constipation. Negative for blood in stool, diarrhea, nausea and vomiting.        Some left lower ab pain   Genitourinary: Negative for dyspareunia, dysuria, frequency, urgency and vaginal bleeding.   Musculoskeletal: Negative for back pain.   Skin: Negative for rash and wound.   Allergic/Immunologic: Negative for immunocompromised state.   Neurological: Negative for weakness.   Hematological: Does not bruise/bleed easily.   Psychiatric/Behavioral: Negative for decreased concentration and dysphoric mood.   All other systems reviewed and are negative.    A complete review of systems was performed with pertinent positives and negatives noted in the HPI, and all other systems negative.    Physical Exam   BP: 117/79  Pulse: 100  Temp: 97.6  F (36.4  C)  Resp: 17  Height: 175.3 cm (5' 9\")  Weight: 81.6 kg (180 lb)  SpO2: 99 %  Physical Exam  Vitals and nursing note reviewed.   Constitutional:       General: She is in acute distress.      Appearance: She is well-developed and well-nourished. She is not toxic-appearing or " diaphoretic.   HENT:      Head: Normocephalic and atraumatic.      Nose: Nose normal.      Mouth/Throat:      Mouth: Mucous membranes are moist.   Eyes:      General: No scleral icterus.     Extraocular Movements: Extraocular movements intact.      Conjunctiva/sclera: Conjunctivae normal.      Pupils: Pupils are equal, round, and reactive to light.   Cardiovascular:      Rate and Rhythm: Normal rate and regular rhythm.   Pulmonary:      Effort: No respiratory distress.   Abdominal:      General: There is no distension.      Palpations: Abdomen is soft.      Tenderness: There is abdominal tenderness. There is no guarding or rebound.      Comments: Patient abdomen is nonrigid some slight tenderness to left lower abdominal area without rebound guarding or mass noted.   Musculoskeletal:      Cervical back: Normal range of motion and neck supple.      Right lower leg: No edema.      Left lower leg: No edema.   Skin:     General: Skin is warm and dry.      Capillary Refill: Capillary refill takes less than 2 seconds.      Coloration: Skin is not jaundiced or pale.      Findings: No erythema or rash.   Neurological:      General: No focal deficit present.      Mental Status: She is alert and oriented to person, place, and time. Mental status is at baseline.   Psychiatric:      Comments: Soft affect but appropriate         ED Course         Patient states she is not pregnant is comfortable getting x-ray without checking to see if she is pregnant she had a urinalysis done yesterday.  X-rays done revealing some air-fluid levels but no free air no other major obstruction fairly large stool burden noted.    In the ER we did administer a pink lady enema with minimal results at this point.    The patient previous surgery elected to do a CT scan without contrast of the abdomen.  Patient noted that she is not pregnant does not need to have this verified.  She is comfortable with the CT scan.    Signed out to evening MD.  We will  get the CT scan to make sure is no obstruction if this is negative we will do another pink lady enema when she gets better results with plan then to discharge her with more aggressive MiraLAX therapy with follow-up.  As noted signed out to evening physician.    Procedures                     Results for orders placed or performed during the hospital encounter of 11/26/22   Abdomen XR, 2 vw, flat and upright     Status: None    Narrative    Exam: XR ABDOMEN 2 VIEWS, 11/26/2022 12:38 PM    Indication: constipation and ab pain    Comparison: None available    Findings:   Supine and upright abdominal radiographs. Right upper quadrant  cholecystectomy clips. Multiple air-fluid levels without dilated small  bowel. Mild colonic stool burden. No free air, pneumatosis, or portal  venous gas. No abnormal calcifications in the abdomen or pelvis.      Impression    Impression: Moderate colonic stool burden on the left colon with  air-fluid levels in the rest of the abdomen. This is nonspecific.  Serial follow-up if pain continues.    I have personally reviewed the examination and initial interpretation  and I agree with the findings.    LINDSAY CRABTREE MD         SYSTEM ID:  [id number]   CT Abdomen Pelvis w/o Contrast     Status: None    Narrative    EXAMINATION: CT ABDOMEN PELVIS W/O CONTRAST, 11/26/2022 7:10 PM    INDICATION: constipation with prev surgery  eval for obstruction.   patient states she is not pregnant    COMPARISON STUDY: Same day radiograph    TECHNIQUE: CT scan of the abdomen and pelvis was performed on  multidetector CT scanner using volumetric acquisition technique and  images were reconstructed in multiple planes with variable thickness  and reviewed on dedicated workstations.     CONTRAST: No contrast    CT scan radiation dose is optimized to minimum requisite dose using  automated dose modulation techniques.    FINDINGS:    Lower thorax: Normal.    Liver: Unremarkable unenhanced appearance of the  liver.    Biliary System: Gallbladder surgically absent.    Pancreas: No mass or pancreatic ductal dilation.    Adrenal glands: No mass or nodules    Spleen: Normal.    Kidneys: Bilateral punctate nonobstructing intrarenal calculi . No  hydronephrosis. Right superior pole simple density cyst on series 2  image 57, with other subcentimeter hypodensities too small to  accurately characterize.    Gastrointestinal tract :Appendix is not identified, however no  inflammatory change in the right lower quadrant to suggest  appendicitis. Normal caliber small bowel.  Mild colonic stool burden.    Mesentery/peritoneum/retroperitoneum: No mass. No free fluid or air.    Lymph nodes: No significant lymphadenopathy.    Vasculature: Patent major abdominal vasculature.    Pelvis: Urinary bladder is normal.    Osseous structures: No aggressive or acute osseous lesion.      Soft tissues: Within normal limits.      Impression    IMPRESSION:   1. Nonobstructive mild colonic stool burden.  2. Punctate non obstructing intra-renal calculi.  No hydronephrosis or  obstructing urinary calculi.    I have personally reviewed the examination and initial interpretation  and I agree with the findings.    BERKLEY FRENCH MD         SYSTEM ID:  [id number]     Medications   polyethylene glycol (GoLYTELY) suspension 4,000 mL (has no administration in time range)   Enema Compound (docusate/mineral oil/NaPhos) NO MAG CIT PREMIX (226 mLs Rectal Given 11/26/22 1546)   Enema Compound (docusate/mineral oil/NaPhos) NO MAG CIT PREMIX (226 mLs Rectal Given 11/26/22 2001)        Assessments & Plan (with Medical Decision Making)  37-year-old female with a history of laparoscopic ovarian cyst removal along with cholecystectomy in the past presents with constipation for the last week.  No fevers chills some lower abdominal pain abdomen soft nonrigid otherwise.  X-rays done initially as patient states she is not pregnant did show fair amount of stool with some none  specific air-fluid levels.  Pink lady enema given with some mild results still feeling fall CT scan was done patient declines pregnancy test again CT scan was then done signout evening physician will plan for pink lady enema and then more aggressive MiraLAX therapy at home following up with MD return if any concerns.       I have reviewed the nursing notes. I have reviewed the findings, diagnosis, plan and need for follow up with the patient.    New Prescriptions    No medications on file       Final diagnoses:   Constipation, unspecified constipation type       --  Viral Quesada  [facility] EMERGENCY DEPARTMENT  11/26/2022    This note was created at least in part by the use of dragon voice dictation system. Inadvertent typographical errors may still exist.  Viral Quesada MD.    Patient evaluated in the emergency department during the COVID-19 pandemic period. Careful attention to patients safety was addressed throughout the evaluation. Evaluation and treatment management was initiated with disposition made efficiently and appropriate as possible to minimize any risk of potential exposure to patient during this evaluation.       Viral Quesada MD  11/26/22 [id number]

## 2022-11-26 NOTE — DISCHARGE INSTRUCTIONS
Home.  You had xray and CT in the ER.  Please drink the GoLytely according to the instructions but stop once you feel the need to have a bowel movement  See MD for follow up.  Return if any concerns.

## 2022-11-26 NOTE — ED TRIAGE NOTES
Pt presents to triage with complaints of constipation. Pts last normal BM 11/18/22. Pt did saline enema yesterday with scant results. Today pt has new onset of nausea and abdominal cramping. Pt is still able to pass gas.

## 2022-11-27 LAB — BACTERIA UR CULT: NO GROWTH

## 2022-11-27 NOTE — ED NOTES
Patient signed out from Dr. Quesada with plan to follow-up CT scan which was done but the read is pending.  If there there is no evidence of any acute pathology he recommends a second enema to assist with what appears to be constipation causing the patient's symptoms.    CT Abdomen Pelvis w/o Contrast   Final Result   IMPRESSION:    1. Nonobstructive mild colonic stool burden.   2. Punctate non obstructing intra-renal calculi.  No hydronephrosis or   obstructing urinary calculi.      I have personally reviewed the examination and initial interpretation   and I agree with the findings.      BERKLEY FRENCH MD            SYSTEM ID:  [id number]      Abdomen XR, 2 vw, flat and upright   Final Result   Impression: Moderate colonic stool burden on the left colon with   air-fluid levels in the rest of the abdomen. This is nonspecific.   Serial follow-up if pain continues.      I have personally reviewed the examination and initial interpretation   and I agree with the findings.      LINDSAY CRABTREE MD            SYSTEM ID:  [id number]        Patient given second enema without significant improvement in her constipation symptoms.  Based on this, I will go ahead and prescribe 4000 mL of GoLytely and recommend that she start drinking the medication but then stop when she has a bowel movement.  Will discharge with return to ED precautions and this medication and plan in hand.  Patient voiced understanding and agreement with the plan.  Okay to discharge     Denys Foster MD  11/26/22 [id number]

## 2022-11-28 NOTE — TELEPHONE ENCOUNTER
JD (3rd) and another [employer] message to schedule a f/u with Tejas Fernandes in April. [phone]

## 2022-11-29 ENCOUNTER — ANCILLARY PROCEDURE (OUTPATIENT)
Dept: ULTRASOUND IMAGING | Facility: CLINIC | Age: 37
End: 2022-11-29
Attending: PHYSICIAN ASSISTANT
Payer: COMMERCIAL

## 2022-11-29 DIAGNOSIS — R79.89 ELEVATED LFTS: ICD-10-CM

## 2022-11-29 PROCEDURE — 76705 ECHO EXAM OF ABDOMEN: CPT | Performed by: RADIOLOGY

## 2022-11-30 ENCOUNTER — NURSE TRIAGE (OUTPATIENT)
Dept: NURSING | Facility: CLINIC | Age: 37
End: 2022-11-30

## 2022-12-01 ENCOUNTER — HOSPITAL ENCOUNTER (EMERGENCY)
Facility: CLINIC | Age: 37
Discharge: LEFT BEFORE DISCHARGE | End: 2022-12-01
Payer: COMMERCIAL

## 2022-12-01 NOTE — TELEPHONE ENCOUNTER
"\"I have sudden numbness and pain, achiness in my left thigh from the knee to 3/4 way up, getting worse. Began 1/2 hr ago\". Earlier she states it felt like she was having an allergic reaction and used an epi pen in both thighs 1-2 hrs ago, but only the left one hurts. Allergic to dust mites, I think something I ate but I don't know. Color is OK, slight swelling. She is able to stand and walk. Currently pain =\"6-7\". No chest pain or difficulty breathing noted.   Advised to contact pharmacist @ [facility] now and discuss, which she states she will do.  Phone number given.     Sandhya Howard RN Triage Nurse Advisor 9:47 PM 11/30/2022    Reason for Disposition    [1] Thigh or calf pain AND [2] only 1 side AND [3] present > 1 hour (Exception: chronic unchanged pain)    Additional Information    Negative: Looks like a broken bone or dislocated joint (e.g., crooked or deformed)    Negative: Sounds like a life-threatening emergency to the triager    Negative: Followed a leg injury    Negative: Leg swelling is main symptom    Negative: Back pain radiating (shooting) into leg(s)    Negative: Knee pain is main symptom    Negative: Ankle pain is main symptom    Negative: Pregnant    Negative: Postpartum (from 0 to 6 weeks after delivery)    Negative: Chest pain    Negative: Difficulty breathing    Negative: Entire foot is cool or blue in comparison to other side    Negative: Unable to walk    Negative: [1] Red area or streak AND [2] fever    Negative: [1] Swollen joint AND [2] fever    Negative: [1] Cast on leg or ankle AND [2] now increased pain    Negative: Patient sounds very sick or weak to the triager    Negative: [1] SEVERE pain (e.g., excruciating, unable to do any normal activities) AND [2] not improved after 2 hours of pain medicine    Protocols used: LEG PAIN-A-AH      "

## 2022-12-04 DIAGNOSIS — J30.9 ALLERGIC RHINITIS, UNSPECIFIED SEASONALITY, UNSPECIFIED TRIGGER: ICD-10-CM

## 2022-12-04 DIAGNOSIS — F33.1 MAJOR DEPRESSIVE DISORDER, RECURRENT EPISODE, MODERATE (H): ICD-10-CM

## 2022-12-05 NOTE — PROGRESS NOTES
"Kasandra Lau is a 37 year old female who is being evaluated via a billable video visit.      The patient has been notified of following:     \"This video visit will be conducted via a call between you and your physician/provider. We have found that certain health care needs can be provided without the need for an in-person physical exam.  This service lets us provide the care you need with a video conversation.  If a prescription is necessary we can send it directly to your pharmacy.  If lab work is needed we can place an order for that and you can then stop by our lab to have the test done at a later time.    Video visits are billed at different rates depending on your insurance coverage.  Please reach out to your insurance provider with any questions.    If during the course of the call the physician/provider feels a video visit is not appropriate, you will not be charged for this service.\"    Patient has given verbal consent for Video visit? Yes  How would you like to obtain your AVS? MyChart  If you are dropped from the video visit, the video invite should be resent to: Send to e-mail at: [email or web address]  Will anyone else be joining your video visit? No  {If patient encounters technical issues they should call [phone]      Video-Visit Details    Type of service:  Video Visit    Video Start Time: 9:34 AM  Video End Time: 10:00 AM    Originating Location (pt. Location): Home    Distant Location (provider location):  Offsite (providers home) [facility] [place]     Platform used for Video Visit: Social Median    During this virtual visit the patient is located in [place], patient verifies this as the location during the entirety of this visit.       Weight Management Nutrition Consultation    Kasandra Lau is a 37 year old female presents today for return weight management nutrition consultation.  Patient referred by Dr. Kerri Evans on October 4, 2022.    Patient with " "Co-morbidities of obesity including:  Type II DM no  Renal Failure no  Sleep apnea no  Hypertension no   Dyslipidemia yes  Joint pain no  Back pain yes  GERD yes   Fatty liver yes    PMH also significant for anxiety, depression, ADHD, PTSD, rheumatoid arthritis, and IBS-C.  H/o cholcystectomy     Anthropometrics:  Estimated body mass index is 29.22 kg/m  as calculated from the following:    Height as of an earlier encounter on 10/4/22: 1.74 m (5' 8.5\").    Weight as of an earlier encounter on 10/4/22: 88.5 kg (195 lb).    Current weight: 174 lbs with BMI 26.5 (-9 lbs this month, -21 lbs from initial)      Pt goal: Aiming for 160s, was at this weight for many years.      Medications for Weight Loss:  Ozempic     NUTRITION HISTORY  Food allergies: None  Food intolerances: Too much dairy. Peppers, onions and garlic.   Supplements: Vitamin D (5000 units/day), daily multivitamin   Previous methods of diet modification for weight loss: Cutting down high-sugar (baked goods, ice cream) and high-fat foods, cutting 500 calories per day. Tried Noom, did not like it.     She is keeping track of nutritional intake via MyFitnessPal, originally aimed for 1650 calories/day, however after a few weeks of not losing weight, decreased goal to 1500 calories/day. Has been following this goal for a couple weeks and has not seen any weight loss.     Last RD visit 11/10/22 - Continue nutritional tracking. Pt estimates she is consuming ~1200 calories/day. She feels comfortable with this calorie level, especially since being more intentional about food choices, focusing on protein, more produce, whole grains.    She reports having some nausea and cramping when starting Ozempic, but has reduced over time. She has noticed increased reflux as well.     Today - overall down 9 lbs this past month, has noticed plateau over last week. Continues to track intake, avg intake 1200 calories/day. Started taking Miralax, but not helping as much as she " would hope. Is now back to using laxatives.     Recent Diet Recall:  Breakfast: Oatmeal, yogurt, cereal with fruit or skip   Lunch: bigger meal - sandwich; take-out (Syrian noodle bowl with chicken); chicken tenders; shrimp bowl   Dinner: graze - nuts, fruit, protein bar   Beverages: tea (20-40 oz/day), 32 oz/day water     Progress Towards Previous Goals:  1) Increase lean protein. Aim for 65-90 gm protein daily. - Not met recently, getting 20-55 gm/day per tracking in tomi.    - 20-30 gm protein = palm-size pc of lean meat/seafood, 4-5 tuna salad, 3 cheese sticks, 3/4 c serving of Greek yogurt, 3 hard boiled eggs, 1 protein bar/shake, 3 slice deli meat (or 2 slice deli meat + 1 slice cheese)  2) Increase activity as able - Feeling more tired lately. Sleep has been good lately, she is thinking may be related to winter season.     Physical Activity:  Walking dog 30 mins per day. Working at  [employer] on the weekends. Bought a SanT[employer]sti bell to start exercise.     Nutrition Prescription  Recommended energy/nutrient modification.  6235-0443 calories/day    Nutrition Diagnosis  Obesity r/t long history of positive energy balance aeb BMI >30. - resolved    Current: Overweight r/t long history of positive energy balance aeb BMI >25    Nutrition Intervention  Materials/education provided on hypocaloric diet for weight loss. Discussed continuing 7971-4241 calorie/day diet, Volumetric eating to help satiety level on fewer calories  Provided dietary/lifestyle tips to help with reflux and constipation.  Provided specific protein goals to aim for and discussed how to optimize protein intake.   Discussed importance of increased exercise, specifically resistance/strengthening over time for wt mgmt, bone health and constipation.   Co-developed goals to work towards.   Provided pt with list of goals and resources below via Space Apartt.    Patient demonstrates understanding.    Expected Engagement: good  Follow-Up Plans: Nutritional  tracking     Nutrition Goals  1) Increase lean protein. Aim for 65-90 gm protein daily.    - 20-30 gm protein = palm-size pc of lean meat/seafood, 4-5 tuna salad, 3 cheese sticks, 3/4 c serving of Greek yogurt, 3 hard boiled eggs, 1 protein bar/shake, 3 slice deli meat (or 2 slice deli meat + 1 slice cheese)  2) Increase activity as able - At home yoga once weekly   3) Consume 3L fluid per day  4) Gradually increase fiber intake.     High Fiber Foods:  Bran cereal, 1/3 cup, 8.6 gm fiber   Cooked kidney beans   cup 7.9 gm  Cooked lentils   cup 7.8 gm  Cooked black beans   cup 7.6 gm  Canned chickpeas   cup 5.3 gm  Baked beans   cup 5.2 gm  Pear 1 5.1 gm  Soybeans   cup 5.1 gm  Quinoa   cup 5 gm  Osvaldo seeds, 1 tbsp 5 gm  Baked sweet potato, with skin 1 medium 4.8 gm  Avocado, half small 4.5 gm  Baked potato, with skin 1 medium 4.4 gm  Cooked frozen green peas   cup 4.4 gm  Bulgur   cup 4.1 gm  Cooked frozen mixed vegetables   cup 4 gm  Raspberries   cup 4 gm  Blackberries   cup 3.8 gm  Almonds 1 oz 3.5 gm  Cooked frozen spinach   cup 3.5 gm  Vegetable or soy barbara 1 each 3.4 gm  Apple 1 medium 3.3 gm  Dried dates 5 pieces 3.3 gm    At Home Exercise Resources    Dance Workouts: The Isabel Freddy   [email or web address]://[email or web address]/[email or web address] Healthall    HIIT Workouts: PopSuCustomer BOOM (formerly R[employer]'s BOOM[employer] Fitness  https://www.[email or web address].[email or web address]/user/[email or web address]    Pilates: Blogilates  https://www.Urban Airshipube.com/user/blogilates    Yoga: Yoga with Prudence   https://www.Urban Airshipube.com/user/yogawithadriene/featured    Strength Training: HASfit  https://www.Urban Airshipube.com/[email or web address]      Exercises you can do anytime/anywhere: https://www.Car[employer].org/resources/everyone/blog/8382/top-25-at-home-exercises/#:~:text=Top%2025%20At-Home%20Exercises.%201%201.%20Supermans.%20Who,Knee%20Push-up.%205%205.%20Downward-facing%20Dog.%20More%20items    Tips for reflux:   - Eat slowly (20-30 minutes per meal), chewing foods well (25 chews per bite/applesauce  "consistency)  - Wait 3 hours after eating before lying down.  - Eat in a calm, relaxed place. Sit down while you eat.  - Avoid intake of high-fat foods (fried foods, processed meats (sausages, hot dogs, salami), high-fat meat, pastries, cookies, candy, full-fat dairy, nuts/nut butter, added oils/butter), alcohol, caffeine, peppermint, spicy foods, chocolate         The Plate Method  Http://www.[employer]ostics/612344.pdf    https://www.cdc.gov/diabetes/images/managing/Diabetes-Manage-Eat-Well-Plate-Graphic_600px.jpg    Protein Sources   http://Strand [employer]ostics/534249.pdf     Carbohydrates  http://fvfiles.com/907284.pdf     Summary of Volumetrics Eating Plan  http://fvfiles.com/443486.pdf     Healthy Recipe Resources:  \"The Volumetrics Eating Plan\" by Lizzette Robles, Ph.D.  https://www.Popular Pays/  www.MaryJane Distribution  www.Adyoulike.org  Dash Diet Recipes [facility]  www.extension.[facility] - the recipe box  \"Cooking that Counts\" by editors of MoveThatBlock.com  https://www.[facility]/communityculinary/[facility]_Cookbook_KT_Final_11-6_NoCrops-compressed.pdf  Https://www.choosemyplate.gov/myplatekitchen  https://snaped.fns.usda.gov/recipes-menus - SNAP recipes  https://www.diabetesfoodhub.org/all-recipes.html  Https://www.mealime.com/  \"Calorie Smart Meals\" cookbook by Better Homes and Quosiss (200-500 calorie meals)  https://www.[employer].Frontleaf/recipes/30576/cuisines-regions//  Budget friendly high-protein recipes: https://www.PDC Biotech/amy/1563194/dietitian-budget-high-protein-dinner-recipes/?ihavh=59e2i463-[id number]-wv66-[id number]#[id number]  https://www.firstnatNasty Gal.org/knowledge-center/recipes/    Follow-Up:  1 month, PRN    Time spent with patient: 26 minutes.  Ana Pillai RD, LD    "

## 2022-12-06 ENCOUNTER — VIRTUAL VISIT (OUTPATIENT)
Dept: ENDOCRINOLOGY | Facility: CLINIC | Age: 37
End: 2022-12-06
Payer: COMMERCIAL

## 2022-12-06 DIAGNOSIS — Z71.3 NUTRITIONAL COUNSELING: Primary | ICD-10-CM

## 2022-12-06 DIAGNOSIS — E66.3 OVERWEIGHT (BMI 25.0-29.9): ICD-10-CM

## 2022-12-06 PROCEDURE — 97803 MED NUTRITION INDIV SUBSEQ: CPT | Mod: 95 | Performed by: DIETITIAN, REGISTERED

## 2022-12-06 NOTE — LETTER
"12/6/2022       RE: Kasandra Lau  [address]  [place] [address]     Dear Colleague,    Thank you for referring your patient, Kasandra Lau, to the [facility] [place] at [facility]. Please see a copy of my visit note below.    Kasandra Lau is a 37 year old female who is being evaluated via a billable video visit.      The patient has been notified of following:     \"This video visit will be conducted via a call between you and your physician/provider. We have found that certain health care needs can be provided without the need for an in-person physical exam.  This service lets us provide the care you need with a video conversation.  If a prescription is necessary we can send it directly to your pharmacy.  If lab work is needed we can place an order for that and you can then stop by our lab to have the test done at a later time.    Video visits are billed at different rates depending on your insurance coverage.  Please reach out to your insurance provider with any questions.    If during the course of the call the physician/provider feels a video visit is not appropriate, you will not be charged for this service.\"    Patient has given verbal consent for Video visit? Yes  How would you like to obtain your AVS? MyChart  If you are dropped from the video visit, the video invite should be resent to: Send to e-mail at: [email or web address]  Will anyone else be joining your video visit? No  {If patient encounters technical issues they should call [phone]      Video-Visit Details    Type of service:  Video Visit    Video Start Time: 9:34 AM  Video End Time: 10:30 AM    Originating Location (pt. Location): Home    Distant Location (provider location):  Offsite (providers home) [facility]     Platform used for Video Visit: Avalara    During this virtual visit the patient is located in [place], " "patient verifies this as the location during the entirety of this visit.       Weight Management Nutrition Consultation    Kasandra Lau is a 37 year old female presents today for return weight management nutrition consultation.  Patient referred by Dr. Kerri Evans on October 4, 2022.    Patient with Co-morbidities of obesity including:  Type II DM no  Renal Failure no  Sleep apnea no  Hypertension no   Dyslipidemia yes  Joint pain no  Back pain yes  GERD yes   Fatty liver yes    PMH also significant for anxiety, depression, ADHD, PTSD, rheumatoid arthritis, and IBS-C.  H/o cholcystectomy     Anthropometrics:  Estimated body mass index is 29.22 kg/m  as calculated from the following:    Height as of an earlier encounter on 10/4/22: 1.74 m (5' 8.5\").    Weight as of an earlier encounter on 10/4/22: 88.5 kg (195 lb).    Current weight: 174 lbs with BMI 26.5 (-9 lbs this month, -21 lbs from initial)      Pt goal: Aiming for 160s, was at this weight for many years.      Medications for Weight Loss:  Ozempic     NUTRITION HISTORY  Food allergies: None  Food intolerances: Too much dairy. Peppers, onions and garlic.   Supplements: Vitamin D (5000 units/day), daily multivitamin   Previous methods of diet modification for weight loss: Cutting down high-sugar (baked goods, ice cream) and high-fat foods, cutting 500 calories per day. Tried Noom, did not like it.     She is keeping track of nutritional intake via MyFitnessPal, originally aimed for 1650 calories/day, however after a few weeks of not losing weight, decreased goal to 1500 calories/day. Has been following this goal for a couple weeks and has not seen any weight loss.     Last RD visit 11/10/22 - Continue nutritional tracking. Pt estimates she is consuming ~1200 calories/day. She feels comfortable with this calorie level, especially since being more intentional about food choices, focusing on protein, more produce, whole grains.    She reports having some " nausea and cramping when starting Ozempic, but has reduced over time. She has noticed increased reflux as well.     Today - overall down 9 lbs this past month, has noticed plateau over last week. Continues to track intake, avg intake 1200 calories/day. Started taking Miralax, but not helping as much as she would hope. Is now back to using laxatives.     Recent Diet Recall:  Breakfast: Oatmeal, yogurt, cereal with fruit or skip   Lunch: bigger meal - sandwich; take-out (Kyrgyz noodle bowl with chicken); chicken tenders; shrimp bowl   Dinner: graze - nuts, fruit, protein bar   Beverages: tea (20-40 oz/day), 32 oz/day water     Progress Towards Previous Goals:  1) Increase lean protein. Aim for 65-90 gm protein daily. - Not met recently, getting 20-55 gm/day per tracking in tomi.    - 20-30 gm protein = palm-size pc of lean meat/seafood, 4-5 tuna salad, 3 cheese sticks, 3/4 c serving of Greek yogurt, 3 hard boiled eggs, 1 protein bar/shake, 3 slice deli meat (or 2 slice deli meat + 1 slice cheese)  2) Increase activity as able - Feeling more tired lately. Sleep has been good lately, she is thinking may be related to winter season.     Physical Activity:  Walking dog 30 mins per day. Working at  [employer] on the weekends. Bought a kettle bell to start exercise.     Nutrition Prescription  Recommended energy/nutrient modification.  9321-1826 calories/day    Nutrition Diagnosis  Obesity r/t long history of positive energy balance aeb BMI >30. - resolved    Current: Overweight r/t long history of positive energy balance aeb BMI >25    Nutrition Intervention  Materials/education provided on hypocaloric diet for weight loss. Discussed continuing 9673-1066 calorie/day diet, Volumetric eating to help satiety level on fewer calories  Provided dietary/lifestyle tips to help with reflux and constipation.  Provided specific protein goals to aim for and discussed how to optimize protein intake.   Discussed importance of  increased exercise, specifically resistance/strengthening over time for wt mgmt, bone health and constipation.   Co-developed goals to work towards.   Provided pt with list of goals and resources below via Inaayahart.    Patient demonstrates understanding.    Expected Engagement: good  Follow-Up Plans: Nutritional tracking     Nutrition Goals  1) Increase lean protein. Aim for 65-90 gm protein daily.    - 20-30 gm protein = palm-size pc of lean meat/seafood, 4-5 tuna salad, 3 cheese sticks, 3/4 c serving of Greek yogurt, 3 hard boiled eggs, 1 protein bar/shake, 3 slice deli meat (or 2 slice deli meat + 1 slice cheese)  2) Increase activity as able - At home yoga once weekly   3) Consume 3L fluid per day  4) Gradually increase fiber intake.     High Fiber Foods:  Bran cereal, 1/3 cup, 8.6 gm fiber   Cooked kidney beans   cup 7.9 gm  Cooked lentils   cup 7.8 gm  Cooked black beans   cup 7.6 gm  Canned chickpeas   cup 5.3 gm  Baked beans   cup 5.2 gm  Pear 1 5.1 gm  Soybeans   cup 5.1 gm  Quinoa   cup 5 gm  Osvaldo seeds, 1 tbsp 5 gm  Baked sweet potato, with skin 1 medium 4.8 gm  Avocado, half small 4.5 gm  Baked potato, with skin 1 medium 4.4 gm  Cooked frozen green peas   cup 4.4 gm  Bulgur   cup 4.1 gm  Cooked frozen mixed vegetables   cup 4 gm  Raspberries   cup 4 gm  Blackberries   cup 3.8 gm  Almonds 1 oz 3.5 gm  Cooked frozen spinach   cup 3.5 gm  Vegetable or soy barbara 1 each 3.4 gm  Apple 1 medium 3.3 gm  Dried dates 5 pieces 3.3 gm    At Home Exercise Resources    Dance Workouts: The Isabel Hayes   https://[email or web address].com/user/[email or web address]    HIIT Workouts: PopSugar Fitness  https://www.Zukiube.com/user/[email or web address]    Pilates: Blogilates  https://www.Zukiube.com/user/blogilates    Yoga: Yoga with Prudence   https://www.Zukiube.com/user/yogawithadriene/featured    Strength Training: HASfit  https://www.youFibroGenube.com/[email or web address]      Exercises you can do anytime/anywhere:  "https://www.aceElyssafregori.org/resources/everyone/blog/6593/top-25-at-home-exercises/#:~:text=Top%2025%20At-Home%20Exercises.%201%201.%20Supermans.%20Who,Knee%20Push-up.%205%205.%20Downward-facing%20Dog.%20More%20items    Tips for reflux:   - Eat slowly (20-30 minutes per meal), chewing foods well (25 chews per bite/applesauce consistency)  - Wait 3 hours after eating before lying down.  - Eat in a calm, relaxed place. Sit down while you eat.  - Avoid intake of high-fat foods (fried foods, processed meats (sausages, hot dogs, salami), high-fat meat, pastries, cookies, candy, full-fat dairy, nuts/nut butter, added oils/butter), alcohol, caffeine, peppermint, spicy foods, chocolate         The Plate Method  Http://wwwUsbek & Rica/012433.pdf    https://www.cdc.gov/diabetes/images/managing/Diabetes-Manage-Eat-Well-Plate-Graphic_600px.jpg    Protein Sources   http://DeepRockDrive/286136.pdf     Carbohydrates  http://fvfiles.com/585686.pdf     Summary of Volumetrics Eating Plan  http://fvfiles.com/158122.pdf     Healthy Recipe Resources:  \"The Volumetrics Eating Plan\" by Lizzette Robles, Ph.D.  https://www.wywytive.[employer]/  www.[employer]  www.[employer].org  Dash Diet Recipes [facility]  www.extension.[facility] - the recipe box  \"Cooking that Counts\" by editors of CookingLight  https://www.[facility].edu/communityculinary/[facility]_Cookbook_KT_Final_11-6_NoCrops-compressed.pdf  Https://www.HStreamingmyplate.gov/myplatekitchen  https://snaped.fns.usda.gov/recipes-menus - SNAP recipes  https://www.diabetesfoodhub.org/all-recipes.html  Https://www.Sociagram.com.[employer]/  \"Calorie Smart Meals\" cookbook by Better Homes and Gardens (200-500 calorie meals)  https://www.AbleSky.com/recipes/90633/cuisines-regions//  Budget friendly high-protein recipes: " https://www.Roomorama/Vastariery/9641665/dietitian-budget-high-protein-dinner-recipes/?cwaac=[id number]  https://www.[facility].org/knowledge-center/recipes/    Follow-Up:  1 month, PRN    Time spent with patient: 26 minutes.  Ana Pillai RD, LD

## 2022-12-07 RX ORDER — FEXOFENADINE HCL 180 MG/1
180 TABLET ORAL EVERY MORNING
Qty: 90 TABLET | Refills: 1 | Status: SHIPPED | OUTPATIENT
Start: 2022-12-07 | End: 2023-06-08

## 2022-12-07 RX ORDER — BUPROPION HYDROCHLORIDE 300 MG/1
TABLET ORAL
Qty: 90 TABLET | Refills: 1 | Status: SHIPPED | OUTPATIENT
Start: 2022-12-07 | End: 2023-06-07

## 2022-12-08 ENCOUNTER — TELEPHONE (OUTPATIENT)
Dept: ENDOCRINOLOGY | Facility: CLINIC | Age: 37
End: 2022-12-08

## 2022-12-08 ENCOUNTER — VIRTUAL VISIT (OUTPATIENT)
Dept: PSYCHOLOGY | Facility: CLINIC | Age: 37
End: 2022-12-08
Payer: COMMERCIAL

## 2022-12-08 DIAGNOSIS — F54 PSYCHOLOGICAL AND BEHAVIORAL FACTORS ASSOCIATED WITH DISORDERS OR DISEASES CLASSIFIED ELSEWHERE: ICD-10-CM

## 2022-12-08 DIAGNOSIS — F34.1 PERSISTENT DEPRESSIVE DISORDER: ICD-10-CM

## 2022-12-08 DIAGNOSIS — F43.12 CHRONIC POST-TRAUMATIC STRESS DISORDER (PTSD): Primary | ICD-10-CM

## 2022-12-08 PROCEDURE — 90837 PSYTX W PT 60 MINUTES: CPT | Mod: 95 | Performed by: PSYCHOLOGIST

## 2022-12-08 NOTE — PATIENT INSTRUCTIONS
Pritesh Slaughter,    Follow-up with RD in 1 month.     Thank you,    nAa Pillai, TIMBO, LD  If you would like to schedule or reschedule an appointment with the RD, please call [phone]    Nutrition Goals  1) Increase lean protein. Aim for 65-90 gm protein daily.    - 20-30 gm protein = palm-size pc of lean meat/seafood, 4-5 tuna salad, 3 cheese sticks, 3/4 c serving of Greek yogurt, 3 hard boiled eggs, 1 protein bar/shake, 3 slice deli meat (or 2 slice deli meat + 1 slice cheese)  2) Increase activity as able - At home yoga once weekly   3) Consume 3L fluid per day  4) Gradually increase fiber intake.     High Fiber Foods:  Bran cereal, 1/3 cup, 8.6 gm fiber   Cooked kidney beans   cup 7.9 gm  Cooked lentils   cup 7.8 gm  Cooked black beans   cup 7.6 gm  Canned chickpeas   cup 5.3 gm  Baked beans   cup 5.2 gm  Pear 1 5.1 gm  Soybeans   cup 5.1 gm  Quinoa   cup 5 gm  Osvaldo seeds, 1 tbsp 5 gm  Baked sweet potato, with skin 1 medium 4.8 gm  Avocado, half small 4.5 gm  Baked potato, with skin 1 medium 4.4 gm  Cooked frozen green peas   cup 4.4 gm  Bulgur   cup 4.1 gm  Cooked frozen mixed vegetables   cup 4 gm  Raspberries   cup 4 gm  Blackberries   cup 3.8 gm  Almonds 1 oz 3.5 gm  Cooked frozen spinach   cup 3.5 gm  Vegetable or soy barbara 1 each 3.4 gm  Apple 1 medium 3.3 gm  Dried dates 5 pieces 3.3 gm    At Home Exercise Resources    Dance Workouts: The Fitness Freddy   https://www.Plan B Labsube.com/user/Magnolia    HIIT Workouts: PopSugar Fitness  https://www.Plan B Labsube.com/user/popsugartvfit    Pilates: Blogilates  https://www.Plan B Labsube.com/user/blogilates    Yoga: Yoga with Prudence   https://www.Plan B Labsube.com/user/yogawithadriene/featured    Strength Training: HASfit  https://www.youtube.com/channel/MKIFC5-MBANu53QY-q2UNmuE      Exercises you can do anytime/anywhere:  "https://www.aceNational Institutes of Health (NIH).org/resources/everyone/blog/6593/top-25-at-home-exercises/#:~:text=Top%2025%20At-Home%20Exercises.%201%201.%20Supermans.%20Who,Knee%20Push-up.%205%205.%20Downward-facing%20Dog.%20More%20items    Tips for reflux:   - Eat slowly (20-30 minutes per meal), chewing foods well (25 chews per bite/applesauce consistency)  - Wait 3 hours after eating before lying down.  - Eat in a calm, relaxed place. Sit down while you eat.  - Avoid intake of high-fat foods (fried foods, processed meats (sausages, hot dogs, salami), high-fat meat, pastries, cookies, candy, full-fat dairy, nuts/nut butter, added oils/butter), alcohol, caffeine, peppermint, spicy foods, chocolate         The Plate Method  Http://wwwCollegium Pharmaceutical/872690.pdf    https://www.cdc.gov/diabetes/images/managing/Diabetes-Manage-Eat-Well-Plate-Graphic_600px.jpg    Protein Sources   http://LiveHive Systems/619943.pdf     Carbohydrates  http://fvfiles.com/868667.pdf     Summary of Volumetrics Eating Plan  http://fvfiles.com/800812.pdf     Healthy Recipe Resources:  \"The Volumetrics Eating Plan\" by Lizzette Robles, Ph.D.  https://www.Lenddotive."Kiwi, Inc."/  www.Cool Planet Energy Systems."Kiwi, Inc."  www.Jelastic.org  Dash Diet Recipes [facility]  www.extension.[facility] - the recipe box  \"Cooking that Counts\" by editors of CookingLight  https://www.[facility].edu/communityculinary/[facility]_Cookbook_KT_Final_11-6_NoCrops-compressed.pdf  Https://www.AuthorBeemyplate.gov/myplatekitchen  https://snaped.fns.usda.gov/recipes-menus - SNAP recipes  https://www.diabetesfoodhub.org/all-recipes.html  Https://www.OncoTree DTS."Kiwi, Inc."/  \"Calorie Smart Meals\" cookbook by [employer] Gardens (200-500 calorie meals)  https://www.Osen.com/recipes/01829/cuisines-regions//  Budget friendly high-protein recipes: " https://www.[employer]ing/[facility]/7625043/dietitian-budget-high-protein-dinner-recipes/?mbmzz=56p3q032-[id number]-yv65-v14pm54851u7#[id number]  https://www.[facility].org/knowledge-center/recipes/    Interested in working with a health ?  Do you know what you are supposed to do, but you just aren't doing it?  Then, HEALTH COACHING may help you!   Get unstuck and move forward with the support of a specially trained, National Board Certified Health  who uses evidence based approaches to help you move forward with healthy lifestyle changes.    Health Coaches help you identify goals that will work best for you. Health Coaches provide support and encouragement with overcoming barriers and help you to find inspiration and motivation to lead a healthy lifestyle.    Book a Health Coaching 3 Pack; Three 30 minute Health Coaching Visits, for $99  Visits are done virtually (phone or video)  This is a self pay service; we do not accept insurance for garret coaching.    To learn more about Health Coaching and if it's a good fit for you schedule a free Health  Q&A appointment - call [phone]      COMPREHENSIVE WEIGHT MANAGEMENT PROGRAM  VIRTUAL SUPPORT GROUPS    For Support Group Information:      We offer support groups for patients who are working on weight loss and considering, preparing for or have had weight loss surgery.   There is no cost for this opportunity.  You are invited to attend the?Virtual Support Groups?provided by any of the following locations:    [facility] via Like.com Teams with Frannie Bowman RN  2.   [place] via Like.com Teams with Errol Boss, PhD, LP  3.   [place] via Like.com Teams with Marilu Wild RN  4.   [facility] via Like.com Teams with Marilu Leonardo [facility]    The following Support Group information can also be found on our website:  https://www.[facility].org/treatments/weight-loss-surgery-support-groups      [facility]  "[facility] Weight Loss Surgery Support Group    [facility] Weight Loss Surgery Support Group  The support group is a patient-lead forum that meets monthly to share experiences, encouragement and education. It is open to those who have had weight loss surgery, are scheduled for surgery, and those who are considering surgery.   WHEN: This group meets on the 3rd Wednesday of each month from 5:00PM - 6:00PM virtually using Microsoft Teams.   FACILITATOR: Led by Frannie Bowman, TIMBO, LD, RN, the program's Clinical Coordinator.   TO REGISTER: Please contact the clinic via [facility] or call the nurse line directly at [phone] to inform our staff that you would like an invite sent to you and to let us know the email you would like the invite sent to. Prior to the meeting, a link with directions on how to join the meeting will be sent to you.    2022 Meetings  January 19: \"Let's Talk\" a time for the group to share.  February 16: \"Let's Talk\" a time for the group to share.  March 16: Guest Speakers: Psychologists, Lorna Lucero, PhD,LP and Alexa Duff PsyD,  April 20: Guest Speaker: Health , Sheri Moctezuma, [facility],CHES, CPT  May 18: Guest Speaker: DietitianDennys, TIMBO, LP  Josiane 15: \"Let's Talk\" a time for the group to share.  July 20: \"Let's Talk\" a time for the group to share.  August 17: TBA  September 21: TBA  October 19: Guest Speaker: Dr Olivier Edmonds MD Pulmonologist and Sleep Medicine Physician, \"Getting a Good Night's Sleep\".  November 16: TBA  December 21: TBA    [facility] and [facility] Support Groups    Connections: Bariatric Care Support Group?  This is open to all [facility] (and those external to this program) pre- and post- operative bariatric surgery patients as well as their support system.   WHEN: This group meets the 2nd Tuesday of each month from 6:30 PM - 8:00 PM virtually using Microsoft Teams.   FACILITATOR: Led by Errol Boss, Ph.D who is a " "Licensed Psychologist with the [facility] Comprehensive Weight Management Program.   TO REGISTER: Please send an email to Errol Boss, Ph.D., LP at?[email or web address]?if you would like an invitation to the group and to learn about using Microsoft Teams.    2022 Meetings January 11: Paris Mancuso, PharmD, Pharmacy Resident at [facility], \"Medications and Bariatric Surgery\".  February 8: Open Forum  March 8  April 12  May 10  Josiane 14    Connections: Post-Operative Bariatric Surgery Support Group  This is a support group for [facility] bariatric patients (and those external to [facility]) who have had bariatric surgery and are at least 3 months post-surgery.  WHEN: This support group meets the 4th Wednesday of the month from 11:00 AM - 12:00 PM virtually using Microsoft Teams.   FACILITATOR: Led by Certified Bariatric Nurse, Marilu Wild RN.   TO REGISTER: Please send an email to Marilu at [email or web address] if you would like an invitation to the group and to learn about using Microsoft Teams.    2022 Meetings  January 26  February 23  March 23  April 27  May 25  Josiane 22    [facility] Healthy Lifestyle Virtual Support Group    Healthy Lifestyle Virtual Support Group?  This is 60 minutes of small group guided discussion, support and resources. All are welcome who want a healthy lifestyle.  WHEN: This group meets monthly on a Friday from 12:30 PM - 1:30 PM virtually using Microsoft Teams.   FACILITATOR: Led by National Board Certified Health and , Marilu Leonardo [facility].   TO REGISTER: Please send an email to Marilu at?[email or web address] to receive monthly invites to the group or if you have any questions about having a health .  Prior to the meeting, a link with directions on how to join the meeting will be sent to you.    2022 Meetings  MAY 20: OPEN FORUM  JUNE: 24:  Setting Limits and BoundariesMarilu  July 29: " OPEN FORUM  August 26: Special Guest Registered Dietician Sosa Gillette  Sept 30: OPEN FORUM  Oct 28, GraKasandra Austin National Board Certified Health   Nov 18: Navigating How to Eat Around the Holidays with TIMBO Magaña  Dec 16: Changing your relationship with movement with  Sheri National Board Certified Health

## 2022-12-13 DIAGNOSIS — J45.30 MILD PERSISTENT ASTHMA, UNSPECIFIED WHETHER COMPLICATED: ICD-10-CM

## 2022-12-13 NOTE — TELEPHONE ENCOUNTER
montelukast (SINGULAIR) 10 MG tablet    Last Written Prescription Date:  06-  Last Fill Quantity: 90,   # refills: 1  Last Office Visit : 11-  Future Office visit:  Not on file    Routing refill request to provider for review/approval because:  Leukotriene Inhibitors Protocol Failed 12/13/2022 12:39 AM   Protocol Details  Asthma control assessment score within normal limits in last 6 months     ACT and ATAQ Scores  6/14/2022    ACT TOTAL SCORE (Goal Greater than or Equal to 20) 24    6/14/2022   Review Flowsheet

## 2022-12-15 RX ORDER — MONTELUKAST SODIUM 10 MG/1
1 TABLET ORAL AT BEDTIME
Qty: 90 TABLET | Refills: 1 | Status: SHIPPED | OUTPATIENT
Start: 2022-12-15 | End: 2023-06-12

## 2022-12-18 ENCOUNTER — MYC MEDICAL ADVICE (OUTPATIENT)
Dept: PSYCHOLOGY | Facility: CLINIC | Age: 37
End: 2022-12-18

## 2022-12-18 NOTE — CONFIDENTIAL NOTE
"    Health Psychology - Follow up Visit  Confidential Summary*    The author of this note documented a reason for not sharing it with the patient.  REFERRAL SOURCE:  Psychiatry    CHIEF COMPLAINT/REASON FOR VISIT  Psychotherapy in context of chronic PTSD and persistent depression.  Patient also complains of dissatisfaction with interpersonal relationships and challenges with emotion regulation.      Patient was seen today for a 60 minute individual psychotherapy session.  The session was facilitated via VIDEO with patient at her home and provider at her own home.  We used doxy.me platform.       This telehealth service is appropriate and effective for delivering services in light of the necessity for social distancing to mitigate the COVID-19 epidemic. Patient has agreed to receiving telehealth services.    The patient has been notified of following:   \"This VIDEO visit will be conducted via a call between you and your physician/provider. We have found that certain health care needs can be provided without the need for an in-person physical exam.  VIDEO visits are billed at different rates depending on your insurance coverage.  Please reach out to your insurance provider with any questions. If during the course of the call the physician/provider feels a video visit is not appropriate, you will not be charged for this service.\"     Patient has given verbal consent for VIDEO visit? Yes    Subjective:  Patient began with report that she established a new social media dating account and that she is open to a noncomitted relationship.Encouraged her to use her DBT skills to move into wise mind and to evaluate the possible impact of this decision.  She described being of two minds in dating.  On one hand, she is lonely and would like someone to spend time with but on the other hand, [place] and that she ia likely going to move away from this area.  She described " hope that by establishing from the beginning that she is not looking for a relationship may result in less pain.      She described her perception of the recent interaction with a new man and that she again brought up social justice issues that he may not be as able to discuss as she.  She described an awareness that she may feel superior when she notices this dynamic.  Discussed the impact on trauma on this reaction and her attempts to protect herself.      Objective:  Patient was on time for today s session. She was alert and oriented. Mood was euthymic with appropriate range of affect. Patient denied suicidal or assaultive ideation, plan, or intent.        Assessment:  The patient has a longstanding history of interpersonal discord and challenges with emotion regulation.  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  She has completed all requirements for her PhD and is now ABD.  She is living in  housing with her two dogs.  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  She is hoping to present her dissertation proposal in the upcoming weeks.      Plan:  Patient graduated from full model DBT at [facility] and is now completing phase 2 work.      Treatment plan completed:  10/13/22     Time In: 2:00  Time Out: 3:00    Diagnosis:  Axis I PTSD, chronic, persistent depressive disorder, adjustment disorder with mixed, psychological factors associated with physical (fibromyalgia)   Axis II  BPD   Axis III please see medical records for details   [place] IV Psychosocial and Environmental Stressors: living alone with no family support, pandemic stress, chronic illness         Corina Muhammad, PhD,

## 2022-12-21 NOTE — PROGRESS NOTES
Kasandra is a 37 year old who is being evaluated via a billable video visit.      How would you like to obtain your AVS? MyChart  If the video visit is dropped, the invitation should be resent by: Send to e-mail at: [email or web address]  Will anyone else be joining your video visit? No        Video-Visit Details    Type of service:  Video Visit     Originating Location (pt. Location): Home    Distant Location (provider location):  Off-site  Platform used for Video Visit: [employer] 1004  End 1027      Rheumatology Clinic Visit  [facility]  Arnie Bennett M.D.     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  Symptom improvement occurred in take correlation with starting hydroxychloroquine.  Video exam shows full painless range of motion in PIPs and PIPs without visible swelling.  Wrist, elbow, and shoulder range of motion is normal.    Data: Lab work on November 7, 2022 showed comprehensive metabolic panel normal except for alkaline phosphatase slightly elevated at 109, and ALT elevated at 134.  CBC was normal.  Urinalysis showed trace protein.  In August 2021, extractable nuclear antigen panel was negative, as was double-stranded DNA.  CRP was normal.  Complement C3 and C4 were negative.      In May 2021, STEPHANIE was positive at a titer of 1: 160.  Hepatitis B and C were negative.  Antigliadin and antitissue transglutaminase antibodies were negative.  Celiac panel was negative.    Discussion: Inflammatory, small joint predominant polyarthritis associated with low titer positive STEPHANIE is significantly improved, first in association with use of prednisone, and more recently in  "association with use of hydroxychloroquine.  I recommend continuing hydroxychloroquine.  We discussed my suggestion that patient continue hydroxychloroquine for a period of at least 9 months.  If she has no recurrence of significant inflammatory arthropathy symptoms in that time, dose reduction and eventual trial of discontinuation may occur.  There is no need for further prednisone.  I recommend checking creatinine in the next 3 to 4 months.    # Non-alcoholic fatty liver disease:  Persistent ALT elevation, monitored by GI. Using ozempic to assist with weight loss--30# in 3 months.    Plan:  1.  Continue hydroxychloroquine 400 mg once daily.  While taking hydroxychloroquine, undergo annual ophthalmology evaluation to screen for rare retinal Plaquenil toxicity.  2.  Check creatinine in March or April 2023    RTC 6 mos    No orders of the defined types were placed in this encounter.    Arnie Bennett MD  Staff Rheumatologist, [facility]           History of Present Illness:   Kasandra Lau presents for evaluation of positive STEPHANIE. Last seen by Dr. Atwood in 7-2021.  I last saw patient on August 19, 2022.  At that time, concern for STEPHANIE-associated inflammatory arthritis was raised.  Prednisone course was given, and hydroxychloroquine was considered.    Interval history December 22, 2022    Hydroxychloroquine was started in November 2022, 400 mg daily.    She is \"doing a lot better\".  Joint pains are less. Her wrists still hurt, especially when she is typing. Her thumbs still get sore with activity. Her hands/fingers are less swollen. Early morning stiffness is not significant.  She noted prednisone helped her hands quickly. She noted feeling \"on edge\" while on the drug. Once she started hydroxychloroquine, joint pain dissipated wihtin  Weeks to a month. No AE from the hydroxychloroquine.  No new skin, joint symptoms. 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  She has been using much less tylenol or " "ibuprofen since last visit.   No skin, CV, neuro, GI symptoms.       History 8-2022    She notes increasing \"carpal tunnel\" symptoms; also increased pain in her hands and other joints. There is \"diffuse achiness\", especially wrists, ankles, fingers. Hard to type, hard to hold things. There has been puffiness in L > R hands. Symptoms are progressing, especially hands.    Mornings are \"rough\" with stiffness, brain fog, fatigue. Early morning stiffness is ~ 1 hour. Toes/forefeet are stiff and can cramp. Moist heat helps. 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    Tylenol and ibuprofen help with morning pain; menthol creams also help.    Muscle pain also is prominent.         Review of Systems:     Constitutional: negative  Skin: \"bumps\" on upper arms, not itchy; Dermatology told her \"genetic\" cause; rec'd moisturizing lotion.  Eyes: negative  Ears/Nose/Throat: s/p tonsillectomy  Respiratory: No shortness of breath, dyspnea on exertion, cough, or hemoptysis; uses mucinex to help breathing.  Cardiovascular: negative  Gastrointestinal: IBS sx  Genitourinary: negative  Musculoskeletal: negative  Neurologic: negative  Psychiatric: negative  Hematologic/Lymphatic/Immunologic: negative  Endocrine: negative         Active Problem List:     Patient Active Problem List    Diagnosis Date Noted     Endometriosis 10/11/2022     Priority: Medium     Keratosis pilaris 06/14/2022     Priority: Medium     Chronic sinusitis, unspecified location 09/21/2021     Priority: Medium     Hypertrophy of breast 09/10/2020     Priority: Medium     Added automatically from request for surgery [id number]       Pap smear abnormality of cervix/human papillomavirus (HPV) positive 09/03/2020     Priority: Medium     5052-1634 NILM HPV  Positive for High Risk HPV types other than 16 or 18 (Care Everywhere)  2020 NILM HPV negative 34 y.o.  PLAN, per ASCCP Guidelines: cotest 1/2023       Cholecystitis 07/16/2020     " Priority: Medium     Added automatically from request for surgery [id number]       Chronic idiopathic urticaria 06/22/2020     Priority: Medium     Hx of abnormal cervical Pap smear 02/03/2020     Priority: Medium     Acid reflux 08/25/2019     Priority: Medium     Need for desensitization to allergens 06/06/2019     Priority: Medium     Formatting of this note might be different from the original.    Allergy Shot Care Plan for Kasandra Lau  Date of Order: June 6 2019  Ordering Provider: Dr. Martin Arreaga 1: Cat  Date Shots Started:  Start Dose: 0.1 mL of 1:100,000 - GREEN  Date Maintenance Reached:  Maintenance Dose: 0.3 mL of 1:100 - RED    Serum 2: Mite DF and Mite DP  Date Shots Started:  Start Dose: 0.1 mL of 1:100,000 - GREEN  Date Maintenance Reached:  Maintenance Dose: 0.1 mL of 1:100 - RED    BUILDING SCHEDULE FOR POLLEN AND NONPOLLEN   IMMUNOTHERAPY  EXCEPT VENOM AND CENTER AL    3 - 14 days Build per schedule.   15 - 21 days Repeat previous dose.   22 - 28 days Decrease by one dose.   29 - 35 days Decrease by two doses.   36 - 42 days Decrease by three doses.   Over 42 days Continue to decrease by one dose for each additional week.     1:100,000 (green)  1:10,000 (blue)  1:1000 (yellow)  1:100 (red)     1. 0.05 ml  7. 0.05 ml  13. 0.05 ml  19. 0.05 ml   2. 0.1 ml  8. 0.1 ml  14. 0.1 ml  20. 0.1 ml   3. 0.2 ml  9. 0.2 ml  15. 0.2 ml  21. 0.15 ml   4. 0.3 ml  10. 0.3 ml  16. 0.3 ml  22. 0.2 ml   5. 0.4 ml  11. 0.4 ml  17. 0.4 ml  23. 0.25 ml   6. 0.45 ml  12. 0.45 ml  18. 0.45 ml  24. 0.3 ml         25   0.35 ml         26   0.4 ml         27. 0.45 ml         28. 0.5 ml     MAINTENANCE SCHEDULE FOR POLLENS, NONPOLLENS (MITES,MOLD,CAT,DOG)  (not used for Center-AL Pollens)    ? When reaching maintenance dose for the first time, gradually stretch by weekly intervals to every 4 weeks (e.g., every 2 weeks, then 3 weeks, then 4 weeks).   ? Patient may receive their injections (patient choice) at 2-4 week  intervals     Maintenance Repeat 1 Dose 2 Dose 3 Dose    Q2 weeks  (10-14 days)  3 weeks  (15-21 days) 4 weeks  (22-28 days) 5 weeks  (29-35 days) 6 weeks  (36-42 days) Decrease by 1 Dose for each additional week   Q3 weeks  (15-21 days)  4 weeks  (22-28 days) 5 weeks  (29-35 days) 6 weeks  (36-42 days) 7 weeks  (43-49 days)    Q4 weeks  (22-28 days)  5 weeks  (29-35 days) 6 weeks  (36-42 days) 7 weeks  (43-49 days) 8 weeks  (50-56 days)      Susanna Ambrose RN 6/6/2019 4:49 PM       Mild intermittent asthma without complication 04/30/2018     Priority: Medium     Seasonal mood disorder (H) 11/15/2017     Priority: Medium     Circadian rhythm sleep disorder, delayed sleep phase type 05/10/2017     Priority: Medium     Unhealthy sleep habit 05/10/2017     Priority: Medium     Vitamin D deficiency 11/19/2016     Priority: Medium     Menorrhagia with regular cycle 11/19/2016     Priority: Medium     Migraine without aura and without status migrainosus, not intractable 11/19/2016     Priority: Medium     Iron deficiency anemia due to chronic blood loss 11/19/2016     Priority: Medium     Tired 11/19/2016     Priority: Medium     Irritable bowel syndrome with constipation 10/25/2016     Priority: Medium     Recurrent major depressive disorder, in remission (H) 10/25/2016     Priority: Medium     Pelvic pain in female 08/25/2016     Priority: Medium     Migraine headache 01/01/2012     Priority: Medium     Panic disorder 01/01/2007     Priority: Medium     Depression 01/01/2004     Priority: Medium            Past Medical History:     Past Medical History:   Diagnosis Date     Anemia fall 2016     Anxiety      Arthritis      Chronic fatigue      Depression (emotion)     sees psych, on meds     Gastroesophageal reflux disease      Migraine     daily meds, 2x month, more mild on meds     Neuropathic pain      Panic disorder      Uncomplicated asthma Spring 2018     Past Surgical History:   Procedure Laterality Date      COLONOSCOPY       LAPAROSCOPIC ABLATION ENDOMETRIOSIS N/A 11/09/2016    Procedure: LAPAROSCOPIC ABLATION ENDOMETRIOSIS;  Surgeon: Tonja Ferrer MD;  Location: [facility]     LAPAROSCOPIC CYSTECTOMY OVARIAN (BENIGN) Left 11/09/2016    Procedure: LAPAROSCOPIC CYSTECTOMY OVARIAN (BENIGN);  Surgeon: Tonja Ferrer MD;  Location: [facility] OR     LAPAROSCOPIC TUBAL DYE STUDY Left 11/09/2016    Procedure: LAPAROSCOPIC TUBAL DYE STUDY;  Surgeon: Tonja Ferrer MD;  Location: [facility] OR     LAPAROSCOPY OPERATIVE ADULT N/A 11/09/2016    Procedure: LAPAROSCOPY OPERATIVE ADULT;  Surgeon: Tonja Ferrer MD;  Location: [facility]     MAMMOPLASTY REDUCTION Bilateral 08/05/2021    Procedure: MAMMOPLASTY, REDUCTION, Bilateral;  Surgeon: ANTONIO Moreno MD;  Location: [facility] OR     ORTHOPEDIC SURGERY      left wrist surgery     TONSILLECTOMY & ADENOIDECTOMY Bilateral     cauterized turbinates also            Social History:     Social History     Socioeconomic History     Marital status: Single     Spouse name: Not on file     Number of children: Not on file     Years of education: Not on file     Highest education level: Not on file   Occupational History     Not on file   Tobacco Use     Smoking status: Former     Packs/day: 0.00     Years: 10.00     Pack years: 0.00     Types: Cigarettes     Smokeless tobacco: Never     Tobacco comments:     smokes occasionally socially    Vaping Use     Vaping Use: Never used   Substance and Sexual Activity     Alcohol use: Not Currently     Alcohol/week: 0.0 standard drinks     Comment: occasional      Drug use: Not Currently     Types: Marijuana     Comment: marijuana  none since May 2021     Sexual activity: Yes     Partners: Male     Birth control/protection: Pull-out method, Inserts/Ring     Comment: Nuvaring   Other Topics Concern     Not on file   Social History Narrative    Grad student in geography     Social Determinants of Health     Financial Resource Strain: Not  on file   Food Insecurity: Not on file   Transportation Needs: Not on file   Physical Activity: Not on file   Stress: Not on file   Social Connections: Not on file   Intimate Partner Violence: Not on file   Housing Stability: Not on file          Family History:     Family History   Problem Relation Age of Onset     Diabetes Maternal Grandfather      Hypertension No family hx of      Coronary Artery Disease No family hx of      Hyperlipidemia No family hx of      Cerebrovascular Disease No family hx of      Breast Cancer No family hx of      Colon Cancer No family hx of      Prostate Cancer No family hx of      Other Cancer No family hx of      Glaucoma No family hx of      Macular Degeneration No family hx of             Allergies:     Allergies   Allergen Reactions     Dust Mites Cough, Difficulty breathing and Shortness Of Breath     Cats      Chest tightness, sinus irritation            Medications:     Current Outpatient Medications   Medication Sig Dispense Refill     almotriptan (AXERT) 12.5 MG tablet Take 1 tablet (12.5 mg) by mouth at onset of headache for migraine (repeat in 2 hours if needed) May repeat in 2 hours. Max 2 tablets/24 hours. 18 tablet 11     atomoxetine (STRATTERA) 25 MG capsule Take 1 capsule (25 mg) by mouth daily 90 capsule 0     buPROPion (WELLBUTRIN XL) 300 MG 24 hr tablet TAKE 1 TABLET BY MOUTH EVERY MORNING 90 tablet 1     calcium carbonate (TUMS) 500 MG chewable tablet Take 1 tablet (500 mg) by mouth 3 times daily As needed for acid reflux 90 tablet 11     cholecalciferol 125 MCG (5000 UT) CAPS Take 5,000 Units by mouth every evening       doxepin (SINEQUAN) 10 MG capsule Take 4 capsules (40 mg) by mouth At Bedtime 120 capsule 11     EMGALITY 120 MG/ML injection Inject 1 mL (120 mg) Subcutaneous every 28 days 1 mL 11     EPINEPHrine (ANY BX GENERIC EQUIV) 0.3 MG/0.3ML injection 2-pack Inject 0.3 mg into the muscle as needed       famotidine (PEPCID) 20 MG tablet Take 1 tablet (20 mg)  by mouth 2 times daily 180 tablet 3     fexofenadine (ALLEGRA) 180 MG tablet TAKE 1 TABLET (180 MG) BY MOUTH EVERY MORNING 90 tablet 1     fluticasone (FLONASE) 50 MCG/ACT nasal spray 2 times daily as needed        gabapentin (NEURONTIN) 400 MG capsule Take 2 capsules (800 mg) by mouth 3 times daily 180 capsule 5     guaiFENesin (MUCINEX) 600 MG 12 hr tablet Take 600 mg by mouth 2 times daily       hydroxychloroquine (PLAQUENIL) 200 MG tablet Take 2 tablets (400 mg) by mouth daily Annual Plaquenil toxicity eye screening required. 60 tablet 3     montelukast (SINGULAIR) 10 MG tablet Take 1 tablet (10 mg) by mouth At Bedtime 90 tablet 1     Multiple Vitamins-Minerals (MULTIVITAMIN ADULTS) TABS Take 1 tablet by mouth daily       polyethylene glycol (MIRALAX) 17 GM/Dose powder Take 17 g (1 capful) by mouth daily 238 g 3     propranolol (INDERAL) 20 MG tablet Take 1 tablet (20 mg) by mouth daily as needed (anxiety) 30 tablet 11     Pseudoephedrine HCl (SUDAFED SINUS CONGESTION PO) Take 5 mg by mouth 2 times daily       Semaglutide, 1 MG/DOSE, 4 MG/3ML SOPN Inject 1 mg Subcutaneous once a week To be started after finishing 0.5 mg weekly 9 mL 3     vilazodone (VIIBRYD) 40 MG TABS tablet Take 1 tablet (40 mg) by mouth every morning 90 tablet 1     hydrocortisone (CORTAID) 1 % external ointment Apply sparingly to affected area three times daily for 14 days. (Patient not taking: Reported on 11/15/2022) 30 g 0     levocetirizine (XYZAL) 5 MG tablet Take 5 mg by mouth every evening  (Patient not taking: Reported on 12/22/2022)       nitroFURantoin macrocrystal-monohydrate (MACROBID) 100 MG capsule Take 1 capsule (100 mg) by mouth 2 times daily (Patient not taking: Reported on 12/22/2022) 14 capsule 0            Physical Exam:   Last menstrual period 11/05/2022, not currently breastfeeding.  Wt Readings from Last 6 Encounters:   11/26/22 81.6 kg (180 lb)   11/25/22 81.2 kg (179 lb)   11/09/22 84.1 kg (185 lb 8 oz)   11/08/22  83.5 kg (184 lb)   10/11/22 86.2 kg (190 lb)   10/04/22 88.5 kg (195 lb)     Constitutional: well-developed, appearing stated age; cooperative  Eyes: nl EOM, PERRLA, vision, conjunctiva, sclera  ENT: nl external ears, nose, hearing, lips, teeth, gums, throat  No mucous membrane lesions, normal saliva pool  Neck: no mass or thyroid enlargement  Resp: l breathing unlabored  Lymph: no cervical, supraclavicular, inguinal or epitrochlear nodes  MS: Tenderness at multiple PIPs and MCPs with subtle puffiness at the hands.  No hidebound or bound down skin; no gross synovitis.  No tenderness at MTPs midfoot and ankle range of motion full and ankles nontender.  Skin: no nail pitting, alopecia, rash, nodules or lesions  Neuro: nl cranial nerves, strength, sensation, DTRs.   Psych: nl judgement, orientation, memory, affect.         Data:     @  RHEUM RESULTS Latest Ref Rng & Units 11/19/2016 2/6/2017 10/5/2017   ALBUMIN 3.4 - 5.0 g/dL 3.7 - 3.5   ALT 10 - 35 U/L 18 - 22   AST 10 - 35 U/L 10 - 12   COMPLEMENT C3 81 - 157 mg/dL - - -   COMPLEMENT C4 13 - 39 mg/dL - - -   CK TOTAL 30 - 225 U/L - - -   CREATININE 0.51 - 0.95 mg/dL 0.68 - 0.75   CRP 0.0 - 8.0 mg/L - <2.9 -   GFR ESTIMATE, IF BLACK >60 mL/min/[1.73:m2] >90  African American GFR Calc   - >90   GFR ESTIMATE >60 mL/min/1.73m2 >90  Non African American GFR Calc   - >90   HEMATOCRIT 35.0 - 47.0 % 37.8 - -   HEMOGLOBIN 11.7 - 15.7 g/dL 12.1 - -   HEPBANG NR:Nonreactive - - -   HCVAB NR:Nonreactive - - -   WBC 4.0 - 11.0 10e3/uL 8.9 - -   RBC 3.80 - 5.20 10e6/uL 4.12 - -   RDW 10.0 - 15.0 % 12.1 - -   MCHC 31.5 - 36.5 g/dL 32.0 - -   MCV 78 - 100 fL 92 - -   PLATELET COUNT 150 - 450 10e3/uL 334 - -   RHEUMATOID FACTOR <12 IU/mL - - -   ESR 0 - 20 mm/hr - - -        ,  ,  ,  ,  ,  ,  ,  ,  ,  ,  ,  ,  ,  ,  ,   Hep B Surface Agn   Date Value Ref Range Status   01/31/2019 Nonreactive NR^Nonreactive Final   ,  ,  ,  ,  ,  ,  ,  ,  ,  ,  ,  ,  ,  ,  ,  ,  ,  ,  ,  ,  ,  ,  ,  ,  " ,  ,  ,  ,  ,         Rheumatology Clinic Visit  [facility]  Arnie Bennett M.D.     Kasandra Lau MRN# [id number]   YOB: 1985 Age: 37 year old   Date of Visit: 12/22/2022  Primary care provider: Roxy Rios          Assessment and Plan:     #Positive STEPHANIE, +centromere, progressive metrical small joint predominant polyarthralgia; fatigue, morning stiffness: Physical exam today shows tenderness at multiple PIPs and MCPs without altered range of motion or gross synovitis.    Laboratory evaluation in April 2022 showed basic metabolic panel and CBC normal except for mildly elevated platelets and white count.  Urinalysis showed increased white and red blood cell counts with large leukocyte esterase positivity.  In May 2021, STEPHANIE was positive at a titer of 1: 160.  Hepatitis B and C were negative.  Antigliadin and antitissue transglutaminase antibodies were negative.  Celiac panel was negative.    Discussion: Clinical picture is consistent with inflammatory or autoimmune arthritis.  Systemic lupus erythematosus or other connective tissue disease, rheumatoid arthritis are on the differential diagnosis.  I recommend a further work-up for inflammatory and autoimmune disorders in the blood and urine.  In addition, I recommend a \"diagnostic\" trial of prednisone at low-dose for 2 weeks.  I requested that patient follow-up by telephone or MyChart at the conclusion of the prednisone trial.  We discussed potential long-acting disease modifying agents depending on results of the work-up and of the prednisone trial.    Plan:  1.  Check lupus markers, inflammation markers, blood cells, kidney function, and urinalysis as well as markers of rheumatoid arthritis.  2.  Prednisone 15 mg once daily for a week, then 10 mg daily for a week, then off.  3.  Alert rheumatology by MyChart or telephone after completion of prednisone trial  4.  After completing prednisone, if symptoms recur, use ibuprofen 600 mg " "3 times daily as needed with food for joint pain.  5.  If prednisone is helpful, inflammatory arthritis may be contributing to the symptoms and longer-term treatment may be helpful.  1 medication to consider is hydroxychloroquine.    RTC 3-4 mos    No orders of the defined types were placed in this encounter.        Arnie Bennett MD  Staff Rheumatologist, [facility]           History of Present Illness:   Kaasndra Lau presents for evaluation of positive STEPHANIE. Last seen by Dr. Atwood in 7-2021.     She notes increasing \"carpal tunnel\" symptoms; also increased pain in her hands and other joints. There is \"diffuse achiness\", especially wrists, ankles, fingers. Hard to type, hard to hold things. There has been puffiness in L > R hands. Symptoms are progressing, especially hands.    Mornings are \"rough\" with stiffness, brain fog, fatigue. Early morning stiffness is ~ 1 hour. Toes/forefeet are stiff and can cramp. Moist heat helps. 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    Tylenol and ibuprofen help with morning pain; menthol creams also help.    Muscle pain also is prominent.         Review of Systems:     Constitutional: negative  Skin: \"bumps\" on upper arms, not itchy; Dermatology told her \"genetic\" cause; rec'd moisturizing lotion.  Eyes: negative  Ears/Nose/Throat: s/p tonsillectomy  Respiratory: No shortness of breath, dyspnea on exertion, cough, or hemoptysis; uses mucinex to help breathing.  Cardiovascular: negative  Gastrointestinal: IBS sx  Genitourinary: negative  Musculoskeletal: negative  Neurologic: negative  Psychiatric: negative  Hematologic/Lymphatic/Immunologic: negative  Endocrine: negative         Active Problem List:     Patient Active Problem List    Diagnosis Date Noted     Endometriosis 10/11/2022     Priority: Medium     Keratosis pilaris 06/14/2022     Priority: Medium     Chronic sinusitis, unspecified location 09/21/2021     Priority: Medium     Hypertrophy " of breast 09/10/2020     Priority: Medium     Added automatically from request for surgery [id number]       Pap smear abnormality of cervix/human papillomavirus (HPV) positive 09/03/2020     Priority: Medium     [id number] NILM HPV  Positive for High Risk HPV types other than 16 or 18 (Care Everywhere)  2020 NILM HPV negative 34 y.o.  PLAN, per ASCCP Guidelines: cotest 1/2023       Cholecystitis 07/16/2020     Priority: Medium     Added automatically from request for surgery [id number]       Chronic idiopathic urticaria 06/22/2020     Priority: Medium     Hx of abnormal cervical Pap smear 02/03/2020     Priority: Medium     Acid reflux 08/25/2019     Priority: Medium     Need for desensitization to allergens 06/06/2019     Priority: Medium     Formatting of this note might be different from the original.    Allergy Shot Care Plan for Kasandra Lau  Date of Order: June 6 2019  Ordering Provider: Dr. Martin Cervantes     Serum 1: Cat  Date Shots Started:  Start Dose: 0.1 mL of 1:100,000 - GREEN  Date Maintenance Reached:  Maintenance Dose: 0.3 mL of 1:100 - RED    Serum 2: Mite DF and Mite DP  Date Shots Started:  Start Dose: 0.1 mL of 1:100,000 - GREEN  Date Maintenance Reached:  Maintenance Dose: 0.1 mL of 1:100 - RED    BUILDING SCHEDULE FOR POLLEN AND NONPOLLEN   IMMUNOTHERAPY  EXCEPT VENOM AND CENTER AL    3 - 14 days Build per schedule.   15 - 21 days Repeat previous dose.   22 - 28 days Decrease by one dose.   29 - 35 days Decrease by two doses.   36 - 42 days Decrease by three doses.   Over 42 days Continue to decrease by one dose for each additional week.     1:100,000 (green)  1:10,000 (blue)  1:1000 (yellow)  1:100 (red)     1. 0.05 ml  7. 0.05 ml  13. 0.05 ml  19. 0.05 ml   2. 0.1 ml  8. 0.1 ml  14. 0.1 ml  20. 0.1 ml   3. 0.2 ml  9. 0.2 ml  15. 0.2 ml  21. 0.15 ml   4. 0.3 ml  10. 0.3 ml  16. 0.3 ml  22. 0.2 ml   5. 0.4 ml  11. 0.4 ml  17. 0.4 ml  23. 0.25 ml   6. 0.45 ml  12. 0.45 ml  18. 0.45 ml  24. 0.3 ml          25   0.35 ml         26   0.4 ml         27. 0.45 ml         28. 0.5 ml     MAINTENANCE SCHEDULE FOR POLLENS, NONPOLLENS (MITES,MOLD,CAT,DOG)  (not used for Center-AL Pollens)    ? When reaching maintenance dose for the first time, gradually stretch by weekly intervals to every 4 weeks (e.g., every 2 weeks, then 3 weeks, then 4 weeks).   ? Patient may receive their injections (patient choice) at 2-4 week intervals     Maintenance Repeat 1 Dose 2 Dose 3 Dose    Q2 weeks  (10-14 days)  3 weeks  (15-21 days) 4 weeks  (22-28 days) 5 weeks  (29-35 days) 6 weeks  (36-42 days) Decrease by 1 Dose for each additional week   Q3 weeks  (15-21 days)  4 weeks  (22-28 days) 5 weeks  (29-35 days) 6 weeks  (36-42 days) 7 weeks  (43-49 days)    Q4 weeks  (22-28 days)  5 weeks  (29-35 days) 6 weeks  (36-42 days) 7 weeks  (43-49 days) 8 weeks  (50-56 days)      Susanna Ambrose RN 6/6/2019 4:49 PM       Mild intermittent asthma without complication 04/30/2018     Priority: Medium     Seasonal mood disorder (H) 11/15/2017     Priority: Medium     Circadian rhythm sleep disorder, delayed sleep phase type 05/10/2017     Priority: Medium     Unhealthy sleep habit 05/10/2017     Priority: Medium     Vitamin D deficiency 11/19/2016     Priority: Medium     Menorrhagia with regular cycle 11/19/2016     Priority: Medium     Migraine without aura and without status migrainosus, not intractable 11/19/2016     Priority: Medium     Iron deficiency anemia due to chronic blood loss 11/19/2016     Priority: Medium     Tired 11/19/2016     Priority: Medium     Irritable bowel syndrome with constipation 10/25/2016     Priority: Medium     Recurrent major depressive disorder, in remission (H) 10/25/2016     Priority: Medium     Pelvic pain in female 08/25/2016     Priority: Medium     Migraine headache 01/01/2012     Priority: Medium     Panic disorder 01/01/2007     Priority: Medium     Depression 01/01/2004     Priority: Medium            Past  Medical History:     Past Medical History:   Diagnosis Date     Anemia fall 2016     Anxiety      Arthritis      Chronic fatigue      Depression (emotion)     sees psych, on meds     Gastroesophageal reflux disease      Migraine     daily meds, 2x month, more mild on meds     Neuropathic pain      Panic disorder      Uncomplicated asthma Spring 2018     Past Surgical History:   Procedure Laterality Date     COLONOSCOPY       LAPAROSCOPIC ABLATION ENDOMETRIOSIS N/A 11/09/2016    Procedure: LAPAROSCOPIC ABLATION ENDOMETRIOSIS;  Surgeon: Tonja Ferrer MD;  [facility]     LAPAROSCOPIC CYSTECTOMY OVARIAN (BENIGN) Left 11/09/2016    Procedure: LAPAROSCOPIC CYSTECTOMY OVARIAN (BENIGN);  Surgeon: Tonja Ferrer MD;  [facility]     LAPAROSCOPIC TUBAL DYE STUDY Left 11/09/2016    Procedure: LAPAROSCOPIC TUBAL DYE STUDY;  Surgeon: Tonja Ferrer MD;  [facility]     LAPAROSCOPY OPERATIVE ADULT N/A 11/09/2016    Procedure: LAPAROSCOPY OPERATIVE ADULT;  Surgeon: Tonja Ferrer MD;  [facility]     MAMMOPLASTY REDUCTION Bilateral 08/05/2021    Procedure: MAMMOPLASTY, REDUCTION, Bilateral;  Surgeon: ANTONIO Moreno MD;  Location: [facility] OR     ORTHOPEDIC SURGERY      left wrist surgery     TONSILLECTOMY & ADENOIDECTOMY Bilateral     cauterized turbinates also            Social History:     Social History     Socioeconomic History     Marital status: Single     Spouse name: Not on file     Number of children: Not on file     Years of education: Not on file     Highest education level: Not on file   Occupational History     Not on file   Tobacco Use     Smoking status: Former     Packs/day: 0.00     Years: 10.00     Pack years: 0.00     Types: Cigarettes     Smokeless tobacco: Never     Tobacco comments:     smokes occasionally socially    Vaping Use     Vaping Use: Never used   Substance and Sexual Activity     Alcohol use: Not Currently     Alcohol/week: 0.0 standard  drinks     Comment: occasional      Drug use: Not Currently     Types: Marijuana     Comment: marijuana  none since May 2021     Sexual activity: Yes     Partners: Male     Birth control/protection: Pull-out method, Inserts/Ring     Comment: Nuvaring   Other Topics Concern     Not on file   Social History Narrative    Grad student in [employer]     Social Determinants of Health     Financial Resource Strain: Not on file   Food Insecurity: Not on file   Transportation Needs: Not on file   Physical Activity: Not on file   Stress: Not on file   Social Connections: Not on file   Intimate Partner Violence: Not on file   Housing Stability: Not on file          Family History:     Family History   Problem Relation Age of Onset     Diabetes Maternal Grandfather      Hypertension No family hx of      Coronary Artery Disease No family hx of      Hyperlipidemia No family hx of      Cerebrovascular Disease No family hx of      Breast Cancer No family hx of      Colon Cancer No family hx of      Prostate Cancer No family hx of      Other Cancer No family hx of      Glaucoma No family hx of      Macular Degeneration No family hx of             Allergies:     Allergies   Allergen Reactions     Dust Mites Cough, Difficulty breathing and Shortness Of Breath     Cats      Chest tightness, sinus irritation            Medications:     Current Outpatient Medications   Medication Sig Dispense Refill     almotriptan (AXERT) 12.5 MG tablet Take 1 tablet (12.5 mg) by mouth at onset of headache for migraine (repeat in 2 hours if needed) May repeat in 2 hours. Max 2 tablets/24 hours. 18 tablet 11     atomoxetine (STRATTERA) 25 MG capsule Take 1 capsule (25 mg) by mouth daily 90 capsule 0     buPROPion (WELLBUTRIN XL) 300 MG 24 hr tablet TAKE 1 TABLET BY MOUTH EVERY MORNING 90 tablet 1     calcium carbonate (TUMS) 500 MG chewable tablet Take 1 tablet (500 mg) by mouth 3 times daily As needed for acid reflux 90 tablet 11     cholecalciferol 125  MCG (5000 UT) CAPS Take 5,000 Units by mouth every evening       doxepin (SINEQUAN) 10 MG capsule Take 4 capsules (40 mg) by mouth At Bedtime 120 capsule 11     EMGALITY 120 MG/ML injection Inject 1 mL (120 mg) Subcutaneous every 28 days 1 mL 11     EPINEPHrine (ANY BX GENERIC EQUIV) 0.3 MG/0.3ML injection 2-pack Inject 0.3 mg into the muscle as needed       famotidine (PEPCID) 20 MG tablet Take 1 tablet (20 mg) by mouth 2 times daily 180 tablet 3     fexofenadine (ALLEGRA) 180 MG tablet TAKE 1 TABLET (180 MG) BY MOUTH EVERY MORNING 90 tablet 1     fluticasone (FLONASE) 50 MCG/ACT nasal spray 2 times daily as needed        gabapentin (NEURONTIN) 400 MG capsule Take 2 capsules (800 mg) by mouth 3 times daily 180 capsule 5     guaiFENesin (MUCINEX) 600 MG 12 hr tablet Take 600 mg by mouth 2 times daily       hydroxychloroquine (PLAQUENIL) 200 MG tablet Take 2 tablets (400 mg) by mouth daily Annual Plaquenil toxicity eye screening required. 60 tablet 3     montelukast (SINGULAIR) 10 MG tablet Take 1 tablet (10 mg) by mouth At Bedtime 90 tablet 1     Multiple Vitamins-Minerals (MULTIVITAMIN ADULTS) TABS Take 1 tablet by mouth daily       polyethylene glycol (MIRALAX) 17 GM/Dose powder Take 17 g (1 capful) by mouth daily 238 g 3     propranolol (INDERAL) 20 MG tablet Take 1 tablet (20 mg) by mouth daily as needed (anxiety) 30 tablet 11     Pseudoephedrine HCl (SUDAFED SINUS CONGESTION PO) Take 5 mg by mouth 2 times daily       Semaglutide, 1 MG/DOSE, 4 MG/3ML SOPN Inject 1 mg Subcutaneous once a week To be started after finishing 0.5 mg weekly 9 mL 3     vilazodone (VIIBRYD) 40 MG TABS tablet Take 1 tablet (40 mg) by mouth every morning 90 tablet 1     hydrocortisone (CORTAID) 1 % external ointment Apply sparingly to affected area three times daily for 14 days. (Patient not taking: Reported on 11/15/2022) 30 g 0     levocetirizine (XYZAL) 5 MG tablet Take 5 mg by mouth every evening  (Patient not taking: Reported on  12/22/2022)       nitroFURantoin macrocrystal-monohydrate (MACROBID) 100 MG capsule Take 1 capsule (100 mg) by mouth 2 times daily (Patient not taking: Reported on 12/22/2022) 14 capsule 0            Physical Exam:   Last menstrual period 11/05/2022, not currently breastfeeding.  Wt Readings from Last 6 Encounters:   11/26/22 81.6 kg (180 lb)   11/25/22 81.2 kg (179 lb)   11/09/22 84.1 kg (185 lb 8 oz)   11/08/22 83.5 kg (184 lb)   10/11/22 86.2 kg (190 lb)   10/04/22 88.5 kg (195 lb)     Constitutional: well-developed, appearing stated age; cooperative  Eyes: nl EOM, PERRLA, vision, conjunctiva, sclera  ENT: nl external ears, nose, hearing, lips, teeth, gums, throat  No mucous membrane lesions, normal saliva pool  Neck: no mass or thyroid enlargement  Resp: l breathing unlabored  MS: No visible swelling in hands or wrists; full range of motion and visually inspected joints.  Psych: nl judgement, orientation, memory, affect.         Data:     @  RHEUM RESULTS Latest Ref Rng & Units 11/19/2016 2/6/2017 10/5/2017   ALBUMIN 3.4 - 5.0 g/dL 3.7 - 3.5   ALT 10 - 35 U/L 18 - 22   AST 10 - 35 U/L 10 - 12   COMPLEMENT C3 81 - 157 mg/dL - - -   COMPLEMENT C4 13 - 39 mg/dL - - -   CK TOTAL 30 - 225 U/L - - -   CREATININE 0.51 - 0.95 mg/dL 0.68 - 0.75   CRP 0.0 - 8.0 mg/L - <2.9 -   GFR ESTIMATE, IF BLACK >60 mL/min/[1.73:m2] >90  African American GFR Calc   - >90   GFR ESTIMATE >60 mL/min/1.73m2 >90  Non African American GFR Calc   - >90   HEMATOCRIT 35.0 - 47.0 % 37.8 - -   HEMOGLOBIN 11.7 - 15.7 g/dL 12.1 - -   HEPBANG NR:Nonreactive - - -   HCVAB NR:Nonreactive - - -   WBC 4.0 - 11.0 10e3/uL 8.9 - -   RBC 3.80 - 5.20 10e6/uL 4.12 - -   RDW 10.0 - 15.0 % 12.1 - -   MCHC 31.5 - 36.5 g/dL 32.0 - -   MCV 78 - 100 fL 92 - -   PLATELET COUNT 150 - 450 10e3/uL 334 - -   RHEUMATOID FACTOR <12 IU/mL - - -   ESR 0 - 20 mm/hr - - -        ,  ,  ,  ,  ,  ,  ,  ,  ,  ,  ,  ,  ,  ,  ,   Hep B Surface Agn   Date Value Ref Range Status    01/31/2019 Nonreactive NR^Nonreactive Final   ,  ,  ,  ,  ,  ,  ,  ,  ,  ,  ,  ,  ,  ,  ,  ,  ,  ,  ,  ,  ,  ,  ,  ,  ,  ,  ,  ,  ,

## 2022-12-22 ENCOUNTER — VIRTUAL VISIT (OUTPATIENT)
Dept: RHEUMATOLOGY | Facility: CLINIC | Age: 37
End: 2022-12-22
Payer: COMMERCIAL

## 2022-12-22 DIAGNOSIS — M19.90 INFLAMMATORY ARTHRITIS: ICD-10-CM

## 2022-12-22 PROCEDURE — 99214 OFFICE O/P EST MOD 30 MIN: CPT | Mod: 95 | Performed by: INTERNAL MEDICINE

## 2022-12-22 RX ORDER — HYDROXYCHLOROQUINE SULFATE 200 MG/1
400 TABLET, FILM COATED ORAL DAILY
Qty: 60 TABLET | Refills: 6 | Status: SHIPPED | OUTPATIENT
Start: 2022-12-22 | End: 2023-03-16

## 2022-12-22 ASSESSMENT — PAIN SCALES - GENERAL: PAINLEVEL: NO PAIN (0)

## 2022-12-22 NOTE — PATIENT INSTRUCTIONS
Diagnosis:  1.  Inflammatory arthritis, improved:    Plan:  1.  Continue hydroxychloroquine 400 mg once daily.  While taking hydroxychloroquine, undergo annual ophthalmology evaluation to screen for rare retinal Plaquenil toxicity.  2.  Check creatinine in March or April 2023

## 2022-12-23 ENCOUNTER — TELEPHONE (OUTPATIENT)
Dept: RHEUMATOLOGY | Facility: CLINIC | Age: 37
End: 2022-12-23

## 2022-12-23 NOTE — TELEPHONE ENCOUNTER
JD and Britany message that Bennett wanted a 3-4 month f/u.  I scheduled her on 03/6/2023 at 10am due to marilyn schedule.  If that does not work I let her know to call us and change it

## 2022-12-27 DIAGNOSIS — Z01.812 PRE-PROCEDURE LAB EXAM: Primary | ICD-10-CM

## 2023-01-02 ENCOUNTER — OFFICE VISIT (OUTPATIENT)
Dept: FAMILY MEDICINE | Facility: CLINIC | Age: 38
End: 2023-01-02
Payer: COMMERCIAL

## 2023-01-02 VITALS
SYSTOLIC BLOOD PRESSURE: 95 MMHG | HEIGHT: 68 IN | DIASTOLIC BLOOD PRESSURE: 69 MMHG | OXYGEN SATURATION: 98 % | WEIGHT: 167 LBS | TEMPERATURE: 98.3 F | BODY MASS INDEX: 25.31 KG/M2 | HEART RATE: 103 BPM

## 2023-01-02 DIAGNOSIS — Z20.2 POTENTIAL EXPOSURE TO STD: Primary | ICD-10-CM

## 2023-01-02 LAB
CLUE CELLS: ABNORMAL
NUGENT SCORE: 8
WHITE BLOOD CELLS: ABNORMAL

## 2023-01-02 PROCEDURE — 87491 CHLMYD TRACH DNA AMP PROBE: CPT | Performed by: NURSE PRACTITIONER

## 2023-01-02 PROCEDURE — 87591 N.GONORRHOEAE DNA AMP PROB: CPT | Performed by: NURSE PRACTITIONER

## 2023-01-02 PROCEDURE — 87205 SMEAR GRAM STAIN: CPT | Performed by: NURSE PRACTITIONER

## 2023-01-02 NOTE — PROGRESS NOTES
"Kasandra Lau is a 37 year old female who presents today for STI testing.  Kasandra has \"several\" partners, she uses protection for most but there is one partner who she does not use an protection with.  She states she may have a mild vaginal odor, but no other symptoms at all.  She likes to come in at least every other month or so for testing.      Review Of Systems   ROS: 10 point ROS neg other than the symptoms noted above in the HPI.      Past Medical History:   Diagnosis Date     Anemia fall 2016     Anxiety      Arthritis      Chronic fatigue      Depression (emotion)     sees psych, on meds     Gastroesophageal reflux disease      Migraine     daily meds, 2x month, more mild on meds     Neuropathic pain      Panic disorder      Uncomplicated asthma Spring 2018     Past Surgical History:   Procedure Laterality Date     COLONOSCOPY       LAPAROSCOPIC ABLATION ENDOMETRIOSIS N/A 11/09/2016    Procedure: LAPAROSCOPIC ABLATION ENDOMETRIOSIS;  Surgeon: Tonja Ferrer MD;  [facility]     LAPAROSCOPIC CYSTECTOMY OVARIAN (BENIGN) Left 11/09/2016    Procedure: LAPAROSCOPIC CYSTECTOMY OVARIAN (BENIGN);  Surgeon: Tonja Ferrer MD;  [facility]     LAPAROSCOPIC TUBAL DYE STUDY Left 11/09/2016    Procedure: LAPAROSCOPIC TUBAL DYE STUDY;  Surgeon: Tonja Ferrer MD;  [facility]     LAPAROSCOPY OPERATIVE ADULT N/A 11/09/2016    Procedure: LAPAROSCOPY OPERATIVE ADULT;  Surgeon: Tonja Ferrer MD;  [facility]     MAMMOPLASTY REDUCTION Bilateral 08/05/2021    Procedure: MAMMOPLASTY, REDUCTION, Bilateral;  Surgeon: ANTONIO Moreno MD;  Location: [facility] OR     ORTHOPEDIC SURGERY      left wrist surgery     TONSILLECTOMY & ADENOIDECTOMY Bilateral     cauterized turbinates also     Social History     Socioeconomic History     Marital status: Single     Spouse name: Not on file     Number of children: Not on file     Years of education: Not on file     Highest education level: " "Not on file   Occupational History     Not on file   Tobacco Use     Smoking status: Former     Packs/day: 0.00     Years: 10.00     Pack years: 0.00     Types: Cigarettes     Smokeless tobacco: Never     Tobacco comments:     smokes occasionally socially    Vaping Use     Vaping Use: Never used   Substance and Sexual Activity     Alcohol use: Not Currently     Alcohol/week: 0.0 standard drinks     Comment: occasional      Drug use: Not Currently     Types: Marijuana     Comment: marijuana  none since May 2021     Sexual activity: Yes     Partners: Male     Birth control/protection: Pull-out method, Inserts/Ring     Comment: Nuvaring   Other Topics Concern     Not on file   Social History Narrative    Grad student in geography     Social Determinants of Health     Financial Resource Strain: Not on file   Food Insecurity: Not on file   Transportation Needs: Not on file   Physical Activity: Not on file   Stress: Not on file   Social Connections: Not on file   Intimate Partner Violence: Not on file   Housing Stability: Not on file     Family History   Problem Relation Age of Onset     Diabetes Maternal Grandfather      Hypertension No family hx of      Coronary Artery Disease No family hx of      Hyperlipidemia No family hx of      Cerebrovascular Disease No family hx of      Breast Cancer No family hx of      Colon Cancer No family hx of      Prostate Cancer No family hx of      Other Cancer No family hx of      Glaucoma No family hx of      Macular Degeneration No family hx of        BP 95/69   Pulse 103   Temp 98.3  F (36.8  C) (Oral)   Ht 1.727 m (5' 8\")   Wt 75.8 kg (167 lb)   LMP 12/29/2022   SpO2 98%   BMI 25.39 kg/m      Exam:  Constitutional: healthy, alert and no distress  Psychiatric: mentation appears normal and affect normal/bright      Assessment/Plan:  1. Potential exposure to STD    - NEISSERIA GONORRHOEA PCR; Future  - CHLAMYDIA TRACHOMATIS PCR; Future  - Treponema Abs w Reflex to RPR and Titer; " Future  - HIV Antigen Antibody Combo; Future  - Bacterial Vaginosis Smear    I spoke to Kasandra about PreP:  [place], [place].  She is interested and will look into this and let me know if she is interested in starting it.  I discussed surveillance with her.      Options for treatment and follow-up care were reviewed with the patient. Patient engaged in the decision making process and verbalized understanding of the options discussed and agreed with the final plan.

## 2023-01-02 NOTE — NURSING NOTE
"ROOM:1  [facility]    Preferred Name: Kasandra SMITH AGREED:              LUIS DECLINED:          Flu    37 year old  Chief Complaint   Patient presents with     STD     Testing      Medication Request     Plan B prescription        Height 1.727 m (5' 8\"), weight 75.8 kg (167 lb), last menstrual period 11/05/2022, not currently breastfeeding. Body mass index is 25.39 kg/m .  BP completed using cuff size:        Patient Active Problem List   Diagnosis     Pelvic pain in female     Vitamin D deficiency     Menorrhagia with regular cycle     Migraine without aura and without status migrainosus, not intractable     Iron deficiency anemia due to chronic blood loss     Tired     Circadian rhythm sleep disorder, delayed sleep phase type     Unhealthy sleep habit     Seasonal mood disorder (H)     Chronic idiopathic urticaria     Acid reflux     Cholecystitis     Depression     Hx of abnormal cervical Pap smear     Irritable bowel syndrome with constipation     Migraine headache     Mild intermittent asthma without complication     Need for desensitization to allergens     Panic disorder     Pap smear abnormality of cervix/human papillomavirus (HPV) positive     Recurrent major depressive disorder, in remission (H)     Hypertrophy of breast     Chronic sinusitis, unspecified location     Keratosis pilaris     Endometriosis       Wt Readings from Last 2 Encounters:   01/02/23 75.8 kg (167 lb)   11/26/22 81.6 kg (180 lb)     BP Readings from Last 3 Encounters:   11/26/22 113/74   11/25/22 117/81   11/09/22 113/78       Allergies   Allergen Reactions     Dust Mites Cough, Difficulty breathing and Shortness Of Breath     Cats      Chest tightness, sinus irritation       Current Outpatient Medications   Medication     almotriptan (AXERT) 12.5 MG tablet     atomoxetine (STRATTERA) 25 MG capsule     buPROPion (WELLBUTRIN XL) 300 MG 24 hr tablet     calcium carbonate (TUMS) 500 MG chewable tablet     cholecalciferol 125 MCG (5000 " UT) CAPS     doxepin (SINEQUAN) 10 MG capsule     EMGALITY 120 MG/ML injection     EPINEPHrine (ANY BX GENERIC EQUIV) 0.3 MG/0.3ML injection 2-pack     famotidine (PEPCID) 20 MG tablet     fexofenadine (ALLEGRA) 180 MG tablet     fluticasone (FLONASE) 50 MCG/ACT nasal spray     gabapentin (NEURONTIN) 400 MG capsule     guaiFENesin (MUCINEX) 600 MG 12 hr tablet     hydrocortisone (CORTAID) 1 % external ointment     hydroxychloroquine (PLAQUENIL) 200 MG tablet     levocetirizine (XYZAL) 5 MG tablet     montelukast (SINGULAIR) 10 MG tablet     Multiple Vitamins-Minerals (MULTIVITAMIN ADULTS) TABS     nitroFURantoin macrocrystal-monohydrate (MACROBID) 100 MG capsule     polyethylene glycol (MIRALAX) 17 GM/Dose powder     propranolol (INDERAL) 20 MG tablet     Pseudoephedrine HCl (SUDAFED SINUS CONGESTION PO)     Semaglutide, 1 MG/DOSE, 4 MG/3ML SOPN     vilazodone (VIIBRYD) 40 MG TABS tablet     No current facility-administered medications for this visit.       Social History     Tobacco Use     Smoking status: Former     Packs/day: 0.00     Years: 10.00     Pack years: 0.00     Types: Cigarettes     Smokeless tobacco: Never     Tobacco comments:     smokes occasionally socially    Vaping Use     Vaping Use: Never used   Substance Use Topics     Alcohol use: Not Currently     Alcohol/week: 0.0 standard drinks     Comment: occasional      Drug use: Not Currently     Types: Marijuana     Comment: marijuana  none since May 2021       Honoring Choices - Health Care Directive Guide offered to patient at time of visit.    Health Maintenance Due   Topic Date Due     ASTHMA ACTION PLAN  Never done     ADVANCE CARE PLANNING  Never done     HEPATITIS B IMMUNIZATION (1 of 3 - 3-dose series) Never done     YEARLY PREVENTIVE VISIT  01/31/2020     HPV TEST  02/21/2020     PAP  02/21/2020     Pneumococcal Vaccine: Pediatrics (0 to 5 Years) and At-Risk Patients (6 to 64 Years) (2 - PCV) 05/22/2020     ASTHMA CONTROL TEST  12/14/2022        Immunization History   Administered Date(s) Administered     COVID-19 Vaccine 12+ (Pfizer 2022) 01/10/2022     COVID-19 Vaccine 12+ (Pfizer) 03/19/2021, 04/09/2021     COVID-19 Vaccine Bivalent Booster 12+ (Pfizer) 09/19/2022     DTaP, Unspecified 05/22/2019     Flu, Unspecified 09/23/2016     Influenza Vaccine >6 months (Alfuria,Fluzone) 09/23/2016, 09/07/2018, 08/28/2019, 09/02/2020, 11/02/2021     Pneumococcal 23 valent 05/22/2019     Tdap (Adacel,Boostrix) 05/22/2019       Lab Results   Component Value Date    PAP NIL 01/31/2019       Recent Labs   Lab Test 11/07/22  1241 08/19/22  0835 04/27/22  1717 04/13/22  1600 01/04/22  1732 08/12/21  1535 09/12/20  2058 08/02/19  1711 10/05/17  1133 02/06/17  1200   LDL  --  100  --   --   --   --   --   --   --   --    HDL  --  43*  --   --   --   --   --   --   --   --    TRIG  --  184*  --   --   --   --   --   --   --   --    * 60*  --  63*  --    < > 36 25   < >  --    CR 0.83 0.69 0.66 0.63  --    < > 0.77 0.80   < >  --    GFRESTIMATED >90 >90 >90 >90  --    < > >90 >90   < >  --    GFRESTBLACK  --   --   --   --   --   --  >90 >90   < >  --    ALBUMIN 4.4 3.6  --  3.9  --    < > 3.3* 3.4   < >  --    POTASSIUM 3.8  --  3.7 3.8  --    < > 3.9 3.5   < >  --    TSH  --   --   --   --  1.81  --   --   --   --  1.72    < > = values in this interval not displayed.       PHQ-2 ( 1999 Pfizer) 11/10/2022 10/11/2022   Q1: Little interest or pleasure in doing things 0 0   Q2: Feeling down, depressed or hopeless 0 1   PHQ-2 Score 0 1   PHQ-2 Total Score (12-17 Years)- Positive if 3 or more points; Administer PHQ-A if positive - -   Q1: Little interest or pleasure in doing things - -   Q2: Feeling down, depressed or hopeless - -   PHQ-2 Score - -   Some encounter information is confidential and restricted. Go to Review Flowsheets activity to see all data.       PHQ-9 SCORE 6/14/2022 9/29/2022 9/29/2022 10/11/2022   PHQ-9 Total Score [place] - - 6 (Mild depression)  -   PHQ-9 Total Score 5 6 6 5   Some encounter information is confidential and restricted. Go to Review Flowsheets activity to see all data.       VIC-7 SCORE 6/14/2022 9/30/2022 10/11/2022   Total Score - 8 (mild anxiety) -   Total Score 10 8 9   Some encounter information is confidential and restricted. Go to Review Flowsheets activity to see all data.       ACT Total Scores 6/14/2022   ACT TOTAL SCORE (Goal Greater than or Equal to 20) 24   In the past 12 months, how many times did you visit the emergency room for your asthma without being admitted to the hospital? 2   In the past 12 months, how many times were you hospitalized overnight because of your asthma? 0       Diogo Hardin    January 2, 2023 2:42 PM

## 2023-01-03 LAB
C TRACH DNA SPEC QL NAA+PROBE: NEGATIVE
N GONORRHOEA DNA SPEC QL NAA+PROBE: NEGATIVE

## 2023-01-04 ENCOUNTER — LAB (OUTPATIENT)
Dept: LAB | Facility: CLINIC | Age: 38
End: 2023-01-04
Payer: COMMERCIAL

## 2023-01-04 ENCOUNTER — OFFICE VISIT (OUTPATIENT)
Dept: OBGYN | Facility: CLINIC | Age: 38
End: 2023-01-04
Payer: COMMERCIAL

## 2023-01-04 ENCOUNTER — ANCILLARY PROCEDURE (OUTPATIENT)
Dept: ULTRASOUND IMAGING | Facility: CLINIC | Age: 38
End: 2023-01-04
Payer: COMMERCIAL

## 2023-01-04 DIAGNOSIS — Z30.430 ENCOUNTER FOR IUD INSERTION: Primary | ICD-10-CM

## 2023-01-04 DIAGNOSIS — N76.0 BACTERIAL VAGINOSIS: ICD-10-CM

## 2023-01-04 DIAGNOSIS — Z30.09 GENERAL COUNSELING AND ADVICE ON CONTRACEPTIVE MANAGEMENT: ICD-10-CM

## 2023-01-04 DIAGNOSIS — Z01.812 PRE-PROCEDURE LAB EXAM: ICD-10-CM

## 2023-01-04 DIAGNOSIS — B96.89 BACTERIAL VAGINOSIS: ICD-10-CM

## 2023-01-04 DIAGNOSIS — N80.9 ENDOMETRIOSIS: ICD-10-CM

## 2023-01-04 LAB — HCG UR QL: NEGATIVE

## 2023-01-04 PROCEDURE — 58300 INSERT INTRAUTERINE DEVICE: CPT | Performed by: OBSTETRICS & GYNECOLOGY

## 2023-01-04 PROCEDURE — 81025 URINE PREGNANCY TEST: CPT

## 2023-01-04 PROCEDURE — 76998 US GUIDE INTRAOP: CPT | Performed by: OBSTETRICS & GYNECOLOGY

## 2023-01-04 PROCEDURE — 99213 OFFICE O/P EST LOW 20 MIN: CPT | Mod: 25 | Performed by: OBSTETRICS & GYNECOLOGY

## 2023-01-04 RX ORDER — METRONIDAZOLE 500 MG/1
500 TABLET ORAL 2 TIMES DAILY
Qty: 14 TABLET | Refills: 0 | Status: SHIPPED | OUTPATIENT
Start: 2023-01-04 | End: 2023-01-11

## 2023-01-04 NOTE — PROGRESS NOTES
SUBJECTIVE:                                                   Kasandra Lau is a 37 year old female who presents to clinic today for the following health issue(s):  ultrasound guided iud insertion    Additional information: Pt c/o Ultra Sound Guided IUD insertion     HPI:  Hx endometriosis.  Was on nuvaring 1yr ago but stopped, was making her migraines worse she thinks. Was using ring continuously q 4wk.   Having HA's now 2-3 times per mo, is better than it was.    Saw PCP, dx with BV, wondering about treatment if that was sent over.     Patient's last menstrual period was 12/29/2022..       Problem list and histories reviewed & adjusted, as indicated.  Additional history: as documented.    Patient Active Problem List   Diagnosis     Pelvic pain in female     Vitamin D deficiency     Menorrhagia with regular cycle     Migraine without aura and without status migrainosus, not intractable     Iron deficiency anemia due to chronic blood loss     Tired     Circadian rhythm sleep disorder, delayed sleep phase type     Unhealthy sleep habit     Seasonal mood disorder (H)     Chronic idiopathic urticaria     Acid reflux     Cholecystitis     Depression     Hx of abnormal cervical Pap smear     Irritable bowel syndrome with constipation     Migraine headache     Mild intermittent asthma without complication     Need for desensitization to allergens     Panic disorder     Pap smear abnormality of cervix/human papillomavirus (HPV) positive     Recurrent major depressive disorder, in remission (H)     Hypertrophy of breast     Chronic sinusitis, unspecified location     Keratosis pilaris     Endometriosis     Past Surgical History:   Procedure Laterality Date     COLONOSCOPY       LAPAROSCOPIC ABLATION ENDOMETRIOSIS N/A 11/09/2016    Procedure: LAPAROSCOPIC ABLATION ENDOMETRIOSIS;  Surgeon: Tonja Ferrer MD;  Location: [facility]     LAPAROSCOPIC CYSTECTOMY OVARIAN (BENIGN) Left 11/09/2016    Procedure: LAPAROSCOPIC  CYSTECTOMY OVARIAN (BENIGN);  Surgeon: Tonja Ferrer MD;  Location: [facility]     LAPAROSCOPIC TUBAL DYE STUDY Left 11/09/2016    Procedure: LAPAROSCOPIC TUBAL DYE STUDY;  Surgeon: Tonja Ferrer MD;  [facility]: [facility]     LAPAROSCOPY OPERATIVE ADULT N/A 11/09/2016    Procedure: LAPAROSCOPY OPERATIVE ADULT;  Surgeon: Tonja Ferrer MD;  [facility]: [facility]     MAMMOPLASTY REDUCTION Bilateral 08/05/2021    Procedure: MAMMOPLASTY, REDUCTION, Bilateral;  Surgeon: ANTONIO Moreno MD;  Location: [facility] OR     ORTHOPEDIC SURGERY      left wrist surgery     TONSILLECTOMY & ADENOIDECTOMY Bilateral     cauterized turbinates also      Social History     Tobacco Use     Smoking status: Former     Packs/day: 0.00     Years: 10.00     Pack years: 0.00     Types: Cigarettes     Smokeless tobacco: Never     Tobacco comments:     smokes occasionally socially    Substance Use Topics     Alcohol use: Not Currently     Alcohol/week: 0.0 standard drinks     Comment: occasional       Problem (# of Occurrences) Relation (Name,Age of Onset)    Diabetes (1) Maternal Grandfather (Mothers father)       Negative family history of: Hypertension, Coronary Artery Disease, Hyperlipidemia, Cerebrovascular Disease, Breast Cancer, Colon Cancer, Prostate Cancer, Other Cancer, Glaucoma, Macular Degeneration            Current Outpatient Medications   Medication Sig     levonorgestrel (MIRENA) 20 MCG/DAY IUD 1 each (20 mcg) by Intrauterine route once for 1 dose     metroNIDAZOLE (FLAGYL) 500 MG tablet Take 1 tablet (500 mg) by mouth 2 times daily for 7 days     almotriptan (AXERT) 12.5 MG tablet Take 1 tablet (12.5 mg) by mouth at onset of headache for migraine (repeat in 2 hours if needed) May repeat in 2 hours. Max 2 tablets/24 hours.     atomoxetine (STRATTERA) 25 MG capsule Take 1 capsule (25 mg) by mouth daily     buPROPion (WELLBUTRIN XL) 300 MG 24 hr tablet TAKE 1 TABLET BY MOUTH EVERY MORNING     calcium carbonate (TUMS)  500 MG chewable tablet Take 1 tablet (500 mg) by mouth 3 times daily As needed for acid reflux     cholecalciferol 125 MCG (5000 UT) CAPS Take 5,000 Units by mouth every evening     doxepin (SINEQUAN) 10 MG capsule Take 4 capsules (40 mg) by mouth At Bedtime     EMGALITY 120 MG/ML injection Inject 1 mL (120 mg) Subcutaneous every 28 days     EPINEPHrine (ANY BX GENERIC EQUIV) 0.3 MG/0.3ML injection 2-pack Inject 0.3 mg into the muscle as needed     famotidine (PEPCID) 20 MG tablet Take 1 tablet (20 mg) by mouth 2 times daily     fexofenadine (ALLEGRA) 180 MG tablet TAKE 1 TABLET (180 MG) BY MOUTH EVERY MORNING     fluticasone (FLONASE) 50 MCG/ACT nasal spray 2 times daily as needed      gabapentin (NEURONTIN) 400 MG capsule Take 2 capsules (800 mg) by mouth 3 times daily     guaiFENesin (MUCINEX) 600 MG 12 hr tablet Take 600 mg by mouth 2 times daily     hydrocortisone (CORTAID) 1 % external ointment Apply sparingly to affected area three times daily for 14 days.     hydroxychloroquine (PLAQUENIL) 200 MG tablet Take 2 tablets (400 mg) by mouth daily Annual Plaquenil toxicity eye screening required.     levocetirizine (XYZAL) 5 MG tablet Take 5 mg by mouth every evening     montelukast (SINGULAIR) 10 MG tablet Take 1 tablet (10 mg) by mouth At Bedtime     Multiple Vitamins-Minerals (MULTIVITAMIN ADULTS) TABS Take 1 tablet by mouth daily     nitroFURantoin macrocrystal-monohydrate (MACROBID) 100 MG capsule Take 1 capsule (100 mg) by mouth 2 times daily     polyethylene glycol (MIRALAX) 17 GM/Dose powder Take 17 g (1 capful) by mouth daily     propranolol (INDERAL) 20 MG tablet Take 1 tablet (20 mg) by mouth daily as needed (anxiety)     Pseudoephedrine HCl (SUDAFED SINUS CONGESTION PO) Take 5 mg by mouth 2 times daily     Semaglutide, 1 MG/DOSE, 4 MG/3ML SOPN Inject 1 mg Subcutaneous once a week To be started after finishing 0.5 mg weekly     vilazodone (VIIBRYD) 40 MG TABS tablet Take 1 tablet (40 mg) by mouth every  morning     No current facility-administered medications for this visit.     Allergies   Allergen Reactions     Dust Mites Cough, Difficulty breathing and Shortness Of Breath     Cats      Chest tightness, sinus irritation       ROS:      OBJECTIVE:     LMP 12/29/2022   There is no height or weight on file to calculate BMI.    Exam:  Constitutional:  Appearance: Well nourished, well developed alert, in no acute distress  Neurologic:  Mental Status:  Oriented X3.  Normal strength and tone, sensory exam grossly normal, mentation intact and speech normal.    Psychiatric:  Mentation appears normal and affect normal/bright.  Pelvic Exam:  External Genitalia:     Normal appearance for age, no discharge present, no tenderness present, no inflammatory lesions present, color normal  Vagina:     Normal vaginal vault without central or paravaginal defects, no discharge present, no inflammatory lesions present, no masses present  Bladder:     Nontender to palpation  Urethra:   Urethral Body:  Urethra palpation normal, urethra structural support normal   Urethral Meatus:  No erythema or lesions present  Cervix:     Appearance healthy, no lesions present, nontender to palpation, no bleeding present  Perineum:     Perineum within normal limits, no evidence of trauma, no rashes or skin lesions present  Anus:     Anus within normal limits, no hemorrhoids present  Inguinal Lymph Nodes:     No lymphadenopathy present  Pubic Hair:     Normal pubic hair distribution for age  Genitalia and Groin:     No rashes present, no lesions present, no areas of discoloration, no masses present       In-Clinic Test Results:  Results for orders placed or performed in visit on 01/04/23 (from the past 24 hour(s))   HCG qualitative urine   Result Value Ref Range    hCG Urine Qualitative Negative Negative       ASSESSMENT/PLAN:                                                        ICD-10-CM    1. Encounter for IUD insertion  Z30.430 INSERTION  INTRAUTERINE DEVICE     levonorgestrel (MIRENA) 20 MCG/DAY IUD      2. Bacterial vaginosis  N76.0 metroNIDAZOLE (FLAGYL) 500 MG tablet    B96.89           Patient Instructions   -You can take Tylenol 1000 mg every 6 hours and alternate this with ibuprofen 800 mg, and use a heating pad, to manage any abdominal pain or cramping.    -No sex or tampons for the first week.  Then use condoms for contraception for 1 week after that.    -I recommend exchange of this IUD in 5 years due to the endometriosis.  This is different than the contraceptive guidelines in which IUD can be retained for 8 years.      -Mirena IUD inserted w/o issues.   See note.   F/U 4-6wk for string check.  Rec exchange at 5yr rather than 8 due to hx endometriosis.    (20 minutes was spent on the date of the encounter doing chart review, history,  documentation, patient counseling in addition to procedure of IUD placement.)        Lorena Charles Masters, DO  [facility] [place]

## 2023-01-04 NOTE — PATIENT INSTRUCTIONS
-You can take Tylenol 1000 mg every 6 hours and alternate this with ibuprofen 800 mg, and use a heating pad, to manage any abdominal pain or cramping.    -No sex or tampons for the first week.  Then use condoms for contraception for 1 week after that.    -I recommend exchange of this IUD in 5 years due to the endometriosis.  This is different than the contraceptive guidelines in which IUD can be retained for 8 years.

## 2023-01-04 NOTE — PROGRESS NOTES
INDICATIONS:                                                      Is a pregnancy test required: Yes.  Was it positive or negative?  Negative  Was a consent obtained?  Yes    Kasandra Lau is a 37 year old female,   who presents for insertion of an IUD. The risks, benefits and alternatives of IUD insertion were discussed in detail previously. She also has reviewed the product brochure.  She has elected to go ahead with the insertion today and her questions were answered. Consent was signed. A pregnancy test was performed today:  Yes        PROCEDURE:                                                      The pelvic exam revealed normal external genitalia. With ultrasound guidance, A speculum was inserted into the vagina and the cervix was visualized. The cervix was prepped with Betadine . The posteriorlip of the cervix was grasped with a single toothed tenaculum. The uterus sounded to 8 cm. A Mirena IUD was then inserted without difficulty. The string was cut to 3 cm.  The patient experienced a mild amount of cramping. Instruments removed. Cervix hemostatic    POST PROCEDURE:                                                      She  tolerated the procedure well. There were no complications. Patient was discharged in stable condition.    Return to clinic in 5 weeks for IUD check.  Call if severe cramping, fever, abnormal bleeding, abnormal discharge or pelvic pain develop.  Patient was counseled about the chance of irregular bleeding.    Lorena Charles Masters, DO

## 2023-01-05 ENCOUNTER — VIRTUAL VISIT (OUTPATIENT)
Dept: PSYCHOLOGY | Facility: CLINIC | Age: 38
End: 2023-01-05
Payer: COMMERCIAL

## 2023-01-05 DIAGNOSIS — F43.23 ADJUSTMENT DISORDER WITH MIXED ANXIETY AND DEPRESSED MOOD: ICD-10-CM

## 2023-01-05 DIAGNOSIS — F43.12 CHRONIC POST-TRAUMATIC STRESS DISORDER (PTSD): Primary | ICD-10-CM

## 2023-01-05 DIAGNOSIS — F54 PSYCHOLOGICAL AND BEHAVIORAL FACTORS ASSOCIATED WITH DISORDERS OR DISEASES CLASSIFIED ELSEWHERE: ICD-10-CM

## 2023-01-05 PROCEDURE — 90837 PSYTX W PT 60 MINUTES: CPT | Mod: 95 | Performed by: PSYCHOLOGIST

## 2023-01-09 ENCOUNTER — VIRTUAL VISIT (OUTPATIENT)
Dept: PSYCHOLOGY | Facility: CLINIC | Age: 38
End: 2023-01-09
Payer: COMMERCIAL

## 2023-01-09 DIAGNOSIS — F43.23 ADJUSTMENT DISORDER WITH MIXED ANXIETY AND DEPRESSED MOOD: ICD-10-CM

## 2023-01-09 DIAGNOSIS — F34.1 PERSISTENT DEPRESSIVE DISORDER: ICD-10-CM

## 2023-01-09 DIAGNOSIS — F43.12 CHRONIC POST-TRAUMATIC STRESS DISORDER (PTSD): Primary | ICD-10-CM

## 2023-01-09 PROCEDURE — 90837 PSYTX W PT 60 MINUTES: CPT | Mod: 95 | Performed by: PSYCHOLOGIST

## 2023-01-12 ENCOUNTER — VIRTUAL VISIT (OUTPATIENT)
Dept: PSYCHIATRY | Facility: CLINIC | Age: 38
End: 2023-01-12
Payer: COMMERCIAL

## 2023-01-12 ENCOUNTER — VIRTUAL VISIT (OUTPATIENT)
Dept: ENDOCRINOLOGY | Facility: CLINIC | Age: 38
End: 2023-01-12
Payer: COMMERCIAL

## 2023-01-12 DIAGNOSIS — E66.3 OVERWEIGHT (BMI 25.0-29.9): ICD-10-CM

## 2023-01-12 DIAGNOSIS — F41.1 GENERALIZED ANXIETY DISORDER: ICD-10-CM

## 2023-01-12 DIAGNOSIS — Z71.3 NUTRITIONAL COUNSELING: Primary | ICD-10-CM

## 2023-01-12 DIAGNOSIS — F90.8 ATTENTION DEFICIT HYPERACTIVITY DISORDER (ADHD), OTHER TYPE: ICD-10-CM

## 2023-01-12 DIAGNOSIS — F33.1 MODERATE EPISODE OF RECURRENT MAJOR DEPRESSIVE DISORDER (H): Primary | ICD-10-CM

## 2023-01-12 PROCEDURE — 99214 OFFICE O/P EST MOD 30 MIN: CPT | Mod: 95 | Performed by: NURSE PRACTITIONER

## 2023-01-12 PROCEDURE — 97803 MED NUTRITION INDIV SUBSEQ: CPT | Mod: 95 | Performed by: DIETITIAN, REGISTERED

## 2023-01-12 RX ORDER — VILAZODONE HYDROCHLORIDE 40 MG/1
40 TABLET ORAL EVERY MORNING
Qty: 90 TABLET | Refills: 1 | Status: SHIPPED | OUTPATIENT
Start: 2023-01-12 | End: 2023-06-30

## 2023-01-12 RX ORDER — DOXEPIN HYDROCHLORIDE 10 MG/1
20 CAPSULE ORAL 2 TIMES DAILY
Qty: 120 CAPSULE | Refills: 11 | COMMUNITY
Start: 2023-01-12 | End: 2023-02-03

## 2023-01-12 RX ORDER — ATOMOXETINE 40 MG/1
40 CAPSULE ORAL DAILY
Qty: 30 CAPSULE | Refills: 3 | Status: SHIPPED | OUTPATIENT
Start: 2023-01-12 | End: 2023-05-04

## 2023-01-12 ASSESSMENT — ANXIETY QUESTIONNAIRES
2. NOT BEING ABLE TO STOP OR CONTROL WORRYING: SEVERAL DAYS
GAD7 TOTAL SCORE: 8
5. BEING SO RESTLESS THAT IT IS HARD TO SIT STILL: SEVERAL DAYS
7. FEELING AFRAID AS IF SOMETHING AWFUL MIGHT HAPPEN: SEVERAL DAYS
GAD7 TOTAL SCORE: 8
GAD7 TOTAL SCORE: 8
7. FEELING AFRAID AS IF SOMETHING AWFUL MIGHT HAPPEN: SEVERAL DAYS
4. TROUBLE RELAXING: SEVERAL DAYS
1. FEELING NERVOUS, ANXIOUS, OR ON EDGE: MORE THAN HALF THE DAYS
8. IF YOU CHECKED OFF ANY PROBLEMS, HOW DIFFICULT HAVE THESE MADE IT FOR YOU TO DO YOUR WORK, TAKE CARE OF THINGS AT HOME, OR GET ALONG WITH OTHER PEOPLE?: VERY DIFFICULT
IF YOU CHECKED OFF ANY PROBLEMS ON THIS QUESTIONNAIRE, HOW DIFFICULT HAVE THESE PROBLEMS MADE IT FOR YOU TO DO YOUR WORK, TAKE CARE OF THINGS AT HOME, OR GET ALONG WITH OTHER PEOPLE: VERY DIFFICULT
3. WORRYING TOO MUCH ABOUT DIFFERENT THINGS: SEVERAL DAYS
6. BECOMING EASILY ANNOYED OR IRRITABLE: SEVERAL DAYS

## 2023-01-12 ASSESSMENT — PATIENT HEALTH QUESTIONNAIRE - PHQ9
SUM OF ALL RESPONSES TO PHQ QUESTIONS 1-9: 5
10. IF YOU CHECKED OFF ANY PROBLEMS, HOW DIFFICULT HAVE THESE PROBLEMS MADE IT FOR YOU TO DO YOUR WORK, TAKE CARE OF THINGS AT HOME, OR GET ALONG WITH OTHER PEOPLE: VERY DIFFICULT
SUM OF ALL RESPONSES TO PHQ QUESTIONS 1-9: 5

## 2023-01-12 NOTE — PROGRESS NOTES
"Kasandra Lau is a 37 year old female who is being evaluated via a billable video visit.      The patient has been notified of following:     \"This video visit will be conducted via a call between you and your physician/provider. We have found that certain health care needs can be provided without the need for an in-person physical exam.  This service lets us provide the care you need with a video conversation.  If a prescription is necessary we can send it directly to your pharmacy.  If lab work is needed we can place an order for that and you can then stop by our lab to have the test done at a later time.    Video visits are billed at different rates depending on your insurance coverage.  Please reach out to your insurance provider with any questions.    If during the course of the call the physician/provider feels a video visit is not appropriate, you will not be charged for this service.\"    Patient has given verbal consent for Video visit? Yes  How would you like to obtain your AVS? MyChart  If you are dropped from the video visit, the video invite should be resent to: Send to e-mail at: [email or web address]  Will anyone else be joining your video visit? No  {If patient encounters technical issues they should call [phone]      Video-Visit Details    Type of service:  Video Visit    Video Start Time: 8:30 AM  Video End Time: 8:45 AM    Originating Location (pt. Location): Home    Distant Location (provider location):  Offsite (providers home) [facility] [place]     Platform used for Video Visit: Pony Zero    During this virtual visit the patient is located in [place], patient verifies this as the location during the entirety of this visit.       Weight Management Nutrition Consultation    Kasandra Lau is a 37 year old female presents today for return weight management nutrition consultation.  Patient referred by Dr. Kerri Evans on October 4, 2022.    Patient with " "Co-morbidities of obesity including:  Type II DM no  Renal Failure no  Sleep apnea no  Hypertension no   Dyslipidemia yes  Joint pain no  Back pain yes  GERD yes   Fatty liver yes    PMH also significant for anxiety, depression, ADHD, PTSD, rheumatoid arthritis, and IBS-C.  H/o cholcystectomy     Anthropometrics:  Estimated body mass index is 29.22 kg/m  as calculated from the following:    Height as of an earlier encounter on 10/4/22: 1.74 m (5' 8.5\").    Weight as of an earlier encounter on 10/4/22: 88.5 kg (195 lb).    Current weight: 165 lbs with BMI 25 (-9 lbs this month, -30 lbs from initial)      Pt goal: Aiming for 160s, was at this weight for many years.      Medications for Weight Loss:  Ozempic     NUTRITION HISTORY  Food allergies: None  Food intolerances: Too much dairy. Peppers, onions and garlic.   Supplements: Vitamin D (5000 units/day), daily multivitamin   Previous methods of diet modification for weight loss: Cutting down high-sugar (baked goods, ice cream) and high-fat foods, cutting 500 calories per day. Tried Noom, did not like it.      She reports having some nausea and cramping when starting Ozempic, but has reduced over time. She has noticed increased reflux as well.     Last RD visit 12/6/22 - overall down 9 lbs this past month, has noticed plateau over last week. Continues to track intake, avg intake 1200 calories/day. Started taking Miralax, but not helping as much as she would hope. Is now back to using laxatives.     Today - Got away from nutritional tracking during the holiday season, has restarted recently. Aiming for 1200 rogelio/day and optimizing protein intake. Portions continue to be smaller. Finding some sweet cravings persist but manages with fruit or smaller portions. Less constipation recently, is taking Miralax daily.     Recent Diet Recall:  Breakfast: Oatmeal, yogurt, cereal with fruit or skip   Lunch: bigger meal - noodle bowl and side veggies  Dinner: graze - nuts, fruit, " protein bar   Beverages: tea (20-40 oz/day), 32 oz/day water     Progress Towards Previous Goals:  1) Increase lean protein. Aim for 65-90 gm protein daily. - Improving   - 20-30 gm protein = palm-size pc of lean meat/seafood, 4-5 tuna salad, 3 cheese sticks, 3/4 c serving of Greek yogurt, 3 hard boiled eggs, 1 protein bar/shake, 3 slice deli meat (or 2 slice deli meat + 1 slice cheese)  2) Increase activity as able - At home yoga once weekly - No change this month. She feels she will have more time now to add in exercise.   3) Consume 3L fluid per day - Met a times  4) Gradually increase fiber intake. - Improving, adding more fruits    Physical Activity:  Walking dog 30 mins per day. Working at  [employer] on the weekends. Bought a [employer] to start exercise.     Nutrition Prescription  Recommended energy/nutrient modification.  1730-1276 calories/day    Nutrition Diagnosis  Current: Overweight r/t long history of positive energy balance aeb BMI >25 - Improving    Nutrition Intervention  Materials/education provided on hypocaloric diet for weight loss. Discussed continuing 1200 calorie/day diet, Volumetric eating to help satiety level on fewer calories.  Discussed protein supplements to help meet goal protein intake.   Encouraged implementation of exercise   Co-developed goals to work towards.   Provided pt with list of goals and resources below via Lovelyt.    Patient demonstrates understanding.    Expected Engagement: good  Follow-Up Plans: Nutritional tracking     Nutrition Goals  1) Increase lean protein. Aim for 65-90 gm protein daily.    - 20-30 gm protein = palm-size pc of lean meat/seafood, 4-5 tuna salad, 3 cheese sticks, 3/4 c serving of Greek yogurt, 3 hard boiled eggs, 1 protein bar/shake, 3 slice deli meat (or 2 slice deli meat + 1 slice cheese)   - Vital proteins collagen powder  2) Increase activity as able - At home yoga once weekly   3) Consume 3L fluid per day  4) Gradually increase fiber  intake. Increase veggie intake.     High Fiber Foods:  Bran cereal, 1/3 cup, 8.6 gm fiber   Cooked kidney beans   cup 7.9 gm  Cooked lentils   cup 7.8 gm  Cooked black beans   cup 7.6 gm  Canned chickpeas   cup 5.3 gm  Baked beans   cup 5.2 gm  Pear 1 5.1 gm  Soybeans   cup 5.1 gm  Quinoa   cup 5 gm  Osvaldo seeds, 1 tbsp 5 gm  Baked sweet potato, with skin 1 medium 4.8 gm  Avocado, half small 4.5 gm  Baked potato, with skin 1 medium 4.4 gm  Cooked frozen green peas   cup 4.4 gm  Bulgur   cup 4.1 gm  Cooked frozen mixed vegetables   cup 4 gm  Raspberries   cup 4 gm  Blackberries   cup 3.8 gm  Almonds 1 oz 3.5 gm  Cooked frozen spinach   cup 3.5 gm  Vegetable or soy barbara 1 each 3.4 gm  Apple 1 medium 3.3 gm  Dried dates 5 pieces 3.3 gm    At Home Exercise Resources    Dance Workouts: [employer]   [email or web address]    HIIT Workouts: [employer]  [email or web address]    Pilates: Blogilates  [email or web address]    Yoga: Yoga with Prudence   [email or web address]    [email or web address] Training: [employer]  [email or web address]      Exercises you can do anytime/anywhere: https://[email or web address]/resources/everyone/blog/6593/top-25-at-home-exercises/#:~:text=Top%2025%20At-Home%20Exercises.%201%201.%20Supermans.%20Who,Knee%20Push-up.%205%205.%20Downward-facing%20Dog.%20More%20items      Follow-Up:  3/7/23    Time spent with patient: 15 minutes.  Ana Pillai RD, LD

## 2023-01-12 NOTE — PATIENT INSTRUCTIONS
Pritesh Slaughter,    Follow-up with RD on 3/23    Thank you,    Ana Pillai, RD, LD  If you would like to schedule or reschedule an appointment with the RD, please call [phone]    Nutrition Goals  1) Increase lean protein. Aim for 65-90 gm protein daily.    - 20-30 gm protein = palm-size pc of lean meat/seafood, 4-5 tuna salad, 3 cheese sticks, 3/4 c serving of Greek yogurt, 3 hard boiled eggs, 1 protein bar/shake, 3 slice deli meat (or 2 slice deli meat + 1 slice cheese)   - Vital proteins collagen powder  2) Increase activity as able - At home yoga once weekly   3) Consume 3L fluid per day  4) Gradually increase fiber intake. Increase veggie intake.     High Fiber Foods:  Bran cereal, 1/3 cup, 8.6 gm fiber   Cooked kidney beans   cup 7.9 gm  Cooked lentils   cup 7.8 gm  Cooked black beans   cup 7.6 gm  Canned chickpeas   cup 5.3 gm  Baked beans   cup 5.2 gm  Pear 1 5.1 gm  Soybeans   cup 5.1 gm  Quinoa   cup 5 gm  Osvaldo seeds, 1 tbsp 5 gm  Baked sweet potato, with skin 1 medium 4.8 gm  Avocado, half small 4.5 gm  Baked potato, with skin 1 medium 4.4 gm  Cooked frozen green peas   cup 4.4 gm  Bulgur   cup 4.1 gm  Cooked frozen mixed vegetables   cup 4 gm  Raspberries   cup 4 gm  Blackberries   cup 3.8 gm  Almonds 1 oz 3.5 gm  Cooked frozen spinach   cup 3.5 gm  Vegetable or soy barbara 1 each 3.4 gm  Apple 1 medium 3.3 gm  Dried dates 5 pieces 3.3 gm    At Home Exercise Resources    Dance Workouts: The Isabel Hayes   [email or web address]://[email or web address].[email or web address]/user/Magnolia VILLEGAS Workouts: PopSugar Fitness  https://www.Sensr.netube.com/user/popsugartvfit    Pilates: Blogilates  https://www.Sensr.netube.com/user/blogilates    Yoga: Yoga with Prudence   https://www.Sensr.netube.com/user/yogawithadriene/featured    Strength Training: HASfit  https://www.Tie Society.com/channel/[email or web address]      Exercises you can do anytime/anywhere:  https://www.MySalescamp.org/resources/everyone/blog/6593/top-25-at-home-exercises/#:~:text=Top%2025%20At-Home%20Exercises.%201%201.%20Supermans.%20Who,Knee%20Push-up.%205%205.%20Downward-facing%20Dog.%20More%20items          COMPREHENSIVE WEIGHT MANAGEMENT PROGRAM  VIRTUAL SUPPORT GROUPS    For Support Group Information:      We offer support groups for patients who are working on weight loss and considering, preparing for or have had weight loss surgery.   There is no cost for this opportunity.  You are invited to attend the?Virtual Support Groups?provided by any of the following locations:    [facility] via Microsoft Teams with Frannie Scales RN  2.   [place] via Sensory Analytics with Errol Boss, PhD, LP  3.   [place] via Sensory Analytics with Marilu Wild RN  4.   [facility] via Microsoft Teams with Marilu Leonardo [facility]    The following Support Group information can also be found on our website:  [email or web address]://www.[email or web address]/treatments/weight-loss-surgery-support-groups    [email or web address]://www.[facility].org/treatments/weight-loss-and-weight-loss-surgery-support-groups    [facility] Weight Loss Surgery Support Group    [facility] Weight Loss Surgery Support Group  The support group is a patient-lead forum that meets monthly to share experiences, encouragement and education. It is open to those who have had weight loss surgery, are scheduled for surgery, or are considering surgery.   WHEN: This group meets on the 3rd Wednesday of each month from 5:00PM - 6:00PM virtually using Microsoft Teams.   FACILITATOR: Led by Frannie Bowman, TIMBO, LD, RN, the program's Clinical Coordinator.   TO REGISTER: Please contact the clinic via [email or web address] or call the nurse line directly at [phone] to inform our staff that you would like an invite sent to you and to let us know the email you would like the invite sent to. Prior to the meeting, a link with directions on how to join the meeting  "will be sent to you.    2023 Meetings (speakers to be determined)  January 18: \"Let's Talk\" a time for the group to share.  February 15: \"Let's Talk\" a time for the group to share.  March 15: \"Let's Talk\" a time for the group to share.  April 19: \"Let's Talk\" a time for the group to share.  May 17: \"Let's Talk\" a time for the group to share.  June 21: \"Let's Talk\" a time for the group to share.    [facility] Support Groups    Connections Bariatric Care Support Group?  This is open to all pre- and post- operative bariatric surgery patients as well as their support system.   WHEN: This group meets the 2nd Tuesday of each month from 6:30 PM - 8:00 PM virtually using Microsoft Teams.   FACILITATOR: Led by Errol Boss, Ph.D who is a Licensed Psychologist with the [facility] Comprehensive Weight Management Program.   TO REGISTER: Please send an email to Errol Boss, Ph.D., LP at?[email or web address]?if you would like an invitation to the group and to learn about using Microsoft Teams.    2023 Meetings  January 10: Manish Alegria, PharmD, Pharmacy Resident, \"Medications and Bariatric Surgery\"  February 14:  March 14:  April 11:  May 9:  June 11:    Connections Post-Operative Bariatric Surgery Support Group  This is a support group for [facility] bariatric patients (and those external to [facility]) who have had bariatric surgery and are at least 3 months post-surgery.  WHEN: This support group meets the 4th Wednesday of the month from 11:00 AM - 12:00 PM virtually using Microsoft Teams.   FACILITATOR: Led by Certified Bariatric Nurse, Marilu Wild RN.   TO REGISTER: Please send an email to Marilu at [email or web address] if you would like an invitation to the group and to learn about using Microsoft Teams.    2023 Meetings  January 25  February 22  March 22  April 26  May 24  Josiane 28      [facility] " "Healthy Lifestyle Virtual Support Group    Healthy Lifestyle Virtual Support Group?  This is 60 minutes of small group guided discussion, support and resources. All are welcome who want a healthy lifestyle.  WHEN: This group meets monthly on a Friday from 12:30 PM - 1:30 PM virtually using Microsoft Teams.   FACILITATOR: Led by National Board Certified Health and , Marilu Leonardo [facility].   TO REGISTER: Please send an email to Marilu [email or web address] to receive monthly invites to the group or if you have any questions about having a health .  Prior to the meeting, a link with directions on how to join the meeting will be sent to you.    2023 Meetings  January 20: \"Let's Talk\" a time for the group to share  February 17: Guest Speaker, Ann Magaña RD, Registered Dietician, \"Tips to Maximize Your Metabolism\"  March 17: Let's Talk\" a time for the group to share  April 14: Guest Speaker, Sosa Gillette RD, Registered Dietician, \"Heart Health\"  May 19: \"Let's Talk\" a time for the group to share  June: To be announced.            "

## 2023-01-12 NOTE — PATIENT INSTRUCTIONS
1.  Increase atomoxetine to 40 mg daily  2.  Continue bupropion  mg daily  3.  Doxepin prescribed by another provider for hives  4.  Gabapentin prescribed by another provider for fibromyalgia  5.  Continue the vilazodone 40 mg daily    6.  Continue propranolol 20 mg as needed for breakthrough anxiety  Continue all other medications as reviewed per electronic medical record today.   Safety plan reviewed. To the Emergency Department as needed or call after hours crisis line at [phone] or [phone]. [place] Crisis Text Line. Text [place] to 156158 or Suicide LifeLine Chat: [employer].org/chat/  To schedule individual or family therapy, call [facility] at [phone]  Schedule an appointment with me in 6 weeks or sooner as needed. Call [facility] at [phone] to schedule.  Follow up with primary care provider as planned or for acute medical concerns.  Call the psychiatric nurse line with medication questions or concerns at [phone]  MyChart may be used to communicate with your provider, but this is not intended to be used for emergencies.    Crisis Resources:    National Suicide Prevention Lifeline: 982.748.6122 (TTY: 993.172.4500). Call anytime for help.  (www.suicidepreventionlifeline.org)  National [place] on Mental Illness (www.elver.org): 956.952.4454 or 693-436-0393.   Mental Health Association (www.mentalhealth.org): 455.994.8528 or 417-687-8503.  [place] Crisis Text Line: Text [place] to 737349  Suicide LifeLine Chat: suicidetolingo.org/chat

## 2023-01-12 NOTE — LETTER
"1/12/2023       RE: Kasandra Lau  [address]  [place] [address]     Dear Colleague,    Thank you for referring your patient, Kasandra Lau, to the [facility] WEIGHT MANAGEMENT CLINIC [place] at [facility]. Please see a copy of my visit note below.    Kasandra Lau is a 37 year old female who is being evaluated via a billable video visit.      The patient has been notified of following:     \"This video visit will be conducted via a call between you and your physician/provider. We have found that certain health care needs can be provided without the need for an in-person physical exam.  This service lets us provide the care you need with a video conversation.  If a prescription is necessary we can send it directly to your pharmacy.  If lab work is needed we can place an order for that and you can then stop by our lab to have the test done at a later time.    Video visits are billed at different rates depending on your insurance coverage.  Please reach out to your insurance provider with any questions.    If during the course of the call the physician/provider feels a video visit is not appropriate, you will not be charged for this service.\"    Patient has given verbal consent for Video visit? Yes  How would you like to obtain your AVS? MyChart  If you are dropped from the video visit, the video invite should be resent to: Send to e-mail at: [email or web address]  Will anyone else be joining your video visit? No  {If patient encounters technical issues they should call [phone]      Video-Visit Details    Type of service:  Video Visit    Video Start Time: 8:30 AM  Video End Time: 8:45 AM    Originating Location (pt. Location): Home    Distant Location (provider location):  Offsite (providers home) [facility] WEIGHT MANAGEMENT [facility]     Platform used for Video Visit: MemberPlanet    During this virtual visit the patient is located in [place], " "patient verifies this as the location during the entirety of this visit.       Weight Management Nutrition Consultation    Kasandra Lau is a 37 year old female presents today for return weight management nutrition consultation.  Patient referred by Dr. Kerri Evans on October 4, 2022.    Patient with Co-morbidities of obesity including:  Type II DM no  Renal Failure no  Sleep apnea no  Hypertension no   Dyslipidemia yes  Joint pain no  Back pain yes  GERD yes   Fatty liver yes    PMH also significant for anxiety, depression, ADHD, PTSD, rheumatoid arthritis, and IBS-C.  H/o cholcystectomy     Anthropometrics:  Estimated body mass index is 29.22 kg/m  as calculated from the following:    Height as of an earlier encounter on 10/4/22: 1.74 m (5' 8.5\").    Weight as of an earlier encounter on 10/4/22: 88.5 kg (195 lb).    Current weight: 165 lbs with BMI 25 (-9 lbs this month, -30 lbs from initial)      Pt goal: Aiming for 160s, was at this weight for many years.      Medications for Weight Loss:  Ozempic     NUTRITION HISTORY  Food allergies: None  Food intolerances: Too much dairy. Peppers, onions and garlic.   Supplements: Vitamin D (5000 units/day), daily multivitamin   Previous methods of diet modification for weight loss: Cutting down high-sugar (baked goods, ice cream) and high-fat foods, cutting 500 calories per day. Tried Noom, did not like it.      She reports having some nausea and cramping when starting Ozempic, but has reduced over time. She has noticed increased reflux as well.     Last RD visit 12/6/22 - overall down 9 lbs this past month, has noticed plateau over last week. Continues to track intake, avg intake 1200 calories/day. Started taking Miralax, but not helping as much as she would hope. Is now back to using laxatives.     Today - Got away from nutritional tracking during the holiday season, has restarted recently. Aiming for 1200 rogelio/day and optimizing protein intake. Portions continue " to be smaller. Finding some sweet cravings persist but manages with fruit or smaller portions. Less constipation recently, is taking Miralax daily.     Recent Diet Recall:  Breakfast: Oatmeal, yogurt, cereal with fruit or skip   Lunch: bigger meal - noodle bowl and side veggies  Dinner: graze - nuts, fruit, protein bar   Beverages: tea (20-40 oz/day), 32 oz/day water     Progress Towards Previous Goals:  1) Increase lean protein. Aim for 65-90 gm protein daily. - Improving   - 20-30 gm protein = palm-size pc of lean meat/seafood, 4-5 tuna salad, 3 cheese sticks, 3/4 c serving of Greek yogurt, 3 hard boiled eggs, 1 protein bar/shake, 3 slice deli meat (or 2 slice deli meat + 1 slice cheese)  2) Increase activity as able - At home yoga once weekly - No change this month. She feels she will have more time now to add in exercise.   3) Consume 3L fluid per day - Met a times  4) Gradually increase fiber intake. - Improving, adding more fruits    Physical Activity:  Walking dog 30 mins per day. Working at  [employer] on the weekends. Bought a My[employer] bell to start exercise.     Nutrition Prescription  Recommended energy/nutrient modification.  7122-0136 calories/day    Nutrition Diagnosis  Current: Overweight r/t long history of positive energy balance aeb BMI >25 - Improving    Nutrition Intervention  Materials/education provided on hypocaloric diet for weight loss. Discussed continuing 1200 calorie/day diet, Volumetric eating to help satiety level on fewer calories.  Discussed protein supplements to help meet goal protein intake.   Encouraged implementation of exercise   Co-developed goals to work towards.   Provided pt with list of goals and resources below via [employer].    Patient demonstrates understanding.    Expected Engagement: good  Follow-Up Plans: Nutritional tracking     Nutrition Goals  1) Increase lean protein. Aim for 65-90 gm protein daily.    - 20-30 gm protein = palm-size pc of lean meat/seafood, 4-5 tuna  salad, 3 cheese sticks, 3/4 c serving of Greek yogurt, 3 hard boiled eggs, 1 protein bar/shake, 3 slice deli meat (or 2 slice deli meat + 1 slice cheese)   - Vital proteins collagen powder  2) Increase activity as able - At home yoga once weekly   3) Consume 3L fluid per day  4) Gradually increase fiber intake. Increase veggie intake.     High Fiber Foods:  Bran cereal, 1/3 cup, 8.6 gm fiber   Cooked kidney beans   cup 7.9 gm  Cooked lentils   cup 7.8 gm  Cooked black beans   cup 7.6 gm  Canned chickpeas   cup 5.3 gm  Baked beans   cup 5.2 gm  Pear 1 5.1 gm  Soybeans   cup 5.1 gm  Quinoa   cup 5 gm  Osvaldo seeds, 1 tbsp 5 gm  Baked sweet potato, with skin 1 medium 4.8 gm  Avocado, half small 4.5 gm  Baked potato, with skin 1 medium 4.4 gm  Cooked frozen green peas   cup 4.4 gm  Bulgur   cup 4.1 gm  Cooked frozen mixed vegetables   cup 4 gm  Raspberries   cup 4 gm  Blackberries   cup 3.8 gm  Almonds 1 oz 3.5 gm  Cooked frozen spinach   cup 3.5 gm  Vegetable or soy barbara 1 each 3.4 gm  Apple 1 medium 3.3 gm  Dried dates 5 pieces 3.3 gm    At Home Exercise Resources    Dance Workouts: The Isabel Hayes   https://www.[email or web address].Phyzios/user/Magnolia    HIEMILI Workouts: PopSugar Fitness  https://www.Experiment.com/user/popsugartvfit    Pilates: Blogilates  https://www.Experiment.Phyzios/user/blogilates    Yoga: Yoga with Prudence   https://www.Rachel Joyce Organic Salonube.com/user/yogawithadriene/featured    Strength Training: HASfit  https://www.Rachel Joyce Organic Salonube.com/channel/[email or web address]      Exercises you can do anytime/anywhere: https://www.Quietyme.org/resources/everyone/blog/6593/top-25-at-home-exercises/#:~:text=Top%2025%20At-Home%20Exercises.%201%201.%20Supermans.%20Who,Knee%20Push-up.%205%205.%20Downward-facing%20Dog.%20More%20items      Follow-Up:  3/7/23    Time spent with patient: 15 minutes.  Ana Pillai RD, LD

## 2023-01-12 NOTE — PROGRESS NOTES
"Kasandra is a 37 year old who is being evaluated via a billable video visit.      How would you like to obtain your AVS? MyChart  If the video visit is dropped, the invitation should be resent by: Text to cell phone: [phone]  Will anyone else be joining your video visit? No        Video-Visit Details    Type of service:  Video Visit     Originating Location (pt. Location): Home    Distant Location (provider location):  Off-site  Platform used for Video Visit: AmWell     Time Start:12:51 PM  Stop Time: 1:17 PM             Outpatient Psychiatric Progress Note    Name: Kasandra Lau   : 1985                    Primary Care Provider: JASVIR Ashley CNP   Therapist: None    PHQ-9 scores:  PHQ-9 SCORE 2022 10/11/2022 2023   PHQ-9 Total Score MyChart 6 (Mild depression) - 5 (Mild depression)   PHQ-9 Total Score 6 5 5   Some encounter information is confidential and restricted. Go to Review Flowsheets activity to see all data.       VIC-7 scores:  VIC-7 SCORE 2022 10/11/2022 2023   Total Score 8 (mild anxiety) - 8 (mild anxiety)   Total Score 8 9 8   Some encounter information is confidential and restricted. Go to Review Flowsheets activity to see all data.       Patient Identification:    Patient is a 37 year old year old, single   Not  or  female  who presents for return visit with me.  Patient is currently employed part time and a student. Patient attended the session alone. Patient prefers to be called: \" Kasandra\".    Current medications include: almotriptan (AXERT) 12.5 MG tablet, Take 1 tablet (12.5 mg) by mouth at onset of headache for migraine (repeat in 2 hours if needed) May repeat in 2 hours. Max 2 tablets/24 hours.  atomoxetine (STRATTERA) 25 MG capsule, Take 1 capsule (25 mg) by mouth daily  buPROPion (WELLBUTRIN XL) 300 MG 24 hr tablet, TAKE 1 TABLET BY MOUTH EVERY MORNING  calcium carbonate (TUMS) 500 MG chewable tablet, Take 1 tablet (500 mg) by mouth 3 " times daily As needed for acid reflux  cholecalciferol 125 MCG (5000 UT) CAPS, Take 5,000 Units by mouth every evening  doxepin (SINEQUAN) 10 MG capsule, Take 4 capsules (40 mg) by mouth At Bedtime  EMGALITY 120 MG/ML injection, Inject 1 mL (120 mg) Subcutaneous every 28 days  EPINEPHrine (ANY BX GENERIC EQUIV) 0.3 MG/0.3ML injection 2-pack, Inject 0.3 mg into the muscle as needed  famotidine (PEPCID) 20 MG tablet, Take 1 tablet (20 mg) by mouth 2 times daily  fexofenadine (ALLEGRA) 180 MG tablet, TAKE 1 TABLET (180 MG) BY MOUTH EVERY MORNING  fluticasone (FLONASE) 50 MCG/ACT nasal spray, 2 times daily as needed   gabapentin (NEURONTIN) 400 MG capsule, Take 2 capsules (800 mg) by mouth 3 times daily  guaiFENesin (MUCINEX) 600 MG 12 hr tablet, Take 600 mg by mouth 2 times daily  hydrocortisone (CORTAID) 1 % external ointment, Apply sparingly to affected area three times daily for 14 days.  hydroxychloroquine (PLAQUENIL) 200 MG tablet, Take 2 tablets (400 mg) by mouth daily Annual Plaquenil toxicity eye screening required.  levocetirizine (XYZAL) 5 MG tablet, Take 5 mg by mouth every evening  montelukast (SINGULAIR) 10 MG tablet, Take 1 tablet (10 mg) by mouth At Bedtime  Multiple Vitamins-Minerals (MULTIVITAMIN ADULTS) TABS, Take 1 tablet by mouth daily  nitroFURantoin macrocrystal-monohydrate (MACROBID) 100 MG capsule, Take 1 capsule (100 mg) by mouth 2 times daily  polyethylene glycol (MIRALAX) 17 GM/Dose powder, Take 17 g (1 capful) by mouth daily  propranolol (INDERAL) 20 MG tablet, Take 1 tablet (20 mg) by mouth daily as needed (anxiety)  Pseudoephedrine HCl (SUDAFED SINUS CONGESTION PO), Take 5 mg by mouth 2 times daily  Semaglutide, 1 MG/DOSE, 4 MG/3ML SOPN, Inject 1 mg Subcutaneous once a week To be started after finishing 0.5 mg weekly  vilazodone (VIIBRYD) 40 MG TABS tablet, Take 1 tablet (40 mg) by mouth every morning  levonorgestrel (MIRENA) 20 MCG/DAY IUD, 1 each (20 mcg) by Intrauterine route once for 1  dose  [] metroNIDAZOLE (FLAGYL) 500 MG tablet, Take 1 tablet (500 mg) by mouth 2 times daily for 7 days    No current facility-administered medications on file prior to visit.       The [place] Prescription Monitoring Program has been reviewed and there are no concerns about diversionary activity for controlled substances at this time.      I was able to review most recent Primary Care Provider, specialty provider, and therapy visit notes that I have access to.     Today, patient reports trouble focusing.  The medicaation helped for a while and now it does not work as much.She is having trouble writing.  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   She is in global studies.  Finishing up dissertation.   She has a second part time job in retail.. She is tired after everything.   Denies depression. She has more anxiety - not getting things done , Advisor mentioned that she needs to get things completed in a more timely manner.   Hard to get focused to get started.   She lives alone with two dogs.   No sleep concerns.  She falls asleep easily.  She is taking Ozembic - weight loss with decreased apppetite.   She is at her target weight - has plateaued - weight loss of 30 pounds.  Some friends are there for support.  Autoimmune  - migraines, fibromyalgia, RA, nonalcoholic fatty liver disease       has a past medical history of Anemia (fall ), Anxiety, Arthritis, Chronic fatigue, Depression (emotion), Gastroesophageal reflux disease, Migraine, Neuropathic pain, Panic disorder, and Uncomplicated asthma (Spring 2018).    Social history updates:    She hopes to have finished her doctorate degree in a year    Substance use updates:    Stopped alcohol 2 years ago  Tobacco use: No    Vital Signs:   [facility] 2022     Labs:    Most recent laboratory results reviewed and no new labs.     Mental Status Examination:  Appearance: awake, alert and casual dress  Attitude: cooperative  Eye Contact:   adequate  Gait and Station: No assistive Devices used and No dizziness or falls  Psychomotor Behavior:  intact station, gait and muscle tone  Oriented to:  time, person, and place  Attention Span and Concentration:  Normal  Speech:   clear, coherent and Speaks: English  Mood:  anxious and depressed  Affect:  mood congruent  Associations:  no loose associations  Thought Process:  goal oriented  Thought Content:  no evidence of suicidal ideation or homicidal ideation, no auditory hallucinations present and no visual hallucinations present  Recent and Remote Memory:  intact Not formally assessed. No amnesia.  Fund of Knowledge: appropriate  Insight:  good  Judgment:  intact  Impulse Control:  intact    Suicide Risk Assessment:  Today Kasandra Lau reports no thoughts to harm themself or others. In addition, there are notable risk factors for self-harm, including single status, anxiety and withdrawing. However, risk is mitigated by history of seeking help when needed, future oriented, denies suicidal intent or plan and denies homicidal ideation, intent, or plan. Therefore, based on all available evidence including the factors cited above, Kasandra Lau does not appear to be at imminent risk for self-harm, does not meet criteria for a 72-hr hold, and therefore remains appropriate for ongoing outpatient level of care.  A thorough assessment of risk factors related to suicide and self-harm have been reviewed and are noted above. The patient convincingly denies suicidality on several occasions. Local community safety resources printed and reviewed for patient to use if needed. There was no deceit detected, and the patient presented in a manner that was believable.     DSM5 Diagnosis:  Attention-Deficit/Hyperactivity Disorder  314.01 (F90.8) Other Specified Attention-Deficit / Hyperactiity Disorder  296.32 (F33.1) Major Depressive Disorder, Recurrent Episode, Moderate _ and With anxious distress  300.02 (F41.1) Generalized  Anxiety Disorder    Medical comorbidities include:   Patient Active Problem List    Diagnosis Date Noted     Endometriosis 10/11/2022     Priority: Medium     Keratosis pilaris 06/14/2022     Priority: Medium     Chronic sinusitis, unspecified location 09/21/2021     Priority: Medium     Hypertrophy of breast 09/10/2020     Priority: Medium     Added automatically from request for surgery [id number]       Pap smear abnormality of cervix/human papillomavirus (HPV) positive 09/03/2020     Priority: Medium     [id number] NILM HPV  Positive for High Risk HPV types other than 16 or 18 (Care Everywhere)  2020 NILM HPV negative 34 y.o.  PLAN, per ASCCP Guidelines: cotest 1/2023       Cholecystitis 07/16/2020     Priority: Medium     Added automatically from request for surgery [id number]       Chronic idiopathic urticaria 06/22/2020     Priority: Medium     Hx of abnormal cervical Pap smear 02/03/2020     Priority: Medium     Acid reflux 08/25/2019     Priority: Medium     Need for desensitization to allergens 06/06/2019     Priority: Medium     Formatting of this note might be different from the original.    Allergy Shot Care Plan for Kasandra Lau  Date of Order: June 6 2019  Ordering Provider: Dr. Martin Cervantes     Serum 1: Cat  Date Shots Started:  Start Dose: 0.1 mL of 1:100,000 - GREEN  Date Maintenance Reached:  Maintenance Dose: 0.3 mL of 1:100 - RED    Serum 2: Mite DF and Mite DP  Date Shots Started:  Start Dose: 0.1 mL of 1:100,000 - GREEN  Date Maintenance Reached:  Maintenance Dose: 0.1 mL of 1:100 - RED    BUILDING SCHEDULE FOR POLLEN AND NONPOLLEN   IMMUNOTHERAPY  EXCEPT VENOM AND CENTER AL    3 - 14 days Build per schedule.   15 - 21 days Repeat previous dose.   22 - 28 days Decrease by one dose.   29 - 35 days Decrease by two doses.   36 - 42 days Decrease by three doses.   Over 42 days Continue to decrease by one dose for each additional week.     1:100,000 (green)  1:10,000 (blue)  1:1000 (yellow)  1:100 (red)      1. 0.05 ml  7. 0.05 ml  13. 0.05 ml  19. 0.05 ml   2. 0.1 ml  8. 0.1 ml  14. 0.1 ml  20. 0.1 ml   3. 0.2 ml  9. 0.2 ml  15. 0.2 ml  21. 0.15 ml   4. 0.3 ml  10. 0.3 ml  16. 0.3 ml  22. 0.2 ml   5. 0.4 ml  11. 0.4 ml  17. 0.4 ml  23. 0.25 ml   6. 0.45 ml  12. 0.45 ml  18. 0.45 ml  24. 0.3 ml         25   0.35 ml         26   0.4 ml         27. 0.45 ml         28. 0.5 ml     MAINTENANCE SCHEDULE FOR POLLENS, NONPOLLENS (MITES,MOLD,CAT,DOG)  (not used for Center-AL Pollens)    ? When reaching maintenance dose for the first time, gradually stretch by weekly intervals to every 4 weeks (e.g., every 2 weeks, then 3 weeks, then 4 weeks).   ? Patient may receive their injections (patient choice) at 2-4 week intervals     Maintenance Repeat 1 Dose 2 Dose 3 Dose    Q2 weeks  (10-14 days)  3 weeks  (15-21 days) 4 weeks  (22-28 days) 5 weeks  (29-35 days) 6 weeks  (36-42 days) Decrease by 1 Dose for each additional week   Q3 weeks  (15-21 days)  4 weeks  (22-28 days) 5 weeks  (29-35 days) 6 weeks  (36-42 days) 7 weeks  (43-49 days)    Q4 weeks  (22-28 days)  5 weeks  (29-35 days) 6 weeks  (36-42 days) 7 weeks  (43-49 days) 8 weeks  (50-56 days)      Susanna Ambrose RN 6/6/2019 4:49 PM       Mild intermittent asthma without complication 04/30/2018     Priority: Medium     Seasonal mood disorder (H) 11/15/2017     Priority: Medium     Circadian rhythm sleep disorder, delayed sleep phase type 05/10/2017     Priority: Medium     Unhealthy sleep habit 05/10/2017     Priority: Medium     Vitamin D deficiency 11/19/2016     Priority: Medium     Menorrhagia with regular cycle 11/19/2016     Priority: Medium     Migraine without aura and without status migrainosus, not intractable 11/19/2016     Priority: Medium     Iron deficiency anemia due to chronic blood loss 11/19/2016     Priority: Medium     Tired 11/19/2016     Priority: Medium     Irritable bowel syndrome with constipation 10/25/2016     Priority: Medium     Recurrent  major depressive disorder, in remission (H) 10/25/2016     Priority: Medium     Pelvic pain in female 08/25/2016     Priority: Medium     Migraine headache 01/01/2012     Priority: Medium     Panic disorder 01/01/2007     Priority: Medium     Depression 01/01/2004     Priority: Medium       Assessment:    Kasandra Lau reported in today concerns about difficulties concentrating and focusing on school tasks.  Atomoxetine was increased to 40 mg daily.  She will continue the Vilazodone at 40 mg daily.    Medication side effects and alternatives were reviewed. Health promotion activities recommended and reviewed today. All questions addressed. Education and counseling completed regarding risks and benefits of medications and psychotherapy options.    Treatment Plan:        1.  Increase atomoxetine to 40 mg daily    2.  Continue bupropion  mg daily    3.  Doxepin prescribed by another provider for hives    4.  Gabapentin prescribed by another provider for fibromyalgia    5.  Continue the vilazodone 40 mg daily        6.  Continue propranolol 20 mg as needed for breakthrough anxiety    Continue all other medications as reviewed per electronic medical record today.     Safety plan reviewed. To the Emergency Department as needed or call after hours crisis line at [phone] or [phone]. [place] Crisis Text Line. Text [place] to 527584 or Suicide LifeLine Chat: suicidepreventionlifeline.org/chat/    To schedule individual or family therapy, call [facility] at [phone]    Schedule an appointment with me in 6 weeks or sooner as needed. Call [facility] at [phone] to schedule.    Follow up with primary care provider as planned or for acute medical concerns.    Call the psychiatric nurse line with medication questions or concerns at [phone]    MyChart may be used to communicate with your provider, but this is not intended to be used for emergencies.    Crisis Resources:     National Suicide Prevention Lifeline: 888.703.5198 (TTY: 861.796.9187). Call anytime for help.  (www.suicidepreventionlifeline.org)  National [place] on Mental Illness (www.elver.org): 956.909.9034 or 225-820-3031.   Mental Health Association (www.mentalhealth.org): 765.245.4361 or 671-371-4920.  [place] Crisis Text Line: Text [place] to 629217  Suicide LifeLine Chat: suicidepreEagerPandaline.org/chat    Administrative Billing:   Time spent with patient includes counseling and coordination of care regarding above diagnoses and treatment plan.    Patient Status:  Patient will continue to be seen for ongoing consultation and stabilization.    Signed:   OUSMANE Fitzgerald-BC   Psychiatry

## 2023-01-14 NOTE — CONFIDENTIAL NOTE
"    Health Psychology - Follow up Visit  Confidential Summary*    The author of this note documented a reason for not sharing it with the patient.  REFERRAL SOURCE:  Psychiatry    CHIEF COMPLAINT/REASON FOR VISIT  Psychotherapy in context of chronic PTSD and persistent depression.  Patient also complains of dissatisfaction with interpersonal relationships and challenges with emotion regulation.      Patient was seen today for a 60 minute individual psychotherapy session.  The session was facilitated via VIDEO with patient at her home and provider at her own home.  We used [email or web address] platform.       This telehealth service is appropriate and effective for delivering services in light of the necessity for social distancing to mitigate the COVID-19 epidemic. Patient has agreed to receiving telehealth services.    The patient has been notified of following:   \"This VIDEO visit will be conducted via a call between you and your physician/provider. We have found that certain health care needs can be provided without the need for an in-person physical exam.  VIDEO visits are billed at different rates depending on your insurance coverage.  Please reach out to your insurance provider with any questions. If during the course of the call the physician/provider feels a video visit is not appropriate, you will not be charged for this service.\"     Patient has given verbal consent for VIDEO visit? Yes    Subjective: Patient began with discussion of ongoing challenges in dedicating time toward writing her PhD dissertation proposal.  She was challenged about her time at work.  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  She reported that her ex  previously allowed her to use a credit card to supplement her income but that the credit card is now at the limit and that he is not open to extending the limit.  He is however, paying the balance off.  Discussed whether  him " "might avail her of spousal support however, she was adamant that she would not \"feel right\" about asking him for more support.  She was acknowledged for paying attention to her own personal values.  She reported that she is at an impasse because she needs time to dedicate to her writing but also needs to work.     Attempted to utilize motivational interviewing to identify intrinsic motivation to address lack of progress.  She reported that she was given feedback from her professors but has yet to address in her revisions.      She admitted that she uses avoidance and procrastination as coping tools for anxiety.  Discussed moving forward on treatment of trauma focused on helping her to be able to dedicate time each day to her research. She reported that she believes that she does not have the tools to succeed because of racial discrimination and her lack of familial and other sources of support.  EMDR may assist in identifying and relieving underlying anxiety.      Objective:  Patient was on time for today s session. She was alert and oriented. Mood was euthymic with appropriate range of affect. Patient denied suicidal or assaultive ideation, plan, or intent.        Assessment:  The patient has a longstanding history of interpersonal discord and challenges with emotion regulation.  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  She has completed all requirements for her PhD and is now ABD.  She is living in  housing with her two dogs.  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  She is hoping to present her dissertation proposal in the upcoming weeks.      Plan:  Patient graduated from full model DBT at [facility] and is now completing phase 2 work.      Treatment plan completed:  10/13/22     Time In: 5:00  Time Out: 6:00    Diagnosis:  Axis I PTSD, chronic, persistent depressive disorder, adjustment disorder with " mixed, psychological factors associated with physical (fibromyalgia)   Axis II  BPD   Axis III please see medical records for details   [place] IV Psychosocial and Environmental Stressors: living alone with no family support, pandemic stress, chronic illness         Corina Muhammad, PhD, LP

## 2023-01-14 NOTE — PROGRESS NOTES
"    Health Psychology - Follow up Visit  Confidential Summary*    The author of this note documented a reason for not sharing it with the patient.  REFERRAL SOURCE:  Psychiatry    CHIEF COMPLAINT/REASON FOR VISIT  Psychotherapy in context of chronic PTSD and persistent depression.  Patient also complains of dissatisfaction with interpersonal relationships and challenges with emotion regulation.      Patient was seen today for a 60 minute individual psychotherapy session.  The session was facilitated via VIDEO with patient at her home and provider at her own home.  We used [email or web address] platform.       This telehealth service is appropriate and effective for delivering services in light of the necessity for social distancing to mitigate the COVID-19 epidemic. Patient has agreed to receiving telehealth services.    The patient has been notified of following:   \"This VIDEO visit will be conducted via a call between you and your physician/provider. We have found that certain health care needs can be provided without the need for an in-person physical exam.  VIDEO visits are billed at different rates depending on your insurance coverage.  Please reach out to your insurance provider with any questions. If during the course of the call the physician/provider feels a video visit is not appropriate, you will not be charged for this service.\"     Patient has given verbal consent for VIDEO visit? Yes    Subjective:  Patient began with discussion of ongoing challenges focusing her energy and attention on her dissertation.  Discussed use of EMDR to address procrastination and anxiety surrounding the task of dissertation proposal completion.  Began EMDR assessment by administering the FRANKY to determine dissociative symptoms.  Patient also administered ACES and the Racial Ethnic Stress and Trauma survey was administered.  Patient described multiple instances of ethnic stress and instances of discrimination.  Discussed targeting these " instances and the impact on her sense of self, safety and anxiety in our treatment.      Patient continues to report weight loss associated with medication use.  Unfortunately, she also reported ongoing GI distress but reported that her symptoms are not debilitating and that she plans to continue medication.  To date, she has lost approximately 25 pounds and reported decreased overall appetite.      She reported that she continues to be challenged by difficulty concentrating on work. She reported that she is overwhelmed by the challenges of working two jobs with differing demands.  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  She described feeling limited on all frons.      Objective:  Patient was on time for today s session. She was alert and oriented. Mood was euthymic with appropriate range of affect. Patient denied suicidal or assaultive ideation, plan, or intent.        Assessment:  The patient has a longstanding history of interpersonal discord and challenges with emotion regulation.  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  She has completed all requirements for her PhD and is now ABD.  She is living in  housing with her two dogs.  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  She is hoping to present her dissertation proposal in the upcoming weeks.      Plan:  Patient graduated [facility] model DBT [facility] and is now completing phase 2 work.      Treatment plan completed:  10/13/22     Time In: 1:00  Time Out: 2:00    Diagnosis:  Axis I PTSD, chronic, persistent depressive disorder, adjustment disorder with mixed, psychological factors associated with physical (fibromyalgia)   Axis II  BPD   Axis III please see medical records for details   [place] IV Psychosocial and Environmental Stressors: living alone with no family  support, pandemic stress, chronic illness         Corina Muhammad, PhD, LP

## 2023-01-14 NOTE — CONFIDENTIAL NOTE
"    Health Psychology - Follow up Visit  Confidential Summary*    The author of this note documented a reason for not sharing it with the patient.  REFERRAL SOURCE:  Psychiatry    CHIEF COMPLAINT/REASON FOR VISIT  Psychotherapy in context of chronic PTSD and persistent depression.  Patient also complains of dissatisfaction with interpersonal relationships and challenges with emotion regulation.      Patient was seen today for a 60 minute individual psychotherapy session.  The session was facilitated via VIDEO with patient at her home and provider at her own home.  We used [email or web address] platform.       This telehealth service is appropriate and effective for delivering services in light of the necessity for social distancing to mitigate the COVID-19 epidemic. Patient has agreed to receiving telehealth services.    The patient has been notified of following:   \"This VIDEO visit will be conducted via a call between you and your physician/provider. We have found that certain health care needs can be provided without the need for an in-person physical exam.  VIDEO visits are billed at different rates depending on your insurance coverage.  Please reach out to your insurance provider with any questions. If during the course of the call the physician/provider feels a video visit is not appropriate, you will not be charged for this service.\"     Patient has given verbal consent for VIDEO visit? Yes    Subjective:  Patient began with discussion of ongoing awareness that she is not able to dedicate more time to completing her dissertation proposal.  She defended her time allocation because of her need to work to support herself.  She expressed frustration surrounding her belief that racial discrimination underlie some of the treatment that she has received.      Spent time in session using racial trauma interview.  She reported she believes that she was aware that she was rejected because of racial reasons since moving to [place] " from [place] since 8th grade.  She endorsed multiple instances of rejection.  We also administered  the FRANKY interview.    We will continue to set up protocol to begin BLS treatment per EMDR protocol.      Objective:  Patient was on time for today s session. She was alert and oriented. Mood was euthymic with appropriate range of affect. Patient denied suicidal or assaultive ideation, plan, or intent.        Assessment:  The patient has a longstanding history of interpersonal discord and challenges with emotion regulation.  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  She has completed all requirements for her PhD and is now ABD.  She is living in  housing with her two dogs.  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  She is hoping to present her dissertation proposal in the upcoming weeks.      Plan:  Patient graduated from full model DBT at [facility] and is now completing phase 2 work.      Treatment plan completed:  10/13/22     Time In: 5:00  Time Out: 6:00    Diagnosis:  Axis I PTSD, chronic, persistent depressive disorder, adjustment disorder with mixed, psychological factors associated with physical (fibromyalgia)   Axis II  BPD   Axis III please see medical records for details   [place] IV Psychosocial and Environmental Stressors: living alone with no family support, pandemic stress, chronic illness         Corina Muhammad, PhD, LP

## 2023-01-17 ENCOUNTER — VIRTUAL VISIT (OUTPATIENT)
Dept: ENDOCRINOLOGY | Facility: CLINIC | Age: 38
End: 2023-01-17
Payer: COMMERCIAL

## 2023-01-17 VITALS — WEIGHT: 165 LBS | BODY MASS INDEX: 25.01 KG/M2 | HEIGHT: 68 IN

## 2023-01-17 DIAGNOSIS — E66.3 OVERWEIGHT: Primary | ICD-10-CM

## 2023-01-17 PROCEDURE — 99212 OFFICE O/P EST SF 10 MIN: CPT | Mod: 95 | Performed by: INTERNAL MEDICINE

## 2023-01-17 NOTE — PATIENT INSTRUCTIONS
PLAN:   - continue Ozempic 1.0 mg weekly (she can take 0.5 mg twice a week)  - increase water intake  - continue healthy small meal  - increase exercise level    FOLLOW-UP:    3 months    If you have any questions, please do not hesitate to call Weight management clinic at [phone] or [phone].  If you need to fax, please fax to [phone].    Sincerely,    Kerri Evans MD  Endocrinology

## 2023-01-17 NOTE — PROGRESS NOTES
Virtual Visit Check-In    During this virtual visit the patient is located in [place], patient verifies this as the location during the entirety of this visit.     Kasandra is a 37 year old who is being evaluated via a billable video visit.      How would you like to obtain your AVS? MyChart  If the video visit is dropped, the invitation should be resent by: Text to cell phone: [phone]  Will anyone else be joining your video visit? No        Video-Visit Details    Type of service:  Video Visit     Originating Location (pt. Location): Home    Distant Location (provider location):  Off-site  Platform used for Video Visit: Chad Robles NREMT

## 2023-01-17 NOTE — LETTER
2023       RE: Kasandra Lau  [address]  [facility] [address]     Dear Colleague,    Thank you for referring your patient, Kasandra Lau, to the [facility] [place] at [facility]. Please see a copy of my visit note below.    Virtual Visit Check-In    During this virtual visit the patient is located in [place], patient verifies this as the location during the entirety of this visit.     Kasandra is a 37 year old who is being evaluated via a billable video visit.      How would you like to obtain your AVS? MyChart  If the video visit is dropped, the invitation should be resent by: Text to cell phone: [phone]  Will anyone else be joining your video visit? No        Video-Visit Details    Type of service:  Video Visit     Originating Location (pt. Location): Home    Distant Location (provider location):  Off-site  Platform used for Video Visit: Chad Robles NREMT              Return Medical Weight Management Note     Kasandra Lau  MRN:  [id number]  :  1985  MIKO:  2023    Dear JASVIR Ashley CNP,    I had the pleasure of seeing your patient Kasandra Lau. She is a 37 year old female who I am continuing to see for treatment of obesity related to:  anxiety, depression, ADHD, PTSD, rheumatoid arthritis, hyperlipidemia, GERD, fatty liver, back pain       10/3/2022   I have the following health issues associated with obesity: High Cholesterol, GERD (Reflux), Fatty Liver   I have the following symptoms associated with obesity: Depression, Back Pain, Fatigue, Irregular Menstral Cycle     Reported baseline weight 150-160 lbs. A couple of years ago, she gained 17 lbs in a month despite no change in diet and exercise. Since then she continued to gain weight in spurt and then plateau out and gained again    Was on topiramate for migraine [id number] -- had brain fog and difficulty with word recall    INTERVAL  "HISTORY:  Initial visit 10/2022.     Started on Ozempic. Has had constipation. She is currently on Ozempic 0.5 mg twice a week. She said taking this regimen is better than 1.0 mg weekly in term of side effects.    She does not drink as much water as she should.    She lost 30 lbs in 3 months.      Exercise -- walk a dog twice a week    CURRENT WEIGHT:   165 lbs 0 oz    Initial Weight (lbs): 195 lbs  Last Visits Weight: 88.5 kg (195 lb)  Cumulative weight loss (lbs): 30  Weight Loss Percentage: 15.38%    Changes and Difficulties 1/17/2023   I have made the following changes to my diet since my last visit: Just drink more water and eat more protein.   With regards to my diet, I am still struggling with: I don't think so.   I have made the following changes to my activity/exercise since my last visit: Not really.   With regards to my activity/exercise, I am still struggling with: Some fatigue.       VITALS:  Ht 1.727 m (5' 8\")   Wt 74.8 kg (165 lb)   LMP 12/29/2022   BMI 25.09 kg/m      MEDICATIONS:   Current Outpatient Medications   Medication Sig Dispense Refill     almotriptan (AXERT) 12.5 MG tablet Take 1 tablet (12.5 mg) by mouth at onset of headache for migraine (repeat in 2 hours if needed) May repeat in 2 hours. Max 2 tablets/24 hours. 18 tablet 11     atomoxetine (STRATTERA) 40 MG capsule Take 1 capsule (40 mg) by mouth daily 30 capsule 3     buPROPion (WELLBUTRIN XL) 300 MG 24 hr tablet TAKE 1 TABLET BY MOUTH EVERY MORNING 90 tablet 1     calcium carbonate (TUMS) 500 MG chewable tablet Take 1 tablet (500 mg) by mouth 3 times daily As needed for acid reflux 90 tablet 11     cholecalciferol 125 MCG (5000 UT) CAPS Take 5,000 Units by mouth every evening       doxepin (SINEQUAN) 10 MG capsule Take 2 capsules (20 mg) by mouth 2 times daily 120 capsule 11     EMGALITY 120 MG/ML injection Inject 1 mL (120 mg) Subcutaneous every 28 days 1 mL 11     EPINEPHrine (ANY BX GENERIC EQUIV) 0.3 MG/0.3ML injection 2-pack " Inject 0.3 mg into the muscle as needed       famotidine (PEPCID) 20 MG tablet Take 1 tablet (20 mg) by mouth 2 times daily 180 tablet 3     fexofenadine (ALLEGRA) 180 MG tablet TAKE 1 TABLET (180 MG) BY MOUTH EVERY MORNING 90 tablet 1     fluticasone (FLONASE) 50 MCG/ACT nasal spray 2 times daily as needed        gabapentin (NEURONTIN) 400 MG capsule Take 2 capsules (800 mg) by mouth 3 times daily 180 capsule 5     guaiFENesin (MUCINEX) 600 MG 12 hr tablet Take 600 mg by mouth 2 times daily       hydrocortisone (CORTAID) 1 % external ointment Apply sparingly to affected area three times daily for 14 days. 30 g 0     hydroxychloroquine (PLAQUENIL) 200 MG tablet Take 2 tablets (400 mg) by mouth daily Annual Plaquenil toxicity eye screening required. 60 tablet 6     levocetirizine (XYZAL) 5 MG tablet Take 5 mg by mouth every evening       levonorgestrel (MIRENA) 20 MCG/DAY IUD 1 each (20 mcg) by Intrauterine route once for 1 dose 1 each 0     montelukast (SINGULAIR) 10 MG tablet Take 1 tablet (10 mg) by mouth At Bedtime 90 tablet 1     Multiple Vitamins-Minerals (MULTIVITAMIN ADULTS) TABS Take 1 tablet by mouth daily       nitroFURantoin macrocrystal-monohydrate (MACROBID) 100 MG capsule Take 1 capsule (100 mg) by mouth 2 times daily 14 capsule 0     polyethylene glycol (MIRALAX) 17 GM/Dose powder Take 17 g (1 capful) by mouth daily 238 g 3     propranolol (INDERAL) 20 MG tablet Take 1 tablet (20 mg) by mouth daily as needed (anxiety) 30 tablet 11     Pseudoephedrine HCl (SUDAFED SINUS CONGESTION PO) Take 5 mg by mouth 2 times daily       Semaglutide, 1 MG/DOSE, 4 MG/3ML SOPN Inject 1 mg Subcutaneous once a week To be started after finishing 0.5 mg weekly 9 mL 3     vilazodone (VIIBRYD) 40 MG TABS tablet Take 1 tablet (40 mg) by mouth every morning 90 tablet 1       Weight Loss Medication History Reviewed With Patient 1/17/2023   Which weight loss medications are you currently taking on a regular basis?  Ozempic   Are  you having any side effects from the weight loss medication that we have prescribed you? Yes   If you are having side effects please describe: Some nausea and cramping but better with .5 dosage.  Getting hungry again so might switch.       ASSESSMENT:   Kasandra Lau is a 37 year old female who I am continuing to see for treatment of obesity related to:  anxiety, depression, ADHD, PTSD, rheumatoid arthritis, hyperlipidemia, GERD, fatty liver, back pain    Responded well to Ozempic  Has constipation -- using stool softener  Still busy and is not regular with exercise    PLAN:   - continue Ozempic 1.0 mg weekly (she can take 0.5 mg twice a week)  - increase water intake  - continue healthy small meal  - increase exercise level    FOLLOW-UP:    3 months    Joined the call at 1/17/2023, 1:53:20 pm.  Left the call at 1/17/2023, 2:01:17 pm.  You were on the call for 7 minutes 56 seconds .    External notes/medical records independently reviewed, labs and imaging independently reviewed, medical management and tests to be discussed/communicated to patient.    Time: I spent 15 minutes spent on the date of the encounter preparing to see patient (including chart review and preparation), obtaining and or reviewing additional medical history, performing a physical exam and evaluation, documenting clinical information in the electronic health record, independently interpreting results, communicating results to the patient and coordinating care.    Sincerely,    Kerri Evans MD

## 2023-01-17 NOTE — PROGRESS NOTES
"      Return Medical Weight Management Note     Kasandra Lau  MRN:  [id number]  :  1985  MIKO:  2023    Dear JASVIR Ashley CNP,    I had the pleasure of seeing your patient Kasandra Lau. She is a 37 year old female who I am continuing to see for treatment of obesity related to:  anxiety, depression, ADHD, PTSD, rheumatoid arthritis, hyperlipidemia, GERD, fatty liver, back pain       10/3/2022   I have the following health issues associated with obesity: High Cholesterol, GERD (Reflux), Fatty Liver   I have the following symptoms associated with obesity: Depression, Back Pain, Fatigue, Irregular Menstral Cycle     Reported baseline weight 150-160 lbs. A couple of years ago, she gained 17 lbs in a month despite no change in diet and exercise. Since then she continued to gain weight in spurt and then plateau out and gained again    Was on topiramate for migraine [id number] -- had brain fog and difficulty with word recall    INTERVAL HISTORY:  Initial visit 10/2022.     Started on Ozempic. Has had constipation. She is currently on Ozempic 0.5 mg twice a week. She said taking this regimen is better than 1.0 mg weekly in term of side effects.    She does not drink as much water as she should.    She lost 30 lbs in 3 months.      Exercise -- walk a dog twice a week    CURRENT WEIGHT:   165 lbs 0 oz    Initial Weight (lbs): 195 lbs  Last Visits Weight: 88.5 kg (195 lb)  Cumulative weight loss (lbs): 30  Weight Loss Percentage: 15.38%    Changes and Difficulties 2023   I have made the following changes to my diet since my last visit: Just drink more water and eat more protein.   With regards to my diet, I am still struggling with: I don't think so.   I have made the following changes to my activity/exercise since my last visit: Not really.   With regards to my activity/exercise, I am still struggling with: Some fatigue.       VITALS:  Ht 1.727 m (5' 8\")   Wt 74.8 kg (165 lb)   LMP 2022   " BMI 25.09 kg/m      MEDICATIONS:   Current Outpatient Medications   Medication Sig Dispense Refill     almotriptan (AXERT) 12.5 MG tablet Take 1 tablet (12.5 mg) by mouth at onset of headache for migraine (repeat in 2 hours if needed) May repeat in 2 hours. Max 2 tablets/24 hours. 18 tablet 11     atomoxetine (STRATTERA) 40 MG capsule Take 1 capsule (40 mg) by mouth daily 30 capsule 3     buPROPion (WELLBUTRIN XL) 300 MG 24 hr tablet TAKE 1 TABLET BY MOUTH EVERY MORNING 90 tablet 1     calcium carbonate (TUMS) 500 MG chewable tablet Take 1 tablet (500 mg) by mouth 3 times daily As needed for acid reflux 90 tablet 11     cholecalciferol 125 MCG (5000 UT) CAPS Take 5,000 Units by mouth every evening       doxepin (SINEQUAN) 10 MG capsule Take 2 capsules (20 mg) by mouth 2 times daily 120 capsule 11     EMGALITY 120 MG/ML injection Inject 1 mL (120 mg) Subcutaneous every 28 days 1 mL 11     EPINEPHrine (ANY BX GENERIC EQUIV) 0.3 MG/0.3ML injection 2-pack Inject 0.3 mg into the muscle as needed       famotidine (PEPCID) 20 MG tablet Take 1 tablet (20 mg) by mouth 2 times daily 180 tablet 3     fexofenadine (ALLEGRA) 180 MG tablet TAKE 1 TABLET (180 MG) BY MOUTH EVERY MORNING 90 tablet 1     fluticasone (FLONASE) 50 MCG/ACT nasal spray 2 times daily as needed        gabapentin (NEURONTIN) 400 MG capsule Take 2 capsules (800 mg) by mouth 3 times daily 180 capsule 5     guaiFENesin (MUCINEX) 600 MG 12 hr tablet Take 600 mg by mouth 2 times daily       hydrocortisone (CORTAID) 1 % external ointment Apply sparingly to affected area three times daily for 14 days. 30 g 0     hydroxychloroquine (PLAQUENIL) 200 MG tablet Take 2 tablets (400 mg) by mouth daily Annual Plaquenil toxicity eye screening required. 60 tablet 6     levocetirizine (XYZAL) 5 MG tablet Take 5 mg by mouth every evening       levonorgestrel (MIRENA) 20 MCG/DAY IUD 1 each (20 mcg) by Intrauterine route once for 1 dose 1 each 0     montelukast (SINGULAIR) 10  MG tablet Take 1 tablet (10 mg) by mouth At Bedtime 90 tablet 1     Multiple Vitamins-Minerals (MULTIVITAMIN ADULTS) TABS Take 1 tablet by mouth daily       nitroFURantoin macrocrystal-monohydrate (MACROBID) 100 MG capsule Take 1 capsule (100 mg) by mouth 2 times daily 14 capsule 0     polyethylene glycol (MIRALAX) 17 GM/Dose powder Take 17 g (1 capful) by mouth daily 238 g 3     propranolol (INDERAL) 20 MG tablet Take 1 tablet (20 mg) by mouth daily as needed (anxiety) 30 tablet 11     Pseudoephedrine HCl (SUDAFED SINUS CONGESTION PO) Take 5 mg by mouth 2 times daily       Semaglutide, 1 MG/DOSE, 4 MG/3ML SOPN Inject 1 mg Subcutaneous once a week To be started after finishing 0.5 mg weekly 9 mL 3     vilazodone (VIIBRYD) 40 MG TABS tablet Take 1 tablet (40 mg) by mouth every morning 90 tablet 1       Weight Loss Medication History Reviewed With Patient 1/17/2023   Which weight loss medications are you currently taking on a regular basis?  Ozempic   Are you having any side effects from the weight loss medication that we have prescribed you? Yes   If you are having side effects please describe: Some nausea and cramping but better with .5 dosage.  Getting hungry again so might switch.       ASSESSMENT:   Kasandra Lau is a 37 year old female who I am continuing to see for treatment of obesity related to:  anxiety, depression, ADHD, PTSD, rheumatoid arthritis, hyperlipidemia, GERD, fatty liver, back pain    Responded well to Ozempic  Has constipation -- using stool softener  Still busy and is not regular with exercise    PLAN:   - continue Ozempic 1.0 mg weekly (she can take 0.5 mg twice a week)  - increase water intake  - continue healthy small meal  - increase exercise level    FOLLOW-UP:    3 months    Joined the call at 1/17/2023, 1:53:20 pm.  Left the call at 1/17/2023, 2:01:17 pm.  You were on the call for 7 minutes 56 seconds .    External notes/medical records independently reviewed, labs and imaging  independently reviewed, medical management and tests to be discussed/communicated to patient.    Time: I spent 15 minutes spent on the date of the encounter preparing to see patient (including chart review and preparation), obtaining and or reviewing additional medical history, performing a physical exam and evaluation, documenting clinical information in the electronic health record, independently interpreting results, communicating results to the patient and coordinating care.    Sincerely,    Kerri Evans MD

## 2023-01-17 NOTE — NURSING NOTE
Chief Complaint   Patient presents with     Follow Up     Return weight management.         Vitals:    01/17/23 1335   Weight: 165 lb       Body mass index is 25.09 kg/m .      Moni Robles, EMT  [facility]

## 2023-01-20 ENCOUNTER — VIRTUAL VISIT (OUTPATIENT)
Dept: PSYCHOLOGY | Facility: CLINIC | Age: 38
End: 2023-01-20
Payer: COMMERCIAL

## 2023-01-20 DIAGNOSIS — Z53.9 ERRONEOUS ENCOUNTER--DISREGARD: Primary | ICD-10-CM

## 2023-01-23 ENCOUNTER — VIRTUAL VISIT (OUTPATIENT)
Dept: PSYCHOLOGY | Facility: CLINIC | Age: 38
End: 2023-01-23
Payer: COMMERCIAL

## 2023-01-23 ENCOUNTER — VIRTUAL VISIT (OUTPATIENT)
Dept: PHARMACY | Facility: CLINIC | Age: 38
End: 2023-01-23
Payer: COMMERCIAL

## 2023-01-23 DIAGNOSIS — F41.9 ANXIETY: ICD-10-CM

## 2023-01-23 DIAGNOSIS — E66.09 OBESITY DUE TO EXCESS CALORIES WITHOUT SERIOUS COMORBIDITY, UNSPECIFIED CLASSIFICATION: ICD-10-CM

## 2023-01-23 DIAGNOSIS — F43.12 CHRONIC POST-TRAUMATIC STRESS DISORDER (PTSD): ICD-10-CM

## 2023-01-23 DIAGNOSIS — G43.119 INTRACTABLE MIGRAINE WITH AURA WITHOUT STATUS MIGRAINOSUS: ICD-10-CM

## 2023-01-23 DIAGNOSIS — L50.1 CHRONIC IDIOPATHIC URTICARIA: ICD-10-CM

## 2023-01-23 DIAGNOSIS — Z78.9 TAKES DIETARY SUPPLEMENTS: ICD-10-CM

## 2023-01-23 DIAGNOSIS — F34.1 PERSISTENT DEPRESSIVE DISORDER: Primary | ICD-10-CM

## 2023-01-23 DIAGNOSIS — F90.9 ATTENTION DEFICIT HYPERACTIVITY DISORDER (ADHD), UNSPECIFIED ADHD TYPE: ICD-10-CM

## 2023-01-23 DIAGNOSIS — K21.9 GASTROESOPHAGEAL REFLUX DISEASE WITHOUT ESOPHAGITIS: ICD-10-CM

## 2023-01-23 DIAGNOSIS — F32.0 CURRENT MILD EPISODE OF MAJOR DEPRESSIVE DISORDER WITHOUT PRIOR EPISODE (H): ICD-10-CM

## 2023-01-23 DIAGNOSIS — J30.2 SEASONAL ALLERGIC RHINITIS, UNSPECIFIED TRIGGER: ICD-10-CM

## 2023-01-23 DIAGNOSIS — R52 PAIN: ICD-10-CM

## 2023-01-23 DIAGNOSIS — K58.1 IRRITABLE BOWEL SYNDROME WITH CONSTIPATION: Primary | ICD-10-CM

## 2023-01-23 DIAGNOSIS — M06.9 RHEUMATOID ARTHRITIS, INVOLVING UNSPECIFIED SITE, UNSPECIFIED WHETHER RHEUMATOID FACTOR PRESENT (H): ICD-10-CM

## 2023-01-23 DIAGNOSIS — F54 PSYCHOLOGICAL AND BEHAVIORAL FACTORS ASSOCIATED WITH DISORDERS OR DISEASES CLASSIFIED ELSEWHERE: ICD-10-CM

## 2023-01-23 PROCEDURE — 99207 PR NO CHARGE LOS: CPT | Performed by: PHARMACIST

## 2023-01-23 PROCEDURE — 90837 PSYTX W PT 60 MINUTES: CPT | Mod: VID | Performed by: PSYCHOLOGIST

## 2023-01-23 RX ORDER — PHENYLEPHRINE HCL 10 MG/1
10 TABLET, FILM COATED ORAL 2 TIMES DAILY
COMMUNITY

## 2023-01-23 RX ORDER — BISACODYL 5 MG/1
15 TABLET, DELAYED RELEASE ORAL DAILY PRN
COMMUNITY
End: 2023-07-13

## 2023-01-23 NOTE — Clinical Note
Hello,  Since she has had issues with IBS-C prior to Ozempic treatment I was thinking that using a medication specifically for IBS-C may be advantageous. Was thinking maybe Amitiza 8 mg twice a day. If you think I should reach out to the PCP about this instead let me know!  Thanks,  Scot Church, Pharm. D., BCACP Medication Therapy Management Pharmacist Direct Voicemail: [phone]

## 2023-01-23 NOTE — PATIENT INSTRUCTIONS
"Recommendations from today's MTM visit:                                                    MTM (medication therapy management) is a service provided by a clinical pharmacist designed to help you get the most of out of your medicines.   Today we reviewed what your medicines are for, how to know if they are working, that your medicines are safe and how to make your medicine regimen as easy as possible.        1. I will recommend Amitiza to Dr. Manning.     Follow-up: 3 months with Dr. Manning    It was great speaking with you today.  I value your experience and would be very thankful for your time in providing feedback in our clinic survey. In the next few days, you may receive an email or text message from [facility] [employer] with a link to a survey related to your  clinical pharmacist.\"     To schedule another MTM appointment, please call the clinic directly or you may call the MTM scheduling line at [phone] or toll-free at [phone].     My Clinical Pharmacist's contact information:                                                      Please feel free to contact me with any questions or concerns you have.      Scot Church, Pharm. D., BCACP  Medication Therapy Management Pharmacist  Direct Voicemail: [phone]     "

## 2023-01-23 NOTE — PROGRESS NOTES
Medication Therapy Management (MTM) Encounter    ASSESSMENT:                            Medication Adherence/Access: Could consider setting a calendar invite for middle of the day.     Obesity:   Patient should continue at current dose of Ozempic due to balance of efficacy and side effects. Could consider increasing to 2 mg if side effects subside.    Constipation: Could consider including Senna, but since she has a diagnosis of IBS-C she could trial a therapy specifically used for IBS-C such as Amitiza since Miralax has not been helpful.     Anxiety/fibromyalgia.: Plan in place with psychiatry.     Depression: Stable.     Hives/Allergies: Stable.     Reflux: Stable. No longer a significant issue.    Migraines: Stable.     ADHD: If Stratterra is not helpful then she may need to discuss stimulant medications with her provider.    RA: Stable.     Supplements: Stable.     PLAN:                            1. I will recommend Amitiza to Dr. Manning for your IBS-C    Follow-up: 3 months with Dr. Manning    SUBJECTIVE/OBJECTIVE:                          Kasandra Lau is a 37 year old female contacted via secure video for an initial visit. She was referred to me from Dr. Manning.      Reason for visit: CMR and Ozempic follow-up.    Allergies/ADRs: Reviewed in chart  Past Medical History: Reviewed in chart  Tobacco: She reports that she has quit smoking. Her smoking use included cigarettes. She has never used smokeless tobacco.  Alcohol: none      Medication Adherence/Access: no issues reported - has been forgetting to take middle of the day gabapentin dose.     Obesity:   Ozempic 1 mg weekly . Reports she thinks this has been working. Was having bad side effects a couple months ago including cramping and nausea. Wants to stay on it because side effects have gotten better.     Followed by Dr. Kerri Evans, seen 1/17/23 for Return Medical Weight Management. Patient is experiencing the follow side effects:  "constipation.  Diet/Eating Habits: Patient reports she has been trying to put more protein in her diet. Reports she eats a lot of fruit and carbs. Reports she has cut down on sugar. .    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 Is planning to take up [employer] soon.   Tried/Failed/Contraindicated Medications: none    Current weight today: 162 lb  Initial Consult Weight 195 lb  Cumulative Weight Loss:   Wt Readings from Last 4 Encounters:   01/17/23 165 lb (74.8 kg)   01/02/23 167 lb (75.8 kg)   11/26/22 180 lb (81.6 kg)   11/25/22 179 lb (81.2 kg)     Estimated body mass index is 25.09 kg/m  as calculated from the following:    Height as of 1/17/23: 5' 8\" (1.727 m).    Weight as of 1/17/23: 165 lb (74.8 kg).     BP Readings from Last 3 Encounters:   01/02/23 95/69   11/26/22 113/74   11/25/22 117/81       Constipation: Currently taking bisacodyl 15 mg daily and feels like this is working so far.   Hs tried senna before and that was helpful and is considering retrying. Does have IBS-C and Ozempic made this worse. Had seen a gastroenterologist about 4 years ago and they didn't have anything for her. Had been using Miralax but did not think that this was helpful.     Anxiety/fibromyalgia.: Currently taking gabapentin 800 mg three times a day. Misses middle of the day dose but feels like medication is working medication well. Takes propranolol 20 mg as needed has been taking it more lately due to school starting.     Depression: Currently taking bupropion  mg and Viibryd 40 mg daily. Feels like this has worked well.     PHQ 9/29/2022 10/11/2022 1/12/2023   PHQ-9 Total Score 6 5 5   Q9: Thoughts of better off dead/self-harm past 2 weeks Not at all Not at all Not at all   Some encounter information is confidential and restricted. Go to Review Flowsheets activity to see all data.         Hives/Allergies: Currently taking doxepin 20 mg twice a day, montelukast 10 mg, " hydrocortisone 1% ointment to affected area three times a day,  and fexofenadine 180 mg (also for dust mite allergies).. Was working well but has been flaring up more recently. Also using the Pepcid 20 mg twice a day for hives as well.     Uses Flonase twice a day as needed if netti pot doesn't clear up congestion. Does take phenyl epherine twice daily and Mucinex 600 mg if that is not helpf - hasnt needed much lately.     Reflux: Currently taking Tums 500 mg as needed, and pepcid 20 twice a day. Used more when she was having side effects from the Ozempic.     Migraines: Currently taking almotriptan 12.5 mg about twice a month, and Emgality 120 mg every 28 days. . Does work well when she does need to take it.     ADHD: Currently taking Strattera 40 mg daily. Thought it was helpful and wanted an increase dose but hasnt felt that the increase has been much more helpful.     RA: Currently taking hydroxychloroquine 400 mg daily. Reports this is working.     Supplements: Currently taking vitamin D 5000 units, and multivitamin daily. No concerns or questions at this time.     Vitamin D Deficiency Screening Results:  Lab Results   Component Value Date    VITDT 63 10/12/2018    VITDT 61 10/05/2017    VITDT 22 02/06/2017    VITDT 13 (L) 11/19/2016         Today's Vitals: LMP 12/29/2022   ----------------      I spent 25 minutes with this patient today. I offer these suggestions for consideration by Dr. Manning. A copy of the visit note was provided to the patient's provider(s).    A summary of these recommendations was sent via [employer].    Scot Church, Pharm. D., BCACP  Medication Therapy Management Pharmacist  Direct Voicemail: [phone]      Telemedicine Visit Details  Type of service:  Video Conference via [employer]  Start Time: 3:02 pm  End Time: 3:27 pm     Medication Therapy Recommendations  Irritable bowel syndrome with constipation    Current Medication: polyethylene glycol (MIRALAX) 17 GM/Dose powder   Rationale:  Condition refractory to medication - Ineffective medication - Effectiveness   Recommendation: Change Medication - Amitiza 8 MCG Caps   Status: Contact Provider - Awaiting Response

## 2023-01-26 ENCOUNTER — TELEPHONE (OUTPATIENT)
Dept: ENDOCRINOLOGY | Facility: CLINIC | Age: 38
End: 2023-01-26
Payer: COMMERCIAL

## 2023-01-26 NOTE — TELEPHONE ENCOUNTER
JD and robert sent to schedule 3 month follow up appointment with Dr. Kerri Evans (around 4/17/23)

## 2023-01-30 ENCOUNTER — VIRTUAL VISIT (OUTPATIENT)
Dept: PSYCHOLOGY | Facility: CLINIC | Age: 38
End: 2023-01-30
Payer: COMMERCIAL

## 2023-01-30 DIAGNOSIS — F43.12 CHRONIC POST-TRAUMATIC STRESS DISORDER (PTSD): Primary | ICD-10-CM

## 2023-01-30 DIAGNOSIS — F34.1 PERSISTENT DEPRESSIVE DISORDER: ICD-10-CM

## 2023-01-30 PROCEDURE — 90837 PSYTX W PT 60 MINUTES: CPT | Mod: 95 | Performed by: PSYCHOLOGIST

## 2023-02-01 NOTE — TELEPHONE ENCOUNTER
Patient is calling because the NP who refilled medication said that her primary provider cn refill the medication so she is requesting if provider can refill the medication for patient.

## 2023-02-01 NOTE — TELEPHONE ENCOUNTER
[facility] phone call message - patient requesting a refill:    Full Medication Name:doxepin (SINEQUAN) 10 MG capsule    Dose:    10 mg   Pharmacy confirmed as   [id number] - [place], [place] - [address]  [address] [address]  Phone: [phone] Fax: [phone]  : Yes    Medication tab checked to see if medication has been sent  No    Is patient out of medication: 2 days left    Did patient contact the pharmacy? Yes    Additional Comments:      Primary language: English      needed? No    Call taken on February 1, 2023 at 3:29 PM by Diogo Hardin    Route to P [facility] MED REFILLS TEAM     ++If medication is a controlled substance, please route directly to the provider++

## 2023-02-04 NOTE — CONFIDENTIAL NOTE
"      Health Psychology - Follow up Visit  Confidential Summary*    The author of this note documented a reason for not sharing it with the patient.  REFERRAL SOURCE:  Psychiatry    CHIEF COMPLAINT/REASON FOR VISIT  Psychotherapy in context of chronic PTSD and persistent depression.  Patient also complains of dissatisfaction with interpersonal relationships and challenges with emotion regulation.      Patient was seen today for a 60 minute individual psychotherapy session.  The session was facilitated via VIDEO with patient at her home and provider at her own home.  We used [email or web address] platform.       This telehealth service is appropriate and effective for delivering services in light of the necessity for social distancing to mitigate the COVID-19 epidemic. Patient has agreed to receiving telehealth services.    The patient has been notified of following:   \"This VIDEO visit will be conducted via a call between you and your physician/provider. We have found that certain health care needs can be provided without the need for an in-person physical exam.  VIDEO visits are billed at different rates depending on your insurance coverage.  Please reach out to your insurance provider with any questions. If during the course of the call the physician/provider feels a video visit is not appropriate, you will not be charged for this service.\"     Patient has given verbal consent for VIDEO visit? Yes    Subjective:  Patient began with report that she continues to successfully lose weight using ozempic. She reported that she believes that she is feeling less nauseated and that she has noticed that she has had improved sleep.  She previously slept during the morning as she would work throughout the night.  She is now sleeping through the night.  She reported that she believes that this is an improvement.  She is hoping that she will continue to lose weight until she is at 120, which is her joie school weight.      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  Discussed how she might use her DBT skills (opposite to emotion behavior) to overcome the anxiety that is not justified.  Encouraged her to use her skills and to pull out her binder.  She reported that having some accountability for her work may help.  Discussed how she may join a group or engage with colleagues or others.  She was also encouraged to use me as an accountability partner.  Discussed how we might set this up for accountability.  She was challenged when she defended her behavior as secondary to her need to work two jobs.  She noted that she has been avoiding the anxiety she experiences when she targets her dissertation work for a long time and that now she has to work two jobs which is affording her less time.      Discussed her use of time management and how she might set aside an hour per day this week to engage in her work.      She also reported that she has had a recent date that she believes went well.  She was surprised to learn that he is also a PhD student and that they have friends in common.  She was educated about the premack principle, to reward with something you want to do following completion of an activity you do not want to do.      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  She wonders if her ongoing ADHD is interfering with moving forward on her work.  Discussed the impact of chronic PTSD on her ability to focus.      Objective:  Patient was on time for today s session. She was alert and oriented. Mood was euthymic with appropriate range of affect. Patient denied suicidal or assaultive ideation, plan, or intent.        Assessment:  The patient has a longstanding history of interpersonal discord and challenges with emotion regulation.  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  She has completed all requirements for her PhD and is now ABD.   She is living in  housing with her two dogs.  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  She is hoping to present her dissertation proposal in the upcoming weeks.      Plan:  Patient graduated from full model DBT at [facility] and is now completing phase 2 work.  Beginning EMDR work.      Treatment plan completed:  10/13/22     Time In: 1:00  Time Out: 2:00    Diagnosis:  Axis I PTSD, chronic, persistent depressive disorder, adjustment disorder with mixed, psychological factors associated with physical (fibromyalgia)   Axis II  BPD   Axis III please see medical records for details   Hardesty IV Psychosocial and Environmental Stressors: living alone with no family support, pandemic stress, chronic illness         Corina Muhammad, PhD, LP

## 2023-02-06 ENCOUNTER — OFFICE VISIT (OUTPATIENT)
Dept: FAMILY MEDICINE | Facility: CLINIC | Age: 38
End: 2023-02-06
Payer: COMMERCIAL

## 2023-02-06 ENCOUNTER — VIRTUAL VISIT (OUTPATIENT)
Dept: PSYCHOLOGY | Facility: CLINIC | Age: 38
End: 2023-02-06
Payer: COMMERCIAL

## 2023-02-06 VITALS
WEIGHT: 162 LBS | OXYGEN SATURATION: 100 % | SYSTOLIC BLOOD PRESSURE: 107 MMHG | HEIGHT: 68 IN | BODY MASS INDEX: 24.55 KG/M2 | TEMPERATURE: 98.9 F | HEART RATE: 90 BPM | DIASTOLIC BLOOD PRESSURE: 71 MMHG

## 2023-02-06 DIAGNOSIS — F34.1 PERSISTENT DEPRESSIVE DISORDER: Primary | ICD-10-CM

## 2023-02-06 DIAGNOSIS — F43.12 CHRONIC POST-TRAUMATIC STRESS DISORDER (PTSD): ICD-10-CM

## 2023-02-06 DIAGNOSIS — J06.9 UPPER RESPIRATORY TRACT INFECTION, UNSPECIFIED TYPE: Primary | ICD-10-CM

## 2023-02-06 DIAGNOSIS — F54 PSYCHOLOGICAL AND BEHAVIORAL FACTORS ASSOCIATED WITH DISORDERS OR DISEASES CLASSIFIED ELSEWHERE: ICD-10-CM

## 2023-02-06 LAB — DEPRECATED S PYO AG THROAT QL EIA: POSITIVE

## 2023-02-06 PROCEDURE — 90837 PSYTX W PT 60 MINUTES: CPT | Mod: VID | Performed by: PSYCHOLOGIST

## 2023-02-06 PROCEDURE — U0003 INFECTIOUS AGENT DETECTION BY NUCLEIC ACID (DNA OR RNA); SEVERE ACUTE RESPIRATORY SYNDROME CORONAVIRUS 2 (SARS-COV-2) (CORONAVIRUS DISEASE [COVID-19]), AMPLIFIED PROBE TECHNIQUE, MAKING USE OF HIGH THROUGHPUT TECHNOLOGIES AS DESCRIBED BY CMS-2020-01-R: HCPCS | Performed by: NURSE PRACTITIONER

## 2023-02-06 RX ORDER — AMOXICILLIN 500 MG/1
500 CAPSULE ORAL 2 TIMES DAILY
Qty: 20 CAPSULE | Refills: 0 | Status: SHIPPED | OUTPATIENT
Start: 2023-02-06 | End: 2023-03-22

## 2023-02-06 ASSESSMENT — ENCOUNTER SYMPTOMS
SINUS PAIN: 0
NEUROLOGICAL NEGATIVE: 1
COUGH: 1
CHEST TIGHTNESS: 1
SINUS PRESSURE: 0
EYES NEGATIVE: 1
RHINORRHEA: 1
WHEEZING: 0
SHORTNESS OF BREATH: 0
GASTROINTESTINAL NEGATIVE: 1
CARDIOVASCULAR NEGATIVE: 1
ARTHRALGIAS: 0
STRIDOR: 0
MYALGIAS: 0
SORE THROAT: 1
CHILLS: 1

## 2023-02-06 NOTE — NURSING NOTE
"ROOM:1  JACK MACKEYEL S    Preferred Name: Kasandra     How did you hear about us?  Current Patient    37 year old  Chief Complaint   Patient presents with     URI     Started couple days ago,  Cough   Chest tightness   Sore throat      Fatigue     Body aches        Blood pressure 107/71, pulse 90, temperature 98.9  F (37.2  C), temperature source Oral, height 1.735 m (5' 8.3\"), weight 73.5 kg (162 lb), last menstrual period 01/29/2023, SpO2 100 %, not currently breastfeeding. Body mass index is 24.42 kg/m .  BP completed using cuff size:        Patient Active Problem List   Diagnosis     Pelvic pain in female     Vitamin D deficiency     Menorrhagia with regular cycle     Migraine without aura and without status migrainosus, not intractable     Iron deficiency anemia due to chronic blood loss     Tired     Circadian rhythm sleep disorder, delayed sleep phase type     Unhealthy sleep habit     Seasonal mood disorder (H)     Chronic idiopathic urticaria     Acid reflux     Cholecystitis     Depression     Hx of abnormal cervical Pap smear     Irritable bowel syndrome with constipation     Migraine headache     Mild intermittent asthma without complication     Need for desensitization to allergens     Panic disorder     Pap smear abnormality of cervix/human papillomavirus (HPV) positive     Recurrent major depressive disorder, in remission (H)     Hypertrophy of breast     Chronic sinusitis, unspecified location     Keratosis pilaris     Endometriosis       Wt Readings from Last 2 Encounters:   02/06/23 73.5 kg (162 lb)   01/17/23 74.8 kg (165 lb)     BP Readings from Last 3 Encounters:   02/06/23 107/71   01/02/23 95/69   11/26/22 113/74       Allergies   Allergen Reactions     Dust Mites Cough, Difficulty breathing and Shortness Of Breath     Cats      Chest tightness, sinus irritation       Current Outpatient Medications   Medication     almotriptan (AXERT) 12.5 MG tablet     atomoxetine (STRATTERA) 40 MG capsule     " bisacodyl (DULCOLAX) 5 MG EC tablet     buPROPion (WELLBUTRIN XL) 300 MG 24 hr tablet     calcium carbonate (TUMS) 500 MG chewable tablet     cholecalciferol 125 MCG (5000 UT) CAPS     doxepin (SINEQUAN) 10 MG capsule     EMGALITY 120 MG/ML injection     EPINEPHrine (ANY BX GENERIC EQUIV) 0.3 MG/0.3ML injection 2-pack     famotidine (PEPCID) 20 MG tablet     fexofenadine (ALLEGRA) 180 MG tablet     fluticasone (FLONASE) 50 MCG/ACT nasal spray     gabapentin (NEURONTIN) 400 MG capsule     guaiFENesin (MUCINEX) 600 MG 12 hr tablet     hydrocortisone (CORTAID) 1 % external ointment     hydroxychloroquine (PLAQUENIL) 200 MG tablet     montelukast (SINGULAIR) 10 MG tablet     Multiple Vitamins-Minerals (MULTIVITAMIN ADULTS) TABS     phenylephrine HCl 10 MG TABS     polyethylene glycol (MIRALAX) 17 GM/Dose powder     propranolol (INDERAL) 20 MG tablet     Semaglutide, 1 MG/DOSE, 4 MG/3ML SOPN     vilazodone (VIIBRYD) 40 MG TABS tablet     levonorgestrel (MIRENA) 20 MCG/DAY IUD     No current facility-administered medications for this visit.       Social History     Tobacco Use     Smoking status: Former     Packs/day: 0.00     Years: 10.00     Pack years: 0.00     Types: Cigarettes     Smokeless tobacco: Never     Tobacco comments:     smokes occasionally socially    Vaping Use     Vaping Use: Never used   Substance Use Topics     Alcohol use: Not Currently     Alcohol/week: 0.0 standard drinks     Comment: occasional      Drug use: Not Currently     Types: Marijuana     Comment: marijuana  none since May 2021       Honoring Choices - Health Care Directive Guide offered to patient at time of visit.    Health Maintenance Due   Topic Date Due     ASTHMA ACTION PLAN  Never done     ADVANCE CARE PLANNING  Never done     HEPATITIS B IMMUNIZATION (1 of 3 - 3-dose series) Never done     YEARLY PREVENTIVE VISIT  01/31/2020     HPV TEST  02/21/2020     PAP  02/21/2020     Pneumococcal Vaccine: Pediatrics (0 to 5 Years) and At-Risk  Patients (6 to 64 Years) (2 - PCV) 05/22/2020     ASTHMA CONTROL TEST  12/14/2022       Immunization History   Administered Date(s) Administered     COVID-19 Vaccine 12+ (Pfizer 2022) 01/10/2022     COVID-19 Vaccine 12+ (Pfizer) 03/19/2021, 04/09/2021     COVID-19 Vaccine Bivalent Booster 12+ (Pfizer) 09/19/2022     DTaP, Unspecified 05/22/2019     Flu, Unspecified 09/23/2016     Influenza Vaccine >6 months (Alfuria,Fluzone) 09/23/2016, 09/07/2018, 08/28/2019, 09/02/2020, 11/02/2021, 09/19/2022     Pneumococcal 23 valent 05/22/2019     Tdap (Adacel,Boostrix) 05/22/2019       Lab Results   Component Value Date    PAP NIL 01/31/2019       Recent Labs   Lab Test 11/07/22  1241 08/19/22  0835 04/27/22  1717 04/13/22  1600 01/04/22  1732 08/12/21  1535 09/12/20 2058 08/02/19  1711 10/05/17  1133 02/06/17  1200   LDL  --  100  --   --   --   --   --   --   --   --    HDL  --  43*  --   --   --   --   --   --   --   --    TRIG  --  184*  --   --   --   --   --   --   --   --    * 60*  --  63*  --    < > 36 25   < >  --    CR 0.83 0.69 0.66 0.63  --    < > 0.77 0.80   < >  --    GFRESTIMATED >90 >90 >90 >90  --    < > >90 >90   < >  --    GFRESTBLACK  --   --   --   --   --   --  >90 >90   < >  --    ALBUMIN 4.4 3.6  --  3.9  --    < > 3.3* 3.4   < >  --    POTASSIUM 3.8  --  3.7 3.8  --    < > 3.9 3.5   < >  --    TSH  --   --   --   --  1.81  --   --   --   --  1.72    < > = values in this interval not displayed.       PHQ-2 ( 1999 Pfizer) 11/10/2022 10/11/2022   Q1: Little interest or pleasure in doing things 0 0   Q2: Feeling down, depressed or hopeless 0 1   PHQ-2 Score 0 1   PHQ-2 Total Score (12-17 Years)- Positive if 3 or more points; Administer PHQ-A if positive - -   Q1: Little interest or pleasure in doing things - -   Q2: Feeling down, depressed or hopeless - -   PHQ-2 Score - -   Some encounter information is confidential and restricted. Go to Review Flowsheets activity to see all data.       PHQ-9  SCORE 9/29/2022 9/29/2022 10/11/2022 1/12/2023   PHQ-9 Total Score MyChart - 6 (Mild depression) - 5 (Mild depression)   PHQ-9 Total Score 6 6 5 5   Some encounter information is confidential and restricted. Go to Review Flowsheets activity to see all data.       VIC-7 SCORE 9/30/2022 10/11/2022 1/12/2023   Total Score 8 (mild anxiety) - 8 (mild anxiety)   Total Score 8 9 8   Some encounter information is confidential and restricted. Go to Review Flowsheets activity to see all data.       ACT Total Scores 6/14/2022   ACT TOTAL SCORE (Goal Greater than or Equal to 20) 24   In the past 12 months, how many times did you visit the emergency room for your asthma without being admitted to the hospital? 2   In the past 12 months, how many times were you hospitalized overnight because of your asthma? 0       Diogo Hardin    February 6, 2023 2:45 PM

## 2023-02-06 NOTE — PROGRESS NOTES
Today's Date: Feb 6, 2023     Patient Kasandra Lau 1985 presents to the clinic today to address   Chief Complaint   Patient presents with     URI     Started couple days ago,  Cough   Chest tightness   Sore throat      Fatigue     Body aches              SUBJECTIVE     History of Present Illness: Kasandra is a 34 year old female who presents to discuss an URI. PMH significant for Headaches/Migraines, ADHD, MDD, Inflammatory polyarthritis (on hydroxychloroquine.) Sore throat started 3 days ago and progressed into a cough with chest tightness, she denies chest pain or difficulty breathing. No known fever but felt chilled at times. She feels phelgm in her throat but cough is not productive. She uses a neti pot on a regular basis and that is not helping the congestion.  She reports taking tylenol 500mg 2-3x/day. Throat pain aggravated by dry air and is affecting her sleep, 4/10 pain. Denies any known exposure or concerns related to STIs. She works part time at  [employer] so there is a risk of community based exposures. No other acute concerns/symptoms at time of exam.           Review of Systems   Constitutional: Positive for chills.   HENT: Positive for congestion, postnasal drip, rhinorrhea and sore throat. Negative for ear pain, sinus pressure and sinus pain.    Eyes: Negative.    Respiratory: Positive for cough and chest tightness. Negative for shortness of breath, wheezing and stridor.    Cardiovascular: Negative.  Negative for chest pain.   Gastrointestinal: Negative.    Musculoskeletal: Negative for arthralgias and myalgias.   Skin: Negative.    Neurological: Negative.    Constitutional, HEENT, cardiovascular, pulmonary, gi and gu systems are negative, except as otherwise noted.      Allergies   Allergen Reactions     Dust Mites Cough, Difficulty breathing and Shortness Of Breath     Cats      Chest tightness, sinus irritation        Current Outpatient Medications   Medication Instructions     almotriptan  (AXERT) 12.5 mg, Oral, AT ONSET OF HEADACHE, May repeat in 2 hours. Max 2 tablets/24 hours.     atomoxetine (STRATTERA) 40 mg, Oral, DAILY     bisacodyl (DULCOLAX) 15 mg, Oral, DAILY PRN     buPROPion (WELLBUTRIN XL) 300 MG 24 hr tablet TAKE 1 TABLET BY MOUTH EVERY MORNING     calcium carbonate (TUMS) 500 mg, Oral, 3 TIMES DAILY, As needed for acid reflux     cholecalciferol 5,000 Units, Oral, EVERY EVENING     doxepin (SINEQUAN) 20 mg, Oral, 2 TIMES DAILY     Emgality 120 mg, Subcutaneous, EVERY 28 DAYS     EPINEPHrine (ANY BX GENERIC EQUIV) 0.3 mg, Intramuscular, PRN     famotidine (PEPCID) 20 mg, Oral, 2 TIMES DAILY     fexofenadine (ALLEGRA) 180 mg, Oral, EVERY MORNING     fluticasone (FLONASE) 50 MCG/ACT nasal spray 2 TIMES DAILY PRN     gabapentin (NEURONTIN) 800 mg, Oral, 3 TIMES DAILY     guaiFENesin (MUCINEX) 600 mg, Oral, 2 TIMES DAILY     hydrocortisone (CORTAID) 1 % external ointment Apply sparingly to affected area three times daily for 14 days.     hydroxychloroquine (PLAQUENIL) 400 mg, Oral, DAILY, Annual Plaquenil toxicity eye screening required.     levonorgestrel (MIRENA) 20 mcg, Intrauterine, ONCE     montelukast (SINGULAIR) 10 mg, Oral, AT BEDTIME     Multiple Vitamins-Minerals (MULTIVITAMIN ADULTS) TABS 1 tablet, Oral, DAILY     phenylephrine HCl 10 mg, Oral, 2 TIMES DAILY     polyethylene glycol (MIRALAX) 17 GM/Dose powder 1 capful, Oral, DAILY     propranolol (INDERAL) 20 mg, Oral, DAILY PRN     Semaglutide (1 MG/DOSE) (OZEMPIC) 1 mg, Subcutaneous, WEEKLY, To be started after finishing 0.5 mg weekly     vilazodone (VIIBRYD) 40 mg, Oral, EVERY MORNING       Past Medical History:   Diagnosis Date     Anemia fall 2016     Anxiety      Arthritis      Chronic fatigue      Depression (emotion)     sees psych, on meds     Gastroesophageal reflux disease      Migraine     daily meds, 2x month, more mild on meds     Neuropathic pain      Panic disorder      Uncomplicated asthma Spring 2018         Family History   Problem Relation Age of Onset     Diabetes Maternal Grandfather      Hypertension No family hx of      Coronary Artery Disease No family hx of      Hyperlipidemia No family hx of      Cerebrovascular Disease No family hx of      Breast Cancer No family hx of      Colon Cancer No family hx of      Prostate Cancer No family hx of      Other Cancer No family hx of      Glaucoma No family hx of      Macular Degeneration No family hx of         Social History     Tobacco Use     Smoking status: Former     Packs/day: 0.00     Years: 10.00     Pack years: 0.00     Types: Cigarettes     Smokeless tobacco: Never     Tobacco comments:     smokes occasionally socially    Vaping Use     Vaping Use: Never used   Substance Use Topics     Alcohol use: Not Currently     Alcohol/week: 0.0 standard drinks     Comment: occasional      Drug use: Not Currently     Types: Marijuana     Comment: marijuana  none since May 2021        History   Sexual Activity     Sexual activity: Yes     Partners: Male     Birth control/ protection: Pull-out method, Inserts/Ring     Comment: Nuvaring        PHQ 9/29/2022 10/11/2022 1/12/2023   PHQ-9 Total Score 6 5 5   Q9: Thoughts of better off dead/self-harm past 2 weeks Not at all Not at all Not at all   Some encounter information is confidential and restricted. Go to Review Flowsheets activity to see all data.        Immunization History   Administered Date(s) Administered     COVID-19 Vaccine 12+ (Pfizer 2022) 01/10/2022     COVID-19 Vaccine 12+ (Pfizer) 03/19/2021, 04/09/2021     COVID-19 Vaccine Bivalent Booster 12+ (Pfizer) 09/19/2022     DTaP, Unspecified 05/22/2019     Flu, Unspecified 09/23/2016     Influenza Vaccine >6 months (Alfuria,Fluzone) 09/23/2016, 09/07/2018, 08/28/2019, 09/02/2020, 11/02/2021, 09/19/2022     Pneumococcal 23 valent 05/22/2019     Tdap (Adacel,Boostrix) 05/22/2019                 OBJECTIVE     /71   Pulse 90   Temp 98.9  F (37.2  C) (Oral)    "Ht 1.735 m (5' 8.3\")   Wt 73.5 kg (162 lb)   LMP 01/29/2023   SpO2 100%   BMI 24.42 kg/m       Labs:  Lab Results   Component Value Date    WBC 8.7 11/07/2022    HGB 13.1 11/07/2022    HCT 40.4 11/07/2022     11/07/2022    CHOL 180 08/19/2022    TRIG 184 (H) 08/19/2022    HDL 43 (L) 08/19/2022     (H) 11/07/2022    AST 31 11/07/2022     11/07/2022    BUN 8.9 11/07/2022    CO2 24 11/07/2022    TSH 1.81 01/04/2022    INR 1.09 11/07/2022        Physical Exam  Constitutional:       Appearance: She is ill-appearing. She is not toxic-appearing.   HENT:      Head: Normocephalic.      Right Ear: Tympanic membrane normal.      Left Ear: Tympanic membrane normal.      Nose: Nose normal.      Mouth/Throat:      Lips: Pink.      Mouth: Mucous membranes are moist.      Pharynx: Uvula midline. Posterior oropharyngeal erythema present.      Tonsils: No tonsillar exudate. 0 on the right. 0 on the left.      Comments: Throat slightly erythematous.   Eyes:      General:         Right eye: No discharge.         Left eye: No discharge.      Conjunctiva/sclera: Conjunctivae normal.      Pupils: Pupils are equal, round, and reactive to light.   Cardiovascular:      Rate and Rhythm: Normal rate and regular rhythm.      Heart sounds: Normal heart sounds. No murmur heard.  Pulmonary:      Effort: Pulmonary effort is normal. No respiratory distress.      Breath sounds: Normal breath sounds.   Musculoskeletal:         General: Normal range of motion.      Cervical back: No tenderness.   Lymphadenopathy:      Cervical: Cervical adenopathy present.      Right cervical: Superficial cervical adenopathy present.      Left cervical: Superficial cervical adenopathy present.   Skin:     General: Skin is warm.   Neurological:      General: No focal deficit present.      Mental Status: She is alert.   Psychiatric:         Thought Content: Thought content normal.         Judgment: Judgment normal.        Recent Results (from the " past 168 hour(s))   Streptococcus A Rapid Screen w/Reflex to PCR    Collection Time: 02/06/23  3:25 PM    Specimen: Throat; Swab   Result Value Ref Range    Group A Strep antigen Positive (A) Negative            ASSESSMENT/PLAN     1. Upper respiratory tract infection, unspecified type.   Strep positive, started on amoxicillin. Per lexicomp, no reaction between amoxicllin and hydroxychloroquine. Education provided on return to work after 24 hours and to complete full course of antibiotics.    - Streptococcus A Rapid Screen w/Reflex to PCR  - Symptomatic COVID-19 Virus (Coronavirus) by PCR Nasopharyngeal      Follow-Up:  - Follow up if symptoms worsen or fail to improve despite antibiotics.     Options for treatment and follow-up care were reviewed with the patient. Patient engaged in the decision making process and verbalized understanding of the options discussed and agreed with the final plan.  AVS printed and given to patient.    Diamond Abdul DNP student at [facility]    Denys SMITH, HONEY reviewed and verified the nurse practitioner (NP)  student's documentation of the patient's history. I performed the exam and the medical decision making activities with the NP student, who was present for learning purposes.      JASVIR Porter CNP    [facility]  [address] [address]  [phone]

## 2023-02-07 LAB — SARS-COV-2 RNA RESP QL NAA+PROBE: NEGATIVE
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
## 2023-02-13 ENCOUNTER — VIRTUAL VISIT (OUTPATIENT)
Dept: PSYCHOLOGY | Facility: CLINIC | Age: 38
End: 2023-02-13
Payer: COMMERCIAL

## 2023-02-13 DIAGNOSIS — F43.12 CHRONIC POST-TRAUMATIC STRESS DISORDER (PTSD): ICD-10-CM

## 2023-02-13 DIAGNOSIS — F34.1 PERSISTENT DEPRESSIVE DISORDER: Primary | ICD-10-CM

## 2023-02-13 DIAGNOSIS — F54 PSYCHOLOGICAL AND BEHAVIORAL FACTORS ASSOCIATED WITH DISORDERS OR DISEASES CLASSIFIED ELSEWHERE: ICD-10-CM

## 2023-02-13 PROCEDURE — 90837 PSYTX W PT 60 MINUTES: CPT | Mod: VID | Performed by: PSYCHOLOGIST

## 2023-02-15 NOTE — CONFIDENTIAL NOTE
"      Health Psychology - Follow up Visit  Confidential Summary*    The author of this note documented a reason for not sharing it with the patient.  REFERRAL SOURCE:  Psychiatry    CHIEF COMPLAINT/REASON FOR VISIT  Psychotherapy in context of chronic PTSD and persistent depression.  Patient also complains of dissatisfaction with interpersonal relationships and challenges with emotion regulation.      Patient was seen today for a 60 minute individual psychotherapy session.  The session was facilitated via VIDEO with patient at her home and provider at her own home.  We used [email or web address] platform.       This telehealth service is appropriate and effective for delivering services in light of the necessity for social distancing to mitigate the COVID-19 epidemic. Patient has agreed to receiving telehealth services.    The patient has been notified of following:   \"This VIDEO visit will be conducted via a call between you and your physician/provider. We have found that certain health care needs can be provided without the need for an in-person physical exam.  VIDEO visits are billed at different rates depending on your insurance coverage.  Please reach out to your insurance provider with any questions. If during the course of the call the physician/provider feels a video visit is not appropriate, you will not be charged for this service.\"     Patient has given verbal consent for VIDEO visit? Yes    Subjective: Patient began with discussion of her ongoing excitement surrounding a new relationship.  She has been texting for approximately 2 weeks and is finding that he is someone that she is enjoying these interactions.  Discussed her increased energy and feelings of excitement that she has finally met someone that is more in line with her expectations of a partner.      Discussed how she progressing toward the activities she identified would make her life one that is worth living.  She reported that she has not made progress on " her dissertation proposal.   Discussed her ongoing belief that she is not able to make progress since she is working two jobs (retail and TA).  Gently reminded patient that she had challenges with time management prior to taking the second job.  She maintains that she believes she may make more progress on the dissertation proposal if she takes a leave from her retail position.  She was encouraged to consider how she will budget her time and that a disciplined schedule may allow her to dedicate more efforts to her dissertation work.  Patient was validated for her feelings of anxiety surrounding her dissertation work and that her default coping strategy for anxiety has been procrastination.  She acknowledged anxiety and procrastination as a coping tool.  Discussed how she might more actively engage in problem solving to address completing her proposal writing task.  She reported that she does not have accountability.  Offered to serve as an accountability partner but she denied interest in this. Encouraged her to consider a jessie writing course or to engage in some type of a writing workshop.  She will consider but she reported that she has tried these things in the past but that they were not helpful.  She was encouraged to devise a plan and assistance was offered.      Session concluded with patient being encouraged to consider how her trauma history may influence her perception that others view her as less valuable because of her ethnicity.      Objective:  Patient was on time for today s session. She was alert and oriented. Mood was euthymic with appropriate range of affect. Patient denied suicidal or assaultive ideation, plan, or intent.        Assessment:  The patient has a longstanding history of interpersonal discord and challenges with emotion regulation.  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  She has completed all requirements for her PhD and is now ABD.   She is living in  housing with her two dogs.  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  She is hoping to present her dissertation proposal in the upcoming weeks.      Plan:  Patient graduated from full model DBT at [facility] and is now completing phase 2 work.  Beginning EMDR work.      Treatment plan completed:  10/13/22     Time In: 1:00  Time Out: 2:00    Diagnosis:  Axis I PTSD, chronic, persistent depressive disorder, adjustment disorder with mixed, psychological factors associated with physical (fibromyalgia)   Axis II  BPD   Axis III please see medical records for details   [place] IV Psychosocial and Environmental Stressors: living alone with no family support, pandemic stress, chronic illness         Corina Muhammad, PhD, LP

## 2023-02-20 NOTE — PROGRESS NOTES
SUBJECTIVE:                                                   Kasandra Lau is a 37 year old female who presents to clinic today for the following health issue(s):  Patient presents with:  IUD          HPI:    Spotting daily. Min cramping.    Periodic nausea. Not associated with triggers. Has this when has endometriosis sx.   Endo Sx have not been bad in recent times.    Patient's last menstrual period was 2023..     Patient is sexually active, .  Using IUD for contraception.    reports that she has quit smoking. Her smoking use included cigarettes. She has never used smokeless tobacco.    STD testing offered?  Declined    Health maintenance updated:  yes    Today's PHQ-2 Score:   PHQ-2 (  [employer]) 11/10/2022   Q1: Little interest or pleasure in doing things 0   Q2: Feeling down, depressed or hopeless 0   PHQ-2 Score 0   PHQ-2 Total Score (12-17 Years)- Positive if 3 or more points; Administer PHQ-A if positive -   Q1: Little interest or pleasure in doing things -   Q2: Feeling down, depressed or hopeless -   PHQ-2 Score -   Some encounter information is confidential and restricted. Go to Review FlowsAFAR activity to see all data.     Today's PHQ-9 Score:   PHQ-9 SCORE 2023   PHQ-9 Total Score MyChart 5 (Mild depression)   PHQ-9 Total Score 5   Some encounter information is confidential and restricted. Go to Review [employer] activity to see all data.     Today's VIC-7 Score:   VIC-7 SCORE 2023   Total Score 8 (mild anxiety)   Total Score 8   Some encounter information is confidential and restricted. Go to Review [employer] activity to see all data.       Problem list and histories reviewed & adjusted, as indicated.  Additional history: as documented.    Patient Active Problem List   Diagnosis     Pelvic pain in female     Vitamin D deficiency     Menorrhagia with regular cycle     Migraine without aura and without status migrainosus, not intractable     Iron deficiency anemia due to  chronic blood loss     Tired     Circadian rhythm sleep disorder, delayed sleep phase type     Unhealthy sleep habit     Seasonal mood disorder (H)     Chronic idiopathic urticaria     Acid reflux     Cholecystitis     Depression     Hx of abnormal cervical Pap smear     Irritable bowel syndrome with constipation     Migraine headache     Mild intermittent asthma without complication     Need for desensitization to allergens     Panic disorder     Pap smear abnormality of cervix/human papillomavirus (HPV) positive     Recurrent major depressive disorder, in remission (H)     Hypertrophy of breast     Chronic sinusitis, unspecified location     Keratosis pilaris     Endometriosis     Past Surgical History:   Procedure Laterality Date     COLONOSCOPY       LAPAROSCOPIC ABLATION ENDOMETRIOSIS N/A 11/09/2016    Procedure: LAPAROSCOPIC ABLATION ENDOMETRIOSIS;  Surgeon: Tonja Ferrer MD;  Location: [facility]     LAPAROSCOPIC CYSTECTOMY OVARIAN (BENIGN) Left 11/09/2016    Procedure: LAPAROSCOPIC CYSTECTOMY OVARIAN (BENIGN);  Surgeon: Tonja Ferrer MD;  Location: [facility]     LAPAROSCOPIC TUBAL DYE STUDY Left 11/09/2016    Procedure: LAPAROSCOPIC TUBAL DYE STUDY;  Surgeon: Tonja Ferrer MD;  [facility]: [facility]     LAPAROSCOPY OPERATIVE ADULT N/A 11/09/2016    Procedure: LAPAROSCOPY OPERATIVE ADULT;  Surgeon: Tonja Ferrer MD;  [facility]: [facility]     MAMMOPLASTY REDUCTION Bilateral 08/05/2021    Procedure: MAMMOPLASTY, REDUCTION, Bilateral;  Surgeon: ANTONIO Moreno MD;  Location: [facility] OR     ORTHOPEDIC SURGERY      left wrist surgery     TONSILLECTOMY & ADENOIDECTOMY Bilateral     cauterized turbinates also      Social History     Tobacco Use     Smoking status: Former     Packs/day: 0.00     Years: 10.00     Pack years: 0.00     Types: Cigarettes     Smokeless tobacco: Never     Tobacco comments:     smokes occasionally socially    Substance Use Topics     Alcohol use: Not Currently      Alcohol/week: 0.0 standard drinks     Comment: occasional       Problem (# of Occurrences) Relation (Name,Age of Onset)    Diabetes (1) Maternal Grandfather (Mothers father)       Negative family history of: Hypertension, Coronary Artery Disease, Hyperlipidemia, Cerebrovascular Disease, Breast Cancer, Colon Cancer, Prostate Cancer, Other Cancer, Glaucoma, Macular Degeneration            Current Outpatient Medications   Medication Sig     ondansetron (ZOFRAN ODT) 4 MG ODT tab Take 1 tablet (4 mg) by mouth every 8 hours as needed for nausea     almotriptan (AXERT) 12.5 MG tablet Take 1 tablet (12.5 mg) by mouth at onset of headache for migraine (repeat in 2 hours if needed) May repeat in 2 hours. Max 2 tablets/24 hours.     amoxicillin (AMOXIL) 500 MG capsule Take 1 capsule (500 mg) by mouth 2 times daily     atomoxetine (STRATTERA) 40 MG capsule Take 1 capsule (40 mg) by mouth daily     bisacodyl (DULCOLAX) 5 MG EC tablet Take 15 mg by mouth daily as needed for constipation     buPROPion (WELLBUTRIN XL) 300 MG 24 hr tablet TAKE 1 TABLET BY MOUTH EVERY MORNING     calcium carbonate (TUMS) 500 MG chewable tablet Take 1 tablet (500 mg) by mouth 3 times daily As needed for acid reflux     cholecalciferol 125 MCG (5000 UT) CAPS Take 5,000 Units by mouth every evening     doxepin (SINEQUAN) 10 MG capsule Take 2 capsules (20 mg) by mouth 2 times daily     EMGALITY 120 MG/ML injection Inject 1 mL (120 mg) Subcutaneous every 28 days     EPINEPHrine (ANY BX GENERIC EQUIV) 0.3 MG/0.3ML injection 2-pack Inject 0.3 mg into the muscle as needed     famotidine (PEPCID) 20 MG tablet Take 1 tablet (20 mg) by mouth 2 times daily     fexofenadine (ALLEGRA) 180 MG tablet TAKE 1 TABLET (180 MG) BY MOUTH EVERY MORNING     fluticasone (FLONASE) 50 MCG/ACT nasal spray 2 times daily as needed      gabapentin (NEURONTIN) 400 MG capsule Take 2 capsules (800 mg) by mouth 3 times daily     guaiFENesin (MUCINEX) 600 MG 12 hr tablet Take 600 mg by  mouth 2 times daily     hydrocortisone (CORTAID) 1 % external ointment Apply sparingly to affected area three times daily for 14 days.     hydroxychloroquine (PLAQUENIL) 200 MG tablet Take 2 tablets (400 mg) by mouth daily Annual Plaquenil toxicity eye screening required.     levonorgestrel (MIRENA) 20 MCG/DAY IUD 1 each (20 mcg) by Intrauterine route once for 1 dose     montelukast (SINGULAIR) 10 MG tablet Take 1 tablet (10 mg) by mouth At Bedtime     Multiple Vitamins-Minerals (MULTIVITAMIN ADULTS) TABS Take 1 tablet by mouth daily     phenylephrine HCl 10 MG TABS Take 10 mg by mouth 2 times daily     polyethylene glycol (MIRALAX) 17 GM/Dose powder Take 17 g (1 capful) by mouth daily     propranolol (INDERAL) 20 MG tablet Take 1 tablet (20 mg) by mouth daily as needed (anxiety)     Semaglutide, 1 MG/DOSE, 4 MG/3ML SOPN Inject 1 mg Subcutaneous once a week To be started after finishing 0.5 mg weekly     vilazodone (VIIBRYD) 40 MG TABS tablet Take 1 tablet (40 mg) by mouth every morning     No current facility-administered medications for this visit.     Allergies   Allergen Reactions     Dust Mites Cough, Difficulty breathing and Shortness Of Breath     Cats      Chest tightness, sinus irritation       ROS:  12 point review of systems negative other than symptoms noted below or in the HPI.  No urinary frequency or dysuria, bladder or kidney problems      OBJECTIVE:     LMP 01/29/2023   There is no height or weight on file to calculate BMI.    Exam:  Constitutional:  Appearance: Well nourished, well developed alert, in no acute distress  Neurologic:  Mental Status:  Oriented X3.  Normal strength and tone, sensory exam grossly normal, mentation intact and speech normal.    Psychiatric:  Mentation appears normal and affect normal/bright.  Pelvic Exam:  External Genitalia:     Normal appearance for age, no discharge present, no tenderness present, no inflammatory lesions present, color normal  Vagina:    Normal vaginal  vault without central or paravaginal defects, no discharge present, no inflammatory lesions present, no masses present  Bladder:     Nontender to palpation  Urethra:   Urethral Body:  Urethra palpation normal, urethra structural support normal   Urethral Meatus:  No erythema or lesions present  Cervix:     Appearance healthy, no lesions present, nontender to palpation, no bleeding present, string present    Perineum:     Perineum within normal limits, no evidence of trauma, no rashes or skin lesions present  Anus:     Anus within normal limits, no hemorrhoids present  Inguinal Lymph Nodes:     No lymphadenopathy present  Pubic Hair:     Normal pubic hair distribution for age  Genitalia and Groin:     No rashes present, no lesions present, no areas of discoloration, no masses present         ASSESSMENT/PLAN:                                                        ICD-10-CM    1. Endometriosis  N80.9       2. Cervical cancer screening  Z12.4 Pap thin layer screen with HPV - recommended age 30 - 65 years      3. Nausea  R11.0 ondansetron (ZOFRAN ODT) 4 MG ODT tab      4. IUD check up  Z30.431 ondansetron (ZOFRAN ODT) 4 MG ODT tab          -Overall acceptable outcome on mirena thus far. Reviewed expectations and potential SE.  Rx for zofran to manage occ nausea. Rec trying to note triggers when possible.   Due for pap/hpv, will manage per guidelines.   F/U for annual exam and string check  Questions answered to her satisfaction    Lorena Charles Masters, DO  [facility] [place]

## 2023-02-24 ENCOUNTER — OFFICE VISIT (OUTPATIENT)
Dept: OBGYN | Facility: CLINIC | Age: 38
End: 2023-02-24
Payer: COMMERCIAL

## 2023-02-24 DIAGNOSIS — R11.0 NAUSEA: ICD-10-CM

## 2023-02-24 DIAGNOSIS — Z30.431 IUD CHECK UP: ICD-10-CM

## 2023-02-24 DIAGNOSIS — Z12.4 CERVICAL CANCER SCREENING: ICD-10-CM

## 2023-02-24 DIAGNOSIS — N80.9 ENDOMETRIOSIS: Primary | ICD-10-CM

## 2023-02-24 PROCEDURE — 99213 OFFICE O/P EST LOW 20 MIN: CPT | Performed by: OBSTETRICS & GYNECOLOGY

## 2023-02-24 PROCEDURE — 87624 HPV HI-RISK TYP POOLED RSLT: CPT | Performed by: OBSTETRICS & GYNECOLOGY

## 2023-02-24 PROCEDURE — G0145 SCR C/V CYTO,THINLAYER,RESCR: HCPCS | Performed by: OBSTETRICS & GYNECOLOGY

## 2023-02-24 RX ORDER — ONDANSETRON 4 MG/1
4 TABLET, ORALLY DISINTEGRATING ORAL EVERY 8 HOURS PRN
Qty: 18 TABLET | Refills: 6 | Status: SHIPPED | OUTPATIENT
Start: 2023-02-24 | End: 2024-03-06

## 2023-02-24 NOTE — CONFIDENTIAL NOTE
"      Health Psychology - Follow up Visit  Confidential Summary*    The author of this note documented a reason for not sharing it with the patient.  REFERRAL SOURCE:  Psychiatry    CHIEF COMPLAINT/REASON FOR VISIT  Psychotherapy in context of chronic PTSD and persistent depression.  Patient also complains of dissatisfaction with interpersonal relationships and challenges with emotion regulation.      Patient was seen today for a 60 minute individual psychotherapy session.  The session was facilitated via VIDEO with patient at her home and provider at her own home.  We used [email or web address] platform.       This telehealth service is appropriate and effective for delivering services in light of the necessity for social distancing to mitigate the COVID-19 epidemic. Patient has agreed to receiving telehealth services.    The patient has been notified of following:   \"This VIDEO visit will be conducted via a call between you and your physician/provider. We have found that certain health care needs can be provided without the need for an in-person physical exam.  VIDEO visits are billed at different rates depending on your insurance coverage.  Please reach out to your insurance provider with any questions. If during the course of the call the physician/provider feels a video visit is not appropriate, you will not be charged for this service.\"     Patient has given verbal consent for VIDEO visit? Yes    Subjective:  Patient began with report that she has been enjoying online dating with a new man.  She reported that she has noted that she feels less anxious when communicating with him and that she is enjoying their connection and flirtatious interactions.  She described noticing that he is emotionally intelligent, intentional, complex, sensitive, thoughtful and a psychology major.  She reported that she finds his communication style to be reassuring and that he appears shy and that he has responded back to her in a timely fashion. "  She also reported that she confronted him on a statement that he made and he immediately apologized and confirmed his intention.      Discussed her comfort and his reluctance to meet in person.  She reported that she believes that he has been ill and that they will get together soon.  She was encouraged to consider how she can cope ahead given the possibility that she may be disappointed if he does not move forward on a date.  She was also encouraged to consider whether meeting on the dating platform she uses may be predisposing her to attract potential partners who may not be motivated to cultivate the type of long term relationship that she is hoping to form.      She was asked about her progress with her dissertation proposal.  She reported that she has been more engaged in working on it now that she has the support of this new potential partner.  She reported that she has more energy when engaged with others.  Encouraged her to increase her self care.  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      Objective:  Patient was on time for today s session. She was alert and oriented. Mood was euthymic with appropriate range of affect. Patient denied suicidal or assaultive ideation, plan, or intent.        Assessment:  The patient has a longstanding history of interpersonal discord and challenges with emotion regulation.  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  She has completed all requirements for her PhD and is now ABD.  She is living in  housing with her two dogs.  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  She is hoping to present her dissertation proposal in the upcoming weeks.      Plan:  Patient graduated from full model DBT at [facility] and is now completing phase 2 work.  Beginning EMDR work.      Treatment plan completed:  10/13/22     Time In: 1:00  Time Out:  2:00    Diagnosis:  Axis I PTSD, chronic, persistent depressive disorder, adjustment disorder with mixed, psychological factors associated with physical (fibromyalgia)   Axis II  BPD   Axis III please see medical records for details   [place] IV Psychosocial and Environmental Stressors: living alone with no family support, pandemic stress, chronic illness         Corina Muhammad, PhD, LP

## 2023-02-27 ENCOUNTER — VIRTUAL VISIT (OUTPATIENT)
Dept: PSYCHOLOGY | Facility: CLINIC | Age: 38
End: 2023-02-27
Payer: COMMERCIAL

## 2023-02-27 DIAGNOSIS — Z53.9 ERRONEOUS ENCOUNTER--DISREGARD: Primary | ICD-10-CM

## 2023-02-28 ENCOUNTER — OFFICE VISIT (OUTPATIENT)
Dept: FAMILY MEDICINE | Facility: CLINIC | Age: 38
End: 2023-02-28
Payer: COMMERCIAL

## 2023-02-28 VITALS
DIASTOLIC BLOOD PRESSURE: 77 MMHG | RESPIRATION RATE: 17 BRPM | TEMPERATURE: 98.5 F | WEIGHT: 157.1 LBS | HEART RATE: 105 BPM | SYSTOLIC BLOOD PRESSURE: 108 MMHG | OXYGEN SATURATION: 98 % | BODY MASS INDEX: 23.68 KG/M2

## 2023-02-28 DIAGNOSIS — J02.9 ACUTE PHARYNGITIS, UNSPECIFIED ETIOLOGY: ICD-10-CM

## 2023-02-28 DIAGNOSIS — Z11.3 ROUTINE SCREENING FOR STI (SEXUALLY TRANSMITTED INFECTION): ICD-10-CM

## 2023-02-28 DIAGNOSIS — J02.8 ACUTE PHARYNGITIS DUE TO OTHER SPECIFIED ORGANISMS: ICD-10-CM

## 2023-02-28 DIAGNOSIS — R53.83 FATIGUE, UNSPECIFIED TYPE: Primary | ICD-10-CM

## 2023-02-28 DIAGNOSIS — M79.10 MYALGIA: ICD-10-CM

## 2023-02-28 LAB
BASOPHILS # BLD AUTO: 0.1 10E3/UL (ref 0–0.2)
BASOPHILS NFR BLD AUTO: 0 %
CRP SERPL-MCNC: 8.83 MG/L
EOSINOPHIL # BLD AUTO: 0.1 10E3/UL (ref 0–0.7)
EOSINOPHIL NFR BLD AUTO: 1 %
ERYTHROCYTE [DISTWIDTH] IN BLOOD BY AUTOMATED COUNT: 13.2 % (ref 10–15)
HCT VFR BLD AUTO: 41.4 % (ref 35–47)
HGB BLD-MCNC: 13.1 G/DL (ref 11.7–15.7)
IMM GRANULOCYTES # BLD: 0 10E3/UL
IMM GRANULOCYTES NFR BLD: 0 %
LYMPHOCYTES # BLD AUTO: 1.9 10E3/UL (ref 0.8–5.3)
LYMPHOCYTES NFR BLD AUTO: 16 %
MCH RBC QN AUTO: 30.1 PG (ref 26.5–33)
MCHC RBC AUTO-ENTMCNC: 31.6 G/DL (ref 31.5–36.5)
MCV RBC AUTO: 95 FL (ref 78–100)
MONOCYTES # BLD AUTO: 0.6 10E3/UL (ref 0–1.3)
MONOCYTES NFR BLD AUTO: 6 %
MONOCYTES NFR BLD AUTO: NEGATIVE %
NEUTROPHILS # BLD AUTO: 8.7 10E3/UL (ref 1.6–8.3)
NEUTROPHILS NFR BLD AUTO: 77 %
NRBC # BLD AUTO: 0 10E3/UL
NRBC BLD AUTO-RTO: 0 /100
PLATELET # BLD AUTO: 314 10E3/UL (ref 150–450)
RBC # BLD AUTO: 4.35 10E6/UL (ref 3.8–5.2)
WBC # BLD AUTO: 11.5 10E3/UL (ref 4–11)

## 2023-02-28 PROCEDURE — U0005 INFEC AGEN DETEC AMPLI PROBE: HCPCS | Performed by: NURSE PRACTITIONER

## 2023-02-28 PROCEDURE — 87491 CHLMYD TRACH DNA AMP PROBE: CPT | Performed by: NURSE PRACTITIONER

## 2023-02-28 PROCEDURE — 86308 HETEROPHILE ANTIBODY SCREEN: CPT | Performed by: NURSE PRACTITIONER

## 2023-02-28 PROCEDURE — 87591 N.GONORRHOEAE DNA AMP PROB: CPT | Performed by: NURSE PRACTITIONER

## 2023-02-28 PROCEDURE — 87389 HIV-1 AG W/HIV-1&-2 AB AG IA: CPT | Performed by: NURSE PRACTITIONER

## 2023-02-28 PROCEDURE — 86140 C-REACTIVE PROTEIN: CPT | Performed by: NURSE PRACTITIONER

## 2023-02-28 PROCEDURE — 86780 TREPONEMA PALLIDUM: CPT | Performed by: NURSE PRACTITIONER

## 2023-02-28 PROCEDURE — 85004 AUTOMATED DIFF WBC COUNT: CPT | Performed by: NURSE PRACTITIONER

## 2023-02-28 NOTE — PATIENT INSTRUCTIONS
Pharyngitis, fatigue and body aches:   -Labs/testing: Complete blood count, CRP, STI (Gonorrhea, chlamydia, HIV, syphilis), Covid-19, Influenza, Mononucleosis     Please make an appointment to see provider in regards to right sided chest discomfort.   Recommend staying hydrated and resting as needed.  Instructions on avoiding respiratory infections attached to AVS.    Report to healthcare provider if symptoms worsen or a persistent fever presents.

## 2023-02-28 NOTE — NURSING NOTE
ROOM:3  [facility]    Preferred Name: Kasandra     How did you hear about us?  Current Patient    37 year old  Chief Complaint   Patient presents with     Pharyngitis     Possible strep, had strep two or three weeks ago was given antibiotics that seemed to help but not make it go fully away  Now has sore throat, chills and body aches       Blood pressure 108/77, pulse 105, temperature 98.5  F (36.9  C), temperature source Oral, resp. rate 17, weight 71.3 kg (157 lb 1.6 oz), last menstrual period 01/29/2023, SpO2 98 %, not currently breastfeeding. Body mass index is 23.68 kg/m .  BP completed using cuff size:        Patient Active Problem List   Diagnosis     Pelvic pain in female     Vitamin D deficiency     Menorrhagia with regular cycle     Migraine without aura and without status migrainosus, not intractable     Iron deficiency anemia due to chronic blood loss     Tired     Circadian rhythm sleep disorder, delayed sleep phase type     Unhealthy sleep habit     Seasonal mood disorder (H)     Chronic idiopathic urticaria     Acid reflux     Cholecystitis     Depression     Hx of abnormal cervical Pap smear     Irritable bowel syndrome with constipation     Migraine headache     Mild intermittent asthma without complication     Need for desensitization to allergens     Panic disorder     Pap smear abnormality of cervix/human papillomavirus (HPV) positive     Recurrent major depressive disorder, in remission (H)     Hypertrophy of breast     Chronic sinusitis, unspecified location     Keratosis pilaris     Endometriosis       Wt Readings from Last 2 Encounters:   02/28/23 71.3 kg (157 lb 1.6 oz)   02/06/23 73.5 kg (162 lb)     BP Readings from Last 3 Encounters:   02/28/23 108/77   02/06/23 107/71   01/02/23 95/69       Allergies   Allergen Reactions     Dust Mites Cough, Difficulty breathing and Shortness Of Breath     Cats      Chest tightness, sinus irritation       Current Outpatient Medications    Medication     almotriptan (AXERT) 12.5 MG tablet     atomoxetine (STRATTERA) 40 MG capsule     bisacodyl (DULCOLAX) 5 MG EC tablet     buPROPion (WELLBUTRIN XL) 300 MG 24 hr tablet     calcium carbonate (TUMS) 500 MG chewable tablet     cholecalciferol 125 MCG (5000 UT) CAPS     doxepin (SINEQUAN) 10 MG capsule     EMGALITY 120 MG/ML injection     EPINEPHrine (ANY BX GENERIC EQUIV) 0.3 MG/0.3ML injection 2-pack     famotidine (PEPCID) 20 MG tablet     fexofenadine (ALLEGRA) 180 MG tablet     fluticasone (FLONASE) 50 MCG/ACT nasal spray     gabapentin (NEURONTIN) 400 MG capsule     guaiFENesin (MUCINEX) 600 MG 12 hr tablet     hydrocortisone (CORTAID) 1 % external ointment     hydroxychloroquine (PLAQUENIL) 200 MG tablet     montelukast (SINGULAIR) 10 MG tablet     Multiple Vitamins-Minerals (MULTIVITAMIN ADULTS) TABS     ondansetron (ZOFRAN ODT) 4 MG ODT tab     phenylephrine HCl 10 MG TABS     polyethylene glycol (MIRALAX) 17 GM/Dose powder     propranolol (INDERAL) 20 MG tablet     Semaglutide, 1 MG/DOSE, 4 MG/3ML SOPN     vilazodone (VIIBRYD) 40 MG TABS tablet     amoxicillin (AMOXIL) 500 MG capsule     levonorgestrel (MIRENA) 20 MCG/DAY IUD     No current facility-administered medications for this visit.       Social History     Tobacco Use     Smoking status: Former     Packs/day: 0.00     Years: 10.00     Pack years: 0.00     Types: Cigarettes     Smokeless tobacco: Never     Tobacco comments:     smokes occasionally socially    Vaping Use     Vaping Use: Never used   Substance Use Topics     Alcohol use: Not Currently     Alcohol/week: 0.0 standard drinks     Comment: occasional      Drug use: Not Currently     Types: Marijuana     Comment: marijuana  none since May 2021       Honoring Choices - Health Care Directive Guide offered to patient at time of visit.    Health Maintenance Due   Topic Date Due     ASTHMA ACTION PLAN  Never done     ADVANCE CARE PLANNING  Never done     HEPATITIS B IMMUNIZATION (1  of 3 - 3-dose series) Never done     YEARLY PREVENTIVE VISIT  01/31/2020     HPV TEST  02/21/2020     PAP  02/21/2020     Pneumococcal Vaccine: Pediatrics (0 to 5 Years) and At-Risk Patients (6 to 64 Years) (2 - PCV) 05/22/2020     ASTHMA CONTROL TEST  12/14/2022       Immunization History   Administered Date(s) Administered     COVID-19 Vaccine 12+ (Pfizer 2022) 01/10/2022     COVID-19 Vaccine 12+ (Pfizer) 03/19/2021, 04/09/2021     COVID-19 Vaccine Bivalent Booster 12+ (Pfizer) 09/19/2022     DTaP, Unspecified 05/22/2019     Flu, Unspecified 09/23/2016     Influenza Vaccine >6 months (Alfuria,Fluzone) 09/23/2016, 09/07/2018, 08/28/2019, 09/02/2020, 11/02/2021, 09/19/2022     Pneumococcal 23 valent 05/22/2019     Tdap (Adacel,Boostrix) 05/22/2019       Lab Results   Component Value Date    PAP NIL 01/31/2019       Recent Labs   Lab Test 11/07/22  1241 08/19/22  0835 04/27/22  1717 04/13/22  1600 01/04/22  1732 08/12/21  1535 09/12/20  2058 08/02/19  1711 10/05/17  1133 02/06/17  1200   LDL  --  100  --   --   --   --   --   --   --   --    HDL  --  43*  --   --   --   --   --   --   --   --    TRIG  --  184*  --   --   --   --   --   --   --   --    * 60*  --  63*  --    < > 36 25   < >  --    CR 0.83 0.69 0.66 0.63  --    < > 0.77 0.80   < >  --    GFRESTIMATED >90 >90 >90 >90  --    < > >90 >90   < >  --    GFRESTBLACK  --   --   --   --   --   --  >90 >90   < >  --    ALBUMIN 4.4 3.6  --  3.9  --    < > 3.3* 3.4   < >  --    POTASSIUM 3.8  --  3.7 3.8  --    < > 3.9 3.5   < >  --    TSH  --   --   --   --  1.81  --   --   --   --  1.72    < > = values in this interval not displayed.       PHQ-2 ( 1999 Pfizer) 2/28/2023 11/10/2022   Q1: Little interest or pleasure in doing things 0 0   Q2: Feeling down, depressed or hopeless 0 0   PHQ-2 Score 0 0   PHQ-2 Total Score (12-17 Years)- Positive if 3 or more points; Administer PHQ-A if positive - -   Q1: Little interest or pleasure in doing things - -   Q2:  Feeling down, depressed or hopeless - -   PHQ-2 Score - -   Some encounter information is confidential and restricted. Go to Review Flowsheets activity to see all data.       PHQ-9 SCORE 9/29/2022 9/29/2022 10/11/2022 1/12/2023   PHQ-9 Total Score MyChart - 6 (Mild depression) - 5 (Mild depression)   PHQ-9 Total Score 6 6 5 5   Some encounter information is confidential and restricted. Go to Review Flowsheets activity to see all data.       VIC-7 SCORE 9/30/2022 10/11/2022 1/12/2023   Total Score 8 (mild anxiety) - 8 (mild anxiety)   Total Score 8 9 8   Some encounter information is confidential and restricted. Go to Review Flowsheets activity to see all data.       ACT Total Scores 6/14/2022   ACT TOTAL SCORE (Goal Greater than or Equal to 20) 24   In the past 12 months, how many times did you visit the emergency room for your asthma without being admitted to the hospital? 2   In the past 12 months, how many times were you hospitalized overnight because of your asthma? 0       Marilu Riley, EMT    February 28, 2023 2:20 PM

## 2023-02-28 NOTE — PROGRESS NOTES
"    Subjective   Kasandra is a 37 year old presenting alone today for the following health issues:  -Pharyngitis. She was diagnosed with Group A Strep via rapid collection on 2/6/23 and was treated with a 10-day course of Amoxicillin 500mg twice a day. She reports that this seemed to help initially, but symptoms quickly re-presented, including sore throat, body aches, post nasal drip, sinus congestion, and a dry cough that persists throughout the day and overnight; never went away completely. Her cough has woken her from sleep frequently. Denies hemoptysis. Took a dose of DayQuil this morning, which she states \"temporarily helps somewhat\".     Recent partner had similar illness.     -Patient also reports falling while skiing about a week and a half ago and is having some right sided chest discomfort, particularly with persistent movement. Low back was initially sore too.     Kasandra has reported recent sexual intercourse, including oral sexual contact, and would like to have STI screenings performed today.     Review Of Systems  GEN: Negative for fever. Has lost weight since on Semaglutide.  Skin: negative for pigmentation, rash  Eyes: negative for eye pain, redness, tearing  Ears/Nose/Throat: positive for nasal congestion, postnasal drainage, hearing loss, sinus trouble, persistent sore throat, hoarseness  Respiratory: Cough- mostly dry and hacking. Occasional phlegm production. No shortness of breath, No dyspnea on exertion and No hemoptysis  Cardiovascular: negative for palpitations and irregular heart beat. Slight tachycardia.   Gastrointestinal: positive for nausea and abdominal pain. Able to tolerate oral fluids and nutrition.   Genitourinary: negative. Reports having no issues.   Musculoskeletal: positive for body aches.   Neurologic: negative for headaches, local weakness, speech problems and behavior changes  Psychiatric: negative excessive stress, depression and agitation. Occasional sleep disturbances related to " cough.   Hematologic/Lymphatic/Immunologic: negative for and bleeding disorder. Reports swollen lymph nodes to neck.       Objective    /77 (BP Location: Left arm, Patient Position: Sitting, Cuff Size: Adult Regular)   Pulse 105   Temp 98.5  F (36.9  C) (Oral)   Resp 17   Wt 71.3 kg (157 lb 1.6 oz)   LMP 01/29/2023   SpO2 98%   BMI 23.68 kg/m    Body mass index is 23.68 kg/m .    Exam:  Constitutional: alert, no distress and cooperative  Head: Normocephalic. No masses, lesions, tenderness or abnormalities  Neck: Neck supple. Lymphadenopathy to right anterior cervical lymph nodes; no posterior.   ENT: bilateral TM normal without fluid or infection, pharynx erythematous without exudate, slight sinus tenderness and post nasal drip noted  Cardiovascular: PMI normal. No lifts, heaves, or thrills. RRR. No murmurs, clicks gallops or rub.   Respiratory: Good diaphragmatic excursion. Lungs clear  Gastrointestinal: bowel sounds active in all four quadrants. Slightly tender to deep palpation.   Musculoskeletal: some tension over right pectoral muscle. Full ROM UE.   Skin: no suspicious lesions or rashes  Psychiatric: mentation appears normal and affect normal/bright    Assessment/Plan:    -Fatigue, unspecified type  -Acute pharyngitis, unspecified etiology  -Routine screening for STI (sexually transmitted infection)  -Myalgia    -Labs/testing today: CBC, CRP, STI (Gonorrhea, Chlamydia, HIV, Syphilis), Covid-19, Influenza (negative), Mononucleosis. Will not repeat strep testing as positive and treated within last 28 days.   -Encouraged rest and fluid intake. Report to healthcare provider if symptoms worsen or a persistent fever presents.  -Instructions on avoiding respiratory infections attached to AVS.  -Encouraged patient to make an additional appointment to see provider in regards to right sided chest discomfort following her fall about a week ago. Will notify of lab results and next steps dependent on finding.  Could consider Augmentin with possibility of resistant bacteria.     Kalpana Melgar, RNC-OB, Student Nurse Midwife  [employer]  I was present with the NPP student who participated in the service and in the documentation of the services provided. I have verified the history and personally performed the physical exam and medical decision making, as documented by the student and edited by me    JASVIR Ashley CNP  02/28/23  5:28 PM

## 2023-03-01 DIAGNOSIS — J02.0 ACUTE STREPTOCOCCAL PHARYNGITIS: Primary | ICD-10-CM

## 2023-03-01 LAB
BKR LAB AP GYN ADEQUACY: NORMAL
BKR LAB AP GYN INTERPRETATION: NORMAL
BKR LAB AP HPV REFLEX: NORMAL
BKR LAB AP PREVIOUS ABNORMAL: NORMAL
C TRACH DNA SPEC QL NAA+PROBE: NEGATIVE
C TRACH DNA SPEC QL NAA+PROBE: NEGATIVE
HIV 1+2 AB+HIV1 P24 AG SERPL QL IA: NONREACTIVE
N GONORRHOEA DNA SPEC QL NAA+PROBE: NEGATIVE
N GONORRHOEA DNA SPEC QL NAA+PROBE: NEGATIVE
PATH REPORT.COMMENTS IMP SPEC: NORMAL
PATH REPORT.COMMENTS IMP SPEC: NORMAL
PATH REPORT.RELEVANT HX SPEC: NORMAL
SARS-COV-2 RNA RESP QL NAA+PROBE: NEGATIVE
T PALLIDUM AB SER QL: NONREACTIVE

## 2023-03-02 LAB
HUMAN PAPILLOMA VIRUS 16 DNA: NEGATIVE
HUMAN PAPILLOMA VIRUS 18 DNA: NEGATIVE
HUMAN PAPILLOMA VIRUS FINAL DIAGNOSIS: NORMAL
HUMAN PAPILLOMA VIRUS OTHER HR: NEGATIVE

## 2023-03-03 PROBLEM — R87.618 PAP SMEAR ABNORMALITY OF CERVIX/HUMAN PAPILLOMAVIRUS (HPV) POSITIVE: Status: ACTIVE | Noted: 2020-09-03

## 2023-03-06 ENCOUNTER — VIRTUAL VISIT (OUTPATIENT)
Dept: PSYCHOLOGY | Facility: CLINIC | Age: 38
End: 2023-03-06
Payer: COMMERCIAL

## 2023-03-06 DIAGNOSIS — F54 PSYCHOLOGICAL AND BEHAVIORAL FACTORS ASSOCIATED WITH DISORDERS OR DISEASES CLASSIFIED ELSEWHERE: ICD-10-CM

## 2023-03-06 DIAGNOSIS — F43.12 CHRONIC POST-TRAUMATIC STRESS DISORDER (PTSD): ICD-10-CM

## 2023-03-06 DIAGNOSIS — F34.1 PERSISTENT DEPRESSIVE DISORDER: Primary | ICD-10-CM

## 2023-03-06 PROCEDURE — 90837 PSYTX W PT 60 MINUTES: CPT | Mod: VID | Performed by: PSYCHOLOGIST

## 2023-03-06 NOTE — PROGRESS NOTES
"Kasandra Lau is a 37 year old female who is being evaluated via a billable video visit.      The patient has been notified of following:     \"This video visit will be conducted via a call between you and your physician/provider. We have found that certain health care needs can be provided without the need for an in-person physical exam.  This service lets us provide the care you need with a video conversation.  If a prescription is necessary we can send it directly to your pharmacy.  If lab work is needed we can place an order for that and you can then stop by our lab to have the test done at a later time.    Video visits are billed at different rates depending on your insurance coverage.  Please reach out to your insurance provider with any questions.    If during the course of the call the physician/provider feels a video visit is not appropriate, you will not be charged for this service.\"    Patient has given verbal consent for Video visit? Yes  How would you like to obtain your AVS? MyChart  If you are dropped from the video visit, the video invite should be resent to: Send to e-mail at: [email or web address]  Will anyone else be joining your video visit? No  {If patient encounters technical issues they should call [phone]      Video-Visit Details    Type of service:  Video Visit    Video Start Time: 1:00 PM  Video End Time: 1:19 PM    Originating Location (pt. Location): Home    Distant Location (provider location):  Offsite (providers home) [facility] [place]     Platform used for Video Visit: Qoniac    During this virtual visit the patient is located in [place], patient verifies this as the location during the entirety of this visit.       Weight Management Nutrition Consultation    Kasandra Lau is a 37 year old female presents today for return weight management nutrition consultation.  Patient referred by Dr. Kerri Evans on October 4, 2022.    Patient with " "Co-morbidities of obesity including:  Type II DM no  Renal Failure no  Sleep apnea no  Hypertension no   Dyslipidemia yes  Joint pain no  Back pain yes  GERD yes   Fatty liver yes    PMH also significant for anxiety, depression, ADHD, PTSD, rheumatoid arthritis, and IBS-C.  H/o cholcystectomy     Anthropometrics:  Estimated body mass index is 29.22 kg/m  as calculated from the following:    Height as of an earlier encounter on 10/4/22: 1.74 m (5' 8.5\").    Weight as of an earlier encounter on 10/4/22: 88.5 kg (195 lb).    Current weight: 155 lbs with BMI 23 (-10 lbs on past 6-7 weeks, -40 lbs from initial)      Medications for Weight Loss:  Ozempic     NUTRITION HISTORY  Food allergies: None  Food intolerances: Too much dairy. Peppers, onions and garlic.   Supplements: Vitamin D (5000 units/day), daily multivitamin   Previous methods of diet modification for weight loss: Cutting down high-sugar (baked goods, ice cream) and high-fat foods, cutting 500 calories per day. Tried Noom, did not like it.      She reports having some nausea and cramping when starting Ozempic, but has reduced over time. She has noticed increased reflux as well.     Last RD visit 1/12/23 - Got away from nutritional tracking during the holiday season, has restarted recently. Aiming for 1200 rogelio/day and optimizing protein intake. Portions continue to be smaller. Finding some sweet cravings persist but manages with fruit or smaller portions. Less constipation recently, is taking Miralax daily.     Today - Reports experiencing hair loss in past 1-2 months. Has reduced Ozempic to 0.5 mg dose to help stableize weight. Likely hair loss r/t rate of weight loss. She has also tried adding in biotin and collagen supplements, in addition to her daily MVI and vitamin S.   Appetite stronger earlier in the day.   Eating meat if getting take-out otherwise doesn't like to cook meat.     Recent Diet Recall:  Breakfast: cereal, bagel with cream cheese with fruit "   Lunch: bigger meal - noodle bowl and side veggies; Meat and veggie frozen meal   Dinner: snack-size fruit smoothie; yogurt; cereal or fruit   Beverages: tea (20-40 oz/day), 32 oz/day water     Progress Towards Previous Goals:  1) Increase lean protein. Aim for 65-90 gm protein daily. - Improving   - 20-30 gm protein = palm-size pc of lean meat/seafood, 4-5 tuna salad, 3 cheese sticks, 3/4 c serving of Greek yogurt, 3 hard boiled eggs, 1 protein bar/shake, 3 slice deli meat (or 2 slice deli meat + 1 slice cheese)   - Vital proteins collagen powder  2) Increase activity as able - At home yoga once weekly - Started cross country skiing, and looking at dance classes.    3) Consume 3L fluid per day - Met, continues   4) Gradually increase fiber intake. Increase veggie intake. - Improving    Physical Activity:  Recently signed up for cross country skiing exercises. And plans to do twice weekly dance classes with a friend once skiing season is over.     Nutrition Prescription  Recommended energy/nutrient modification.  3472-6718 calories/day    Nutrition Diagnosis  Current: Overweight r/t long history of positive energy balance aeb BMI >25 - Resolved.     Current: Food-nutrition knowledge deficit r/t maintenance diet aeb pt presents with interest in maintenance diet review.     Nutrition Intervention  Materials/education provided on hypocaloric diet for weight loss. Discussed restarting nutritional tracking to help assess calorie needs for weight maintenance, assessing for average daily protein intake. Reviewed sources of protein.   Discussed protein supplements to help meet goal protein intake.   Encouraged increased exercise as able.  Co-developed goals to work towards.   Provided pt with list of goals and resources below via [employer].    Patient demonstrates understanding.    Expected Engagement: good  Follow-Up Plans: Nutritional tracking     Nutrition Goals  1) Restart tracking nutritional intake. Aim for 65-90 gm  protein daily.    - 20-30 gm protein = palm-size pc of lean meat/seafood, 4-5 tuna salad, 3 cheese sticks, 3/4 c serving of Greek yogurt, 3 hard boiled eggs, 1 protein bar/shake, 3 slice deli meat (or 2 slice deli meat + 1 slice cheese)   - Vital proteins collagen powder  2) Increase activity as able   3) Consume 3L fluid per day  4) Gradually increase fiber intake. Increase veggie intake.     Meal Replacement Shake Options:   *Protein Shake Criteria: no more than 210 Calories, at least 20 grams of protein, and less than 10 grams of sugar   [facility] smoothie (160 Calories, 20 g protein)   Premier Protein (160 Calories, 30 g protein)  Slim Fast Advanced Nutrition (180 Calories, 20 g protein)  Muscle Milk, lactose-free, 17 oz bottle (210 Calories, 30 g protein)  Integrated Supplements, no artificial sugars (110 Calories, 20 g protein)  Genepro, unflavored protein powder (60 Calories, 30 g protein)  Boost/Ensure Max (160 calories, 30 gm protein)   Fairlife Core Power (170 calories, 26 gm protein)  Aldi's Elevation Protein Powder (180 calories, 30 gm protein)     Meal Replacement Bar Options:  [facility] Protein Shake (160 Calories, 15 g protein)  Quest Protein Bars (190 Calories, 20 g protein)  Built Bar (170 Calories, 15-20 g protein)  One Protein Bar (210 calories, 20 g protein)  [place] Signature Protein Bar ([employer]) (190 Calories, 21 g protein)  Pure Protein Bars (180 Calories, 21 g protein)    Low Calorie Frozen Meal:  Healthy Choice Power Bowls  Lean Cuisine  Smart Ones  Tera Ontiveros    High Fiber Foods:  Bran cereal, 1/3 cup, 8.6 gm fiber   Cooked kidney beans   cup 7.9 gm  Cooked lentils   cup 7.8 gm  Cooked black beans   cup 7.6 gm  Canned chickpeas   cup 5.3 gm  Baked beans   cup 5.2 gm  Pear 1 5.1 gm  Soybeans   cup 5.1 gm  Quinoa   cup 5 gm  Osvaldo seeds, 1 tbsp 5 gm  Baked sweet potato, with skin 1 medium 4.8 gm  Avocado, half small 4.5 gm  Baked potato, with skin 1 medium 4.4 gm  Cooked frozen  green peas   cup 4.4 gm  Bulgur   cup 4.1 gm  Cooked frozen mixed vegetables   cup 4 gm  Raspberries   cup 4 gm  Blackberries   cup 3.8 gm  Almonds 1 oz 3.5 gm  Cooked frozen spinach   cup 3.5 gm  Vegetable or soy barbara 1 each 3.4 gm  Apple 1 medium 3.3 gm  Dried dates 5 pieces 3.3 gm        Follow-Up:  4/13/23    Time spent with patient: 19 minutes.  Ana Pillai RD, LD

## 2023-03-07 ENCOUNTER — OFFICE VISIT (OUTPATIENT)
Dept: FAMILY MEDICINE | Facility: CLINIC | Age: 38
End: 2023-03-07
Payer: COMMERCIAL

## 2023-03-07 ENCOUNTER — VIRTUAL VISIT (OUTPATIENT)
Dept: ENDOCRINOLOGY | Facility: CLINIC | Age: 38
End: 2023-03-07
Payer: COMMERCIAL

## 2023-03-07 VITALS
SYSTOLIC BLOOD PRESSURE: 105 MMHG | TEMPERATURE: 98.3 F | HEART RATE: 91 BPM | WEIGHT: 161 LBS | HEIGHT: 69 IN | BODY MASS INDEX: 23.85 KG/M2 | OXYGEN SATURATION: 98 % | DIASTOLIC BLOOD PRESSURE: 73 MMHG

## 2023-03-07 DIAGNOSIS — Z86.39 HISTORY OF OBESITY: ICD-10-CM

## 2023-03-07 DIAGNOSIS — M25.511 ACUTE PAIN OF RIGHT SHOULDER: ICD-10-CM

## 2023-03-07 DIAGNOSIS — Z71.3 NUTRITIONAL COUNSELING: Primary | ICD-10-CM

## 2023-03-07 DIAGNOSIS — M53.3 PAIN IN THE COCCYX: ICD-10-CM

## 2023-03-07 PROCEDURE — 97803 MED NUTRITION INDIV SUBSEQ: CPT | Mod: VID | Performed by: DIETITIAN, REGISTERED

## 2023-03-07 NOTE — PATIENT INSTRUCTIONS
Shoulder and back pain   Physical therapy referral   Ibuprofen 400mg every 8 hours for 1 week. Take with food. Do not take at same time as tylenol.   Continue home symptom management- hot, cold, foam rolling, biofreeze   Good body mechanics, stretching, avoid re injury   Recommend donut cushion, can get OTC. Use for few weeks while sitting   Massage therapy recommended     Return to clinic if no improvement in 3-4 weeks with PT. Can consider imaging at that time

## 2023-03-07 NOTE — NURSING NOTE
"ROOM:1  [facility]    Preferred Name: Kasandra     How did you hear about us?  Current Patient    37 year old  Chief Complaint   Patient presents with     Fall     Fell on ice two weeks ago,   Back, rt side chest and RT shoulder pain, tailbone        Blood pressure 105/73, pulse 91, temperature 98.3  F (36.8  C), temperature source Oral, height 1.748 m (5' 8.8\"), weight 73 kg (161 lb), last menstrual period 01/29/2023, SpO2 98 %, not currently breastfeeding. Body mass index is 23.91 kg/m .  BP completed using cuff size:        Patient Active Problem List   Diagnosis     Pelvic pain in female     Vitamin D deficiency     Menorrhagia with regular cycle     Migraine without aura and without status migrainosus, not intractable     Iron deficiency anemia due to chronic blood loss     Tired     Circadian rhythm sleep disorder, delayed sleep phase type     Unhealthy sleep habit     Seasonal mood disorder (H)     Chronic idiopathic urticaria     Acid reflux     Cholecystitis     Depression     Hx of abnormal cervical Pap smear     Irritable bowel syndrome with constipation     Migraine headache     Mild intermittent asthma without complication     Need for desensitization to allergens     Panic disorder     Pap smear abnormality of cervix/human papillomavirus (HPV) positive     Recurrent major depressive disorder, in remission (H)     Hypertrophy of breast     Chronic sinusitis, unspecified location     Keratosis pilaris     Endometriosis     Acute pharyngitis due to other specified organisms       Wt Readings from Last 2 Encounters:   03/07/23 73 kg (161 lb)   02/28/23 71.3 kg (157 lb 1.6 oz)     BP Readings from Last 3 Encounters:   03/07/23 105/73   02/28/23 108/77   02/06/23 107/71       Allergies   Allergen Reactions     Dust Mites Cough, Difficulty breathing and Shortness Of Breath     Cats      Chest tightness, sinus irritation       Current Outpatient Medications   Medication     almotriptan (AXERT) 12.5 MG " tablet     amoxicillin-clavulanate (AUGMENTIN) 875-125 MG tablet     atomoxetine (STRATTERA) 40 MG capsule     bisacodyl (DULCOLAX) 5 MG EC tablet     buPROPion (WELLBUTRIN XL) 300 MG 24 hr tablet     calcium carbonate (TUMS) 500 MG chewable tablet     cholecalciferol 125 MCG (5000 UT) CAPS     doxepin (SINEQUAN) 10 MG capsule     EMGALITY 120 MG/ML injection     EPINEPHrine (ANY BX GENERIC EQUIV) 0.3 MG/0.3ML injection 2-pack     famotidine (PEPCID) 20 MG tablet     fexofenadine (ALLEGRA) 180 MG tablet     fluticasone (FLONASE) 50 MCG/ACT nasal spray     gabapentin (NEURONTIN) 400 MG capsule     guaiFENesin (MUCINEX) 600 MG 12 hr tablet     hydrocortisone (CORTAID) 1 % external ointment     hydroxychloroquine (PLAQUENIL) 200 MG tablet     montelukast (SINGULAIR) 10 MG tablet     Multiple Vitamins-Minerals (MULTIVITAMIN ADULTS) TABS     ondansetron (ZOFRAN ODT) 4 MG ODT tab     phenylephrine HCl 10 MG TABS     polyethylene glycol (MIRALAX) 17 GM/Dose powder     propranolol (INDERAL) 20 MG tablet     Semaglutide, 1 MG/DOSE, 4 MG/3ML SOPN     vilazodone (VIIBRYD) 40 MG TABS tablet     amoxicillin (AMOXIL) 500 MG capsule     levonorgestrel (MIRENA) 20 MCG/DAY IUD     No current facility-administered medications for this visit.       Social History     Tobacco Use     Smoking status: Former     Packs/day: 0.00     Years: 10.00     Pack years: 0.00     Types: Cigarettes     Smokeless tobacco: Never     Tobacco comments:     smokes occasionally socially    Vaping Use     Vaping Use: Never used   Substance Use Topics     Alcohol use: Not Currently     Alcohol/week: 0.0 standard drinks     Comment: occasional      Drug use: Not Currently     Types: Marijuana     Comment: marijuana  none since May 2021       Honoring Choices - Health Care Directive Guide offered to patient at time of visit.    Health Maintenance Due   Topic Date Due     ASTHMA ACTION PLAN  Never done     ADVANCE CARE PLANNING  Never done     HEPATITIS B  IMMUNIZATION (1 of 3 - 3-dose series) Never done     YEARLY PREVENTIVE VISIT  01/31/2020     Pneumococcal Vaccine: Pediatrics (0 to 5 Years) and At-Risk Patients (6 to 64 Years) (2 - PCV) 05/22/2020     ASTHMA CONTROL TEST  12/14/2022       Immunization History   Administered Date(s) Administered     COVID-19 Vaccine 12+ (Pfizer 2022) 01/10/2022     COVID-19 Vaccine 12+ (Pfizer) 03/19/2021, 04/09/2021     COVID-19 Vaccine Bivalent Booster 12+ (Pfizer) 09/19/2022     DTaP, Unspecified 05/22/2019     Flu, Unspecified 09/23/2016     Influenza Vaccine >6 months (Alfuria,Fluzone) 09/23/2016, 09/07/2018, 08/28/2019, 09/02/2020, 11/02/2021, 09/19/2022     Pneumococcal 23 valent 05/22/2019     Tdap (Adacel,Boostrix) 05/22/2019       Lab Results   Component Value Date    PAP NIL 01/31/2019       Recent Labs   Lab Test 11/07/22  1241 08/19/22  0835 04/27/22  1717 04/13/22  1600 01/04/22  1732 08/12/21  1535 09/12/20  2058 08/02/19  1711 10/05/17  1133 02/06/17  1200   LDL  --  100  --   --   --   --   --   --   --   --    HDL  --  43*  --   --   --   --   --   --   --   --    TRIG  --  184*  --   --   --   --   --   --   --   --    * 60*  --  63*  --    < > 36 25   < >  --    CR 0.83 0.69 0.66 0.63  --    < > 0.77 0.80   < >  --    GFRESTIMATED >90 >90 >90 >90  --    < > >90 >90   < >  --    GFRESTBLACK  --   --   --   --   --   --  >90 >90   < >  --    ALBUMIN 4.4 3.6  --  3.9  --    < > 3.3* 3.4   < >  --    POTASSIUM 3.8  --  3.7 3.8  --    < > 3.9 3.5   < >  --    TSH  --   --   --   --  1.81  --   --   --   --  1.72    < > = values in this interval not displayed.       PHQ-2 ( 1999 Pfizer) 2/28/2023 11/10/2022   Q1: Little interest or pleasure in doing things 0 0   Q2: Feeling down, depressed or hopeless 0 0   PHQ-2 Score 0 0   PHQ-2 Total Score (12-17 Years)- Positive if 3 or more points; Administer PHQ-A if positive - -   Q1: Little interest or pleasure in doing things - -   Q2: Feeling down, depressed or  hopeless - -   PHQ-2 Score - -   Some encounter information is confidential and restricted. Go to Review Flowsheets activity to see all data.       PHQ-9 SCORE 9/29/2022 9/29/2022 10/11/2022 1/12/2023   PHQ-9 Total Score MyChart - 6 (Mild depression) - 5 (Mild depression)   PHQ-9 Total Score 6 6 5 5   Some encounter information is confidential and restricted. Go to Review Flowsheets activity to see all data.       VIC-7 SCORE 9/30/2022 10/11/2022 1/12/2023   Total Score 8 (mild anxiety) - 8 (mild anxiety)   Total Score 8 9 8   Some encounter information is confidential and restricted. Go to Review Flowsheets activity to see all data.       ACT Total Scores 6/14/2022   ACT TOTAL SCORE (Goal Greater than or Equal to 20) 24   In the past 12 months, how many times did you visit the emergency room for your asthma without being admitted to the hospital? 2   In the past 12 months, how many times were you hospitalized overnight because of your asthma? 0       Diogo Hardin    March 7, 2023 2:09 PM

## 2023-03-07 NOTE — LETTER
"3/7/2023       RE: Kasandra Lau  [address]  [facility] [address]     Dear Colleague,    Thank you for referring your patient, Kasandra Lau, to the [facility]NAG[facility] [place] [facility]. Please see a copy of my visit note below.    Kasandra Lau is a 37 year old female who is being evaluated via a billable video visit.      The patient has been notified of following:     \"This video visit will be conducted via a call between you and your physician/provider. We have found that certain health care needs can be provided without the need for an in-person physical exam.  This service lets us provide the care you need with a video conversation.  If a prescription is necessary we can send it directly to your pharmacy.  If lab work is needed we can place an order for that and you can then stop by our lab to have the test done at a later time.    Video visits are billed at different rates depending on your insurance coverage.  Please reach out to your insurance provider with any questions.    If during the course of the call the physician/provider feels a video visit is not appropriate, you will not be charged for this service.\"    Patient has given verbal consent for Video visit? Yes  How would you like to obtain your AVS? MyChart  If you are dropped from the video visit, the video invite should be resent to: Send to e-mail at: [email or web address]  Will anyone else be joining your video visit? No  {If patient encounters technical issues they should call [phone]      Video-Visit Details    Type of service:  Video Visit    Video Start Time: 1:00 PM  Video End Time: 1:19 PM    Originating Location (pt. Location): Home    Distant Location (provider location):  Offsite (providers home) [facility] WEIGHT MANAGEMENT [facility]     Platform used for Video Visit: [employer]    During this virtual visit the patient is located in [place], " "patient verifies this as the location during the entirety of this visit.       Weight Management Nutrition Consultation    Kasandra Lau is a 37 year old female presents today for return weight management nutrition consultation.  Patient referred by Dr. Kerri Evans on October 4, 2022.    Patient with Co-morbidities of obesity including:  Type II DM no  Renal Failure no  Sleep apnea no  Hypertension no   Dyslipidemia yes  Joint pain no  Back pain yes  GERD yes   Fatty liver yes    PMH also significant for anxiety, depression, ADHD, PTSD, rheumatoid arthritis, and IBS-C.  H/o cholcystectomy     Anthropometrics:  Estimated body mass index is 29.22 kg/m  as calculated from the following:    Height as of an earlier encounter on 10/4/22: 1.74 m (5' 8.5\").    Weight as of an earlier encounter on 10/4/22: 88.5 kg (195 lb).    Current weight: 155 lbs with BMI 23 (-10 lbs on past 6-7 weeks, -40 lbs from initial)      Medications for Weight Loss:  Ozempic     NUTRITION HISTORY  Food allergies: None  Food intolerances: Too much dairy. Peppers, onions and garlic.   Supplements: Vitamin D (5000 units/day), daily multivitamin   Previous methods of diet modification for weight loss: Cutting down high-sugar (baked goods, ice cream) and high-fat foods, cutting 500 calories per day. Tried Noom, did not like it.      She reports having some nausea and cramping when starting Ozempic, but has reduced over time. She has noticed increased reflux as well.     Last RD visit 1/12/23 - Got away from nutritional tracking during the holiday season, has restarted recently. Aiming for 1200 rogelio/day and optimizing protein intake. Portions continue to be smaller. Finding some sweet cravings persist but manages with fruit or smaller portions. Less constipation recently, is taking Miralax daily.     Today - Reports experiencing hair loss in past 1-2 months. Has reduced Ozempic to 0.5 mg dose to help stableize weight. Likely hair loss r/t rate " of weight loss. She has also tried adding in biotin and collagen supplements, in addition to her daily MVI and vitamin S.   Appetite stronger earlier in the day.   Eating meat if getting take-out otherwise doesn't like to cook meat.     Recent Diet Recall:  Breakfast: cereal, bagel with cream cheese with fruit   Lunch: bigger meal - noodle bowl and side veggies; Meat and veggie frozen meal   Dinner: snack-size fruit smoothie; yogurt; cereal or fruit   Beverages: tea (20-40 oz/day), 32 oz/day water     Progress Towards Previous Goals:  1) Increase lean protein. Aim for 65-90 gm protein daily. - Improving   - 20-30 gm protein = palm-size pc of lean meat/seafood, 4-5 tuna salad, 3 cheese sticks, 3/4 c serving of Greek yogurt, 3 hard boiled eggs, 1 protein bar/shake, 3 slice deli meat (or 2 slice deli meat + 1 slice cheese)   - Vital proteins collagen powder  2) Increase activity as able - At home yoga once weekly - Started cross country skiing, and looking at dance classes.    3) Consume 3L fluid per day - Met, continues   4) Gradually increase fiber intake. Increase veggie intake. - Improving    Physical Activity:  Recently signed up for cross country skiing exercises. And plans to do twice weekly dance classes with a friend once skiing season is over.     Nutrition Prescription  Recommended energy/nutrient modification.  [id number] calories/day    Nutrition Diagnosis  Current: Overweight r/t long history of positive energy balance aeb BMI >25 - Resolved.     Current: Food-nutrition knowledge deficit r/t maintenance diet aeb pt presents with interest in maintenance diet review.     Nutrition Intervention  Materials/education provided on hypocaloric diet for weight loss. Discussed restarting nutritional tracking to help assess calorie needs for weight maintenance, assessing for average daily protein intake. Reviewed sources of protein.   Discussed protein supplements to help meet goal protein intake.   Encouraged  increased exercise as able.  Co-developed goals to work towards.   Provided pt with list of goals and resources below via [employer].    Patient demonstrates understanding.    Expected Engagement: good  Follow-Up Plans: Nutritional tracking     Nutrition Goals  1) Restart tracking nutritional intake. Aim for 65-90 gm protein daily.    - 20-30 gm protein = palm-size pc of lean meat/seafood, 4-5 tuna salad, 3 cheese sticks, 3/4 c serving of Greek yogurt, 3 hard boiled eggs, 1 protein bar/shake, 3 slice deli meat (or 2 slice deli meat + 1 slice cheese)   - Vital proteins collagen powder  2) Increase activity as able   3) Consume 3L fluid per day  4) Gradually increase fiber intake. Increase veggie intake.     Meal Replacement Shake Options:   *Protein Shake Criteria: no more than 210 Calories, at least 20 grams of protein, and less than 10 grams of sugar   [facility] smoothie (160 Calories, 20 g protein)   Premier Protein (160 Calories, 30 g protein)  Slim Fast Advanced Nutrition (180 Calories, 20 g protein)  Muscle Milk, lactose-free, 17 oz bottle (210 Calories, 30 g protein)  Integrated Supplements, no artificial sugars (110 Calories, 20 g protein)  Genepro, unflavored protein powder (60 Calories, 30 g protein)  Boost/Ensure Max (160 calories, 30 gm protein)   Fairlife Core Power (170 calories, 26 gm protein)  Aldi's Elevation Protein Powder (180 calories, 30 gm protein)     Meal Replacement Bar Options:  [facility] Protein Shake (160 Calories, 15 g protein)  Quest Protein Bars (190 Calories, 20 g protein)  Built Bar (170 Calories, 15-20 g protein)  One Protein Bar (210 calories, 20 g protein)  [place] Signature Protein Bar (Costco) (190 Calories, 21 g protein)  Pure Protein Bars (180 Calories, 21 g protein)    Low Calorie Frozen Meal:  Healthy Choice Power Bowls  Lean Cuisine  Smart Ones  Tera Ontiveros    High Fiber Foods:  Bran cereal, 1/3 cup, 8.6 gm fiber   Cooked kidney beans   cup 7.9 gm  Cooked lentils    cup 7.8 gm  Cooked black beans   cup 7.6 gm  Canned chickpeas   cup 5.3 gm  Baked beans   cup 5.2 gm  Pear 1 5.1 gm  Soybeans   cup 5.1 gm  Quinoa   cup 5 gm  Osvaldo seeds, 1 tbsp 5 gm  Baked sweet potato, with skin 1 medium 4.8 gm  Avocado, half small 4.5 gm  Baked potato, with skin 1 medium 4.4 gm  Cooked frozen green peas   cup 4.4 gm  Bulgur   cup 4.1 gm  Cooked frozen mixed vegetables   cup 4 gm  Raspberries   cup 4 gm  Blackberries   cup 3.8 gm  Almonds 1 oz 3.5 gm  Cooked frozen spinach   cup 3.5 gm  Vegetable or soy barbara 1 each 3.4 gm  Apple 1 medium 3.3 gm  Dried dates 5 pieces 3.3 gm        Follow-Up:  4/13/23    Time spent with patient: 19 minutes.  Ana Pillai, RD, LD

## 2023-03-07 NOTE — PROGRESS NOTES
HPI       Kasandra Lau is a 37 year old  who presents for acute shoulder and coccyx pain following a nontraumatic fall while cross country skiing on 2/19/2023. Mechanism of injury was a fall directly onto buttocks/coccyx, with slight impact to right shoulder. Did not hit head.  Symptoms include persistent right shoulder pain which radiates to right pectoral and right scapular region. Is a sharp/shooting pain that worsens with activity. Coccyx pain is also a sharp shooting pain and is isolated to coccyx and lumber region of back. Coccyx pain is aggravated by prolong sitting especially on hard surfaces. Alleviating treatments tried at home include tylenol a few times daily, warm baths, Biofreeze, and a muscle roller on the shoulder. These have helped slightly. The pain was worst the first week following injury but is starting to slowly improve. Today pain is a 3/10 in both locations. Pain does not interfere with ADLs, patient has been able to ski since initial injury. Back pain does not radiated, patient denies bowel or bladders changes or leakages, denies numbness or tingling or decreased strength. Denies fever, chills, malaise as related to back pain. Denies rashes, swelling, bruising, cuts or lesions to areas of injury.     To note was seen in clinic on 2/28/23 and treated with Augmentin for ongoing pharyngitis. WBC 11.5 and neutrophils 8.7. CRP 8.83. Patient endorses improvement to throat pain and is tolerating Augmentin well.     Chief Complaint   Patient presents with     Fall     Fell on ice two weeks ago,   Back, rt side chest and RT shoulder pain, tailbone        Problem, Medication and Allergy Lists were reviewed and updated if needed..    Patient is an established patient of this clinic..         Review of Systems:   Review of Systems  Gen: denies fever, chills, malaise   Throat: notes improvement in throat pain, decreased swelling   GI: denies bowel changes   : denies incontinence   MSK: see  "HPI  Neuro: denies numbness or tingling or gait disturbance, weakness  Skin: denies rashes, bruising, swelling          Physical Exam:     Vitals:    03/07/23 1409   BP: 105/73   Pulse: 91   Temp: 98.3  F (36.8  C)   TempSrc: Oral   SpO2: 98%   Weight: 73 kg (161 lb)   Height: 1.748 m (5' 8.8\")     Body mass index is 23.91 kg/m .  Vitals were reviewed and were normal     Physical Exam  Gen: alert, in no acute distress   Throat: OP intact, pink. Non swollen without exudate. No lymphadenopathy.   MSK: shoulders symmetrical on inspection.  Tightness and tension over right trapezius to palpation.Skin intact. No bruising or swelling to shoulder or coccyx. Full ROM bilaterally in arms and legs, spine.  Slight tenseness over lumber spine bilaterally. Negative straight leg raises, Negative ALBERTO.     Neuro: DTRs +2 UE triceps and LE patellar  Skin: intact, no lesions or bruising          Results:   No testing ordered today    Assessment and Plan        1. (M25.511) Acute pain of right shoulder  Comment: non traumatic injury to right shoulder, see HPI. Referral to physical therapy. Recommended 1 week of ibuprofen, 400 mg very 8 hours in addition to continue home symptom management with hot, cold, foam rolling, and biofreeze. Considered addition of a muscle relaxant however multiple drug interactions present with current medication regiment. Discussed risk/benefits with patient and decided to hold for now. If no improvement with above management including PT in 3-4 weeks can consider imaging.    Plan: Physical Therapy Referral     2. (M53.3) Pain in the coccyx  Comment: see plan as noted in problem 1. In addition, recommended us of a donut cushion to alleviate pressure and discomfort with sitting.   Plan: Physical Therapy Referral       There are no discontinued medications.    Options for treatment and follow-up care were reviewed with the patient. Kasandra Lau  engaged in the decision making process and verbalized " understanding of the options discussed and agreed with the final plan.    Follow up in 3-4 weeks if no improvement with physical therapy or as needed.     Marilu Costello), RN, BAN  Doctor of Nursing Practice Student  Class of 2024   [employer]  [employer]   I was present with the NPP student who participated in the service and in the documentation of the services provided. I have verified the history and personally performed the physical exam and medical decision making, as documented by the student and edited by me    JASVIR Ashley CNP  03/07/23  5:37 PM             JASVIR Ashley CNP

## 2023-03-07 NOTE — PATIENT INSTRUCTIONS
Pritesh Slaughter,    Follow-up with RD on 4/13    Thank you,    Ana Pillai, RD, LD  If you would like to schedule or reschedule an appointment with the RD, please call [phone]    Nutrition Goals  1) Restart tracking nutritional intake. Aim for 65-90 gm protein daily.    - 20-30 gm protein = palm-size pc of lean meat/seafood, 4-5 tuna salad, 3 cheese sticks, 3/4 c serving of Greek yogurt, 3 hard boiled eggs, 1 protein bar/shake, 3 slice deli meat (or 2 slice deli meat + 1 slice cheese)   - Vital proteins collagen powder  2) Increase activity as able   3) Consume 3L fluid per day  4) Gradually increase fiber intake. Increase veggie intake.     Meal Replacement Shake Options:   *Protein Shake Criteria: no more than 210 Calories, at least 20 grams of protein, and less than 10 grams of sugar   [facility] smoothie (160 Calories, 20 g protein)   Premier Protein (160 Calories, 30 g protein)  Slim Fast Advanced Nutrition (180 Calories, 20 g protein)  Muscle Milk, lactose-free, 17 oz bottle (210 Calories, 30 g protein)  Integrated Supplements, no artificial sugars (110 Calories, 20 g protein)  Genepro, unflavored protein powder (60 Calories, 30 g protein)  Boost/Ensure Max (160 calories, 30 gm protein)   Fairlife Core Power (170 calories, 26 gm protein)  Aldi's Elevation Protein Powder (180 calories, 30 gm protein)     Meal Replacement Bar Options:  [facility] Protein Shake (160 Calories, 15 g protein)  Quest Protein Bars (190 Calories, 20 g protein)  Built Bar (170 Calories, 15-20 g protein)  One Protein Bar (210 calories, 20 g protein)  [place] Signature Protein Bar ([employer]co) (190 Calories, 21 g protein)  Pure Protein Bars (180 Calories, 21 g protein)    Low Calorie Frozen Meal:  Healthy Choice Power Bowls  Lean Cuisine  Smart Ones  Tera Ontiveros    High Fiber Foods:  Bran cereal, 1/3 cup, 8.6 gm fiber   Cooked kidney beans   cup 7.9 gm  Cooked lentils   cup 7.8 gm  Cooked black beans   cup 7.6 gm  Canned chickpeas    cup 5.3 gm  Baked beans   cup 5.2 gm  Pear 1 5.1 gm  Soybeans   cup 5.1 gm  Quinoa   cup 5 gm  Osvaldo seeds, 1 tbsp 5 gm  Baked sweet potato, with skin 1 medium 4.8 gm  Avocado, half small 4.5 gm  Baked potato, with skin 1 medium 4.4 gm  Cooked frozen green peas   cup 4.4 gm  Bulgur   cup 4.1 gm  Cooked frozen mixed vegetables   cup 4 gm  Raspberries   cup 4 gm  Blackberries   cup 3.8 gm  Almonds 1 oz 3.5 gm  Cooked frozen spinach   cup 3.5 gm  Vegetable or soy barbara 1 each 3.4 gm  Apple 1 medium 3.3 gm  Dried dates 5 pieces 3.3 gm    At Home Exercise Resources    Dance Workouts: The Isabel Hayes   [email or web address]/[email or web address]    HIIT Workouts: [employer]itness  https://www.Ansible/user/popsugartvfit    Pilates: Blogilates  https://www.youtube.com/user/blogilates    Yoga: Yoga with Prudence   https://www.IPextreme.Asthmatracker/user/yogawithadriene/featured    Strength Training: HASfit  https://[email or web address]/[email or web address]      Exercises you can do anytime/anywhere: https://www.[employer].org/resources/everyone/blog/6593/top-25-at-home-exercises/#:~:text=Top%2025%20At-Home%20Exercises.%201%201.%20Supermans.%20Who,Knee%20Push-up.%205%205.%20Downward-facing%20Dog.%20More%20items            COMPREHENSIVE WEIGHT MANAGEMENT PROGRAM  VIRTUAL SUPPORT GROUPS    For Support Group Information:      We offer support groups for patients who are working on weight loss and considering, preparing for or have had weight loss surgery.   There is no cost for this opportunity.  You are invited to attend the?Virtual Support Groups?provided by any of the following locations:    [facility] via Digital [employer] Teams with Frannie Scales RN  2.   [place] via [employer]ault Teams with Errol Boss, PhD, LP  3.   [facility] [facility] via [employer] Teams with Marilu Wild RN  4.   [facility] via Digital Vault Teams with JOSE ANGEL [facility]    The following Support Group information can also be found on our  "website:  https://www.[email or web address].org/treatments/weight-loss-surgery-support-groups    https://www.[facility].org/treatments/weight-loss-and-weight-loss-surgery-support-groups    [facility] Weight Loss Surgery Support Group    [facility] Weight Loss Surgery Support Group  The support group is a patient-lead forum that meets monthly to share experiences, encouragement and education. It is open to those who have had weight loss surgery, are scheduled for surgery, or are considering surgery.   WHEN: This group meets on the 3rd Wednesday of each month from 5:00PM - 6:00PM virtually using Microsoft Teams.   FACILITATOR: Led by Frannie Bowman RD, LD, RN, the program's Clinical Coordinator.   TO REGISTER: Please contact the clinic via Biodirection or call the nurse line directly at [phone] to inform our staff that you would like an invite sent to you and to let us know the email you would like the invite sent to. Prior to the meeting, a link with directions on how to join the meeting will be sent to you.    2023 Meetings (speakers to be determined)  January 18: \"Let's Talk\" a time for the group to share.  February 15: \"Let's Talk\" a time for the group to share.  March 15: \"Let's Talk\" a time for the group to share.  April 19: \"Let's Talk\" a time for the group to share.  May 17: \"Let's Talk\" a time for the group to share.  June 21: \"Let's Talk\" a time for the group to share.    [facility] Support Groups    [facility]ctions Bariatric Care Support Group?  This is open to all pre- and post- operative bariatric surgery patients as well as their support system.   WHEN: This group meets the 2nd Tuesday of each month from 6:30 PM - 8:00 PM virtually using Microsoft Teams.   FACILITATOR: Led by Errol Boss, Ph.D who is a Licensed Psychologist with the [facility] Comprehensive Weight Management Program.   TO REGISTER: Please send an email to Errol" "Gera, Ph.D., LP at?[email or web address]?if you would like an invitation to the group and to learn about using Microsoft Teams.    2023 Meetings  January 10: Manish Alegria, PharmD, Pharmacy Resident, \"Medications and Bariatric Surgery\"  February 14:  March 14:  April 11:  May 9:  June 11:    Connections Post-Operative Bariatric Surgery Support Group  This is a support group for [facility] bariatric patients (and those external to [facility]) who have had bariatric surgery and are at least 3 months post-surgery.  WHEN: This support group meets the 4th Wednesday of the month from 11:00 AM - 12:00 PM virtually using Microsoft Teams.   FACILITATOR: Led by Certified Bariatric Nurse, Marilu Wild RN.   TO REGISTER: Please send an email to Marilu at [email or web address] if you would like an invitation to the group and to learn about using Microsoft Teams.    2023 Meetings  January 25  February 22  March 22  April 26  May 24  Josiane 28      [facility] Healthy Lifestyle Virtual Support Group    Healthy Lifestyle Virtual Support Group?  This is 60 minutes of small group guided discussion, support and resources. All are welcome who want a healthy lifestyle.  WHEN: This group meets monthly on a Friday from 12:30 PM - 1:30 PM virtually using Microsoft Teams.   FACILITATOR: Led by National Board Certified Health and , Marilu Leonardo [facility].   TO REGISTER: Please send an email to Marilu [email or web address] to receive monthly invites to the group or if you have any questions about having a health .  Prior to the meeting, a link with directions on how to join the meeting will be sent to you.    2023 Meetings January 20: \"Let's Talk\" a time for the group to share  February 17: Guest Speaker, Ann Magaña RD, Registered Dietician, \"Tips to Maximize Your Metabolism\"  March 17: Let's Talk\" a time for the group to share  April 14: Guest Speaker, " "Ssoa Gillette RD, Registered Dietician, \"Heart Health\"  May 19: \"Let's Talk\" a time for the group to share  June: To be announced.            "

## 2023-03-12 NOTE — PROGRESS NOTES
Rheumatology Clinic Visit  [facility]  Arnie Bennett M.D.     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  Symptom improvement occurred in take correlation with starting hydroxychloroquine. Exam shows full painless range of motion in PIPs and PIPs; slight tenderness at MCPs 3 and 4 on the left.  Wrist, elbow, and shoulder range of motion is normal.    Data: Blood work on February 28, 2023 showed CRP 8; CBC was normal; urinalysis in November 2022 was normal.  SARS Cov 2, HIV, and mononucleosis were negative.   In May 2021, STEPHANIE was positive at a titer of 1: 160; extractable nuclear antigen panel was negative, as was double-stranded DNA.  CRP was normal.  Complement C3 and C4 were negative.  Hepatitis B and C were negative.  Antigliadin and antitissue transglutaminase antibodies were negative.  Celiac panel was negative.    Discussion: Inflammatory, small joint predominant polyarthritis associated with low titer positive STEPHANIE is significantly improved, first in association with use of prednisone, and more recently in association with use of hydroxychloroquine in summer 2022.  There are symptoms of low-grade smoldering inflammatory arthritis with morning stiffness prolonged and tenderness in several MCPs today.  I recommend continuing hydroxychloroquine.  Recommend using acetaminophen and as needed ibuprofen to manage low-grade daily joint pain.  There is no need for further prednisone.  If additional medication is needed, I would recommend consideration of methotrexate.  I recommend checking creatinine in September 2023.    # Non-alcoholic fatty liver  "disease:  Persistent ALT elevation, monitored by GI. Using ozempic to assist with weight loss--30# in 3 months.  #Carpal tunnel syndrome: Hand and wrist symptoms are improved with use of splints.  I do not think that carpal tunnel syndrome represents a manifestation of inflammatory arthritis.  I agree with planned physical therapy.    Plan:  1.  Continue hydroxychloroquine 400 mg once daily.  While taking hydroxychloroquine, undergo annual ophthalmology evaluation to screen for rare retinal Plaquenil toxicity.  Blood work prior to next visit.  2.  Check creatinine in September 2023  3.  Use acetaminophen 1000 mg up to 3 times daily for joint pain.  Use ibuprofen 400 to 600 mg up to 3 times daily for pain not adequately controlled by acetaminophen.    RTC 7 mos    Orders Placed This Encounter   Procedures     Creatinine     CBC with platelets     Arnie Bennett MD  Staff Rheumatologist, [facility]           History of Present Illness:   Kasandra Lau presents for follow-up of inflammatory arthritis and positive STEPHANIE.  I last saw the patient on December 22, 2022.  At that visit, inflammatory arthritis was significantly improved.  I recommended continuing hydroxychloroquine.    Interval history March 16, 2023    She continues doing much better.  Joint pains are less. Her wrists still hurt, especially when she is typing. Her thumbs still get sore with activity. Her hands/fingers are less swollen. Early morning stiffness is still 1 hour. Still has CTS; uses compression sleeves when typing.  She notes more widespread joint pain with x country skiing that she started.  She used prednisone for treating respiratory symptoms recently, but not for her joints.  Continues hydroxychloroquine 400 mg daily. No AE from the hydroxychloroquine.  She uses tylenol 1000 mg daily; not using ibuprofen.    Interval history December 22, 2022    Hydroxychloroquine was started in November 2022, 400 mg daily.    She is \"doing a lot " "better\".  Joint pains are less. Her wrists still hurt, especially when she is typing. Her thumbs still get sore with activity. Her hands/fingers are less swollen. Early morning stiffness is not significant.  She noted prednisone helped her hands quickly. She noted feeling \"on edge\" while on the drug. Once she started hydroxychloroquine, joint pain dissipated wihtin  Weeks to a month. No AE from the hydroxychloroquine.  No new skin, joint symptoms. 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  She has been using much less tylenol or ibuprofen since last visit.   No skin, CV, neuro, GI symptoms.       History 8-2022    She notes increasing \"carpal tunnel\" symptoms; also increased pain in her hands and other joints. There is \"diffuse achiness\", especially wrists, ankles, fingers. Hard to type, hard to hold things. There has been puffiness in L > R hands. Symptoms are progressing, especially hands.    Mornings are \"rough\" with stiffness, brain fog, fatigue. Early morning stiffness is ~ 1 hour. Toes/forefeet are stiff and can cramp. Moist heat helps. 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    Tylenol and ibuprofen help with morning pain; menthol creams also help.    Muscle pain also is prominent.         Review of Systems:     Constitutional: negative  Skin: \"bumps\" on upper arms, not itchy; Dermatology told her \"genetic\" cause; rec'd moisturizing lotion.  Eyes: negative  Ears/Nose/Throat: s/p tonsillectomy  Respiratory: No shortness of breath, dyspnea on exertion, cough, or hemoptysis; uses mucinex to help breathing.  Cardiovascular: negative  Gastrointestinal: IBS sx  Genitourinary: negative  Musculoskeletal: negative  Neurologic: negative  Psychiatric: negative  Hematologic/Lymphatic/Immunologic: negative  Endocrine: negative         Active Problem List:     Patient Active Problem List    Diagnosis Date Noted     Acute pharyngitis due to other specified " organisms 02/28/2023     Priority: Medium     Endometriosis 10/11/2022     Priority: Medium     Keratosis pilaris 06/14/2022     Priority: Medium     Chronic sinusitis, unspecified location 09/21/2021     Priority: Medium     Hypertrophy of breast 09/10/2020     Priority: Medium     Added automatically from request for surgery [id number]       Pap smear abnormality of cervix/human papillomavirus (HPV) positive 09/03/2020     Priority: Medium     10/10/17 NIL pap, + HR HPV (not 16/18)  1/31/19 NILM HPV  Positive for High Risk HPV types other than 16 or 18 (Care Everywhere)  2/21/19 [place] ECC: benign. Plan 1 year cotest  2020 NILM HPV negative 34 y.o. PLAN, per ASCCP Guidelines: cotest 1/2023 2/24/23 NIL pap, neg HR HPV. Plan 3 year cotest       Cholecystitis 07/16/2020     Priority: Medium     Added automatically from request for surgery [id number]       Chronic idiopathic urticaria 06/22/2020     Priority: Medium     Hx of abnormal cervical Pap smear 02/03/2020     Priority: Medium     Acid reflux 08/25/2019     Priority: Medium     Need for desensitization to allergens 06/06/2019     Priority: Medium     Formatting of this note might be different from the original.    Allergy Shot Care Plan for Kasandra Lau  Date of Order: June 6 2019  Ordering Provider: Dr. Martin Cervantes     Serum 1: Cat  Date Shots Started:  Start Dose: 0.1 mL of 1:100,000 - GREEN  Date Maintenance Reached:  Maintenance Dose: 0.3 mL of 1:100 - RED    Serum 2: Mite DF and Mite DP  Date Shots Started:  Start Dose: 0.1 mL of 1:100,000 - GREEN  Date Maintenance Reached:  Maintenance Dose: 0.1 mL of 1:100 - RED    BUILDING SCHEDULE FOR POLLEN AND NONPOLLEN   IMMUNOTHERAPY  EXCEPT VENOM AND CENTER AL    3 - 14 days Build per schedule.   15 - 21 days Repeat previous dose.   22 - 28 days Decrease by one dose.   29 - 35 days Decrease by two doses.   36 - 42 days Decrease by three doses.   Over 42 days Continue to decrease by one dose for each additional week.      1:100,000 (green)  1:10,000 (blue)  1:1000 (yellow)  1:100 (red)     1. 0.05 ml  7. 0.05 ml  13. 0.05 ml  19. 0.05 ml   2. 0.1 ml  8. 0.1 ml  14. 0.1 ml  20. 0.1 ml   3. 0.2 ml  9. 0.2 ml  15. 0.2 ml  21. 0.15 ml   4. 0.3 ml  10. 0.3 ml  16. 0.3 ml  22. 0.2 ml   5. 0.4 ml  11. 0.4 ml  17. 0.4 ml  23. 0.25 ml   6. 0.45 ml  12. 0.45 ml  18. 0.45 ml  24. 0.3 ml         25   0.35 ml         26   0.4 ml         27. 0.45 ml         28. 0.5 ml     MAINTENANCE SCHEDULE FOR POLLENS, NONPOLLENS (MITES,MOLD,CAT,DOG)  (not used for [facility] Pollens)    ? When reaching maintenance dose for the first time, gradually stretch by weekly intervals to every 4 weeks (e.g., every 2 weeks, then 3 weeks, then 4 weeks).   ? Patient may receive their injections (patient choice) at 2-4 week intervals     Maintenance Repeat 1 Dose 2 Dose 3 Dose    Q2 weeks  (10-14 days)  3 weeks  (15-21 days) 4 weeks  (22-28 days) 5 weeks  (29-35 days) 6 weeks  (36-42 days) Decrease by 1 Dose for each additional week   Q3 weeks  (15-21 days)  4 weeks  (22-28 days) 5 weeks  (29-35 days) 6 weeks  (36-42 days) 7 weeks  (43-49 days)    Q4 weeks  (22-28 days)  5 weeks  (29-35 days) 6 weeks  (36-42 days) 7 weeks  (43-49 days) 8 weeks  (50-56 days)      Susanna Ambrose RN 6/6/2019 4:49 PM       Mild intermittent asthma without complication 04/30/2018     Priority: Medium     Seasonal mood disorder (H) 11/15/2017     Priority: Medium     Circadian rhythm sleep disorder, delayed sleep phase type 05/10/2017     Priority: Medium     Unhealthy sleep habit 05/10/2017     Priority: Medium     Vitamin D deficiency 11/19/2016     Priority: Medium     Menorrhagia with regular cycle 11/19/2016     Priority: Medium     Migraine without aura and without status migrainosus, not intractable 11/19/2016     Priority: Medium     Iron deficiency anemia due to chronic blood loss 11/19/2016     Priority: Medium     Tired 11/19/2016     Priority: Medium     Irritable bowel  syndrome with constipation 10/25/2016     Priority: Medium     Recurrent major depressive disorder, in remission (H) 10/25/2016     Priority: Medium     Pelvic pain in female 08/25/2016     Priority: Medium     Migraine headache 01/01/2012     Priority: Medium     Panic disorder 01/01/2007     Priority: Medium     Depression 01/01/2004     Priority: Medium            Past Medical History:     Past Medical History:   Diagnosis Date     Anemia fall 2016     Anxiety      Arthritis      Chronic fatigue      Depression (emotion)     sees psych, on meds     Gastroesophageal reflux disease      Migraine     daily meds, 2x month, more mild on meds     Neuropathic pain      Panic disorder      Uncomplicated asthma Spring 2018     Past Surgical History:   Procedure Laterality Date     COLONOSCOPY       LAPAROSCOPIC ABLATION ENDOMETRIOSIS N/A 11/09/2016    Procedure: LAPAROSCOPIC ABLATION ENDOMETRIOSIS;  Surgeon: Tonja Ferrer MD;  Location: [facility]     LAPAROSCOPIC CYSTECTOMY OVARIAN (BENIGN) Left 11/09/2016    Procedure: LAPAROSCOPIC CYSTECTOMY OVARIAN (BENIGN);  Surgeon: Tonja Ferrer MD;  Location: [facility] OR     LAPAROSCOPIC TUBAL DYE STUDY Left 11/09/2016    Procedure: LAPAROSCOPIC TUBAL DYE STUDY;  Surgeon: Tonja Ferrer MD;  Location: [facility] OR     LAPAROSCOPY OPERATIVE ADULT N/A 11/09/2016    Procedure: LAPAROSCOPY OPERATIVE ADULT;  Surgeon: Tonja Ferrer MD;  Location: [facility] OR     MAMMOPLASTY REDUCTION Bilateral 08/05/2021    Procedure: MAMMOPLASTY, REDUCTION, Bilateral;  Surgeon: ANTONIO Moreno MD;  Location: [facility] OR     ORTHOPEDIC SURGERY      left wrist surgery     TONSILLECTOMY & ADENOIDECTOMY Bilateral     cauterized turbinates also            Social History:     Social History     Socioeconomic History     Marital status: Single     Spouse name: Not on file     Number of children: Not on file     Years of education: Not on file     Highest education level: Not on file    Occupational History     Not on file   Tobacco Use     Smoking status: Former     Packs/day: 0.00     Years: 10.00     Pack years: 0.00     Types: Cigarettes     Smokeless tobacco: Never     Tobacco comments:     smokes occasionally socially    Vaping Use     Vaping Use: Never used   Substance and Sexual Activity     Alcohol use: Not Currently     Alcohol/week: 0.0 standard drinks     Comment: occasional      Drug use: Not Currently     Types: Marijuana     Comment: marijuana  none since May 2021     Sexual activity: Yes     Partners: Male     Birth control/protection: I.U.D.     Comment: Nuvaring   Other Topics Concern     Not on file   Social History Narrative    Grad student in geography     Social Determinants of Health     Financial Resource Strain: Not on file   Food Insecurity: Not on file   Transportation Needs: Not on file   Physical Activity: Not on file   Stress: Not on file   Social Connections: Not on file   Intimate Partner Violence: Not on file   Housing Stability: Not on file          Family History:     Family History   Problem Relation Age of Onset     Diabetes Maternal Grandfather      Hypertension No family hx of      Coronary Artery Disease No family hx of      Hyperlipidemia No family hx of      Cerebrovascular Disease No family hx of      Breast Cancer No family hx of      Colon Cancer No family hx of      Prostate Cancer No family hx of      Other Cancer No family hx of      Glaucoma No family hx of      Macular Degeneration No family hx of             Allergies:     Allergies   Allergen Reactions     Dust Mites Cough, Difficulty breathing and Shortness Of Breath     Cats      Chest tightness, sinus irritation            Medications:     Current Outpatient Medications   Medication Sig Dispense Refill     almotriptan (AXERT) 12.5 MG tablet Take 1 tablet (12.5 mg) by mouth at onset of headache for migraine (repeat in 2 hours if needed) May repeat in 2 hours. Max 2 tablets/24 hours. 18 tablet  11     amoxicillin-clavulanate (AUGMENTIN) 875-125 MG tablet Take 1 tablet by mouth 2 times daily 20 tablet 0     atomoxetine (STRATTERA) 40 MG capsule Take 1 capsule (40 mg) by mouth daily 30 capsule 3     bisacodyl (DULCOLAX) 5 MG EC tablet Take 15 mg by mouth daily as needed for constipation       buPROPion (WELLBUTRIN XL) 300 MG 24 hr tablet TAKE 1 TABLET BY MOUTH EVERY MORNING 90 tablet 1     calcium carbonate (TUMS) 500 MG chewable tablet Take 1 tablet (500 mg) by mouth 3 times daily As needed for acid reflux 90 tablet 11     cholecalciferol 125 MCG (5000 UT) CAPS Take 5,000 Units by mouth every evening       doxepin (SINEQUAN) 10 MG capsule Take 2 capsules (20 mg) by mouth 2 times daily 120 capsule 11     EMGALITY 120 MG/ML injection Inject 1 mL (120 mg) Subcutaneous every 28 days 1 mL 11     EPINEPHrine (ANY BX GENERIC EQUIV) 0.3 MG/0.3ML injection 2-pack Inject 0.3 mg into the muscle as needed       famotidine (PEPCID) 20 MG tablet Take 1 tablet (20 mg) by mouth 2 times daily 180 tablet 3     fexofenadine (ALLEGRA) 180 MG tablet TAKE 1 TABLET (180 MG) BY MOUTH EVERY MORNING 90 tablet 1     fluticasone (FLONASE) 50 MCG/ACT nasal spray 2 times daily as needed        gabapentin (NEURONTIN) 400 MG capsule Take 2 capsules (800 mg) by mouth 3 times daily 180 capsule 5     guaiFENesin (MUCINEX) 600 MG 12 hr tablet Take 600 mg by mouth 2 times daily       hydrocortisone (CORTAID) 1 % external ointment Apply sparingly to affected area three times daily for 14 days. 30 g 0     hydroxychloroquine (PLAQUENIL) 200 MG tablet Take 2 tablets (400 mg) by mouth daily 180 tablet 2     montelukast (SINGULAIR) 10 MG tablet Take 1 tablet (10 mg) by mouth At Bedtime 90 tablet 1     Multiple Vitamins-Minerals (MULTIVITAMIN ADULTS) TABS Take 1 tablet by mouth daily       ondansetron (ZOFRAN ODT) 4 MG ODT tab Take 1 tablet (4 mg) by mouth every 8 hours as needed for nausea 18 tablet 6     phenylephrine HCl 10 MG TABS Take 10 mg by  "mouth 2 times daily       polyethylene glycol (MIRALAX) 17 GM/Dose powder Take 17 g (1 capful) by mouth daily 238 g 3     propranolol (INDERAL) 20 MG tablet Take 1 tablet (20 mg) by mouth daily as needed (anxiety) 30 tablet 11     Semaglutide, 1 MG/DOSE, 4 MG/3ML SOPN Inject 1 mg Subcutaneous once a week To be started after finishing 0.5 mg weekly 9 mL 3     vilazodone (VIIBRYD) 40 MG TABS tablet Take 1 tablet (40 mg) by mouth every morning 90 tablet 1     amoxicillin (AMOXIL) 500 MG capsule Take 1 capsule (500 mg) by mouth 2 times daily (Patient not taking: Reported on 2/28/2023) 20 capsule 0     levonorgestrel (MIRENA) 20 MCG/DAY IUD 1 each (20 mcg) by Intrauterine route once for 1 dose 1 each 0            Physical Exam:   Blood pressure 112/80, pulse 84, height 1.735 m (5' 8.3\"), weight 72.3 kg (159 lb 8 oz), last menstrual period 01/29/2023, SpO2 99 %, not currently breastfeeding.  Wt Readings from Last 6 Encounters:   03/16/23 72.3 kg (159 lb 8 oz)   03/07/23 73 kg (161 lb)   02/28/23 71.3 kg (157 lb 1.6 oz)   02/06/23 73.5 kg (162 lb)   01/17/23 74.8 kg (165 lb)   01/02/23 75.8 kg (167 lb)     Constitutional: well-developed, appearing stated age; cooperative  Eyes: nl EOM, PERRLA, vision, conjunctiva, sclera  ENT: nl external ears, nose, hearing, lips, teeth, gums, throat  No mucous membrane lesions, normal saliva pool  Neck: no mass or thyroid enlargement  Resp: l breathing unlabored  Lymph: no cervical, supraclavicular, inguinal or epitrochlear nodes  MS: Tenderness at multiple PIPs and MCPs with subtle puffiness at the hands.  No hidebound or bound down skin; no gross synovitis.  No tenderness at MTPs midfoot and ankle range of motion full and ankles nontender.  Skin: no nail pitting, alopecia, rash, nodules or lesions  Neuro: nl cranial nerves, strength, sensation, DTRs.   Psych: nl judgement, orientation, memory, affect.         Data:     @  RHEUM RESULTS Latest Ref Rng & Units 8/19/2022 11/7/2022 " 2/28/2023   ALBUMIN 3.4 - 5.0 g/dL 3.6 - -   ALBUMIN (R) 3.5 - 5.2 g/dL - 4.4 -   ALT 10 - 35 U/L 60(H) 134(H) -   AST 10 - 35 U/L 28 31 -   COMPLEMENT C3 81 - 157 mg/dL 140 - -   COMPLEMENT C4 13 - 39 mg/dL 41(H) - -   CK TOTAL 30 - 225 U/L - - -   CREATININE 0.51 - 0.95 mg/dL 0.69 0.83 -   CRP 0.0 - 8.0 mg/L 3.4 - -   C-REACTIVE PROTEIN, INFLAMMATION (R) <5.00 mg/L - - 8.83(H)   GFR ESTIMATE, IF BLACK >60 mL/min/[1.73:m2] - - -   GFR ESTIMATE >60 mL/min/1.73m2 >90 >90 -   HEMATOCRIT 35.0 - 47.0 % - 40.4 41.4   HEMOGLOBIN 11.7 - 15.7 g/dL - 13.1 13.1   HEPBANG NR:Nonreactive - - -   HCVAB NR:Nonreactive - - -   WBC 4.0 - 11.0 10e3/uL - 8.7 11.5(H)   RBC 3.80 - 5.20 10e6/uL - 4.46 4.35   RDW 10.0 - 15.0 % - 12.6 13.2   MCHC 31.5 - 36.5 g/dL - 32.4 31.6   MCV 78 - 100 fL - 91 95   PLATELET COUNT 150 - 450 10e3/uL - 372 314   RHEUMATOID FACTOR <12 IU/mL 7 - -   ESR 0 - 20 mm/hr 10 - -        ,  ,  ,  ,  ,  ,  ,  ,  ,  ,  ,  ,  ,  ,  ,   Hep B Surface Agn   Date Value Ref Range Status   01/31/2019 Nonreactive NR^Nonreactive Final   ,  ,  ,  ,  ,  ,  ,  ,  ,  ,  ,  ,  ,  ,  ,  ,  ,  ,  ,  ,  ,  ,  ,  ,  ,  ,  ,  ,  ,         Rheumatology Clinic Visit  [facility]  Arnie Bennett M.D.     Kasandra Lau MRN# [id number]   YOB: 1985 Age: 37 year old   Date of Visit: 03/16/2023  Primary care provider: Roxy Rios          Assessment and Plan:     #Positive STEPHANIE, +centromere, progressive metrical small joint predominant polyarthralgia; fatigue, morning stiffness: Physical exam today shows tenderness at multiple PIPs and MCPs without altered range of motion or gross synovitis.    Laboratory evaluation in April 2022 showed basic metabolic panel and CBC normal except for mildly elevated platelets and white count.  Urinalysis showed increased white and red blood cell counts with large leukocyte esterase positivity.  In May 2021, STEPHANIE was positive at a titer of 1: 160.  Hepatitis B and C were  "negative.  Antigliadin and antitissue transglutaminase antibodies were negative.  Celiac panel was negative.    Discussion: Clinical picture is consistent with inflammatory or autoimmune arthritis.  Systemic lupus erythematosus or other connective tissue disease, rheumatoid arthritis are on the differential diagnosis.  I recommend a further work-up for inflammatory and autoimmune disorders in the blood and urine.  In addition, I recommend a \"diagnostic\" trial of prednisone at low-dose for 2 weeks.  I requested that patient follow-up by telephone or MyChart at the conclusion of the prednisone trial.  We discussed potential long-acting disease modifying agents depending on results of the work-up and of the prednisone trial.    Plan:  1.  Check lupus markers, inflammation markers, blood cells, kidney function, and urinalysis as well as markers of rheumatoid arthritis.  2.  Prednisone 15 mg once daily for a week, then 10 mg daily for a week, then off.  3.  Alert rheumatology by MyChart or telephone after completion of prednisone trial  4.  After completing prednisone, if symptoms recur, use ibuprofen 600 mg 3 times daily as needed with food for joint pain.  5.  If prednisone is helpful, inflammatory arthritis may be contributing to the symptoms and longer-term treatment may be helpful.  1 medication to consider is hydroxychloroquine.    RTC 3-4 mos    Orders Placed This Encounter   Procedures     Creatinine     CBC with platelets         Arnie Bennett MD  Staff Rheumatologist, [facility]           History of Present Illness:   Kasandra Lau presents for evaluation of positive STEPHANIE. Last seen by Dr. Atwood in 7-2021.     She notes increasing \"carpal tunnel\" symptoms; also increased pain in her hands and other joints. There is \"diffuse achiness\", especially wrists, ankles, fingers. Hard to type, hard to hold things. There has been puffiness in L > R hands. Symptoms are progressing, especially hands.    Mornings are \"rough\" " "with stiffness, brain fog, fatigue. Early morning stiffness is ~ 1 hour. Toes/forefeet are stiff and can cramp. Moist heat helps. 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    Tylenol and ibuprofen help with morning pain; menthol creams also help.    Muscle pain also is prominent.         Review of Systems:     Constitutional: negative  Skin: \"bumps\" on upper arms, not itchy; Dermatology told her \"genetic\" cause; rec'd moisturizing lotion.  Eyes: negative  Ears/Nose/Throat: s/p tonsillectomy  Respiratory: No shortness of breath, dyspnea on exertion, cough, or hemoptysis; uses mucinex to help breathing.  Cardiovascular: negative  Gastrointestinal: IBS sx  Genitourinary: negative  Musculoskeletal: negative  Neurologic: negative  Psychiatric: negative  Hematologic/Lymphatic/Immunologic: negative  Endocrine: negative         Active Problem List:     Patient Active Problem List    Diagnosis Date Noted     Acute pharyngitis due to other specified organisms 02/28/2023     Priority: Medium     Endometriosis 10/11/2022     Priority: Medium     Keratosis pilaris 06/14/2022     Priority: Medium     Chronic sinusitis, unspecified location 09/21/2021     Priority: Medium     Hypertrophy of breast 09/10/2020     Priority: Medium     Added automatically from request for surgery [id number]       Pap smear abnormality of cervix/human papillomavirus (HPV) positive 09/03/2020     Priority: Medium     10/10/17 NIL pap, + HR HPV (not 16/18)  1/31/19 NILM HPV  Positive for High Risk HPV types other than 16 or 18 (Care Everywhere)  2/21/19 [place] ECC: benign. Plan 1 year cotest  2020 NILM HPV negative 34 y.o. PLAN, per ASCCP Guidelines: cotest 1/2023 2/24/23 NIL pap, neg HR HPV. Plan 3 year cotest       Cholecystitis 07/16/2020     Priority: Medium     Added automatically from request for surgery [id number]       Chronic idiopathic urticaria 06/22/2020     Priority: Medium     Hx of abnormal cervical Pap smear " 02/03/2020     Priority: Medium     Acid reflux 08/25/2019     Priority: Medium     Need for desensitization to allergens 06/06/2019     Priority: Medium     Formatting of this note might be different from the original.    Allergy Shot Care Plan for Kasandra Lau  Date of Order: June 6 2019  Ordering Provider: Dr. Martin Cervantes     Serum 1: Cat  Date Shots Started:  Start Dose: 0.1 mL of 1:100,000 - GREEN  Date Maintenance Reached:  Maintenance Dose: 0.3 mL of 1:100 - RED    Serum 2: Mite DF and Mite DP  Date Shots Started:  Start Dose: 0.1 mL of 1:100,000 - GREEN  Date Maintenance Reached:  Maintenance Dose: 0.1 mL of 1:100 - RED    BUILDING SCHEDULE FOR POLLEN AND NONPOLLEN   IMMUNOTHERAPY  EXCEPT [facility]    3 - 14 days Build per schedule.   15 - 21 days Repeat previous dose.   22 - 28 days Decrease by one dose.   29 - 35 days Decrease by two doses.   36 - 42 days Decrease by three doses.   Over 42 days Continue to decrease by one dose for each additional week.     1:100,000 (green)  1:10,000 (blue)  1:1000 (yellow)  1:100 (red)     1. 0.05 ml  7. 0.05 ml  13. 0.05 ml  19. 0.05 ml   2. 0.1 ml  8. 0.1 ml  14. 0.1 ml  20. 0.1 ml   3. 0.2 ml  9. 0.2 ml  15. 0.2 ml  21. 0.15 ml   4. 0.3 ml  10. 0.3 ml  16. 0.3 ml  22. 0.2 ml   5. 0.4 ml  11. 0.4 ml  17. 0.4 ml  23. 0.25 ml   6. 0.45 ml  12. 0.45 ml  18. 0.45 ml  24. 0.3 ml         25   0.35 ml         26   0.4 ml         27. 0.45 ml         28. 0.5 ml     MAINTENANCE SCHEDULE FOR POLLENS, NONPOLLENS (MITES,MOLD,CAT,DOG)  (not used for [facility])    ? When reaching maintenance dose for the first time, gradually stretch by weekly intervals to every 4 weeks (e.g., every 2 weeks, then 3 weeks, then 4 weeks).   ? Patient may receive their injections (patient choice) at 2-4 week intervals     Maintenance Repeat 1 Dose 2 Dose 3 Dose    Q2 weeks  (10-14 days)  3 weeks  (15-21 days) 4 weeks  (22-28 days) 5 weeks  (29-35 days) 6 weeks  (36-42 days) Decrease by  1 Dose for each additional week   Q3 weeks  (15-21 days)  4 weeks  (22-28 days) 5 weeks  (29-35 days) 6 weeks  (36-42 days) 7 weeks  (43-49 days)    Q4 weeks  (22-28 days)  5 weeks  (29-35 days) 6 weeks  (36-42 days) 7 weeks  (43-49 days) 8 weeks  (50-56 days)      Susanna Ambrose RN 6/6/2019 4:49 PM       Mild intermittent asthma without complication 04/30/2018     Priority: Medium     Seasonal mood disorder (H) 11/15/2017     Priority: Medium     Circadian rhythm sleep disorder, delayed sleep phase type 05/10/2017     Priority: Medium     Unhealthy sleep habit 05/10/2017     Priority: Medium     Vitamin D deficiency 11/19/2016     Priority: Medium     Menorrhagia with regular cycle 11/19/2016     Priority: Medium     Migraine without aura and without status migrainosus, not intractable 11/19/2016     Priority: Medium     Iron deficiency anemia due to chronic blood loss 11/19/2016     Priority: Medium     Tired 11/19/2016     Priority: Medium     Irritable bowel syndrome with constipation 10/25/2016     Priority: Medium     Recurrent major depressive disorder, in remission (H) 10/25/2016     Priority: Medium     Pelvic pain in female 08/25/2016     Priority: Medium     Migraine headache 01/01/2012     Priority: Medium     Panic disorder 01/01/2007     Priority: Medium     Depression 01/01/2004     Priority: Medium            Past Medical History:     Past Medical History:   Diagnosis Date     Anemia fall 2016     Anxiety      Arthritis      Chronic fatigue      Depression (emotion)     sees psych, on meds     Gastroesophageal reflux disease      Migraine     daily meds, 2x month, more mild on meds     Neuropathic pain      Panic disorder      Uncomplicated asthma Spring 2018     Past Surgical History:   Procedure Laterality Date     COLONOSCOPY       LAPAROSCOPIC ABLATION ENDOMETRIOSIS N/A 11/09/2016    Procedure: LAPAROSCOPIC ABLATION ENDOMETRIOSIS;  Surgeon: Tonja Ferrer MD;  Location:  OR      LAPAROSCOPIC CYSTECTOMY OVARIAN (BENIGN) Left 11/09/2016    Procedure: LAPAROSCOPIC CYSTECTOMY OVARIAN (BENIGN);  Surgeon: Tonja Ferrer MD;  Location: [facility]     LAPAROSCOPIC TUBAL DYE STUDY Left 11/09/2016    Procedure: LAPAROSCOPIC TUBAL DYE STUDY;  Surgeon: Tonja Ferrer MD;  Location: [facility]     LAPAROSCOPY OPERATIVE ADULT N/A 11/09/2016    Procedure: LAPAROSCOPY OPERATIVE ADULT;  Surgeon: Tonja Ferrer MD;  Location: [facility]     MAMMOPLASTY REDUCTION Bilateral 08/05/2021    Procedure: MAMMOPLASTY, REDUCTION, Bilateral;  Surgeon: ANTONIO Moreno MD;  Location: [facility] OR     ORTHOPEDIC SURGERY      left wrist surgery     TONSILLECTOMY & ADENOIDECTOMY Bilateral     cauterized turbinates also            Social History:     Social History     Socioeconomic History     Marital status: Single     Spouse name: Not on file     Number of children: Not on file     Years of education: Not on file     Highest education level: Not on file   Occupational History     Not on file   Tobacco Use     Smoking status: Former     Packs/day: 0.00     Years: 10.00     Pack years: 0.00     Types: Cigarettes     Smokeless tobacco: Never     Tobacco comments:     smokes occasionally socially    Vaping Use     Vaping Use: Never used   Substance and Sexual Activity     Alcohol use: Not Currently     Alcohol/week: 0.0 standard drinks     Comment: occasional      Drug use: Not Currently     Types: Marijuana     Comment: marijuana  none since May 2021     Sexual activity: Yes     Partners: Male     Birth control/protection: I.U.D.     Comment: Nuvaring   Other Topics Concern     Not on file   Social History Narrative    Grad student in geography     Social Determinants of Health     Financial Resource Strain: Not on file   Food Insecurity: Not on file   Transportation Needs: Not on file   Physical Activity: Not on file   Stress: Not on file   Social Connections: Not on file   Intimate Partner Violence: Not on  file   Housing Stability: Not on file          Family History:     Family History   Problem Relation Age of Onset     Diabetes Maternal Grandfather      Hypertension No family hx of      Coronary Artery Disease No family hx of      Hyperlipidemia No family hx of      Cerebrovascular Disease No family hx of      Breast Cancer No family hx of      Colon Cancer No family hx of      Prostate Cancer No family hx of      Other Cancer No family hx of      Glaucoma No family hx of      Macular Degeneration No family hx of             Allergies:     Allergies   Allergen Reactions     Dust Mites Cough, Difficulty breathing and Shortness Of Breath     Cats      Chest tightness, sinus irritation            Medications:     Current Outpatient Medications   Medication Sig Dispense Refill     almotriptan (AXERT) 12.5 MG tablet Take 1 tablet (12.5 mg) by mouth at onset of headache for migraine (repeat in 2 hours if needed) May repeat in 2 hours. Max 2 tablets/24 hours. 18 tablet 11     amoxicillin-clavulanate (AUGMENTIN) 875-125 MG tablet Take 1 tablet by mouth 2 times daily 20 tablet 0     atomoxetine (STRATTERA) 40 MG capsule Take 1 capsule (40 mg) by mouth daily 30 capsule 3     bisacodyl (DULCOLAX) 5 MG EC tablet Take 15 mg by mouth daily as needed for constipation       buPROPion (WELLBUTRIN XL) 300 MG 24 hr tablet TAKE 1 TABLET BY MOUTH EVERY MORNING 90 tablet 1     calcium carbonate (TUMS) 500 MG chewable tablet Take 1 tablet (500 mg) by mouth 3 times daily As needed for acid reflux 90 tablet 11     cholecalciferol 125 MCG (5000 UT) CAPS Take 5,000 Units by mouth every evening       doxepin (SINEQUAN) 10 MG capsule Take 2 capsules (20 mg) by mouth 2 times daily 120 capsule 11     EMGALITY 120 MG/ML injection Inject 1 mL (120 mg) Subcutaneous every 28 days 1 mL 11     EPINEPHrine (ANY BX GENERIC EQUIV) 0.3 MG/0.3ML injection 2-pack Inject 0.3 mg into the muscle as needed       famotidine (PEPCID) 20 MG tablet Take 1 tablet  "(20 mg) by mouth 2 times daily 180 tablet 3     fexofenadine (ALLEGRA) 180 MG tablet TAKE 1 TABLET (180 MG) BY MOUTH EVERY MORNING 90 tablet 1     fluticasone (FLONASE) 50 MCG/ACT nasal spray 2 times daily as needed        gabapentin (NEURONTIN) 400 MG capsule Take 2 capsules (800 mg) by mouth 3 times daily 180 capsule 5     guaiFENesin (MUCINEX) 600 MG 12 hr tablet Take 600 mg by mouth 2 times daily       hydrocortisone (CORTAID) 1 % external ointment Apply sparingly to affected area three times daily for 14 days. 30 g 0     hydroxychloroquine (PLAQUENIL) 200 MG tablet Take 2 tablets (400 mg) by mouth daily 180 tablet 2     montelukast (SINGULAIR) 10 MG tablet Take 1 tablet (10 mg) by mouth At Bedtime 90 tablet 1     Multiple Vitamins-Minerals (MULTIVITAMIN ADULTS) TABS Take 1 tablet by mouth daily       ondansetron (ZOFRAN ODT) 4 MG ODT tab Take 1 tablet (4 mg) by mouth every 8 hours as needed for nausea 18 tablet 6     phenylephrine HCl 10 MG TABS Take 10 mg by mouth 2 times daily       polyethylene glycol (MIRALAX) 17 GM/Dose powder Take 17 g (1 capful) by mouth daily 238 g 3     propranolol (INDERAL) 20 MG tablet Take 1 tablet (20 mg) by mouth daily as needed (anxiety) 30 tablet 11     Semaglutide, 1 MG/DOSE, 4 MG/3ML SOPN Inject 1 mg Subcutaneous once a week To be started after finishing 0.5 mg weekly 9 mL 3     vilazodone (VIIBRYD) 40 MG TABS tablet Take 1 tablet (40 mg) by mouth every morning 90 tablet 1     amoxicillin (AMOXIL) 500 MG capsule Take 1 capsule (500 mg) by mouth 2 times daily (Patient not taking: Reported on 2/28/2023) 20 capsule 0     levonorgestrel (MIRENA) 20 MCG/DAY IUD 1 each (20 mcg) by Intrauterine route once for 1 dose 1 each 0            Physical Exam:   Blood pressure 112/80, pulse 84, height 1.735 m (5' 8.3\"), weight 72.3 kg (159 lb 8 oz), last menstrual period 01/29/2023, SpO2 99 %, not currently breastfeeding.  Wt Readings from Last 6 Encounters:   03/16/23 72.3 kg (159 lb 8 oz) "   03/07/23 73 kg (161 lb)   02/28/23 71.3 kg (157 lb 1.6 oz)   02/06/23 73.5 kg (162 lb)   01/17/23 74.8 kg (165 lb)   01/02/23 75.8 kg (167 lb)     Constitutional: well-developed, appearing stated age; cooperative  Eyes: nl EOM, PERRLA, vision, conjunctiva, sclera  ENT: nl external ears, nose, hearing, lips, teeth, gums, throat  No mucous membrane lesions, normal saliva pool  Neck: no mass or thyroid enlargement  Resp: l breathing unlabored  MS: No visible swelling in hands or wrists; full range of motion and visually inspected joints.  Psych: nl judgement, orientation, memory, affect.         Data:     @  RHEUM RESULTS Latest Ref Rng & Units 8/19/2022 11/7/2022 2/28/2023   ALBUMIN 3.4 - 5.0 g/dL 3.6 - -   ALBUMIN (R) 3.5 - 5.2 g/dL - 4.4 -   ALT 10 - 35 U/L 60(H) 134(H) -   AST 10 - 35 U/L 28 31 -   COMPLEMENT C3 81 - 157 mg/dL 140 - -   COMPLEMENT C4 13 - 39 mg/dL 41(H) - -   CK TOTAL 30 - 225 U/L - - -   CREATININE 0.51 - 0.95 mg/dL 0.69 0.83 -   CRP 0.0 - 8.0 mg/L 3.4 - -   C-REACTIVE PROTEIN, INFLAMMATION (R) <5.00 mg/L - - 8.83(H)   GFR ESTIMATE, IF BLACK >60 mL/min/[1.73:m2] - - -   GFR ESTIMATE >60 mL/min/1.73m2 >90 >90 -   HEMATOCRIT 35.0 - 47.0 % - 40.4 41.4   HEMOGLOBIN 11.7 - 15.7 g/dL - 13.1 13.1   HEPBANG NR:Nonreactive - - -   HCVAB NR:Nonreactive - - -   WBC 4.0 - 11.0 10e3/uL - 8.7 11.5(H)   RBC 3.80 - 5.20 10e6/uL - 4.46 4.35   RDW 10.0 - 15.0 % - 12.6 13.2   MCHC 31.5 - 36.5 g/dL - 32.4 31.6   MCV 78 - 100 fL - 91 95   PLATELET COUNT 150 - 450 10e3/uL - 372 314   RHEUMATOID FACTOR <12 IU/mL 7 - -   ESR 0 - 20 mm/hr 10 - -        ,  ,  ,  ,  ,  ,  ,  ,  ,  ,  ,  ,  ,  ,  ,   Hep B Surface Agn   Date Value Ref Range Status   01/31/2019 Nonreactive NR^Nonreactive Final   ,  ,  ,  ,  ,  ,  ,  ,  ,  ,  ,  ,  ,  ,  ,  ,  ,  ,  ,  ,  ,  ,  ,  ,  ,  ,  ,  ,  ,

## 2023-03-13 ENCOUNTER — VIRTUAL VISIT (OUTPATIENT)
Dept: PSYCHOLOGY | Facility: CLINIC | Age: 38
End: 2023-03-13
Payer: COMMERCIAL

## 2023-03-13 DIAGNOSIS — Z53.9 ERRONEOUS ENCOUNTER--DISREGARD: Primary | ICD-10-CM

## 2023-03-16 ENCOUNTER — OFFICE VISIT (OUTPATIENT)
Dept: RHEUMATOLOGY | Facility: CLINIC | Age: 38
End: 2023-03-16
Payer: COMMERCIAL

## 2023-03-16 VITALS
OXYGEN SATURATION: 99 % | HEIGHT: 68 IN | WEIGHT: 159.5 LBS | BODY MASS INDEX: 24.17 KG/M2 | SYSTOLIC BLOOD PRESSURE: 112 MMHG | DIASTOLIC BLOOD PRESSURE: 80 MMHG | HEART RATE: 84 BPM

## 2023-03-16 DIAGNOSIS — M19.90 INFLAMMATORY ARTHRITIS: ICD-10-CM

## 2023-03-16 PROCEDURE — 99214 OFFICE O/P EST MOD 30 MIN: CPT | Performed by: INTERNAL MEDICINE

## 2023-03-16 RX ORDER — HYDROXYCHLOROQUINE SULFATE 200 MG/1
400 TABLET, FILM COATED ORAL DAILY
Qty: 180 TABLET | Refills: 2 | Status: SHIPPED | OUTPATIENT
Start: 2023-03-16 | End: 2023-10-19

## 2023-03-16 ASSESSMENT — PAIN SCALES - GENERAL: PAINLEVEL: NO PAIN (0)

## 2023-03-16 NOTE — NURSING NOTE
"Chief Complaint   Patient presents with     RECHECK     Inflammatory arthritis       Vitals:    03/16/23 0946   BP: 112/80   BP Location: Left arm   Patient Position: Sitting   Cuff Size: Adult Regular   Pulse: 84   SpO2: 99%   Weight: 72.3 kg (159 lb 8 oz)   Height: 1.735 m (5' 8.3\")       Body mass index is 24.04 kg/m .    Olga Bliss, [facility]  "

## 2023-03-16 NOTE — PATIENT INSTRUCTIONS
Diagnosis:  1.  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  It is reasonable to continue with current hydroxychloroquine, and to supplement Tylenol with ibuprofen for low-grade waxing and waning joint pain.    Remains significantly improved with consistent use of hydroxychloroquine.  Carpal tunnel syndrome:

## 2023-03-20 NOTE — CONFIDENTIAL NOTE
"      Health Psychology - Follow up Visit  Confidential Summary*    The author of this note documented a reason for not sharing it with the patient.  REFERRAL SOURCE:  Psychiatry    CHIEF COMPLAINT/REASON FOR VISIT  Psychotherapy in context of chronic PTSD and persistent depression.  Patient also complains of dissatisfaction with interpersonal relationships and challenges with emotion regulation.      Patient was seen today for a 60 minute individual psychotherapy session.  The session was facilitated via VIDEO with patient at her home and provider at her own home.  We used [email or web address] platform.       This telehealth service is appropriate and effective for delivering services in light of the necessity for social distancing to mitigate the COVID-19 epidemic. Patient has agreed to receiving telehealth services.    The patient has been notified of following:   \"This VIDEO visit will be conducted via a call between you and your physician/provider. We have found that certain health care needs can be provided without the need for an in-person physical exam.  VIDEO visits are billed at different rates depending on your insurance coverage.  Please reach out to your insurance provider with any questions. If during the course of the call the physician/provider feels a video visit is not appropriate, you will not be charged for this service.\"     Patient has given verbal consent for VIDEO visit? Yes    Subjective:  Patient began with discussion of ongoing struggles with strep throat. She described her interaction with providing team and frustration with the long duration of her symptoms.  She described illness having a negative impact on her energy level and subsequently, her ability to do more than is required of her for her course.  She reported that she has not made any progress in her dissertation work. However, she requested a mtg with her advisor.  Invited patient to have me as an accountability partner as she appeared " "defensive surrounding discussions of her remaining project to graduate.  She expressed anxiety about working on dissertation and that she is avoiding.  Discussed avoidance as her primary coping tool and used DBT skills to examine whether her emotions fit the facts and if anxiety is justified.  She was encouraged to use her skills and to problem solve.  She was given one calendar year to propose her dissertation following exams and she is now at 2 years.  She described being overwhelmed with the duties of her new professor and that his expectations of her are elevated.      Encouraged patient to continue to focus on her long term goals and \"building a life worth living\".      Objective:  Patient was on time for today s session. She was alert and oriented. Mood was dysphoric and she appeared tired, affect appropriate.. Patient denied suicidal or assaultive ideation, plan, or intent.        Assessment:  The patient has a longstanding history of interpersonal discord and challenges with emotion regulation and use of avoidance as a primary coping tool.  She has completed all requirements for her PhD and is now ABD.  She is living in  housing with her two dogs.  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  She is stalled on her dissertation proposal.      Plan:  Patient graduated from full model DBT at [facility] [employer] and is now completing phase 2 work.        Treatment plan completed:  10/13/22     Time In: 1:00  Time Out: 2:00    Diagnosis:  Axis I PTSD, chronic, persistent depressive disorder, adjustment disorder with mixed, psychological factors associated with physical (fibromyalgia)   Axis II No current diagnosis   Axis III please see medical records for details   [place] IV Psychosocial and Environmental Stressors: living alone with no family support, pandemic stress, chronic illness         Corina Muhammad, PhD, LP    "

## 2023-03-22 ENCOUNTER — OFFICE VISIT (OUTPATIENT)
Dept: FAMILY MEDICINE | Facility: CLINIC | Age: 38
End: 2023-03-22
Payer: COMMERCIAL

## 2023-03-22 VITALS
HEART RATE: 83 BPM | TEMPERATURE: 97.8 F | DIASTOLIC BLOOD PRESSURE: 70 MMHG | WEIGHT: 155 LBS | BODY MASS INDEX: 22.96 KG/M2 | SYSTOLIC BLOOD PRESSURE: 102 MMHG | OXYGEN SATURATION: 100 % | HEIGHT: 69 IN

## 2023-03-22 DIAGNOSIS — R07.0 THROAT PAIN: Primary | ICD-10-CM

## 2023-03-22 LAB — DEPRECATED S PYO AG THROAT QL EIA: NEGATIVE

## 2023-03-22 PROCEDURE — 87637 SARSCOV2&INF A&B&RSV AMP PRB: CPT

## 2023-03-22 PROCEDURE — 87651 STREP A DNA AMP PROBE: CPT

## 2023-03-22 NOTE — PROGRESS NOTES
"Assessment & Plan   Kasandra was seen today for pharyngitis and cough.    Diagnoses and all orders for this visit:    Throat pain  -     Streptococcus A Rapid Screen w/Reflex to PCR  -     Group A Streptococcus PCR Throat Swab  -     Symptomatic Influenza A/B, RSV, & SARS-CoV2 PCR (COVID-19); Future  Rapid strep negative- culture pending. COVID-19 and influenza testing negative. Low suspicion of strep as no tonsillar exudate or erythema. Likely a viral URI. Recommend symptomatic management (rest, fluids, tylenol, ibuprofen PRN). Seek emergency care if SOB develops.      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 No      Medical, surgical, family and social histories reviewed and updated as indicated.   Medications and allergies reviewed and updated as indicated.       ROS  Review Of Systems  See HPI      General Physical Exam:  Vitals: /70   Pulse 83   Temp 97.8  F (36.6  C) (Oral)   Ht 1.74 m (5' 8.5\")   Wt 70.3 kg (155 lb)   LMP 01/29/2023   SpO2 100%   BMI 23.22 kg/m    Physical Exam  Vitals and nursing note reviewed.   Constitutional:       General: She is not in acute distress.     Appearance: Normal appearance. She is not ill-appearing.   HENT:      Head: Normocephalic and atraumatic.      Nose: Nose normal. No congestion or rhinorrhea.      Mouth/Throat:      Mouth: Mucous membranes are moist.      Pharynx: Oropharynx is clear. Uvula midline. No pharyngeal swelling, " oropharyngeal exudate, posterior oropharyngeal erythema or uvula swelling.      Tonsils: No tonsillar exudate or tonsillar abscesses. 0 on the right. 0 on the left.   Eyes:      Extraocular Movements: Extraocular movements intact.      Conjunctiva/sclera: Conjunctivae normal.      Pupils: Pupils are equal, round, and reactive to light.   Neck:      Thyroid: No thyromegaly.   Cardiovascular:      Rate and Rhythm: Normal rate and regular rhythm.      Pulses: Normal pulses.      Heart sounds: Normal heart sounds.   Pulmonary:      Effort: No accessory muscle usage.   Musculoskeletal:      Cervical back: Normal range of motion and neck supple.   Lymphadenopathy:      Cervical: Cervical adenopathy (right sided) present.      Right cervical: Superficial cervical adenopathy present.   Skin:     General: Skin is warm and dry.      Capillary Refill: Capillary refill takes less than 2 seconds.      Coloration: Skin is not cyanotic.      Nails: There is no clubbing.   Neurological:      General: No focal deficit present.      Mental Status: She is alert and oriented to person, place, and time.      Gait: Gait is intact.   Psychiatric:         Mood and Affect: Mood normal.         Behavior: Behavior normal.         Thought Content: Thought content normal.         Judgment: Judgment normal.

## 2023-03-22 NOTE — NURSING NOTE
"ROOM:2  [facility]    Preferred Name: Kasandra     How did you hear about us?  Current Patient    37 year old  Chief Complaint   Patient presents with     Pharyngitis     Losing voice a little bit as well     Cough     Couple days now        Blood pressure 102/70, pulse 83, temperature 97.8  F (36.6  C), temperature source Oral, height 1.74 m (5' 8.5\"), weight 70.3 kg (155 lb), last menstrual period 01/29/2023, SpO2 100 %, not currently breastfeeding. Body mass index is 23.22 kg/m .  BP completed using cuff size:        Patient Active Problem List   Diagnosis     Pelvic pain in female     Vitamin D deficiency     Menorrhagia with regular cycle     Migraine without aura and without status migrainosus, not intractable     Iron deficiency anemia due to chronic blood loss     Tired     Circadian rhythm sleep disorder, delayed sleep phase type     Unhealthy sleep habit     Seasonal mood disorder (H)     Chronic idiopathic urticaria     Acid reflux     Cholecystitis     Depression     Hx of abnormal cervical Pap smear     Irritable bowel syndrome with constipation     Migraine headache     Mild intermittent asthma without complication     Need for desensitization to allergens     Panic disorder     Pap smear abnormality of cervix/human papillomavirus (HPV) positive     Recurrent major depressive disorder, in remission (H)     Hypertrophy of breast     Chronic sinusitis, unspecified location     Keratosis pilaris     Endometriosis     Acute pharyngitis due to other specified organisms       Wt Readings from Last 2 Encounters:   03/22/23 70.3 kg (155 lb)   03/16/23 72.3 kg (159 lb 8 oz)     BP Readings from Last 3 Encounters:   03/22/23 102/70   03/16/23 112/80   03/07/23 105/73       Allergies   Allergen Reactions     Dust Mites Cough, Difficulty breathing and Shortness Of Breath     Cats      Chest tightness, sinus irritation       Current Outpatient Medications   Medication     almotriptan (AXERT) 12.5 MG tablet     " amoxicillin (AMOXIL) 500 MG capsule     amoxicillin-clavulanate (AUGMENTIN) 875-125 MG tablet     atomoxetine (STRATTERA) 40 MG capsule     bisacodyl (DULCOLAX) 5 MG EC tablet     buPROPion (WELLBUTRIN XL) 300 MG 24 hr tablet     calcium carbonate (TUMS) 500 MG chewable tablet     cholecalciferol 125 MCG (5000 UT) CAPS     doxepin (SINEQUAN) 10 MG capsule     EMGALITY 120 MG/ML injection     EPINEPHrine (ANY BX GENERIC EQUIV) 0.3 MG/0.3ML injection 2-pack     famotidine (PEPCID) 20 MG tablet     fexofenadine (ALLEGRA) 180 MG tablet     fluticasone (FLONASE) 50 MCG/ACT nasal spray     gabapentin (NEURONTIN) 400 MG capsule     guaiFENesin (MUCINEX) 600 MG 12 hr tablet     hydrocortisone (CORTAID) 1 % external ointment     hydroxychloroquine (PLAQUENIL) 200 MG tablet     levonorgestrel (MIRENA) 20 MCG/DAY IUD     montelukast (SINGULAIR) 10 MG tablet     Multiple Vitamins-Minerals (MULTIVITAMIN ADULTS) TABS     ondansetron (ZOFRAN ODT) 4 MG ODT tab     phenylephrine HCl 10 MG TABS     polyethylene glycol (MIRALAX) 17 GM/Dose powder     propranolol (INDERAL) 20 MG tablet     Semaglutide, 1 MG/DOSE, 4 MG/3ML SOPN     vilazodone (VIIBRYD) 40 MG TABS tablet     No current facility-administered medications for this visit.       Social History     Tobacco Use     Smoking status: Former     Packs/day: 0.00     Years: 10.00     Pack years: 0.00     Types: Cigarettes     Smokeless tobacco: Never     Tobacco comments:     smokes occasionally socially    Vaping Use     Vaping Use: Never used   Substance Use Topics     Alcohol use: Not Currently     Alcohol/week: 0.0 standard drinks     Comment: occasional      Drug use: Not Currently     Types: Marijuana     Comment: marijuana  none since May 2021       Honoring Choices - Health Care Directive Guide offered to patient at time of visit.    Health Maintenance Due   Topic Date Due     ASTHMA ACTION PLAN  Never done     ADVANCE CARE PLANNING  Never done     HEPATITIS B IMMUNIZATION (1  of 3 - 3-dose series) Never done     YEARLY PREVENTIVE VISIT  01/31/2020     Pneumococcal Vaccine: Pediatrics (0 to 5 Years) and At-Risk Patients (6 to 64 Years) (2 - PCV) 05/22/2020     ASTHMA CONTROL TEST  12/14/2022       Immunization History   Administered Date(s) Administered     COVID-19 Vaccine 12+ (Pfizer 2022) 01/10/2022     COVID-19 Vaccine 12+ (Pfizer) 03/19/2021, 04/09/2021     COVID-19 Vaccine Bivalent Booster 12+ (Pfizer) 09/19/2022     DTaP, Unspecified 05/22/2019     Flu, Unspecified 09/23/2016     Influenza Vaccine >6 months (Alfuria,Fluzone) 09/23/2016, 09/07/2018, 08/28/2019, 09/02/2020, 11/02/2021, 09/19/2022     Pneumococcal 23 valent 05/22/2019     TDAP (Adacel,Boostrix) 05/22/2019       Lab Results   Component Value Date    PAP NIL 01/31/2019       Recent Labs   Lab Test 11/07/22  1241 08/19/22  0835 04/27/22  1717 04/13/22  1600 01/04/22  1732 08/12/21  1535 09/12/20  2058 08/02/19  1711 10/05/17  1133 02/06/17  1200   LDL  --  100  --   --   --   --   --   --   --   --    HDL  --  43*  --   --   --   --   --   --   --   --    TRIG  --  184*  --   --   --   --   --   --   --   --    * 60*  --  63*  --    < > 36 25   < >  --    CR 0.83 0.69 0.66 0.63  --    < > 0.77 0.80   < >  --    GFRESTIMATED >90 >90 >90 >90  --    < > >90 >90   < >  --    GFRESTBLACK  --   --   --   --   --   --  >90 >90   < >  --    ALBUMIN 4.4 3.6  --  3.9  --    < > 3.3* 3.4   < >  --    POTASSIUM 3.8  --  3.7 3.8  --    < > 3.9 3.5   < >  --    TSH  --   --   --   --  1.81  --   --   --   --  1.72    < > = values in this interval not displayed.       PHQ-2 ( 1999 Pfizer) 2/28/2023 11/10/2022   Q1: Little interest or pleasure in doing things 0 0   Q2: Feeling down, depressed or hopeless 0 0   PHQ-2 Score 0 0   PHQ-2 Total Score (12-17 Years)- Positive if 3 or more points; Administer PHQ-A if positive - -   Q1: Little interest or pleasure in doing things - -   Q2: Feeling down, depressed or hopeless - -   PHQ-2  Score - -   Some encounter information is confidential and restricted. Go to Review Flowsheets activity to see all data.       PHQ-9 SCORE 9/29/2022 9/29/2022 10/11/2022 1/12/2023   PHQ-9 Total Score MyChart - 6 (Mild depression) - 5 (Mild depression)   PHQ-9 Total Score 6 6 5 5   Some encounter information is confidential and restricted. Go to Review Flowsheets activity to see all data.       VIC-7 SCORE 9/30/2022 10/11/2022 1/12/2023   Total Score 8 (mild anxiety) - 8 (mild anxiety)   Total Score 8 9 8   Some encounter information is confidential and restricted. Go to Review Flowsheets activity to see all data.       ACT Total Scores 6/14/2022   ACT TOTAL SCORE (Goal Greater than or Equal to 20) 24   In the past 12 months, how many times did you visit the emergency room for your asthma without being admitted to the hospital? 2   In the past 12 months, how many times were you hospitalized overnight because of your asthma? 0       Diogo Hardin    March 22, 2023 2:15 PM

## 2023-03-23 LAB
FLUAV RNA SPEC QL NAA+PROBE: NEGATIVE
FLUBV RNA RESP QL NAA+PROBE: NEGATIVE
GROUP A STREP BY PCR: NOT DETECTED
RSV RNA SPEC NAA+PROBE: NEGATIVE
SARS-COV-2 RNA RESP QL NAA+PROBE: NEGATIVE

## 2023-03-27 ENCOUNTER — VIRTUAL VISIT (OUTPATIENT)
Dept: PSYCHOLOGY | Facility: CLINIC | Age: 38
End: 2023-03-27
Payer: COMMERCIAL

## 2023-03-27 ENCOUNTER — TELEPHONE (OUTPATIENT)
Dept: ENDOCRINOLOGY | Facility: CLINIC | Age: 38
End: 2023-03-27
Payer: COMMERCIAL

## 2023-03-27 DIAGNOSIS — Z53.9 ERRONEOUS ENCOUNTER--DISREGARD: Primary | ICD-10-CM

## 2023-03-27 NOTE — TELEPHONE ENCOUNTER
[facility]      Schedule a follow up appt with Dr. Manning next avaialble appt. RET [facility]. In person or virtual. Left call center #

## 2023-03-28 ENCOUNTER — THERAPY VISIT (OUTPATIENT)
Dept: PHYSICAL THERAPY | Facility: CLINIC | Age: 38
End: 2023-03-28
Attending: NURSE PRACTITIONER
Payer: COMMERCIAL

## 2023-03-28 DIAGNOSIS — M53.3 PAIN IN THE COCCYX: ICD-10-CM

## 2023-03-28 DIAGNOSIS — G89.29 CHRONIC PAIN OF BOTH SHOULDERS: ICD-10-CM

## 2023-03-28 DIAGNOSIS — M25.511 CHRONIC PAIN OF BOTH SHOULDERS: ICD-10-CM

## 2023-03-28 DIAGNOSIS — M25.512 CHRONIC PAIN OF BOTH SHOULDERS: ICD-10-CM

## 2023-03-28 DIAGNOSIS — M25.511 ACUTE PAIN OF RIGHT SHOULDER: ICD-10-CM

## 2023-03-28 PROCEDURE — 97161 PT EVAL LOW COMPLEX 20 MIN: CPT | Mod: GP | Performed by: PHYSICAL THERAPIST

## 2023-03-28 PROCEDURE — 97110 THERAPEUTIC EXERCISES: CPT | Mod: GP | Performed by: PHYSICAL THERAPIST

## 2023-03-28 NOTE — PROGRESS NOTES
Physical Therapy Initial Evaluation  Subjective:  Kasandra Lau presents to outpatient PT with bilateral neck, shoulder, and arm pain.  Patient reports fall while skiing in February resulting in right shoulder and coccyx pain.  Right shoulder pain from this fall is reported as nearly resolved.  Coccyx pain remains, but is improving.  Primary concern at this time is chronic neck, bilateral shoulder and arm pain. Feels this pain stems from bilateral carpal tunnel syndrome.  Pain meds and wrist guards currently used to manage these symptoms.  Has seen a chiro for this issue in the past, but only for a few visits (then moved back to [place]). Patient does use foam roller daily.  Patient walks dog 2x/day.      The history is provided by the patient. No  was used.   Patient Health History  Kasandra Lau being seen for shoulder pain.       Problem occurred: carpal tunnel, fall   Pain is reported as 4/10 on pain scale.  General health as reported by patient is good.  Pertinent medical history includes: anemia, depression, fibromyalgia, history of fractures, mental illness, migraines/headaches, numbness/tingling and rheumatoid arthritis.   Red flags:  Severe dizziness and significant weakness.   Other medical allergies details: NA.   Surgeries include:  Orthopedic surgery. Other surgery history details: L wrist fracture.    Current medications:  Anti-depressants, anti-inflammatory and pain medication.    Current occupation is instructor.   Primary job tasks include:  Computer work, prolonged sitting and repetitive tasks.                  Therapist Generated HPI Evaluation         Type of problem:  Bilateral shoulders.    This is a chronic condition.  Condition occurred with:  Unknown cause and a fall.  Where condition occurred: for unknown reasons.  Patient reports pain:  Scapular area.  and is constant.  Pain radiates to:  Shoulder, upper arm, lower arm, hand and cervical.   Since onset symptoms are gradually  worsening.  Symptoms are exacerbated by certain positions  and relieved by analgesics.    Previous treatment includes chiropractic and physical therapy. There was mild improvement following previous treatment.  Restrictions due to condition include:  Working in normal job without restrictions.  Barriers include:  None as reported by patient.                        Objective:  System              Cervical/Thoracic Evaluation              Cervical Palpation:    Tenderness present at Left:    Rhomboids; Upper Trap; Levator and Erector Spinae  Tenderness present at Right:    Rhomboids; Upper Trap; Levator and Erector Spinae               Shoulder Evaluation:  ROM:  AROM:  normal                                  Strength:    Flexion: Left:5/5   Pain:    Right: 5/5      Pain:  +  Extension:  Left:/5    Pain:+      Abduction:  Right: 5/5      Pain:+  Adduction:  Left: 5/5     Pain:+      Internal Rotation:  Right: 4+/5      Pain:+  External Rotation:   Left:4+/5      Pain:+   Right:4+/5      Pain:+    Horizontal Abduction:  Left:4+/5      Pain:+        Elbow Flexion:  Left:5/5      Pain:+    Right:5/5      Pain:+  Elbow Extension:  Left:4+/5      Pain:+    Right:4+/5      Pain:+                                           Dena Cervical Evaluation    Posture:  Sitting: good  Standing: good  Protruding Head: no  Wry Neck: no  Correction of Posture: worse    Movement Loss:  Protrusion (PRO): nil and pain  Flexion (Flex): min and pain  Retraction (RET): min  Extension (EXT): min  Lateral Flexion Right (LF R): min  Lateral Flexion Left (LF L): min  Rotation Right (ROT R): min  Rotation Left (ROT L): min  Test Movements:      RET:   Repeat RET: During: no effect  After: no effect  Mechanical Response: Rafi Fernandes Thoracic Evalution:    Movement Loss:    Extension (EXT): min  Rotation Lt (ROT L): min  Rotation Rt (ROT R): min    Test Movements:        Rep Extension:  During:   Decreases  After: better  Mechanical Response: IncROM                                              ROS    Assessment/Plan:    Patient is a 37 year old female with both sides shoulder complaints.    Patient has the following significant findings with corresponding treatment plan.                Diagnosis 1:  Bilateral shoulder pain  Pain -  manual therapy, self management, education, directional preference exercise and home program  Decreased strength - therapeutic exercise, therapeutic activities and home program  Impaired posture - neuro re-education and home program    Therapy Evaluation Codes:   1) History comprised of:   Personal factors that impact the plan of care:      None.    Comorbidity factors that impact the plan of care are:      Depression, Fibromyalgia, Mental illness, Migraines/headaches, Numbness/tingling, Rheumatoid arthritis and Weakness.     Medications impacting care: Anti-depressant and Pain.  2) Examination of Body Systems comprised of:   Body structures and functions that impact the plan of care:      Cervical spine.   Activity limitations that impact the plan of care are:      Lifting, Reading/Computer work and Sitting.  3) Clinical presentation characteristics are:   Stable/Uncomplicated.  4) Decision-Making    Low complexity using standardized patient assessment instrument and/or measureable assessment of functional outcome.  Cumulative Therapy Evaluation is: Low complexity.    Previous and current functional limitations:  (See Goal Flow Sheet for this information)    Short term and Long term goals: (See Goal Flow Sheet for this information)     Communication ability:  Patient appears to be able to clearly communicate and understand verbal and written communication and follow directions correctly.  Treatment Explanation - The following has been discussed with the patient:   RX ordered/plan of care  Anticipated outcomes  Possible risks and side effects  This patient would benefit from PT  intervention to resume normal activities.   Rehab potential is good.    Frequency:  1 X week, once daily  Duration:  for 6-8 weeks  Discharge Plan:  Achieve all LTG.  Independent in home treatment program.  Reach maximal therapeutic benefit.    Please refer to the daily flowsheet for treatment today, total treatment time and time spent performing 1:1 timed codes.

## 2023-03-29 NOTE — TELEPHONE ENCOUNTER
LVM (2nd attempt). Pt read [facility] message. Please see message below for scheduling instructions

## 2023-04-03 ENCOUNTER — VIRTUAL VISIT (OUTPATIENT)
Dept: PSYCHOLOGY | Facility: CLINIC | Age: 38
End: 2023-04-03
Payer: COMMERCIAL

## 2023-04-03 DIAGNOSIS — Z53.9 ERRONEOUS ENCOUNTER--DISREGARD: Primary | ICD-10-CM

## 2023-04-04 ENCOUNTER — THERAPY VISIT (OUTPATIENT)
Dept: PHYSICAL THERAPY | Facility: CLINIC | Age: 38
End: 2023-04-04
Attending: NURSE PRACTITIONER
Payer: COMMERCIAL

## 2023-04-04 DIAGNOSIS — M25.511 CHRONIC PAIN OF BOTH SHOULDERS: Primary | ICD-10-CM

## 2023-04-04 DIAGNOSIS — G89.29 CHRONIC PAIN OF BOTH SHOULDERS: Primary | ICD-10-CM

## 2023-04-04 DIAGNOSIS — M25.512 CHRONIC PAIN OF BOTH SHOULDERS: Primary | ICD-10-CM

## 2023-04-04 PROCEDURE — 97140 MANUAL THERAPY 1/> REGIONS: CPT | Mod: GP | Performed by: PHYSICAL THERAPIST

## 2023-04-04 PROCEDURE — 97110 THERAPEUTIC EXERCISES: CPT | Mod: GP | Performed by: PHYSICAL THERAPIST

## 2023-04-05 ENCOUNTER — OFFICE VISIT (OUTPATIENT)
Dept: FAMILY MEDICINE | Facility: CLINIC | Age: 38
End: 2023-04-05
Payer: COMMERCIAL

## 2023-04-05 VITALS
SYSTOLIC BLOOD PRESSURE: 111 MMHG | HEIGHT: 69 IN | TEMPERATURE: 97.9 F | BODY MASS INDEX: 23.85 KG/M2 | HEART RATE: 87 BPM | DIASTOLIC BLOOD PRESSURE: 81 MMHG | WEIGHT: 161 LBS | OXYGEN SATURATION: 100 %

## 2023-04-05 DIAGNOSIS — N89.8 VAGINAL ITCHING: ICD-10-CM

## 2023-04-05 DIAGNOSIS — R39.15 URINARY URGENCY: Primary | ICD-10-CM

## 2023-04-05 DIAGNOSIS — R35.0 URINARY FREQUENCY: ICD-10-CM

## 2023-04-05 LAB
ALBUMIN UR-MCNC: NEGATIVE MG/DL
APPEARANCE UR: CLEAR
BILIRUB UR QL STRIP: NEGATIVE
COLOR UR AUTO: YELLOW
GLUCOSE UR STRIP-MCNC: NEGATIVE MG/DL
HGB UR QL STRIP: ABNORMAL
KETONES UR STRIP-MCNC: NEGATIVE MG/DL
LEUKOCYTE ESTERASE UR QL STRIP: NEGATIVE
NITRATE UR QL: NEGATIVE
PH UR STRIP: 6 [PH] (ref 5–7)
SP GR UR STRIP: 1.01 (ref 1–1.03)
T VAGINALIS DNA SPEC QL NAA+PROBE: NOT DETECTED
UROBILINOGEN UR STRIP-ACNC: 0.2 E.U./DL

## 2023-04-05 PROCEDURE — 87086 URINE CULTURE/COLONY COUNT: CPT

## 2023-04-05 PROCEDURE — 87591 N.GONORRHOEAE DNA AMP PROB: CPT

## 2023-04-05 PROCEDURE — 87661 TRICHOMONAS VAGINALIS AMPLIF: CPT

## 2023-04-05 PROCEDURE — 87102 FUNGUS ISOLATION CULTURE: CPT

## 2023-04-05 RX ORDER — PHENAZOPYRIDINE HYDROCHLORIDE 95 MG/1
190 TABLET ORAL 3 TIMES DAILY
Qty: 20 TABLET | Refills: 0 | Status: SHIPPED | OUTPATIENT
Start: 2023-04-05 | End: 2023-07-13

## 2023-04-05 NOTE — NURSING NOTE
"ROOM:1  [facility]    Preferred Name: Kasandra     How did you hear about us?  Current Patient    38 year old  Chief Complaint   Patient presents with     UTI     Since the last week  Urgency to pee   Irritation        Blood pressure 111/81, pulse 87, temperature 97.9  F (36.6  C), temperature source Oral, height 1.753 m (5' 9\"), weight 73 kg (161 lb), last menstrual period 01/29/2023, SpO2 100 %, not currently breastfeeding. Body mass index is 23.78 kg/m .  BP completed using cuff size:        Patient Active Problem List   Diagnosis     Pelvic pain in female     Vitamin D deficiency     Menorrhagia with regular cycle     Migraine without aura and without status migrainosus, not intractable     Iron deficiency anemia due to chronic blood loss     Tired     Circadian rhythm sleep disorder, delayed sleep phase type     Unhealthy sleep habit     Seasonal mood disorder (H)     Chronic idiopathic urticaria     Acid reflux     Cholecystitis     Depression     Hx of abnormal cervical Pap smear     Irritable bowel syndrome with constipation     Migraine headache     Mild intermittent asthma without complication     Need for desensitization to allergens     Panic disorder     Pap smear abnormality of cervix/human papillomavirus (HPV) positive     Recurrent major depressive disorder, in remission (H)     Hypertrophy of breast     Chronic sinusitis, unspecified location     Keratosis pilaris     Endometriosis     Acute pharyngitis due to other specified organisms     Chronic pain of both shoulders       Wt Readings from Last 2 Encounters:   04/05/23 73 kg (161 lb)   03/22/23 70.3 kg (155 lb)     BP Readings from Last 3 Encounters:   04/05/23 111/81   03/22/23 102/70   03/16/23 112/80       Allergies   Allergen Reactions     Dust Mites Cough, Difficulty breathing and Shortness Of Breath     Cats      Chest tightness, sinus irritation       Current Outpatient Medications   Medication     almotriptan (AXERT) 12.5 MG tablet     " atomoxetine (STRATTERA) 40 MG capsule     bisacodyl (DULCOLAX) 5 MG EC tablet     buPROPion (WELLBUTRIN XL) 300 MG 24 hr tablet     calcium carbonate (TUMS) 500 MG chewable tablet     cholecalciferol 125 MCG (5000 UT) CAPS     doxepin (SINEQUAN) 10 MG capsule     EMGALITY 120 MG/ML injection     EPINEPHrine (ANY BX GENERIC EQUIV) 0.3 MG/0.3ML injection 2-pack     famotidine (PEPCID) 20 MG tablet     fexofenadine (ALLEGRA) 180 MG tablet     fluticasone (FLONASE) 50 MCG/ACT nasal spray     gabapentin (NEURONTIN) 400 MG capsule     guaiFENesin (MUCINEX) 600 MG 12 hr tablet     hydrocortisone (CORTAID) 1 % external ointment     hydroxychloroquine (PLAQUENIL) 200 MG tablet     montelukast (SINGULAIR) 10 MG tablet     Multiple Vitamins-Minerals (MULTIVITAMIN ADULTS) TABS     ondansetron (ZOFRAN ODT) 4 MG ODT tab     phenylephrine HCl 10 MG TABS     polyethylene glycol (MIRALAX) 17 GM/Dose powder     propranolol (INDERAL) 20 MG tablet     Semaglutide, 1 MG/DOSE, 4 MG/3ML SOPN     vilazodone (VIIBRYD) 40 MG TABS tablet     levonorgestrel (MIRENA) 20 MCG/DAY IUD     No current facility-administered medications for this visit.       Social History     Tobacco Use     Smoking status: Former     Packs/day: 0.00     Years: 10.00     Pack years: 0.00     Types: Cigarettes     Smokeless tobacco: Never     Tobacco comments:     smokes occasionally socially    Vaping Use     Vaping status: Never Used   Substance Use Topics     Alcohol use: Not Currently     Alcohol/week: 0.0 standard drinks of alcohol     Comment: occasional      Drug use: Not Currently     Types: Marijuana     Comment: marijuana  none since May 2021       Honoring Choices - Health Care Directive Guide offered to patient at time of visit.    Health Maintenance Due   Topic Date Due     ASTHMA ACTION PLAN  Never done     ADVANCE CARE PLANNING  Never done     HEPATITIS B IMMUNIZATION (1 of 3 - 3-dose series) Never done     YEARLY PREVENTIVE VISIT  01/31/2020      Pneumococcal Vaccine: Pediatrics (0 to 5 Years) and At-Risk Patients (6 to 64 Years) (2 - PCV) 05/22/2020     ASTHMA CONTROL TEST  12/14/2022       Immunization History   Administered Date(s) Administered     COVID-19 Vaccine 12+ (Pfizer 2022) 01/10/2022     COVID-19 Vaccine 12+ (Pfizer) 03/19/2021, 04/09/2021     COVID-19 Vaccine Bivalent Booster 12+ (Pfizer) 09/19/2022     DTaP, Unspecified 05/22/2019     Flu, Unspecified 09/23/2016     Influenza Vaccine >6 months (Alfuria,Fluzone) 09/23/2016, 09/07/2018, 08/28/2019, 09/02/2020, 11/02/2021, 09/19/2022     Pneumococcal 23 valent 05/22/2019     TDAP (Adacel,Boostrix) 05/22/2019       Lab Results   Component Value Date    PAP NIL 01/31/2019       Recent Labs   Lab Test 11/07/22  1241 08/19/22  0835 04/27/22  1717 04/13/22  1600 01/04/22  1732 08/12/21  1535 09/12/20  2058 08/02/19  1711 10/05/17  1133 02/06/17  1200   LDL  --  100  --   --   --   --   --   --   --   --    HDL  --  43*  --   --   --   --   --   --   --   --    TRIG  --  184*  --   --   --   --   --   --   --   --    * 60*  --  63*  --    < > 36 25   < >  --    CR 0.83 0.69 0.66 0.63  --    < > 0.77 0.80   < >  --    GFRESTIMATED >90 >90 >90 >90  --    < > >90 >90   < >  --    GFRESTBLACK  --   --   --   --   --   --  >90 >90   < >  --    ALBUMIN 4.4 3.6  --  3.9  --    < > 3.3* 3.4   < >  --    POTASSIUM 3.8  --  3.7 3.8  --    < > 3.9 3.5   < >  --    TSH  --   --   --   --  1.81  --   --   --   --  1.72    < > = values in this interval not displayed.           2/28/2023     2:19 PM 11/10/2022    10:10 AM   PHQ-2 ( 1999 Pfizer)   Q1: Little interest or pleasure in doing things 0 0   Q2: Feeling down, depressed or hopeless 0 0   PHQ-2 Score 0 0           6/14/2022    11:07 AM 9/29/2022     9:13 PM 10/11/2022    11:43 AM 1/12/2023    12:22 PM   PHQ-9 SCORE   PHQ-9 Total Score MyChart    5 (Mild depression)   PHQ-9 Total Score 5 6        6 5 5       Information is confidential and restricted. Go  to Review Flowsheets to unlock data.    Multiple values from one day are sorted in reverse-chronological order           9/30/2022     7:45 AM 10/11/2022    11:43 AM 1/12/2023    12:23 PM   VIC-7 SCORE   Total Score   8 (mild anxiety)   Total Score     8 9 8       Information is confidential and restricted. Go to Review Flowsheets to unlock data.    Multiple values from one day are sorted in reverse-chronological order           6/14/2022    11:07 AM   ACT Total Scores   ACT TOTAL SCORE (Goal Greater than or Equal to 20) 24   In the past 12 months, how many times did you visit the emergency room for your asthma without being admitted to the hospital? 2   In the past 12 months, how many times were you hospitalized overnight because of your asthma? 0       Diogo Hardin    April 5, 2023 12:59 PM

## 2023-04-05 NOTE — PROGRESS NOTES
"Assessment & Plan   Kasandra was seen today for uti.    Diagnoses and all orders for this visit:    (R39.15) Urinary urgency  (primary encounter diagnosis)  (R35.0) Urinary frequency  (N89.8) Vaginal itching  Plan: UA without Microscopic, Urine Culture,         Chlamydia trachomatis/Neisseria gonorrhoeae by         PCR - Clinic Collect, Trichomonas vaginalis DNA        PCR, phenazopyridine (AZO URINARY PAIN RELIEF)         95 MG tablet    DDX includes yeast infection, STI, vs UTI. UA unremarkable other than RBCs (pt is menstruating). Sent urine for culture and completed STI testing. Will wait to prescribe antibiotics until urine culture results. Sent rx for AZO to relieve symptoms       Charis King, ANDRES   ______________________________________    Subjective   Kasandra is a 38 year old patient here today for suspected UTI    Urinary urgency, hesitancy, and frequency  Urinating does not burn, but feels uncomfortable  Symptoms are mildly bothersome. 2-3/10  Vaginal itching  No change in vaginal discharge  Period just started, spotting  New sexual partner  Hx of UTIs, last one about 4 months ago  Gets more UTIs when she is more sexually active  No fever, chills, or low back pain  Some abd cramping and mild nausea, typical for her during menstruation    Do you need any refills on your Medications today? No      Medical, surgical, family and social histories reviewed and updated as indicated.   Medications and allergies reviewed and updated as indicated.       ROS  Review Of Systems  See HPI    General Physical Exam:  Vitals: /81   Pulse 87   Temp 97.9  F (36.6  C) (Oral)   Ht 1.753 m (5' 9\")   Wt 73 kg (161 lb)   LMP 01/29/2023   SpO2 100%   BMI 23.78 kg/m    Physical Exam  Vitals and nursing note reviewed.   Constitutional:       General: She is not in acute distress.     Appearance: Normal appearance. She is not ill-appearing or diaphoretic.   HENT:      Head: Normocephalic and atraumatic.   Eyes:      Pupils: " Pupils are equal, round, and reactive to light.   Cardiovascular:      Rate and Rhythm: Normal rate and regular rhythm.      Heart sounds: Normal heart sounds.   Pulmonary:      Effort: Pulmonary effort is normal.      Breath sounds: Normal breath sounds.   Abdominal:      General: Abdomen is flat. Bowel sounds are normal. There is no distension.      Palpations: Abdomen is soft.      Tenderness: There is no abdominal tenderness. There is no right CVA tenderness or left CVA tenderness.   Skin:     General: Skin is warm and dry.   Neurological:      General: No focal deficit present.      Mental Status: She is alert and oriented to person, place, and time.      Gait: Gait is intact.   Psychiatric:         Mood and Affect: Mood normal.         Behavior: Behavior normal.         Thought Content: Thought content normal.         Judgment: Judgment normal.

## 2023-04-06 LAB
C TRACH DNA SPEC QL PROBE+SIG AMP: NEGATIVE
N GONORRHOEA DNA SPEC QL NAA+PROBE: NEGATIVE

## 2023-04-07 LAB — BACTERIA UR CULT: NORMAL

## 2023-04-09 LAB — BACTERIA SPEC CULT: NO GROWTH

## 2023-04-11 ENCOUNTER — TELEPHONE (OUTPATIENT)
Dept: GASTROENTEROLOGY | Facility: CLINIC | Age: 38
End: 2023-04-11

## 2023-04-11 ENCOUNTER — LAB (OUTPATIENT)
Dept: LAB | Facility: CLINIC | Age: 38
End: 2023-04-11
Payer: COMMERCIAL

## 2023-04-11 ENCOUNTER — THERAPY VISIT (OUTPATIENT)
Dept: PHYSICAL THERAPY | Facility: CLINIC | Age: 38
End: 2023-04-11
Attending: NURSE PRACTITIONER
Payer: COMMERCIAL

## 2023-04-11 DIAGNOSIS — M25.512 CHRONIC PAIN OF BOTH SHOULDERS: Primary | ICD-10-CM

## 2023-04-11 DIAGNOSIS — M25.511 CHRONIC PAIN OF BOTH SHOULDERS: Primary | ICD-10-CM

## 2023-04-11 DIAGNOSIS — G89.29 CHRONIC PAIN OF BOTH SHOULDERS: Primary | ICD-10-CM

## 2023-04-11 DIAGNOSIS — K76.0 HEPATIC STEATOSIS: ICD-10-CM

## 2023-04-11 LAB — HCV AB SERPL QL IA: NONREACTIVE

## 2023-04-11 PROCEDURE — 97112 NEUROMUSCULAR REEDUCATION: CPT | Mod: GP | Performed by: PHYSICAL THERAPIST

## 2023-04-11 PROCEDURE — 81332 SERPINA1 GENE: CPT | Mod: 90 | Performed by: PATHOLOGY

## 2023-04-11 PROCEDURE — 86039 ANTINUCLEAR ANTIBODIES (ANA): CPT | Performed by: PATHOLOGY

## 2023-04-11 PROCEDURE — 82103 ALPHA-1-ANTITRYPSIN TOTAL: CPT | Mod: 90 | Performed by: PATHOLOGY

## 2023-04-11 PROCEDURE — 83516 IMMUNOASSAY NONANTIBODY: CPT | Mod: 90 | Performed by: PATHOLOGY

## 2023-04-11 PROCEDURE — 97140 MANUAL THERAPY 1/> REGIONS: CPT | Mod: GP | Performed by: PHYSICAL THERAPIST

## 2023-04-11 PROCEDURE — 36415 COLL VENOUS BLD VENIPUNCTURE: CPT | Performed by: PATHOLOGY

## 2023-04-11 PROCEDURE — 97110 THERAPEUTIC EXERCISES: CPT | Mod: GP | Performed by: PHYSICAL THERAPIST

## 2023-04-11 PROCEDURE — 86803 HEPATITIS C AB TEST: CPT | Performed by: PATHOLOGY

## 2023-04-11 PROCEDURE — 86038 ANTINUCLEAR ANTIBODIES: CPT | Performed by: PATHOLOGY

## 2023-04-11 PROCEDURE — 86364 TISS TRNSGLTMNASE EA IG CLAS: CPT | Performed by: PATHOLOGY

## 2023-04-11 PROCEDURE — 99000 SPECIMEN HANDLING OFFICE-LAB: CPT | Performed by: PATHOLOGY

## 2023-04-11 PROCEDURE — 82784 ASSAY IGA/IGD/IGG/IGM EACH: CPT | Performed by: PATHOLOGY

## 2023-04-11 NOTE — PROGRESS NOTES
"Kasandra Lau is a 38 year old female who is being evaluated via a billable video visit.      The patient has been notified of following:     \"This video visit will be conducted via a call between you and your physician/provider. We have found that certain health care needs can be provided without the need for an in-person physical exam.  This service lets us provide the care you need with a video conversation.  If a prescription is necessary we can send it directly to your pharmacy.  If lab work is needed we can place an order for that and you can then stop by our lab to have the test done at a later time.    Video visits are billed at different rates depending on your insurance coverage.  Please reach out to your insurance provider with any questions.    If during the course of the call the physician/provider feels a video visit is not appropriate, you will not be charged for this service.\"    Patient has given verbal consent for Video visit? Yes  How would you like to obtain your AVS? MyChart  If you are dropped from the video visit, the video invite should be resent to: Send to e-mail at: [email or web address]  Will anyone else be joining your video visit? No  {If patient encounters technical issues they should call [phone]      Video-Visit Details    Type of service:  Video Visit    Video Start Time: 1:01 PM  Video End Time: 1:24 PM    Originating Location (pt. Location): Home    Distant Location (provider location):  Offsite (providers home) [facility]NAG[facility] [place]     Platform used for Video Visit: Screenleap    During this virtual visit the patient is located in [place], patient verifies this as the location during the entirety of this visit.       Weight Management Nutrition Consultation    Kasandra Lau is a 38 year old female presents today for return weight management nutrition consultation.  Patient referred by Dr. Kerri Evans on October 4, 2022.    Patient with " "Co-morbidities of obesity including:  Type II DM no  Renal Failure no  Sleep apnea no  Hypertension no   Dyslipidemia yes  Joint pain no  Back pain yes  GERD yes   Fatty liver yes    PMH also significant for anxiety, depression, ADHD, PTSD, rheumatoid arthritis, and IBS-C.  H/o cholcystectomy     Anthropometrics:  Estimated body mass index is 29.22 kg/m  as calculated from the following:    Height as of an earlier encounter on 10/4/22: 1.74 m (5' 8.5\").    Weight as of an earlier encounter on 10/4/22: 88.5 kg (195 lb).    Current weight: 155-160 lbs with BMI 23 (-0 lbs on past month -40 lbs from initial)      Medications for Weight Loss:  Ozempic     NUTRITION HISTORY  Food allergies: None  Food intolerances: Too much dairy. Peppers, onions and garlic.   Supplements: Vitamin D (5000 units/day), daily multivitamin   Previous methods of diet modification for weight loss: Cutting down high-sugar (baked goods, ice cream) and high-fat foods, cutting 500 calories per day. Tried Noom, did not like it.      She reports having some nausea and cramping when starting Ozempic, but has reduced over time. She has noticed increased reflux as well.     Last RD 3/7/23 - Reports experiencing hair loss in past 1-2 months. Has reduced Ozempic to 0.5 mg dose to help stableize weight. Likely hair loss r/t rate of weight loss. She has also tried adding in biotin and collagen supplements, in addition to her daily MVI and vitamins.   Appetite stronger earlier in the day.   Eating meat if getting take-out otherwise doesn't like to cook meat.     Today - Weight stabelized. Feeling good at current weight. She is working on toning, increasing physical activity - 2 hours. Noticing some nausea after meals, noticed with Macro bar protein bars, caffeine, and high-sugar foods.    Recent Diet Recall:  Breakfast: cereal (whole grain); yogurt  Lunch: bigger meal - noodle bowl and side veggies; Meat and veggie frozen meal   Dinner: meat   Beverages: 50 " "oz/day water     Progress Towards Previous Goals:  1) Restart tracking nutritional intake. Aim for 65-90 gm protein daily. - Not met   - 20-30 gm protein = palm-size pc of lean meat/seafood, 4-5 tuna salad, 3 cheese sticks, 3/4 c serving of Greek yogurt, 3 hard boiled eggs, 1 protein bar/shake, 3 slice deli meat (or 2 slice deli meat + 1 slice cheese)   - Vital proteins collagen powder  2) Increase activity as able - Met, continues   3) Consume 3L fluid per day - Met, continues  4) Gradually increase fiber intake. Increase veggie intake. - Ongoing, continues to work on this    Physical Activity:  Recently signed up for cross country skiing exercises. And plans to do twice weekly dance classes with a friend once skiing season is over.     Nutrition Prescription  Recommended energy/nutrient modification.    Nutrition Diagnosis  Food-nutrition knowledge deficit r/t maintenance diet aeb pt presents with interest in maintenance diet review. - improved    Nutrition Intervention  Materials/education provided on diet for weight maintenance. Discussed proteins needs for muscle synthesis as increasing strengthening exercise.   Discussed protein supplements to help meet goal protein intake.   Co-developed goals to work towards.   Provided pt with list of goals and resources below via [employer].    Patient demonstrates understanding.    Expected Engagement: good     Nutrition Goals  1) Aim for 65-90 gm protein daily.    - 20-30 gm protein = palm-size pc of lean meat/seafood, 4-5 tuna salad, 3 cheese sticks, 3/4 c serving of Greek yogurt, 3 hard boiled eggs, 1 protein bar/shake, 3 slice deli meat (or 2 slice deli meat + 1 slice cheese)   - Vital proteins collagen powder  2) Increase activity as able   3) Consume 3L fluid per day  4) Gradually increase fiber intake. Increase fruit/veggie intake. Try new summer fruit/veggies recipes.        Healthy Recipe Resources:  Books:    \"The Volumetrics Eating Plan\" by Lizzette Robles, " "Ph.D.    \"Cooking that Counts\" by editors of ParkAround.com    \"Calorie Smart Meals\" cookbook by Better Homes and Gardens (200-500 calorie meals)    Websites:    www.Tango.Zynstra    www.Fabric Engine.org    https://www.diabetesfoodhub.org/all-recipes.html    https://www.United Capital.Zynstra/    Https://www.Valkeeplate.gov/myplatekitchen    https://snaped.8thBridges.usda.gov/recipes-menus     https://www.Page Mage/gallery/7695201/dietitian-budget-high-protein-dinner-recipes/    https://www.Airu/recipes/84/healthy-recipes/     Https://www.Zhilabs.com/c/RFIDeasen     EatX Plus Two Solutions.com       Cultural Cuisines:    https://www.Page Mage/recipes/62905/cuisines-regions//    https://www.Spruceling.org/knowledge-center/recipes/    https://Stage I Diagnostics/recipe-index/    https://OnTheGo Platforms.Zynstra/    Apps:    EduSourced tomi (or website, mealime.com)     SpendSmartEatSmart           Follow-Up:  3 months, prn    Time spent with patient: 23 minutes.  Ana Pillai RD, LD  "

## 2023-04-11 NOTE — TELEPHONE ENCOUNTER
Left Voicemail (1st Attempt) for the patient to call back and schedule the following:    Appointment type: RTN LIVER  Provider: TREY  Return date: NEXT AVAILABLE  Specialty phone number: [phone]  Additional appointment(s) needed: LAB  Additonal Notes: RESCDHEDULE NEEDED.

## 2023-04-11 NOTE — TELEPHONE ENCOUNTER
Left Voicemail (1st Attempt) for the patient to call back and schedule the following:    Appointment type: Reschedule for 4.11.23   Provider: Tejas Fernandes  Return date: Next available  Specialty phone number: [phone]  Additional appointment(s) needed: n/a  Additonal Notes: n/a

## 2023-04-12 LAB
ANA PAT SER IF-IMP: ABNORMAL
ANA SER QL IF: ABNORMAL
ANA TITR SER IF: ABNORMAL {TITER}
IGA SERPL-MCNC: 283 MG/DL (ref 84–499)
SMA IGG SER-ACNC: 10 UNITS
TTG IGA SER-ACNC: 0.6 U/ML
TTG IGG SER-ACNC: <0.6 U/ML

## 2023-04-13 ENCOUNTER — VIRTUAL VISIT (OUTPATIENT)
Dept: PSYCHOLOGY | Facility: CLINIC | Age: 38
End: 2023-04-13
Payer: COMMERCIAL

## 2023-04-13 ENCOUNTER — VIRTUAL VISIT (OUTPATIENT)
Dept: ENDOCRINOLOGY | Facility: CLINIC | Age: 38
End: 2023-04-13

## 2023-04-13 DIAGNOSIS — Z86.39 HISTORY OF OBESITY: ICD-10-CM

## 2023-04-13 DIAGNOSIS — F54 PSYCHOLOGICAL AND BEHAVIORAL FACTORS ASSOCIATED WITH DISORDERS OR DISEASES CLASSIFIED ELSEWHERE: ICD-10-CM

## 2023-04-13 DIAGNOSIS — F34.1 PERSISTENT DEPRESSIVE DISORDER: Primary | ICD-10-CM

## 2023-04-13 DIAGNOSIS — F43.12 CHRONIC POST-TRAUMATIC STRESS DISORDER (PTSD): ICD-10-CM

## 2023-04-13 DIAGNOSIS — Z71.3 NUTRITIONAL COUNSELING: Primary | ICD-10-CM

## 2023-04-13 PROCEDURE — 99207 PR NO CHARGE LOS: CPT | Mod: VID | Performed by: DIETITIAN, REGISTERED

## 2023-04-13 PROCEDURE — 97803 MED NUTRITION INDIV SUBSEQ: CPT | Mod: VID | Performed by: DIETITIAN, REGISTERED

## 2023-04-13 PROCEDURE — 90837 PSYTX W PT 60 MINUTES: CPT | Mod: VID | Performed by: PSYCHOLOGIST

## 2023-04-13 NOTE — LETTER
"4/13/2023       RE: Kasandra Lau  [address]  [place] [address]     Dear Colleague,    Thank you for referring your patient, Kasandra Lau, to the [facility]NA[facility] [place] at [facility]. Please see a copy of my visit note below.    Kasandra Lau is a 38 year old female who is being evaluated via a billable video visit.      The patient has been notified of following:     \"This video visit will be conducted via a call between you and your physician/provider. We have found that certain health care needs can be provided without the need for an in-person physical exam.  This service lets us provide the care you need with a video conversation.  If a prescription is necessary we can send it directly to your pharmacy.  If lab work is needed we can place an order for that and you can then stop by our lab to have the test done at a later time.    Video visits are billed at different rates depending on your insurance coverage.  Please reach out to your insurance provider with any questions.    If during the course of the call the physician/provider feels a video visit is not appropriate, you will not be charged for this service.\"    Patient has given verbal consent for Video visit? Yes  How would you like to obtain your AVS? MyChart  If you are dropped from the video visit, the video invite should be resent to: Send to e-mail at: [email or web address]  Will anyone else be joining your video visit? No  {If patient encounters technical issues they should call [phone]      Video-Visit Details    Type of service:  Video Visit    Video Start Time: 1:01 PM  Video End Time: 1:24 PM    Originating Location (pt. Location): Home    Distant Location (provider location):  Offsite (providers home) [facility] WEIGHT MANAGEMENT [facility]     Platform used for Video Visit: AtriCure    During this virtual visit the patient is located in [place], " "patient verifies this as the location during the entirety of this visit.       Weight Management Nutrition Consultation    Kasandra Lau is a 38 year old female presents today for return weight management nutrition consultation.  Patient referred by Dr. Kerri Evans on October 4, 2022.    Patient with Co-morbidities of obesity including:  Type II DM no  Renal Failure no  Sleep apnea no  Hypertension no   Dyslipidemia yes  Joint pain no  Back pain yes  GERD yes   Fatty liver yes    PMH also significant for anxiety, depression, ADHD, PTSD, rheumatoid arthritis, and IBS-C.  H/o cholcystectomy     Anthropometrics:  Estimated body mass index is 29.22 kg/m  as calculated from the following:    Height as of an earlier encounter on 10/4/22: 1.74 m (5' 8.5\").    Weight as of an earlier encounter on 10/4/22: 88.5 kg (195 lb).    Current weight: 155-160 lbs with BMI 23 (-0 lbs on past month -40 lbs from initial)      Medications for Weight Loss:  Ozempic     NUTRITION HISTORY  Food allergies: None  Food intolerances: Too much dairy. Peppers, onions and garlic.   Supplements: Vitamin D (5000 units/day), daily multivitamin   Previous methods of diet modification for weight loss: Cutting down high-sugar (baked goods, ice cream) and high-fat foods, cutting 500 calories per day. Tried Noom, did not like it.      She reports having some nausea and cramping when starting Ozempic, but has reduced over time. She has noticed increased reflux as well.     Last RD 3/7/23 - Reports experiencing hair loss in past 1-2 months. Has reduced Ozempic to 0.5 mg dose to help stableize weight. Likely hair loss r/t rate of weight loss. She has also tried adding in biotin and collagen supplements, in addition to her daily MVI and vitamins.   Appetite stronger earlier in the day.   Eating meat if getting take-out otherwise doesn't like to cook meat.     Today - Weight stabelized. Feeling good at current weight. She is working on toning, " increasing physical activity - 2 hours. Noticing some nausea after meals, noticed with Macro bar protein bars, caffeine, and high-sugar foods.    Recent Diet Recall:  Breakfast: cereal (whole grain); yogurt  Lunch: bigger meal - noodle bowl and side veggies; Meat and veggie frozen meal   Dinner: meat   Beverages: 50 oz/day water     Progress Towards Previous Goals:  1) Restart tracking nutritional intake. Aim for 65-90 gm protein daily. - Not met   - 20-30 gm protein = palm-size pc of lean meat/seafood, 4-5 tuna salad, 3 cheese sticks, 3/4 c serving of Greek yogurt, 3 hard boiled eggs, 1 protein bar/shake, 3 slice deli meat (or 2 slice deli meat + 1 slice cheese)   - Vital proteins collagen powder  2) Increase activity as able - Met, continues   3) Consume 3L fluid per day - Met, continues  4) Gradually increase fiber intake. Increase veggie intake. - Ongoing, continues to work on this    Physical Activity:  Recently signed up for cross country skiing exercises. And plans to do twice weekly dance classes with a friend once skiing season is over.     Nutrition Prescription  Recommended energy/nutrient modification.    Nutrition Diagnosis  Food-nutrition knowledge deficit r/t maintenance diet aeb pt presents with interest in maintenance diet review. - improved    Nutrition Intervention  Materials/education provided on diet for weight maintenance. Discussed proteins needs for muscle synthesis as increasing strengthening exercise.   Discussed protein supplements to help meet goal protein intake.   Co-developed goals to work towards.   Provided pt with list of goals and resources below via WIBt.    Patient demonstrates understanding.    Expected Engagement: good     Nutrition Goals  1) Aim for 65-90 gm protein daily.    - 20-30 gm protein = palm-size pc of lean meat/seafood, 4-5 tuna salad, 3 cheese sticks, 3/4 c serving of Greek yogurt, 3 hard boiled eggs, 1 protein bar/shake, 3 slice deli meat (or 2 slice deli meat  "+ 1 slice cheese)   - Vital proteins collagen powder  2) Increase activity as able   3) Consume 3L fluid per day  4) Gradually increase fiber intake. Increase fruit/veggie intake. Try new summer fruit/veggies recipes.        Healthy Recipe Resources:  Books:  \"The Volumetrics Eating Plan\" by Lizzette Robles, Ph.D.  \"Cooking that Counts\" by editors of Compound Semiconductor Technologies  \"Calorie Smart Meals\" cookbook by Better Homes and Gardens (200-500 calorie meals)    Websites:  www.Nitero  www.Inpria Corporation.Stockbet.com  https://www.diabetesfoodhub.org/all-recipes.html  https://www.Net 263.Little Quest/  Https://www.UXFLIPplate.gov/myplatekitchen  https://snaped.SeeSaw.coms.usda.gov/recipes-menus   https://www.Modern Family Doctor/Toura/5393384/dietitian-budget-high-protein-dinner-recipes/  https://www.[employer] Economic Development Corporation/recipes/84/healthy-recipes/   Https://www.For Art's Sake Media.com/c/Ticketbis.com       Cultural Cuisines:  https://www.Modern Family Doctor/recipes/55575/cuisines-regions//  https://www.[facility].org/knowledge-center/recipes/  https://BitGravity/recipe-index/  https://Aunt Bertha.Little Quest/    Apps:  Ondore tomi (or website, mealime.com)   SpendSmartEatSmart           Follow-Up:  3 months, prn    Time spent with patient: 23 minutes.  Ana Pillai RD, LD    "

## 2023-04-13 NOTE — PATIENT INSTRUCTIONS
"Pritesh Slaughter,    Follow-up with RD in 3 months    Thank you,    Ana Pillai, RD, LD  If you would like to schedule or reschedule an appointment with the RD, please call [phone]    Nutrition Goals  1) Aim for 65-90 gm protein daily.    - 20-30 gm protein = palm-size pc of lean meat/seafood, 4-5 tuna salad, 3 cheese sticks, 3/4 c serving of Greek yogurt, 3 hard boiled eggs, 1 protein bar/shake, 3 slice deli meat (or 2 slice deli meat + 1 slice cheese)   - Vital proteins collagen powder  2) Increase activity as able   3) Consume 3L fluid per day  4) Gradually increase fiber intake. Increase fruit/veggie intake. Try new summer fruit/veggies recipes.        Healthy Recipe Resources:  Books:  \"The Volumetrics Eating Plan\" by Lizzette Robles, Ph.D.  \"Cooking that Counts\" by editors of WhichSocial.com  \"Calorie Smart Meals\" cookbook by Better Homes and Gardens (200-500 calorie meals)    Websites:  www.Gliph  www.Twist and Shout.aSmallWorld  https://www.diabetesfoodhub.org/all-recipes.html  https://www.Education Elements.GreenDust/  Https://www.Six Degrees Groupmyplate.gov/myplatekitchen  https://snaped.fns.usda.gov/recipes-menus   https://www.Zoom.GreenDust/gallery/8462293/dietitian-budget-high-protein-dinner-recipes/  https://www.PubNative/recipes/84/healthy-recipes/   Https://www.youLagouube.com/c/ControlCircleen   EatthiAwoX.com       Cultural Cuisines:  https://www.Zoom.GreenDust/recipes/48464/cuisines-regions//  https://www.Luxury Fashion TradenatKeyEffx.org/knowledge-center/recipes/  https://Flash Ambition Entertainment Company.GreenDust/recipe-index/  https://Zipalong.GreenDust/    Apps:  Dashbook tomi (or website, mealime.com)   Claire             COMPREHENSIVE WEIGHT MANAGEMENT PROGRAM  VIRTUAL SUPPORT GROUPS    For Support Group Information:      We offer support groups for patients who are working on weight loss and considering, preparing for or have had weight loss surgery.   There is no cost for this opportunity.  You are invited to attend the?Virtual Support " "Groups?provided by any of the following locations:    [facility] via [place] Group Teams with Frannie Scales RN  2.   [place] via Cloutex with Errol Boss, PhD, LP  3.   [place] via Endeka Group Teams with Marilu Wild RN  4.   [facility] via [employer] Group Teams with Marilu Leonardo [facility]    The following Support Group information can also be found on our website:  https://www.[facility].org/treatments/weight-loss-surgery-support-groups    https://www.[facility].org/treatments/weight-loss-and-weight-loss-surgery-support-groups    [facility] Weight Loss Surgery Support Group    [facility] Weight Loss Surgery Support Group  The support group is a patient-lead forum that meets monthly to share experiences, encouragement and education. It is open to those who have had weight loss surgery, are scheduled for surgery, or are considering surgery.   WHEN: This group meets on the 3rd Wednesday of each month from 5:00PM - 6:00PM virtually using Microsoft Teams.   FACILITATOR: Led by Frannie Bowman, RD, LD, RN, the program's Clinical Coordinator.   TO REGISTER: Please contact the clinic via ImpressPages or call the nurse line directly at [phone] to inform our staff that you would like an invite sent to you and to let us know the email you would like the invite sent to. Prior to the meeting, a link with directions on how to join the meeting will be sent to you.    2023 Meetings (speakers to be determined)  January 18: \"Let's Talk\" a time for the group to share.  February 15: \"Let's Talk\" a time for the group to share.  March 15: \"Let's Talk\" a time for the group to share.  April 19: \"Let's Talk\" a time for the group to share.  May 17: \"Let's Talk\" a time for the group to share.  June 21: \"Let's Talk\" a time for the group to share.    [facility] [place] Support Groups    Connections Bariatric Care Support Group?  This is open to all pre- " "and post- operative bariatric surgery patients as well as their support system.   WHEN: This group meets the 2nd Tuesday of each month from 6:30 PM - 8:00 PM virtually using Microsoft Teams.   FACILITATOR: Led by Errol Boss, Ph.D who is a Licensed Psychologist with the [facility] Comprehensive Weight Management Program.   TO REGISTER: Please send an email to Errol Boss, Ph.D., LP at?[email or web address]?if you would like an invitation to the group and to learn about using Microsoft Teams.    2023 Meetings  January 10: Manish Alegria, PharmD, Pharmacy Resident, \"Medications and Bariatric Surgery\"  February 14:  March 14:  April 11:  May 9:  June 11:    Connections Post-Operative Bariatric Surgery Support Group  This is a support group for [facility] bariatric patients (and those external to [facility]) who have had bariatric surgery and are at least 3 months post-surgery.  WHEN: This support group meets the 4th Wednesday of the month from 11:00 AM - 12:00 PM virtually using Microsoft Teams.   FACILITATOR: Led by Certified Bariatric Nurse, Marilu Wild RN.   TO REGISTER: Please send an email to Marilu at [email or web address] if you would like an invitation to the group and to learn about using Microsoft Teams.    2023 Meetings  January 25  February 22  March 22  April 26  May 24  Josiane 28      [facility] Healthy Lifestyle Virtual Support Group    Healthy Lifestyle Virtual Support Group?  This is 60 minutes of small group guided discussion, support and resources. All are welcome who want a healthy lifestyle.  WHEN: This group meets monthly on a Friday from 12:30 PM - 1:30 PM virtually using Microsoft Teams.   FACILITATOR: Led by National Board Certified Health and , Marilu Leonardo [facility].   TO REGISTER: Please send an email to Marilu [email or web address] to receive monthly invites to the group or if you have any questions " "about having a health .  Prior to the meeting, a link with directions on how to join the meeting will be sent to you.    2023 Meetings  January 20: \"Let's Talk\" a time for the group to share  February 17: Guest Speaker, Ann Magaña RD, Registered Dietician, \"Tips to Maximize Your Metabolism\"  March 17: Let's Talk\" a time for the group to share  April 14: Guest Speaker, Sosa Gillette RD, Registered Dietician, \"Heart Health\"  May 19: \"Let's Talk\" a time for the group to share  June: To be announced.            "

## 2023-04-15 LAB
A1AT PHENOTYP SERPL-IMP: NORMAL
A1AT SERPL-MCNC: 130 MG/DL
A1AT SS SERPL-MCNC: NEGATIVE G/L
A1AT SZ SERPL-MCNC: NORMAL G/L
A1AT ZZ SERPL-MCNC: NEGATIVE G/L
SPECIMEN SOURCE: NORMAL

## 2023-04-18 NOTE — TELEPHONE ENCOUNTER
Incoming refill from [facility]/pharmacy #[id number] - [place], [place] - [address] AT [facility] [place] via electronic request     Medication requested:   Disp Refills Start End ROSE   gabapentin (NEURONTIN) 400 MG capsule 90 capsule 0 1/16/2020  --   Sig - Route: Take 1 capsule (400 mg) by mouth 3 times daily         Writer called pharmacy ([phone]) and confirmed the pt's updated dose to 400 mg three times a day from 1/16, so this request is not needed.      .  
[FreeTextEntry1] : Cristal is a 61 year old female who presents for ongoing bilateral breast pain as well as for high risk screening. The patient underwent cardiac workup which was negative; states that she was recent definitely diagnosed with SLE and will be starting physical therapy soon. She states that breast pain improved, tried wearing sports bras. The pain has come back, not as severe as before, it is intermittent, sometimes unilateral sometimes bilateral, sometimes tender to touch. She states that a few weeks ago her right nipple became darker than previous, states that it resolved on it's own and is back to baseline. Pt denies palpable masses, skin lesions/changes, nipple discharge.\par \par She has a hx of b/l benign breast excisions, one in her left breast over 20 years ago and one in her right breast in 2017.\par \par PURNIMA lifetime risk is 29.9%; Family hx of breast cancer: mother - 49 years old, m-aunt 52 years old, maternal female cousin - 34 years old. 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

## 2023-04-20 ENCOUNTER — VIRTUAL VISIT (OUTPATIENT)
Dept: PSYCHOLOGY | Facility: CLINIC | Age: 38
End: 2023-04-20
Payer: COMMERCIAL

## 2023-04-20 DIAGNOSIS — F34.1 PERSISTENT DEPRESSIVE DISORDER: ICD-10-CM

## 2023-04-20 DIAGNOSIS — F43.12 CHRONIC POST-TRAUMATIC STRESS DISORDER (PTSD): Primary | Chronic | ICD-10-CM

## 2023-04-20 PROCEDURE — 90837 PSYTX W PT 60 MINUTES: CPT | Mod: VID | Performed by: PSYCHOLOGIST

## 2023-04-22 NOTE — CONFIDENTIAL NOTE
"      Health Psychology - Follow up Visit  Confidential Summary*    The author of this note documented a reason for not sharing it with the patient.  REFERRAL SOURCE:  Psychiatry    CHIEF COMPLAINT/REASON FOR VISIT  Psychotherapy in context of chronic PTSD and persistent depression.  Patient also complains of dissatisfaction with interpersonal relationships and challenges with emotion regulation.      Patient was seen today for a 60 minute individual psychotherapy session.  The session was facilitated via VIDEO with patient at her home and provider at her own home.  We used [email or web address] platform.       This telehealth service is appropriate and effective for delivering services in light of the necessity for social distancing to mitigate the COVID-19 epidemic. Patient has agreed to receiving telehealth services.    The patient has been notified of following:   \"This VIDEO visit will be conducted via a call between you and your physician/provider. We have found that certain health care needs can be provided without the need for an in-person physical exam.  VIDEO visits are billed at different rates depending on your insurance coverage.  Please reach out to your insurance provider with any questions. If during the course of the call the physician/provider feels a video visit is not appropriate, you will not be charged for this service.\"     Patient has given verbal consent for VIDEO visit? Yes    Subjective:  Patient began with report that she believes that she has found a new friend, Dorita.  She reported that they have a number of things in common and that she is enjoying the feeling of closeness and validation. She reported that they are at similar stages in their doctoral programs and that she believes that they will motivate one another to finish so that they might be able to move on from this particular phase of their lives.     She described ongoing relationship with new partner.  She reported the pros and cons of " "this complex situation and her belief that she will be able to navigate the complexities.  She was encouraged to consider her goals and whether this relationship will allow her to create her live worth living.  She continues to describe the temporary nature of her situation and her desire to have an outlet.  Encouraged her to continue to collect \"data\" about the impact of the situation on her her anxiety and ability to focus on her work.      She was encouraged to continue to use her DBT skills.  Encouraged return to diary card completion and to focus on mastery and using opposite to emotion.      Discussed ongoing weight loss using Ozempic and her adjustment to this medication.    Objective:  Patient was on time for today s session. She was alert and oriented. Mood was euthymic and she appeared engaged and happy as she described new relationship. Patient denied suicidal or assaultive ideation, plan, or intent.        Assessment:  The patient has a longstanding history of interpersonal discord and challenges with emotion regulation and use of avoidance as a primary coping tool.  She has completed all requirements for her PhD and is now ABD.  She is living in  housing with her two dogs.  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  She is stalled on her dissertation proposal.      Plan:  Patient graduated from full model DBT at [facility] and is now completing phase 2 work.       Treatment plan completed:  10/13/22     Time In: 2:00  Time Out: 3:00    Diagnosis:  Axis I PTSD, chronic, persistent depressive disorder, adjustment disorder with mixed, psychological factors associated with physical (fibromyalgia)   Axis II No current diagnosis   Axis III please see medical records for details   [place] IV Psychosocial and Environmental Stressors: living alone with no family support, pandemic stress, chronic illness         Corina Muhammad, PhD, LP    "

## 2023-04-22 NOTE — CONFIDENTIAL NOTE
"        Health Psychology - Follow up Visit  Confidential Summary*    The author of this note documented a reason for not sharing it with the patient.  REFERRAL SOURCE:  Psychiatry    CHIEF COMPLAINT/REASON FOR VISIT  Psychotherapy in context of chronic PTSD and persistent depression.  Patient also complains of dissatisfaction with interpersonal relationships and challenges with emotion regulation.      Patient was seen today for a 60 minute individual psychotherapy session.  The session was facilitated via VIDEO with patient at her home and provider at her own home.  We used [email or web address] platform.       This telehealth service is appropriate and effective for delivering services in light of the necessity for social distancing to mitigate the COVID-19 epidemic. Patient has agreed to receiving telehealth services.    The patient has been notified of following:   \"This VIDEO visit will be conducted via a call between you and your physician/provider. We have found that certain health care needs can be provided without the need for an in-person physical exam.  VIDEO visits are billed at different rates depending on your insurance coverage.  Please reach out to your insurance provider with any questions. If during the course of the call the physician/provider feels a video visit is not appropriate, you will not be charged for this service.\"     Patient has given verbal consent for VIDEO visit? Yes    Subjective:  She began with report that she has started a new improv class and is looking forward to it.  She reported that she continues to improve in her skills and is now in an advanced group.  We discussed the impact of learning about acting and the way in which her body and facial expressions might impact and convey impressions to others.  She reported an awareness that she may come across initially as cold and that she is working to change this impression. She reported an awareness that she is anxious and may present in " "this manner as a protection.     She reported that she is also signed up for a dance class at the [employer] and is looking forward to meeting new friends.      She has recently started dating someone and described learning that he may not be what he initially described himself to be.  She was encouraged to consider her \"life worth living\" goals and if this relationship fits in with what she is hoping to create in her life.      She continues to report success in her weight loss goals and that she has noticed a reduction in her feelings of fatigue and less constipation with ozempic.  She is nearing her weight loss goal and reported that she feels less overall pain.    She was encouraged to discuss her dissertation proposal but she reported little progress to date.  Encouraged opposite to emotion and daily targeted work.      Objective:  Patient was on time for today s session. She was alert and oriented. Mood was dysphoric and she appeared tired, affect appropriate.. Patient denied suicidal or assaultive ideation, plan, or intent.        Assessment:  The patient has a longstanding history of interpersonal discord and challenges with emotion regulation and use of avoidance as a primary coping tool.  She has completed all requirements for her PhD and is now ABD.  She is living in  housing with her two dogs.  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  She is stalled on her dissertation proposal.      Plan:  Patient graduated from full model DBT at [facility] and is now completing phase 2 work.        Treatment plan completed:  10/13/22     Time In: 1:00  Time Out: 2:00    Diagnosis:  Axis I PTSD, chronic, persistent depressive disorder, adjustment disorder with mixed, psychological factors associated with physical (fibromyalgia)   Axis II No current diagnosis   Axis III please see medical records for details   [place] IV Psychosocial and Environmental Stressors: " living alone with no family support, pandemic stress, chronic illness         Corina Muhammad, PhD, LP

## 2023-04-25 ENCOUNTER — OFFICE VISIT (OUTPATIENT)
Dept: FAMILY MEDICINE | Facility: CLINIC | Age: 38
End: 2023-04-25
Payer: COMMERCIAL

## 2023-04-25 VITALS
RESPIRATION RATE: 17 BRPM | HEIGHT: 69 IN | HEART RATE: 94 BPM | TEMPERATURE: 98.1 F | DIASTOLIC BLOOD PRESSURE: 62 MMHG | WEIGHT: 158.5 LBS | OXYGEN SATURATION: 98 % | SYSTOLIC BLOOD PRESSURE: 96 MMHG | BODY MASS INDEX: 23.47 KG/M2

## 2023-04-25 DIAGNOSIS — B96.89 BACTERIAL VAGINOSIS: ICD-10-CM

## 2023-04-25 DIAGNOSIS — N76.0 BACTERIAL VAGINOSIS: ICD-10-CM

## 2023-04-25 DIAGNOSIS — R30.0 DYSURIA: Primary | ICD-10-CM

## 2023-04-25 DIAGNOSIS — L65.9 ALOPECIA: ICD-10-CM

## 2023-04-25 DIAGNOSIS — N89.8 VAGINAL DISCHARGE: ICD-10-CM

## 2023-04-25 LAB
ALBUMIN UR-MCNC: 100 MG/DL
APPEARANCE UR: ABNORMAL
BILIRUB UR QL STRIP: NEGATIVE
CLUE CELLS: DETECTED
COLOR UR AUTO: ABNORMAL
FOLATE SERPL-MCNC: 27.9 NG/ML (ref 4.6–34.8)
GLUCOSE UR STRIP-MCNC: NEGATIVE MG/DL
HGB UR QL STRIP: ABNORMAL
KETONES UR STRIP-MCNC: 15 MG/DL
LEUKOCYTE ESTERASE UR QL STRIP: ABNORMAL
NITRATE UR QL: NEGATIVE
PH UR STRIP: 6 [PH] (ref 5–7)
SP GR UR STRIP: 1.03 (ref 1–1.03)
TRICHOMONAS (WET PREP): ABNORMAL
TSH SERPL DL<=0.005 MIU/L-ACNC: 2.12 UIU/ML (ref 0.3–4.2)
UROBILINOGEN UR STRIP-ACNC: 0.2 E.U./DL
VIT B12 SERPL-MCNC: 600 PG/ML (ref 232–1245)
WBC (WET PREP): ABNORMAL
YEAST (WET PREP): ABNORMAL

## 2023-04-25 PROCEDURE — 82746 ASSAY OF FOLIC ACID SERUM: CPT | Mod: ORL | Performed by: NURSE PRACTITIONER

## 2023-04-25 PROCEDURE — 84443 ASSAY THYROID STIM HORMONE: CPT | Mod: ORL | Performed by: NURSE PRACTITIONER

## 2023-04-25 PROCEDURE — 87186 SC STD MICRODIL/AGAR DIL: CPT | Mod: ORL | Performed by: NURSE PRACTITIONER

## 2023-04-25 PROCEDURE — 87086 URINE CULTURE/COLONY COUNT: CPT | Mod: ORL | Performed by: NURSE PRACTITIONER

## 2023-04-25 PROCEDURE — 82607 VITAMIN B-12: CPT | Mod: ORL | Performed by: NURSE PRACTITIONER

## 2023-04-25 RX ORDER — METRONIDAZOLE 500 MG/1
500 TABLET ORAL 2 TIMES DAILY
Qty: 14 TABLET | Refills: 0 | Status: SHIPPED | OUTPATIENT
Start: 2023-04-25 | End: 2023-08-03

## 2023-04-25 ASSESSMENT — PAIN SCALES - GENERAL: PAINLEVEL: MILD PAIN (2)

## 2023-04-25 NOTE — LETTER
May 1, 2023      Kasandra Lau  [address] [facility] [address]        Dear ,    We are writing to inform you of your test results.    {results letter list:[id number]}    Resulted Orders   Urine Culture Aerobic Bacterial   Result Value Ref Range    Culture >100,000 CFU/mL Escherichia coli (A)        If you have any questions or concerns, please call the clinic at the number listed above.       Sincerely,      JASVIR Ashley CNP

## 2023-04-25 NOTE — PATIENT INSTRUCTIONS
Vaginal irritation/discharge  -Wet prep positive for clue cells today. Recommend treating with Metronidazole (Flagyl) 500mg twice daily for 7 days  -Send Urine Culture to rule out urinary tract infection  -Recommend drinking more water to stay hydrated    Hair Loss:  -Check TSH/T4, B12, folate levels today  Brush hair to massage scalp  May try gentle selsen blue for a few weeks

## 2023-04-25 NOTE — NURSING NOTE
"ROOM:3  [facility]    Preferred Name: Kasandra     How did you hear about us?  Current Patient    38 year old  Chief Complaint   Patient presents with     Urinary Pain     Vaginal irritation and pain with urination, pressure, less energy than normal  Some discharge with slight red streaks but otherwise clear       Blood pressure 96/62, pulse 94, temperature 98.1  F (36.7  C), temperature source Oral, resp. rate 17, height 1.742 m (5' 8.6\"), weight 71.9 kg (158 lb 8 oz), SpO2 98 %, not currently breastfeeding. Body mass index is 23.68 kg/m .  BP completed using cuff size:        Patient Active Problem List   Diagnosis     Pelvic pain in female     Vitamin D deficiency     Menorrhagia with regular cycle     Migraine without aura and without status migrainosus, not intractable     Iron deficiency anemia due to chronic blood loss     Tired     Circadian rhythm sleep disorder, delayed sleep phase type     Unhealthy sleep habit     Seasonal mood disorder (H)     Chronic idiopathic urticaria     Acid reflux     Cholecystitis     Depression     Hx of abnormal cervical Pap smear     Irritable bowel syndrome with constipation     Migraine headache     Mild intermittent asthma without complication     Need for desensitization to allergens     Panic disorder     Pap smear abnormality of cervix/human papillomavirus (HPV) positive     Recurrent major depressive disorder, in remission (H)     Hypertrophy of breast     Chronic sinusitis, unspecified location     Keratosis pilaris     Endometriosis     Acute pharyngitis due to other specified organisms     Chronic pain of both shoulders       Wt Readings from Last 2 Encounters:   04/25/23 71.9 kg (158 lb 8 oz)   04/05/23 73 kg (161 lb)     BP Readings from Last 3 Encounters:   04/25/23 96/62   04/05/23 111/81   03/22/23 102/70       Allergies   Allergen Reactions     Dust Mites Cough, Difficulty breathing and Shortness Of Breath     Cats      Chest tightness, sinus " irritation       Current Outpatient Medications   Medication     almotriptan (AXERT) 12.5 MG tablet     atomoxetine (STRATTERA) 40 MG capsule     bisacodyl (DULCOLAX) 5 MG EC tablet     buPROPion (WELLBUTRIN XL) 300 MG 24 hr tablet     calcium carbonate (TUMS) 500 MG chewable tablet     cholecalciferol 125 MCG (5000 UT) CAPS     doxepin (SINEQUAN) 10 MG capsule     EMGALITY 120 MG/ML injection     EPINEPHrine (ANY BX GENERIC EQUIV) 0.3 MG/0.3ML injection 2-pack     famotidine (PEPCID) 20 MG tablet     fexofenadine (ALLEGRA) 180 MG tablet     fluticasone (FLONASE) 50 MCG/ACT nasal spray     gabapentin (NEURONTIN) 400 MG capsule     guaiFENesin (MUCINEX) 600 MG 12 hr tablet     hydrocortisone (CORTAID) 1 % external ointment     hydroxychloroquine (PLAQUENIL) 200 MG tablet     montelukast (SINGULAIR) 10 MG tablet     Multiple Vitamins-Minerals (MULTIVITAMIN ADULTS) TABS     ondansetron (ZOFRAN ODT) 4 MG ODT tab     phenazopyridine (AZO URINARY PAIN RELIEF) 95 MG tablet     phenylephrine HCl 10 MG TABS     polyethylene glycol (MIRALAX) 17 GM/Dose powder     propranolol (INDERAL) 20 MG tablet     Semaglutide, 1 MG/DOSE, 4 MG/3ML SOPN     vilazodone (VIIBRYD) 40 MG TABS tablet     levonorgestrel (MIRENA) 20 MCG/DAY IUD     No current facility-administered medications for this visit.       Social History     Tobacco Use     Smoking status: Former     Packs/day: 0.00     Years: 10.00     Pack years: 0.00     Types: Cigarettes     Smokeless tobacco: Never     Tobacco comments:     smokes occasionally socially    Vaping Use     Vaping status: Never Used   Substance Use Topics     Alcohol use: Not Currently     Alcohol/week: 0.0 standard drinks of alcohol     Comment: occasional      Drug use: Not Currently     Types: Marijuana     Comment: marijuana  none since May 2021       Honoring Choices - Health Care Directive Guide offered to patient at time of visit.    Health Maintenance Due   Topic Date Due     ASTHMA ACTION PLAN   Never done     ADVANCE CARE PLANNING  Never done     HEPATITIS B IMMUNIZATION (1 of 3 - 3-dose series) Never done     YEARLY PREVENTIVE VISIT  01/31/2020     Pneumococcal Vaccine: Pediatrics (0 to 5 Years) and At-Risk Patients (6 to 64 Years) (2 - PCV) 05/22/2020     ASTHMA CONTROL TEST  12/14/2022       Immunization History   Administered Date(s) Administered     COVID-19 Vaccine 12+ (Pfizer 2022) 01/10/2022     COVID-19 Vaccine 12+ (Pfizer) 03/19/2021, 04/09/2021     COVID-19 Vaccine Bivalent Booster 12+ (Pfizer) 09/19/2022     DTaP, Unspecified 05/22/2019     Flu, Unspecified 09/23/2016     Influenza Vaccine >6 months (Alfuria,Fluzone) 09/23/2016, 09/07/2018, 08/28/2019, 09/02/2020, 11/02/2021, 09/19/2022     Pneumococcal 23 valent 05/22/2019     TDAP (Adacel,Boostrix) 05/22/2019       Lab Results   Component Value Date    PAP NIL 01/31/2019       Recent Labs   Lab Test 11/07/22  1241 08/19/22  0835 04/27/22  1717 04/13/22  1600 01/04/22  1732 08/12/21  1535 09/12/20  2058 08/02/19  1711 10/05/17  1133 02/06/17  1200   LDL  --  100  --   --   --   --   --   --   --   --    HDL  --  43*  --   --   --   --   --   --   --   --    TRIG  --  184*  --   --   --   --   --   --   --   --    * 60*  --  63*  --    < > 36 25   < >  --    CR 0.83 0.69 0.66 0.63  --    < > 0.77 0.80   < >  --    GFRESTIMATED >90 >90 >90 >90  --    < > >90 >90   < >  --    GFRESTBLACK  --   --   --   --   --   --  >90 >90   < >  --    ALBUMIN 4.4 3.6  --  3.9  --    < > 3.3* 3.4   < >  --    POTASSIUM 3.8  --  3.7 3.8  --    < > 3.9 3.5   < >  --    TSH  --   --   --   --  1.81  --   --   --   --  1.72    < > = values in this interval not displayed.           4/25/2023    11:22 AM 2/28/2023     2:19 PM   PHQ-2 ( 1999 Pfizer)   Q1: Little interest or pleasure in doing things 0 0   Q2: Feeling down, depressed or hopeless 0 0   PHQ-2 Score 0 0           6/14/2022    11:07 AM 9/29/2022     9:13 PM 10/11/2022    11:43 AM 1/12/2023    12:22  PM   PHQ-9 SCORE   PHQ-9 Total Score MyChart  6 (Mild depression)  5 (Mild depression)   PHQ-9 Total Score 5 6    6    6 5 5           9/30/2022     7:45 AM 10/11/2022    11:43 AM 1/12/2023    12:23 PM   VIC-7 SCORE   Total Score 8 (mild anxiety)  8 (mild anxiety)   Total Score 8    8 9 8           6/14/2022    11:07 AM   ACT Total Scores   ACT TOTAL SCORE (Goal Greater than or Equal to 20) 24   In the past 12 months, how many times did you visit the emergency room for your asthma without being admitted to the hospital? 2   In the past 12 months, how many times were you hospitalized overnight because of your asthma? 0       Marilu Riley, EMT    April 25, 2023 11:25 AM

## 2023-04-25 NOTE — PROGRESS NOTES
"       CIRILO Slaughter is a 38-year-old female who presents with recurrent dysuria and clear, vaginal discharge.     She had been evaluated by Charis King NP on 3/22 for pharyngitis/cough and again on 4/5 for urinary urgency/frequency and vaginal itching. She was given phenazopyridine at that time and reported some relief (all STI testing/wet prep/UC were unremarkable).   Returns today with increased urination frequency, bladder fullness/pressure, dyspareunia, clear vaginal discharge and vaginal irritation.  Has a male sexual partner for the past couple of months with no new partners since last STI screening on 4/5 (she declines additional testing for this today). She had attempted OTC Monistat use on 4/24 without relief.     Also complains of significant hair loss for the past 3 months. She has some body aches, but denies fevers, chills or night sweats. She is has been seen by weight management and lost 40 lbs in about 6-8 months and has been taking Semaglutide since October 2022.     Problem, Medication and Allergy Lists were reviewed and updated if needed..    Patient is an established patient of this clinic.         Review of Systems:     Head: significant hair thinning and loss; \"falling out in clumps\" for 3 months  Gastrointestinal: negative for poor appetite, nausea, vomiting, reflux, abdominal pain, constipation and diarrhea  Genitourinary: positive for dysuria, frequency, vaginal discharge and dysparunia, negative for hesitancy and incontinence. Has occasional vaginal bleeding/spotting that is mostly dark in color twice a month. Has Mirena IUD in place since 1/4/23.  Musculoskeletal: negative for back pain and muscular weakness  Hematologic/Lymphatic/Immunologic: negative, chills, fever and night sweats; positive for weight loss (40lbs in 6-8 months with weight management)  Endocrine: negative for cold intolerance, heat intolerance, hot flashes, night sweats, polyphagia, polydipsia and polyuria         " "Physical Exam:     Vitals:    04/25/23 1122   BP: 96/62   BP Location: Left arm   Patient Position: Sitting   Cuff Size: Adult Regular   Pulse: 94   Resp: 17   Temp: 98.1  F (36.7  C)   TempSrc: Oral   SpO2: 98%   Weight: 71.9 kg (158 lb 8 oz)   Height: 1.742 m (5' 8.6\")     Physical Exam:    Head: Scalp appears dry with some flaking; hair thinning noted on upper and frontal scalp. No lesions or signs of dermatitis noted.   Abdomen: bowel sound activex4; no tenderness, no masses or HSM  Pelvic exam:BUS intact, vagina pink moist with moderate white yellow discharge vault,  Pink cervix without lesions or tenderness (displaced to posterior right). IUD strings visualized at approximately 4cm in length. Bimanual: uterus palpates normal size: right and retroverted, adenxa normal in size without tenderness. Negative CMT.       Results:      Results from this visit  Results for orders placed or performed in visit on 04/25/23   UA without Microscopic     Status: Abnormal   Result Value Ref Range    Color Urine Dark Yellow (A) Colorless, Straw, Light Yellow, Yellow    Appearance Urine Cloudy (A) Clear    Glucose Urine Negative Negative mg/dL    Bilirubin Urine Negative Negative    Ketones Urine 15 (A) Negative mg/dL    Specific Gravity Urine 1.030 1.003 - 1.035    Blood Urine Small (A) Negative    pH Urine 6.0 5.0 - 7.0    Protein Albumin Urine 100 (A) Negative mg/dL    Urobilinogen Urine 0.2 0.2, 1.0 E.U./dL    Nitrite Urine Negative Negative    Leukocyte Esterase Urine Large (A) Negative   Wet Prep POCT, Enter/Edit Result     Status: Abnormal   Result Value Ref Range    Trichomonas Wet Prep Absent Negative    Clue cells Detected Negative    Yeast Wet Prep Absent Negative    WBC (Wet Prep) few Negative     Assessment and Plan   (R30.0) Dysuria  (primary encounter diagnosis)  Comment: Was relieved earlier this month after Phenazopyridine therapy. Reoccurrence of symptoms approximately two weeks ago.   Plan: UA without " Microscopic, Urine Culture Aerobic         Bacterial  Awaiting UC results.    (L65.9) Alopecia  Comment: For the past three months. No dermatological issues visualized other than some dryness/flaking. May be related to recent weight loss. Has not had thyroid levels evaluated since Jan 2022. Experiencing some body aches and low energy as well. Semaglutide can cause hair loss; could be attributable to this. Consider Thyroid abnormality of B12, folate deficiency.   Plan: TSH with free T4 reflex, Vitamin B12, Folate    (N89.8) Vaginal discharge  Comment: Reports it to be clear and in moderate amount. Appears yellow/milky upon speculum exam, but patient had utilized OTC Monistat yesterday.   Plan: Wet Prep POCT, Enter/Edit Result    (N76.0,  B96.89) Bacterial vaginosis  Comment: Clue cells noted on wet mount >20%.  Plan: metroNIDAZOLE (FLAGYL) 500 MG tablet. Reviewed use and side effects. Seek care if persistent symptoms.   BID - 7 day treatment.     Encouraged patient to contact clinic if symptoms worsen or are not improved within the next few days.     Kalpana Melgar, RNC-OB, BSN  DNP Student Nurse Midwife  [facility]    Options for treatment and follow-up care were reviewed with the patient. Kasandra Lau  engaged in the decision making process and verbalized understanding of the options discussed and agreed with the final plan.    I was present with the NPP student who participated in the service and in the documentation of the services provided. I have verified the history and personally performed the physical exam and medical decision making, as documented by the student and edited by me    JASVIR Ashley CNP  04/25/23  3:35 PM    Contacted patient on 3/26 via My Chart re: positive UC. Started Macrobid. Result in inbox today so will resend My Chart message JASVIR Golden CNP, CNP

## 2023-04-26 LAB — BACTERIA UR CULT: ABNORMAL

## 2023-04-27 ENCOUNTER — VIRTUAL VISIT (OUTPATIENT)
Dept: PSYCHOLOGY | Facility: CLINIC | Age: 38
End: 2023-04-27
Payer: COMMERCIAL

## 2023-04-27 ENCOUNTER — DOCUMENTATION ONLY (OUTPATIENT)
Dept: FAMILY MEDICINE | Facility: CLINIC | Age: 38
End: 2023-04-27

## 2023-04-27 DIAGNOSIS — F54 PSYCHOLOGICAL AND BEHAVIORAL FACTORS ASSOCIATED WITH DISORDERS OR DISEASES CLASSIFIED ELSEWHERE: ICD-10-CM

## 2023-04-27 DIAGNOSIS — N30.00 ACUTE CYSTITIS WITHOUT HEMATURIA: Primary | ICD-10-CM

## 2023-04-27 DIAGNOSIS — F34.1 PERSISTENT DEPRESSIVE DISORDER: ICD-10-CM

## 2023-04-27 DIAGNOSIS — F43.12 CHRONIC POST-TRAUMATIC STRESS DISORDER (PTSD): Primary | ICD-10-CM

## 2023-04-27 PROCEDURE — 90837 PSYTX W PT 60 MINUTES: CPT | Mod: VID | Performed by: PSYCHOLOGIST

## 2023-04-27 RX ORDER — NITROFURANTOIN 25; 75 MG/1; MG/1
100 CAPSULE ORAL 2 TIMES DAILY
Qty: 14 CAPSULE | Refills: 0 | Status: SHIPPED | OUTPATIENT
Start: 2023-04-27 | End: 2023-07-13

## 2023-04-28 NOTE — PROGRESS NOTES
Lab results did not show up in inbox. Review of labs showed UTI and Nitrofurantoin prescribed. Patient notified of all lab results via My Chart and of prescription for Nitrofurantoin. INstructed to hold Flagyl for BV for now and resume after UTI treatment if symptoms. iMchelle TAY CNP

## 2023-05-01 DIAGNOSIS — F90.8 ATTENTION DEFICIT HYPERACTIVITY DISORDER (ADHD), OTHER TYPE: ICD-10-CM

## 2023-05-02 NOTE — CONFIDENTIAL NOTE
Date of Last Office Visit: 1/12/23  Date of Next Office Visit: none scheduled, will sent to [facility] and [facility] also sent  No shows since last visit: 0  Cancellations since last visit: 0    Medication requested: atomoxetine (STRATTERA) 40 MG capsule Date last ordered: 1/12/23 Qty: 30 Refills: 3     Review of [place] ?: writer not a delegate  for provider    Lapse in medication adherence greater than 5 days?: no  If yes, call patient and gather details: na  Medication refill request verified as identical to current order?: yes  Result of Last DAM, VPA, Li+ Level, CBC, or Carbamazepine Level (at or since last visit): N/A    Last visit treatment plan:     1.  Increase atomoxetine to 40 mg daily    2.  Continue bupropion  mg daily    3.  Doxepin prescribed by another provider for hives    4.  Gabapentin prescribed by another provider for fibromyalgia    5.  Continue the vilazodone 40 mg daily         6.  Continue propranolol 20 mg as needed for breakthrough anxiety    Continue all other medications as reviewed per electronic medical record today.     Safety plan reviewed. To the Emergency Department as needed or call after hours crisis line at [phone] or [phone]. [place] Crisis Text Line. Text [place] to 099067 or Suicide LifeLine Chat: suicidepreventionlifeline.org/chat/    To schedule individual or family therapy, call [facility] at [phone]    Schedule an appointment with me in 6 weeks or sooner as needed. Call [facility] at [phone] to schedule.    Follow up with primary care provider as planned or for acute medical concerns.    Call the psychiatric nurse line with medication questions or concerns at [phone]    [employer] Text may be used to communicate with your provider, but this is not intended to be used for emergencies.    []Medication refilled per  Medication Refill in Ambulatory Care  policy.  [x]Medication unable to be refilled by RN due to criteria not met as  indicated below:    []Eligibility - not seen in the last year   []Supervision - no future appointment   []Compliance - no shows, cancellations or lapse in therapy   []Verification - order discrepancy   []Controlled medication   []Medication not included in policy   []90-day supply request   [x]Other out of scope of CMA

## 2023-05-04 ENCOUNTER — VIRTUAL VISIT (OUTPATIENT)
Dept: PSYCHOLOGY | Facility: CLINIC | Age: 38
End: 2023-05-04
Payer: COMMERCIAL

## 2023-05-04 DIAGNOSIS — F34.1 PERSISTENT DEPRESSIVE DISORDER: Primary | ICD-10-CM

## 2023-05-04 DIAGNOSIS — F43.12 CHRONIC POST-TRAUMATIC STRESS DISORDER (PTSD): ICD-10-CM

## 2023-05-04 DIAGNOSIS — F43.23 ADJUSTMENT DISORDER WITH MIXED ANXIETY AND DEPRESSED MOOD: ICD-10-CM

## 2023-05-04 DIAGNOSIS — F54 PSYCHOLOGICAL AND BEHAVIORAL FACTORS ASSOCIATED WITH DISORDERS OR DISEASES CLASSIFIED ELSEWHERE: ICD-10-CM

## 2023-05-04 PROCEDURE — 90837 PSYTX W PT 60 MINUTES: CPT | Mod: VID | Performed by: PSYCHOLOGIST

## 2023-05-04 RX ORDER — ATOMOXETINE 40 MG/1
40 CAPSULE ORAL DAILY
Qty: 30 CAPSULE | Refills: 1 | Status: SHIPPED | OUTPATIENT
Start: 2023-05-04 | End: 2023-06-30 | Stop reason: DRUGHIGH

## 2023-05-11 ENCOUNTER — VIRTUAL VISIT (OUTPATIENT)
Dept: PSYCHOLOGY | Facility: CLINIC | Age: 38
End: 2023-05-11
Payer: COMMERCIAL

## 2023-05-11 DIAGNOSIS — F54 PSYCHOLOGICAL AND BEHAVIORAL FACTORS ASSOCIATED WITH DISORDERS OR DISEASES CLASSIFIED ELSEWHERE: ICD-10-CM

## 2023-05-11 DIAGNOSIS — F34.1 PERSISTENT DEPRESSIVE DISORDER: ICD-10-CM

## 2023-05-11 DIAGNOSIS — F43.12 CHRONIC POST-TRAUMATIC STRESS DISORDER (PTSD): Primary | ICD-10-CM

## 2023-05-11 PROCEDURE — 90837 PSYTX W PT 60 MINUTES: CPT | Mod: VID | Performed by: PSYCHOLOGIST

## 2023-05-18 ENCOUNTER — VIRTUAL VISIT (OUTPATIENT)
Dept: PSYCHOLOGY | Facility: CLINIC | Age: 38
End: 2023-05-18
Payer: COMMERCIAL

## 2023-05-18 DIAGNOSIS — F34.1 PERSISTENT DEPRESSIVE DISORDER: ICD-10-CM

## 2023-05-18 DIAGNOSIS — F43.12 CHRONIC POST-TRAUMATIC STRESS DISORDER (PTSD): Primary | ICD-10-CM

## 2023-05-18 PROCEDURE — 90837 PSYTX W PT 60 MINUTES: CPT | Mod: 95 | Performed by: PSYCHOLOGIST

## 2023-05-23 NOTE — CONFIDENTIAL NOTE
"      Health Psychology - Follow up Visit  Confidential Summary*    The author of this note documented a reason for not sharing it with the patient.  REFERRAL SOURCE:  Psychiatry    CHIEF COMPLAINT/REASON FOR VISIT  Psychotherapy in context of chronic PTSD and persistent depression.  Patient also complains of dissatisfaction with interpersonal relationships and challenges with emotion regulation.      Patient was seen today for a 60 minute individual psychotherapy session.  The session was facilitated via VIDEO with patient at her home and provider at her own home.  We used [email or web address] platform.       This telehealth service is appropriate and effective for delivering services in light of the necessity for social distancing to mitigate the COVID-19 epidemic. Patient has agreed to receiving telehealth services.    The patient has been notified of following:   \"This VIDEO visit will be conducted via a call between you and your physician/provider. We have found that certain health care needs can be provided without the need for an in-person physical exam.  VIDEO visits are billed at different rates depending on your insurance coverage.  Please reach out to your insurance provider with any questions. If during the course of the call the physician/provider feels a video visit is not appropriate, you will not be charged for this service.\"     Patient has given verbal consent for VIDEO visit? Yes    Subjective:  Patient began with report that she believes that she would be wise to discontinue her current relationship.  However, she then sited multiple reasons why this relationship is good for her.  She described the impact that this relationship is having on her self esteem and the sadness that she experienced when they did not have contact for a number of days.  Discussed the dialectic in the situation and the two ways that she currently feels about this developing relationship.  Validated her feelings of loneliness and her " concern that she will not find another partner.      She was encouraged to discuss the progress she is making on her dissertation proposal. She reported that her professor  Expressed concern that she may be asked to move her timeline forward and that he is supportive of her.  She was encouraged to set daily goals and to develop a basic table to determine the multiple pieces that she has to complete.  She described making more progress but that emotions may interfere with her ability to focus on work.  Discussed emotion mind and how she might use skills to increase mastery and effectiveness.      Objective:  Patient was on time for today s session. She was alert and oriented. Mood was euthymic and she appeared engaged but sad as she described new relationship. Patient denied suicidal or assaultive ideation, plan, or intent.        Assessment:  The patient has a longstanding history of interpersonal discord and challenges with emotion regulation and use of avoidance as a primary coping tool.  She has completed all requirements for her PhD and is now ABD.  She is living in  housing with her two dogs.  She is stalled on her dissertation proposal.  She is not working during the summer months.    Plan:  Patient graduated from full model DBT at [facility] and is now completing phase 2 work.       Treatment plan completed:  10/13/22     Time In: 2:00  Time Out: 3:00    Diagnosis:  Axis I PTSD, chronic, persistent depressive disorder, adjustment disorder with mixed, psychological factors associated with physical (fibromyalgia)   Axis II No current diagnosis   Axis III please see medical records for details   [place] IV Psychosocial and Environmental Stressors: living alone with no family support, pandemic stress, chronic illness         Corina Muhammad, PhD, LP

## 2023-05-23 NOTE — CONFIDENTIAL NOTE
"      Health Psychology - Follow up Visit  Confidential Summary*    The author of this note documented a reason for not sharing it with the patient.  REFERRAL SOURCE:  Psychiatry    CHIEF COMPLAINT/REASON FOR VISIT  Psychotherapy in context of chronic PTSD and persistent depression.  Patient also complains of dissatisfaction with interpersonal relationships and challenges with emotion regulation.      Patient was seen today for a 60 minute individual psychotherapy session.  The session was facilitated via VIDEO with patient at her home and provider at her own home.  We used [email or web address] platform.       This telehealth service is appropriate and effective for delivering services in light of the necessity for social distancing to mitigate the COVID-19 epidemic. Patient has agreed to receiving telehealth services.    The patient has been notified of following:   \"This VIDEO visit will be conducted via a call between you and your physician/provider. We have found that certain health care needs can be provided without the need for an in-person physical exam.  VIDEO visits are billed at different rates depending on your insurance coverage.  Please reach out to your insurance provider with any questions. If during the course of the call the physician/provider feels a video visit is not appropriate, you will not be charged for this service.\"     Patient has given verbal consent for VIDEO visit? Yes    Subjective:  Patient began with report that she has enjoyed her new relationship and that she believes they may have a future.  She was observed to frequently contradict herself.  She was reminded of her long term plan and her intention to eventually  again and possibly to have a family.  Patient described experiencing feelings of being appreciated and cared for and that she has been longing for connection which she is having in this current relationship.  She expressed anxiety surrounding the possible ending of union.  "     Patient was noted to complain less of somatic complaints.  She reported sustained weight loss and that any side effects of medication have discontinued.      She reported that she had a recent visit with her professor and that he was both supportive of her career and encouraging of her to set a deadline for her proposal.  He informed her that her division has noticed the number of years she has been pursuing her PhD and that they may not longer be able to offer her a paid assistantship in an effort to encourage her to move forward on her proposal so that she might defend so that she can move on with her career.  Patient described both experiencing positive feelings associated with his statements of support and belief in her work and anxiety associated with the observation that she has a limited number of years to complete her degree.  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  She was encouraged to consider the positive outcome of moving forward on proposal meeting.  She was invited to use me as an accountability partner.     Objective:  Patient was on time for today s session. She was alert and oriented. Mood was euthymic and she appeared engaged and happy as she described new relationship. Patient denied suicidal or assaultive ideation, plan, or intent.        Assessment:  The patient has a longstanding history of interpersonal discord and challenges with emotion regulation and use of avoidance as a primary coping tool.  She has completed all requirements for her PhD and is now ABD.  She is living in  housing with her two dogs.  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  She is stalled on her dissertation proposal.      Plan:  Patient graduated from full model DBT at [facility] and is now completing phase 2 work.       Treatment plan completed:  10/13/22     Time In: 2:00  Time Out:  3:00    Diagnosis:  Axis I PTSD, chronic, persistent depressive disorder, adjustment disorder with mixed, psychological factors associated with physical (fibromyalgia)   Axis II No current diagnosis   Axis III please see medical records for details   [place] IV Psychosocial and Environmental Stressors: living alone with no family support, pandemic stress, chronic illness         Corina Muhammad, PhD, LP
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
## 2023-05-25 ENCOUNTER — VIRTUAL VISIT (OUTPATIENT)
Dept: PSYCHOLOGY | Facility: CLINIC | Age: 38
End: 2023-05-25
Payer: COMMERCIAL

## 2023-05-25 ENCOUNTER — VIRTUAL VISIT (OUTPATIENT)
Dept: NEUROLOGY | Facility: CLINIC | Age: 38
End: 2023-05-25
Payer: COMMERCIAL

## 2023-05-25 DIAGNOSIS — F43.12 CHRONIC POST-TRAUMATIC STRESS DISORDER (PTSD): ICD-10-CM

## 2023-05-25 DIAGNOSIS — G43.709 CHRONIC MIGRAINE WITHOUT AURA WITHOUT STATUS MIGRAINOSUS, NOT INTRACTABLE: ICD-10-CM

## 2023-05-25 DIAGNOSIS — F34.1 PERSISTENT DEPRESSIVE DISORDER: Primary | ICD-10-CM

## 2023-05-25 PROCEDURE — 99213 OFFICE O/P EST LOW 20 MIN: CPT | Mod: VID | Performed by: PSYCHIATRY & NEUROLOGY

## 2023-05-25 PROCEDURE — 90837 PSYTX W PT 60 MINUTES: CPT | Mod: 95 | Performed by: PSYCHOLOGIST

## 2023-05-25 RX ORDER — GALCANEZUMAB 120 MG/ML
120 INJECTION, SOLUTION SUBCUTANEOUS
Qty: 1 ML | Refills: 11 | Status: SHIPPED | OUTPATIENT
Start: 2023-05-25 | End: 2024-06-03

## 2023-05-25 RX ORDER — ALMOTRIPTAN 12.5 MG/1
12.5 TABLET, FILM COATED ORAL
Qty: 18 TABLET | Refills: 11 | Status: SHIPPED | OUTPATIENT
Start: 2023-05-25 | End: 2024-06-05

## 2023-05-25 ASSESSMENT — MIGRAINE DISABILITY ASSESSMENT (MIDAS)
HOW MANY DAYS DID YOU MISS WORK OR SCHOOL BECAUSE OF HEADACHES: 4
HOW OFTEN WERE SOCIAL ACTIVITIES MISSED DUE TO HEADACHES: 3
HOW MANY DAYS IN THE PAST 3 MONTHS HAVE YOU HAD A HEADACHE: 8
TOTAL SCORE: 21
ON A SCALE FROM 0-10 ON AVERAGE HOW PAINFUL WERE HEADACHES: 4
HOW MANY DAYS DID YOU NOT DO HOUSEWORK BECAUSE OF HEADACHES: 2
HOW MANY DAYS WAS HOUSEWORK PRODUCTIVITY CUT IN HALF DUE TO HEADACHES: 4
HOW MANY DAYS WAS YOUR PRODUCTIVITY CUT IN HALF BECAUSE OF HEADACHES: 8

## 2023-05-25 ASSESSMENT — HEADACHE IMPACT TEST (HIT 6)
HOW OFTEN HAVE YOU FELT TOO TIRED TO WORK BECAUSE OF YOUR HEADACHES: SOMETIMES
HOW OFTEN HAVE YOU FELT FED UP OR IRRITATED BECAUSE OF YOUR HEADACHES: RARELY
WHEN YOU HAVE A HEADACHE HOW OFTEN DO YOU WISH YOU COULD LIE DOWN: VERY OFTEN
WHEN YOU HAVE HEADACHES HOW OFTEN IS THE PAIN SEVERE: SOMETIMES
HIT6 TOTAL SCORE: 60
HOW OFTEN DID HEADACHS LIMIT CONCENTRATION ON WORK OR DAILY ACTIVITY: SOMETIMES
HOW OFTEN DO HEADACHES LIMIT YOUR DAILY ACTIVITIES: VERY OFTEN

## 2023-05-25 NOTE — LETTER
"    5/25/2023         RE: Kasandra Lau  [address]  [place] [address]        Dear Colleague,    Thank you for referring your patient, Kasandra Lau, to the [facility]. Please see a copy of my visit note below.    Virtual Visit Details    Type of service:  Video Visit     Originating Location (pt. Location): Home    Distant Location (provider location):  On-site  Platform used for Video Visit: [facility]    Headache Neurology Progress Note  May 25, 2023    Subjective:    Kasandra Lau returns for follow up of chronic migraine.  She had previously been doing well with Emgality for migraine prevention.    Today, she reports that she has been doing \"pretty good\", without severe attacks.    She has 8/30 headache days per month, with 1-2/30 severe headache days per month.    She takes doxepin 40 mg nightly, propranolol 20 mg nightly, gabapentin 800 mg TID, for other medical reasons.  For migraine specifically, she takes Emgality.  This is effective without side effects.    For acute treatment, she takes Tylenol and naps. She also has almotriptan, but hasn't needed this.    Objective:    Vitals: There were no vitals taken for this visit.  General: Cooperative, NAD  Neurologic:  Mental Status: Fully alert, attentive and oriented. Speech clear and fluent.   Cranial Nerves: Facial movements symmetric.   Motor: No abnormal movements.      Assessment/Plan:   Kasandra Lau is a 38 year old woman who returns for follow-up of chronic migraine.  Emgality remains effective without side effects.  She is no longer having the most severe attacks.     I recommend that she continue with Emgality for headache prevention.    She may continue doxepin, propranolol, and gabapentin through her other providers.  -I refilled Emgality for her.     For acute treatment, almotriptan 12.5 mg at the onset of headache has been helpful previously, although she has not been " needing it lately.  -I have refilled this for her to have available as a rescue treatment, if she were to have a severe breakthrough.     I will plan to see her back in 12 months, or sooner if needed.    Natividad Osuna MD  Neurology         Again, thank you for allowing me to participate in the care of your patient.        Sincerely,        Natividad Osuna MD

## 2023-05-25 NOTE — NURSING NOTE
Is the patient currently in the state of [place]? YES    Visit mode:VIDEO    If the visit is dropped, the patient can be reconnected by: VIDEO VISIT: Text to cell phone: [phone]    Will anyone else be joining the visit? NO      How would you like to obtain your AVS? MyChart    Are changes needed to the allergy or medication list? NO    Reason for visit: RECHECK

## 2023-05-25 NOTE — PROGRESS NOTES
"Virtual Visit Details    Type of service:  Video Visit     Originating Location (pt. Location): Home    Distant Location (provider location):  On-site  Platform used for Video Visit: [facility]    Headache Neurology Progress Note  May 25, 2023    Subjective:    Kasandra Lau returns for follow up of chronic migraine.  She had previously been doing well with Emgality for migraine prevention.    Today, she reports that she has been doing \"pretty good\", without severe attacks.    She has 8/30 headache days per month, with 1-2/30 severe headache days per month.    She takes doxepin 40 mg nightly, propranolol 20 mg nightly, gabapentin 800 mg TID, for other medical reasons.  For migraine specifically, she takes Emgality.  This is effective without side effects.    For acute treatment, she takes Tylenol and naps. She also has almotriptan, but hasn't needed this.    Objective:    Vitals: There were no vitals taken for this visit.  General: Cooperative, NAD  Neurologic:  Mental Status: Fully alert, attentive and oriented. Speech clear and fluent.   Cranial Nerves: Facial movements symmetric.   Motor: No abnormal movements.      Assessment/Plan:   Kasandra Lua is a 38 year old woman who returns for follow-up of chronic migraine.  Emgality remains effective without side effects.  She is no longer having the most severe attacks.     I recommend that she continue with Emgality for headache prevention.    She may continue doxepin, propranolol, and gabapentin through her other providers.  -I refilled Emgality for her.     For acute treatment, almotriptan 12.5 mg at the onset of headache has been helpful previously, although she has not been needing it lately.  -I have refilled this for her to have available as a rescue treatment, if she were to have a severe breakthrough.     I will plan to see her back in 12 months, or sooner if needed.    Natividad Osuna MD  Neurology     "

## 2023-06-01 ENCOUNTER — VIRTUAL VISIT (OUTPATIENT)
Dept: PSYCHOLOGY | Facility: CLINIC | Age: 38
End: 2023-06-01
Payer: COMMERCIAL

## 2023-06-01 DIAGNOSIS — F43.23 ADJUSTMENT DISORDER WITH MIXED ANXIETY AND DEPRESSED MOOD: ICD-10-CM

## 2023-06-01 DIAGNOSIS — F34.1 PERSISTENT DEPRESSIVE DISORDER: Primary | ICD-10-CM

## 2023-06-01 DIAGNOSIS — F43.12 CHRONIC POST-TRAUMATIC STRESS DISORDER (PTSD): ICD-10-CM

## 2023-06-01 PROCEDURE — 90837 PSYTX W PT 60 MINUTES: CPT | Mod: 95 | Performed by: PSYCHOLOGIST

## 2023-06-02 DIAGNOSIS — F33.1 MAJOR DEPRESSIVE DISORDER, RECURRENT EPISODE, MODERATE (H): ICD-10-CM

## 2023-06-03 ENCOUNTER — HEALTH MAINTENANCE LETTER (OUTPATIENT)
Age: 38
End: 2023-06-03

## 2023-06-04 NOTE — PROGRESS NOTES
"    Health Psychology - Follow up Visit  Confidential Summary*    The author of this note documented a reason for not sharing it with the patient.  REFERRAL SOURCE:  Psychiatry    CHIEF COMPLAINT/REASON FOR VISIT  Psychotherapy in context of chronic PTSD and persistent depression.  Patient also complains of dissatisfaction with interpersonal relationships and challenges with emotion regulation.      Patient was seen today for a 60 minute individual psychotherapy session.  The session was facilitated via VIDEO with patient at her home and provider at her own home.  We used [email or web address] platform.       This telehealth service is appropriate and effective for delivering services in light of the necessity for social distancing to mitigate the COVID-19 epidemic. Patient has agreed to receiving telehealth services.    The patient has been notified of following:   \"This VIDEO visit will be conducted via a call between you and your physician/provider. We have found that certain health care needs can be provided without the need for an in-person physical exam.  VIDEO visits are billed at different rates depending on your insurance coverage.  Please reach out to your insurance provider with any questions. If during the course of the call the physician/provider feels a video visit is not appropriate, you will not be charged for this service.\"     Patient has given verbal consent for VIDEO visit? Yes    Subjective:  Patient began with discussion of ongoing relationship. She reviewed the pros and cons of continuing.  Discussed her perception of value and the frustration that she continues to experience when dating.  She described increased energy and elevated mood associated with this relationship and reported that she believes she is experiencing increased overall productivity toward her proposal writing.  Patient was encouraged to consider the impact of relationship on her self esteem.  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  She was noted to report that she has made a new female friend and that she notes another positive impact on her mood because she has found someone who both understands and appears to appreciate her.      Encouraged patient to continue social engagement with others and to increase self validation.  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  Discussed how she will support herself during these months and patient has a plan.      Objective:  Patient was on time for today s session. She was alert and oriented. Mood was dysphoric with appropriate range of affect. Patient denied suicidal or assaultive ideation, plan, or intent.        Assessment:  The patient has a longstanding history of interpersonal discord and challenges with emotion regulation.  She has completed all requirements for her PhD and is now ABD.  She is living in  housing with her two dogs.  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      Plan:  Patient graduated from full model DBT at [facility] and is now completing phase 2 work.      Treatment plan completed:  10/13/22     Time In: 2:00  Time Out: 3:00    Diagnosis:  Axis I PTSD, chronic, persistent depressive disorder, adjustment disorder with mixed, psychological factors associated with physical (fibromyalgia)   Axis II  BPD   Axis III please see medical records for details   [place] IV Psychosocial and Environmental Stressors: living alone with no family support, pandemic stress, chronic illness         Corina Muhammad, PhD, LP

## 2023-06-06 DIAGNOSIS — J30.9 ALLERGIC RHINITIS, UNSPECIFIED SEASONALITY, UNSPECIFIED TRIGGER: ICD-10-CM

## 2023-06-06 NOTE — CONFIDENTIAL NOTE
"    Health Psychology - Follow up Visit  Confidential Summary*    The author of this note documented a reason for not sharing it with the patient.  REFERRAL SOURCE:  Psychiatry    CHIEF COMPLAINT/REASON FOR VISIT  Psychotherapy in context of chronic PTSD and persistent depression.  Patient also complains of dissatisfaction with interpersonal relationships and challenges with emotion regulation.      Patient was seen today for a 60 minute individual psychotherapy session.  The session was facilitated via [employer] with patient at her home and provider at her own home.         This telehealth service is appropriate and effective for delivering services in light of the necessity for social distancing to mitigate the COVID-19 epidemic. Patient has agreed to receiving telehealth services.    The patient has been notified of following:   \"This VIDEO visit will be conducted via a call between you and your physician/provider. We have found that certain health care needs can be provided without the need for an in-person physical exam.  VIDEO visits are billed at different rates depending on your insurance coverage.  Please reach out to your insurance provider with any questions. If during the course of the call the physician/provider feels a video visit is not appropriate, you will not be charged for this service.\"     Patient has given verbal consent for VIDEO visit? Yes    Subjective:  Patient began with helen report that she has reunited with man she previously told me that she was intending to break up with.  Her mixed feelings about this relationship were validated and she was supported in her desire to be in a relationship and to have support and that she has been lonely and has had multiple disappointments in dating.  She was encouraged to consider how she might establish expectations of this relationship that are in line with what he has told her he can give to her and that she may be able to continue to identify " ways to meet the needs she has that he is not able to address.      Patient reported that she is challenged currently with multiple financial challenges as she closed her current bank and is now responsible for multiple returned check charges.  She also reported that she is still owed $400 from friend that she loaned money to and that she does not have an income for the summer months.  She is hoping not to work so that she can dedicate this time to completing her dissertation proposal but this may not be possible.  Patients use of avoidance as a coping tool was highlighted and we reviewed the severe anxiety that underlies her use of this coping tool.  She was encouraged to use skills to address anxiety including asking if her life is in danger and if her wellbeing is imminently threatened.  Otherwise, she was reminded of her competence and ability to effectively problem solve.  She was encouraged to set realistic goals and to continue to use coaching if I can support her.  She was challenged to consider that the inconsistency of her current relationship may be a distraction from the anxiety associated with her writing.  Used CBT to address negative cognitions about her writing.     Objective:  Patient was on time for today s session. She was alert and oriented. Mood was dysphoric with appropriate range of affect. Patient denied suicidal or assaultive ideation, plan, or intent.        Assessment:  The patient has a longstanding history of interpersonal discord and challenges with emotion regulation.  She has completed all requirements for her PhD and is now ABD, working on her proposal.  She is living in  housing with her two dogs.  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  She is dating. Patient uses avoidance as a primary coping tool.    Plan:  Patient graduated from full model DBT at [facility] and is now completing phase 2 work.      Treatment plan completed:   10/13/22     Time In: 2:00  Time Out: 3:00    Diagnosis:  Axis I PTSD, chronic, persistent depressive disorder, adjustment disorder with mixed, psychological factors associated with physical (fibromyalgia)   Axis II  BPD   Axis III please see medical records for details   [place] IV Psychosocial and Environmental Stressors: living alone with no family support, chronic illnesses, dissertation stress, financial stress         Corina Muhammad, PhD, LP

## 2023-06-06 NOTE — CONFIDENTIAL NOTE
"    Health Psychology - Follow up Visit  Confidential Summary*    The author of this note documented a reason for not sharing it with the patient.  REFERRAL SOURCE:  Psychiatry    CHIEF COMPLAINT/REASON FOR VISIT  Psychotherapy in context of chronic PTSD and persistent depression.  Patient also complains of dissatisfaction with interpersonal relationships and challenges with emotion regulation.      Patient was seen today for a 60 minute individual psychotherapy session.  The session was facilitated via [employer] with patient at her home and provider at her own home.         This telehealth service is appropriate and effective for delivering services in light of the necessity for social distancing to mitigate the COVID-19 epidemic. Patient has agreed to receiving telehealth services.    The patient has been notified of following:   \"This VIDEO visit will be conducted via a call between you and your physician/provider. We have found that certain health care needs can be provided without the need for an in-person physical exam.  VIDEO visits are billed at different rates depending on your insurance coverage.  Please reach out to your insurance provider with any questions. If during the course of the call the physician/provider feels a video visit is not appropriate, you will not be charged for this service.\"     Patient has given verbal consent for VIDEO visit? Yes    Subjective:  Patient began with report that she is no longer willing to engage in relationship with man she has been seeing.  She described the relationship as \"hurtful\" given that he is unable to meet her needs because he has much on his own plate.  She reported that she attempted to explain this to him and that he largely appears to understand and informed her that he enjoys her company and that he is only able to given her a certain type of relationship.  She reported that she has two feelings at the same time.  Validated the dialectic and we reviewed a " pros and cons and how she might navigate the complexities of this relationship.  She was encouraged to consider her long term goals and the challenges that this relationship would provide over time.  She also reported that she continues to experience loneliness and that she would like a relationship to help with giving her rupert while she continues to navigate the boredom and stress of writing her dissertation proposal.  She was given to the end of May to present to her professors.  Patient was encouraged to consider how she might act opposite to her emotions in tackling the anxiety she is experiencing associated with her writing.  She was offered validation, support and was encouraged to share her emotional response to the idea of joining a writing group or partnership or in using me as an accountability partner until she identifies a peer or mentor to serve this important supportive function. 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 She has been offerred TA ship in another department which will serve as a stop gap.      Objective:  Patient was on time for today s session. She was alert and oriented. Mood was dysphoric with appropriate range of affect. Patient denied suicidal or assaultive ideation, plan, or intent.        Assessment:  The patient has a longstanding history of interpersonal discord and challenges with emotion regulation.  She has completed all requirements for her PhD and is now ABD, working on her proposal.  She is living in  housing with her two dogs.  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  She is dating.     Plan:  Patient graduated from full model DBT at [facility] and is now completing phase 2 work.      Treatment plan completed:  10/13/22     Time In: 2:00  Time Out: 3:00    Diagnosis:  Axis I PTSD, chronic, persistent  depressive disorder, adjustment disorder with mixed, psychological factors associated with physical (fibromyalgia)   Axis II  BPD   Axis III please see medical records for details   [place] IV Psychosocial and Environmental Stressors: living alone with no family support, pandemic stress, chronic illness         Corina Muhammad, PhD, LP

## 2023-06-07 RX ORDER — BUPROPION HYDROCHLORIDE 300 MG/1
300 TABLET ORAL EVERY MORNING
Qty: 90 TABLET | Refills: 1 | Status: SHIPPED | OUTPATIENT
Start: 2023-06-07 | End: 2023-10-02

## 2023-06-07 NOTE — TELEPHONE ENCOUNTER
BUPROPION HCL  MG TABLET  Last Written Prescription Date:  12/7/2022  Last Fill Quantity: 90,   # refills: 1  Last Office Visit :  4/25/2023  Future Office visit:   6/15/2023  90 Tabs, 1 Refill sent to pharm       4/25/2023    11:22 AM 2/28/2023     2:19 PM   PHQ-2 ( 1999 Pfizer)   Q1: Little interest or pleasure in doing things 0 0   Q2: Feeling down, depressed or hopeless 0 0   PHQ-2 Score 0 0              6/14/2022    11:07 AM 9/29/2022     9:13 PM 10/11/2022    11:43 AM 1/12/2023    12:22 PM   PHQ-9 SCORE   PHQ-9 Total Score MyChart   6 (Mild depression)   5 (Mild depression)   PHQ-9 Total Score 5 6    6    6 5 5              9/30/2022     7:45 AM 10/11/2022    11:43 AM 1/12/2023    12:23 PM   VIC-7 SCORE   Total Score 8 (mild anxiety)   8 (mild anxiety)   Total Score 8    8 9 8        Tara Reagan RN  Central Triage Red Flags/Med Refills

## 2023-06-08 ENCOUNTER — VIRTUAL VISIT (OUTPATIENT)
Dept: PSYCHOLOGY | Facility: CLINIC | Age: 38
End: 2023-06-08
Payer: COMMERCIAL

## 2023-06-08 DIAGNOSIS — F54 PSYCHOLOGICAL AND BEHAVIORAL FACTORS ASSOCIATED WITH DISORDERS OR DISEASES CLASSIFIED ELSEWHERE: ICD-10-CM

## 2023-06-08 DIAGNOSIS — F34.1 PERSISTENT DEPRESSIVE DISORDER: Primary | ICD-10-CM

## 2023-06-08 DIAGNOSIS — F43.12 CHRONIC POST-TRAUMATIC STRESS DISORDER (PTSD): ICD-10-CM

## 2023-06-08 PROCEDURE — 90837 PSYTX W PT 60 MINUTES: CPT | Mod: 95 | Performed by: PSYCHOLOGIST

## 2023-06-08 RX ORDER — FEXOFENADINE HCL 180 MG/1
180 TABLET ORAL EVERY MORNING
Qty: 90 TABLET | Refills: 3 | Status: SHIPPED | OUTPATIENT
Start: 2023-06-08 | End: 2024-01-30 | Stop reason: ALTCHOICE

## 2023-06-08 NOTE — TELEPHONE ENCOUNTER
Antihistamines Protocol Passed   fexofenadine (ALLEGRA) 180 MG tablet    4/25/2023  [facility] Michelle Ling APRN CNP  Family Medicine

## 2023-06-15 ENCOUNTER — VIRTUAL VISIT (OUTPATIENT)
Dept: PSYCHOLOGY | Facility: CLINIC | Age: 38
End: 2023-06-15
Payer: COMMERCIAL

## 2023-06-15 DIAGNOSIS — F54 PSYCHOLOGICAL AND BEHAVIORAL FACTORS ASSOCIATED WITH DISORDERS OR DISEASES CLASSIFIED ELSEWHERE: ICD-10-CM

## 2023-06-15 DIAGNOSIS — F43.12 CHRONIC POST-TRAUMATIC STRESS DISORDER (PTSD): Primary | ICD-10-CM

## 2023-06-15 DIAGNOSIS — F34.1 PERSISTENT DEPRESSIVE DISORDER: ICD-10-CM

## 2023-06-15 PROCEDURE — 90837 PSYTX W PT 60 MINUTES: CPT | Mod: 95 | Performed by: PSYCHOLOGIST

## 2023-06-15 ASSESSMENT — ANXIETY QUESTIONNAIRES
6. BECOMING EASILY ANNOYED OR IRRITABLE: MORE THAN HALF THE DAYS
IF YOU CHECKED OFF ANY PROBLEMS ON THIS QUESTIONNAIRE, HOW DIFFICULT HAVE THESE PROBLEMS MADE IT FOR YOU TO DO YOUR WORK, TAKE CARE OF THINGS AT HOME, OR GET ALONG WITH OTHER PEOPLE: SOMEWHAT DIFFICULT
3. WORRYING TOO MUCH ABOUT DIFFERENT THINGS: MORE THAN HALF THE DAYS
5. BEING SO RESTLESS THAT IT IS HARD TO SIT STILL: SEVERAL DAYS
8. IF YOU CHECKED OFF ANY PROBLEMS, HOW DIFFICULT HAVE THESE MADE IT FOR YOU TO DO YOUR WORK, TAKE CARE OF THINGS AT HOME, OR GET ALONG WITH OTHER PEOPLE?: SOMEWHAT DIFFICULT
4. TROUBLE RELAXING: SEVERAL DAYS
7. FEELING AFRAID AS IF SOMETHING AWFUL MIGHT HAPPEN: SEVERAL DAYS
2. NOT BEING ABLE TO STOP OR CONTROL WORRYING: MORE THAN HALF THE DAYS
7. FEELING AFRAID AS IF SOMETHING AWFUL MIGHT HAPPEN: SEVERAL DAYS
1. FEELING NERVOUS, ANXIOUS, OR ON EDGE: MORE THAN HALF THE DAYS
GAD7 TOTAL SCORE: 11
GAD7 TOTAL SCORE: 11

## 2023-06-15 ASSESSMENT — PATIENT HEALTH QUESTIONNAIRE - PHQ9
10. IF YOU CHECKED OFF ANY PROBLEMS, HOW DIFFICULT HAVE THESE PROBLEMS MADE IT FOR YOU TO DO YOUR WORK, TAKE CARE OF THINGS AT HOME, OR GET ALONG WITH OTHER PEOPLE: SOMEWHAT DIFFICULT
SUM OF ALL RESPONSES TO PHQ QUESTIONS 1-9: 5
SUM OF ALL RESPONSES TO PHQ QUESTIONS 1-9: 5

## 2023-06-16 NOTE — CONFIDENTIAL NOTE
"    Health Psychology - Follow up Visit  Confidential Summary*    The author of this note documented a reason for not sharing it with the patient.  REFERRAL SOURCE:  Psychiatry    CHIEF COMPLAINT/REASON FOR VISIT  Psychotherapy in context of chronic PTSD and persistent depression.  Patient also complains of dissatisfaction with interpersonal relationships and challenges with emotion regulation.      Patient was seen today for a 60 minute individual psychotherapy session.  The session was facilitated via [employer] with patient at her home and provider at her own home.  Please note, patient running late for appt and when she tried to log into [employer], the visit was already cancelled.  Because I do not have immediate access to rescheduling, we moved to the doxy platform for this one time visit.         This telehealth service is appropriate and effective for delivering services in light of the necessity for social distancing to mitigate the COVID-19 epidemic. Patient has agreed to receiving telehealth services.    The patient has been notified of following:   \"This VIDEO visit will be conducted via a call between you and your physician/provider. We have found that certain health care needs can be provided without the need for an in-person physical exam.  VIDEO visits are billed at different rates depending on your insurance coverage.  Please reach out to your insurance provider with any questions. If during the course of the call the physician/provider feels a video visit is not appropriate, you will not be charged for this service.\"     Patient has given verbal consent for VIDEO visit? Yes    Subjective:  Patient began with report that she has experienced a recent crisis when she developed a corruption in her dissertation file.  Discussed her upset including anxiety and depression and overwhelm.  She was encouraged to use her emotion regulation skills and to move into problem solving.  Offered her two resources of services " "that might be able to assist her in recovering the document.  Discussed other problem solving options including retrieving the most recent emailed version and looking for storage in the cloud.  She reported that she will pursue other options.  Discussed her desire to propose so that she might be able to move onto the next chapter in her life.      Discussed her loan of funds to a friend and her feelings of betrayal that this friend has not returned the money.  She reported that this friend \"is more alone and in more need of the money than she is\".  Discussed her view of herself and that she is deserving of a life with less stress and financial security.  She described feeling like an orphan and that she does not have anyone who \"has her back\" and that she is happy that she is able to help this friend, even if it is challenging for her.      Patient also described looking forward to making new friends in her [employer] show and her pleasure that she was offered a spot after auditions.  She reported that she invited a man from the group to have lunch.  She reported that she has decided not to pursue further contact with a man that she met on line.  Discussed how she might meet men who also want a relationship with her.  She described persistent belief that [place]  Validated her feelings of disappointment and encouraged active problem solving and how her choices may impact the outcome that she is noticing.      Objective:  Patient was on time for today s session. She was alert and oriented. Mood was dysphoric with appropriate range of affect. Patient denied suicidal or assaultive ideation, plan, or intent.        Assessment:  The patient has a longstanding history of interpersonal discord and challenges with emotion regulation.  She has completed all requirements for her PhD and is now ABD, working on her proposal.  She is living in  housing with her two dogs.  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  She is dating. Patient uses avoidance as a primary coping tool.    Plan:  Patient graduated from full model DBT at [facility] and is now completing phase 2 work.      Treatment plan completed:  10/13/22     Time In: 2:00  Time Out: 3:00    Diagnosis:  Axis I PTSD, chronic, persistent depressive disorder, adjustment disorder with mixed, psychological factors associated with physical (fibromyalgia)   Axis II  BPD   Axis III please see medical records for details   [place] IV Psychosocial and Environmental Stressors: living alone with no family support, chronic illnesses, dissertation stress, financial stress         Corina Muhammad, PhD, LP

## 2023-06-26 NOTE — CONFIDENTIAL NOTE
"    Health Psychology - Follow up Visit  Confidential Summary*    The author of this note documented a reason for not sharing it with the patient.  REFERRAL SOURCE:  Psychiatry    CHIEF COMPLAINT/REASON FOR VISIT  Psychotherapy in context of chronic PTSD and persistent depression.  Patient also complains of dissatisfaction with interpersonal relationships and challenges with emotion regulation.      Patient was seen today for a 60 minute individual psychotherapy session.  The session was facilitated via [employer] with patient at her home and provider at her own home.  Please note, patient running late for appt and when she tried to log into [employer], the visit was already cancelled.  Because I do not have immediate access to rescheduling, we moved to the doxy platform for this one time visit.         This telehealth service is appropriate and effective for delivering services in light of the necessity for social distancing to mitigate the COVID-19 epidemic. Patient has agreed to receiving telehealth services.    The patient has been notified of following:   \"This VIDEO visit will be conducted via a call between you and your physician/provider. We have found that certain health care needs can be provided without the need for an in-person physical exam.  VIDEO visits are billed at different rates depending on your insurance coverage.  Please reach out to your insurance provider with any questions. If during the course of the call the physician/provider feels a video visit is not appropriate, you will not be charged for this service.\"     Patient has given verbal consent for VIDEO visit? Yes    Subjective:  Patient began with report that she has felt less depressed this week following her decision to discontinue new relationship.  She described noticing that this relationship challenged her own self respect and that she is glad about the decision she made.  She reported that she has returned to [facility] and that she has met " several new men.  Discussed her long insistence that [facility] is not a site to find those who are interested in establishing long term relationships.  However, patient endorsed two thoughts, the first that she is not likely to remain in [place] following completion of her degree and that she is lonely and the only place to meet someone appears to be on [facility].  Discussed the negative impact that the relationships she has formed with men she has met on this site have had on her, in the long run.  Discussed this as a type of avoidance behavior.  It works well in the short term but does not create long term improvement and does not solve the problem.      She continues to enjoy [employer] and is relieved that she was able to recover her dissertation following computer failure.      She is also enjoying a new female friendship and she is looking forward to attending another meeting in [facility].     Objective:  Patient was on time for today s session. She was alert and oriented. Mood was dysphoric with appropriate range of affect. Patient denied suicidal or assaultive ideation, plan, or intent.        Assessment:  The patient has a longstanding history of interpersonal discord and challenges with emotion regulation.  She has completed all requirements for her PhD and is now ABD, working on her proposal.  She is living in  housing with her two dogs.  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  She is dating. Patient uses avoidance as a primary coping tool.    Plan:  Patient graduated from full model DBT at [facility] and is now completing phase 2 work.      Treatment plan completed:  10/13/22     Time In: 2:00  Time Out: 3:00    Diagnosis:  Axis I PTSD, chronic, persistent depressive disorder, adjustment disorder with mixed, psychological factors associated with physical (fibromyalgia)   Axis II  BPD   Axis III please see medical records for details   [place] IV Psychosocial and  Environmental Stressors: living alone with no family support, chronic illnesses, dissertation stress, financial stress         Corina Muhammad, PhD, LP

## 2023-06-28 DIAGNOSIS — E66.3 OVERWEIGHT (BMI 25.0-29.9): Primary | ICD-10-CM

## 2023-06-29 ENCOUNTER — VIRTUAL VISIT (OUTPATIENT)
Dept: PSYCHOLOGY | Facility: CLINIC | Age: 38
End: 2023-06-29
Payer: COMMERCIAL

## 2023-06-29 DIAGNOSIS — F43.12 CHRONIC POST-TRAUMATIC STRESS DISORDER: ICD-10-CM

## 2023-06-29 DIAGNOSIS — F54 PSYCHOLOGICAL AND BEHAVIORAL FACTORS ASSOCIATED WITH DISORDERS OR DISEASES CLASSIFIED ELSEWHERE: ICD-10-CM

## 2023-06-29 DIAGNOSIS — F34.1 PERSISTENT DEPRESSIVE DISORDER: Primary | ICD-10-CM

## 2023-06-29 PROCEDURE — 90837 PSYTX W PT 60 MINUTES: CPT | Mod: 95 | Performed by: PSYCHOLOGIST

## 2023-06-30 ENCOUNTER — VIRTUAL VISIT (OUTPATIENT)
Dept: PSYCHIATRY | Facility: CLINIC | Age: 38
End: 2023-06-30
Payer: COMMERCIAL

## 2023-06-30 ENCOUNTER — TELEPHONE (OUTPATIENT)
Dept: DERMATOLOGY | Facility: CLINIC | Age: 38
End: 2023-06-30
Payer: COMMERCIAL

## 2023-06-30 DIAGNOSIS — F41.1 GENERALIZED ANXIETY DISORDER: ICD-10-CM

## 2023-06-30 DIAGNOSIS — F90.8 ATTENTION DEFICIT HYPERACTIVITY DISORDER (ADHD), OTHER TYPE: Primary | ICD-10-CM

## 2023-06-30 DIAGNOSIS — F33.1 MODERATE EPISODE OF RECURRENT MAJOR DEPRESSIVE DISORDER (H): ICD-10-CM

## 2023-06-30 PROCEDURE — 99214 OFFICE O/P EST MOD 30 MIN: CPT | Mod: VID | Performed by: NURSE PRACTITIONER

## 2023-06-30 RX ORDER — ATOMOXETINE 60 MG/1
60 CAPSULE ORAL DAILY
Qty: 90 CAPSULE | Refills: 0 | Status: SHIPPED | OUTPATIENT
Start: 2023-06-30 | End: 2023-10-02

## 2023-06-30 RX ORDER — VILAZODONE HYDROCHLORIDE 40 MG/1
40 TABLET ORAL EVERY MORNING
Qty: 90 TABLET | Refills: 1 | Status: SHIPPED | OUTPATIENT
Start: 2023-06-30 | End: 2023-10-02

## 2023-06-30 ASSESSMENT — PATIENT HEALTH QUESTIONNAIRE - PHQ9
SUM OF ALL RESPONSES TO PHQ QUESTIONS 1-9: 5
SUM OF ALL RESPONSES TO PHQ QUESTIONS 1-9: 5
10. IF YOU CHECKED OFF ANY PROBLEMS, HOW DIFFICULT HAVE THESE PROBLEMS MADE IT FOR YOU TO DO YOUR WORK, TAKE CARE OF THINGS AT HOME, OR GET ALONG WITH OTHER PEOPLE: VERY DIFFICULT

## 2023-06-30 NOTE — NURSING NOTE
Is the patient currently in the state of [place]? YES    Visit mode:VIDEO    If the visit is dropped, the patient can be reconnected by: VIDEO VISIT: Text to cell phone: [phone]    Will anyone else be joining the visit? NO      How would you like to obtain your AVS? MyChart    Are changes needed to the allergy or medication list? NO    Reason for visit: RECHECK

## 2023-06-30 NOTE — TELEPHONE ENCOUNTER
Via phone patient was scheduled for the following:    Appointment type: Cosmetic Return  Provider:   Return date: 11-22-23    Specialty phone number: [phone]

## 2023-07-05 DIAGNOSIS — F41.9 ANXIETY: ICD-10-CM

## 2023-07-05 DIAGNOSIS — M79.2 NEUROPATHIC PAIN: ICD-10-CM

## 2023-07-05 NOTE — TELEPHONE ENCOUNTER
[facility] Family Medicine phone call message - patient requesting a refill:    Full Medication Name: gabapentin    Dose: 400 grams (2 tablets by mouth three times daily)     Pharmacy confirmed as   [id number] - [place], [place] - [address]  [address] [address]  Phone: [phone] Fax: [phone]  : Yes    Medication tab checked to see if medication has been sent  Yes    Is patient out of medication: No.  1 days left    Did patient contact the pharmacy? Yes    Additional Comments:      Primary language: English      needed? No    Call taken on July 5, 2023 at 3:17 PM by Marilu Riley, EMT    Route to P [facility] MED REFILLS TEAM     ++If medication is a controlled substance, please route directly to the provider++

## 2023-07-06 ENCOUNTER — VIRTUAL VISIT (OUTPATIENT)
Dept: PSYCHOLOGY | Facility: CLINIC | Age: 38
End: 2023-07-06
Payer: COMMERCIAL

## 2023-07-06 DIAGNOSIS — F43.12 CHRONIC POST-TRAUMATIC STRESS DISORDER (PTSD): ICD-10-CM

## 2023-07-06 DIAGNOSIS — F34.1 PERSISTENT DEPRESSIVE DISORDER: Primary | ICD-10-CM

## 2023-07-06 PROCEDURE — 90837 PSYTX W PT 60 MINUTES: CPT | Mod: 95 | Performed by: PSYCHOLOGIST

## 2023-07-06 RX ORDER — GABAPENTIN 400 MG/1
800 CAPSULE ORAL 3 TIMES DAILY
Qty: 180 CAPSULE | Refills: 5
Start: 2023-07-06

## 2023-07-06 NOTE — TELEPHONE ENCOUNTER
gabapentin (NEURONTIN) 400 MG capsule 180 capsule 5 1/3/2023         Last Written Prescription Date:  1-3-2023  Last Fill Quantity: 180,   # refills: 5  Last Office Visit : 4-  Future Office visit:  none    Routing refill request to provider for review/approval because:  Not on protocol.      Kathleen M Doege RN

## 2023-07-07 RX ORDER — GABAPENTIN 400 MG/1
800 CAPSULE ORAL 3 TIMES DAILY
Qty: 180 CAPSULE | Refills: 5 | Status: SHIPPED | OUTPATIENT
Start: 2023-07-07 | End: 2024-01-23

## 2023-07-10 NOTE — CONFIDENTIAL NOTE
"    Health Psychology - Follow up Visit  Confidential Summary*    The author of this note documented a reason for not sharing it with the patient.  REFERRAL SOURCE:  Psychiatry    CHIEF COMPLAINT/REASON FOR VISIT  Psychotherapy in context of chronic PTSD and persistent depression.  Patient also complains of dissatisfaction with interpersonal relationships and challenges with emotion regulation.      Patient was seen today for a 60 minute individual psychotherapy session.  The session was facilitated via [employer] patient at her home and provider at her own home.  Please note, patient running late for appt and when she tried to log into "[employer] the visit was already cancelled.         This telehealth service is appropriate and effective for delivering services in light of the necessity for social distancing to mitigate the COVID-19 epidemic. Patient has agreed to receiving telehealth services.    The patient has been notified of following:   \"This VIDEO visit will be conducted via a call between you and your physician/provider. We have found that certain health care needs can be provided without the need for an in-person physical exam.  VIDEO visits are billed at different rates depending on your insurance coverage.  Please reach out to your insurance provider with any questions. If during the course of the call the physician/provider feels a video visit is not appropriate, you will not be charged for this service.\"     Patient has given verbal consent for VIDEO visit? Yes    Subjective:  Patient began with report that she believes that she is \"sensitive to heat\" and that she has been feeling decreased energy with hot spell.  She also reported that she has noticed that there are many children in the area and that they are noisy.  She reported that she is more isolated and described feeling like she has become \"a hermit\".      Discussed her participation in a writing group, but she described that this group fell " apart.  She also reported that she has not been going to the coffee shop as she had.  However, she is enjoying rehearsals for the improv group performance and anticipates participating in festivals during the summer months.      She described less financial distress because she has received her summer stipend but that she is aware that she spent more money during the past year and that she may sell some of her items at a luxury consignment store.      She reported that she is again using dating apps.  Confronted her on her ongoing use of same apps expecting different outcomes.  She asserted that she will meet someone but continues to express belief that she is marginalized because of her ethnicity and education level.      Objective:  Patient was on time for today s session. She was alert and oriented. Mood was dysphoric with appropriate range of affect. Patient denied suicidal or assaultive ideation, plan, or intent.        Assessment:  The patient has a longstanding history of interpersonal discord and challenges with emotion regulation.  She has completed all requirements for her PhD and is now ABD, working on her proposal.  She is living in  housing with her two dogs.  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  She is dating. Patient uses avoidance as a primary coping tool.    Plan:  Patient graduated from full model DBT at [facility] and is now completing phase 2 work.      Treatment plan completed:  10/13/22     Time In: 2:00  Time Out: 3:00    Diagnosis:  Axis I PTSD, chronic, persistent depressive disorder, adjustment disorder with mixed, psychological factors associated with physical (fibromyalgia)   Axis II  BPD   Axis III please see medical records for details   [place] IV Psychosocial and Environmental Stressors: living alone with no family support, chronic illnesses, dissertation stress, financial stress         Corina Muhammad, PhD, LP

## 2023-07-13 ENCOUNTER — OFFICE VISIT (OUTPATIENT)
Dept: FAMILY MEDICINE | Facility: CLINIC | Age: 38
End: 2023-07-13
Payer: COMMERCIAL

## 2023-07-13 VITALS
WEIGHT: 158.9 LBS | OXYGEN SATURATION: 98 % | TEMPERATURE: 98.7 F | HEIGHT: 69 IN | RESPIRATION RATE: 17 BRPM | DIASTOLIC BLOOD PRESSURE: 73 MMHG | SYSTOLIC BLOOD PRESSURE: 106 MMHG | HEART RATE: 99 BPM | BODY MASS INDEX: 23.54 KG/M2

## 2023-07-13 DIAGNOSIS — N30.01 ACUTE CYSTITIS WITH HEMATURIA: Primary | ICD-10-CM

## 2023-07-13 DIAGNOSIS — N76.0 ACUTE VAGINITIS: ICD-10-CM

## 2023-07-13 DIAGNOSIS — R23.1 PALLOR: ICD-10-CM

## 2023-07-13 DIAGNOSIS — H92.01 RIGHT EAR PAIN: ICD-10-CM

## 2023-07-13 LAB
ALBUMIN SERPL BCG-MCNC: 4.3 G/DL (ref 3.5–5.2)
ALP SERPL-CCNC: 67 U/L (ref 35–104)
ALT SERPL W P-5'-P-CCNC: 16 U/L (ref 0–50)
ANION GAP SERPL CALCULATED.3IONS-SCNC: 11 MMOL/L (ref 7–15)
AST SERPL W P-5'-P-CCNC: 22 U/L (ref 0–45)
BACTERIAL VAGINOSIS VAG-IMP: NEGATIVE
BASOPHILS # BLD AUTO: 0 10E3/UL (ref 0–0.2)
BASOPHILS NFR BLD AUTO: 0 %
BILIRUB SERPL-MCNC: 0.3 MG/DL
BUN SERPL-MCNC: 7.7 MG/DL (ref 6–20)
CALCIUM SERPL-MCNC: 9.4 MG/DL (ref 8.6–10)
CANDIDA DNA VAG QL NAA+PROBE: NOT DETECTED
CANDIDA GLABRATA / CANDIDA KRUSEI DNA: NOT DETECTED
CHLORIDE SERPL-SCNC: 104 MMOL/L (ref 98–107)
CREAT SERPL-MCNC: 0.7 MG/DL (ref 0.51–0.95)
DEPRECATED HCO3 PLAS-SCNC: 23 MMOL/L (ref 22–29)
EOSINOPHIL # BLD AUTO: 0.1 10E3/UL (ref 0–0.7)
EOSINOPHIL NFR BLD AUTO: 1 %
ERYTHROCYTE [DISTWIDTH] IN BLOOD BY AUTOMATED COUNT: 12 % (ref 10–15)
FERRITIN SERPL-MCNC: 92 NG/ML (ref 6–175)
GFR SERPL CREATININE-BSD FRML MDRD: >90 ML/MIN/1.73M2
GLUCOSE SERPL-MCNC: 89 MG/DL (ref 70–99)
HCT VFR BLD AUTO: 38.3 % (ref 35–47)
HGB BLD-MCNC: 12.5 G/DL (ref 11.7–15.7)
IMM GRANULOCYTES # BLD: 0 10E3/UL
IMM GRANULOCYTES NFR BLD: 0 %
LYMPHOCYTES # BLD AUTO: 2.4 10E3/UL (ref 0.8–5.3)
LYMPHOCYTES NFR BLD AUTO: 29 %
MCH RBC QN AUTO: 30.5 PG (ref 26.5–33)
MCHC RBC AUTO-ENTMCNC: 32.6 G/DL (ref 31.5–36.5)
MCV RBC AUTO: 93 FL (ref 78–100)
MONOCYTES # BLD AUTO: 0.4 10E3/UL (ref 0–1.3)
MONOCYTES NFR BLD AUTO: 5 %
NEUTROPHILS # BLD AUTO: 5.4 10E3/UL (ref 1.6–8.3)
NEUTROPHILS NFR BLD AUTO: 65 %
NRBC # BLD AUTO: 0 10E3/UL
NRBC BLD AUTO-RTO: 0 /100
PLATELET # BLD AUTO: 314 10E3/UL (ref 150–450)
POTASSIUM SERPL-SCNC: 4.2 MMOL/L (ref 3.4–5.3)
PROT SERPL-MCNC: 7.3 G/DL (ref 6.4–8.3)
RBC # BLD AUTO: 4.1 10E6/UL (ref 3.8–5.2)
SODIUM SERPL-SCNC: 138 MMOL/L (ref 136–145)
T VAGINALIS DNA VAG QL NAA+PROBE: NOT DETECTED
WBC # BLD AUTO: 8.4 10E3/UL (ref 4–11)

## 2023-07-13 PROCEDURE — 87801 DETECT AGNT MULT DNA AMPLI: CPT | Mod: ORL | Performed by: NURSE PRACTITIONER

## 2023-07-13 PROCEDURE — 82728 ASSAY OF FERRITIN: CPT | Mod: ORL | Performed by: NURSE PRACTITIONER

## 2023-07-13 PROCEDURE — 85025 COMPLETE CBC W/AUTO DIFF WBC: CPT | Mod: ORL | Performed by: NURSE PRACTITIONER

## 2023-07-13 PROCEDURE — 87086 URINE CULTURE/COLONY COUNT: CPT | Mod: ORL | Performed by: NURSE PRACTITIONER

## 2023-07-13 PROCEDURE — 80053 COMPREHEN METABOLIC PANEL: CPT | Mod: ORL | Performed by: NURSE PRACTITIONER

## 2023-07-13 ASSESSMENT — PAIN SCALES - GENERAL: PAINLEVEL: NO PAIN (0)

## 2023-07-13 NOTE — PROGRESS NOTES
Today's Date: Jul 13, 2023     Patient Kasandra Lau 1985 presents to the clinic today to address   Chief Complaint   Patient presents with     UTI     Gets them frequently, has cramping and pressure, discharge is clear white, symptoms started about a week or two ago             SUBJECTIVE     History of Present Illness:    Kasandra is a 38 years old female with past medical history Anemia, anxiety, Asthma, GERD,Depression, Arthritis, migraine, panic disorder, chronic fatigue, recurrent UTI and neuropathic pain who is here today due to complaints of approx. 1 to 2 weeks lower abdominal pain, pressure, cramping and urinary urgency. Patient reported that she has history of recurrent UTI and was recently treated in April/23 with antibiotics. She denied any burning sensation, blood or pus in the urine. Patient is sexually active with one male partner (same partner since last negative gonorrhea/chlamydia/trich test) and does not use any protection. Patient has IUD. She denied any chills , fever, N/V, low grade temperature, or body aches.    Vaginal Discharge:  Patient also reported milky moderate vaginal discharge that started almost the same time as lower abdominal cramp,pressue and pain. She denied any itching, rash or burning sensation. Patient is concern that it might be yeast infection. She also denied any new sexual partner and does not mensurate due to IUD but does spot sometimes. Patient denied dyspareunia or soreness in vagina.       Onset: Symptoms began several weeks ago. Symptom onset was/is gradual. This is a recurrent condition.  Location: Pelvic and lower abdomen vaginal  Duration: 7 days  Characteristics: Cramping, pressure and pain  Aggravating factors: none  Relieving factors: Nothing  Radiation: none  Treatments tried: None  Severity: No change  Potential causes per patient: Unknown    She also reports intermittent right ear pain.  She has a history of TMJ and believes her symptoms could be secondary to  this.  No other URI symptoms at time of exam.    Review of Systems   Constitutional, HEENT, cardiovascular, pulmonary, gi and gu systems are negative, except as otherwise noted.        Allergies   Allergen Reactions     Dust Mites Cough, Difficulty breathing and Shortness Of Breath     Cats      Chest tightness, sinus irritation        Current Outpatient Medications   Medication Instructions     almotriptan (AXERT) 12.5 mg, Oral, AT ONSET OF HEADACHE, May repeat in 2 hours. Max 2 tablets/24 hours.     atomoxetine (STRATTERA) 60 mg, Oral, DAILY     bisacodyl (DULCOLAX) 15 mg, DAILY PRN     buPROPion (WELLBUTRIN XL) 300 mg, Oral, EVERY MORNING     calcium carbonate (TUMS) 500 mg, Oral, 3 TIMES DAILY, As needed for acid reflux     cholecalciferol 5,000 Units, Oral, EVERY EVENING     doxepin (SINEQUAN) 20 mg, Oral, 2 TIMES DAILY     Emgality 120 mg, Subcutaneous, EVERY 28 DAYS     EPINEPHrine (ANY BX GENERIC EQUIV) 0.3 mg, Intramuscular, PRN     famotidine (PEPCID) 20 mg, Oral, 2 TIMES DAILY     fexofenadine (ALLEGRA) 180 mg, Oral, EVERY MORNING     fluticasone (FLONASE) 50 MCG/ACT nasal spray 2 TIMES DAILY PRN     gabapentin (NEURONTIN) 800 mg, Oral, 3 TIMES DAILY     guaiFENesin (MUCINEX) 600 mg, Oral, 2 TIMES DAILY     hydrocortisone (CORTAID) 1 % external ointment Apply sparingly to affected area three times daily for 14 days.     hydroxychloroquine (PLAQUENIL) 400 mg, Oral, DAILY     levonorgestrel (MIRENA) 20 mcg, Intrauterine, ONCE     metroNIDAZOLE (FLAGYL) 500 mg, Oral, 2 TIMES DAILY     montelukast (SINGULAIR) 10 mg, Oral, AT BEDTIME     Multiple Vitamins-Minerals (MULTIVITAMIN ADULTS) TABS 1 tablet, Oral, DAILY     nitroFURantoin macrocrystal-monohydrate (MACROBID) 100 mg, Oral, 2 TIMES DAILY     ondansetron (ZOFRAN ODT) 4 mg, Oral, EVERY 8 HOURS PRN     phenazopyridine (AZO URINARY PAIN RELIEF) 190 mg, Oral, 3 TIMES DAILY     phenylephrine HCl 10 mg, Oral, 2 TIMES DAILY     polyethylene glycol (MIRALAX) 17  GM/Dose powder 1 capful., Oral, DAILY     propranolol (INDERAL) 20 mg, Oral, DAILY PRN     Semaglutide (1 MG/DOSE) (OZEMPIC) 1 mg, Subcutaneous, WEEKLY, To be started after finishing 0.5 mg weekly     Semaglutide (2 MG/DOSE) (OZEMPIC) 2 mg, Subcutaneous, EVERY 7 DAYS     vilazodone (VIIBRYD) 40 mg, Oral, EVERY MORNING       Past Medical History:   Diagnosis Date     Anemia fall 2016     Anxiety      Arthritis      Chronic fatigue      Depression (emotion)     sees psych, on meds     Gastroesophageal reflux disease      Migraine     daily meds, 2x month, more mild on meds     Neuropathic pain      Panic disorder      Uncomplicated asthma Spring 2018        Family History   Problem Relation Age of Onset     Diabetes Maternal Grandfather      Hypertension No family hx of      Coronary Artery Disease No family hx of      Hyperlipidemia No family hx of      Cerebrovascular Disease No family hx of      Breast Cancer No family hx of      Colon Cancer No family hx of      Prostate Cancer No family hx of      Other Cancer No family hx of      Glaucoma No family hx of      Macular Degeneration No family hx of         Social History     Tobacco Use     Smoking status: Former     Packs/day: 0.00     Years: 10.00     Pack years: 0.00     Types: Cigarettes     Smokeless tobacco: Never     Tobacco comments:     smokes occasionally socially    Vaping Use     Vaping Use: Never used   Substance Use Topics     Alcohol use: Not Currently     Alcohol/week: 0.0 standard drinks of alcohol     Comment: occasional      Drug use: Not Currently     Types: Marijuana     Comment: marijuana  none since May 2021        History   Sexual Activity     Sexual activity: Yes     Partners: Male     Birth control/ protection: I.U.D.     Comment: Nuvaring            1/12/2023    12:22 PM 6/15/2023     1:24 PM 6/30/2023     1:22 PM   PHQ   PHQ-9 Total Score 5 5 5   Q9: Thoughts of better off dead/self-harm past 2 weeks Not at all Not at all Not at all     "    Immunization History   Administered Date(s) Administered     COVID-19 Bivalent 12+ (Pfizer) 09/19/2022     COVID-19 MONOVALENT 12+ (Pfizer) 03/19/2021, 04/09/2021     COVID-19 Monovalent 12+ (Pfizer 2022) 01/10/2022     DTaP, Unspecified 05/22/2019     Flu, Unspecified 09/23/2016     Influenza Vaccine >6 months (Alfuria,Fluzone) 09/23/2016, 09/07/2018, 08/28/2019, 09/02/2020, 11/02/2021, 09/19/2022     Pneumococcal 23 valent 05/22/2019     TDAP (Adacel,Boostrix) 05/22/2019                 OBJECTIVE     /73 (BP Location: Left arm, Patient Position: Sitting, Cuff Size: Adult Regular)   Pulse 99   Temp 98.7  F (37.1  C) (Oral)   Resp 17   Ht 1.74 m (5' 8.5\")   Wt 72.1 kg (158 lb 14.4 oz)   SpO2 98%   BMI 23.81 kg/m       Labs:  Lab Results   Component Value Date    WBC 11.5 (H) 02/28/2023    HGB 13.1 02/28/2023    HCT 41.4 02/28/2023     02/28/2023    CHOL 180 08/19/2022    TRIG 184 (H) 08/19/2022    HDL 43 (L) 08/19/2022     (H) 11/07/2022    AST 31 11/07/2022     11/07/2022    BUN 8.9 11/07/2022    CO2 24 11/07/2022    TSH 2.12 04/25/2023    INR 1.09 11/07/2022        Physical Exam  Vitals reviewed.   Constitutional:       General: She is not in acute distress.     Appearance: Normal appearance. She is not ill-appearing.   HENT:      Head: Normocephalic and atraumatic.      Right Ear: Tympanic membrane, ear canal and external ear normal. No tenderness. There is no impacted cerumen. No mastoid tenderness.      Left Ear: Tympanic membrane, ear canal and external ear normal. There is no impacted cerumen.      Nose: No rhinorrhea.      Mouth/Throat:      Mouth: Mucous membranes are moist.      Pharynx: No oropharyngeal exudate or posterior oropharyngeal erythema.   Eyes:      Extraocular Movements: Extraocular movements intact.      Pupils: Pupils are equal, round, and reactive to light.   Cardiovascular:      Rate and Rhythm: Normal rate and regular rhythm.      Pulses: Normal " pulses.      Heart sounds: Normal heart sounds.   Pulmonary:      Effort: Pulmonary effort is normal. No respiratory distress.      Breath sounds: Normal breath sounds. No wheezing.   Abdominal:      General: Abdomen is flat. Bowel sounds are normal. There is no distension.      Palpations: Abdomen is soft. There is no mass.      Tenderness: There is no abdominal tenderness. There is no right CVA tenderness, left CVA tenderness or guarding.   Genitourinary:     Comments: Exam deferred by patient.  Musculoskeletal:      Cervical back: Neck supple.   Lymphadenopathy:      Cervical: No cervical adenopathy.   Skin:     General: Skin is warm.      Coloration: Skin is pale. Skin is not jaundiced.      Findings: No bruising, erythema, lesion or rash.   Neurological:      General: No focal deficit present.      Mental Status: She is alert.   Psychiatric:         Mood and Affect: Mood normal.         Behavior: Behavior normal.         Thought Content: Thought content normal.         Judgment: Judgment normal.        Recent Results (from the past 2 hour(s))   UA without Microscopic    Collection Time: 07/13/23  3:11 PM   Result Value Ref Range    Color Urine Yellow Colorless, Straw, Light Yellow, Yellow    Appearance Urine Clear Clear    Glucose Urine Negative Negative mg/dL    Bilirubin Urine Negative Negative    Ketones Urine Negative Negative mg/dL    Specific Gravity Urine 1.010 1.003 - 1.035    Blood Urine Trace (A) Negative    pH Urine 7.0 5.0 - 7.0    Protein Albumin Urine Negative Negative mg/dL    Urobilinogen Urine 2.0 (A) 0.2, 1.0 E.U./dL    Nitrite Urine Negative Negative    Leukocyte Esterase Urine Negative Negative     *Urobilogen should state 0.2, NOT 2.0*         ASSESSMENT/PLAN     (N30.01) Acute cystitis with hematuria  (primary encounter diagnosis)  Comment: Patient has history of recurrent UTI,she was treated in late April with antibiotics and currently presents with lower abdominal pain, cramp and  pressure. UA shows trace of blood otherwise negative. Will await culture result prior to starting treatment with antibiotics and referral to urologist made to evaluate the cause of recurrent UTI. Patient advised on perianal hygiene.  Plan: UA without Microscopic, CBC with platelets         differential, Urine Culture Aerobic Bacterial,         Adult Urology  Referral            (R23.1) Pallor  Comment: Patient skin appears pallor  She has past medical history of anemia (currently taking multivitamin with Fe). Check labs as noted below. Disposition pending results.  Plan: CBC with platelets differential, Ferritin,         Comprehensive metabolic panel            (N76.0) Acute vaginitis  Comment: Patient has history of yeast infection and complained of moderate amount of milky vaginal discharge. Denied any foul smell, rashes, itching or bloody discharge.     Due to complaints of abdominal pressure, the patient was advised to have a pelvic exam today to rule out PID.  Patient declined exam, as she was uncomfortable with a male provider.  Unfortunately, my female colleague who works alongside me on Fridays is out of the office tomorrow.  Advised the patient that the reason why we want to rule out PID is due to the risk of infertility.  Patient stated understanding to risk and was agreeable to do self swab.  She was instructed to follow-up tomorrow with symptom update.  Should her symptoms worsen, she was advised to find an urgent care with a female provider for pelvic exam asap.      Plan: Multiplex Vaginal Panel by PCR          Right ear pain:   Right ear exam within normal limits.  Suspect symptoms secondary to TMJ.    Follow-Up:  - Follow up in as needed, or if symptoms worsen or fail to improve. Disposition pending results.      Options for treatment and follow-up care were reviewed with the patient. Patient engaged in the decision making process and verbalized understanding of the options discussed and  agreed with the final plan.  AVS printed and given to patient.     Note by Lasha Ann DNP Student    I, Denys TAY, HONEY reviewed and verified the nurse practitioner (NP)  student's documentation of the patient's history. I performed the exam and the medical decision making activities with the NP student, who was present for learning purposes.      JASVIR Porter CNP    [facility]  Nurse Practitioners [address] [id number] [phone]        Note: Chart documentation was done in part with Dragon Voice Recognition software.  Although reviewed after completion, some word and grammatical errors may remain. Please contact author for any clarification or concerns.

## 2023-07-13 NOTE — RESULT ENCOUNTER NOTE
Pritesh Banuelos, Urobilinogen should say 0.2 instead of 2.0- do you know how to correct this?    Thanks,  Rufino

## 2023-07-13 NOTE — NURSING NOTE
"ROOM:2  NARENDRA ZURI S    Preferred Name: Kasandra     How did you hear about us?  Current Patient    38 year old  Chief Complaint   Patient presents with     UTI     Gets them frequently, has cramping and pressure, discharge is clear white, symptoms started about a week or two ago       Blood pressure 106/73, pulse 99, temperature 98.7  F (37.1  C), temperature source Oral, resp. rate 17, height 1.74 m (5' 8.5\"), weight 72.1 kg (158 lb 14.4 oz), SpO2 98 %, not currently breastfeeding. Body mass index is 23.81 kg/m .  BP completed using cuff size:        Patient Active Problem List   Diagnosis     Pelvic pain in female     Vitamin D deficiency     Menorrhagia with regular cycle     Migraine without aura and without status migrainosus, not intractable     Iron deficiency anemia due to chronic blood loss     Tired     Circadian rhythm sleep disorder, delayed sleep phase type     Unhealthy sleep habit     Seasonal mood disorder (H)     Chronic idiopathic urticaria     Acid reflux     Cholecystitis     Depression     Hx of abnormal cervical Pap smear     Irritable bowel syndrome with constipation     Migraine headache     Mild intermittent asthma without complication     Need for desensitization to allergens     Panic disorder     Pap smear abnormality of cervix/human papillomavirus (HPV) positive     Recurrent major depressive disorder, in remission (H)     Hypertrophy of breast     Chronic sinusitis, unspecified location     Keratosis pilaris     Endometriosis     Acute pharyngitis due to other specified organisms     Chronic pain of both shoulders     Alopecia       Wt Readings from Last 2 Encounters:   07/13/23 72.1 kg (158 lb 14.4 oz)   04/25/23 71.9 kg (158 lb 8 oz)     BP Readings from Last 3 Encounters:   07/13/23 106/73   04/25/23 96/62   04/05/23 111/81       Allergies   Allergen Reactions     Dust Mites Cough, Difficulty breathing and Shortness Of Breath     Cats      Chest tightness, sinus irritation "       Current Outpatient Medications   Medication     almotriptan (AXERT) 12.5 MG tablet     atomoxetine (STRATTERA) 60 MG capsule     buPROPion (WELLBUTRIN XL) 300 MG 24 hr tablet     calcium carbonate (TUMS) 500 MG chewable tablet     cholecalciferol 125 MCG (5000 UT) CAPS     doxepin (SINEQUAN) 10 MG capsule     EMGALITY 120 MG/ML injection     EPINEPHrine (ANY BX GENERIC EQUIV) 0.3 MG/0.3ML injection 2-pack     famotidine (PEPCID) 20 MG tablet     fexofenadine (ALLEGRA) 180 MG tablet     fluticasone (FLONASE) 50 MCG/ACT nasal spray     gabapentin (NEURONTIN) 400 MG capsule     guaiFENesin (MUCINEX) 600 MG 12 hr tablet     hydrocortisone (CORTAID) 1 % external ointment     hydroxychloroquine (PLAQUENIL) 200 MG tablet     metroNIDAZOLE (FLAGYL) 500 MG tablet     montelukast (SINGULAIR) 10 MG tablet     Multiple Vitamins-Minerals (MULTIVITAMIN ADULTS) TABS     ondansetron (ZOFRAN ODT) 4 MG ODT tab     phenylephrine HCl 10 MG TABS     propranolol (INDERAL) 20 MG tablet     Semaglutide, 1 MG/DOSE, 4 MG/3ML SOPN     Semaglutide, 2 MG/DOSE, (OZEMPIC) 8 MG/3ML pen     vilazodone (VIIBRYD) 40 MG TABS tablet     bisacodyl (DULCOLAX) 5 MG EC tablet     levonorgestrel (MIRENA) 20 MCG/DAY IUD     nitroFURantoin macrocrystal-monohydrate (MACROBID) 100 MG capsule     phenazopyridine (AZO URINARY PAIN RELIEF) 95 MG tablet     polyethylene glycol (MIRALAX) 17 GM/Dose powder     No current facility-administered medications for this visit.       Social History     Tobacco Use     Smoking status: Former     Packs/day: 0.00     Years: 10.00     Pack years: 0.00     Types: Cigarettes     Smokeless tobacco: Never     Tobacco comments:     smokes occasionally socially    Vaping Use     Vaping Use: Never used   Substance Use Topics     Alcohol use: Not Currently     Alcohol/week: 0.0 standard drinks of alcohol     Comment: occasional      Drug use: Not Currently     Types: Marijuana     Comment: marijuana  none since May 2021        Honoring Choices - Health Care Directive Guide offered to patient at time of visit.    Health Maintenance Due   Topic Date Due     ASTHMA ACTION PLAN  Never done     ADVANCE CARE PLANNING  Never done     HEPATITIS B IMMUNIZATION (1 of 3 - 3-dose series) Never done     YEARLY PREVENTIVE VISIT  01/31/2020     Pneumococcal Vaccine: Pediatrics (0 to 5 Years) and At-Risk Patients (6 to 64 Years) (2 - PCV) 05/22/2020     ASTHMA CONTROL TEST  12/14/2022       Immunization History   Administered Date(s) Administered     COVID-19 Bivalent 12+ (Pfizer) 09/19/2022     COVID-19 MONOVALENT 12+ (Pfizer) 03/19/2021, 04/09/2021     COVID-19 Monovalent 12+ (Pfizer 2022) 01/10/2022     DTaP, Unspecified 05/22/2019     Flu, Unspecified 09/23/2016     Influenza Vaccine >6 months (Alfuria,Fluzone) 09/23/2016, 09/07/2018, 08/28/2019, 09/02/2020, 11/02/2021, 09/19/2022     Pneumococcal 23 valent 05/22/2019     TDAP (Adacel,Boostrix) 05/22/2019       Lab Results   Component Value Date    PAP NIL 01/31/2019       Recent Labs   Lab Test 04/25/23  1232 11/07/22  1241 08/19/22  0835 04/27/22  1717 04/13/22  1600 01/04/22  1732 08/12/21  1535 09/12/20 2058 08/02/19 1711   LDL  --   --  100  --   --   --   --   --   --    HDL  --   --  43*  --   --   --   --   --   --    TRIG  --   --  184*  --   --   --   --   --   --    ALT  --  134* 60*  --  63*  --    < > 36 25   CR  --  0.83 0.69 0.66 0.63  --    < > 0.77 0.80   GFRESTIMATED  --  >90 >90 >90 >90  --    < > >90 >90   GFRESTBLACK  --   --   --   --   --   --   --  >90 >90   ALBUMIN  --  4.4 3.6  --  3.9  --    < > 3.3* 3.4   POTASSIUM  --  3.8  --  3.7 3.8  --    < > 3.9 3.5   TSH 2.12  --   --   --   --  1.81  --   --   --     < > = values in this interval not displayed.           7/13/2023     3:06 PM 5/25/2023     3:27 PM   PHQ-2 ( 1999 Pfizer)   Q1: Little interest or pleasure in doing things 0 0   Q2: Feeling down, depressed or hopeless 1 1   PHQ-2 Score 1 1           10/11/2022     11:43 AM 1/12/2023    12:22 PM 6/15/2023     1:24 PM 6/30/2023     1:22 PM   PHQ-9 SCORE   PHQ-9 Total Score MyChart  5 (Mild depression) 5 (Mild depression) 5 (Mild depression)   PHQ-9 Total Score 5 5 5 5           10/11/2022    11:43 AM 1/12/2023    12:23 PM 6/15/2023     1:24 PM   VIC-7 SCORE   Total Score  8 (mild anxiety) 11 (moderate anxiety)   Total Score 9 8 11    11           6/14/2022    11:07 AM   ACT Total Scores   ACT TOTAL SCORE (Goal Greater than or Equal to 20) 24   In the past 12 months, how many times did you visit the emergency room for your asthma without being admitted to the hospital? 2   In the past 12 months, how many times were you hospitalized overnight because of your asthma? 0       Marilu Riley, EMT    July 13, 2023 3:08 PM

## 2023-07-14 ENCOUNTER — TRANSFERRED RECORDS (OUTPATIENT)
Dept: HEALTH INFORMATION MANAGEMENT | Facility: CLINIC | Age: 38
End: 2023-07-14

## 2023-07-14 ENCOUNTER — TELEPHONE (OUTPATIENT)
Dept: FAMILY MEDICINE | Facility: CLINIC | Age: 38
End: 2023-07-14
Payer: COMMERCIAL

## 2023-07-14 ENCOUNTER — OFFICE VISIT (OUTPATIENT)
Dept: GASTROENTEROLOGY | Facility: CLINIC | Age: 38
End: 2023-07-14
Attending: PHYSICIAN ASSISTANT
Payer: COMMERCIAL

## 2023-07-14 VITALS
BODY MASS INDEX: 23.87 KG/M2 | OXYGEN SATURATION: 99 % | TEMPERATURE: 98.4 F | DIASTOLIC BLOOD PRESSURE: 85 MMHG | HEART RATE: 93 BPM | WEIGHT: 159.3 LBS | SYSTOLIC BLOOD PRESSURE: 132 MMHG

## 2023-07-14 DIAGNOSIS — K76.0 HEPATIC STEATOSIS: Primary | ICD-10-CM

## 2023-07-14 DIAGNOSIS — K76.0 HEPATIC STEATOSIS: ICD-10-CM

## 2023-07-14 PROCEDURE — 99213 OFFICE O/P EST LOW 20 MIN: CPT | Performed by: PHYSICIAN ASSISTANT

## 2023-07-14 PROCEDURE — 91200 LIVER ELASTOGRAPHY: CPT | Mod: 26 | Performed by: PHYSICIAN ASSISTANT

## 2023-07-14 PROCEDURE — 99214 OFFICE O/P EST MOD 30 MIN: CPT | Mod: 25 | Performed by: PHYSICIAN ASSISTANT

## 2023-07-14 ASSESSMENT — PAIN SCALES - GENERAL: PAINLEVEL: NO PAIN (0)

## 2023-07-14 NOTE — CONFIDENTIAL NOTE
"Telephone encounter (1:27 minutes)      Called patient to check in on symptoms.  Patient reports that her UTI symptoms have resolved today.  Per patient\" symptoms seem to come and go\" and she currently denies fevers, chills, or pelvic pain.  We will continue to monitor the urine culture results.  Patient reports that she has scheduled an appointment with urology.  She was advised to follow-up with me or her PCP with any other concerns/new symptoms.    All questions/concerns addressed. Patient stated understanding/agreement to plan of care.    JASVIR Porter, CNP  [facility] [employer]    "

## 2023-07-14 NOTE — CONFIDENTIAL NOTE
"    Health Psychology - Follow up Visit  Confidential Summary*    The author of this note documented a reason for not sharing it with the patient.  REFERRAL SOURCE:  Psychiatry    CHIEF COMPLAINT/REASON FOR VISIT  Psychotherapy in context of chronic PTSD and persistent depression.  Patient also complains of dissatisfaction with interpersonal relationships and challenges with emotion regulation.      Patient was seen today for a 60 minute individual psychotherapy session.  The session was facilitated via [employer] patient at her home and provider at her own home.  Please note, patient running late for appt and when she tried to log into [employer], the visit was already cancelled.         This telehealth service is appropriate and effective for delivering services in light of the necessity for social distancing to mitigate the COVID-19 epidemic. Patient has agreed to receiving telehealth services.    The patient has been notified of following:   \"This VIDEO visit will be conducted via a call between you and your physician/provider. We have found that certain health care needs can be provided without the need for an in-person physical exam.  VIDEO visits are billed at different rates depending on your insurance coverage.  Please reach out to your insurance provider with any questions. If during the course of the call the physician/provider feels a video visit is not appropriate, you will not be charged for this service.\"     Patient has given verbal consent for VIDEO visit? Yes    Subjective:  Patient began with a review of recent increase in ADHD medication and the positive impact on her ability to concentrate.  She acknowledged perfectionism in her writing and is concerned that this may interfere with her ability to complete her dissertation proposal.  She was invited to use our sessions as accountability and to discuss how she might use CBT strategies to address her anxiety.  She currently relies almost exclusively on " "avoidance (\"procrastination\").  Discussed how this strategy works in the short term but in the long term only contributes to unrelenting crises.  She was also invited to discuss her desire to work as a professor, a position that will include writing, research and teaching.  She describes her belief that her particular field of study requires more writing skill than others and that requirements are higher for her.      She was noted to become defensive during inquiry into her timeline and how long her department will be able to continue funding her if she is not showing progress.  It is noted that she does not have recurrent meetings or deadlines and that she is stalled in moving forward on her career goals.      Objective:  Patient was on time for today s session. She was alert and oriented. Mood was dysphoric with appropriate range of affect. Patient denied suicidal or assaultive ideation, plan, or intent.        Assessment:  The patient has a longstanding history of interpersonal discord and challenges with emotion regulation.  She has completed all requirements for her PhD and is now ABD, working on her proposal.  She is living in  housing with her two dogs.  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  She is dating. Patient uses avoidance as a primary coping tool.    Plan:  Patient graduated from full model DBT at [facility] and is now completing phase 2 work.      Treatment plan completed:  10/13/22     Time In: 2:00  Time Out: 3:00    Diagnosis:  Axis I PTSD, chronic, persistent depressive disorder, adjustment disorder with mixed, psychological factors associated with physical (fibromyalgia)   Axis II  BPD   Axis III please see medical records for details   [place] IV Psychosocial and Environmental Stressors: living alone with no family support, chronic illnesses, dissertation stress, financial stress         Corina Muhammad, PhD, LP    "

## 2023-07-14 NOTE — PATIENT INSTRUCTIONS
"Patient Education   The Panel Psychiatry Program  What to Expect  Here's what to expect in the Panel Psychiatry Program.   About the program  You'll be meeting with a psychiatric doctor to check your mental health. A psychiatric doctor helps you deal with troubling thoughts and feelings by giving you medicine. They'll make sure you know the plan for your care. You may see them for a long time. When you're feeling better, they may refer you back to seeing your family doctor.   If you have any questions, we'll be glad to talk to you.  About visits  Be open  At your visits, please talk openly about your problems. It may feel hard, but it's the best way for us to help you.  Cancelling visits  If you can't come to your visit, please call us right away at [phone]. If you don't cancel at least 24 hours (1 full day) before your visit, that's \"late cancellation.\"  Not showing up for your visits  Being very late is the same as not showing up. You'll be a \"no show\" if:  You're more than 15 minutes late for a 30-minute (half hour) visit.  You're more than 30 minutes late for a 60-minute (full hour) visit.  If you cancel late or don't show up 2 times within 6 months, we may end your care.  Getting help between visits  If you need help between visits, you can call us Monday to Friday from 8 a.m. to 4:30 p.m. at [phone].  Emergency care  Call 911 or go to the nearest emergency department if your life or someone else's life is in danger.  Call 988 anytime to reach the national Suicide and Crisis hotline.  Medicine refills  To refill your medicine, call your pharmacy. You can also call [facility] [place] at [phone], Monday to Friday, 8 a.m. to 4:30 p.m. It can take 1 to 3 business days to get a refill.   Forms, letters, and tests  You may have papers to fill out, like FMLA, short-term disability, and workability. We can help you with these forms at your visits, but you must have an " appointment. You may need more than 1 visit for this, to be in an intensive therapy program, or both.  Before we can give you medicine for ADHD, we may refer you to get tested for it or confirm it another way.  We may not be able to give you an emotional support animal letter.  We don't do mental health checks ordered by the court.   We don't do mental health testing, but we can refer you to get tested.   Thank you for choosing us for your care.  For informational purposes only. Not to replace the advice of your health care provider. Copyright   2022 [facility] "[employer]. All rights reserved. [employer] 259767 - 12/22.      Treatment Plan:      1.  Atomoxetine increased to 60 mg daily  2.  Viibryd 40 mg daily  3.  Wellbutrin  mg daily  4.  Doxepin 20 mg 2 times daily  5.  Propranolol 20 mg daily as needed for anxiety  6.  Therapy when able to, for learning skills to manage life stressors    Continue all other medications as reviewed per electronic medical record today.   Safety plan reviewed. To the Emergency Department as needed or call after hours crisis line at [phone] or [phone]. [place] Crisis Text Line. Text [place] to 985303 or Suicide LifeLine Chat: suicidepreventionlifeline.org/chat/  To schedule individual or family therapy, call [facility] at [phone]  Schedule an appointment with me in 2 months or sooner as needed. Call [facility] at [phone] to schedule.  Follow up with primary care provider as planned or for acute medical concerns.  Call the psychiatric nurse line with medication questions or concerns at [phone]  MyChart may be used to communicate with your provider, but this is not intended to be used for emergencies.    Crisis Resources:    National Suicide Prevention Lifeline: 764.437.9532 (TTY: 510.405.8637). Call anytime for help.  (www.suicidepreventionlifeline.org)  National [place] on Mental Illness (www.elver.org): 133.820.2058 or  587.132.6034.   Mental Health Association (www.mentalhealth.org): 550-466-1197 or 273-078-2673.  [place] Crisis Text Line: Text [place] to 915073  Suicide LifeLine Chat: suicidepreventionlifeline.org/chat

## 2023-07-14 NOTE — Clinical Note
7/14/2023         RE: Kasandra Lau  [address]  [place] [address]        Dear Colleague,    Thank you for referring your patient, Kasandra Lau, to the [facility] [place]. Please see a copy of my visit note below.    Hepatology Follow-up Clinic note  Kasandra Lau   Date of Birth 1985  Date of Service 7/13/2023    Reason for follow-up:          Assessment/plan:   Kasandra Lau is a 38 year old female with History of elevated ALT/AST and imaging findings of hepatic steatosis. Risk factors for fatty liver disease includes: ***obesity, HTN, hyperlipidemia, hypothryoidism, diabetes, NIKOLAI. Transaminases have been historically ***. Liver function also normal without stigmata of advanced liver disease. Given historically normal transaminases, do not feel that further hepatobiliary work-up indicated at this time.***    # NALFD:     - Will obtain a fibrosis scan to evaluate any fibrosis  - Continue weight maintenance. Discussed how a weight loss of 3-5% can show improvement on steatosis and weight loss of 7-10% can show improvement on fibrosis on histology.   - Maintain good control of cholesterol (No contraindication to starting a statin with LFT elevations)   - Optimize blood glucose as needed. Could consider GLP-1 inhibitor for both insulin resistance and help with weight loss  - Improve nutrition: continue limiting carbohydrates, especially simple carbohydrates, and following Mediterranean eating patten  - Increase physical activity: maintain physical activity as able   - Limit alcohol intake to not more than 3 drinks a week    - Consider/recommend follow up with non-invasive elastography or FibroScan in 3-5 years     - Follow up with Hepatology in six months *** in one year *** or if above work-up     Tejas Fernandes PA-C   [facility] Hepatology clinic    Total time for E/M services performed on the date of the encounter *** minutes.  This included review of  previous: clinic visits, hospital records, lab results, imaging studies, and procedural documentation. Time also includes patient visit, documentation and discussion with other providers.  The findings from this review are summarized in the above note.   -----------------------------------------------------       HPI:   Kasandra Lau is a 38 year old female with *** presenting for follow-up.     Dx:   Etiology:   Bx:      Patient reports***    Appetite is down.     Weight is currently       Weight is down 40 lbs since September 2022.       Patient denies jaundice, lower extremity edema, abdominal distension or confusion.      Patient also denies melena, hematochezia or hematemesis.    Patient denies weight loss, fevers, sweats or chills.    HAven't been been able to drink alcohol in the past few years.   Geography - social social PhD.     Recent Labs   Lab Test 07/13/23  1554 11/07/22  1241 08/19/22  0835 04/13/22  1600 08/31/21  1057 08/12/21  1535 09/12/20  2058 08/02/19  1711 10/12/18  1532 10/05/17  1133   ALKPHOS 67 109* 95 110 82 90 70 76 65 57   ALT 16 134* 60* 63* 41 33 36 25 22 22   AST 22 31 28 36 44 24 25 20 11 12     Previous work-up:   Lab Results   Component Value Date    HEPBANG Nonreactive 01/31/2019    AUSAB 27.58 (H) 10/10/2017    HCVAB Nonreactive 04/11/2023    YADIRA 92 07/13/2023    IRONSAT 29 02/06/2017    TTG 0.6 04/11/2023    TTGG <0.6 04/11/2023     04/11/2023    GHISLAINE Borderline Positive (A) 04/11/2023    ANAT1 1:80 04/11/2023    SMOOTHMUS 10 04/11/2023    O8CGIHN Whole Blood 04/11/2023    A1A 130 04/11/2023    L1UGCDX Negative 04/11/2023    U1GWTQS Negative 04/11/2023    I9BIXMBC Not Applicable 04/11/2023    C6EEQKZSR See Note 04/11/2023    TSH 2.12 04/25/2023    CHOL 180 08/19/2022    HDL 43 (L) 08/19/2022     08/19/2022    TRIG 184 (H) 08/19/2022      No results found for: SPECDES, LDRESULTS    Medical hx Surgical hx   Past Medical History:   Diagnosis Date     Anemia fall 2016      Anxiety      Arthritis      Chronic fatigue      Depression (emotion)      Gastroesophageal reflux disease      Migraine      Neuropathic pain      Panic disorder      Uncomplicated asthma Spring 2018    Past Surgical History:   Procedure Laterality Date     COLONOSCOPY       LAPAROSCOPIC ABLATION ENDOMETRIOSIS N/A 11/09/2016    Procedure: LAPAROSCOPIC ABLATION ENDOMETRIOSIS;  Surgeon: Tonja Ferrer MD;  Location: [facility]     LAPAROSCOPIC CYSTECTOMY OVARIAN (BENIGN) Left 11/09/2016    Procedure: LAPAROSCOPIC CYSTECTOMY OVARIAN (BENIGN);  Surgeon: Tonja Ferrer MD;  Location: [facility]     LAPAROSCOPIC TUBAL DYE STUDY Left 11/09/2016    Procedure: LAPAROSCOPIC TUBAL DYE STUDY;  Surgeon: Tonja Ferrer MD;  Location: [facility]     LAPAROSCOPY OPERATIVE ADULT N/A 11/09/2016    Procedure: LAPAROSCOPY OPERATIVE ADULT;  Surgeon: Tonja Ferrer MD;  Location: [facility]     MAMMOPLASTY REDUCTION Bilateral 08/05/2021    Procedure: MAMMOPLASTY, REDUCTION, Bilateral;  Surgeon: ANTONIO Moreno MD;  Location: [facility] OR     ORTHOPEDIC SURGERY      left wrist surgery     TONSILLECTOMY & ADENOIDECTOMY Bilateral     cauterized turbinates also                 Medications:     Current Outpatient Medications   Medication     almotriptan (AXERT) 12.5 MG tablet     atomoxetine (STRATTERA) 60 MG capsule     buPROPion (WELLBUTRIN XL) 300 MG 24 hr tablet     calcium carbonate (TUMS) 500 MG chewable tablet     cholecalciferol 125 MCG (5000 UT) CAPS     doxepin (SINEQUAN) 10 MG capsule     EMGALITY 120 MG/ML injection     EPINEPHrine (ANY BX GENERIC EQUIV) 0.3 MG/0.3ML injection 2-pack     famotidine (PEPCID) 20 MG tablet     fexofenadine (ALLEGRA) 180 MG tablet     fluticasone (FLONASE) 50 MCG/ACT nasal spray     gabapentin (NEURONTIN) 400 MG capsule     guaiFENesin (MUCINEX) 600 MG 12 hr tablet     hydrocortisone (CORTAID) 1 % external ointment     hydroxychloroquine (PLAQUENIL) 200 MG tablet      levonorgestrel (MIRENA) 20 MCG/DAY IUD     metroNIDAZOLE (FLAGYL) 500 MG tablet     montelukast (SINGULAIR) 10 MG tablet     Multiple Vitamins-Minerals (MULTIVITAMIN ADULTS) TABS     ondansetron (ZOFRAN ODT) 4 MG ODT tab     phenylephrine HCl 10 MG TABS     propranolol (INDERAL) 20 MG tablet     Semaglutide, 1 MG/DOSE, 4 MG/3ML SOPN     Semaglutide, 2 MG/DOSE, (OZEMPIC) 8 MG/3ML pen     vilazodone (VIIBRYD) 40 MG TABS tablet     No current facility-administered medications for this visit.            Allergies:     Allergies   Allergen Reactions     Dust Mites Cough, Difficulty breathing and Shortness Of Breath     Cats      Chest tightness, sinus irritation            Review of Systems:   10 points ROS was obtained and highlighted in the HPI, otherwise negative.          Physical Exam:   VS:  There were no vitals taken for this visit.    ***  Gen- well, NAD, A+Ox3, normal color  Lym- no palpable LAD  CVS- RRR  RS- CTA  Abd-   Extr- hands normal, no KING  Skin- no rash or jaundice  Neuro- no asterixis  Psych- normal mood           Data:   Reviewed in person and significant for:    Lab Results   Component Value Date     11/07/2022     09/12/2020      Lab Results   Component Value Date    POTASSIUM 3.8 11/07/2022    POTASSIUM 3.7 04/27/2022    POTASSIUM 3.9 09/12/2020     Lab Results   Component Value Date    CHLORIDE 105 11/07/2022    CHLORIDE 106 04/27/2022    CHLORIDE 113 09/12/2020     Lab Results   Component Value Date    CO2 24 11/07/2022    CO2 22 04/27/2022    CO2 21 09/12/2020     Lab Results   Component Value Date    BUN 8.9 11/07/2022    BUN 9 04/27/2022    BUN 13 09/12/2020     Lab Results   Component Value Date    CR 0.83 11/07/2022    CR 0.77 09/12/2020       Lab Results   Component Value Date    WBC 8.4 07/13/2023    WBC 7.3 09/12/2020     Lab Results   Component Value Date    HGB 12.5 07/13/2023    HGB 11.6 09/12/2020     Lab Results   Component Value Date    HCT 38.3 07/13/2023    HCT 35.7  09/12/2020     Lab Results   Component Value Date    MCV 93 07/13/2023    MCV 89 09/12/2020     Lab Results   Component Value Date     07/13/2023     09/12/2020       Lab Results   Component Value Date    AST 31 11/07/2022    AST 25 09/12/2020     Lab Results   Component Value Date     11/07/2022    ALT 36 09/12/2020     No results found for: BILICONJ   Lab Results   Component Value Date    BILITOTAL 0.3 11/07/2022    BILITOTAL 0.2 09/12/2020       Lab Results   Component Value Date    ALBUMIN 4.4 11/07/2022    ALBUMIN 3.6 08/19/2022    ALBUMIN 3.3 09/12/2020     Lab Results   Component Value Date    PROTTOTAL 7.6 11/07/2022    PROTTOTAL 7.3 09/12/2020      Lab Results   Component Value Date    ALKPHOS 109 11/07/2022    ALKPHOS 70 09/12/2020       Lab Results   Component Value Date    INR 1.09 11/07/2022         Again, thank you for allowing me to participate in the care of your patient.        Sincerely,        Tejas Fernandes PA-C

## 2023-07-14 NOTE — NURSING NOTE
Chief Complaint   Patient presents with     RECHECK   /85 (BP Location: Right arm, Patient Position: Sitting, Cuff Size: Adult Regular)   Pulse 93   Temp 98.4  F (36.9  C) (Oral)   Wt 72.3 kg (159 lb 4.8 oz)   SpO2 99%   BMI 23.87 kg/m

## 2023-07-14 NOTE — PROGRESS NOTES
Hepatology Follow-up Clinic note  Kasandra Lau   Date of Birth 1985  Date of Service 7/13/2023    Reason for follow-up: hepatic steatosis          Assessment/plan:   Kasandra Lau is a 38 year old female with history of elevated ALT/AST and imaging findings of hepatic steatosis. She has had 40 lb weight loss over the past 10 months. Her transaminases are now normal which correlates with  Liver function also normal without stigmata of advanced liver disease.     # Metabolic associated fatty liver disease:   - Will obtain a fibrosis scan to evaluate any fibrosis today   - Continue weight maintenance.   - Maintain good control of cholesterol (No contraindication to starting a statin with LFT elevations)   - Optimize blood glucose as needed.   - Continue limiting carbohydrates, especially simple carbohydrates, and following Mediterranean eating patten  - Increase physical activity: maintain physical activity as able   - Continue limiting or avoiding alcohol    - Consider follow up with non-invasive elastography or FibroScan in 5 years     - Follow up with Hepatology as needed     Tejas Fernandes PA-C   [facility] Hepatology clinic    Total time for E/M services performed on the date of the encounter 30 minutes; excluding performing and official interpretation of fibrosis scan reads.  This included review of previous: clinic visits, hospital records, lab results, imaging studies, and procedural documentation. Time also includes patient visit The findings from this review are summarized in the above note.   -----------------------------------------------------       HPI:   Kasandra Lau is a 38 year old female with presenting for follow-up.     Dx: MAFLD  Risk factors: obesity  Bx: nil     Patient was last seen by me on November 2022. She was in the ED for constipation in late November. No recent hospitalizations.     Appetite is down due to Ozempic.  Weight is down 40 lbs since September 2022.     Having more  regular bowel movements. Patient denies jaundice, lower extremity edema, abdominal distension or confusion.  Patient also denies melena, hematochezia or hematemesis. Patient denies weight loss, fevers, sweats or chills.    HAven't been been able to drink alcohol in the past few years.  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.     Recent Labs   Lab Test 07/13/23  1554 11/07/22  1241 08/19/22  0835 04/13/22  1600 08/31/21  1057 08/12/21  1535 09/12/20  2058 08/02/19  1711 10/12/18  1532 10/05/17  1133   ALKPHOS 67 109* 95 110 82 90 70 76 65 57   ALT 16 134* 60* 63* 41 33 36 25 22 22   AST 22 31 28 36 44 24 25 20 11 12     Previous work-up:   Lab Results   Component Value Date    HEPBANG Nonreactive 01/31/2019    AUSAB 27.58 (H) 10/10/2017    HCVAB Nonreactive 04/11/2023    YADIRA 92 07/13/2023    IRONSAT 29 02/06/2017    TTG 0.6 04/11/2023    TTGG <0.6 04/11/2023     04/11/2023    GHISLAINE Borderline Positive (A) 04/11/2023    ANAT1 1:80 04/11/2023    SMOOTHMUS 10 04/11/2023    N1PKDPP Whole Blood 04/11/2023    A1A 130 04/11/2023    U7FOSKY Negative 04/11/2023    J0MAKPA Negative 04/11/2023    A7NBPFHB Not Applicable 04/11/2023    S7EFLADJY See Note 04/11/2023    TSH 2.12 04/25/2023    CHOL 180 08/19/2022    HDL 43 (L) 08/19/2022     08/19/2022    TRIG 184 (H) 08/19/2022      No results found for: SPECDES, LDRESULTS    Medical hx Surgical hx   Past Medical History:   Diagnosis Date    Anemia fall 2016    Anxiety     Arthritis     Chronic fatigue     Depression (emotion)     Gastroesophageal reflux disease     Migraine     Neuropathic pain     Panic disorder     Uncomplicated asthma Spring 2018    Past Surgical History:   Procedure Laterality Date    COLONOSCOPY      LAPAROSCOPIC ABLATION ENDOMETRIOSIS N/A 11/09/2016    Procedure: LAPAROSCOPIC ABLATION ENDOMETRIOSIS;  Surgeon: Tonja Ferrer MD;  Location: [facility]    LAPAROSCOPIC CYSTECTOMY OVARIAN (BENIGN) Left 11/09/2016    Procedure:  LAPAROSCOPIC CYSTECTOMY OVARIAN (BENIGN);  Surgeon: Tonja Ferrer MD;  [facility] OR    LAPAROSCOPIC TUBAL DYE STUDY Left 11/09/2016    Procedure: LAPAROSCOPIC TUBAL DYE STUDY;  Surgeon: Tonja Ferrer MD;  [facility]    LAPAROSCOPY OPERATIVE ADULT N/A 11/09/2016    Procedure: LAPAROSCOPY OPERATIVE ADULT;  Surgeon: Tonja Ferrer MD;  [facility]    MAMMOPLASTY REDUCTION Bilateral 08/05/2021    Procedure: MAMMOPLASTY, REDUCTION, Bilateral;  Surgeon: ANTONIO Moreno MD;  Location: [facility] OR    ORTHOPEDIC SURGERY      left wrist surgery    TONSILLECTOMY & ADENOIDECTOMY Bilateral     cauterized turbinates also                 Medications:     Current Outpatient Medications   Medication    almotriptan (AXERT) 12.5 MG tablet    atomoxetine (STRATTERA) 60 MG capsule    buPROPion (WELLBUTRIN XL) 300 MG 24 hr tablet    calcium carbonate (TUMS) 500 MG chewable tablet    cholecalciferol 125 MCG (5000 UT) CAPS    doxepin (SINEQUAN) 10 MG capsule    EMGALITY 120 MG/ML injection    EPINEPHrine (ANY BX GENERIC EQUIV) 0.3 MG/0.3ML injection 2-pack    famotidine (PEPCID) 20 MG tablet    fexofenadine (ALLEGRA) 180 MG tablet    fluticasone (FLONASE) 50 MCG/ACT nasal spray    gabapentin (NEURONTIN) 400 MG capsule    guaiFENesin (MUCINEX) 600 MG 12 hr tablet    hydrocortisone (CORTAID) 1 % external ointment    hydroxychloroquine (PLAQUENIL) 200 MG tablet    levonorgestrel (MIRENA) 20 MCG/DAY IUD    metroNIDAZOLE (FLAGYL) 500 MG tablet    montelukast (SINGULAIR) 10 MG tablet    Multiple Vitamins-Minerals (MULTIVITAMIN ADULTS) TABS    ondansetron (ZOFRAN ODT) 4 MG ODT tab    phenylephrine HCl 10 MG TABS    propranolol (INDERAL) 20 MG tablet    Semaglutide, 1 MG/DOSE, 4 MG/3ML SOPN    Semaglutide, 2 MG/DOSE, (OZEMPIC) 8 MG/3ML pen    vilazodone (VIIBRYD) 40 MG TABS tablet     No current facility-administered medications for this visit.            Allergies:     Allergies   Allergen Reactions     Dust Mites Cough, Difficulty breathing and Shortness Of Breath    Cats      Chest tightness, sinus irritation            Review of Systems:   10 points ROS was obtained and highlighted in the HPI, otherwise negative.          Physical Exam:   /85 (BP Location: Right arm, Patient Position: Sitting, Cuff Size: Adult Regular)   Pulse 93   Temp 98.4  F (36.9  C) (Oral)   Wt 72.3 kg (159 lb 4.8 oz)   SpO2 99%   BMI 23.87 kg/m      Gen- well, NAD, A+Ox3, normal color  Lym- no palpable LAD  CVS- RRR  RS- CTA  Abd- soft, nontender.   Extr- hands normal, no KING  Skin- no rash or jaundice  Neuro- no asterixis  Psych- normal mood           Data:   Reviewed in person and significant for:    Lab Results   Component Value Date     11/07/2022     09/12/2020      Lab Results   Component Value Date    POTASSIUM 3.8 11/07/2022    POTASSIUM 3.7 04/27/2022    POTASSIUM 3.9 09/12/2020     Lab Results   Component Value Date    CHLORIDE 105 11/07/2022    CHLORIDE 106 04/27/2022    CHLORIDE 113 09/12/2020     Lab Results   Component Value Date    CO2 24 11/07/2022    CO2 22 04/27/2022    CO2 21 09/12/2020     Lab Results   Component Value Date    BUN 8.9 11/07/2022    BUN 9 04/27/2022    BUN 13 09/12/2020     Lab Results   Component Value Date    CR 0.83 11/07/2022    CR 0.77 09/12/2020       Lab Results   Component Value Date    WBC 8.4 07/13/2023    WBC 7.3 09/12/2020     Lab Results   Component Value Date    HGB 12.5 07/13/2023    HGB 11.6 09/12/2020     Lab Results   Component Value Date    HCT 38.3 07/13/2023    HCT 35.7 09/12/2020     Lab Results   Component Value Date    MCV 93 07/13/2023    MCV 89 09/12/2020     Lab Results   Component Value Date     07/13/2023     09/12/2020       Lab Results   Component Value Date    AST 31 11/07/2022    AST 25 09/12/2020     Lab Results   Component Value Date     11/07/2022    ALT 36 09/12/2020     No results found for: BILICONJ   Lab Results    Component Value Date    BILITOTAL 0.3 11/07/2022    BILITOTAL 0.2 09/12/2020       Lab Results   Component Value Date    ALBUMIN 4.4 11/07/2022    ALBUMIN 3.6 08/19/2022    ALBUMIN 3.3 09/12/2020     Lab Results   Component Value Date    PROTTOTAL 7.6 11/07/2022    PROTTOTAL 7.3 09/12/2020      Lab Results   Component Value Date    ALKPHOS 109 11/07/2022    ALKPHOS 70 09/12/2020       Lab Results   Component Value Date    INR 1.09 11/07/2022       EXAMINATION: US ABDOMEN LIMITED 11/29/2022 7:45 AM       CLINICAL HISTORY: Elevated LFTs     COMPARISON: CT 11/26/2022        FINDINGS:  The liver is normal in contour and slightly echogenic. The liver  measures 16.9 cm in length. No intrahepatic or extrahepatic biliary  ductal dilatation. The common bile duct measures 5 mm. The gallbladder  is surgically absent.     The visualized portions of the pancreas are normal in echogenicity.  The visualized upper abdominal aorta and inferior vena cava are  normal.       The right kidney is normal in position and echogenicity. The right  kidney measures 12.5 cm. No urinary tract dilation. Minimally complex  cyst in the upper right kidney measuring up to 1.8 cm. Scattered  echogenic foci in the right kidney.                                                                         IMPRESSION:   1. Mild hepatic steatosis. No focal liver lesion identified.  2. Nonobstructive right nephrolithiasis.

## 2023-07-15 LAB — BACTERIA UR CULT: NO GROWTH

## 2023-07-15 NOTE — TELEPHONE ENCOUNTER
MEDICAL RECORDS REQUEST   [facility]  Urology Clinic  [address] [place] [address]  PHONE: [phone]  Fax: [phone]        FUTURE VISIT INFORMATION                                                   Kasandra Conradvern : 1985 scheduled for future visit at [facility]    APPOINTMENT INFORMATION:    Date: 2023    Provider:  Lakisha Merida PA-C    Reason for Visit/Diagnosis: Recurrent UTIs    REFERRAL INFORMATION:    Referring provider:  Denys Arango APRN CNP in [facility]    RECORDS REQUESTED FOR VISIT                                                     NOTES  STATUS/DETAILS   OFFICE NOTE from referring provider  yes, 2023 -- Denys Arango APRN CNP in [facility]   MEDICATION LIST  yes   LABS     URINALYSIS (UA)  yes     PRE-VISIT CHECKLIST      Joint diagnostic appointment coordinated correctly          (ensure right order & amount of time) Yes   RECORD COLLECTION COMPLETE Yes

## 2023-07-17 ENCOUNTER — TELEPHONE (OUTPATIENT)
Dept: GASTROENTEROLOGY | Facility: CLINIC | Age: 38
End: 2023-07-17
Payer: COMMERCIAL

## 2023-07-17 NOTE — PROGRESS NOTES
"Video-Visit Details    Type of service:  Video Visit    Video Start Time: 1:19 PM  Video End Time: 1:39 PM    Originating Location (pt. Location): Home    Distant Location (provider location):  Offsite (providers home) [facility] [place]     Platform used for Video Visit: Socialscope    During this virtual visit the patient is located in [place], patient verifies this as the location during the entirety of this visit.       Weight Management Nutrition Consultation    Kasandra Lau is a 38 year old female presents today for return weight management nutrition consultation.  Patient referred by Dr. Kerri Evans on October 4, 2022.    Patient with Co-morbidities of obesity including:  Type II DM no  Renal Failure no  Sleep apnea no  Hypertension no   Dyslipidemia yes  Joint pain no  Back pain yes  GERD yes   Fatty liver yes    PMH also significant for anxiety, depression, ADHD, PTSD, rheumatoid arthritis, and IBS-C.  H/o cholcystectomy     Anthropometrics:  Estimated body mass index is 29.22 kg/m  as calculated from the following:    Height as of an earlier encounter on 10/4/22: 1.74 m (5' 8.5\").    Weight as of an earlier encounter on 10/4/22: 88.5 kg (195 lb).    Current weight:  Estimated body mass index is 22.89 kg/m  as calculated from the following:    Height as of this encounter: 1.753 m (5' 9\").    Weight as of this encounter: 70.3 kg (155 lb).    (-0 lbs on past month -40 lbs from initial)      Medications for Weight Loss:  Ozempic     NUTRITION HISTORY  Food allergies: None  Food intolerances: Too much dairy. Peppers, onions and garlic.   Supplements: Vitamin D (5000 units/day), daily multivitamin   Previous methods of diet modification for weight loss: Cutting down high-sugar (baked goods, ice cream) and high-fat foods, cutting 500 calories per day. Tried Noom, did not like it.      She reports having some nausea and cramping when starting Ozempic, but has reduced over time. " She has noticed increased reflux as well.     RD 3/7/23 - Reports experiencing hair loss in past 1-2 months. Has reduced Ozempic to 0.5 mg dose to help stableize weight. Likely hair loss r/t rate of weight loss. She has also tried adding in biotin and collagen supplements, in addition to her daily MVI and vitamins.   Appetite stronger earlier in the day.   Eating meat if getting take-out otherwise doesn't like to cook meat.     RD visit 4/13/23 - Weight stabelized. Feeling good at current weight. She is working on toning, increasing physical activity - 2 hours. Noticing some nausea after meals, noticed with Macro bar protein bars, caffeine, and high-sugar foods.    Today - Recently went up on Ozempic dose as she had noticed some cravings return. Has maintained weight. Has noticed return of GERD and constipation since going up. Working on cutting out reflux triggers - coffee, acidic. Increasing fiber intake with fruit, increasing protein intake with lean meats.     Recent Diet Recall:  Breakfast/Lunch: bigger meal - noodle bowl and side veggies; Meat and veggie frozen meal   Afterwards may have fruit, cereal, left-overs, ice cream/popcicle. Dong eating by 7 pm.    Beverages: 1.5-2 L day water     Progress Towards Previous Goals:  1) Aim for 65-90 gm protein daily. - Improving   - 20-30 gm protein = palm-size pc of lean meat/seafood, 4-5 tuna salad, 3 cheese sticks, 3/4 c serving of Greek yogurt, 3 hard boiled eggs, 1 protein bar/shake, 3 slice deli meat (or 2 slice deli meat + 1 slice cheese)   - Vital proteins collagen powder  2) Increase activity as able - Doing elliptical for 20-30 mins a couple times per week.   3) Consume 3L fluid per day - Improving  4) Gradually increase fiber intake. Increase fruit/veggie intake. Try new summer fruit/veggies recipes. - Improving    Physical Activity:  Doing weekly improve class, elliptical at apt gym and walking her dog daily.     Nutrition Prescription  Recommended  "energy/nutrient modification.    Nutrition Diagnosis  Food-nutrition knowledge deficit r/t maintenance diet aeb pt presents with interest in maintenance diet review. - improved    Nutrition Intervention  Materials/education provided on diet for weight maintenance. Discussed proteins needs for muscle synthesis as increasing strengthening exercise.   Discussed low-calorie/high-protein snack ideas.   Co-developed goals to work towards.   Provided pt with list of goals and resources below via avoxhart.    Patient demonstrates understanding.    Expected Engagement: good     Nutrition Goals  1) Plan high-protein and fiber snacks to have available.    - Lean deli meat, greek yogurt, boiled egg  2) Increase activity as able   3) Consume 3L fluid per day       Healthy Recipe Resources:  Books:    \"The Volumetrics Eating Plan\" by Lizzette Robles, Ph.D.    \"Cooking that Counts\" by editors of Compario    \"Calorie Smart Meals\" cookbook by [employer] Homes and Gardens (200-500 calorie meals)    Websites:    www.ForMune    www.Siverge Networks.myinfoQ    https://www.diabetesfoodhub.org/all-recipes.html    https://www.Anteryon.Anchor Therapeutics/    Https://www.Sellfmyplate.gov/myplatekitchen    https://snaped.OTOYs.usda.gov/recipes-menus     https://www.Myngle/gallsanta/4195185/dietitian-budget-high-protein-dinner-recipes/    https://www.Passenger Baggage Xpress.Anchor Therapeutics/recipes/84/healthy-recipes/     Https://www.youVoovio aka 3Ditizeube.com/c/TextHub     EatADENTS HTI.com       Cultural Cuisines:    https://www.ZimpleMoney.Anchor Therapeutics/recipes/73726/cuisines-regions//    https://www.Tessella.org/knowledge-center/recipes/    https://Wittlebee/recipe-index/    https://WhoAPI.Anchor Therapeutics/    Apps:    [employer] tomi (or website, mealime.com)     SpendSmartEatSmart       Follow-Up:  6 months, prn    Time spent with patient: 20 minutes.  Ana Pillai RD, LD  "

## 2023-07-17 NOTE — TELEPHONE ENCOUNTER
LVM and sent mychart to schedule fibrosis scan per Tejas Fernandes at pt's earliest convenience// 7.17.23 KET

## 2023-07-17 NOTE — TELEPHONE ENCOUNTER
[facility] Call Center    Phone Message    May a detailed message be left on voicemail: yes     Reason for Call: Other: Pt returning call from Yuan Mahmood states she has completed the fibrosis scan on 07/14. Please advise. Thank you!      Action Taken: Message routed to:  [facility] Hepatology    Travel Screening: Not Applicable

## 2023-07-18 ENCOUNTER — VIRTUAL VISIT (OUTPATIENT)
Dept: ENDOCRINOLOGY | Facility: CLINIC | Age: 38
End: 2023-07-18
Payer: COMMERCIAL

## 2023-07-18 VITALS — BODY MASS INDEX: 22.96 KG/M2 | WEIGHT: 155 LBS | HEIGHT: 69 IN

## 2023-07-18 VITALS — WEIGHT: 155 LBS | BODY MASS INDEX: 22.96 KG/M2 | HEIGHT: 69 IN

## 2023-07-18 DIAGNOSIS — Z86.39 HISTORY OF OBESITY: ICD-10-CM

## 2023-07-18 DIAGNOSIS — Z71.3 NUTRITIONAL COUNSELING: Primary | ICD-10-CM

## 2023-07-18 DIAGNOSIS — E66.3 OVERWEIGHT (BMI 25.0-29.9): Primary | ICD-10-CM

## 2023-07-18 PROCEDURE — 97803 MED NUTRITION INDIV SUBSEQ: CPT | Mod: 95 | Performed by: DIETITIAN, REGISTERED

## 2023-07-18 PROCEDURE — 99207 PR NO CHARGE LOS: CPT | Mod: 95 | Performed by: DIETITIAN, REGISTERED

## 2023-07-18 PROCEDURE — 99213 OFFICE O/P EST LOW 20 MIN: CPT | Mod: VID | Performed by: INTERNAL MEDICINE

## 2023-07-18 ASSESSMENT — PAIN SCALES - GENERAL
PAINLEVEL: NO PAIN (0)
PAINLEVEL: NO PAIN (0)

## 2023-07-18 NOTE — LETTER
"7/18/2023       RE: Kasandra Lau  [address]  [facility] [place] [address]     Dear Colleague,    Thank you for referring your patient, Kasandra Lau, to the [facility] [place] at [facility]. Please see a copy of my visit note below.    Video-Visit Details    Type of service:  Video Visit    Video Start Time: 1:19 PM  Video End Time: 1:39 PM    Originating Location (pt. Location): Home    Distant Location (provider location):  Offsite (providers home) [facility] [place]     Platform used for Video Visit: NephroGenex    During this virtual visit the patient is located in [place], patient verifies this as the location during the entirety of this visit.       Weight Management Nutrition Consultation    Kasandra Lau is a 38 year old female presents today for return weight management nutrition consultation.  Patient referred by Dr. Kerri Evans on October 4, 2022.    Patient with Co-morbidities of obesity including:  Type II DM no  Renal Failure no  Sleep apnea no  Hypertension no   Dyslipidemia yes  Joint pain no  Back pain yes  GERD yes   Fatty liver yes    PMH also significant for anxiety, depression, ADHD, PTSD, rheumatoid arthritis, and IBS-C.  H/o cholcystectomy     Anthropometrics:  Estimated body mass index is 29.22 kg/m  as calculated from the following:    Height as of an earlier encounter on 10/4/22: 1.74 m (5' 8.5\").    Weight as of an earlier encounter on 10/4/22: 88.5 kg (195 lb).    Current weight:  Estimated body mass index is 22.89 kg/m  as calculated from the following:    Height as of this encounter: 1.753 m (5' 9\").    Weight as of this encounter: 70.3 kg (155 lb).    (-0 lbs on past month -40 lbs from initial)      Medications for Weight Loss:  Ozempic     NUTRITION HISTORY  Food allergies: None  Food intolerances: Too much dairy. Peppers, onions and garlic.   Supplements: Vitamin D " (5000 units/day), daily multivitamin   Previous methods of diet modification for weight loss: Cutting down high-sugar (baked goods, ice cream) and high-fat foods, cutting 500 calories per day. Tried Noom, did not like it.      She reports having some nausea and cramping when starting Ozempic, but has reduced over time. She has noticed increased reflux as well.     RD 3/7/23 - Reports experiencing hair loss in past 1-2 months. Has reduced Ozempic to 0.5 mg dose to help stableize weight. Likely hair loss r/t rate of weight loss. She has also tried adding in biotin and collagen supplements, in addition to her daily MVI and vitamins.   Appetite stronger earlier in the day.   Eating meat if getting take-out otherwise doesn't like to cook meat.     RD visit 4/13/23 - Weight stabelized. Feeling good at current weight. She is working on toning, increasing physical activity - 2 hours. Noticing some nausea after meals, noticed with Macro bar protein bars, caffeine, and high-sugar foods.    Today - Recently went up on Ozempic dose as she had noticed some cravings return. Has maintained weight. Has noticed return of GERD and constipation since going up. Working on cutting out reflux triggers - coffee, acidic. Increasing fiber intake with fruit, increasing protein intake with lean meats.     Recent Diet Recall:  Breakfast/Lunch: bigger meal - noodle bowl and side veggies; Meat and veggie frozen meal   Afterwards may have fruit, cereal, left-overs, ice cream/popcicle. Dong eating by 7 pm.    Beverages: 1.5-2 L day water     Progress Towards Previous Goals:  1) Aim for 65-90 gm protein daily. - Improving   - 20-30 gm protein = palm-size pc of lean meat/seafood, 4-5 tuna salad, 3 cheese sticks, 3/4 c serving of Greek yogurt, 3 hard boiled eggs, 1 protein bar/shake, 3 slice deli meat (or 2 slice deli meat + 1 slice cheese)   - Vital proteins collagen powder  2) Increase activity as able - Doing elliptical for 20-30 mins a couple  "times per week.   3) Consume 3L fluid per day - Improving  4) Gradually increase fiber intake. Increase fruit/veggie intake. Try new summer fruit/veggies recipes. - Improving    Physical Activity:  Doing weekly improve class, elliptical at apt gym and walking her dog daily.     Nutrition Prescription  Recommended energy/nutrient modification.    Nutrition Diagnosis  Food-nutrition knowledge deficit r/t maintenance diet aeb pt presents with interest in maintenance diet review. - improved    Nutrition Intervention  Materials/education provided on diet for weight maintenance. Discussed proteins needs for muscle synthesis as increasing strengthening exercise.   Discussed low-calorie/high-protein snack ideas.   Co-developed goals to work towards.   Provided pt with list of goals and resources below via Enomalyhart.    Patient demonstrates understanding.    Expected Engagement: good     Nutrition Goals  1) Plan high-protein and fiber snacks to have available.    - Lean deli meat, greek yogurt, boiled egg  2) Increase activity as able   3) Consume 3L fluid per day       Healthy Recipe Resources:  Books:  \"The Volumetrics Eating Plan\" by Lizzette Robles, Ph.D.  \"Cooking that Counts\" by editors of [employer]itt [employer]  \"Calorie Smart Meals\" cookbook by Better Homes and Benjamin's Desks (200-500 calorie meals)    Websites:  www.Nanocomp Technologies  www.Enplug.org  https://www.diabetesfoodhub.org/all-recipes.html  https://www.[employer] Used Gym Equipment.Applauze/  Https://www.choosemyplate.gov/myplatekitchen  https://snaped.fns.usda.gov/recipes-menus   https://www.Samares.Applauze/gallery/5282402/dietitian-budget-high-protein-dinner-recipes/  https://www.Core Dynamicspes.com/recipes/84/healthy-recipes/   Https://www.youtube.com/c/LawBitekorina   Voltaix.com       Cultural " Cuisines:  https://www.Ember Entertainment.com/recipes/03216/cuisines-regions//  https://www.UberGrape.org/knowledge-center/recipes/  https://HireIQ Solutions/recipe-index/  https://Optrace.Kaseya/    Apps:  Book of Odds tomi (or website, mealime.com)   SpendSmartEatSmart       Follow-Up:  6 months, prn    Time spent with patient: 20 minutes.  Ana Pillai RD, LD

## 2023-07-18 NOTE — NURSING NOTE
Is the patient currently in the state of [place]? YES    Visit mode:VIDEO    If the visit is dropped, the patient can be reconnected by: VIDEO VISIT: Send to e-mail at: [email or web address]    Will anyone else be joining the visit? NO      How would you like to obtain your AVS? MyChart    Are changes needed to the allergy or medication list? NO    Reason for visit: RECHECK ([facility] Nutrition)        Shani Brito, PATTI

## 2023-07-18 NOTE — LETTER
2023       RE: Kasandra Lau  [address]  [facility] [address]     Dear Colleague,    Thank you for referring your patient, Kasandra Lau, to the [facility] [place] at . Please see a copy of my visit note below.        Return Medical Weight Management Note     Kasandra Lau  MRN:  [id number]  :  1985  MIKO:  2023    Dear JASVIR Ashley CNP,    I had the pleasure of seeing your patient Kasandra Lau. She is a 38 year old female who I am continuing to see for treatment of obesity related to:  anxiety, depression, ADHD, PTSD, rheumatoid arthritis, hyperlipidemia, GERD, fatty liver, back pain        10/3/2022    11:26 PM   --   I have the following health issues associated with obesity High Cholesterol    GERD (Reflux)    Fatty Liver   I have the following symptoms associated with obesity Depression    Back Pain    Fatigue    Irregular Menstral Cycle     Reported baseline weight 150-160 lbs. A couple of years ago, she gained 17 lbs in a month despite no change in diet and exercise. Since then she continued to gain weight in spurt and then plateau out and gained again    Was on topiramate for migraine 4237-5841 -- had brain fog and difficulty with word recall    INTERVAL HISTORY:  Last visit 2023    Started Ozempic 10/2022    Weight is down from 167 to 155 lbs over the past 4 months. Total weight loss 40 llbs    She is now doing 1.5 mg weekly (0.5 mg three times per week). She takes smaller dose at a time due to constipation. Crave more sugar lately so she plan to cut down some more sugar    Exercise -- walk a dog twice a week    CURRENT WEIGHT:   155 lbs 0 oz                      2023     9:26 AM   Changes and Difficulties   I have made the following changes to my diet since my last visit: More protein in my diet and less caffeine.   With regards to my diet, I am still struggling  "with: Adding more vegetables to my diet.   I have made the following changes to my activity/exercise since my last visit: Started to use the elliptical more.   With regards to my activity/exercise, I am still struggling with: Exercises that get my heartrate up.       VITALS:  Ht 1.753 m (5' 9\")   Wt 70.3 kg (155 lb)   BMI 22.89 kg/m      MEDICATIONS:   Current Outpatient Medications   Medication Sig Dispense Refill    almotriptan (AXERT) 12.5 MG tablet Take 1 tablet (12.5 mg) by mouth at onset of headache for migraine (repeat in 2 hours if needed) May repeat in 2 hours. Max 2 tablets/24 hours. (Patient not taking: Reported on 7/14/2023) 18 tablet 11    atomoxetine (STRATTERA) 60 MG capsule Take 1 capsule (60 mg) by mouth daily 90 capsule 0    buPROPion (WELLBUTRIN XL) 300 MG 24 hr tablet Take 1 tablet (300 mg) by mouth every morning 90 tablet 1    calcium carbonate (TUMS) 500 MG chewable tablet Take 1 tablet (500 mg) by mouth 3 times daily As needed for acid reflux 90 tablet 11    cholecalciferol 125 MCG (5000 UT) CAPS Take 5,000 Units by mouth every evening      doxepin (SINEQUAN) 10 MG capsule Take 2 capsules (20 mg) by mouth 2 times daily 120 capsule 11    EMGALITY 120 MG/ML injection Inject 1 mL (120 mg) Subcutaneous every 28 days 1 mL 11    EPINEPHrine (ANY BX GENERIC EQUIV) 0.3 MG/0.3ML injection 2-pack Inject 0.3 mg into the muscle as needed      famotidine (PEPCID) 20 MG tablet Take 1 tablet (20 mg) by mouth 2 times daily 180 tablet 3    fexofenadine (ALLEGRA) 180 MG tablet Take 1 tablet (180 mg) by mouth every morning 90 tablet 3    fluticasone (FLONASE) 50 MCG/ACT nasal spray 2 times daily as needed       gabapentin (NEURONTIN) 400 MG capsule Take 2 capsules (800 mg) by mouth 3 times daily 180 capsule 5    guaiFENesin (MUCINEX) 600 MG 12 hr tablet Take 600 mg by mouth 2 times daily      hydrocortisone (CORTAID) 1 % external ointment Apply sparingly to affected area three times daily for 14 days. 30 g 0 "    hydroxychloroquine (PLAQUENIL) 200 MG tablet Take 2 tablets (400 mg) by mouth daily 180 tablet 2    levonorgestrel (MIRENA) 20 MCG/DAY IUD 1 each (20 mcg) by Intrauterine route once for 1 dose 1 each 0    metroNIDAZOLE (FLAGYL) 500 MG tablet Take 1 tablet (500 mg) by mouth 2 times daily 14 tablet 0    montelukast (SINGULAIR) 10 MG tablet Take 1 tablet (10 mg) by mouth At Bedtime 90 tablet 0    Multiple Vitamins-Minerals (MULTIVITAMIN ADULTS) TABS Take 1 tablet by mouth daily      ondansetron (ZOFRAN ODT) 4 MG ODT tab Take 1 tablet (4 mg) by mouth every 8 hours as needed for nausea 18 tablet 6    phenylephrine HCl 10 MG TABS Take 10 mg by mouth 2 times daily      propranolol (INDERAL) 20 MG tablet Take 1 tablet (20 mg) by mouth daily as needed (anxiety) 30 tablet 11    Semaglutide, 1 MG/DOSE, 4 MG/3ML SOPN Inject 1 mg Subcutaneous once a week To be started after finishing 0.5 mg weekly 9 mL 3    Semaglutide, 2 MG/DOSE, (OZEMPIC) 8 MG/3ML pen Inject 2 mg Subcutaneous every 7 days 9 mL 3    vilazodone (VIIBRYD) 40 MG TABS tablet Take 1 tablet (40 mg) by mouth every morning 90 tablet 1           7/18/2023     9:26 AM   Weight Loss Medication History Reviewed With Patient   Which weight loss medications are you currently taking on a regular basis? Ozempic   If you are not taking a weight loss medication that was prescribed to you, please indicate why: Other   Are you having any side effects from the weight loss medication that we have prescribed you? Yes   If you are having side effects please describe: Constipation mostly       ASSESSMENT:   Kasandra Lau is a 38 year old female who I am continuing to see for treatment of obesity related to:  anxiety, depression, ADHD, PTSD, rheumatoid arthritis, hyperlipidemia, GERD, fatty liver, back pain    Responded well to Ozempic  Has constipation -- using stool softener  Still busy and is not regular with exercise    PLAN:   - continue Ozempic 1.5 mg weekly (she can take 0.5 mg  thrice a week) then increase to 2.0 mg weekly  - increase water intake  - continue healthy small meal  - increase exercise level    FOLLOW-UP:    See Lauren Bloch, PharmD in 2 month(s)  See Dr.Tasma VALADEZ in 4-6 month(s)    Joined the call at 7/18/2023, 10:05:42 am.  Left the call at 7/18/2023, 10:12:48 am.  You were on the call for 7 minutes 5 seconds .    External notes/medical records independently reviewed, labs and imaging independently reviewed, medical management and tests to be discussed/communicated to patient.    Time: I spent 23 minutes spent on the date of the encounter preparing to see patient (including chart review and preparation), obtaining and or reviewing additional medical history, performing a physical exam and evaluation, documenting clinical information in the electronic health record, independently interpreting results, communicating results to the patient and coordinating care.    Sincerely,    Kerri Evans MD

## 2023-07-18 NOTE — PROGRESS NOTES
Virtual Visit Details    Type of service:  Video Visit     Originating Location (pt. Location): Home    Distant Location (provider location):  Off-site  Platform used for Video Visit: Chad

## 2023-07-18 NOTE — TELEPHONE ENCOUNTER
Records Requested     July 19, 2023 2:34 PM   [address] MMB   Facility  [facility], [place], [place].   Outcome Faxed request to [phone]    EzLike Tracking #: [id number]     DIAGNOSIS: Bilateral hand pain/ self referral/ [employer]/ ortho con   Action 07/18/23  4:00 PM - MMB   Action Taken  Left patient a voicemail attempting to get an JARON in order to obtain her records from [facility] in [place]. Would also like more information on her 2014 surgery in [place].    [facility], [place] medical records phone: [phone]  [facility], [place] medical records FAX: [phone]     Action July 22, 2023 JTV 11:06 AM    Action Taken Image sin PACS.     APPOINTMENT DATE: 07/19/23   NOTES STATUS DETAILS   OFFICE NOTE from referring provider Care Everywhere 04/01/21- [facility] Services:  - Ramone Rogers PA-C   OFFICE NOTE from other specialist Care Everywhere/ Received and scanned 07/18/23- Received Clinical Summary from [facility];  - History of a left distal radius fracture with an ORIF and volar locking plate placed in 2014.     MEDICATION LIST Care Everywhere    XRAYS (IMAGES & REPORTS)  [facility]:  - 04/01/21 - XR L Wrist - Ramone Rogers PA-C

## 2023-07-18 NOTE — PATIENT INSTRUCTIONS
"Pritesh Slaughter,    Follow-up with RD in 6 months, or as needed.     Thank you,    Ana Pillai, TIMBO, LD  If you would like to schedule or reschedule an appointment with the RD, please call [phone]    Nutrition Goals  1) Plan high-protein and fiber snacks to have available.    - Lean deli meat, greek yogurt, boiled egg  2) Increase activity as able   3) Consume 3L fluid per day       Healthy Recipe Resources:  Books:  \"The Volumetrics Eating Plan\" by Lizzette Robles, Ph.D.  \"Cooking that Counts\" by editors of [employer]  \"Calorie Smart Meals\" cookbook by [employer] Gardens (200-500 calorie meals)    Websites:  www.Deep Nines  www.[employer].Joongel  https://www.diabetesfoodhub.org/all-recipes.html  https://www.[employer]obi [employer].Bon'App/  Https://www.Armasightplate.gov/myplatekitchen  https://snaped.Zestys.usda.gov/recipes-menus   https://www.iGuiders/gallery/7274039/dietitian-budget-high-protein-dinner-recipes/  https://www.Lasso Logic/recipes/84/healthy-recipes/   Https://www.Vantia [employer]rapeutics.com/c/Aveillant   EatOndeego.com       Cultural Cuisines:  https://www.iGuiders/recipes/31662/cuisines-regions//  https://www.Visante.org/knowledge-center/recipes/  https://Concept.io/recipe-index/  https://Infectious.Bon'App/    Apps:  Jobs The Word tomi (or website, mealime.com)   [facility]         COMPREHENSIVE WEIGHT MANAGEMENT PROGRAM  VIRTUAL SUPPORT GROUPS    For Support Group Information:      We offer support groups for patients who are working on weight loss and considering, preparing for or have had weight loss surgery.   There is no cost for this opportunity.  You are invited to attend the?Virtual Support Groups?provided by any of the following locations:    [facility] via Ho[place] Pokey Teams with Frannie Scales RN  2.   [place] via Hokey Pokey Teams with Errol Boss, PhD, LP  3.   [place] via Hokey Pokey Teams with Marilu Wild RN  4.   [facility] via Hokey Pokey Teams with Marilu" "[facility]    The following Support Group information can also be found on our website:  https://www.[facility].org/treatments/weight-loss-surgery-support-groups    https://www.[facility].org/treatments/weight-loss-and-weight-loss-surgery-support-groups    [facility] Weight Loss Surgery Support Group    [facility] Weight Loss Surgery Support Group  The support group is a patient-lead forum that meets monthly to share experiences, encouragement and education. It is open to those who have had weight loss surgery, are scheduled for surgery, or are considering surgery.   WHEN: This group meets on the 3rd Wednesday of each month from 5:00PM - 6:00PM virtually using Microsoft Teams.   FACILITATOR: Led by Frannie Bowman RD, REAL, RN, the program's Clinical Coordinator.   TO REGISTER: Please contact the clinic via UTILICASE or call the nurse line directly at [phone] to inform our staff that you would like an invite sent to you and to let us know the email you would like the invite sent to. Prior to the meeting, a link with directions on how to join the meeting will be sent to you.    2023 Meetings   April 19: Guest Speaker, Dennys Rawls, TIMBO, LD, \"Maintaining Weight Loss after Bariatric Surgery\".  May 17: \"Let's Talk\" a time for the group to share.  June 21: \"Let's Talk\" a time for the group to share.  July 19  August 16  September 20  October 18  November 15  December 20    [facility] Support Groups    [facility] Bariatric Care Support Group?  This is open to all pre- and post- operative bariatric surgery patients as well as their support system.   WHEN: This group meets the 2nd Tuesday of each month from 6:30 PM - 8:00 PM virtually using Microsoft Teams.   FACILITATOR: Led by Errol Boss, Ph.D who is a Licensed Psychologist with the [facility] Comprehensive Weight Management Program.   TO REGISTER: Please send an email to Errol" "Gera, Ph.D., LP at?[email or web address]?if you would like an invitation to the group and to learn about using Microsoft Teams.    2023 Meetings April 11: Guest speaker, Grace Infante MD, Bariatrician, [facility] Comprehensive Weight Management Program, \"Injectable Weight Loss Medications\".  May 9  Josiane 13  July 11  August 8  September 12  October 10  November 14  December 12    [facility] Post-Operative Bariatric Surgery Support Group  This is a support group for [facility] bariatric patients (and those external to [facility]) who have had bariatric surgery and are at least 3 months post-surgery.  WHEN: This support group meets the 4th Wednesday of the month from 11:00 AM - 12:00 PM virtually using Microsoft Teams.   FACILITATOR: Led by Certified Bariatric Nurse, Marilu Wild RN.   TO REGISTER: Please send an email to Marilu at [email or web address] if you would like an invitation to the group and to learn about using Microsoft Teams.    2023 Meetings  April 26  May 24  Josiane 28  July 26  August 23  September 27  October 25  November 22  December 27    [facility]   Healthy Lifestyle Virtual Support Group    Healthy Lifestyle Virtual Support Group?  This is 60 minutes of small group guided discussion, support and resources. All are welcome who want a healthy lifestyle.  WHEN: This group meets monthly on a Friday from 12:30 PM - 1:30 PM virtually using Microsoft Teams.  This group will meet the first Friday of the month beginning In July  FACILITATOR: Led by National Board Certified Health and , Marilu Leonardo [facility].   TO REGISTER: Please send an email to Marilu [email or web address] to receive monthly invites to the group or if you have any questions about having a health .  Prior to the meeting, a link with directions on how to join the meeting will be sent to you.    2023 Meetings  May 19: Let's Talk  June 9: Create Your " Coaching Toolkit: Learn How to  Yourself  July 7: Let's Talk  August 4: Benefits of Fiber with TIMBO Bishop  September 1: Show and Tell (share your aps, podcasts, recipes, hacks, books)  October 6 :Let's Talk  November 3: Introduction to Mindfulness   December 1: Let's Talk

## 2023-07-18 NOTE — NURSING NOTE
Is the patient currently in the state of [place]? YES    Visit mode:VIDEO    If the visit is dropped, the patient can be reconnected by: VIDEO VISIT: Send to e-mail at: [email or web address]    Will anyone else be joining the visit? NO      How would you like to obtain your AVS? MyChart    Are changes needed to the allergy or medication list? NO    Patient declined individual allergy and medication review by support staff because no changes since 7/14 review.    Molly ARMSTRONG    Reason for visit: RECHECK

## 2023-07-18 NOTE — PROGRESS NOTES
"Return Medical Weight Management Note     Kasandra Lau  MRN:  [id number]  :  1985  MIKO:  2023    Dear JASVIR Ashley CNP,    I had the pleasure of seeing your patient Kasandra Lau. She is a 38 year old female who I am continuing to see for treatment of obesity related to:  anxiety, depression, ADHD, PTSD, rheumatoid arthritis, hyperlipidemia, GERD, fatty liver, back pain        10/3/2022    11:26 PM   --   I have the following health issues associated with obesity High Cholesterol    GERD (Reflux)    Fatty Liver   I have the following symptoms associated with obesity Depression    Back Pain    Fatigue    Irregular Menstral Cycle     Reported baseline weight 150-160 lbs. A couple of years ago, she gained 17 lbs in a month despite no change in diet and exercise. Since then she continued to gain weight in spurt and then plateau out and gained again    Was on topiramate for migraine 6180-7198 -- had brain fog and difficulty with word recall    INTERVAL HISTORY:  Last visit 2023    Started Ozempic 10/2022    Weight is down from 167 to 155 lbs over the past 4 months. Total weight loss 40 llbs    She is now doing 1.5 mg weekly (0.5 mg three times per week). She takes smaller dose at a time due to constipation. Crave more sugar lately so she plan to cut down some more sugar    Exercise -- walk a dog twice a week    CURRENT WEIGHT:   155 lbs 0 oz                      2023     9:26 AM   Changes and Difficulties   I have made the following changes to my diet since my last visit: More protein in my diet and less caffeine.   With regards to my diet, I am still struggling with: Adding more vegetables to my diet.   I have made the following changes to my activity/exercise since my last visit: Started to use the elliptical more.   With regards to my activity/exercise, I am still struggling with: Exercises that get my heartrate up.       VITALS:  Ht 1.753 m (5' 9\")   Wt 70.3 kg (155 lb)   BMI " 22.89 kg/m      MEDICATIONS:   Current Outpatient Medications   Medication Sig Dispense Refill     almotriptan (AXERT) 12.5 MG tablet Take 1 tablet (12.5 mg) by mouth at onset of headache for migraine (repeat in 2 hours if needed) May repeat in 2 hours. Max 2 tablets/24 hours. (Patient not taking: Reported on 7/14/2023) 18 tablet 11     atomoxetine (STRATTERA) 60 MG capsule Take 1 capsule (60 mg) by mouth daily 90 capsule 0     buPROPion (WELLBUTRIN XL) 300 MG 24 hr tablet Take 1 tablet (300 mg) by mouth every morning 90 tablet 1     calcium carbonate (TUMS) 500 MG chewable tablet Take 1 tablet (500 mg) by mouth 3 times daily As needed for acid reflux 90 tablet 11     cholecalciferol 125 MCG (5000 UT) CAPS Take 5,000 Units by mouth every evening       doxepin (SINEQUAN) 10 MG capsule Take 2 capsules (20 mg) by mouth 2 times daily 120 capsule 11     EMGALITY 120 MG/ML injection Inject 1 mL (120 mg) Subcutaneous every 28 days 1 mL 11     EPINEPHrine (ANY BX GENERIC EQUIV) 0.3 MG/0.3ML injection 2-pack Inject 0.3 mg into the muscle as needed       famotidine (PEPCID) 20 MG tablet Take 1 tablet (20 mg) by mouth 2 times daily 180 tablet 3     fexofenadine (ALLEGRA) 180 MG tablet Take 1 tablet (180 mg) by mouth every morning 90 tablet 3     fluticasone (FLONASE) 50 MCG/ACT nasal spray 2 times daily as needed        gabapentin (NEURONTIN) 400 MG capsule Take 2 capsules (800 mg) by mouth 3 times daily 180 capsule 5     guaiFENesin (MUCINEX) 600 MG 12 hr tablet Take 600 mg by mouth 2 times daily       hydrocortisone (CORTAID) 1 % external ointment Apply sparingly to affected area three times daily for 14 days. 30 g 0     hydroxychloroquine (PLAQUENIL) 200 MG tablet Take 2 tablets (400 mg) by mouth daily 180 tablet 2     levonorgestrel (MIRENA) 20 MCG/DAY IUD 1 each (20 mcg) by Intrauterine route once for 1 dose 1 each 0     metroNIDAZOLE (FLAGYL) 500 MG tablet Take 1 tablet (500 mg) by mouth 2 times daily 14 tablet 0      montelukast (SINGULAIR) 10 MG tablet Take 1 tablet (10 mg) by mouth At Bedtime 90 tablet 0     Multiple Vitamins-Minerals (MULTIVITAMIN ADULTS) TABS Take 1 tablet by mouth daily       ondansetron (ZOFRAN ODT) 4 MG ODT tab Take 1 tablet (4 mg) by mouth every 8 hours as needed for nausea 18 tablet 6     phenylephrine HCl 10 MG TABS Take 10 mg by mouth 2 times daily       propranolol (INDERAL) 20 MG tablet Take 1 tablet (20 mg) by mouth daily as needed (anxiety) 30 tablet 11     Semaglutide, 1 MG/DOSE, 4 MG/3ML SOPN Inject 1 mg Subcutaneous once a week To be started after finishing 0.5 mg weekly 9 mL 3     Semaglutide, 2 MG/DOSE, (OZEMPIC) 8 MG/3ML pen Inject 2 mg Subcutaneous every 7 days 9 mL 3     vilazodone (VIIBRYD) 40 MG TABS tablet Take 1 tablet (40 mg) by mouth every morning 90 tablet 1           7/18/2023     9:26 AM   Weight Loss Medication History Reviewed With Patient   Which weight loss medications are you currently taking on a regular basis? Ozempic   If you are not taking a weight loss medication that was prescribed to you, please indicate why: Other   Are you having any side effects from the weight loss medication that we have prescribed you? Yes   If you are having side effects please describe: Constipation mostly       ASSESSMENT:   Kasandra Lau is a 38 year old female who I am continuing to see for treatment of obesity related to:  anxiety, depression, ADHD, PTSD, rheumatoid arthritis, hyperlipidemia, GERD, fatty liver, back pain    Responded well to Ozempic  Has constipation -- using stool softener  Still busy and is not regular with exercise    PLAN:   - continue Ozempic 1.5 mg weekly (she can take 0.5 mg thrice a week) then increase to 2.0 mg weekly  - increase water intake  - continue healthy small meal  - increase exercise level    FOLLOW-UP:    See Lauren Bloch, PharmD in 2 month(s)  See Dr.Tasma VALADEZ in 4-6 month(s)    Joined the call at 7/18/2023, 10:05:42 am.  Left the call at 7/18/2023,  10:12:48 am.  You were on the call for 7 minutes 5 seconds .    External notes/medical records independently reviewed, labs and imaging independently reviewed, medical management and tests to be discussed/communicated to patient.    Time: I spent 23 minutes spent on the date of the encounter preparing to see patient (including chart review and preparation), obtaining and or reviewing additional medical history, performing a physical exam and evaluation, documenting clinical information in the electronic health record, independently interpreting results, communicating results to the patient and coordinating care.    Sincerely,    Kerri Evans MD

## 2023-07-18 NOTE — PATIENT INSTRUCTIONS
Take Ozempic 2.0 mg weekly (gradually titrating)    See Lauren Bloch, PharmD in 2 month(s)  See Dr.Tasma VALADEZ in 4-6 month(s)    If you have any questions, please do not hesitate to call Weight management clinic at [phone] or [phone].  If you need to fax, please fax to [phone].    Sincerely,    Kerri Evans MD  Endocrinology

## 2023-07-19 ENCOUNTER — PRE VISIT (OUTPATIENT)
Dept: ORTHOPEDICS | Facility: CLINIC | Age: 38
End: 2023-07-19

## 2023-07-19 ENCOUNTER — OFFICE VISIT (OUTPATIENT)
Dept: ORTHOPEDICS | Facility: CLINIC | Age: 38
End: 2023-07-19
Payer: COMMERCIAL

## 2023-07-19 DIAGNOSIS — G56.03 CARPAL TUNNEL SYNDROME ON BOTH SIDES: Primary | ICD-10-CM

## 2023-07-19 LAB
ALBUMIN UR-MCNC: NEGATIVE MG/DL
APPEARANCE UR: CLEAR
BILIRUB UR QL STRIP: NEGATIVE
COLOR UR AUTO: YELLOW
GLUCOSE UR STRIP-MCNC: NEGATIVE MG/DL
HGB UR QL STRIP: ABNORMAL
KETONES UR STRIP-MCNC: NEGATIVE MG/DL
LEUKOCYTE ESTERASE UR QL STRIP: NEGATIVE
NITRATE UR QL: NEGATIVE
PH UR STRIP: 7 [PH] (ref 5–7)
SP GR UR STRIP: 1.01 (ref 1–1.03)
UROBILINOGEN UR STRIP-ACNC: 0.2 E.U./DL

## 2023-07-19 PROCEDURE — 99203 OFFICE O/P NEW LOW 30 MIN: CPT | Performed by: FAMILY MEDICINE

## 2023-07-19 NOTE — LETTER
"  7/19/2023      RE: Kasandra Lau  [address]  [facility] [address]     Dear Colleague,    Thank you for referring your patient, Ksaandra Lau, to the [facility] [place]. Please see a copy of my visit note below.    Sports Medicine Clinic Visit    PCP: Michelle Aguirre    Kasandra Lau is a 38 year old female who is seen  in consultation at the request of Dr. Cheryl castro. provider found presenting with bilateral hand pain    Numbness and tingling in the bilateral hands for 2 years.  Patient has tried night splints for carpal tunnel syndrome.  Patient indicates that she has used the night splints recurrently over the last year, and consistently in the last month, without great improvements in her symptoms.  Tingling involving the bilateral thumbs primarily.  Aching discomfort into the hands.     Patient indicates that an EMG in [place] 2 years ago indicated signs of carpal tunnel syndrome.  Patient indicates that the EMG showed \"moderate carpal tunnel on 1 arm, mild on the other arm\".  She was encouraged to try splints and some physical therapy.    Injury: No injury    Location of Pain: bilateral hands  Duration of Pain: 2 year(s)  Rating of Pain: 3/10  Pain is better with: Bracing  Pain is worse with: Gripping  Additional Features: numbness, tingling, weakness  Treatment so far consists of: bracing  Prior History of related problems: Previous wrist fx 10+ years ago    There were no vitals taken for this visit.      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  At that time the rheumatologist had suggested night splints for carpal tunnel syndrome symptoms of the bilateral hands.    PMH also significant for anxiety, depression, ADHD, PTSD,  and IBS.  H/o cholcystectomy     Normal TSH and vitamin B12 and folate 4/25/2023    Exercise: elliptical at " gym and walking her dog daily.     Left wrist xray 4/1/2021:  INDICATION: Left wrist pain      COMPARISON: None.      TECHNIQUE:4 views      FINDINGS:      There is an old fracture of the left distal radius with volar plate and screw fixation. No acute fracture identified. There is probably an old healed ulnar styloid avulsion. No definitive acute fracture.          PMH:  Past Medical History:   Diagnosis Date     Anemia fall 2016     Anxiety      Arthritis      Chronic fatigue      Depression (emotion)     sees psych, on meds     Gastroesophageal reflux disease      Migraine     daily meds, 2x month, more mild on meds     Neuropathic pain      Panic disorder      Uncomplicated asthma Spring 2018       Active problem list:  Patient Active Problem List   Diagnosis     Pelvic pain in female     Vitamin D deficiency     Menorrhagia with regular cycle     Migraine without aura and without status migrainosus, not intractable     Iron deficiency anemia due to chronic blood loss     Tired     Circadian rhythm sleep disorder, delayed sleep phase type     Unhealthy sleep habit     Seasonal mood disorder (H)     Chronic idiopathic urticaria     Acid reflux     Cholecystitis     Depression     Hx of abnormal cervical Pap smear     Irritable bowel syndrome with constipation     Migraine headache     Mild intermittent asthma without complication     Need for desensitization to allergens     Panic disorder     Pap smear abnormality of cervix/human papillomavirus (HPV) positive     Recurrent major depressive disorder, in remission (H)     Hypertrophy of breast     Chronic sinusitis, unspecified location     Keratosis pilaris     Endometriosis     Acute pharyngitis due to other specified organisms     Chronic pain of both shoulders     Alopecia       FH:  Family History   Problem Relation Age of Onset     Diabetes Maternal Grandfather      Hypertension No family hx of      Coronary Artery Disease No family hx of      Hyperlipidemia  No family hx of      Cerebrovascular Disease No family hx of      Breast Cancer No family hx of      Colon Cancer No family hx of      Prostate Cancer No family hx of      Other Cancer No family hx of      Glaucoma No family hx of      Macular Degeneration No family hx of        SH:  Social History     Socioeconomic History     Marital status: Single     Spouse name: Not on file     Number of children: Not on file     Years of education: Not on file     Highest education level: Not on file   Occupational History     Not on file   Tobacco Use     Smoking status: Former     Packs/day: 0.00     Years: 10.00     Pack years: 0.00     Types: Cigarettes     Smokeless tobacco: Never     Tobacco comments:     smokes occasionally socially    Vaping Use     Vaping Use: Never used   Substance and Sexual Activity     Alcohol use: Not Currently     Alcohol/week: 0.0 standard drinks of alcohol     Comment: occasional      Drug use: Not Currently     Types: Marijuana     Comment: marijuana  none since May 2021     Sexual activity: Yes     Partners: Male     Birth control/protection: I.U.D.     Comment: Nuvaring   Other Topics Concern     Not on file   Social History Narrative    Grad student in geography     Social Determinants of Health     Financial Resource Strain: Not on file   Food Insecurity: Not on file   Transportation Needs: Not on file   Physical Activity: Not on file   Stress: Not on file   Social Connections: Not on file   Intimate Partner Violence: Not on file   Housing Stability: Not on file       MEDS:  See EMR, reviewed  ALL:  See EMR, reviewed    REVIEW OF SYSTEMS:  CONSTITUTIONAL:NEGATIVE for fever, chills, change in weight  INTEGUMENTARY/SKIN: NEGATIVE for worrisome rashes, moles or lesions  EYES: NEGATIVE for vision changes or irritation  ENT/MOUTH: NEGATIVE for ear, mouth and throat problems  RESP:NEGATIVE for significant cough or SOB  BREAST: NEGATIVE for masses, tenderness or discharge  CV: NEGATIVE for chest  pain, palpitations or peripheral edema  GI: NEGATIVE for nausea, abdominal pain, heartburn, or change in bowel habits  :NEGATIVE for frequency, dysuria, or hematuria  :NEGATIVE for frequency, dysuria, or hematuria  NEURO: NEGATIVE for weakness, dizziness or paresthesias  ENDOCRINE: NEGATIVE for temperature intolerance, skin/hair changes  HEME/ALLERGY/IMMUNE: NEGATIVE for bleeding problems  PSYCHIATRIC: NEGATIVE for changes in mood or affect        Objective: There is no thenar or hypothenar atrophy with either hand.  She has normal thumb strength bilaterally to thumb AB duction and to opponens and cocking of the thumbs.  Grasp strength is strong.  Phalen sign is positive bilaterally reproducing tingling into the fingers.  Tinel's sign is negative bilaterally.  Scar on the volar aspect of the left wrist from her previous ORIF.  Appropriate conversation and affect.      Assessment: Bilateral carpal tunnel symptoms, recurrent x2 years, despite use of night splints.  History of left wrist ORIF.    Plan: EMG of the bilateral upper extremities is pending.  Phone call follow-up after the EMG to go over results.  We discussed that if this confirms carpal tunnel syndrome that it would be reasonable to follow through with a hand surgeon for surgical options.  The basics of carpal tunnel surgery were discussed.  Follow-up phone call after EMG.        Again, thank you for allowing me to participate in the care of your patient.      Sincerely,    Vishal Perez MD

## 2023-07-19 NOTE — PROGRESS NOTES
"Sports Medicine Clinic Visit    PCP: Michelle Aguirre    Kasandra Lau is a 38 year old female who is seen  in consultation at the request of Dr. Cheryl castro. provider found presenting with bilateral hand pain    Numbness and tingling in the bilateral hands for 2 years.  Patient has tried night splints for carpal tunnel syndrome.  Patient indicates that she has used the night splints recurrently over the last year, and consistently in the last month, without great improvements in her symptoms.  Tingling involving the bilateral thumbs primarily.  Aching discomfort into the hands.     Patient indicates that an EMG in [place] 2 years ago indicated signs of carpal tunnel syndrome.  Patient indicates that the EMG showed \"moderate carpal tunnel on 1 arm, mild on the other arm\".  She was encouraged to try splints and some physical therapy.    Injury: No injury    Location of Pain: bilateral hands  Duration of Pain: 2 year(s)  Rating of Pain: 3/10  Pain is better with: Bracing  Pain is worse with: Gripping  Additional Features: numbness, tingling, weakness  Treatment so far consists of: bracing  Prior History of related problems: Previous wrist fx 10+ years ago    There were no vitals taken for this visit.      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  At that time the rheumatologist had suggested night splints for carpal tunnel syndrome symptoms of the bilateral hands.    PMH also significant for anxiety, depression, ADHD, PTSD,  and IBS.  H/o cholcystectomy     Normal TSH and vitamin B12 and folate 4/25/2023    Exercise: elliptical at gym and walking her dog daily.     Left wrist xray 4/1/2021:  INDICATION: Left wrist pain      COMPARISON: None.      TECHNIQUE:4 views      FINDINGS:      There is an old fracture of the left distal radius with volar plate and screw fixation. No acute fracture " identified. There is probably an old healed ulnar styloid avulsion. No definitive acute fracture.          PMH:  Past Medical History:   Diagnosis Date     Anemia fall 2016     Anxiety      Arthritis      Chronic fatigue      Depression (emotion)     sees psych, on meds     Gastroesophageal reflux disease      Migraine     daily meds, 2x month, more mild on meds     Neuropathic pain      Panic disorder      Uncomplicated asthma Spring 2018       Active problem list:  Patient Active Problem List   Diagnosis     Pelvic pain in female     Vitamin D deficiency     Menorrhagia with regular cycle     Migraine without aura and without status migrainosus, not intractable     Iron deficiency anemia due to chronic blood loss     Tired     Circadian rhythm sleep disorder, delayed sleep phase type     Unhealthy sleep habit     Seasonal mood disorder (H)     Chronic idiopathic urticaria     Acid reflux     Cholecystitis     Depression     Hx of abnormal cervical Pap smear     Irritable bowel syndrome with constipation     Migraine headache     Mild intermittent asthma without complication     Need for desensitization to allergens     Panic disorder     Pap smear abnormality of cervix/human papillomavirus (HPV) positive     Recurrent major depressive disorder, in remission (H)     Hypertrophy of breast     Chronic sinusitis, unspecified location     Keratosis pilaris     Endometriosis     Acute pharyngitis due to other specified organisms     Chronic pain of both shoulders     Alopecia       FH:  Family History   Problem Relation Age of Onset     Diabetes Maternal Grandfather      Hypertension No family hx of      Coronary Artery Disease No family hx of      Hyperlipidemia No family hx of      Cerebrovascular Disease No family hx of      Breast Cancer No family hx of      Colon Cancer No family hx of      Prostate Cancer No family hx of      Other Cancer No family hx of      Glaucoma No family hx of      Macular Degeneration No  family hx of        SH:  Social History     Socioeconomic History     Marital status: Single     Spouse name: Not on file     Number of children: Not on file     Years of education: Not on file     Highest education level: Not on file   Occupational History     Not on file   Tobacco Use     Smoking status: Former     Packs/day: 0.00     Years: 10.00     Pack years: 0.00     Types: Cigarettes     Smokeless tobacco: Never     Tobacco comments:     smokes occasionally socially    Vaping Use     Vaping Use: Never used   Substance and Sexual Activity     Alcohol use: Not Currently     Alcohol/week: 0.0 standard drinks of alcohol     Comment: occasional      Drug use: Not Currently     Types: Marijuana     Comment: marijuana  none since May 2021     Sexual activity: Yes     Partners: Male     Birth control/protection: I.U.D.     Comment: Nuvaring   Other Topics Concern     Not on file   Social History Narrative    Grad student in geography     Social Determinants of Health     Financial Resource Strain: Not on file   Food Insecurity: Not on file   Transportation Needs: Not on file   Physical Activity: Not on file   Stress: Not on file   Social Connections: Not on file   Intimate Partner Violence: Not on file   Housing Stability: Not on file       MEDS:  See EMR, reviewed  ALL:  See EMR, reviewed    REVIEW OF SYSTEMS:  CONSTITUTIONAL:NEGATIVE for fever, chills, change in weight  INTEGUMENTARY/SKIN: NEGATIVE for worrisome rashes, moles or lesions  EYES: NEGATIVE for vision changes or irritation  ENT/MOUTH: NEGATIVE for ear, mouth and throat problems  RESP:NEGATIVE for significant cough or SOB  BREAST: NEGATIVE for masses, tenderness or discharge  CV: NEGATIVE for chest pain, palpitations or peripheral edema  GI: NEGATIVE for nausea, abdominal pain, heartburn, or change in bowel habits  :NEGATIVE for frequency, dysuria, or hematuria  :NEGATIVE for frequency, dysuria, or hematuria  NEURO: NEGATIVE for weakness, dizziness  or paresthesias  ENDOCRINE: NEGATIVE for temperature intolerance, skin/hair changes  HEME/ALLERGY/IMMUNE: NEGATIVE for bleeding problems  PSYCHIATRIC: NEGATIVE for changes in mood or affect        Objective: There is no thenar or hypothenar atrophy with either hand.  She has normal thumb strength bilaterally to thumb AB duction and to opponens and cocking of the thumbs.  Grasp strength is strong.  Phalen sign is positive bilaterally reproducing tingling into the fingers.  Tinel's sign is negative bilaterally.  Scar on the volar aspect of the left wrist from her previous ORIF.  Appropriate conversation and affect.      Assessment: Bilateral carpal tunnel symptoms, recurrent x2 years, despite use of night splints.  History of left wrist ORIF.    Plan: EMG of the bilateral upper extremities is pending.  Phone call follow-up after the EMG to go over results.  We discussed that if this confirms carpal tunnel syndrome that it would be reasonable to follow through with a hand surgeon for surgical options.  The basics of carpal tunnel surgery were discussed.  Follow-up phone call after EMG.

## 2023-07-20 ENCOUNTER — VIRTUAL VISIT (OUTPATIENT)
Dept: PSYCHOLOGY | Facility: CLINIC | Age: 38
End: 2023-07-20
Payer: COMMERCIAL

## 2023-07-20 ENCOUNTER — TELEPHONE (OUTPATIENT)
Dept: ENDOCRINOLOGY | Facility: CLINIC | Age: 38
End: 2023-07-20
Payer: COMMERCIAL

## 2023-07-20 DIAGNOSIS — F43.12 CHRONIC POST-TRAUMATIC STRESS DISORDER (PTSD): ICD-10-CM

## 2023-07-20 DIAGNOSIS — F34.1 PERSISTENT DEPRESSIVE DISORDER: Primary | ICD-10-CM

## 2023-07-20 PROCEDURE — 90837 PSYTX W PT 60 MINUTES: CPT | Mod: 95 | Performed by: PSYCHOLOGIST

## 2023-07-20 NOTE — TELEPHONE ENCOUNTER
MT referral from: [facility] visit (referral by provider)    MT referral outreach attempt #2 on July 20, 2023 at 9:42 AM      Outcome: Patient not reachable after several attempts, will route to [facility] Pharmacist/Provider as an FYI.  [facility] scheduling number is [phone].  Thank you for the referral.    Use dsm for the carrier/Plan on the flowsheet    CAROL Gage

## 2023-07-21 ENCOUNTER — TELEPHONE (OUTPATIENT)
Dept: GASTROENTEROLOGY | Facility: CLINIC | Age: 38
End: 2023-07-21
Payer: COMMERCIAL

## 2023-07-27 ENCOUNTER — VIRTUAL VISIT (OUTPATIENT)
Dept: PSYCHOLOGY | Facility: CLINIC | Age: 38
End: 2023-07-27
Payer: COMMERCIAL

## 2023-07-27 DIAGNOSIS — F43.12 CHRONIC POST-TRAUMATIC STRESS DISORDER (PTSD): ICD-10-CM

## 2023-07-27 DIAGNOSIS — F34.1 PERSISTENT DEPRESSIVE DISORDER: Primary | ICD-10-CM

## 2023-07-27 PROCEDURE — 90837 PSYTX W PT 60 MINUTES: CPT | Mod: 95 | Performed by: PSYCHOLOGIST

## 2023-07-28 DIAGNOSIS — F41.9 ANXIETY: ICD-10-CM

## 2023-07-28 RX ORDER — PROPRANOLOL HYDROCHLORIDE 20 MG/1
20 TABLET ORAL DAILY PRN
Qty: 30 TABLET | Refills: 11 | Status: SHIPPED | OUTPATIENT
Start: 2023-07-28 | End: 2024-06-19

## 2023-07-28 NOTE — TELEPHONE ENCOUNTER
Rx Authorization:  Requested Medication/ Dose propranolol (INDERAL) 20 MG tablet   Date last refill ordered:   Quantity ordered:   # refills:   Date of last clinic visit with ordering provider:   Date of next clinic visit with ordering provider:   All pertinent protocol data (lab date/result):   Include pertinent information from patients message:

## 2023-08-01 NOTE — CONFIDENTIAL NOTE
"    Health Psychology - Follow up Visit  Confidential Summary*    The author of this note documented a reason for not sharing it with the patient.  REFERRAL SOURCE:  Psychiatry    CHIEF COMPLAINT/REASON FOR VISIT  Psychotherapy in context of chronic PTSD and persistent depression.  Patient also complains of dissatisfaction with interpersonal relationships and challenges with emotion regulation.      Patient was seen today for a 60 minute individual psychotherapy session.  The session was facilitated via [employer] patient at her home and provider at her own home.  Please note, patient running late for appt and when she tried to log into [employer], the visit was already cancelled.         This telehealth service is appropriate and effective for delivering services in light of the necessity for social distancing to mitigate the COVID-19 epidemic. Patient has agreed to receiving telehealth services.    The patient has been notified of following:   \"This VIDEO visit will be conducted via a call between you and your physician/provider. We have found that certain health care needs can be provided without the need for an in-person physical exam.  VIDEO visits are billed at different rates depending on your insurance coverage.  Please reach out to your insurance provider with any questions. If during the course of the call the physician/provider feels a video visit is not appropriate, you will not be charged for this service.\"     Patient has given verbal consent for VIDEO visit? 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 She was congratulated for this accomplishment and was encouraged to practice self celebration.  She reported that she does not want to celebrate this preliminary step.  Discussed her ongoing motivation and discussed behavioral principles that " might increase her overall energy to continue to put effort into her progress toward completing her PhD.      She reported that she continues to experience financial distress.  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  She reported that she is understanding that her appointment is decreased as they are hoping to encourage her to finish her program.    Patient is excited at two opportunities to travel for meetings in [place] and [facility].  She is planning to begin planting seeds for postdoctoral fellowship opportunities.      Patients overall mood bright.      Objective:  Patient was on time for today s session. She was alert and oriented. Mood was dysphoric with appropriate range of affect. Patient denied suicidal or assaultive ideation, plan, or intent.        Assessment:  The patient has a longstanding history of interpersonal discord and challenges with emotion regulation.  She has completed all requirements for her PhD and is now ABD, working on her proposal.  She is living in  housing with her two dogs.  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  She is dating. Patient uses avoidance as a primary coping tool.    Plan:  Patient graduated from full model DBT at [employer] and is now completing phase 2 work.      Treatment plan completed:  10/13/22     Time In: 2:00  Time Out: 3:00    Diagnosis:  Axis I PTSD, chronic, persistent depressive disorder, adjustment disorder with mixed, psychological factors associated with physical (fibromyalgia)   Axis II  BPD   Axis III please see medical records for details   [place] IV Psychosocial and Environmental Stressors: living alone with no family support, chronic illnesses, dissertation stress, financial stress         Corina Muhammad, PhD, LP

## 2023-08-03 ENCOUNTER — VIRTUAL VISIT (OUTPATIENT)
Dept: PSYCHOLOGY | Facility: CLINIC | Age: 38
End: 2023-08-03
Payer: COMMERCIAL

## 2023-08-03 ENCOUNTER — OFFICE VISIT (OUTPATIENT)
Dept: FAMILY MEDICINE | Facility: CLINIC | Age: 38
End: 2023-08-03
Payer: COMMERCIAL

## 2023-08-03 VITALS
TEMPERATURE: 98.7 F | OXYGEN SATURATION: 98 % | WEIGHT: 155.9 LBS | HEART RATE: 87 BPM | DIASTOLIC BLOOD PRESSURE: 75 MMHG | SYSTOLIC BLOOD PRESSURE: 104 MMHG | BODY MASS INDEX: 23.63 KG/M2 | HEIGHT: 68 IN

## 2023-08-03 DIAGNOSIS — F54 PSYCHOLOGICAL AND BEHAVIORAL FACTORS ASSOCIATED WITH DISORDERS OR DISEASES CLASSIFIED ELSEWHERE: ICD-10-CM

## 2023-08-03 DIAGNOSIS — F34.1 PERSISTENT DEPRESSIVE DISORDER: Primary | ICD-10-CM

## 2023-08-03 DIAGNOSIS — F43.12 CHRONIC POST-TRAUMATIC STRESS DISORDER (PTSD): ICD-10-CM

## 2023-08-03 DIAGNOSIS — R53.83 OTHER FATIGUE: ICD-10-CM

## 2023-08-03 DIAGNOSIS — R19.7 DIARRHEA, UNSPECIFIED TYPE: Primary | ICD-10-CM

## 2023-08-03 DIAGNOSIS — R11.0 NAUSEA: ICD-10-CM

## 2023-08-03 LAB
ALBUMIN SERPL BCG-MCNC: 4.4 G/DL (ref 3.5–5.2)
ALP SERPL-CCNC: 109 U/L (ref 35–104)
ALT SERPL W P-5'-P-CCNC: ABNORMAL U/L
ANION GAP SERPL CALCULATED.3IONS-SCNC: 7 MMOL/L (ref 7–15)
AST SERPL W P-5'-P-CCNC: ABNORMAL U/L
BASOPHILS # BLD AUTO: 0 10E3/UL (ref 0–0.2)
BASOPHILS NFR BLD AUTO: 1 %
BILIRUB SERPL-MCNC: 0.4 MG/DL
BUN SERPL-MCNC: 7.7 MG/DL (ref 6–20)
CALCIUM SERPL-MCNC: 9.1 MG/DL (ref 8.6–10)
CHLORIDE SERPL-SCNC: 103 MMOL/L (ref 98–107)
CREAT SERPL-MCNC: 0.81 MG/DL (ref 0.51–0.95)
DEPRECATED HCO3 PLAS-SCNC: 26 MMOL/L (ref 22–29)
EOSINOPHIL # BLD AUTO: 0.1 10E3/UL (ref 0–0.7)
EOSINOPHIL NFR BLD AUTO: 1 %
ERYTHROCYTE [DISTWIDTH] IN BLOOD BY AUTOMATED COUNT: 11.9 % (ref 10–15)
GFR SERPL CREATININE-BSD FRML MDRD: >90 ML/MIN/1.73M2
GLUCOSE SERPL-MCNC: 81 MG/DL (ref 70–99)
HCT VFR BLD AUTO: 41.2 % (ref 35–47)
HGB BLD-MCNC: 13.5 G/DL (ref 11.7–15.7)
IMM GRANULOCYTES # BLD: 0 10E3/UL
IMM GRANULOCYTES NFR BLD: 0 %
LYMPHOCYTES # BLD AUTO: 2.6 10E3/UL (ref 0.8–5.3)
LYMPHOCYTES NFR BLD AUTO: 31 %
MCH RBC QN AUTO: 30.5 PG (ref 26.5–33)
MCHC RBC AUTO-ENTMCNC: 32.8 G/DL (ref 31.5–36.5)
MCV RBC AUTO: 93 FL (ref 78–100)
MONOCYTES # BLD AUTO: 0.4 10E3/UL (ref 0–1.3)
MONOCYTES NFR BLD AUTO: 5 %
NEUTROPHILS # BLD AUTO: 5.2 10E3/UL (ref 1.6–8.3)
NEUTROPHILS NFR BLD AUTO: 62 %
NRBC # BLD AUTO: 0 10E3/UL
NRBC BLD AUTO-RTO: 0 /100
PLATELET # BLD AUTO: 345 10E3/UL (ref 150–450)
POTASSIUM SERPL-SCNC: 4.5 MMOL/L (ref 3.4–5.3)
PROT SERPL-MCNC: 7.6 G/DL (ref 6.4–8.3)
RBC # BLD AUTO: 4.43 10E6/UL (ref 3.8–5.2)
SODIUM SERPL-SCNC: 136 MMOL/L (ref 136–145)
WBC # BLD AUTO: 8.4 10E3/UL (ref 4–11)

## 2023-08-03 PROCEDURE — 87635 SARS-COV-2 COVID-19 AMP PRB: CPT | Mod: ORL | Performed by: NURSE PRACTITIONER

## 2023-08-03 PROCEDURE — 85025 COMPLETE CBC W/AUTO DIFF WBC: CPT | Mod: ORL | Performed by: NURSE PRACTITIONER

## 2023-08-03 PROCEDURE — 90837 PSYTX W PT 60 MINUTES: CPT | Mod: VID | Performed by: PSYCHOLOGIST

## 2023-08-03 PROCEDURE — 84155 ASSAY OF PROTEIN SERUM: CPT | Mod: ORL | Performed by: NURSE PRACTITIONER

## 2023-08-03 RX ORDER — ONDANSETRON 4 MG/1
4-8 TABLET, ORALLY DISINTEGRATING ORAL EVERY 8 HOURS PRN
Qty: 20 TABLET | Refills: 1 | Status: SHIPPED | OUTPATIENT
Start: 2023-08-03 | End: 2024-07-29

## 2023-08-03 RX ORDER — CIPROFLOXACIN 500 MG/1
500 TABLET, FILM COATED ORAL 2 TIMES DAILY
Qty: 14 TABLET | Refills: 0 | Status: SHIPPED | OUTPATIENT
Start: 2023-08-03 | End: 2024-01-30

## 2023-08-03 NOTE — NURSING NOTE
"ROOM:2  JADE CAIN    Preferred Name: Kasandra     How did you hear about us?  Current Patient    38 year old  Chief Complaint   Patient presents with     Nausea            Diarrhea     Fatigue     GI Problem     X1 week, worse with food,        Blood pressure 104/75, pulse 87, temperature 98.7  F (37.1  C), temperature source Oral, height 1.72 m (5' 7.7\"), weight 70.7 kg (155 lb 14.4 oz), SpO2 98 %, not currently breastfeeding. Body mass index is 23.92 kg/m .  BP completed using cuff size:        Patient Active Problem List   Diagnosis     Pelvic pain in female     Vitamin D deficiency     Menorrhagia with regular cycle     Migraine without aura and without status migrainosus, not intractable     Iron deficiency anemia due to chronic blood loss     Tired     Circadian rhythm sleep disorder, delayed sleep phase type     Unhealthy sleep habit     Seasonal mood disorder (H)     Chronic idiopathic urticaria     Acid reflux     Cholecystitis     Depression     Hx of abnormal cervical Pap smear     Irritable bowel syndrome with constipation     Migraine headache     Mild intermittent asthma without complication     Need for desensitization to allergens     Panic disorder     Pap smear abnormality of cervix/human papillomavirus (HPV) positive     Recurrent major depressive disorder, in remission (H)     Hypertrophy of breast     Chronic sinusitis, unspecified location     Keratosis pilaris     Endometriosis     Acute pharyngitis due to other specified organisms     Chronic pain of both shoulders     Alopecia       Wt Readings from Last 2 Encounters:   08/03/23 70.7 kg (155 lb 14.4 oz)   07/18/23 70.3 kg (155 lb)     BP Readings from Last 3 Encounters:   08/03/23 104/75   07/14/23 132/85   07/13/23 106/73       Allergies   Allergen Reactions     Dust Mites Cough, Difficulty breathing and Shortness Of Breath     Cats      Chest tightness, sinus irritation       Current Outpatient Medications   Medication     almotriptan " (AXERT) 12.5 MG tablet     atomoxetine (STRATTERA) 60 MG capsule     buPROPion (WELLBUTRIN XL) 300 MG 24 hr tablet     calcium carbonate (TUMS) 500 MG chewable tablet     cholecalciferol 125 MCG (5000 UT) CAPS     doxepin (SINEQUAN) 10 MG capsule     EMGALITY 120 MG/ML injection     EPINEPHrine (ANY BX GENERIC EQUIV) 0.3 MG/0.3ML injection 2-pack     famotidine (PEPCID) 20 MG tablet     fexofenadine (ALLEGRA) 180 MG tablet     fluticasone (FLONASE) 50 MCG/ACT nasal spray     gabapentin (NEURONTIN) 400 MG capsule     guaiFENesin (MUCINEX) 600 MG 12 hr tablet     hydroxychloroquine (PLAQUENIL) 200 MG tablet     levonorgestrel (MIRENA) 20 MCG/DAY IUD     montelukast (SINGULAIR) 10 MG tablet     Multiple Vitamins-Minerals (MULTIVITAMIN ADULTS) TABS     ondansetron (ZOFRAN ODT) 4 MG ODT tab     phenylephrine HCl 10 MG TABS     propranolol (INDERAL) 20 MG tablet     Semaglutide, 2 MG/DOSE, (OZEMPIC) 8 MG/3ML pen     vilazodone (VIIBRYD) 40 MG TABS tablet     hydrocortisone (CORTAID) 1 % external ointment     metroNIDAZOLE (FLAGYL) 500 MG tablet     No current facility-administered medications for this visit.       Social History     Tobacco Use     Smoking status: Former     Packs/day: 0.00     Years: 10.00     Pack years: 0.00     Types: Cigarettes     Smokeless tobacco: Never     Tobacco comments:     smokes occasionally socially    Vaping Use     Vaping Use: Never used   Substance Use Topics     Alcohol use: Not Currently     Alcohol/week: 0.0 standard drinks of alcohol     Comment: occasional      Drug use: Not Currently     Types: Marijuana     Comment: marijuana  none since May 2021       Honoring Choices - Health Care Directive Guide offered to patient at time of visit.    Health Maintenance Due   Topic Date Due     ASTHMA ACTION PLAN  Never done     ADVANCE CARE PLANNING  Never done     HEPATITIS B IMMUNIZATION (1 of 3 - 3-dose series) Never done     YEARLY PREVENTIVE VISIT  01/31/2020     Pneumococcal Vaccine:  Pediatrics (0 to 5 Years) and At-Risk Patients (6 to 64 Years) (2 - PCV) 05/22/2020     ASTHMA CONTROL TEST  12/14/2022       Immunization History   Administered Date(s) Administered     COVID-19 Bivalent 12+ (Pfizer) 09/19/2022     COVID-19 MONOVALENT 12+ (Pfizer) 03/19/2021, 04/09/2021     COVID-19 Monovalent 12+ (Pfizer 2022) 01/10/2022     DTaP, Unspecified 05/22/2019     Flu, Unspecified 09/23/2016     Influenza Vaccine >6 months (Alfuria,Fluzone) 09/23/2016, 09/07/2018, 08/28/2019, 09/02/2020, 11/02/2021, 09/19/2022     Pneumococcal 23 valent 05/22/2019     TDAP (Adacel,Boostrix) 05/22/2019       Lab Results   Component Value Date    PAP NIL 01/31/2019       Recent Labs   Lab Test 07/13/23  1554 04/25/23  1232 11/07/22  1241 08/19/22  0835 04/13/22  1600 01/04/22  1732 08/12/21  1535 09/12/20 2058 08/02/19  1711   LDL  --   --   --  100  --   --   --   --   --    HDL  --   --   --  43*  --   --   --   --   --    TRIG  --   --   --  184*  --   --   --   --   --    ALT 16  --  134* 60*   < >  --    < > 36 25   CR 0.70  --  0.83 0.69   < >  --    < > 0.77 0.80   GFRESTIMATED >90  --  >90 >90   < >  --    < > >90 >90   GFRESTBLACK  --   --   --   --   --   --   --  >90 >90   ALBUMIN 4.3  --  4.4 3.6   < >  --    < > 3.3* 3.4   POTASSIUM 4.2  --  3.8  --    < >  --    < > 3.9 3.5   TSH  --  2.12  --   --   --  1.81  --   --   --     < > = values in this interval not displayed.           8/3/2023     3:26 PM 7/13/2023     3:06 PM   PHQ-2 ( 1999 Pfizer)   Q1: Little interest or pleasure in doing things 0 0   Q2: Feeling down, depressed or hopeless 0 1   PHQ-2 Score 0 1           10/11/2022    11:43 AM 1/12/2023    12:22 PM 6/15/2023     1:24 PM 6/30/2023     1:22 PM   PHQ-9 SCORE   PHQ-9 Total Score MyChart  5 (Mild depression) 5 (Mild depression) 5 (Mild depression)   PHQ-9 Total Score 5 5 5 5           10/11/2022    11:43 AM 1/12/2023    12:23 PM 6/15/2023     1:24 PM   VIC-7 SCORE   Total Score  8 (mild anxiety)  11 (moderate anxiety)   Total Score 9 8 11    11           6/14/2022    11:07 AM   ACT Total Scores   ACT TOTAL SCORE (Goal Greater than or Equal to 20) 24   In the past 12 months, how many times did you visit the emergency room for your asthma without being admitted to the hospital? 2   In the past 12 months, how many times were you hospitalized overnight because of your asthma? 0       Lakisha Yu CMA    August 3, 2023 3:31 PM

## 2023-08-04 LAB — SARS-COV-2 RNA RESP QL NAA+PROBE: NEGATIVE

## 2023-08-04 PROCEDURE — 87507 IADNA-DNA/RNA PROBE TQ 12-25: CPT | Mod: ORL | Performed by: NURSE PRACTITIONER

## 2023-08-04 NOTE — CONFIDENTIAL NOTE
"    Health Psychology - Follow up Visit  Confidential Summary*    The author of this note documented a reason for not sharing it with the patient.  REFERRAL SOURCE:  Psychiatry    CHIEF COMPLAINT/REASON FOR VISIT  Psychotherapy in context of chronic PTSD and persistent depression.  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      Patient was seen today for a 60 minute individual psychotherapy session.  The session was facilitated via [employer] patient at her home and provider at her own home.  Please note, patient running late for appt and when she tried to log into [employer] the visit was already cancelled.         This telehealth service is appropriate and effective for delivering services in light of the necessity for social distancing to mitigate the COVID-19 epidemic. Patient has agreed to receiving telehealth services.    The patient has been notified of following:   \"This VIDEO visit will be conducted via a call between you and your physician/provider. We have found that certain health care needs can be provided without the need for an in-person physical exam.  VIDEO visits are billed at different rates depending on your insurance coverage.  Please reach out to your insurance provider with any questions. If during the course of the call the physician/provider feels a video visit is not appropriate, you will not be charged for this service.\"     Patient has given verbal consent for VIDEO visit? Yes    Subjective:  Patient began with report that she has met a new man online (on a new site) and that he is an  who reminds her of her ex. She described his qualities and noted that he is traditional in his behavior toward her and is practicing respect and is eager to get to know her.  She reported that his behavior " is uncomfortable and that she is not used to being the one who is asked the questions.  She reported that they have several things in common including beginning their career in their mid 30s.  She described him as organized and holding structure in his life and his expectation that their relationship would progress slowly and that they would see each other only on the weekends.  He also does not text but prefers to speak in person, via videochat and via phone. She voiced amusement and frustration with his pace.      Discussed her decision to move forward on an intimate relationship.  She reported that she believed that she would increase his feelings of rejection if she was to pause on their physical relationship and concern that he would then withdrawal.  Encouraged her to consider the origin of these beliefs, that it is her job to read him and to do what she believes he wants in order to allow him to feel confident.  She now describes concern that he may be pulling away.      Encouraged her to consider how she might use empathy to increase her understanding of his emotions and his desire to open up at a slower pace.  Discussed the possibility that he may have been hurt.  He is  with a child that he shares custody of.      Objective:  Patient was on time for today s session. She was alert and oriented. Mood was dysphoric with appropriate range of affect. Patient denied suicidal or assaultive ideation, plan, or intent.        Assessment:  The patient has a longstanding history of interpersonal discord and challenges with emotion regulation.  She has completed all requirements for her PhD and is now ABD, working on her proposal.  She is living in  housing with her two dogs.  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  She is dating. Patient uses avoidance as a primary coping tool.    Plan:  Patient graduated from full model DBT at [facility] and is now  completing phase 2 work.      Treatment plan completed:  10/13/22     Time In: 2:00  Time Out: 3:00    Diagnosis:  Axis I PTSD, chronic, persistent depressive disorder, adjustment disorder with mixed, psychological factors associated with physical (fibromyalgia)   Axis II  BPD   Axis III please see medical records for details   [place] IV Psychosocial and Environmental Stressors: living alone with no family support, chronic illnesses, dissertation stress, financial stress         Corina Muhammad, PhD, LP

## 2023-08-05 LAB
ADV 40+41 DNA STL QL NAA+NON-PROBE: NEGATIVE
ASTRO TYP 1-8 RNA STL QL NAA+NON-PROBE: NEGATIVE
C CAYETANENSIS DNA STL QL NAA+NON-PROBE: NEGATIVE
CAMPYLOBACTER DNA SPEC NAA+PROBE: NEGATIVE
CRYPTOSP DNA STL QL NAA+NON-PROBE: NEGATIVE
E COLI O157 DNA STL QL NAA+NON-PROBE: NORMAL
E HISTOLYT DNA STL QL NAA+NON-PROBE: NEGATIVE
EAEC ASTA GENE ISLT QL NAA+PROBE: NEGATIVE
EC STX1+STX2 GENES STL QL NAA+NON-PROBE: NEGATIVE
EPEC EAE GENE STL QL NAA+NON-PROBE: NEGATIVE
ETEC LTA+ST1A+ST1B TOX ST NAA+NON-PROBE: NEGATIVE
G LAMBLIA DNA STL QL NAA+NON-PROBE: NEGATIVE
NOROVIRUS GI+II RNA STL QL NAA+NON-PROBE: NEGATIVE
P SHIGELLOIDES DNA STL QL NAA+NON-PROBE: NEGATIVE
RVA RNA STL QL NAA+NON-PROBE: NEGATIVE
SALMONELLA SP RPOD STL QL NAA+PROBE: NEGATIVE
SAPO I+II+IV+V RNA STL QL NAA+NON-PROBE: NEGATIVE
SHIGELLA SP+EIEC IPAH ST NAA+NON-PROBE: NEGATIVE
V CHOLERAE DNA SPEC QL NAA+PROBE: NEGATIVE
VIBRIO DNA SPEC NAA+PROBE: NEGATIVE
Y ENTEROCOL DNA STL QL NAA+PROBE: NEGATIVE

## 2023-08-08 NOTE — CONFIDENTIAL NOTE
"    Health Psychology - Follow up Visit  Confidential Summary*    The author of this note documented a reason for not sharing it with the patient.  REFERRAL SOURCE:  Psychiatry    CHIEF COMPLAINT/REASON FOR VISIT  Psychotherapy in context of chronic PTSD and persistent depression.  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      Patient was seen today for a 60 minute individual psychotherapy session.  The session was facilitated via [employer] patient at her home and provider at her own home.  Please note, patient running late for appt and when she tried to log into [employer], the visit was already cancelled.         This telehealth service is appropriate and effective for delivering services in light of the necessity for social distancing to mitigate the COVID-19 epidemic. Patient has agreed to receiving telehealth services.    The patient has been notified of following:   \"This VIDEO visit will be conducted via a call between you and your physician/provider. We have found that certain health care needs can be provided without the need for an in-person physical exam.  VIDEO visits are billed at different rates depending on your insurance coverage.  Please reach out to your insurance provider with any questions. If during the course of the call the physician/provider feels a video visit is not appropriate, you will not be charged for this service.\"     Patient has given verbal consent for VIDEO visit? Yes    Subjective:  Patient began with report that she has been ill and that she is hoping that she might be well enough to travel this weekend.  She reviewed her decision to go to the clinic for the possibility of antibiotics.  She spent much of the session discussing her upcoming trip and that she is looking forward to the " possibility that she will meet some that she might discuss a postdoctoral fellowship.  However, she has not heard from her team about her dissertation proposal and that she may not until the fall.  She is hoping that there are not many corrections to be made.      She reported that she has not heard from the man she recently dated and she assumes that he may not have taken to her because of his biases and some of his barriers surrounding traditional values.  She was encouraged to consider other reasons and was invited to consider his vulnerabilities in an effort to increase compassion for him.      Objective:  Patient was on time for today s session. She was alert and oriented. Mood was dysphoric with appropriate range of affect. Patient denied suicidal or assaultive ideation, plan, or intent.        Assessment:  The patient has a longstanding history of interpersonal discord and challenges with emotion regulation.  She has completed all requirements for her PhD and is now ABD, working on her proposal.  She is living in  housing with her two dogs.  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  She is dating. Patient uses avoidance as a primary coping tool.    Plan:  Patient graduated from full model DBT at [facility] and is now completing phase 2 work.      Treatment plan completed:  10/13/22     Time In: 2:00  Time Out: 3:00    Diagnosis:  Axis I PTSD, chronic, persistent depressive disorder, adjustment disorder with mixed, psychological factors associated with physical (fibromyalgia)   Axis II  BPD   Axis III please see medical records for details   [place] IV Psychosocial and Environmental Stressors: living alone with no family support, chronic illnesses, dissertation stress, financial stress         Corina Muhammad, PhD, LP

## 2023-08-09 DIAGNOSIS — K21.00 GASTROESOPHAGEAL REFLUX DISEASE WITH ESOPHAGITIS WITHOUT HEMORRHAGE: ICD-10-CM

## 2023-08-13 RX ORDER — FAMOTIDINE 20 MG/1
20 TABLET, FILM COATED ORAL 2 TIMES DAILY
Qty: 180 TABLET | Refills: 3 | Status: SHIPPED | OUTPATIENT
Start: 2023-08-13

## 2023-08-22 ENCOUNTER — OFFICE VISIT (OUTPATIENT)
Dept: NEUROLOGY | Facility: CLINIC | Age: 38
End: 2023-08-22
Attending: FAMILY MEDICINE
Payer: COMMERCIAL

## 2023-08-22 DIAGNOSIS — M25.531 PAIN IN BOTH WRISTS: Primary | ICD-10-CM

## 2023-08-22 DIAGNOSIS — M79.641 PAIN IN BOTH HANDS: ICD-10-CM

## 2023-08-22 DIAGNOSIS — G56.03 CARPAL TUNNEL SYNDROME ON BOTH SIDES: ICD-10-CM

## 2023-08-22 DIAGNOSIS — M79.642 PAIN IN BOTH HANDS: ICD-10-CM

## 2023-08-22 DIAGNOSIS — M25.532 PAIN IN BOTH WRISTS: Primary | ICD-10-CM

## 2023-08-22 PROCEDURE — 95885 MUSC TST DONE W/NERV TST LIM: CPT | Mod: LT | Performed by: STUDENT IN AN ORGANIZED HEALTH CARE EDUCATION/TRAINING PROGRAM

## 2023-08-22 PROCEDURE — 95910 NRV CNDJ TEST 7-8 STUDIES: CPT | Performed by: STUDENT IN AN ORGANIZED HEALTH CARE EDUCATION/TRAINING PROGRAM

## 2023-08-22 NOTE — PROGRESS NOTES
[facility]  Electrodiagnostic Laboratory                 Department of Neurology                                                                                                         Test Date:  2023    Patient: Kasandra Lau : 1985 Physician: Felicia Medel MD   Sex: Male AGE: 38 year Ref Phys:    ID#: [id number]   Technician: Jennifer Rosas     History and Examination:  38-year-old female with history of inflammatory arthritis presented with pain in bilateral hands that did not improve with wrist splints.  She also reports occasional intermittent numbness in all fingers.  She has chronic neck pain but denies radicular pain down the arm.  Of note, she has history of traumatic left wrist fracture s/p ORIF surgery. This study was performed to assess for focal neuropathy.    Techniques:  Motor and sensory conduction studies were done with surface recording electrodes. EMG was done with a concentric needle electrode.     Results:  Nerve conduction studies of bilateral upper limbs showed mildly reduced left median antidromic (D2) sensory nerve action potential amplitude with normal conduction velocity and distal peak latency. Median-ulnar palmar interlatency difference was normal on both sides. Left median-lumbrical vs ulnar-interosseous latency difference was also normal.    Needle EMG of the left upper limb was normal.    Interpretation:  This is a mildly abnormal study with technical limitations. There is no compelling electrophysiologic evidence of median neuropathy at the wrists or left cervical radiculopathy. The mildly reduced left median antidromic sensory amplitude, in the setting of normal orthodromic median sensory response, is likely due to technical issues given the stimulation site is on surgical scar on the left wrist. Clinical correlation is advised.     Felicia Medel MD  Department of Neurology        Nerve Conduction Studies  Motor Sites       Latency Amplitude Neg. Amp Diff Segment Distance Velocity Neg. Dur Neg Area Diff Temperature Comment   Site (ms) Norm (mV) Norm %  cm m/s Norm ms %  C    Left Median (APB) Motor   Wrist 3.8  < 4.4 9.1  > 5.0  Wrist-APB 8   5.8  31.4    Elbow 7.6 - 9.2  > 5.0 1.10 Elbow-Wrist 23 61  > 48 6.1 -0.37 31.4    Right Median (APB) Motor   Wrist 3.4  < 4.4 9.5  > 5.0  Wrist-APB 8   6.2  31.7    Elbow 7.4 - 9.3  > 5.0 -2.1 Elbow-Wrist 24 60  > 48 6.4 -2.2 31.7    Left Median/Ulnar (Lumb-Dors Int II) Motor        Median (Lumb I)   Wrist 3.2 - 1.87 -  Wrist-Lumb I 10   5.5  23.9         Ulnar (Dorsal Int (manus))   Wrist 3.5 - 2.4 -  Wrist-Dorsal Int (manus) 10   4.5  23.9    Left Ulnar (ADM) Motor   Wrist 2.7  < 3.5 9.8  > 5.0  Wrist-ADM 8   5.7  31.2    Bel Elbow 6.3 - 9.0 - -8.2 Bel Elbow-Wrist 20.5 57  > 48 6.2 -5.6 31.2    Abv Elbow 7.4 - 9.2 - 2.2 Abv Elbow-Bel Elbow 7 64  > 48 6.2 1.75 31.2    Right Ulnar (ADM) Motor   Wrist 2.5  < 3.5 9.8  > 5.0  Wrist-ADM 8   5.4  31.6    Bel Elbow 6.2 - 8.6 - -12.2 Bel Elbow-Wrist 21 57  > 48 5.7 -5.6 31.6    Abv Elbow 7.6 - 8.9 - 3.5 Abv Elbow-Bel Elbow 8.2 59  > 48 5.7 6.3 31.6      Sensory Sites      Onset Lat Peak Lat Amp (O-P) Amp (P-P) Segment Distance Velocity Temperature Comment   Site ms ms  V Norm  V  cm m/s Norm  C    Left Median Sensory   Wrist-Dig II 2.3 3.4 8  > 10 12 Wrist-Dig II 14 61  > 48 28.8 Surgical scar at stimulation site at the wrist   Right Median Sensory   Wrist-Dig II 2.4 3.3 31  > 10 49 Wrist-Dig II 14 58  > 48 31.9    Left Median-Ulnar Palmar Sensory        Median   Palm-Wrist 1.33 1.93 54 - 77 Palm-Wrist 8 60 - 31.4         Ulnar   Palm-Wrist 1.20 1.73 27 - 38 Palm-Wrist 8 67 - 31.3    Right Median-Ulnar Palmar Sensory        Median   Palm-Wrist 1.43 1.95 56 - 78 Palm-Wrist 8 56 - 31.6         Ulnar   Palm-Wrist 1.30 1.78 31 - 36 Palm-Wrist 8 62 - 31.7    Left Ulnar Sensory   Wrist-Dig V 2.2 2.9 40  > 8 53 Wrist-Dig V 12.5 57  > 48 31.2    Right Ulnar  Sensory   Wrist-Dig V 2.4 3.1 39  > 8 60 Wrist-Dig V 12.5 52  > 48 31.9      Inter-Nerve Comparisons     Nerve 1 Value 1 Nerve 2 Value 2 Parameter Result Normal   Sensory Sites   R Median Palm-Wrist 2.0 ms R Ulnar Palm-Wrist 1.8 ms Peak Lat Diff 0.17 ms <0.40   L Median Palm-Wrist 1.9 ms L Ulnar Palm-Wrist 1.7 ms Peak Lat Diff 0.20 ms <0.40       Electromyography     Side Muscle Ins Act Fibs/PSW Fasc HF Amp Dur Poly Recrt Int Pat   Left FDI Nml None Nml 0 Nml Nml 0 Nml Nml   Left Pronator Teres Nml None Nml 0 Nml Nml 0 Nml Nml   Left Triceps Nml None Nml 0 Nml Nml 0 Nml Nml   Left APB Nml None Nml 0 Nml Nml 0 Nml Nml         NCS Waveforms:    Motor                  Sensory                      Ultrasound Images:

## 2023-08-22 NOTE — LETTER
2023       RE: Kasandra Lau  [address]  [facility] [address]       Dear Colleague,    Thank you for referring your patient, Kasandra Lau, to the [facility] EMG CLIN[facility] [place] at [facility]. Please see a copy of my visit note below.                            [facility]  Electrodiagnostic Laboratory                 Department of Neurology                                                                                                         Test Date:  2023    Patient: Kasandra Lau : 1985 Physician: Felicia Medel MD   Sex: Male AGE: 38 year Ref Phys:    ID#: [id number]   Technician: Jennifer Rosas     History and Examination:  38-year-old female with history of inflammatory arthritis presented with pain in bilateral hands that did not improve with wrist splints.  She also reports occasional intermittent numbness in all fingers.  She has chronic neck pain but denies radicular pain down the arm.  Of note, she has history of traumatic left wrist fracture s/p ORIF surgery. This study was performed to assess for focal neuropathy.    Techniques:  Motor and sensory conduction studies were done with surface recording electrodes. EMG was done with a concentric needle electrode.     Results:  Nerve conduction studies of bilateral upper limbs showed mildly reduced left median antidromic (D2) sensory nerve action potential amplitude with normal conduction velocity and distal peak latency. Median-ulnar palmar interlatency difference was normal on both sides. Left median-lumbrical vs ulnar-interosseous latency difference was also normal.    Needle EMG of the left upper limb was normal.    Interpretation:  This is a mildly abnormal study with technical limitations. There is no compelling electrophysiologic evidence of median neuropathy at the wrists or left cervical radiculopathy. The mildly reduced left median antidromic  sensory amplitude, in the setting of normal orthodromic median sensory response, is likely due to technical issues given the stimulation site is on surgical scar on the left wrist. Clinical correlation is advised.     Felicia Medel MD  Department of Neurology        Nerve Conduction Studies  Motor Sites      Latency Amplitude Neg. Amp Diff Segment Distance Velocity Neg. Dur Neg Area Diff Temperature Comment   Site (ms) Norm (mV) Norm %  cm m/s Norm ms %  C    Left Median (APB) Motor   Wrist 3.8  < 4.4 9.1  > 5.0  Wrist-APB 8   5.8  31.4    Elbow 7.6 - 9.2  > 5.0 1.10 Elbow-Wrist 23 61  > 48 6.1 -0.37 31.4    Right Median (APB) Motor   Wrist 3.4  < 4.4 9.5  > 5.0  Wrist-APB 8   6.2  31.7    Elbow 7.4 - 9.3  > 5.0 -2.1 Elbow-Wrist 24 60  > 48 6.4 -2.2 31.7    Left Median/Ulnar (Lumb-Dors Int II) Motor        Median (Lumb I)   Wrist 3.2 - 1.87 -  Wrist-Lumb I 10   5.5  23.9         Ulnar (Dorsal Int (manus))   Wrist 3.5 - 2.4 -  Wrist-Dorsal Int (manus) 10   4.5  23.9    Left Ulnar (ADM) Motor   Wrist 2.7  < 3.5 9.8  > 5.0  Wrist-ADM 8   5.7  31.2    Bel Elbow 6.3 - 9.0 - -8.2 Bel Elbow-Wrist 20.5 57  > 48 6.2 -5.6 31.2    Abv Elbow 7.4 - 9.2 - 2.2 Abv Elbow-Bel Elbow 7 64  > 48 6.2 1.75 31.2    Right Ulnar (ADM) Motor   Wrist 2.5  < 3.5 9.8  > 5.0  Wrist-ADM 8   5.4  31.6    Bel Elbow 6.2 - 8.6 - -12.2 Bel Elbow-Wrist 21 57  > 48 5.7 -5.6 31.6    Abv Elbow 7.6 - 8.9 - 3.5 Abv Elbow-Bel Elbow 8.2 59  > 48 5.7 6.3 31.6      Sensory Sites      Onset Lat Peak Lat Amp (O-P) Amp (P-P) Segment Distance Velocity Temperature Comment   Site ms ms  V Norm  V  cm m/s Norm  C    Left Median Sensory   Wrist-Dig II 2.3 3.4 8  > 10 12 Wrist-Dig II 14 61  > 48 28.8 Surgical scar at stimulation site at the wrist   Right Median Sensory   Wrist-Dig II 2.4 3.3 31  > 10 49 Wrist-Dig II 14 58  > 48 31.9    Left Median-Ulnar Palmar Sensory        Median   Palm-Wrist 1.33 1.93 54 - 77 Palm-Wrist 8 60 - 31.4         Ulnar   Palm-Wrist  1.20 1.73 27 - 38 Palm-Wrist 8 67 - 31.3    Right Median-Ulnar Palmar Sensory        Median   Palm-Wrist 1.43 1.95 56 - 78 Palm-Wrist 8 56 - 31.6         Ulnar   Palm-Wrist 1.30 1.78 31 - 36 Palm-Wrist 8 62 - 31.7    Left Ulnar Sensory   Wrist-Dig V 2.2 2.9 40  > 8 53 Wrist-Dig V 12.5 57  > 48 31.2    Right Ulnar Sensory   Wrist-Dig V 2.4 3.1 39  > 8 60 Wrist-Dig V 12.5 52  > 48 31.9      Inter-Nerve Comparisons     Nerve 1 Value 1 Nerve 2 Value 2 Parameter Result Normal   Sensory Sites   R Median Palm-Wrist 2.0 ms R Ulnar Palm-Wrist 1.8 ms Peak Lat Diff 0.17 ms <0.40   L Median Palm-Wrist 1.9 ms L Ulnar Palm-Wrist 1.7 ms Peak Lat Diff 0.20 ms <0.40       Electromyography     Side Muscle Ins Act Fibs/PSW Fasc HF Amp Dur Poly Recrt Int Pat   Left FDI Nml None Nml 0 Nml Nml 0 Nml Nml   Left Pronator Teres Nml None Nml 0 Nml Nml 0 Nml Nml   Left Triceps Nml None Nml 0 Nml Nml 0 Nml Nml   Left APB Nml None Nml 0 Nml Nml 0 Nml Nml         NCS Waveforms:    Motor                  Sensory                      Ultrasound Images:            Again, thank you for allowing me to participate in the care of your patient.      Sincerely,    Felicia Medel MD

## 2023-08-24 ENCOUNTER — VIRTUAL VISIT (OUTPATIENT)
Dept: PSYCHOLOGY | Facility: CLINIC | Age: 38
End: 2023-08-24
Payer: COMMERCIAL

## 2023-08-24 DIAGNOSIS — F43.12 CHRONIC POST-TRAUMATIC STRESS DISORDER (PTSD): ICD-10-CM

## 2023-08-24 DIAGNOSIS — F34.1 PERSISTENT DEPRESSIVE DISORDER: Primary | ICD-10-CM

## 2023-08-24 PROCEDURE — 90837 PSYTX W PT 60 MINUTES: CPT | Mod: VID | Performed by: PSYCHOLOGIST

## 2023-08-28 ENCOUNTER — VIRTUAL VISIT (OUTPATIENT)
Dept: ORTHOPEDICS | Facility: CLINIC | Age: 38
End: 2023-08-28
Payer: COMMERCIAL
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
## 2023-08-28 DIAGNOSIS — R20.0 NUMBNESS OR TINGLING: ICD-10-CM

## 2023-08-28 DIAGNOSIS — R20.2 NUMBNESS OR TINGLING: ICD-10-CM

## 2023-08-28 PROCEDURE — 99213 OFFICE O/P EST LOW 20 MIN: CPT | Mod: 93 | Performed by: FAMILY MEDICINE

## 2023-08-28 NOTE — LETTER
"8/28/2023       RE: Kasandra Lau  [address]  [facility] [address]     Dear Colleague,    Thank you for referring your patient, Kasandra Lau, to the [facility] [place] at [facility]. Please see a copy of my visit note below.    Phone fup EMG.      EMG 8/22/2023 revealed no evidence of carpal tunnel syndrome or neural compromise.  No signs of cervical radiculopathy.        Phone start:2:20  Phone stop:  2:31      S: We discussed the patient's EMG results in detail.  Patient has seen a physical therapist and has worked on some \"loosening\" of the area around the shoulders.  We discussed protocols for people that spend time at a desk and computer that have to do with maximizing the dynamics of strength about the shoulder blade, postural control, optimizing the ergonomics of the workstation, and pads that raise the wrist slightly for the use of the keyboard.  These interventions have been found to be useful for folks with intermittent numbness and tingling in the hands that spend time at a desk or computer.  Patient has seen physical therapy in the [facility].  I will add additional visits with physical therapy so she can maximize his protocol.      O:  GENERAL: healthy, alert, without distress  RESP: No audible wheeze, cough.  No increased work of breathing.    NEURO:  Mentation and speech appropriate for age.  PSYCH: mentation appears normal, concentration appears appropriate, judgement and insight intact.        Numbness and tingling in the bilateral hands for 2 years.  Patient has tried night splints for carpal tunnel syndrome.  Patient indicates that she has used the night splints recurrently over the last year, and consistently in the last month, without great improvements in her symptoms.  Tingling involving the bilateral thumbs primarily.  Aching discomfort into the hands.      Patient indicates that an EMG in [place] 2 years ago " "indicated signs of carpal tunnel syndrome.  Patient indicates that the EMG showed \"moderate carpal tunnel on 1 arm, mild on the other arm\".  She was encouraged to try splints and some physical therapy.    Patient follows with rheumatology for inflammatory multi-joint predominant polyarthritis, maintained on hydroxychloroquine, most recent clinic visit 3/16/2023 reviewed by me.  At that time the rheumatologist had suggested night splints for carpal tunnel syndrome symptoms of the bilateral hands.     PMH also significant for anxiety, depression, ADHD, PTSD,  and IBS.  H/o cholecystectomy      Normal TSH and vitamin B12 and folate 4/25/2023     Exercise: elliptical at gym and walking her dog daily.      Left wrist xray 4/1/2021:  INDICATION: Left wrist pain      COMPARISON: None.      TECHNIQUE:4 views      FINDINGS:      There is an old fracture of the left distal radius with volar plate and screw fixation. No acute fracture identified. There is probably an old healed ulnar styloid avulsion. No definitive acute fracture.        EMG 8/22/2023  Results:  Nerve conduction studies of bilateral upper limbs showed mildly reduced left median antidromic (D2) sensory nerve action potential amplitude with normal conduction velocity and distal peak latency. Median-ulnar palmar interlatency difference was normal on both sides. Left median-lumbrical vs ulnar-interosseous latency difference was also normal.     Needle EMG of the left upper limb was normal.     Interpretation:  This is a mildly abnormal study with technical limitations. There is no compelling electrophysiologic evidence of median neuropathy at the wrists or left cervical radiculopathy. The mildly reduced left median antidromic sensory amplitude, in the setting of normal orthodromic median sensory response, is likely due to technical issues given the stimulation site is on surgical scar on the left wrist. Clinical correlation is advised.           PMH:  Past Medical " History:   Diagnosis Date    Anemia fall 2016    Anxiety     Arthritis     Chronic fatigue     Depression (emotion)     sees psych, on meds    Gastroesophageal reflux disease     Migraine     daily meds, 2x month, more mild on meds    Neuropathic pain     Panic disorder     Uncomplicated asthma Spring 2018       Active problem list:  Patient Active Problem List   Diagnosis    Pelvic pain in female    Vitamin D deficiency    Menorrhagia with regular cycle    Migraine without aura and without status migrainosus, not intractable    Iron deficiency anemia due to chronic blood loss    Tired    Circadian rhythm sleep disorder, delayed sleep phase type    Unhealthy sleep habit    Seasonal mood disorder (H)    Chronic idiopathic urticaria    Acid reflux    Cholecystitis    Depression    Hx of abnormal cervical Pap smear    Irritable bowel syndrome with constipation    Migraine headache    Mild intermittent asthma without complication    Need for desensitization to allergens    Panic disorder    Pap smear abnormality of cervix/human papillomavirus (HPV) positive    Recurrent major depressive disorder, in remission (H)    Hypertrophy of breast    Chronic sinusitis, unspecified location    Keratosis pilaris    Endometriosis    Acute pharyngitis due to other specified organisms    Chronic pain of both shoulders    Alopecia       FH:  Family History   Problem Relation Age of Onset    Diabetes Maternal Grandfather     Hypertension No family hx of     Coronary Artery Disease No family hx of     Hyperlipidemia No family hx of     Cerebrovascular Disease No family hx of     Breast Cancer No family hx of     Colon Cancer No family hx of     Prostate Cancer No family hx of     Other Cancer No family hx of     Glaucoma No family hx of     Macular Degeneration No family hx of        SH:  Social History     Socioeconomic History    Marital status: Single     Spouse name: Not on file    Number of children: Not on file    Years of  education: Not on file    Highest education level: Not on file   Occupational History    Not on file   Tobacco Use    Smoking status: Former     Packs/day: 0.00     Years: 10.00     Pack years: 0.00     Types: Cigarettes    Smokeless tobacco: Never    Tobacco comments:     smokes occasionally socially    Vaping Use    Vaping Use: Never used   Substance and Sexual Activity    Alcohol use: Not Currently     Alcohol/week: 0.0 standard drinks of alcohol     Comment: occasional     Drug use: Not Currently     Types: Marijuana     Comment: marijuana  none since May 2021    Sexual activity: Yes     Partners: Male     Birth control/protection: I.U.D.     Comment: Nuvaring   Other Topics Concern    Not on file   Social History Narrative    Grad student in [employer]     Social Determinants of Health     Financial Resource Strain: Not on file   Food Insecurity: Not on file   Transportation Needs: Not on file   Physical Activity: Not on file   Stress: Not on file   Social Connections: Not on file   Intimate Partner Violence: Not on file   Housing Stability: Not on file       MEDS:  See EMR, reviewed  ALL:  See EMR, reviewed    REVIEW OF SYSTEMS:  CONSTITUTIONAL:NEGATIVE for fever, chills, change in weight  INTEGUMENTARY/SKIN: NEGATIVE for worrisome rashes, moles or lesions  EYES: NEGATIVE for vision changes or irritation  ENT/MOUTH: NEGATIVE for ear, mouth and throat problems  RESP:NEGATIVE for significant cough or SOB  BREAST: NEGATIVE for masses, tenderness or discharge  CV: NEGATIVE for chest pain, palpitations or peripheral edema  GI: NEGATIVE for nausea, abdominal pain, heartburn, or change in bowel habits  :NEGATIVE for frequency, dysuria, or hematuria  :NEGATIVE for frequency, dysuria, or hematuria  NEURO: NEGATIVE for weakness, dizziness or paresthesias  ENDOCRINE: NEGATIVE for temperature intolerance, skin/hair changes  HEME/ALLERGY/IMMUNE: NEGATIVE for bleeding problems  PSYCHIATRIC: NEGATIVE for changes in mood  or affect        Again, thank you for allowing me to participate in the care of your patient.      Sincerely,    Vishal Perez MD

## 2023-08-28 NOTE — PROGRESS NOTES
"Phone fup EMG.      EMG 8/22/2023 revealed no evidence of carpal tunnel syndrome or neural compromise.  No signs of cervical radiculopathy.        Phone start:2:20  Phone stop:  2:31      S: We discussed the patient's EMG results in detail.  Patient has seen a physical therapist and has worked on some \"loosening\" of the area around the shoulders.  We discussed protocols for people that spend time at a desk and computer that have to do with maximizing the dynamics of strength about the shoulder blade, postural control, optimizing the ergonomics of the workstation, and pads that raise the wrist slightly for the use of the keyboard.  These interventions have been found to be useful for folks with intermittent numbness and tingling in the hands that spend time at a desk or computer.  Patient has seen physical therapy in the [facility].  I will add additional visits with physical therapy so she can maximize his protocol.      O:  GENERAL: healthy, alert, without distress  RESP: No audible wheeze, cough.  No increased work of breathing.    NEURO:  Mentation and speech appropriate for age.  PSYCH: mentation appears normal, concentration appears appropriate, judgement and insight intact.        Numbness and tingling in the bilateral hands for 2 years.  Patient has tried night splints for carpal tunnel syndrome.  Patient indicates that she has used the night splints recurrently over the last year, and consistently in the last month, without great improvements in her symptoms.  Tingling involving the bilateral thumbs primarily.  Aching discomfort into the hands.      Patient indicates that an EMG in [place] 2 years ago indicated signs of carpal tunnel syndrome.  Patient indicates that the EMG showed \"moderate carpal tunnel on 1 arm, mild on the other arm\".  She was encouraged to try splints and some physical therapy.    Patient follows with rheumatology for inflammatory multi-joint predominant polyarthritis, maintained on " hydroxychloroquine, most recent clinic visit 3/16/2023 reviewed by me.  At that time the rheumatologist had suggested night splints for carpal tunnel syndrome symptoms of the bilateral hands.     PMH also significant for anxiety, depression, ADHD, PTSD,  and IBS.  H/o cholecystectomy      Normal TSH and vitamin B12 and folate 4/25/2023     Exercise: elliptical at gym and walking her dog daily.      Left wrist xray 4/1/2021:  INDICATION: Left wrist pain      COMPARISON: None.      TECHNIQUE:4 views      FINDINGS:      There is an old fracture of the left distal radius with volar plate and screw fixation. No acute fracture identified. There is probably an old healed ulnar styloid avulsion. No definitive acute fracture.        EMG 8/22/2023  Results:  Nerve conduction studies of bilateral upper limbs showed mildly reduced left median antidromic (D2) sensory nerve action potential amplitude with normal conduction velocity and distal peak latency. Median-ulnar palmar interlatency difference was normal on both sides. Left median-lumbrical vs ulnar-interosseous latency difference was also normal.     Needle EMG of the left upper limb was normal.     Interpretation:  This is a mildly abnormal study with technical limitations. There is no compelling electrophysiologic evidence of median neuropathy at the wrists or left cervical radiculopathy. The mildly reduced left median antidromic sensory amplitude, in the setting of normal orthodromic median sensory response, is likely due to technical issues given the stimulation site is on surgical scar on the left wrist. Clinical correlation is advised.           PMH:  Past Medical History:   Diagnosis Date    Anemia fall 2016    Anxiety     Arthritis     Chronic fatigue     Depression (emotion)     sees psych, on meds    Gastroesophageal reflux disease     Migraine     daily meds, 2x month, more mild on meds    Neuropathic pain     Panic disorder     Uncomplicated asthma Spring 2018        Active problem list:  Patient Active Problem List   Diagnosis    Pelvic pain in female    Vitamin D deficiency    Menorrhagia with regular cycle    Migraine without aura and without status migrainosus, not intractable    Iron deficiency anemia due to chronic blood loss    Tired    Circadian rhythm sleep disorder, delayed sleep phase type    Unhealthy sleep habit    Seasonal mood disorder (H)    Chronic idiopathic urticaria    Acid reflux    Cholecystitis    Depression    Hx of abnormal cervical Pap smear    Irritable bowel syndrome with constipation    Migraine headache    Mild intermittent asthma without complication    Need for desensitization to allergens    Panic disorder    Pap smear abnormality of cervix/human papillomavirus (HPV) positive    Recurrent major depressive disorder, in remission (H)    Hypertrophy of breast    Chronic sinusitis, unspecified location    Keratosis pilaris    Endometriosis    Acute pharyngitis due to other specified organisms    Chronic pain of both shoulders    Alopecia       FH:  Family History   Problem Relation Age of Onset    Diabetes Maternal Grandfather     Hypertension No family hx of     Coronary Artery Disease No family hx of     Hyperlipidemia No family hx of     Cerebrovascular Disease No family hx of     Breast Cancer No family hx of     Colon Cancer No family hx of     Prostate Cancer No family hx of     Other Cancer No family hx of     Glaucoma No family hx of     Macular Degeneration No family hx of        SH:  Social History     Socioeconomic History    Marital status: Single     Spouse name: Not on file    Number of children: Not on file    Years of education: Not on file    Highest education level: Not on file   Occupational History    Not on file   Tobacco Use    Smoking status: Former     Packs/day: 0.00     Years: 10.00     Pack years: 0.00     Types: Cigarettes    Smokeless tobacco: Never    Tobacco comments:     smokes occasionally socially    Vaping Use     Vaping Use: Never used   Substance and Sexual Activity    Alcohol use: Not Currently     Alcohol/week: 0.0 standard drinks of alcohol     Comment: occasional     Drug use: Not Currently     Types: Marijuana     Comment: marijuana  none since May 2021    Sexual activity: Yes     Partners: Male     Birth control/protection: I.U.D.     Comment: Nuvaring   Other Topics Concern    Not on file   Social History Narrative    Grad student in [employer]     Social Determinants of Health     Financial Resource Strain: Not on file   Food Insecurity: Not on file   Transportation Needs: Not on file   Physical Activity: Not on file   Stress: Not on file   Social Connections: Not on file   Intimate Partner Violence: Not on file   Housing Stability: Not on file       MEDS:  See EMR, reviewed  ALL:  See EMR, reviewed    REVIEW OF SYSTEMS:  CONSTITUTIONAL:NEGATIVE for fever, chills, change in weight  INTEGUMENTARY/SKIN: NEGATIVE for worrisome rashes, moles or lesions  EYES: NEGATIVE for vision changes or irritation  ENT/MOUTH: NEGATIVE for ear, mouth and throat problems  RESP:NEGATIVE for significant cough or SOB  BREAST: NEGATIVE for masses, tenderness or discharge  CV: NEGATIVE for chest pain, palpitations or peripheral edema  GI: NEGATIVE for nausea, abdominal pain, heartburn, or change in bowel habits  :NEGATIVE for frequency, dysuria, or hematuria  :NEGATIVE for frequency, dysuria, or hematuria  NEURO: NEGATIVE for weakness, dizziness or paresthesias  ENDOCRINE: NEGATIVE for temperature intolerance, skin/hair changes  HEME/ALLERGY/IMMUNE: NEGATIVE for bleeding problems  PSYCHIATRIC: NEGATIVE for changes in mood or affect

## 2023-08-31 ENCOUNTER — VIRTUAL VISIT (OUTPATIENT)
Dept: PSYCHOLOGY | Facility: CLINIC | Age: 38
End: 2023-08-31
Payer: COMMERCIAL

## 2023-08-31 DIAGNOSIS — F34.1 PERSISTENT DEPRESSIVE DISORDER: Primary | ICD-10-CM

## 2023-08-31 DIAGNOSIS — F43.12 CHRONIC POST-TRAUMATIC STRESS DISORDER (PTSD): ICD-10-CM

## 2023-08-31 PROCEDURE — 90837 PSYTX W PT 60 MINUTES: CPT | Mod: VID | Performed by: PSYCHOLOGIST

## 2023-09-03 NOTE — CONFIDENTIAL NOTE
"    Health Psychology - Follow up Visit  Confidential Summary*    The author of this note documented a reason for not sharing it with the patient.  REFERRAL SOURCE:  Psychiatry    CHIEF COMPLAINT/REASON FOR VISIT  Psychotherapy in context of chronic PTSD and persistent depression.  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      Patient was seen today for a 60 minute individual psychotherapy session.  The session was facilitated via AMBigEvidencewith patient at her home and provider at her own home.  Please note, patient running late for appt and when she tried to log into [employer], the visit was already cancelled.         This telehealth service is appropriate and effective for delivering services in light of the necessity for social distancing to mitigate the COVID-19 epidemic. Patient has agreed to receiving telehealth services.    The patient has been notified of following:   \"This VIDEO visit will be conducted via a call between you and your physician/provider. We have found that certain health care needs can be provided without the need for an in-person physical exam.  VIDEO visits are billed at different rates depending on your insurance coverage.  Please reach out to your insurance provider with any questions. If during the course of the call the physician/provider feels a video visit is not appropriate, you will not be charged for this service.\"     Patient has given verbal consent for VIDEO visit? Yes    Subjective:  Patient seen for first time since she went to [facility] for work conference, Winter school.  Patient described improved mood with increased socializing while away.  She was able to respond positively to observation that spending time outside of her apartment and with others is nurturing for her.  She was " encouraged to describe the possible impact of having two dogs on her overall well being as she is not able to easily leave them alone in the apartment since they bark when alone.  She described learning of several opportunities for postdoctoral fellowships which have served to increase her enthusiasm to finish her program.  She educated me on the [employer] system of jessie funding and fellowship funds.      Patient encouraged to address feedback given to her by her professors on her dissertation proposal. She described use of avoidance to address anxiety about feedback.  Discussed approach and the use of problem solving skills to be more effective.      Objective:  Patient was on time for today s session. She was alert and oriented. Mood was dysphoric with appropriate range of affect. Patient denied suicidal or assaultive ideation, plan, or intent.        Assessment:  The patient has a longstanding history of interpersonal discord and challenges with emotion regulation.  She has completed all requirements for her PhD and is now ABD, working on her proposal.  She is living in  housing with her two dogs.  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  She is dating. Patient uses avoidance as a primary coping tool.    Plan:  Patient graduated from full model DBT at [facility] and is now completing phase 2 work.      Treatment plan completed:  10/13/22     Time In: 2:00  Time Out: 3:00    Diagnosis:  Axis I PTSD, chronic, persistent depressive disorder, adjustment disorder with mixed, psychological factors associated with physical (fibromyalgia)   Axis II  BPD   Axis III please see medical records for details   [place] IV Psychosocial and Environmental Stressors: living alone with no family support, chronic illnesses, dissertation stress, financial stress         Corina Muhammad, PhD, LP

## 2023-09-05 NOTE — CONFIDENTIAL NOTE
"    Health Psychology - Follow up Visit  Confidential Summary*    The author of this note documented a reason for not sharing it with the patient.  REFERRAL SOURCE:  Psychiatry    CHIEF COMPLAINT/REASON FOR VISIT  Psychotherapy in context of chronic PTSD and persistent depression.  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      Patient was seen today for a 60 minute individual psychotherapy session.  The session was facilitated via AMAptelawith patient at her home and provider at her own home.  Please note, patient running late for appt and when she tried to log into [employer], the visit was already cancelled.         This telehealth service is appropriate and effective for delivering services in light of the necessity for social distancing to mitigate the COVID-19 epidemic. Patient has agreed to receiving telehealth services.    The patient has been notified of following:   \"This VIDEO visit will be conducted via a call between you and your physician/provider. We have found that certain health care needs can be provided without the need for an in-person physical exam.  VIDEO visits are billed at different rates depending on your insurance coverage.  Please reach out to your insurance provider with any questions. If during the course of the call the physician/provider feels a video visit is not appropriate, you will not be charged for this service.\"     Patient has given verbal consent for VIDEO visit? Yes    Subjective:  Patient began with report that she is feeling more optimistic about her chances of securing a satisfactory postdoctoral fellowship in [place] or a [place].  She described a recent phone conversation with someone that she became acquainted with during her recent visit to [facility] and she described " "increased validation that she might make a satisfying contribution to science.  She was encouraged to use our next session to open the emails sent by her dissertation proposal committee since she reported that she was engaged in avoidance associated with the assumption that the feedback she will be given will result in sabotaging her food mood.      Discussed her ongoing reliance on her \"emotion mind\" to make effective decisions.  She was encouraged to examine whether her emotions are justified and to act opposite to her emotions.  She maintained that she was not open to the possibility that opening the email will dramatically worsen her mood.      Objective:  Patient was on time for today s session. She was alert and oriented. Mood was dysphoric with appropriate range of affect. Patient denied suicidal or assaultive ideation, plan, or intent.        Assessment:  The patient has a longstanding history of interpersonal discord and challenges with emotion regulation.  She has completed all requirements for her PhD and is now ABD, working on her proposal.  She is living in  housing with her two dogs.  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  She is dating. Patient uses avoidance as a primary coping tool.    Plan:  Patient graduated from full model DBT at [facility] and is now completing phase 2 work.      Treatment plan updated:  8/31/23    Time In: 2:00  Time Out: 3:00    Diagnosis:  Axis I PTSD, chronic, persistent depressive disorder, adjustment disorder with mixed, psychological factors associated with physical (fibromyalgia)   Axis II  BPD   Axis III please see medical records for details   [place] IV Psychosocial and Environmental Stressors: living alone with no family support, chronic illnesses, dissertation stress, financial stress         Corina Muhammad, PhD, LP    "

## 2023-09-06 ENCOUNTER — VIRTUAL VISIT (OUTPATIENT)
Dept: PSYCHOLOGY | Facility: CLINIC | Age: 38
End: 2023-09-06
Payer: COMMERCIAL

## 2023-09-06 DIAGNOSIS — F43.12 CHRONIC POST-TRAUMATIC STRESS DISORDER (PTSD): Primary | ICD-10-CM

## 2023-09-06 DIAGNOSIS — F34.1 PERSISTENT DEPRESSIVE DISORDER: ICD-10-CM

## 2023-09-06 DIAGNOSIS — R20.0 NUMBNESS OR TINGLING: ICD-10-CM

## 2023-09-06 DIAGNOSIS — R20.2 NUMBNESS OR TINGLING: ICD-10-CM
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
## 2023-09-06 PROCEDURE — 90837 PSYTX W PT 60 MINUTES: CPT | Mod: VID | Performed by: PSYCHOLOGIST

## 2023-09-07 ENCOUNTER — TELEPHONE (OUTPATIENT)
Dept: ENDOCRINOLOGY | Facility: CLINIC | Age: 38
End: 2023-09-07
Payer: COMMERCIAL

## 2023-09-07 NOTE — TELEPHONE ENCOUNTER
PRIOR AUTHORIZATION DENIED    Medication: SEMAGLUTIDE (2 MG/DOSE) 8 MG/3ML SC SOPN  Insurance Company: [employer] [place] - Phone [phone] Fax [phone]  Denial Date: 9/7/2023  Denial Rational:     Appeal Information:     Patient Notified: No

## 2023-09-08 ENCOUNTER — PRE VISIT (OUTPATIENT)
Dept: UROLOGY | Facility: CLINIC | Age: 38
End: 2023-09-08
Payer: COMMERCIAL

## 2023-09-08 DIAGNOSIS — E66.3 OVERWEIGHT (BMI 25.0-29.9): Primary | ICD-10-CM

## 2023-09-08 DIAGNOSIS — J45.30 MILD PERSISTENT ASTHMA, UNSPECIFIED WHETHER COMPLICATED: ICD-10-CM

## 2023-09-11 ENCOUNTER — TELEPHONE (OUTPATIENT)
Dept: ENDOCRINOLOGY | Facility: CLINIC | Age: 38
End: 2023-09-11
Payer: COMMERCIAL

## 2023-09-11 NOTE — TELEPHONE ENCOUNTER
Per Dr. Manning, Wegovy 1.7mg sent as alternative. Patient notified of med change via [employer].

## 2023-09-11 NOTE — TELEPHONE ENCOUNTER
PRIOR AUTHORIZATION DENIED    Medication: WEGOVY 1.7 MG/0.75ML SC SOAJ  Insurance Company: [employer] - Phone [phone] Fax [phone]  Denial Date: 9/11/2023  Denial Rational: Plan exclusion. Weight loss medications not covered under insurance plan.       Appeal Information: N/A - cannot appeal  Patient Notified:   No          Thank you,     Jerry Sanz [facility]  Pharmacy Clinic [facility]  [email or web address]   Phone: [phone]  Fax: [phone]

## 2023-09-11 NOTE — TELEPHONE ENCOUNTER
PA Initiation    Medication: WEGOVY 1.7 MG/0.75ML SC SOAJ  Insurance Company: [employer] - Phone [phone] Fax [phone]  Pharmacy Filling the Rx: [id number] - [place], [place] - [address]  Filling Pharmacy Phone: [phone]  Filling Pharmacy Fax: [phone]  Start Date: 9/11/2023

## 2023-09-12 RX ORDER — MONTELUKAST SODIUM 10 MG/1
1 TABLET ORAL AT BEDTIME
Qty: 90 TABLET | Refills: 0 | Status: SHIPPED | OUTPATIENT
Start: 2023-09-12 | End: 2023-12-15

## 2023-09-12 NOTE — CONFIDENTIAL NOTE
"    Health Psychology - Follow up Visit  Confidential Summary*    The author of this note documented a reason for not sharing it with the patient.  REFERRAL SOURCE:  Psychiatry    CHIEF COMPLAINT/REASON FOR VISIT  Psychotherapy in context of chronic PTSD and persistent depression.  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      Patient was seen today for a 60 minute individual psychotherapy session.  The session was facilitated via AMXifra Businesswith patient at her home and provider at her own home.  Please note, patient running late for appt and when she tried to log into [employer], the visit was already cancelled.         This telehealth service is appropriate and effective for delivering services in light of the necessity for social distancing to mitigate the COVID-19 epidemic. Patient has agreed to receiving telehealth services.    The patient has been notified of following:   \"This VIDEO visit will be conducted via a call between you and your physician/provider. We have found that certain health care needs can be provided without the need for an in-person physical exam.  VIDEO visits are billed at different rates depending on your insurance coverage.  Please reach out to your insurance provider with any questions. If during the course of the call the physician/provider feels a video visit is not appropriate, you will not be charged for this service.\"     Patient has given verbal consent for VIDEO visit? Yes    Subjective:  Patient began with report that she is experiencing increased stress associated with delay in receipt of her financial aid checks.  She reported that she is confident that she will eventually receive these funds but that she will again need to fight to receive them.  She was provided validation that her " situation is anxiety provoking and that the efforts to get to the source of this delay is exhausting.  She described multiple phone calls and emails in an attempt to understand what the financial aid department is needing from her.  She reported that she has not been able to pay her rent but that she has been able to catch up on her car payments.  She described having to juggle what she is paying for in order to balance limited funds and her need for food, clothing, shelter and transportation.      She reported that she is looking forward to attending two upcoming conferences, both in the [place].  She described hope that she will be able to soon complete her dissertation proposal document so that she might move on to the next steps in her educational journey.      Patient agreed to open the document detailing feedback from her committee on her first draft of her proposal.  She was noted to describe an awareness that she anticipated that reading negative input would increase her negative self evaluation and increase her anger.  She reported that she appreciates that we reviewed feedback together.  Encouraged her to consider this experience in the future.    Objective:  Patient was on time for today s session. She was alert and oriented. Mood was dysphoric with appropriate range of affect. Patient denied suicidal or assaultive ideation, plan, or intent.        Assessment:  The patient has a longstanding history of interpersonal discord and challenges with emotion regulation.  She has completed all requirements for her PhD and is now ABD, working on her proposal.  She is living in  housing with her two dogs.  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  She is dating. Patient uses avoidance as a primary coping tool.    Plan:  Patient graduated from full model DBT at [facility] and is now completing phase 2 work.      Treatment plan updated:  8/31/23    Time In:  2:00  Time Out: 3:00    Diagnosis:  Axis I PTSD, chronic, persistent depressive disorder, adjustment disorder with mixed, psychological factors associated with physical (fibromyalgia)   Axis II  BPD   Axis III please see medical records for details   [place] IV Psychosocial and Environmental Stressors: living alone with no family support, chronic illnesses, dissertation stress, financial stress         Corina Muhammad, PhD, LP

## 2023-09-12 NOTE — TELEPHONE ENCOUNTER
Last Clinic Visit: 8/3/2023 [facility]    Test(s)- Asthma Control Test past due.  [place] refill of Singulair was sent for a 90 day supply and FYI to NP clinic.   *>6 months last ACT but noting that test done on 6/14/2022 was at goal =20

## 2023-09-13 ENCOUNTER — TELEPHONE (OUTPATIENT)
Dept: ENDOCRINOLOGY | Facility: CLINIC | Age: 38
End: 2023-09-13
Payer: COMMERCIAL

## 2023-09-13 DIAGNOSIS — E66.3 OVERWEIGHT (BMI 25.0-29.9): Primary | ICD-10-CM

## 2023-09-13 NOTE — TELEPHONE ENCOUNTER
Medication Question or Refill    Contacts         Type Contact Phone/Fax    09/13/2023 01:16 PM CDT Phone (Incoming) Kasandra Lau (Self) [phone] (M)            What medication are you calling about (include dose and sig)?:     Semaglutide-Weight Management (WEGOVY) 1.7 MG/0.75ML pen     Preferred Pharmacy:  [place] [id number] [place] [facility] - [address]  [address] [place] [id number]  Phone: [phone] Fax: [phone]      Controlled Substance Agreement on file:   CSA -- Patient Level:    CSA: None found at the patient level.       Who prescribed the medication?: Tasma      Do you have any questions or concerns?  Yes:     > pt call as prior auth denied as 'not covered for obesity.   Pt would like appeal started, and is leaving country for 2 weeks on Monday (hopes done by then, if possible)    Please advice via [facility] msg...    Could we send this information to you in [employer] or would you prefer to receive a phone call?:   Patient would like to be contacted via [employer]

## 2023-09-13 NOTE — TELEPHONE ENCOUNTER
Sent [employer] message to patient informing her of denial. Weight loss meds are excluded. This cannot be appealed. Per Dr. Manning, offered Phentermine as alterative option.

## 2023-09-14 ENCOUNTER — VIRTUAL VISIT (OUTPATIENT)
Dept: PSYCHOLOGY | Facility: CLINIC | Age: 38
End: 2023-09-14
Payer: COMMERCIAL

## 2023-09-14 DIAGNOSIS — F34.1 PERSISTENT DEPRESSIVE DISORDER: ICD-10-CM

## 2023-09-14 DIAGNOSIS — F43.12 CHRONIC POST-TRAUMATIC STRESS DISORDER (PTSD): ICD-10-CM

## 2023-09-14 DIAGNOSIS — F54 PSYCHOLOGICAL AND BEHAVIORAL FACTORS ASSOCIATED WITH DISORDERS OR DISEASES CLASSIFIED ELSEWHERE: ICD-10-CM

## 2023-09-14 DIAGNOSIS — F43.12 CHRONIC POST-TRAUMATIC STRESS DISORDER: Primary | ICD-10-CM

## 2023-09-14 PROCEDURE — 90837 PSYTX W PT 60 MINUTES: CPT | Mod: 95 | Performed by: PSYCHOLOGIST

## 2023-09-15 DIAGNOSIS — E66.3 OVERWEIGHT (BMI 25.0-29.9): Primary | ICD-10-CM

## 2023-09-15 RX ORDER — PHENTERMINE AND TOPIRAMATE 7.5; 46 MG/1; MG/1
1 CAPSULE, EXTENDED RELEASE ORAL DAILY
Qty: 90 CAPSULE | Refills: 1 | Status: SHIPPED | OUTPATIENT
Start: 2023-09-15 | End: 2023-10-19 | Stop reason: DRUGHIGH

## 2023-09-15 RX ORDER — PHENTERMINE AND TOPIRAMATE 3.75; 23 MG/1; MG/1
1 CAPSULE, EXTENDED RELEASE ORAL DAILY
Qty: 14 CAPSULE | Refills: 0 | Status: SHIPPED | OUTPATIENT
Start: 2023-09-15 | End: 2023-10-19 | Stop reason: DRUGHIGH

## 2023-09-19 ENCOUNTER — PRE VISIT (OUTPATIENT)
Dept: UROLOGY | Facility: CLINIC | Age: 38
End: 2023-09-19

## 2023-09-22 NOTE — CONFIDENTIAL NOTE
"    Health Psychology - Follow up Visit  Confidential Summary*    The author of this note documented a reason for not sharing it with the patient.  REFERRAL SOURCE:  Psychiatry    CHIEF COMPLAINT/REASON FOR VISIT  Psychotherapy in context of chronic PTSD and persistent depression.  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      Patient was seen today for a 60 minute individual psychotherapy session.  The session was facilitated via [email or web address] patient at her home and provider at her own home.  Please note, patient running late for appt and when she tried to log into [employer], the visit was already cancelled.         This telehealth service is appropriate and effective for delivering services in light of the necessity for social distancing to mitigate the COVID-19 epidemic. Patient has agreed to receiving telehealth services.    The patient has been notified of following:   \"This VIDEO visit will be conducted via a call between you and your physician/provider. We have found that certain health care needs can be provided without the need for an in-person physical exam.  VIDEO visits are billed at different rates depending on your insurance coverage.  Please reach out to your insurance provider with any questions. If during the course of the call the physician/provider feels a video visit is not appropriate, you will not be charged for this service.\"     Patient has given verbal consent for VIDEO visit? Yes    Subjective:  Patient began with a review of her increase stress associated with failure of her full financial aid payments to be distributed.  She reported that she is now 6 months behind in her school loan payments and is also behind in her rent and car payment.  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  She reported that with her dissertation proposal, papers and 2 trips this semester, she does not believe that she has time to work a job for extra income.  She reported that she has learned that her insurance will no longer cover ozempic.  She is concerned about weight gain should she be forced to discontinue this medication.  She has been diagnosed with non alcoholic fatty liver disease and is hoping that she might fight the insurance decision based on her need to maintain her weight loss.      Discussed how she might use behavioral techniques to assist in ongoing weight loss maintenance.      Patient will miss the upcoming two Thursday meetings because she will be traveling for an academic meeting, however, she requested that we schedule on September 29 at 1.      Objective:  Patient was on time for today s session. She was alert and oriented. Mood was euthymic with appropriate range of affect. Patient denied suicidal or assaultive ideation, plan, or intent.        Assessment:  The patient has a longstanding history of interpersonal discord and challenges with emotion regulation.  She has completed all requirements for her PhD and is now ABD, working on her proposal.  She is living in  housing with her two dogs.  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  She is dating. Patient uses avoidance as a primary coping tool.    Plan:  Patient graduated from full model DBT at [facility] and is now completing phase 2 work.      Treatment plan updated:  8/31/23    Time In: 2:00  Time Out: 3:00    Diagnosis:  Axis I PTSD, chronic, persistent depressive disorder, adjustment disorder with mixed, psychological factors associated with physical (fibromyalgia)   Axis II  BPD   Axis III please see medical records for details   [place] IV Psychosocial and Environmental Stressors: living alone with no family support, chronic illnesses, dissertation  stress, financial stress         Corina Muhammad, PhD, LP

## 2023-09-25 ENCOUNTER — TELEPHONE (OUTPATIENT)
Dept: UROLOGY | Facility: CLINIC | Age: 38
End: 2023-09-25
Payer: COMMERCIAL

## 2023-09-25 NOTE — TELEPHONE ENCOUNTER
Left detailed message regarding checkout notes, chanel nunez advised to reschedule appointment with any ANTONI since appointment did not happen. Provided call center number to assist in scheduling.    -Check Out Comments: Schedule with next available ANTONI to discuss recurrent UTIs (schedule as NEW since today's visit was not completed)

## 2023-09-30 NOTE — PROGRESS NOTES
Kasandra Lau is a 38 year old female who presents today with one week history of nausea, diarrhea, heartburn and fatigue.  She consistently has 2-3 stools/day, originally they were watery, they are less so but still very loose.  For one week she had nausea, that is better.  Her appetite is poor, she is taking fluids well.  She tried imodium, it made her feel worse so she stopped taking it, it did not help much with the diarrhea.  She tried Mylanta, it was somewhat helpful.      Review Of Systems   ROS: 10 point ROS neg other than the symptoms noted above in the HPI.      Past Medical History:   Diagnosis Date     Anemia fall 2016     Anxiety      Arthritis      Chronic fatigue      Depression (emotion)     sees psych, on meds     Gastroesophageal reflux disease      Migraine     daily meds, 2x month, more mild on meds     Neuropathic pain      Panic disorder      Uncomplicated asthma Spring 2018     Past Surgical History:   Procedure Laterality Date     COLONOSCOPY       LAPAROSCOPIC ABLATION ENDOMETRIOSIS N/A 11/09/2016    Procedure: LAPAROSCOPIC ABLATION ENDOMETRIOSIS;  Surgeon: Tonja Ferrer MD;  [facility]: [facility]     LAPAROSCOPIC CYSTECTOMY OVARIAN (BENIGN) Left 11/09/2016    Procedure: LAPAROSCOPIC CYSTECTOMY OVARIAN (BENIGN);  Surgeon: Tonja Ferrer MD;  [facility]     LAPAROSCOPIC TUBAL DYE STUDY Left 11/09/2016    Procedure: LAPAROSCOPIC TUBAL DYE STUDY;  Surgeon: Tonja Ferrer MD;  [facility]     LAPAROSCOPY OPERATIVE ADULT N/A 11/09/2016    Procedure: LAPAROSCOPY OPERATIVE ADULT;  Surgeon: Tonja Ferrer MD;  [facility]     MAMMOPLASTY REDUCTION Bilateral 08/05/2021    Procedure: MAMMOPLASTY, REDUCTION, Bilateral;  Surgeon: ANTONIO Moreno MD;  Location: [facility] OR     ORTHOPEDIC SURGERY      left wrist surgery     TONSILLECTOMY & ADENOIDECTOMY Bilateral     cauterized turbinates also     Social History     Socioeconomic History     Marital status:  Patient called out stating she did not feel good. States her right arm was hurting and it wasn't due to the blood pressure cuff. Also complaining of chest pain. Awake, alert, and oriented. Vitals stable. PRN EKG done. Showed to Dr. Bunn for comparison. Updated when he sent secure chat to hospitalist. Patient left comfortable, cb in reach. Will continue to monitor for any changes.    "Single     Spouse name: Not on file     Number of children: Not on file     Years of education: Not on file     Highest education level: Not on file   Occupational History     Not on file   Tobacco Use     Smoking status: Former     Packs/day: 0.00     Years: 10.00     Pack years: 0.00     Types: Cigarettes     Smokeless tobacco: Never     Tobacco comments:     smokes occasionally socially    Vaping Use     Vaping Use: Never used   Substance and Sexual Activity     Alcohol use: Not Currently     Alcohol/week: 0.0 standard drinks of alcohol     Comment: occasional      Drug use: Not Currently     Types: Marijuana     Comment: marijuana  none since May 2021     Sexual activity: Yes     Partners: Male     Birth control/protection: I.U.D.     Comment: Nuvaring   Other Topics Concern     Not on file   Social History Narrative    Grad student in geography     Social Determinants of Health     Financial Resource Strain: Not on file   Food Insecurity: Not on file   Transportation Needs: Not on file   Physical Activity: Not on file   Stress: Not on file   Social Connections: Not on file   Interpersonal Safety: Not on file   Housing Stability: Not on file     Family History   Problem Relation Age of Onset     Diabetes Maternal Grandfather      Hypertension No family hx of      Coronary Artery Disease No family hx of      Hyperlipidemia No family hx of      Cerebrovascular Disease No family hx of      Breast Cancer No family hx of      Colon Cancer No family hx of      Prostate Cancer No family hx of      Other Cancer No family hx of      Glaucoma No family hx of      Macular Degeneration No family hx of        /75   Pulse 87   Temp 98.7  F (37.1  C) (Oral)   Ht 1.72 m (5' 7.7\")   Wt 70.7 kg (155 lb 14.4 oz)   SpO2 98%   BMI 23.92 kg/m      Exam:  Constitutional: alert and mild distress  Head: Normocephalic. No masses, lesions, tenderness or abnormalities  Neck: Neck supple. No adenopathy. Thyroid symmetric, normal " size,, Carotids without bruits.  ENT: ENT exam normal, no neck nodes or sinus tenderness  Cardiovascular: negative, PMI normal. No lifts, heaves, or thrills. RRR. No murmurs, clicks gallops or rub  Respiratory: negative, Percussion normal. Good diaphragmatic excursion. Lungs clear  Gastrointestinal: Abdomen soft, non-tender. BS normal. No masses, organomegaly  : Deferred  Psychiatric: mentation appears normal and affect normal/bright  Hematologic/Lymphatic/Immunologic: Normal cervical lymph nodes    Assessment/Plan:  1. Diarrhea, unspecified type    - ciprofloxacin (CIPRO) 500 MG tablet; Take 1 tablet (500 mg) by mouth 2 times daily  Dispense: 14 tablet; Refill: 0  - Enteric Bacteria and Virus Panel by ALANIS Stool; Future  - CBC with platelets differential; Future  - Comprehensive metabolic panel; Future  - Symptomatic COVID-19 Virus (Coronavirus) by PCR; Future      2. Other fatigue    - ciprofloxacin (CIPRO) 500 MG tablet; Take 1 tablet (500 mg) by mouth 2 times daily  Dispense: 14 tablet; Refill: 0  - Symptomatic COVID-19 Virus (Coronavirus) by PCR; Future      3. Nausea    - ondansetron (ZOFRAN ODT) 4 MG ODT tab; Take 1-2 tablets (4-8 mg) by mouth every 8 hours as needed for nausea  Dispense: 20 tablet; Refill: 1  - Symptomatic COVID-19 Virus (Coronavirus) by PCR; Future    Follow up in 48-72 hours if symptoms persist or worsen.  We will follow up after labs back.        Options for treatment and follow-up care were reviewed with the patient. Patient engaged in the decision making process and verbalized understanding of the options discussed and agreed with the final plan.

## 2023-10-02 ENCOUNTER — VIRTUAL VISIT (OUTPATIENT)
Dept: PSYCHIATRY | Facility: CLINIC | Age: 38
End: 2023-10-02
Payer: COMMERCIAL

## 2023-10-02 DIAGNOSIS — F43.12 CHRONIC POST-TRAUMATIC STRESS DISORDER (PTSD): ICD-10-CM

## 2023-10-02 DIAGNOSIS — F41.1 GENERALIZED ANXIETY DISORDER: ICD-10-CM

## 2023-10-02 DIAGNOSIS — F33.1 MAJOR DEPRESSIVE DISORDER, RECURRENT EPISODE, MODERATE (H): Primary | ICD-10-CM

## 2023-10-02 DIAGNOSIS — F90.8 ATTENTION DEFICIT HYPERACTIVITY DISORDER (ADHD), OTHER TYPE: ICD-10-CM

## 2023-10-02 PROCEDURE — 99214 OFFICE O/P EST MOD 30 MIN: CPT | Mod: VID | Performed by: NURSE PRACTITIONER

## 2023-10-02 RX ORDER — BUPROPION HYDROCHLORIDE 300 MG/1
300 TABLET ORAL EVERY MORNING
Qty: 90 TABLET | Refills: 1 | Status: SHIPPED | OUTPATIENT
Start: 2023-10-02 | End: 2024-04-16

## 2023-10-02 RX ORDER — VILAZODONE HYDROCHLORIDE 40 MG/1
40 TABLET ORAL EVERY MORNING
Qty: 90 TABLET | Refills: 1 | Status: SHIPPED | OUTPATIENT
Start: 2023-10-02 | End: 2024-04-16

## 2023-10-02 RX ORDER — ATOMOXETINE 60 MG/1
60 CAPSULE ORAL DAILY
Qty: 90 CAPSULE | Refills: 1 | Status: SHIPPED | OUTPATIENT
Start: 2023-10-02 | End: 2024-02-26 | Stop reason: DRUGHIGH

## 2023-10-02 ASSESSMENT — ANXIETY QUESTIONNAIRES
GAD7 TOTAL SCORE: 7
GAD7 TOTAL SCORE: 7
1. FEELING NERVOUS, ANXIOUS, OR ON EDGE: MORE THAN HALF THE DAYS
5. BEING SO RESTLESS THAT IT IS HARD TO SIT STILL: NOT AT ALL
7. FEELING AFRAID AS IF SOMETHING AWFUL MIGHT HAPPEN: SEVERAL DAYS
IF YOU CHECKED OFF ANY PROBLEMS ON THIS QUESTIONNAIRE, HOW DIFFICULT HAVE THESE PROBLEMS MADE IT FOR YOU TO DO YOUR WORK, TAKE CARE OF THINGS AT HOME, OR GET ALONG WITH OTHER PEOPLE: SOMEWHAT DIFFICULT
6. BECOMING EASILY ANNOYED OR IRRITABLE: SEVERAL DAYS
4. TROUBLE RELAXING: SEVERAL DAYS
2. NOT BEING ABLE TO STOP OR CONTROL WORRYING: SEVERAL DAYS
3. WORRYING TOO MUCH ABOUT DIFFERENT THINGS: SEVERAL DAYS

## 2023-10-02 ASSESSMENT — PATIENT HEALTH QUESTIONNAIRE - PHQ9
10. IF YOU CHECKED OFF ANY PROBLEMS, HOW DIFFICULT HAVE THESE PROBLEMS MADE IT FOR YOU TO DO YOUR WORK, TAKE CARE OF THINGS AT HOME, OR GET ALONG WITH OTHER PEOPLE: SOMEWHAT DIFFICULT
SUM OF ALL RESPONSES TO PHQ QUESTIONS 1-9: 4
SUM OF ALL RESPONSES TO PHQ QUESTIONS 1-9: 4

## 2023-10-02 NOTE — NURSING NOTE
Is the patient currently in the state of [place]? YES    Visit mode:VIDEO    If the visit is dropped, the patient can be reconnected by: VIDEO VISIT: Text to cell phone:   Telephone Information:   Mobile [phone]       Will anyone else be joining the visit? No  (If patient encounters technical issues they should call [phone])    How would you like to obtain your AVS? MyChart    Are changes needed to the allergy or medication list? No    Rooming Documentation: Patient will complete qnrs in mychart.    Reason for visit: LILIYA Rodriguez

## 2023-10-02 NOTE — PROGRESS NOTES
"Virtual Visit Details    Type of service:  Video Visit     Originating Location (pt. Location): Home    Distant Location (provider location):  On-site  Platform used for Video Visit: Chad      Start time: 2:12 PM  Stop time: 2:45 PM             Outpatient Psychiatric Progress Note    Name: Kasandra Lau   : 1985                    Primary Care Provider: JASVIR Ashley CNP   Therapist: Yes    PHQ-9 scores:      6/15/2023     1:24 PM 2023     1:22 PM 10/2/2023     1:48 PM   PHQ-9 SCORE   PHQ-9 Total Score MyChart 5 (Mild depression) 5 (Mild depression) 4 (Minimal depression)   PHQ-9 Total Score 5 5 4       VIC-7 scores:      2023    12:23 PM 6/15/2023     1:24 PM 10/2/2023     1:49 PM   VIC-7 SCORE   Total Score 8 (mild anxiety) 11 (moderate anxiety) 7 (mild anxiety)   Total Score 8 11    11 7       Patient Identification:    Patient is a 38 year old year old, single   Not  or  female  who presents for return visit with me.  Patient is currently employed part time and a student. Patient attended the session alone. Patient prefers to be called: \" Kasandra\".    Current medications include: almotriptan (AXERT) 12.5 MG tablet, Take 1 tablet (12.5 mg) by mouth at onset of headache for migraine (repeat in 2 hours if needed) May repeat in 2 hours. Max 2 tablets/24 hours.  atomoxetine (STRATTERA) 60 MG capsule, Take 1 capsule (60 mg) by mouth daily  buPROPion (WELLBUTRIN XL) 300 MG 24 hr tablet, Take 1 tablet (300 mg) by mouth every morning  cholecalciferol 125 MCG (5000 UT) CAPS, Take 5,000 Units by mouth every evening  ciprofloxacin (CIPRO) 500 MG tablet, Take 1 tablet (500 mg) by mouth 2 times daily  doxepin (SINEQUAN) 10 MG capsule, Take 2 capsules (20 mg) by mouth 2 times daily  EMGALITY 120 MG/ML injection, Inject 1 mL (120 mg) Subcutaneous every 28 days  EPINEPHrine (ANY BX GENERIC EQUIV) 0.3 MG/0.3ML injection 2-pack, Inject 0.3 mg into the muscle as needed  famotidine " (PEPCID) 20 MG tablet, TAKE 1 TABLET BY MOUTH TWICE A DAY  fexofenadine (ALLEGRA) 180 MG tablet, Take 1 tablet (180 mg) by mouth every morning  fluticasone (FLONASE) 50 MCG/ACT nasal spray, 2 times daily as needed   gabapentin (NEURONTIN) 400 MG capsule, Take 2 capsules (800 mg) by mouth 3 times daily  guaiFENesin (MUCINEX) 600 MG 12 hr tablet, Take 600 mg by mouth daily as needed  hydroxychloroquine (PLAQUENIL) 200 MG tablet, Take 2 tablets (400 mg) by mouth daily  levonorgestrel (MIRENA) 20 MCG/DAY IUD, 1 each (20 mcg) by Intrauterine route once for 1 dose  montelukast (SINGULAIR) 10 MG tablet, Take 1 tablet (10 mg) by mouth At Bedtime  ondansetron (ZOFRAN ODT) 4 MG ODT tab, Take 1-2 tablets (4-8 mg) by mouth every 8 hours as needed for nausea  ondansetron (ZOFRAN ODT) 4 MG ODT tab, Take 1 tablet (4 mg) by mouth every 8 hours as needed for nausea  Phentermine-Topiramate (QSYMIA) 3.75-23 MG CP24, Take 1 capsule by mouth daily  Phentermine-Topiramate (QSYMIA) 7.5-46 MG CP24, Take 1 capsule by mouth daily  phenylephrine HCl 10 MG TABS, Take 10 mg by mouth 2 times daily  propranolol (INDERAL) 20 MG tablet, Take 1 tablet (20 mg) by mouth daily as needed (anxiety)  vilazodone (VIIBRYD) 40 MG TABS tablet, Take 1 tablet (40 mg) by mouth every morning    No current facility-administered medications on file prior to visit.       The [place] Prescription Monitoring Program has been reviewed and there are no concerns about diversionary activity for controlled substances at this time.      I was able to review most recent Primary Care Provider, specialty provider, and therapy visit notes that I have access to.     Today, patient reports She is teaching two classes.  [employer] is threatening to not fund her as she has been in the program too long.  She is responsible for grading student papers.  She just got back from [place] - possible funding opportunities were explored.  She plans to go to [employer] in January.  Due to  traveling she is  broke now.  She is hoping to start a post doc degree Spring 2025.  Her perspectus is overdue.  She has high anxiety - starting to wake up again in the early mornig - worrying about various things.  Medications have helped - she feels less scattered.  She was taking Ozempic - insurance stopped paying for it - took it for weight loss.  Now on Qsymia.  Humidity and heat make her break out in hives - doxepin decreases this. She identifies no support network..  She does have some friends through her [employer] work.  Sees therapist sheela.       has a past medical history of Anemia (fall 2016), Anxiety, Arthritis, Chronic fatigue, Depression (emotion), Gastroesophageal reflux disease, Migraine, Neuropathic pain, Panic disorder, and Uncomplicated asthma (Spring 2018).    Social history updates:    She will no longer qualify for loans in Fall of 2024.      Substance use updates:    No alcohol use reported  Tobacco use: No    Vital Signs:   There were no vitals taken for this visit.    Labs:    Most recent laboratory results reviewed and no new labs.     Mental Status Examination:  Appearance: awake, alert, casual dress, and mild distress  Attitude: cooperative  Eye Contact:  adequate  Gait and Station: No dizziness or falls  Psychomotor Behavior:  intact station, gait and muscle tone  Oriented to:  time, person, and place  Attention Span and Concentration:  Normal  Speech:   vtspeech: clear, coherent and Speaks: English  Mood:  anxious and depressed  Affect:  mood congruent  Associations:  no loose associations  Thought Process:  goal oriented  Thought Content:  no evidence of suicidal ideation or homicidal ideation, no auditory hallucinations present, and no visual hallucinations present  Recent and Remote Memory:  intact Not formally assessed. No amnesia.  Fund of Knowledge: appropriate  Insight:  good  Judgment: good  Impulse Control:  good    Suicide Risk Assessment:  Today Kasandra Lau reports no  thoughts to harm themself or others. In addition, there are notable risk factors for self-harm, including single status, anxiety, and withdrawing. However, risk is mitigated by history of seeking help when needed, future oriented, denies suicidal intent or plan, and denies homicidal ideation, intent, or plan. Therefore, based on all available evidence including the factors cited above, Kasandra Lau does not appear to be at imminent risk for self-harm, does not meet criteria for a 72-hr hold, and therefore remains appropriate for ongoing outpatient level of care.  A thorough assessment of risk factors related to suicide and self-harm have been reviewed and are noted above. The patient convincingly denies suicidality on several occasions. Local community safety resources printed and reviewed for patient to use if needed. There was no deceit detected, and the patient presented in a manner that was believable.     DSM5 Diagnosis:  Attention-Deficit/Hyperactivity Disorder  314.01 (F90.8) Other Specified Attention-Deficit / Hyperactiity Disorder  296.32 (F33.1) Major Depressive Disorder, Recurrent Episode, Moderate _ and With mixed features  300.02 (F41.1) Generalized Anxiety Disorder  309.81 (F43.10) Posttraumatic Stress Disorder (includes Posttraumatic Stress Disorder for Children 6 Years and Younger)  Without dissociative symptoms    Medical comorbidities include:   Patient Active Problem List    Diagnosis Date Noted    Alopecia 04/25/2023     Priority: Medium    Chronic pain of both shoulders 03/28/2023     Priority: Medium    Acute pharyngitis due to other specified organisms 02/28/2023     Priority: Medium    Endometriosis 10/11/2022     Priority: Medium    Keratosis pilaris 06/14/2022     Priority: Medium    Chronic sinusitis, unspecified location 09/21/2021     Priority: Medium    Hypertrophy of breast 09/10/2020     Priority: Medium     Added automatically from request for surgery [id number]        Pap smear  abnormality of cervix/human papillomavirus (HPV) positive 09/03/2020     Priority: Medium     10/10/17 NIL pap, + HR HPV (not 16/18)  1/31/19 NILM HPV  Positive for High Risk HPV types other than 16 or 18 (Care Everywhere)  2/21/19 [facility] ECC: benign. Plan 1 year cotest  2020 NILM HPV negative 34 y.o. PLAN, per ASCCP Guidelines: cotest 1/2023 2/24/23 NIL pap, neg HR HPV. Plan 3 year cotest        Cholecystitis 07/16/2020     Priority: Medium     Added automatically from request for surgery [id number]        Chronic idiopathic urticaria 06/22/2020     Priority: Medium    Hx of abnormal cervical Pap smear 02/03/2020     Priority: Medium    Acid reflux 08/25/2019     Priority: Medium    Need for desensitization to allergens 06/06/2019     Priority: Medium     Formatting of this note might be different from the original.    Allergy Shot Care Plan for Kasandra Lau  Date of Order: June 6 2019  Ordering Provider: Dr. Martin Arreaga 1: Cat  Date Shots Started:  Start Dose: 0.1 mL of 1:100,000 - GREEN  Date Maintenance Reached:  Maintenance Dose: 0.3 mL of 1:100 - RED    Serum 2: Mite DF and Mite DP  Date Shots Started:  Start Dose: 0.1 mL of 1:100,000 - GREEN  Date Maintenance Reached:  Maintenance Dose: 0.1 mL of 1:100 - RED    BUILDING SCHEDULE FOR POLLEN AND NONPOLLEN   IMMUNOTHERAPY  EXCEPT VENOM AND CENTER AL    3 - 14 days Build per schedule.   15 - 21 days Repeat previous dose.   22 - 28 days Decrease by one dose.   29 - 35 days Decrease by two doses.   36 - 42 days Decrease by three doses.   Over 42 days Continue to decrease by one dose for each additional week.     1:100,000 (green)  1:10,000 (blue)  1:1000 (yellow)  1:100 (red)     1. 0.05 ml  7. 0.05 ml  13. 0.05 ml  19. 0.05 ml   2. 0.1 ml  8. 0.1 ml  14. 0.1 ml  20. 0.1 ml   3. 0.2 ml  9. 0.2 ml  15. 0.2 ml  21. 0.15 ml   4. 0.3 ml  10. 0.3 ml  16. 0.3 ml  22. 0.2 ml   5. 0.4 ml  11. 0.4 ml  17. 0.4 ml  23. 0.25 ml   6. 0.45 ml  12. 0.45 ml  18. 0.45 ml  24.  0.3 ml         25   0.35 ml         26   0.4 ml         27. 0.45 ml         28. 0.5 ml     MAINTENANCE SCHEDULE FOR POLLENS, NONPOLLENS (MITES,MOLD,CAT,DOG)  (not used for Center-[facility] Pollens)    ? When reaching maintenance dose for the first time, gradually stretch by weekly intervals to every 4 weeks (e.g., every 2 weeks, then 3 weeks, then 4 weeks).   ? Patient may receive their injections (patient choice) at 2-4 week intervals     Maintenance Repeat 1 Dose 2 Dose 3 Dose    Q2 weeks  (10-14 days)  3 weeks  (15-21 days) 4 weeks  (22-28 days) 5 weeks  (29-35 days) 6 weeks  (36-42 days) Decrease by 1 Dose for each additional week   Q3 weeks  (15-21 days)  4 weeks  (22-28 days) 5 weeks  (29-35 days) 6 weeks  (36-42 days) 7 weeks  (43-49 days)    Q4 weeks  (22-28 days)  5 weeks  (29-35 days) 6 weeks  (36-42 days) 7 weeks  (43-49 days) 8 weeks  (50-56 days)      Susanna Ambrose RN 6/6/2019 4:49 PM        Mild intermittent asthma without complication 04/30/2018     Priority: Medium    Seasonal mood disorder (H24) 11/15/2017     Priority: Medium    Circadian rhythm sleep disorder, delayed sleep phase type 05/10/2017     Priority: Medium    Unhealthy sleep habit 05/10/2017     Priority: Medium    Vitamin D deficiency 11/19/2016     Priority: Medium    Menorrhagia with regular cycle 11/19/2016     Priority: Medium    Migraine without aura and without status migrainosus, not intractable 11/19/2016     Priority: Medium    Iron deficiency anemia due to chronic blood loss 11/19/2016     Priority: Medium    Tired 11/19/2016     Priority: Medium    Irritable bowel syndrome with constipation 10/25/2016     Priority: Medium    Recurrent major depressive disorder, in remission (H24) 10/25/2016     Priority: Medium    Pelvic pain in female 08/25/2016     Priority: Medium    Migraine headache 01/01/2012     Priority: Medium    Panic disorder 01/01/2007     Priority: Medium    Depression 01/01/2004     Priority: Medium        Assessment:    Kasandra Lau is beginning to have an increase in her anxiety as 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  However, the atomoxetine is helping her stay on task.  She takes the doxepin to help her avoid breaking out in rashes when she gets overheated.  Propranolol and gabapentin are available for anxiety.  To help with depression bupropion is ordered at 300 mg daily..  She is tolerating the Viibryd well for managing anxiety and depression symptoms.  She has very few identifiable support networks in place but she does see a therapist regularly.    Medication side effects and alternatives were reviewed. Health promotion activities recommended and reviewed today. All questions addressed. Education and counseling completed regarding risks and benefits of medications and psychotherapy options.    Treatment Plan:      1.  Atomoxetine 60 mg daily  2.  Bupropion  mg daily  3.  Doxepin 20 mg 2 times daily  4.  Gabapentin 800 mg 3 times daily  5.  Propranolol 20 mg daily as needed for anxiety  6.  Continue Viibryd 40 mg daily  7.  Continue talk therapy to learn more skills to manage life stressors    Continue all other medications as reviewed per electronic medical record today.   Safety plan reviewed. To the Emergency Department as needed or call after hours crisis line at [phone] or [phone]. [place] Crisis Text Line. Text [place] to 422610 or Suicide LifeLine Chat: suicidepreventionlifeline.org/chat/  To schedule individual or family therapy, call [facility] at [phone]  Schedule an appointment with me in 3 months or sooner as needed. Call [facility] at [phone] to schedule.  Follow up with primary care provider as planned or for acute medical concerns.  Call the psychiatric nurse line with medication questions or concerns at [phone]  MyChart may be used  to communicate with your provider, but this is not intended to be used for emergencies.    Crisis Resources:    National Suicide Prevention Lifeline: 174.183.4679 (TTY: 167.532.9662). Call anytime for help.  (www.suicidepreventionlifeline.org)  National [place] [employer] Illness (www.elver.org): 692.548.3185 or 794-650-1818.   Mental Health Association (www.mentalhealth.org): 159.799.4680 or 661-980-3306.  [place] Crisis Text Line: Text [place] to 362609  Suicide LifeLine Chat: suicideTranSiCline.org/chat    Administrative Billing:   Time spent with patient includes counseling and coordination of care regarding above diagnoses and treatment plan.    Patient Status:  This is a continuous care patient and medications will be prescribed by the psychiatric provider until further indicated.    Signed:   OUSMANE Fitzgerald-BC   Psychiatry

## 2023-10-03 NOTE — PATIENT INSTRUCTIONS
"Patient Education   The [facility] Program  What to Expect  Here's what to expect in the Panel Psychiatry Program.   About the program  You'll be meeting with a psychiatric doctor to check your mental health. A psychiatric doctor helps you deal with troubling thoughts and feelings by giving you medicine. They'll make sure you know the plan for your care. You may see them for a long time. When you're feeling better, they may refer you back to seeing your family doctor.   If you have any questions, we'll be glad to talk to you.  About visits  Be open  At your visits, please talk openly about your problems. It may feel hard, but it's the best way for us to help you.  Cancelling visits  If you can't come to your visit, please call us right away at [phone]. If you don't cancel at least 24 hours (1 full day) before your visit, that's \"late cancellation.\"  Not showing up for your visits  Being very late is the same as not showing up. You'll be a \"no show\" if:  You're more than 15 minutes late for a 30-minute (half hour) visit.  You're more than 30 minutes late for a 60-minute (full hour) visit.  If you cancel late or don't show up 2 times within 6 months, we may end your care.  Getting help between visits  If you need help between visits, you can call us Monday to Friday from 8 a.m. to 4:30 p.m. at [phone].  Emergency care  Call 911 or go to the nearest emergency department if your life or someone else's life is in danger.  Call 988 anytime to reach the national Suicide and Crisis hotline.  Medicine refills  To refill your medicine, call your pharmacy. You can also call [facility]'s Behavior[facility] Access at [phone], Monday to Friday, 8 a.m. to 4:30 p.m. It can take 1 to 3 business days to get a refill.   Forms, letters, and tests  You may have papers to fill out, like FMLA, short-term disability, and workability. We can help you with these forms at your visits, but you must have an " appointment. You may need more than 1 visit for this, to be in an intensive therapy program, or both.  Before we can give you medicine for ADHD, we may refer you to get tested for it or confirm it another way.  We may not be able to give you an emotional support animal letter.  We don't do mental health checks ordered by the court.   We don't do mental health testing, but we can refer you to get tested.   Thank you for choosing us for your care.  For informational purposes only. Not to replace the advice of your health care provider. Copyright   2022 [facility] [employer]. All rights reserved. [employer] [id number] - 12/22.         Treatment Plan:      1.  Atomoxetine 60 mg daily  2.  Bupropion  mg daily  3.  Doxepin 20 mg 2 times daily  4.  Gabapentin 800 mg 3 times daily  5.  Propranolol 20 mg daily as needed for anxiety  6.  Continue Viibryd 40 mg daily  7.  Continue talk therapy to learn more skills to manage life stressors    Continue all other medications as reviewed per electronic medical record today.   Safety plan reviewed. To the Emergency Department as needed or call after hours crisis line at [phone] or [phone]. [place] Crisis Text Line. Text [place] to 926286 or Suicide LifeLine Chat: suicidepreventionSociactline.org/chat/  To schedule individual or family therapy, call [facility] at [phone]  Schedule an appointment with me in 3 months or sooner as needed. Call [facility] at [phone] to schedule.  Follow up with primary care provider as planned or for acute medical concerns.  Call the psychiatric nurse line with medication questions or concerns at [phone]  MyChart may be used to communicate with your provider, but this is not intended to be used for emergencies.    Crisis Resources:    National Suicide Prevention Lifeline: 451.693.6642 (TTY: 568.874.3644). Call anytime for help.  (www.suicidepreventionlifeline.org)  National [place] on Mental  Illness (www.elver.org): 398-782-0982 or 479-641-0039.   Mental Health Association (www.mentalhealth.org): 806.985.9990 or 550-168-7925.  [place] Crisis Text Line: Text [place] to 268318  Suicide LifeLine Chat: suicidepreventionlifeline.org/chat

## 2023-10-05 ENCOUNTER — VIRTUAL VISIT (OUTPATIENT)
Dept: PSYCHOLOGY | Facility: CLINIC | Age: 38
End: 2023-10-05
Payer: COMMERCIAL

## 2023-10-05 DIAGNOSIS — F54 PSYCHOLOGICAL AND BEHAVIORAL FACTORS ASSOCIATED WITH DISORDERS OR DISEASES CLASSIFIED ELSEWHERE: ICD-10-CM

## 2023-10-05 DIAGNOSIS — F43.12 CHRONIC POST-TRAUMATIC STRESS DISORDER (PTSD): ICD-10-CM

## 2023-10-05 DIAGNOSIS — F34.1 PERSISTENT DEPRESSIVE DISORDER: Primary | ICD-10-CM

## 2023-10-05 PROCEDURE — 90837 PSYTX W PT 60 MINUTES: CPT | Mod: VID | Performed by: PSYCHOLOGIST

## 2023-10-06 ENCOUNTER — OFFICE VISIT (OUTPATIENT)
Dept: FAMILY MEDICINE | Facility: CLINIC | Age: 38
End: 2023-10-06
Payer: COMMERCIAL

## 2023-10-06 VITALS
HEART RATE: 98 BPM | SYSTOLIC BLOOD PRESSURE: 104 MMHG | OXYGEN SATURATION: 99 % | DIASTOLIC BLOOD PRESSURE: 72 MMHG | TEMPERATURE: 98.4 F

## 2023-10-06 DIAGNOSIS — Z23 ENCOUNTER FOR ADMINISTRATION OF VACCINE: ICD-10-CM

## 2023-10-06 DIAGNOSIS — Z11.52 ENCOUNTER FOR SCREENING LABORATORY TESTING FOR COVID-19 VIRUS: Primary | ICD-10-CM

## 2023-10-06 LAB — SARS-COV-2 RNA RESP QL NAA+PROBE: NEGATIVE

## 2023-10-06 PROCEDURE — 87635 SARS-COV-2 COVID-19 AMP PRB: CPT | Mod: ORL

## 2023-10-06 NOTE — NURSING NOTE
ROOM:  [facility]    Preferred Name: Kasandra     How did you hear about us?  Current Patient    38 year old  Chief Complaint   Patient presents with     Covid 19 Testing     Flu Shot       Blood pressure 104/72, pulse 98, temperature 98.4  F (36.9  C), temperature source Oral, SpO2 99 %, not currently breastfeeding. There is no height or weight on file to calculate BMI.  BP completed using cuff size:        Patient Active Problem List   Diagnosis     Pelvic pain in female     Vitamin D deficiency     Menorrhagia with regular cycle     Migraine without aura and without status migrainosus, not intractable     Iron deficiency anemia due to chronic blood loss     Tired     Circadian rhythm sleep disorder, delayed sleep phase type     Unhealthy sleep habit     Seasonal mood disorder (H24)     Chronic idiopathic urticaria     Acid reflux     Cholecystitis     Depression     Hx of abnormal cervical Pap smear     Irritable bowel syndrome with constipation     Migraine headache     Mild intermittent asthma without complication     Need for desensitization to allergens     Panic disorder     Pap smear abnormality of cervix/human papillomavirus (HPV) positive     Recurrent major depressive disorder, in remission (H24)     Hypertrophy of breast     Chronic sinusitis, unspecified location     Keratosis pilaris     Endometriosis     Acute pharyngitis due to other specified organisms     Chronic pain of both shoulders     Alopecia       Wt Readings from Last 2 Encounters:   08/03/23 70.7 kg (155 lb 14.4 oz)   07/18/23 70.3 kg (155 lb)     BP Readings from Last 3 Encounters:   10/06/23 104/72   08/03/23 104/75   07/14/23 132/85       Allergies   Allergen Reactions     Dust Mites Cough, Difficulty breathing and Shortness Of Breath     Cats      Chest tightness, sinus irritation       Current Outpatient Medications   Medication     almotriptan (AXERT) 12.5 MG tablet     atomoxetine (STRATTERA) 60 MG capsule     buPROPion  (WELLBUTRIN XL) 300 MG 24 hr tablet     cholecalciferol 125 MCG (5000 UT) CAPS     ciprofloxacin (CIPRO) 500 MG tablet     doxepin (SINEQUAN) 10 MG capsule     EMGALITY 120 MG/ML injection     EPINEPHrine (ANY BX GENERIC EQUIV) 0.3 MG/0.3ML injection 2-pack     famotidine (PEPCID) 20 MG tablet     fexofenadine (ALLEGRA) 180 MG tablet     fluticasone (FLONASE) 50 MCG/ACT nasal spray     gabapentin (NEURONTIN) 400 MG capsule     guaiFENesin (MUCINEX) 600 MG 12 hr tablet     hydroxychloroquine (PLAQUENIL) 200 MG tablet     levonorgestrel (MIRENA) 20 MCG/DAY IUD     montelukast (SINGULAIR) 10 MG tablet     ondansetron (ZOFRAN ODT) 4 MG ODT tab     ondansetron (ZOFRAN ODT) 4 MG ODT tab     Phentermine-Topiramate (QSYMIA) 3.75-23 MG CP24     Phentermine-Topiramate (QSYMIA) 7.5-46 MG CP24     phenylephrine HCl 10 MG TABS     propranolol (INDERAL) 20 MG tablet     vilazodone (VIIBRYD) 40 MG TABS tablet     No current facility-administered medications for this visit.       Social History     Tobacco Use     Smoking status: Former     Packs/day: 0.00     Years: 10.00     Pack years: 0.00     Types: Cigarettes     Smokeless tobacco: Never     Tobacco comments:     smokes occasionally socially    Vaping Use     Vaping Use: Never used   Substance Use Topics     Alcohol use: Not Currently     Alcohol/week: 0.0 standard drinks of alcohol     Comment: occasional      Drug use: Not Currently     Types: Marijuana     Comment: marijuana  none since May 2021       Honoring Choices - Health Care Directive Guide offered to patient at time of visit.    Health Maintenance Due   Topic Date Due     ASTHMA ACTION PLAN  Never done     ADVANCE CARE PLANNING  Never done     HEPATITIS B IMMUNIZATION (1 of 3 - 3-dose series) Never done     YEARLY PREVENTIVE VISIT  01/31/2020     Pneumococcal Vaccine: Pediatrics (0 to 5 Years) and At-Risk Patients (6 to 64 Years) (2 - PCV) 05/22/2020     ASTHMA CONTROL TEST  12/14/2022     INFLUENZA VACCINE (1)  09/01/2023     COVID-19 Vaccine (5 - 2023-24 season) 09/01/2023       Immunization History   Administered Date(s) Administered     COVID-19 Bivalent 12+ (Pfizer) 09/19/2022     COVID-19 MONOVALENT 12+ (Pfizer) 03/19/2021, 04/09/2021     COVID-19 Monovalent 12+ (Pfizer 2022) 01/10/2022     DTaP, Unspecified 05/22/2019     Flu, Unspecified 09/23/2016     Influenza Vaccine >6 months (Alfuria,Fluzone) 09/23/2016, 09/07/2018, 08/28/2019, 09/02/2020, 11/02/2021, 09/19/2022     Pneumococcal 23 valent 05/22/2019     TDAP (Adacel,Boostrix) 05/22/2019       Lab Results   Component Value Date    PAP NIL 01/31/2019       Recent Labs   Lab Test 08/03/23  1604 07/13/23  1554 04/25/23  1232 11/07/22  1241 08/19/22  0835 08/19/22  0835 04/13/22  1600 01/04/22  1732 08/12/21  1535 09/12/20 2058 08/02/19  1711   LDL  --   --   --   --   --  100  --   --   --   --   --    HDL  --   --   --   --   --  43*  --   --   --   --   --    TRIG  --   --   --   --   --  184*  --   --   --   --   --    ALT  --  16  --  134*  --  60*   < >  --    < > 36 25   CR 0.81 0.70  --  0.83  --  0.69   < >  --    < > 0.77 0.80   GFRESTIMATED >90 >90  --  >90  --  >90   < >  --    < > >90 >90   GFRESTBLACK  --   --   --   --   --   --   --   --   --  >90 >90   ALBUMIN 4.4 4.3  --  4.4   < > 3.6   < >  --    < > 3.3* 3.4   POTASSIUM 4.5 4.2  --  3.8   < >  --    < >  --    < > 3.9 3.5   TSH  --   --  2.12  --   --   --   --  1.81  --   --   --     < > = values in this interval not displayed.           9/19/2023     2:00 PM 8/3/2023     3:26 PM   PHQ-2 ( 1999 Pfizer)   Q1: Little interest or pleasure in doing things 0 0   Q2: Feeling down, depressed or hopeless 0 0   PHQ-2 Score 0 0           1/12/2023    12:22 PM 6/15/2023     1:24 PM 6/30/2023     1:22 PM 10/2/2023     1:48 PM   PHQ-9 SCORE   PHQ-9 Total Score MyChart 5 (Mild depression) 5 (Mild depression) 5 (Mild depression) 4 (Minimal depression)   PHQ-9 Total Score 5 5 5 4           1/12/2023    12:23  PM 6/15/2023     1:24 PM 10/2/2023     1:49 PM   VIC-7 SCORE   Total Score 8 (mild anxiety) 11 (moderate anxiety) 7 (mild anxiety)   Total Score 8 11    11 7           6/14/2022    11:07 AM   ACT Total Scores   ACT TOTAL SCORE (Goal Greater than or Equal to 20) 24   In the past 12 months, how many times did you visit the emergency room for your asthma without being admitted to the hospital? 2   In the past 12 months, how many times were you hospitalized overnight because of your asthma? 0       Diogo Hardin    October 6, 2023 3:23 PM

## 2023-10-06 NOTE — PROGRESS NOTES
Assessment & Plan   Kasandra was seen today for covid 19 testing and flu shot.    Diagnoses and all orders for this visit:    Encounter for screening laboratory testing for COVID-19 virus  -     Asymptomatic COVID-19 Virus (Coronavirus) by PCR    Encounter for administration of vaccine  -     INFLUENZA VACCINE >6 MONTHS (AFLURIA/FLUZONE)    RTC PRN     Charis ANTONIOAmira Marsurya, NP   ______________________________________    Subjective   Kasandra is a 38 year old patient here today for Covid 19 Testing and Flu Shot    Recent international travel. Returned to [place] 9/28. Known positive contacts. Patient is asymptomatic. No fever, chills, cough, rhinorrhea, SOB, chest tightness. She feels overall well. She desires PCR testing to prevent transmission to others. She is due for a COVID-19 booster and flu shot.      Do you need any refills on your Medications today? No      Medical, surgical, family and social histories reviewed and updated as indicated.   Medications and allergies reviewed and updated as indicated.       ROS  Review Of Systems  See HPI      General Physical Exam:  Vitals: /72   Pulse 98   Temp 98.4  F (36.9  C) (Oral)   SpO2 99%   Physical Exam  Vitals and nursing note reviewed.   Constitutional:       General: She is not in acute distress.     Appearance: Normal appearance. She is not ill-appearing.   HENT:      Head: Normocephalic and atraumatic.   Eyes:      General: Lids are normal.      Conjunctiva/sclera: Conjunctivae normal.   Cardiovascular:      Rate and Rhythm: Normal rate and regular rhythm.   Pulmonary:      Effort: Pulmonary effort is normal.      Breath sounds: Normal breath sounds.   Skin:     Comments: Skin intact with no visible lesions.   Neurological:      General: No focal deficit present.      Mental Status: She is alert and oriented to person, place, and time.      Gait: Gait is intact.   Psychiatric:         Mood and Affect: Mood normal.         Behavior: Behavior normal.         Thought  Content: Thought content normal.         Judgment: Judgment normal.

## 2023-10-10 NOTE — CONFIDENTIAL NOTE
"    Health Psychology - Follow up Visit  Confidential Summary*    The author of this note documented a reason for not sharing it with the patient.  REFERRAL SOURCE:  Psychiatry    CHIEF COMPLAINT/REASON FOR VISIT  Psychotherapy in context of chronic PTSD and persistent depression.  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      Patient was seen today for a 60 minute individual psychotherapy session.  The session was facilitated via [facility] patient at her home and provider at her own home.  Please note, patient running late for appt and when she tried to log into [employer], the visit was already cancelled.         This telehealth service is appropriate and effective for delivering services in light of the necessity for social distancing to mitigate the COVID-19 epidemic. Patient has agreed to receiving telehealth services.    The patient has been notified of following:   \"This VIDEO visit will be conducted via a call between you and your physician/provider. We have found that certain health care needs can be provided without the need for an in-person physical exam.  VIDEO visits are billed at different rates depending on your insurance coverage.  Please reach out to your insurance provider with any questions. If during the course of the call the physician/provider feels a video visit is not appropriate, you will not be charged for this service.\"     Patient has given verbal consent for VIDEO visit? Yes    Subjective:  Patient began with report that she thoroughly enjoyed her trip to [place] and [place] but that she is struggling with finances at present.  Discussed how she might make ends meet until she receives compensation from her university to cover the cost of these work related trips.  She was encouraged to resist " avoidance with her apartment management because she knows that she will be late on her rent for the next few months.  She was encouraged to consider what she has to do to move her dissertation forward.      Patient was confronted on her lack of progress in addressing the criticism of her dissertation chair.  She described frustration that he has not readily responded to her requests to meet however, she also reported that she has not responded to his email in which he agreed to meet.  He is retired and she described ambivalence about engaging him.  Encouraged her to consider her worthiness and all the work her chair has put into her success.  She also reported that another member of her committee has relocated and again, she described shame and guilt at having to repeatedly reach out for assistance.  Encouraged her to address her negative cognitions and to address distortions.      She reported that she met someone on her trip and that she is willing to send her draft of her proposal to him so that she might move her process forward.      Objective:  Patient was on time for today s session. She was alert and oriented. Mood was dysphoric with appropriate range of affect. Patient denied suicidal or assaultive ideation, plan, or intent.        Assessment:  The patient has a longstanding history of interpersonal discord and challenges with emotion regulation.  She has completed all requirements for her PhD and is now ABD, working on her proposal.  She is living in  housing with her two dogs.  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  She is dating. Patient uses avoidance as a primary coping tool.    Plan:  Patient graduated from full model DBT at [facility] and is now completing phase 2 work.      Treatment plan updated:  8/31/23    Time In: 2:00  Time Out: 3:00    Diagnosis:  Axis I PTSD, chronic, persistent depressive disorder, adjustment disorder with mixed,  psychological factors associated with physical (fibromyalgia)   Axis II  BPD   Axis III please see medical records for details   [place] IV Psychosocial and Environmental Stressors: living alone with no family support, chronic illnesses, dissertation stress, financial stress         Corina Muhammad, PhD, LP

## 2023-10-12 ENCOUNTER — VIRTUAL VISIT (OUTPATIENT)
Dept: PSYCHOLOGY | Facility: CLINIC | Age: 38
End: 2023-10-12
Payer: COMMERCIAL

## 2023-10-12 DIAGNOSIS — F43.12 CHRONIC POST-TRAUMATIC STRESS DISORDER (PTSD): Primary | Chronic | ICD-10-CM

## 2023-10-12 DIAGNOSIS — F34.1 PERSISTENT DEPRESSIVE DISORDER: ICD-10-CM

## 2023-10-12 DIAGNOSIS — F54 PSYCHOLOGICAL AND BEHAVIORAL FACTORS ASSOCIATED WITH DISORDERS OR DISEASES CLASSIFIED ELSEWHERE: ICD-10-CM

## 2023-10-12 PROCEDURE — 90837 PSYTX W PT 60 MINUTES: CPT | Mod: VID | Performed by: PSYCHOLOGIST

## 2023-10-19 ENCOUNTER — OFFICE VISIT (OUTPATIENT)
Dept: RHEUMATOLOGY | Facility: CLINIC | Age: 38
End: 2023-10-19
Payer: COMMERCIAL

## 2023-10-19 ENCOUNTER — TELEPHONE (OUTPATIENT)
Dept: ENDOCRINOLOGY | Facility: CLINIC | Age: 38
End: 2023-10-19

## 2023-10-19 VITALS
OXYGEN SATURATION: 99 % | WEIGHT: 165 LBS | RESPIRATION RATE: 16 BRPM | HEART RATE: 83 BPM | HEIGHT: 68 IN | BODY MASS INDEX: 25.01 KG/M2 | SYSTOLIC BLOOD PRESSURE: 103 MMHG | DIASTOLIC BLOOD PRESSURE: 70 MMHG

## 2023-10-19 DIAGNOSIS — Z79.631 LONG TERM METHOTREXATE USER: ICD-10-CM

## 2023-10-19 DIAGNOSIS — E66.3 OVERWEIGHT (BMI 25.0-29.9): Primary | ICD-10-CM

## 2023-10-19 DIAGNOSIS — M19.90 INFLAMMATORY ARTHRITIS: Primary | ICD-10-CM

## 2023-10-19 PROCEDURE — 99214 OFFICE O/P EST MOD 30 MIN: CPT | Performed by: INTERNAL MEDICINE

## 2023-10-19 RX ORDER — HYDROXYCHLOROQUINE SULFATE 200 MG/1
400 TABLET, FILM COATED ORAL DAILY
Qty: 180 TABLET | Refills: 2 | Status: SHIPPED | OUTPATIENT
Start: 2023-10-19

## 2023-10-19 RX ORDER — PHENTERMINE AND TOPIRAMATE 11.25; 69 MG/1; MG/1
1 CAPSULE, EXTENDED RELEASE ORAL DAILY
Qty: 90 CAPSULE | Refills: 0 | Status: SHIPPED | OUTPATIENT
Start: 2023-10-19 | End: 2023-10-24

## 2023-10-19 NOTE — PATIENT INSTRUCTIONS
Diagnosis:  1.  Inflammatory arthritis, continued improvement, but persistent morning stiffness and pain in several joints suggests ongoing inflammatory arthritis.  Recommend adding methotrexate to hydroxychloroquine.    Plan:  1.  Continue hydroxychloroquine 400 mg once daily.  While using hydroxychloroquine, undergo annual ophthalmology evaluation to rule out rare Plaquenil toxicity.  2.  Start methotrexate 6 tablets (15 mg) once weekly.While on methotrexate:   -- Check blood tests every 3 months (AST/ALT, Albumin, CBC with platelets)   -- Limit alcohol intake to 2 drinks weekly; use folate 1 mg daily.  --Tylenol 500-1000 mg can be used as needed up to three times daily for nausea/headache associated with dosing.    --Use at least one form of contraception (current, IUD) while using methotrexate, because of risk of birth defects.    Methotrexate full effectiveness at the starting dose may be felt at 4 to 6 weeks.  Please give progress report to rheumatology regarding how joints are doing after 4-6 doses of methotrexate.    3.  For residual joint pain, use acetaminophen 1000 mg up to 3 times daily.

## 2023-10-19 NOTE — PROGRESS NOTES
Rheumatology Clinic Visit  [facility]  Arnie Bennett M.D.     Kasandra Lau MRN# [id number]   YOB: 1985 Age: 38 year old   Date of Visit: 10/19/2023  Primary care provider: Roxy Rios          Assessment and Plan:     # Positive STEPHANIE, +centromere, inflammatory polyarthritis:  Patient relates persistent moderate symmetrical small and medium joint pain, and 1 hour of morning stiffness remains. Exam shows full painless range of motion in PIPs and PIPs; tenderness at MCPs 3 and 4 on the left.  Wrist, elbow, and shoulder range of motion is normal.    Data: Blood work on August 3, 2023 showed comprehensive metabolic panel normal; CBC normal.   In May 2021, STEPHANIE was positive at a titer of 1: 160; extractable nuclear antigen panel was negative, as was double-stranded DNA.  CRP was normal.  Complement C3 and C4 were negative.  Hepatitis B and C were negative.  Antigliadin and antitissue transglutaminase antibodies were negative.  Celiac panel was negative.    Discussion: Prednisone sensitive inflammatory polyarthritis associated with low titer positive STEPHANIE remains modestly improved in association with start of hydroxychloroquine in the summer 2022.  However significant requirement for daily nonsteroidal therapy remains and morning stiffness suggest the presence of an inflammatory component.  I recommend addition of methotrexate.  I recommend continuing hydroxychloroquine.    # Non-alcoholic fatty liver disease: Persistent ALT of elevation in 2022 in association with use of Ozempic; patient recently switched to phentermine, and most recent transaminases were normal.    # Carpal tunnel syndrome: Hand and wrist symptoms are improved with use of splints.  EMGs were repeated in recent months, and showed no median nerve compression.  I recommend monitoring hand and finger symptoms when starting methotrexate, as a component of chronic symptoms may be due to inflammatory  polyarthritis.    Plan:  1.  Continue hydroxychloroquine 400 mg once daily.  While using hydroxychloroquine, undergo annual ophthalmology evaluation to rule out rare Plaquenil toxicity.  2.  Start methotrexate 6 tablets (15 mg) once weekly.While on methotrexate:   -- Check blood tests every 3 months (AST/ALT, Albumin, CBC with platelets)   -- Limit alcohol intake to 2 drinks weekly; use folate 1 mg daily.  --Tylenol 500-1000 mg can be used as needed up to three times daily for nausea/headache associated with dosing.    Methotrexate full effectiveness at the starting dose may be felt at 4 to 6 weeks.  Please give progress report to rheumatology regarding how joints are doing after 4-6 doses of methotrexate.    3.  For residual joint pain, use acetaminophen 1000 mg up to 3 times daily.    RTC 7 mos    No orders of the defined types were placed in this encounter.    Arnie Bennett MD  Staff Rheumatologist, [facility]           History of Present Illness:   Kasandra Lau presents for follow-up of inflammatory arthritis and positive STEPHANIE.  I last saw the patient 3-2023. At that visit, inflammatory arthritis remained significantly improved.  I recommended continuing hydroxychloroquine and considering addition of methotrexate.    Interval history October 19, 2023    Wrists, knees and ankles continue painful on a daily basis. Changing weather associated with more joint pain. Physical exercise associated with worsening pain  Visible swelling at joints not dramatic.  Ibuprofen 400 mg twice daily; well tolerated.  Hydroxychloroquine takes 400 mg daily.  No prednisone recently; she started phentermine after ozempic was no longer paid for.  Early morning stiffness is still several hours.  She is starting to do pilates.    Interval history March 16, 2023    She continues doing much better.  Joint pains are less. Her wrists still hurt, especially when she is typing. Her thumbs still get sore with activity. Her hands/fingers are less  "swollen. Early morning stiffness is still 1 hour. Still has CTS; uses compression sleeves when typing.  She notes more widespread joint pain with x country skiing that she started.  She used prednisone for treating respiratory symptoms recently, but not for her joints.  Continues hydroxychloroquine 400 mg daily. No AE from the hydroxychloroquine.  She uses tylenol 1000 mg daily; not using ibuprofen.    Interval history December 22, 2022    Hydroxychloroquine was started in November 2022, 400 mg daily.    She is \"doing a lot better\".  Joint pains are less. Her wrists still hurt, especially when she is typing. Her thumbs still get sore with activity. Her hands/fingers are less swollen. Early morning stiffness is not significant.  She noted prednisone helped her hands quickly. She noted feeling \"on edge\" while on the drug. Once she started hydroxychloroquine, joint pain dissipated wihtin  Weeks to a month. No AE from the hydroxychloroquine.  No new skin, joint symptoms. 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  She has been using much less tylenol or ibuprofen since last visit.   No skin, CV, neuro, GI symptoms.       History 8-2022    She notes increasing \"carpal tunnel\" symptoms; also increased pain in her hands and other joints. There is \"diffuse achiness\", especially wrists, ankles, fingers. Hard to type, hard to hold things. There has been puffiness in L > R hands. Symptoms are progressing, especially hands.    Mornings are \"rough\" with stiffness, brain fog, fatigue. Early morning stiffness is ~ 1 hour. Toes/forefeet are stiff and can cramp. Moist heat helps. 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    Tylenol and ibuprofen help with morning pain; menthol creams also help.    Muscle pain also is prominent.         Review of Systems:     Constitutional: negative  Skin: \"bumps\" on upper arms, not itchy; Dermatology told her \"genetic\" cause; rec'd " moisturizing lotion.  Eyes: negative  Ears/Nose/Throat: s/p tonsillectomy  Respiratory: No shortness of breath, dyspnea on exertion, cough, or hemoptysis; uses mucinex to help breathing.  Cardiovascular: negative  Gastrointestinal: IBS sx  Genitourinary: negative  Musculoskeletal: negative  Neurologic: negative  Psychiatric: negative  Hematologic/Lymphatic/Immunologic: negative  Endocrine: negative         Active Problem List:     Patient Active Problem List    Diagnosis Date Noted    Alopecia 04/25/2023     Priority: Medium    Chronic pain of both shoulders 03/28/2023     Priority: Medium    Acute pharyngitis due to other specified organisms 02/28/2023     Priority: Medium    Endometriosis 10/11/2022     Priority: Medium    Keratosis pilaris 06/14/2022     Priority: Medium    Chronic sinusitis, unspecified location 09/21/2021     Priority: Medium    Hypertrophy of breast 09/10/2020     Priority: Medium     Added automatically from request for surgery [id number]      Pap smear abnormality of cervix/human papillomavirus (HPV) positive 09/03/2020     Priority: Medium     10/10/17 NIL pap, + HR HPV (not 16/18)  1/31/19 NILM HPV  Positive for High Risk HPV types other than 16 or 18 (Care Everywhere)  2/21/19 [place] ECC: benign. Plan 1 year cotest  2020 NILM HPV negative 34 y.o. PLAN, per ASCCP Guidelines: cotest 1/2023 2/24/23 NIL pap, neg HR HPV. Plan 3 year cotest      Cholecystitis 07/16/2020     Priority: Medium     Added automatically from request for surgery [id number]      Chronic idiopathic urticaria 06/22/2020     Priority: Medium    Hx of abnormal cervical Pap smear 02/03/2020     Priority: Medium    Acid reflux 08/25/2019     Priority: Medium    Need for desensitization to allergens 06/06/2019     Priority: Medium     Formatting of this note might be different from the original.    Allergy Shot Care Plan for Kasandra Lau  Date of Order: June 6 2019  Ordering Provider: Dr. Martin Arreaga 1: Cat  Date Shots  Started:  Start Dose: 0.1 mL of 1:100,000 - GREEN  Date Maintenance Reached:  Maintenance Dose: 0.3 mL of 1:100 - RED    Serum 2: Mite DF and Mite DP  Date Shots Started:  Start Dose: 0.1 mL of 1:100,000 - GREEN  Date Maintenance Reached:  Maintenance Dose: 0.1 mL of 1:100 - RED    BUILDING SCHEDULE FOR POLLEN AND NONPOLLEN   IMMUNOTHERAPY  EXCEPT VENOM AND CENTER AL    3 - 14 days Build per schedule.   15 - 21 days Repeat previous dose.   22 - 28 days Decrease by one dose.   29 - 35 days Decrease by two doses.   36 - 42 days Decrease by three doses.   Over 42 days Continue to decrease by one dose for each additional week.     1:100,000 (green)  1:10,000 (blue)  1:1000 (yellow)  1:100 (red)     1. 0.05 ml  7. 0.05 ml  13. 0.05 ml  19. 0.05 ml   2. 0.1 ml  8. 0.1 ml  14. 0.1 ml  20. 0.1 ml   3. 0.2 ml  9. 0.2 ml  15. 0.2 ml  21. 0.15 ml   4. 0.3 ml  10. 0.3 ml  16. 0.3 ml  22. 0.2 ml   5. 0.4 ml  11. 0.4 ml  17. 0.4 ml  23. 0.25 ml   6. 0.45 ml  12. 0.45 ml  18. 0.45 ml  24. 0.3 ml         25   0.35 ml         26   0.4 ml         27. 0.45 ml         28. 0.5 ml     MAINTENANCE SCHEDULE FOR POLLENS, NONPOLLENS (MITES,MOLD,CAT,DOG)  (not used for Center-[facility] Pollens)    ? When reaching maintenance dose for the first time, gradually stretch by weekly intervals to every 4 weeks (e.g., every 2 weeks, then 3 weeks, then 4 weeks).   ? Patient may receive their injections (patient choice) at 2-4 week intervals     Maintenance Repeat 1 Dose 2 Dose 3 Dose    Q2 weeks  (10-14 days)  3 weeks  (15-21 days) 4 weeks  (22-28 days) 5 weeks  (29-35 days) 6 weeks  (36-42 days) Decrease by 1 Dose for each additional week   Q3 weeks  (15-21 days)  4 weeks  (22-28 days) 5 weeks  (29-35 days) 6 weeks  (36-42 days) 7 weeks  (43-49 days)    Q4 weeks  (22-28 days)  5 weeks  (29-35 days) 6 weeks  (36-42 days) 7 weeks  (43-49 days) 8 weeks  (50-56 days)      Susanna Ambrose RN 6/6/2019 4:49 PM      Mild intermittent asthma without complication  04/30/2018     Priority: Medium    Seasonal mood disorder (H24) 11/15/2017     Priority: Medium    Circadian rhythm sleep disorder, delayed sleep phase type 05/10/2017     Priority: Medium    Unhealthy sleep habit 05/10/2017     Priority: Medium    Vitamin D deficiency 11/19/2016     Priority: Medium    Menorrhagia with regular cycle 11/19/2016     Priority: Medium    Migraine without aura and without status migrainosus, not intractable 11/19/2016     Priority: Medium    Iron deficiency anemia due to chronic blood loss 11/19/2016     Priority: Medium    Tired 11/19/2016     Priority: Medium    Irritable bowel syndrome with constipation 10/25/2016     Priority: Medium    Recurrent major depressive disorder, in remission (H24) 10/25/2016     Priority: Medium    Pelvic pain in female 08/25/2016     Priority: Medium    Migraine headache 01/01/2012     Priority: Medium    Panic disorder 01/01/2007     Priority: Medium    Depression 01/01/2004     Priority: Medium            Past Medical History:     Past Medical History:   Diagnosis Date    Anemia fall 2016    Anxiety     Arthritis     Chronic fatigue     Depression (emotion)     sees psych, on meds    Gastroesophageal reflux disease     Migraine     daily meds, 2x month, more mild on meds    Neuropathic pain     Panic disorder     Uncomplicated asthma Spring 2018     Past Surgical History:   Procedure Laterality Date    COLONOSCOPY      LAPAROSCOPIC ABLATION ENDOMETRIOSIS N/A 11/09/2016    Procedure: LAPAROSCOPIC ABLATION ENDOMETRIOSIS;  Surgeon: Tonja Ferrer MD;  Location: [facility] OR    LAPAROSCOPIC CYSTECTOMY OVARIAN (BENIGN) Left 11/09/2016    Procedure: LAPAROSCOPIC CYSTECTOMY OVARIAN (BENIGN);  Surgeon: Tonja Ferrer MD;  Location: [facility] OR    LAPAROSCOPIC TUBAL DYE STUDY Left 11/09/2016    Procedure: LAPAROSCOPIC TUBAL DYE STUDY;  Surgeon: Tonja Ferrer MD;  Location: [facility] OR    LAPAROSCOPY OPERATIVE ADULT N/A 11/09/2016    Procedure:  LAPAROSCOPY OPERATIVE ADULT;  Surgeon: Tonja Ferrer MD;  Location: [facility] OR    MAMMOPLASTY REDUCTION Bilateral 08/05/2021    Procedure: MAMMOPLASTY, REDUCTION, Bilateral;  Surgeon: ANTONIO Moreno MD;  Location: [facility] OR    ORTHOPEDIC SURGERY      left wrist surgery    TONSILLECTOMY & ADENOIDECTOMY Bilateral     cauterized turbinates also            Social History:     Social History     Socioeconomic History    Marital status: Single     Spouse name: Not on file    Number of children: Not on file    Years of education: Not on file    Highest education level: Not on file   Occupational History    Not on file   Tobacco Use    Smoking status: Former     Packs/day: 0.00     Years: 10.00     Additional pack years: 0.00     Total pack years: 0.00     Types: Cigarettes    Smokeless tobacco: Never    Tobacco comments:     smokes occasionally socially    Vaping Use    Vaping Use: Never used   Substance and Sexual Activity    Alcohol use: Not Currently     Alcohol/week: 0.0 standard drinks of alcohol     Comment: occasional     Drug use: Not Currently     Types: Marijuana     Comment: marijuana  none since May 2021    Sexual activity: Yes     Partners: Male     Birth control/protection: I.U.D.     Comment: Nuvaring   Other Topics Concern    Not on file   Social History Narrative    Grad student in geography     Social Determinants of Health     Financial Resource Strain: Not on file   Food Insecurity: Not on file   Transportation Needs: Not on file   Physical Activity: Not on file   Stress: Not on file   Social Connections: Not on file   Interpersonal Safety: Not on file   Housing Stability: Not on file          Family History:     Family History   Problem Relation Age of Onset    Diabetes Maternal Grandfather     Hypertension No family hx of     Coronary Artery Disease No family hx of     Hyperlipidemia No family hx of     Cerebrovascular Disease No family hx of     Breast Cancer No family hx of     Colon  Cancer No family hx of     Prostate Cancer No family hx of     Other Cancer No family hx of     Glaucoma No family hx of     Macular Degeneration No family hx of             Allergies:     Allergies   Allergen Reactions    Dust Mites Cough, Difficulty breathing and Shortness Of Breath    Cats      Chest tightness, sinus irritation            Medications:     Current Outpatient Medications   Medication Sig Dispense Refill    almotriptan (AXERT) 12.5 MG tablet Take 1 tablet (12.5 mg) by mouth at onset of headache for migraine (repeat in 2 hours if needed) May repeat in 2 hours. Max 2 tablets/24 hours. 18 tablet 11    atomoxetine (STRATTERA) 60 MG capsule Take 1 capsule (60 mg) by mouth daily 90 capsule 1    buPROPion (WELLBUTRIN XL) 300 MG 24 hr tablet Take 1 tablet (300 mg) by mouth every morning 90 tablet 1    cholecalciferol 125 MCG (5000 UT) CAPS Take 5,000 Units by mouth every evening      ciprofloxacin (CIPRO) 500 MG tablet Take 1 tablet (500 mg) by mouth 2 times daily 14 tablet 0    doxepin (SINEQUAN) 10 MG capsule Take 2 capsules (20 mg) by mouth 2 times daily 120 capsule 11    EMGALITY 120 MG/ML injection Inject 1 mL (120 mg) Subcutaneous every 28 days 1 mL 11    EPINEPHrine (ANY BX GENERIC EQUIV) 0.3 MG/0.3ML injection 2-pack Inject 0.3 mg into the muscle as needed      famotidine (PEPCID) 20 MG tablet TAKE 1 TABLET BY MOUTH TWICE A  tablet 3    fexofenadine (ALLEGRA) 180 MG tablet Take 1 tablet (180 mg) by mouth every morning 90 tablet 3    fluticasone (FLONASE) 50 MCG/ACT nasal spray 2 times daily as needed       gabapentin (NEURONTIN) 400 MG capsule Take 2 capsules (800 mg) by mouth 3 times daily 180 capsule 5    guaiFENesin (MUCINEX) 600 MG 12 hr tablet Take 600 mg by mouth daily as needed      hydroxychloroquine (PLAQUENIL) 200 MG tablet Take 2 tablets (400 mg) by mouth daily 180 tablet 2    montelukast (SINGULAIR) 10 MG tablet Take 1 tablet (10 mg) by mouth At Bedtime 90 tablet 0    ondansetron  "(ZOFRAN ODT) 4 MG ODT tab Take 1-2 tablets (4-8 mg) by mouth every 8 hours as needed for nausea 20 tablet 1    ondansetron (ZOFRAN ODT) 4 MG ODT tab Take 1 tablet (4 mg) by mouth every 8 hours as needed for nausea 18 tablet 6    Phentermine-Topiramate (QSYMIA) 11.25-69 MG CP24 Take 1 capsule by mouth daily 90 capsule 0    phenylephrine HCl 10 MG TABS Take 10 mg by mouth 2 times daily      propranolol (INDERAL) 20 MG tablet Take 1 tablet (20 mg) by mouth daily as needed (anxiety) 30 tablet 11    vilazodone (VIIBRYD) 40 MG TABS tablet Take 1 tablet (40 mg) by mouth every morning 90 tablet 1    levonorgestrel (MIRENA) 20 MCG/DAY IUD 1 each (20 mcg) by Intrauterine route once for 1 dose 1 each 0            Physical Exam:   Blood pressure 103/70, pulse 83, resp. rate 16, height 1.72 m (5' 7.7\"), weight 74.8 kg (165 lb), SpO2 99%, not currently breastfeeding.  Wt Readings from Last 6 Encounters:   10/19/23 74.8 kg (165 lb)   08/03/23 70.7 kg (155 lb 14.4 oz)   07/18/23 70.3 kg (155 lb)   07/18/23 70.3 kg (155 lb)   07/14/23 72.3 kg (159 lb 4.8 oz)   07/13/23 72.1 kg (158 lb 14.4 oz)     Constitutional: well-developed, appearing stated age; cooperative  Eyes: nl EOM, PERRLA, vision, conjunctiva, sclera  ENT: nl external ears, nose, hearing, lips, teeth, gums, throat  No mucous membrane lesions, normal saliva pool  Neck: no mass or thyroid enlargement  Resp: l breathing unlabored  Lymph: no cervical, supraclavicular, inguinal or epitrochlear nodes  MS: Tenderness at multiple PIPs and MCPs with subtle puffiness at the hands.  No hidebound or bound down skin; no gross synovitis.  No tenderness at MTPs midfoot and ankle range of motion full and ankles nontender.  Skin: no nail pitting, alopecia, rash, nodules or lesions  Neuro: nl cranial nerves, strength, sensation, DTRs.   Psych: nl judgement, orientation, memory, affect.         Data:     @      Latest Ref Rng & Units 4/11/2023    12:42 PM 7/13/2023     3:54 PM 8/3/2023     " 4:04 PM   RHEUM RESULTS   Albumin 3.5 - 5.2 g/dL  4.3  4.4    ALT   16     AST   22     STEPHANIE interpretation Negative Borderline Positive      STEPHANIE pattern 1  Centromere      STEPHANIE titer 1  1:80      Creatinine 0.51 - 0.95 mg/dL  0.70  0.81    GFR Estimate >60 mL/min/1.73m2  >90  >90    Hematocrit 35.0 - 47.0 %  38.3  41.2    Hemoglobin 11.7 - 15.7 g/dL  12.5  13.5    Hepatitis C Antibody Nonreactive Nonreactive      IGA 84 - 499 mg/dL 283      WBC 4.0 - 11.0 10e3/uL  8.4  8.4    RBC Count 3.80 - 5.20 10e6/uL  4.10  4.43    RDW 10.0 - 15.0 %  12.0  11.9    MCHC 31.5 - 36.5 g/dL  32.6  32.8    MCV 78 - 100 fL  93  93    Platelet Count 150 - 450 10e3/uL  314  345         ,  ,  ,  ,  ,  ,  ,  ,  ,  ,  ,  ,  ,  ,  ,   Hep B Surface Agn   Date Value Ref Range Status   01/31/2019 Nonreactive NR^Nonreactive Final   ,  ,  ,  ,  ,  ,  ,  ,  ,  ,  ,  ,  ,  ,  ,  ,  ,  ,  ,  ,  ,  ,  ,  ,  ,  ,  ,  ,  ,         Rheumatology Clinic Visit  [facility]  Arnie Bennett M.D.     Kasandra Lau MRN# [id number]   YOB: 1985 Age: 38 year old   Date of Visit: 10/19/2023  Primary care provider: Roxy Rios          Assessment and Plan:     #Positive STEPHANIE, +centromere, progressive metrical small joint predominant polyarthralgia; fatigue, morning stiffness: Physical exam today shows tenderness at multiple PIPs and MCPs without altered range of motion or gross synovitis.    Laboratory evaluation in April 2022 showed basic metabolic panel and CBC normal except for mildly elevated platelets and white count.  Urinalysis showed increased white and red blood cell counts with large leukocyte esterase positivity.  In May 2021, STEPHANIE was positive at a titer of 1: 160.  Hepatitis B and C were negative.  Antigliadin and antitissue transglutaminase antibodies were negative.  Celiac panel was negative.    Discussion: Clinical picture is consistent with inflammatory or autoimmune arthritis.  Systemic lupus erythematosus or  "other connective tissue disease, rheumatoid arthritis are on the differential diagnosis.  I recommend a further work-up for inflammatory and autoimmune disorders in the blood and urine.  In addition, I recommend a \"diagnostic\" trial of prednisone at low-dose for 2 weeks.  I requested that patient follow-up by telephone or MyChart at the conclusion of the prednisone trial.  We discussed potential long-acting disease modifying agents depending on results of the work-up and of the prednisone trial.    Plan:  1.  Check lupus markers, inflammation markers, blood cells, kidney function, and urinalysis as well as markers of rheumatoid arthritis.  2.  Prednisone 15 mg once daily for a week, then 10 mg daily for a week, then off.  3.  Alert rheumatology by MyChart or telephone after completion of prednisone trial  4.  After completing prednisone, if symptoms recur, use ibuprofen 600 mg 3 times daily as needed with food for joint pain.  5.  If prednisone is helpful, inflammatory arthritis may be contributing to the symptoms and longer-term treatment may be helpful.  1 medication to consider is hydroxychloroquine.    RTC 3-4 mos    No orders of the defined types were placed in this encounter.        Arnie Bennett MD  Staff Rheumatologist, [facility]           History of Present Illness:   Kasandra Lau presents for evaluation of positive STEPHANIE. Last seen by Dr. Atwood in 7-2021.     She notes increasing \"carpal tunnel\" symptoms; also increased pain in her hands and other joints. There is \"diffuse achiness\", especially wrists, ankles, fingers. Hard to type, hard to hold things. There has been puffiness in L > R hands. Symptoms are progressing, especially hands.    Mornings are \"rough\" with stiffness, brain fog, fatigue. Early morning stiffness is ~ 1 hour. Toes/forefeet are stiff and can cramp. Moist heat helps. 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    Tylenol and ibuprofen help with morning " "pain; menthol creams also help.    Muscle pain also is prominent.         Review of Systems:     Constitutional: negative  Skin: \"bumps\" on upper arms, not itchy; Dermatology told her \"genetic\" cause; rec'd moisturizing lotion.  Eyes: negative  Ears/Nose/Throat: s/p tonsillectomy  Respiratory: No shortness of breath, dyspnea on exertion, cough, or hemoptysis; uses mucinex to help breathing.  Cardiovascular: negative  Gastrointestinal: IBS sx  Genitourinary: negative  Musculoskeletal: negative  Neurologic: negative  Psychiatric: negative  Hematologic/Lymphatic/Immunologic: negative  Endocrine: negative         Active Problem List:     Patient Active Problem List    Diagnosis Date Noted    Alopecia 04/25/2023     Priority: Medium    Chronic pain of both shoulders 03/28/2023     Priority: Medium    Acute pharyngitis due to other specified organisms 02/28/2023     Priority: Medium    Endometriosis 10/11/2022     Priority: Medium    Keratosis pilaris 06/14/2022     Priority: Medium    Chronic sinusitis, unspecified location 09/21/2021     Priority: Medium    Hypertrophy of breast 09/10/2020     Priority: Medium     Added automatically from request for surgery [id number]      Pap smear abnormality of cervix/human papillomavirus (HPV) positive 09/03/2020     Priority: Medium     10/10/17 NIL pap, + HR HPV (not 16/18)  1/31/19 NILM HPV  Positive for High Risk HPV types other than 16 or 18 (Care Everywhere)  2/21/19 [place] [facility]: benign. Plan 1 year cotest  2020 NILM HPV negative 34 y.o. PLAN, per ASCCP Guidelines: cotest 1/2023 2/24/23 NIL pap, neg HR HPV. Plan 3 year cotest      Cholecystitis 07/16/2020     Priority: Medium     Added automatically from request for surgery [id number]      Chronic idiopathic urticaria 06/22/2020     Priority: Medium    Hx of abnormal cervical Pap smear 02/03/2020     Priority: Medium    Acid reflux 08/25/2019     Priority: Medium    Need for desensitization to allergens 06/06/2019     Priority: Medium "     Formatting of this note might be different from the original.    Allergy Shot Care Plan for Kasandra Lau  Date of Order: June 6 2019  Ordering Provider: Dr. Martin Cervantes     Serum 1: Cat  Date Shots Started:  Start Dose: 0.1 mL of 1:100,000 - GREEN  Date Maintenance Reached:  Maintenance Dose: 0.3 mL of 1:100 - RED    Serum 2: Mite DF and Mite DP  Date Shots Started:  Start Dose: 0.1 mL of 1:100,000 - GREEN  Date Maintenance Reached:  Maintenance Dose: 0.1 mL of 1:100 - RED    BUILDING SCHEDULE FOR POLLEN AND NONPOLLEN   IMMUNOTHERAPY  EXCEPT VENOM [facility]    3 - 14 days Build per schedule.   15 - 21 days Repeat previous dose.   22 - 28 days Decrease by one dose.   29 - 35 days Decrease by two doses.   36 - 42 days Decrease by three doses.   Over 42 days Continue to decrease by one dose for each additional week.     1:100,000 (green)  1:10,000 (blue)  1:1000 (yellow)  1:100 (red)     1. 0.05 ml  7. 0.05 ml  13. 0.05 ml  19. 0.05 ml   2. 0.1 ml  8. 0.1 ml  14. 0.1 ml  20. 0.1 ml   3. 0.2 ml  9. 0.2 ml  15. 0.2 ml  21. 0.15 ml   4. 0.3 ml  10. 0.3 ml  16. 0.3 ml  22. 0.2 ml   5. 0.4 ml  11. 0.4 ml  17. 0.4 ml  23. 0.25 ml   6. 0.45 ml  12. 0.45 ml  18. 0.45 ml  24. 0.3 ml         25   0.35 ml         26   0.4 ml         27. 0.45 ml         28. 0.5 ml     MAINTENANCE SCHEDULE FOR POLLENS, NONPOLLENS (MITES,MOLD,CAT,DOG)  (not used for [facility] Pollens)    ? When reaching maintenance dose for the first time, gradually stretch by weekly intervals to every 4 weeks (e.g., every 2 weeks, then 3 weeks, then 4 weeks).   ? Patient may receive their injections (patient choice) at 2-4 week intervals     Maintenance Repeat 1 Dose 2 Dose 3 Dose    Q2 weeks  (10-14 days)  3 weeks  (15-21 days) 4 weeks  (22-28 days) 5 weeks  (29-35 days) 6 weeks  (36-42 days) Decrease by 1 Dose for each additional week   Q3 weeks  (15-21 days)  4 weeks  (22-28 days) 5 weeks  (29-35 days) 6 weeks  (36-42 days) 7 weeks  (43-49 days)    Q4  weeks  (22-28 days)  5 weeks  (29-35 days) 6 weeks  (36-42 days) 7 weeks  (43-49 days) 8 weeks  (50-56 days)      Susanna Ambrose RN 6/6/2019 4:49 PM      Mild intermittent asthma without complication 04/30/2018     Priority: Medium    Seasonal mood disorder (H24) 11/15/2017     Priority: Medium    Circadian rhythm sleep disorder, delayed sleep phase type 05/10/2017     Priority: Medium    Unhealthy sleep habit 05/10/2017     Priority: Medium    Vitamin D deficiency 11/19/2016     Priority: Medium    Menorrhagia with regular cycle 11/19/2016     Priority: Medium    Migraine without aura and without status migrainosus, not intractable 11/19/2016     Priority: Medium    Iron deficiency anemia due to chronic blood loss 11/19/2016     Priority: Medium    Tired 11/19/2016     Priority: Medium    Irritable bowel syndrome with constipation 10/25/2016     Priority: Medium    Recurrent major depressive disorder, in remission (H24) 10/25/2016     Priority: Medium    Pelvic pain in female 08/25/2016     Priority: Medium    Migraine headache 01/01/2012     Priority: Medium    Panic disorder 01/01/2007     Priority: Medium    Depression 01/01/2004     Priority: Medium            Past Medical History:     Past Medical History:   Diagnosis Date    Anemia fall 2016    Anxiety     Arthritis     Chronic fatigue     Depression (emotion)     sees psych, on meds    Gastroesophageal reflux disease     Migraine     daily meds, 2x month, more mild on meds    Neuropathic pain     Panic disorder     Uncomplicated asthma Spring 2018     Past Surgical History:   Procedure Laterality Date    COLONOSCOPY      LAPAROSCOPIC ABLATION ENDOMETRIOSIS N/A 11/09/2016    Procedure: LAPAROSCOPIC ABLATION ENDOMETRIOSIS;  Surgeon: Tonja Ferrer MD;  Location: [facility]    LAPAROSCOPIC CYSTECTOMY OVARIAN (BENIGN) Left 11/09/2016    Procedure: LAPAROSCOPIC CYSTECTOMY OVARIAN (BENIGN);  Surgeon: Tonja Ferrer MD;  [facility]     LAPAROSCOPIC TUBAL DYE STUDY Left 11/09/2016    Procedure: LAPAROSCOPIC TUBAL DYE STUDY;  Surgeon: Tonja Ferrer MD;  [facility]    LAPAROSCOPY OPERATIVE ADULT N/A 11/09/2016    Procedure: LAPAROSCOPY OPERATIVE ADULT;  Surgeon: Tonja Ferrer MD;  [facility]    MAMMOPLASTY REDUCTION Bilateral 08/05/2021    Procedure: MAMMOPLASTY, REDUCTION, Bilateral;  Surgeon: ANTONIO Moreno MD;  Location: [facility] OR    ORTHOPEDIC SURGERY      left wrist surgery    TONSILLECTOMY & ADENOIDECTOMY Bilateral     cauterized turbinates also            Social History:     Social History     Socioeconomic History    Marital status: Single     Spouse name: Not on file    Number of children: Not on file    Years of education: Not on file    Highest education level: Not on file   Occupational History    Not on file   Tobacco Use    Smoking status: Former     Packs/day: 0.00     Years: 10.00     Additional pack years: 0.00     Total pack years: 0.00     Types: Cigarettes    Smokeless tobacco: Never    Tobacco comments:     smokes occasionally socially    Vaping Use    Vaping Use: Never used   Substance and Sexual Activity    Alcohol use: Not Currently     Alcohol/week: 0.0 standard drinks of alcohol     Comment: occasional     Drug use: Not Currently     Types: Marijuana     Comment: marijuana  none since May 2021    Sexual activity: Yes     Partners: Male     Birth control/protection: I.U.D.     Comment: Nuvaring   Other Topics Concern    Not on file   Social History Narrative    Grad student in geography     Social Determinants of Health     Financial Resource Strain: Not on file   Food Insecurity: Not on file   Transportation Needs: Not on file   Physical Activity: Not on file   Stress: Not on file   Social Connections: Not on file   Interpersonal Safety: Not on file   Housing Stability: Not on file          Family History:     Family History   Problem Relation Age of Onset    Diabetes Maternal Grandfather      Hypertension No family hx of     Coronary Artery Disease No family hx of     Hyperlipidemia No family hx of     Cerebrovascular Disease No family hx of     Breast Cancer No family hx of     Colon Cancer No family hx of     Prostate Cancer No family hx of     Other Cancer No family hx of     Glaucoma No family hx of     Macular Degeneration No family hx of             Allergies:     Allergies   Allergen Reactions    Dust Mites Cough, Difficulty breathing and Shortness Of Breath    Cats      Chest tightness, sinus irritation            Medications:     Current Outpatient Medications   Medication Sig Dispense Refill    almotriptan (AXERT) 12.5 MG tablet Take 1 tablet (12.5 mg) by mouth at onset of headache for migraine (repeat in 2 hours if needed) May repeat in 2 hours. Max 2 tablets/24 hours. 18 tablet 11    atomoxetine (STRATTERA) 60 MG capsule Take 1 capsule (60 mg) by mouth daily 90 capsule 1    buPROPion (WELLBUTRIN XL) 300 MG 24 hr tablet Take 1 tablet (300 mg) by mouth every morning 90 tablet 1    cholecalciferol 125 MCG (5000 UT) CAPS Take 5,000 Units by mouth every evening      ciprofloxacin (CIPRO) 500 MG tablet Take 1 tablet (500 mg) by mouth 2 times daily 14 tablet 0    doxepin (SINEQUAN) 10 MG capsule Take 2 capsules (20 mg) by mouth 2 times daily 120 capsule 11    EMGALITY 120 MG/ML injection Inject 1 mL (120 mg) Subcutaneous every 28 days 1 mL 11    EPINEPHrine (ANY BX GENERIC EQUIV) 0.3 MG/0.3ML injection 2-pack Inject 0.3 mg into the muscle as needed      famotidine (PEPCID) 20 MG tablet TAKE 1 TABLET BY MOUTH TWICE A  tablet 3    fexofenadine (ALLEGRA) 180 MG tablet Take 1 tablet (180 mg) by mouth every morning 90 tablet 3    fluticasone (FLONASE) 50 MCG/ACT nasal spray 2 times daily as needed       gabapentin (NEURONTIN) 400 MG capsule Take 2 capsules (800 mg) by mouth 3 times daily 180 capsule 5    guaiFENesin (MUCINEX) 600 MG 12 hr tablet Take 600 mg by mouth daily as needed       "hydroxychloroquine (PLAQUENIL) 200 MG tablet Take 2 tablets (400 mg) by mouth daily 180 tablet 2    montelukast (SINGULAIR) 10 MG tablet Take 1 tablet (10 mg) by mouth At Bedtime 90 tablet 0    ondansetron (ZOFRAN ODT) 4 MG ODT tab Take 1-2 tablets (4-8 mg) by mouth every 8 hours as needed for nausea 20 tablet 1    ondansetron (ZOFRAN ODT) 4 MG ODT tab Take 1 tablet (4 mg) by mouth every 8 hours as needed for nausea 18 tablet 6    Phentermine-Topiramate (QSYMIA) 11.25-69 MG CP24 Take 1 capsule by mouth daily 90 capsule 0    phenylephrine HCl 10 MG TABS Take 10 mg by mouth 2 times daily      propranolol (INDERAL) 20 MG tablet Take 1 tablet (20 mg) by mouth daily as needed (anxiety) 30 tablet 11    vilazodone (VIIBRYD) 40 MG TABS tablet Take 1 tablet (40 mg) by mouth every morning 90 tablet 1    levonorgestrel (MIRENA) 20 MCG/DAY IUD 1 each (20 mcg) by Intrauterine route once for 1 dose 1 each 0            Physical Exam:   Blood pressure 103/70, pulse 83, resp. rate 16, height 1.72 m (5' 7.7\"), weight 74.8 kg (165 lb), SpO2 99%, not currently breastfeeding.  Wt Readings from Last 6 Encounters:   10/19/23 74.8 kg (165 lb)   08/03/23 70.7 kg (155 lb 14.4 oz)   07/18/23 70.3 kg (155 lb)   07/18/23 70.3 kg (155 lb)   07/14/23 72.3 kg (159 lb 4.8 oz)   07/13/23 72.1 kg (158 lb 14.4 oz)     Constitutional: well-developed, appearing stated age; cooperative  Eyes: nl EOM, PERRLA, vision, conjunctiva, sclera  ENT: nl external ears, nose, hearing, lips, teeth, gums, throat  No mucous membrane lesions, normal saliva pool  Neck: no mass or thyroid enlargement  Resp: l breathing unlabored  MS: No visible swelling in hands or wrists; full range of motion and visually inspected joints.  Psych: nl judgement, orientation, memory, affect.         Data:     @      Latest Ref Rng & Units 4/11/2023    12:42 PM 7/13/2023     3:54 PM 8/3/2023     4:04 PM   RHEUM RESULTS   Albumin 3.5 - 5.2 g/dL  4.3  4.4    ALT   16     AST   22     STEPHANIE " interpretation Negative Borderline Positive      STEPHANIE pattern 1  Centromere      STEPHANIE titer 1  1:80      Creatinine 0.51 - 0.95 mg/dL  0.70  0.81    GFR Estimate >60 mL/min/1.73m2  >90  >90    Hematocrit 35.0 - 47.0 %  38.3  41.2    Hemoglobin 11.7 - 15.7 g/dL  12.5  13.5    Hepatitis C Antibody Nonreactive Nonreactive      IGA 84 - 499 mg/dL 283      WBC 4.0 - 11.0 10e3/uL  8.4  8.4    RBC Count 3.80 - 5.20 10e6/uL  4.10  4.43    RDW 10.0 - 15.0 %  12.0  11.9    MCHC 31.5 - 36.5 g/dL  32.6  32.8    MCV 78 - 100 fL  93  93    Platelet Count 150 - 450 10e3/uL  314  345         ,  ,  ,  ,  ,  ,  ,  ,  ,  ,  ,  ,  ,  ,  ,   Hep B Surface Agn   Date Value Ref Range Status   01/31/2019 Nonreactive NR^Nonreactive Final   ,  ,  ,  ,  ,  ,  ,  ,  ,  ,  ,  ,  ,  ,  ,  ,  ,  ,  ,  ,  ,  ,  ,  ,  ,  ,  ,  ,  ,

## 2023-10-19 NOTE — TELEPHONE ENCOUNTER
Central Prior Authorization Team   Phone: [phone]    PA Initiation    Medication: Qsymia  Insurance Company: [facility] - Phone [phone] Fax [phone]  Pharmacy Filling the Rx: [id number] - [place], [place] - [address]  Filling Pharmacy Phone: [phone]  Filling Pharmacy Fax:    Start Date: 10/19/2023

## 2023-10-19 NOTE — NURSING NOTE
"Chief Complaint   Patient presents with    RECHECK     Inflammatory arthritis       Vitals:    10/19/23 1508   BP: 103/70   BP Location: Left arm   Patient Position: Sitting   Cuff Size: Adult Regular   Pulse: 83   Resp: 16   SpO2: 99%   Weight: 74.8 kg (165 lb)   Height: 1.72 m (5' 7.7\")       Body mass index is 25.31 kg/m .    Olga Bliss, [facility]  "

## 2023-10-19 NOTE — TELEPHONE ENCOUNTER
"Prior Authorization Retail Medication Request    Medication/Dose: Qsymia  ICD code (if different than what is on RX):  Overweight (BMI 25.0-29.9) [E66.3]  - Primary     Previously Tried and Failed:  history of diet and exercise   Rationale:  I had the pleasure of seeing your patient Kasandra Lau. She is a 38 year old female who I am continuing to see for treatment of obesity related to:  anxiety, depression, ADHD, PTSD, rheumatoid arthritis, hyperlipidemia, GERD, fatty liver, back pain     Reported baseline weight 150-160 lbs. A couple of years ago, she gained 17 lbs in a month despite no change in diet and exercise. Since then she continued to gain weight in spurt and then plateau out and gained again     Was on topiramate for migraine [id number] -- had brain fog and difficulty with word recall    Weight loss medication(s) are indicated due to patient having a BMI of at least 27 kg/m2 with the following comorbidity of high cholesterol, fatty liver, GERD.    Estimated body mass index is 23.92 kg/m  as calculated from the following:    Height as of 8/3/23: 1.72 m (5' 7.7\").    Weight as of 8/3/23: 70.7 kg (155 lb 14.4 oz).    Insurance Name:    Insurance ID:        Pharmacy Information (if different than what is on RX)  Name:    [id number] [place] [facility] - [address]     Phone:  [phone]   "

## 2023-10-20 NOTE — TELEPHONE ENCOUNTER
PRIOR AUTHORIZATION DENIED    Medication: Qsymia-PA DENIED     Denial Date: 10/20/2023    Denial Rational: Medication Exclusion from patients benefit plan.             Appeal Information:

## 2023-10-22 NOTE — CONFIDENTIAL NOTE
"    Health Psychology - Follow up Visit  Confidential Summary*    The author of this note documented a reason for not sharing it with the patient.  REFERRAL SOURCE:  Psychiatry    CHIEF COMPLAINT/REASON FOR VISIT  Psychotherapy in context of chronic PTSD and persistent depression.  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      Patient was seen today for a 60 minute individual psychotherapy session.  The session was facilitated via [employer] patient at her home and provider at her own home.  Please note, patient running late for appt and when she tried to log into [employer], the visit was already cancelled.         This telehealth service is appropriate and effective for delivering services in light of the necessity for social distancing to mitigate the COVID-19 epidemic. Patient has agreed to receiving telehealth services.    The patient has been notified of following:   \"This VIDEO visit will be conducted via a call between you and your physician/provider. We have found that certain health care needs can be provided without the need for an in-person physical exam.  VIDEO visits are billed at different rates depending on your insurance coverage.  Please reach out to your insurance provider with any questions. If during the course of the call the physician/provider feels a video visit is not appropriate, you will not be charged for this service.\"     Patient has given verbal consent for VIDEO visit? Yes    Subjective: Patient began with report that she has been avoiding sending an email to her professor.  Encouraged her to consider applying her DBT skills including checking the facts and if her anxiety and shame are not justified, to act opposite to her emotion urge to avoid.  She was encouraged to craft the email using the " JOI structure.  Used session to remind patient of the describe, express, ask and reinforce format and suggested no more than 6 sentences.  She reported that she is concerned that she will be misunderstood and that she would like to explain. Discussed the pros and cons of explaining with a long email.      She reported that she has sent her abstract to a potential committee member who has been quite encouraging.  She reported that she would like to send him her draft of her proposal.  Encouraged her to send revised draft or to send current draft with the edits of her committee members. She described challenges in addressing their suggestions.  Encouraged patient to consider whether her response represents willfulness and that open willing hands might assist in moving forward using radical acceptance that she must get sign off of committee members to move forward.      Patient expressed belief that she is unloveable.  First time her chronic PTSD belief system was expressed.  Continue to use AIR network to discuss developmental trauma and distorted beliefs.      Objective:  Patient was on time for today s session. She was alert and oriented. Mood was dysphoric with appropriate range of affect. Patient denied suicidal or assaultive ideation, plan, or intent.        Assessment:  The patient has a longstanding history of interpersonal discord and challenges with emotion regulation.  She has completed all requirements for her PhD and is now ABD, working on her proposal.  She is living in  housing with her two dogs.  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  She is dating. Patient uses avoidance as a primary coping tool.    Plan:  Patient graduated from full model DBT at [facility] and is now completing phase 2 work.      Treatment plan updated:  8/31/23    Time In: 2:00  Time Out: 3:00    Diagnosis:  Axis I PTSD, chronic, persistent depressive disorder, adjustment  disorder with mixed, psychological factors associated with physical (fibromyalgia)   Axis II  BPD   Axis III please see medical records for details   [place] IV Psychosocial and Environmental Stressors: living alone with no family support, chronic illnesses, dissertation stress, financial stress         Corina Muhammad, PhD, LP

## 2023-10-24 DIAGNOSIS — E66.3 OVERWEIGHT (BMI 25.0-29.9): ICD-10-CM

## 2023-10-24 RX ORDER — PHENTERMINE AND TOPIRAMATE 11.25; 69 MG/1; MG/1
1 CAPSULE, EXTENDED RELEASE ORAL DAILY
Qty: 90 CAPSULE | Refills: 1 | Status: SHIPPED | OUTPATIENT
Start: 2023-10-24 | End: 2023-12-22

## 2023-10-25 ENCOUNTER — TELEPHONE (OUTPATIENT)
Dept: ENDOCRINOLOGY | Facility: CLINIC | Age: 38
End: 2023-10-25
Payer: COMMERCIAL

## 2023-10-25 NOTE — TELEPHONE ENCOUNTER
Central Prior Authorization Team   Phone: [phone]        EPA DENIED: WAITING ON DENIAL LETTER

## 2023-10-26 NOTE — TELEPHONE ENCOUNTER
PRIOR AUTHORIZATION DENIED    Medication: PHENTERMINE-TOPIRAMATE 11.25-69 MG PO CP24  Insurance Company: [employer] [place] - Phone [phone] Fax [phone]  Denial Date: 10/25/2023  Denial Rational:     Appeal Information:     Patient Notified: No

## 2023-11-02 ENCOUNTER — VIRTUAL VISIT (OUTPATIENT)
Dept: PSYCHOLOGY | Facility: CLINIC | Age: 38
End: 2023-11-02
Payer: COMMERCIAL

## 2023-11-02 DIAGNOSIS — F54 PSYCHOLOGICAL AND BEHAVIORAL FACTORS ASSOCIATED WITH DISORDERS OR DISEASES CLASSIFIED ELSEWHERE: ICD-10-CM

## 2023-11-02 DIAGNOSIS — F34.1 PERSISTENT DEPRESSIVE DISORDER: ICD-10-CM

## 2023-11-02 DIAGNOSIS — F43.12 CHRONIC POST-TRAUMATIC STRESS DISORDER (PTSD): ICD-10-CM

## 2023-11-02 DIAGNOSIS — F43.12 CHRONIC POST-TRAUMATIC STRESS DISORDER: Primary | ICD-10-CM

## 2023-11-02 PROCEDURE — 90837 PSYTX W PT 60 MINUTES: CPT | Mod: VID | Performed by: PSYCHOLOGIST

## 2023-11-02 ASSESSMENT — ANXIETY QUESTIONNAIRES
4. TROUBLE RELAXING: SEVERAL DAYS
3. WORRYING TOO MUCH ABOUT DIFFERENT THINGS: SEVERAL DAYS
7. FEELING AFRAID AS IF SOMETHING AWFUL MIGHT HAPPEN: NOT AT ALL
6. BECOMING EASILY ANNOYED OR IRRITABLE: SEVERAL DAYS
IF YOU CHECKED OFF ANY PROBLEMS ON THIS QUESTIONNAIRE, HOW DIFFICULT HAVE THESE PROBLEMS MADE IT FOR YOU TO DO YOUR WORK, TAKE CARE OF THINGS AT HOME, OR GET ALONG WITH OTHER PEOPLE: SOMEWHAT DIFFICULT
2. NOT BEING ABLE TO STOP OR CONTROL WORRYING: SEVERAL DAYS
6. BECOMING EASILY ANNOYED OR IRRITABLE: SEVERAL DAYS
GAD7 TOTAL SCORE: 5
4. TROUBLE RELAXING: SEVERAL DAYS
5. BEING SO RESTLESS THAT IT IS HARD TO SIT STILL: NOT AT ALL
2. NOT BEING ABLE TO STOP OR CONTROL WORRYING: SEVERAL DAYS
1. FEELING NERVOUS, ANXIOUS, OR ON EDGE: SEVERAL DAYS
3. WORRYING TOO MUCH ABOUT DIFFERENT THINGS: SEVERAL DAYS
GAD7 TOTAL SCORE: 5
GAD7 TOTAL SCORE: 5
5. BEING SO RESTLESS THAT IT IS HARD TO SIT STILL: NOT AT ALL
IF YOU CHECKED OFF ANY PROBLEMS ON THIS QUESTIONNAIRE, HOW DIFFICULT HAVE THESE PROBLEMS MADE IT FOR YOU TO DO YOUR WORK, TAKE CARE OF THINGS AT HOME, OR GET ALONG WITH OTHER PEOPLE: SOMEWHAT DIFFICULT
GAD7 TOTAL SCORE: 5
1. FEELING NERVOUS, ANXIOUS, OR ON EDGE: SEVERAL DAYS
7. FEELING AFRAID AS IF SOMETHING AWFUL MIGHT HAPPEN: NOT AT ALL

## 2023-11-09 ENCOUNTER — VIRTUAL VISIT (OUTPATIENT)
Dept: PSYCHOLOGY | Facility: CLINIC | Age: 38
End: 2023-11-09
Payer: COMMERCIAL

## 2023-11-09 ENCOUNTER — VIRTUAL VISIT (OUTPATIENT)
Dept: PHARMACY | Facility: CLINIC | Age: 38
End: 2023-11-09
Payer: COMMERCIAL

## 2023-11-09 VITALS — HEIGHT: 68 IN | WEIGHT: 160 LBS | BODY MASS INDEX: 24.25 KG/M2

## 2023-11-09 DIAGNOSIS — F43.12 CHRONIC POST-TRAUMATIC STRESS DISORDER (PTSD): ICD-10-CM

## 2023-11-09 DIAGNOSIS — F34.1 PERSISTENT DEPRESSIVE DISORDER: ICD-10-CM

## 2023-11-09 DIAGNOSIS — F43.12 CHRONIC POST-TRAUMATIC STRESS DISORDER: Primary | ICD-10-CM

## 2023-11-09 DIAGNOSIS — Z86.39 HX OF OBESITY: Primary | ICD-10-CM

## 2023-11-09 DIAGNOSIS — F54 PSYCHOLOGICAL AND BEHAVIORAL FACTORS ASSOCIATED WITH DISORDERS OR DISEASES CLASSIFIED ELSEWHERE: ICD-10-CM

## 2023-11-09 PROCEDURE — 90832 PSYTX W PT 30 MINUTES: CPT | Mod: VID | Performed by: PSYCHOLOGIST

## 2023-11-09 PROCEDURE — 99207 PR NO CHARGE LOS: CPT | Mod: VID | Performed by: PHARMACIST

## 2023-11-09 ASSESSMENT — PAIN SCALES - GENERAL: PAINLEVEL: NO PAIN (0)

## 2023-11-09 NOTE — NURSING NOTE
Is the patient currently in the state of [place]? YES    Visit mode:VIDEO    If the visit is dropped, the patient can be reconnected by: VIDEO VISIT: Text to cell phone:   Telephone Information:   Mobile [phone]       Will anyone else be joining the visit? NO  (If patient encounters technical issues they should call [phone]    How would you like to obtain your AVS? MyChart    Are changes needed to the allergy or medication list? No    Reason for visit: RECHECK    Jose Carlos LOVELL

## 2023-11-09 NOTE — PROGRESS NOTES
"Disease State Management Encounter:                          Kasandra Lau is a 38 year old female contacted via secure video for an initial visit. She was referred to me from Dr. Kerri Evans. Insurance does not cover comprehensive medication review with Medication Therapy Management (MTM). Patient declines private pay. Therefore, completed one time consult today. Medication reconciliation completed.     Reason for visit: Ozempic --> Qsymia check in.    Weight Management:   Qsymia (phentermine/topiramate) 11.25-69 mg daily     Followed by Dr. Kerri Evans, seen 7/18/2023 for Return Medical Weight Management. Patient is experiencing the follow side effects: None. Ozempic stopped being covered by insurance, so switched over to Qsymia. Just increased to current dose of Qsymia. Constipation relieved when stopping Ozempic. Reports that she finds that her mood has been good overall since starting Qsymia. Reports jitteriness at times but not affecting day to day. Sleep is pretty good.   Diet/Eating Habits: Patient reports starting to track calories. She finds helpful to ensure staying on track, using [employer].     Exercise/Activity: Patient reports she started doing pilates class 1 time per week. Has dog, walking twice daily.   Current weight today: 160 lbs 0 oz    Wt Readings from Last 4 Encounters:   11/09/23 160 lb (72.6 kg)   10/19/23 165 lb (74.8 kg)   08/03/23 155 lb 14.4 oz (70.7 kg)   07/18/23 155 lb (70.3 kg)     Estimated body mass index is 24.54 kg/m  as calculated from the following:    Height as of this encounter: 5' 7.7\" (1.72 m).    Weight as of this encounter: 160 lb (72.6 kg).    BP Readings from Last 3 Encounters:   10/19/23 103/70   10/06/23 104/72   08/03/23 104/75     Today's Vitals: Ht 5' 7.7\" (1.72 m)   Wt 160 lb (72.6 kg)   BMI 24.54 kg/m      Assessment/Plan:  Would benefit from continuing Qsymia at the newest current dose. Would benefit from blood pressure/pulse repeat in " near future with the dose change.     Continue Qsymia 11.25-69 mg daily in AM   2. Call and schedule follow up with Dr. Kerri Evans - Call [phone] to schedule.     Follow-up: as needed     I spent 15 minutes with this patient today. All changes were made via collaborative practice agreement with Dr. Kerri Evans. A copy of the visit note was provided to the patient's provider(s).    A summary of these recommendations was declined by the patient.    Lauren Bloch, PharmD, BCACP   Medication Therapy Management Pharmacist   [facility] [place]     Medication Therapy Recommendations  No medication therapy recommendations to display

## 2023-11-13 ENCOUNTER — TELEPHONE (OUTPATIENT)
Dept: DERMATOLOGY | Facility: CLINIC | Age: 38
End: 2023-11-13
Payer: COMMERCIAL

## 2023-11-13 DIAGNOSIS — G43.709 CHRONIC MIGRAINE WITHOUT AURA WITHOUT STATUS MIGRAINOSUS, NOT INTRACTABLE: Primary | ICD-10-CM

## 2023-11-13 RX ORDER — PROCHLORPERAZINE MALEATE 10 MG
10 TABLET ORAL EVERY 6 HOURS PRN
Qty: 10 TABLET | Refills: 3 | Status: SHIPPED | OUTPATIENT
Start: 2023-11-13 | End: 2024-02-29

## 2023-11-13 NOTE — TELEPHONE ENCOUNTER
Left Voicemail (1st Attempt) sent Mychart for the patient to call back and reschedule the following:    Appointment type: Cosmetic return  Provider: Dr. Montgomery   Return date: 11/22/2023  Specialty phone number: [phone]  Additional appointment(s) needed: Dr. Montgomery will not be in clinic at that time. This appt will need to be rescheduled.

## 2023-11-15 ENCOUNTER — OFFICE VISIT (OUTPATIENT)
Dept: PODIATRY | Facility: CLINIC | Age: 38
End: 2023-11-15
Payer: COMMERCIAL

## 2023-11-15 VITALS — SYSTOLIC BLOOD PRESSURE: 108 MMHG | DIASTOLIC BLOOD PRESSURE: 68 MMHG

## 2023-11-15 DIAGNOSIS — M20.5X9 HALLUX LIMITUS, UNSPECIFIED LATERALITY: ICD-10-CM

## 2023-11-15 DIAGNOSIS — M79.672 BILATERAL FOOT PAIN: ICD-10-CM

## 2023-11-15 DIAGNOSIS — M21.42 PES PLANUS OF BOTH FEET: Primary | ICD-10-CM

## 2023-11-15 DIAGNOSIS — M79.671 BILATERAL FOOT PAIN: ICD-10-CM

## 2023-11-15 DIAGNOSIS — M21.41 PES PLANUS OF BOTH FEET: Primary | ICD-10-CM

## 2023-11-15 PROCEDURE — 99203 OFFICE O/P NEW LOW 30 MIN: CPT | Performed by: PODIATRIST

## 2023-11-15 RX ORDER — AMITRIPTYLINE HYDROCHLORIDE 10 MG/1
1 TABLET ORAL AT BEDTIME
COMMUNITY
End: 2024-03-06

## 2023-11-15 NOTE — PROGRESS NOTES
ASSESSMENT:  Encounter Diagnoses   Name Primary?    Pes planus of both feet Yes    Hallux limitus, unspecified laterality     Bilateral foot pain      MEDICAL DECISION MAKING:  Although not reproduced today, her bilateral foot pain is likely mechanical and related to her pes planus.  She also knowledges having some intermittent discomfort in her bilateral first metatarsophalangeal joint.    Referral for custom orthoses is placed.  They can consider a Peralta's extension for the hallux limitus.  I also recommended she consider stiffer soled shoes to protect the joints.  Avoidance of barefoot walking may be helpful and wearing shoes at home.    I offered a referral to sports medicine for her knee pain.  We both agreed that we will first try the orthotic therapy to see if her knee pain improves, as it is very likely related to her pes planus.    Follow-up on an as-needed basis.    Disclaimer: This note consists of symbols derived from keyboarding, dictation and/or voice recognition software. As a result, there may be errors in the script that have gone undetected. Please consider this when interpreting information found in this chart.    London Lucero DPM, FACFAS, MS    [facility] & Ankle [facility]      ____________________________________________________________________    HPI:       Kasandra presents today with long-term bilateral foot discomfort that she associates with having a flatter foot structure.  She reports her pain being over the medial rear foot and sometimes lateral ankle.  She is tried more supportive shoes and over-the-counter inserts.  These do help.  She is interested in possible custom orthoses.  No other foot or ankle concerns today.  She does experience some medial knee pain and questions if this is related to her foot structure.    *  Past Medical History:   Diagnosis Date    Anemia fall 2016    Anxiety     Arthritis     Chronic fatigue     Depression (emotion)     sees psych,  on meds    Gastroesophageal reflux disease     Migraine     daily meds, 2x month, more mild on meds    Neuropathic pain     Panic disorder     Uncomplicated asthma Spring 2018   *  *  Past Surgical History:   Procedure Laterality Date    COLONOSCOPY      LAPAROSCOPIC ABLATION ENDOMETRIOSIS N/A 11/09/2016    Procedure: LAPAROSCOPIC ABLATION ENDOMETRIOSIS;  Surgeon: Tonja Ferrer MD;  Location: [facility]    LAPAROSCOPIC CYSTECTOMY OVARIAN (BENIGN) Left 11/09/2016    Procedure: LAPAROSCOPIC CYSTECTOMY OVARIAN (BENIGN);  Surgeon: Tonja Ferrer MD;  [facility]: [facility]    LAPAROSCOPIC TUBAL DYE STUDY Left 11/09/2016    Procedure: LAPAROSCOPIC TUBAL DYE STUDY;  Surgeon: Tonja Ferrer MD;  [facility]    LAPAROSCOPY OPERATIVE ADULT N/A 11/09/2016    Procedure: LAPAROSCOPY OPERATIVE ADULT;  Surgeon: Tonja Ferrer MD;  [facility]    MAMMOPLASTY REDUCTION Bilateral 08/05/2021    Procedure: MAMMOPLASTY, REDUCTION, Bilateral;  Surgeon: ANTONIO Moreno MD;  Location: [facility] OR    ORTHOPEDIC SURGERY      left wrist surgery    TONSILLECTOMY & ADENOIDECTOMY Bilateral     cauterized turbinates also   *  *  Current Outpatient Medications   Medication Sig Dispense Refill    almotriptan (AXERT) 12.5 MG tablet Take 1 tablet (12.5 mg) by mouth at onset of headache for migraine (repeat in 2 hours if needed) May repeat in 2 hours. Max 2 tablets/24 hours. 18 tablet 11    amitriptyline (ELAVIL) 10 MG tablet Take 1 tablet by mouth at bedtime      atomoxetine (STRATTERA) 60 MG capsule Take 1 capsule (60 mg) by mouth daily 90 capsule 1    buPROPion (WELLBUTRIN XL) 300 MG 24 hr tablet Take 1 tablet (300 mg) by mouth every morning 90 tablet 1    cholecalciferol 125 MCG (5000 UT) CAPS Take 5,000 Units by mouth every evening      ciprofloxacin (CIPRO) 500 MG tablet Take 1 tablet (500 mg) by mouth 2 times daily 14 tablet 0    doxepin (SINEQUAN) 10 MG capsule Take 2 capsules (20 mg) by mouth 2 times daily  120 capsule 11    EMGALITY 120 MG/ML injection Inject 1 mL (120 mg) Subcutaneous every 28 days 1 mL 11    EPINEPHrine (ANY BX GENERIC EQUIV) 0.3 MG/0.3ML injection 2-pack Inject 0.3 mg into the muscle as needed      famotidine (PEPCID) 20 MG tablet TAKE 1 TABLET BY MOUTH TWICE A  tablet 3    fexofenadine (ALLEGRA) 180 MG tablet Take 1 tablet (180 mg) by mouth every morning 90 tablet 3    fluticasone (FLONASE) 50 MCG/ACT nasal spray 2 times daily as needed       gabapentin (NEURONTIN) 400 MG capsule Take 2 capsules (800 mg) by mouth 3 times daily 180 capsule 5    guaiFENesin (MUCINEX) 600 MG 12 hr tablet Take 600 mg by mouth daily as needed      hydroxychloroquine (PLAQUENIL) 200 MG tablet Take 2 tablets (400 mg) by mouth daily 180 tablet 2    levonorgestrel (MIRENA) 20 MCG/DAY IUD 1 each (20 mcg) by Intrauterine route once for 1 dose 1 each 0    methotrexate 2.5 MG tablet Take 6 tablets (15 mg) by mouth every 7 days Labs every 8 - 12 weeks for refills. 24 tablet 4    montelukast (SINGULAIR) 10 MG tablet Take 1 tablet (10 mg) by mouth At Bedtime 90 tablet 0    ondansetron (ZOFRAN ODT) 4 MG ODT tab Take 1-2 tablets (4-8 mg) by mouth every 8 hours as needed for nausea 20 tablet 1    ondansetron (ZOFRAN ODT) 4 MG ODT tab Take 1 tablet (4 mg) by mouth every 8 hours as needed for nausea 18 tablet 6    Phentermine-Topiramate (QSYMIA) 11.25-69 MG CP24 Take 1 capsule by mouth daily 90 capsule 1    phenylephrine HCl 10 MG TABS Take 10 mg by mouth 2 times daily      prochlorperazine (COMPAZINE) 10 MG tablet Take 1 tablet (10 mg) by mouth every 6 hours as needed for nausea or vomiting (migraine) 10 tablet 3    propranolol (INDERAL) 20 MG tablet Take 1 tablet (20 mg) by mouth daily as needed (anxiety) 30 tablet 11    vilazodone (VIIBRYD) 40 MG TABS tablet Take 1 tablet (40 mg) by mouth every morning 90 tablet 1         EXAM:    Vitals: /68   BMI: There is no height or weight on file to calculate  BMI.    Constitutional:  Kasandra Lau is in no apparent distress, appears well-nourished.  Cooperative with history and physical exam.    Vascular:  Pedal pulses are palpable for both the DP and PT arteries.  CFT < 3 sec.  No edema.      Neuro: Light touch sensation is intact to the L4, L5, S1 distributions  No evidence of weakness, spasticity, or contracture in the lower extremities.     Derm: Normal texture and turgor.  No erythema, ecchymosis, or cyanosis.  No open lesions.     Musculoskeletal:    Lower extremity muscle strength is normal.  With weightbearing, Kasandra demonstrates fairly significant midfoot pronation, flattening of the medial longitudinal arch.  She was also noted to have significant hallux limitus with loading of the bilateral forefoot.  No pain today on palpatory exam.  No pain with testing the bilateral posterior tibial tendons.

## 2023-11-15 NOTE — LETTER
11/15/2023         RE: Kasandra Lau  [address]  [place] [address]        Dear Colleague,    Thank you for referring your patient, Kasanrda Lau, to the [facility]. Please see a copy of my visit note below.    ASSESSMENT:  Encounter Diagnoses   Name Primary?     Pes planus of both feet Yes     Hallux limitus, unspecified laterality      Bilateral foot pain      MEDICAL DECISION MAKING:  Although not reproduced today, her bilateral foot pain is likely mechanical and related to her pes planus.  She also knowledges having some intermittent discomfort in her bilateral first metatarsophalangeal joint.    Referral for custom orthoses is placed.  They can consider a Peralta's extension for the hallux limitus.  I also recommended she consider stiffer soled shoes to protect the joints.  Avoidance of barefoot walking may be helpful and wearing shoes at home.    I offered a referral to sports medicine for her knee pain.  We both agreed that we will first try the orthotic therapy to see if her knee pain improves, as it is very likely related to her pes planus.    Follow-up on an as-needed basis.    Disclaimer: This note consists of symbols derived from keyboarding, dictation and/or voice recognition software. As a result, there may be errors in the script that have gone undetected. Please consider this when interpreting information found in this chart.    London Lucero DPM, FACFAS, [facility] Department of Podiatry/Foot & Ankle Surgery      ____________________________________________________________________    HPI:       Kasandra presents today with long-term bilateral foot discomfort that she associates with having a flatter foot structure.  She reports her pain being over the medial rear foot and sometimes lateral ankle.  She is tried more supportive shoes and over-the-counter inserts.  These do help.  She is interested in possible custom orthoses.  No other foot or ankle  concerns today.  She does experience some medial knee pain and questions if this is related to her foot structure.    *  Past Medical History:   Diagnosis Date     Anemia fall 2016     Anxiety      Arthritis      Chronic fatigue      Depression (emotion)     sees psych, on meds     Gastroesophageal reflux disease      Migraine     daily meds, 2x month, more mild on meds     Neuropathic pain      Panic disorder      Uncomplicated asthma Spring 2018   *  *  Past Surgical History:   Procedure Laterality Date     COLONOSCOPY       LAPAROSCOPIC ABLATION ENDOMETRIOSIS N/A 11/09/2016    Procedure: LAPAROSCOPIC ABLATION ENDOMETRIOSIS;  Surgeon: Tonja Ferrer MD;  [facility]     LAPAROSCOPIC CYSTECTOMY OVARIAN (BENIGN) Left 11/09/2016    Procedure: LAPAROSCOPIC CYSTECTOMY OVARIAN (BENIGN);  Surgeon: Tonja Ferrer MD;  [facility]     LAPAROSCOPIC TUBAL DYE STUDY Left 11/09/2016    Procedure: LAPAROSCOPIC TUBAL DYE STUDY;  Surgeon: Tonja Ferrer MD;  [facility]     LAPAROSCOPY OPERATIVE ADULT N/A 11/09/2016    Procedure: LAPAROSCOPY OPERATIVE ADULT;  Surgeon: Tonja Ferrer MD;  [facility]     MAMMOPLASTY REDUCTION Bilateral 08/05/2021    Procedure: MAMMOPLASTY, REDUCTION, Bilateral;  Surgeon: ANTONIO Moreno MD;  Location: [facility] OR     ORTHOPEDIC SURGERY      left wrist surgery     TONSILLECTOMY & ADENOIDECTOMY Bilateral     cauterized turbinates also   *  *  Current Outpatient Medications   Medication Sig Dispense Refill     almotriptan (AXERT) 12.5 MG tablet Take 1 tablet (12.5 mg) by mouth at onset of headache for migraine (repeat in 2 hours if needed) May repeat in 2 hours. Max 2 tablets/24 hours. 18 tablet 11     amitriptyline (ELAVIL) 10 MG tablet Take 1 tablet by mouth at bedtime       atomoxetine (STRATTERA) 60 MG capsule Take 1 capsule (60 mg) by mouth daily 90 capsule 1     buPROPion (WELLBUTRIN XL) 300 MG 24 hr tablet Take 1 tablet (300 mg) by mouth  every morning 90 tablet 1     cholecalciferol 125 MCG (5000 UT) CAPS Take 5,000 Units by mouth every evening       ciprofloxacin (CIPRO) 500 MG tablet Take 1 tablet (500 mg) by mouth 2 times daily 14 tablet 0     doxepin (SINEQUAN) 10 MG capsule Take 2 capsules (20 mg) by mouth 2 times daily 120 capsule 11     EMGALITY 120 MG/ML injection Inject 1 mL (120 mg) Subcutaneous every 28 days 1 mL 11     EPINEPHrine (ANY BX GENERIC EQUIV) 0.3 MG/0.3ML injection 2-pack Inject 0.3 mg into the muscle as needed       famotidine (PEPCID) 20 MG tablet TAKE 1 TABLET BY MOUTH TWICE A  tablet 3     fexofenadine (ALLEGRA) 180 MG tablet Take 1 tablet (180 mg) by mouth every morning 90 tablet 3     fluticasone (FLONASE) 50 MCG/ACT nasal spray 2 times daily as needed        gabapentin (NEURONTIN) 400 MG capsule Take 2 capsules (800 mg) by mouth 3 times daily 180 capsule 5     guaiFENesin (MUCINEX) 600 MG 12 hr tablet Take 600 mg by mouth daily as needed       hydroxychloroquine (PLAQUENIL) 200 MG tablet Take 2 tablets (400 mg) by mouth daily 180 tablet 2     levonorgestrel (MIRENA) 20 MCG/DAY IUD 1 each (20 mcg) by Intrauterine route once for 1 dose 1 each 0     methotrexate 2.5 MG tablet Take 6 tablets (15 mg) by mouth every 7 days Labs every 8 - 12 weeks for refills. 24 tablet 4     montelukast (SINGULAIR) 10 MG tablet Take 1 tablet (10 mg) by mouth At Bedtime 90 tablet 0     ondansetron (ZOFRAN ODT) 4 MG ODT tab Take 1-2 tablets (4-8 mg) by mouth every 8 hours as needed for nausea 20 tablet 1     ondansetron (ZOFRAN ODT) 4 MG ODT tab Take 1 tablet (4 mg) by mouth every 8 hours as needed for nausea 18 tablet 6     Phentermine-Topiramate (QSYMIA) 11.25-69 MG CP24 Take 1 capsule by mouth daily 90 capsule 1     phenylephrine HCl 10 MG TABS Take 10 mg by mouth 2 times daily       prochlorperazine (COMPAZINE) 10 MG tablet Take 1 tablet (10 mg) by mouth every 6 hours as needed for nausea or vomiting (migraine) 10 tablet 3      propranolol (INDERAL) 20 MG tablet Take 1 tablet (20 mg) by mouth daily as needed (anxiety) 30 tablet 11     vilazodone (VIIBRYD) 40 MG TABS tablet Take 1 tablet (40 mg) by mouth every morning 90 tablet 1         EXAM:    Vitals: /68   BMI: There is no height or weight on file to calculate BMI.    Constitutional:  Kasandra Lau is in no apparent distress, appears well-nourished.  Cooperative with history and physical exam.    Vascular:  Pedal pulses are palpable for both the DP and PT arteries.  CFT < 3 sec.  No edema.      Neuro: Light touch sensation is intact to the L4, L5, S1 distributions  No evidence of weakness, spasticity, or contracture in the lower extremities.     Derm: Normal texture and turgor.  No erythema, ecchymosis, or cyanosis.  No open lesions.     Musculoskeletal:    Lower extremity muscle strength is normal.  With weightbearing, Kasandra demonstrates fairly significant midfoot pronation, flattening of the medial longitudinal arch.  She was also noted to have significant hallux limitus with loading of the bilateral forefoot.  No pain today on palpatory exam.  No pain with testing the bilateral posterior tibial tendons.      Again, thank you for allowing me to participate in the care of your patient.        Sincerely,        London Lucero DPM

## 2023-11-15 NOTE — PATIENT INSTRUCTIONS
Thank you for choosing [facility] Podiatry / Foot & Ankle Surgery!    DR. POP'S CLINIC LOCATIONS:     [facility] TRIAGE LINE: [phone]   [address] APPOINTMENTS: [phone]   [place] [address] RADIOLOGY: [phone]   (Every other Tues - Wed - Fri PM) SET UP SURGERY: [phone]    PHYSICAL THERAPY: [phone]   [place] SPECIALTY BILLING QUESTIONS: [phone]   [address] FAX: [phone]   [place] [address]    (Thurs & Fri AM)        [place] [facility]THOT[facility] LOCATIONS  [facility] [place] [id number]  [place] [address]  Phone: [phone]  Fax: [phone] [facility]   [address]  [place] [address]  Phone: [phone]  Fax: [phone]   [facility] [place]  [address]  [place] [address]  Phone: [phone]  Fax: [phone] [facility]  [address]  [place] [address]  Phone: [phone]   Fax: [phone]   * Please call any location listed to make an appointment for a casting/fitting. Your referral was sent to their central office and they will all have the order on file.     [facility] LOCATIONS  [place]  [address]  [phone]   [address]  [phone] [place]  [address]  [phone]   [place]  [address]  [phone]   [place]  [address]  [phone] [place] [address]  [phone]   [place]  [address]  [phone]   [place]  [address] [place] [address] [phone] [place] [id number]  [phone]       FLAT FEET   Flatfoot is often a complex disorder, with diverse symptoms and varying degrees of deformity and disability. There are several types of flatfoot, all of which have one characteristic in common: partial or total collapse (loss) of the arch.  Other characteristics shared by  most types of flatfoot include:   Toe drift,  in which the toes and front part of the foot point outward   The heel tilts toward the outside and the ankle appears to turn in   A tight Achilles tendon, which causes the heel to lift off the ground earlier when walking and may make the problem worse   Bunions and hammertoes may develop as a result of a flatfoot.   Flexible Flatfoot  Flexible flatfoot is one of the most common types of flatfoot. It typically begins in childhood or adolescence and continues into adulthood. It usually occurs in both feet and progresses in severity throughout the adult years. As the deformity worsens, the soft tissues (tendons and ligaments) of the arch may stretch or tear and can become inflamed.  The term  flexible  means that while the foot is flat when standing (weight-bearing), the arch returns when not standing.  SYMPTOMS  Pain in the heel, arch, ankle, or along the outside of the foot    Rolled-in  ankle (over-pronation)   Pain along the shin bone (shin splint)   General aching or fatigue in the foot or leg   Low back, hip or knee pain.   DIAGNOSIS  In diagnosing flatfoot, the foot and ankle surgeon examines the foot and observes how it looks when you stand and sit. X-rays are usually taken to determine the severity of the disorder. If you are diagnosed with flexible flatfoot but you don t have any symptoms, your surgeon will explain what you might expect in the future.  NON-SURGICAL TREATMENT  If you experience symptoms with flexible flatfoot, the surgeon may recommend non-surgical treatment options, including:  Activity modifications. Cut down on activities that bring you pain and avoid prolonged walking and standing to give your arches a rest.   Weight loss. If you are overweight, try to lose weight. Putting too much weight on your arches may aggravate your symptoms.   Orthotic devices. Your foot and ankle surgeon can provide you with custom orthotic devices for your shoes to  "give more support to the arches.   Immobilization. In some cases, it may be necessary to use a walking cast or to completely avoid weight-bearing.   Medications. Nonsteroidal anti-inflammatory drugs (NSAIDs), such as ibuprofen, help reduce pain and inflammation.   Physical therapy. Ultrasound therapy or other physical therapy modalities may be used to provide temporary relief.   Shoe modifications. Wearing shoes that support the arches is important for anyone who has flatfoot.   SURGICAL TREATMENT  In some patients whose pain is not adequately relieved by other treatments, surgery may be considered. A variety of surgical techniques is available to correct flexible flatfoot, and one or a combination of procedures may be required to relieve the symptoms and improve foot function.  In selecting the procedure or combination of procedures for your particular case, the foot and ankle surgeon will take into consideration the extent of your deformity based on the x-ray findings, your age, your activity level, and other factors. The length of the recovery period will vary, depending on the procedure or procedures performed.    DEGENERATIVE ARTHRITIS OF THE BIG TOE JOINT   (hallux limitus/hallux rigidus)   Arthritis of the joint at the base of the big toe (metatarsophalangeal joint) has several causes. Usually it results from repetitive trauma to the joint, secondary to abnormal foot mechanics. Often it is hereditary. However, a one-time traumatic event can lead to arthritis. The condition doesn't improve with time, and even with treatment, can worsen. The cartilage wears out, joint surfaces are no longer smooth, bone rubs on bone, inflammation occurs with pain, and eventually bone spurs and loose fragments might develop.   The joint is often painful with activity, worse with flimsy shoes or walking barefoot, and it slowly progresses over time. A person might notice the toe \"locking up\" with walking. There often is an " obvious, and irritating, bony bump on top of the foot. Shoes might be uncomfortable. In some people the pain is so bothersome that recreational activities sometimes even normal daily activities are difficult to perform.   The pain from this arthritis is likely a combination of joint jamming, cartilage loss and inflammation, and irritation from shoes rubbing on the bump. Sometimes other parts of the foot, leg, or back hurt from altering one's walk to compensate for the painful joint.     Ways to help a person live with the discomfort include wearing a good, supportive shoe with a rigid, rocker-type bottom. An example is a hiking boot. A rigid sole minimizes bending of the joint, and therefore, joint motion and pain. Shoes with a high toe box allow for less rubbing on the bump. Avoiding barefoot walking, sandals, flip-flops and slippers usually helps.   Sometimes an insert or orthotic provides symptom relief. This might make shoe fit more difficult. Pads over the bump and occasionally injections into the joint provide relief.   Surgery for this condition is aimed towards alleviating pain. It does not cure the arthritis nor does it guarantee better joint motion. Depending on the condition of the metatarsophalangeal joint, there are several surgiqal options:    1.  Cutting off the bony bump(s) and cleaning the joint    2.  Loosening the joint up by making cuts in the first metatarsal bone or the big toe bone and removing a small section of bone.    3.  Repositioning bone to minimize jamming of the joint.    4.  In severe cases, the joint is fused. By fusing the joint, it will never bend again. This resolves the pain, because it's the movement of a worn out joint that causes pain. Oftentimes the operation involves a combination of these procedures and. requires the use of screws, pins, and/or a small surgical plate.     Healing after surgery requires about six weeks of protection. This allows the bone to heal. Maximum  recovery takes about one year. The scar tissue and joint structures require this amount of time to finish the healing process. Expect stiffness, swelling and numbness during that time frame.   Surgery for arthritis of the metatarsophalangeal joint does involve side effects. Some side effects are predictable and others are less common but do occur. A scar will be visible and could be irritated by shoes. The shoe may rub on the screw or internal pin requiring surgical removal of these fixation devices. The screw and pin would likely be left in place for a full year. The first toe may remain stiff after surgery. The amount of stiffness is variable. Most people never regain normal motion of the first toe. This is due to scar tissue inherent to any surgery, in addition to the cumUlative effects of arthritis. Sometimes the big toe drifts to one side or the other. Joint fusion is one option to correct an unstable, drifting toe. This procedure straightens the toe, however, no motion remains.   All surgical procedures involve risk of infection, numbness, pain, delayed bone healing, osteotomy (bone cut) dislocation, blood clots, continued foot pain, etc. Arthritic joint surgery is quite complex and should not be taken lightly.    Any skin incision can lead to infection. Deep infection might involve the bone and thus repeat surgery and six weeks of IV antibiotics. Scar tissue can cause nerve pain or numbness. his is generally temporary but can be permanent. We do not have treatments that cure nerve problems. Second toe pain could be related to altered mechanics and pressure transferred to the second toe. Delayed bone healing would lengthen the healing time. Some bones simply do not heal. This requires repeat surgery, electronic bone stimulation and/or extended protection. Smokers have an approximate 20% chance of poor bone healing. This is double that of a non-smoker. The bone cut may displace. This may need to be repaired  with a second operation. Displacement can cause joint malalignment. Immobility after surgery can cause a blood clot in the legs and lungs. This could result in death.   Foot pain is complex. Most feet hurt for more than one reason. Operating on the arthritic   big toe joint will not necessarily create a pain free foot. Appropriate shoes, healthy body weight, avoidance of bare foot walking and moderation of activity will always be   necessary to enjoy foot comfort. Arthritis is incurable even with surgery.     Surgery for this type of arthritis is nevertheless quite successful. Most surgical patients are pleased with their foot following surgery. Many of the issues described above can be controlled by taking proper care of your foot during the healing process.   Cosmetic bump surgery is discouraged for the reasons listed above. A bump and joint that is comfortable when wearing appropriate shoes should simply be treated with appropriate shoes.   Your surgeon would be happy to fully describe any of the above issues. You should pursue a full understanding of the operation, recovery process and any potential problems that could develop.

## 2023-11-16 ENCOUNTER — TELEPHONE (OUTPATIENT)
Dept: DERMATOLOGY | Facility: CLINIC | Age: 38
End: 2023-11-16
Payer: COMMERCIAL

## 2023-11-16 ENCOUNTER — VIRTUAL VISIT (OUTPATIENT)
Dept: PSYCHOLOGY | Facility: CLINIC | Age: 38
End: 2023-11-16
Payer: COMMERCIAL

## 2023-11-16 DIAGNOSIS — F43.23 ADJUSTMENT DISORDER WITH MIXED ANXIETY AND DEPRESSED MOOD: ICD-10-CM

## 2023-11-16 DIAGNOSIS — F43.12 CHRONIC POST-TRAUMATIC STRESS DISORDER (PTSD): Primary | ICD-10-CM

## 2023-11-16 PROCEDURE — 90837 PSYTX W PT 60 MINUTES: CPT | Mod: VID | Performed by: PSYCHOLOGIST

## 2023-11-16 ASSESSMENT — ANXIETY QUESTIONNAIRES
2. NOT BEING ABLE TO STOP OR CONTROL WORRYING: SEVERAL DAYS
4. TROUBLE RELAXING: SEVERAL DAYS
1. FEELING NERVOUS, ANXIOUS, OR ON EDGE: SEVERAL DAYS
IF YOU CHECKED OFF ANY PROBLEMS ON THIS QUESTIONNAIRE, HOW DIFFICULT HAVE THESE PROBLEMS MADE IT FOR YOU TO DO YOUR WORK, TAKE CARE OF THINGS AT HOME, OR GET ALONG WITH OTHER PEOPLE: SOMEWHAT DIFFICULT
GAD7 TOTAL SCORE: 6
3. WORRYING TOO MUCH ABOUT DIFFERENT THINGS: SEVERAL DAYS
6. BECOMING EASILY ANNOYED OR IRRITABLE: SEVERAL DAYS
5. BEING SO RESTLESS THAT IT IS HARD TO SIT STILL: SEVERAL DAYS
7. FEELING AFRAID AS IF SOMETHING AWFUL MIGHT HAPPEN: NOT AT ALL
GAD7 TOTAL SCORE: 6

## 2023-11-16 NOTE — TELEPHONE ENCOUNTER
Left Voicemail (2nd Attempt) sent Letter for the patient to call back and reschedule the following:    Appointment type: Cosmetic return  Provider: Dr. Montgomery  Return date: 11/22/2023  Specialty phone number: [phone]  Additonal Notes: This appointment has now been cancelled. Dr. Montgomery will not be in clinic at that time.

## 2023-11-19 NOTE — CONFIDENTIAL NOTE
"    Health Psychology - Follow up Visit  Confidential Summary*    The author of this note documented a reason for not sharing it with the patient.  REFERRAL SOURCE:  Psychiatry    CHIEF COMPLAINT/REASON FOR VISIT  Psychotherapy in context of chronic PTSD and persistent depression.  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      Patient was seen today for a 60 minute individual psychotherapy session.  The session was facilitated via [employer] patient at her home and provider at her own home.  Please note, patient running late for appt and when she tried to log into [employer], the visit was already cancelled.         This telehealth service is appropriate and effective for delivering services in light of the necessity for social distancing to mitigate the COVID-19 epidemic. Patient has agreed to receiving telehealth services.    The patient has been notified of following:   \"This VIDEO visit will be conducted via a call between you and your physician/provider. We have found that certain health care needs can be provided without the need for an in-person physical exam.  VIDEO visits are billed at different rates depending on your insurance coverage.  Please reach out to your insurance provider with any questions. If during the course of the call the physician/provider feels a video visit is not appropriate, you will not be charged for this service.\"     Patient has given verbal consent for VIDEO visit? Yes    Subjective:  Patient began with report that she is currently considering whether she might begin a relationship with the ex boyfriend of her friend.  However, this friend has asked that she not move forward on getting closer to this man.  She described frustration that her friend has her choice of partners and a current " boyfriend and that she does not have an outlet.  She reported that she has the perception that the availability of partners is less for her and that there are racial disparities in the availability of partners for her.  She reported that she has been meeting this friend almost each week and that she does not want to lose this friendship.  Dicussed her values and encouraged her to consider the impact of violating her friends requests vs what she might have to gain from a relationship with this man.      Discussed her ongoing progress toward her dissertation proposal and her recent acquisition of a new member of her committee.  She described feeling more optimistic about her ability to move forward toward application to fellowship.      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  She reported that she believes that she will be able to secure the funds to allow her to maintain her apartment and continue toward completion of her program.      Objective:  Patient was on time for today s session. She was alert and oriented. Mood was dysphoric with appropriate range of affect. Patient denied suicidal or assaultive ideation, plan, or intent.        Assessment:  The patient has a longstanding history of interpersonal discord and challenges with emotion regulation.  She has completed all requirements for her PhD and is now ABD, working on her proposal.  She is living in  housing with her two dogs.  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  She is dating. Patient uses avoidance as a primary coping tool.    Plan:  Patient graduated from full model DBT at [facility] and is now completing phase 2 work.      Treatment plan updated:  8/31/23    Time In: 2:00  Time Out: 3:00    Diagnosis:  Axis I PTSD, chronic,  persistent depressive disorder, adjustment disorder with mixed, psychological factors associated with physical (fibromyalgia)   Axis II  BPD   Axis III please see medical records for details   [place] IV Psychosocial and Environmental Stressors: living alone with no family support, chronic illnesses, dissertation stress, financial stress         Corina Muhammad, PhD, LP

## 2023-11-20 ENCOUNTER — TELEPHONE (OUTPATIENT)
Dept: DERMATOLOGY | Facility: CLINIC | Age: 38
End: 2023-11-20
Payer: COMMERCIAL

## 2023-11-20 NOTE — TELEPHONE ENCOUNTER
Adrienm sent my chart msg for patient to rescheduled the following:    Appointment type: Cosmetic Return  Provider: Dr. Montgomery  Return date: 11-22-23  Specialty phone number: [phone]

## 2023-11-22 NOTE — CONFIDENTIAL NOTE
"    Health Psychology - Follow up Visit  Confidential Summary*    The author of this note documented a reason for not sharing it with the patient.  REFERRAL SOURCE:  Psychiatry    CHIEF COMPLAINT/REASON FOR VISIT  Psychotherapy in context of chronic PTSD and persistent depression.  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      Patient was seen today for a 30 minute individual psychotherapy session.  The session was facilitated via [employer] patient at her home and provider at her own home.  Please note, patient running late for appt and when she tried to log into [employer], the visit was already cancelled.         This telehealth service is appropriate and effective for delivering services in light of the necessity for social distancing to mitigate the COVID-19 epidemic. Patient has agreed to receiving telehealth services.    The patient has been notified of following:   \"This VIDEO visit will be conducted via a call between you and your physician/provider. We have found that certain health care needs can be provided without the need for an in-person physical exam.  VIDEO visits are billed at different rates depending on your insurance coverage.  Please reach out to your insurance provider with any questions. If during the course of the call the physician/provider feels a video visit is not appropriate, you will not be charged for this service.\"     Patient has given verbal consent for VIDEO visit? Yes    Subjective:  Patient described increased hopefulness surrounding her ability to move forward on her dissertation proposal.  She has been given feedback from her major professor and she has now met with him to review feedback.  She described frustration with her friend who continues to express her belief that patient " should not date her former boyfriend.  Patient was encouraged to use her DBT skills of checking the facts to determine if her experience of anger may be justified and if her underlying emotion may be sadness.  Discussed the pros and cons of moving forward on contacting this man.  Discussed her loneliness and how she might address with increasing connection with friends. She denied interest in dating as she has had recurrent feelings of rejection associated with her experiences with dating.  Discussed trust today.     She informed me that she has a conflicting appointment today and that our session would need to end at 245.      Objective:  Patient was on time for today s session. She was alert and oriented. Mood was dysphoric with appropriate range of affect. Patient denied suicidal or assaultive ideation, plan, or intent.        Assessment:  The patient has a longstanding history of interpersonal discord and challenges with emotion regulation.  She has completed all requirements for her PhD and is now ABD, working on her proposal.  She is living in  housing with her two dogs.  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  She is dating. Patient uses avoidance as a primary coping tool.    Plan:  Patient graduated from full model DBT at [facility] and is now completing phase 2 work.      Treatment plan updated:  8/31/23    Time In: 2:00  Time Out: 3:00    Diagnosis:  Axis I PTSD, chronic, persistent depressive disorder, adjustment disorder with mixed, psychological factors associated with physical (fibromyalgia)   Axis II  BPD   Axis III please see medical records for details   [place] IV Psychosocial and Environmental Stressors: living alone with no family support, chronic illnesses, dissertation stress, financial stress         Corina Muhammad, PhD, LP

## 2023-11-30 ENCOUNTER — VIRTUAL VISIT (OUTPATIENT)
Dept: PSYCHOLOGY | Facility: CLINIC | Age: 38
End: 2023-11-30
Payer: COMMERCIAL

## 2023-11-30 DIAGNOSIS — F34.1 PERSISTENT DEPRESSIVE DISORDER: ICD-10-CM

## 2023-11-30 DIAGNOSIS — F43.23 ADJUSTMENT DISORDER WITH MIXED ANXIETY AND DEPRESSED MOOD: ICD-10-CM

## 2023-11-30 DIAGNOSIS — F43.12 CHRONIC POST-TRAUMATIC STRESS DISORDER (PTSD): Primary | ICD-10-CM

## 2023-11-30 PROCEDURE — 90837 PSYTX W PT 60 MINUTES: CPT | Mod: VID | Performed by: PSYCHOLOGIST

## 2023-12-04 NOTE — CONFIDENTIAL NOTE
"    Health Psychology - Follow up Visit  Confidential Summary*    The author of this note documented a reason for not sharing it with the patient.  REFERRAL SOURCE:  Psychiatry    CHIEF COMPLAINT/REASON FOR VISIT  Psychotherapy in context of chronic PTSD and persistent depression.  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      Patient was seen today for a 30 minute individual psychotherapy session.  The session was facilitated via [employer] patient at her home and provider at her own home.  Please note, patient running late for appt and when she tried to log into [employer] the visit was already cancelled.         This telehealth service is appropriate and effective for delivering services in light of the necessity for social distancing to mitigate the COVID-19 epidemic. Patient has agreed to receiving telehealth services.    The patient has been notified of following:   \"This VIDEO visit will be conducted via a call between you and your physician/provider. We have found that certain health care needs can be provided without the need for an in-person physical exam.  VIDEO visits are billed at different rates depending on your insurance coverage.  Please reach out to your insurance provider with any questions. If during the course of the call the physician/provider feels a video visit is not appropriate, you will not be charged for this service.\"     Patient has given verbal consent for VIDEO visit? Yes    Subjective:  Patient began with discussion of her ongoing frustration that she continues to have challenges in dating and that other women do not.  Discussed the trauma related to racial discrimination.  She described her childhood history of invalidation and having her feelings disregarded.  She described her frustration " that there is nothing that she can do to change the perceptions of others.  She was encouraged to consider how she might increase her contact with those who are supportive and accepting of her.  She was able [facility]    Encouraged decreasing avoidance of dissertation progress.        Objective:  Patient was on time for today s session. She was alert and oriented. Mood was dysphoric with appropriate range of affect. Patient denied suicidal or assaultive ideation, plan, or intent.        Assessment:  The patient has a longstanding history of interpersonal discord and challenges with emotion regulation.  She has completed all requirements for her PhD and is now ABD, working on her proposal.  She is living in  housing with her two dogs.  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  She is dating. Patient uses avoidance as a primary coping tool.    Plan:  Patient graduated from full model DBT at [facility] and is now completing phase 2 work.      Treatment plan updated:  8/31/23    Time In: 2:00  Time Out: 3:00    Diagnosis:  Axis I PTSD, chronic, persistent depressive disorder, adjustment disorder with mixed, psychological factors associated with physical (fibromyalgia)   Axis II  BPD   Axis III please see medical records for details   [place] IV Psychosocial and Environmental Stressors: living alone with no family support, chronic illnesses, dissertation stress, financial stress         Corina Muhammad, PhD, LP

## 2023-12-07 ENCOUNTER — VIRTUAL VISIT (OUTPATIENT)
Dept: PSYCHOLOGY | Facility: CLINIC | Age: 38
End: 2023-12-07
Payer: COMMERCIAL

## 2023-12-07 DIAGNOSIS — F43.12 CHRONIC POST-TRAUMATIC STRESS DISORDER (PTSD): Primary | ICD-10-CM

## 2023-12-07 DIAGNOSIS — F43.23 ADJUSTMENT DISORDER WITH MIXED ANXIETY AND DEPRESSED MOOD: ICD-10-CM

## 2023-12-07 DIAGNOSIS — F34.1 PERSISTENT DEPRESSIVE DISORDER: ICD-10-CM

## 2023-12-07 PROCEDURE — 90837 PSYTX W PT 60 MINUTES: CPT | Mod: VID | Performed by: PSYCHOLOGIST

## 2023-12-09 NOTE — CONFIDENTIAL NOTE
"    Health Psychology - Follow up Visit  Confidential Summary*    The author of this note documented a reason for not sharing it with the patient.  REFERRAL SOURCE:  Psychiatry    CHIEF COMPLAINT/REASON FOR VISIT  Psychotherapy in context of chronic PTSD and persistent depression.  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      Patient was seen today for a 60 minute individual psychotherapy session.  The session was facilitated via AMWELLwith patient at her home and provider at her own home.         This telehealth service is appropriate and effective for delivering services in light of the necessity for social distancing to mitigate the COVID-19 epidemic. Patient has agreed to receiving telehealth services.    The patient has been notified of following:   \"This VIDEO visit will be conducted via a call between you and your physician/provider. We have found that certain health care needs can be provided without the need for an in-person physical exam.  VIDEO visits are billed at different rates depending on your insurance coverage.  Please reach out to your insurance provider with any questions. If during the course of the call the physician/provider feels a video visit is not appropriate, you will not be charged for this service.\"     Patient has given verbal consent for VIDEO visit? Yes    Subjective:  Patient described ongoing lack of progress toward dissertation proposal.  She described an inability to move forward because of a lack of feedback provided to her by her committee.  She was encouraged to reach out to her committee and to ask specific questions.  She was also encouraged to schedule a meeting.  She described increased enthusiasm surrounding the identification of an opportunity to apply for a dissertation " award.  She reported that she does not believe that she will be a competitive candidate for the overall award, she is hoping to receive feedback from other faculty members who review these awards.      She reported that she is done with her grading and is feeling food about her time management.      She also described excitement that she was invited to attend a conference in [place] where she will be able to interact with others who are engaged in similar research. She reported that she is able to see a light at the end of the tunnel.      Will continue patient to approach versus avoid.        Objective:  Patient was on time for today s session. She was alert and oriented. Mood was dysphoric with appropriate range of affect. Patient denied suicidal or assaultive ideation, plan, or intent.        Assessment:  The patient has a longstanding history of interpersonal discord and challenges with emotion regulation.  She has completed all requirements for her PhD and is now ABD, working on her proposal.  She is living in  housing with her two dogs.  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 She is dating. Patient uses avoidance as a primary coping tool.    Plan:  Patient graduated from full model DBT at [facility] and is now completing phase 2 work.      Treatment plan updated:  8/31/23    Time In: 2:00  Time Out: 3:00    Diagnosis:  Axis I PTSD, chronic, persistent depressive disorder, adjustment disorder with mixed, psychological factors associated with physical (fibromyalgia)   Axis II  BPD   Axis III please see medical records for details   [place] IV Psychosocial and Environmental Stressors: living alone with no family support, chronic illnesses, dissertation stress, financial stress         Corina Muhammad, PhD, LP

## 2023-12-11 DIAGNOSIS — J45.30 MILD PERSISTENT ASTHMA, UNSPECIFIED WHETHER COMPLICATED: ICD-10-CM

## 2023-12-14 ENCOUNTER — VIRTUAL VISIT (OUTPATIENT)
Dept: PSYCHOLOGY | Facility: CLINIC | Age: 38
End: 2023-12-14
Payer: COMMERCIAL

## 2023-12-14 DIAGNOSIS — F54 PSYCHOLOGICAL AND BEHAVIORAL FACTORS ASSOCIATED WITH DISORDERS OR DISEASES CLASSIFIED ELSEWHERE: ICD-10-CM

## 2023-12-14 DIAGNOSIS — F34.1 PERSISTENT DEPRESSIVE DISORDER: ICD-10-CM

## 2023-12-14 DIAGNOSIS — F43.12 CHRONIC POST-TRAUMATIC STRESS DISORDER (PTSD): Primary | ICD-10-CM

## 2023-12-14 DIAGNOSIS — F43.23 ADJUSTMENT DISORDER WITH MIXED ANXIETY AND DEPRESSED MOOD: ICD-10-CM

## 2023-12-14 PROCEDURE — 90837 PSYTX W PT 60 MINUTES: CPT | Mod: VID | Performed by: PSYCHOLOGIST

## 2023-12-14 ASSESSMENT — ANXIETY QUESTIONNAIRES
3. WORRYING TOO MUCH ABOUT DIFFERENT THINGS: SEVERAL DAYS
4. TROUBLE RELAXING: SEVERAL DAYS
2. NOT BEING ABLE TO STOP OR CONTROL WORRYING: SEVERAL DAYS
4. TROUBLE RELAXING: SEVERAL DAYS
5. BEING SO RESTLESS THAT IT IS HARD TO SIT STILL: SEVERAL DAYS
IF YOU CHECKED OFF ANY PROBLEMS ON THIS QUESTIONNAIRE, HOW DIFFICULT HAVE THESE PROBLEMS MADE IT FOR YOU TO DO YOUR WORK, TAKE CARE OF THINGS AT HOME, OR GET ALONG WITH OTHER PEOPLE: SOMEWHAT DIFFICULT
2. NOT BEING ABLE TO STOP OR CONTROL WORRYING: SEVERAL DAYS
5. BEING SO RESTLESS THAT IT IS HARD TO SIT STILL: SEVERAL DAYS
1. FEELING NERVOUS, ANXIOUS, OR ON EDGE: SEVERAL DAYS
GAD7 TOTAL SCORE: 7
7. FEELING AFRAID AS IF SOMETHING AWFUL MIGHT HAPPEN: SEVERAL DAYS
6. BECOMING EASILY ANNOYED OR IRRITABLE: SEVERAL DAYS
IF YOU CHECKED OFF ANY PROBLEMS ON THIS QUESTIONNAIRE, HOW DIFFICULT HAVE THESE PROBLEMS MADE IT FOR YOU TO DO YOUR WORK, TAKE CARE OF THINGS AT HOME, OR GET ALONG WITH OTHER PEOPLE: SOMEWHAT DIFFICULT
1. FEELING NERVOUS, ANXIOUS, OR ON EDGE: SEVERAL DAYS
GAD7 TOTAL SCORE: 7
3. WORRYING TOO MUCH ABOUT DIFFERENT THINGS: SEVERAL DAYS
7. FEELING AFRAID AS IF SOMETHING AWFUL MIGHT HAPPEN: SEVERAL DAYS
6. BECOMING EASILY ANNOYED OR IRRITABLE: SEVERAL DAYS
GAD7 TOTAL SCORE: 7

## 2023-12-14 NOTE — TELEPHONE ENCOUNTER
MONTELUKAST SOD 10 MG TABLET       Last Written Prescription Date:  9-12-23  Last Fill Quantity: 90,   # refills: 0  Last Office Visit : 10-6-23  Future Office visit:  none    Routing refill request to provider for review/approval because:  Overdue ACT, previous daniel refill

## 2023-12-15 RX ORDER — MONTELUKAST SODIUM 10 MG/1
1 TABLET ORAL AT BEDTIME
Qty: 90 TABLET | Refills: 1 | Status: SHIPPED | OUTPATIENT
Start: 2023-12-15 | End: 2023-12-25

## 2023-12-15 NOTE — TELEPHONE ENCOUNTER
Sent patient a message via [employer] to schedule an asthma follow up.     Lakisha Yu, CMA

## 2023-12-16 ENCOUNTER — HEALTH MAINTENANCE LETTER (OUTPATIENT)
Age: 38
End: 2023-12-16

## 2023-12-17 NOTE — PROGRESS NOTES
"    Health Psychology - Follow up Visit  Confidential Summary*    The author of this note documented a reason for not sharing it with the patient.  REFERRAL SOURCE:  Psychiatry    CHIEF COMPLAINT/REASON FOR VISIT  Psychotherapy in context of chronic PTSD and persistent depression.  Patient also complains of dissatisfaction with interpersonal relationships and challenges with emotion regulation.      Patient was seen today for a 60 minute individual psychotherapy session.  The session was facilitated via VIDEO with patient at her home and provider at her own home.  We used [employer] platform.       The patient has been notified of following:   \"This VIDEO visit will be conducted via a call between you and your physician/provider. We have found that certain health care needs can be provided without the need for an in-person physical exam.  VIDEO visits are billed at different rates depending on your insurance coverage.  Please reach out to your insurance provider with any questions. If during the course of the call the physician/provider feels a video visit is not appropriate, you will not be charged for this service.\"     Patient has given verbal consent for VIDEO visit? Yes    Subjective:  Patient began with report that she continues to be frustrated by the dialectic that she is being invited to apply for fellowship positions and is being invited to present her work at international conferences but her advisor and chair of her dissertation committee is not responding to her request for feedback on her dissertation proposal.  She reported that she remains optimistic that she has a good draft but that she is not understanding the edits and comments he made on her draft and therefore, she is unable to move forward on corrections.  She is encouraged by the addition of a new professor onto her committee but remains concerned and voiced feeling of ostacism associated with racial, financial and cultural disparities.  She was " encouraged to detail her questions for the committee and to send an email explicitly asking for her needs to be met.  Patient expressed her appreciation that she has adopted a practice of avoidance and learned helplessness secondary to years of limited input from her team.  Encouraged her to increase assertive expression of her asks.  Used active feedback in session today.    Treatment plan updated today.      Objective:  Patient was on time for today s session. She was alert and oriented. Mood was dysphoric with appropriate range of affect. Patient denied suicidal or assaultive ideation, plan, or intent.        Assessment:  The patient has a longstanding history of interpersonal discord and challenges with emotion regulation.  She has completed all requirements for her PhD and is now ABD.  She is living in  housing with her two dogs.  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      Plan:  Patient graduated from full model DBT at [facility] and is now completing phase 2 work.      Treatment plan updated.      Time In: 2:00  Time Out: 3:00    Diagnosis:  Axis I PTSD, chronic, persistent depressive disorder, adjustment disorder with mixed, psychological factors associated with physical (fibromyalgia)   Axis II  BPD   Axis III please see medical records for details   [place] IV Psychosocial and Environmental Stressors: living alone with no family support, pandemic stress, chronic illness         Corina Muhammad, PhD, LP

## 2023-12-17 NOTE — CONFIDENTIAL NOTE
"    Health Psychology - Follow up Visit  Confidential Summary*    The author of this note documented a reason for not sharing it with the patient.  REFERRAL SOURCE:  Psychiatry    CHIEF COMPLAINT/REASON FOR VISIT  Psychotherapy in context of chronic PTSD and persistent depression.  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      Patient was seen today for a 60 minute individual psychotherapy session.  The session was facilitated via AMWELLwith patient at her home and provider at her own home.         This telehealth service is appropriate and effective for delivering services in light of the necessity for social distancing to mitigate the COVID-19 epidemic. Patient has agreed to receiving telehealth services.    The patient has been notified of following:   \"This VIDEO visit will be conducted via a call between you and your physician/provider. We have found that certain health care needs can be provided without the need for an in-person physical exam.  VIDEO visits are billed at different rates depending on your insurance coverage.  Please reach out to your insurance provider with any questions. If during the course of the call the physician/provider feels a video visit is not appropriate, you will not be charged for this service.\"     Patient has given verbal consent for VIDEO visit? Yes    Subjective:  Patient described ongoing lack of progress toward dissertation proposal.  She described an inability to move forward because of a lack of feedback provided to her by her committee.  She was encouraged to reach out to her committee and to ask specific questions.  She was also encouraged to schedule a meeting.  She described increased enthusiasm surrounding the identification of an opportunity to apply for a dissertation " award.  She reported that she does not believe that she will be a competitive candidate for the overall award, she is hoping to receive feedback from other faculty members who review these awards.      She reported that she is done with her grading and is feeling food about her time management.      She also described excitement that she was invited to attend a conference in [place] where she will be able to interact with others who are engaged in similar research. She reported that she is able to see a light at the end of the tunnel.      Will continue patient to approach versus avoid.        Objective:  Patient was on time for today s session. She was alert and oriented. Mood was dysphoric with appropriate range of affect. Patient denied suicidal or assaultive ideation, plan, or intent.        Assessment:  The patient has a longstanding history of interpersonal discord and challenges with emotion regulation.  She has completed all requirements for her PhD and is now ABD, working on her proposal.  She is living in  housing with her two dogs.  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 She is dating. Patient uses avoidance as a primary coping tool.    Plan:  Patient graduated from full model DBT at [facility] and is now completing phase 2 work.      Treatment plan updated:  8/31/23    Time In: 2:00  Time Out: 3:00    Diagnosis:  Axis I PTSD, chronic, persistent depressive disorder, adjustment disorder with mixed, psychological factors associated with physical (fibromyalgia)   Axis II  BPD   Axis III please see medical records for details   [place] IV Psychosocial and Environmental Stressors: living alone with no family support, chronic illnesses, dissertation stress, financial stress         Corina Muhammad, PhD, LP

## 2023-12-19 ENCOUNTER — MYC REFILL (OUTPATIENT)
Dept: PSYCHIATRY | Facility: CLINIC | Age: 38
End: 2023-12-19
Payer: COMMERCIAL

## 2023-12-19 NOTE — TELEPHONE ENCOUNTER
KENNY received faxed refill request for doxepin (SINEQUAN) 10 MG capsule   Too soon for refill there is enough until January 2024     Vandana Ramsey CMA December 19, 2023

## 2023-12-21 ENCOUNTER — VIRTUAL VISIT (OUTPATIENT)
Dept: PSYCHOLOGY | Facility: CLINIC | Age: 38
End: 2023-12-21
Payer: COMMERCIAL

## 2023-12-21 DIAGNOSIS — F54 PSYCHOLOGICAL AND BEHAVIORAL FACTORS ASSOCIATED WITH DISORDERS OR DISEASES CLASSIFIED ELSEWHERE: ICD-10-CM

## 2023-12-21 DIAGNOSIS — F43.23 ADJUSTMENT DISORDER WITH MIXED ANXIETY AND DEPRESSED MOOD: ICD-10-CM

## 2023-12-21 DIAGNOSIS — F43.12 CHRONIC POST-TRAUMATIC STRESS DISORDER (PTSD): Primary | ICD-10-CM

## 2023-12-21 DIAGNOSIS — F34.1 PERSISTENT DEPRESSIVE DISORDER: ICD-10-CM

## 2023-12-21 PROCEDURE — 90837 PSYTX W PT 60 MINUTES: CPT | Mod: VID | Performed by: PSYCHOLOGIST

## 2023-12-21 ASSESSMENT — ANXIETY QUESTIONNAIRES: GAD7 TOTAL SCORE: 7

## 2023-12-22 ENCOUNTER — TELEPHONE (OUTPATIENT)
Dept: FAMILY MEDICINE | Facility: CLINIC | Age: 38
End: 2023-12-22

## 2023-12-22 DIAGNOSIS — E66.3 OVERWEIGHT (BMI 25.0-29.9): Primary | ICD-10-CM

## 2023-12-22 RX ORDER — PHENTERMINE AND TOPIRAMATE 15; 92 MG/1; MG/1
1 CAPSULE, EXTENDED RELEASE ORAL DAILY
Qty: 90 CAPSULE | Refills: 1 | Status: SHIPPED | OUTPATIENT
Start: 2023-12-22 | End: 2024-01-18

## 2023-12-22 NOTE — TELEPHONE ENCOUNTER
Patient states that she does not take Montelukast Sodium 10 MG Oral Tablet (SINGULAIR) for asthma but rather for hives. The current diagnosis is listed as asthma. Are you able to update it for future refills to [facility] as she has difficulty filling because it requires a asthma follow up but she does not take it for asthma.     Marilu LARSON EMT 8:52 AM 12/22/2023

## 2023-12-25 DIAGNOSIS — J45.30 MILD PERSISTENT ASTHMA, UNSPECIFIED WHETHER COMPLICATED: ICD-10-CM

## 2023-12-25 DIAGNOSIS — L50.9 URTICARIA: Primary | ICD-10-CM

## 2023-12-25 RX ORDER — MONTELUKAST SODIUM 10 MG/1
1 TABLET ORAL AT BEDTIME
Qty: 90 TABLET | Refills: 1 | Status: SHIPPED | OUTPATIENT
Start: 2023-12-25 | End: 2024-07-02

## 2023-12-25 NOTE — PROGRESS NOTES
Patient requested montelukast aligned diagnosis be changed to hives rather than asthma. This was changed and RX resent. Michelle TAY CNP

## 2023-12-30 NOTE — CONFIDENTIAL NOTE
"    Health Psychology - Follow up Visit  Confidential Summary*    The author of this note documented a reason for not sharing it with the patient.  REFERRAL SOURCE:  Psychiatry    CHIEF COMPLAINT/REASON FOR VISIT  Psychotherapy in context of chronic PTSD and persistent depression.  Patient also complains of dissatisfaction with interpersonal relationships and challenges with emotion regulation.      Patient was seen today for a 60 minute individual psychotherapy session.  The session was facilitated via VIDEO with patient at her home and provider at her own home.  We used [employer] platform.       The patient has been notified of following:   \"This VIDEO visit will be conducted via a call between you and your physician/provider. We have found that certain health care needs can be provided without the need for an in-person physical exam.  VIDEO visits are billed at different rates depending on your insurance coverage.  Please reach out to your insurance provider with any questions. If during the course of the call the physician/provider feels a video visit is not appropriate, you will not be charged for this service.\"     Patient has given verbal consent for VIDEO visit? Yes    Subjective:  Patient began with report that she has been given challenging information from the chair of her committee and that she has been experiencing more anxiety and irritability.  Discussed how she might use her DBT skills to cope with this information.  Reviewed being mindful of her current emotions and using the check the facts sheet.  She admitted that she is not in danger; however, her anger is justified as she is interpreting his email to indicate that her advisor has not been helping her during the 5 years that she has been paying attention to her work.  Validated her emotions and encouraged her to practice radical acceptance so that she might problem solve her current situation.  We discussed how she might use her committee to her " benefit.  She currently has not communicated with one member and we problem solved how she might reconnect with her.  Session was spent vascillating between acceptance of situation as it is and problem solving for change.      She was encouraged to practice opposite action to anxiety which for her is to move away from procrastination and avoidance toward action.      Objective:  Patient was on time for today s session. She was alert and oriented. Mood was dysphoric with appropriate range of affect. Patient denied suicidal or assaultive ideation, plan, or intent.        Assessment:  The patient has a longstanding history of interpersonal discord and challenges with emotion regulation.  She has completed all requirements for her PhD and is now ABD.  She is living in  housing with her two dogs.  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  She is challenged by anxiety and avoidance as her primary coping skill.      Plan:  Patient graduated from full model DBT at [facility] and is now completing phase 2 work.      Treatment plan updated.      Time In: 2:00  Time Out: 3:00    Diagnosis:  Axis I PTSD, chronic, persistent depressive disorder, adjustment disorder with mixed, psychological factors associated with physical (fibromyalgia)   Axis II  BPD   Axis III please see medical records for details   [place] IV Psychosocial and Environmental Stressors: living alone with no family support, pandemic stress, chronic illness         Corina Muhammad, PhD, LP

## 2023-12-30 NOTE — CONFIDENTIAL NOTE
"    Health Psychology - Follow up Visit  Confidential Summary*    The author of this note documented a reason for not sharing it with the patient.  REFERRAL SOURCE:  Psychiatry    CHIEF COMPLAINT/REASON FOR VISIT  Psychotherapy in context of chronic PTSD and persistent depression.  Patient also complains of dissatisfaction with interpersonal relationships and challenges with emotion regulation.      Patient was seen today for a 60 minute individual psychotherapy session.  The session was facilitated via VIDEO with patient at her home and provider at her own home.  We used [employer] platform.       The patient has been notified of following:   \"This VIDEO visit will be conducted via a call between you and your physician/provider. We have found that certain health care needs can be provided without the need for an in-person physical exam.  VIDEO visits are billed at different rates depending on your insurance coverage.  Please reach out to your insurance provider with any questions. If during the course of the call the physician/provider feels a video visit is not appropriate, you will not be charged for this service.\"     Patient has given verbal consent for VIDEO visit? Yes    Subjective:  Patient began with report that she continues to look forward to pursuing a postdoctoral fellowship in another country, including [facility] and [place].  However, she remains stuck at the stage of dissertation proposal approval.  She was encouraged to follow up on suggestions of new committee member which includes scheduling a meeting in January.  She reported that Reji will now be her advisor and the chair of her committee and that the other two members have agreed to remain onboard.  She was encouraged to review the critique and to make a table detailing each criticism and ways that she might respond.  Encouraged her to approach this as a task with multiple do-able steps.     Discussed her upcoming trip to [facility] and how she might be " able to use this trip as an opportunity to network for postdoctoral fellowships.      She continues to report financial challenge and is looking forward to securing a position with an income that will allow her to spend more feely.      She reported that now that she has discontinued Ozempic, she has experienced increased weight gain.  Discussed how she might decrease her caloric intake and increase her caloric expenditure to continue to lose weight.  She expressed frustration that she is not able to remain on Ozempic.  Validated her complaints that her insurance will not continue to pay for this medication.    Objective:  Patient was on time for today s session. She was alert and oriented. Mood was dysphoric with appropriate range of affect. Patient denied suicidal or assaultive ideation, plan, or intent.        Assessment:  The patient has a longstanding history of interpersonal discord and challenges with emotion regulation.  She has completed all requirements for her PhD and is now ABD.  She is living in  housing with her two dogs.  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  She is challenged by anxiety and avoidance as her primary coping skill.      Plan:  Patient graduated from full model DBT at [facility] and is now completing phase 2 work.      Treatment plan updated.      Time In: 2:00  Time Out: 3:00    Diagnosis:  Axis I PTSD, chronic, persistent depressive disorder, adjustment disorder with mixed, psychological factors associated with physical (fibromyalgia)   Axis II  BPD   Axis III please see medical records for details   [place] IV Psychosocial and Environmental Stressors: living alone with no family support, pandemic stress, chronic illness         Corina Muhammad, PhD, LP

## 2024-01-02 ENCOUNTER — OFFICE VISIT (OUTPATIENT)
Dept: FAMILY MEDICINE | Facility: CLINIC | Age: 39
End: 2024-01-02
Payer: COMMERCIAL

## 2024-01-02 VITALS
HEART RATE: 104 BPM | BODY MASS INDEX: 25.91 KG/M2 | OXYGEN SATURATION: 98 % | RESPIRATION RATE: 18 BRPM | WEIGHT: 171 LBS | TEMPERATURE: 98.8 F | DIASTOLIC BLOOD PRESSURE: 79 MMHG | SYSTOLIC BLOOD PRESSURE: 118 MMHG | HEIGHT: 68 IN

## 2024-01-02 DIAGNOSIS — N89.8 VAGINAL DISCHARGE: Primary | ICD-10-CM

## 2024-01-02 LAB
BACTERIAL VAGINOSIS VAG-IMP: NEGATIVE
CANDIDA DNA VAG QL NAA+PROBE: NOT DETECTED
CANDIDA GLABRATA / CANDIDA KRUSEI DNA: NOT DETECTED
T VAGINALIS DNA VAG QL NAA+PROBE: NOT DETECTED

## 2024-01-02 PROCEDURE — 0352U MULTIPLEX VAGINAL PANEL BY PCR: CPT | Mod: ORL | Performed by: NURSE PRACTITIONER

## 2024-01-02 ASSESSMENT — ASTHMA QUESTIONNAIRES: ACT_TOTALSCORE: 25

## 2024-01-02 ASSESSMENT — PAIN SCALES - GENERAL: PAINLEVEL: NO PAIN (0)

## 2024-01-02 NOTE — NURSING NOTE
"ROOM:1  [facility]    Preferred Name: Kasandar     How did you hear about us?  Current Patient    38 year old  Chief Complaint   Patient presents with     Vaginal Problem     Patient describes discharge as a grey color. Patient is going out of town and she wants to know if the discharge will be an issue while she is travelling.        Blood pressure 118/79, pulse 104, temperature 98.8  F (37.1  C), temperature source Oral, resp. rate 18, height 1.732 m (5' 8.2\"), weight 77.6 kg (171 lb), SpO2 98%, not currently breastfeeding. Body mass index is 25.85 kg/m .  BP completed using cuff size:        Patient Active Problem List   Diagnosis     Pelvic pain in female     Vitamin D deficiency     Menorrhagia with regular cycle     Migraine without aura and without status migrainosus, not intractable     Iron deficiency anemia due to chronic blood loss     Tired     Circadian rhythm sleep disorder, delayed sleep phase type     Unhealthy sleep habit     Seasonal mood disorder (H24)     Chronic idiopathic urticaria     Acid reflux     Cholecystitis     Depression     Hx of abnormal cervical Pap smear     Irritable bowel syndrome with constipation     Migraine headache     Mild intermittent asthma without complication     Need for desensitization to allergens     Panic disorder     Pap smear abnormality of cervix/human papillomavirus (HPV) positive     Recurrent major depressive disorder, in remission (H24)     Hypertrophy of breast     Chronic sinusitis, unspecified location     Keratosis pilaris     Endometriosis     Acute pharyngitis due to other specified organisms     Chronic pain of both shoulders     Alopecia       Wt Readings from Last 2 Encounters:   01/02/24 77.6 kg (171 lb)   11/09/23 72.6 kg (160 lb)     BP Readings from Last 3 Encounters:   01/02/24 118/79   11/15/23 108/68   10/19/23 103/70       Allergies   Allergen Reactions     Dust Mites Cough, Difficulty breathing and Shortness Of Breath     Cats      " Chest tightness, sinus irritation       Current Outpatient Medications   Medication     almotriptan (AXERT) 12.5 MG tablet     atomoxetine (STRATTERA) 60 MG capsule     buPROPion (WELLBUTRIN XL) 300 MG 24 hr tablet     cholecalciferol 125 MCG (5000 UT) CAPS     doxepin (SINEQUAN) 10 MG capsule     EMGALITY 120 MG/ML injection     famotidine (PEPCID) 20 MG tablet     fexofenadine (ALLEGRA) 180 MG tablet     fluticasone (FLONASE) 50 MCG/ACT nasal spray     gabapentin (NEURONTIN) 400 MG capsule     guaiFENesin (MUCINEX) 600 MG 12 hr tablet     hydroxychloroquine (PLAQUENIL) 200 MG tablet     levonorgestrel (MIRENA) 20 MCG/DAY IUD     methotrexate 2.5 MG tablet     montelukast (SINGULAIR) 10 MG tablet     ondansetron (ZOFRAN ODT) 4 MG ODT tab     Phentermine-Topiramate (QSYMIA) 15-92 MG CP24     phenylephrine HCl 10 MG TABS     propranolol (INDERAL) 20 MG tablet     vilazodone (VIIBRYD) 40 MG TABS tablet     amitriptyline (ELAVIL) 10 MG tablet     ciprofloxacin (CIPRO) 500 MG tablet     EPINEPHrine (ANY BX GENERIC EQUIV) 0.3 MG/0.3ML injection 2-pack     ondansetron (ZOFRAN ODT) 4 MG ODT tab     prochlorperazine (COMPAZINE) 10 MG tablet     No current facility-administered medications for this visit.       Social History     Tobacco Use     Smoking status: Former     Packs/day: 0.00     Years: 10.00     Additional pack years: 0.00     Total pack years: 0.00     Types: Cigarettes     Smokeless tobacco: Never     Tobacco comments:     smokes occasionally socially    Vaping Use     Vaping Use: Never used   Substance Use Topics     Alcohol use: Not Currently     Alcohol/week: 0.0 standard drinks of alcohol     Comment: occasional      Drug use: Not Currently     Types: Marijuana     Comment: marijuana  none since May 2021       Honoring Choices - Health Care Directive Guide offered to patient at time of visit.    Health Maintenance Due   Topic Date Due     ASTHMA ACTION PLAN  Never done     ADVANCE CARE PLANNING  Never done      HEPATITIS B IMMUNIZATION (1 of 3 - 3-dose series) Never done     YEARLY PREVENTIVE VISIT  01/31/2020     Pneumococcal Vaccine: Pediatrics (0 to 5 Years) and At-Risk Patients (6 to 64 Years) (2 of 2 - PCV) 05/22/2020     COVID-19 Vaccine (6 - 2023-24 season) 12/20/2023       Immunization History   Administered Date(s) Administered     COVID-19 12+ (2023-24) (MODERNA) 10/25/2023     COVID-19 Bivalent 12+ (Pfizer) 09/19/2022     COVID-19 MONOVALENT 12+ (Pfizer) 03/19/2021, 04/09/2021     COVID-19 Monovalent 12+ (Pfizer 2022) 01/10/2022     DTaP, Unspecified 05/22/2019     Flu, Unspecified 09/23/2016     Influenza Vaccine >6 months,quad, PF 09/23/2016, 09/07/2018, 08/28/2019, 09/02/2020, 11/02/2021, 09/19/2022, 10/06/2023     Pneumococcal 23 valent 05/22/2019     TDAP (Adacel,Boostrix) 05/22/2019       Lab Results   Component Value Date    PAP NIL 01/31/2019       Recent Labs   Lab Test 08/03/23  1604 07/13/23  1554 04/25/23  1232 11/07/22  1241 08/19/22  0835 08/19/22  0835 04/13/22  1600 01/04/22  1732 08/12/21  1535 09/12/20  2058 08/02/19  1711   LDL  --   --   --   --   --  100  --   --   --   --   --    HDL  --   --   --   --   --  43*  --   --   --   --   --    TRIG  --   --   --   --   --  184*  --   --   --   --   --    ALT  --  16  --  134*  --  60*   < >  --    < > 36 25   CR 0.81 0.70  --  0.83  --  0.69   < >  --    < > 0.77 0.80   GFRESTIMATED >90 >90  --  >90  --  >90   < >  --    < > >90 >90   GFRESTBLACK  --   --   --   --   --   --   --   --   --  >90 >90   ALBUMIN 4.4 4.3  --  4.4   < > 3.6   < >  --    < > 3.3* 3.4   POTASSIUM 4.5 4.2  --  3.8   < >  --    < >  --    < > 3.9 3.5   TSH  --   --  2.12  --   --   --   --  1.81  --   --   --     < > = values in this interval not displayed.           1/2/2024    12:56 PM 9/19/2023     2:00 PM   PHQ-2 ( 1999 Pfizer)   Q1: Little interest or pleasure in doing things 0 0   Q2: Feeling down, depressed or hopeless 0 0   PHQ-2 Score 0 0           1/12/2023     12:22 PM 6/15/2023     1:24 PM 6/30/2023     1:22 PM 10/2/2023     1:48 PM   PHQ-9 SCORE   PHQ-9 Total Score MyChart 5 (Mild depression) 5 (Mild depression) 5 (Mild depression) 4 (Minimal depression)   PHQ-9 Total Score 5 5 5 4           11/2/2023     2:52 PM 11/16/2023     3:19 PM 12/14/2023     1:03 PM   VIC-7 SCORE   Total Score 5 (mild anxiety) 6 (mild anxiety) 7 (mild anxiety)   Total Score 5    5 6 7    7           6/14/2022    11:07 AM 1/2/2024    12:48 PM   ACT Total Scores   ACT TOTAL SCORE (Goal Greater than or Equal to 20) 24 25   In the past 12 months, how many times did you visit the emergency room for your asthma without being admitted to the hospital? 2 0   In the past 12 months, how many times were you hospitalized overnight because of your asthma? 0 0       Marilu Celestin    January 2, 2024 1:01 PM

## 2024-01-02 NOTE — PATIENT INSTRUCTIONS
Vaginal discharge, no odor.   Wet prep sent. Will notify you of lab results via My Chart  Eating yogurt might help restore normal bacteria.   Avoid douching, bubble baths  Scant bloody discharge on exam; IUD strings visible.

## 2024-01-02 NOTE — PROGRESS NOTES
"       HPI       Kasandra Lau is a 38 year old  who presents for No chief complaint on file.      38 year-old female with extensive history (panic, depression, migraines, endometriosis, IBS, TERELL, chronic pain, ELSA) presents to clinic for evaluation of vaginal discharge. Has noted some vaginal discharge x 1 week.  Not really noting odor. Has IUD in place so no menses. Last sexual encounter May 2023 and had STI screening which was negative after that. Denies abdominal pain but is reporting menstrual-like cramping. This has happened since IUD in place.   Denies fever, chills.     Insurance stopped covering Emgality and semaglutide. Is now on phentermine and topiramate for weight loss. Purchasing emgality and added another medication which has caused her to gain addiitonal 10 pounds.  She continues to try to eat healthy    Problem, Medication and Allergy Lists were reviewed and updated if needed..    Patient is an established patient of this clinic..         Review of Systems:   Review of Systems  GEN: negative fever,malaise.   NEURO: emgality no longer covered. Continues on this despite copays. Trying another med also.   ENDO: has gained back 10 pounds with med changes. Semaglutide no longer covered. Tolerating phentermine and topirimate  GI: negative for abdominal pain. Has had constipation off and on, using laxative prn.   : as per HPI       Physical Exam:   /79 (BP Location: Left arm, Patient Position: Sitting, Cuff Size: Adult Regular)   Pulse 104   Temp 98.8  F (37.1  C) (Oral)   Resp 18   Ht 1.732 m (5' 8.2\")   Wt 77.6 kg (171 lb)   SpO2 98%   BMI 25.85 kg/m      There is no height or weight on file to calculate BMI.  Vital signs normal except elevated HR     Physical Exam  GEN: alert female in NAD.    ABD: soft with BSx4 no HSM; nontender.   : EG without lesions or discharge. BUS intact.  Vagina pink moist with small amount this yellow discharge. Scant blood at cervical os. IUD strings present.  " No cervical lesions. No odor.       Results:   Results are ordered and pending    Assessment and Plan        1. Vaginal discharge  Send wet prep. Will notify of results.  Avoid bubble baths, douches. Discussed environmental factors that can influence vaginal pH. Could increase yogurt with live culture intake. Will travel to [place] tomorrow and be there for 2 weeks. Will try to review labs before she leaves. Discussed STI screening, declines.   - Multiplex Vaginal Panel by PCR; Future  - Multiplex Vaginal Panel by PCR         There are no discontinued medications.    Options for treatment and follow-up care were reviewed with the patient. Kasandra Lau  engaged in the decision making process and verbalized understanding of the options discussed and agreed with the final plan.    JASVIR Ashley CNP

## 2024-01-03 DIAGNOSIS — F90.8 ATTENTION DEFICIT HYPERACTIVITY DISORDER (ADHD), OTHER TYPE: ICD-10-CM

## 2024-01-03 RX ORDER — ATOMOXETINE 60 MG/1
60 CAPSULE ORAL DAILY
Qty: 90 CAPSULE | Refills: 1 | OUTPATIENT
Start: 2024-01-03

## 2024-01-03 NOTE — TELEPHONE ENCOUNTER
Date of Last Office Visit: 10/2/23  Date of Next Office Visit: 10/16/24  No shows since last visit: 0  Cancellations since last visit: 0    Pending Prescriptions:                       Disp   Refills    atomoxetine (STRATTERA) 60 MG capsule     90 cap*1            Sig: Take 1 capsule (60 mg) by mouth daily      Medication requested: atomoxetine (STRATTERA) 60 MG capsule  Date last ordered: 10/2/23 Qty: 90 Refills: 1     Review of [place] ?: NA - Medication is to soon to fill    Last visit treatment plan: 10/02/2023    Treatment Plan:        1.  Atomoxetine 60 mg daily  2.  Bupropion  mg daily  3.  Doxepin 20 mg 2 times daily  4.  Gabapentin 800 mg 3 times daily  5.  Propranolol 20 mg daily as needed for anxiety  6.  Continue Viibryd 40 mg daily  7.  Continue talk therapy to learn more skills to manage life stressors      []Medication refilled per  Medication Refill in Ambulatory Care  policy.  [x]Medication unable to be refilled by RN due to criteria not met as indicated below:    []Eligibility - not seen in the last year   []Supervision - no future appointment   []Compliance - no shows, cancellations or lapse in therapy   []Verification - order discrepancy   []Controlled medication   []Medication not included in policy   []90-day supply request   [x]Other - Medication is to soon to fill. Refill due around 3/21/24

## 2024-01-18 ENCOUNTER — VIRTUAL VISIT (OUTPATIENT)
Dept: PSYCHOLOGY | Facility: CLINIC | Age: 39
End: 2024-01-18
Payer: COMMERCIAL

## 2024-01-18 DIAGNOSIS — F43.12 CHRONIC POST-TRAUMATIC STRESS DISORDER (PTSD): Primary | ICD-10-CM

## 2024-01-18 DIAGNOSIS — F43.23 ADJUSTMENT DISORDER WITH MIXED ANXIETY AND DEPRESSED MOOD: ICD-10-CM

## 2024-01-18 DIAGNOSIS — E66.3 OVERWEIGHT (BMI 25.0-29.9): ICD-10-CM

## 2024-01-18 DIAGNOSIS — F34.1 PERSISTENT DEPRESSIVE DISORDER: ICD-10-CM

## 2024-01-18 DIAGNOSIS — F54 PSYCHOLOGICAL AND BEHAVIORAL FACTORS ASSOCIATED WITH DISORDERS OR DISEASES CLASSIFIED ELSEWHERE: ICD-10-CM

## 2024-01-18 PROCEDURE — 90837 PSYTX W PT 60 MINUTES: CPT | Mod: 95 | Performed by: PSYCHOLOGIST

## 2024-01-18 RX ORDER — PHENTERMINE AND TOPIRAMATE 15; 92 MG/1; MG/1
1 CAPSULE, EXTENDED RELEASE ORAL DAILY
Qty: 90 CAPSULE | Refills: 1 | Status: SHIPPED | OUTPATIENT
Start: 2024-01-18 | End: 2024-06-25

## 2024-01-18 ASSESSMENT — PATIENT HEALTH QUESTIONNAIRE - PHQ9
SUM OF ALL RESPONSES TO PHQ QUESTIONS 1-9: 2
SUM OF ALL RESPONSES TO PHQ QUESTIONS 1-9: 2
10. IF YOU CHECKED OFF ANY PROBLEMS, HOW DIFFICULT HAVE THESE PROBLEMS MADE IT FOR YOU TO DO YOUR WORK, TAKE CARE OF THINGS AT HOME, OR GET ALONG WITH OTHER PEOPLE: SOMEWHAT DIFFICULT

## 2024-01-18 ASSESSMENT — ANXIETY QUESTIONNAIRES
4. TROUBLE RELAXING: SEVERAL DAYS
7. FEELING AFRAID AS IF SOMETHING AWFUL MIGHT HAPPEN: SEVERAL DAYS
5. BEING SO RESTLESS THAT IT IS HARD TO SIT STILL: SEVERAL DAYS
GAD7 TOTAL SCORE: 6
6. BECOMING EASILY ANNOYED OR IRRITABLE: SEVERAL DAYS
8. IF YOU CHECKED OFF ANY PROBLEMS, HOW DIFFICULT HAVE THESE MADE IT FOR YOU TO DO YOUR WORK, TAKE CARE OF THINGS AT HOME, OR GET ALONG WITH OTHER PEOPLE?: SOMEWHAT DIFFICULT
IF YOU CHECKED OFF ANY PROBLEMS ON THIS QUESTIONNAIRE, HOW DIFFICULT HAVE THESE PROBLEMS MADE IT FOR YOU TO DO YOUR WORK, TAKE CARE OF THINGS AT HOME, OR GET ALONG WITH OTHER PEOPLE: SOMEWHAT DIFFICULT
3. WORRYING TOO MUCH ABOUT DIFFERENT THINGS: SEVERAL DAYS
7. FEELING AFRAID AS IF SOMETHING AWFUL MIGHT HAPPEN: SEVERAL DAYS
1. FEELING NERVOUS, ANXIOUS, OR ON EDGE: SEVERAL DAYS
GAD7 TOTAL SCORE: 6
GAD7 TOTAL SCORE: 6
2. NOT BEING ABLE TO STOP OR CONTROL WORRYING: NOT AT ALL

## 2024-01-19 RX ORDER — VILAZODONE HYDROCHLORIDE 40 MG/1
40 TABLET ORAL EVERY MORNING
Qty: 90 TABLET | Refills: 1 | OUTPATIENT
Start: 2024-01-19

## 2024-01-19 NOTE — TELEPHONE ENCOUNTER
Refill request for Viibryd 40 mg received. Refill will be refused. Ordered on 10/2/23 for #90 and 1 refill. Should have 1 refill remaining.     vilazodone (VIIBRYD) 40 MG TABS tablet 90 tablet 1 10/2/2023 -- No   Sig - Route: Take 1 tablet (40 mg) by mouth every morning - Oral       Charley Rubin RN on 1/19/2024 at 10:31 AM

## 2024-01-23 DIAGNOSIS — M79.2 NEUROPATHIC PAIN: ICD-10-CM

## 2024-01-23 DIAGNOSIS — F41.9 ANXIETY: ICD-10-CM

## 2024-01-23 RX ORDER — GABAPENTIN 400 MG/1
800 CAPSULE ORAL 3 TIMES DAILY
Qty: 180 CAPSULE | Refills: 5 | Status: SHIPPED | OUTPATIENT
Start: 2024-01-23 | End: 2024-07-11

## 2024-01-23 NOTE — TELEPHONE ENCOUNTER
[facility] phone call message - patient requesting a refill:    Full Medication Name: Gabapentin (neurontin)     Dose: 400 MG     Pharmacy confirmed as   [id number] - [place], [place] - [address]  [address] [address]  Phone: [phone] Fax: [phone]  : Yes    Medication tab checked to see if medication has been sent  Yes    Is patient out of medication: Yes    Did patient contact the pharmacy? Yes    Additional Comments:      Primary language: English      needed? No    Call taken on January 23, 2024 at 2:33 PM by Marilu Riley, EMT    Route to P [facility] MED REFILLS TEAM     ++If medication is a controlled substance, please route directly to the provider++

## 2024-01-26 ENCOUNTER — VIRTUAL VISIT (OUTPATIENT)
Dept: PSYCHOLOGY | Facility: CLINIC | Age: 39
End: 2024-01-26
Payer: COMMERCIAL

## 2024-01-26 DIAGNOSIS — F43.12 CHRONIC POST-TRAUMATIC STRESS DISORDER (PTSD): Primary | ICD-10-CM

## 2024-01-26 DIAGNOSIS — F34.1 PERSISTENT DEPRESSIVE DISORDER: ICD-10-CM

## 2024-01-26 DIAGNOSIS — F43.23 ADJUSTMENT DISORDER WITH MIXED ANXIETY AND DEPRESSED MOOD: ICD-10-CM

## 2024-01-26 DIAGNOSIS — F54 PSYCHOLOGICAL AND BEHAVIORAL FACTORS ASSOCIATED WITH DISORDERS OR DISEASES CLASSIFIED ELSEWHERE: ICD-10-CM

## 2024-01-26 PROCEDURE — 90837 PSYTX W PT 60 MINUTES: CPT | Mod: 95 | Performed by: PSYCHOLOGIST

## 2024-01-26 NOTE — CONFIDENTIAL NOTE
"    Health Psychology - Follow up Visit  Confidential Summary*    The author of this note documented a reason for not sharing it with the patient.    REFERRAL SOURCE:  Psychiatry    CHIEF COMPLAINT/REASON FOR VISIT  Psychotherapy in context of chronic PTSD and persistent depression.  Patient also complains of dissatisfaction with interpersonal relationships and challenges with emotion regulation.      Patient was seen today for a 60 minute individual psychotherapy session.  The session was facilitated via VIDEO with patient at her home and provider at her own home.  We used [employer] platform.       The patient has been notified of following:   \"This VIDEO visit will be conducted via a call between you and your physician/provider. We have found that certain health care needs can be provided without the need for an in-person physical exam.  VIDEO visits are billed at different rates depending on your insurance coverage.  Please reach out to your insurance provider with any questions. If during the course of the call the physician/provider feels a video visit is not appropriate, you will not be charged for this service.\"     Patient has given verbal consent for VIDEO visit? Yes    Subjective:  Patient has been away in [facility] for the past few weeks.  Spent time describing her observation that her mood and interpersonal relations are improved when she is away.  Described meeting with potential postdoctoral mentor.  She will need to defend her dissertation prior to being considered for a position as she will not be eligible for [place] postdoctoral or faculty funding without degree in hand.      Discussed her progress toward completing her proposal.  She has an new advisor and she has yet to set up her first meeting.  Encouraged her to do so and to set up regular meetings so that she will not rely on  emotional reasoning to set time to have accountability toward progress.  Encouraged her to practice opposite to emotion " action.  Used motivational interviewing to address pros and cons of focusing attention on proposal completion.      Patient may be practicing avoidance to decrease anxiety.  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      Objective:  Patient was on time for today s session. She was alert and oriented. Mood was dysphoric with appropriate range of affect. Patient denied suicidal or assaultive ideation, plan, or intent.        Assessment:  The patient has a longstanding history of interpersonal discord and challenges with emotion regulation.  She has completed all requirements for her PhD and is now ABD.  She is living in  housing with her two dogs.  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  She is challenged by anxiety and avoidance as her primary coping skill.      Plan:  Patient graduated from full model DBT at [facility] and is now completing phase 2 work.      Treatment plan updated.      Time In: 1:00  Time Out: 2:00    Diagnosis:  Axis I PTSD, chronic, persistent depressive disorder, adjustment disorder with mixed, psychological factors associated with physical (fibromyalgia)   Axis II  BPD   Axis III please see medical records for details   [place] IV Psychosocial and Environmental Stressors: living alone with no family support, chronic illness; adhd         Corina Muhammad, PhD, LP

## 2024-01-29 ENCOUNTER — MYC REFILL (OUTPATIENT)
Dept: PSYCHIATRY | Facility: CLINIC | Age: 39
End: 2024-01-29
Payer: COMMERCIAL

## 2024-01-29 DIAGNOSIS — F41.1 GENERALIZED ANXIETY DISORDER: ICD-10-CM

## 2024-01-29 DIAGNOSIS — F34.1 PERSISTENT DEPRESSIVE DISORDER: ICD-10-CM

## 2024-01-30 ENCOUNTER — OFFICE VISIT (OUTPATIENT)
Dept: FAMILY MEDICINE | Facility: CLINIC | Age: 39
End: 2024-01-30
Payer: COMMERCIAL

## 2024-01-30 VITALS
TEMPERATURE: 98.4 F | OXYGEN SATURATION: 98 % | BODY MASS INDEX: 27.56 KG/M2 | HEART RATE: 79 BPM | DIASTOLIC BLOOD PRESSURE: 74 MMHG | SYSTOLIC BLOOD PRESSURE: 109 MMHG | RESPIRATION RATE: 18 BRPM | HEIGHT: 67 IN | WEIGHT: 175.6 LBS

## 2024-01-30 DIAGNOSIS — Z13.220 LIPID SCREENING: ICD-10-CM

## 2024-01-30 DIAGNOSIS — M26.609 TMJ (TEMPOROMANDIBULAR JOINT SYNDROME): Primary | ICD-10-CM

## 2024-01-30 DIAGNOSIS — J30.9 ALLERGIC RHINITIS, UNSPECIFIED SEASONALITY, UNSPECIFIED TRIGGER: ICD-10-CM

## 2024-01-30 RX ORDER — FLUTICASONE PROPIONATE 50 MCG
2 SPRAY, SUSPENSION (ML) NASAL 2 TIMES DAILY PRN
Qty: 11.1 ML | Refills: 5 | Status: SHIPPED | OUTPATIENT
Start: 2024-01-30 | End: 2024-02-28

## 2024-01-30 RX ORDER — DOXEPIN HYDROCHLORIDE 10 MG/1
20 CAPSULE ORAL 2 TIMES DAILY
Qty: 120 CAPSULE | Refills: 0 | Status: SHIPPED | OUTPATIENT
Start: 2024-01-30 | End: 2024-02-23

## 2024-01-30 RX ORDER — CETIRIZINE HYDROCHLORIDE 10 MG/1
10 TABLET ORAL EVERY EVENING
Qty: 30 TABLET | Refills: 1 | Status: SHIPPED | OUTPATIENT
Start: 2024-01-30 | End: 2024-02-28

## 2024-01-30 ASSESSMENT — PAIN SCALES - GENERAL: PAINLEVEL: MODERATE PAIN (5)

## 2024-01-30 NOTE — NURSING NOTE
"ROOM:3  [facility]    Preferred Name: Kasandra     How did you hear about us?  Current Patient    38 year old  Chief Complaint   Patient presents with     Referral     Patient reports jaw pain (right jaw).  The patient says that they feel the pain in their right ear, says it's a pinching and shooting pain toward their head.        Blood pressure 109/74, pulse 79, temperature 98.4  F (36.9  C), temperature source Oral, resp. rate 18, height 1.699 m (5' 6.9\"), weight 79.7 kg (175 lb 9.6 oz), SpO2 98%, not currently breastfeeding. Body mass index is 27.59 kg/m .  BP completed using cuff size:        Patient Active Problem List   Diagnosis     Pelvic pain in female     Vitamin D deficiency     Menorrhagia with regular cycle     Migraine without aura and without status migrainosus, not intractable     Iron deficiency anemia due to chronic blood loss     Tired     Circadian rhythm sleep disorder, delayed sleep phase type     Unhealthy sleep habit     Seasonal mood disorder (H24)     Chronic idiopathic urticaria     Acid reflux     Cholecystitis     Depression     Hx of abnormal cervical Pap smear     Irritable bowel syndrome with constipation     Migraine headache     Mild intermittent asthma without complication     Need for desensitization to allergens     Panic disorder     Pap smear abnormality of cervix/human papillomavirus (HPV) positive     Recurrent major depressive disorder, in remission (H24)     Hypertrophy of breast     Chronic sinusitis, unspecified location     Keratosis pilaris     Endometriosis     Acute pharyngitis due to other specified organisms     Chronic pain of both shoulders     Alopecia       Wt Readings from Last 2 Encounters:   01/30/24 79.7 kg (175 lb 9.6 oz)   01/02/24 77.6 kg (171 lb)     BP Readings from Last 3 Encounters:   01/30/24 109/74   01/02/24 118/79   11/15/23 108/68       Allergies   Allergen Reactions     Dust Mites Cough, Difficulty breathing and Shortness Of Breath     " Cats      Chest tightness, sinus irritation       Current Outpatient Medications   Medication     almotriptan (AXERT) 12.5 MG tablet     amitriptyline (ELAVIL) 10 MG tablet     atomoxetine (STRATTERA) 60 MG capsule     buPROPion (WELLBUTRIN XL) 300 MG 24 hr tablet     cholecalciferol 125 MCG (5000 UT) CAPS     doxepin (SINEQUAN) 10 MG capsule     EMGALITY 120 MG/ML injection     famotidine (PEPCID) 20 MG tablet     fexofenadine (ALLEGRA) 180 MG tablet     fluticasone (FLONASE) 50 MCG/ACT nasal spray     gabapentin (NEURONTIN) 400 MG capsule     guaiFENesin (MUCINEX) 600 MG 12 hr tablet     hydroxychloroquine (PLAQUENIL) 200 MG tablet     methotrexate 2.5 MG tablet     montelukast (SINGULAIR) 10 MG tablet     ondansetron (ZOFRAN ODT) 4 MG ODT tab     ondansetron (ZOFRAN ODT) 4 MG ODT tab     Phentermine-Topiramate (QSYMIA) 15-92 MG CP24     phenylephrine HCl 10 MG TABS     propranolol (INDERAL) 20 MG tablet     vilazodone (VIIBRYD) 40 MG TABS tablet     ciprofloxacin (CIPRO) 500 MG tablet     EPINEPHrine (ANY BX GENERIC EQUIV) 0.3 MG/0.3ML injection 2-pack     levonorgestrel (MIRENA) 20 MCG/DAY IUD     prochlorperazine (COMPAZINE) 10 MG tablet     No current facility-administered medications for this visit.       Social History     Tobacco Use     Smoking status: Former     Packs/day: 0.00     Years: 10.00     Additional pack years: 0.00     Total pack years: 0.00     Types: Cigarettes     Smokeless tobacco: Never     Tobacco comments:     smokes occasionally socially    Vaping Use     Vaping Use: Never used   Substance Use Topics     Alcohol use: Not Currently     Alcohol/week: 0.0 standard drinks of alcohol     Comment: occasional      Drug use: Not Currently     Types: Marijuana     Comment: marijuana  none since May 2021       Honoring Choices - Health Care Directive Guide offered to patient at time of visit.    Health Maintenance Due   Topic Date Due     ASTHMA ACTION PLAN  Never done     ADVANCE CARE PLANNING   Never done     HEPATITIS B IMMUNIZATION (1 of 3 - 3-dose series) Never done     YEARLY PREVENTIVE VISIT  01/31/2020     Pneumococcal Vaccine: Pediatrics (0 to 5 Years) and At-Risk Patients (6 to 64 Years) (2 of 2 - PCV) 05/22/2020       Immunization History   Administered Date(s) Administered     COVID-19 12+ (2023-24) (MODERNA) 10/25/2023     COVID-19 Bivalent 12+ (Pfizer) 09/19/2022     COVID-19 MONOVALENT 12+ (Pfizer) 03/19/2021, 04/09/2021     COVID-19 Monovalent 12+ (Pfizer 2022) 01/10/2022     DTaP, Unspecified 05/22/2019     Flu, Unspecified 09/23/2016     Influenza Vaccine >6 months,quad, PF 09/23/2016, 09/07/2018, 08/28/2019, 09/02/2020, 11/02/2021, 09/19/2022, 10/06/2023     Pneumococcal 23 valent 05/22/2019     TDAP (Adacel,Boostrix) 05/22/2019       Lab Results   Component Value Date    PAP NIL 01/31/2019       Recent Labs   Lab Test 08/03/23  1604 07/13/23  1554 04/25/23  1232 11/07/22  1241 08/19/22  0835 08/19/22  0835 04/13/22  1600 01/04/22  1732 08/12/21  1535 09/12/20 2058 08/02/19  1711   LDL  --   --   --   --   --  100  --   --   --   --   --    HDL  --   --   --   --   --  43*  --   --   --   --   --    TRIG  --   --   --   --   --  184*  --   --   --   --   --    ALT  --  16  --  134*  --  60*   < >  --    < > 36 25   CR 0.81 0.70  --  0.83  --  0.69   < >  --    < > 0.77 0.80   GFRESTIMATED >90 >90  --  >90  --  >90   < >  --    < > >90 >90   GFRESTBLACK  --   --   --   --   --   --   --   --   --  >90 >90   ALBUMIN 4.4 4.3  --  4.4   < > 3.6   < >  --    < > 3.3* 3.4   POTASSIUM 4.5 4.2  --  3.8   < >  --    < >  --    < > 3.9 3.5   TSH  --   --  2.12  --   --   --   --  1.81  --   --   --     < > = values in this interval not displayed.           1/30/2024     1:42 PM 1/2/2024    12:56 PM   PHQ-2 ( 1999 Pfizer)   Q1: Little interest or pleasure in doing things 0 0   Q2: Feeling down, depressed or hopeless 1 0   PHQ-2 Score 1 0   Q1: Little interest or pleasure in doing things Not at all     Q2: Feeling down, depressed or hopeless Several days    PHQ-2 Score 1            6/15/2023     1:24 PM 6/30/2023     1:22 PM 10/2/2023     1:48 PM 1/18/2024     1:54 PM   PHQ-9 SCORE   PHQ-9 Total Score MyChart 5 (Mild depression) 5 (Mild depression) 4 (Minimal depression) 2 (Minimal depression)   PHQ-9 Total Score 5 5 4 2           11/16/2023     3:19 PM 12/14/2023     1:03 PM 1/18/2024     1:55 PM   VIC-7 SCORE   Total Score 6 (mild anxiety) 7 (mild anxiety) 6 (mild anxiety)   Total Score 6 7    7 6    6           6/14/2022    11:07 AM 1/2/2024    12:48 PM   ACT Total Scores   ACT TOTAL SCORE (Goal Greater than or Equal to 20) 24 25   In the past 12 months, how many times did you visit the emergency room for your asthma without being admitted to the hospital? 2 0   In the past 12 months, how many times were you hospitalized overnight because of your asthma? 0 0       Marilu Celestin    January 30, 2024 1:57 PM

## 2024-01-30 NOTE — CONFIDENTIAL NOTE
"    Health Psychology - Follow up Visit  Confidential Summary*    The author of this note documented a reason for not sharing it with the patient.  REFERRAL SOURCE:  Psychiatry    CHIEF COMPLAINT/REASON FOR VISIT  Psychotherapy in context of chronic PTSD and persistent depression.  Patient also complains of dissatisfaction with interpersonal relationships and challenges with emotion regulation.      Patient was seen today for a 60 minute individual psychotherapy session.  The session was facilitated via VIDEO with patient at her home and provider at her own home.  We used [employer] platform.       The patient has been notified of following:   \"This VIDEO visit will be conducted via a call between you and your physician/provider. We have found that certain health care needs can be provided without the need for an in-person physical exam.  VIDEO visits are billed at different rates depending on your insurance coverage.  Please reach out to your insurance provider with any questions. If during the course of the call the physician/provider feels a video visit is not appropriate, you will not be charged for this service.\"     Patient has given verbal consent for VIDEO visit? Yes    Subjective:  Patient began with discussion of a recent conversation with a professor at a university in [facility].  She reported that there is a possibility that she might apply for a jessie to the [facility] [employer] [employer] that would cover the cost of both a project and postdoctoral fellowship salary.  She reported that she is excited at the possibilities and that she finds that she finds her feelings of self efficacy increase as she engages in intellectual conversations with those who live abroad.  She described her recent participation in a panel on her scholarly work in colonialism and that she enjoyed the workshop.      She reported that she has had a recent discussion with her new graduate school advisor and chair of her dissertation committee.  She has been " informed that she has to defend her dissertation by December 2024.  She described ongoing frustration that her committee continues to confront her with their challenges with her proposal.  She was encouraged to use language that allows her to detail their specific concerns so that she might address.  Motivational interviewing was used to increase intrinsic reasons to focus on proposal.  She was reminded that avoidance is effective in the short term but is not effective in the long term.  Discussed perfectionism and shame and the impact on her ability to write.      Objective:  Patient was on time for today s session. She was alert and oriented. Mood was dysphoric with appropriate range of affect. Patient denied suicidal or assaultive ideation, plan, or intent.        Assessment:  The patient has a longstanding history of interpersonal discord and challenges with emotion regulation.  She has completed all requirements for her PhD and is now ABD.  She is living in  housing with her two dogs.  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  She is challenged by anxiety and avoidance as her primary coping skill.      Plan:  Patient graduated from full model DBT at [facility] and is now completing phase 2 work.      Treatment plan updated.      Time In: 2:00  Time Out: 3:00    Diagnosis:  Axis I PTSD, chronic, persistent depressive disorder, adjustment disorder with mixed, psychological factors associated with physical (fibromyalgia)   Axis II  BPD   Axis III please see medical records for details   [place] IV Psychosocial and Environmental Stressors: living alone with no family support, pandemic stress, chronic illness         Corina Muhammad, PhD, LP

## 2024-01-30 NOTE — TELEPHONE ENCOUNTER
KENNY received second  refill request for doxepin (SINEQUAN) 10 MG capsule       Vandana Ramsey CMA January 30, 2024

## 2024-01-30 NOTE — PROGRESS NOTES
"       HPI       Kasandra Lau is a 38 year old  who presents for   Chief Complaint   Patient presents with    Referral     Patient reports jaw pain (right jaw).  The patient says that they feel the pain in their right ear, says it's a pinching and shooting pain toward their head.        38 year-old female with history overweight, endometriosis, migraine, urticaria presents to clinic for evaluation of right jaw pain. Onset has been years with worsening over time. Previous diagnosis TMJ. Has been seen by orthognagic surgery and told has abnormal jaw structure. Recommended surgery but insurance has not authorized.  Pain is intermittent, sometimes absent but gets twinges and tightness of variable severity.  Feels like she pops her jaw. Radiates to right ear. Able to eat and drink, No fever or chills.    She is unaware if she grinds teeth at night.   No bite block.     Also noting worsening allergy symptoms with snow melt. Would like refill of flonase. Allegra not working for her, Has been using benadryl also.     Weight loss. Has gained weight and concerned about this. Doing Pilates regularly and feels good. Maintaining health diet. Has good energy and no change in clothes fit.     Problem, Medication and Allergy Lists were reviewed and updated if needed..    Patient is an established patient of this clinic..         Review of Systems:   Review of Systems  GEN: negative fever, chills. Weight gain  HEENT: jaw pain. Allergy symptoms as above. Right ear discomfort, some allergy symptoms.   RESP: negative.   MSK: jaw pain as above.        Physical Exam:     Vitals:    01/30/24 1353   BP: 109/74   BP Location: Left arm   Patient Position: Sitting   Cuff Size: Adult Regular   Pulse: 79   Resp: 18   Temp: 98.4  F (36.9  C)   TempSrc: Oral   SpO2: 98%   Weight: 79.7 kg (175 lb 9.6 oz)   Height: 1.699 m (5' 6.9\")     Body mass index is 27.59 kg/m .  Vitals were reviewed and were normal     Physical Exam  GEN: alert female in " NAD.  HEENT: eyes clear.  Ears: RTM dull gray with diminished LR, small amount cerumen. No erythema. LTM gray with LR.  Nose: edema of turbinates, scant nasal discharge. OP clear. Dentition intact. Jaw: feel jaw shift with open and clause, palpable clicks.  No swelling.       Results:   Results are ordered and pending  Due for lipid testing, not fasting today. Ordered as future.   Assessment and Plan      1. TMJ (temporomandibular joint syndrome)  Referred to oral surgeon. Provided information on TMJ for comfort.   - Oral Surgery Referral; Future    2. Allergic rhinitis, unspecified seasonality, unspecified trigger  Refilled fluticasone and DCd Allegra, switch to cetirizine.  Stop Benadryl. Discussed should not take double. Seek care if not improving.   - fluticasone (FLONASE) 50 MCG/ACT nasal spray; Spray 2 sprays into both nostrils 2 times daily as needed for allergies  Dispense: 11.1 mL; Refill: 5  - cetirizine (ZYRTEC) 10 MG tablet; Take 1 tablet (10 mg) by mouth every evening  Dispense: 30 tablet; Refill: 1    3. Lipid screening  Ordered for future as last check 2022.   - Lipid panel reflex to direct LDL Fasting; Future    Discussed diet and weight. Reinforced healthy diet and exercise. Reassured that she is doing well. Monitor weight. Increase may be partially due to muscle mass. Continue Pentermine/Topiramate     There are no discontinued medications.    Options for treatment and follow-up care were reviewed with the patient. Kasandra Lau  engaged in the decision making process and verbalized understanding of the options discussed and agreed with the final plan.    Michelle Aguirre, JASVIR CNP

## 2024-01-30 NOTE — TELEPHONE ENCOUNTER
Date of Last Office Visit: 10/2/23  Date of Next Office Visit: None  No shows since last visit: 0  Cancellations since last visit: 0    Medication requested: doxepin (SINEQUAN) 10 MG capsule  Date last ordered: 2/3/23 Qty: 120 Refills: 11     [place] of [place] ?: NA    Lapse in medication adherence greater than 5 days?: No  If yes, call patient and gather details: na  Medication refill request verified as identical to current order?: yes  Result of Last DAM, VPA, Li+ Level, CBC, or Carbamazepine Level (at or since last visit): N/A    Last visit treatment plan:  the atomoxetine is helping her stay on task.  She takes the doxepin to help her avoid breaking out in rashes when she gets overheated.  Propranolol and gabapentin are available for anxiety.  To help with depression bupropion is ordered at 300 mg daily..  She is tolerating the Viibryd well for managing anxiety and depression symptoms.  She has very few identifiable support networks in place but she does see a therapist regularly.   1.  Atomoxetine 60 mg daily  2.  Bupropion  mg daily  3.  Doxepin 20 mg 2 times daily  4.  Gabapentin 800 mg 3 times daily  5.  Propranolol 20 mg daily as needed for anxiety  6.  Continue Viibryd 40 mg daily  Schedule an appointment with me in 3 months or sooner as needed     []Medication refilled per  Medication Refill in Ambulatory Care  policy.  [x]Medication unable to be refilled by RN due to criteria not met as indicated below:    []Eligibility - not seen in the last year   [x]Supervision - no future appointment   []Compliance - no shows, cancellations or lapse in therapy   []Verification - order discrepancy   []Controlled medication   []Medication not included in policy   []90-day supply request   []Other

## 2024-01-30 NOTE — PATIENT INSTRUCTIONS
TMJ  Referred to oral surgeon for evaluation    Allergic rhinitis  Refilled flonase  Stop Allegra and switch to cetirizine 10mg at bedtime. Might cause a little sedation

## 2024-02-01 ENCOUNTER — VIRTUAL VISIT (OUTPATIENT)
Dept: PSYCHOLOGY | Facility: CLINIC | Age: 39
End: 2024-02-01
Payer: COMMERCIAL

## 2024-02-01 DIAGNOSIS — F54 PSYCHOLOGICAL AND BEHAVIORAL FACTORS ASSOCIATED WITH DISORDERS OR DISEASES CLASSIFIED ELSEWHERE: ICD-10-CM

## 2024-02-01 DIAGNOSIS — F43.23 ADJUSTMENT DISORDER WITH MIXED ANXIETY AND DEPRESSED MOOD: ICD-10-CM

## 2024-02-01 DIAGNOSIS — F34.1 PERSISTENT DEPRESSIVE DISORDER: ICD-10-CM

## 2024-02-01 DIAGNOSIS — F43.12 CHRONIC POST-TRAUMATIC STRESS DISORDER (PTSD): Primary | ICD-10-CM

## 2024-02-01 PROCEDURE — 90837 PSYTX W PT 60 MINUTES: CPT | Mod: 95 | Performed by: PSYCHOLOGIST

## 2024-02-07 ENCOUNTER — VIRTUAL VISIT (OUTPATIENT)
Dept: PSYCHOLOGY | Facility: CLINIC | Age: 39
End: 2024-02-07
Payer: COMMERCIAL

## 2024-02-07 DIAGNOSIS — F34.1 PERSISTENT DEPRESSIVE DISORDER: ICD-10-CM

## 2024-02-07 DIAGNOSIS — F54 PSYCHOLOGICAL AND BEHAVIORAL FACTORS ASSOCIATED WITH DISORDERS OR DISEASES CLASSIFIED ELSEWHERE: ICD-10-CM

## 2024-02-07 DIAGNOSIS — F43.12 CHRONIC POST-TRAUMATIC STRESS DISORDER (PTSD): Primary | ICD-10-CM

## 2024-02-07 PROCEDURE — 90837 PSYTX W PT 60 MINUTES: CPT | Mod: 95 | Performed by: PSYCHOLOGIST
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
## 2024-02-18 DIAGNOSIS — F33.1 MAJOR DEPRESSIVE DISORDER, RECURRENT EPISODE, MODERATE (H): ICD-10-CM

## 2024-02-19 RX ORDER — BUPROPION HYDROCHLORIDE 300 MG/1
300 TABLET ORAL EVERY MORNING
Qty: 90 TABLET | Refills: 1 | OUTPATIENT
Start: 2024-02-19

## 2024-02-21 ENCOUNTER — VIRTUAL VISIT (OUTPATIENT)
Dept: PSYCHOLOGY | Facility: CLINIC | Age: 39
End: 2024-02-21
Payer: COMMERCIAL

## 2024-02-21 DIAGNOSIS — F34.1 PERSISTENT DEPRESSIVE DISORDER: ICD-10-CM

## 2024-02-21 DIAGNOSIS — F43.23 ADJUSTMENT DISORDER WITH MIXED ANXIETY AND DEPRESSED MOOD: ICD-10-CM

## 2024-02-21 DIAGNOSIS — F90.8 ATTENTION DEFICIT HYPERACTIVITY DISORDER (ADHD), OTHER TYPE: ICD-10-CM

## 2024-02-21 DIAGNOSIS — F43.12 CHRONIC POST-TRAUMATIC STRESS DISORDER (PTSD): Primary | ICD-10-CM

## 2024-02-21 DIAGNOSIS — F54 PSYCHOLOGICAL AND BEHAVIORAL FACTORS ASSOCIATED WITH DISORDERS OR DISEASES CLASSIFIED ELSEWHERE: ICD-10-CM

## 2024-02-21 PROCEDURE — 90837 PSYTX W PT 60 MINUTES: CPT | Mod: 95 | Performed by: PSYCHOLOGIST

## 2024-02-22 DIAGNOSIS — J30.9 ALLERGIC RHINITIS, UNSPECIFIED SEASONALITY, UNSPECIFIED TRIGGER: ICD-10-CM

## 2024-02-23 DIAGNOSIS — F41.1 GENERALIZED ANXIETY DISORDER: ICD-10-CM

## 2024-02-23 DIAGNOSIS — F34.1 PERSISTENT DEPRESSIVE DISORDER: ICD-10-CM

## 2024-02-23 RX ORDER — DOXEPIN HYDROCHLORIDE 10 MG/1
20 CAPSULE ORAL 2 TIMES DAILY
Qty: 60 CAPSULE | Refills: 0 | Status: SHIPPED | OUTPATIENT
Start: 2024-02-23 | End: 2024-02-26

## 2024-02-23 NOTE — TELEPHONE ENCOUNTER
Date of Last Office Visit: 10/02/2023  Date of Next Office Visit: 02/26/2024  No shows since last visit: 0  Cancellations since last visit: 01/16/2024    Medication requested: doxepin (SINEQUAN) 10 MG capsule  Date last ordered: 01/30/2024 Qty: 120 capsule Refills: 0     [place] of [place] ?: n/a    Lapse in medication adherence greater than 5 days?: no  If yes, call patient and gather details: n/a  Medication refill request verified as identical to current order?: yes  Result of Last DAM, VPA, Li+ Level, CBC, or Carbamazepine Level (at or since last visit): N/A    Last visit treatment plan: Treatment Plan:        1.  Atomoxetine 60 mg daily  2.  Bupropion  mg daily  3.  Doxepin 20 mg 2 times daily  4.  Gabapentin 800 mg 3 times daily  5.  Propranolol 20 mg daily as needed for anxiety  6.  Continue Viibryd 40 mg daily  7.  Continue talk therapy to learn more skills to manage life stressors     Continue all other medications as reviewed per electronic medical record today.   Safety plan reviewed. To the Emergency Department as needed or call after hours crisis line at [phone] or [phone]. [place] Crisis Text Line. Text [place] to 445159 or Suicide LifeLine Chat: suicidepreventionlifeline.org/chat/  To schedule individual or family therapy, call [facility] at [phone]  Schedule an appointment with me in 3 months or sooner as needed. Call [facility] at [phone] to schedule.  Follow up with primary care provider as planned or for acute medical concerns.  Call the psychiatric nurse line with medication questions or concerns at [phone]  MyChart may be used to communicate with your provider, but this is not intended to be used for emergencies.    []Medication refilled per  Medication Refill in Ambulatory Care  policy.  [x]Medication unable to be refilled by RN due to criteria not met as indicated below:    []Eligibility - not seen in the last year   []Supervision - no  future appointment   [x]Compliance - no shows, cancellations or lapse in therapy   []Verification - order discrepancy   []Controlled medication   []Medication not included in policy   []90-day supply request   [x]Other- MA processed    Sasha Joshi MA on 2/23/2024 at 12:46 PM

## 2024-02-26 ENCOUNTER — VIRTUAL VISIT (OUTPATIENT)
Dept: PSYCHIATRY | Facility: CLINIC | Age: 39
End: 2024-02-26
Payer: COMMERCIAL

## 2024-02-26 DIAGNOSIS — F34.1 PERSISTENT DEPRESSIVE DISORDER: ICD-10-CM

## 2024-02-26 DIAGNOSIS — F41.1 GENERALIZED ANXIETY DISORDER: ICD-10-CM

## 2024-02-26 DIAGNOSIS — F90.8 ATTENTION DEFICIT HYPERACTIVITY DISORDER (ADHD), OTHER TYPE: Primary | ICD-10-CM

## 2024-02-26 PROCEDURE — 99214 OFFICE O/P EST MOD 30 MIN: CPT | Mod: 95 | Performed by: NURSE PRACTITIONER

## 2024-02-26 ASSESSMENT — ANXIETY QUESTIONNAIRES
6. BECOMING EASILY ANNOYED OR IRRITABLE: MORE THAN HALF THE DAYS
7. FEELING AFRAID AS IF SOMETHING AWFUL MIGHT HAPPEN: SEVERAL DAYS
4. TROUBLE RELAXING: SEVERAL DAYS
7. FEELING AFRAID AS IF SOMETHING AWFUL MIGHT HAPPEN: SEVERAL DAYS
GAD7 TOTAL SCORE: 8
8. IF YOU CHECKED OFF ANY PROBLEMS, HOW DIFFICULT HAVE THESE MADE IT FOR YOU TO DO YOUR WORK, TAKE CARE OF THINGS AT HOME, OR GET ALONG WITH OTHER PEOPLE?: SOMEWHAT DIFFICULT
IF YOU CHECKED OFF ANY PROBLEMS ON THIS QUESTIONNAIRE, HOW DIFFICULT HAVE THESE PROBLEMS MADE IT FOR YOU TO DO YOUR WORK, TAKE CARE OF THINGS AT HOME, OR GET ALONG WITH OTHER PEOPLE: SOMEWHAT DIFFICULT
5. BEING SO RESTLESS THAT IT IS HARD TO SIT STILL: NOT AT ALL
1. FEELING NERVOUS, ANXIOUS, OR ON EDGE: MORE THAN HALF THE DAYS
2. NOT BEING ABLE TO STOP OR CONTROL WORRYING: SEVERAL DAYS
8. IF YOU CHECKED OFF ANY PROBLEMS, HOW DIFFICULT HAVE THESE MADE IT FOR YOU TO DO YOUR WORK, TAKE CARE OF THINGS AT HOME, OR GET ALONG WITH OTHER PEOPLE?: SOMEWHAT DIFFICULT
GAD7 TOTAL SCORE: 8
7. FEELING AFRAID AS IF SOMETHING AWFUL MIGHT HAPPEN: SEVERAL DAYS
5. BEING SO RESTLESS THAT IT IS HARD TO SIT STILL: NOT AT ALL
GAD7 TOTAL SCORE: 8
3. WORRYING TOO MUCH ABOUT DIFFERENT THINGS: SEVERAL DAYS
2. NOT BEING ABLE TO STOP OR CONTROL WORRYING: SEVERAL DAYS
1. FEELING NERVOUS, ANXIOUS, OR ON EDGE: MORE THAN HALF THE DAYS
IF YOU CHECKED OFF ANY PROBLEMS ON THIS QUESTIONNAIRE, HOW DIFFICULT HAVE THESE PROBLEMS MADE IT FOR YOU TO DO YOUR WORK, TAKE CARE OF THINGS AT HOME, OR GET ALONG WITH OTHER PEOPLE: SOMEWHAT DIFFICULT
GAD7 TOTAL SCORE: 8
GAD7 TOTAL SCORE: 8
7. FEELING AFRAID AS IF SOMETHING AWFUL MIGHT HAPPEN: SEVERAL DAYS
GAD7 TOTAL SCORE: 8
6. BECOMING EASILY ANNOYED OR IRRITABLE: MORE THAN HALF THE DAYS
3. WORRYING TOO MUCH ABOUT DIFFERENT THINGS: SEVERAL DAYS
4. TROUBLE RELAXING: SEVERAL DAYS

## 2024-02-26 ASSESSMENT — PATIENT HEALTH QUESTIONNAIRE - PHQ9
SUM OF ALL RESPONSES TO PHQ QUESTIONS 1-9: 4
10. IF YOU CHECKED OFF ANY PROBLEMS, HOW DIFFICULT HAVE THESE PROBLEMS MADE IT FOR YOU TO DO YOUR WORK, TAKE CARE OF THINGS AT HOME, OR GET ALONG WITH OTHER PEOPLE: SOMEWHAT DIFFICULT
SUM OF ALL RESPONSES TO PHQ QUESTIONS 1-9: 4

## 2024-02-26 ASSESSMENT — PAIN SCALES - GENERAL: PAINLEVEL: MODERATE PAIN (4)

## 2024-02-26 NOTE — PROGRESS NOTES
"Virtual Visit Details    Type of service:  Video Visit     Originating Location (pt. Location): Home    Distant Location (provider location):  On-site  Platform used for Video Visit: Chad    Start time: 1:45 PM  Stop time: 2:15 PM           Outpatient Psychiatric Progress Note    Name: Kasandra Lau   : 1985                    Primary Care Provider: JASVIR Ashley CNP   Therapist: None    PHQ-9 scores:      2023     1:22 PM 10/2/2023     1:48 PM 2024     1:54 PM   PHQ-9 SCORE   PHQ-9 Total Score MyChart 5 (Mild depression) 4 (Minimal depression) 2 (Minimal depression)   PHQ-9 Total Score 5 4 2       VIC-7 scores:      2023     3:19 PM 2023     1:03 PM 2024     1:55 PM   VIC-7 SCORE   Total Score 6 (mild anxiety) 7 (mild anxiety) 6 (mild anxiety)   Total Score 6 7    7 6    6       Patient Identification:    Patient is a 38 year old year old, single   Not  or  female  who presents for return visit with me.  Patient is currently employed part time and a student. Patient attended the session alone. Patient prefers to be called: \" Kasandra\".    Current medications include: almotriptan (AXERT) 12.5 MG tablet, Take 1 tablet (12.5 mg) by mouth at onset of headache for migraine (repeat in 2 hours if needed) May repeat in 2 hours. Max 2 tablets/24 hours.  amitriptyline (ELAVIL) 10 MG tablet, Take 1 tablet by mouth at bedtime  atomoxetine (STRATTERA) 60 MG capsule, Take 1 capsule (60 mg) by mouth daily  buPROPion (WELLBUTRIN XL) 300 MG 24 hr tablet, Take 1 tablet (300 mg) by mouth every morning  cetirizine (ZYRTEC) 10 MG tablet, Take 1 tablet (10 mg) by mouth every evening  cholecalciferol 125 MCG (5000 UT) CAPS, Take 5,000 Units by mouth every evening  doxepin (SINEQUAN) 10 MG capsule, Take 2 capsules (20 mg) by mouth 2 times daily  EMGALITY 120 MG/ML injection, Inject 1 mL (120 mg) Subcutaneous every 28 days  EPINEPHrine (ANY BX GENERIC EQUIV) 0.3 MG/0.3ML " injection 2-pack, Inject 0.3 mg into the muscle as needed (Patient not taking: Reported on 1/2/2024)  famotidine (PEPCID) 20 MG tablet, TAKE 1 TABLET BY MOUTH TWICE A DAY  fluticasone (FLONASE) 50 MCG/ACT nasal spray, Spray 2 sprays into both nostrils 2 times daily as needed for allergies  gabapentin (NEURONTIN) 400 MG capsule, Take 2 capsules (800 mg) by mouth 3 times daily  guaiFENesin (MUCINEX) 600 MG 12 hr tablet, Take 600 mg by mouth daily as needed  hydroxychloroquine (PLAQUENIL) 200 MG tablet, Take 2 tablets (400 mg) by mouth daily  levonorgestrel (MIRENA) 20 MCG/DAY IUD, 1 each (20 mcg) by Intrauterine route once for 1 dose  methotrexate 2.5 MG tablet, Take 6 tablets (15 mg) by mouth every 7 days  montelukast (SINGULAIR) 10 MG tablet, Take 1 tablet (10 mg) by mouth at bedtime  ondansetron (ZOFRAN ODT) 4 MG ODT tab, Take 1-2 tablets (4-8 mg) by mouth every 8 hours as needed for nausea  ondansetron (ZOFRAN ODT) 4 MG ODT tab, Take 1 tablet (4 mg) by mouth every 8 hours as needed for nausea  Phentermine-Topiramate (QSYMIA) 15-92 MG CP24, Take 1 tablet by mouth daily  phenylephrine HCl 10 MG TABS, Take 10 mg by mouth 2 times daily  prochlorperazine (COMPAZINE) 10 MG tablet, Take 1 tablet (10 mg) by mouth every 6 hours as needed for nausea or vomiting (migraine) (Patient not taking: Reported on 1/2/2024)  propranolol (INDERAL) 20 MG tablet, Take 1 tablet (20 mg) by mouth daily as needed (anxiety)  vilazodone (VIIBRYD) 40 MG TABS tablet, Take 1 tablet (40 mg) by mouth every morning    No current facility-administered medications on file prior to visit.       The [place] Prescription Monitoring Program has been reviewed and there are no concerns about diversionary activity for controlled substances at this time.      I was able to review most recent Primary Care Provider, specialty provider, and therapy visit notes that I have access to.     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  In January she went to [facility] and collaborated with her peers about possible research opportunities.  For now she is still a  and doing some writing.  Kasandra tells me her anxiety is very high.  Financial stress contributes to this.  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  She is disappointed.  Kasandra tells me she is sleeping at night.  She tells me her ADHD medication helps her sleep more soundly.  In the mornings she often wakes up feeling anxious.  Also lately, she has felt irritable and frustrated.  This is related to changes in her future life/career plans.  We took a look at Vyvanse for ADHD and talked about its use for treating binge eating disorder as she wonders if she has this condition.  While her focus has not been as great as it once was, since she is unsure if this is because of her disappointment and not being able to continue at the University or not.    She is in the job  market.  She went to [place] in January.  She is still TA and writing things. Anxiety I spretty high.  Fiancial stress contributes to this.  .  She has applied for substutute teaching. Her department is not f unding her for next year.  Sleeping at night.  ADHD meds help her sleep more soundly.  She wakes up with anxiety  in the morning.  She feel like her sleep is deep. She has been irritable.  She feels frustrated - She wonders if she has binge eating ;     has a past medical history of Anemia (fall 2016), Anxiety, Arthritis, Chronic fatigue, Depression (emotion), Gastroesophageal reflux disease, Migraine, Neuropathic pain, Panic disorder, and Uncomplicated asthma (Spring 2018).    Social history updates:    Kasandra lives alone.    Substance use updates:    No alcohol use reported  Tobacco use: No    Vital Signs:   There were no vitals taken for this visit.    Labs:    Most recent laboratory results reviewed and no new labs.     Mental Status  Examination:  Appearance: awake, alert and casual dress  Attitude: cooperative  Eye Contact:  adequate  Gait and Station: No dizziness or falls  Psychomotor Behavior:  intact station, gait and muscle tone  Oriented to:  time, person, and place  Attention Span and Concentration:  Normal  Speech:   vtspeech: clear, coherent and Speaks: English  Mood:  anxious and depressed  Affect:  mood congruent  Associations:  no loose associations  Thought Process:  goal oriented  Thought Content:  no evidence of suicidal ideation or homicidal ideation, no auditory hallucinations present, and no visual hallucinations present  Recent and Remote Memory:  intact Not formally assessed. No amnesia.  Fund of Knowledge: appropriate  Insight:  good  Judgment: good  Impulse Control:  good    Suicide Risk Assessment:  Today Kasandra Lau reports no thoughts to harm themself or others. In addition, there are notable risk factors for self-harm, including single status, anxiety, and withdrawing. However, risk is mitigated by history of seeking help when needed, future oriented, denies suicidal intent or plan, and denies homicidal ideation, intent, or plan. Therefore, based on all available evidence including the factors cited above, Kasandra Lau does not appear to be at imminent risk for self-harm, does not meet criteria for a 72-hr hold, and therefore remains appropriate for ongoing outpatient level of care.  A thorough assessment of risk factors related to suicide and self-harm have been reviewed and are noted above. The patient convincingly denies suicidality on several occasions. Local community safety resources printed and reviewed for patient to use if needed. There was no deceit detected, and the patient presented in a manner that was believable.     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, With pure  dysthymic syndrome  300.02 (F41.1) Generalized Anxiety Disorder    Medical comorbidities include:   Patient Active Problem List    Diagnosis Date Noted    Alopecia 04/25/2023     Priority: Medium    Chronic pain of both shoulders 03/28/2023     Priority: Medium    Acute pharyngitis due to other specified organisms 02/28/2023     Priority: Medium    Endometriosis 10/11/2022     Priority: Medium    Keratosis pilaris 06/14/2022     Priority: Medium    Chronic sinusitis, unspecified location 09/21/2021     Priority: Medium    Hypertrophy of breast 09/10/2020     Priority: Medium     Added automatically from request for surgery [id number]      Pap smear abnormality of cervix/human papillomavirus (HPV) positive 09/03/2020     Priority: Medium     10/10/17 NIL pap, + HR HPV (not 16/18)  1/31/19 NILM HPV  Positive for High Risk HPV types other than 16 or 18 (Care Everywhere)  2/21/19 [place] ECC: benign. Plan 1 year cotest  2020 NILM HPV negative 34 y.o. PLAN, per ASCCP Guidelines: cotest 1/2023 2/24/23 NIL pap, neg HR HPV. Plan 3 year cotest      Cholecystitis 07/16/2020     Priority: Medium     Added automatically from request for surgery [id number]      Chronic idiopathic urticaria 06/22/2020     Priority: Medium    Hx of abnormal cervical Pap smear 02/03/2020     Priority: Medium    Acid reflux 08/25/2019     Priority: Medium    Need for desensitization to allergens 06/06/2019     Priority: Medium     Formatting of this note might be different from the original.    Allergy Shot Care Plan for Kasandra Lau  Date of Order: June 6 2019  Ordering Provider: Dr. Martin Arreaga 1: Cat  Date Shots Started:  Start Dose: 0.1 mL of 1:100,000 - GREEN  Date Maintenance Reached:  Maintenance Dose: 0.3 mL of 1:100 - RED    Serum 2: Mite DF and Mite DP  Date Shots Started:  Start Dose: 0.1 mL of 1:100,000 - GREEN  Date Maintenance Reached:  Maintenance Dose: 0.1 mL of 1:100 - RED    BUILDING SCHEDULE FOR POLLEN AND NONPOLLEN    IMMUNOTHERAPY  EXCEPT VENOM AND CENTER AL    3 - 14 days Build per schedule.   15 - 21 days Repeat previous dose.   22 - 28 days Decrease by one dose.   29 - 35 days Decrease by two doses.   36 - 42 days Decrease by three doses.   Over 42 days Continue to decrease by one dose for each additional week.     1:100,000 (green)  1:10,000 (blue)  1:1000 (yellow)  1:100 (red)     1. 0.05 ml  7. 0.05 ml  13. 0.05 ml  19. 0.05 ml   2. 0.1 ml  8. 0.1 ml  14. 0.1 ml  20. 0.1 ml   3. 0.2 ml  9. 0.2 ml  15. 0.2 ml  21. 0.15 ml   4. 0.3 ml  10. 0.3 ml  16. 0.3 ml  22. 0.2 ml   5. 0.4 ml  11. 0.4 ml  17. 0.4 ml  23. 0.25 ml   6. 0.45 ml  12. 0.45 ml  18. 0.45 ml  24. 0.3 ml         25   0.35 ml         26   0.4 ml         27. 0.45 ml         28. 0.5 ml     MAINTENANCE SCHEDULE FOR POLLENS, NONPOLLENS (MITES,MOLD,CAT,DOG)  (not used for Center-AL Pollens)    ? When reaching maintenance dose for the first time, gradually stretch by weekly intervals to every 4 weeks (e.g., every 2 weeks, then 3 weeks, then 4 weeks).   ? Patient may receive their injections (patient choice) at 2-4 week intervals     Maintenance Repeat 1 Dose 2 Dose 3 Dose    Q2 weeks  (10-14 days)  3 weeks  (15-21 days) 4 weeks  (22-28 days) 5 weeks  (29-35 days) 6 weeks  (36-42 days) Decrease by 1 Dose for each additional week   Q3 weeks  (15-21 days)  4 weeks  (22-28 days) 5 weeks  (29-35 days) 6 weeks  (36-42 days) 7 weeks  (43-49 days)    Q4 weeks  (22-28 days)  5 weeks  (29-35 days) 6 weeks  (36-42 days) 7 weeks  (43-49 days) 8 weeks  (50-56 days)      Susanna Ambrose RN 6/6/2019 4:49 PM      Mild intermittent asthma without complication 04/30/2018     Priority: Medium    Seasonal mood disorder (H24) 11/15/2017     Priority: Medium    Circadian rhythm sleep disorder, delayed sleep phase type 05/10/2017     Priority: Medium    Unhealthy sleep habit 05/10/2017     Priority: Medium    Vitamin D deficiency 11/19/2016     Priority: Medium    Menorrhagia with  regular cycle 11/19/2016     Priority: Medium    Migraine without aura and without status migrainosus, not intractable 11/19/2016     Priority: Medium    Iron deficiency anemia due to chronic blood loss 11/19/2016     Priority: Medium    Tired 11/19/2016     Priority: Medium    Irritable bowel syndrome with constipation 10/25/2016     Priority: Medium    Recurrent major depressive disorder, in remission (H24) 10/25/2016     Priority: Medium    Pelvic pain in female 08/25/2016     Priority: Medium    Migraine headache 01/01/2012     Priority: Medium    Panic disorder 01/01/2007     Priority: Medium    Depression 01/01/2004     Priority: Medium       Assessment:    Kasandra Lau is concerned that she might have a binge eating disorder as she often overeats to the point of feeling uncomfortable.  Her depression and anxiety symptoms have worsened since finding out that her position will not be funded for next year at the [employer].  She now is having to rethink her options for her future.  At this time she will take atomoxetine 40 mg daily for the next month and Vyvanse 20 mg daily will be started.  I spoke with her providers that prescribed weight loss medication to taper her off of that.  The instruction given to her for that was to take 1 capsule every other day for 1 week then discontinue.  For depression she continues with bupropion and Viibryd.  Doxepin is available to help her sleep at night as well as take during the day for anxiety.  Propranolol continues to daily as needed for breakthrough anxiety symptoms.  She denies any suicide thinking..    Medication side effects and alternatives were reviewed. Health promotion activities recommended and reviewed today. All questions addressed. Education and counseling completed regarding risks and benefits of medications and psychotherapy options.    Treatment Plan:      1.  Continue bupropion  mg daily  2.  Doxepin 20 mg 2 times daily  3.  Propranolol 20 mg daily  as needed for anxiety  4.  Viibryd 40 mg every morning  5.  Atomoxetine 40 mg daily  6.  Vyvanse 20 mg daily    Continue all other medications as reviewed per electronic medical record today.   Safety plan reviewed. To the Emergency Department as needed or call after hours crisis line at [phone] or [phone]. [place] Crisis Text Line. Text [place] to 983179 or Suicide LifeLine Chat: suicideMetwit.org/chat/  To schedule individual or family therapy, call [facility] at [phone]  Schedule an appointment with me in 6 weeks or sooner as needed. Call [facility] at [phone] to schedule.  Follow up with primary care provider as planned or for acute medical concerns.  Call the psychiatric nurse line with medication questions or concerns at [phone]  MyChart may be used to communicate with your provider, but this is not intended to be used for emergencies.        Crisis Resources   The [facility] is an adults only unit located at [facility] in [place] is a short term (generally less than 23 hour stay) designed for crisis intervention and stabilization. Pts have the opportunity to meet quickly with a behavioral health team for evaluation in a calm and peaceful therapuetic environment. To be evaluated for admission pts are triaged throught the [facility] ED.      The following hotlines are for both adults and children. The and are open 24 hours a day, 7 days a week unless noted otherwise.        Crisis Lines      Crisis Text Line  Text 513272  You will be connected with a trained live crisis counselor to provide support.        Gambling Hotline  2.504.010.7597 [hope]        línea de crisis española  101.991.9519        [facility] Media Battles Helpline  838.462.1356        National Hope Line  6.246.335.8952 [hope]        National Suicide Prevention Lifeline  Free and confidential support  988 or 8.506.872.TALK [9655]  http://suicidepre"[employer]"line.org         The Matthew Project (LGBTQ Youth Crisis Line)  2.605.915.3219  text START to 305-421        's Crisis Line  7.818.875.9230 (Press 1)  or text 429197    [facility] Mental Health Crisis Response  Within [place], call **CRISIS [**229829] to be connected to a mental health professional who can assist you.        [facility] Crisis  [phone]      [facility] Mobile Crisis  [phone]      [facility] Crisis  [phone]      [facility] Mobile Crisis  [phone] (adults)  [phone] (children)      [facility] Mobile Crisis  [phone] (adults)  [phone] (children)      [facility] Mobile Crisis  [phone]      [facility] Mobile Crisis  [phone]    Community Resources      Fast Tracker  Linking people to mental health and substance use disorder resources  [employer] Technolog[employer].org        [place] Mental Health Warmline  Peer to peer support  5 pm to 9 am 7 days/week  8.999.713.8627  https://mnwitw.org/rodney        National [place] on Mental Illness (RANDAL)  825.054.9986 or 1.888.RANDAL.HELPS  https://namimn.org/        [facility] Urgent Care for Adult Mental Health  [facility] residents [place]   [address]  [phone]        [facility]  Free mental health counseling  https://walkin.org/  612.870.0565 X2    Mental Health Apps      Calm Harm  https://calmharm.co.uk/      My3  https://my3app.org/      [place] Safety Plan  https://www.mysafetyplan.org/   Administrative Billing:   Time spent with patient includes counseling and coordination of care regarding above diagnoses and treatment plan.    Patient Status:  This is a continuous care patient and medications will be prescribed by the psychiatric provider until further indicated.    Signed:   OUSMANE Fitzgerald-BC   Psychiatry

## 2024-02-26 NOTE — NURSING NOTE
Is the patient currently in the state of [place]? YES    Visit mode:VIDEO    If the visit is dropped, the patient can be reconnected by: VIDEO VISIT: Send to e-mail at: [email or web address]    Will anyone else be joining the visit? NO  (If patient encounters technical issues they should call [phone])    How would you like to obtain your AVS? MyChart    Are changes needed to the allergy or medication list? No    Reason for visit: RECHECK    Idania LOVELL

## 2024-02-27 NOTE — TELEPHONE ENCOUNTER
CETIRIZINE HCL 10 MG TABLET     And     FLUTICASONE PROP 50 MCG SPRAY       Pharmacy comment: REQUEST FOR 90 DAYS PRESCRIPTION.

## 2024-02-28 ENCOUNTER — VIRTUAL VISIT (OUTPATIENT)
Dept: PSYCHOLOGY | Facility: CLINIC | Age: 39
End: 2024-02-28
Payer: COMMERCIAL

## 2024-02-28 DIAGNOSIS — F43.12 CHRONIC POST-TRAUMATIC STRESS DISORDER (PTSD): ICD-10-CM

## 2024-02-28 DIAGNOSIS — F54 PSYCHOLOGICAL AND BEHAVIORAL FACTORS ASSOCIATED WITH DISORDERS OR DISEASES CLASSIFIED ELSEWHERE: ICD-10-CM

## 2024-02-28 DIAGNOSIS — F34.1 PERSISTENT DEPRESSIVE DISORDER: ICD-10-CM

## 2024-02-28 DIAGNOSIS — F43.23 ADJUSTMENT DISORDER WITH MIXED ANXIETY AND DEPRESSED MOOD: Primary | ICD-10-CM

## 2024-02-28 PROCEDURE — 90837 PSYTX W PT 60 MINUTES: CPT | Mod: 95 | Performed by: PSYCHOLOGIST

## 2024-02-28 RX ORDER — CETIRIZINE HYDROCHLORIDE 10 MG/1
10 TABLET ORAL EVERY EVENING
Qty: 90 TABLET | Refills: 1 | Status: SHIPPED | OUTPATIENT
Start: 2024-02-28 | End: 2024-06-07

## 2024-02-28 RX ORDER — FLUTICASONE PROPIONATE 50 MCG
2 SPRAY, SUSPENSION (ML) NASAL 2 TIMES DAILY PRN
Qty: 48 ML | Refills: 5 | Status: SHIPPED | OUTPATIENT
Start: 2024-02-28

## 2024-02-28 NOTE — PROGRESS NOTES
"Answers for HPI/ROS submitted by the patient on 2/26/2024  VIC 7 TOTAL SCORE: 8        Health Psychology - Follow up Visit  Confidential Summary*    The author of this note documented a reason for not sharing it with the patient.  REFERRAL SOURCE:  Psychiatry    CHIEF COMPLAINT/REASON FOR VISIT  Psychotherapy in context of chronic PTSD and persistent depression.  Patient also complains of dissatisfaction with interpersonal relationships and challenges with emotion regulation.      Patient was seen today for a 60 minute individual psychotherapy session.  The session was facilitated via VIDEO with patient at her home and provider at her own home.  We used [employer] platform.       The patient has been notified of following:   \"This VIDEO visit will be conducted via a call between you and your physician/provider. We have found that certain health care needs can be provided without the need for an in-person physical exam.  VIDEO visits are billed at different rates depending on your insurance coverage.  Please reach out to your insurance provider with any questions. If during the course of the call the physician/provider feels a video visit is not appropriate, you will not be charged for this service.\"     Patient has given verbal consent for VIDEO visit? 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  She was confronted that she may not justo able to secure her life goals if she is not able defend her dissertation.      She initially described frustration with this decision and her perception that she has not been given the support from her department that she would have liked to received and that she is " "planning to complete her program without their support.  She was reminded of the benefit to radical acceptance, grieving and then problem solving potential solutions.  She was encouraged to consider that her mentoring team may not be intent on sabotaging her progress but may instead be concerned about her not being able to finish the work she has begun on her research.  She was encouraged to consider how she might respond if she is asked by her mentor how he can help her.  She reported that she would like to have more assistance in navigating the job market.  She reported that she continues to feel disappointed that she has not been able to make close relationships with friends or others in her tenure in [place].  She reported that she will \"keep moving because that is what I do\".  She described an awareness that she is hoping that relocating to [place] might result in improved relations.      Objective:  Patient was on time for today s session. She was alert and oriented. Mood was euthymic with appropriate range of affect. Patient denied suicidal or assaultive ideation, plan, or intent.        Assessment:  The patient has a longstanding history of interpersonal discord and challenges with emotion regulation and avoidance as a coping mechanism for anxiety.  She has completed all requirements for her PhD and is now ABD.  She is living in  housing with her two dogs.  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      Plan:  Patient graduated from full model DBT at [employer] and is now completing phase 2 work.      Treatment plan updated.      Time In: 2:00  Time Out: 3:00    Diagnosis:  Axis I PTSD, chronic, persistent depressive disorder, adjustment disorder with mixed, psychological factors associated with physical (fibromyalgia)   Axis II  BPD   Axis III please see medical records for details   [place] IV Psychosocial and Environmental Stressors: living " alone with no family support, pandemic stress, chronic illness         Corina Muhammad, PhD, LP

## 2024-02-29 RX ORDER — DOXEPIN HYDROCHLORIDE 10 MG/1
20 CAPSULE ORAL 2 TIMES DAILY
Qty: 120 CAPSULE | Refills: 3 | Status: SHIPPED | OUTPATIENT
Start: 2024-02-29 | End: 2024-06-11

## 2024-02-29 RX ORDER — ATOMOXETINE 40 MG/1
40 CAPSULE ORAL DAILY
Qty: 30 CAPSULE | Refills: 0 | Status: SHIPPED | OUTPATIENT
Start: 2024-02-29 | End: 2024-04-16

## 2024-02-29 RX ORDER — LISDEXAMFETAMINE DIMESYLATE 20 MG/1
20 CAPSULE ORAL EVERY MORNING
Qty: 30 CAPSULE | Refills: 0 | Status: SHIPPED | OUTPATIENT
Start: 2024-02-29 | End: 2024-04-09

## 2024-02-29 NOTE — PATIENT INSTRUCTIONS
"Patient Education   The Panel Psychiatry Program  What to Expect  Here's what to expect in the Panel Psychiatry Program.   About the program  You'll be meeting with a psychiatric doctor to check your mental health. A psychiatric doctor helps you deal with troubling thoughts and feelings by giving you medicine. They'll make sure you know the plan for your care. You may see them for a long time. When you're feeling better, they may refer you back to seeing your family doctor.   If you have any questions, we'll be glad to talk to you.  About visits  Be open  At your visits, please talk openly about your problems. It may feel hard, but it's the best way for us to help you.  Cancelling visits  If you can't come to your visit, please call us right away at [phone]. If you don't cancel at least 24 hours (1 full day) before your visit, that's \"late cancellation.\"  Not showing up for your visits  Being very late is the same as not showing up. You'll be a \"no show\" if:  You're more than 15 minutes late for a 30-minute (half hour) visit.  You're more than 30 minutes late for a 60-minute (full hour) visit.  If you cancel late or don't show up 2 times within 6 months, we may end your care.  Getting help between visits  If you need help between visits, you can call us Monday to Friday from 8 a.m. to 4:30 p.m. at [phone].  Emergency care  Call 911 or go to the nearest emergency department if your life or someone else's life is in danger.  Call 988 anytime to reach the national Suicide and Crisis hotline.  Medicine refills  To refill your medicine, call your pharmacy. You can also call [facility] at [phone], Monday to Friday, 8 a.m. to 4:30 p.m. It can take 1 to 3 business days to get a refill.   Forms, letters, and tests  You may have papers to fill out, like FMLA, short-term disability, and workability. We can help you with these forms at your visits, but you must have an " appointment. You may need more than 1 visit for this, to be in an intensive therapy program, or both.  Before we can give you medicine for ADHD, we may refer you to get tested for it or confirm it another way.  We may not be able to give you an emotional support animal letter.  We don't do mental health checks ordered by the court.   We don't do mental health testing, but we can refer you to get tested.   Thank you for choosing us for your care.  For informational purposes only. Not to replace the advice of your health care provider. Copyright   2022 [facility]. All rights reserved. [facility] 712180 - 12/22.    Treatment Plan:      1.  Continue bupropion  mg daily  2.  Doxepin 20 mg 2 times daily  3.  Propranolol 20 mg daily as needed for anxiety  4.  Viibryd 40 mg every morning  5.  Atomoxetine 40 mg daily  6.  Vyvanse 20 mg daily    Continue all other medical directions per primary care provider.   Continue all other medications as reviewed per electronic medical record today.   Safety plan reviewed. To the Emergency Department as needed or call after hours crisis line at [phone] or [phone]. [place] Crisis Text Line: Text [place] to 708540  or  Suicide LifeLine Chat: suicidepreventionlifeline.org/chat/  To schedule individual or family therapy, call [facility] at [phone].   Schedule an appointment with me in 6 weeks or sooner as needed.  Call [facility] at [phone] to schedule.  Follow up with primary care provider as planned or for acute medical concerns.  Call the psychiatric nurse line with medication questions or concerns at [phone].  Dinglepharbhart may be used to communicate with your provider, but this is not intended to be used for emergencies.        Crisis Resources   The [facility] is an adults only unit located at [facility] in [place] is a short term (generally less than 23 hour stay) designed for crisis intervention and  stabilization. Pts have the opportunity to meet quickly with a behavioral health team for evaluation in a calm and peaceful therapuetic environment. To be evaluated for admission pts are triaged throught the [facility] ED.      The following hotlines are for both adults and children. The and are open 24 hours a day, 7 days a week unless noted otherwise.        Crisis Lines      Crisis Text Line  Text 740024  You will be connected with a trained live crisis counselor to provide support.        [place] Hotline  4.234.239.5393 [hope]        línea de crisis española  142.914.5658        [facility] Ecrio Helpline  [phone]        National Hope Line  0.628.610.7956 [hope]        National Suicide Prevention Lifeline  Free and confidential support  988 or 1.672.915.TALK [8255]  http://suicidepreventionlifeline.org        The [employer] Project (LGBTQ Youth Crisis Line)  0.392.812.8600  text START to 151-261        [facility] Crisis Line  8.005.449.8570 (Press 1)  or text 146419    [facility] Mental Health Crisis Response  Within [place], call **CRISIS [**115519] to be connected to a mental health professional who can assist you.        [facility] Crisis  [phone]      [facility] Mobile Crisis  [phone]      [facility] Crisis  [phone]      [facility] Mobile Crisis  [phone] (adults)  [phone] (children)      [facility] Mobile Crisis  [phone] (adults)  [phone] (children)      [facility] Mobile Crisis  [phone]      [facility] Mobile Crisis  [phone]    Community Resources      Fast Tracker  Linking people to mental health and substance use disorder resources  fasttrackermn.org        [place] Mental Health Warmline  Peer to peer support  5 pm to 9 am 7 days/week  9.922.302.0787  https://mnwitw.org/rodney        National [place] on Mental Illness (RANDAL)  073.637.0626 or 1.888.RANDAL.HELPS  https://namimn.org/        Jacek  [facility] for Adult Mental Health  [facility] residents only   [address]  [phone]        [facility]  Free mental health counseling  https://walkin.org/  612.870.0565 X2    Mental Health Apps      Calm Harm  https://calmharm.co.uk/      My3  https://my3app.org/      Don Safety Plan  https://www.mysafetyplan.org/

## 2024-02-29 NOTE — PROGRESS NOTES
"    Health Psychology - Follow up Visit  Confidential Summary*    The author of this note documented a reason for not sharing it with the patient.  REFERRAL SOURCE:  Psychiatry    CHIEF COMPLAINT/REASON FOR VISIT  Psychotherapy in context of chronic PTSD and persistent depression.  Patient also complains of dissatisfaction with interpersonal relationships and challenges with emotion regulation.      Patient was seen today for a 60 minute individual psychotherapy session.  The session was facilitated via VIDEO with patient at her home and provider at her own home.  We used [employer] platform.       The patient has been notified of following:   \"This VIDEO visit will be conducted via a call between you and your physician/provider. We have found that certain health care needs can be provided without the need for an in-person physical exam.  VIDEO visits are billed at different rates depending on your insurance coverage.  Please reach out to your insurance provider with any questions. If during the course of the call the physician/provider feels a video visit is not appropriate, you will not be charged for this service.\"     Patient has given verbal consent for VIDEO visit? Yes    Subjective:  Patient began with discussion that she has an upcoming meeting with her new advisor and that she is expecting that they will devise a timeline to allow her to continue the progress she is making toward her dissertation completion.  She reported that she understands that she may be asked to finish her program during the next year and that she is at risk of losing her funding.  She reported that she is concerned that the way that she understands the literature she is reading may not be in line with the way that academia expects her to write her papers.  She described her understanding that she is being asked to write and publish three papers and then she will be done with her dissertation.  She was encouraged to confirm this " understanding with her mentor.  She reported that although she has been writing a dissertation proposal, she has already done the work and that it does not make sense to propose the work for approval.  She also described her understanding that she might change her topic so that she might focus on work that will allow her to focus on as she applies for postdoctoral fellowships.      She reported that she would like her mentor to assist her in job search and pulling together her portfolio of scientific products so that she might put her best foot forward.  Discussed the need for dissertation completion prior to applications for  postdoctoral fellowships but she denied need for this.      Confronted patient on her apparent challenges with accepting the reality of her current situation and the use of avoidance as a primary coping tool.  Encouraged her to problem solve and to establish a list of questions for her mentor.  She reported that she would like help to establish a timeline.  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      She reported that she is planning to apply for a substitute teaching credential so that she will be able to support herself and pay for tuition until she is able to defend her dissertation.      Patient described feeling alone and lonely and expressed that she has anxiety about her ability to focus.      Objective:  Patient was on time for today s session. She was alert and oriented. Mood was dysphoric with appropriate range of affect. Patient denied suicidal or assaultive ideation, plan, or intent.        Assessment:  The patient has a longstanding history of interpersonal discord and challenges with emotion regulation.  She has completed all requirements for her PhD and is now ABD.  She is living in  housing  with her two dogs.  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      Plan:  Patient graduated from full model DBT at [facility] and is now completing phase 2 work.      Treatment plan updated.      Time In: 2:00  Time Out: 3:00    Diagnosis:  Axis I PTSD, chronic, persistent depressive disorder, adjustment disorder with mixed, psychological factors associated with physical (fibromyalgia)   Axis II  BPD   Axis III please see medical records for details   [place] IV Psychosocial and Environmental Stressors: living alone with no family support, pandemic stress, chronic illness         Corina Muhammad, PhD, LP
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
## 2024-03-04 ENCOUNTER — OFFICE VISIT (OUTPATIENT)
Dept: FAMILY MEDICINE | Facility: CLINIC | Age: 39
End: 2024-03-04
Payer: COMMERCIAL

## 2024-03-04 VITALS
DIASTOLIC BLOOD PRESSURE: 76 MMHG | TEMPERATURE: 98 F | OXYGEN SATURATION: 100 % | HEIGHT: 67 IN | HEART RATE: 86 BPM | WEIGHT: 175 LBS | BODY MASS INDEX: 27.47 KG/M2 | SYSTOLIC BLOOD PRESSURE: 107 MMHG

## 2024-03-04 DIAGNOSIS — R39.9 UTI SYMPTOMS: Primary | ICD-10-CM

## 2024-03-04 DIAGNOSIS — M26.623 TMJ TENDERNESS, BILATERAL: ICD-10-CM

## 2024-03-04 LAB
ALBUMIN UR-MCNC: ABNORMAL MG/DL
APPEARANCE UR: CLEAR
BILIRUB UR QL STRIP: NEGATIVE
COLOR UR AUTO: YELLOW
GLUCOSE UR STRIP-MCNC: NEGATIVE MG/DL
HGB UR QL STRIP: NEGATIVE
KETONES UR STRIP-MCNC: 15 MG/DL
LEUKOCYTE ESTERASE UR QL STRIP: NEGATIVE
NITRATE UR QL: NEGATIVE
PH UR STRIP: 7 [PH] (ref 5–7)
SP GR UR STRIP: 1.02 (ref 1–1.03)
UROBILINOGEN UR STRIP-ACNC: 0.2 E.U./DL

## 2024-03-04 PROCEDURE — 87086 URINE CULTURE/COLONY COUNT: CPT | Mod: ORL | Performed by: NURSE PRACTITIONER

## 2024-03-04 RX ORDER — NITROFURANTOIN MACROCRYSTAL 100 MG
100 CAPSULE ORAL 4 TIMES DAILY
Qty: 10 CAPSULE | Refills: 0 | Status: CANCELLED | OUTPATIENT
Start: 2024-03-04

## 2024-03-04 RX ORDER — NITROFURANTOIN 25; 75 MG/1; MG/1
100 CAPSULE ORAL 2 TIMES DAILY
Qty: 10 CAPSULE | Refills: 0 | Status: SHIPPED | OUTPATIENT
Start: 2024-03-04 | End: 2024-03-06

## 2024-03-04 ASSESSMENT — ENCOUNTER SYMPTOMS
NAUSEA: 1
ABDOMINAL PAIN: 1
FLANK PAIN: 0
FREQUENCY: 1
CHILLS: 1

## 2024-03-04 NOTE — PROGRESS NOTES
Today's Date: Mar 4, 2024     Patient Kasandra Lau 1985 presents to the clinic today to address   Chief Complaint   Patient presents with    UTI     Urine frequency, odor, pressure, cramping and some nausea               SUBJECTIVE     History of Present Illness:      38-year-old female with past medical history significant for anemia, anxiety, asthma, GERD, depression, arthritis, migraine, chronic fatigue, recurrent UTI, and neuropathic pain presents today for UTI concerns.  Patient reports 7-day history of urinary frequency, foul-smelling urine, lower abdominal cramping and pressure.  She also reports intermittent nausea.  She endorses chills.  She denies flank pain, pelvic pain, or vaginal discharge.  She is not currently sexually active. She reports that she took 2 days worth of metronidazole that she had at home which was not helpful.    TMJ-patient reports she is working with [facility] regarding bilateral TMJ.  She has questions about what else she can do to mitigate symptoms.She is planning on attending physical therapy. No other acute concerns/symptoms at time of exam.          Review of Systems   Constitutional: Positive for chills.   Gastrointestinal: Positive for abdominal pain and nausea.   Genitourinary: Positive for frequency. Negative for flank pain, pelvic pain and vaginal discharge.     Constitutional, HEENT, cardiovascular, pulmonary, gi and gu systems are negative, except as otherwise noted.      Allergies   Allergen Reactions    Dust Mites Cough, Difficulty breathing and Shortness Of Breath    Cats      Chest tightness, sinus irritation        Current Outpatient Medications   Medication Instructions    almotriptan (AXERT) 12.5 mg, Oral, AT ONSET OF HEADACHE, May repeat in 2 hours. Max 2 tablets/24 hours.    amitriptyline (ELAVIL) 10 MG tablet 1 tablet, Oral, AT BEDTIME    atomoxetine (STRATTERA) 40 mg, Oral, DAILY    buPROPion (WELLBUTRIN XL) 300 mg, Oral, EVERY MORNING    cetirizine  (ZYRTEC) 10 mg, Oral, EVERY EVENING    cholecalciferol 5,000 Units, Oral, EVERY EVENING    doxepin (SINEQUAN) 20 mg, Oral, 2 TIMES DAILY    Emgality 120 mg, Subcutaneous, EVERY 28 DAYS    EPINEPHrine (ANY BX GENERIC EQUIV) 0.3 mg, PRN    famotidine (PEPCID) 20 mg, Oral, 2 TIMES DAILY    fluticasone (FLONASE) 50 MCG/ACT nasal spray 2 sprays, Both Nostrils, 2 TIMES DAILY PRN    gabapentin (NEURONTIN) 800 mg, Oral, 3 TIMES DAILY    guaiFENesin (MUCINEX) 600 mg, Oral, DAILY PRN    hydroxychloroquine (PLAQUENIL) 400 mg, Oral, DAILY    levonorgestrel (MIRENA) 20 mcg, Intrauterine, ONCE    lisdexamfetamine (VYVANSE) 20 mg, Oral, EVERY MORNING    methotrexate 15 mg, Oral, EVERY 7 DAYS    montelukast (SINGULAIR) 10 mg, Oral, AT BEDTIME    ondansetron (ZOFRAN ODT) 4 mg, Oral, EVERY 8 HOURS PRN    ondansetron (ZOFRAN ODT) 4-8 mg, Oral, EVERY 8 HOURS PRN    Phentermine-Topiramate (QSYMIA) 15-92 MG CP24 1 tablet, Oral, DAILY    phenylephrine HCl 10 mg, Oral, 2 TIMES DAILY    propranolol (INDERAL) 20 mg, Oral, DAILY PRN    vilazodone (VIIBRYD) 40 mg, Oral, EVERY MORNING       Past Medical History:   Diagnosis Date    Anemia fall 2016    Anxiety     Arthritis     Chronic fatigue     Depression (emotion)     sees psych, on meds    Gastroesophageal reflux disease     Migraine     daily meds, 2x month, more mild on meds    Neuropathic pain     Panic disorder     Uncomplicated asthma Spring 2018        Family History   Problem Relation Age of Onset    Diabetes Maternal Grandfather     Hypertension No family hx of     Coronary Artery Disease No family hx of     Hyperlipidemia No family hx of     Cerebrovascular Disease No family hx of     Breast Cancer No family hx of     Colon Cancer No family hx of     Prostate Cancer No family hx of     Other Cancer No family hx of     Glaucoma No family hx of     Macular Degeneration No family hx of         Social History     Tobacco Use    Smoking status: Former     Packs/day: 0.00     Years:  "10.00     Additional pack years: 0.00     Total pack years: 0.00     Types: Cigarettes    Smokeless tobacco: Never    Tobacco comments:     smokes occasionally socially    Vaping Use    Vaping Use: Never used   Substance Use Topics    Alcohol use: Not Currently     Alcohol/week: 0.0 standard drinks of alcohol     Comment: occasional     Drug use: Not Currently     Types: Marijuana     Comment: marijuana  none since May 2021        History   Sexual Activity    Sexual activity: Yes    Partners: Male    Birth control/ protection: I.U.D.     Comment: Nuvaring            10/2/2023     1:48 PM 1/18/2024     1:54 PM 2/26/2024     1:25 PM   PHQ   PHQ-9 Total Score 4 2 4   Q9: Thoughts of better off dead/self-harm past 2 weeks Not at all Not at all Not at all        Immunization History   Administered Date(s) Administered    COVID-19 12+ (2023-24) (MODERNA) 10/25/2023    COVID-19 Bivalent 12+ (Pfizer) 09/19/2022    COVID-19 MONOVALENT 12+ (Pfizer) 03/19/2021, 04/09/2021    COVID-19 Monovalent 12+ (Pfizer 2022) 01/10/2022    DTaP, Unspecified 05/22/2019    Flu, Unspecified 09/23/2016    Influenza Vaccine >6 months,quad, PF 09/23/2016, 09/07/2018, 08/28/2019, 09/02/2020, 11/02/2021, 09/19/2022, 10/06/2023    Pneumococcal 23 valent 05/22/2019    TDAP (Adacel,Boostrix) 05/22/2019                 OBJECTIVE     /76 (BP Location: Left arm, Patient Position: Sitting, Cuff Size: Adult Regular)   Pulse 86   Temp 98  F (36.7  C) (Oral)   Ht 1.699 m (5' 6.9\")   Wt 79.4 kg (175 lb)   SpO2 100%   BMI 27.49 kg/m       Labs:  Lab Results   Component Value Date    WBC 8.4 08/03/2023    HGB 13.5 08/03/2023    HCT 41.2 08/03/2023     08/03/2023    CHOL 180 08/19/2022    TRIG 184 (H) 08/19/2022    HDL 43 (L) 08/19/2022    ALT  08/03/2023      Comment:      Unsatisfactory specimen - lipemic    Reference intervals for this test were updated on 6/12/2023 to more accurately reflect our healthy population. There may be differences " in the flagging of prior results with similar values performed with this method. Interpretation of those prior results can be made in the context of the updated reference intervals.      AST  08/03/2023      Comment:      Unsatisfactory specimen - lipemic    Reference intervals for this test were updated on 6/12/2023 to more accurately reflect our healthy population. There may be differences in the flagging of prior results with similar values performed with this method. Interpretation of those prior results can be made in the context of the updated reference intervals.     08/03/2023    BUN 7.7 08/03/2023    CO2 26 08/03/2023    TSH 2.12 04/25/2023    INR 1.09 11/07/2022        Physical Exam  Constitutional:       General: She is not in acute distress.     Appearance: Normal appearance. She is not ill-appearing.   HENT:      Head:      Jaw: No trismus or swelling.      Comments: No focal misalignment noted.     Mouth/Throat:      Dentition: No dental caries.   Cardiovascular:      Rate and Rhythm: Normal rate and regular rhythm.      Heart sounds: Normal heart sounds. No murmur heard.  Pulmonary:      Effort: Pulmonary effort is normal. No respiratory distress.      Breath sounds: Normal breath sounds. No wheezing or rales.   Abdominal:      General: Bowel sounds are normal.      Palpations: Abdomen is soft.      Tenderness: There is no abdominal tenderness. There is no right CVA tenderness or left CVA tenderness.   Musculoskeletal:      Cervical back: Neck supple.      Right lower leg: No edema.      Left lower leg: No edema.   Lymphadenopathy:      Cervical: No cervical adenopathy.   Neurological:      General: No focal deficit present.      Mental Status: She is alert.   Psychiatric:         Thought Content: Thought content normal.         Judgment: Judgment normal.          Recent Results (from the past 2 hour(s))   UA without Microscopic    Collection Time: 03/04/24  2:16 PM   Result Value Ref Range     Color Urine Yellow Colorless, Straw, Light Yellow, Yellow    Appearance Urine Clear Clear    Glucose Urine Negative Negative mg/dL    Bilirubin Urine Negative Negative    Ketones Urine 15 (A) Negative mg/dL    Specific Gravity Urine 1.025 1.003 - 1.035    Blood Urine Negative Negative    pH Urine 7.0 5.0 - 7.0    Protein Albumin Urine Trace (A) Negative mg/dL    Urobilinogen Urine 0.2 0.2, 1.0 E.U./dL    Nitrite Urine Negative Negative    Leukocyte Esterase Urine Negative Negative              ASSESSMENT/PLAN     1. UTI symptoms    38-year-old female with past medical history significant for anemia, anxiety, asthma, GERD, depression, arthritis, migraine, chronic fatigue, recurrent UTI presents to discuss concerns for UTI after 7-day history of urinary frequency, foul-smelling urine, lower abdominal cramping, and pressure.  Patient is currently afebrile and in no acute distress with negative CVA tenderness.  UA today without nitrites or leukocyte esterase, however, UA is likely less sensitive today considering patient recently took metronidazole.    Will check urine culture and start patient on nitrofurantoin.  Patient was advised not to take antibiotics without an active prescription or discussing with her provider in the future.      - UA without Microscopic  - Urine Culture Aerobic Bacterial; Future  - nitroFURantoin macrocrystal-monohydrate (MACROBID) 100 MG capsule; Take 1 capsule (100 mg) by mouth 2 times daily for 5 days  Dispense: 10 capsule; Refill: 0    2. TMJ tenderness, bilateral  Will defer primary management to dentistry, however, provided the patient with TMJ exercises and discussed the benefits of a dental guard           Follow-Up:  - Follow up in as needed, or if symptoms worsen or fail to improve.     Options for treatment and follow-up care were reviewed with the patient. Patient engaged in the decision making process and verbalized understanding of the options discussed and agreed with the final  plan.  AVS printed and given to patient.    JASVIR [facility]  [address] [address] [phone]        Note: Chart documentation was done in part with Dragon Voice Recognition software.  Although reviewed after completion, some word and grammatical errors may remain. Please contact author for any clarification or concerns.

## 2024-03-04 NOTE — NURSING NOTE
"ROOM:4  ZURI MACKEY    Preferred Name: Kasandra     How did you hear about us?  Current Patient     Services Provided? No    38 year old  Chief Complaint   Patient presents with     UTI     Urine frequency, odor, pressure, cramping and some nausea         Blood pressure 107/76, pulse 86, temperature 98  F (36.7  C), temperature source Oral, height 1.699 m (5' 6.9\"), weight 79.4 kg (175 lb), SpO2 100%, not currently breastfeeding. Body mass index is 27.49 kg/m .  BP completed using cuff size:        Patient Active Problem List   Diagnosis     Pelvic pain in female     Vitamin D deficiency     Menorrhagia with regular cycle     Migraine without aura and without status migrainosus, not intractable     Iron deficiency anemia due to chronic blood loss     Tired     Circadian rhythm sleep disorder, delayed sleep phase type     Unhealthy sleep habit     Seasonal mood disorder (H24)     Chronic idiopathic urticaria     Acid reflux     Cholecystitis     Depression     Hx of abnormal cervical Pap smear     Irritable bowel syndrome with constipation     Migraine headache     Mild intermittent asthma without complication     Need for desensitization to allergens     Panic disorder     Pap smear abnormality of cervix/human papillomavirus (HPV) positive     Recurrent major depressive disorder, in remission (H24)     Hypertrophy of breast     Chronic sinusitis, unspecified location     Keratosis pilaris     Endometriosis     Acute pharyngitis due to other specified organisms     Chronic pain of both shoulders     Alopecia       Wt Readings from Last 2 Encounters:   03/04/24 79.4 kg (175 lb)   01/30/24 79.7 kg (175 lb 9.6 oz)     BP Readings from Last 3 Encounters:   03/04/24 107/76   01/30/24 109/74   01/02/24 118/79       Allergies   Allergen Reactions     Dust Mites Cough, Difficulty breathing and Shortness Of Breath     Cats      Chest tightness, sinus irritation       Current Outpatient Medications   Medication     " almotriptan (AXERT) 12.5 MG tablet     amitriptyline (ELAVIL) 10 MG tablet     atomoxetine (STRATTERA) 40 MG capsule     buPROPion (WELLBUTRIN XL) 300 MG 24 hr tablet     cetirizine (ZYRTEC) 10 MG tablet     cholecalciferol 125 MCG (5000 UT) CAPS     doxepin (SINEQUAN) 10 MG capsule     EMGALITY 120 MG/ML injection     famotidine (PEPCID) 20 MG tablet     fluticasone (FLONASE) 50 MCG/ACT nasal spray     gabapentin (NEURONTIN) 400 MG capsule     guaiFENesin (MUCINEX) 600 MG 12 hr tablet     hydroxychloroquine (PLAQUENIL) 200 MG tablet     lisdexamfetamine (VYVANSE) 20 MG capsule     methotrexate 2.5 MG tablet     montelukast (SINGULAIR) 10 MG tablet     ondansetron (ZOFRAN ODT) 4 MG ODT tab     ondansetron (ZOFRAN ODT) 4 MG ODT tab     Phentermine-Topiramate (QSYMIA) 15-92 MG CP24     phenylephrine HCl 10 MG TABS     propranolol (INDERAL) 20 MG tablet     vilazodone (VIIBRYD) 40 MG TABS tablet     EPINEPHrine (ANY BX GENERIC EQUIV) 0.3 MG/0.3ML injection 2-pack     levonorgestrel (MIRENA) 20 MCG/DAY IUD     No current facility-administered medications for this visit.       Social History     Tobacco Use     Smoking status: Former     Packs/day: 0.00     Years: 10.00     Additional pack years: 0.00     Total pack years: 0.00     Types: Cigarettes     Smokeless tobacco: Never     Tobacco comments:     smokes occasionally socially    Vaping Use     Vaping Use: Never used   Substance Use Topics     Alcohol use: Not Currently     Alcohol/week: 0.0 standard drinks of alcohol     Comment: occasional      Drug use: Not Currently     Types: Marijuana     Comment: marijuana  none since May 2021       Honoring Choices - Health Care Directive Guide offered to patient at time of visit.    Health Maintenance Due   Topic Date Due     ASTHMA ACTION PLAN  Never done     ADVANCE CARE PLANNING  Never done     HEPATITIS B IMMUNIZATION (1 of 3 - 19+ 3-dose series) Never done     YEARLY PREVENTIVE VISIT  01/31/2020     Pneumococcal  Vaccine: Pediatrics (0 to 5 Years) and At-Risk Patients (6 to 64 Years) (2 of 2 - PCV) 05/22/2020       Immunization History   Administered Date(s) Administered     COVID-19 12+ (2023-24) (MODERNA) 10/25/2023     COVID-19 Bivalent 12+ (Pfizer) 09/19/2022     COVID-19 MONOVALENT 12+ (Pfizer) 03/19/2021, 04/09/2021     COVID-19 Monovalent 12+ (Pfizer 2022) 01/10/2022     DTaP, Unspecified 05/22/2019     Flu, Unspecified 09/23/2016     Influenza Vaccine >6 months,quad, PF 09/23/2016, 09/07/2018, 08/28/2019, 09/02/2020, 11/02/2021, 09/19/2022, 10/06/2023     Pneumococcal 23 valent 05/22/2019     TDAP (Adacel,Boostrix) 05/22/2019       Lab Results   Component Value Date    PAP NIL 01/31/2019       Recent Labs   Lab Test 08/03/23  1604 07/13/23  1554 04/25/23  1232 11/07/22  1241 08/19/22  0835 08/19/22  0835 04/13/22  1600 01/04/22  1732 08/12/21  1535 09/12/20 2058 08/02/19  1711   LDL  --   --   --   --   --  100  --   --   --   --   --    HDL  --   --   --   --   --  43*  --   --   --   --   --    TRIG  --   --   --   --   --  184*  --   --   --   --   --    ALT  --  16  --  134*  --  60*   < >  --    < > 36 25   CR 0.81 0.70  --  0.83  --  0.69   < >  --    < > 0.77 0.80   GFRESTIMATED >90 >90  --  >90  --  >90   < >  --    < > >90 >90   GFRESTBLACK  --   --   --   --   --   --   --   --   --  >90 >90   ALBUMIN 4.4 4.3  --  4.4   < > 3.6   < >  --    < > 3.3* 3.4   POTASSIUM 4.5 4.2  --  3.8   < >  --    < >  --    < > 3.9 3.5   TSH  --   --  2.12  --   --   --   --  1.81  --   --   --     < > = values in this interval not displayed.           1/30/2024     1:42 PM 1/2/2024    12:56 PM   PHQ-2 ( 1999 Pfizer)   Q1: Little interest or pleasure in doing things 0 0   Q2: Feeling down, depressed or hopeless 1 0   PHQ-2 Score 1 0   Q1: Little interest or pleasure in doing things Not at all    Q2: Feeling down, depressed or hopeless Several days    PHQ-2 Score 1            6/30/2023     1:22 PM 10/2/2023     1:48 PM  1/18/2024     1:54 PM 2/26/2024     1:25 PM   PHQ-9 SCORE   PHQ-9 Total Score MyChart 5 (Mild depression) 4 (Minimal depression) 2 (Minimal depression) 4 (Minimal depression)   PHQ-9 Total Score 5 4 2 4           12/14/2023     1:03 PM 1/18/2024     1:55 PM 2/26/2024     1:26 PM   VIC-7 SCORE   Total Score 7 (mild anxiety) 6 (mild anxiety) 8 (mild anxiety)   Total Score 7    7 6    6 8    8           6/14/2022    11:07 AM 1/2/2024    12:48 PM   ACT Total Scores   ACT TOTAL SCORE (Goal Greater than or Equal to 20) 24 25   In the past 12 months, how many times did you visit the emergency room for your asthma without being admitted to the hospital? 2 0   In the past 12 months, how many times were you hospitalized overnight because of your asthma? 0 0       Fartun Moreno    March 4, 2024 2:13 PM

## 2024-03-04 NOTE — PATIENT INSTRUCTIONS
Urinary Tract Infection (UTI) in Women: Care Instructions  Overview     A urinary tract infection, or UTI, is a general term for an infection anywhere between the kidneys and the urethra (where urine comes out). Most UTIs are bladder infections. They often cause pain or burning when you urinate.  UTIs are caused by bacteria and can be cured with antibiotics. Be sure to complete your treatment so that the infection does not get worse.  Follow-up care is a key part of your treatment and safety. Be sure to make and go to all appointments, and call your doctor if you are having problems. It's also a good idea to know your test results and keep a list of the medicines you take.  How can you care for yourself at home?  Take your antibiotics as directed. Do not stop taking them just because you feel better. You need to take the full course of antibiotics.  Drink extra water and other fluids for the next day or two. This will help make the urine less concentrated and help wash out the bacteria that are causing the infection. (If you have kidney, heart, or liver disease and have to limit fluids, talk with your doctor before you increase the amount of fluids you drink.)  Avoid drinks that are carbonated or have caffeine. They can irritate the bladder.  Urinate often. Try to empty your bladder each time.  To relieve pain, take a hot bath or lay a heating pad set on low over your lower belly or genital area. Never go to sleep with a heating pad in place.  To prevent UTIs  Drink plenty of water each day. This helps you urinate often, which clears bacteria from your system. (If you have kidney, heart, or liver disease and have to limit fluids, talk with your doctor before you increase the amount of fluids you drink.)  Urinate when you need to.  If you are sexually active, urinate right after you have sex.  Change sanitary pads often.  Avoid douches, bubble baths, feminine hygiene sprays, and other feminine hygiene products that  "have deodorants.  After going to the bathroom, wipe from front to back.  When should you call for help?   Call your doctor now or seek immediate medical care if:    Symptoms such as fever, chills, nausea, or vomiting get worse or appear for the first time.     You have new pain in your back just below your rib cage. This is called flank pain.     There is new blood or pus in your urine.     You have any problems with your antibiotic medicine.   Watch closely for changes in your health, and be sure to contact your doctor if:    You are not getting better after taking an antibiotic for 2 days.     Your symptoms go away but then come back.   Where can you learn more?  Go to https://www.[email or web address].net/patiented  Enter K848 in the search box to learn more about \"Urinary Tract Infection (UTI) in Women: Care Instructions.\"  Current as of: February 28, 2023               Content Version: 13.8    8565-8558 [employer].   Care instructions adapted under license by your healthcare professional. If you have questions about a medical condition or this instruction, always ask your healthcare professional. [employer] disclaims any warranty or liability for your use of this information.      "

## 2024-03-06 ENCOUNTER — VIRTUAL VISIT (OUTPATIENT)
Dept: PSYCHOLOGY | Facility: CLINIC | Age: 39
End: 2024-03-06
Payer: COMMERCIAL

## 2024-03-06 ENCOUNTER — OFFICE VISIT (OUTPATIENT)
Dept: OBGYN | Facility: CLINIC | Age: 39
End: 2024-03-06
Payer: COMMERCIAL

## 2024-03-06 DIAGNOSIS — N94.3 PMS (PREMENSTRUAL SYNDROME): Primary | ICD-10-CM

## 2024-03-06 DIAGNOSIS — F34.1 PERSISTENT DEPRESSIVE DISORDER: ICD-10-CM

## 2024-03-06 DIAGNOSIS — F43.12 CHRONIC POST-TRAUMATIC STRESS DISORDER (PTSD): Primary | ICD-10-CM

## 2024-03-06 DIAGNOSIS — F54 PSYCHOLOGICAL AND BEHAVIORAL FACTORS ASSOCIATED WITH DISORDERS OR DISEASES CLASSIFIED ELSEWHERE: ICD-10-CM

## 2024-03-06 DIAGNOSIS — Z97.5 IUD (INTRAUTERINE DEVICE) IN PLACE: ICD-10-CM

## 2024-03-06 DIAGNOSIS — F43.23 ADJUSTMENT DISORDER WITH MIXED ANXIETY AND DEPRESSED MOOD: ICD-10-CM

## 2024-03-06 DIAGNOSIS — R10.2 PELVIC CRAMPING: ICD-10-CM

## 2024-03-06 DIAGNOSIS — N80.9 ENDOMETRIOSIS: ICD-10-CM

## 2024-03-06 PROBLEM — M25.532 LEFT WRIST PAIN: Status: ACTIVE | Noted: 2021-03-15

## 2024-03-06 PROBLEM — K76.9 LIVER LESION, RIGHT LOBE: Status: ACTIVE | Noted: 2020-12-06

## 2024-03-06 PROBLEM — G56.03 BILATERAL CARPAL TUNNEL SYNDROME: Status: ACTIVE | Noted: 2021-06-08

## 2024-03-06 PROBLEM — K21.00 GASTROESOPHAGEAL REFLUX DISEASE WITH ESOPHAGITIS: Status: ACTIVE | Noted: 2021-01-18

## 2024-03-06 PROBLEM — H65.91 FLUID LEVEL BEHIND TYMPANIC MEMBRANE OF RIGHT EAR: Status: ACTIVE | Noted: 2020-12-06

## 2024-03-06 PROBLEM — Z90.49 S/P CHOLECYSTECTOMY: Status: ACTIVE | Noted: 2020-12-06

## 2024-03-06 LAB — BACTERIA UR CULT: NO GROWTH

## 2024-03-06 PROCEDURE — 90837 PSYTX W PT 60 MINUTES: CPT | Mod: 95 | Performed by: PSYCHOLOGIST

## 2024-03-06 PROCEDURE — 99214 OFFICE O/P EST MOD 30 MIN: CPT | Performed by: OBSTETRICS & GYNECOLOGY

## 2024-03-06 NOTE — PROGRESS NOTES
SUBJECTIVE:                                                   Kasandra Lau is a 38 year old female who presents to clinic today for the following health issue(s):  Patient presents with:  Consult      Additional information: removing IUD to a lower dose IUD     HPI:  Mirena IUD placed 1yr ago with ultrasound guidance for L/S confirmed Endometriosis.  Feels like she is constantly PMS'ing. Before was about 1-2wk/mo, now its hard to tell. Has stopped bleeding, occ will have some blood discharge. Will gets bloated, fatigue, irritable, doesn't seem to follow a pattern. Comes and goes. Wondering if she went to a lower dosage IUD if there would be less of that.     Has been on OCPs in past. Thinks the SE were worse when on the OCPs. Had mood affects, wt gain, fatigue, loss of motivation.   Has used the ring in past.     Will get painful cramps that radiate to feet at times, has happened throughout the year. Not as much now but will happen sometimes. Didn't get this before IUD.     Has gone on/off wt loss meds over the past 6mo-1yr.     No LMP recorded..     Patient is not sexually active, .  Using IUD for contraception.    reports that she has quit smoking. Her smoking use included cigarettes. She has never used smokeless tobacco.    STD testing offered?  Declined    Health maintenance updated:  yes    Today's PHQ-2 Score:       2024     1:42 PM   PHQ-2 (  [employer])   Q1: Little interest or pleasure in doing things 0   Q2: Feeling down, depressed or hopeless 1   PHQ-2 Score 1   Q1: Little interest or pleasure in doing things Not at all   Q2: Feeling down, depressed or hopeless Several days   PHQ-2 Score 1     Today's PHQ-9 Score:       2024     1:25 PM   PHQ-9 SCORE   PHQ-9 Total Score MyChart 4 (Minimal depression)   PHQ-9 Total Score 4     Today's VIC-7 Score:       2024     1:26 PM   VIC-7 SCORE   Total Score 8 (mild anxiety)   Total Score 8    8       Problem list and histories reviewed &  adjusted, as indicated.  Additional history: as documented.    Patient Active Problem List   Diagnosis    Pelvic pain in female    Menorrhagia with regular cycle    Migraine without aura and without status migrainosus, not intractable    Tired    Circadian rhythm sleep disorder, delayed sleep phase type    Unhealthy sleep habit    Seasonal mood disorder (H24)    Chronic idiopathic urticaria    Acid reflux    Cholecystitis    Depression    Hx of abnormal cervical Pap smear    Irritable bowel syndrome with constipation    Migraine headache    Mild intermittent asthma without complication    Need for desensitization to allergens    Panic disorder    Pap smear abnormality of cervix/human papillomavirus (HPV) positive    Recurrent major depressive disorder, in remission (H24)    Hypertrophy of breast    Chronic sinusitis, unspecified location    Keratosis pilaris    Endometriosis    Acute pharyngitis due to other specified organisms    Chronic pain of both shoulders    Alopecia    S/P cholecystectomy    Liver lesion, right lobe    Left wrist pain    Hx of migraines    Gastroesophageal reflux disease with esophagitis    Fluid level behind tympanic membrane of right ear    Bilateral carpal tunnel syndrome     Past Surgical History:   Procedure Laterality Date    COLONOSCOPY      LAPAROSCOPIC ABLATION ENDOMETRIOSIS N/A 11/09/2016    Procedure: LAPAROSCOPIC ABLATION ENDOMETRIOSIS;  Surgeon: Tonja Ferrer MD;  Location: [facility]    LAPAROSCOPIC CYSTECTOMY OVARIAN (BENIGN) Left 11/09/2016    Procedure: LAPAROSCOPIC CYSTECTOMY OVARIAN (BENIGN);  Surgeon: Tonja Ferrer MD;  [facility]: [facility]    LAPAROSCOPIC TUBAL DYE STUDY Left 11/09/2016    Procedure: LAPAROSCOPIC TUBAL DYE STUDY;  Surgeon: Tonja Ferrer MD;  [facility]: [facility]    LAPAROSCOPY OPERATIVE ADULT N/A 11/09/2016    Procedure: LAPAROSCOPY OPERATIVE ADULT;  Surgeon: Tonja Ferrer MD;  [facility]: [facility]    MAMMOPLASTY REDUCTION Bilateral  08/05/2021    Procedure: MAMMOPLASTY, REDUCTION, Bilateral;  Surgeon: ANTONIO Moreno MD;  Location: [facility] OR    ORTHOPEDIC SURGERY      left wrist surgery    TONSILLECTOMY & ADENOIDECTOMY Bilateral     cauterized turbinates also      Social History     Tobacco Use    Smoking status: Former     Packs/day: 0.00     Years: 10.00     Additional pack years: 0.00     Total pack years: 0.00     Types: Cigarettes    Smokeless tobacco: Never    Tobacco comments:     smokes occasionally socially    Substance Use Topics    Alcohol use: Not Currently     Alcohol/week: 0.0 standard drinks of alcohol     Comment: occasional       Problem (# of Occurrences) Relation (Name,Age of Onset)    Diabetes (1) Maternal Grandfather (Mothers father)           Negative family history of: Hypertension, Coronary Artery Disease, Hyperlipidemia, Cerebrovascular Disease, Breast Cancer, Colon Cancer, Prostate Cancer, Other Cancer, Glaucoma, Macular Degeneration              Current Outpatient Medications   Medication Sig    almotriptan (AXERT) 12.5 MG tablet Take 1 tablet (12.5 mg) by mouth at onset of headache for migraine (repeat in 2 hours if needed) May repeat in 2 hours. Max 2 tablets/24 hours.    atomoxetine (STRATTERA) 40 MG capsule Take 1 capsule (40 mg) by mouth daily    buPROPion (WELLBUTRIN XL) 300 MG 24 hr tablet Take 1 tablet (300 mg) by mouth every morning    cetirizine (ZYRTEC) 10 MG tablet Take 1 tablet (10 mg) by mouth every evening    cholecalciferol 125 MCG (5000 UT) CAPS Take 5,000 Units by mouth every evening    doxepin (SINEQUAN) 10 MG capsule Take 2 capsules (20 mg) by mouth 2 times daily    EMGALITY 120 MG/ML injection Inject 1 mL (120 mg) Subcutaneous every 28 days    EPINEPHrine (ANY BX GENERIC EQUIV) 0.3 MG/0.3ML injection 2-pack Inject 0.3 mg into the muscle as needed    famotidine (PEPCID) 20 MG tablet TAKE 1 TABLET BY MOUTH TWICE A DAY    fluticasone (FLONASE) 50 MCG/ACT nasal spray Spray 2 sprays into both  nostrils 2 times daily as needed for allergies    gabapentin (NEURONTIN) 400 MG capsule Take 2 capsules (800 mg) by mouth 3 times daily    guaiFENesin (MUCINEX) 600 MG 12 hr tablet Take 600 mg by mouth daily as needed    hydroxychloroquine (PLAQUENIL) 200 MG tablet Take 2 tablets (400 mg) by mouth daily    methotrexate 2.5 MG tablet Take 6 tablets (15 mg) by mouth every 7 days    montelukast (SINGULAIR) 10 MG tablet Take 1 tablet (10 mg) by mouth at bedtime    ondansetron (ZOFRAN ODT) 4 MG ODT tab Take 1-2 tablets (4-8 mg) by mouth every 8 hours as needed for nausea    Phentermine-Topiramate (QSYMIA) 15-92 MG CP24 Take 1 tablet by mouth daily    phenylephrine HCl 10 MG TABS Take 10 mg by mouth 2 times daily    propranolol (INDERAL) 20 MG tablet Take 1 tablet (20 mg) by mouth daily as needed (anxiety)    vilazodone (VIIBRYD) 40 MG TABS tablet Take 1 tablet (40 mg) by mouth every morning    levonorgestrel (MIRENA) 20 MCG/DAY IUD 1 each (20 mcg) by Intrauterine route once for 1 dose    lisdexamfetamine (VYVANSE) 20 MG capsule Take 1 capsule (20 mg) by mouth every morning (Patient not taking: Reported on 3/6/2024)     No current facility-administered medications for this visit.     Allergies   Allergen Reactions    Dust Mites Cough, Difficulty breathing and Shortness Of Breath    Cats      Chest tightness, sinus irritation           OBJECTIVE:     There were no vitals taken for this visit.  There is no height or weight on file to calculate BMI.    Exam:  Constitutional:  Appearance: Well nourished, well developed alert, in no acute distress. Flat affect  Psychiatric:  Mentation appears normal and affect normal/bright.       ASSESSMENT/PLAN:                                                        ICD-10-CM    1. PMS (premenstrual syndrome)  N94.3       2. IUD (intrauterine device) in place  Z97.5 US Transvaginal Pelvic Non-OB      3. Pelvic cramping  R10.2 US Transvaginal Pelvic Non-OB      4. Endometriosis  N80.9              -New symptoms of PMS more often than not, along with cramping. Patient concerned these symptoms are secondary to mirena IUD.   Discussed likely symptoms are multifactorial, as she has history complicated by endometriosis, depression and future tx not only needs to consider these but also hx of migraines and intolerance of OCPs.   Discussed possible ovarian cyst, IUD displacement, under treated endometriosis, uncontrolled depression, medication affect.   Consider pelvic ultrasound.   Do not recommend changing IUD from mirena to mario or kyleena as these are not likely to be able to offer therapy for endometriosis. Discussed risks of untreated/undertreated endometriosis including intraabdominal scarring and infertility. Reviewed other meds to consider.   Offered to remove IUD today to see what her menstrual cycles would feel like off meds.  Kasandra will take some time to think over her options. She has had IUD placed with ultrasound guidance so this would be needed again if placement of another IUD.  Questions answered to her satisfaction    (35 minutes was spent on the date of the encounter doing chart review, review and interpretation of pertinent test results, history and/or exam, documentation, patient counseling.)    Lorena Charles Masters, DO  [facility] [place]     Dermal Autograft Text: The defect edges were debeveled with a #15 scalpel blade.  Given the location of the defect, shape of the defect and the proximity to free margins a dermal autograft was deemed most appropriate.  Using a sterile surgical marker, the primary defect shape was transferred to the donor site. The area thus outlined was incised deep to adipose tissue with a #15 scalpel blade.  The harvested graft was then trimmed of adipose and epidermal tissue until only dermis was left.  The skin graft was then placed in the primary defect and oriented appropriately.

## 2024-03-13 ENCOUNTER — VIRTUAL VISIT (OUTPATIENT)
Dept: PSYCHOLOGY | Facility: CLINIC | Age: 39
End: 2024-03-13
Payer: COMMERCIAL

## 2024-03-13 DIAGNOSIS — F54 PSYCHOLOGICAL AND BEHAVIORAL FACTORS ASSOCIATED WITH DISORDERS OR DISEASES CLASSIFIED ELSEWHERE: ICD-10-CM

## 2024-03-13 DIAGNOSIS — F43.12 CHRONIC POST-TRAUMATIC STRESS DISORDER (PTSD): Primary | ICD-10-CM

## 2024-03-13 DIAGNOSIS — F34.1 PERSISTENT DEPRESSIVE DISORDER: ICD-10-CM

## 2024-03-13 DIAGNOSIS — F43.23 ADJUSTMENT DISORDER WITH MIXED ANXIETY AND DEPRESSED MOOD: ICD-10-CM

## 2024-03-13 PROCEDURE — 90837 PSYTX W PT 60 MINUTES: CPT | Mod: 95 | Performed by: PSYCHOLOGIST

## 2024-03-13 NOTE — CONFIDENTIAL NOTE
"Answers for HPI/ROS submitted by the patient on 2/26/2024  VIC 7 TOTAL SCORE: 8        Health Psychology - Follow up Visit  Confidential Summary*    The author of this note documented a reason for not sharing it with the patient.  REFERRAL SOURCE:  Psychiatry    CHIEF COMPLAINT/REASON FOR VISIT  Psychotherapy in context of chronic PTSD and persistent depression.  Patient also complains of dissatisfaction with interpersonal relationships and challenges with emotion regulation.      Patient was seen today for a 60 minute individual psychotherapy session.  The session was facilitated via VIDEO with patient at her home and provider at her own home.  We used [employer] platform.       The patient has been notified of following:   \"This VIDEO visit will be conducted via a call between you and your physician/provider. We have found that certain health care needs can be provided without the need for an in-person physical exam.  VIDEO visits are billed at different rates depending on your insurance coverage.  Please reach out to your insurance provider with any questions. If during the course of the call the physician/provider feels a video visit is not appropriate, you will not be charged for this service.\"     Patient has given verbal consent for VIDEO visit? Yes    Subjective:  Patient began with report that she is concerned about the possibility that she will not have funding after the fall.  Discussed her ongoing frustration that her mentor accused her of having her priorities confused as she explained to him that she has not been able to dedicate time to working on her dissertation proposal because she needs to look for work so that she can continue to maintain her apartment and living expenses.  She described the belief that his failure to understand the impact of the loss of departmental support on her overall well being is another sign of the disparities between those in her program who come from privilege and her " situation in which she has no support at all from her family.  She reported that she is planning to finish her program and that she is intent on finding a postdoctoral fellowship and maintains the belief that she does not have to complete her proposal but that if she is able to publish three papers, her department will graduate her.  Encouraged her to make sure that she is accurate in her beliefs.  She was noted to become defensive.      Objective:  Patient was on time for today s session. She was alert and oriented. Mood was euthymic with appropriate range of affect. Patient denied suicidal or assaultive ideation, plan, or intent.        Assessment:  The patient has a longstanding history of interpersonal discord and challenges with emotion regulation and avoidance as a coping mechanism for anxiety.  She has completed all requirements for her PhD and is now ABD.  She is living in  housing with her two dogs.  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      Plan:  Patient graduated from full model DBT at [facility] and is now completing phase 2 work.      Treatment plan updated.      Time In: 2:00  Time Out: 3:00    Diagnosis:  Axis I PTSD, chronic, persistent depressive disorder, adjustment disorder with mixed, psychological factors associated with physical (fibromyalgia)   Axis II  BPD   Axis III please see medical records for details   [place] IV Psychosocial and Environmental Stressors: living alone with no family support, pandemic stress, chronic illness         Corina Muhammad, PhD, LP

## 2024-03-14 DIAGNOSIS — F90.8 ATTENTION DEFICIT HYPERACTIVITY DISORDER (ADHD), OTHER TYPE: ICD-10-CM

## 2024-03-15 RX ORDER — ATOMOXETINE 40 MG/1
40 CAPSULE ORAL DAILY
Qty: 30 CAPSULE | Refills: 0 | OUTPATIENT
Start: 2024-03-15

## 2024-03-15 NOTE — TELEPHONE ENCOUNTER
1) RN received refill request from    [id number] - [place], [place] - [address]  for atomoxetine (STRATTERA) 40 MG capsule .      2) This medication was last prescribed on 2/29/24 for qty of 30 with 0 refill(s).     3) Per [place] , not listed     4) Pt should not need a new prescription until around 3/29/24.    5) Therefore, RN sent reply back to pharmacy that refill request will be DENIED.     6) Pt also does have follow-up appointment scheduled.       Message left for patient to discuss at next appt.

## 2024-03-16 NOTE — CONFIDENTIAL NOTE
"Answers for HPI/ROS submitted by the patient on 2/26/2024  VIC 7 TOTAL SCORE: 8        Health Psychology - Follow up Visit  Confidential Summary*    The author of this note documented a reason for not sharing it with the patient.  REFERRAL SOURCE:  Psychiatry    CHIEF COMPLAINT/REASON FOR VISIT  Psychotherapy in context of chronic PTSD and persistent depression.  Patient also complains of dissatisfaction with interpersonal relationships and challenges with emotion regulation.      Patient was seen today for a 60 minute individual psychotherapy session.  The session was facilitated via VIDEO with patient at her home and provider at her own home.  We used [employer] platform.       The patient has been notified of following:   \"This VIDEO visit will be conducted via a call between you and your physician/provider. We have found that certain health care needs can be provided without the need for an in-person physical exam.  VIDEO visits are billed at different rates depending on your insurance coverage.  Please reach out to your insurance provider with any questions. If during the course of the call the physician/provider feels a video visit is not appropriate, you will not be charged for this service.\"     Patient has given verbal consent for VIDEO visit? 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  She reported success in securing agreement from her references to support her.  Sh has also applied for a postdoctoral position in [place].  She reported that she has been disappointed in her  position and that she has been working hard to provide thorough feedback to the students who fundamentally appear to lack critical thinking skills.  She reported that she is aware that she was a scapegoat in her family of origin.  She reported that she is aware that her lack of familial support and her awareness " of racial discrimination has placed her in a position in which she has to remain on the defensive.      Validated her experience and supported her ongoing examination of her perceptions of threat.  Encouraged her to use the check the facts skill.      Objective:  Patient was on time for today s session. She was alert and oriented. Mood was euthymic with appropriate range of affect. Patient denied suicidal or assaultive ideation, plan, or intent.        Assessment:  The patient has a longstanding history of interpersonal discord and challenges with emotion regulation and avoidance as a coping mechanism for anxiety.  She has completed all requirements for her PhD and is now ABD.  She is living in  housing with her two dogs.  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      Plan:  Patient graduated from full model DBT at [facility] and is now completing phase 2 work.      Treatment plan updated.      Time In: 2:00  Time Out: 3:00    Diagnosis:  Axis I PTSD, chronic, persistent depressive disorder, adjustment disorder with mixed, psychological factors associated with physical (fibromyalgia)   Axis II  BPD   Axis III please see medical records for details   [place] IV Psychosocial and Environmental Stressors: living alone with no family support, pandemic stress, chronic illness         Corina Muhammad, PhD, LP

## 2024-03-27 ENCOUNTER — VIRTUAL VISIT (OUTPATIENT)
Dept: PSYCHOLOGY | Facility: CLINIC | Age: 39
End: 2024-03-27
Payer: COMMERCIAL

## 2024-03-27 DIAGNOSIS — F54 PSYCHOLOGICAL AND BEHAVIORAL FACTORS ASSOCIATED WITH DISORDERS OR DISEASES CLASSIFIED ELSEWHERE: ICD-10-CM

## 2024-03-27 DIAGNOSIS — F34.1 PERSISTENT DEPRESSIVE DISORDER: ICD-10-CM

## 2024-03-27 DIAGNOSIS — F43.12 CHRONIC POST-TRAUMATIC STRESS DISORDER (PTSD): Primary | ICD-10-CM

## 2024-03-27 DIAGNOSIS — F43.23 ADJUSTMENT DISORDER WITH MIXED ANXIETY AND DEPRESSED MOOD: ICD-10-CM

## 2024-03-27 DIAGNOSIS — F90.8 ATTENTION DEFICIT HYPERACTIVITY DISORDER (ADHD), OTHER TYPE: ICD-10-CM

## 2024-03-27 PROCEDURE — 90837 PSYTX W PT 60 MINUTES: CPT | Mod: 95 | Performed by: PSYCHOLOGIST

## 2024-03-27 NOTE — CONFIDENTIAL NOTE
"Answers for HPI/ROS submitted by the patient on 2/26/2024  VIC 7 TOTAL SCORE: 8        Health Psychology - Follow up Visit  Confidential Summary*    The author of this note documented a reason for not sharing it with the patient.  REFERRAL SOURCE:  Psychiatry    CHIEF COMPLAINT/REASON FOR VISIT  Psychotherapy in context of chronic PTSD and persistent depression.  Patient also complains of dissatisfaction with interpersonal relationships and challenges with emotion regulation.      Patient was seen today for a 60 minute individual psychotherapy session.  The session was facilitated via VIDEO with patient at her home and provider at her own home.  We used [employer] platform.       The patient has been notified of following:   \"This VIDEO visit will be conducted via a call between you and your physician/provider. We have found that certain health care needs can be provided without the need for an in-person physical exam.  VIDEO visits are billed at different rates depending on your insurance coverage.  Please reach out to your insurance provider with any questions. If during the course of the call the physician/provider feels a video visit is not appropriate, you will not be charged for this service.\"     Patient has given verbal consent for VIDEO visit? Yes    Subjective:  Patient began with report that she has \"gained back all the weight I lost on Ozempic\".  She reported that she has not been diagnosed with diabetes and believes that her insurance paid for the original 6 month prescription was covered by accident.  She described frustration with her weight regain and reported that she is unable to lose weight again on her own.  Discussed the various mechanism's behind the success of this medication and discussed how she might use a food diary to record caloric intake, portion size and any emotional eating.  She expressed disbelief that these methods would work for her and maintains that she requires medication.  " Suggested that she might meet with the weight management clinic and possibly discuss whether she might benefit from an alternate weight management medication.      She described her intention to move forward on her dissertation defence without a proposal meeting.  Discussed the possibility that she might review with the chair of her committee to ensure that there is o miscommunication.      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  She was encouraged to consider how she might be able to prioritize and allocate time to the most important tasks or long term success.  She continues to express concern that she may not be able to manage her time because of the need to secure employment.    Objective:  Patient was on time for today s session. She was alert and oriented. Mood was euthymic with appropriate range of affect. Patient denied suicidal or assaultive ideation, plan, or intent.        Assessment:  The patient has a longstanding history of interpersonal discord and challenges with emotion regulation and avoidance as a coping mechanism for anxiety.  She has completed all requirements for her PhD and is now ABD.  She is living in  housing with her two dogs.  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      Plan:  Patient graduated from full model DBT at [facility] and is now completing phase 2 work.      Treatment plan updated.      Time In: 2:00  Time Out: 3:00    Diagnosis:  Axis I PTSD, chronic, persistent depressive  disorder, adjustment disorder with mixed, psychological factors associated with physical (fibromyalgia)   Axis II  BPD   Axis III please see medical records for details   [place] IV Psychosocial and Environmental Stressors: living alone with no family support, pandemic stress, chronic illness         Corina Muhammad, PhD, LP

## 2024-04-07 ENCOUNTER — MYC MEDICAL ADVICE (OUTPATIENT)
Dept: PSYCHIATRY | Facility: CLINIC | Age: 39
End: 2024-04-07
Payer: COMMERCIAL

## 2024-04-07 DIAGNOSIS — F90.8 ATTENTION DEFICIT HYPERACTIVITY DISORDER (ADHD), OTHER TYPE: ICD-10-CM

## 2024-04-08 NOTE — TELEPHONE ENCOUNTER
Date of Last Office Visit: 2/26/2024  Date of Next Office Visit: 4/16/2024  No shows since last visit: none  Cancellations since last visit: none     Medication requested: lisdexamfetamine (VYVANSE) 20 MG capsule  Date last ordered: 2/29/2024 Qty: 30 Refills: 0  - It appears this script was never sold.     [place] of [place] ?: Yes - the patient has not been taking this, Geovanna wanted to the pt to start taking it again as of 2/26/2024 appointment   Other [place] ?: Yes  If yes, is this a new medication?: No  Filled  Written  Sold  ID  Drug  QTY  Days  Prescriber  RX #  Dispenser  Refill  Daily Dose*  Pymt Type      03/29/2024 01/23/2024 03/29/2024 3 Gabapentin 400 Mg Capsule 180.00 30 Ma Hernesto [id number] Gra (8234) 2/5  [place]   02/24/2024 01/23/2024 02/24/2024 3 Gabapentin 400 Mg Capsule 180.00 30 Ma Hernesto [id number] Gra (8234) 1/5  [place]   01/23/2024 01/18/2024 01/24/2024 1 Qsymia 15 Mg-92 Mg Capsule 90.00 90 Ta Lyle [id number] Mayte (4294) 0/1  [place]   01/23/2024 01/23/2024 01/24/2024 3 Gabapentin 400 Mg Capsule 180.00 30 Ma [place]esto [id number] Gra (8234) 0/5  [place]   12/18/2023 07/07/2023 12/18/2023 3 Gabapentin 400 Mg Capsule 180.00 30            Lapse in medication adherence greater than 5 days?: n/a  If yes, call patient and gather details: n/a  Medication refill request verified as identical to current order?: Yes  Result of Last DAM, VPA, Li+ Level, CBC, or Carbamazepine Level (at or since last visit): N/A    Last visit treatment plan:   Assessment:   Kasandra Lau is concerned that she might have a binge eating disorder as she often overeats to the point of feeling uncomfortable.  Her depression and anxiety symptoms have worsened since finding out that her position will not be funded for next year at the [employer].  She now is having to rethink her options for her future.  At this time she will take atomoxetine 40 mg daily for the next month and Vyvanse 20 mg daily will be started.   I spoke with her providers that prescribed weight loss medication to taper her off of that.  The instruction given to her for that was to take 1 capsule every other day for 1 week then discontinue.  For depression she continues with bupropion and Viibryd.  Doxepin is available to help her sleep at night as well as take during the day for anxiety.  Propranolol continues to daily as needed for breakthrough anxiety symptoms.  She denies any suicide thinking..     Medication side effects and alternatives were reviewed. Health promotion activities recommended and reviewed today. All questions addressed. Education and counseling completed regarding risks and benefits of medications and psychotherapy options.     Treatment Plan:  1.  Continue bupropion  mg daily  2.  Doxepin 20 mg 2 times daily  3.  Propranolol 20 mg daily as needed for anxiety  4.  Viibryd 40 mg every morning  5.  Atomoxetine 40 mg daily  6.  Vyvanse 20 mg daily     Continue all other medications as reviewed per electronic medical record today.   Safety plan reviewed. To the Emergency Department as needed or call after hours crisis line at [phone] or [phone]. [place] Crisis Text Line. Text [place] to 236905 or Suicide LifeLine Chat: suicidepreventionlifeline.org/chat/  To schedule individual or family therapy, call [facility] at [phone]  Schedule an appointment with me in 6 weeks or sooner as needed. Call [facility] at [phone] to schedule.  Follow up with primary care provider as planned or for acute medical concerns.  Call the psychiatric nurse line with medication questions or concerns at [phone]  MyChart may be used to communicate with your provider, but this is not intended to be used for emergencies.         RN attempted to call [facility] [id number] [place] [facility] [place], [place] - [address] [facility] at phone [phone], waited on hold for 15 minutes, but was not able to speak with anyone to  verify if they still had the above script on file for lisdexamfetamine (VYVANSE) 20 MG capsule    RN sent a MyC message to the patient to check with the pharmacy.     Lore Howell RN on 4/8/2024 at 9:36 AM

## 2024-04-09 ENCOUNTER — TELEPHONE (OUTPATIENT)
Dept: PSYCHIATRY | Facility: CLINIC | Age: 39
End: 2024-04-09
Payer: COMMERCIAL

## 2024-04-09 RX ORDER — LISDEXAMFETAMINE DIMESYLATE 10 MG/1
20 CAPSULE ORAL EVERY MORNING
Qty: 30 CAPSULE | Refills: 0 | Status: SHIPPED | OUTPATIENT
Start: 2024-04-09 | End: 2024-04-19

## 2024-04-09 NOTE — CONFIDENTIAL NOTE
Please let pt know she will need to call pharmacies to find a 20 mg Vyvanse cap since her insurance will not cover more than 1 per day. Thanks.

## 2024-04-09 NOTE — TELEPHONE ENCOUNTER
"MyC message from the pt regarding Vyvanse -  \"the pharmacy said they have been out of the 20mg dosage for months but have the 10 mg if you wanted to write the script to adjust it to 2x10mg instead.\"      RN forwarded Psych provider pool for review for a new prescription for lisdexamfetamine (Vyvanse) 10 mg - Take 2 capsules (20 mg) by mouth every morning to be sent to   [facility] [id number] [place], [place] - [address] [place] [address]    Lore Howell RN on 4/9/2024 at 8:37 AM      "

## 2024-04-09 NOTE — TELEPHONE ENCOUNTER
KENNY received faxed refill request for lisdexamfetamine (VYVANSE) 10 MG capsule     Alternative requested              Vandana Ramsey CMA April 9, 2024

## 2024-04-10 ENCOUNTER — VIRTUAL VISIT (OUTPATIENT)
Dept: PSYCHOLOGY | Facility: CLINIC | Age: 39
End: 2024-04-10
Payer: COMMERCIAL

## 2024-04-10 DIAGNOSIS — F90.8 ATTENTION DEFICIT HYPERACTIVITY DISORDER (ADHD), OTHER TYPE: ICD-10-CM

## 2024-04-10 DIAGNOSIS — F34.1 PERSISTENT DEPRESSIVE DISORDER: ICD-10-CM

## 2024-04-10 DIAGNOSIS — F54 PSYCHOLOGICAL AND BEHAVIORAL FACTORS ASSOCIATED WITH DISORDERS OR DISEASES CLASSIFIED ELSEWHERE: ICD-10-CM

## 2024-04-10 DIAGNOSIS — F43.12 CHRONIC POST-TRAUMATIC STRESS DISORDER (PTSD): Primary | ICD-10-CM

## 2024-04-10 PROCEDURE — 90837 PSYTX W PT 60 MINUTES: CPT | Mod: 95 | Performed by: PSYCHOLOGIST

## 2024-04-10 NOTE — TELEPHONE ENCOUNTER
See MyC encounter dated 4/7/2024, waiting to hear back from the patient about this as to how to proceed.  I.e. do a PA or she finds a different pharmacy that has 20 mg of lisdexamfetamine (Vyvanse) in stock.    Lore Howell RN on 4/10/2024 at 10:33 AM

## 2024-04-11 NOTE — TELEPHONE ENCOUNTER
Prior Authorization Approval    Medication: LISDEXAMFETAMINE DIMESYLATE 10 MG PO CAPS  Authorization Effective Date: 3/12/2024  Authorization Expiration Date: 7/11/2024  Approved Dose/Quantity:   Reference #:     Insurance Company: [employer] - Phone [phone] Fax [phone]  Expected CoPay: $    CoPay Card Available:      Financial Assistance Needed:   Which Pharmacy is filling the prescription: [facility] [id number] [place] [facility] [place], [place] - [address] [place] [address]  Pharmacy Notified: YES  Patient Notified: **Instructed pharmacy to notify patient when script is ready to /ship.**

## 2024-04-11 NOTE — TELEPHONE ENCOUNTER
PA Initiation    Medication: LISDEXAMFETAMINE DIMESYLATE 10 MG PO CAPS  Insurance Company: [employer] - Phone [phone] Fax [phone]  Pharmacy Filling the Rx: [id number] [place], [place] - [address] [place] [address]  Filling Pharmacy Phone: [phone]  Filling Pharmacy Fax: [phone]  [place] Date: 4/10/2024

## 2024-04-16 ENCOUNTER — VIRTUAL VISIT (OUTPATIENT)
Dept: PSYCHIATRY | Facility: CLINIC | Age: 39
End: 2024-04-16
Payer: COMMERCIAL

## 2024-04-16 DIAGNOSIS — F33.1 MAJOR DEPRESSIVE DISORDER, RECURRENT EPISODE, MODERATE (H): Primary | ICD-10-CM

## 2024-04-16 DIAGNOSIS — F41.1 GENERALIZED ANXIETY DISORDER: ICD-10-CM

## 2024-04-16 DIAGNOSIS — F90.8 ATTENTION DEFICIT HYPERACTIVITY DISORDER (ADHD), OTHER TYPE: ICD-10-CM

## 2024-04-16 PROCEDURE — 99214 OFFICE O/P EST MOD 30 MIN: CPT | Mod: 95 | Performed by: NURSE PRACTITIONER

## 2024-04-16 RX ORDER — VILAZODONE HYDROCHLORIDE 40 MG/1
40 TABLET ORAL EVERY MORNING
Qty: 90 TABLET | Refills: 1 | Status: SHIPPED | OUTPATIENT
Start: 2024-04-16 | End: 2024-08-15

## 2024-04-16 RX ORDER — BUPROPION HYDROCHLORIDE 300 MG/1
300 TABLET ORAL EVERY MORNING
Qty: 90 TABLET | Refills: 1 | Status: SHIPPED | OUTPATIENT
Start: 2024-04-16

## 2024-04-16 RX ORDER — ATOMOXETINE 40 MG/1
40 CAPSULE ORAL DAILY
Qty: 30 CAPSULE | Refills: 0 | Status: SHIPPED | OUTPATIENT
Start: 2024-04-16 | End: 2024-05-13

## 2024-04-16 ASSESSMENT — PATIENT HEALTH QUESTIONNAIRE - PHQ9
SUM OF ALL RESPONSES TO PHQ QUESTIONS 1-9: 5
10. IF YOU CHECKED OFF ANY PROBLEMS, HOW DIFFICULT HAVE THESE PROBLEMS MADE IT FOR YOU TO DO YOUR WORK, TAKE CARE OF THINGS AT HOME, OR GET ALONG WITH OTHER PEOPLE: SOMEWHAT DIFFICULT
SUM OF ALL RESPONSES TO PHQ QUESTIONS 1-9: 5

## 2024-04-16 NOTE — NURSING NOTE
Is the patient currently in the state of [place]? YES    Visit mode:VIDEO    If the visit is dropped, the patient can be reconnected by: VIDEO VISIT:  Send e-mail to at [email or web address]    Will anyone else be joining the visit? No  (If patient encounters technical issues they should call [phone])    How would you like to obtain your AVS? MyChart    Are changes needed to the allergy or medication list? Pt stated no changes to allergies and Pt stated no med changes    Are refills needed on medications prescribed by this physician? NO    Rooming Documentation: Assigned questionnaire(s) completed .    Reason for visit: RECHRONN Lunsford, VVF

## 2024-04-16 NOTE — PATIENT INSTRUCTIONS
"Patient Education   The [facility]  What to Expect  Here's what to expect in the [facility] Psych[facility] Program.   About the program  You'll be meeting with a psychiatric doctor to check your mental health. A psychiatric doctor helps you deal with troubling thoughts and feelings by giving you medicine. They'll make sure you know the plan for your care. You may see them for a long time. When you're feeling better, they may refer you back to seeing your family doctor.   If you have any questions, we'll be glad to talk to you.  About visits  Be open  At your visits, please talk openly about your problems. It may feel hard, but it's the best way for us to help you.  Cancelling visits  If you can't come to your visit, please call us right away at [phone]. If you don't cancel at least 24 hours (1 full day) before your visit, that's \"late cancellation.\"  Not showing up for your visits  Being very late is the same as not showing up. You'll be a \"no show\" if:  You're more than 15 minutes late for a 30-minute (half hour) visit.  You're more than 30 minutes late for a 60-minute (full hour) visit.  If you cancel late or don't show up 2 times within 6 months, we may end your care.  Getting help between visits  If you need help between visits, you can call us Monday to Friday from 8 a.m. to 4:30 p.m. at [phone].  Emergency care  Call 911 or go to the nearest emergency department if your life or someone else's life is in danger.  Call 988 anytime to reach the national Suicide and Crisis hotline.  Medicine refills  To refill your medicine, call your pharmacy. You can also call [facility]'s Behavioral Access at [phone], Monday to Friday, 8 a.m. to 4:30 p.m. It can take 1 to 3 business days to get a refill.   Forms, letters, and tests  You may have papers to fill out, like FMLA, short-term disability, and workability. We can help you with these forms at your visits, but you must have an " appointment. You may need more than 1 visit for this, to be in an intensive therapy program, or both.  Before we can give you medicine for ADHD, we may refer you to get tested for it or confirm it another way.  We may not be able to give you an emotional support animal letter.  We don't do mental health checks ordered by the court.   We don't do mental health testing, but we can refer you to get tested.   Thank you for choosing us for your care.  For informational purposes only. Not to replace the advice of your health care provider. Copyright   2022 [facility]. All rights reserved. Mazree [id number] - 12/22.       Treatment Plan:      1.  Decrease atomoxetine to 40 mg daily  2.  Continue bupropion  mg daily  3.  Continue doxepin 20 mg 2 times daily  4.  Continue Vyvanse 20 mg daily-consider increasing to 30 mg daily if you are able to tolerate this dose  5.  Consider discontinuing Vyvanse and changing to Concerta 27 mg daily if unable to refill Vyvanse due to insurance restrictions  6.  Continue propranolol 20 mg daily as needed for anxiety  7.  Continue Viibryd 40 mg daily    Continue all other medical directions per primary care provider.   Continue all other medications as reviewed per electronic medical record today.   Safety plan reviewed. To the Emergency Department as needed or call after hours crisis line at [phone] or [phone]. [place] Crisis Text Line: Text [place] to 007630  or  Suicide LifeLine Chat: suicidepreventionlifeline.org/chat/  To schedule individual or family therapy, call [facility] at [phone].   Schedule an appointment with me in 2 months or sooner as needed.  Call [facility] at [phone] to schedule.  Follow up with primary care provider as planned or for acute medical concerns.  Call the psychiatric nurse line with medication questions or concerns at [phone].  Mobiquityhart may be used to communicate with your provider, but  this is not intended to be used for emergencies.        Crisis Resources   The [facility]Path is an adults only unit located at [facility] in [place] is a short term (generally less than 23 hour stay) designed for crisis intervention and stabilization. Pts have the opportunity to meet quickly with a behavioral health team for evaluation in a calm and peaceful therapuetic environment. To be evaluated for admission pts are triaged throught the [facility] ED.      The following hotlines are for both adults and children. The and are open 24 hours a day, 7 days a week unless noted otherwise.        Crisis Lines      Crisis Text Line  Text 116593  You will be connected with a trained live crisis counselor to provide support.        Gambling Hotline  0.586.808.1327 [hope]        línea de crisis española  894.786.2366        [facility] ClariFI Helpline  309.114.8405        National Hope Line  6.195.840.9840 [hope]        National Suicide Prevention Lifeline  Free and confidential support  988 or 1.846.517.TALK [8255]  http://suicidepreventionlifeline.org        [employer] (LGBTQ Youth Crisis Line)  5.125.459.7583  text [place] to 738-035        [place] Crisis Line  6.757.051.4348 (Press 1)  or text 414713    [facility] Mental Health Crisis Response  Within [place], call **CRISIS [**587847] to be connected to a mental health professional who can assist you.        [facility] Crisis  [phone]      [facility] Mobile Crisis  [phone]      [facility] Crisis  [phone]      [facility] Mobile Crisis  [phone] (adults)  [phone] (children)      [facility] Mobile Crisis  [phone] (adults)  [phone] (children)      [facility] Mobile Crisis  [phone]      [facility] Mobile Crisis  [phone]    Community Resources      Fast Tracker  Linking people to mental health and substance use disorder resources  fasttrackermn.org        [facility]  Health Warmline  Peer to peer support  5 pm to 9 am 7 days/week  7.857.386.0560  https://mnwitw.org/rodney        National [place] [employer] Mental Illness (RANDAL)  162.140.5203 or 1.888.RANDAL.HELPS  https://namimn.org/        [facility] Urgent [facility] for Adult Mental Health  [facility] residents [address]  [phone]        [facility]  Free mental health counseling  https://walkin.org/  612.870.0565 X2    Mental Health Apps      Calm Harm  https://calmharm.co.uk/      My3  https://my3app.org/      Don Safety Plan  https://www.mysafetyplan.org/

## 2024-04-16 NOTE — PROGRESS NOTES
"Virtual Visit Details    Type of service:  Video Visit     Originating Location (pt. Location): Home    Distant Location (provider location):  Off-site  Platform used for Video Visit: Chad    Start time: 10:47 AM  Stop time: 11:17 AM           Outpatient Psychiatric Progress Note    Name: Kasandra Lau   : 1985                    Primary Care Provider: JASVIR Ashley CNP   Therapist: Yes    PHQ-9 scores:      10/2/2023     1:48 PM 2024     1:54 PM 2024     1:25 PM   PHQ-9 SCORE   PHQ-9 Total Score MyChart 4 (Minimal depression) 2 (Minimal depression) 4 (Minimal depression)   PHQ-9 Total Score 4 2 4       VIC-7 scores:      2023     1:03 PM 2024     1:55 PM 2024     1:26 PM   VIC-7 SCORE   Total Score 7 (mild anxiety) 6 (mild anxiety) 8 (mild anxiety)   Total Score 7    7 6    6 8    8       Patient Identification:    Patient is a 39 year old year old, single   Not  or  female  who presents for return visit with me.  Patient is currently employed part time and a student. Patient attended the session alone. Patient prefers to be called: \" Kasandra\".    Current medications include:   Current Outpatient Medications   Medication Sig Dispense Refill    almotriptan (AXERT) 12.5 MG tablet Take 1 tablet (12.5 mg) by mouth at onset of headache for migraine (repeat in 2 hours if needed) May repeat in 2 hours. Max 2 tablets/24 hours. 18 tablet 11    atomoxetine (STRATTERA) 40 MG capsule Take 1 capsule (40 mg) by mouth daily 30 capsule 0    buPROPion (WELLBUTRIN XL) 300 MG 24 hr tablet Take 1 tablet (300 mg) by mouth every morning 90 tablet 1    cetirizine (ZYRTEC) 10 MG tablet Take 1 tablet (10 mg) by mouth every evening 90 tablet 1    cholecalciferol 125 MCG (5000 UT) CAPS Take 5,000 Units by mouth every evening      doxepin (SINEQUAN) 10 MG capsule Take 2 capsules (20 mg) by mouth 2 times daily 120 capsule 3    EMGALITY 120 MG/ML injection Inject 1 mL (120 mg) " Subcutaneous every 28 days 1 mL 11    EPINEPHrine (ANY BX GENERIC EQUIV) 0.3 MG/0.3ML injection 2-pack Inject 0.3 mg into the muscle as needed      famotidine (PEPCID) 20 MG tablet TAKE 1 TABLET BY MOUTH TWICE A  tablet 3    fluticasone (FLONASE) 50 MCG/ACT nasal spray Spray 2 sprays into both nostrils 2 times daily as needed for allergies 48 mL 5    gabapentin (NEURONTIN) 400 MG capsule Take 2 capsules (800 mg) by mouth 3 times daily 180 capsule 5    guaiFENesin (MUCINEX) 600 MG 12 hr tablet Take 600 mg by mouth daily as needed      hydroxychloroquine (PLAQUENIL) 200 MG tablet Take 2 tablets (400 mg) by mouth daily 180 tablet 2    levonorgestrel (MIRENA) 20 MCG/DAY IUD 1 each (20 mcg) by Intrauterine route once for 1 dose 1 each 0    lisdexamfetamine (VYVANSE) 10 MG capsule Take 2 capsules (20 mg) by mouth every morning 30 capsule 0    methotrexate 2.5 MG tablet Take 6 tablets (15 mg) by mouth every 7 days 72 tablet 2    montelukast (SINGULAIR) 10 MG tablet Take 1 tablet (10 mg) by mouth at bedtime 90 tablet 1    ondansetron (ZOFRAN ODT) 4 MG ODT tab Take 1-2 tablets (4-8 mg) by mouth every 8 hours as needed for nausea 20 tablet 1    Phentermine-Topiramate (QSYMIA) 15-92 MG CP24 Take 1 tablet by mouth daily 90 capsule 1    phenylephrine HCl 10 MG TABS Take 10 mg by mouth 2 times daily      propranolol (INDERAL) 20 MG tablet Take 1 tablet (20 mg) by mouth daily as needed (anxiety) 30 tablet 11    vilazodone (VIIBRYD) 40 MG TABS tablet Take 1 tablet (40 mg) by mouth every morning 90 tablet 1     No current facility-administered medications for this visit.        The [place] Prescription Monitoring Program has been reviewed and there are no concerns about diversionary activity for controlled substances at this time.      I was able to review most recent Primary Care Provider, specialty provider, and therapy visit notes that I have access to.     Today, patient reports that she has tried for two fellowships -  still looking.  She found a post doc in [employer] yesterday - She is still a TA and writing.   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  She is licensed for [place] to substitute teach.  She has been juggling a lot of things and this has increased her anxiety - uncertanty for the future.  Able to sleep at night.  She has  had difficulty getting Vyvanse 20 mg - out of stock.  She just started vyvanse last week - 15 day supply.   Qsymia - still on it.  Has not helped with weight loss.  She had been on Ozembic for a year then gained the weight back .  She has been trying to exercise more.  Strength training and pilates.  She takes doxepin for hives and flushing.  Sometimes more often.       has a past medical history of Anemia (fall 2016), Anxiety, Arthritis, Chronic fatigue, Depression (emotion), Gastroesophageal reflux disease, Migraine, Neuropathic pain, Panic disorder, and Uncomplicated asthma (Spring 2018).    Social history updates:    She identifies little support in her academic network.  She has other social outlets outside academics.  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  Identifies no family support.  She gets support from her ex.      Substance use updates:    No alcohol use reported  Tobacco use: No    Vital Signs:   There were no vitals taken for this visit.    Labs:    Most recent laboratory results reviewed and no new labs.     Mental Status Examination:  Appearance: awake, alert and casual dress  Attitude: cooperative  Eye Contact:  adequate  Gait and Station: No dizziness or falls  Psychomotor Behavior:  intact station, gait and muscle tone  Oriented to:  time, person, and place  Attention Span and Concentration:  Normal  Speech:   vtspeech: clear, coherent and Speaks: English  Mood:  anxious and depressed  Affect:  mood congruent  Associations:  no loose associations  Thought Process:  goal oriented  Thought Content:  no evidence of suicidal ideation or homicidal ideation, no auditory  hallucinations present, and no visual hallucinations present  Recent and Remote Memory:  intact Not formally assessed. No amnesia.  Fund of Knowledge: appropriate  Insight:  good  Judgment: good  Impulse Control:  good    Suicide Risk Assessment:  Today Kasandra Lau reports no thoughts to harm themself or others. In addition, there are notable risk factors for self-harm, including single status, anxiety, and withdrawing. However, risk is mitigated by history of seeking help when needed, future oriented, denies suicidal intent or plan, and denies homicidal ideation, intent, or plan. Therefore, based on all available evidence including the factors cited above, Kasandra Lau does not appear to be at imminent risk for self-harm, does not meet criteria for a 72-hr hold, and therefore remains appropriate for ongoing outpatient level of care.  A thorough assessment of risk factors related to suicide and self-harm have been reviewed and are noted above. The patient convincingly denies suicidality on several occasions. Local community safety resources printed and reviewed for patient to use if needed. There was no deceit detected, and the patient presented in a manner that was believable.     DSM5 Diagnosis:  Attention-Deficit/Hyperactivity Disorder  314.01 (F90.8) Other Specified Attention-Deficit / Hyperactiity Disorder  296.32 (F33.1) Major Depressive Disorder, Recurrent Episode, Moderate _ and With melancholic features  300.02 (F41.1) Generalized Anxiety Disorder    Medical comorbidities include:   Patient Active Problem List    Diagnosis Date Noted    Alopecia 04/25/2023     Priority: Medium    Chronic pain of both shoulders 03/28/2023     Priority: Medium    Acute pharyngitis due to other specified organisms 02/28/2023     Priority: Medium    Endometriosis 10/11/2022     Priority: Medium    Keratosis pilaris 06/14/2022     Priority: Medium    Chronic sinusitis, unspecified location 09/21/2021     Priority: Medium     Bilateral carpal tunnel syndrome 06/08/2021     Priority: Medium    Left wrist pain 03/15/2021     Priority: Medium     Last Assessment & Plan:    Formatting of this note might be different from the original.   1-2-week history of left wrist pain.  Diagnosis clouded by previous history of bilateral wrist surgery including plate placement.  Will obtain x-ray and send referral to Ortho hand for further assessment.      Gastroesophageal reflux disease with esophagitis 01/18/2021     Priority: Medium    S/P cholecystectomy 12/06/2020     Priority: Medium    Liver lesion, right lobe 12/06/2020     Priority: Medium     Formatting of this note might be different from the original.     Abd US: Stable 5 mm hyperechoic lesion in the right hepatic lobe (7/16/2020)     Abd US: 5 mm hyperechoic lesion in the liver, favored to represent a hemangioma (6/15/2020)      Last Assessment & Plan:    Formatting of this note might be different from the original.   5 mm hyperechoic lesion previously identified in right lobe of liver on ultrasound.  Follow-up ultrasound 1 month later with stability.  Likely hepatic hemangioma.  Liver function preserved.  Lesion is small (<5 cm) she does not require any imaging.  Will continue to monitor for symptoms.         Abd US: Stable 5 mm hyperechoic lesion in the right hepatic lobe (7/16/2020)     Abd US: 5 mm hyperechoic lesion in the liver, favored to represent a hemangioma (6/15/2020)      Fluid level behind tympanic membrane of right ear 12/06/2020     Priority: Medium     Last Assessment & Plan:    Formatting of this note might be different from the original.   2-week history of right ear pain with intermittent pressure.  Examination notable for fluid behind right TM.  The symptom also correlates with with her recent IBS flare; again supporting differential of mastocytosis.  Ear does not appear infected.  If not secondary to a mastocytosis, then is likely due to allergic rhinitis as she recently  moved to the [facility].  Provided reassurance and also a small amount of Sudafed for relief of pressure.      Hypertrophy of breast 09/10/2020     Priority: Medium     Added automatically from request for surgery [id number]      Pap smear abnormality of cervix/human papillomavirus (HPV) positive 09/03/2020     Priority: Medium     10/10/17 NIL pap, + HR HPV (not 16/18)  1/31/19 NILM HPV  Positive for High Risk HPV types other than 16 or 18 (Care Everywhere)  2/21/19 [place] [facility]: benign. Plan 1 year cotest  2020 NILM HPV negative 34 y.o. PLAN, per ASCCP Guidelines: cotest 1/2023 2/24/23 NIL pap, neg HR HPV. Plan 3 year cotest      Cholecystitis 07/16/2020     Priority: Medium     Added automatically from request for surgery [id number]      Chronic idiopathic urticaria 06/22/2020     Priority: Medium    Hx of abnormal cervical Pap smear 02/03/2020     Priority: Medium    Acid reflux 08/25/2019     Priority: Medium    Need for desensitization to allergens 06/06/2019     Priority: Medium     Formatting of this note might be different from the original.    Allergy Shot Care Plan for Kasandra Lau  Date of Order: June 6 2019  Ordering Provider: Dr. Martin Cervantes     Serum 1: Cat  Date Shots Started:  Start Dose: 0.1 mL of 1:100,000 - GREEN  Date Maintenance Reached:  Maintenance Dose: 0.3 mL of 1:100 - RED    Serum 2: Mite DF and Mite DP  Date Shots Started:  Start Dose: 0.1 mL of 1:100,000 - GREEN  Date Maintenance Reached:  Maintenance Dose: 0.1 mL of 1:100 - RED    BUILDING SCHEDULE FOR POLLEN AND NONPOLLEN   IMMUNOTHERAPY  EXCEPT VENOM AND CENTER AL    3 - 14 days Build per schedule.   15 - 21 days Repeat previous dose.   22 - 28 days Decrease by one dose.   29 - 35 days Decrease by two doses.   36 - 42 days Decrease by three doses.   Over 42 days Continue to decrease by one dose for each additional week.     1:100,000 (green)  1:10,000 (blue)  1:1000 (yellow)  1:100 (red)     1. 0.05 ml  7. 0.05 ml  13. 0.05 ml  19.  0.05 ml   2. 0.1 ml  8. 0.1 ml  14. 0.1 ml  20. 0.1 ml   3. 0.2 ml  9. 0.2 ml  15. 0.2 ml  21. 0.15 ml   4. 0.3 ml  10. 0.3 ml  16. 0.3 ml  22. 0.2 ml   5. 0.4 ml  11. 0.4 ml  17. 0.4 ml  23. 0.25 ml   6. 0.45 ml  12. 0.45 ml  18. 0.45 ml  24. 0.3 ml         25   0.35 ml         26   0.4 ml         27. 0.45 ml         28. 0.5 ml     MAINTENANCE SCHEDULE FOR POLLENS, NONPOLLENS (MITES,MOLD,CAT,DOG)  (not used for Center[facility] Pollens)    ? When reaching maintenance dose for the first time, gradually stretch by weekly intervals to every 4 weeks (e.g., every 2 weeks, then 3 weeks, then 4 weeks).   ? Patient may receive their injections (patient choice) at 2-4 week intervals     Maintenance Repeat 1 Dose 2 Dose 3 Dose    Q2 weeks  (10-14 days)  3 weeks  (15-21 days) 4 weeks  (22-28 days) 5 weeks  (29-35 days) 6 weeks  (36-42 days) Decrease by 1 Dose for each additional week   Q3 weeks  (15-21 days)  4 weeks  (22-28 days) 5 weeks  (29-35 days) 6 weeks  (36-42 days) 7 weeks  (43-49 days)    Q4 weeks  (22-28 days)  5 weeks  (29-35 days) 6 weeks  (36-42 days) 7 weeks  (43-49 days) 8 weeks  (50-56 days)      Susanna Ambrose RN 6/6/2019 4:49 PM      Mild intermittent asthma without complication 04/30/2018     Priority: Medium    Seasonal mood disorder (H24) 11/15/2017     Priority: Medium    Circadian rhythm sleep disorder, delayed sleep phase type 05/10/2017     Priority: Medium    Unhealthy sleep habit 05/10/2017     Priority: Medium    Menorrhagia with regular cycle 11/19/2016     Priority: Medium    Migraine without aura and without status migrainosus, not intractable 11/19/2016     Priority: Medium    Tired 11/19/2016     Priority: Medium    Hx of migraines 11/19/2016     Priority: Medium     Last Assessment & Plan:    Formatting of this note might be different from the original.   Long history of migraines previously seen by neurology in [place].  She receives monthly Emgality injections. Will send referral to  neurology here for injection management.      Irritable bowel syndrome with constipation 10/25/2016     Priority: Medium    Recurrent major depressive disorder, in remission (H24) 10/25/2016     Priority: Medium    Pelvic pain in female 08/25/2016     Priority: Medium    Migraine headache 01/01/2012     Priority: Medium    Panic disorder 01/01/2007     Priority: Medium    Depression 01/01/2004     Priority: Medium       Assessment:    Kasandra Lau has had difficulties finding a pharmacy to fill her Vyvanse prescription.  She finds the dose most effective at 20 mg daily and we talked about increasing it to 30 mg daily if she can tolerate the 20 mg dose.  We also talked about changing her stimulant to Concerta if she is unable to refill the Vyvanse.  Propranolol is effective in managing anxiety symptoms for her.  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  Atomoxetine has recently been decreased to 40 mg daily for ADHD symptoms with plans to discontinue this in the future doxepin is being taken to help with flushing and hives of unknown etiology..    Medication side effects and alternatives were reviewed. Health promotion activities recommended and reviewed today. All questions addressed. Education and counseling completed regarding risks and benefits of medications and psychotherapy options.    Treatment Plan:      1.  Decrease atomoxetine to 40 mg daily  2.  Continue bupropion  mg daily  3.  Continue doxepin 20 mg 2 times daily  4.  Continue Vyvanse 20 mg daily-consider increasing to 30 mg daily if you are able to tolerate this dose  5.  Consider discontinuing Vyvanse and changing to Concerta 27 mg daily if unable to refill Vyvanse due to insurance restrictions  6.  Continue propranolol 20 mg daily as needed for anxiety  7.  Continue Viibryd 40 mg daily    Continue all other medications as reviewed per electronic  medical record today.   Safety plan reviewed. To the Emergency Department as needed or call after hours crisis line at [phone] or [phone]. [place] Crisis Text Line. Text [place] to 528994 or Suicide LifeLine Chat: suicidepreventionlifeline.org/chat/  To schedule individual or family therapy, call [facility] at [phone]  Schedule an appointment with me in 2 months or sooner as needed. Call [facility] at [phone] to schedule.  Follow up with primary care provider as planned or for acute medical concerns.  Call the psychiatric nurse line with medication questions or concerns at [phone]  MyChart may be used to communicate with your provider, but this is not intended to be used for emergencies.        Crisis Resources   The [facility] is an adults only unit located at [facility] is a short term (generally less than 23 hour stay) designed for crisis intervention and stabilization. Pts have the opportunity to meet quickly with a behavioral health team for evaluation in a calm and peaceful therapuetic environment. To be evaluated for admission pts are triaged throught the [facility] ED.      The following hotlines are for both adults and children. The and are open 24 hours a day, 7 days a week unless noted otherwise.        Crisis Lines      Crisis Text Line  Text 608791  You will be connected with a trained live crisis counselor to provide support.        [place] Hotline  8.447.005.2762 [hope]        línea de crisis española  328.209.7872        [facility] Helpline  728.560.2650        National Hope Line  9.370.368.9209 [hope]        National Suicide Prevention Lifeline  Free and confidential support  988 or 1.342.393.TALK [8255]  http://suicidepreventionlifeline.org        The [employer] (LGBTQ Youth Crisis Line)  0.756.331.6066  text START to 371-365        [place] Crisis Line  5.195.340.5768 (Press 1)  or text  129874    [facility] Mental Health Crisis Response  Within [place], call **CRISIS [**343414] to be connected to a mental health professional who can assist you.        [facility] Crisis  [phone]      [facility] Mobile Crisis  [phone]      [facility] Crisis  [phone]      [facility] Mobile Crisis  [phone] (adults)  [phone] (children)      [facility] Mobile Crisis  [phone] (adults)  [phone] (children)      [facility] Mobile Crisis  [phone]      [facility] Mobile Crisis  [phone]    Community Resources      Fast Tracker  Linking people to mental health and substance use disorder resources  MarketRiders.org        [place] Mental Health Warmline  Peer to peer support  5 pm to 9 am 7 days/week  8.888.732.2248  https://mnwitw.org/rodney        National Beryl on Mental Illness (RANDAL)  590.860.7809 or 1.888.RANDAL.HELPS  https://namimn.org/        [facility] for Adult Mental Health  [facility] residents [address]  [phone]        [facility]  Free mental health counseling  https://walkin.org/  612.870.0565 X2    Mental Health Apps      Calm Harm  https://calmharm.co.uk/      My3  https://my3app.org/      [place] Safety Plan  https://www.mysafetyplan.org/   Administrative Billing:   Time spent with patient includes counseling and coordination of care regarding above diagnoses and treatment plan.    Patient Status:  This is a continuous care patient and medications will be prescribed by the psychiatric provider until further indicated.    Signed:   LEE FitzgeraldBC   Psychiatry       Answers submitted by the patient for this visit:  Patient Health Questionnaire (Submitted on 4/16/2024)  If you checked off any problems, how difficult have these problems made it for you to do your work, take care of things at home, or get along with other people?:  Somewhat difficult  PHQ9 TOTAL SCORE: 5

## 2024-04-17 ENCOUNTER — VIRTUAL VISIT (OUTPATIENT)
Dept: PSYCHOLOGY | Facility: CLINIC | Age: 39
End: 2024-04-17
Payer: COMMERCIAL

## 2024-04-17 DIAGNOSIS — F43.12 CHRONIC POST-TRAUMATIC STRESS DISORDER (PTSD): Primary | ICD-10-CM

## 2024-04-17 DIAGNOSIS — F54 PSYCHOLOGICAL AND BEHAVIORAL FACTORS ASSOCIATED WITH DISORDERS OR DISEASES CLASSIFIED ELSEWHERE: ICD-10-CM

## 2024-04-17 DIAGNOSIS — F34.1 PERSISTENT DEPRESSIVE DISORDER: ICD-10-CM

## 2024-04-17 DIAGNOSIS — F90.0 ADHD (ATTENTION DEFICIT HYPERACTIVITY DISORDER), INATTENTIVE TYPE: ICD-10-CM

## 2024-04-17 PROCEDURE — 90837 PSYTX W PT 60 MINUTES: CPT | Mod: 95 | Performed by: PSYCHOLOGIST

## 2024-04-17 ASSESSMENT — ANXIETY QUESTIONNAIRES
7. FEELING AFRAID AS IF SOMETHING AWFUL MIGHT HAPPEN: SEVERAL DAYS
2. NOT BEING ABLE TO STOP OR CONTROL WORRYING: SEVERAL DAYS
3. WORRYING TOO MUCH ABOUT DIFFERENT THINGS: SEVERAL DAYS
8. IF YOU CHECKED OFF ANY PROBLEMS, HOW DIFFICULT HAVE THESE MADE IT FOR YOU TO DO YOUR WORK, TAKE CARE OF THINGS AT HOME, OR GET ALONG WITH OTHER PEOPLE?: SOMEWHAT DIFFICULT
IF YOU CHECKED OFF ANY PROBLEMS ON THIS QUESTIONNAIRE, HOW DIFFICULT HAVE THESE PROBLEMS MADE IT FOR YOU TO DO YOUR WORK, TAKE CARE OF THINGS AT HOME, OR GET ALONG WITH OTHER PEOPLE: SOMEWHAT DIFFICULT
7. FEELING AFRAID AS IF SOMETHING AWFUL MIGHT HAPPEN: SEVERAL DAYS
1. FEELING NERVOUS, ANXIOUS, OR ON EDGE: SEVERAL DAYS
4. TROUBLE RELAXING: SEVERAL DAYS
7. FEELING AFRAID AS IF SOMETHING AWFUL MIGHT HAPPEN: SEVERAL DAYS
GAD7 TOTAL SCORE: 8
6. BECOMING EASILY ANNOYED OR IRRITABLE: MORE THAN HALF THE DAYS
GAD7 TOTAL SCORE: 8
1. FEELING NERVOUS, ANXIOUS, OR ON EDGE: SEVERAL DAYS
5. BEING SO RESTLESS THAT IT IS HARD TO SIT STILL: SEVERAL DAYS
GAD7 TOTAL SCORE: 8
5. BEING SO RESTLESS THAT IT IS HARD TO SIT STILL: SEVERAL DAYS
7. FEELING AFRAID AS IF SOMETHING AWFUL MIGHT HAPPEN: SEVERAL DAYS
2. NOT BEING ABLE TO STOP OR CONTROL WORRYING: SEVERAL DAYS
GAD7 TOTAL SCORE: 8
8. IF YOU CHECKED OFF ANY PROBLEMS, HOW DIFFICULT HAVE THESE MADE IT FOR YOU TO DO YOUR WORK, TAKE CARE OF THINGS AT HOME, OR GET ALONG WITH OTHER PEOPLE?: SOMEWHAT DIFFICULT
IF YOU CHECKED OFF ANY PROBLEMS ON THIS QUESTIONNAIRE, HOW DIFFICULT HAVE THESE PROBLEMS MADE IT FOR YOU TO DO YOUR WORK, TAKE CARE OF THINGS AT HOME, OR GET ALONG WITH OTHER PEOPLE: SOMEWHAT DIFFICULT
4. TROUBLE RELAXING: SEVERAL DAYS
6. BECOMING EASILY ANNOYED OR IRRITABLE: MORE THAN HALF THE DAYS
3. WORRYING TOO MUCH ABOUT DIFFERENT THINGS: SEVERAL DAYS

## 2024-04-19 ENCOUNTER — MYC MEDICAL ADVICE (OUTPATIENT)
Dept: PSYCHIATRY | Facility: CLINIC | Age: 39
End: 2024-04-19
Payer: COMMERCIAL

## 2024-04-19 DIAGNOSIS — F90.8 ATTENTION DEFICIT HYPERACTIVITY DISORDER (ADHD), OTHER TYPE: ICD-10-CM

## 2024-04-19 NOTE — TELEPHONE ENCOUNTER
"Patient reported via MyC -  \"I have been taking it for over a week now and feel that I am tolerating it well so would like to continue with it. \"      Date of Last Office Visit: 2/26/2024  Date of Next Office Visit: 6/11/2024  No shows since last visit: none  Cancellations since last visit: none    Medication requested: lisdexamfetamine (VYVANSE) 10 MG capsule  Date last ordered: 4/9/2024 Qty: 30 Refills: 0     [place] of [place] ?: Yes  Medication last sold date: 4/13/2024 Qty filled: 30  Other [place] substance on [place] ?: Yes    Filled  Written  Sold  ID  Drug  QTY  Days  Prescriber  RX #  Dispenser  Refill  Daily Dose*  Pymt Type      04/11/2024 04/09/2024 04/13/2024 3 Lisdexamfetamine 10 Mg Capsule 30.00 15 Al Bau [id number] Gra (8234) 0/0  [place]   03/29/2024 01/23/2024 03/29/2024 3 Gabapentin 400 Mg Capsule 180.00 30 Ma [place] [id number] Gra (8234) 2/5  [place]   02/24/2024 01/23/2024 02/24/2024 3 Gabapentin 400 Mg Capsule 180.00 30 Ma [place] [id number] Gra (8234) 1/5  [place]   01/23/2024 01/18/2024 01/24/2024 1 Qsymia 15 Mg-92 Mg Capsule 90.00 90 Ta Lyle [id number] Mayte (4294) 0/1  Private Pay [place]   01/23/2024 01/23/2024 01/24/2024 3 Gabapentin 400 Mg Capsule 180.00 30 M           Lapse in medication adherence greater than 5 days?: No  If yes, call patient and gather details: na/  Medication refill request verified as identical to current order?: Yes  Result of Last DAM, VPA, Li+ Level, CBC, or Carbamazepine Level (at or since last visit):  lab work on 3/4/2024    Last visit treatment plan:   Assessment:     Kasandra Lau is concerned that she might have a binge eating disorder as she often overeats to the point of feeling uncomfortable.  Her depression and anxiety symptoms have worsened since finding out that her position will not be funded for next year at the [employer].  She now is having to rethink her options for her future.  At this time she will take atomoxetine 40 mg daily for the next month and Vyvanse " 20 mg daily will be started.  I spoke with her providers that prescribed weight loss medication to taper her off of that.  The instruction given to her for that was to take 1 capsule every other day for 1 week then discontinue.  For depression she continues with bupropion and Viibryd.  Doxepin is available to help her sleep at night as well as take during the day for anxiety.  Propranolol continues to daily as needed for breakthrough anxiety symptoms.  She denies any suicide thinking..     Medication side effects and alternatives were reviewed. Health promotion activities recommended and reviewed today. All questions addressed. Education and counseling completed regarding risks and benefits of medications and psychotherapy options.     Treatment Plan:  1.  Continue bupropion  mg daily  2.  Doxepin 20 mg 2 times daily  3.  Propranolol 20 mg daily as needed for anxiety  4.  Viibryd 40 mg every morning  5.  Atomoxetine 40 mg daily  6.  Vyvanse 20 mg daily     Continue all other medications as reviewed per electronic medical record today.   Safety plan reviewed. To the Emergency Department as needed or call after hours crisis line at [phone] or [phone]. [place] Crisis Text Line. Text [place] to 420778 or Suicide LifeLine Chat: suicidepreventionlifeline.org/chat/  To schedule individual or family therapy, call [facility] at [phone]  Schedule an appointment with me in 6 weeks or sooner as needed. Call [facility] at [phone] to schedule.  Follow up with primary care provider as planned or for acute medical concerns.  Call the psychiatric nurse line with medication questions or concerns at [phone]  MyChart may be used to communicate with your provider, but this is not intended to be used for emergencies.         []Medication refilled per  Medication Refill in Ambulatory Care  policy.  [x]Medication unable to be refilled by RN due to criteria not met as indicated  below:    []Eligibility - not seen in the last year   []Supervision - no future appointment   []Compliance - no shows, cancellations or lapse in therapy   []Verification - order discrepancy   [x]Controlled medication   []Medication not included in policy   []90-day supply request   []Other    RN pended a 30-day refill for Geovanna Owens to review and approve.    Lore Howell RN on 4/19/2024 at 3:14 PM

## 2024-04-21 NOTE — CONFIDENTIAL NOTE
"  Health Psychology - Follow up Visit  Confidential Summary*    The author of this note documented a reason for not sharing it with the patient.  REFERRAL SOURCE:  Psychiatry    CHIEF COMPLAINT/REASON FOR VISIT  Psychotherapy in context of chronic PTSD, ADHD and persistent depression.  Patient is challenged by procrastination and time management.      Patient was seen today for a 60 minute individual psychotherapy session.  The session was facilitated via VIDEO with patient at her home and provider at her own home.  We used [employer] platform.       The patient has been notified of following:   \"This VIDEO visit will be conducted via a call between you and your physician/provider. We have found that certain health care needs can be provided without the need for an in-person physical exam.  VIDEO visits are billed at different rates depending on your insurance coverage.  Please reach out to your insurance provider with any questions. If during the course of the call the physician/provider feels a video visit is not appropriate, you will not be charged for this service.\"     Patient has given verbal consent for VIDEO visit? Yes    Subjective:  Patient began with report that she continues to work as a  in order to support herself and to save money for the summer months when she will not have an income from her  positions.  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      She reported that she applied for three jobs and is waiting to hear possible positive news from the one in [place].      Discussed my role as an accountability partner.  She asked me to text her asking \"how far have you gotten?\"  Agreed to do so.      Objective:  Patient was on time for today s session. She was alert and oriented. Mood was dysphoric " with appropriate range of affect. Patient denied suicidal or assaultive ideation, plan, or intent.        Assessment:  The patient has a longstanding history of interpersonal discord and challenges with emotion regulation and avoidance as a coping mechanism for anxiety.  She has completed all requirements for her PhD and is now ABD.  She is living in  housing with her two dogs.  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  She is challenged with managing working with dissertation work.      Plan:  Patient graduated from full model DBT at [facility] and is now completing phase 2 work.      Treatment plan updated.      Time In: 2:00  Time Out: 3:00    Diagnosis:  Axis I PTSD, chronic, persistent depressive disorder, adjustment disorder with mixed, psychological factors associated with physical (fibromyalgia)   Axis II  BPD   Axis III please see medical records for details   [place] IV Psychosocial and Environmental Stressors: living alone with no family support, pandemic stress, chronic illness         Corina Muhammad, PhD, LP

## 2024-04-24 ENCOUNTER — VIRTUAL VISIT (OUTPATIENT)
Dept: PSYCHOLOGY | Facility: CLINIC | Age: 39
End: 2024-04-24
Payer: COMMERCIAL

## 2024-04-24 DIAGNOSIS — F34.1 PERSISTENT DEPRESSIVE DISORDER: ICD-10-CM

## 2024-04-24 DIAGNOSIS — F90.0 ADHD (ATTENTION DEFICIT HYPERACTIVITY DISORDER), INATTENTIVE TYPE: ICD-10-CM

## 2024-04-24 DIAGNOSIS — F54 PSYCHOLOGICAL AND BEHAVIORAL FACTORS ASSOCIATED WITH DISORDERS OR DISEASES CLASSIFIED ELSEWHERE: ICD-10-CM

## 2024-04-24 DIAGNOSIS — F43.12 CHRONIC POST-TRAUMATIC STRESS DISORDER (PTSD): Primary | ICD-10-CM

## 2024-04-24 PROCEDURE — 90837 PSYTX W PT 60 MINUTES: CPT | Mod: 95 | Performed by: PSYCHOLOGIST

## 2024-04-25 RX ORDER — LISDEXAMFETAMINE DIMESYLATE 10 MG/1
20 CAPSULE ORAL EVERY MORNING
Qty: 60 CAPSULE | Refills: 0 | Status: SHIPPED | OUTPATIENT
Start: 2024-04-25 | End: 2024-05-31

## 2024-04-25 NOTE — TELEPHONE ENCOUNTER
Per provider:  Because she is asking for three 10 mg capsules, it might be better for her to take one 30 mg capsule daily.  Oralia     Diassess message sent to patient.

## 2024-04-25 NOTE — TELEPHONE ENCOUNTER
Reviewed patient's MyChart message. She wants to know what provider thinks about increasing the Vyvanse after her 30 day supply.      MELONY MADRID RN on 4/25/2024 at 10:23 AM

## 2024-04-25 NOTE — PROGRESS NOTES
"  Health Psychology - Follow up Visit  Confidential Summary*    The author of this note documented a reason for not sharing it with the patient.  REFERRAL SOURCE:  Psychiatry    CHIEF COMPLAINT/REASON FOR VISIT  Psychotherapy in context of chronic PTSD and persistent depression.  Patient also complains of dissatisfaction with interpersonal relationships and challenges with emotion regulation.      Patient was seen today for a 60 minute individual psychotherapy session.  The session was facilitated via doxy.me with patient at her home and provider at her own home.     This telehealth service is appropriate and effective for delivering services in light of the necessity for social distancing to mitigate the COVID-19 epidemic. Patient has agreed to receiving telehealth services.    The patient has been notified of following:   \"This video visit will be conducted via a call between you and your physician/provider. We have found that certain health care needs can be provided without the need for an in-person physical exam.   Video visits are billed at different rates depending on your insurance coverage.  Please reach out to your insurance provider with any questions. If during the course of the call the physician/provider feels a video visit is not appropriate, you will not be charged for this service.\"     Patient has given verbal consent for Video visit? Yes    Subjective:  Patient began with discussion of recent dating situation in which she asked a man that she was communicating with online who was from [place] \"ghosted\" her after she asked him for a current picture.  She described sadness, loss, anger and self blame for the fact that he is now not contacting her.  She also attributed the situation to cultural factors and the fact that he is French and his behavior may have been attributed to cultural factors.  She was encouraged to check the facts and to act in a way that will preserve her self respect.  She was " May 3, 2024    Chema Ruiz MD  [id number] [place] [id number]    Patient: Angie Eastman   YOB: 1943   Date of Visit: 2024     Encounter Diagnosis     ICD-10-CM    1. Primary osteoarthritis of right knee  M17.11 PT plan of care cert/re-cert      2. Chronic pain of right knee  M25.561 PT plan of care cert/re-cert    G89.29           Dear Dr. Ruiz:    Thank you for your recent referral of Angie Eastman. Please review the attached evaluation summary from Angie's recent visit.     Please verify that you agree with the plan of care by signing the attached order.     If you have any questions or concerns, please do not hesitate to call.     I sincerely appreciate the opportunity to share in the care of one of your patients and hope to have another opportunity to work with you in the near future.       Sincerely,    Iris Chase, PT      Referring Provider:      I certify that I have read the below Plan of Care and certify the need for these services furnished under this plan of treatment while under my care.                    Chema Ruiz MD  [id number] [place] [id number]  Via In Basket          PT Re-Evaluation     Today's date: 2024  Patient name: Angie Eastman  : 1943  MRN: [id number]  Referring provider: Chema Ruiz MD  Dx:   Encounter Diagnosis     ICD-10-CM    1. Primary osteoarthritis of right knee  M17.11       2. Chronic pain of right knee  M25.561     G89.29           Start Time: 1100  Stop Time: 1200  Total time in clinic (min): 60 minutes    Assessment  Assessment details: Patient has attended 17 physical therapy visits to date. She demonstrates improved to R knee extension rom along with R knee and hip strength during the evaluation today. She continues to have greatest strength limitation noted to R hip flexion and knee extension. She reports no pain during the evaluation today and notes that pain has been well controlled since the injection in her  "encouraged to consider how her dating behavior may be contributing to some of the factors that she has been encountering in her dating failures.  She was encouraged to \"experiment\" with her dating behaviors and to possibly engage in alternate behaviors including meeting men and possibly male and female friends via a pursuit of her hobbies.  She was encouraged to identify her hobbies and it was noted that she described having a desire to discuss more intellectual pursuits and she was encouraged to engage in activities that may allow her to access her playful, flexible side.  She reported that she enjoys hiking and time with her dogs.  She also reported that she is interested in joining a gym and is interested in finding a \"gym denise\".  She was encouraged to consider the way in which others may feel when she challenge their intellectual understanding of her work.  She was encouraged to consider whether her behavior may be defensive to protect herself.     She did not have diary card completed.  Completed during session and encouraged daily diary card completion.       Objective:  Patient was on time for today s session, appropriately groomed and dressed, and demonstrated good eye contact.  She appeared to be in a sad mood. She was alert and oriented. Mood was dysphoric with appropriate range of affect. Patient denied suicidal or assaultive ideation, plan, or intent.        Assessment:  The patient has a longstanding history of interpersonal discord and challenges with emotion regulation.  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  She has made recent progress toward completing her academic program and is now ABD.  She is living in  housing with her two dogs.  She was provided with documentation to support her need for these emotional support animals.      Plan:  Patient is now in full model DBT at [facility] and is attending skills group on M at 2.  " knee. Difficulties noted during single leg stance interventions to the R LE secondary to weakness.   Impairments: abnormal gait, abnormal muscle tone, abnormal or restricted ROM, impaired physical strength and lacks appropriate home exercise program    Symptom irritability: lowUnderstanding of Dx/Px/POC: good   Prognosis: good    Goals  STGs:  Patient will be independent with HEP in 1-2 visits - MET  Improve R knee strength to 4-/5 for improved tolerance with adls and transfers by 3-4 weeks. - Progressing  Improve R knee extension to 0 degrees for improved tolerance with adls and transfers by 3-4 weeks. - Progressing      LTGs:  Improve FOTO score from 55 To 64 Indicating improved tolerance with activities involving the LE by discharge. - Not MET  Improve R knee strength and rom to wnl for improved tolerance with adls, home duties, and recreation by discharge. - Progressing  Patient will be able to return to normal ambulation without deviation over all terrains without pain or limitation from the knee by discharge. - Progressing  Patient will be able to return to normal home duties without limitation from the knee by discharge. - Progressing      Plan  Plan details: Patient informed that from this point forward, to ensure adherence to the aforementioned plan of care, all or some of the treatment may be performed and carried out by a Physical Therapy Assistant (PTA) with supervision from a licensed Physical Therapist (PT) in accordance with [facility] Physical Therapy Practice Act.  Patient will continue to benefit from skilled physical therapy to address the functional deficits that were identified during the evaluation today. We will continue to progress the therapy program to address these functional deficits and achieve the established goals.     Patient would benefit from: skilled physical therapy  Planned modality interventions: cryotherapy and thermotherapy: hydrocollator packs  Planned therapy      Treatment plan completed:  10/8/21    Time In: 1:00  Time Out: 2:00    Diagnosis:  Axis I PTSD, chronic, persistent depressive disorder, adjustment disorder with mixed, r/o somatization disorder   Axis II  BPD   Axis III please see medical records for details   [place] IV Psychosocial and Environmental Stressors: academic challenges and living alone with no family support         Corina Muhammad, PhD, LP     interventions: ADL retraining, balance/weight bearing training, flexibility, functional ROM exercises, gait training, home exercise program, joint mobilization, manual therapy, neuromuscular re-education, patient education, strengthening, stretching, therapeutic activities and therapeutic exercise  Frequency: 1-2x week.  Duration in weeks: 4  Plan of Care beginning date: 4/25/2024  Plan of Care expiration date: 5/23/2024  Treatment plan discussed with: patient      Subjective Evaluation    History of Present Illness  Mechanism of injury: Patient presents to out patient physical therapy with chief c/o R knee pain. Patient reports onset of R knee pain approximately 3 weeks ago without incident. She notes that she she was walking after waking one day and she felt soreness in her knee that she never experienced before. She does disclose a history of polio that did effect her R LE when she was a child. She feels that since the injection in her knee she has noticed a significant improvement in knee pain but feels that it is only temporary.     Update 3/28/2024:  Patient reports that she is still having a hard time getting up from the floor and she needs to use her arms on a chair to get up from the floor. She feels that doing stairs and performing car transfers has gotten easier for her as her legs are getting stronger.     Update 4/25/24:  The patient states that she feels that her strength and endurance has slightly improved since she started coming to PT. She states that she has less difficulty going up and down stairs compared to before she started PT. She states that her pain has been significantly better since she had a cortisone injection a few months ago.             Not a recurrent problem   Quality of life: good    Patient Goals  Patient goals for therapy: decreased pain, increased strength, increased motion and independence with ADLs/IADLs  Patient goal: Patient wishes to prevent return of pain in her knee by  "improving her mobility and strength in her legs.  Pain  Current pain ratin  At best pain ratin  At worst pain ratin  Location: R knee  Quality: discomfort and dull ache  Alleviating factors: Injection.  Aggravating factors: standing and walking (kneeling)    Social Support  Steps to enter house: yes  2  Stairs in house: yes (to basement)   Lives in: one-story house  Lives with: alone    Employment status: not working  Treatments  Previous treatment: injection treatment      Objective     Active Range of Motion     Right Knee   Flexion: 143 degrees   Extension: -5 degrees   Extensor la degrees     Passive Range of Motion     Right Knee   Extension: -2 degrees     Strength/Myotome Testing     Right Hip   Planes of Motion   Flexion: 4-  Extension: 4  Abduction: 4  Adduction: 4  External rotation: 4  Internal rotation: 4    Right Knee   Flexion: 4-  Extension: 3+      Flowsheet Rows      Flowsheet Row Most Recent Value   PT/OT G-Codes    Current Score 55   Projected Score 64               Precautions: Hx of polio with R LE involvement.   Date    FOTO  58 AW      Manuals        Passive hamstring stretch JF AW jf JF 30\" 5x           Neuro Re-Ed        SLS- foam 4x 15\" 15\" 4x ea.  4x 15\" ea 4 x 15 \" 20\" 4x    Tandem Walk-foam 5 laps 5 laps f/b 5 laps 5 laps 5 laps   Towel  press 20x 5\" 20x 5\" 20x 5\" 20 x 5 \" 20 x 5\"   Side to side 5 laps 5 laps 5 laps 5 laps 5 laps           Ther Ex        LAQ 3# 2x10 3# 20x 3# 20x 3# 20 x 3# 20x   SHC 3# 20x  3# 20x 3# 20x 3#  20 x    NuStep for strengthening 8m L5 8m L5 8m L5 8m L5 Bike L3 8 min   Standing hip abd/ ext 3# 20x ea 3# 20x ea.  3# 20x ea 3# 20 x 3# 20 x   SAQ 3 #  20 x 3# 20x 3# 20x  3#  20 x 3# 5\" 20x   Marching  3#  20 x stand 3# 20  stand 3# 20x ea 3#  20 x 3#  20 x   Bridge c add 10 x 5 \"  10x 5\" 20x 5\" add only 20 x 5 \"  add  20 x 5 \"    Ther Activity        Squats 10x 20x  10x 20 x  20x   Lunges 10x  held  NV   Step down 4\" 10x  " "4\" 10x np  4\" 10x   Step up 6\" 20 x  6\" 20x 6' 20x  6\" 20 x 6\" 20x    Gait Training                Modalities                                                       "

## 2024-05-02 ASSESSMENT — ANXIETY QUESTIONNAIRES
2. NOT BEING ABLE TO STOP OR CONTROL WORRYING: SEVERAL DAYS
7. FEELING AFRAID AS IF SOMETHING AWFUL MIGHT HAPPEN: SEVERAL DAYS
6. BECOMING EASILY ANNOYED OR IRRITABLE: MORE THAN HALF THE DAYS
GAD7 TOTAL SCORE: 8
3. WORRYING TOO MUCH ABOUT DIFFERENT THINGS: SEVERAL DAYS
8. IF YOU CHECKED OFF ANY PROBLEMS, HOW DIFFICULT HAVE THESE MADE IT FOR YOU TO DO YOUR WORK, TAKE CARE OF THINGS AT HOME, OR GET ALONG WITH OTHER PEOPLE?: SOMEWHAT DIFFICULT
7. FEELING AFRAID AS IF SOMETHING AWFUL MIGHT HAPPEN: SEVERAL DAYS
4. TROUBLE RELAXING: SEVERAL DAYS
GAD7 TOTAL SCORE: 8
IF YOU CHECKED OFF ANY PROBLEMS ON THIS QUESTIONNAIRE, HOW DIFFICULT HAVE THESE PROBLEMS MADE IT FOR YOU TO DO YOUR WORK, TAKE CARE OF THINGS AT HOME, OR GET ALONG WITH OTHER PEOPLE: SOMEWHAT DIFFICULT
5. BEING SO RESTLESS THAT IT IS HARD TO SIT STILL: NOT AT ALL
1. FEELING NERVOUS, ANXIOUS, OR ON EDGE: MORE THAN HALF THE DAYS

## 2024-05-02 NOTE — CONFIDENTIAL NOTE
"  Health Psychology - Follow up Visit  Confidential Summary*    The author of this note documented a reason for not sharing it with the patient.  REFERRAL SOURCE:  Psychiatry    CHIEF COMPLAINT/REASON FOR VISIT  Psychotherapy in context of chronic PTSD, ADHD and persistent depression.  Patient is challenged by procrastination and time management.      Patient was seen today for a 60 minute individual psychotherapy session.  The session was facilitated via VIDEO with patient at her home and provider at her own home.  We used [employer] platform.       The patient has been notified of following:   \"This VIDEO visit will be conducted via a call between you and your physician/provider. We have found that certain health care needs can be provided without the need for an in-person physical exam.  VIDEO visits are billed at different rates depending on your insurance coverage.  Please reach out to your insurance provider with any questions. If during the course of the call the physician/provider feels a video visit is not appropriate, you will not be charged for this service.\"     Patient has given verbal consent for VIDEO visit? Yes    Subjective:  Patient began with report that she continues to work as a  to secure funds to help to cover rent and other expenses for the time in the summer when she does not have academic support.  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  She described her ongoing procrastination of her dissertation work.  She reported that she has a deadline set for end of May and asked that I serve as an accountability partner for her.  She described her ongoing perception that she is alone.  Discussed growing up with parents who were also not available to her.      Objective:  Patient was on time for today s session. She was alert and oriented. Mood was dysphoric with appropriate range of affect. Patient denied suicidal or assaultive ideation, " plan, or intent.        Assessment:  The patient has a longstanding history of interpersonal discord and challenges with emotion regulation and avoidance as a coping mechanism for anxiety.  She has completed all requirements for her PhD and is now ABD.  She is living in  housing with her two dogs.  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  She is challenged with managing working with dissertation work.      Plan:  Patient graduated from full model DBT at [facility] and is now completing phase 2 work.      Treatment plan updated.      Time In: 2:00  Time Out: 3:00    Diagnosis:  Axis I PTSD, chronic, persistent depressive disorder, adjustment disorder with mixed, psychological factors associated with physical (fibromyalgia)   Axis II  BPD   Axis III please see medical records for details   [place] IV Psychosocial and Environmental Stressors: living alone with no family support, pandemic stress, chronic illness         Corina Muhammad, PhD,

## 2024-05-09 ENCOUNTER — VIRTUAL VISIT (OUTPATIENT)
Dept: PSYCHOLOGY | Facility: CLINIC | Age: 39
End: 2024-05-09
Payer: COMMERCIAL

## 2024-05-09 DIAGNOSIS — F43.12 CHRONIC POST-TRAUMATIC STRESS DISORDER (PTSD): Primary | ICD-10-CM

## 2024-05-09 DIAGNOSIS — F34.1 PERSISTENT DEPRESSIVE DISORDER: ICD-10-CM

## 2024-05-09 DIAGNOSIS — F90.0 ADHD (ATTENTION DEFICIT HYPERACTIVITY DISORDER), INATTENTIVE TYPE: ICD-10-CM

## 2024-05-09 DIAGNOSIS — F54 PSYCHOLOGICAL AND BEHAVIORAL FACTORS ASSOCIATED WITH DISORDERS OR DISEASES CLASSIFIED ELSEWHERE: ICD-10-CM

## 2024-05-09 PROCEDURE — 90837 PSYTX W PT 60 MINUTES: CPT | Mod: 95 | Performed by: PSYCHOLOGIST

## 2024-05-13 DIAGNOSIS — F90.8 ATTENTION DEFICIT HYPERACTIVITY DISORDER (ADHD), OTHER TYPE: ICD-10-CM

## 2024-05-14 NOTE — TELEPHONE ENCOUNTER
90 day prescription request      Date of Last Office Visit: 4/16/24  Date of Next Office Visit: 6/11/24  No shows since last visit: 0  Cancellations since last visit: 0    Medication requested: atomoxetine (STRATTERA) 40 MG capsule  Date last ordered: 4/16/24 Qty: 30 Refills: 0     Lapse in medication adherence greater than 5 days?: no  If yes, call patient and gather details: na  Medication refill request verified as identical to current order?: yes  Result of Last DAM, VPA, Li+ Level, CBC, or Carbamazepine Level (at or since last visit): N/A    Last visit treatment plan:     Treatment Plan:        1.  Decrease atomoxetine to 40 mg daily  2.  Continue bupropion  mg daily  3.  Continue doxepin 20 mg 2 times daily  4.  Continue Vyvanse 20 mg daily-consider increasing to 30 mg daily if you are able to tolerate this dose  5.  Consider discontinuing Vyvanse and changing to Concerta 27 mg daily if unable to refill Vyvanse due to insurance restrictions  6.  Continue propranolol 20 mg daily as needed for anxiety  7.  Continue Viibryd 40 mg daily     Continue all other medications as reviewed per electronic medical record today.   Safety plan reviewed. To the Emergency Department as needed or call after hours crisis line at [phone] or [phone]. [place] Crisis Text Line. Text [place] to 699544 or Suicide LifeLine Chat: suicidepreventionlifeline.org/chat/  To schedule individual or family therapy, call [facility] at [phone]  Schedule an appointment with me in 2 months or sooner as needed. Call [facility] at [phone] to schedule.    []Medication refilled per  Medication Refill in Ambulatory Care  policy.  [x]Medication unable to be refilled by RN due to criteria not met as indicated below:    []Eligibility - not seen in the last year   []Supervision - no future appointment   []Compliance - no shows, cancellations or lapse in therapy   []Verification - order  discrepancy   []Controlled medication   []Medication not included in policy   [x]90-day supply request   []Other
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
## 2024-05-15 NOTE — CONFIDENTIAL NOTE
"Answers for HPI/ROS submitted by the patient on 2/26/2024  VIC 7 TOTAL SCORE: 8        Health Psychology - Follow up Visit  Confidential Summary*    The author of this note documented a reason for not sharing it with the patient.  REFERRAL SOURCE:  Psychiatry    CHIEF COMPLAINT/REASON FOR VISIT  Psychotherapy in context of chronic PTSD and persistent depression.  Patient also complains of dissatisfaction with interpersonal relationships and challenges with emotion regulation.      Patient was seen today for a 60 minute individual psychotherapy session.  The session was facilitated via VIDEO with patient at her home and provider at her own home.  We used [employer] platform.       The patient has been notified of following:   \"This VIDEO visit will be conducted via a call between you and your physician/provider. We have found that certain health care needs can be provided without the need for an in-person physical exam.  VIDEO visits are billed at different rates depending on your insurance coverage.  Please reach out to your insurance provider with any questions. If during the course of the call the physician/provider feels a video visit is not appropriate, you will not be charged for this service.\"     Patient has given verbal consent for VIDEO visit? Yes    Subjective:  Patient began with report that she is angry that she will have to work all summer to earn enough money to pay her rent and bills for the summer months.  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  Congratulated her efforts toward problem solving and her ongoing persistence to complete the work required to finish her PhD.  She was encouraged to set a meeting with her advisor to establish a time line so that she might target specific deadlines for the multiple tasks she has to complete in order to complete final two papers.      Patient described ongoing disappointment that the the two " positions she has applied for did not come through and she is not sure what she will do for a postdoctoral fellowhship next year.  She was encouraged to continue to focus on finishing as most postdocs will not accept her application ABD.      She was also encouraged to continue practicing mindfulness of current emotions and thoughts in order to continue to keep her depression and anxiety manageable.      Objective:  Patient was on time for today s session. She was alert and oriented. Mood was euthymic with appropriate range of affect. Patient denied suicidal or assaultive ideation, plan, or intent.        Assessment:  The patient has a longstanding history of interpersonal discord and challenges with emotion regulation and avoidance as a coping mechanism for anxiety.  She has completed all requirements for her PhD and is now ABD.  She is living in  housing with her two dogs.  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      Plan:  Patient graduated from full model DBT at [facility] and is now completing phase 2 work.      Treatment plan updated.      Time In: 2:00  Time Out: 3:00    Diagnosis:  Axis I PTSD, chronic, persistent depressive disorder, adjustment disorder with mixed, psychological factors associated with physical (fibromyalgia)   Axis II  BPD   Axis III please see medical records for details   [place] IV Psychosocial and Environmental Stressors: living alone with no family support, pandemic stress, chronic illness         Corina Muhammad, PhD, LP

## 2024-05-16 ENCOUNTER — VIRTUAL VISIT (OUTPATIENT)
Dept: PSYCHOLOGY | Facility: CLINIC | Age: 39
End: 2024-05-16
Payer: COMMERCIAL

## 2024-05-16 DIAGNOSIS — F90.0 ADHD (ATTENTION DEFICIT HYPERACTIVITY DISORDER), INATTENTIVE TYPE: ICD-10-CM

## 2024-05-16 DIAGNOSIS — F54 PSYCHOLOGICAL AND BEHAVIORAL FACTORS ASSOCIATED WITH DISORDERS OR DISEASES CLASSIFIED ELSEWHERE: ICD-10-CM

## 2024-05-16 DIAGNOSIS — F43.12 CHRONIC POST-TRAUMATIC STRESS DISORDER (PTSD): Primary | ICD-10-CM

## 2024-05-16 PROCEDURE — 90837 PSYTX W PT 60 MINUTES: CPT | Mod: 95 | Performed by: PSYCHOLOGIST

## 2024-05-16 RX ORDER — ATOMOXETINE 40 MG/1
40 CAPSULE ORAL DAILY
Qty: 90 CAPSULE | Refills: 0 | Status: SHIPPED | OUTPATIENT
Start: 2024-05-16 | End: 2024-08-16

## 2024-05-16 ASSESSMENT — ANXIETY QUESTIONNAIRES
6. BECOMING EASILY ANNOYED OR IRRITABLE: SEVERAL DAYS
1. FEELING NERVOUS, ANXIOUS, OR ON EDGE: SEVERAL DAYS
GAD7 TOTAL SCORE: 7
5. BEING SO RESTLESS THAT IT IS HARD TO SIT STILL: SEVERAL DAYS
4. TROUBLE RELAXING: SEVERAL DAYS
GAD7 TOTAL SCORE: 7
8. IF YOU CHECKED OFF ANY PROBLEMS, HOW DIFFICULT HAVE THESE MADE IT FOR YOU TO DO YOUR WORK, TAKE CARE OF THINGS AT HOME, OR GET ALONG WITH OTHER PEOPLE?: SOMEWHAT DIFFICULT
2. NOT BEING ABLE TO STOP OR CONTROL WORRYING: SEVERAL DAYS
IF YOU CHECKED OFF ANY PROBLEMS ON THIS QUESTIONNAIRE, HOW DIFFICULT HAVE THESE PROBLEMS MADE IT FOR YOU TO DO YOUR WORK, TAKE CARE OF THINGS AT HOME, OR GET ALONG WITH OTHER PEOPLE: SOMEWHAT DIFFICULT
3. WORRYING TOO MUCH ABOUT DIFFERENT THINGS: SEVERAL DAYS
7. FEELING AFRAID AS IF SOMETHING AWFUL MIGHT HAPPEN: SEVERAL DAYS
7. FEELING AFRAID AS IF SOMETHING AWFUL MIGHT HAPPEN: SEVERAL DAYS

## 2024-05-21 NOTE — CONFIDENTIAL NOTE
"  Health Psychology - Follow up Visit  Confidential Summary*    The author of this note documented a reason for not sharing it with the patient.  REFERRAL SOURCE:  Psychiatry    CHIEF COMPLAINT/REASON FOR VISIT  Psychotherapy in context of chronic PTSD, ADHD and persistent depression.  Patient is challenged by procrastination and time management.      Patient was seen today for a 60 minute individual psychotherapy session.  The session was facilitated via VIDEO with patient at her home and provider at her own home.  We used [employer] platform.       The patient has been notified of following:   \"This VIDEO visit will be conducted via a call between you and your physician/provider. We have found that certain health care needs can be provided without the need for an in-person physical exam.  VIDEO visits are billed at different rates depending on your insurance coverage.  Please reach out to your insurance provider with any questions. If during the course of the call the physician/provider feels a video visit is not appropriate, you will not be charged for this service.\"     Patient has given verbal consent for VIDEO visit? Yes    Subjective: Patient began with report that she continues to enjoy dating new boyfriend.  She reported that she recognizes that there are multiple red flags but that she is enjoying the companionship and support and distraction from her own work and life stress.  She described several positive qualities that she has observed in him and that she believes that he has been telling her the truth.  She was again encouraged to continue to keep healthy boundaries until she knows more about him, especially given that he has served time in shelter, is in alcohol recovery and currently lives in a sober house and that he has lost his vehicle and does not have a current source of income.    She continues to work as a  40 hours per week and plans to continue through the end of the school year. "  She reported that she will make sufficient funds to be able to support herself through the summer.  She has contacted her  to ask for assistance and he complied.      She was encouraged to continue to make commitments to move forward on her dissertation progress.  She reported that she had an effective phone meeting with him and that she hs a timeline to complete paper number 2 before the end of this month. She reported that she will also need to formalize her committee members.  She reported that she may have to invite another to join her team.      Objective:  Patient was on time for today s session. She was alert and oriented. Mood was euthymic with appropriate range of affect. Patient denied suicidal or assaultive ideation, plan, or intent.        Assessment:  The patient has a longstanding history of interpersonal discord and challenges with emotion regulation and avoidance as a coping mechanism for anxiety.  She has completed all requirements for her PhD and is now ABD.  She is living in  housing with her two dogs.  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      Plan:  Patient graduated from full model DBT at [facility] and is now completing phase 2 work.      Treatment plan updated.      Time In: 2:00  Time Out: 3:00    Diagnosis:  Axis I PTSD, chronic, persistent depressive disorder, adjustment disorder with mixed, psychological factors associated with physical (fibromyalgia)   Axis II  BPD   Axis III please see medical records for details   [place] IV Psychosocial and Environmental Stressors: living alone with no family support, pandemic stress, chronic illness         Corina Muhammad, PhD, LP

## 2024-05-23 ENCOUNTER — VIRTUAL VISIT (OUTPATIENT)
Dept: PSYCHOLOGY | Facility: CLINIC | Age: 39
End: 2024-05-23
Payer: COMMERCIAL

## 2024-05-23 DIAGNOSIS — F43.12 CHRONIC POST-TRAUMATIC STRESS DISORDER (PTSD): Primary | ICD-10-CM

## 2024-05-23 DIAGNOSIS — F34.1 PERSISTENT DEPRESSIVE DISORDER: ICD-10-CM

## 2024-05-23 DIAGNOSIS — F54 PSYCHOLOGICAL AND BEHAVIORAL FACTORS ASSOCIATED WITH DISORDERS OR DISEASES CLASSIFIED ELSEWHERE: ICD-10-CM

## 2024-05-23 DIAGNOSIS — F90.0 ADHD (ATTENTION DEFICIT HYPERACTIVITY DISORDER), INATTENTIVE TYPE: ICD-10-CM

## 2024-05-23 PROCEDURE — 90837 PSYTX W PT 60 MINUTES: CPT | Mod: 95 | Performed by: PSYCHOLOGIST

## 2024-05-26 ENCOUNTER — HOSPITAL ENCOUNTER (EMERGENCY)
Facility: CLINIC | Age: 39
Discharge: HOME | End: 2024-05-26
Attending: EMERGENCY MEDICINE | Admitting: EMERGENCY MEDICINE
Payer: COMMERCIAL

## 2024-05-26 VITALS
WEIGHT: 175 LBS | OXYGEN SATURATION: 99 % | BODY MASS INDEX: 25.92 KG/M2 | RESPIRATION RATE: 20 BRPM | DIASTOLIC BLOOD PRESSURE: 80 MMHG | TEMPERATURE: 97.7 F | SYSTOLIC BLOOD PRESSURE: 118 MMHG | HEIGHT: 69 IN | HEART RATE: 90 BPM

## 2024-05-26 DIAGNOSIS — R31.9 URINARY TRACT INFECTION WITH HEMATURIA, SITE UNSPECIFIED: ICD-10-CM

## 2024-05-26 DIAGNOSIS — N39.0 URINARY TRACT INFECTION WITH HEMATURIA, SITE UNSPECIFIED: ICD-10-CM

## 2024-05-26 LAB
ALBUMIN UR-MCNC: 200 MG/DL
APPEARANCE UR: ABNORMAL
BILIRUB UR QL STRIP: NEGATIVE
COLOR UR AUTO: ABNORMAL
GLUCOSE UR STRIP-MCNC: NEGATIVE MG/DL
HCG UR QL: NEGATIVE
HGB UR QL STRIP: ABNORMAL
KETONES UR STRIP-MCNC: NEGATIVE MG/DL
LEUKOCYTE ESTERASE UR QL STRIP: ABNORMAL
NITRATE UR QL: NEGATIVE
PH UR STRIP: 6.5 [PH] (ref 5–7)
RBC URINE: >182 /HPF
SP GR UR STRIP: 1.02 (ref 1–1.03)
UROBILINOGEN UR STRIP-MCNC: NORMAL MG/DL
WBC CLUMPS #/AREA URNS HPF: PRESENT /HPF
WBC URINE: 40 /HPF

## 2024-05-26 PROCEDURE — 81001 URINALYSIS AUTO W/SCOPE: CPT | Performed by: STUDENT IN AN ORGANIZED HEALTH CARE EDUCATION/TRAINING PROGRAM

## 2024-05-26 PROCEDURE — 99283 EMERGENCY DEPT VISIT LOW MDM: CPT | Performed by: EMERGENCY MEDICINE

## 2024-05-26 PROCEDURE — 87086 URINE CULTURE/COLONY COUNT: CPT | Performed by: EMERGENCY MEDICINE

## 2024-05-26 PROCEDURE — 81025 URINE PREGNANCY TEST: CPT

## 2024-05-26 PROCEDURE — 99284 EMERGENCY DEPT VISIT MOD MDM: CPT | Mod: FS | Performed by: EMERGENCY MEDICINE

## 2024-05-26 PROCEDURE — 81001 URINALYSIS AUTO W/SCOPE: CPT | Performed by: EMERGENCY MEDICINE

## 2024-05-26 PROCEDURE — 250N000013 HC RX MED GY IP 250 OP 250 PS 637

## 2024-05-26 PROCEDURE — 87186 SC STD MICRODIL/AGAR DIL: CPT | Performed by: EMERGENCY MEDICINE

## 2024-05-26 RX ORDER — PHENAZOPYRIDINE HYDROCHLORIDE 200 MG/1
200 TABLET, FILM COATED ORAL ONCE
Status: COMPLETED | OUTPATIENT
Start: 2024-05-26 | End: 2024-05-26

## 2024-05-26 RX ORDER — CEPHALEXIN 500 MG/1
500 CAPSULE ORAL ONCE
Status: COMPLETED | OUTPATIENT
Start: 2024-05-26 | End: 2024-05-26

## 2024-05-26 RX ORDER — CEPHALEXIN 500 MG/1
500 CAPSULE ORAL 2 TIMES DAILY
Qty: 10 CAPSULE | Refills: 0 | Status: SHIPPED | OUTPATIENT
Start: 2024-05-26 | End: 2024-05-31

## 2024-05-26 RX ADMIN — PHENAZOPYRIDINE 200 MG: 200 TABLET ORAL at 21:52

## 2024-05-26 RX ADMIN — CEPHALEXIN 500 MG: 500 CAPSULE ORAL at 21:52

## 2024-05-26 ASSESSMENT — COLUMBIA-SUICIDE SEVERITY RATING SCALE - C-SSRS
2. HAVE YOU ACTUALLY HAD ANY THOUGHTS OF KILLING YOURSELF IN THE PAST MONTH?: NO
1. IN THE PAST MONTH, HAVE YOU WISHED YOU WERE DEAD OR WISHED YOU COULD GO TO SLEEP AND NOT WAKE UP?: NO
6. HAVE YOU EVER DONE ANYTHING, STARTED TO DO ANYTHING, OR PREPARED TO DO ANYTHING TO END YOUR LIFE?: NO

## 2024-05-26 ASSESSMENT — ACTIVITIES OF DAILY LIVING (ADL)
ADLS_ACUITY_SCORE: 33
ADLS_ACUITY_SCORE: 33

## 2024-05-27 NOTE — ED TRIAGE NOTES
Pt presents with a 1 day history of hematuria, urinary urgency and frequency.  Pt has a history of urinary tract infection.      Triage Assessment (Adult)       Row Name 05/26/24 2041          Triage Assessment    Airway WDL WDL        Respiratory WDL    Respiratory WDL WDL        Skin Circulation/Temperature WDL    Skin Circulation/Temperature WDL WDL        Cardiac WDL    Cardiac WDL WDL        Peripheral/Neurovascular WDL    Peripheral Neurovascular WDL WDL        Cognitive/Neuro/Behavioral WDL    Cognitive/Neuro/Behavioral WDL WDL

## 2024-05-27 NOTE — CONFIDENTIAL NOTE
"  Health Psychology - Follow up Visit  Confidential Summary*    The author of this note documented a reason for not sharing it with the patient.  REFERRAL SOURCE:  Psychiatry    CHIEF COMPLAINT/REASON FOR VISIT  Psychotherapy in context of chronic PTSD, ADHD and persistent depression.  Patient is challenged by procrastination and time management.      Patient was seen today for a 60 minute individual psychotherapy session.  The session was facilitated via VIDEO with patient at her home and provider at her own home.  We used [employer] platform.       The patient has been notified of following:   \"This VIDEO visit will be conducted via a call between you and your physician/provider. We have found that certain health care needs can be provided without the need for an in-person physical exam.  VIDEO visits are billed at different rates depending on your insurance coverage.  Please reach out to your insurance provider with any questions. If during the course of the call the physician/provider feels a video visit is not appropriate, you will not be charged for this service.\"     Patient has given verbal consent for VIDEO visit? Yes    Subjective: Patient began with report that she continues to work as a  each day and that she has committed to doing so for the next 3 weeks.  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  She reported that she is enjoying the work although it is exhausting to her.      She also reported that her  came into town and that they completed the forms for her to apply for divorce.  She described noticing that he \"walks on eggshells\" around her and that this behavior irritated her when they were  and continues to do so.  She also reported that he is easily distracted, messy and disorganized.  She noted that these characteristics were assumed when they were together but " "that time apart has allowed her to see him more objectively.  She also noted he is now more critical of his mother and that during their time together, he was defensive of her and his family.  She reported that she felt validated by his expression of upset surrounding her \"bully\" behavior.      Patient also reported that she continues to date [place] and that she is enjoying his company.  She described multiple challenges that he is currently tackling and that she is enjoying the vulnerability that he is willing to share with her.  She is planning to spend the weekend with him and is looking forward to this special time together.      She was encouraged to consider how she will continue to engage in work toward finishing her 2nd dissertation paper.  She reported that she set a date for end of month to complete and that she has a timeline.  She sent the timeline to me and invited me to serve as an accountability partner.      Objective:  Patient was on time for today s session. She was alert and oriented. Mood was euthymic with appropriate range of affect. Patient denied suicidal or assaultive ideation, plan, or intent.        Assessment:  The patient has a longstanding history of interpersonal discord and challenges with emotion regulation and avoidance as a coping mechanism for anxiety.  She has completed all requirements for her PhD and is now ABD.  She is living in  housing with her two dogs.  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      Plan:  Patient graduated from full model DBT at [facility] and is now completing phase 2 work.      Treatment plan updated.      Time In: 4:15  Time Out: 5:15    Diagnosis:  Axis I PTSD, chronic, persistent depressive disorder, adjustment disorder with mixed, psychological factors associated with physical " (fibromyalgia)   Axis II  BPD   Axis III please see medical records for details   [place] IV Psychosocial and Environmental Stressors: living alone with no family support, pandemic stress, chronic illness         Corina Muhammad, PhD, LP

## 2024-05-27 NOTE — ED NOTES
Patient verbalized understanding of discharge instructions including medication administration and follow up care as noted on AVS. Denied further questions.

## 2024-05-27 NOTE — ED PROVIDER NOTES
[facility] EMERGENCY DEPARTMENT ([facility])    5/26/24       ED PROVIDER NOTE      History     Chief Complaint   Patient presents with    Hematuria    Urinary Frequency     The history is provided by the patient and medical records. No  was used.     Kasandra Lau is a 39 year old female with a past medical history significant for recurrent UTI who presents to the ED for evaluation of urinary urgency, frequency, and hematuria.  Patient states that her symptoms have been ongoing for the past day.  She states that she began having urgency and frequency as well as dysuria this morning with development of hematuria a couple hours prior to arrival to the ED.  She reports some lower abdominal/pelvic discomfort without any sharp or stabbing pain.  She denies any bilateral low back/flank pain.  She reports some nausea but denies any episodes of emesis today.  She denies fever or chills.  She denies any abnormal vaginal bleeding or discharge.  She denies pregnancy.  She denies any diarrhea, melena, hematochezia.  She states that her last UTI was several months ago and states that she does get them frequently throughout the year.  She is unsure if she is ever followed up with urology but thinks that she has been referred to them in the past for evaluation, monitoring, and management of her frequent UTIs.  She has not had any over-the-counter therapies for her pain prior to arrival.    Past Medical History  Past Medical History:   Diagnosis Date    Anemia fall 2016    Anxiety     Arthritis     Chronic fatigue     Depression (emotion)     sees psych, on meds    Gastroesophageal reflux disease     Migraine     daily meds, 2x month, more mild on meds    Neuropathic pain     Panic disorder     Uncomplicated asthma Spring 2018     Past Surgical History:   Procedure Laterality Date    COLONOSCOPY      LAPAROSCOPIC ABLATION ENDOMETRIOSIS N/A 11/09/2016    Procedure: LAPAROSCOPIC ABLATION ENDOMETRIOSIS;   Surgeon: Tonja Ferrer MD;  Location: [facility] OR    LAPAROSCOPIC CYSTECTOMY OVARIAN (BENIGN) Left 11/09/2016    Procedure: LAPAROSCOPIC CYSTECTOMY OVARIAN (BENIGN);  Surgeon: Tonja Ferrer MD;  [facility]    LAPAROSCOPIC TUBAL DYE STUDY Left 11/09/2016    Procedure: LAPAROSCOPIC TUBAL DYE STUDY;  Surgeon: Tonja Ferrer MD;  [facility]    LAPAROSCOPY OPERATIVE ADULT N/A 11/09/2016    Procedure: LAPAROSCOPY OPERATIVE ADULT;  Surgeon: Tonja Ferrer MD;  [facility]    MAMMOPLASTY REDUCTION Bilateral 08/05/2021    Procedure: MAMMOPLASTY, REDUCTION, Bilateral;  Surgeon: ANTONIO Moreno MD;  Location: [facility] OR    ORTHOPEDIC SURGERY      left wrist surgery    TONSILLECTOMY & ADENOIDECTOMY Bilateral     cauterized turbinates also     cephALEXin (KEFLEX) 500 MG capsule  almotriptan (AXERT) 12.5 MG tablet  atomoxetine (STRATTERA) 40 MG capsule  buPROPion (WELLBUTRIN XL) 300 MG 24 hr tablet  cetirizine (ZYRTEC) 10 MG tablet  cholecalciferol 125 MCG (5000 UT) CAPS  doxepin (SINEQUAN) 10 MG capsule  EMGALITY 120 MG/ML injection  EPINEPHrine (ANY BX GENERIC EQUIV) 0.3 MG/0.3ML injection 2-pack  famotidine (PEPCID) 20 MG tablet  fluticasone (FLONASE) 50 MCG/ACT nasal spray  gabapentin (NEURONTIN) 400 MG capsule  guaiFENesin (MUCINEX) 600 MG 12 hr tablet  hydroxychloroquine (PLAQUENIL) 200 MG tablet  levonorgestrel (MIRENA) 20 MCG/DAY IUD  lisdexamfetamine (VYVANSE) 10 MG capsule  methotrexate 2.5 MG tablet  montelukast (SINGULAIR) 10 MG tablet  ondansetron (ZOFRAN ODT) 4 MG ODT tab  Phentermine-Topiramate (QSYMIA) 15-92 MG CP24  phenylephrine HCl 10 MG TABS  propranolol (INDERAL) 20 MG tablet  vilazodone (VIIBRYD) 40 MG TABS tablet      Allergies   Allergen Reactions    Dust Mites Cough, Difficulty breathing and Shortness Of Breath    Cats      Chest tightness, sinus irritation     Family History  Family History   Problem Relation Age of Onset    Diabetes Maternal Grandfather   "   Hypertension No family hx of     Coronary Artery Disease No family hx of     Hyperlipidemia No family hx of     Cerebrovascular Disease No family hx of     Breast Cancer No family hx of     Colon Cancer No family hx of     Prostate Cancer No family hx of     Other Cancer No family hx of     Glaucoma No family hx of     Macular Degeneration No family hx of      Social History   Social History     Tobacco Use    Smoking status: Former     Current packs/day: 0.00     Types: Cigarettes    Smokeless tobacco: Never    Tobacco comments:     smokes occasionally socially    Vaping Use    Vaping status: Never Used   Substance Use Topics    Alcohol use: Not Currently     Alcohol/week: 0.0 standard drinks of alcohol     Comment: occasional     Drug use: Not Currently     Types: Marijuana     Comment: marijuana  none since May 2021      Past medical history, past surgical history, medications, allergies, family history, and social history were reviewed with the patient. No additional pertinent items.       Physical Exam   BP: 116/79  Pulse: 96  Temp: 97.5  F (36.4  C)  Resp: 18  Height: 175.3 cm (5' 9\")  Weight: 79.4 kg (175 lb)  SpO2: 98 %    Physical Exam  Constitutional:       General: She is not in acute distress.     Appearance: Normal appearance. She is normal weight. She is not ill-appearing, toxic-appearing or diaphoretic.   HENT:      Mouth/Throat:      Mouth: Mucous membranes are moist.      Pharynx: Oropharynx is clear.   Cardiovascular:      Rate and Rhythm: Normal rate and regular rhythm.   Abdominal:      General: Abdomen is flat. Bowel sounds are normal.      Palpations: Abdomen is soft.      Tenderness: There is abdominal tenderness in the suprapubic area. There is no right CVA tenderness, left CVA tenderness, guarding or rebound.   Skin:     General: Skin is warm.      Findings: No rash.   Neurological:      General: No focal deficit present.      Mental Status: She is alert. Mental status is at baseline. "   Psychiatric:         Mood and Affect: Mood normal.         Behavior: Behavior normal.         ED Course, Procedures, & Data      Procedures               Results for orders placed or performed during the hospital encounter of 05/26/24   UA with Microscopic reflex to Culture     Status: Abnormal    Specimen: Urine, Clean Catch   Result Value Ref Range    Color Urine Red (A) Colorless, Straw, Light Yellow, Yellow    Appearance Urine Slightly Cloudy (A) Clear    Glucose Urine Negative Negative mg/dL    Bilirubin Urine Negative Negative    Ketones Urine Negative Negative mg/dL    Specific Gravity Urine 1.019 1.003 - 1.035    Blood Urine Large (A) Negative    pH Urine 6.5 5.0 - 7.0    Protein Albumin Urine 200 (A) Negative mg/dL    Urobilinogen Urine Normal Normal, 2.0 mg/dL    Nitrite Urine Negative Negative    Leukocyte Esterase Urine Large (A) Negative    WBC Clumps Urine Present (A) None Seen /HPF    RBC Urine >182 (H) <=2 /HPF    WBC Urine 40 (H) <=5 /HPF    Narrative    Urine Culture ordered based on laboratory criteria   HCG qualitative urine (UPT)     Status: Normal   Result Value Ref Range    hCG Urine Qualitative Negative Negative     Medications   phenazopyridine (PYRIDIUM) tablet 200 mg (200 mg Oral $Given 5/26/24 2152)   cephALEXin (KEFLEX) capsule 500 mg (500 mg Oral $Given 5/26/24 2152)     Labs Ordered and Resulted from Time of ED Arrival to Time of ED Departure   ROUTINE UA WITH MICROSCOPIC REFLEX TO CULTURE - Abnormal       Result Value    Color Urine Red (*)     Appearance Urine Slightly Cloudy (*)     Glucose Urine Negative      Bilirubin Urine Negative      Ketones Urine Negative      Specific Gravity Urine 1.019      Blood Urine Large (*)     pH Urine 6.5      Protein Albumin Urine 200 (*)     Urobilinogen Urine Normal      Nitrite Urine Negative      Leukocyte Esterase Urine Large (*)     WBC Clumps Urine Present (*)     RBC Urine >182 (*)     WBC Urine 40 (*)    HCG QUALITATIVE URINE - Normal     hCG Urine Qualitative Negative     URINE CULTURE     No orders to display          Critical care was not performed.     Medical Decision Making  The patient's presentation was of moderate complexity (an acute illness with systemic symptoms).    The patient's evaluation involved:  review of 1 test result(s) ordered prior to this encounter (numerous, previous urine cultures and sensitivities)  ordering and/or review of 2 test(s) in this encounter (see separate area of note for details)    The patient's management necessitated moderate risk (prescription drug management including medications given in the ED).    Assessment & Plan    Kasandra is a 39-year-old female that presented to the ED with complaints of dysuria, hematuria, urgency frequency of urination.  Acceptable vital signs without tachycardia, fever, hypotension.  Patient in no acute distress and nontoxic-appearing.  No acute abdomen signs on palpation with only mild tenderness to the suprapubic/lower abdomen in the area of her bladder.  No CVA tenderness bilaterally.  UA obtained and notable for large amount of leukocyte Estrace, WBC 40 and present in clumps as well as large amount of blood with RBCs greater than 182.  Due to patient's notable history for frequent UTIs as well as her symptoms with UA findings, will treat for hemorrhagic cystitis/UTI.  P.o. Pyridium and p.o. Keflex in the ED.  Beta-hCG otherwise negative.  Patient stable for discharge home at this time with outpatient treatment of UTI.  Strict return precautions given.  Follow-up with PCP office this week for reevaluation recheck of symptoms especially after beginning antibiotic therapy.  Referral was sent to urology from the ED today for follow-up in the next 1 to 2 weeks to establish care for further monitoring and evaluation and to frequent/recurrent UTIs.  Continue Tylenol and ibuprofen as well as over-the-counter Pyridium as well as plenty of fluids.  Patient was agreeable to the discharge  treatment plan, voiced understanding, and all questions answered prior to discharge.    I have reviewed the nursing notes. I have reviewed the findings, diagnosis, plan and need for follow up with the patient.    Discharge Medication List as of 5/26/2024 10:16 PM        START taking these medications    Details   cephALEXin (KEFLEX) 500 MG capsule Take 1 capsule (500 mg) by mouth 2 times daily for 5 days, Disp-10 capsule, R-0, E-Prescribe             Final diagnoses:   Urinary tract infection with hematuria, site unspecified       Diamond Sterling PA-C    [facility] EMERGENCY DEPARTMENT  5/26/2024     Diamond Sterling PA-C  05/26/24 1277

## 2024-05-27 NOTE — DISCHARGE INSTRUCTIONS
Begin your antibiotic tomorrow and continue for the full 5-day course  Follow-up with your PCP office this week for reevaluation recheck of your symptoms especially after beginning your antibiotic and taking it for at least 24 to 48 hours as you should start seeing improvement in your symptoms after this many doses of said antibiotic  A referral was sent for urology for establishment of care and further evaluation into your frequent UTIs  Continue to utilize Tylenol and ibuprofen as well as over-the-counter Azo for urination pain  Continue plenty of fluids and diet as tolerated for your nausea  Otherwise, do not hesitate to return to the ED if you have any worsening or concerning signs or symptoms

## 2024-05-28 LAB
BACTERIA UR CULT: ABNORMAL
BACTERIA UR CULT: ABNORMAL

## 2024-05-29 ENCOUNTER — TELEPHONE (OUTPATIENT)
Dept: EMERGENCY MEDICINE | Facility: CLINIC | Age: 39
End: 2024-05-29
Payer: COMMERCIAL

## 2024-05-29 NOTE — TELEPHONE ENCOUNTER
[facility] ([place])    Reason for call: Lab Result Notification     Lab Result (including Rx patient on, if applicable).  If culture, copy of lab report at bottom.  Lab Result: Final Urine Culture Report on 5/28/24  Emergency Dep/Urgent Care discharge antibiotic prescribed: Cephalexin (Keflex) 500 mg capsule, 1 capsule (500 mg) by mouth 2 times daily for 5 days.   #1. Bacteria, >100,000 CFU/ML Escherichia coli  is SUSCEPTIBLE to Antibiotic.    #2. Bacteria, 50,000 - 100,000 CFU/ML Escherichia coli  is SUSCEPTIBLE to Antibiotic.    No change in treatment per [facility] ED lab result Urine Culture protocol.    ED sx: Urinary frequency, blood in urine, urinary urgency, dysuria in the morning    Patient's current Symptoms:   10:30AM, Left voicemail message requesting a call back to [facility] ED Lab Result RN at [phone]. RN is available every day between 9 a.m. and 5:30 p.m.   Letter sent    At 11:23AM;  In the morning for the last couple of days has been having pain in my left lower side but the pain goes away.  Is not having any pain right now.      RN Recommendations/Instructions per [facility] ED lab result protocol:   [facility] ED lab result protocol utilized: Urine cx        Can Truong RN

## 2024-05-29 NOTE — LETTER
May 29, 2024        Kasandra Lau  [address]  [place] [address]          Dear Kasandra Lau:    You were seen in the [facility] Emergency Department at [facility] EMERGENCY DEPARTMENT on 5/26/2024.  We are unable to reach you by phone, so we are sending you this letter.     It is important that you call [facility] Emergency Department lab result nurse at [phone], as we have information to relay to you AND/OR we MAY have to make some changes in your treatment.    Best time to call back is between 9AM and 5:30PM, 7 days a week.      Sincerely,     [facility] Emergency Department Lab Result RN  [phone]

## 2024-05-30 ENCOUNTER — VIRTUAL VISIT (OUTPATIENT)
Dept: PSYCHOLOGY | Facility: CLINIC | Age: 39
End: 2024-05-30
Payer: COMMERCIAL

## 2024-05-30 DIAGNOSIS — F90.0 ADHD (ATTENTION DEFICIT HYPERACTIVITY DISORDER), INATTENTIVE TYPE: ICD-10-CM

## 2024-05-30 DIAGNOSIS — F54 PSYCHOLOGICAL AND BEHAVIORAL FACTORS ASSOCIATED WITH DISORDERS OR DISEASES CLASSIFIED ELSEWHERE: ICD-10-CM

## 2024-05-30 DIAGNOSIS — F34.1 PERSISTENT DEPRESSIVE DISORDER: ICD-10-CM

## 2024-05-30 DIAGNOSIS — F43.12 CHRONIC POST-TRAUMATIC STRESS DISORDER (PTSD): Primary | ICD-10-CM

## 2024-05-30 PROCEDURE — 90837 PSYTX W PT 60 MINUTES: CPT | Mod: 95 | Performed by: PSYCHOLOGIST

## 2024-05-31 ENCOUNTER — MYC MEDICAL ADVICE (OUTPATIENT)
Dept: PSYCHIATRY | Facility: CLINIC | Age: 39
End: 2024-05-31
Payer: COMMERCIAL

## 2024-05-31 DIAGNOSIS — F90.8 ATTENTION DEFICIT HYPERACTIVITY DISORDER (ADHD), OTHER TYPE: ICD-10-CM

## 2024-05-31 RX ORDER — LISDEXAMFETAMINE DIMESYLATE 30 MG/1
30 CAPSULE ORAL EVERY MORNING
Qty: 30 CAPSULE | Refills: 0 | Status: SHIPPED | OUTPATIENT
Start: 2024-05-31 | End: 2024-06-11

## 2024-05-31 NOTE — TELEPHONE ENCOUNTER
Patient is out of her Vyvanse and tolerating well. She would like to increase dose to 30 mg and needs a new script sent either way.     Last visit: Continue Vyvanse 20 mg daily-consider increasing to 30 mg daily if you are able to tolerate this dose     Charley Rubin RN on 5/31/2024 at 11:48 AM

## 2024-05-31 NOTE — TELEPHONE ENCOUNTER
Reviewed patient's [employer] message. She is requesting to increase her dose of Vyvanse.     Treatment Plan:        1.  Decrease atomoxetine to 40 mg daily  2.  Continue bupropion  mg daily  3.  Continue doxepin 20 mg 2 times daily  4.  Continue Vyvanse 20 mg daily-consider increasing to 30 mg daily if you are able to tolerate this dose  5.  Consider discontinuing Vyvanse and changing to Concerta 27 mg daily if unable to refill Vyvanse due to insurance restrictions  6.  Continue propranolol 20 mg daily as needed for anxiety  7.  Continue Viibryd 40 mg daily     Continue all other medications as reviewed per electronic medical record today.   Safety plan reviewed. To the Emergency Department as needed or call after hours crisis line at [phone] or [phone]. [place] Crisis Text Line. Text [place] to 544921 or Suicide LifeLine Chat: suicidepreventionlifeline.org/chat/  To schedule individual or family therapy, call [facility] at [phone]  Schedule an appointment with me in 2 months or sooner as needed. Call [facility] at [phone] to schedule.

## 2024-06-01 DIAGNOSIS — J30.9 ALLERGIC RHINITIS, UNSPECIFIED SEASONALITY, UNSPECIFIED TRIGGER: ICD-10-CM

## 2024-06-02 NOTE — CONFIDENTIAL NOTE
"  Health Psychology - Follow up Visit  Confidential Summary*    The author of this note documented a reason for not sharing it with the patient.  REFERRAL SOURCE:  Psychiatry    CHIEF COMPLAINT/REASON FOR VISIT  Psychotherapy in context of chronic PTSD, ADHD and persistent depression.  Patient is challenged by procrastination and time management.      Patient was seen today for a 60 minute individual psychotherapy session.  The session was facilitated via VIDEO with patient at her home and provider at her own home.  We used [employer] platform.       The patient has been notified of following:   \"This VIDEO visit will be conducted via a call between you and your physician/provider. We have found that certain health care needs can be provided without the need for an in-person physical exam.  VIDEO visits are billed at different rates depending on your insurance coverage.  Please reach out to your insurance provider with any questions. If during the course of the call the physician/provider feels a video visit is not appropriate, you will not be charged for this service.\"     Patient has given verbal consent for VIDEO visit? Yes    Subjective:  Patient began with report that she enjoyed her recent weekend with her boyfriend and that their relationship continues to grow in closeness.  She described time with him as a reprieve from the stress of the reality of her life.     She reported that she has not talked to Giovany since his recent visit but that they completed paperwork and  are likely to be  by end of year. She denied that divorce will have an impact on the financial support he provides to her.     She also described increased engagement in her improv group and she is taking two exercise classes and believes that she is feeling stronger.  She has gained 20 pounds since discontinuing Ozempic and she is hoping that increasing muscle mass might help to increase metabolism.     She reported that she did not meet " the deadline that she set for herself to complete her second paper by the end of the month.  Discussed accountability and the impact of missing deadlines on her well being.  She denied any impact and mentioned multiple factors that interfered including the need to take on a job to allow her to support herself.  She reported that she intends to work solely on finishing her dissertation during the upcoming summer break.  She was encouraged to consider how she might set aside time each day.  She reported that she continues to base scheduling on her emotional mood.  Reminded her of dangers of basing schedule on emotion mind.     Objective:  Patient was on time for today s session. She was alert and oriented. Mood was euthymic with appropriate range of affect. Patient denied suicidal or assaultive ideation, plan, or intent.        Assessment:  The patient has a longstanding history of interpersonal discord and challenges with emotion regulation and avoidance as a coping mechanism for anxiety.  She has completed all requirements for her PhD and is now ABD.  She is living in  housing with her two dogs.  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      Plan:  Patient graduated from full model DBT at [facility] and is now completing phase 2 work.      Treatment plan updated.      Time In: 3:00  Time Out: 4:00    Diagnosis:  Axis I PTSD, chronic, persistent depressive disorder, adjustment disorder with mixed, psychological factors associated with physical (fibromyalgia)   Axis II  BPD   Axis III please see medical records for details   [place] IV Psychosocial and Environmental Stressors: living alone with no family support, pandemic stress, chronic illness         Corina Muhammad, PhD, LP

## 2024-06-03 DIAGNOSIS — G43.709 CHRONIC MIGRAINE WITHOUT AURA WITHOUT STATUS MIGRAINOSUS, NOT INTRACTABLE: ICD-10-CM

## 2024-06-03 RX ORDER — GALCANEZUMAB 120 MG/ML
120 INJECTION, SOLUTION SUBCUTANEOUS
Qty: 1 ML | Refills: 11 | Status: SHIPPED | OUTPATIENT
Start: 2024-06-03 | End: 2024-06-05

## 2024-06-03 NOTE — TELEPHONE ENCOUNTER
Patient wants a PA done for getting 3 of the 10 mg tabs of vyvanse. She has been told several times to check with other pharmacy's but she hasn't been willing to try that.     She said to just fill the 2 10 mg tabs until the PA comes back for the 3 10 mg tabs.     Will wait for patient response.     Charley Rubin RN on 6/3/2024 at 9:36 AM

## 2024-06-03 NOTE — TELEPHONE ENCOUNTER
Rx Authorization:  Requested Medication/ Dose EMGALITY 120 MG/ML injection   Date last refill ordered: 5/2/23  Quantity ordered: 1mL  # refills: 6  Date of last clinic visit with ordering provider: 5/25/23  Date of next clinic visit with ordering provider: no future appt. scheduled  All pertinent protocol data (lab date/result):   Include pertinent information from patients message:

## 2024-06-05 ENCOUNTER — VIRTUAL VISIT (OUTPATIENT)
Dept: NEUROLOGY | Facility: CLINIC | Age: 39
End: 2024-06-05
Payer: COMMERCIAL

## 2024-06-05 VITALS — WEIGHT: 175 LBS | BODY MASS INDEX: 25.92 KG/M2 | HEIGHT: 69 IN

## 2024-06-05 DIAGNOSIS — G43.709 CHRONIC MIGRAINE WITHOUT AURA WITHOUT STATUS MIGRAINOSUS, NOT INTRACTABLE: ICD-10-CM

## 2024-06-05 PROCEDURE — 99214 OFFICE O/P EST MOD 30 MIN: CPT | Mod: 95 | Performed by: PSYCHIATRY & NEUROLOGY

## 2024-06-05 PROCEDURE — G2211 COMPLEX E/M VISIT ADD ON: HCPCS | Mod: 95 | Performed by: PSYCHIATRY & NEUROLOGY

## 2024-06-05 RX ORDER — GALCANEZUMAB 120 MG/ML
120 INJECTION, SOLUTION SUBCUTANEOUS
Qty: 1 ML | Refills: 11 | Status: SHIPPED | OUTPATIENT
Start: 2024-06-05

## 2024-06-05 RX ORDER — ALMOTRIPTAN 12.5 MG/1
12.5 TABLET, FILM COATED ORAL
Qty: 18 TABLET | Refills: 11 | Status: SHIPPED | OUTPATIENT
Start: 2024-06-05 | End: 2024-09-04

## 2024-06-05 ASSESSMENT — MIGRAINE DISABILITY ASSESSMENT (MIDAS)
ON A SCALE FROM 0-10 ON AVERAGE HOW PAINFUL WERE HEADACHES: 3
HOW MANY DAYS WAS YOUR PRODUCTIVITY CUT IN HALF BECAUSE OF HEADACHES: 2
HOW OFTEN WERE SOCIAL ACTIVITIES MISSED DUE TO HEADACHES: 2
HOW MANY DAYS WAS HOUSEWORK PRODUCTIVITY CUT IN HALF DUE TO HEADACHES: 3
HOW MANY DAYS DID YOU MISS WORK OR SCHOOL BECAUSE OF HEADACHES: 0
HOW MANY DAYS DID YOU NOT DO HOUSEWORK BECAUSE OF HEADACHES: 2
TOTAL SCORE: 9
HOW MANY DAYS IN THE PAST 3 MONTHS HAVE YOU HAD A HEADACHE: 7

## 2024-06-05 ASSESSMENT — HEADACHE IMPACT TEST (HIT 6)
HOW OFTEN HAVE YOU FELT TOO TIRED TO WORK BECAUSE OF YOUR HEADACHES: RARELY
HIT6 TOTAL SCORE: 50
HOW OFTEN DO HEADACHES LIMIT YOUR DAILY ACTIVITIES: RARELY
WHEN YOU HAVE A HEADACHE HOW OFTEN DO YOU WISH YOU COULD LIE DOWN: RARELY
HOW OFTEN DID HEADACHS LIMIT CONCENTRATION ON WORK OR DAILY ACTIVITY: RARELY
WHEN YOU HAVE HEADACHES HOW OFTEN IS THE PAIN SEVERE: SOMETIMES
HOW OFTEN HAVE YOU FELT FED UP OR IRRITATED BECAUSE OF YOUR HEADACHES: RARELY

## 2024-06-05 ASSESSMENT — PAIN SCALES - GENERAL: PAINLEVEL: NO PAIN (0)

## 2024-06-05 NOTE — LETTER
"6/5/2024       RE: Kasandra Lau  [address]  [place] [address]     Dear Colleague,    Thank you for referring your patient, Kasandra Lau, to the [facility] NEUROLOGY CLINIC [place] at [facility]. Please see a copy of my visit note below.      [facility]    Headache Neurology Progress Note  June 5, 2024    Subjective:    Kasandra Lau returns for follow up of chronic migraine.    Migraine has been stable; less with Emgality. No change.    She reports jaw related issues, which can trigger tension headaches, which feel different to her but can be severe on the sides of her head. She has been using almotriptan more due to this.    She is seeing a physical therapist for her jaw. Planning to try injections, mouth guard.    Almotriptan is helpful. Wonders about limit to use.  Otherwise, takes Tylenol as needed.    Objective:    Vitals: Ht 1.753 m (5' 9.02\")   Wt 79.4 kg (175 lb)   LMP  (LMP Unknown)   BMI 25.83 kg/m    General: Cooperative, NAD  Neurologic:  Mental Status: Fully alert, attentive and oriented. Speech clear and fluent.   Cranial Nerves: Facial movements symmetric.   Motor: No abnormal movements.      Pertinent Investigations:          8/4/2022     3:49 PM 5/25/2023     3:17 PM 6/5/2024     8:08 AM   HIT-6   When you have headaches, how often is the pain severe 8 10 10   How often do headaches limit your ability to do usual daily activities including household work, work, school, or social activities? 10 11 8   When you have a headache, how often do you wish you could lie down? 11 11 8   In the past 4 weeks, how often have you felt too tired to do work or daily activities because of your headaches 10 10 8   In the past 4 weeks, how often have you felt fed up or irritated because of your headaches 10 8 8   In the past 4 weeks, how often did headaches limit your ability to concentrate on work or daily " activities 11 10 8   HIT-6 Total Score 60 60 50           8/4/2022     3:51 PM 5/25/2023     3:18 PM 6/5/2024     8:10 AM   MIDAS - in the past three months:   On how many days did you miss work or school because of your headaches? 15 4 0   How many days was your productivity at work or school reduced by half or more because of your headaches? 0 8 2   On how many days did you not do household work because of your headaches? 15 2 2   How many days was your productivity in household work reduced by half or more because of your headaches? 0 4 3   On how many days did you miss family, social, or leisure activities because of your headaches? 0 3 2   On how many days did you have a headache? 25 8 7   On a scale of 0-10, on average how painful were these headaches? 3 4 3   MIDAS Score 30 (IV - Severe Disability) 21 (IV - Severe Disability) 9 (II - Mild Disability)        Assessment/Plan:   Kasandra Lau is a 39 year old woman who returns for follow-up of chronic migraine.  Emgality remains effective without side effects.  She is no longer having the most severe attacks. Today, she reports worsening of jaw pain and muscle tension; she is working with the orofacial pain clinic and receiving PT, plan for TPIs and mouth guard.     I recommend that she continue with Emgality for headache prevention.    She may continue doxepin, propranolol, and gabapentin through her other providers.  -I refilled Emgality for her.  -We discussed possibly switching to Botox in the future, if needed, as this may treat both TMD and chronic migraine.     For acute treatment, almotriptan 12.5 mg at the onset of headache has been helpful previously, although she has not been needing it lately.  -Trial naproxen 440 mg BID PRN, no more than 14 days per month.  -Continue almotriptan 12.5 mg at onset, repeat in 2 hours if needed, no more than 9 days per month.     I will plan to see her back in 6 months, or sooner if needed.    The longitudinal plan of  care for Kasandra was addressed during this visit. Due to the added complexity in care, I will continue to support Kasandra in the subsequent management of this condition(s) and with the ongoing continuity of care of this condition(s).      Again, thank you for allowing me to participate in the care of your patient.      Sincerely,    Natividad Osuna MD

## 2024-06-05 NOTE — NURSING NOTE
Is the patient currently in the state of [place]? YES    Visit mode:VIDEO    If the visit is dropped, the patient can be reconnected by: VIDEO VISIT: Text to cell phone:   Telephone Information:   Mobile [phone]       Will anyone else be joining the visit? NO  (If patient encounters technical issues they should call [phone]    How would you like to obtain your AVS? MyChart    Are changes needed to the allergy or medication list? No    Are refills needed on medications prescribed by this physician? NO    Reason for visit: Follow Up    Yennifer LOVELL

## 2024-06-05 NOTE — PROGRESS NOTES
"Virtual Visit Details    Type of service:  Video Visit     Originating Location (pt. Location): Home    Distant Location (provider location):  Off-site  Platform used for Video Visit: [facility]    Headache Neurology Progress Note  June 5, 2024    Subjective:    Kasandra Lau returns for follow up of chronic migraine.    Migraine has been stable; less with Emgality. No change.    She reports jaw related issues, which can trigger tension headaches, which feel different to her but can be severe on the sides of her head. She has been using almotriptan more due to this.    She is seeing a physical therapist for her jaw. Planning to try injections, mouth guard.    Almotriptan is helpful. Wonders about limit to use.  Otherwise, takes Tylenol as needed.    Objective:    Vitals: Ht 1.753 m (5' 9.02\")   Wt 79.4 kg (175 lb)   LMP  (LMP Unknown)   BMI 25.83 kg/m    General: Cooperative, NAD  Neurologic:  Mental Status: Fully alert, attentive and oriented. Speech clear and fluent.   Cranial Nerves: Facial movements symmetric.   Motor: No abnormal movements.      Pertinent Investigations:          8/4/2022     3:49 PM 5/25/2023     3:17 PM 6/5/2024     8:08 AM   HIT-6   When you have headaches, how often is the pain severe 8 10 10   How often do headaches limit your ability to do usual daily activities including household work, work, school, or social activities? 10 11 8   When you have a headache, how often do you wish you could lie down? 11 11 8   In the past 4 weeks, how often have you felt too tired to do work or daily activities because of your headaches 10 10 8   In the past 4 weeks, how often have you felt fed up or irritated because of your headaches 10 8 8   In the past 4 weeks, how often did headaches limit your ability to concentrate on work or daily activities 11 10 8   HIT-6 Total Score 60 60 50           8/4/2022     3:51 PM 5/25/2023     3:18 PM 6/5/2024     8:10 AM   MIDAS " - in the past three months:   On how many days did you miss work or school because of your headaches? 15 4 0   How many days was your productivity at work or school reduced by half or more because of your headaches? 0 8 2   On how many days did you not do household work because of your headaches? 15 2 2   How many days was your productivity in household work reduced by half or more because of your headaches? 0 4 3   On how many days did you miss family, social, or leisure activities because of your headaches? 0 3 2   On how many days did you have a headache? 25 8 7   On a scale of 0-10, on average how painful were these headaches? 3 4 3   MIDAS Score 30 (IV - Severe Disability) 21 (IV - Severe Disability) 9 (II - Mild Disability)        Assessment/Plan:   Kasandra Lau is a 39 year old woman who returns for follow-up of chronic migraine.  Emgality remains effective without side effects.  She is no longer having the most severe attacks. Today, she reports worsening of jaw pain and muscle tension; she is working with the orofacial pain clinic and receiving PT, plan for TPIs and mouth guard.     I recommend that she continue with Emgality for headache prevention.    She may continue doxepin, propranolol, and gabapentin through her other providers.  -I refilled Emgality for her.  -We discussed possibly switching to Botox in the future, if needed, as this may treat both TMD and chronic migraine.     For acute treatment, almotriptan 12.5 mg at the onset of headache has been helpful previously, although she has not been needing it lately.  -Trial naproxen 440 mg BID PRN, no more than 14 days per month.  -Continue almotriptan 12.5 mg at onset, repeat in 2 hours if needed, no more than 9 days per month.     I will plan to see her back in 6 months, or sooner if needed.    The longitudinal plan of care for Kasandra was addressed during this visit. Due to the added complexity in care, I will continue to support Kasandra in the  subsequent management of this condition(s) and with the ongoing continuity of care of this condition(s).    Natividad Osuna MD  Neurology

## 2024-06-06 NOTE — TELEPHONE ENCOUNTER
Called [facility] in [place]. The pharmacy technician reported that the Vyvanse 30mg had not been received. She was unsure when it would come.     Patient notified via [employer] and encouraged to call other pharmacy's to see if anyone had it in stock.       MELONY MADRID RN on 6/6/2024 at 3:38 PM

## 2024-06-07 ENCOUNTER — VIRTUAL VISIT (OUTPATIENT)
Dept: PSYCHOLOGY | Facility: CLINIC | Age: 39
End: 2024-06-07
Payer: COMMERCIAL

## 2024-06-07 DIAGNOSIS — F43.12 CHRONIC POST-TRAUMATIC STRESS DISORDER (PTSD): Primary | ICD-10-CM

## 2024-06-07 DIAGNOSIS — F34.1 PERSISTENT DEPRESSIVE DISORDER: ICD-10-CM

## 2024-06-07 DIAGNOSIS — F54 PSYCHOLOGICAL AND BEHAVIORAL FACTORS ASSOCIATED WITH DISORDERS OR DISEASES CLASSIFIED ELSEWHERE: ICD-10-CM

## 2024-06-07 DIAGNOSIS — F90.0 ADHD (ATTENTION DEFICIT HYPERACTIVITY DISORDER), INATTENTIVE TYPE: ICD-10-CM

## 2024-06-07 PROCEDURE — 90837 PSYTX W PT 60 MINUTES: CPT | Mod: 95 | Performed by: PSYCHOLOGIST

## 2024-06-07 RX ORDER — CETIRIZINE HYDROCHLORIDE 10 MG/1
10 TABLET ORAL EVERY EVENING
Qty: 90 TABLET | Refills: 1 | Status: SHIPPED | OUTPATIENT
Start: 2024-06-07

## 2024-06-07 ASSESSMENT — ANXIETY QUESTIONNAIRES
4. TROUBLE RELAXING: SEVERAL DAYS
6. BECOMING EASILY ANNOYED OR IRRITABLE: SEVERAL DAYS
5. BEING SO RESTLESS THAT IT IS HARD TO SIT STILL: SEVERAL DAYS
2. NOT BEING ABLE TO STOP OR CONTROL WORRYING: SEVERAL DAYS
GAD7 TOTAL SCORE: 7
7. FEELING AFRAID AS IF SOMETHING AWFUL MIGHT HAPPEN: SEVERAL DAYS
GAD7 TOTAL SCORE: 7
1. FEELING NERVOUS, ANXIOUS, OR ON EDGE: SEVERAL DAYS
IF YOU CHECKED OFF ANY PROBLEMS ON THIS QUESTIONNAIRE, HOW DIFFICULT HAVE THESE PROBLEMS MADE IT FOR YOU TO DO YOUR WORK, TAKE CARE OF THINGS AT HOME, OR GET ALONG WITH OTHER PEOPLE: SOMEWHAT DIFFICULT
7. FEELING AFRAID AS IF SOMETHING AWFUL MIGHT HAPPEN: SEVERAL DAYS
3. WORRYING TOO MUCH ABOUT DIFFERENT THINGS: SEVERAL DAYS
8. IF YOU CHECKED OFF ANY PROBLEMS, HOW DIFFICULT HAVE THESE MADE IT FOR YOU TO DO YOUR WORK, TAKE CARE OF THINGS AT HOME, OR GET ALONG WITH OTHER PEOPLE?: SOMEWHAT DIFFICULT

## 2024-06-07 NOTE — TELEPHONE ENCOUNTER
LVD:  3/4/2024  [facility]     Denys Arango, APRN CNP     Refilled per protocol.

## 2024-06-10 NOTE — TELEPHONE ENCOUNTER
1) Reviewed patient's [employer] message. She would like her Vyvanse transferred to [facility] target on [address]     2) Phoned [facility] on [address] in [place] and spoke with the pharmacist. They have 60 capsules of Lisdexamphetamine 30mg in stock.     3) Attempted to call [facility] [id number] [facility] - [place], [place] - [address] to cancel the patient's prescription for 30mg Lisdexamphetamine. Waited on hold for 49 minutes with no answer.     4) Next RN to:  - Cancel prescription at [facility] in [place] at [phone].   - Pend Lisdexamphetamine 30mg to [facility] on [address] In [place] and route to provider for review    MELONY MADRID RN on 6/10/2024 at 1:04 PM

## 2024-06-11 ENCOUNTER — VIRTUAL VISIT (OUTPATIENT)
Dept: PSYCHIATRY | Facility: CLINIC | Age: 39
End: 2024-06-11
Payer: COMMERCIAL

## 2024-06-11 DIAGNOSIS — F41.1 GENERALIZED ANXIETY DISORDER: ICD-10-CM

## 2024-06-11 DIAGNOSIS — F34.1 PERSISTENT DEPRESSIVE DISORDER: Primary | ICD-10-CM

## 2024-06-11 DIAGNOSIS — F43.12 CHRONIC POST-TRAUMATIC STRESS DISORDER (PTSD): ICD-10-CM

## 2024-06-11 PROCEDURE — 99214 OFFICE O/P EST MOD 30 MIN: CPT | Mod: 95 | Performed by: NURSE PRACTITIONER

## 2024-06-11 PROCEDURE — G2211 COMPLEX E/M VISIT ADD ON: HCPCS | Mod: 95 | Performed by: NURSE PRACTITIONER

## 2024-06-11 RX ORDER — LISDEXAMFETAMINE DIMESYLATE 30 MG/1
30 CAPSULE ORAL EVERY MORNING
Qty: 30 CAPSULE | Refills: 0 | Status: SHIPPED | OUTPATIENT
Start: 2024-06-11 | End: 2024-07-11

## 2024-06-11 RX ORDER — DOXEPIN HYDROCHLORIDE 10 MG/1
20 CAPSULE ORAL 2 TIMES DAILY
Qty: 120 CAPSULE | Refills: 3 | Status: SHIPPED | OUTPATIENT
Start: 2024-06-11

## 2024-06-11 ASSESSMENT — PATIENT HEALTH QUESTIONNAIRE - PHQ9
SUM OF ALL RESPONSES TO PHQ QUESTIONS 1-9: 3
SUM OF ALL RESPONSES TO PHQ QUESTIONS 1-9: 3
10. IF YOU CHECKED OFF ANY PROBLEMS, HOW DIFFICULT HAVE THESE PROBLEMS MADE IT FOR YOU TO DO YOUR WORK, TAKE CARE OF THINGS AT HOME, OR GET ALONG WITH OTHER PEOPLE: SOMEWHAT DIFFICULT

## 2024-06-11 ASSESSMENT — PAIN SCALES - GENERAL: PAINLEVEL: NO PAIN (0)

## 2024-06-11 NOTE — CONFIDENTIAL NOTE
"  Health Psychology - Follow up Visit  Confidential Summary*    The author of this note documented a reason for not sharing it with the patient.  REFERRAL SOURCE:  Psychiatry    CHIEF COMPLAINT/REASON FOR VISIT  Psychotherapy in context of chronic PTSD, ADHD and persistent depression.  Patient is challenged by procrastination and time management.      Patient was seen today for a 60 minute individual psychotherapy session.  The session was facilitated via VIDEO with patient at her home and provider at her own home.  We used [employer] platform.       The patient has been notified of following:   \"This VIDEO visit will be conducted via a call between you and your physician/provider. We have found that certain health care needs can be provided without the need for an in-person physical exam.  VIDEO visits are billed at different rates depending on your insurance coverage.  Please reach out to your insurance provider with any questions. If during the course of the call the physician/provider feels a video visit is not appropriate, you will not be charged for this service.\"     Patient has given verbal consent for VIDEO visit? 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  She was able to describe several positive aspects of this experience, including her ability to apply some of what she learned to her own teaching.  She was also given a  position for the fall that will help with ongoing financial challenges.  Discussed her ongoing irritation with her academic department because they have not given her a class to teach on her ow.  She maintains that those in administrative power do not like her and that they favor the students who come from more affluent backgrounds.  Discussed the possibility that she might inquire about any opportunities in the fall.  She continues to be reticent to expose herself to possible rejection.  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  She has been planning how she will spend these funds, knowing that she will not have enough saved to pay for all of her summer rent.  Discussed the ongoing stress of this way of living.  Inquired about her progress on her dissertation work so that she might move forward on graduating and finding a postdoctoral fellowship.  She maintains that she will have time this summer and that she is looking forward to finishing.     She continues to date her boyfriend and she reported that this is going very well.  She is enjoying the companionship and reported that his ability to be vulnerable with her is refreshing and increases her feelings of safety and subsequent relaxation with him.      Objective:  Patient was on time for today s session. She was alert and oriented. Mood was euthymic with appropriate range of affect. Patient denied suicidal or assaultive ideation, plan, or intent.        Assessment:  The patient has a longstanding history of interpersonal discord and challenges with emotion regulation and avoidance as a coping mechanism for anxiety.  She has completed all requirements for her PhD and is now ABD.  She is living in  housing with her two dogs.  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      Plan:  Patient graduated from full model DBT at [facility] [employer] and is now completing phase 2 work.      Treatment plan updated.      Time In: 10:00  Time Out: 11:00    Diagnosis:  Axis I PTSD, chronic, persistent depressive disorder, adjustment disorder with mixed, psychological factors associated with physical (fibromyalgia); ADHD   Axis II  BPD   Axis III please see medical records for details   [place] IV Psychosocial and Environmental Stressors: living  alone with no family support, chronic illness         Corina Muhammad, PhD, LP

## 2024-06-11 NOTE — PROGRESS NOTES
"Virtual Visit Details    Type of service:  Video Visit     Originating Location (pt. Location): Home    Distant Location (provider location):  Off-site  Platform used for Video Visit: Chad    Start time: 1:48 PM  Stop time: 2:15 PM           Outpatient Psychiatric Progress Note    Name: Kasandra Lau   : 1985                    Primary Care Provider: JASVIR Ashley CNP   Therapist: Yes    PHQ-9 scores:      2024     1:54 PM 2024     1:25 PM 2024    10:31 AM   PHQ-9 SCORE   PHQ-9 Total Score MyChart 2 (Minimal depression) 4 (Minimal depression) 5 (Mild depression)   PHQ-9 Total Score 2 4 5       VIC-7 scores:      2024     2:47 PM 2024     2:55 PM 2024     9:52 AM   VIC-7 SCORE   Total Score 8 (mild anxiety) 7 (mild anxiety) 7 (mild anxiety)   Total Score 8 7    7 7    7       Patient Identification:    Patient is a 39 year old year old, single   Not  or  female  who presents for return visit with me.  Patient is currently employed part time and a student. Patient attended the session alone. Patient prefers to be called: \" Kasandra\".    Current medications include:   Current Outpatient Medications   Medication Sig Dispense Refill    almotriptan (AXERT) 12.5 MG tablet Take 1 tablet (12.5 mg) by mouth at onset of headache for migraine (repeat in 2 hours if needed) May repeat in 2 hours. Max 2 tablets/24 hours. 18 tablet 11    atomoxetine (STRATTERA) 40 MG capsule Take 1 capsule (40 mg) by mouth daily 90 capsule 0    buPROPion (WELLBUTRIN XL) 300 MG 24 hr tablet Take 1 tablet (300 mg) by mouth every morning 90 tablet 1    cetirizine (ZYRTEC) 10 MG tablet TAKE 1 TABLET BY MOUTH EVERY EVENING 90 tablet 1    cholecalciferol 125 MCG (5000 UT) CAPS Take 5,000 Units by mouth every evening      doxepin (SINEQUAN) 10 MG capsule Take 2 capsules (20 mg) by mouth 2 times daily 120 capsule 3    EMGALITY 120 MG/ML injection Inject 1 mL (120 mg) Subcutaneous every 28 " days 1 mL 11    EPINEPHrine (ANY BX GENERIC EQUIV) 0.3 MG/0.3ML injection 2-pack Inject 0.3 mg into the muscle as needed      famotidine (PEPCID) 20 MG tablet TAKE 1 TABLET BY MOUTH TWICE A  tablet 3    fluticasone (FLONASE) 50 MCG/ACT nasal spray Spray 2 sprays into both nostrils 2 times daily as needed for allergies 48 mL 5    gabapentin (NEURONTIN) 400 MG capsule Take 2 capsules (800 mg) by mouth 3 times daily 180 capsule 5    guaiFENesin (MUCINEX) 600 MG 12 hr tablet Take 600 mg by mouth daily as needed      hydroxychloroquine (PLAQUENIL) 200 MG tablet Take 2 tablets (400 mg) by mouth daily 180 tablet 2    levonorgestrel (MIRENA) 20 MCG/DAY IUD 1 each (20 mcg) by Intrauterine route once for 1 dose 1 each 0    lisdexamfetamine (VYVANSE) 30 MG capsule Take 1 capsule (30 mg) by mouth every morning 30 capsule 0    methotrexate 2.5 MG tablet Take 6 tablets (15 mg) by mouth every 7 days 72 tablet 2    montelukast (SINGULAIR) 10 MG tablet Take 1 tablet (10 mg) by mouth at bedtime 90 tablet 1    ondansetron (ZOFRAN ODT) 4 MG ODT tab Take 1-2 tablets (4-8 mg) by mouth every 8 hours as needed for nausea 20 tablet 1    Phentermine-Topiramate (QSYMIA) 15-92 MG CP24 Take 1 tablet by mouth daily 90 capsule 1    phenylephrine HCl 10 MG TABS Take 10 mg by mouth 2 times daily      propranolol (INDERAL) 20 MG tablet Take 1 tablet (20 mg) by mouth daily as needed (anxiety) 30 tablet 11    vilazodone (VIIBRYD) 40 MG TABS tablet Take 1 tablet (40 mg) by mouth every morning 90 tablet 1     No current facility-administered medications for this visit.        The [place] Prescription Monitoring Program has been reviewed and there are no concerns about diversionary activity for controlled substances at this time.      I was able to review most recent Primary Care Provider, specialty provider, and therapy visit notes that I have access to.     Today, patient reports she has been trying to increase her dose of vyvanse.  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  She is going  to start writing.   She reports no depression concerns.  Sleep is decent.  She has chosen not to be social as she wants to get focused to finish her dissertation.  She has started to date someone that she met at a [employer].  She is planning to move within the next few months.  She has been exercising  more.  Her weight has remained the same.   Vyvanse has helped to decrease her binge eating.  Frequent UTIs.  She is receiving PT for jaw pain. Had to change to telephone visit - no sound.       has a past medical history of Anemia (fall 2016), Anxiety, Arthritis, Chronic fatigue, Depression (emotion), Gastroesophageal reflux disease, Migraine, Neuropathic pain, Panic disorder, and Uncomplicated asthma (Spring 2018).    Social history updates:    She is trying to finish her dissertation this summer.      Substance use updates:    Edibles to help her sleep at night  Tobacco use: No    Vital Signs:   LMP  (LMP Unknown)     Labs:    Most recent laboratory results reviewed and no new labs.     Mental Status Examination:  Appearance: awake, alert and casual dress  Attitude: cooperative  Eye Contact:  adequate  Gait and Station: No dizziness or falls  Psychomotor Behavior:  intact station, gait and muscle tone  Oriented to:  time, person, and place  Attention Span and Concentration:  Normal  Speech:   vtspeech: clear, coherent and Speaks: English  Mood:  anxious and depressed  Affect:  mood congruent  Associations:  no loose associations  Thought Process:  goal oriented  Thought Content:  no evidence of suicidal ideation or homicidal ideation, no auditory hallucinations present, and no visual hallucinations present  Recent and Remote Memory:  intact Not formally assessed. No amnesia.  Fund of Knowledge: appropriate  Insight:  good  Judgment: good  Impulse Control:  good    Suicide Risk Assessment:  Today Kasandra Lau reports no thoughts to harm themself or others. In addition,  there are notable risk factors for self-harm, including single status, anxiety, and withdrawing. However, risk is mitigated by history of seeking help when needed, future oriented, denies suicidal intent or plan, and denies homicidal ideation, intent, or plan. Therefore, based on all available evidence including the factors cited above, Kasandra Lau does not appear to be at imminent risk for self-harm, does not meet criteria for a 72-hr hold, and therefore remains appropriate for ongoing outpatient level of care.  A thorough assessment of risk factors related to suicide and self-harm have been reviewed and are noted above. The patient convincingly denies suicidality on several occasions. Local community safety resources printed and reviewed for patient to use if needed. There was no deceit detected, and the patient presented in a manner that was believable.     DSM5 Diagnosis:  300.4 (F34.1) Persistent Depressive Disorder, With intermittent major depressive episodes, with current episodes and Mild  300.02 (F41.1) Generalized Anxiety Disorder  309.81 (F43.10) Posttraumatic Stress Disorder (includes Posttraumatic Stress Disorder for Children 6 Years and Younger)  Without dissociative symptoms    Medical comorbidities include:   Patient Active Problem List    Diagnosis Date Noted    Alopecia 04/25/2023     Priority: Medium    Chronic pain of both shoulders 03/28/2023     Priority: Medium    Acute pharyngitis due to other specified organisms 02/28/2023     Priority: Medium    Endometriosis 10/11/2022     Priority: Medium    Keratosis pilaris 06/14/2022     Priority: Medium    Chronic sinusitis, unspecified location 09/21/2021     Priority: Medium    Bilateral carpal tunnel syndrome 06/08/2021     Priority: Medium    Left wrist pain 03/15/2021     Priority: Medium     Last Assessment & Plan:    Formatting of this note might be different from the original.   1-2-week history of left wrist pain.  Diagnosis clouded by  previous history of bilateral wrist surgery including plate placement.  Will obtain x-ray and send referral to Ortho hand for further assessment.      Gastroesophageal reflux disease with esophagitis 01/18/2021     Priority: Medium    S/P cholecystectomy 12/06/2020     Priority: Medium    Liver lesion, right lobe 12/06/2020     Priority: Medium     Formatting of this note might be different from the original.     Abd US: Stable 5 mm hyperechoic lesion in the right hepatic lobe (7/16/2020)     Abd US: 5 mm hyperechoic lesion in the liver, favored to represent a hemangioma (6/15/2020)      Last Assessment & Plan:    Formatting of this note might be different from the original.   5 mm hyperechoic lesion previously identified in right lobe of liver on ultrasound.  Follow-up ultrasound 1 month later with stability.  Likely hepatic hemangioma.  Liver function preserved.  Lesion is small (<5 cm) she does not require any imaging.  Will continue to monitor for symptoms.         Abd US: Stable 5 mm hyperechoic lesion in the right hepatic lobe (7/16/2020)     Abd US: 5 mm hyperechoic lesion in the liver, favored to represent a hemangioma (6/15/2020)      Fluid level behind tympanic membrane of right ear 12/06/2020     Priority: Medium     Last Assessment & Plan:    Formatting of this note might be different from the original.   2-week history of right ear pain with intermittent pressure.  Examination notable for fluid behind right TM.  The symptom also correlates with with her recent IBS flare; again supporting differential of mastocytosis.  Ear does not appear infected.  If not secondary to a mastocytosis, then is likely due to allergic rhinitis as she recently moved to the [facility].  Provided reassurance and also a small amount of Sudafed for relief of pressure.      Hypertrophy of breast 09/10/2020     Priority: Medium     Added automatically from request for surgery [id number]      Pap smear abnormality of  cervix/human papillomavirus (HPV) positive 09/03/2020     Priority: Medium     10/10/17 NIL pap, + HR HPV (not 16/18)  1/31/19 NILM HPV  Positive for High Risk HPV types other than 16 or 18 (Care Everywhere)  2/21/19 [facility]: benign. Plan 1 year cotest  2020 NILM HPV negative 34 y.o. PLAN, per ASCCP Guidelines: cotest 1/2023 2/24/23 NIL pap, neg HR HPV. Plan 3 year cotest      Cholecystitis 07/16/2020     Priority: Medium     Added automatically from request for surgery [id number]      Chronic idiopathic urticaria 06/22/2020     Priority: Medium    Hx of abnormal cervical Pap smear 02/03/2020     Priority: Medium    Acid reflux 08/25/2019     Priority: Medium    Need for desensitization to allergens 06/06/2019     Priority: Medium     Formatting of this note might be different from the original.    Allergy Shot Care Plan for Kasandra Lau  Date of Order: June 6 2019  Ordering Provider: Dr. Martin Arreaga 1: Cat  Date Shots Started:  Start Dose: 0.1 mL of 1:100,000 - GREEN  Date Maintenance Reached:  Maintenance Dose: 0.3 mL of 1:100 - RED    Serum 2: Mite DF and Mite DP  Date Shots Started:  Start Dose: 0.1 mL of 1:100,000 - GREEN  Date Maintenance Reached:  Maintenance Dose: 0.1 mL of 1:100 - RED    BUILDING SCHEDULE FOR POLLEN AND NONPOLLEN   IMMUNOTHERAPY  EXCEPT VENOM AND CENTER AL    3 - 14 days Build per schedule.   15 - 21 days Repeat previous dose.   22 - 28 days Decrease by one dose.   29 - 35 days Decrease by two doses.   36 - 42 days Decrease by three doses.   Over 42 days Continue to decrease by one dose for each additional week.     1:100,000 (green)  1:10,000 (blue)  1:1000 (yellow)  1:100 (red)     1. 0.05 ml  7. 0.05 ml  13. 0.05 ml  19. 0.05 ml   2. 0.1 ml  8. 0.1 ml  14. 0.1 ml  20. 0.1 ml   3. 0.2 ml  9. 0.2 ml  15. 0.2 ml  21. 0.15 ml   4. 0.3 ml  10. 0.3 ml  16. 0.3 ml  22. 0.2 ml   5. 0.4 ml  11. 0.4 ml  17. 0.4 ml  23. 0.25 ml   6. 0.45 ml  12. 0.45 ml  18. 0.45 ml  24. 0.3 ml         25    0.35 ml         26   0.4 ml         27. 0.45 ml         28. 0.5 ml     MAINTENANCE SCHEDULE FOR POLLENS, NONPOLLENS (MITES,MOLD,CAT,DOG)  (not used for Center-AL Pollens)    ? When reaching maintenance dose for the first time, gradually stretch by weekly intervals to every 4 weeks (e.g., every 2 weeks, then 3 weeks, then 4 weeks).   ? Patient may receive their injections (patient choice) at 2-4 week intervals     Maintenance Repeat 1 Dose 2 Dose 3 Dose    Q2 weeks  (10-14 days)  3 weeks  (15-21 days) 4 weeks  (22-28 days) 5 weeks  (29-35 days) 6 weeks  (36-42 days) Decrease by 1 Dose for each additional week   Q3 weeks  (15-21 days)  4 weeks  (22-28 days) 5 weeks  (29-35 days) 6 weeks  (36-42 days) 7 weeks  (43-49 days)    Q4 weeks  (22-28 days)  5 weeks  (29-35 days) 6 weeks  (36-42 days) 7 weeks  (43-49 days) 8 weeks  (50-56 days)      Susanna Ambrose RN 6/6/2019 4:49 PM      Mild intermittent asthma without complication 04/30/2018     Priority: Medium    Seasonal mood disorder (H24) 11/15/2017     Priority: Medium    Circadian rhythm sleep disorder, delayed sleep phase type 05/10/2017     Priority: Medium    Unhealthy sleep habit 05/10/2017     Priority: Medium    Menorrhagia with regular cycle 11/19/2016     Priority: Medium    Migraine without aura and without status migrainosus, not intractable 11/19/2016     Priority: Medium    Tired 11/19/2016     Priority: Medium    Hx of migraines 11/19/2016     Priority: Medium     Last Assessment & Plan:    Formatting of this note might be different from the original.   Long history of migraines previously seen by neurology in [place].  She receives monthly Emgality injections. Will send referral to neurology here for injection management.      Irritable bowel syndrome with constipation 10/25/2016     Priority: Medium    Recurrent major depressive disorder, in remission (H24) 10/25/2016     Priority: Medium    Pelvic pain in female 08/25/2016     Priority: Medium     Migraine headache 01/01/2012     Priority: Medium    Panic disorder 01/01/2007     Priority: Medium    Depression 01/01/2004     Priority: Medium       Assessment:    Kasandra Lau continues to work towards completing her dissertation.  She has not started the increased dose of Vyvanse yet but will start that soon at 30 mg daily to help with focus, follow through, as well as binge eating which is a concern for her.  Doxepin continues twice daily for anxiety.  She will continue with atomoxetine 40 mg daily as an adjunct to the Vyvanse.  Bupropion continues daily for depression in addition to the Viibryd.  She tolerates the propranolol well twice daily as needed for performance anxiety.  Counseling therapies are recommended as she feels isolated from her peers and is now establishing a new relationship..    Medication side effects and alternatives were reviewed. Health promotion activities recommended and reviewed today. All questions addressed. Education and counseling completed regarding risks and benefits of medications and psychotherapy options.    Treatment Plan:      1.  Atomoxetine 40 mg daily  2.  Bupropion  mg daily  3.  Doxepin 20 mg 2 times daily  4.  Vyvanse 30 mg daily  5.  Propranolol 20 mg daily as needed for anxiety  6.  Viibryd 40 mg daily  7.  Counseling therapies when able to for learning skills to manage life stressors and adjustments    Continue all other medications as reviewed per electronic medical record today.   Safety plan reviewed. To the Emergency Department as needed or call after hours crisis line at [phone] or [phone]. [place] Crisis Text Line. Text [place] to 924137 or Suicide LifeLine Chat: suicidepreventionlifeline.org/chat/  To schedule individual or family therapy, call [facility] at [phone]  Schedule an appointment with me in 3 months or sooner as needed. Call [facility] at [phone] to schedule.  Follow up with primary care  provider as planned or for acute medical concerns.  Call the psychiatric nurse line with medication questions or concerns at [phone]  E-Blinkhart may be used to communicate with your provider, but this is not intended to be used for emergencies.        Crisis Resources   The [facility] is an adults only unit located at [facility] in [place] is a short term (generally less than 23 hour stay) designed for crisis intervention and stabilization. Pts have the opportunity to meet quickly with a behavioral health team for evaluation in a calm and peaceful therapuetic environment. To be evaluated for admission pts are triaged throught the [facility] ED.      The following hotlines are for both adults and children. The and are open 24 hours a day, 7 days a week unless noted otherwise.        Crisis Lines      Crisis Text Line  Text 431663  You will be connected with a trained live crisis counselor to provide support.        [place]ambling Hotline  4.602.211.7068 [hope]        línea de crisis española  335.717.2401        [facility]  [phone]        National Hope Line  1.209.914.6874 [hope]        National Suicide Prevention Lifeline  Free and confidential support  988 or 1.357.957.TALK [8255]  http://suicidepreventionlifeline.org        The [employer] Project (LGBTQ Youth Crisis Line)  0.738.617.4510  text START to 072-262        [place]'s Crisis Line  4.375.171.6930 (Press 1)  or text 529756    [facility] Mental Health Crisis Response  Within [place], call **CRISIS [**019843] to be connected to a mental health professional who can assist you.        [facility] Crisis  [phone]      [facility] Mobile Crisis  [phone]      [facility] Crisis  [phone]      [facility] Mobile Crisis  [phone] (adults)  [phone] (children)      [facility] Mobile Crisis  [phone] (adults)  [phone] (children)      [facility] Mobile Crisis  [phone]      [place]  [facility] Mobile Crisis  [phone]    Community Resources      Fast Tracker  Linking people to mental health and substance use disorder resources  Click BusckiConnect CRMn.org        [place] Mental Health Warmline  Peer to peer support  5 pm to 9 am 7 days/week  1.435.891.3333  https://mnwitw.org/rodney        National [place] on Mental Illness (RANDAL)  544.560.4582 or 1.888.RANDAL.HELPS  https://namimn.org/        [facility]  [facility] residents [address]  [phone]        [facility]  Free mental health counseling  https://walkin.org/  612.870.0565 X2    Mental Health Apps      Calm Harm  https://calmharm.co.uk/      My3  https://my3app.org/      Don Safety Plan  https://www.mysafetyplan.org/   Administrative Billing:   Time spent with patient includes counseling and coordination of care regarding above diagnoses and treatment plan.    The longitudinal plan of care for the diagnosis(es)/condition(s) as documented were addressed during this visit. Due to the added complexity in care, I will continue to support Kasandra in the subsequent management and with ongoing continuity of care.    Patient Status:  This is a continuous care patient and medications will be prescribed by the psychiatric provider until further indicated.    Signed:   OUSMANE Fitzgerald-BC   Psychiatry

## 2024-06-11 NOTE — TELEPHONE ENCOUNTER
Called  [facility] [id number] [facility] - [place], [place] - [address] [place] [address] and Cancelled the prescription for Vyvanse 30mg capsule.     Prescription for Vyvanse 30mg capsule pended to patient's preferred pharmacy.           MELONY MADRID RN on 6/11/2024 at 9:40 AM

## 2024-06-11 NOTE — TELEPHONE ENCOUNTER
RN reviewed documentation from RN colleague and also note from Geovanna Owens CNP.       Geovanna Owens CNP indicated she will discuss this patients Vyvance dosing and preferred pharmacy at her 1:45 visit with the patient today.      2.  RN noted that Geovanna Owens CNP had sent a new prescription today for lisdexamfetamine (VYVANSE) 30 MG capsule to [place] [address] [place] [address].      3.  RN noted that the patient did complete her visit with Geovanna Owens CNP  today.      Kush Farrar RN on 6/11/2024 at 3:10 PM

## 2024-06-11 NOTE — NURSING NOTE
Is the patient currently in the state of [place]? YES    Visit mode:TELEPHONE    If the visit is dropped, the patient can be reconnected by: VIDEO VISIT: Text to cell phone:   Telephone Information:   Mobile [phone]    and VIDEO VISIT: Send to e-mail at: [email or web address]    Will anyone else be joining the visit? NO  (If patient encounters technical issues they should call [phone]    How would you like to obtain your AVS? MyChart    Are changes needed to the allergy or medication list? No    Are refills needed on medications prescribed by this physician? YES but unsure which medications at this time    OK with student joining for today's visit.    Reason for visit: RECHECK    Idania LOVELL

## 2024-06-12 ENCOUNTER — TELEPHONE (OUTPATIENT)
Dept: NEUROLOGY | Facility: CLINIC | Age: 39
End: 2024-06-12
Payer: COMMERCIAL

## 2024-06-12 NOTE — TELEPHONE ENCOUNTER
Left Voicemail (1st Attempt) and Sent Mychart (1st Attempt) for the patient to call back and schedule the following:    Appointment type: Return Headache  Provider: Thao  Return date: around 12/5/2024   Specialty phone number: [phone]  Additional appointment(s) needed:   Additonal Notes: 6 month follow up      Elena Manrique on 6/12/2024 at 1:38 PM

## 2024-06-14 NOTE — TELEPHONE ENCOUNTER
Left Voicemail (2nd Attempt) for the patient to call back and schedule the following:    Appointment type: Return Headache  Provider: Thao  Return date: around 12/5/2024   Specialty phone number: [phone]  Additional appointment(s) needed:   Additonal Notes: 6 month follow up      Elena Manrique on 6/14/2024 at 10:08 AM

## 2024-06-18 NOTE — PATIENT INSTRUCTIONS
"Patient Education   The Panel Psychiatry Program  What to Expect  Here's what to expect in the Panel Psychiatry Program.   About the program  You'll be meeting with a psychiatric doctor to check your mental health. A psychiatric doctor helps you deal with troubling thoughts and feelings by giving you medicine. They'll make sure you know the plan for your care. You may see them for a long time. When you're feeling better, they may refer you back to seeing your family doctor.   If you have any questions, we'll be glad to talk to you.  About visits  Be open  At your visits, please talk openly about your problems. It may feel hard, but it's the best way for us to help you.  Cancelling visits  If you can't come to your visit, please call us right away at [phone]. If you don't cancel at least 24 hours (1 full day) before your visit, that's \"late cancellation.\"  Not showing up for your visits  Being very late is the same as not showing up. You'll be a \"no show\" if:  You're more than 15 minutes late for a 30-minute (half hour) visit.  You're more than 30 minutes late for a 60-minute (full hour) visit.  If you cancel late or don't show up 2 times within 6 months, we may end your care.  Getting help between visits  If you need help between visits, you can call us Monday to Friday from 8 a.m. to 4:30 p.m. at [phone].  Emergency care  Call 911 or go to the nearest emergency department if your life or someone else's life is in danger.  Call 988 anytime to reach the national Suicide and Crisis hotline.  Medicine refills  To refill your medicine, call your pharmacy. You can also call [facility]'s Behavioral Access at [phone], Monday to Friday, 8 a.m. to 4:30 p.m. It can take 1 to 3 business days to get a refill.   Forms, letters, and tests  You may have papers to fill out, like FMLA, short-term disability, and workability. We can help you with these forms at your visits, but you must have an " appointment. You may need more than 1 visit for this, to be in an intensive therapy program, or both.  Before we can give you medicine for ADHD, we may refer you to get tested for it or confirm it another way.  We may not be able to give you an emotional support animal letter.  We don't do mental health checks ordered by the court.   We don't do mental health testing, but we can refer you to get tested.   Thank you for choosing us for your care.  For informational purposes only. Not to replace the advice of your health care provider. Copyright   2022 [facility] [employer]. All rights reserved. [place] 599584 - 12/22.    Treatment Plan:      1.  Atomoxetine 40 mg daily  2.  Bupropion  mg daily  3.  Doxepin 20 mg 2 times daily  4.  Vyvanse 30 mg daily  5.  Propranolol 20 mg daily as needed for anxiety  6.  Viibryd 40 mg daily  7.  Counseling therapies when able to for learning skills to manage life stressors and adjustments    Continue all other medical directions per primary care provider.   Continue all other medications as reviewed per electronic medical record today.   Safety plan reviewed. To the Emergency Department as needed or call after hours crisis line at [phone] or [phone]. [place] Crisis Text Line: Text [place] to 882884  or  Suicide LifeLine Chat: suicidepreventionlifeline.org/chat/  To schedule individual or family therapy, call [facility] at [phone].   Schedule an appointment with me in 3 months  or sooner as needed.  Call [facility] at [phone] to schedule.  Follow up with primary care provider as planned or for acute medical concerns.  Call the psychiatric nurse line with medication questions or concerns at [phone].  Banyanhart may be used to communicate with your provider, but this is not intended to be used for emergencies.        Crisis Resources   The EmPath is an adults only unit located at [facility] in [place] is a short  term (generally less than 23 hour stay) designed for crisis intervention and stabilization. Pts have the opportunity to meet quickly with a behavioral health team for evaluation in a calm and peaceful therapuetic environment. To be evaluated for admission pts are triaged throught the [facility] ED.      The following hotlines are for both adults and children. The and are open 24 hours a day, 7 days a week unless noted otherwise.        Crisis Lines      Crisis Text Line  Text 351313  You will be connected with a trained live crisis counselor to provide support.        Gambling Hotline  2.952.911.8310 [hope]        línea de crisis española  634.281.5604        [facility] Helpline  [phone]        National Hope Line  6.604.148.8139 [hope]        National Suicide Prevention Lifeline  Free and confidential support  988 or 1.963.304.TALK [8255]  http://suicidepreventionlifeline.org        The [place] Project (LGBTQ Youth Crisis Line)  2.448.358.4277  text START to 239-278        [place]'s Crisis Line  7.507.279.8224 (Press 1)  or text 467573    [facility] Mental Health Crisis Response  Within [place], call **CRISIS [**680790] to be connected to a mental health professional who can assist you.        [facility] Crisis  [phone]      [facility] Mobile Crisis  [phone]      [facility] Crisis  [phone]      [facility] Mobile Crisis  [phone] (adults)  [phone] (children)      [facility] Mobile Crisis  [phone] (adults)  [phone] (children)      [facility] Mobile Crisis  [phone]      [facility] Mobile Crisis  [phone]    Community Resources      Fast Tracker  Linking people to mental health and substance use disorder resources  fasttrackGiving Assistantn.org        [place] Mental Health Warmline  Peer to peer support  5 pm to 9 am 7 days/week  5.758.925.7610  https://mnwitw.org/mnwarcalderon        National [place] on Mental Illness (RANDAL)   766.597.3313 or 1.888.RANDAL.HELPS  https://namimn.org/        [facility] for Adult Mental Health  [facility] residents only   [address] [facility]  [phone]        [facility]  Free mental health counseling  https://walkin.org/  612.870.0565 X2    Mental Health Apps      Calm Harm  https://calmharm.co.uk/      My3  https://my3app.org/      Don Safety Plan  https://www.mysafetyplan.org/

## 2024-06-19 DIAGNOSIS — F41.9 ANXIETY: ICD-10-CM

## 2024-06-19 RX ORDER — PROPRANOLOL HYDROCHLORIDE 20 MG/1
20 TABLET ORAL DAILY PRN
Qty: 30 TABLET | Refills: 11 | Status: SHIPPED | OUTPATIENT
Start: 2024-06-19

## 2024-06-19 NOTE — TELEPHONE ENCOUNTER
RX Authorization    Medication: Propranolol (INDERAL) 20 MG tablet    Date last refill ordered: 7/28/2023    Quantity ordered: 30    # refills: 11    Date of last clinic visit with ordering provider: 6/5/2024    Date of next clinic visit with ordering provider:    All pertinent protocol data (lab date/result):    Include pertinent information from patients message:

## 2024-06-20 ENCOUNTER — VIRTUAL VISIT (OUTPATIENT)
Dept: PSYCHOLOGY | Facility: CLINIC | Age: 39
End: 2024-06-20
Payer: COMMERCIAL

## 2024-06-20 DIAGNOSIS — F90.0 ADHD (ATTENTION DEFICIT HYPERACTIVITY DISORDER), INATTENTIVE TYPE: ICD-10-CM

## 2024-06-20 DIAGNOSIS — F54 PSYCHOLOGICAL AND BEHAVIORAL FACTORS ASSOCIATED WITH DISORDERS OR DISEASES CLASSIFIED ELSEWHERE: ICD-10-CM

## 2024-06-20 DIAGNOSIS — F43.12 CHRONIC POST-TRAUMATIC STRESS DISORDER (PTSD): Primary | ICD-10-CM

## 2024-06-20 PROCEDURE — 90837 PSYTX W PT 60 MINUTES: CPT | Mod: 95 | Performed by: PSYCHOLOGIST

## 2024-06-22 DIAGNOSIS — L50.9 URTICARIA: ICD-10-CM

## 2024-06-22 NOTE — CONFIDENTIAL NOTE
"  Health Psychology - Follow up Visit  Confidential Summary*    The author of this note documented a reason for not sharing it with the patient.  REFERRAL SOURCE:  Psychiatry    CHIEF COMPLAINT/REASON FOR VISIT  Psychotherapy in context of chronic PTSD, ADHD and persistent depression.  Patient is challenged by procrastination and time management.      Patient was seen today for a 60 minute individual psychotherapy session.  The session was facilitated via VIDEO with patient at her home and provider at her own home.  We used [employer] platform.       The patient has been notified of following:   \"This VIDEO visit will be conducted via a call between you and your physician/provider. We have found that certain health care needs can be provided without the need for an in-person physical exam.  VIDEO visits are billed at different rates depending on your insurance coverage.  Please reach out to your insurance provider with any questions. If during the course of the call the physician/provider feels a video visit is not appropriate, you will not be charged for this service.\"     Patient has given verbal consent for VIDEO visit? Yes    Subjective:  Patient began with report that she continues to struggle with writing and that she recognizes that she missed her last deadline set with dissertation chair.  She was encouraged to consider how communicating with him might contribute to improvements in their relationship.  She reported that she does not want to disappoint him and that she continues to make progress on the paper in her own ways.  She expressed concern about not yet beginning paper #3 but that she is convinced that she will be able to complete this by December 24, the last day she will be able to remain enrolled in her PhD program.  She was encouraged to discuss how anxiety might be interfering with her ability to focus on this last lap of her program.  She described her awareness that she is delayed, behind her " cohort.  Discussed the valid reasons for her failure to complete her program in the same time frame including multiple physical illnesses and mental health concerns and her upbringing which did not include a culture or education surrounding higher education.      She described relief that her dog survived recent traumatic ingestion and gratefulness that her ex  volunteered to help her financially.  She reported that she remains behind in her ability to support herself through the summer months but is reluctant to take on a position that might allow her to secure more money because she wants to remain focused on her dissertation work.      She was encouraged to focus on time management strategies.  Offered support.      She continues to date boyfriend and reported that they may begin to cohabitate and this may help with financial concerns.      Encouraged anxiety management strategies including checking the facts and breaking down tasks.      Objective:  Patient was on time for today s session. She was alert and oriented. Mood was euthymic with appropriate range of affect. Patient denied suicidal or assaultive ideation, plan, or intent.        Assessment:  The patient has a longstanding history of interpersonal discord and challenges with emotion regulation and avoidance as a coping mechanism for anxiety.  She has completed all requirements for her PhD and is now ABD.  She is living in  housing with her two dogs.  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      Plan:  Patient graduated from full model DBT at [facility] and is now completing phase 2 work.      Treatment plan updated.      Time In: 10:00  Time Out: 11:00    Diagnosis:  Axis I PTSD, chronic, persistent depressive disorder, adjustment disorder with mixed, psychological factors  associated with physical (fibromyalgia); ADHD   Axis II  BPD   Axis III please see medical records for details   [place] IV Psychosocial and Environmental Stressors: living alone with no family support, chronic illness         Corina Muhammad, PhD, LP

## 2024-06-25 ENCOUNTER — VIRTUAL VISIT (OUTPATIENT)
Dept: ENDOCRINOLOGY | Facility: CLINIC | Age: 39
End: 2024-06-25
Payer: COMMERCIAL

## 2024-06-25 VITALS — HEIGHT: 69 IN | BODY MASS INDEX: 27.7 KG/M2 | WEIGHT: 187 LBS

## 2024-06-25 DIAGNOSIS — E66.3 OVERWEIGHT (BMI 25.0-29.9): Primary | ICD-10-CM

## 2024-06-25 PROCEDURE — G2211 COMPLEX E/M VISIT ADD ON: HCPCS | Mod: 95 | Performed by: INTERNAL MEDICINE

## 2024-06-25 PROCEDURE — 99214 OFFICE O/P EST MOD 30 MIN: CPT | Mod: 95 | Performed by: INTERNAL MEDICINE

## 2024-06-25 ASSESSMENT — PAIN SCALES - GENERAL: PAINLEVEL: NO PAIN (0)

## 2024-06-25 NOTE — NURSING NOTE
Is the patient currently in the state of [place]? YES    Visit mode:VIDEO    If the visit is dropped, the patient can be reconnected by: VIDEO VISIT: Send to e-mail at: [email or web address]    Will anyone else be joining the visit? NO  (If patient encounters technical issues they should call [phone])    How would you like to obtain your AVS? MyChart    Are changes needed to the allergy or medication list? No    Are refills needed on medications prescribed by this physician? NO    Reason for visit: RECHECK    Jose Carlos LOVELL

## 2024-06-25 NOTE — LETTER
2024       RE: Kasandra Lau  [address]  [place] [address]     Dear Colleague,    Thank you for referring your patient, Kasandra Lau, to the [facility]NAGEMENT [facility] [place] at [facility]. Please see a copy of my visit note below.    Return Medical Weight Management Note     Kasandra Lau  MRN:  [id number]  :  1985  MIKO: 2024    Dear JASVIR Ashley CNP,    I had the pleasure of seeing your patient Kasandra Lau. She is a 39 year old female who I am continuing to see for treatment of obesity related to:  anxiety, depression, ADHD, PTSD, rheumatoid arthritis, hyperlipidemia, GERD, fatty liver, back pain        10/3/2022    11:26 PM   --   I have the following health issues associated with obesity High Cholesterol    GERD (Reflux)    Fatty Liver   I have the following symptoms associated with obesity Depression    Back Pain    Fatigue    Irregular Menstral Cycle     Reported baseline weight 150-160 lbs. A couple of years ago, she gained 17 lbs in a month despite no change in diet and exercise. Since then she continued to gain weight in spurt and then plateau out and gained again    Was on topiramate for migraine [id number] -- had brain fog and difficulty with word recall    INTERVAL HISTORY:  Last visit 2023.    Was on Ozempic 10/2022-2023. Insurance was no longer covered.  Then switched over to Qsymia but she did not feel that it was helping.    She gained 30 lbs over the past year.     She feels that she is craving more sugar lately so she plan to cut down some more sugar/ felt that she may have binges eating but Vyvanse was helping somewhat.    Exercise -- walk a dog twice a week    CURRENT WEIGHT:   187 lbs 0 oz    Initial Weight (lbs): 195 lbs  Last Visits Weight: 79.4 kg (175 lb)  Cumulative weight loss (lbs): 8  Weight Loss Percentage: 4.1%        2024    11:12 AM   Changes and Difficulties   I  "have made the following changes to my diet since my last visit: more fresh foods and oats   With regards to my diet, I am still struggling with: still have a sweettooth but less binge eating   I have made the following changes to my activity/exercise since my last visit: strength training class and pilates, once each a week   With regards to my activity/exercise, I am still struggling with: weight plateauing or increasing       VITALS:  Ht 1.753 m (5' 9\")   Wt 84.8 kg (187 lb)   LMP  (LMP Unknown)   BMI 27.62 kg/m      MEDICATIONS:   Current Outpatient Medications   Medication Sig Dispense Refill    almotriptan (AXERT) 12.5 MG tablet Take 1 tablet (12.5 mg) by mouth at onset of headache for migraine (repeat in 2 hours if needed) May repeat in 2 hours. Max 2 tablets/24 hours. 18 tablet 11    atomoxetine (STRATTERA) 40 MG capsule Take 1 capsule (40 mg) by mouth daily 90 capsule 0    buPROPion (WELLBUTRIN XL) 300 MG 24 hr tablet Take 1 tablet (300 mg) by mouth every morning 90 tablet 1    cetirizine (ZYRTEC) 10 MG tablet TAKE 1 TABLET BY MOUTH EVERY EVENING 90 tablet 1    cholecalciferol 125 MCG (5000 UT) CAPS Take 5,000 Units by mouth every evening      doxepin (SINEQUAN) 10 MG capsule Take 2 capsules (20 mg) by mouth 2 times daily 120 capsule 3    EMGALITY 120 MG/ML injection Inject 1 mL (120 mg) Subcutaneous every 28 days 1 mL 11    EPINEPHrine (ANY BX GENERIC EQUIV) 0.3 MG/0.3ML injection 2-pack Inject 0.3 mg into the muscle as needed      famotidine (PEPCID) 20 MG tablet TAKE 1 TABLET BY MOUTH TWICE A  tablet 3    fluticasone (FLONASE) 50 MCG/ACT nasal spray Spray 2 sprays into both nostrils 2 times daily as needed for allergies 48 mL 5    gabapentin (NEURONTIN) 400 MG capsule Take 2 capsules (800 mg) by mouth 3 times daily 180 capsule 5    guaiFENesin (MUCINEX) 600 MG 12 hr tablet Take 600 mg by mouth daily as needed      hydroxychloroquine (PLAQUENIL) 200 MG tablet Take 2 tablets (400 mg) by mouth " daily 180 tablet 2    levonorgestrel (MIRENA) 20 MCG/DAY IUD 1 each (20 mcg) by Intrauterine route once for 1 dose 1 each 0    lisdexamfetamine (VYVANSE) 30 MG capsule Take 1 capsule (30 mg) by mouth every morning 30 capsule 0    methotrexate 2.5 MG tablet Take 6 tablets (15 mg) by mouth every 7 days 72 tablet 2    montelukast (SINGULAIR) 10 MG tablet Take 1 tablet (10 mg) by mouth at bedtime 90 tablet 1    ondansetron (ZOFRAN ODT) 4 MG ODT tab Take 1-2 tablets (4-8 mg) by mouth every 8 hours as needed for nausea 20 tablet 1    Phentermine-Topiramate (QSYMIA) 15-92 MG CP24 Take 1 tablet by mouth daily 90 capsule 1    phenylephrine HCl 10 MG TABS Take 10 mg by mouth 2 times daily      propranolol (INDERAL) 20 MG tablet Take 1 tablet (20 mg) by mouth daily as needed (anxiety) 30 tablet 11    vilazodone (VIIBRYD) 40 MG TABS tablet Take 1 tablet (40 mg) by mouth every morning 90 tablet 1           6/25/2024    11:12 AM   Weight Loss Medication History Reviewed With Patient   Which weight loss medications are you currently taking on a regular basis? Bupropion   If you are not taking a weight loss medication that was prescribed to you, please indicate why: My insurance does not cover the cost   Are you having any side effects from the weight loss medication that we have prescribed you? No       ASSESSMENT:   Kasandra Lau is a 39 year old female who I am continuing to see for treatment of obesity related to:  anxiety, depression, ADHD, PTSD, rheumatoid arthritis, hyperlipidemia, GERD, fatty liver, back pain    Insurance does not cover GLP-1RA  Qsymia did not help  She gained 30 lbs over the past year.     PLAN:   - discuss option of adding naltrexone or topiramate only regimen  - discuss compounded semaglutide if she would like to try  - patient will let us know what she decides  - continue healthy small meal  - increase exercise    FOLLOW-UP:    See Lauren Bloch, PharmD in 3 month(s)  See Dr.Tasma VALADEZ in 6  month(s)    Joined the call at 6/25/2024, 11:25:50 am.  Left the call at 6/25/2024, 11:36:09 am.  You were on the call for 10 minutes 19 seconds .    Sincerely,    Kerri Evans MD

## 2024-06-25 NOTE — PATIENT INSTRUCTIONS
PLAN:   - discuss option of adding naltrexone or topiramate only regimen  - discuss compounded semaglutide if she would like to try  - patient will let us know what she decides  - continue healthy small meal  - increase exercise    FOLLOW-UP:    See Lauren Bloch, PharmD in 3 month(s)  See Dr.Tasma VALADEZ in 6 month(s)    If you have any questions, please do not hesitate to call Weight management clinic at [phone] or [phone].  If you need to fax, please fax to [phone].    Sincerely,    Kerri Evans MD  Endocrinology

## 2024-06-25 NOTE — PROGRESS NOTES
Return Medical Weight Management Note     Kasandra Lau  MRN:  [id number]  :  1985  MIKO: 2024    Dear JASVIR Ashley CNP,    I had the pleasure of seeing your patient Kasandra Lau. She is a 39 year old female who I am continuing to see for treatment of obesity related to:  anxiety, depression, ADHD, PTSD, rheumatoid arthritis, hyperlipidemia, GERD, fatty liver, back pain        10/3/2022    11:26 PM   --   I have the following health issues associated with obesity High Cholesterol    GERD (Reflux)    Fatty Liver   I have the following symptoms associated with obesity Depression    Back Pain    Fatigue    Irregular Menstral Cycle     Reported baseline weight 150-160 lbs. A couple of years ago, she gained 17 lbs in a month despite no change in diet and exercise. Since then she continued to gain weight in spurt and then plateau out and gained again    Was on topiramate for migraine [id number] -- had brain fog and difficulty with word recall    INTERVAL HISTORY:  Last visit 2023.    Was on Ozempic 10/2022-2023. Insurance was no longer covered.  Then switched over to Qsymia but she did not feel that it was helping.    She gained 30 lbs over the past year.     She feels that she is craving more sugar lately so she plan to cut down some more sugar/ felt that she may have binges eating but Vyvanse was helping somewhat.    Exercise -- walk a dog twice a week    CURRENT WEIGHT:   187 lbs 0 oz    Initial Weight (lbs): 195 lbs  Last Visits Weight: 79.4 kg (175 lb)  Cumulative weight loss (lbs): 8  Weight Loss Percentage: 4.1%        2024    11:12 AM   Changes and Difficulties   I have made the following changes to my diet since my last visit: more fresh foods and oats   With regards to my diet, I am still struggling with: still have a sweettooth but less binge eating   I have made the following changes to my activity/exercise since my last visit: strength training class and pilates, once each a  "week   With regards to my activity/exercise, I am still struggling with: weight plateauing or increasing       VITALS:  Ht 1.753 m (5' 9\")   Wt 84.8 kg (187 lb)   LMP  (LMP Unknown)   BMI 27.62 kg/m      MEDICATIONS:   Current Outpatient Medications   Medication Sig Dispense Refill    almotriptan (AXERT) 12.5 MG tablet Take 1 tablet (12.5 mg) by mouth at onset of headache for migraine (repeat in 2 hours if needed) May repeat in 2 hours. Max 2 tablets/24 hours. 18 tablet 11    atomoxetine (STRATTERA) 40 MG capsule Take 1 capsule (40 mg) by mouth daily 90 capsule 0    buPROPion (WELLBUTRIN XL) 300 MG 24 hr tablet Take 1 tablet (300 mg) by mouth every morning 90 tablet 1    cetirizine (ZYRTEC) 10 MG tablet TAKE 1 TABLET BY MOUTH EVERY EVENING 90 tablet 1    cholecalciferol 125 MCG (5000 UT) CAPS Take 5,000 Units by mouth every evening      doxepin (SINEQUAN) 10 MG capsule Take 2 capsules (20 mg) by mouth 2 times daily 120 capsule 3    EMGALITY 120 MG/ML injection Inject 1 mL (120 mg) Subcutaneous every 28 days 1 mL 11    EPINEPHrine (ANY BX GENERIC EQUIV) 0.3 MG/0.3ML injection 2-pack Inject 0.3 mg into the muscle as needed      famotidine (PEPCID) 20 MG tablet TAKE 1 TABLET BY MOUTH TWICE A  tablet 3    fluticasone (FLONASE) 50 MCG/ACT nasal spray Spray 2 sprays into both nostrils 2 times daily as needed for allergies 48 mL 5    gabapentin (NEURONTIN) 400 MG capsule Take 2 capsules (800 mg) by mouth 3 times daily 180 capsule 5    guaiFENesin (MUCINEX) 600 MG 12 hr tablet Take 600 mg by mouth daily as needed      hydroxychloroquine (PLAQUENIL) 200 MG tablet Take 2 tablets (400 mg) by mouth daily 180 tablet 2    levonorgestrel (MIRENA) 20 MCG/DAY IUD 1 each (20 mcg) by Intrauterine route once for 1 dose 1 each 0    lisdexamfetamine (VYVANSE) 30 MG capsule Take 1 capsule (30 mg) by mouth every morning 30 capsule 0    methotrexate 2.5 MG tablet Take 6 tablets (15 mg) by mouth every 7 days 72 tablet 2    " montelukast (SINGULAIR) 10 MG tablet Take 1 tablet (10 mg) by mouth at bedtime 90 tablet 1    ondansetron (ZOFRAN ODT) 4 MG ODT tab Take 1-2 tablets (4-8 mg) by mouth every 8 hours as needed for nausea 20 tablet 1    Phentermine-Topiramate (QSYMIA) 15-92 MG CP24 Take 1 tablet by mouth daily 90 capsule 1    phenylephrine HCl 10 MG TABS Take 10 mg by mouth 2 times daily      propranolol (INDERAL) 20 MG tablet Take 1 tablet (20 mg) by mouth daily as needed (anxiety) 30 tablet 11    vilazodone (VIIBRYD) 40 MG TABS tablet Take 1 tablet (40 mg) by mouth every morning 90 tablet 1           6/25/2024    11:12 AM   Weight Loss Medication History Reviewed With Patient   Which weight loss medications are you currently taking on a regular basis? Bupropion   If you are not taking a weight loss medication that was prescribed to you, please indicate why: My insurance does not cover the cost   Are you having any side effects from the weight loss medication that we have prescribed you? No       ASSESSMENT:   Kasandra Lau is a 39 year old female who I am continuing to see for treatment of obesity related to:  anxiety, depression, ADHD, PTSD, rheumatoid arthritis, hyperlipidemia, GERD, fatty liver, back pain    Insurance does not cover GLP-1RA  Qsymia did not help  She gained 30 lbs over the past year.     PLAN:   - discuss option of adding naltrexone or topiramate only regimen  - discuss compounded semaglutide if she would like to try  - patient will let us know what she decides  - continue healthy small meal  - increase exercise    FOLLOW-UP:    See Lauren Bloch, PharmD in 3 month(s)  See Dr.Tasma VALADEZ in 6 month(s)    Joined the call at 6/25/2024, 11:25:50 am.  Left the call at 6/25/2024, 11:36:09 am.  You were on the call for 10 minutes 19 seconds .    Sincerely,    Kerri Evans MD

## 2024-06-26 DIAGNOSIS — E66.09 CLASS 1 OBESITY DUE TO EXCESS CALORIES WITH SERIOUS COMORBIDITY AND BODY MASS INDEX (BMI) OF 30.0 TO 30.9 IN ADULT: Primary | ICD-10-CM

## 2024-06-26 DIAGNOSIS — E66.811 CLASS 1 OBESITY DUE TO EXCESS CALORIES WITH SERIOUS COMORBIDITY AND BODY MASS INDEX (BMI) OF 30.0 TO 30.9 IN ADULT: Primary | ICD-10-CM

## 2024-06-26 DIAGNOSIS — E66.3 OVERWEIGHT: ICD-10-CM

## 2024-06-26 RX ORDER — NALTREXONE HYDROCHLORIDE 50 MG/1
TABLET, FILM COATED ORAL
Qty: 60 TABLET | Refills: 1 | Status: SHIPPED | OUTPATIENT
Start: 2024-06-26 | End: 2024-07-29

## 2024-06-27 ENCOUNTER — VIRTUAL VISIT (OUTPATIENT)
Dept: PSYCHOLOGY | Facility: CLINIC | Age: 39
End: 2024-06-27
Payer: COMMERCIAL

## 2024-06-27 DIAGNOSIS — F43.12 CHRONIC POST-TRAUMATIC STRESS DISORDER (PTSD): Primary | ICD-10-CM

## 2024-06-27 DIAGNOSIS — F90.0 ADHD (ATTENTION DEFICIT HYPERACTIVITY DISORDER), INATTENTIVE TYPE: ICD-10-CM

## 2024-06-27 DIAGNOSIS — F54 PSYCHOLOGICAL AND BEHAVIORAL FACTORS ASSOCIATED WITH DISORDERS OR DISEASES CLASSIFIED ELSEWHERE: ICD-10-CM

## 2024-06-27 PROCEDURE — 90837 PSYTX W PT 60 MINUTES: CPT | Mod: 95 | Performed by: PSYCHOLOGIST

## 2024-07-02 RX ORDER — MONTELUKAST SODIUM 10 MG/1
1 TABLET ORAL AT BEDTIME
Qty: 90 TABLET | Refills: 1 | Status: SHIPPED | OUTPATIENT
Start: 2024-07-02

## 2024-07-02 NOTE — TELEPHONE ENCOUNTER
MONTELUKAST SOD 10 MG TABLET       Last Written Prescription Date:  12-25-23  Last Fill Quantity: 90,   # refills: 1  Last Office Visit : 3-4-24  Future Office visit:  none    Routing refill request to provider for review/approval because:  Failed protocol: diagnosis

## 2024-07-02 NOTE — CONFIDENTIAL NOTE
"  Health Psychology - Follow up Visit  Confidential Summary*    The author of this note documented a reason for not sharing it with the patient.  REFERRAL SOURCE:  Psychiatry    CHIEF COMPLAINT/REASON FOR VISIT  Psychotherapy in context of chronic PTSD, ADHD and persistent depression.  Patient is challenged by procrastination and time management.      Patient was seen today for a 60 minute individual psychotherapy session.  The session was facilitated via VIDEO with patient at her home and provider at her own home.  We used Pong[employer] platform.       The patient has been notified of following:   \"This VIDEO visit will be conducted via a call between you and your physician/provider. We have found that certain health care needs can be provided without the need for an in-person physical exam.  VIDEO visits are billed at different rates depending on your insurance coverage.  Please reach out to your insurance provider with any questions. If during the course of the call the physician/provider feels a video visit is not appropriate, you will not be charged for this service.\"     Patient has given verbal consent for VIDEO visit? Yes    Subjective:  Patient began with report that she continues to perceive that race and class based inequities are responsible for some of the challenges that she continues to have.  She described the observation that a male in her cohort was given the opportunity to extend his tenure to 11 years but that she was told that she would only have 8 years to complete. She reported that she is experiencing elevated anxiety upon recognizing that she only now has 6 months to finish.  She was encouraged to consider her work in DBT and the mantra that although she did not create all her problems, it is still up to her to solve them. She was encouraged to consider a revised timeline.  However, she reported too much anxiety associated with commitment. She was encouraged to practice anxiety reduction strategies " "including mindfulness, increasing daily activity and improving organization.  She reported that her new relationship is helping to buffer anxiety but it is also taking more time.      She was encouraged to consider the pros and cons of her decision to allow boyfriend to move into her home.  She was encouraged to consider \"how to keep herself safe\".      Objective:  Patient was on time for today s session. She was alert and oriented. Mood was euthymic with appropriate range of affect. Patient denied suicidal or assaultive ideation, plan, or intent.        Assessment:  The patient has a longstanding history of interpersonal discord and challenges with emotion regulation and avoidance as a coping mechanism for anxiety.  She has completed all requirements for her PhD and is now ABD.  She is living in  housing with her two dogs.  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      Plan:  Patient graduated from full model DBT at [facility] and is now completing phase 2 work.      Treatment plan updated.      Time In: 3:00  Time Out: 4:00    Diagnosis:  Axis I PTSD, chronic, persistent depressive disorder, adjustment disorder with mixed, psychological factors associated with physical (fibromyalgia); ADHD   Axis II  BPD   Axis III please see medical records for details   [place] IV Psychosocial and Environmental Stressors: living alone with no family support, chronic illness         Corina Muhammad, PhD, LP    "

## 2024-07-11 ENCOUNTER — MYC MEDICAL ADVICE (OUTPATIENT)
Dept: PSYCHIATRY | Facility: CLINIC | Age: 39
End: 2024-07-11
Payer: COMMERCIAL

## 2024-07-11 ENCOUNTER — MYC REFILL (OUTPATIENT)
Dept: RHEUMATOLOGY | Facility: CLINIC | Age: 39
End: 2024-07-11
Payer: COMMERCIAL

## 2024-07-11 DIAGNOSIS — M79.2 NEUROPATHIC PAIN: ICD-10-CM

## 2024-07-11 DIAGNOSIS — F90.8 ATTENTION DEFICIT HYPERACTIVITY DISORDER (ADHD), OTHER TYPE: ICD-10-CM

## 2024-07-11 DIAGNOSIS — F41.9 ANXIETY: ICD-10-CM

## 2024-07-11 DIAGNOSIS — M19.90 INFLAMMATORY ARTHRITIS: ICD-10-CM

## 2024-07-11 RX ORDER — METHOTREXATE 2.5 MG/1
15 TABLET ORAL
Qty: 72 TABLET | Refills: 0 | Status: SHIPPED | OUTPATIENT
Start: 2024-07-11

## 2024-07-11 RX ORDER — GABAPENTIN 400 MG/1
800 CAPSULE ORAL 3 TIMES DAILY
Qty: 180 CAPSULE | Refills: 5 | Status: SHIPPED | OUTPATIENT
Start: 2024-07-11

## 2024-07-11 RX ORDER — LISDEXAMFETAMINE DIMESYLATE 30 MG/1
30 CAPSULE ORAL EVERY MORNING
Qty: 30 CAPSULE | Refills: 0 | Status: SHIPPED | OUTPATIENT
Start: 2024-07-11 | End: 2024-08-12

## 2024-07-11 NOTE — TELEPHONE ENCOUNTER
methotrexate 2.5 MG tablet     72 tablet 2 11/16/2023       Last Office Visit : 10-  Future Office visit:  none    CBC RESULTS:   Recent Labs   Lab Test 08/03/23  1604   WBC 8.4   RBC 4.43   HGB 13.5   HCT 41.2   MCV 93   MCH 30.5   MCHC 32.8   RDW 11.9          Creatinine   Date Value Ref Range Status   08/03/2023 0.81 0.51 - 0.95 mg/dL Final   09/12/2020 0.77 0.52 - 1.04 mg/dL Final   ]    Liver Function Studies -   Recent Labs   Lab Test 08/03/23  1604 07/13/23  1554   PROTTOTAL 7.6 7.3   ALBUMIN 4.4 4.3   BILITOTAL 0.4 0.3   ALKPHOS 109* 67   AST  --  22   ALT  --  16           '

## 2024-07-11 NOTE — TELEPHONE ENCOUNTER
Received refill request for Vyvanse, atomoxetine, viibryd and gabapentin. The atomoxetine was ordered on 5/16/24 for a 90 day supply. The viibryd was ordered on 4/16/24 for a 90 day supply and 1 refill.     Date of Last Office Visit: 6/11/24  Date of Next Office Visit: None; routing for A to assist pt with scheduling.    No shows since last visit: No  More than one patient-initiated cancellation (with reschedule) since last seen in clinic? No    []Medication refilled per  Medication Refill in Ambulatory Care  policy.  [x]Medication unable to be refilled by RN due to criteria not met as indicated below:    []Eligibility: has not had a provider visit within last 6 months   [x]Supervision: no future appointment; < 7 days before next appointment   []Compliance: no shows; cancellations; lapse in therapy   []Verification: order discrepancy; may need modification...   []90-day supply request   []Advanced refill request: > 7 days before refill date   [x]Controlled medication   []Medication not included in policy   []Review: new med; med adjusted ? 30 days; safety alert; requires lab monitoring...   []Scope of Practice: refill request processed by LPN/MA   []Other:      Medication(s) requested:     -  lisdexamfetamine (VYVANSE) 30 MG capsule   Date last ordered: 6/11/24  Qty: 30  Refills: 0  Appropriate for refill? Yes    -  gabapentin (NEURONTIN) 400 MG capsule   Date last ordered: 1/23/24  Qty: 180  Refills: 5  Appropriate for refill? Yes    Any Controlled Substance(s)? Yes, not a delegate of provider      Requested medication(s) verified as identical to current order? Yes    Any lapse in adherence to medication(s) greater than 5 days? No      Additional action taken? no.    Last visit treatment plan:   1.  Atomoxetine 40 mg daily  2.  Bupropion  mg daily  3.  Doxepin 20 mg 2 times daily  4.  Vyvanse 30 mg daily  5.  Propranolol 20 mg daily as needed for anxiety  6.  Viibryd 40 mg daily  7.  Counseling therapies  when able to for learning skills to manage life stressors and adjustments  e has not started the increased dose of Vyvanse yet but will start that soon at 30 mg daily to help with focus, follow through, as well as binge eating which is a concern for her. Doxepin continues twice daily for anxiety. She will continue with atomoxetine 40 mg daily as an adjunct to the Vyvanse. Bupropion continues daily for depression in addition to the Viibryd. She tolerates the propranolol well twice daily as needed for performance anxiety.     Any medication(s) require lab monitoring? No

## 2024-07-13 ENCOUNTER — HEALTH MAINTENANCE LETTER (OUTPATIENT)
Age: 39
End: 2024-07-13

## 2024-07-15 ENCOUNTER — TELEPHONE (OUTPATIENT)
Dept: ENDOCRINOLOGY | Facility: CLINIC | Age: 39
End: 2024-07-15
Payer: COMMERCIAL

## 2024-07-15 NOTE — TELEPHONE ENCOUNTER
Left Voicemail (1st Attempt) and Sent Mychart (1st Attempt) for the patient to call back and schedule the following:    Appointment type: [facility]   Provider:   Return date: 6 mos   Specialty phone number: [phone]  Additional appointment(s) needed: yes  Additonal Notes: Lauren Bloch,RPH, SE[facility] MTM, 3 mos

## 2024-07-17 DIAGNOSIS — E66.09 CLASS 1 OBESITY DUE TO EXCESS CALORIES WITH SERIOUS COMORBIDITY AND BODY MASS INDEX (BMI) OF 30.0 TO 30.9 IN ADULT: Primary | ICD-10-CM

## 2024-07-17 DIAGNOSIS — E66.811 CLASS 1 OBESITY DUE TO EXCESS CALORIES WITH SERIOUS COMORBIDITY AND BODY MASS INDEX (BMI) OF 30.0 TO 30.9 IN ADULT: Primary | ICD-10-CM

## 2024-07-18 ENCOUNTER — VIRTUAL VISIT (OUTPATIENT)
Dept: PSYCHOLOGY | Facility: CLINIC | Age: 39
End: 2024-07-18
Payer: COMMERCIAL

## 2024-07-18 DIAGNOSIS — F90.0 ADHD (ATTENTION DEFICIT HYPERACTIVITY DISORDER), INATTENTIVE TYPE: ICD-10-CM

## 2024-07-18 DIAGNOSIS — F54 PSYCHOLOGICAL AND BEHAVIORAL FACTORS ASSOCIATED WITH DISORDERS OR DISEASES CLASSIFIED ELSEWHERE: ICD-10-CM

## 2024-07-18 DIAGNOSIS — F90.8 ATTENTION DEFICIT HYPERACTIVITY DISORDER (ADHD), OTHER TYPE: ICD-10-CM

## 2024-07-18 DIAGNOSIS — F43.12 CHRONIC POST-TRAUMATIC STRESS DISORDER (PTSD): Primary | ICD-10-CM

## 2024-07-18 DIAGNOSIS — F34.1 PERSISTENT DEPRESSIVE DISORDER: ICD-10-CM

## 2024-07-18 DIAGNOSIS — F43.23 ADJUSTMENT DISORDER WITH MIXED ANXIETY AND DEPRESSED MOOD: ICD-10-CM

## 2024-07-18 PROCEDURE — 90837 PSYTX W PT 60 MINUTES: CPT | Mod: 95 | Performed by: PSYCHOLOGIST

## 2024-07-23 NOTE — CONFIDENTIAL NOTE
"  Health Psychology - Follow up Visit  Confidential Summary*    The author of this note documented a reason for not sharing it with the patient.  REFERRAL SOURCE:  Psychiatry    CHIEF COMPLAINT/REASON FOR VISIT  Psychotherapy in context of chronic PTSD, ADHD and persistent depression.  Patient is challenged by procrastination and time management.      Patient was seen today for a 60 minute individual psychotherapy session.  The session was facilitated via VIDEO with patient at her home and provider at her own home.  We used [employer] platform.       The patient has been notified of following:   \"This VIDEO visit will be conducted via a call between you and your physician/provider. We have found that certain health care needs can be provided without the need for an in-person physical exam.  VIDEO visits are billed at different rates depending on your insurance coverage.  Please reach out to your insurance provider with any questions. If during the course of the call the physician/provider feels a video visit is not appropriate, you will not be charged for this service.\"     Patient has given verbal consent for VIDEO visit? Yes    Subjective:  Patient began with report that she and boyfriend are now living together and that they are enjoying cohabitating.  She reported that he has recently secured a job and that he has not yet received a paycheck.  She described the expectation that they will share expenses.  She reported that she continues to experience stress associated with financial distress.  She is considering application for a position in retail so that she might be able to continue to support herself through the end of her graduate degree.      Patient continues to report that she is challenged in completing the second paper for her degree.  She described an awareness of the possibility that she is stalled by procrastination stemming from perfectionism.  She was encouraged to consider how she might prioritize " completion of a draft over perfectionism.  Discussed time management strategies and encouraged her to use support network including my support as an accountability partner.  She described some anxiety associated with the possibility that she will not be able to complete her second or third paper.      Objective:  Patient was on time for today s session. She was alert and oriented. Mood was euthymic with appropriate range of affect. Patient denied suicidal or assaultive ideation, plan, or intent.        Assessment:  The patient has a longstanding history of interpersonal discord and challenges with emotion regulation and avoidance as a coping mechanism for anxiety.  She has completed all requirements for her PhD and is now ABD.  She is living in  housing with her two dogs.  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      Plan:  Patient graduated from full model DBT at [facility] and is now completing phase 2 work.      Treatment plan updated.      Time In: 3:30  Time Out: 4:30    Diagnosis:  Axis I PTSD, chronic, persistent depressive disorder, adjustment disorder with mixed, psychological factors associated with physical (fibromyalgia); ADHD   Axis II  BPD   Axis III please see medical records for details   [place] IV Psychosocial and Environmental Stressors: living alone with no family support, chronic illness, completing grad school requirements under 6 month deadline for completion         Corina Muhammad, PhD, LP

## 2024-07-26 ENCOUNTER — VIRTUAL VISIT (OUTPATIENT)
Dept: PSYCHOLOGY | Facility: CLINIC | Age: 39
End: 2024-07-26
Payer: COMMERCIAL

## 2024-07-26 DIAGNOSIS — F90.0 ADHD (ATTENTION DEFICIT HYPERACTIVITY DISORDER), INATTENTIVE TYPE: ICD-10-CM

## 2024-07-26 DIAGNOSIS — F54 PSYCHOLOGICAL AND BEHAVIORAL FACTORS ASSOCIATED WITH DISORDERS OR DISEASES CLASSIFIED ELSEWHERE: ICD-10-CM

## 2024-07-26 DIAGNOSIS — F43.23 ADJUSTMENT DISORDER WITH MIXED ANXIETY AND DEPRESSED MOOD: Primary | ICD-10-CM

## 2024-07-26 DIAGNOSIS — F90.8 ATTENTION DEFICIT HYPERACTIVITY DISORDER (ADHD), OTHER TYPE: ICD-10-CM

## 2024-07-26 DIAGNOSIS — F43.12 CHRONIC POST-TRAUMATIC STRESS DISORDER (PTSD): ICD-10-CM

## 2024-07-26 PROCEDURE — 90837 PSYTX W PT 60 MINUTES: CPT | Mod: 95 | Performed by: PSYCHOLOGIST

## 2024-07-26 ASSESSMENT — ANXIETY QUESTIONNAIRES
1. FEELING NERVOUS, ANXIOUS, OR ON EDGE: MORE THAN HALF THE DAYS
GAD7 TOTAL SCORE: 10
IF YOU CHECKED OFF ANY PROBLEMS ON THIS QUESTIONNAIRE, HOW DIFFICULT HAVE THESE PROBLEMS MADE IT FOR YOU TO DO YOUR WORK, TAKE CARE OF THINGS AT HOME, OR GET ALONG WITH OTHER PEOPLE: SOMEWHAT DIFFICULT
5. BEING SO RESTLESS THAT IT IS HARD TO SIT STILL: SEVERAL DAYS
4. TROUBLE RELAXING: SEVERAL DAYS
8. IF YOU CHECKED OFF ANY PROBLEMS, HOW DIFFICULT HAVE THESE MADE IT FOR YOU TO DO YOUR WORK, TAKE CARE OF THINGS AT HOME, OR GET ALONG WITH OTHER PEOPLE?: SOMEWHAT DIFFICULT
2. NOT BEING ABLE TO STOP OR CONTROL WORRYING: MORE THAN HALF THE DAYS
3. WORRYING TOO MUCH ABOUT DIFFERENT THINGS: MORE THAN HALF THE DAYS
7. FEELING AFRAID AS IF SOMETHING AWFUL MIGHT HAPPEN: SEVERAL DAYS
GAD7 TOTAL SCORE: 10
6. BECOMING EASILY ANNOYED OR IRRITABLE: SEVERAL DAYS
7. FEELING AFRAID AS IF SOMETHING AWFUL MIGHT HAPPEN: SEVERAL DAYS

## 2024-07-29 ENCOUNTER — VIRTUAL VISIT (OUTPATIENT)
Dept: CARDIOLOGY | Facility: CLINIC | Age: 39
End: 2024-07-29
Attending: INTERNAL MEDICINE
Payer: COMMERCIAL

## 2024-07-29 VITALS — WEIGHT: 190 LBS | HEIGHT: 69 IN | BODY MASS INDEX: 28.14 KG/M2

## 2024-07-29 DIAGNOSIS — K76.0 FATTY LIVER: ICD-10-CM

## 2024-07-29 DIAGNOSIS — R11.0 NAUSEA: ICD-10-CM

## 2024-07-29 DIAGNOSIS — F90.9 ATTENTION DEFICIT HYPERACTIVITY DISORDER (ADHD), UNSPECIFIED ADHD TYPE: ICD-10-CM

## 2024-07-29 DIAGNOSIS — M79.7 FIBROMYALGIA: ICD-10-CM

## 2024-07-29 DIAGNOSIS — G43.009 MIGRAINE WITHOUT AURA AND WITHOUT STATUS MIGRAINOSUS, NOT INTRACTABLE: ICD-10-CM

## 2024-07-29 DIAGNOSIS — Z91.09 HOUSE DUST MITE ALLERGY: ICD-10-CM

## 2024-07-29 DIAGNOSIS — L50.1 CHRONIC IDIOPATHIC URTICARIA: ICD-10-CM

## 2024-07-29 DIAGNOSIS — M19.90 INFLAMMATORY ARTHRITIS: ICD-10-CM

## 2024-07-29 DIAGNOSIS — F41.9 ANXIETY: ICD-10-CM

## 2024-07-29 DIAGNOSIS — F32.0 CURRENT MILD EPISODE OF MAJOR DEPRESSIVE DISORDER WITHOUT PRIOR EPISODE (H): ICD-10-CM

## 2024-07-29 DIAGNOSIS — E66.3 OVERWEIGHT (BMI 25.0-29.9): Primary | ICD-10-CM

## 2024-07-29 RX ORDER — ONDANSETRON 4 MG/1
4-8 TABLET, ORALLY DISINTEGRATING ORAL EVERY 8 HOURS PRN
Qty: 20 TABLET | Refills: 1 | Status: SHIPPED | OUTPATIENT
Start: 2024-07-29

## 2024-07-29 ASSESSMENT — PAIN SCALES - GENERAL: PAINLEVEL: NO PAIN (0)

## 2024-07-29 NOTE — LETTER
7/29/2024      RE: Kasandra Lau  [address]  [facility] [address]       Dear Colleague,    Thank you for the opportunity to participate in the care of your patient, Kasandra Lau, at the [facility] [place] [facility]. Please see a copy of my visit note below.    Medication Therapy Management (MTM) Encounter    ASSESSMENT:                            Medication Adherence/Access: No issues identified    Weight Management /Fatty liver/nausea:  She just started compounded semaglutide so too early to assess efficacy/tolerability. Will continue taper. Monitor for side effects since history of GI side effects in the past.     Depression/Anxiety  Stable. She is on several medications that can cause dry mouth including Vyvanse, doxepin, bupropion, and vilazodone. The medications are helpful for the respective conditions. Can discuss more next visit.     Idiopathic hives:   stable    ADHD:   stable    Fibromyalgia/inflammatory arthritis:  stable    Allergies:   Medications are helpful. Phenylephrine has been shown to not be more effective than placebo in studies. Will discuss at next visit.     Migraine:   Improved with Emgality       PLAN:                            Increase semaglutide to 0.5 mg once weekly in 3 weeks. If tolerating, can increase to semaglutide 1 mg once weekly after that.     Follow-up: 2 months with medication therapy management, 4 months with Dr. Manning    SUBJECTIVE/OBJECTIVE:                          Kasandra Lau is a 39 year old female seen for a follow-up visit.  Previously saw Lauren Bloch as a DSM visit. Full med review not done that time. Scheduled for a 30 minute visit today.    Reason for visit: follow-up on compounded semaglutide.    Allergies/ADRs: Reviewed in chart  Past Medical History: Reviewed in chart  Tobacco: She reports that she has quit smoking. Her smoking use included cigarettes. She has never used  "smokeless tobacco.  Alcohol: not currently  Medication Adherence/Access: weight management medications not covered    Weight Management/Fatty liver/nausea:  Compounded semaglutide 0.25 mg (5 units) once weekly (one dose so far)  Vyvanse 30 mg once daily     Following with Dr. Manning - last visit was 6/25/24. No side effects but just started. She has taken ondansetron in the past for nausea and would like a refill. No questions on administration. Not taking naltrexone.   Diet/Eating Habits: Patient is mindful of what she's eating but not currently tracking calories. Incorporating whole foods into diet like fruits and whole grains. Sometimes has sugar cravings. Drinks water out of a big jug.      Exercise/Activity: Patient reports she was doing pilates class 1 time per week but budget is tight right now. Has dog, walking twice daily. Has gym at apartment she could start using.   Previous trials:   Ozempic 10/2022-7/2023 (caused constipation, nausea, acid reflux. Stopped being covered).   Qsymia (Not helpful)  Topiramate - [id number]- brain fog and word recall issues    Initial consult weight: 195 lbs 10/4/22     Current weight today: 190 lbs 0 oz  Cumulative Weight Loss: -5 lb    Wt Readings from Last 4 Encounters:   07/29/24 86.2 kg (190 lb)   06/25/24 84.8 kg (187 lb)   06/05/24 79.4 kg (175 lb)   05/26/24 79.4 kg (175 lb)     Estimated body mass index is 28.06 kg/m  as calculated from the following:    Height as of this encounter: 1.753 m (5' 9\").    Weight as of this encounter: 86.2 kg (190 lb).      Depression/Anxiety  Bupropion 300 mg once daily  Vilazodone 40 mg once daily.   Propranolol 20 mg once daily as needed   Gabapentin 800 mg 3 times daily   Patient reports dry mouth.  Patient reports some anxiety due to finishing PhD but overall stable.    Idiopathic hives:   Doxepin 20 mg 2 times daily   Famotidine 20 mg 2 times daily   Montelukast 10 mg once daily at bedtime  These are helpful. Famotidine also helps " "with acid reflux. Dry mouth from doxepin.    ADHD:   Strattera 40 mg once daily   Vyvanse 30 mg once daily every morning   These are helpful. Vyvanse has also been helpful for binge-eating. Dry mouth.    Fibromyalgia/inflammatory arthritis:  Hydroxychloroquine 400 mg once daily   Methotrexate 15 mg every 7 days  Gabapentin 800 mg 3 times daily   This is helpful. No issues    Allergies:   Cetirizine 10 mg once daily   Flonase nasal spray 2 sprays 2 times daily   Montelukast 10 m gonce daily   Phenylephrine 10 mg 2 times daily     Allergic to dust mites. Thinks living in an apartment with carpet is worsening allergies. The medications are helpful. Guaifenesin is helpful fr post-nasal drip. No issues.    Migraine:   Emgality 120 mg every 28 days  Almotriptan 12.5 mg as needed  Emgality has helped reduce migraine frequency. One migraine per week. No side effect concerns    Today's Vitals: Ht 1.753 m (5' 9\")   Wt 86.2 kg (190 lb)   BMI 28.06 kg/m    ----------------    I spent 25 minutes with this patient today. All changes were made via collaborative practice agreement with Dr. Kerri Evans. A copy of the visit note was provided to the patient's provider(s).    A summary of these recommendations was sent via CloudTags.    Telemedicine Visit Details  Type of service:  Video Conference via Issue  Start Time:  8:30 am  End Time:  8:55 am     Medication Therapy Recommendations  Irritable bowel syndrome with constipation    Current Medication: polyethylene glycol (MIRALAX) 17 GM/Dose powder (Discontinued)   Rationale: Condition refractory to medication - Ineffective medication - Effectiveness   Recommendation: Change Medication - Amitiza 8 MCG Caps   Status: No Longer Relevant         Overweight (BMI 25.0-29.9)    Current Medication: COMPOUNDED NON-CONTROLLED SUBSTANCE (CMPD RX) - PHARMACY TO MIX COMPOUNDED MEDICATION   Rationale: Dose too low - Dosage too low - Effectiveness   Recommendation: Increase Dose "   Status: Accepted per CPA                Please do not hesitate to contact me if you have any questions/concerns.     Sincerely,     CHELLE MOSER RPH

## 2024-07-29 NOTE — PROGRESS NOTES
Medication Therapy Management (MTM) Encounter    ASSESSMENT:                            Medication Adherence/Access: No issues identified    Weight Management /Fatty liver/nausea:  She just started compounded semaglutide so too early to assess efficacy/tolerability. Will continue taper. Monitor for side effects since history of GI side effects in the past.     Depression/Anxiety  Stable. She is on several medications that can cause dry mouth including Vyvanse, doxepin, bupropion, and vilazodone. The medications are helpful for the respective conditions. Can discuss more next visit.     Idiopathic hives:   stable    ADHD:   stable    Fibromyalgia/inflammatory arthritis:  stable    Allergies:   Medications are helpful. Phenylephrine has been shown to not be more effective than placebo in studies. Will discuss at next visit.     Migraine:   Improved with Emgality       PLAN:                            Increase semaglutide to 0.5 mg once weekly in 3 weeks. If tolerating, can increase to semaglutide 1 mg once weekly after that.     Follow-up: 2 months with medication therapy management, 4 months with Dr. Manning    SUBJECTIVE/OBJECTIVE:                          Kasandra Lau is a 39 year old female seen for a follow-up visit.  Previously saw Lauren Bloch as a DSM visit. Full med review not done that time. Scheduled for a 30 minute visit today.    Reason for visit: follow-up on compounded semaglutide.    Allergies/ADRs: Reviewed in chart  Past Medical History: Reviewed in chart  Tobacco: She reports that she has quit smoking. Her smoking use included cigarettes. She has never used smokeless tobacco.  Alcohol: not currently  Medication Adherence/Access: weight management medications not covered    Weight Management /Fatty liver/nausea:  Compounded semaglutide 0.25 mg (5 units) once weekly (one dose so far)  Vyvanse 30 mg once daily     Following with Dr. Manning - last visit was 6/25/24. No side effects but just started. She  "has taken ondansetron in the past for nausea and would like a refill. No questions on administration. Not taking naltrexone.   Diet/Eating Habits: Patient is mindful of what she's eating but not currently tracking calories. Incorporating whole foods into diet like fruits and whole grains. Sometimes has sugar cravings. Drinks water out of a big jug.      Exercise/Activity: Patient reports she was doing pilates class 1 time per week but budget is tight right now. Has dog, walking twice daily. Has gym at apartment she could start using.   Previous trials:   Ozempic 10/2022-7/2023 (caused constipation, nausea, acid reflux. Stopped being covered).   Qsymia (Not helpful)  Topiramate - [id number]- brain fog and word recall issues    Initial consult weight: 195 lbs 10/4/22     Current weight today: 190 lbs 0 oz  Cumulative Weight Loss: -5 lb    Wt Readings from Last 4 Encounters:   07/29/24 86.2 kg (190 lb)   06/25/24 84.8 kg (187 lb)   06/05/24 79.4 kg (175 lb)   05/26/24 79.4 kg (175 lb)     Estimated body mass index is 28.06 kg/m  as calculated from the following:    Height as of this encounter: 1.753 m (5' 9\").    Weight as of this encounter: 86.2 kg (190 lb).      Depression/Anxiety  Bupropion 300 mg once daily  Vilazodone 40 mg once daily.   Propranolol 20 mg once daily as needed   Gabapentin 800 mg 3 times daily   Patient reports dry mouth.  Patient reports some anxiety due to finishing PhD but overall stable.    Idiopathic hives:   Doxepin 20 mg 2 times daily   Famotidine 20 mg 2 times daily   Montelukast 10 mg once daily at bedtime  These are helpful. Famotidine also helps with acid reflux. Dry mouth from doxepin.    ADHD:   Strattera 40 mg once daily   Vyvanse 30 mg once daily every morning   These are helpful. Vyvanse has also been helpful for binge-eating. Dry mouth.    Fibromyalgia/inflammatory arthritis:  Hydroxychloroquine 400 mg once daily   Methotrexate 15 mg every 7 days  Gabapentin 800 mg 3 times daily " "  This is helpful. No issues    Allergies:   Cetirizine 10 mg once daily   Flonase nasal spray 2 sprays 2 times daily   Montelukast 10 m gonce daily   Phenylephrine 10 mg 2 times daily     Allergic to dust mites. Thinks living in an apartment with carpet is worsening allergies. The medications are helpful. Guaifenesin is helpful fr post-nasal drip. No issues.    Migraine:   Emgality 120 mg every 28 days  Almotriptan 12.5 mg as needed  Emgality has helped reduce migraine frequency. One migraine per week. No side effect concerns    Today's Vitals: Ht 1.753 m (5' 9\")   Wt 86.2 kg (190 lb)   BMI 28.06 kg/m    ----------------    I spent 25 minutes with this patient today. All changes were made via collaborative practice agreement with Dr. Kerri Evans. A copy of the visit note was provided to the patient's provider(s).    A summary of these recommendations was sent via [employer].    Telemedicine Visit Details  Type of service:  Video Conference via [employer]  Start Time:  8:30 am  End Time:  8:55 am     Medication Therapy Recommendations  Irritable bowel syndrome with constipation    Current Medication: polyethylene glycol (MIRALAX) 17 GM/Dose powder (Discontinued)   Rationale: Condition refractory to medication - Ineffective medication - Effectiveness   Recommendation: Change Medication - Amitiza 8 MCG Caps   Status: No Longer Relevant         Overweight (BMI 25.0-29.9)    Current Medication: COMPOUNDED NON-CONTROLLED SUBSTANCE (CMPD RX) - PHARMACY TO MIX COMPOUNDED MEDICATION   Rationale: Dose too low - Dosage too low - Effectiveness   Recommendation: Increase Dose   Status: Accepted per CPA              "

## 2024-07-29 NOTE — NURSING NOTE
Current patient location: [address]  [facility] [address]    Is the patient currently in the state of [place]? YES    Visit mode:VIDEO    If the visit is dropped, the patient can be reconnected by: VIDEO VISIT: Text to cell phone:   Telephone Information:   Mobile [phone]       Will anyone else be joining the visit? NO  (If patient encounters technical issues they should call [phone]    How would you like to obtain your AVS? MyChart    Are changes needed to the allergy or medication list? No    Are refills needed on medications prescribed by this physician? Compounded semaglutide- not sure which to select - I will leave it up to provider for the controlled substance question.     Reason for visit: RECHECK    Bobbilynn Grossaint VVF

## 2024-07-31 NOTE — CONFIDENTIAL NOTE
"  Health Psychology - Follow up Visit  Confidential Summary*    The author of this note documented a reason for not sharing it with the patient.  REFERRAL SOURCE:  Psychiatry    CHIEF COMPLAINT/REASON FOR VISIT  Psychotherapy in context of chronic PTSD, ADHD and persistent depression.  Patient is challenged by procrastination and time management.      Patient was seen today for a 60 minute individual psychotherapy session.  The session was facilitated via VIDEO with patient at her home and provider at her own home.  We used [employer] platform.       The patient has been notified of following:   \"This VIDEO visit will be conducted via a call between you and your physician/provider. We have found that certain health care needs can be provided without the need for an in-person physical exam.  VIDEO visits are billed at different rates depending on your insurance coverage.  Please reach out to your insurance provider with any questions. If during the course of the call the physician/provider feels a video visit is not appropriate, you will not be charged for this service.\"     Patient has given verbal consent for VIDEO visit? Yes    Subjective:  Patient began with report that she and live in boyfriend continue to grow closer and that she is anticipating that he will help to contribute to their finances once he starts to receive a paycheck.  She reported that they have largely been getting along well but that he has chosen to spend more time with his mother and because he is cpmpleting the requirements of a substance abuse treatment center, he attends groups there daily for half the day and now works the remainder of the day.  She described noticing that having him live with her has allowed her to focus on her work and that she is soon to complete her second paper.      Validate patients ongoing challenges in completing her doctoral degree without family or other sources of support and struggling with multiple medical " conditions and ADHD.  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    She reported that she has begun taking weight loss medication and has found it effective in decreasing cravings.      She denied current depressed mood but endorsed worry about her academic progress.  Discussed her use of coping tools to manage time and anxiety.      Objective:  Patient was on time for today s session. She was alert and oriented. Mood was euthymic with appropriate range of affect. Patient denied suicidal or assaultive ideation, plan, or intent.        Assessment:  The patient has a longstanding history of interpersonal discord and challenges with emotion regulation and avoidance as a coping mechanism for anxiety.  She has completed all requirements for her PhD and is now ABD.  She is living in  housing with her two dogs and boyfriend.        Plan:  Patient graduated from full model DBT at [facility] and is now completing phase 2 work.      Treatment plan updated.      Time In: 10:00  Time Out: 11:00    Diagnosis:  Axis I PTSD, chronic, persistent depressive disorder, adjustment disorder with mixed, psychological factors associated with physical (fibromyalgia); ADHD   Axis II  BPD   Axis III please see medical records for details   [place] IV Psychosocial and Environmental Stressors: living alone with no family support, chronic illness, completing grad school requirements under 6 month deadline for completion         Corina Muhammad, PhD, LP

## 2024-08-01 ENCOUNTER — VIRTUAL VISIT (OUTPATIENT)
Dept: PSYCHOLOGY | Facility: CLINIC | Age: 39
End: 2024-08-01
Payer: COMMERCIAL

## 2024-08-01 DIAGNOSIS — F43.12 CHRONIC POST-TRAUMATIC STRESS DISORDER (PTSD): Primary | ICD-10-CM

## 2024-08-01 DIAGNOSIS — F43.23 ADJUSTMENT DISORDER WITH MIXED ANXIETY AND DEPRESSED MOOD: ICD-10-CM

## 2024-08-01 DIAGNOSIS — F90.0 ADHD (ATTENTION DEFICIT HYPERACTIVITY DISORDER), INATTENTIVE TYPE: ICD-10-CM

## 2024-08-01 DIAGNOSIS — F54 PSYCHOLOGICAL AND BEHAVIORAL FACTORS ASSOCIATED WITH DISORDERS OR DISEASES CLASSIFIED ELSEWHERE: ICD-10-CM

## 2024-08-01 PROCEDURE — 90837 PSYTX W PT 60 MINUTES: CPT | Mod: 95 | Performed by: PSYCHOLOGIST

## 2024-08-05 DIAGNOSIS — E66.3 OVERWEIGHT: Primary | ICD-10-CM

## 2024-08-05 NOTE — PROGRESS NOTES
MTM referral placed due to [facility] Medication Therapy Management (MTM) practice shift. CPA with Dr. Kerri Evans.

## 2024-08-05 NOTE — PATIENT INSTRUCTIONS
"Recommendations from today's MTM visit:                                                      Increase semaglutide to 0.5 mg once weekly in 3 weeks. If tolerating, can increase to semaglutide 1 mg once weekly after that.     Follow-up: 2 months with medication therapy management, 4 months with Dr. Manning    It was great speaking with you today.  I value your experience and would be very thankful for your time in providing feedback in our clinic survey. In the next few days, you may receive an email or text message from [employer] with a link to a survey related to your  clinical pharmacist.\"     To schedule another MTM appointment, please call the clinic directly or you may call the MTM scheduling line at [phone].    My Clinical Pharmacist's contact information:                                                      Please feel free to contact me with any questions or concerns you have.      Shaunna Cary, PharmD, AAHIVP  Medication Therapy Management Pharmacist      "

## 2024-08-05 NOTE — CONFIDENTIAL NOTE
"  Health Psychology - Follow up Visit  Confidential Summary*    The author of this note documented a reason for not sharing it with the patient.  REFERRAL SOURCE:  Psychiatry    CHIEF COMPLAINT/REASON FOR VISIT  Psychotherapy in context of chronic PTSD, ADHD and persistent depression.  Patient is challenged by procrastination and time management.      Patient was seen today for a 60 minute individual psychotherapy session.  The session was facilitated via VIDEO with patient at her home and provider at her own home.  We used [employer] platform.       The patient has been notified of following:   \"This VIDEO visit will be conducted via a call between you and your physician/provider. We have found that certain health care needs can be provided without the need for an in-person physical exam.  VIDEO visits are billed at different rates depending on your insurance coverage.  Please reach out to your insurance provider with any questions. If during the course of the call the physician/provider feels a video visit is not appropriate, you will not be charged for this service.\"     Patient has given verbal consent for VIDEO visit? Yes    Subjective:  Patient began with report that she continues to live with boyfriend and that she believes he is improving the overall quality of her life.  She reported that they had a recent argument surrounding his value system pertaining to his understanding of politics and cultural and gender equality.  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  Therefore, she has established that the relationship is short term and for enjoyment and positive distraction from her day to " day stress.  Discussed how she might balance her frustration with his relative lack of education about the myriad aspects behind inequalities with her desire to have a stable homelife with someone she can relax with.      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  Used motivational interviewing to identify reasons that she might move forward on her statement and on to her papers so that she might complete her degree.  She continues to maintain that her progress is stalled by her concerns about money and that the relationship with [place] may help her to stay on top of her bills, if he continues to share their finances.      Patient was encouraged to continue to build a life worth living.      Objective:  Patient was on time for today s session. She was alert and oriented. Mood was euthymic with appropriate range of affect. Patient denied suicidal or assaultive ideation, plan, or intent.        Assessment:  The patient has a longstanding history of interpersonal discord and challenges with emotion regulation and avoidance as a coping mechanism for anxiety.  She has completed all requirements for her PhD and is now ABD.  She is living in  housing with her two dogs and boyfriend.        Plan:  Patient graduated from full model DBT at [facility] and is now completing phase 2 work.      Treatment plan updated.      Time In: 3:00  Time Out: 4:00    Diagnosis:  Axis I PTSD, chronic, persistent depressive disorder, adjustment disorder with mixed, psychological factors associated with physical (fibromyalgia); ADHD   Axis II  BPD   Axis III please see medical records for details   [place] IV Psychosocial and Environmental Stressors: living alone with no family support, chronic illness, completing grad school requirements under 6 month deadline for completion         Corina Muhammad, PhD, LP

## 2024-08-08 ENCOUNTER — VIRTUAL VISIT (OUTPATIENT)
Dept: PSYCHOLOGY | Facility: CLINIC | Age: 39
End: 2024-08-08
Payer: COMMERCIAL

## 2024-08-08 DIAGNOSIS — F54 PSYCHOLOGICAL AND BEHAVIORAL FACTORS ASSOCIATED WITH DISORDERS OR DISEASES CLASSIFIED ELSEWHERE: ICD-10-CM

## 2024-08-08 DIAGNOSIS — F90.0 ADHD (ATTENTION DEFICIT HYPERACTIVITY DISORDER), INATTENTIVE TYPE: ICD-10-CM

## 2024-08-08 DIAGNOSIS — F43.12 CHRONIC POST-TRAUMATIC STRESS DISORDER (PTSD): Primary | ICD-10-CM

## 2024-08-08 DIAGNOSIS — F43.23 ADJUSTMENT DISORDER WITH MIXED ANXIETY AND DEPRESSED MOOD: ICD-10-CM

## 2024-08-08 PROCEDURE — 90837 PSYTX W PT 60 MINUTES: CPT | Mod: 95 | Performed by: PSYCHOLOGIST

## 2024-08-11 NOTE — CONFIDENTIAL NOTE
"  Health Psychology - Follow up Visit  Confidential Summary*    The author of this note documented a reason for not sharing it with the patient.  REFERRAL SOURCE:  Psychiatry    CHIEF COMPLAINT/REASON FOR VISIT  Psychotherapy in context of chronic PTSD, ADHD and persistent depression.  Patient is challenged by procrastination and time management.      Patient was seen today for a 60 minute individual psychotherapy session.  The session was facilitated via VIDEO with patient at her home and provider at her own home.  We used [employer] platform.       The patient has been notified of following:   \"This VIDEO visit will be conducted via a call between you and your physician/provider. We have found that certain health care needs can be provided without the need for an in-person physical exam.  VIDEO visits are billed at different rates depending on your insurance coverage.  Please reach out to your insurance provider with any questions. If during the course of the call the physician/provider feels a video visit is not appropriate, you will not be charged for this service.\"     Patient has given verbal consent for VIDEO visit? Yes    Subjective:  Patient began with discussion that she and boyfriend have had several difficult discussions about his not helping out with the care of their joint home since they have started cohabitating.  She had sent several texts describing her frustration with his lack of effort.  She reported that he informed her that he is feeling depressed and that he is concerned about his mother in hospice, his father has pneumonia and he is currently challenged to have visitation with his daughter who resides with her mother.  She reported that his ex girlfriend has been calling him daily and that although he does not answer these calls because they have an order for protection, knowing she is calling is upsetting to him.  Encouraged her to continue to focus on her own well being and reminded her that " "she is intent on completing the requirements for her degree so that she can graduate, secure a postdoctoral fellowship and move to [place] or elsewhere.  She reported that she is enjoying his company and is having to remind herself that this relationship is temporary since he is not interested in leaving the [place].     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  Congratulated her accomplishment.  She reported that she is close to send her paper for external review.  Encouraged her to set her own deadline.      She reported that her dog has been losing weight and used term \"wasting\".  She described concern about longevity of this special pet who has been with her for the past 17 years.  She described awareness of the possibility of upcoming loss.      Objective:  Patient was on time for today s session. She was alert and oriented. Mood was euthymic with appropriate range of affect. Patient denied suicidal or assaultive ideation, plan, or intent.        Assessment:  The patient has a longstanding history of interpersonal discord and challenges with emotion regulation and avoidance as a coping mechanism for anxiety.  She has completed all requirements for her PhD and is now ABD.  She is living in  housing with her two dogs and boyfriend.        Plan:  Patient graduated from full model DBT at [facility] and is now completing phase 2 work.      Treatment plan updated.      Time In: 3:00  Time Out: 4:00    Diagnosis:  Axis I PTSD, chronic, persistent depressive disorder, adjustment disorder with mixed, psychological factors associated with physical (fibromyalgia); ADHD   Axis II  BPD   Axis III please see medical records for details   [place] IV Psychosocial and Environmental Stressors: living alone with no family support, chronic illness, completing grad school requirements under 6 month deadline for completion "         Corina Muhammad, PhD, LP

## 2024-08-12 ENCOUNTER — MYC MEDICAL ADVICE (OUTPATIENT)
Dept: PSYCHIATRY | Facility: CLINIC | Age: 39
End: 2024-08-12
Payer: COMMERCIAL

## 2024-08-12 DIAGNOSIS — F33.1 MAJOR DEPRESSIVE DISORDER, RECURRENT EPISODE, MODERATE (H): ICD-10-CM

## 2024-08-12 DIAGNOSIS — F90.8 ATTENTION DEFICIT HYPERACTIVITY DISORDER (ADHD), OTHER TYPE: ICD-10-CM

## 2024-08-12 RX ORDER — LISDEXAMFETAMINE DIMESYLATE 30 MG/1
30 CAPSULE ORAL EVERY MORNING
Qty: 30 CAPSULE | Refills: 0 | Status: SHIPPED | OUTPATIENT
Start: 2024-08-12 | End: 2024-09-04

## 2024-08-12 NOTE — TELEPHONE ENCOUNTER
Reviewed patient's [employer] message. She is requesting a refill of Vyvanse sent to [facility]     Date of Last Office Visit: 6/11/24  Date of Next Office Visit: None; routing for A to assist pt with scheduling.    No shows since last visit: No  More than one patient-initiated cancellation (with reschedule) since last seen in clinic? No    []Medication refilled per  Medication Refill in Ambulatory Care  policy.  [x]Medication unable to be refilled by RN due to criteria not met as indicated below:    []Eligibility: has not had a provider visit within last 6 months   []Supervision: no future appointment; < 7 days before next appointment   []Compliance: no shows; cancellations; lapse in therapy   []Verification: order discrepancy; may need modification...   [] > 30-day supply request   []Advanced refill request: > 7 days before refill date   [x]Controlled medication   []Medication not included in policy   []Review: new med; med adjusted ? 30 days; safety alert; requires lab monitoring...   []Scope of Practice: refill request processed by LPN/MA   []Other:      Medication(s) requested:     -  lisdexamfetamine (VYVANSE) 30 MG capsule   Date last ordered: 7/11/24  Qty: 30  Refills: 0      Any Controlled Substance(s)? Yes   [place]  checked? No; not current delegate      Requested medication(s) verified as identical to current order? Yes    Any lapse in adherence to medication(s) greater than 5 days? No      Additional action taken? routed encounter to provider for review.      Last visit treatment plan:     Treatment Plan:        1.  Atomoxetine 40 mg daily  2.  Bupropion  mg daily  3.  Doxepin 20 mg 2 times daily  4.  Vyvanse 30 mg daily  5.  Propranolol 20 mg daily as needed for anxiety  6.  Viibryd 40 mg daily  7.  Counseling therapies when able to for learning skills to manage life stressors and adjustments     Continue all other medications as reviewed per electronic medical record today.   Safety  plan reviewed. To the Emergency Department as needed or call after hours crisis line at [phone] or [phone]. [place] Crisis Text Line. Text [place] to 405338 or Suicide LifeLine Chat: suicidepreventionlifeline.org/chat/  To schedule individual or family therapy, call [facility] at [phone]  Schedule an appointment with me in 3 months or sooner as needed. Call [facility] at [phone] to schedule.    Any medication(s) require lab monitoring? No

## 2024-08-15 ENCOUNTER — VIRTUAL VISIT (OUTPATIENT)
Dept: PSYCHOLOGY | Facility: CLINIC | Age: 39
End: 2024-08-15
Payer: COMMERCIAL

## 2024-08-15 DIAGNOSIS — F43.12 CHRONIC POST-TRAUMATIC STRESS DISORDER (PTSD): Primary | ICD-10-CM

## 2024-08-15 DIAGNOSIS — F54 PSYCHOLOGICAL AND BEHAVIORAL FACTORS ASSOCIATED WITH DISORDERS OR DISEASES CLASSIFIED ELSEWHERE: ICD-10-CM

## 2024-08-15 DIAGNOSIS — F90.0 ADHD (ATTENTION DEFICIT HYPERACTIVITY DISORDER), INATTENTIVE TYPE: ICD-10-CM

## 2024-08-15 DIAGNOSIS — F43.23 ADJUSTMENT DISORDER WITH MIXED ANXIETY AND DEPRESSED MOOD: ICD-10-CM

## 2024-08-15 PROCEDURE — 90837 PSYTX W PT 60 MINUTES: CPT | Mod: 95 | Performed by: PSYCHOLOGIST

## 2024-08-15 RX ORDER — VILAZODONE HYDROCHLORIDE 40 MG/1
40 TABLET ORAL EVERY MORNING
Qty: 90 TABLET | Refills: 1 | Status: SHIPPED | OUTPATIENT
Start: 2024-08-15

## 2024-08-15 NOTE — TELEPHONE ENCOUNTER
1) Reviewed [employer] message. The patient reported that she was unable to transfer Vilazodone from [facility] to [facility]. [facility] told her that they do not have an active order for Vilazodone on file    2) Attempted to call [facility] [id number] [place], [place] - [address] to confirm if there was an active order. Unable to reach the pharmacy.     3) Routing to provider for review       Date of Last Office Visit: 6/11/24  Date of Next Office Visit: None; routing for [facility] to assist pt with scheduling.    No shows since last visit: No  More than one patient-initiated cancellation (with reschedule) since last seen in clinic? No    []Medication refilled per  Medication Refill in Ambulatory Care  policy.  [x]Medication unable to be refilled by RN due to criteria not met as indicated below:    []Eligibility: has not had a provider visit within last 6 months   []Supervision: no future appointment; < 7 days before next appointment   []Compliance: no shows; cancellations; lapse in therapy   []Verification: order discrepancy; may need modification...   [x] > 30-day supply request   []Advanced refill request: > 7 days before refill date   []Controlled medication   []Medication not included in policy   []Review: new med; med adjusted ? 30 days; safety alert; requires lab monitoring...   []Scope of Practice: refill request processed by LPN/MA   []Other:      Medication(s) requested:     -  vilazodone (VIIBRYD) 40 MG TABS tablet   Date last ordered: 4/16/24  Qty: 90  Refills: 1        Any Controlled Substance(s)? No      Requested medication(s) verified as identical to current order? Yes    Any lapse in adherence to medication(s) greater than 5 days? No      Additional action taken? routed encounter to provider for review.      Last visit treatment plan:     Treatment Plan:        1.  Atomoxetine 40 mg daily  2.  Bupropion  mg daily  3.  Doxepin 20 mg 2 times daily  4.  Vyvanse 30 mg daily  5.  Propranolol 20 mg  daily as needed for anxiety  6.  Viibryd 40 mg daily  7.  Counseling therapies when able to for learning skills to manage life stressors and adjustments     Continue all other medications as reviewed per electronic medical record today.   Safety plan reviewed. To the Emergency Department as needed or call after hours crisis line at [phone] or [phone]. [place] Crisis Text Line. Text [place] to 881150 or Suicide LifeLine Chat: suicidepreventionlifeline.org/chat/  To schedule individual or family therapy, call [facility] at [phone]  Schedule an appointment with me in 3 months or sooner as needed    Any medication(s) require lab monitoring? No

## 2024-08-16 RX ORDER — ATOMOXETINE 40 MG/1
40 CAPSULE ORAL DAILY
Qty: 90 CAPSULE | Refills: 0 | Status: SHIPPED | OUTPATIENT
Start: 2024-08-16

## 2024-08-16 NOTE — TELEPHONE ENCOUNTER
Geovanna's patient list has been sent to [facility].  They will contact those patients to schedule a new patient appointment with a new provider.  Scheduling Coordinators will not be scheduling any of Geovanna's patients.

## 2024-08-16 NOTE — TELEPHONE ENCOUNTER
Date of Last Office Visit: 6/11/2024  Date of Next Office Visit:  None referred to long term psychiatry d/t provider retiring   No shows since last visit: No  More than one patient-initiated cancellation (with reschedule) since last seen in clinic? No    []Medication refilled per  Medication Refill in Ambulatory Care  policy.  [x]Medication unable to be refilled by RN due to criteria not met as indicated below:    []Eligibility: has not had a provider visit within last 6 months   [x]Supervision: no future appointment; < 7 days before next appointment   []Compliance: no shows; cancellations; lapse in therapy   []Verification: order discrepancy; may need modification...   [x] > 30-day supply request   []Advanced refill request: > 7 days before refill date   []Controlled medication   []Medication not included in policy   []Review: new med; med adjusted ? 30 days; safety alert; requires lab monitoring...   []Scope of Practice: refill request processed by LPN/MA   []Other:      Medication(s) requested:   -  atomoxetine (STRATTERA) 40 MG capsule   Date last ordered: 5/16/2024  Qty: 90  Refills: 0  Take 1 capsule (40 mg) by mouth daily     Appropriate for refill? Yes      Any Controlled Substance(s)? No      Requested medication(s) verified as identical to current order? Yes    Any lapse in adherence to medication(s) greater than 5 days? No      Additional action taken? routed encounter to provider for review.      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  She has not started the increased dose of Vyvanse yet but will start that soon at 30 mg daily to help with focus, follow through, as well as binge eating which is a concern for her.  Doxepin continues twice daily for anxiety.  She will continue with atomoxetine 40 mg daily as an adjunct to the Vyvanse.  Bupropion continues daily for depression in addition to the Viibryd.  She tolerates the propranolol  well twice daily as needed for performance anxiety.  Counseling therapies are recommended as she feels isolated from her peers and is now establishing a new relationship..     Medication side effects and alternatives were reviewed. Health promotion activities recommended and reviewed today. All questions addressed. Education and counseling completed regarding risks and benefits of medications and psychotherapy options.     Treatment Plan:  1.  Atomoxetine 40 mg daily  2.  Bupropion  mg daily  3.  Doxepin 20 mg 2 times daily  4.  Vyvanse 30 mg daily  5.  Propranolol 20 mg daily as needed for anxiety  6.  Viibryd 40 mg daily  7.  Counseling therapies when able to for learning skills to manage life stressors and adjustments     Continue all other medications as reviewed per electronic medical record today.   Safety plan reviewed. To the Emergency Department as needed or call after hours crisis line at [phone] or [phone]. [place] Crisis Text Line. Text [place] to 084496 or Suicide LifeLine Chat: suicidepreventionlifeline.org/chat/  To schedule individual or family therapy, call [facility] at [phone]  Schedule an appointment with me in 3 months or sooner as needed. Call [facility] at [phone] to schedule.  Follow up with primary care provider as planned or for acute medical concerns.  Call the psychiatric nurse line with medication questions or concerns at [phone]  MyChart may be used to communicate with your provider, but this is not intended to be used for emergencies.          Any medication(s) require lab monitoring? No

## 2024-08-17 NOTE — CONFIDENTIAL NOTE
"  Health Psychology - Follow up Visit  Confidential Summary*    The author of this note documented a reason for not sharing it with the patient.  REFERRAL SOURCE:  Psychiatry    CHIEF COMPLAINT/REASON FOR VISIT  Psychotherapy in context of chronic PTSD, ADHD and persistent depression.  Patient is challenged by procrastination and time management.      Patient was seen today for a 60 minute individual psychotherapy session.  The session was facilitated via VIDEO with patient at her home and provider at her own home.  We used [employer] platform.       The patient has been notified of following:   \"This VIDEO visit will be conducted via a call between you and your physician/provider. We have found that certain health care needs can be provided without the need for an in-person physical exam.  VIDEO visits are billed at different rates depending on your insurance coverage.  Please reach out to your insurance provider with any questions. If during the course of the call the physician/provider feels a video visit is not appropriate, you will not be charged for this service.\"     Patient has given verbal consent for VIDEO visit? Yes    Subjective:  Patient began with discussion of her ongoing work that she is doing to cope with the frustration inherent in living with her boyfriend who is completing treatment (20 hours/week) and working 20 hours per week.  He is also grieving the loss of his daughter who is residing with his ex girlfriend and is not allowing for visitation.  Patient reminded herself that their relationship is likely temporary and that she is hoping to accept a position in [place] for Fall, 25.    Much of session focused on her ongoing anxiety surrounding completing her second paper toward her dissertation completion.  She reported that the paper is approaching a draft but that she has not completed the conclusion.  Encouraged her to send to her advisor so that he might comment and that comments might inform her " conclusion.  She was also encouraged to consider her anxiety when writing and whether a career focused on writing will provide her with a quality life where she will be happy.  She initially described the belief that she enjoys writing but that she has trouble with perfectionism when being evaluated, that she cannot focus on writing when she has competing demands on her time and that she has a harder time writing when she is expected to summarize a large area of work.  Each of these variables will likely exist in an academic position.  However, she reported that each of these are not an issue in [place] and that she believes that she will have more luxury in her time line.      Encouraged her to notice her emotions and what distracts her from maintaining her focus when writing.  She was able to describe fear that she will be judged and criticized.  She was encouraged to send a draft.     Objective:  Patient was on time for today s session. She was alert and oriented. Mood was euthymic with appropriate range of affect. Patient denied suicidal or assaultive ideation, plan, or intent.        Assessment:  The patient has a longstanding history of interpersonal discord and challenges with emotion regulation and avoidance as a coping mechanism for anxiety.  She has completed all requirements for her PhD and is now ABD.  She is living in  housing with her two dogs and boyfriend.        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      Treatment plan updated.      Time In: 3:00  Time Out: 4:00    Diagnosis:  Axis I PTSD, chronic, persistent depressive disorder, adjustment disorder with mixed, psychological factors associated with physical (fibromyalgia); ADHD   Axis II  BPD   Axis III please see medical records for details   [place] IV Psychosocial and Environmental Stressors: living alone with no family support, chronic illness, completing grad school requirements  under 6 month deadline for completion         Corina Muhammad, PhD, LP

## 2024-08-22 ENCOUNTER — VIRTUAL VISIT (OUTPATIENT)
Dept: PSYCHOLOGY | Facility: CLINIC | Age: 39
End: 2024-08-22
Payer: COMMERCIAL

## 2024-08-22 DIAGNOSIS — F43.12 CHRONIC POST-TRAUMATIC STRESS DISORDER (PTSD): Primary | ICD-10-CM

## 2024-08-22 DIAGNOSIS — F43.23 ADJUSTMENT DISORDER WITH MIXED ANXIETY AND DEPRESSED MOOD: ICD-10-CM

## 2024-08-22 DIAGNOSIS — F90.0 ADHD (ATTENTION DEFICIT HYPERACTIVITY DISORDER), INATTENTIVE TYPE: ICD-10-CM

## 2024-08-22 DIAGNOSIS — F54 PSYCHOLOGICAL AND BEHAVIORAL FACTORS ASSOCIATED WITH DISORDERS OR DISEASES CLASSIFIED ELSEWHERE: ICD-10-CM

## 2024-08-22 PROCEDURE — 90837 PSYTX W PT 60 MINUTES: CPT | Mod: 95 | Performed by: PSYCHOLOGIST

## 2024-08-22 ASSESSMENT — ANXIETY QUESTIONNAIRES
GAD7 TOTAL SCORE: 14
8. IF YOU CHECKED OFF ANY PROBLEMS, HOW DIFFICULT HAVE THESE MADE IT FOR YOU TO DO YOUR WORK, TAKE CARE OF THINGS AT HOME, OR GET ALONG WITH OTHER PEOPLE?: VERY DIFFICULT
IF YOU CHECKED OFF ANY PROBLEMS ON THIS QUESTIONNAIRE, HOW DIFFICULT HAVE THESE PROBLEMS MADE IT FOR YOU TO DO YOUR WORK, TAKE CARE OF THINGS AT HOME, OR GET ALONG WITH OTHER PEOPLE: VERY DIFFICULT
5. BEING SO RESTLESS THAT IT IS HARD TO SIT STILL: SEVERAL DAYS
GAD7 TOTAL SCORE: 14
7. FEELING AFRAID AS IF SOMETHING AWFUL MIGHT HAPPEN: MORE THAN HALF THE DAYS
3. WORRYING TOO MUCH ABOUT DIFFERENT THINGS: MORE THAN HALF THE DAYS
4. TROUBLE RELAXING: NEARLY EVERY DAY
1. FEELING NERVOUS, ANXIOUS, OR ON EDGE: MORE THAN HALF THE DAYS
GAD7 TOTAL SCORE: 14
2. NOT BEING ABLE TO STOP OR CONTROL WORRYING: MORE THAN HALF THE DAYS
6. BECOMING EASILY ANNOYED OR IRRITABLE: MORE THAN HALF THE DAYS
7. FEELING AFRAID AS IF SOMETHING AWFUL MIGHT HAPPEN: MORE THAN HALF THE DAYS

## 2024-08-24 NOTE — CONFIDENTIAL NOTE
"  Health Psychology - Follow up Visit  Confidential Summary*    The author of this note documented a reason for not sharing it with the patient.  REFERRAL SOURCE:  Psychiatry    CHIEF COMPLAINT/REASON FOR VISIT  Psychotherapy in context of chronic PTSD, ADHD and persistent depression.  Patient is challenged by procrastination and time management.      Patient was seen today for a 60 minute individual psychotherapy session.  The session was facilitated via VIDEO with patient at her home and provider at her own home.  We used [employer] platform.       The patient has been notified of following:   \"This VIDEO visit will be conducted via a call between you and your physician/provider. We have found that certain health care needs can be provided without the need for an in-person physical exam.  VIDEO visits are billed at different rates depending on your insurance coverage.  Please reach out to your insurance provider with any questions. If during the course of the call the physician/provider feels a video visit is not appropriate, you will not be charged for this service.\"     Patient has given verbal consent for VIDEO visit? Yes    Subjective:  Patient began with discussion of her ongoing work that she is doing to cope with the frustration inherent in living with her boyfriend who is completing treatment (20 hours/week) and working 20 hours per week.  He is also grieving the loss of his daughter who is residing with his ex girlfriend and is not allowing for visitation.  Patient reminded herself that their relationship is likely temporary and that she is hoping to accept a position in [place] for Fall, 25.    Much of session focused on her ongoing anxiety surrounding completing her second paper toward her dissertation completion.  She reported that the paper is approaching a draft but that she has not completed the conclusion.  Encouraged her to send to her advisor so that he might comment and that comments might inform her " conclusion.  She was also encouraged to consider her anxiety when writing and whether a career focused on writing will provide her with a quality life where she will be happy.  She initially described the belief that she enjoys writing but that she has trouble with perfectionism when being evaluated, that she cannot focus on writing when she has competing demands on her time and that she has a harder time writing when she is expected to summarize a large area of work.  Each of these variables will likely exist in an academic position.  However, she reported that each of these are not an issue in [place] and that she believes that she will have more luxury in her time line.      Encouraged her to notice her emotions and what distracts her from maintaining her focus when writing.  She was able to describe fear that she will be judged and criticized.  She was encouraged to send a draft.     Objective:  Patient was on time for today s session. She was alert and oriented. Mood was euthymic with appropriate range of affect. Patient denied suicidal or assaultive ideation, plan, or intent.        Assessment:  The patient has a longstanding history of interpersonal discord and challenges with emotion regulation and avoidance as a coping mechanism for anxiety.  She has completed all requirements for her PhD and is now ABD.  She is living in  housing with her two dogs and boyfriend.        Plan:  Patient graduated from full model DBT at [employer] and is now completing phase 2 work.      Treatment plan updated.      Time In: 3:00  Time Out: 4:00    Diagnosis:  Axis I PTSD, chronic, persistent depressive disorder, adjustment disorder with mixed, psychological factors associated with physical (fibromyalgia); ADHD   Axis II  BPD   Axis III please see medical records for details   [place] IV Psychosocial and Environmental Stressors: living alone with no family support, chronic illness, completing grad school requirements  under 6 month deadline for completion         Corina Muhammad, PhD, LP

## 2024-08-24 NOTE — PROGRESS NOTES
"Answers for HPI/ROS submitted by the patient on 2/26/2024  VIC 7 TOTAL SCORE: 8        Health Psychology - Follow up Visit  Confidential Summary*    The author of this note documented a reason for not sharing it with the patient.  REFERRAL SOURCE:  Psychiatry    CHIEF COMPLAINT/REASON FOR VISIT  Psychotherapy in context of chronic PTSD and persistent depression.  Patient also complains of dissatisfaction with interpersonal relationships and challenges with emotion regulation.      Patient was seen today for a 60 minute individual psychotherapy session.  The session was facilitated via VIDEO with patient at her home and provider at her own home.  We used [employer] platform.       The patient has been notified of following:   \"This VIDEO visit will be conducted via a call between you and your physician/provider. We have found that certain health care needs can be provided without the need for an in-person physical exam.  VIDEO visits are billed at different rates depending on your insurance coverage.  Please reach out to your insurance provider with any questions. If during the course of the call the physician/provider feels a video visit is not appropriate, you will not be charged for this service.\"     Patient has given verbal consent for VIDEO visit? Yes    Subjective:  Patient began with report that she continues to experience concern that her boyfriend may move out of her home and into the home of his mother.  She expressed sadness that she believes that she has not asked for very much from him or the relationship but that it was not enough.  Encouraged her to consider what she knows about him and that she may be a position where she can now date him without the stress of living together.  Encourage her to consider the many red flags she has been expose to and her intention that this relationship was intended to provide her with solace and distraction while she completes the last 2 papers required to secure her " doctoral degree.  She also described her intention to relocate to [place] for her postdoctoral training and that he has informed her that he has no intention to join her because he is actively engaged in efforts to secure visitation with his daughter.  She described feelings of disappointment, sadness and loss.      She reported frustration that she received feedback from her dissertation advisor who provided feedback on her draft.  She reported that she is battling cognitions surrounding the hopelessness of finishing her program.  Used CBT strategies to encourage her to catch these catastrophic thoughts and distorted beliefs and to use strategies to change these thoughts into those that are more rational and hopeful.      Objective:  Patient was on time for today s session. She was alert and oriented. Mood was dysphoric with appropriate range of affect. Patient denied suicidal or assaultive ideation, plan, or intent.        Assessment:  The patient has a longstanding history of interpersonal discord and challenges with emotion regulation and avoidance as a coping mechanism for anxiety.  She has completed all requirements for her PhD and is now ABD.  She is living in  housing with her boyfriend and two dogs.        Plan:  Patient graduated from full model DBT at [facility] and is now completing phase 2 work.      Treatment plan updated.      Time In: 2:00  Time Out: 3:00    Diagnosis:  Axis I PTSD, chronic, persistent depressive disorder, adjustment disorder with mixed, psychological factors associated with physical (fibromyalgia)   Axis II  BPD   Axis III please see medical records for details   [place] IV Psychosocial and Environmental Stressors: living alone with no family support, pandemic stress, chronic illness         Corina Muhammad, PhD,     Answers submitted by the patient for this visit:  Patient Health Questionnaire (G7) (Submitted on 8/22/2024)  VIC 7 TOTAL SCORE: 14

## 2024-08-29 ENCOUNTER — TELEPHONE (OUTPATIENT)
Dept: PSYCHOLOGY | Facility: CLINIC | Age: 39
End: 2024-08-29
Payer: COMMERCIAL

## 2024-08-29 PROBLEM — G47.21 CIRCADIAN RHYTHM SLEEP DISORDER, DELAYED SLEEP PHASE TYPE: Chronic | Status: ACTIVE | Noted: 2017-05-10

## 2024-09-04 ENCOUNTER — MYC REFILL (OUTPATIENT)
Dept: PSYCHIATRY | Facility: CLINIC | Age: 39
End: 2024-09-04
Payer: COMMERCIAL

## 2024-09-04 DIAGNOSIS — F90.8 ATTENTION DEFICIT HYPERACTIVITY DISORDER (ADHD), OTHER TYPE: ICD-10-CM

## 2024-09-04 DIAGNOSIS — G43.709 CHRONIC MIGRAINE WITHOUT AURA WITHOUT STATUS MIGRAINOSUS, NOT INTRACTABLE: ICD-10-CM

## 2024-09-04 RX ORDER — ALMOTRIPTAN 12.5 MG/1
12.5 TABLET, FILM COATED ORAL
Qty: 18 TABLET | Refills: 11 | Status: SHIPPED | OUTPATIENT
Start: 2024-09-04

## 2024-09-04 NOTE — TELEPHONE ENCOUNTER
RX Authorization    Medication: Almotriptan (AXERT) 12.5 MG tablet    Date last refill ordered: 6/5/2024    Quantity ordered: 18    # refills: 11    Date of last clinic visit with ordering provider: 6/5/2024    Date of next clinic visit with ordering provider:    All pertinent protocol data (lab date/result):    Include pertinent information from patients message:

## 2024-09-05 ENCOUNTER — MYC REFILL (OUTPATIENT)
Dept: PSYCHIATRY | Facility: CLINIC | Age: 39
End: 2024-09-05
Payer: COMMERCIAL

## 2024-09-05 DIAGNOSIS — F90.8 ATTENTION DEFICIT HYPERACTIVITY DISORDER (ADHD), OTHER TYPE: Primary | ICD-10-CM

## 2024-09-05 RX ORDER — LISDEXAMFETAMINE DIMESYLATE 30 MG/1
30 CAPSULE ORAL EVERY MORNING
Qty: 30 CAPSULE | Refills: 0 | Status: SHIPPED | OUTPATIENT
Start: 2024-10-12

## 2024-09-05 RX ORDER — LISDEXAMFETAMINE DIMESYLATE 30 MG/1
30 CAPSULE ORAL EVERY MORNING
Qty: 30 CAPSULE | Refills: 0 | Status: SHIPPED | OUTPATIENT
Start: 2024-09-14

## 2024-09-05 NOTE — TELEPHONE ENCOUNTER
[facility] refill message    lisdexamfetamine (VYVANSE) 30 MG capsule [Geovanna Naominicolette Owens]      Patient Comment: could I get a couple of refills so I dont have to bother you with it      Date of Last Office Visit: 6/11/2024  Date of Next Office Visit:  None  No shows since last visit: No  More than one patient-initiated cancellation (with reschedule) since last seen in clinic? No    []Medication refilled per  Medication Refill in Ambulatory Care  policy.  [x]Medication unable to be refilled by RN due to criteria not met as indicated below:    []Eligibility: has not had a provider visit within last 6 months   [x]Supervision: no future appointment; < 7 days before next appointment   []Compliance: no shows; cancellations; lapse in therapy   []Verification: order discrepancy; may need modification...   [] > 30-day supply request   []Advanced refill request: > 7 days before refill date   [x]Controlled medication   []Medication not included in policy   []Review: new med; med adjusted ? 30 days; safety alert; requires lab monitoring...   []Scope of Practice: refill request processed by LPN/MA   [x]Other: multiple refill request       Medication(s) requested:   -   lisdexamfetamine (VYVANSE) 30 MG capsule   Date last ordered: 8/12/2024  Qty: 30  Refills: 0  Take 1 capsule (30 mg) by mouth every morning     Appropriate for refill? Provider to review. Patient is asking for multiple refills       Any Controlled Substance(s)? Yes   [place]  checked? [place]   Lisdexamfetamine 30 mg capsule was last sold on 8/15/2024 for quantity of 30.  Other controlled substance on [place] ?: Yes   If yes, are any new medications? No      Requested medication(s) verified as identical to current order? Yes    Any lapse in adherence to medication(s) greater than 5 days? No      Additional action taken? routed encounter to provider for review.      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   She has not started the increased dose of Vyvanse yet but will start that soon at 30 mg daily to help with focus, follow through, as well as binge eating which is a concern for her.  Doxepin continues twice daily for anxiety.  She will continue with atomoxetine 40 mg daily as an adjunct to the Vyvanse.  Bupropion continues daily for depression in addition to the Viibryd.  She tolerates the propranolol well twice daily as needed for performance anxiety.  Counseling therapies are recommended as she feels isolated from her peers and is now establishing a new relationship..     Medication side effects and alternatives were reviewed. Health promotion activities recommended and reviewed today. All questions addressed. Education and counseling completed regarding risks and benefits of medications and psychotherapy options.     Treatment Plan:  1.  Atomoxetine 40 mg daily  2.  Bupropion  mg daily  3.  Doxepin 20 mg 2 times daily  4.  Vyvanse 30 mg daily  5.  Propranolol 20 mg daily as needed for anxiety  6.  Viibryd 40 mg daily  7.  Counseling therapies when able to for learning skills to manage life stressors and adjustments     Continue all other medications as reviewed per electronic medical record today.   Safety plan reviewed. To the Emergency Department as needed or call after hours crisis line at [phone] or [phone]. [place] Crisis Text Line. Text [place] to 930768 or Suicide LifeLine Chat: suicidepreventionlifeline.org/chat/  To schedule individual or family therapy, call [facility] at [phone]  Schedule an appointment with me in 3 months or sooner as needed. Call [facility] at [phone] to schedule.  Follow up with primary care provider as planned or for acute medical concerns.  Call the psychiatric nurse line with medication questions or concerns at [phone]  MyChart may be used to communicate with your provider, but this is not intended to be used for  emergencies.    Any medication(s) require lab monitoring? No

## 2024-09-06 ENCOUNTER — VIRTUAL VISIT (OUTPATIENT)
Dept: PSYCHOLOGY | Facility: CLINIC | Age: 39
End: 2024-09-06
Payer: COMMERCIAL

## 2024-09-06 DIAGNOSIS — F90.0 ADHD (ATTENTION DEFICIT HYPERACTIVITY DISORDER), INATTENTIVE TYPE: ICD-10-CM

## 2024-09-06 DIAGNOSIS — F43.12 CHRONIC POST-TRAUMATIC STRESS DISORDER (PTSD): Primary | ICD-10-CM

## 2024-09-06 DIAGNOSIS — F54 PSYCHOLOGICAL AND BEHAVIORAL FACTORS ASSOCIATED WITH DISORDERS OR DISEASES CLASSIFIED ELSEWHERE: ICD-10-CM

## 2024-09-06 DIAGNOSIS — F43.23 ADJUSTMENT DISORDER WITH MIXED ANXIETY AND DEPRESSED MOOD: ICD-10-CM

## 2024-09-06 PROCEDURE — 90837 PSYTX W PT 60 MINUTES: CPT | Mod: 95 | Performed by: PSYCHOLOGIST
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
## 2024-09-11 ENCOUNTER — VIRTUAL VISIT (OUTPATIENT)
Dept: PSYCHOLOGY | Facility: CLINIC | Age: 39
End: 2024-09-11
Payer: COMMERCIAL

## 2024-09-11 DIAGNOSIS — F90.0 ADHD (ATTENTION DEFICIT HYPERACTIVITY DISORDER), INATTENTIVE TYPE: ICD-10-CM

## 2024-09-11 DIAGNOSIS — F54 PSYCHOLOGICAL AND BEHAVIORAL FACTORS ASSOCIATED WITH DISORDERS OR DISEASES CLASSIFIED ELSEWHERE: ICD-10-CM

## 2024-09-11 DIAGNOSIS — F43.23 ADJUSTMENT DISORDER WITH MIXED ANXIETY AND DEPRESSED MOOD: ICD-10-CM

## 2024-09-11 DIAGNOSIS — F43.12 CHRONIC POST-TRAUMATIC STRESS DISORDER (PTSD): Primary | ICD-10-CM

## 2024-09-11 PROCEDURE — 90837 PSYTX W PT 60 MINUTES: CPT | Mod: 95 | Performed by: PSYCHOLOGIST
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
## 2024-09-18 ENCOUNTER — VIRTUAL VISIT (OUTPATIENT)
Dept: PSYCHOLOGY | Facility: CLINIC | Age: 39
End: 2024-09-18
Payer: COMMERCIAL

## 2024-09-18 DIAGNOSIS — F54 PSYCHOLOGICAL AND BEHAVIORAL FACTORS ASSOCIATED WITH DISORDERS OR DISEASES CLASSIFIED ELSEWHERE: ICD-10-CM

## 2024-09-18 DIAGNOSIS — F43.12 CHRONIC POST-TRAUMATIC STRESS DISORDER (PTSD): Primary | ICD-10-CM

## 2024-09-18 DIAGNOSIS — F90.0 ADHD (ATTENTION DEFICIT HYPERACTIVITY DISORDER), INATTENTIVE TYPE: ICD-10-CM

## 2024-09-18 DIAGNOSIS — F43.23 ADJUSTMENT DISORDER WITH MIXED ANXIETY AND DEPRESSED MOOD: ICD-10-CM

## 2024-09-18 PROCEDURE — 90837 PSYTX W PT 60 MINUTES: CPT | Mod: 95 | Performed by: PSYCHOLOGIST
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
## 2024-10-01 NOTE — TELEPHONE ENCOUNTER
Last appt: 11/15/17    Per last office note:  Continue lorazepam 0.5 mg PO PRN anxiety q12hrs    Last prescribed:  LORazepam (ATIVAN) 0.5 MG tablet 60 tablet 0 11/15/2017  No   Sig: Take 2 tablets (1 mg) by mouth 2 times daily as needed for anxiety   Class: [employer]   Notes to Pharmacy: Take 1-2 tabs per day PRN   Route: Oral     Pharmacy likely calling to clarify whether Ativan is to be dosed at:  1 mg BID PRN anxiety or 0.5-1 mg QD PRN   01-Oct-2024 18:00

## 2024-10-07 ENCOUNTER — LAB (OUTPATIENT)
Dept: LAB | Facility: CLINIC | Age: 39
End: 2024-10-07
Payer: COMMERCIAL

## 2024-10-07 DIAGNOSIS — Z79.631 LONG TERM METHOTREXATE USER: ICD-10-CM

## 2024-10-07 LAB
ALBUMIN SERPL BCG-MCNC: 4.4 G/DL (ref 3.5–5.2)
ALT SERPL W P-5'-P-CCNC: 17 U/L (ref 0–50)
AST SERPL W P-5'-P-CCNC: 19 U/L (ref 0–45)
CREAT SERPL-MCNC: 0.81 MG/DL (ref 0.51–0.95)
CRP SERPL-MCNC: <3 MG/L
EGFRCR SERPLBLD CKD-EPI 2021: >90 ML/MIN/1.73M2
ERYTHROCYTE [DISTWIDTH] IN BLOOD BY AUTOMATED COUNT: 13.2 % (ref 10–15)
HCT VFR BLD AUTO: 40.7 % (ref 35–47)
HGB BLD-MCNC: 13.5 G/DL (ref 11.7–15.7)
MCH RBC QN AUTO: 30.8 PG (ref 26.5–33)
MCHC RBC AUTO-ENTMCNC: 33.2 G/DL (ref 31.5–36.5)
MCV RBC AUTO: 93 FL (ref 78–100)
PLATELET # BLD AUTO: 379 10E3/UL (ref 150–450)
RBC # BLD AUTO: 4.38 10E6/UL (ref 3.8–5.2)
WBC # BLD AUTO: 7.5 10E3/UL (ref 4–11)

## 2024-10-07 PROCEDURE — 82040 ASSAY OF SERUM ALBUMIN: CPT | Performed by: PATHOLOGY

## 2024-10-07 PROCEDURE — 82565 ASSAY OF CREATININE: CPT | Performed by: PATHOLOGY

## 2024-10-07 PROCEDURE — 85027 COMPLETE CBC AUTOMATED: CPT | Performed by: PATHOLOGY

## 2024-10-07 PROCEDURE — 84460 ALANINE AMINO (ALT) (SGPT): CPT | Performed by: PATHOLOGY

## 2024-10-07 PROCEDURE — 86140 C-REACTIVE PROTEIN: CPT | Performed by: PATHOLOGY

## 2024-10-07 PROCEDURE — 84450 TRANSFERASE (AST) (SGOT): CPT | Performed by: PATHOLOGY

## 2024-10-07 PROCEDURE — 36415 COLL VENOUS BLD VENIPUNCTURE: CPT | Performed by: PATHOLOGY

## 2024-10-09 ENCOUNTER — VIRTUAL VISIT (OUTPATIENT)
Dept: PSYCHOLOGY | Facility: CLINIC | Age: 39
End: 2024-10-09
Payer: COMMERCIAL

## 2024-10-09 DIAGNOSIS — F54 PSYCHOLOGICAL AND BEHAVIORAL FACTORS ASSOCIATED WITH DISORDERS OR DISEASES CLASSIFIED ELSEWHERE: ICD-10-CM

## 2024-10-09 DIAGNOSIS — F34.1 PERSISTENT DEPRESSIVE DISORDER: ICD-10-CM

## 2024-10-09 DIAGNOSIS — F43.12 CHRONIC POST-TRAUMATIC STRESS DISORDER (PTSD): Primary | ICD-10-CM

## 2024-10-09 DIAGNOSIS — F90.0 ADHD (ATTENTION DEFICIT HYPERACTIVITY DISORDER), INATTENTIVE TYPE: ICD-10-CM

## 2024-10-09 PROCEDURE — 90837 PSYTX W PT 60 MINUTES: CPT | Mod: 95 | Performed by: PSYCHOLOGIST

## 2024-10-12 NOTE — PROGRESS NOTES
"Health Psychology - Follow up Visit  Confidential Summary*    The author of this note documented a reason for not sharing it with the patient.  REFERRAL SOURCE:  Psychiatry    CHIEF COMPLAINT/REASON FOR VISIT  Psychotherapy in context of chronic PTSD and persistent depression.  Patient also complains of dissatisfaction with interpersonal relationships and challenges with emotion regulation.      Patient was seen today for a 60 minute individual psychotherapy session.  The session was facilitated via VIDEO with patient at her home and provider at her own home.  We used [employer] platform.       The patient has been notified of following:   \"This VIDEO visit will be conducted via a call between you and your physician/provider. We have found that certain health care needs can be provided without the need for an in-person physical exam.  VIDEO visits are billed at different rates depending on your insurance coverage.  Please reach out to your insurance provider with any questions. If during the course of the call the physician/provider feels a video visit is not appropriate, you will not be charged for this service.\"     Patient has given verbal consent for VIDEO visit? Yes    Subjective:  Patient began with report that she recently gave a presentation and that she believes it was well received and that the experience served to increase her self confidence and her ability to believe that she might \"actually finish\" her PhD.  She tearfully described her awareness that she has been concerned that she may not finish.  She reported that she has an appointment set for later today with her primary mentor.  She reported that she remains concerned about his view of her writing.      Discussed her recent stress surrounding the acute illness of her older pet and her reliance on ex  to help her financially.  She reported that she is working to pay him back and that she continues to strive for financial independence from him.  "     Discussed her enjoyment of her current relationship and her observation that having his support is allowing her to relax more and that she is aware of the impact of this healthy relationship on her overall belief in a positive future for herself.  She is now considering applications to postdoctoral and faculty positions in the .    She reported that boyfriend is fighting for visitation of his 5 year old daughter.  Discussed the impact of possible parenting time on their relationship.      Objective:  Patient was on time for today s session. She was alert and oriented. Mood was dysphoric with appropriate range of affect. Patient denied suicidal or assaultive ideation, plan, or intent.        Assessment:  The patient has a longstanding history of interpersonal discord and challenges with emotion regulation and avoidance as a coping mechanism for anxiety.  She has completed all requirements for her PhD and is now ABD.  She is living in  housing with her boyfriend and two dogs.        Plan:  Patient graduated from full model DBT at [facility] and is now completing phase 2 work.      Treatment plan updated.      Time In: 2:00  Time Out: 3:00    Diagnosis:  Axis I PTSD, chronic, persistent depressive disorder, adjustment disorder with mixed, psychological factors associated with physical (fibromyalgia)   Axis II  BPD   Axis III please see medical records for details   [place] IV Psychosocial and Environmental Stressors: living alone with no family support, pandemic stress, chronic illness         Corina Muhammad, PhD, LP    Answers submitted by the patient for this visit:  Patient Health Questionnaire (G7) (Submitted on 8/22/2024)  VIC 7 TOTAL SCORE: 14
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
## 2024-10-14 ENCOUNTER — VIRTUAL VISIT (OUTPATIENT)
Dept: PHARMACY | Facility: CLINIC | Age: 39
End: 2024-10-14
Attending: INTERNAL MEDICINE
Payer: COMMERCIAL

## 2024-10-14 VITALS — WEIGHT: 175 LBS | HEIGHT: 69 IN | BODY MASS INDEX: 25.92 KG/M2

## 2024-10-14 DIAGNOSIS — F90.9 ATTENTION DEFICIT HYPERACTIVITY DISORDER (ADHD), UNSPECIFIED ADHD TYPE: ICD-10-CM

## 2024-10-14 DIAGNOSIS — F32.0 CURRENT MILD EPISODE OF MAJOR DEPRESSIVE DISORDER WITHOUT PRIOR EPISODE (H): ICD-10-CM

## 2024-10-14 DIAGNOSIS — F43.10 PTSD (POST-TRAUMATIC STRESS DISORDER): ICD-10-CM

## 2024-10-14 DIAGNOSIS — R11.0 NAUSEA: ICD-10-CM

## 2024-10-14 DIAGNOSIS — F41.9 ANXIETY: ICD-10-CM

## 2024-10-14 DIAGNOSIS — E66.3 OVERWEIGHT (BMI 25.0-29.9): Primary | ICD-10-CM

## 2024-10-14 DIAGNOSIS — K59.00 CONSTIPATION, UNSPECIFIED CONSTIPATION TYPE: ICD-10-CM

## 2024-10-14 DIAGNOSIS — K76.0 FATTY LIVER: ICD-10-CM

## 2024-10-14 ASSESSMENT — PAIN SCALES - GENERAL: PAINLEVEL: NO PAIN (0)

## 2024-10-14 NOTE — NURSING NOTE
Current patient location: [address]  [facility] [address]    Is the patient currently in the state of [place]? YES    Visit mode:VIDEO    If the visit is dropped, the patient can be reconnected by: VIDEO VISIT: Send to e-mail at: [email or web address]    Will anyone else be joining the visit? NO  (If patient encounters technical issues they should call [phone]    Are changes needed to the allergy or medication list? Yes pt stated does also take ibuprofen and aleve as needed    Are refills needed on medications prescribed by this physician? NO    Rooming Documentation:  Questionnaire(s) not pre-assigned    Reason for visit: Medication Therapy Management    Trista NAYAKF      
Statement Selected

## 2024-10-14 NOTE — Clinical Note
Pritesh Austin, I'm a pharmacist in the weight management clinic. Patient is on compounded semaglutide 1 mg/week. She has nausea every other day right now and hasn't noticed any patterns around it. I am going to have her try making diet changes. One other thing I noticed is that bupropion increases atomoxetine concentrations (6-8 increase in AUC, 3-4 increase in Cmax). She's been on atomoxetine for 2 years but wondering if now adding semaglutide is also contributing to more side effects. Just wondering if you recall if she is getting benefit from atomoxetine or if we could possibly trial a lower dose or holding it to see if nausea improves at all IF she doesn't get relief from diet changes. Please let me know your thoughts. Thank you Shaunna Cary, PharmD, [facility] Medication Therapy Management Pharmacist  
Detail Level: Detailed
Quality 226: Preventive Care And Screening: Tobacco Use: Screening And Cessation Intervention: Patient screened for tobacco use, is a smoker AND received Cessation Counseling within measurement period or in the six months prior to the measurement period
Quality 130: Documentation Of Current Medications In The Medical Record: Current Medications Documented
Quality 431: Preventive Care And Screening: Unhealthy Alcohol Use - Screening: Patient not identified as an unhealthy alcohol user when screened for unhealthy alcohol use using a systematic screening method

## 2024-10-14 NOTE — PROGRESS NOTES
Medication Therapy Management (MTM) Encounter    ASSESSMENT:                            Medication Adherence/Access: No issues identified.    Weight Management /Fatty liver/nausea:  Weight loss progressing but is having side effects. Nausea could be triggered by certain foods or from going too long without eating. Recommend increasing frequency of meals and adding protein with each meal to help reduce nausea and fatigue. Discussed high protein meal/snack ideas. Consider reducing the dose if side effects don't improve. Could trial lowering atomoxetine dose as well to see if nausea improves (see below)    Constipation:   Medications are helpful    Mental Health   ADHD/PTSD/anxiety/depression:  The atomoxetine prescribing information states that because strong CYP2D6 inhibitors have generally been associated with a 6- to 8-fold increase in atomoxetine AUC and a 3- to 4-fold increase in atomoxetine Cmax, dose reductions of atomoxetine are recommended when combined with strong CYP2D6 inhibitors such as bupropion. Atomoxetine can cause nausea, constipation, and dry mouth, however, she has been taking for a few years. Could consider a dose reduction or holding to see if she has less nausea if dietary changes aren't helpful.      PLAN:                            Continue semaglutide 1 mg (20 units) once weekly   Try having a small meal or snack with a protein source every 4-6 hours to see if it helps nausea and fatigue    Follow-up: mychart 4 weeks, 2 months with Dr. Kerri Evans, 3 months with medication therapy management     SUBJECTIVE/OBJECTIVE:                          Kasandra Lau is a 39 year old female seen for a follow-up visit.       Reason for visit: semaglutide follow-up.    Allergies/ADRs: Reviewed in chart  Past Medical History: Reviewed in chart  Tobacco: She reports that she has quit smoking. Her smoking use included cigarettes. She has never used smokeless tobacco.  Alcohol: no   Medication  "Adherence/Access: no issues reported.    Weight Management /Fatty liver/nausea:  Compounded semaglutide 1 mg (20 units) once weekly   Vyvanse 30 mg once daily   Ondansetron 4-8 mg every 8 hours as needed     Following with Dr. Manning - last visit was 6/25/24. Some worsened nausea and constipation on this dose of semaglutide. She's using ondansetron every other day. Hasn't noticed any specific pattern in the nausea. Has decreased energy throughout the day and feels she has to conserve her energy. Decreased hunger, cravings, and binge-eating at this dose. Has small portions when she does eat. She craves sweets sometimes which she'll have occasionally.    Diet/Eating Habits: eating about twice per day with some snacks in-between meals. Breakfast: fruit, cereal. Lunch: something with protein. Dinner: cereal if she has something otherwise skips. Fiber: hummus, sweet potatoes, oatmeal. Smoothies with seeds. Water: 65 ounces per day  Exercise/Activity: Patient reports walking dogs twice per day. Isn't able to be super active because she feels tired.   Previous trials:   Ozempic 10/2022-7/2023 (caused constipation, nausea, acid reflux. Stopped being covered).   Qsymia (Not helpful)  Topiramate - [id number]- brain fog and word recall issues  Naltrexone- prescribed but unclear if she tried this    Initial consult weight: 195 lbs 10/4/22     Current weight today: 175 lbs 0 oz  Cumulative Weight Loss: -20 lb, -10.3% from baseline    Wt Readings from Last 4 Encounters:   10/14/24 175 lb (79.4 kg)   07/29/24 190 lb (86.2 kg)   06/25/24 187 lb (84.8 kg)   06/05/24 175 lb (79.4 kg)     Estimated body mass index is 25.84 kg/m  as calculated from the following:    Height as of this encounter: 5' 9\" (1.753 m).    Weight as of this encounter: 175 lb (79.4 kg).      Constipation:   Bisacodyl 2-3 tablets daily   Saline enema as needed     Currently having a bowel movement every other day. No pain with bowel movement. She's tried increasing " "fiber intake. Finds current medications effective. No side effects.       Mental Health   ADHD/PTSD/anxiety/depression:  Propranolol 20 mg daily as needed  Gabapentin 800 mg 3 times daily   Atomoxetine 40 mg once daily   Vyvanse 30 mg once daily   Bupropion  mg once daily   Vilazodone 40 mg once daily   Patient reports dry mouth. No other side effects. Symptoms are stable.        Today's Vitals: Ht 5' 9\" (1.753 m)   Wt 175 lb (79.4 kg)   BMI 25.84 kg/m    ----------------  I spent 27 minutes with this patient today. All changes were made via collaborative practice agreement with Dr. Kerri Evans. A copy of the visit note was provided to the patient's provider(s).    A summary of these recommendations was sent via [employer].    Telemedicine Visit Details  The patient's medications can be safely assessed via a telemedicine encounter.  Type of service:  Video Conference via Typeform  Originating Location (pt. Location): Home    Distant Location (provider location):  Off-site  Start Time: 2:34 PM  End Time: 3:01 PM     Medication Therapy Recommendations  No medication therapy recommendations to display     "

## 2024-10-15 RX ORDER — BISACODYL 5 MG/1
5 TABLET, DELAYED RELEASE ORAL DAILY PRN
COMMUNITY

## 2024-10-15 NOTE — PATIENT INSTRUCTIONS
"Recommendations from today's MTM visit:                                                      Continue semaglutide 1 mg (20 units) once weekly   Try having a small meal or snack with a protein source every 4-6 hours to see if it helps nausea and fatigue    Quick/Easy Protein Sources:  Hard boiled eggs  Part-skim cheese sticks  Baby Bell cheese rounds  Low-fat/low-sugar Greek yogurt  Low-fat cottage cheese  Lean deli meat (chicken/turkey/ham)  Roasted chickpeas or lentils  Nuts   Turkey meat stick  Protein shake/bar  \"P3\" snack (cheese, nuts, deli meat)  [employer] \"Protein Bread\"   \"Egglife\" egg white wrap    Tuna/salmon can/packet       Follow-up: myclouist 4 weeks, 2 months with Dr. Kerri Evans, 3 months with medication therapy management     It was great speaking with you today.  I value your experience and would be very thankful for your time in providing feedback in our clinic survey. In the next few days, you may receive an email or text message from [employer] with a link to a survey related to your  clinical pharmacist.\"     To schedule another MTM appointment, please call the clinic directly or you may call the MTM scheduling line at [phone].    My Clinical Pharmacist's contact information:                                                      Please feel free to contact me with any questions or concerns you have.      Shaunna Cary, PharmD, [facility]  Medication Therapy Management Pharmacist      "

## 2024-10-16 ENCOUNTER — VIRTUAL VISIT (OUTPATIENT)
Dept: PSYCHOLOGY | Facility: CLINIC | Age: 39
End: 2024-10-16
Payer: COMMERCIAL

## 2024-10-16 DIAGNOSIS — F90.0 ADHD (ATTENTION DEFICIT HYPERACTIVITY DISORDER), INATTENTIVE TYPE: ICD-10-CM

## 2024-10-16 DIAGNOSIS — F54 PSYCHOLOGICAL AND BEHAVIORAL FACTORS ASSOCIATED WITH DISORDERS OR DISEASES CLASSIFIED ELSEWHERE: ICD-10-CM

## 2024-10-16 DIAGNOSIS — F34.1 PERSISTENT DEPRESSIVE DISORDER: ICD-10-CM

## 2024-10-16 DIAGNOSIS — F43.12 CHRONIC POST-TRAUMATIC STRESS DISORDER (PTSD): Primary | ICD-10-CM

## 2024-10-16 PROCEDURE — 90837 PSYTX W PT 60 MINUTES: CPT | Mod: 95 | Performed by: PSYCHOLOGIST

## 2024-10-16 ASSESSMENT — ANXIETY QUESTIONNAIRES
5. BEING SO RESTLESS THAT IT IS HARD TO SIT STILL: SEVERAL DAYS
6. BECOMING EASILY ANNOYED OR IRRITABLE: MORE THAN HALF THE DAYS
7. FEELING AFRAID AS IF SOMETHING AWFUL MIGHT HAPPEN: SEVERAL DAYS
2. NOT BEING ABLE TO STOP OR CONTROL WORRYING: SEVERAL DAYS
3. WORRYING TOO MUCH ABOUT DIFFERENT THINGS: SEVERAL DAYS
1. FEELING NERVOUS, ANXIOUS, OR ON EDGE: MORE THAN HALF THE DAYS
GAD7 TOTAL SCORE: 9
IF YOU CHECKED OFF ANY PROBLEMS ON THIS QUESTIONNAIRE, HOW DIFFICULT HAVE THESE PROBLEMS MADE IT FOR YOU TO DO YOUR WORK, TAKE CARE OF THINGS AT HOME, OR GET ALONG WITH OTHER PEOPLE: SOMEWHAT DIFFICULT
GAD7 TOTAL SCORE: 9
4. TROUBLE RELAXING: SEVERAL DAYS
7. FEELING AFRAID AS IF SOMETHING AWFUL MIGHT HAPPEN: SEVERAL DAYS
GAD7 TOTAL SCORE: 9
8. IF YOU CHECKED OFF ANY PROBLEMS, HOW DIFFICULT HAVE THESE MADE IT FOR YOU TO DO YOUR WORK, TAKE CARE OF THINGS AT HOME, OR GET ALONG WITH OTHER PEOPLE?: SOMEWHAT DIFFICULT

## 2024-10-18 ENCOUNTER — MYC REFILL (OUTPATIENT)
Dept: PSYCHIATRY | Facility: CLINIC | Age: 39
End: 2024-10-18
Payer: COMMERCIAL

## 2024-10-18 ENCOUNTER — MYC REFILL (OUTPATIENT)
Dept: RHEUMATOLOGY | Facility: CLINIC | Age: 39
End: 2024-10-18
Payer: COMMERCIAL

## 2024-10-18 DIAGNOSIS — M19.90 INFLAMMATORY ARTHRITIS: ICD-10-CM

## 2024-10-18 DIAGNOSIS — F41.1 GENERALIZED ANXIETY DISORDER: ICD-10-CM

## 2024-10-18 DIAGNOSIS — F34.1 PERSISTENT DEPRESSIVE DISORDER: ICD-10-CM
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
## 2024-10-21 ENCOUNTER — TELEPHONE (OUTPATIENT)
Dept: FAMILY MEDICINE | Facility: CLINIC | Age: 39
End: 2024-10-21

## 2024-10-21 RX ORDER — DOXEPIN HYDROCHLORIDE 10 MG/1
20 CAPSULE ORAL 2 TIMES DAILY
Qty: 120 CAPSULE | Refills: 3 | OUTPATIENT
Start: 2024-10-21

## 2024-10-21 RX ORDER — DOXEPIN HYDROCHLORIDE 10 MG/1
20 CAPSULE ORAL 2 TIMES DAILY
Qty: 120 CAPSULE | Refills: 3 | Status: SHIPPED | OUTPATIENT
Start: 2024-10-21

## 2024-10-21 NOTE — TELEPHONE ENCOUNTER
Patient sent MyC reply that there are no existing refills. Last sent to [facility] for about a 4 month supply.     doxepin (SINEQUAN) 10 MG capsule 120 capsule 3 6/11/2024 -- No   Sig - Route: Take 2 capsules (20 mg) by mouth 2 times daily - Oral     Routing refill request.     Date of Last Office Visit: 6/11/2024 Graciela   Date of Next Office Visit:  was referred to a community provider      []Medication refilled per  Medication Refill in Ambulatory Care  policy.  [x]Medication unable to be refilled by RN due to criteria not met as indicated below:      [x]Other: Geovanna Owens patient       Medication(s) requested:     -  doxepin (SINEQUAN) 10 MG capsule   Date last ordered: 6/11/2024  Qty: 120  Refills: 3      Requested medication(s) verified as identical to current order? Yes    Any lapse in adherence to medication(s) greater than 5 days? Unknown     Last visit treatment plan:     Assessment:     Kasandra Lau continues to work towards completing her dissertation.  She has not started the increased dose of Vyvanse yet but will start that soon at 30 mg daily to help with focus, follow through, as well as binge eating which is a concern for her.  Doxepin continues twice daily for anxiety.  She will continue with atomoxetine 40 mg daily as an adjunct to the Vyvanse.  Bupropion continues daily for depression in addition to the Viibryd.  She tolerates the propranolol well twice daily as needed for performance anxiety.  Counseling therapies are recommended as she feels isolated from her peers and is now establishing a new relationship..     Medication side effects and alternatives were reviewed. Health promotion activities recommended and reviewed today. All questions addressed. Education and counseling completed regarding risks and benefits of medications and psychotherapy options.     Treatment Plan:        1.  Atomoxetine 40 mg daily  2.  Bupropion  mg daily  3.  Doxepin 20 mg 2 times daily  4.  Vyvanse 30 mg  daily  5.  Propranolol 20 mg daily as needed for anxiety  6.  Viibryd 40 mg daily  7.  Counseling therapies when able to for learning skills to manage life stressors and adjustments     Continue all other medications as reviewed per electronic medical record today.   Safety plan reviewed. To the Emergency Department as needed or call after hours crisis line at [phone] or [phone]. [place] Crisis Text Line. Text [place] to 045194 or Suicide LifeLine Chat: suicidepreventionlifeline.org/chat/  To schedule individual or family therapy, call [facility] at [phone]  Schedule an appointment with me in 3 months or sooner as needed.

## 2024-10-21 NOTE — TELEPHONE ENCOUNTER
Health Call Center    Phone Message    May a detailed message be left on voicemail: yes     Reason for Call: Medication Refill Request    Has the patient contacted the pharmacy for the refill? Yes. Per pt, pharmacy reached out to us as well for refill request. Pt can be reached at [phone].     Name of medication being requested: hydroxychloroquine (PLAQUENIL) 200 MG tablet, methotrexate 2.5 MG tablet     Provider who prescribed the medication: Arnie Bennett MD     Pharmacy: [id number]     Date medication is needed: ASAP. Pt is out of the medications.    Action Taken: Message routed to:  Other: RHEUM    Travel Screening: Not Applicable     Date of Service: 10/21/2024

## 2024-10-21 NOTE — TELEPHONE ENCOUNTER
[facility] phone call message - patient requesting a refill:    Full Medication Name: doxepin (SINEQUAN)    Dose: 10 MG     Pharmacy confirmed as   [facility] [place], [place] - [address]  [address]  [facility] [address]  Phone: [phone] Fax: [phone] Alternate Fax: [phone], [phone]    : Yes    Medication tab checked to see if medication has been sent  Yes    Is patient out of medication: Yes    Did patient contact the pharmacy? Yes    Additional Comments: patients psych provider left is wondering if rukhsana can provide a bridge until she can find a new one. Patient said that the pharmacy has no record of the refills. She also would like it to sent to a different pharmacy then the one it was originally sent to    Would the patient like to be updated? Send patient a [employer] message  If a phone call, is it okay to leave a detailed message?: Not Applicable  at Not Applicable      Primary language: English      needed? No    Call taken on October 21, 2024 at 3:52 PM by Marilu Riley, EMT    Route to P [facility] MED REFILLS TEAM     ++If medication is a controlled substance, please route directly to the provider++

## 2024-10-22 RX ORDER — HYDROXYCHLOROQUINE SULFATE 200 MG/1
400 TABLET, FILM COATED ORAL DAILY
Qty: 180 TABLET | Refills: 2 | OUTPATIENT
Start: 2024-10-22

## 2024-10-30 ENCOUNTER — VIRTUAL VISIT (OUTPATIENT)
Dept: PSYCHOLOGY | Facility: CLINIC | Age: 39
End: 2024-10-30
Payer: COMMERCIAL

## 2024-10-30 DIAGNOSIS — F54 PSYCHOLOGICAL AND BEHAVIORAL FACTORS ASSOCIATED WITH DISORDERS OR DISEASES CLASSIFIED ELSEWHERE: ICD-10-CM

## 2024-10-30 DIAGNOSIS — F90.0 ADHD (ATTENTION DEFICIT HYPERACTIVITY DISORDER), INATTENTIVE TYPE: ICD-10-CM

## 2024-10-30 DIAGNOSIS — F43.12 CHRONIC POST-TRAUMATIC STRESS DISORDER (PTSD): Primary | ICD-10-CM

## 2024-10-30 DIAGNOSIS — F34.1 PERSISTENT DEPRESSIVE DISORDER: ICD-10-CM

## 2024-10-30 PROCEDURE — 90837 PSYTX W PT 60 MINUTES: CPT | Mod: 95 | Performed by: PSYCHOLOGIST

## 2024-10-31 DIAGNOSIS — R11.0 NAUSEA: ICD-10-CM

## 2024-10-31 DIAGNOSIS — M19.90 INFLAMMATORY ARTHRITIS: ICD-10-CM

## 2024-11-01 ENCOUNTER — TELEPHONE (OUTPATIENT)
Dept: ENDOCRINOLOGY | Facility: CLINIC | Age: 39
End: 2024-11-01
Payer: COMMERCIAL

## 2024-11-01 NOTE — TELEPHONE ENCOUNTER
Left Voicemail (1st Attempt) and Sent Mychart (1st Attempt) for the patient to call back and schedule the following:    Appointment type: [facility] MWM  Provider: Dr Manning  Return date: Reschedule 12/31/24 appointment  Specialty phone number: gave direct line  Additional appointment(s) needed: n/a  Additonal Notes: n/a

## 2024-11-03 NOTE — CONFIDENTIAL NOTE
"Health Psychology - Follow up Visit  Confidential Summary*    The author of this note documented a reason for not sharing it with the patient.  REFERRAL SOURCE:  Psychiatry    CHIEF COMPLAINT/REASON FOR VISIT  Psychotherapy in context of chronic PTSD and persistent depression.  Patient also complains of dissatisfaction with interpersonal relationships and challenges with emotion regulation.      Patient was seen today for a 60 minute individual psychotherapy session.  The session was facilitated via VIDEO with patient at her home and provider at her own home.  We used [email or web address] platform.       The patient has been notified of following:   \"This VIDEO visit will be conducted via a call between you and your physician/provider. We have found that certain health care needs can be provided without the need for an in-person physical exam.  VIDEO visits are billed at different rates depending on your insurance coverage.  Please reach out to your insurance provider with any questions. If during the course of the call the physician/provider feels a video visit is not appropriate, you will not be charged for this service.\"     Patient has given verbal consent for VIDEO visit? Yes    Subjective:  Patient began with report that she briefly met the mother of her boyfriend and that the meeting went well.  She continues to report that their relationship is going well and that they are learning to communicate and they have agreed that they have a future together.  She continues to report that he is working to get parenting time with his daughter and that he has been actively engaged in legal efforts and that his parents are helping.      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  She reported that her primary dissertation mentor informed her that she should have \"no trouble finding a job\" and that her materials are coming together well.  However, she has not " completed her second or third paper.  Efforts to discuss with her were met with resistance as she reported that there is another in her department who has worked on his dissertation for longer and that she knows that he has been here for 11 years.  Of note, she is in her 9th year.  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  She reported that she is now pursuing treatment for another symptom and she continues to be troubled by low energy.        She reported that her dogs are well and that she believes that she is doing better mentally than she has in years.  She described noticing that having love in her life has softened all the edges and that she is no longer lonely.      Objective:  Patient was on time for today s session. She was alert and oriented. Mood was euthymic with appropriate range of affect. Patient denied suicidal or assaultive ideation, plan, or intent.        Assessment:  The patient has a longstanding history of interpersonal discord and challenges with emotion regulation and avoidance as a coping mechanism for anxiety.  She has completed all requirements for her PhD and is now ABD but is struggling with completing her dissertation papers due to anxiety and associated procrastination..  She is living in  housing with her boyfriend and two dogs.        She reported that she is doing well and that she would like to meet less often, every 2 weeks.      Plan:  Patient graduated from full model DBT at [facility] and is now completing phase 2 work.      Treatment plan updated.      Time In: 2:00  Time Out: 3:00    Diagnosis:  Axis I PTSD, chronic, persistent depressive disorder, adjustment disorder with mixed, psychological factors associated with physical (fibromyalgia)   Axis II  BPD   Axis III please see medical records for details   [place] IV Psychosocial and Environmental Stressors: living alone with no  family support, pandemic stress, chronic illness         Corina Muhammad, PhD, LP

## 2024-11-05 RX ORDER — ONDANSETRON 4 MG/1
TABLET, ORALLY DISINTEGRATING ORAL
Qty: 20 TABLET | Refills: 1 | OUTPATIENT
Start: 2024-11-05

## 2024-11-05 RX ORDER — METHOTREXATE 2.5 MG/1
15 TABLET ORAL
Qty: 72 TABLET | Refills: 1 | Status: SHIPPED | OUTPATIENT
Start: 2024-11-05

## 2024-11-05 NOTE — TELEPHONE ENCOUNTER
methotrexate 2.5 MG tablet       Last Written Prescription Date:  7/11/24  Last Fill Quantity: 72,   # refills: 0  Last Office Visit: 10/19/23 Donald Keith  Future Office visit:  None    CBC RESULTS:   Recent Labs   Lab Test 10/07/24  1544   WBC 7.5   RBC 4.38   HGB 13.5   HCT 40.7   MCV 93   MCH 30.8   MCHC 33.2   RDW 13.2          Creatinine   Date Value Ref Range Status   10/07/2024 0.81 0.51 - 0.95 mg/dL Final   09/12/2020 0.77 0.52 - 1.04 mg/dL Final   ]    Liver Function Studies -   Recent Labs   Lab Test 10/07/24  1544 08/03/23  1604   PROTTOTAL  --  7.6   ALBUMIN 4.4 4.4   BILITOTAL  --  0.4   ALKPHOS  --  109*   AST 19  --    ALT 17  --        Routing refill request to provider for review/approval because:  DMARD/Not on Protocol/Needs Provider authorization/ Patient overdue for office visit/ At visit 10/19/23 when started on Methotrexate was to call back with status in 4-6 weeks, also f/u in clinic in 7 months. NO Pending visit.     Angella CORTES RN  P Central Nursing/Red Flag Triage & Med Refill Team

## 2024-11-05 NOTE — TELEPHONE ENCOUNTER
ondansetron (ZOFRAN ODT) 4 MG ODT tab 20 tablet 1 7/29/2024     Last Office Visit: 6/25/24  Future Office visit:  12/31/24     Antivertigo/Antiemetic Agents Passed     Routing refill request to provider for review/approval because:  Not on refill protocol for Weight Management    Tila Amaya RN  P Central Nursing/Red Flag Triage & Med Refill Team

## 2024-11-06 ENCOUNTER — TELEPHONE (OUTPATIENT)
Dept: NEUROLOGY | Facility: CLINIC | Age: 39
End: 2024-11-06

## 2024-11-06 DIAGNOSIS — E66.09 CLASS 1 OBESITY DUE TO EXCESS CALORIES WITH SERIOUS COMORBIDITY AND BODY MASS INDEX (BMI) OF 30.0 TO 30.9 IN ADULT: Primary | ICD-10-CM

## 2024-11-06 DIAGNOSIS — E66.811 CLASS 1 OBESITY DUE TO EXCESS CALORIES WITH SERIOUS COMORBIDITY AND BODY MASS INDEX (BMI) OF 30.0 TO 30.9 IN ADULT: Primary | ICD-10-CM

## 2024-11-06 RX ORDER — ONDANSETRON 4 MG/1
4 TABLET, FILM COATED ORAL EVERY 8 HOURS PRN
Qty: 20 TABLET | Refills: 1 | Status: SHIPPED | OUTPATIENT
Start: 2024-11-06 | End: 2024-11-15

## 2024-11-06 NOTE — TELEPHONE ENCOUNTER
Prior Authorization Approval    Medication: EMGALITY 120 MG/ML SC SOAJ  Authorization Effective Date: 10/7/2024  Authorization Expiration Date: 11/6/2025  Approved Dose/Quantity:   Reference #:     Insurance Company: [employer] [place] - Phone [phone] Fax [phone]  Expected CoPay: $    CoPay Card Available:      Financial Assistance Needed: No  Which Pharmacy is filling the prescription: [facility] [id number] [place], [place] - [address] [place] [address]  Pharmacy Notified: Yes  Patient Notified: The pharmacy will notify the patient.

## 2024-11-06 NOTE — TELEPHONE ENCOUNTER
PA Initiation    Medication: EMGALITY 120 MG/ML SC SOAJ  Insurance Company: A[employer] [place] - Phone [phone] Fax [phone]  Pharmacy Filling the Rx: [id number] [place] [facility] - [place], [place] - [address] [place] [address]  Filling Pharmacy Phone: [phone]  Filling Pharmacy Fax:    Start Date: 11/6/2024  Retail Pharmacy Prior Authorization Team   Phone: [phone]

## 2024-11-12 ENCOUNTER — MYC REFILL (OUTPATIENT)
Dept: FAMILY MEDICINE | Facility: CLINIC | Age: 39
End: 2024-11-12

## 2024-11-12 DIAGNOSIS — L50.9 URTICARIA: ICD-10-CM

## 2024-11-12 DIAGNOSIS — R11.0 NAUSEA: ICD-10-CM

## 2024-11-12 DIAGNOSIS — K21.00 GASTROESOPHAGEAL REFLUX DISEASE WITH ESOPHAGITIS WITHOUT HEMORRHAGE: ICD-10-CM

## 2024-11-14 RX ORDER — MONTELUKAST SODIUM 10 MG/1
1 TABLET ORAL AT BEDTIME
Qty: 30 TABLET | Refills: 0 | Status: SHIPPED | OUTPATIENT
Start: 2024-11-14

## 2024-11-14 RX ORDER — FAMOTIDINE 20 MG/1
20 TABLET, FILM COATED ORAL 2 TIMES DAILY
Qty: 30 TABLET | Refills: 0 | Status: SHIPPED | OUTPATIENT
Start: 2024-11-14

## 2024-11-15 DIAGNOSIS — R11.0 NAUSEA: ICD-10-CM

## 2024-11-15 RX ORDER — ONDANSETRON 4 MG/1
4-8 TABLET, ORALLY DISINTEGRATING ORAL EVERY 8 HOURS PRN
Qty: 20 TABLET | Refills: 1 | Status: SHIPPED | OUTPATIENT
Start: 2024-11-15

## 2024-11-15 RX ORDER — ONDANSETRON 4 MG/1
TABLET, ORALLY DISINTEGRATING ORAL
Qty: 20 TABLET | Refills: 1 | OUTPATIENT
Start: 2024-11-15

## 2024-11-15 NOTE — PROGRESS NOTES
Ordered ondansetron to resend to correct pharmacy per verbal order from Dr. Kerri Cary, PharmD, AAHIVP  Medication Therapy Management Pharmacist

## 2024-11-20 ENCOUNTER — VIRTUAL VISIT (OUTPATIENT)
Dept: PSYCHOLOGY | Facility: CLINIC | Age: 39
End: 2024-11-20
Payer: COMMERCIAL

## 2024-11-20 DIAGNOSIS — F54 PSYCHOLOGICAL AND BEHAVIORAL FACTORS ASSOCIATED WITH DISORDERS OR DISEASES CLASSIFIED ELSEWHERE: ICD-10-CM

## 2024-11-20 DIAGNOSIS — F90.0 ADHD (ATTENTION DEFICIT HYPERACTIVITY DISORDER), INATTENTIVE TYPE: ICD-10-CM

## 2024-11-20 DIAGNOSIS — F43.12 CHRONIC POST-TRAUMATIC STRESS DISORDER (PTSD): ICD-10-CM

## 2024-11-20 DIAGNOSIS — F43.23 ADJUSTMENT DISORDER WITH MIXED ANXIETY AND DEPRESSED MOOD: Primary | ICD-10-CM

## 2024-11-20 PROCEDURE — 90837 PSYTX W PT 60 MINUTES: CPT | Mod: 95 | Performed by: PSYCHOLOGIST

## 2024-11-20 ASSESSMENT — ANXIETY QUESTIONNAIRES
1. FEELING NERVOUS, ANXIOUS, OR ON EDGE: MORE THAN HALF THE DAYS
5. BEING SO RESTLESS THAT IT IS HARD TO SIT STILL: SEVERAL DAYS
2. NOT BEING ABLE TO STOP OR CONTROL WORRYING: MORE THAN HALF THE DAYS
6. BECOMING EASILY ANNOYED OR IRRITABLE: SEVERAL DAYS
7. FEELING AFRAID AS IF SOMETHING AWFUL MIGHT HAPPEN: SEVERAL DAYS
GAD7 TOTAL SCORE: 10
4. TROUBLE RELAXING: SEVERAL DAYS
IF YOU CHECKED OFF ANY PROBLEMS ON THIS QUESTIONNAIRE, HOW DIFFICULT HAVE THESE PROBLEMS MADE IT FOR YOU TO DO YOUR WORK, TAKE CARE OF THINGS AT HOME, OR GET ALONG WITH OTHER PEOPLE: SOMEWHAT DIFFICULT
7. FEELING AFRAID AS IF SOMETHING AWFUL MIGHT HAPPEN: SEVERAL DAYS
3. WORRYING TOO MUCH ABOUT DIFFERENT THINGS: MORE THAN HALF THE DAYS
8. IF YOU CHECKED OFF ANY PROBLEMS, HOW DIFFICULT HAVE THESE MADE IT FOR YOU TO DO YOUR WORK, TAKE CARE OF THINGS AT HOME, OR GET ALONG WITH OTHER PEOPLE?: SOMEWHAT DIFFICULT
GAD7 TOTAL SCORE: 10
GAD7 TOTAL SCORE: 10

## 2024-11-23 NOTE — CONFIDENTIAL NOTE
"Health Psychology - Follow up Visit  Confidential Summary*    The author of this note documented a reason for not sharing it with the patient.  REFERRAL SOURCE:  Psychiatry    CHIEF COMPLAINT/REASON FOR VISIT  Psychotherapy in context of chronic PTSD and persistent depression.  Patient also complains of dissatisfaction with interpersonal relationships and challenges with emotion regulation.      Patient was seen today for a 60 minute individual psychotherapy session.  The session was facilitated via VIDEO with patient at her home and provider at her own home.  We used [employer] platform.       The patient has been notified of following:   \"This VIDEO visit will be conducted via a call between you and your physician/provider. We have found that certain health care needs can be provided without the need for an in-person physical exam.  VIDEO visits are billed at different rates depending on your insurance coverage.  Please reach out to your insurance provider with any questions. If during the course of the call the physician/provider feels a video visit is not appropriate, you will not be charged for this service.\"     Patient has given verbal consent for VIDEO visit? Yes    Subjective:  Patient began with report that she briefly met the mother of her boyfriend and that the meeting went well.  She continues to report that their relationship is going well and that they are learning to communicate and they have agreed that they have a future together.  She continues to report that he is working to get parenting time with his daughter and that he has been actively engaged in legal efforts and that his parents are helping.      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  She reported that her primary dissertation mentor informed her that she should have \"no trouble finding a job\" and that her materials are coming together well.  However, she has not " completed her second or third paper.  Efforts to discuss with her were met with resistance as she reported that there is another in her department who has worked on his dissertation for longer and that she knows that he has been here for 11 years.  Of note, she is in her 9th year.  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  She reported that she is now pursuing treatment for another symptom and she continues to be troubled by low energy.        She reported that her dogs are well and that she believes that she is doing better mentally than she has in years.  She described noticing that having love in her life has softened all the edges and that she is no longer lonely.      Objective:  Patient was on time for today s session. She was alert and oriented. Mood was euthymic with appropriate range of affect. Patient denied suicidal or assaultive ideation, plan, or intent.        Assessment:  The patient has a longstanding history of interpersonal discord and challenges with emotion regulation and avoidance as a coping mechanism for anxiety.  She has completed all requirements for her PhD and is now ABD but is struggling with completing her dissertation papers due to anxiety and associated procrastination..  She is living in  housing with her boyfriend and two dogs.        She reported that she is doing well and that she would like to meet less often, every 2 weeks.      Plan:  Patient graduated from full model DBT at [facility] and is now completing phase 2 work.      Treatment plan updated.      Time In: 2:00  Time Out: 3:00    Diagnosis:  Axis I PTSD, chronic, persistent depressive disorder, adjustment disorder with mixed, psychological factors associated with physical (fibromyalgia)   Axis II  BPD   Axis III please see medical records for details   [place] IV Psychosocial and Environmental Stressors: living alone with no  family support, pandemic stress, chronic illness         Corina Muhammad, PhD, LP
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
## 2024-12-16 DIAGNOSIS — K21.00 GASTROESOPHAGEAL REFLUX DISEASE WITH ESOPHAGITIS WITHOUT HEMORRHAGE: ICD-10-CM

## 2024-12-18 ENCOUNTER — VIRTUAL VISIT (OUTPATIENT)
Dept: PSYCHOLOGY | Facility: CLINIC | Age: 39
End: 2024-12-18
Payer: COMMERCIAL

## 2024-12-18 DIAGNOSIS — F90.0 ADHD (ATTENTION DEFICIT HYPERACTIVITY DISORDER), INATTENTIVE TYPE: ICD-10-CM

## 2024-12-18 DIAGNOSIS — F43.12 CHRONIC POST-TRAUMATIC STRESS DISORDER (PTSD): Primary | ICD-10-CM

## 2024-12-18 DIAGNOSIS — F43.23 ADJUSTMENT DISORDER WITH MIXED ANXIETY AND DEPRESSED MOOD: ICD-10-CM

## 2024-12-18 DIAGNOSIS — F54 PSYCHOLOGICAL AND BEHAVIORAL FACTORS ASSOCIATED WITH DISORDERS OR DISEASES CLASSIFIED ELSEWHERE: ICD-10-CM

## 2024-12-19 RX ORDER — FAMOTIDINE 20 MG/1
TABLET, FILM COATED ORAL
Qty: 30 TABLET | Refills: 0 | Status: SHIPPED | OUTPATIENT
Start: 2024-12-19

## 2024-12-21 NOTE — CONFIDENTIAL NOTE
"Health Psychology - Follow up Visit  Confidential Summary*    The author of this note documented a reason for not sharing it with the patient.  REFERRAL SOURCE:  Psychiatry    CHIEF COMPLAINT/REASON FOR VISIT  Psychotherapy in context of chronic PTSD and persistent depression.  Patient also complains of dissatisfaction with interpersonal relationships and challenges with emotion regulation.      Patient was seen today for a 60 minute individual psychotherapy session.  The session was facilitated via VIDEO with patient at her home and provider at her own home.  We used [employer] platform.       The patient has been notified of following:   \"This VIDEO visit will be conducted via a call between you and your physician/provider. We have found that certain health care needs can be provided without the need for an in-person physical exam.  VIDEO visits are billed at different rates depending on your insurance coverage.  Please reach out to your insurance provider with any questions. If during the course of the call the physician/provider feels a video visit is not appropriate, you will not be charged for this service.\"     Patient has given verbal consent for VIDEO visit? Yes    Subjective:  Patient began with report that the relationship between she and boyfriend has improved and that [place]  She reported that he has yet to move back in but they are considering it.  She also reported that she has enjoyed time on her own.      She described a recent meeting with her dissertation chair and that she believes he is experiencing anxiety surrounding her apparent insufficient progress on the second of three papers required for her to complete her dissertation.  She reported that she understands that someone else in her program \"is in his 11th year\" and that she is not concerned since she is soon to begin her 9th.  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 She reported that she is committed to applications to postdocs in the next few months.  Used some motivational interviewing techniques to encourage completion of her two application essays.      Patient continues to report a desire to leave the [place] and a desire to invite boyfriend to join her.      Objective:  Patient was on time for today s session. She was alert and oriented. Mood was euthymic with appropriate range of affect. Patient denied suicidal or assaultive ideation, plan, or intent.        Assessment:  The patient has a longstanding history of using avoidance as a coping mechanism for anxiety.  She has completed all requirements for her PhD and is now ABD but is struggling with completing her dissertation papers due to anxiety and associated procrastination..  She is living in  housing with her two dogs.        She reported that she is doing well and that she would like to meet less often, every 2 weeks.      Plan:  Patient graduated from full model DBT at [facility] and is now completing phase 2 work.      Treatment plan updated.      Time In: 2:00  Time Out: 3:00    Diagnosis:  Axis I PTSD, chronic, persistent depressive disorder, adjustment disorder with mixed, psychological factors associated with physical (fibromyalgia)   Axis II  BPD   Axis III please see medical records for details   [place] IV Psychosocial and Environmental Stressors: living alone with no family support, pandemic stress, chronic illness         Corina Muhammad, PhD,

## 2025-01-03 DIAGNOSIS — L50.9 URTICARIA: ICD-10-CM

## 2025-01-08 ENCOUNTER — VIRTUAL VISIT (OUTPATIENT)
Dept: PSYCHOLOGY | Facility: CLINIC | Age: 40
End: 2025-01-08
Payer: COMMERCIAL

## 2025-01-08 DIAGNOSIS — F34.1 PERSISTENT DEPRESSIVE DISORDER: ICD-10-CM

## 2025-01-08 DIAGNOSIS — F54 PSYCHOLOGICAL AND BEHAVIORAL FACTORS ASSOCIATED WITH DISORDERS OR DISEASES CLASSIFIED ELSEWHERE: ICD-10-CM

## 2025-01-08 DIAGNOSIS — F90.0 ADHD (ATTENTION DEFICIT HYPERACTIVITY DISORDER), INATTENTIVE TYPE: ICD-10-CM

## 2025-01-08 DIAGNOSIS — F43.12 CHRONIC POST-TRAUMATIC STRESS DISORDER (PTSD): Primary | ICD-10-CM

## 2025-01-08 DIAGNOSIS — F43.23 ADJUSTMENT DISORDER WITH MIXED ANXIETY AND DEPRESSED MOOD: ICD-10-CM

## 2025-01-08 PROCEDURE — 90837 PSYTX W PT 60 MINUTES: CPT | Mod: 95 | Performed by: PSYCHOLOGIST

## 2025-01-11 NOTE — CONFIDENTIAL NOTE
"Health Psychology - Follow up Visit  Confidential Summary*    The author of this note documented a reason for not sharing it with the patient.  REFERRAL SOURCE:  Psychiatry    CHIEF COMPLAINT/REASON FOR VISIT  Psychotherapy in context of chronic PTSD and persistent depression.  Patient also complains of dissatisfaction with interpersonal relationships and challenges with emotion regulation.      Patient was seen today for a 60 minute individual psychotherapy session.  The session was facilitated via VIDEO with patient at her home and provider at her own home.  We used [employer] platform.       The patient has been notified of following:   \"This VIDEO visit will be conducted via a call between you and your physician/provider. We have found that certain health care needs can be provided without the need for an in-person physical exam.  VIDEO visits are billed at different rates depending on your insurance coverage.  Please reach out to your insurance provider with any questions. If during the course of the call the physician/provider feels a video visit is not appropriate, you will not be charged for this service.\"     Patient has given verbal consent for VIDEO visit? Yes    Subjective:  Patient began with discussion of successful progress made in her second paper and that she only has the conclusion remaining.  She reported that she intends to send the draft to a colleague in [facility] for review and that she intends to reconnect with her committee to secure their commitment to continuing to serve on her behalf.  She reported ongoing frustration that her chair does not appear to appreciate the extenuating circumstances surrounding her delay in completing her papers.  Encouraged her to consider the impact of avoidance and anxiety in her choices.  She was also validated that she has had multiple stressful life events that have made steady work on the papers (3 due in total) a possibility.  She reported that she has now " "been spending time with essays required in her job applications.  She was gently confronted with my awareness that she has been working on these 3 essays for several months.  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  Discussed how she might approach time management so that she can complete her requirements to graduate and encouraged her to consider any anxiety that she might have about this next phase in her professional development.  She reported that she has applied to two local teaching jobs and that these might allow her to remain close to boyfriend and get more experience.  She was gently confronted that completing applications now may be futile if she is not going to be able to defend in time to begin these jobs.      She spent much of session discussing frustration with boyfriend who is living back with his mother and who continues to express reluctance to \"fight\" for his daughter through the court system.  He reportedly is not wiling to ask for full custody.  Encouraged her to consider that he may recognize that he is not able to care for her full time.  She expressed grief that she would like to become a parent to this child and that she would be willing to help him but that she is not able to make his fight for a future which would make her happiest.      Her emotions of frustration and grief were validated.  She was provided with psychoeducation about resistance and encouraged that her role is to support her partner during this challenging time.      Objective:  Patient was on time for today s session. She was alert and oriented. Mood was euthymic with appropriate range of affect. Patient denied suicidal or assaultive ideation, plan, or intent.        Assessment:  The patient has a longstanding history of using avoidance as a coping mechanism for anxiety.  She has completed all requirements for her PhD " and is now ABD but is struggling with completing her dissertation papers due to anxiety and associated procrastination..  She is living in  housing with her two dogs.        She reported that she is doing well and that she would like to meet less often, every 2 weeks.      Plan:  Patient graduated from full model DBT at [facility] and is now completing phase 2 work.      Treatment plan updated.      Time In: 2:00  Time Out: 3:00    Diagnosis:  Axis I PTSD, chronic, persistent depressive disorder, adjustment disorder with mixed, psychological factors associated with physical (fibromyalgia)   Axis II  BPD   Axis III please see medical records for details   [place] IV Psychosocial and Environmental Stressors: living alone with no family support, pandemic stress, chronic illness         Corina Muhammad, PhD, LP

## 2025-01-15 ENCOUNTER — MYC REFILL (OUTPATIENT)
Dept: RHEUMATOLOGY | Facility: CLINIC | Age: 40
End: 2025-01-15
Payer: COMMERCIAL

## 2025-01-15 DIAGNOSIS — M19.90 INFLAMMATORY ARTHRITIS: ICD-10-CM

## 2025-01-16 RX ORDER — HYDROXYCHLOROQUINE SULFATE 200 MG/1
400 TABLET, FILM COATED ORAL DAILY
Qty: 180 TABLET | Refills: 2 | Status: SHIPPED | OUTPATIENT
Start: 2025-01-16

## 2025-01-16 NOTE — TELEPHONE ENCOUNTER
Last Written Prescription Date:  10/22/24  Last Fill Quantity: 180,  # refills: 0   Last office visit: 10/19/2023 ; last virtual visit: Visit date not found with prescribing provider:  Donald   Future Office Visit: none scheduled    No eye exam on file    Requested Prescriptions   Pending Prescriptions Disp Refills    hydroxychloroquine (PLAQUENIL) 200 MG tablet 180 tablet 0     Sig: Take 2 tablets (400 mg) by mouth daily.       There is no refill protocol information for this order        Lauren Pham RN

## 2025-01-27 ENCOUNTER — VIRTUAL VISIT (OUTPATIENT)
Dept: PHARMACY | Facility: CLINIC | Age: 40
End: 2025-01-27
Attending: INTERNAL MEDICINE
Payer: COMMERCIAL

## 2025-01-27 VITALS — BODY MASS INDEX: 25.18 KG/M2 | HEIGHT: 69 IN | WEIGHT: 170 LBS

## 2025-01-27 DIAGNOSIS — F41.9 ANXIETY: ICD-10-CM

## 2025-01-27 DIAGNOSIS — L50.1 CHRONIC IDIOPATHIC URTICARIA: ICD-10-CM

## 2025-01-27 DIAGNOSIS — F43.10 PTSD (POST-TRAUMATIC STRESS DISORDER): ICD-10-CM

## 2025-01-27 DIAGNOSIS — K59.00 CONSTIPATION, UNSPECIFIED CONSTIPATION TYPE: ICD-10-CM

## 2025-01-27 DIAGNOSIS — E66.3 OVERWEIGHT (BMI 25.0-29.9): Primary | ICD-10-CM

## 2025-01-27 DIAGNOSIS — F90.9 ATTENTION DEFICIT HYPERACTIVITY DISORDER (ADHD), UNSPECIFIED ADHD TYPE: ICD-10-CM

## 2025-01-27 DIAGNOSIS — J30.2 SEASONAL ALLERGIC RHINITIS, UNSPECIFIED TRIGGER: ICD-10-CM

## 2025-01-27 DIAGNOSIS — K76.0 FATTY LIVER: ICD-10-CM

## 2025-01-27 DIAGNOSIS — F32.0 CURRENT MILD EPISODE OF MAJOR DEPRESSIVE DISORDER WITHOUT PRIOR EPISODE: ICD-10-CM

## 2025-01-27 ASSESSMENT — PAIN SCALES - GENERAL: PAINLEVEL_OUTOF10: MODERATE PAIN (4)

## 2025-01-27 NOTE — PATIENT INSTRUCTIONS
"Recommendations from today's MTM visit:                                                      Continue compounded semaglutide 1 mg (20 units) once weekly   Try adding docusate  mg at night to help with softer bowel movements   Try adding more protein and fiber with breakfast to help have more consistent energy levels   Can schedule appointment with registered dietitian if interested  Will send protein ideas over mychart  Medications that can make you feel tired:   Cetirizine  Doxepin (could you try taking 40 mg at bedtime instead of 20 mg 2 times daily to see if you feel less tired? Doxepin is long-acting and can be taken once daily).       Quick/Easy Protein Sources:  Hard boiled eggs  Part-skim cheese sticks  Baby Bell cheese rounds  Low-fat/low-sugar Greek yogurt  Low-fat cottage cheese  Lean deli meat (chicken/turkey/ham)  Roasted chickpeas or lentils  Nuts   Turkey meat stick  Protein shake/bar  \"P3\" snack (cheese, nuts, deli meat)  Aldi's \"Protein Bread\"   \"Egglife\" egg white wrap    Tuna/salmon can/packet     Meal Replacement Shake Options:   *Protein Shake Criteria: no more than 210 Calories, at least 20 grams of protein, and less than 10 grams of sugar   Premier Protein (160 Calories, 30 g protein)  Slim Fast Advanced Nutrition (180 Calories, 20 g protein)  Muscle Milk, lactose-free, 17 oz bottle (210 Calories, 30 g protein)  Integrated Supplements, no artificial sugars (110 Calories, 20 g protein)  Boost/Ensure Max (160 calories, 30 gm protein)   Fairlife Protein Shakes (160-230 calories, 26-42 gm protein)  Aldi's Elevation Protein Powder (180 calories, 30 gm protein)   Orgain Protein Shakes (130-160 calories, 20-26 gm protein)   Chiboni Complete Yogurt Drink (170 calories, 20 gm protein)  Oikos Pro Yogurt Drink (140 calories, 23 gm protein)     Meal Replacement Bar Options:  Quest Protein Bars (190 Calories, 20 g protein)  Built Bar (170 Calories, 15-20 g protein)  One Protein Bar (210 calories, 20 g " "protein)  John Signature Protein Bar ([employer]) (190 Calories, 21 g protein)  Pure Protein Bars (180 Calories, 21 g protein)    Low Calorie Frozen Meal:  Healthy Choice Power Bowls  Lean Cuisine  Smart Ones  Tera Ontiveros    Follow-up: 2 weeks with Dr. Kerri Evans, 6 months with medication therapy management     It was great speaking with you today.  I value your experience and would be very thankful for your time in providing feedback in our clinic survey. In the next few days, you may receive an email or text message from [employer] with a link to a survey related to your  clinical pharmacist.\"     To schedule another MTM appointment, please call the clinic directly or you may call the MTM scheduling line at [phone].    My Clinical Pharmacist's contact information:                                                      Please feel free to contact me with any questions or concerns you have.      Shaunna Cary, PharmD, AAHIVP  Medication Therapy Management Pharmacist      "

## 2025-01-27 NOTE — PROGRESS NOTES
Medication Therapy Management (MTM) Encounter    ASSESSMENT:                            Medication Adherence/Access: No issues identified.    Weight Management /Fatty liver:  Weight loss has been slowly progressing. Would like to continue current semaglutide dose. Would benefit from increasing protein intake in the morning. Discussed prepping frozen or fresh vegetables for the week to include with meals to increase fiber/nutrients.     Constipation:   Could try adding docusate to bisacodyl since she has occasional hard stools    Mental Health   ADHD/PTSD/anxiety/depression:  Lower energy may be seasonal. Reported low energy last visit as well. No medication changes recently. Doxepin is usually taken at bedtime due to causing drowsiness. Could try some dietary modifications.     Allergies/hives:   stable    PLAN:                            Continue compounded semaglutide 1 mg (20 units) once weekly   Try adding docusate  mg at night to help with softer bowel movements   Try adding more protein and fiber with breakfast to help have more consistent energy levels   Can schedule appointment with registered dietitian if interested  Will send protein ideas over mychart  Medications that can make you feel tired:   Cetirizine  Doxepin (could you try taking 40 mg at bedtime instead of 20 mg 2 times daily to see if you feel less tired? Doxepin is long-acting and can be taken once daily).     Follow-up: 2 weeks with Dr. Kerri Evans, 6 months with medication therapy management     SUBJECTIVE/OBJECTIVE:                          Kasandra Lau is a 39 year old female seen for a follow-up visit.       Reason for visit: weight management follow-up.    Allergies/ADRs: Reviewed in chart  Past Medical History: Reviewed in chart  Tobacco: She reports that she has quit smoking. Her smoking use included cigarettes. She has never used smokeless tobacco.  Alcohol: none  Busy with grad school program   Medication  "Adherence/Access: no issues reported.    Weight Management /Fatty liver:  Compounded semaglutide 1 mg (20 units) once weekly   Vyvanse 30 mg once daily   Ondansetron 4-8 mg every 8 hours as needed     Following with Dr. Manning - last visit was 6/25/24. Feels more tired lately. Thinks it could be season-related. Nausea has improved.     Diet/Eating Habits:   Breakfast: Cereal, fruit, yogurt.  Lunch: takeout a lot (wei or English food), meat/carb/veggie, sandwich  Dinner: smaller dinner, cheese and crackers   Would like to add more veggies   Has sweets cravings- will have a gummy or small piece of licorice to help curb cravings   Water: 65 ounces per day  Exercise/Activity: Patient reports walking dogs twice per day. Isn't able to be super active because she feels tired.   Previous trials:   Ozempic 10/2022-7/2023 (caused constipation, nausea, acid reflux. Stopped being covered).   Qsymia (Not helpful)  Topiramate - [id number]- brain fog and word recall issues  Naltrexone- prescribed but unclear if she tried this    Initial consult weight: 195 lbs 10/4/22     Current weight today: 170 lbs 0 oz  Cumulative Weight Loss: -25 lb, -12.8% from baseline  Goal weight: 155 lbs - felt stable at this weight for years    Wt Readings from Last 4 Encounters:   01/27/25 170 lb (77.1 kg)   10/14/24 175 lb (79.4 kg)   07/29/24 190 lb (86.2 kg)   06/25/24 187 lb (84.8 kg)     Estimated body mass index is 25.1 kg/m  as calculated from the following:    Height as of this encounter: 5' 9\" (1.753 m).    Weight as of this encounter: 170 lb (77.1 kg).      Constipation:   Bisacodyl 10 mg daily at night   Enema every other day     Bowel movement daily. Occasional painful. Tried senna-docusate before but felt it wasn't as helpful.       Mental Health   ADHD/PTSD/anxiety/depression:  Propranolol 20 mg daily as needed  Gabapentin 800 mg 3 times daily - sometimes skips mid-day  Atomoxetine 40 mg once daily   Vyvanse 30 mg once daily " "  Bupropion  mg once daily   Vilazodone 40 mg once daily   Doxepin 10 mg 2 times daily - for hives/itching     Reports feeling more tired and low energy the last 2 months. Thinks it could be seasonal. No recent medication changes. Thinks ADHD medications are helpful for having more sustained energy throughout the day and sleeping well. Doesn't think gabapentin makes her feel tired. Dry mouth - drinks a lot of water.     Allergies/hives:   Cetirizine 10 mg once daily   Flonase nasal spray 2 sprays 2 times daily   Montelukast 10 mg once daily   Phenylephrine 10 mg 2 times daily   Doxepin 10 mg 2 times daily   Famotidine 20 mg 2 times daily   Mucinex 600 mg daily as needed      Allergic to dust mites. Thinks living in an apartment with carpet is worsening allergies. The medications are helpful including phenylephrine. She notices worsening symptoms if she misses a dose of doxepin or phenylephrine. Guaifenesin is helpful for post-nasal drip.  Dry mouth - drinks a lot of water.       Today's Vitals: Ht 5' 9\" (1.753 m)   Wt 170 lb (77.1 kg)   BMI 25.10 kg/m    ----------------    I spent 25 minutes with this patient today. All changes were made via collaborative practice agreement with Dr. Kerri Evans.     A summary of these recommendations was sent via [employer].    Telemedicine Visit Details  The patient's medications can be safely assessed via a telemedicine encounter.  Type of service:  Video Conference via [employer]  Originating Location (pt. Location): Home    Distant Location (provider location):  Off-site  Start Time:  2:35 pm  End Time:  3 pm     Medication Therapy Recommendations  Constipation, unspecified constipation type   1 Current Medication: bisacodyl (DULCOLAX) 5 MG EC tablet   Current Medication Sig: Take 5 mg by mouth daily as needed for constipation. Takes 1-3 tablets daily   Rationale: Synergistic therapy - Needs additional medication therapy - Indication   Recommendation: Start " Medication - DOCUSATE CALCIUM   Status: Patient Agreed - Adherence/Education   Identified Date: 1/27/2025 Completed Date: 1/27/2025

## 2025-01-27 NOTE — NURSING NOTE
Current patient location: home    Is the patient currently in the state of [place]? YES    Visit mode: VIDEO    If the visit is dropped, the patient can be reconnected by:VIDEO VISIT: Text to cell phone:   Telephone Information:   Mobile [phone]       Will anyone else be joining the visit? NO  (If patient encounters technical issues they should call [phone] :[id number])    Are changes needed to the allergy or medication list? No    Are refills needed on medications prescribed by this physician? NO    Rooming Documentation:  Not applicable      Reason for visit: Medication Therapy Management    Wt other than 24 hrs:  week ago   Pain more than one location:  no  Kate LOVELL

## 2025-02-18 ENCOUNTER — VIRTUAL VISIT (OUTPATIENT)
Dept: ENDOCRINOLOGY | Facility: CLINIC | Age: 40
End: 2025-02-18
Payer: COMMERCIAL

## 2025-02-18 VITALS — WEIGHT: 166 LBS | HEIGHT: 69 IN | BODY MASS INDEX: 24.59 KG/M2

## 2025-02-18 DIAGNOSIS — R11.0 NAUSEA: ICD-10-CM

## 2025-02-18 PROCEDURE — 98006 SYNCH AUDIO-VIDEO EST MOD 30: CPT | Performed by: INTERNAL MEDICINE

## 2025-02-18 PROCEDURE — G2211 COMPLEX E/M VISIT ADD ON: HCPCS | Performed by: INTERNAL MEDICINE

## 2025-02-18 RX ORDER — ONDANSETRON 4 MG/1
4 TABLET, ORALLY DISINTEGRATING ORAL EVERY 8 HOURS PRN
Qty: 30 TABLET | Refills: 1 | Status: SHIPPED | OUTPATIENT
Start: 2025-02-18

## 2025-02-18 NOTE — PROGRESS NOTES
Return Medical Weight Management Note     Kasandra Lau  MRN:  [id number]  :  1985  MIKO: 2025    Dear JASVIR Ashley CNP,    I had the pleasure of seeing your patient Kasandra Lau. She is a 39 year old female who I am continuing to see for treatment of obesity related to:  anxiety, depression, ADHD, PTSD, rheumatoid arthritis, hyperlipidemia, GERD, fatty liver, back pain        10/3/2022    11:26 PM   --   I have the following health issues associated with obesity High Cholesterol    GERD (Reflux)    Fatty Liver   I have the following symptoms associated with obesity Depression    Back Pain    Fatigue    Irregular Menstral Cycle     Reported baseline weight 150-160 lbs. A couple of years ago, she gained 17 lbs in a month despite no change in diet and exercise. Since then she continued to gain weight in spurt and then plateau out and gained again    Was on topiramate for migraine [id number] -- had brain fog and difficulty with word recall    INTERVAL HISTORY:  Last visit with me 2024 and with [facility] pharmacist 2025.    Was on Ozempic 10/2022-2023. Insurance was no longer covered. Then switched over to Qsymia but she did not feel that it was helping.    Has started on compound semaglutide 2024. Currently on 1.0 mg weekly    Total weight loss 29 lbs which is about 14%.    Started using [employer] -- feel helpful    Exercise -- walk a dog twice a week, has not have regular exercise lately    Busy over the past few months due to studying and planning for job application    CURRENT WEIGHT:   166 lbs 0 oz    Initial Weight (lbs): 195 lbs  Last Visits Weight: 77.1 kg (170 lb)  Cumulative weight loss (lbs): 29  Weight Loss Percentage: 14.87%        2024    11:12 AM   Changes and Difficulties   I have made the following changes to my diet since my last visit: more fresh foods and oats   With regards to my diet, I am still struggling with: still have a sweettooth but less binge eating  "  I have made the following changes to my activity/exercise since my last visit: strength training class and pilates, once each a week   With regards to my activity/exercise, I am still struggling with: weight plateauing or increasing       VITALS:  Ht 1.753 m (5' 9.02\")   Wt 75.3 kg (166 lb)   BMI 24.50 kg/m      MEDICATIONS:   Current Outpatient Medications   Medication Sig Dispense Refill    almotriptan (AXERT) 12.5 MG tablet Take 1 tablet (12.5 mg) by mouth at onset of headache for migraine (repeat in 2 hours if needed). May repeat in 2 hours. Max 2 tablets/24 hours. 18 tablet 11    atomoxetine (STRATTERA) 40 MG capsule Take 1 capsule (40 mg) by mouth daily 90 capsule 0    bisacodyl (DULCOLAX) 5 MG EC tablet Take 5 mg by mouth daily as needed for constipation. Takes 1-3 tablets daily      buPROPion (WELLBUTRIN XL) 300 MG 24 hr tablet Take 1 tablet (300 mg) by mouth every morning 90 tablet 1    cetirizine (ZYRTEC) 10 MG tablet TAKE 1 TABLET BY MOUTH EVERY EVENING 90 tablet 1    cholecalciferol 125 MCG (5000 UT) CAPS Take 5,000 Units by mouth every evening      COMPOUNDED NON-CONTROLLED SUBSTANCE (CMPD RX) - PHARMACY TO MIX COMPOUNDED MEDICATION Inject compound semaglutide subcutaneously 1 mg (20 units) once weekly. OK to compound during Wegovy shortage. 1 mL 5    doxepin (SINEQUAN) 10 MG capsule Take 2 capsules (20 mg) by mouth 2 times daily. 120 capsule 3    EMGALITY 120 MG/ML injection Inject 1 mL (120 mg) Subcutaneous every 28 days 1 mL 11    EPINEPHrine (ANY BX GENERIC EQUIV) 0.3 MG/0.3ML injection 2-pack Inject 0.3 mg into the muscle as needed      famotidine (PEPCID) 20 MG tablet TAKE ONE TABLET BY MOUTH TWICE A DAY  **CLINIC CLOSED, PLEASE ESTABLISH CARE ELSEWHERE, CANNOT PROVIDE ADDITIONAL REFILLS 30 tablet 0    fluticasone (FLONASE) 50 MCG/ACT nasal spray Spray 2 sprays into both nostrils 2 times daily as needed for allergies 48 mL 5    gabapentin (NEURONTIN) 400 MG capsule Take 2 capsules (800 mg) by " mouth 3 times daily 180 capsule 5    guaiFENesin (MUCINEX) 600 MG 12 hr tablet Take 600 mg by mouth daily as needed      hydroxychloroquine (PLAQUENIL) 200 MG tablet Take 2 tablets (400 mg) by mouth daily. 180 tablet 2    levonorgestrel (MIRENA) 20 MCG/DAY IUD 1 each (20 mcg) by Intrauterine route once for 1 dose 1 each 0    lisdexamfetamine (VYVANSE) 30 MG capsule Take 1 capsule (30 mg) by mouth every morning. Do not start before October 12, 2024. 30 capsule 0    methotrexate 2.5 MG tablet TAKE 6 TABLETS (15 MG) BY MOUTH EVERY 7 DAYS 72 tablet 1    montelukast (SINGULAIR) 10 MG tablet Take 1 tablet (10 mg) by mouth at bedtime. 30 tablet 0    Multiple Vitamin (MULTIVITAMIN ADULT PO) Take 1 tablet by mouth daily.      ondansetron (ZOFRAN ODT) 4 MG ODT tab Take 1-2 tablets (4-8 mg) by mouth every 8 hours as needed for nausea. 20 tablet 1    phenylephrine HCl 10 MG TABS Take 10 mg by mouth 2 times daily      propranolol (INDERAL) 20 MG tablet Take 1 tablet (20 mg) by mouth daily as needed (anxiety) 30 tablet 11    Sodium Phosphates (FLEET SALINE ENEMA RE) Place 1 enema rectally daily as needed (constipation).      vilazodone (VIIBRYD) 40 MG TABS tablet Take 1 tablet (40 mg) by mouth every morning 90 tablet 1    lisdexamfetamine (VYVANSE) 30 MG capsule Take 1 capsule (30 mg) by mouth every morning. 30 capsule 0           6/25/2024    11:12 AM   Weight Loss Medication History Reviewed With Patient   Which weight loss medications are you currently taking on a regular basis? Bupropion   If you are not taking a weight loss medication that was prescribed to you, please indicate why: My insurance does not cover the cost   Are you having any side effects from the weight loss medication that we have prescribed you? No       ASSESSMENT:   Kasandra Lau is a 39 year old female who I am continuing to see for treatment of obesity related to:  anxiety, depression, ADHD, PTSD, rheumatoid arthritis, hyperlipidemia, GERD, fatty liver,  back pain    Insurance does not cover GLP-1RA  Qsymia did not help  Has started on compound semaglutide July 2024. Currently on 1.0 mg weekly  Total weight loss 29 lbs which is about 14%.  Busy over the past few months due to studying and planning for job application    PLAN:   - continue compound semaglutide 1.0 mg weekly  - continue healthy small meal  - increase exercise level    FOLLOW-UP:    See MD in 5-6 month(s)    Joined the call at invalid date.  Left the call at 2/18/2025, 12:57:16 pm.  You were on the call for .    Sincerely,    Kerri Evans MD

## 2025-02-18 NOTE — LETTER
2025       RE: Kasandra Lau  [address]  [facility] [address]     Dear Colleague,    Thank you for referring your patient, Kasandra Lau, to the [facility] [place] [facility]. Please see a copy of my visit note below.    Return Medical Weight Management Note     Kasandra Lau  MRN:  [id number]  :  1985  MIKO: 2025    Dear JASVIR Ashley CNP,    I had the pleasure of seeing your patient Kasandra Lau. She is a 39 year old female who I am continuing to see for treatment of obesity related to:  anxiety, depression, ADHD, PTSD, rheumatoid arthritis, hyperlipidemia, GERD, fatty liver, back pain        10/3/2022    11:26 PM   --   I have the following health issues associated with obesity High Cholesterol    GERD (Reflux)    Fatty Liver   I have the following symptoms associated with obesity Depression    Back Pain    Fatigue    Irregular Menstral Cycle     Reported baseline weight 150-160 lbs. A couple of years ago, she gained 17 lbs in a month despite no change in diet and exercise. Since then she continued to gain weight in spurt and then plateau out and gained again    Was on topiramate for migraine [id number] -- had brain fog and difficulty with word recall    INTERVAL HISTORY:  Last visit with me 2024 and with [facility] pharmacist 2025.    Was on Ozempic 10/2022-2023. Insurance was no longer covered. Then switched over to Qsymia but she did not feel that it was helping.    Has started on compound semaglutide 2024. Currently on 1.0 mg weekly    Total weight loss 29 lbs which is about 14%.    Started using [employer] -- feel helpful    Exercise -- walk a dog twice a week, has not have regular exercise lately    Busy over the past few months due to studying and planning for job application    CURRENT WEIGHT:   166 lbs 0 oz    Initial Weight (lbs): 195 lbs  Last Visits Weight: 77.1 kg (170  The patient was seen primarily by the J[facility].    I was available for consult on this patient, but did not see nor evaluate the patient.     Vital Signs Review: Vital signs have been reviewed.     The oxygen saturation is  SpO2: 98 % which is Normal       Jose Sim MD  01/07/24 0425     "lb)  Cumulative weight loss (lbs): 29  Weight Loss Percentage: 14.87%        6/25/2024    11:12 AM   Changes and Difficulties   I have made the following changes to my diet since my last visit: more fresh foods and oats   With regards to my diet, I am still struggling with: still have a sweettooth but less binge eating   I have made the following changes to my activity/exercise since my last visit: strength training class and pilates, once each a week   With regards to my activity/exercise, I am still struggling with: weight plateauing or increasing       VITALS:  Ht 1.753 m (5' 9.02\")   Wt 75.3 kg (166 lb)   BMI 24.50 kg/m      MEDICATIONS:   Current Outpatient Medications   Medication Sig Dispense Refill     almotriptan (AXERT) 12.5 MG tablet Take 1 tablet (12.5 mg) by mouth at onset of headache for migraine (repeat in 2 hours if needed). May repeat in 2 hours. Max 2 tablets/24 hours. 18 tablet 11     atomoxetine (STRATTERA) 40 MG capsule Take 1 capsule (40 mg) by mouth daily 90 capsule 0     bisacodyl (DULCOLAX) 5 MG EC tablet Take 5 mg by mouth daily as needed for constipation. Takes 1-3 tablets daily       buPROPion (WELLBUTRIN XL) 300 MG 24 hr tablet Take 1 tablet (300 mg) by mouth every morning 90 tablet 1     cetirizine (ZYRTEC) 10 MG tablet TAKE 1 TABLET BY MOUTH EVERY EVENING 90 tablet 1     cholecalciferol 125 MCG (5000 UT) CAPS Take 5,000 Units by mouth every evening       COMPOUNDED NON-CONTROLLED SUBSTANCE (CMPD RX) - PHARMACY TO MIX COMPOUNDED MEDICATION Inject compound semaglutide subcutaneously 1 mg (20 units) once weekly. OK to compound during Wegovy shortage. 1 mL 5     doxepin (SINEQUAN) 10 MG capsule Take 2 capsules (20 mg) by mouth 2 times daily. 120 capsule 3     EMGALITY 120 MG/ML injection Inject 1 mL (120 mg) Subcutaneous every 28 days 1 mL 11     EPINEPHrine (ANY BX GENERIC EQUIV) 0.3 MG/0.3ML injection 2-pack Inject 0.3 mg into the muscle as needed       famotidine (PEPCID) 20 MG tablet " TAKE ONE TABLET BY MOUTH TWICE A DAY  **CLINIC CLOSED, PLEASE ESTABLISH CARE ELSEWHERE, CANNOT PROVIDE ADDITIONAL REFILLS 30 tablet 0     fluticasone (FLONASE) 50 MCG/ACT nasal spray Spray 2 sprays into both nostrils 2 times daily as needed for allergies 48 mL 5     gabapentin (NEURONTIN) 400 MG capsule Take 2 capsules (800 mg) by mouth 3 times daily 180 capsule 5     guaiFENesin (MUCINEX) 600 MG 12 hr tablet Take 600 mg by mouth daily as needed       hydroxychloroquine (PLAQUENIL) 200 MG tablet Take 2 tablets (400 mg) by mouth daily. 180 tablet 2     levonorgestrel (MIRENA) 20 MCG/DAY IUD 1 each (20 mcg) by Intrauterine route once for 1 dose 1 each 0     lisdexamfetamine (VYVANSE) 30 MG capsule Take 1 capsule (30 mg) by mouth every morning. Do not start before October 12, 2024. 30 capsule 0     methotrexate 2.5 MG tablet TAKE 6 TABLETS (15 MG) BY MOUTH EVERY 7 DAYS 72 tablet 1     montelukast (SINGULAIR) 10 MG tablet Take 1 tablet (10 mg) by mouth at bedtime. 30 tablet 0     Multiple Vitamin (MULTIVITAMIN ADULT PO) Take 1 tablet by mouth daily.       ondansetron (ZOFRAN ODT) 4 MG ODT tab Take 1-2 tablets (4-8 mg) by mouth every 8 hours as needed for nausea. 20 tablet 1     phenylephrine HCl 10 MG TABS Take 10 mg by mouth 2 times daily       propranolol (INDERAL) 20 MG tablet Take 1 tablet (20 mg) by mouth daily as needed (anxiety) 30 tablet 11     Sodium Phosphates (FLEET SALINE ENEMA RE) Place 1 enema rectally daily as needed (constipation).       vilazodone (VIIBRYD) 40 MG TABS tablet Take 1 tablet (40 mg) by mouth every morning 90 tablet 1     lisdexamfetamine (VYVANSE) 30 MG capsule Take 1 capsule (30 mg) by mouth every morning. 30 capsule 0           6/25/2024    11:12 AM   Weight Loss Medication History Reviewed With Patient   Which weight loss medications are you currently taking on a regular basis? Bupropion   If you are not taking a weight loss medication that was prescribed to you, please indicate why: My  insurance does not cover the cost   Are you having any side effects from the weight loss medication that we have prescribed you? No       ASSESSMENT:   Kasandra Lau is a 39 year old female who I am continuing to see for treatment of obesity related to:  anxiety, depression, ADHD, PTSD, rheumatoid arthritis, hyperlipidemia, GERD, fatty liver, back pain    Insurance does not cover GLP-1RA  Qsymia did not help  Has started on compound semaglutide July 2024. Currently on 1.0 mg weekly  Total weight loss 29 lbs which is about 14%.  Busy over the past few months due to studying and planning for job application    PLAN:   - continue compound semaglutide 1.0 mg weekly  - continue healthy small meal  - increase exercise level    FOLLOW-UP:    See MD in 5-6 month(s)    Joined the call at invalid date.  Left the call at 2/18/2025, 12:57:16 pm.  You were on the call for .    Sincerely,    Kerri Evans MD      Again, thank you for allowing me to participate in the care of your patient.      Sincerely,    Kerri Evans MD

## 2025-02-18 NOTE — NURSING NOTE
Current patient location: [address]  [facility] [address]    Is the patient currently in the state of [place]? YES    Visit mode:VIDEO    If the visit is dropped, the patient can be reconnected by: VIDEO VISIT: Send to e-mail at: [email or web address]    Will anyone else be joining the visit? NO  (If patient encounters technical issues they should call [phone]    Are changes needed to the allergy or medication list? No    Are refills needed on medications prescribed by this physician? YES compound    Reason for visit: RECHECK    Zarina LOVELL

## 2025-02-27 DIAGNOSIS — M79.642 PAIN IN BOTH HANDS: ICD-10-CM

## 2025-02-27 DIAGNOSIS — M25.531 PAIN IN BOTH WRISTS: Primary | ICD-10-CM

## 2025-02-27 DIAGNOSIS — M25.532 PAIN IN BOTH WRISTS: Primary | ICD-10-CM

## 2025-02-27 DIAGNOSIS — M79.641 PAIN IN BOTH HANDS: ICD-10-CM

## 2025-03-04 ASSESSMENT — ANXIETY QUESTIONNAIRES
1. FEELING NERVOUS, ANXIOUS, OR ON EDGE: MORE THAN HALF THE DAYS
GAD7 TOTAL SCORE: 9
6. BECOMING EASILY ANNOYED OR IRRITABLE: MORE THAN HALF THE DAYS
5. BEING SO RESTLESS THAT IT IS HARD TO SIT STILL: SEVERAL DAYS
7. FEELING AFRAID AS IF SOMETHING AWFUL MIGHT HAPPEN: SEVERAL DAYS
8. IF YOU CHECKED OFF ANY PROBLEMS, HOW DIFFICULT HAVE THESE MADE IT FOR YOU TO DO YOUR WORK, TAKE CARE OF THINGS AT HOME, OR GET ALONG WITH OTHER PEOPLE?: SOMEWHAT DIFFICULT
3. WORRYING TOO MUCH ABOUT DIFFERENT THINGS: SEVERAL DAYS
GAD7 TOTAL SCORE: 9
7. FEELING AFRAID AS IF SOMETHING AWFUL MIGHT HAPPEN: SEVERAL DAYS
GAD7 TOTAL SCORE: 9
4. TROUBLE RELAXING: SEVERAL DAYS
IF YOU CHECKED OFF ANY PROBLEMS ON THIS QUESTIONNAIRE, HOW DIFFICULT HAVE THESE PROBLEMS MADE IT FOR YOU TO DO YOUR WORK, TAKE CARE OF THINGS AT HOME, OR GET ALONG WITH OTHER PEOPLE: SOMEWHAT DIFFICULT
2. NOT BEING ABLE TO STOP OR CONTROL WORRYING: SEVERAL DAYS

## 2025-03-04 ASSESSMENT — PAIN SCALES - PAIN ENJOYMENT GENERAL ACTIVITY SCALE (PEG)
PEG_TOTALSCORE: 4.67
INTERFERED_GENERAL_ACTIVITY: 5
AVG_PAIN_PASTWEEK: 4
AVG_PAIN_PASTWEEK: 4
INTERFERED_ENJOYMENT_LIFE: 5
PEG_TOTALSCORE: 4.67
INTERFERED_GENERAL_ACTIVITY: 5
INTERFERED_ENJOYMENT_LIFE: 5

## 2025-03-05 ENCOUNTER — OFFICE VISIT (OUTPATIENT)
Dept: ANESTHESIOLOGY | Facility: CLINIC | Age: 40
End: 2025-03-05
Attending: PSYCHIATRY & NEUROLOGY
Payer: COMMERCIAL

## 2025-03-05 VITALS
HEART RATE: 91 BPM | DIASTOLIC BLOOD PRESSURE: 78 MMHG | OXYGEN SATURATION: 100 % | RESPIRATION RATE: 16 BRPM | SYSTOLIC BLOOD PRESSURE: 111 MMHG

## 2025-03-05 DIAGNOSIS — M25.531 PAIN IN BOTH WRISTS: ICD-10-CM

## 2025-03-05 DIAGNOSIS — M25.532 PAIN IN BOTH WRISTS: ICD-10-CM

## 2025-03-05 DIAGNOSIS — M79.641 PAIN IN BOTH HANDS: ICD-10-CM

## 2025-03-05 DIAGNOSIS — M79.642 PAIN IN BOTH HANDS: ICD-10-CM

## 2025-03-05 DIAGNOSIS — F41.0 PANIC DISORDER: Primary | ICD-10-CM

## 2025-03-05 DIAGNOSIS — G89.4 CHRONIC PAIN SYNDROME: ICD-10-CM

## 2025-03-05 PROCEDURE — 99204 OFFICE O/P NEW MOD 45 MIN: CPT | Performed by: ANESTHESIOLOGY

## 2025-03-05 PROCEDURE — 3074F SYST BP LT 130 MM HG: CPT | Performed by: ANESTHESIOLOGY

## 2025-03-05 PROCEDURE — 3078F DIAST BP <80 MM HG: CPT | Performed by: ANESTHESIOLOGY

## 2025-03-05 PROCEDURE — 1125F AMNT PAIN NOTED PAIN PRSNT: CPT | Performed by: ANESTHESIOLOGY

## 2025-03-05 ASSESSMENT — PAIN SCALES - GENERAL: PAINLEVEL_OUTOF10: MODERATE PAIN (4)

## 2025-03-05 NOTE — PROGRESS NOTES
[facility] Consultation    Date of visit: 3/5/2025    Reason for consultation:    Kasandra Lau is a 39 year old female who is seen in consultation today at the request of her provider, Natividad Osuna MD.    Primary Care Provider is Michelle Aguirre.  Pain medications are being prescribed by PCP.    Please see the [facility] health questionnaire which the patient completed and reviewed with me in detail.    Chief Complaint:    Chief Complaint   Patient presents with    Consult    Pain    Pain Management     A number of pain issues that are not well managed-referred from neurologist        Pain history:  Kasandra Lau is a 39 year old female who first started having problems with pain in the neck, and mostly in the jaw due to TMJ disorder, as well as pain in both sides of the neck and upper back. The pain has been present for several years, but worse this year. This adds to general body achiness as she has previously been diagnosed with rheumatoid arthritis. She is currently on methotrexate and hydroxychloroquine for the RA. She notices the pain in the hands and wrists the most, and sometimes in elbows, knees, and ankles.      Pain rating: intensity ranges from 2/10 to 6/10, and Averages 4/10 on a 0-10 scale.  Aggravating factors include: stress, increased activity  Relieving factors include: heat therapy, magnesium lotion  Any bowel or bladder incontinence: n/a    Current treatments include:  Acetaminophen  Naproxen  Ibuprofen  Gabapentin 800 mg TID - takes for anxiety and also for painnnnn    Previous medication treatments included:  ***    Other treatments have included:  Kasandra Lau has not been seen at a pain clinic in the past.    PT: yes, for back and shoulder pain and to [facility] PT  Acupuncture: yes, many years ago  TENs Unit: no  Injections: TMJ injection    Past Medical History:  Past Medical History:   Diagnosis Date    Anemia fall 2016     Anxiety     Arthritis     Chronic fatigue     Depression (emotion)     sees psych, on meds    Gastroesophageal reflux disease     Migraine     daily meds, 2x month, more mild on meds    Neuropathic pain     Panic disorder     Uncomplicated asthma Spring 2018     Patient Active Problem List    Diagnosis Date Noted    Alopecia 04/25/2023     Priority: Medium    Chronic pain of both shoulders 03/28/2023     Priority: Medium    Acute pharyngitis due to other specified organisms 02/28/2023     Priority: Medium    Endometriosis 10/11/2022     Priority: Medium    Keratosis pilaris 06/14/2022     Priority: Medium    Chronic sinusitis, unspecified location 09/21/2021     Priority: Medium    Bilateral carpal tunnel syndrome 06/08/2021     Priority: Medium    Left wrist pain 03/15/2021     Priority: Medium     Last Assessment & Plan:    Formatting of this note might be different from the original.   1-2-week history of left wrist pain.  Diagnosis clouded by previous history of bilateral wrist surgery including plate placement.  Will obtain x-ray and send referral to Ortho hand for further assessment.      Gastroesophageal reflux disease with esophagitis 01/18/2021     Priority: Medium    S/P cholecystectomy 12/06/2020     Priority: Medium    Liver lesion, right lobe 12/06/2020     Priority: Medium     Formatting of this note might be different from the original.     Abd US: Stable 5 mm hyperechoic lesion in the right hepatic lobe (7/16/2020)     Abd US: 5 mm hyperechoic lesion in the liver, favored to represent a hemangioma (6/15/2020)      Last Assessment & Plan:    Formatting of this note might be different from the original.   5 mm hyperechoic lesion previously identified in right lobe of liver on ultrasound.  Follow-up ultrasound 1 month later with stability.  Likely hepatic hemangioma.  Liver function preserved.  Lesion is small (<5 cm) she does not require any imaging.  Will continue to monitor for symptoms.         Abd  US: Stable 5 mm hyperechoic lesion in the right hepatic lobe (7/16/2020)     Abd US: 5 mm hyperechoic lesion in the liver, favored to represent a hemangioma (6/15/2020)      Fluid level behind tympanic membrane of right ear 12/06/2020     Priority: Medium     Last Assessment & Plan:    Formatting of this note might be different from the original.   2-week history of right ear pain with intermittent pressure.  Examination notable for fluid behind right TM.  The symptom also correlates with with her recent IBS flare; again supporting differential of mastocytosis.  Ear does not appear infected.  If not secondary to a mastocytosis, then is likely due to allergic rhinitis as she recently moved to the [facility].  Provided reassurance and also a small amount of Sudafed for relief of pressure.      Hypertrophy of breast 09/10/2020     Priority: Medium     Added automatically from request for surgery [id number]      Pap smear abnormality of cervix/human papillomavirus (HPV) positive 09/03/2020     Priority: Medium     10/10/17 NIL pap, + HR HPV (not 16/18)  1/31/19 NILM HPV  Positive for High Risk HPV types other than 16 or 18 (Care Everywhere)  2/21/19 [facility]: benign. Plan 1 year cotest  2020 NILM HPV negative 34 y.o. PLAN, per ASCCP Guidelines: cotest 1/2023 2/24/23 NIL pap, neg HR HPV. Plan 3 year cotest      Cholecystitis 07/16/2020     Priority: Medium     Added automatically from request for surgery [id number]      Chronic idiopathic urticaria 06/22/2020     Priority: Medium    Hx of abnormal cervical Pap smear 02/03/2020     Priority: Medium    Acid reflux 08/25/2019     Priority: Medium    Need for desensitization to allergens 06/06/2019     Priority: Medium     Formatting of this note might be different from the original.    Allergy Shot Care Plan for Kasandra Lau  Date of Order: June 6 2019  Ordering Provider: Dr. aMrtin Arreaga 1: Cat  Date Shots Started:  Start Dose: 0.1 mL of 1:100,000 - GREEN  Date  Maintenance Reached:  Maintenance Dose: 0.3 mL of 1:100 - RED    Serum 2: Mite DF and Mite DP  Date Shots Started:  Start Dose: 0.1 mL of 1:100,000 - GREEN  Date Maintenance Reached:  Maintenance Dose: 0.1 mL of 1:100 - RED    BUILDING SCHEDULE FOR POLLEN AND NONPOLLEN   IMMUNOTHERAPY  EXCEPT [facility]    3 - 14 days Build per schedule.   15 - 21 days Repeat previous dose.   22 - 28 days Decrease by one dose.   29 - 35 days Decrease by two doses.   36 - 42 days Decrease by three doses.   Over 42 days Continue to decrease by one dose for each additional week.     1:100,000 (green)  1:10,000 (blue)  1:1000 (yellow)  1:100 (red)     1. 0.05 ml  7. 0.05 ml  13. 0.05 ml  19. 0.05 ml   2. 0.1 ml  8. 0.1 ml  14. 0.1 ml  20. 0.1 ml   3. 0.2 ml  9. 0.2 ml  15. 0.2 ml  21. 0.15 ml   4. 0.3 ml  10. 0.3 ml  16. 0.3 ml  22. 0.2 ml   5. 0.4 ml  11. 0.4 ml  17. 0.4 ml  23. 0.25 ml   6. 0.45 ml  12. 0.45 ml  18. 0.45 ml  24. 0.3 ml         25   0.35 ml         26   0.4 ml         27. 0.45 ml         28. 0.5 ml     MAINTENANCE SCHEDULE FOR POLLENS, NONPOLLENS (MITES,MOLD,CAT,DOG)  (not used for [facility] Pollens)    ? When reaching maintenance dose for the first time, gradually stretch by weekly intervals to every 4 weeks (e.g., every 2 weeks, then 3 weeks, then 4 weeks).   ? Patient may receive their injections (patient choice) at 2-4 week intervals     Maintenance Repeat 1 Dose 2 Dose 3 Dose    Q2 weeks  (10-14 days)  3 weeks  (15-21 days) 4 weeks  (22-28 days) 5 weeks  (29-35 days) 6 weeks  (36-42 days) Decrease by 1 Dose for each additional week   Q3 weeks  (15-21 days)  4 weeks  (22-28 days) 5 weeks  (29-35 days) 6 weeks  (36-42 days) 7 weeks  (43-49 days)    Q4 weeks  (22-28 days)  5 weeks  (29-35 days) 6 weeks  (36-42 days) 7 weeks  (43-49 days) 8 weeks  (50-56 days)      Susanna Ambrose RN 6/6/2019 4:49 PM      Mild intermittent asthma without complication 04/30/2018     Priority: Medium    Seasonal mood  disorder 11/15/2017     Priority: Medium    Circadian rhythm sleep disorder, delayed sleep phase type 05/10/2017     Priority: Medium    Unhealthy sleep habit 05/10/2017     Priority: Medium    Menorrhagia with regular cycle 11/19/2016     Priority: Medium    Migraine without aura and without status migrainosus, not intractable 11/19/2016     Priority: Medium    Tired 11/19/2016     Priority: Medium    Hx of migraines 11/19/2016     Priority: Medium     Last Assessment & Plan:    Formatting of this note might be different from the original.   Long history of migraines previously seen by neurology in [place].  She receives monthly Emgality injections. Will send referral to neurology here for injection management.      Irritable bowel syndrome with constipation 10/25/2016     Priority: Medium    Recurrent major depressive disorder, in remission 10/25/2016     Priority: Medium    Pelvic pain in female 08/25/2016     Priority: Medium    Migraine headache 01/01/2012     Priority: Medium    Panic disorder 01/01/2007     Priority: Medium    Depression 01/01/2004     Priority: Medium       Past Surgical History:  Past Surgical History:   Procedure Laterality Date    COLONOSCOPY      LAPAROSCOPIC ABLATION ENDOMETRIOSIS N/A 11/09/2016    Procedure: LAPAROSCOPIC ABLATION ENDOMETRIOSIS;  Surgeon: Tonja Ferrer MD;  Location: [facility] OR    LAPAROSCOPIC CYSTECTOMY OVARIAN (BENIGN) Left 11/09/2016    Procedure: LAPAROSCOPIC CYSTECTOMY OVARIAN (BENIGN);  Surgeon: Tonja Ferrer MD;  Location: [facility]    LAPAROSCOPIC TUBAL DYE STUDY Left 11/09/2016    Procedure: LAPAROSCOPIC TUBAL DYE STUDY;  Surgeon: Tonja Ferrer MD;  Location: [facility]    LAPAROSCOPY OPERATIVE ADULT N/A 11/09/2016    Procedure: LAPAROSCOPY OPERATIVE ADULT;  Surgeon: Tonja Ferrer MD;  Location: [facility] OR    MAMMOPLASTY REDUCTION Bilateral 08/05/2021    Procedure: MAMMOPLASTY, REDUCTION, Bilateral;  Surgeon: ANTONIO Moreno MD;   Location: [facility] OR    ORTHOPEDIC SURGERY      left wrist surgery    TONSILLECTOMY & ADENOIDECTOMY Bilateral     cauterized turbinates also     Medications:  Current Outpatient Medications   Medication Sig Dispense Refill    almotriptan (AXERT) 12.5 MG tablet Take 1 tablet (12.5 mg) by mouth at onset of headache for migraine (repeat in 2 hours if needed). May repeat in 2 hours. Max 2 tablets/24 hours. 18 tablet 11    atomoxetine (STRATTERA) 40 MG capsule Take 1 capsule (40 mg) by mouth daily 90 capsule 0    bisacodyl (DULCOLAX) 5 MG EC tablet Take 5 mg by mouth daily as needed for constipation. Takes 1-3 tablets daily      buPROPion (WELLBUTRIN XL) 300 MG 24 hr tablet Take 1 tablet (300 mg) by mouth every morning 90 tablet 1    cetirizine (ZYRTEC) 10 MG tablet TAKE 1 TABLET BY MOUTH EVERY EVENING 90 tablet 1    cholecalciferol 125 MCG (5000 UT) CAPS Take 5,000 Units by mouth every evening      COMPOUNDED NON-CONTROLLED SUBSTANCE (CMPD RX) - PHARMACY TO MIX COMPOUNDED MEDICATION Inject compound semaglutide subcutaneously 1 mg (20 units) once weekly. OK to compound during [employer]gov[employer] shortage. 1 mL 5    doxepin (SINEQUAN) 10 MG capsule Take 2 capsules (20 mg) by mouth 2 times daily. 120 capsule 3    EMGALITY 120 MG/ML injection Inject 1 mL (120 mg) Subcutaneous every 28 days 1 mL 11    EPINEPHrine (ANY BX GENERIC EQUIV) 0.3 MG/0.3ML injection 2-pack Inject 0.3 mg into the muscle as needed      famotidine (PEPCID) 20 MG tablet TAKE ONE TABLET BY MOUTH TWICE A DAY  **CLINIC CLOSED, PLEASE ESTABLISH CARE ELSEWHERE, CANNOT PROVIDE ADDITIONAL REFILLS 30 tablet 0    fluticasone (FLONASE) 50 MCG/ACT nasal spray Spray 2 sprays into both nostrils 2 times daily as needed for allergies 48 mL 5    gabapentin (NEURONTIN) 400 MG capsule Take 2 capsules (800 mg) by mouth 3 times daily 180 capsule 5    guaiFENesin (MUCINEX) 600 MG 12 hr tablet Take 600 mg by mouth daily as needed      hydroxychloroquine (PLAQUENIL) 200 MG tablet Take 2  tablets (400 mg) by mouth daily. 180 tablet 2    levonorgestrel (MIRENA) 20 MCG/DAY IUD 1 each (20 mcg) by Intrauterine route once for 1 dose 1 each 0    lisdexamfetamine (VYVANSE) 30 MG capsule Take 1 capsule (30 mg) by mouth every morning. 30 capsule 0    lisdexamfetamine (VYVANSE) 30 MG capsule Take 1 capsule (30 mg) by mouth every morning. Do not start before October 12, 2024. 30 capsule 0    methotrexate 2.5 MG tablet TAKE 6 TABLETS (15 MG) BY MOUTH EVERY 7 DAYS 72 tablet 1    montelukast (SINGULAIR) 10 MG tablet Take 1 tablet (10 mg) by mouth at bedtime. 30 tablet 0    Multiple Vitamin (MULTIVITAMIN ADULT PO) Take 1 tablet by mouth daily.      ondansetron (ZOFRAN ODT) 4 MG ODT tab Take 1 tablet (4 mg) by mouth every 8 hours as needed for nausea. 30 tablet 1    phenylephrine HCl 10 MG TABS Take 10 mg by mouth 2 times daily      propranolol (INDERAL) 20 MG tablet Take 1 tablet (20 mg) by mouth daily as needed (anxiety) 30 tablet 11    Sodium Phosphates (FLEET SALINE ENEMA RE) Place 1 enema rectally daily as needed (constipation).      vilazodone (VIIBRYD) 40 MG TABS tablet Take 1 tablet (40 mg) by mouth every morning 90 tablet 1     Allergies:     Allergies   Allergen Reactions    Dust Mites Cough, Difficulty breathing and Shortness Of Breath    Cats      Chest tightness, sinus irritation     Social History:  Home situation: Grad student, almost done with PhD, lives alone with   Occupation/Schooling: PhD student   Tobacco use: denies  Alcohol use: denies  Drug use: denies  History of chemical dependency treatment: denies    Family history:  Family History   Problem Relation Age of Onset    Diabetes Maternal Grandfather     Hypertension No family hx of     Coronary Artery Disease No family hx of     Hyperlipidemia No family hx of     Cerebrovascular Disease No family hx of     Breast Cancer No family hx of     Colon Cancer No family hx of     Prostate Cancer No family hx of     Other Cancer No family hx of      "Glaucoma No family hx of     Macular Degeneration No family hx of      Family history of headaches: ***    Review of Systems:    POSTIVE IN BOLD  GENERAL: fever/chills, fatigue, general unwell feeling, weight gain/loss.  HEAD/EYES:  headache, dizziness, or vision changes.    EARS/NOSE/THROAT:  Nosebleeds, hearing loss, sinus infection, earache, tinnitus.  IMMUNE:  Allergies, cancer, immune deficiency, or infections.  SKIN:  Urticaria, rash, hives  HEME/Lymphatic:   anemia, easy bruising, easy bleeding.  RESPIRATORY:  cough, wheezing, or shortness of breath  CARDIOVASCULAR/Circulation:  Extremity edema, syncope, hypertension, tachycardia, or angina.  GASTROINTESTINAL:  abdominal pain, nausea/emesis, diarrhea, constipation,  hematochezia, or melena.  ENDOCRINE:  Diabetes, steroid use,  thyroid disease or osteoporosis.  MUSCULOSKELETAL: neck pain, back pain, arthralgia, arthritis, or gout.  GENITOURINARY:  frequency, urgency, dysuria, difficulty voiding, hematuria or incontinence.  NEUROLOGIC:  weakness, numbness, paresthesias, seizure, tremor, stroke or memory loss.  PSYCHIATRIC:  depression, anxiety, stress, suicidal thoughts or mood swings.     Physical Exam:  Vitals:    03/05/25 1007   BP: 111/78   Pulse: 91   Resp: 16   SpO2: 100%     Exam:  Constitutional: {GENERAL APPEARANCE:918315::\"healthy\",\"alert\",\"no distress\"}  Head: normocephalic. Atraumatic. ***  Eyes: no redness or jaundice noted ***  ENT: oropharnx normal.  MMM.  Neck supple.  ***  Cardiovascular: RRR no m/g/r ***  Respiratory: clear ***  Gastrointestinal: soft, non-tender, normoactive bowel sounds ***  : deferred***  Skin: {[facility] SKIN EXAM:354317::\"no suspicious lesions or rashes\"}  Psychiatric: {[facility] PATIENT PSYCH APPEARANCE:988030::\"mentation appears normal\",\"affect normal/bright\"}    Musculoskeletal exam:  Gait/Station/Posture: ***  Cervical spine: ***   Flex:  *** degrees   Ext: *** degrees   Rotation to right: *** degrees   Rotation to left: *** " "degrees   ***    Thoracic spine:  Normal ***    Lumbar spine: ***   Flex:  *** degrees   Ext: *** degrees   Rotation/ext to right: {PAINFUL/PAIN FREE:327804}   Rotation/ext to left: {PAINFUL/PAIN FREE:290326}    Myofascial tenderness:  ***  Straight leg exam: ***  Vishal's maneuver: ***    Neurologic exam:  CN:  Cranial nerves 2-12 are normal***  Motor:  5/5 UE and LE strength, except for ***  Reflexes:     Biceps:     R:  ***/4 L: ***/4   Brachioradialis   R:  ***/4 L: ***/4   Triceps:  R:  ***/4 L: ***/4   Patella:  R:  ***/4 L: ***/4   Achilles:  R:  ***/4 L: ***/4  Other reflexes:  Toes downgoing***   Sensory:  (upper and lower extremities):   Light touch: normal ***   Vibration: normal ***   Allodynia: absent ***   Dysethesia: absent ***   Hyperalgesia: absent ***    Diagnostic tests:  MRI of *** was completed on *** showing:  \"***\"    Personally reviewed imaging ***    Other testing (labs, diagnostics) reviewed:  Labs***  EKG***    [place] Prescription Monitoring Program reviewed***    Outside records reviewed***      Screening tools:  DIRE Score for ongoing opioid management is calculated as follows:    Diagnosis = ***    Intractability = ***    Risk: Psych = ***  Chem Hlth = ***  Reliability = ***  Social = ***    Efficacy = ***    Total DIRE Score = *** (14 or higher predicts good candidate for ongoing opioid management; 13 or lower predicts poor candidate for opioid management)         Assessment:  ***    Kasandra Lau is a 39 year old female who presents with the complaints of ***. ***    Plan:  Diagnosis reviewed, treatment option addressed, and risk/benefits discussed.  Self-care instructions given.  I am recommending a multidisciplinary treatment plan to help this patient better manage her pain.      Physical Therapy: ***  Pain Psychologist to address issues of relaxation, behavioral change, coping style, and other factors important to improvement: ***  Diagnostic Studies: ***  Medication Management: " ***  Further procedures recommended: ***  Other treatments:  Urine toxicology screen: ***   Recommendations/follow-up for PCP:  ***  Release of information: ***  Follow up: ***    Total time spent was *** minutes, and more than 50% of face to face time was spent in counseling and/or coordination of care regarding principles of multidisciplinary care, medication management, and ***      Answers submitted by the patient for this visit:  Patient Health Questionnaire (G7) (Submitted on 3/4/2025)  VIC 7 TOTAL SCORE: 9

## 2025-03-05 NOTE — PATIENT INSTRUCTIONS
Medications:     Diclofenac Gel ordered.  Apply 2 g topically 4 times daily as needed for moderate pain.      Referrals:       Pain Psychology Referral placed-  Please contact their scheduling office at [phone] to schedule, if you have not heard from them within 2 business days.     Pain psychology Therapies Requested- Mindfulness training, CBT, Coping and relaxation therapy.        Pain Physical Therapy Referral placed-   If you don't hear from a representative within 2 business days, please call [phone].        Recommended Follow up:      Follow up as needed.      To speak with a nurse, schedule/reschedule/cancel a clinic appointment, or request a medication refill call: [phone]    You can also reach us by [employer]: [email or web address]

## 2025-03-05 NOTE — LETTER
3/5/2025       RE: Kasandra Lau  [address]  [facility] [address]     Dear Colleague,    Thank you for referring your patient, Kasandra Lau, to the [facility] [place] at . Please see a copy of my visit note below.                          [facility] Consultation    Date of visit: 3/5/2025    Reason for consultation:    Kasandra Lau is a 39 year old female who is seen in consultation today at the request of her provider, Natividad Osuna MD.    Primary Care Provider is Michelle Aguirre.  Pain medications are being prescribed by PCP.    Please see the [facility] [place] health questionnaire which the patient completed and reviewed with me in detail.    Chief Complaint:    Chief Complaint   Patient presents with     Consult     Pain     Pain Management     A number of pain issues that are not well managed-referred from neurologist        Pain history:  Kasandra Lau is a 39 year old female who first started having problems with pain in the neck, and mostly in the jaw due to TMJ disorder, as well as pain in both sides of the neck and upper back. The pain has been present for several years, but worse this year. This adds to general body achiness as she has previously been diagnosed with rheumatoid arthritis. She is currently on methotrexate and hydroxychloroquine for the RA. She notices the pain in the hands and wrists the most, and sometimes in elbows, knees, and ankles bilaterally.      Pain rating: intensity ranges from 2/10 to 6/10, and Averages 4/10 on a 0-10 scale.  Aggravating factors include: stress, increased activity  Relieving factors include: heat therapy, magnesium lotion  Any bowel or bladder incontinence: n/a    Current treatments include:  Acetaminophen  Naproxen  Ibuprofen  Gabapentin 800 mg TID - takes for anxiety and also for  painnnnn    Previous medication treatments included:      Other treatments have included:  Kasandra Lau has not been seen at a pain clinic in the past.    PT: yes, for back and shoulder pain and to TMD PT  Acupuncture: yes, many years ago  TENs Unit: no  Injections: TMJ injection    Past Medical History:  Past Medical History:   Diagnosis Date     Anemia fall 2016     Anxiety      Arthritis      Chronic fatigue      Depression (emotion)     sees psych, on meds     Gastroesophageal reflux disease      Migraine     daily meds, 2x month, more mild on meds     Neuropathic pain      Panic disorder      Uncomplicated asthma Spring 2018     Patient Active Problem List    Diagnosis Date Noted     Alopecia 04/25/2023     Priority: Medium     Chronic pain of both shoulders 03/28/2023     Priority: Medium     Acute pharyngitis due to other specified organisms 02/28/2023     Priority: Medium     Endometriosis 10/11/2022     Priority: Medium     Keratosis pilaris 06/14/2022     Priority: Medium     Chronic sinusitis, unspecified location 09/21/2021     Priority: Medium     Bilateral carpal tunnel syndrome 06/08/2021     Priority: Medium     Left wrist pain 03/15/2021     Priority: Medium     Last Assessment & Plan:    Formatting of this note might be different from the original.   1-2-week history of left wrist pain.  Diagnosis clouded by previous history of bilateral wrist surgery including plate placement.  Will obtain x-ray and send referral to Ortho hand for further assessment.       Gastroesophageal reflux disease with esophagitis 01/18/2021     Priority: Medium     S/P cholecystectomy 12/06/2020     Priority: Medium     Liver lesion, right lobe 12/06/2020     Priority: Medium     Formatting of this note might be different from the original.     Abd US: Stable 5 mm hyperechoic lesion in the right hepatic lobe (7/16/2020)     Abd US: 5 mm hyperechoic lesion in the liver, favored to represent a hemangioma (6/15/2020)       Last Assessment & Plan:    Formatting of this note might be different from the original.   5 mm hyperechoic lesion previously identified in right lobe of liver on ultrasound.  Follow-up ultrasound 1 month later with stability.  Likely hepatic hemangioma.  Liver function preserved.  Lesion is small (<5 cm) she does not require any imaging.  Will continue to monitor for symptoms.         Abd US: Stable 5 mm hyperechoic lesion in the right hepatic lobe (7/16/2020)     Abd US: 5 mm hyperechoic lesion in the liver, favored to represent a hemangioma (6/15/2020)       Fluid level behind tympanic membrane of right ear 12/06/2020     Priority: Medium     Last Assessment & Plan:    Formatting of this note might be different from the original.   2-week history of right ear pain with intermittent pressure.  Examination notable for fluid behind right TM.  The symptom also correlates with with her recent IBS flare; again supporting differential of mastocytosis.  Ear does not appear infected.  If not secondary to a mastocytosis, then is likely due to allergic rhinitis as she recently moved to the [facility].  Provided reassurance and also a small amount of Sudafed for relief of pressure.       Hypertrophy of breast 09/10/2020     Priority: Medium     Added automatically from request for surgery [id number]       Pap smear abnormality of cervix/human papillomavirus (HPV) positive 09/03/2020     Priority: Medium     10/10/17 NIL pap, + HR HPV (not 16/18)  1/31/19 NILM HPV  Positive for High Risk HPV types other than 16 or 18 (Care Everywhere)  2/21/19 [place] [facility]: benign. Plan 1 year cotest  2020 NILM HPV negative 34 y.o. PLAN, per ASCCP Guidelines: cotest 1/2023 2/24/23 NIL pap, neg HR HPV. Plan 3 year cotest       Cholecystitis 07/16/2020     Priority: Medium     Added automatically from request for surgery [id number]       Chronic idiopathic urticaria 06/22/2020     Priority: Medium     Hx of abnormal cervical Pap smear 02/03/2020      Priority: Medium     Acid reflux 08/25/2019     Priority: Medium     Need for desensitization to allergens 06/06/2019     Priority: Medium     Formatting of this note might be different from the original.    Allergy Shot Care Plan for Kasandra Lau  Date of Order: June 6 2019  Ordering Provider: Dr. Martin Cervantes     Serum 1: Cat  Date Shots Started:  Start Dose: 0.1 mL of 1:100,000 - GREEN  Date Maintenance Reached:  Maintenance Dose: 0.3 mL of 1:100 - RED    Serum 2: Mite DF and Mite DP  Date Shots Started:  Start Dose: 0.1 mL of 1:100,000 - GREEN  Date Maintenance Reached:  Maintenance Dose: 0.1 mL of 1:100 - RED    BUILDING SCHEDULE FOR POLLEN AND NONPOLLEN   IMMUNOTHERAPY  EXCEPT V[facility] CENTER AL    3 - 14 days Build per schedule.   15 - 21 days Repeat previous dose.   22 - 28 days Decrease by one dose.   29 - 35 days Decrease by two doses.   36 - 42 days Decrease by three doses.   Over 42 days Continue to decrease by one dose for each additional week.     1:100,000 (green)  1:10,000 (blue)  1:1000 (yellow)  1:100 (red)     1. 0.05 ml  7. 0.05 ml  13. 0.05 ml  19. 0.05 ml   2. 0.1 ml  8. 0.1 ml  14. 0.1 ml  20. 0.1 ml   3. 0.2 ml  9. 0.2 ml  15. 0.2 ml  21. 0.15 ml   4. 0.3 ml  10. 0.3 ml  16. 0.3 ml  22. 0.2 ml   5. 0.4 ml  11. 0.4 ml  17. 0.4 ml  23. 0.25 ml   6. 0.45 ml  12. 0.45 ml  18. 0.45 ml  24. 0.3 ml         25   0.35 ml         26   0.4 ml         27. 0.45 ml         28. 0.5 ml     MAINTENANCE SCHEDULE FOR POLLENS, NONPOLLENS (MITES,MOLD,CAT,DOG)  (not used for Center[facility] Pollens)    ? When reaching maintenance dose for the first time, gradually stretch by weekly intervals to every 4 weeks (e.g., every 2 weeks, then 3 weeks, then 4 weeks).   ? Patient may receive their injections (patient choice) at 2-4 week intervals     Maintenance Repeat 1 Dose 2 Dose 3 Dose    Q2 weeks  (10-14 days)  3 weeks  (15-21 days) 4 weeks  (22-28 days) 5 weeks  (29-35 days) 6 weeks  (36-42 days) Decrease by 1 Dose for  each additional week   Q3 weeks  (15-21 days)  4 weeks  (22-28 days) 5 weeks  (29-35 days) 6 weeks  (36-42 days) 7 weeks  (43-49 days)    Q4 weeks  (22-28 days)  5 weeks  (29-35 days) 6 weeks  (36-42 days) 7 weeks  (43-49 days) 8 weeks  (50-56 days)      Susanna Ambrose RN 6/6/2019 4:49 PM       Mild intermittent asthma without complication 04/30/2018     Priority: Medium     Seasonal mood disorder 11/15/2017     Priority: Medium     Circadian rhythm sleep disorder, delayed sleep phase type 05/10/2017     Priority: Medium     Unhealthy sleep habit 05/10/2017     Priority: Medium     Menorrhagia with regular cycle 11/19/2016     Priority: Medium     Migraine without aura and without status migrainosus, not intractable 11/19/2016     Priority: Medium     Tired 11/19/2016     Priority: Medium     Hx of migraines 11/19/2016     Priority: Medium     Last Assessment & Plan:    Formatting of this note might be different from the original.   Long history of migraines previously seen by neurology in [place].  She receives monthly Emgality injections. Will send referral to neurology here for injection management.       Irritable bowel syndrome with constipation 10/25/2016     Priority: Medium     Recurrent major depressive disorder, in remission 10/25/2016     Priority: Medium     Pelvic pain in female 08/25/2016     Priority: Medium     Migraine headache 01/01/2012     Priority: Medium     Panic disorder 01/01/2007     Priority: Medium     Depression 01/01/2004     Priority: Medium       Past Surgical History:  Past Surgical History:   Procedure Laterality Date     COLONOSCOPY       LAPAROSCOPIC ABLATION ENDOMETRIOSIS N/A 11/09/2016    Procedure: LAPAROSCOPIC ABLATION ENDOMETRIOSIS;  Surgeon: Tonja Ferrer MD;  Location: [facility] OR     LAPAROSCOPIC CYSTECTOMY OVARIAN (BENIGN) Left 11/09/2016    Procedure: LAPAROSCOPIC CYSTECTOMY OVARIAN (BENIGN);  Surgeon: Tonja Ferrer MD;  Location: [facility] OR      LAPAROSCOPIC TUBAL DYE STUDY Left 11/09/2016    Procedure: LAPAROSCOPIC TUBAL DYE STUDY;  Surgeon: Tonja Ferrer MD;  Location: [facility] OR     LAPAROSCOPY OPERATIVE ADULT N/A 11/09/2016    Procedure: LAPAROSCOPY OPERATIVE ADULT;  Surgeon: Tonja Ferrer MD;  Location: [facility] OR     MAMMOPLASTY REDUCTION Bilateral 08/05/2021    Procedure: MAMMOPLASTY, REDUCTION, Bilateral;  Surgeon: ANTONIO Moreno MD;  Location: [facility] OR     ORTHOPEDIC SURGERY      left wrist surgery     TONSILLECTOMY & ADENOIDECTOMY Bilateral     cauterized turbinates also     Medications:  Current Outpatient Medications   Medication Sig Dispense Refill     almotriptan (AXERT) 12.5 MG tablet Take 1 tablet (12.5 mg) by mouth at onset of headache for migraine (repeat in 2 hours if needed). May repeat in 2 hours. Max 2 tablets/24 hours. 18 tablet 11     atomoxetine (STRATTERA) 40 MG capsule Take 1 capsule (40 mg) by mouth daily 90 capsule 0     bisacodyl (DULCOLAX) 5 MG EC tablet Take 5 mg by mouth daily as needed for constipation. Takes 1-3 tablets daily       buPROPion (WELLBUTRIN XL) 300 MG 24 hr tablet Take 1 tablet (300 mg) by mouth every morning 90 tablet 1     cetirizine (ZYRTEC) 10 MG tablet TAKE 1 TABLET BY MOUTH EVERY EVENING 90 tablet 1     cholecalciferol 125 MCG (5000 UT) CAPS Take 5,000 Units by mouth every evening       COMPOUNDED NON-CONTROLLED SUBSTANCE (CMPD RX) - PHARMACY TO MIX COMPOUNDED MEDICATION Inject compound semaglutide subcutaneously 1 mg (20 units) once weekly. OK to compound during Wegovy shortage. 1 mL 5     doxepin (SINEQUAN) 10 MG capsule Take 2 capsules (20 mg) by mouth 2 times daily. 120 capsule 3     EMGALITY 120 MG/ML injection Inject 1 mL (120 mg) Subcutaneous every 28 days 1 mL 11     EPINEPHrine (ANY BX GENERIC EQUIV) 0.3 MG/0.3ML injection 2-pack Inject 0.3 mg into the muscle as needed       famotidine (PEPCID) 20 MG tablet TAKE ONE TABLET BY MOUTH TWICE A DAY  **CLINIC CLOSED, PLEASE ESTABLISH  CARE ELSEWHERE, CANNOT PROVIDE ADDITIONAL REFILLS 30 tablet 0     fluticasone (FLONASE) 50 MCG/ACT nasal spray Spray 2 sprays into both nostrils 2 times daily as needed for allergies 48 mL 5     gabapentin (NEURONTIN) 400 MG capsule Take 2 capsules (800 mg) by mouth 3 times daily 180 capsule 5     guaiFENesin (MUCINEX) 600 MG 12 hr tablet Take 600 mg by mouth daily as needed       hydroxychloroquine (PLAQUENIL) 200 MG tablet Take 2 tablets (400 mg) by mouth daily. 180 tablet 2     levonorgestrel (MIRENA) 20 MCG/DAY IUD 1 each (20 mcg) by Intrauterine route once for 1 dose 1 each 0     lisdexamfetamine (VYVANSE) 30 MG capsule Take 1 capsule (30 mg) by mouth every morning. 30 capsule 0     lisdexamfetamine (VYVANSE) 30 MG capsule Take 1 capsule (30 mg) by mouth every morning. Do not start before October 12, 2024. 30 capsule 0     methotrexate 2.5 MG tablet TAKE 6 TABLETS (15 MG) BY MOUTH EVERY 7 DAYS 72 tablet 1     montelukast (SINGULAIR) 10 MG tablet Take 1 tablet (10 mg) by mouth at bedtime. 30 tablet 0     Multiple Vitamin (MULTIVITAMIN ADULT PO) Take 1 tablet by mouth daily.       ondansetron (ZOFRAN ODT) 4 MG ODT tab Take 1 tablet (4 mg) by mouth every 8 hours as needed for nausea. 30 tablet 1     phenylephrine HCl 10 MG TABS Take 10 mg by mouth 2 times daily       propranolol (INDERAL) 20 MG tablet Take 1 tablet (20 mg) by mouth daily as needed (anxiety) 30 tablet 11     Sodium Phosphates (FLEET SALINE ENEMA RE) Place 1 enema rectally daily as needed (constipation).       vilazodone (VIIBRYD) 40 MG TABS tablet Take 1 tablet (40 mg) by mouth every morning 90 tablet 1     Allergies:     Allergies   Allergen Reactions     Dust Mites Cough, Difficulty breathing and Shortness Of Breath     Cats      Chest tightness, sinus irritation     Social History:  Home situation: Grad student, almost done with PhD, lives alone with dog  Occupation/Schooling: PhD student   Tobacco use: denies  Alcohol use: denies  Drug use:  denies  History of chemical dependency treatment: denies    Family history:  Family History   Problem Relation Age of Onset     Diabetes Maternal Grandfather      Hypertension No family hx of      Coronary Artery Disease No family hx of      Hyperlipidemia No family hx of      Cerebrovascular Disease No family hx of      Breast Cancer No family hx of      Colon Cancer No family hx of      Prostate Cancer No family hx of      Other Cancer No family hx of      Glaucoma No family hx of      Macular Degeneration No family hx of          Review of Systems:    POSTIVE IN BOLD  GENERAL: fever/chills, fatigue, general unwell feeling, weight gain/loss.  HEAD/EYES:  headache, dizziness, or vision changes.    EARS/NOSE/THROAT:  Nosebleeds, hearing loss, sinus infection, earache, tinnitus.  IMMUNE:  Allergies, cancer, immune deficiency, or infections.  SKIN:  Urticaria, rash, hives  HEME/Lymphatic:   anemia, easy bruising, easy bleeding.  RESPIRATORY:  cough, wheezing, or shortness of breath  CARDIOVASCULAR/Circulation:  Extremity edema, syncope, hypertension, tachycardia, or angina.  GASTROINTESTINAL:  abdominal pain, nausea/emesis, diarrhea, constipation,  hematochezia, or melena.  ENDOCRINE:  Diabetes, steroid use,  thyroid disease or osteoporosis.  MUSCULOSKELETAL: neck pain, back pain, arthralgia, arthritis, or gout.  GENITOURINARY:  frequency, urgency, dysuria, difficulty voiding, hematuria or incontinence.  NEUROLOGIC:  weakness, numbness, paresthesias, seizure, tremor, stroke or memory loss.  PSYCHIATRIC:  depression, anxiety, stress, suicidal thoughts or mood swings.     Physical Exam:  Vitals:    03/05/25 1007   BP: 111/78   Pulse: 91   Resp: 16   SpO2: 100%     Exam:  Constitutional: healthy, alert, and no distress  Head: normocephalic. Atraumatic.   Eyes: no redness or jaundice noted   Respiratory: clear, non-labored breathing with conversation  Skin: no suspicious lesions or rashes  Psychiatric: mentation appears  "normal and affect normal/bright    Musculoskeletal exam:  Gait/Station/Posture: normal non-antalgic  Cervical spine: WNL ROM with f/e/bl rotation       Myofascial tenderness:  paraspinal tenderness  Straight leg exam: negative  Vishal's maneuver: negative    Neurologic exam:  CN:  Cranial nerves 2-12 are normal  Motor:  5/5 UE and LE strength    Sensory:  (upper and lower extremities):   Light touch: normal    Allodynia: absent    Dysethesia: absent    Hyperalgesia: absent     Diagnostic tests:      Other testing (labs, diagnostics) reviewed:  Labs  No results found for: \"A1C\"    EKG    [place] Prescription Monitoring Program reviewed    Outside records reviewed      Assessment:  Chronic Pain Syndrome  Pain in multiple joints  Rheumatoid Arthritis    Kasandra Lau is a 39 year old female who presents with the complaints of chronic pain thorughout multiple joints in her body. We discussed treatment options as described below.    Plan:  Diagnosis reviewed, treatment option addressed, and risk/benefits discussed.  Self-care instructions given.  I am recommending a multidisciplinary treatment plan to help this patient better manage her pain.      Physical Therapy:  Referral placed for pain PT  Pain Psychologist to address issues of relaxation, behavioral change, coping style, and other factors important to improvement: referral placed  Diagnostic Studies: none  Medication Management: PRN Diclofenac gel for multijoint pain  Further procedures recommended: none    Follow up: as needed    Total time spent was 50 minutes, and more than 50% of face to face time was spent in counseling and/or coordination of care regarding principles of multidisciplinary care, medication management, and therapeutic options on the day of the visit.     Chitra Henderson MD    Pain Medicine  Department of Anesthesiology  [facility]    Answers submitted by the patient for this visit:  Patient Health Questionnaire (G7) " (Submitted on 3/4/2025)  VIC 7 TOTAL SCORE: 9      Again, thank you for allowing me to participate in the care of your patient.      Sincerely,    Chitra Henderson MD

## 2025-03-05 NOTE — NURSING NOTE
Patient presents with:  Consult  Pain  Pain Management: A number of pain issues that are not well managed-referred from neurologist       Moderate Pain (4)         What medications are you using for pain? Nsaids, IBU, nothing prescribed    Have you been seen by another pain clinic/ provider? no    Expectations: pain management-has a lot of questions  Sheri Denis LPN

## 2025-03-11 ENCOUNTER — OFFICE VISIT (OUTPATIENT)
Dept: FAMILY MEDICINE | Facility: CLINIC | Age: 40
End: 2025-03-11
Payer: COMMERCIAL

## 2025-03-11 VITALS
BODY MASS INDEX: 25.31 KG/M2 | HEART RATE: 86 BPM | HEIGHT: 68 IN | RESPIRATION RATE: 16 BRPM | WEIGHT: 167 LBS | SYSTOLIC BLOOD PRESSURE: 112 MMHG | TEMPERATURE: 97.8 F | DIASTOLIC BLOOD PRESSURE: 78 MMHG | OXYGEN SATURATION: 99 %

## 2025-03-11 DIAGNOSIS — K21.00 GASTROESOPHAGEAL REFLUX DISEASE WITH ESOPHAGITIS WITHOUT HEMORRHAGE: ICD-10-CM

## 2025-03-11 DIAGNOSIS — L50.9 URTICARIA: ICD-10-CM

## 2025-03-11 DIAGNOSIS — F34.1 PERSISTENT DEPRESSIVE DISORDER: ICD-10-CM

## 2025-03-11 DIAGNOSIS — F41.1 GENERALIZED ANXIETY DISORDER: ICD-10-CM

## 2025-03-11 DIAGNOSIS — Z00.00 ROUTINE GENERAL MEDICAL EXAMINATION AT A HEALTH CARE FACILITY: Primary | ICD-10-CM

## 2025-03-11 DIAGNOSIS — M26.609 TEMPOROMANDIBULAR JOINT DISORDER: ICD-10-CM

## 2025-03-11 DIAGNOSIS — H81.13 BENIGN PAROXYSMAL POSITIONAL VERTIGO DUE TO BILATERAL VESTIBULAR DISORDER: ICD-10-CM

## 2025-03-11 DIAGNOSIS — J30.9 ALLERGIC RHINITIS, UNSPECIFIED SEASONALITY, UNSPECIFIED TRIGGER: ICD-10-CM

## 2025-03-11 PROCEDURE — 1126F AMNT PAIN NOTED NONE PRSNT: CPT | Performed by: PHYSICIAN ASSISTANT

## 2025-03-11 PROCEDURE — 3078F DIAST BP <80 MM HG: CPT | Performed by: PHYSICIAN ASSISTANT

## 2025-03-11 PROCEDURE — 99213 OFFICE O/P EST LOW 20 MIN: CPT | Mod: 25 | Performed by: PHYSICIAN ASSISTANT

## 2025-03-11 PROCEDURE — 99395 PREV VISIT EST AGE 18-39: CPT | Performed by: PHYSICIAN ASSISTANT

## 2025-03-11 PROCEDURE — 3074F SYST BP LT 130 MM HG: CPT | Performed by: PHYSICIAN ASSISTANT

## 2025-03-11 RX ORDER — CETIRIZINE HYDROCHLORIDE 10 MG/1
10 TABLET ORAL 2 TIMES DAILY
Qty: 180 TABLET | Refills: 1 | Status: SHIPPED | OUTPATIENT
Start: 2025-03-11

## 2025-03-11 RX ORDER — FLUTICASONE PROPIONATE 50 MCG
2 SPRAY, SUSPENSION (ML) NASAL 2 TIMES DAILY PRN
Qty: 48 ML | Refills: 5 | Status: SHIPPED | OUTPATIENT
Start: 2025-03-11

## 2025-03-11 RX ORDER — GUAIFENESIN 600 MG/1
1200 TABLET, EXTENDED RELEASE ORAL DAILY PRN
Qty: 180 TABLET | Refills: 1 | Status: SHIPPED | OUTPATIENT
Start: 2025-03-11

## 2025-03-11 RX ORDER — FAMOTIDINE 20 MG/1
20 TABLET, FILM COATED ORAL 2 TIMES DAILY
Qty: 180 TABLET | Refills: 1 | Status: SHIPPED | OUTPATIENT
Start: 2025-03-11

## 2025-03-11 RX ORDER — DOXEPIN HYDROCHLORIDE 50 MG/1
50 CAPSULE ORAL 2 TIMES DAILY
Qty: 180 CAPSULE | Refills: 1 | Status: SHIPPED | OUTPATIENT
Start: 2025-03-11

## 2025-03-11 ASSESSMENT — PAIN SCALES - GENERAL: PAINLEVEL_OUTOF10: NO PAIN (0)

## 2025-03-11 ASSESSMENT — SOCIAL DETERMINANTS OF HEALTH (SDOH): HOW OFTEN DO YOU GET TOGETHER WITH FRIENDS OR RELATIVES?: THREE TIMES A WEEK

## 2025-03-11 NOTE — PROGRESS NOTES
Preventive Care Visit  [facility]  Mar 11, 2025      Assessment & Plan     Routine general medical examination at a health care facility    Urticaria  Long standing, chronic, UNcontrolled- continue with over the counter and supportive care with option for increase in previous prescription for doxepin to 50 mg two times a day. Will update on final dose if further adjustments needed; immunology/allergy referral in place as well.  - Immunology Referral; Future    Generalized anxiety disorder  Persistent depressive disorder  Related to above but added bonus if doxepin dose adjustment also helps with mental health; continue working with psychiatry regularly as well.   - doxepin (SINEQUAN) 50 MG capsule; Take 1 capsule (50 mg) by mouth 2 times daily.    Allergic rhinitis, unspecified seasonality, unspecified trigger  Long standing, chronic, controlled- regular refills sent to pharmacy   - cetirizine (ZYRTEC) 10 MG tablet; Take 1 tablet (10 mg) by mouth 2 times daily.  - fluticasone (FLONASE) 50 MCG/ACT nasal spray; Spray 2 sprays into both nostrils 2 times daily as needed for allergies.  - guaiFENesin (MUCINEX) 600 MG 12 hr tablet; Take 2 tablets (1,200 mg) by mouth daily as needed for congestion.    Gastroesophageal reflux disease with esophagitis without hemorrhage  - famotidine (PEPCID) 20 MG tablet; Take 1 tablet (20 mg) by mouth 2 times daily.    Temporomandibular joint disorder  - Miscellaneous DME Supply Order (Use only if a more specific DME order does not already exist)    Benign paroxysmal positional vertigo due to bilateral vestibular disorder  Referral for vestibular therapy placed but potential to follow up with neurology pending above.  - Physical Therapy  Referral; Future      Patient has been advised of split billing requirements and indicates understanding: Yes        BMI  Estimated body mass index is 25.77 kg/m  as calculated from the  "following:    Height as of this encounter: 1.715 m (5' 7.5\").    Weight as of this encounter: 75.8 kg (167 lb).       Counseling  Appropriate preventive services were addressed with this patient via screening, questionnaire, or discussion as appropriate for fall prevention, nutrition, physical activity, Tobacco-use cessation, social engagement, weight loss and cognition.  Checklist reviewing preventive services available has been given to the patient.  Reviewed patient's diet, addressing concerns and/or questions.   The patient was instructed to see the dentist every 6 months.   She is at risk for psychosocial distress and has been provided with information to reduce risk.   The patient's PHQ-9 score is consistent with mild depression. She was provided with information regarding depression.       See Patient Instructions    Subjective   Kasandra is a 39 year old, presenting for the following:  Physical (Pt is not fasting) and Establish Care        3/11/2025     2:17 PM   Additional Questions   Roomed by Dante   Accompanied by self          Healthy Habits:     Additional concerns today:  Yes (discuss doxepin and increasing dosage, getting more hives all over body)       Patient previously seen by NP clinic  Patient working with neurology, weight loss clinic/endocrinology/MTM, psychology, rheumatology and cardiology regularly.  History of working with new psychiatry with some growing pains but overall stable at this time  Patient uses doxepin for hives with recent flares though. It does not make her tired though.  Patient has TMJ disorder with need for official order for mouth guard for insurance    Advance Care Planning  Patient does not have a Health Care Directive: Discussed advance care planning with patient; however, patient declined at this time.      3/11/2025   General Health   How would you rate your overall physical health? Good         3/11/2025   Nutrition   Three or more servings of calcium each day? Yes "   Diet: Regular (no restrictions)   How many servings of fruit and vegetables per day? (!) 2-3   How many sweetened beverages each day? 0-1          No data to display                  1/30/2024   Social Factors   Worry food won't last until get money to buy more No   Food not last or not have enough money for food? No   Do you have housing? (Housing is defined as stable permanent housing and does not include staying ouside in a car, in a tent, in an abandoned building, in an overnight shelter, or couch-surfing.) Yes   Are you worried about losing your housing? Yes   Lack of transportation? No   Unable to get utilities (heat,electricity)? No   Want help with housing or utility concern? (!) YES          No data to display                  Today's PHQ-9 Score:       3/11/2025     2:14 PM   PHQ-9 SCORE   PHQ-9 Total Score MyChart 5 (Mild depression)   PHQ-9 Total Score 5        Patient-reported          No data to display              Social History     Tobacco Use    Smoking status: Former     Current packs/day: 0.00     Types: Cigarettes    Smokeless tobacco: Never    Tobacco comments:     smokes occasionally socially    Vaping Use    Vaping status: Never Used   Substance Use Topics    Alcohol use: Not Currently     Alcohol/week: 0.0 standard drinks of alcohol     Comment: occasional     Drug use: Not Currently     Types: Marijuana     Comment: marijuana  none since May 2021          Mammogram Screening - Patient under 40 years of age: Routine Mammogram Screening not recommended.       History of abnormal Pap smear: YES - reflected in Problem List and Health Maintenance accordingly        Latest Ref Rng & Units 2/24/2023     4:11 PM 1/31/2019    10:10 AM 1/31/2019    10:00 AM   PAP / HPV   PAP  Negative for Intraepithelial Lesion or Malignancy (NILM)      PAP (Historical)   NIL     HPV 16 DNA Negative Negative   Negative    HPV 18 DNA Negative Negative   Negative    Other HR HPV Negative Negative   Positive              No data to display                 Reviewed and updated as needed this visit by Provider   Tobacco  Allergies  Meds  Problems  Med Hx  Surg Hx  Fam Hx            Past Medical History:   Diagnosis Date    Anemia fall 2016    Anxiety     Arthritis     Chronic fatigue     Depression (emotion)     sees psych, on meds    Gastroesophageal reflux disease     Migraine     daily meds, 2x month, more mild on meds    Neuropathic pain     Panic disorder     Uncomplicated asthma Spring 2018     Past Surgical History:   Procedure Laterality Date    COLONOSCOPY      LAPAROSCOPIC ABLATION ENDOMETRIOSIS N/A 11/09/2016    Procedure: LAPAROSCOPIC ABLATION ENDOMETRIOSIS;  Surgeon: Tonja Ferrer MD;  Location: [facility]    LAPAROSCOPIC CYSTECTOMY OVARIAN (BENIGN) Left 11/09/2016    Procedure: LAPAROSCOPIC CYSTECTOMY OVARIAN (BENIGN);  Surgeon: Tonja Ferrer MD;  [facility]    LAPAROSCOPIC TUBAL DYE STUDY Left 11/09/2016    Procedure: LAPAROSCOPIC TUBAL DYE STUDY;  Surgeon: Tonja Ferrer MD;  [facility]    LAPAROSCOPY OPERATIVE ADULT N/A 11/09/2016    Procedure: LAPAROSCOPY OPERATIVE ADULT;  Surgeon: Tonja Ferrer MD;  [facility]    MAMMOPLASTY REDUCTION Bilateral 08/05/2021    Procedure: MAMMOPLASTY, REDUCTION, Bilateral;  Surgeon: ANTONIO Moreno MD;  Location: [facility] OR    ORTHOPEDIC SURGERY      left wrist surgery    TONSILLECTOMY & ADENOIDECTOMY Bilateral     cauterized turbinates also     Labs reviewed in EPIC  BP Readings from Last 3 Encounters:   03/11/25 112/78   03/05/25 111/78   05/26/24 118/80    Wt Readings from Last 3 Encounters:   03/11/25 75.8 kg (167 lb)   02/18/25 75.3 kg (166 lb)   01/27/25 77.1 kg (170 lb)                  Patient Active Problem List   Diagnosis    Pelvic pain in female    Menorrhagia with regular cycle    Migraine without aura and without status migrainosus, not intractable    Tired    Circadian rhythm sleep disorder, delayed sleep  phase type    Unhealthy sleep habit    Seasonal mood disorder    Chronic idiopathic urticaria    Acid reflux    Cholecystitis    Depression    Hx of abnormal cervical Pap smear    Irritable bowel syndrome with constipation    Migraine headache    Mild intermittent asthma without complication    Need for desensitization to allergens    Panic disorder    Pap smear abnormality of cervix/human papillomavirus (HPV) positive    Recurrent major depressive disorder, in remission    Hypertrophy of breast    Chronic sinusitis, unspecified location    Keratosis pilaris    Endometriosis    Acute pharyngitis due to other specified organisms    Chronic pain of both shoulders    Alopecia    S/P cholecystectomy    Liver lesion, right lobe    Left wrist pain    Hx of migraines    Gastroesophageal reflux disease with esophagitis    Fluid level behind tympanic membrane of right ear    Bilateral carpal tunnel syndrome     Past Surgical History:   Procedure Laterality Date    COLONOSCOPY      LAPAROSCOPIC ABLATION ENDOMETRIOSIS N/A 11/09/2016    Procedure: LAPAROSCOPIC ABLATION ENDOMETRIOSIS;  Surgeon: Tonja Ferrer MD;  [facility]    LAPAROSCOPIC CYSTECTOMY OVARIAN (BENIGN) Left 11/09/2016    Procedure: LAPAROSCOPIC CYSTECTOMY OVARIAN (BENIGN);  Surgeon: Tonja Ferrer MD;  [facility]    LAPAROSCOPIC TUBAL DYE STUDY Left 11/09/2016    Procedure: LAPAROSCOPIC TUBAL DYE STUDY;  Surgeon: Tonja Ferrer MD;  [facility]    LAPAROSCOPY OPERATIVE ADULT N/A 11/09/2016    Procedure: LAPAROSCOPY OPERATIVE ADULT;  Surgeon: Tonja Ferrer MD;  [facility]    MAMMOPLASTY REDUCTION Bilateral 08/05/2021    Procedure: MAMMOPLASTY, REDUCTION, Bilateral;  Surgeon: ANTONIO Moreno MD;  Location: [facility] OR    ORTHOPEDIC SURGERY      left wrist surgery    TONSILLECTOMY & ADENOIDECTOMY Bilateral     cauterized turbinates also       Social History     Tobacco Use    Smoking status: Former      Current packs/day: 0.00     Types: Cigarettes    Smokeless tobacco: Never    Tobacco comments:     smokes occasionally socially    Substance Use Topics    Alcohol use: Not Currently     Alcohol/week: 0.0 standard drinks of alcohol     Comment: occasional      Family History   Problem Relation Age of Onset    Diabetes Maternal Grandfather     Hypertension No family hx of     Coronary Artery Disease No family hx of     Hyperlipidemia No family hx of     Cerebrovascular Disease No family hx of     Breast Cancer No family hx of     Colon Cancer No family hx of     Prostate Cancer No family hx of     Other Cancer No family hx of     Glaucoma No family hx of     Macular Degeneration No family hx of          Current Outpatient Medications   Medication Sig Dispense Refill    almotriptan (AXERT) 12.5 MG tablet Take 1 tablet (12.5 mg) by mouth at onset of headache for migraine (repeat in 2 hours if needed). May repeat in 2 hours. Max 2 tablets/24 hours. 18 tablet 11    atomoxetine (STRATTERA) 40 MG capsule Take 1 capsule (40 mg) by mouth daily 90 capsule 0    bisacodyl (DULCOLAX) 5 MG EC tablet Take 5 mg by mouth daily as needed for constipation. Takes 1-3 tablets daily      buPROPion (WELLBUTRIN XL) 300 MG 24 hr tablet Take 1 tablet (300 mg) by mouth every morning 90 tablet 1    cetirizine (ZYRTEC) 10 MG tablet Take 1 tablet (10 mg) by mouth 2 times daily. 180 tablet 1    cholecalciferol 125 MCG (5000 UT) CAPS Take 5,000 Units by mouth every evening      COMPOUNDED NON-CONTROLLED SUBSTANCE (CMPD RX) - PHARMACY TO MIX COMPOUNDED MEDICATION Inject compound semaglutide subcutaneously 1 mg (20 units) once weekly. OK to compound during [employer] shortage. 1 mL 5    diclofenac (VOLTAREN) 1 % topical gel Apply 2 g topically 4 times daily as needed for moderate pain. 350 g 4    doxepin (SINEQUAN) 50 MG capsule Take 1 capsule (50 mg) by mouth 2 times daily. 180 capsule 1    EMGALITY 120 MG/ML injection Inject 1 mL (120 mg) Subcutaneous  every 28 days 1 mL 11    EPINEPHrine (ANY BX GENERIC EQUIV) 0.3 MG/0.3ML injection 2-pack Inject 0.3 mg into the muscle as needed      famotidine (PEPCID) 20 MG tablet Take 1 tablet (20 mg) by mouth 2 times daily. 180 tablet 1    fluticasone (FLONASE) 50 MCG/ACT nasal spray Spray 2 sprays into both nostrils 2 times daily as needed for allergies. 48 mL 5    gabapentin (NEURONTIN) 400 MG capsule Take 2 capsules (800 mg) by mouth 3 times daily 180 capsule 5    guaiFENesin (MUCINEX) 600 MG 12 hr tablet Take 2 tablets (1,200 mg) by mouth daily as needed for congestion. 180 tablet 1    hydroxychloroquine (PLAQUENIL) 200 MG tablet Take 2 tablets (400 mg) by mouth daily. 180 tablet 2    lisdexamfetamine (VYVANSE) 30 MG capsule Take 1 capsule (30 mg) by mouth every morning. 30 capsule 0    lisdexamfetamine (VYVANSE) 30 MG capsule Take 1 capsule (30 mg) by mouth every morning. Do not start before October 12, 2024. 30 capsule 0    methotrexate 2.5 MG tablet TAKE 6 TABLETS (15 MG) BY MOUTH EVERY 7 DAYS 72 tablet 1    montelukast (SINGULAIR) 10 MG tablet Take 1 tablet (10 mg) by mouth at bedtime. 30 tablet 0    Multiple Vitamin (MULTIVITAMIN ADULT PO) Take 1 tablet by mouth daily.      ondansetron (ZOFRAN ODT) 4 MG ODT tab Take 1 tablet (4 mg) by mouth every 8 hours as needed for nausea. 30 tablet 1    phenylephrine HCl 10 MG TABS Take 10 mg by mouth 2 times daily      propranolol (INDERAL) 20 MG tablet Take 1 tablet (20 mg) by mouth daily as needed (anxiety) 30 tablet 11    Sodium Phosphates (FLEET SALINE ENEMA RE) Place 1 enema rectally daily as needed (constipation).      vilazodone (VIIBRYD) 40 MG TABS tablet Take 1 tablet (40 mg) by mouth every morning 90 tablet 1    levonorgestrel (MIRENA) 20 MCG/DAY IUD 1 each (20 mcg) by Intrauterine route once for 1 dose 1 each 0     Allergies   Allergen Reactions    Dust Mites Cough, Difficulty breathing and Shortness Of Breath    Cats      Chest tightness, sinus irritation     Recent  "Labs   Lab Test 10/07/24  1544 08/03/23  1604 07/13/23  1554 04/25/23  1232 11/07/22  1241 08/19/22  0835 08/19/22  0835 04/13/22  1600 01/04/22  1732 08/12/21  1535 09/12/20 2058 08/02/19  1711   LDL  --   --   --   --   --   --  100  --   --   --   --   --    HDL  --   --   --   --   --   --  43*  --   --   --   --   --    TRIG  --   --   --   --   --   --  184*  --   --   --   --   --    ALT 17  --  16  --  134*  --  60*   < >  --    < > 36 25   CR 0.81 0.81 0.70  --  0.83  --  0.69   < >  --    < > 0.77 0.80   GFRESTIMATED >90 >90 >90  --  >90  --  >90   < >  --    < > >90 >90   GFRESTBLACK  --   --   --   --   --   --   --   --   --   --  >90 >90   POTASSIUM  --  4.5 4.2  --  3.8   < >  --    < >  --    < > 3.9 3.5   TSH  --   --   --  2.12  --   --   --   --  1.81  --   --   --     < > = values in this interval not displayed.               Review of Systems  Constitutional, neuro, ENT, endocrine, pulmonary, cardiac, gastrointestinal, genitourinary, musculoskeletal, integument and psychiatric systems are negative, except as otherwise noted.     Objective    Exam  /78 (BP Location: Right arm, Patient Position: Sitting, Cuff Size: Adult Regular)   Pulse 86   Temp 97.8  F (36.6  C) (Temporal)   Resp 16   Ht 1.715 m (5' 7.5\")   Wt 75.8 kg (167 lb)   SpO2 99%   BMI 25.77 kg/m     Estimated body mass index is 25.77 kg/m  as calculated from the following:    Height as of this encounter: 1.715 m (5' 7.5\").    Weight as of this encounter: 75.8 kg (167 lb).    Physical Exam  GENERAL: alert and no distress  EYES: Eyes grossly normal to inspection, PERRL and conjunctivae and sclerae normal  HENT: ear canals and TM's normal, nose and mouth without ulcers or lesions  NECK: no adenopathy, no asymmetry, masses, or scars  RESP: lungs clear to auscultation - no rales, rhonchi or wheezes  CV: regular rate and rhythm, normal S1 S2, no S3 or S4, no murmur, click or rub, no peripheral edema  MS: no gross " musculoskeletal defects noted, no edema  SKIN: no suspicious lesions or rashes  NEURO: Normal strength and tone, mentation intact and speech normal  PSYCH: mentation appears normal, affect normal/bright        Signed Electronically by: Oleg Pfeiffer PA-C    Answers submitted by the patient for this visit:  Patient Health Questionnaire (Submitted on 3/11/2025)  If you checked off any problems, how difficult have these problems made it for you to do your work, take care of things at home, or get along with other people?: Somewhat difficult  PHQ9 TOTAL SCORE: 5

## 2025-03-11 NOTE — PATIENT INSTRUCTIONS
I recommend using over the counter antihistamines- Zyrtec, Allegra or Claritin during the day - up to two times a day.  You can use benadryl at night if you have significant symptoms. This medication will make you very drowsy.      Immunology referral - please call [phone]   Patient Education   Preventive Care Advice   This is general advice given by our system to help you stay healthy. However, your care team may have specific advice just for you. Please talk to your care team about your preventive care needs.  Nutrition  Eat 5 or more servings of fruits and vegetables each day.  Try wheat bread, brown rice and whole grain pasta (instead of white bread, rice, and pasta).  Get enough calcium and vitamin D. Check the label on foods and aim for 100% of the RDA (recommended daily allowance).  Lifestyle  Exercise at least 150 minutes each week  (30 minutes a day, 5 days a week).  Do muscle strengthening activities 2 days a week. These help control your weight and prevent disease.  No smoking.  Wear sunscreen to prevent skin cancer.  Have a dental exam and cleaning every 6 months.  Yearly exams  See your health care team every year to talk about:  Any changes in your health.  Any medicines your care team has prescribed.  Preventive care, family planning, and ways to prevent chronic diseases.  Shots (vaccines)   HPV shots (up to age 26), if you've never had them before.  Hepatitis B shots (up to age 59), if you've never had them before.  COVID-19 shot: Get this shot when it's due.  Flu shot: Get a flu shot every year.  Tetanus shot: Get a tetanus shot every 10 years.  Pneumococcal, hepatitis A, and RSV shots: Ask your care team if you need these based on your risk.  Shingles shot (for age 50 and up)  General health tests  Diabetes screening:  Starting at age 35, Get screened for diabetes at least every 3 years.  If you are younger than age 35, ask your care team if you should be screened for diabetes.  Cholesterol  test: At age 39, start having a cholesterol test every 5 years, or more often if advised.  Bone density scan (DEXA): At age 50, ask your care team if you should have this scan for osteoporosis (brittle bones).  Hepatitis C: Get tested at least once in your life.  STIs (sexually transmitted infections)  Before age 24: Ask your care team if you should be screened for STIs.  After age 24: Get screened for STIs if you're at risk. You are at risk for STIs (including HIV) if:  You are sexually active with more than one person.  You don't use condoms every time.  You or a partner was diagnosed with a sexually transmitted infection.  If you are at risk for HIV, ask about PrEP medicine to prevent HIV.  Get tested for HIV at least once in your life, whether you are at risk for HIV or not.  Cancer screening tests  Cervical cancer screening: If you have a cervix, begin getting regular cervical cancer screening tests starting at age 21.  Breast cancer scan (mammogram): If you've ever had breasts, begin having regular mammograms starting at age 40. This is a scan to check for breast cancer.  Colon cancer screening: It is important to start screening for colon cancer at age 45.  Have a colonoscopy test every 10 years (or more often if you're at risk) Or, ask your provider about stool tests like a FIT test every year or Cologuard test every 3 years.  To learn more about your testing options, visit:   .  For help making a decision, visit:   https://bit.ly/np67176.  Prostate cancer screening test: If you have a prostate, ask your care team if a prostate cancer screening test (PSA) at age 55 is right for you.  Lung cancer screening: If you are a current or former smoker ages 50 to 80, ask your care team if ongoing lung cancer screenings are right for you.  For informational purposes only. Not to replace the advice of your health care provider. Copyright   2023 [employer]. All rights reserved. Clinically reviewed by the [facility]. [employer] [id number] - REV 01/24.  Learning About Stress  What is stress?     Stress is your body's response to a hard situation. Your body can have a physical, emotional, or mental response. Stress is a fact of life for most people, and it affects everyone differently. What causes stress for you may not be stressful for someone else.  A lot of things can cause stress. You may feel stress when you go on a job interview, take a test, or run a race. This kind of short-term stress is normal and even useful. It can help you if you need to work hard or react quickly. For example, stress can help you finish an important job on time.  Long-term stress is caused by ongoing stressful situations or events. Examples of long-term stress include long-term health problems, ongoing problems at work, or conflicts in your family. Long-term stress can harm your health.  How does stress affect your health?  When you are stressed, your body responds as though you are in danger. It makes hormones that speed up your heart, make you breathe faster, and give you a burst of energy. This is called the fight-or-flight stress response. If the stress is over quickly, your body goes back to normal and no harm is done.  But if stress happens too often or lasts too long, it can have bad effects. Long-term stress can make you more likely to get sick, and it can make symptoms of some diseases worse. If you tense up when you are stressed, you may develop neck, shoulder, or low back pain. Stress is linked to high blood pressure and heart disease.  Stress also harms your emotional health. It can make you lewis, tense, or depressed. Your relationships may suffer, and you may not do well at work or school.  What can you do to manage stress?  You can try these things to help manage stress:   Do something active. Exercise or activity can help reduce stress. Walking is a great way to get started. Even everyday activities  such as housecleaning or yard work can help.  Try yoga or mamadou chi. These techniques combine exercise and meditation. You may need some training at first to learn them.  Do something you enjoy. For example, listen to music or go to a movie. Practice your hobby or do volunteer work.  Meditate. This can help you relax, because you are not worrying about what happened before or what may happen in the future.  Do guided imagery. Imagine yourself in any setting that helps you feel calm. You can use online videos, books, or a teacher to guide you.  Do breathing exercises. For example:  From a standing position, bend forward from the waist with your knees slightly bent. Let your arms dangle close to the floor.  Breathe in slowly and deeply as you return to a standing position. Roll up slowly and lift your head last.  Hold your breath for just a few seconds in the standing position.  Breathe out slowly and bend forward from the waist.  Let your feelings out. Talk, laugh, cry, and express anger when you need to. Talking with supportive friends or family, a counselor, or a jos[employer] leader about your feelings is a healthy way to relieve stress. Avoid discussing your feelings with people who make you feel worse.  Write. It may help to write about things that are bothering you. This helps you find out how much stress you feel and what is causing it. When you know this, you can find better ways to cope.  What can you do to prevent stress?  You might try some of these things to help prevent stress:  Manage your time. This helps you find time to do the things you want and need to do.  Get enough sleep. Your body recovers from the stresses of the day while you are sleeping.  Get support. Your family, friends, and community can make a difference in how you experience stress.  Limit your news feed. Avoid or limit time on social media or news that may make you feel stressed.  Do something active. Exercise or activity can help reduce  "stress. Walking is a great way to get started.  Where can you learn more?  Go to https://www.[employer].net/patiented  Enter N032 in the search box to learn more about \"Learning About Stress.\"  Current as of: October 24, 2023  Content Version: 14.3    2024 [employer].   Care instructions adapted under license by your healthcare professional. If you have questions about a medical condition or this instruction, always ask your healthcare professional. [employer] disclaims any warranty or liability for your use of this information.    Learning About Depression Screening  What is depression screening?  Depression screening is a way to see if you have depression symptoms. It may be done by a doctor or counselor. It's often part of a routine checkup. That's because your mental health is just as important as your physical health.  Depression is a mental health condition that affects how you feel, think, and act. You may:  Have less energy.  Lose interest in your daily activities.  Feel sad and grouchy for a long time.  Depression is very common. It affects people of all ages.  Many things can lead to depression. Some people become depressed after they have a stroke or find out they have a major illness like cancer or heart disease. The death of a loved one or a breakup may lead to depression. It can run in families. Most experts believe that a combination of inherited genes and stressful life events can cause it.  What happens during screening?  You may be asked to fill out a form about your depression symptoms. You and the doctor will discuss your answers. The doctor may ask you more questions to learn more about how you think, act, and feel.  What happens after screening?  If you have symptoms of depression, your doctor will talk to you about your options.  Doctors usually treat depression with medicines or counseling. Often, combining the two works best. Many people don't get help because they " "think that they'll get over the depression on their own. But people with depression may not get better unless they get treatment.  The cause of depression is not well understood. There may be many factors involved. But if you have depression, it's not your fault.  A serious symptom of depression is thinking about death or suicide. If you or someone you care about talks about this or about feeling hopeless, get help right away.  It's important to know that depression can be treated. Medicine, counseling, and self-care may help.  Where can you learn more?  Go to https://www.[employer].net/patiented  Enter T185 in the search box to learn more about \"Learning About Depression Screening.\"  Current as of: July 31, 2024  Content Version: 14.3    2024 [employer].   Care instructions adapted under license by your healthcare professional. If you have questions about a medical condition or this instruction, always ask your healthcare professional. [employer] disclaims any warranty or liability for your use of this information.    Substance Use Disorder: Care Instructions  Overview     You can improve your life and health by stopping your use of alcohol or drugs. When you don't drink or use drugs, you may feel and sleep better. You may get along better with your family, friends, and coworkers. There are medicines and programs that can help with substance use disorder.  How can you care for yourself at home?  Here are some ways to help you stay sober and prevent relapse.  If you have been given medicine to help keep you sober or reduce your cravings, be sure to take it exactly as prescribed.  Talk to your doctor about programs that can help you stop using drugs or drinking alcohol.  Do not keep alcohol or drugs in your home.  Plan ahead. Think about what you'll say if other people ask you to drink or use drugs. Try not to spend time with people who drink or use drugs.  Use the time and money spent on " drinking or drugs to do something that's important to you.  Preventing a relapse  Have a plan to deal with relapse. Learn to recognize changes in your thinking that lead you to drink or use drugs. Get help before you start to drink or use drugs again.  Try to stay away from situations, friends, or places that may lead you to drink or use drugs.  If you feel the need to drink alcohol or use drugs again, seek help right away. Call a trusted friend or family member. Some people get support from organizations such as Narcotics Anonymous or [employer] or from treatment facilities.  If you relapse, get help as soon as you can. Some people make a plan with another person that outlines what they want that person to do for them if they relapse. The plan usually includes how to handle the relapse and who to notify in case of relapse.  Don't give up. Remember that a relapse doesn't mean that you have failed. Use the experience to learn the triggers that lead you to drink or use drugs. Then quit again. Recovery is a lifelong process. Many people have several relapses before they are able to quit for good.  Follow-up care is a key part of your treatment and safety. Be sure to make and go to all appointments, and call your doctor if you are having problems. It's also a good idea to know your test results and keep a list of the medicines you take.  When should you call for help?   Call 911  anytime you think you may need emergency care. For example, call if you or someone else:    Has overdosed or has withdrawal signs. Be sure to tell the emergency workers that you are or someone else is using or trying to quit using drugs. Overdose or withdrawal signs may include:  Losing consciousness.  Seizure.  Seeing or hearing things that aren't there (hallucinations).     Is thinking or talking about suicide or harming others.   Where to get help 24 hours a day, 7 days a week   If you or someone you know talks about suicide, self-harm,  "a mental health crisis, a substance use crisis, or any other kind of emotional distress, get help right away. You can:    Call the Suicide and Crisis Lifeline at 988.     Call 5-603-484-BDAZ (1-831.894.7423).     Text HOME to 866871 to access the Crisis Text Line.   Consider saving these numbers in your phone.  Go to [employer] for more information or to chat online.  Call your doctor now or seek immediate medical care if:    You are having withdrawal symptoms. These may include nausea or vomiting, sweating, shakiness, and anxiety.   Watch closely for changes in your health, and be sure to contact your doctor if:    You have a relapse.     You need more help or support to stop.   Where can you learn more?  Go to https://www.[employer].net/patiented  Enter H573 in the search box to learn more about \"Substance Use Disorder: Care Instructions.\"  Current as of: November 15, 2023  Content Version: 14.3    2024 [employer].   Care instructions adapted under license by your healthcare professional. If you have questions about a medical condition or this instruction, always ask your healthcare professional. [employer] disclaims any warranty or liability for your use of this information.       "

## 2025-04-03 RX ORDER — MONTELUKAST SODIUM 10 MG/1
TABLET ORAL
Qty: 30 TABLET | Refills: 0 | OUTPATIENT
Start: 2025-04-03

## 2025-04-07 ASSESSMENT — ANXIETY QUESTIONNAIRES
8. IF YOU CHECKED OFF ANY PROBLEMS, HOW DIFFICULT HAVE THESE MADE IT FOR YOU TO DO YOUR WORK, TAKE CARE OF THINGS AT HOME, OR GET ALONG WITH OTHER PEOPLE?: SOMEWHAT DIFFICULT
4. TROUBLE RELAXING: SEVERAL DAYS
7. FEELING AFRAID AS IF SOMETHING AWFUL MIGHT HAPPEN: SEVERAL DAYS
1. FEELING NERVOUS, ANXIOUS, OR ON EDGE: MORE THAN HALF THE DAYS
GAD7 TOTAL SCORE: 8
3. WORRYING TOO MUCH ABOUT DIFFERENT THINGS: SEVERAL DAYS
IF YOU CHECKED OFF ANY PROBLEMS ON THIS QUESTIONNAIRE, HOW DIFFICULT HAVE THESE PROBLEMS MADE IT FOR YOU TO DO YOUR WORK, TAKE CARE OF THINGS AT HOME, OR GET ALONG WITH OTHER PEOPLE: SOMEWHAT DIFFICULT
5. BEING SO RESTLESS THAT IT IS HARD TO SIT STILL: SEVERAL DAYS
6. BECOMING EASILY ANNOYED OR IRRITABLE: SEVERAL DAYS
GAD7 TOTAL SCORE: 8
GAD7 TOTAL SCORE: 8
7. FEELING AFRAID AS IF SOMETHING AWFUL MIGHT HAPPEN: SEVERAL DAYS
2. NOT BEING ABLE TO STOP OR CONTROL WORRYING: SEVERAL DAYS

## 2025-04-07 ASSESSMENT — COLUMBIA-SUICIDE SEVERITY RATING SCALE - C-SSRS
2. HAVE YOU ACTUALLY HAD ANY THOUGHTS OF KILLING YOURSELF?: NO
6. IN YOUR LIFETIME, HAVE YOU EVER DONE ANYTHING, STARTED TO DO ANYTHING, OR PREPARED TO DO ANYTHING TO END YOUR LIFE?: NO
1. IN THE PAST MONTH, HAVE YOU WISHED YOU WERE DEAD OR WISHED YOU COULD GO TO SLEEP AND NOT WAKE UP?: NO

## 2025-04-07 ASSESSMENT — PATIENT HEALTH QUESTIONNAIRE - PHQ9
10. IF YOU CHECKED OFF ANY PROBLEMS, HOW DIFFICULT HAVE THESE PROBLEMS MADE IT FOR YOU TO DO YOUR WORK, TAKE CARE OF THINGS AT HOME, OR GET ALONG WITH OTHER PEOPLE: SOMEWHAT DIFFICULT
SUM OF ALL RESPONSES TO PHQ QUESTIONS 1-9: 6
SUM OF ALL RESPONSES TO PHQ QUESTIONS 1-9: 6

## 2025-04-08 DIAGNOSIS — E66.3 OVERWEIGHT (BMI 25.0-29.9): ICD-10-CM

## 2025-04-08 DIAGNOSIS — K76.0 FATTY LIVER: ICD-10-CM

## 2025-04-09 ENCOUNTER — OFFICE VISIT (OUTPATIENT)
Dept: PALLIATIVE MEDICINE | Facility: OTHER | Age: 40
End: 2025-04-09
Attending: ANESTHESIOLOGY
Payer: COMMERCIAL

## 2025-04-09 DIAGNOSIS — G89.4 CHRONIC PAIN SYNDROME: ICD-10-CM

## 2025-04-09 DIAGNOSIS — F41.1 GAD (GENERALIZED ANXIETY DISORDER): Primary | ICD-10-CM

## 2025-04-09 PROCEDURE — 90791 PSYCH DIAGNOSTIC EVALUATION: CPT | Performed by: COUNSELOR
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
## 2025-04-17 ENCOUNTER — VIRTUAL VISIT (OUTPATIENT)
Dept: PALLIATIVE MEDICINE | Facility: OTHER | Age: 40
End: 2025-04-17
Payer: COMMERCIAL

## 2025-04-17 DIAGNOSIS — F41.1 GAD (GENERALIZED ANXIETY DISORDER): Primary | ICD-10-CM

## 2025-04-17 DIAGNOSIS — G89.4 CHRONIC PAIN SYNDROME: ICD-10-CM

## 2025-04-17 PROCEDURE — 90834 PSYTX W PT 45 MINUTES: CPT | Mod: 95 | Performed by: COUNSELOR

## 2025-04-21 ENCOUNTER — THERAPY VISIT (OUTPATIENT)
Dept: PHYSICAL THERAPY | Facility: CLINIC | Age: 40
End: 2025-04-21
Attending: PHYSICIAN ASSISTANT
Payer: COMMERCIAL

## 2025-04-21 ENCOUNTER — TELEPHONE (OUTPATIENT)
Dept: PHYSICAL THERAPY | Facility: CLINIC | Age: 40
End: 2025-04-21

## 2025-04-21 DIAGNOSIS — H81.13 BENIGN PAROXYSMAL POSITIONAL VERTIGO DUE TO BILATERAL VESTIBULAR DISORDER: ICD-10-CM

## 2025-04-21 NOTE — PATIENT INSTRUCTIONS
You may be able to ask about adding a blue light filter to your glasses, or try dimming the brightness on the computer.   It may be helpful for you to start keeping a journal of your symptoms to identify all triggers and help with management:  Record: symptoms, sleep, food/water intake, stress levels, neck or TMD pain, activity level, and any other factors that seem to be contributing.  Focus on hydration, eating regular meals throughout the day, sticking to a balance diet, stress management, and regular aerobic exercise for management of symptoms.  Physical therapy can help by improving symptoms of dizziness with exercises. I would like you to start working on standing balance (see the printed pictures) as a gentle way of challenging your vestibular system.   Grounding: Sit in a chair, holding a weighted object. Focus on taking slow/deep breaths, and the feel of the weight in your lap    Dafne Ceja DPT  Physical Therapist  [facility] [address]  [address] [place] [address]    Appointments: [phone]

## 2025-04-21 NOTE — TELEPHONE ENCOUNTER
LVM that Razia is not available today and we needed to reschedule. Patient was switched to Dafne 15 min sooner. Gave [facility] rehab number if this didn't work

## 2025-04-21 NOTE — PROGRESS NOTES
PHYSICAL THERAPY EVALUATION  Type of Visit: Evaluation       Fall Risk Screen:  Have you fallen 2 or more times in the past year?: No  Have you fallen and had an injury in the past year?: No    Subjective         Presenting condition or subjective complaint: vertigo. See vestibular section.   Date of onset: 04/21/25    Relevant medical history: Arthritis; Bladder or bowel problems; Depression; Dizziness; Fibromyalgia; History of fractures; Mental Illness; Migraines or headaches; Overweight; Rheumatoid arthritis; Severe dizziness; Severe headaches   Dates & types of surgery: cystectomy, gall bladder removal,plate implant,breast reduction    Prior diagnostic imaging/testing results:       Prior therapy history for the same diagnosis, illness or injury: No  . She did have therapy about 1 year ago for TMD and is still doing the exercises for this.     Prior Level of Function  Transfers: Independent  Ambulation: Independent  ADL: Independent  IADL: Work    Living Environment  Social support: Alone   Type of home: Apartment/condo   Stairs to enter the home: Yes 1 Is there a railing: Yes     Ramp: No   Stairs inside the home: Yes 1 Is there a railing: Yes     Help at home:    Equipment owned:       Employment: Yes instructor (research, )  Hobbies/Interests: performance, movies & shows,hiking, walks dogs 2x/day for ~15 min each time    Patient goals for therapy: bad vision& nausea    Pain assessment: Pain present--neck/shoulder pain, TMD     Objective      Cognitive Status Examination  Orientation: Oriented to person, place and time   Level of Consciousness: Alert  Follows Commands and Answers Questions: 100% of the time  Personal Safety and Judgement: Intact  Memory: Intact    OBSERVATION: Arrives independently to appointment. Reports dizziness 6/10 today.   INTEGUMENTARY: Intact  POSTURE:  Forward head position. Slight forward position of lower jaw at rest.  PALPATION: N/T  RANGE OF MOTION: UE/LE ROM  WFL. Cervical ROM not formally assessed  STRENGTH: LE Strength WFL  UE Strength WFL    BED MOBILITY: Independent    TRANSFERS: Independent    WHEELCHAIR MOBILITY: N/A    GAIT:   Level of [place]: Independent  Assistive Device(s): None  Gait Deviations: WFL  Gait Distance: Not assessed  Stairs: N/T    BALANCE: Majority of balance testing deferred today secondary to strong symptoms. She had falls on condition 5 of the mCTSIB, suggesting difficulty using vestibular input for balance.    SPECIAL TESTS    Modified CTSIB Conditions (sec) Cond 1: 30  Cond 2: 30  Cond 4: 30  Cond 5 : 9 sec--increased symptoms          VESTIBULAR EVALUATION  ADDITIONAL HISTORY:  Description of symptoms: Constant dizziness; Off balance; Nausea or vomiting; I feel like I am spinning; Blurry or jumping vision; I feel like the room is spinning; Light-headedness.   She reports that she prior to starting on migraine medication (years ago) she had a lot of dizziness with her migraines, but then starting on migraine medication seemed to help. Over the last several months, frequency of dizzy symptoms is again becoming more frequent. Typically when she wakes up in the morning she will know if it will be a good or bad day. If she does not have too much stimulation, symptoms won't get worse, but they will be present for the full day.     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 She has exercises that she continues to do. Was recently prescribed a mouth guard for night, but insurance coverage is an issue.     She does get motion sickness (very motion sick 1 year ago on a train). She gets migraine headaches about 1-2x/month.  Sensitive to light and notices that it is hard to focus on things on the computer.   Being in grocery stores/busy places don't seem to have an effect, but when she is having symptoms she is unable to watch movement on television or have too much stimulation.   Rates dizziness as 6/10 today.      Dizzy attacks:   Start: over a decade ago   Last attack: today   Frequency of occurrences: every other day   Length of attack: all day  Difficulty hearing:   No  Noise in ears? No    Alleviates symptoms: meds,aromatherapy, sleep,staying in dark  Worsens symptoms: light,smells,visual stimuli,some foods  Activities that bring on symptoms: Riding in a car; Driving a car; Bending over; Reaching up; Walking up or down stairs       Pertinent visual history: hard to focus eyes on things on the computer; sensitive to bright lights  Pertinent history of current vestibular problem: ADHD, Anxiety, Depression, Migraines, Motion sickness, PTSD  DHI: Total Score: (Patient-Rptd) 52      Oculomotor Screen    Ocular ROM Normal   Smooth Pursuit Normal (caused increased symptoms)   Saccades Abnormal--slow (caused increased symptoms)   VOR *Unable to assess today secondary to sx   VOR Cancellation *Unable to assess today secondary to sx   Head Impulse Test *Unable to assess today secondary to sx   Convergence Testing Abnormal--8 cm (caused symptoms)        Infrared Goggle Exam Vestibular Suppressant in Last 24 Hours? No  Exam Completed With: Infrared goggles   Spontaneous Nystagmus Weak R beating, intermittent   Gaze Evoked Nystagmus End range nystagmus left and right   Head Shake Horizontal Nystagmus *Not tested secondary to already strong sx   Positional Testing Negative    Left Right   [place] Negative Negative   [facility] Test Negative Negative       Dynamic Visual Acuity (DVA) *Unable to assess secondary to strong symptoms       Assessment & Plan   CLINICAL IMPRESSIONS  Medical Diagnosis: bppv due to bilateral vestibular disorder    Treatment Diagnosis: Sensory selection and weighting deficit   Impression/Assessment: Patient is a 40 year old female with complaints of frequent episodes of dizziness accompanied by sensitivity to motion/light/noise/visual stimulation.  Symptoms most consistent with vestibular  migraines.  Many parts of the evaluation needed to be deferred today secondary to elevated symptoms. Continued PT is indicated to improve symptoms to allow her to return to her PLOF.  The following significant findings have been identified: Pain, Decreased ROM/flexibility, Impaired posture, Dizziness, and Disequilibrium . These impairments interfere with their ability to perform work tasks, recreational activities, and community mobility as compared to previous level of function.     Clinical Decision Making (Complexity):  Clinical Presentation: Stable/Uncomplicated  Clinical Presentation Rationale: based on medical and personal factors listed in PT evaluation  Clinical Decision Making (Complexity): Low complexity    PLAN OF CARE  Treatment Interventions:  Interventions: Gait Training, Manual Therapy, Neuromuscular Re-education, Therapeutic Activity, Therapeutic Exercise, Self-Care/Home Management, Canalith Repositioning, Standardized Testing    Long Term Goals     PT Goal 1  Goal Identifier: HEP  Goal Description: Patient will be independent with Home Exercise Program for continued improvements in health and wellness upon d/c from therapy  Target Date: 06/16/25  PT Goal 2  Goal Identifier: DHI  Goal Description: Patient will improve DHI score to 34 as an indication of improved subjective symptoms of dizziness.  Target Date: 06/16/25  PT Goal 3  Goal Identifier: mCTSIB  Goal Description: Patient will be able to maintain condition 5 of the mCTSIB for 30 sec to indicate improved use of vestibular input for balance for compliant surfaces and dimly lit environments  Target Date: 06/16/25      Frequency of Treatment: 1x/week, decreasing to every other week  Duration of Treatment: 8 weeks    Recommended Referrals to Other Professionals: Possible referral to a TMJ specialist, OT for vision; consider audiology referral if symptoms not improving  Education Assessment:   Learner/Method: Patient;Listening;Pictures/Video;No  Barriers to Learning  Education Comments: Educated on initial HEP and PT POC    Risks and benefits of evaluation/treatment have been explained.   Patient/Family/caregiver agrees with Plan of Care.     Evaluation Time:     PT Jalen, Low Complexity Minutes (30032): 30       Signing Clinician: Yumiko Ceja, PT

## 2025-04-22 NOTE — PROGRESS NOTES
[facility]- Psychotherapy  (Provider Oversight- Dr. Shane Bolden)                                    Progress Note    Patient Name: Kasandra Lau  Date: 4/17/25         Service Type: Individual      Session Start Time: 4:16 PM  Session End Time: 5:04 PM     Session Length: 48 minutes    Session #: 1    Attendees: Client attended alone    Service Modality:  Video Visit:      Provider verified identity through the following two step process.  Patient provided:  Patient photo and Patient is known previously to provider    Telemedicine Visit: The patient's condition can be safely assessed and treated via synchronous audio and visual telemedicine encounter.      Reason for Telemedicine Visit: Patient has requested telehealth visit, Patient unable to travel, and Patient convenience (e.g. access to timely appointments / distance to available provider)    Originating Site (Patient Location):  Patient's parked car    Distant Site (Provider Location): [facility]    Consent:  The patient/guardian has verbally consented to: the potential risks and benefits of telemedicine (video visit) versus in person care; bill my insurance or make self-payment for services provided; and responsibility for payment of non-covered services.     Patient would like the video invitation sent by:  Text to cell phone:   [phone]     Mode of Communication:  Video Conference via [employer]    Distant Location (Provider):  On-site    As the provider I attest to compliance with applicable laws and regulations related to telemedicine.    DATA  Interactive Complexity: No  Crisis: No        Progress Since Last Session (Related to Symptoms / Goals / Homework):   Symptoms: No change    Patient is addressing chronic pain, medical and mental health concerns. She reports personal stressors that she is addressing. She is reaching out to her support system.     Homework:  This is patient's first follow up  session.      Episode of Care Goals:  This is patient's first follow up session.     Current / Ongoing Stressors and Concerns:   Patient continues to address chronic pain, mental health and medical concerns.  Patient discussed stressors related to current political issues. She reports  anxiety symptoms are the most difficult to manage. She is also working on addressing current sleep issues and she reports trouble staying asleep. Patient was late logging on for her visit today, therefore, the visit started later than expected.      Treatment Objective(s) Addressed in This Session:   use at least 2 coping skills for anxiety management in the next 2 weeks, use thought-stopping strategy daily to reduce intrusive thoughts, use relaxation strategies 2 times per day to reduce the physical symptoms of anxiety, and use cognitive strategies identified in therapy to challenge anxious thoughts, identify 2 strategies for managing pain, practice relaxation training 2 times/week, and follow up with recommended medical treatments and appointments with medical providers       Intervention:   CBT: discussed thoughts and feelings related to behavior change  EMDR: discussed mind/body connection regarding pain and history of trauma.   Reviewed grounding tool and encouraged home practice  Presented calm place exercise and encouraged home practice  Encouraged patient to follow up with medical providers regarding medical questions/concerns    Assessments completed prior to visit:  The following assessments were completed by patient for this visit:  None today       ASSESSMENT: Current Emotional / Mental Status (status of significant symptoms):   Risk status (Self / Other harm or suicidal ideation)   Patient denies current fears or concerns for personal safety.   Patient denies current or recent suicidal ideation or behaviors.   Patient denies current or recent homicidal ideation or behaviors.   Patient denies current or recent self  injurious behavior or ideation.   Patient reports other safety concerns including current political issues at this time.   Patient reports there has been no change in risk factors since their last session.     Patient reports there has been no change in protective factors since their last session.     Recommended that patient call 911 or go to the local ED should there be a change in any of these risk factors     Appearance:   Appropriate    Eye Contact:   Good    Psychomotor Behavior: Normal    Attitude:   Cooperative    Orientation:   All   Speech    Rate / Production: Normal/ Responsive    Volume:  Normal    Mood:    Anxious  Normal   Affect:    Appropriate  Worrisome    Thought Content:  Clear  Rumination    Thought Form:  Coherent  Logical    Insight:    Good      Medication Review:   No changes to current psychiatric medication(s)     Medication Compliance:   Yes     Changes in Health Issues:   None reported Patient continues to address chronic pain, mental health and medical concerns. She reports concerns about jaw swelling and also hives and that she is addressing this with her medical providers.      Chemical Use Review:   Substance Use: Chemical use reviewed, no active concerns identified      Tobacco Use: No current tobacco use.      Diagnosis:  1. VIC (generalized anxiety disorder)    2. Chronic pain syndrome        Collateral Reports Completed:   Not Applicable    PLAN: (Patient Tasks / Therapist Tasks / Other)  Patient to attend psychotherapy  every 2-4 weeks . Follow care plan as discussed. Continue medical care as needed. Practice resourcing as discussed. Will send grounding tool through My Chart to help with home practice. Follow up with medical providers regarding medical questions/concerns.         AMANDA ROMAN                                                         ______________________________________________________________________    Individual Treatment Plan    Patient's Name:  Kasandra Lau  YOB: 1985    Date of Creation: 4/17/25  Date Treatment Plan Last Reviewed/Revised: 4/17/25    DSM5 Diagnoses: 300.02 (F41.1) Generalized Anxiety Disorder Chronic Pain Syndrome  Psychosocial / Contextual Factors: Chronic pain, medical and mental health concerns  PROMIS (reviewed every 90 days): 4/9/25    Referral / Collaboration:  Referral to another professional/service is not indicated at this time..    Anticipated number of session for this episode of care: 9-12 sessions  Anticipation frequency of session:  every 2-4 weeks  Anticipated Duration of each session: 38-52 minutes  Treatment plan will be reviewed in 90 days or when goals have been changed.       MeasurableTreatment Goal(s) related to diagnosis / functional impairment(s)  Goal 1: Patient will increase coping skills and decrease symptoms of chronic pain.     I will know I've met my goal when symptoms are more manageable in daily living.      Objective #A (Patient Action)    Patient will identify 2 strategies for managing pain.  Status: New - Date: 4/17/25      Intervention(s)  Therapist will teach emotional regulation skills. Mindfulness/EMDR .    Objective #B  Patient will practice relaxation training 2 times/week.  Status: New - Date: 4/17/25      Intervention(s)  Therapist will teach emotional regulation skills. Mindfulness/EMDR .    Objective #C  Patient will follow up with recommended medical treatments and appointments with medical providers .  Status: New - Date: 4/17/25      Intervention(s)  Therapist will  encourage patient to follow through with medical providers as needed .      Goal 2: Patient will decrease anxiety symptoms and increase coping skills to manage anxiety.    I will know I've met my goal when anxiety symptoms are more manageable in daily living.      Objective #A (Patient Action)    Status: New - Date: 4/17/25      Patient will use at least 2 coping skills for anxiety management in the next 2  weeks.    Intervention(s)  Therapist will teach emotional regulation skills. Mindfulness/EMDR .    Objective #B  Patient will use relaxation strategies 2 times per day to reduce the physical symptoms of anxiety.    Status: New - Date: 4/17/25      Intervention(s)  Therapist will teach emotional regulation skills. Mindfulness/EMDR .    Objective #C  Patient will use cognitive strategies identified in therapy to challenge anxious thoughts.  Status: New - Date: 4/17/25      Intervention(s)  Therapist will teach emotional regulation skills. Mindfulness/EMDR .          Patient has reviewed and agreed to the above plan.      JUAN CARLOS MORE, [facility]ard[facility]IR[facility]  April 17, 2025

## 2025-04-28 ENCOUNTER — THERAPY VISIT (OUTPATIENT)
Dept: PHYSICAL THERAPY | Facility: CLINIC | Age: 40
End: 2025-04-28
Attending: PHYSICIAN ASSISTANT
Payer: COMMERCIAL

## 2025-04-28 DIAGNOSIS — H81.13 BENIGN PAROXYSMAL POSITIONAL VERTIGO DUE TO BILATERAL VESTIBULAR DISORDER: Primary | ICD-10-CM

## 2025-04-29 DIAGNOSIS — R11.0 NAUSEA: ICD-10-CM

## 2025-05-01 RX ORDER — ONDANSETRON 4 MG/1
4 TABLET, ORALLY DISINTEGRATING ORAL EVERY 8 HOURS PRN
Qty: 30 TABLET | Refills: 1 | OUTPATIENT
Start: 2025-05-01

## 2025-05-01 NOTE — TELEPHONE ENCOUNTER
Last Written Prescription:  ondansetron (ZOFRAN ODT) 4 MG ODT tab 30 tablet 1 2/18/2025     ----------------------  Last Visit Date: 2/18/25  Future Visit Date: NONE  ----------------------    Received refill request for ONDANSETRON . Patient needs appointment scheduled prior to any refills. Clinic Coordinator notified and will follow up with the patient as appropriate. The pharmacy has been notified that the medication will not be refilled prior to an appointment being scheduled.          Request from pharmacy:  Requested Prescriptions   Pending Prescriptions Disp Refills    ondansetron (ZOFRAN ODT) 4 MG ODT tab [Pharmacy Med Name: ONDANSETRON 4MG TBDP] 30 tablet 1     Sig: DISSOLVE ONE TABLET BY MOUTH EVERY 8 HOURS AS NEEDED FOR NAUSEA        Antivertigo/Antiemetic Agents Passed - 5/1/2025  8:54 AM        Passed - Medication is active on med list and the sig matches. RN to manually verify dose and sig if red X/fail.     If the protocol passes (green check), you do not need to verify med dose and sig.    A prescription matches if they are the same clinical intention.    For Example: once daily and every morning are the same.    The protocol can not identify upper and lower case letters as matching and will fail.     For Example: Take 1 tablet (50 mg) by mouth daily     TAKE 1 TABLET (50 MG) BY MOUTH DAILY    For all fails (red x), verify dose and sig.    If the refill does match what is on file, the RN can still proceed to approve the refill request.       If they do not match, route to the appropriate provider.             Passed - Medication indicated for associated diagnosis     The medication is prescribed for one or more of the following conditions:     Motion sickness   Nausea   Vomiting   Vertigo   Chemotherapy-induced nausea and vomiting   Radiation-induced nausea and vomiting,    Nausea and vomiting prophylaxis, migraine          Passed - Recent (12 mo) or future (90 days) visit with authorizing provider's  specialty     The patient must have completed an in-person or virtual visit within the past 12 months or has a future visit scheduled within the next 90 days with the authorizing provider s specialty.  Urgent care and e-visits do not qualify as an office visit for this protocol.          Passed - Patient is 18 years of age or older

## 2025-05-01 NOTE — TELEPHONE ENCOUNTER
Refill denied at this time. Patient needs appointment with Dr. Manning. Britany message sent to patient to schedule appointment.

## 2025-05-07 ENCOUNTER — THERAPY VISIT (OUTPATIENT)
Dept: PHYSICAL THERAPY | Facility: REHABILITATION | Age: 40
End: 2025-05-07
Attending: ANESTHESIOLOGY
Payer: COMMERCIAL

## 2025-05-07 DIAGNOSIS — G89.4 CHRONIC PAIN SYNDROME: Primary | ICD-10-CM

## 2025-05-07 PROCEDURE — 97112 NEUROMUSCULAR REEDUCATION: CPT | Mod: GP | Performed by: PHYSICAL THERAPIST

## 2025-05-07 PROCEDURE — 97110 THERAPEUTIC EXERCISES: CPT | Mod: GP | Performed by: PHYSICAL THERAPIST

## 2025-05-07 PROCEDURE — 97162 PT EVAL MOD COMPLEX 30 MIN: CPT | Mod: GP | Performed by: PHYSICAL THERAPIST

## 2025-05-07 NOTE — PROGRESS NOTES
PHYSICAL THERAPY EVALUATION  Type of Visit: Evaluation       Fall Risk Screen:  Have you fallen 2 or more times in the past year?: No  Have you fallen and had an injury in the past year?: No    Subjective         Presenting condition or subjective complaint: vertigo  Pt reports having chronic pain challenges. Pt reports having auto-immune disorder - found to have RA and has been using methotrexate and hydroxycholoroquince for that.  Pt reports she also has TMD - has met with surgeon and PT but recently this also feels like it is worsening so pt might have to have surgery for this and then braces.  Pt reports can have pain anywhere in her body with her RA and also fibromyalgia. Pt feels like she is generally flexible but harder to move through tightness due to pain.  Pt can get some tingling into B hands but doesn't report areas that have complete numbness.  Pt hasn't seen her rheumatologist lately so she will call them to check on if she needs changes to medication and if she might need medication for schleroderma - has noticed lighter pink nodules on her finger joints with skin peeling at fingertips.  Pt tries to use a foam roller and does a stretching routine every night to be more comfortable to fall asleep.   Pt reports decent quality of sleep.  Pt can have good versus bad days - can be triggered by changes in weather and or too much sensory stimulation.  Pt has had her gallbladder removed - that helped some with GI challenges but pt reports she still struggles with constipation. Pt has been trying to do more fruit and water and will use laxatives as needed. Pt also has been taking semiglutide for weight loss. Pt reports she has lost about 30 lbs within 6 months.         Date of onset: 03/05/25    Relevant medical history: Arthritis; Bladder or bowel problems; Depression; Dizziness; Fibromyalgia; History of fractures; Mental Illness; Migraines or headaches; Overweight; Rheumatoid arthritis; Severe dizziness;  Severe headaches   Dates & types of surgery: cystectomy, gall bladder removal,plate implant,breast reduction    Prior diagnostic imaging/testing results:       Prior therapy history for the same diagnosis, illness or injury: No      Prior Level of Function  Transfers: Independent  Ambulation: Independent  ADL: Independent      Living Environment  Social support: Alone   Type of home: Apartment/condo   Stairs to enter the home: Yes 1 Is there a railing: Yes     Ramp: No   Stairs inside the home: Yes 1 Is there a railing: Yes     Help at home:    Equipment owned:       Employment: Yes instructor  Hobbies/Interests: performance, movies & shows,hiking    Patient goals for therapy: bad vision& nausea    Pain assessment: 2/10 ache on good day versus 6/10 on bad day     Objective      Cognitive Status Examination  Orientation: Oriented to person, place and time   Level of Consciousness: Lethargic/somnolent  Follows Commands and Answers Questions: 100% of the time  Personal Safety and Judgement: Intact  Memory: Intact      INTEGUMENTARY: Light pink nodules on finger joints - will discuss with rheumatologist    POSTURE: Mild forward head, rounded shoulders    RANGE OF MOTION: Neck ROM WFL except min limited B SB with tightness    B UE WFL with tightness in chest, shoulders and upper back    Trunk ROM - flex WFL with hamstring tightness, ext WNL with increased hinging lumbar spine and back ache, B SB WFL with some relief of tightness felt     STRENGTH: B UE grossly 5/5   B hip flexors 4+/5 with mild ache, B quad and DF 5/5 with minimal achiness    BED MOBILITY: WFL    TRANSFERS: WFL    GAIT:   Level of Swain: WNL  Assistive Device(s): None  Gait Deviations: B trendelenberg mild    BALANCE: APTA STS 30 seconds - 10 reps - age/gender norms 14-15 reps - at risk for falls    SENSATION: UE Sensation WNL, LE Sensation WNL    Assessment & Plan   CLINICAL IMPRESSIONS  Medical Diagnosis: Chronic pain syndrome (G89.4)     Treatment Diagnosis: Chronic pain syndrome (G89.4)   Impression/Assessment: Patient is a 40 year old female with whole body pain complaints.  Pt reports having RA and fibromyalgia, migraines, balance and vestibular disorder and TMD. Pt demo's mild neck ROM limitations with muscle tightness, overall some hypermobility noticed in trunk and B hips and mild hip/core/trunk weakness with pain/ache with movements. The following significant findings have been identified: Pain, Decreased ROM/flexibility, Decreased strength, Impaired balance, Impaired muscle performance, and Decreased activity tolerance. These impairments interfere with their ability to perform self care tasks, recreational activities, household chores, household mobility, and community mobility as compared to previous level of function.     Clinical Decision Making (Complexity):  Clinical Presentation: Evolving/Changing  Clinical Presentation Rationale: based on medical and personal factors listed in PT evaluation  Clinical Decision Making (Complexity): Moderate complexity    PLAN OF CARE  Treatment Interventions:  Interventions: Manual Therapy, Neuromuscular Re-education, Therapeutic Activity, Therapeutic Exercise, Self-Care/Home Management    Long Term Goals     PT Goal 1  Goal Description: Pt will be able to perform in HEP for general strength and aerobic conditioning without increase in pain for improved functional mobility and jt protection with RA in 12 weeks.  Target Date: 07/30/25      Frequency of Treatment: 1x/week  Duration of Treatment: 12 weeks    Risks and benefits of evaluation/treatment have been explained.   Patient/Family/caregiver agrees with Plan of Care.     Evaluation Time:     PT Eval, Moderate Complexity Minutes (64752): 30       Signing Clinician: Vale Gilmore PT

## 2025-05-07 NOTE — PATIENT INSTRUCTIONS
Purple pain education sheet - come back with any questions related to the factors that can increase/decrease pain sensitivity levels     Bring strength training paperwork from last summer to next sessions if you can find it    Sit to stand exercise  X10-15 reps in a row from elevated chair    1-2x/day

## 2025-05-09 ENCOUNTER — VIRTUAL VISIT (OUTPATIENT)
Dept: PALLIATIVE MEDICINE | Facility: OTHER | Age: 40
End: 2025-05-09
Payer: COMMERCIAL

## 2025-05-09 DIAGNOSIS — G89.4 CHRONIC PAIN SYNDROME: ICD-10-CM

## 2025-05-09 DIAGNOSIS — F41.1 GAD (GENERALIZED ANXIETY DISORDER): Primary | ICD-10-CM

## 2025-05-09 PROCEDURE — 90837 PSYTX W PT 60 MINUTES: CPT | Mod: 95 | Performed by: COUNSELOR

## 2025-05-11 NOTE — PROGRESS NOTES
[facility]- Psychotherapy  (Provider Oversight- Dr. Shane Bolden)                                    Progress Note    Patient Name: Kasandra Lau  Date: 5/9/25         Service Type: Individual      Session Start Time: 2:01 PM  Session End Time: 2:55 PM     Session Length: 54 minutes    Session #: 2    Attendees: Client attended alone    Service Modality:  Video Visit:      Provider verified identity through the following two step process.  Patient provided:  Patient photo and Patient is known previously to provider    Telemedicine Visit: The patient's condition can be safely assessed and treated via synchronous audio and visual telemedicine encounter.      Reason for Telemedicine Visit: Patient has requested telehealth visit, Patient unable to travel, and Patient convenience (e.g. access to timely appointments / distance to available provider)    Originating Site (Patient Location): Patient's parked car    Distant Site (Provider Location): [facility]    Consent:  The patient/guardian has verbally consented to: the potential risks and benefits of telemedicine (video visit) versus in person care; bill my insurance or make self-payment for services provided; and responsibility for payment of non-covered services.     Patient would like the video invitation sent by:  My Chart    Mode of Communication:  Video Conference via [employer]    Distant Location (Provider):  On-site    As the provider I attest to compliance with applicable laws and regulations related to telemedicine.    DATA  Extended Session (53+ minutes):   - Patient's presenting concerns require more intensive intervention than could be completed within the usual service  Interactive Complexity: No  Crisis: No          Progress Since Last Session (Related to Symptoms / Goals / Homework):   Symptoms: Mild improvement. Patient continues to address chronic pain, mental health and medical concerns.  She reports personal,  health and career stressors that she is addressing. She is reaching out to her support system. She is working on self care.   Homework: Partially completed Continue self care/resourcing. Work on resourcing at times of transition in your day.        Episode of Care Goals: Satisfactory progress - PREPARATION (Decided to change - considering how); Intervened by negotiating a change plan and determining options / strategies for behavior change, identifying triggers, exploring social supports, and working towards setting a date to begin behavior change     Current / Ongoing Stressors and Concerns:   Patient continues to address chronic pain, mental health and medical concerns. Patient discussed stressors related to current political issues. She reports anxiety symptoms related to this, as well as career planning. She is working on addressing sleep concerns. She reports pain issues, mostly in her jaw at this time, as well as migraines.      Treatment Objective(s) Addressed in This Session:   use at least 2 coping skills for anxiety management in the next 2 weeks, use thought-stopping strategy daily to reduce intrusive thoughts, use relaxation strategies 2 times per day to reduce the physical symptoms of anxiety, and use cognitive strategies identified in therapy to challenge anxious thoughts, identify 2 strategies for managing pain, practice relaxation training 2 times/week, and follow up with recommended medical treatments and appointments with medical providers       Intervention:   CBT: discussed thoughts and feelings related to behavior change  EMDR: discussed mind/body connection regarding pain and history of trauma.   Reviewed grounding tool and encouraged home practice.   Presented calm place and container exercises. Encouraged home practice.  Encouraged patient to follow up with medical providers, as needed  Discussed ways to set healthier boundaries/communication skills    Assessments completed prior to  visit:  The following assessments were completed by patient for this visit:  None today      ASSESSMENT: Current Emotional / Mental Status (status of significant symptoms):   Risk status (Self / Other harm or suicidal ideation)   Patient denies current fears or concerns for personal safety.   Patient denies current or recent suicidal ideation or behaviors.   Patient denies current or recent homicidal ideation or behaviors.   Patient denies current or recent self injurious behavior or ideation.   Patient reports other safety concerns including current political issues at this time.   Patient reports there has been no change in risk factors since their last session.     Patient reports there has been no change in protective factors since their last session.     Recommended that patient call 911 or go to the local ED should there be a change in any of these risk factors     Appearance:   Appropriate    Eye Contact:   Good    Psychomotor Behavior: Normal    Attitude:   Cooperative    Orientation:   All   Speech    Rate / Production: Normal/ Responsive    Volume:  Normal    Mood:    Anxious  Normal   Affect:    Appropriate  Worrisome    Thought Content:  Clear  Rumination    Thought Form:  Coherent  Logical    Insight:    Good      Medication Review:   No changes to current psychiatric medication(s)     Medication Compliance:   Yes     Changes in Health Issues:   None reported Patient continues to address chronic pain, mental health and medical concerns.  She reports concerns about her jaw pain and will follow up with medical providers. She is dealing with migraines and is attending PT.     Chemical Use Review:   Substance Use: Chemical use reviewed, no active concerns identified      Tobacco Use: No current tobacco use.      Diagnosis:  1. VIC (generalized anxiety disorder)    2. Chronic pain syndrome        Collateral Reports Completed:   Not Applicable    PLAN: (Patient Tasks / Therapist Tasks / Other)  Patient to  attend psychotherapy every 2-4 weeks. Follow care plan as discussed. Continue medical care as needed. Practice resourcing as discussed. Will send grounding tool through My Chart to help with home practice. Follow up with medical providers regarding medical questions/concerns.         JUAN CARLOS MORE [facility]                                                         ______________________________________________________________________    Individual Treatment Plan    Patient's Name: Kasandra Lau  YOB: 1985    Date of Creation: 4/17/25  Date Treatment Plan Last Reviewed/Revised: 4/17/25    DSM5 Diagnoses: 300.02 (F41.1) Generalized Anxiety Disorder Chronic Pain Syndrome  Psychosocial / Contextual Factors: Chronic pain, medical and mental health concerns  PROMIS (reviewed every 90 days): 4/9/25    Referral / Collaboration:  Referral to another professional/service is not indicated at this time..    Anticipated number of session for this episode of care: 9-12 sessions  Anticipation frequency of session: every 2-4 weeks  Anticipated Duration of each session: 38-52 minutes  Treatment plan will be reviewed in 90 days or when goals have been changed.       MeasurableTreatment Goal(s) related to diagnosis / functional impairment(s)  Goal 1: Patient will increase coping skills and decrease symptoms of chronic pain.     I will know I've met my goal when symptoms are more manageable in daily living.      Objective #A (Patient Action)    Patient will identify 2 strategies for managing pain.  Status: New - Date: 4/17/25     Intervention(s)  Therapist will teach emotional regulation skills. Mindfulness/EMDR.    Objective #B  Patient will practice relaxation training 2 times/week.  Status: New - Date: 4/17/25     Intervention(s)  Therapist will teach emotional regulation skills. Mindfulness/EMDR.    Objective #C  Patient will follow up with recommended medical treatments and appointments with medical providers.  Status:  New - Date: 4/17/25     Intervention(s)  Therapist will encourage patient to follow through with medical providers as needed.      Goal 2: Patient will decrease anxiety symptoms and increase coping skills to manage anxiety.    I will know I've met my goal when anxiety symptoms are more manageable in daily living.      Objective #A (Patient Action)    Status: New - Date: 4/17/25     Patient will use at least 2 coping skills for anxiety management in the next 2 weeks.    Intervention(s)  Therapist will teach emotional regulation skills. Mindfulness/EMDR.    Objective #B  Patient will use relaxation strategies 2 times per day to reduce the physical symptoms of anxiety.    Status: New - Date: 4/17/25     Intervention(s)  Therapist will teach emotional regulation skills. Mindfulness/EMDR.    Objective #C  Patient will use cognitive strategies identified in therapy to challenge anxious thoughts.  Status: New - Date: 4/17/25     Intervention(s)  Therapist will teach emotional regulation skills. Mindfulness/EMDR.          Patient has reviewed and agreed to the above plan.      AMANDA ROMAN  April 17, 2025

## 2025-05-12 ENCOUNTER — THERAPY VISIT (OUTPATIENT)
Dept: PHYSICAL THERAPY | Facility: CLINIC | Age: 40
End: 2025-05-12
Attending: PHYSICIAN ASSISTANT
Payer: COMMERCIAL

## 2025-05-12 DIAGNOSIS — H81.13 BENIGN PAROXYSMAL POSITIONAL VERTIGO DUE TO BILATERAL VESTIBULAR DISORDER: Primary | ICD-10-CM

## 2025-05-12 DIAGNOSIS — M19.90 INFLAMMATORY ARTHRITIS: ICD-10-CM

## 2025-05-14 ENCOUNTER — THERAPY VISIT (OUTPATIENT)
Dept: PHYSICAL THERAPY | Facility: REHABILITATION | Age: 40
End: 2025-05-14
Attending: ANESTHESIOLOGY
Payer: COMMERCIAL

## 2025-05-14 DIAGNOSIS — G89.4 CHRONIC PAIN SYNDROME: Primary | ICD-10-CM

## 2025-05-14 PROCEDURE — 97110 THERAPEUTIC EXERCISES: CPT | Mod: GP | Performed by: PHYSICAL THERAPIST

## 2025-05-14 RX ORDER — METHOTREXATE 2.5 MG/1
15 TABLET ORAL
Qty: 72 TABLET | Refills: 1 | Status: SHIPPED | OUTPATIENT
Start: 2025-05-14

## 2025-05-14 NOTE — TELEPHONE ENCOUNTER
Last Written Prescription:     methotrexate 2.5 MG tablet 72 tablet 1 11/5/2024 -- No   Sig - Route: TAKE 6 TABLETS (15 MG) BY MOUTH EVERY 7 DAYS - Oral     methotrexate 2.5 MG tablet     Last Written Prescription Date:  11/5/2024  Last Fill Quantity: 72,   # refills: 1  Last Office Visit: 10/19/23  Future Office visit:  7/17/25 Annie    CBC RESULTS:   Recent Labs   Lab Test 10/07/24  1544   WBC 7.5   RBC 4.38   HGB 13.5   HCT 40.7   MCV 93   MCH 30.8   MCHC 33.2   RDW 13.2          Creatinine   Date Value Ref Range Status   10/07/2024 0.81 0.51 - 0.95 mg/dL Final   09/12/2020 0.77 0.52 - 1.04 mg/dL Final   ]    Liver Function Studies -   Recent Labs   Lab Test 10/07/24  1544 08/03/23  1604   PROTTOTAL  --  7.6   ALBUMIN 4.4 4.4   BILITOTAL  --  0.4   ALKPHOS  --  109*   AST 19  --    ALT 17  --        Routing refill request to provider(& clinic- labs past due) for review/approval because:  DMARD. Past due labs. Last seen 10/19/23, upcoming appt  July 17. 2025 Care Everywhere reviewed/ updated, no additional labs seen                  Request from pharmacy:  Requested Prescriptions   Pending Prescriptions Disp Refills    methotrexate 2.5 MG tablet [Pharmacy Med Name: METHOTREXATE SODIUM 2.5MG TABS] 72 tablet 1     Sig: TAKE 6 TABLETS (15 MG) BY MOUTH EVERY 7 DAYS       There is no refill protocol information for this order

## 2025-05-15 ENCOUNTER — TELEPHONE (OUTPATIENT)
Dept: ENDOCRINOLOGY | Facility: CLINIC | Age: 40
End: 2025-05-15
Payer: COMMERCIAL

## 2025-05-15 NOTE — TELEPHONE ENCOUNTER
General Call    Contacts       Contact Date/Time Type Contact Phone/Fax    05/15/2025 10:46 AM CDT Phone (Incoming) Kasandra Lau (Self) [phone] (M)          Reason for Call:  Medication question    What are your questions or concerns:  pt has questions about semaglutide and the new sublingual         Could we send this information to you in [employer] or would you prefer to receive a phone call?:   Patient would like to be contacted via [employer]

## 2025-05-15 NOTE — TELEPHONE ENCOUNTER
Patient given information for oral semaglutide per her request. Will review information and let Dr Manning know if she wants to try it.

## 2025-05-19 ENCOUNTER — THERAPY VISIT (OUTPATIENT)
Dept: PHYSICAL THERAPY | Facility: CLINIC | Age: 40
End: 2025-05-19
Payer: COMMERCIAL

## 2025-05-19 DIAGNOSIS — H81.13 BENIGN PAROXYSMAL POSITIONAL VERTIGO DUE TO BILATERAL VESTIBULAR DISORDER: Primary | ICD-10-CM

## 2025-06-02 ENCOUNTER — VIRTUAL VISIT (OUTPATIENT)
Dept: PALLIATIVE MEDICINE | Facility: OTHER | Age: 40
End: 2025-06-02
Payer: COMMERCIAL

## 2025-06-02 DIAGNOSIS — F41.1 GAD (GENERALIZED ANXIETY DISORDER): Primary | ICD-10-CM

## 2025-06-02 DIAGNOSIS — G89.4 CHRONIC PAIN SYNDROME: ICD-10-CM

## 2025-06-02 PROCEDURE — 90837 PSYTX W PT 60 MINUTES: CPT | Mod: 95 | Performed by: COUNSELOR

## 2025-06-03 DIAGNOSIS — R11.0 NAUSEA: ICD-10-CM

## 2025-06-03 NOTE — CONFIDENTIAL NOTE
[facility]- Psychotherapy  (Provider Oversight- Dr. Shane Bolden)                                    Progress Note    Patient Name: Kasandra Lau  Date: 6/2/25         Service Type: Individual      Session Start Time: 2:04 PM  Session End Time: 3:02 PM     Session Length: 58 minutes    Session #: 3    Attendees: Client attended alone    Service Modality:  Video Visit:      Provider verified identity through the following two step process.  Patient provided:  Patient photo and Patient is known previously to provider    Telemedicine Visit: The patient's condition can be safely assessed and treated via synchronous audio and visual telemedicine encounter.      Reason for Telemedicine Visit: Patient has requested telehealth visit, Patient unable to travel, and Patient convenience (e.g. access to timely appointments / distance to available provider)    Originating Site (Patient Location): Patient's parked car    Distant Site (Provider Location): [facility]    Consent:  The patient/guardian has verbally consented to: the potential risks and benefits of telemedicine (video visit) versus in person care; bill my insurance or make self-payment for services provided; and responsibility for payment of non-covered services.     Patient would like the video invitation sent by:  My Chart    Mode of Communication:  Video Conference via [employer]    Distant Location (Provider):  Off-site    As the provider I attest to compliance with applicable laws and regulations related to telemedicine.    DATA  Extended Session (53+ minutes):   - Patient's presenting concerns require more intensive intervention than could be completed within the usual service  Interactive Complexity: No  Crisis: No          Progress Since Last Session (Related to Symptoms / Goals / Homework):   Symptoms: Mild improvement. Patient continues to address chronic pain, mental health and medical concerns. She reports personal,  health and career stressors that she is addressing. She is reaching out to her support system. She is working on self care. Patient will be out of the country to further work on educational goals from June to August.        Homework: Partially completed Continue self care/resourcing. Work on resourcing at times of transition in your day. Encouraged consistent/daily practice.           Episode of Care Goals: Satisfactory progress - PREPARATION (Decided to change - considering how); Intervened by negotiating a change plan and determining options / strategies for behavior change, identifying triggers, exploring social supports, and working towards setting a date to begin behavior change     Current / Ongoing Stressors and Concerns:   Patient continues to address chronic pain, mental health and medical concerns.  Patient discussed stressors related to current political issues. She reports anxiety symptoms related to this as well as career planning/completing educational goals. She is addressing sleep concerns. He reports ongoing pain issues and difficulty with hives/overheating. Discussed related cognitions.      Treatment Objective(s) Addressed in This Session:   use at least 2 coping skills for anxiety management in the next 2 weeks, use thought-stopping strategy daily to reduce intrusive thoughts, use relaxation strategies 2 times per day to reduce the physical symptoms of anxiety, and use cognitive strategies identified in therapy to challenge anxious thoughts, identify 2 strategies for managing pain, practice relaxation training 2 times/week, and follow up with recommended medical treatments and appointments with medical providers       Intervention:   CBT: discussed thoughts and feelings related to behavior change  EMDR: discussed mind/body connection regarding pain and history of trauma.   Presented light stream exercise and patient appeared to benefit.   Reinforced using resourcing consistently at this  "time  Discussed internal and external stressors related to physical discomfort  Discussed ways to set healthier boundaries/communication  Discussed ways to stay mindful/present in daily living        Assessments completed prior to visit:  The following assessments were completed by patient for this visit:  None today      ASSESSMENT: Current Emotional / Mental Status (status of significant symptoms):   Risk status (Self / Other harm or suicidal ideation)   Patient denies current fears or concerns for personal safety.   Patient denies current or recent suicidal ideation or behaviors.   Patient denies current or recent homicidal ideation or behaviors.   Patient denies current or recent self injurious behavior or ideation.   Patient reports other safety concerns including current political issues at this time.   Patient reports there has been no change in risk factors since their last session.     Patient reports there has been no change in protective factors since their last session.     Recommended that patient call 911 or go to the local ED should there be a change in any of these risk factors     Appearance:   Appropriate    Eye Contact:   Good    Psychomotor Behavior: Normal    Attitude:   Cooperative    Orientation:   All   Speech    Rate / Production: Normal/ Responsive    Volume:  Normal    Mood:    Anxious  Normal   Affect:    Appropriate  Worrisome    Thought Content:  Clear  Rumination    Thought Form:  Coherent  Logical    Insight:    Good      Medication Review:   No changes to current psychiatric medication(s)     Medication Compliance:   Yes     Changes in Health Issues:   None reported Patient continues to address chronic pain, mental health and medical concerns. She is attending PT and working on stretching at home. She states that she feels \"barely present\" recently with current pain symptoms.        Chemical Use Review:   Substance Use: Chemical use reviewed, no active concerns identified "      Tobacco Use: No current tobacco use.      Diagnosis:  1. VIC (generalized anxiety disorder)    2. Chronic pain syndrome        Collateral Reports Completed:   Not Applicable    PLAN: (Patient Tasks / Therapist Tasks / Other)  Patient to attend psychotherapy every 2-4 weeks. However, patient will be out of the country from June to August to pursue educational goals. Follow care plan as discussed. Continue medical care as needed. Continue resourcing as discussed.           JUAN CARLOS MORE [facility]                                                         ______________________________________________________________________    Individual Treatment Plan    Patient's Name: Kasandra Lau  YOB: 1985    Date of Creation: 4/17/25  Date Treatment Plan Last Reviewed/Revised: 4/17/25    DSM5 Diagnoses: 300.02 (F41.1) Generalized Anxiety Disorder Chronic Pain Syndrome  Psychosocial / Contextual Factors: Chronic pain, medical and mental health concerns  PROMIS (reviewed every 90 days): 4/9/25    Referral / Collaboration:  Referral to another professional/service is not indicated at this time..    Anticipated number of session for this episode of care: 9-12 sessions  Anticipation frequency of session: every 2-4 weeks  Anticipated Duration of each session: 38-52 minutes  Treatment plan will be reviewed in 90 days or when goals have been changed.       MeasurableTreatment Goal(s) related to diagnosis / functional impairment(s)  Goal 1: Patient will increase coping skills and decrease symptoms of chronic pain.     I will know I've met my goal when symptoms are more manageable in daily living.      Objective #A (Patient Action)    Patient will identify 2 strategies for managing pain.  Status: New - Date: 4/17/25     Intervention(s)  Therapist will teach emotional regulation skills. Mindfulness/EMDR.    Objective #B  Patient will practice relaxation training 2 times/week.  Status: New - Date: 4/17/25      Intervention(s)  Therapist will teach emotional regulation skills. Mindfulness/EMDR.    Objective #C  Patient will follow up with recommended medical treatments and appointments with medical providers.  Status: New - Date: 4/17/25     Intervention(s)  Therapist will encourage patient to follow through with medical providers as needed.      Goal 2: Patient will decrease anxiety symptoms and increase coping skills to manage anxiety.    I will know I've met my goal when anxiety symptoms are more manageable in daily living.      Objective #A (Patient Action)    Status: New - Date: 4/17/25     Patient will use at least 2 coping skills for anxiety management in the next 2 weeks.    Intervention(s)  Therapist will teach emotional regulation skills. Mindfulness/EMDR.    Objective #B  Patient will use relaxation strategies 2 times per day to reduce the physical symptoms of anxiety.    Status: New - Date: 4/17/25     Intervention(s)  Therapist will teach emotional regulation skills. Mindfulness/EMDR.    Objective #C  Patient will use cognitive strategies identified in therapy to challenge anxious thoughts.  Status: New - Date: 4/17/25     Intervention(s)  Therapist will teach emotional regulation skills. Mindfulness/EMDR.          Patient has reviewed and agreed to the above plan.      JUAN CARLOS MORE, [facility]  April 17, 2025

## 2025-06-04 RX ORDER — ONDANSETRON 4 MG/1
4 TABLET, ORALLY DISINTEGRATING ORAL EVERY 8 HOURS PRN
Qty: 30 TABLET | Refills: 1 | Status: SHIPPED | OUTPATIENT
Start: 2025-06-04

## 2025-06-04 NOTE — TELEPHONE ENCOUNTER
Last Written Prescription:  2/18/25   30 : 1  ----------------------  Last Visit Date: 2/18/25  Future Visit Date: 10/28/25    Refill decision: Medication refilled per  Medication Refill in Ambulatory Care  policy.     Request from pharmacy:  Requested Prescriptions   Pending Prescriptions Disp Refills    ondansetron (ZOFRAN ODT) 4 MG ODT tab [Pharmacy Med Name: ONDANSETRON 4MG TBDP] 30 tablet 1     Sig: DISSOLVE ONE TABLET BY MOUTH EVERY 8 HOURS AS NEEDED FOR NAUSEA        Antivertigo/Antiemetic Agents Passed - 6/4/2025  6:56 PM        Passed - Medication is active on med list and the sig matches. RN to manually verify dose and sig if red X/fail.             Passed - Medication indicated for associated diagnosis     The medication is prescribed for one or more of the following conditions:     Motion sickness   Nausea   Vomiting   Vertigo   Chemotherapy-induced nausea and vomiting   Radiation-induced nausea and vomiting,    Nausea and vomiting prophylaxis, migraine          Passed - Recent (12 month) or future (90 days) visit with authorizing provider's specialty (provided they have been seen in the past 15 months)     The patient must have completed an in-person or virtual visit within the past 12 months or has a future visit scheduled within the next 90 days with the authorizing provider s specialty.  Urgent care and e-visits do not qualify as an office visit for this protocol.          Passed - Patient is 18 years of age or older

## 2025-06-09 ENCOUNTER — PATIENT OUTREACH (OUTPATIENT)
Dept: CARE COORDINATION | Facility: CLINIC | Age: 40
End: 2025-06-09
Payer: COMMERCIAL

## 2025-06-10 ENCOUNTER — LAB (OUTPATIENT)
Dept: LAB | Facility: CLINIC | Age: 40
End: 2025-06-10
Payer: COMMERCIAL

## 2025-06-10 DIAGNOSIS — R23.4 SKIN TEXTURE CHANGES: ICD-10-CM

## 2025-06-10 PROCEDURE — 36415 COLL VENOUS BLD VENIPUNCTURE: CPT | Performed by: PATHOLOGY

## 2025-06-10 PROCEDURE — 86235 NUCLEAR ANTIGEN ANTIBODY: CPT | Performed by: INTERNAL MEDICINE

## 2025-06-10 PROCEDURE — 99000 SPECIMEN HANDLING OFFICE-LAB: CPT | Performed by: PATHOLOGY

## 2025-06-11 ENCOUNTER — PATIENT OUTREACH (OUTPATIENT)
Dept: CARE COORDINATION | Facility: CLINIC | Age: 40
End: 2025-06-11
Payer: COMMERCIAL

## 2025-06-13 ENCOUNTER — MYC MEDICAL ADVICE (OUTPATIENT)
Dept: FAMILY MEDICINE | Facility: CLINIC | Age: 40
End: 2025-06-13
Payer: COMMERCIAL

## 2025-06-13 DIAGNOSIS — F33.1 MAJOR DEPRESSIVE DISORDER, RECURRENT EPISODE, MODERATE (H): ICD-10-CM

## 2025-06-14 ENCOUNTER — RESULTS FOLLOW-UP (OUTPATIENT)
Dept: MULTI SPECIALTY CLINIC | Facility: CLINIC | Age: 40
End: 2025-06-14

## 2025-06-16 ENCOUNTER — THERAPY VISIT (OUTPATIENT)
Dept: PHYSICAL THERAPY | Facility: CLINIC | Age: 40
End: 2025-06-16
Payer: COMMERCIAL

## 2025-06-16 ENCOUNTER — TELEPHONE (OUTPATIENT)
Dept: RHEUMATOLOGY | Facility: CLINIC | Age: 40
End: 2025-06-16

## 2025-06-16 DIAGNOSIS — H81.13 BENIGN PAROXYSMAL POSITIONAL VERTIGO DUE TO BILATERAL VESTIBULAR DISORDER: Primary | ICD-10-CM

## 2025-06-16 DIAGNOSIS — J45.20 MILD INTERMITTENT ASTHMA WITHOUT COMPLICATION: Primary | ICD-10-CM

## 2025-06-16 RX ORDER — BUPROPION HYDROCHLORIDE 300 MG/1
300 TABLET ORAL EVERY MORNING
Qty: 90 TABLET | Refills: 0 | Status: SHIPPED | OUTPATIENT
Start: 2025-06-16

## 2025-06-16 NOTE — PROGRESS NOTES
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   PT Tx Diagnosis Sensory selection and weighting deficit   Progress Note/Certification   Onset of illness/injury or Date of Surgery 04/21/25   Therapy Frequency 1x/week, decreasing to every other week   Predicted Duration 8 weeks   Progress Note Due Date 06/16/25   GOALS   PT Goals 2;3   PT Goal 1   Goal Identifier HEP   Goal Description Patient will be independent with Home Exercise Program for continued improvements in health and wellness upon d/c from therapy   Rationale to maximize safety and independence within the community   Goal Progress met   Target Date 06/16/25   Date Met 06/16/25   PT Goal 2   Goal Identifier DHI   Goal Description Patient will improve DHI score to 34 as an indication of improved subjective symptoms of dizziness.   Rationale to maximize safety and independence within the community   Goal Progress DHI - 58, although has not had dizziness in >4 weeks   Target Date 06/16/25   PT Goal 3   Goal Identifier mCTSIB   Goal Description Patient will be able to maintain condition 5 of the mCTSIB for 30 sec to indicate improved use of vestibular input for balance for compliant surfaces and dimly lit environments   Rationale to maximize safety and independence within the community   Goal Progress met   Target Date 06/16/25   Date Met 06/16/25   Self Care/home Management   ADL/Home Mgmt Training (89618) 20   Self Care 1 Home management of symptoms   Self Care 1 - Details Discussed factors in patient's symptoms and triggers that she has somewhat been able to identify.  Generally symptoms seem to have happened after prolonged periods of work including screens as well as altered sleep.  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  Discussed upcoming travel and how this may affect  symptoms answered questions about how to utilize exercises going forward.   Eval/Assessments   Assessments Physical Performance Test/Measures   Physical Performance Test/measures   Physical Performance Test/Measurement, Minutes (21271) 18   Physical Performance Test/Measurement Details mCSTIB, DHI, FGA   Skilled Intervention Performed above mentioned tests and discussed results with patient and impact on POC   Education   Learner/Method Patient;Listening;Demonstration;Pictures/Video   Plan   Home program PTrx   Plan for next session No further therapy at this time and will discharge based on recent progress   Total Session Time   Timed Code Treatment Minutes 38   Total Treatment Time (sum of timed and untimed services) 38         DISCHARGE  Reason for Discharge: Patient has met nearly all goals.      Equipment Issued:     Discharge Plan: Patient to continue home program.    Referring Provider:  Marley Pfeiffer

## 2025-06-16 NOTE — TELEPHONE ENCOUNTER
"Per Dr Bennett, \"Please set up Allergy referral, Dr. Burger, for patient.\"    Referral is pended.  Please review and sign.     Lauren Pham RN    "

## 2025-06-16 NOTE — TELEPHONE ENCOUNTER
FOUZIA Pfeiffer,  See pt Mychart message, med cued if you agree    Terra RADHA Vallejo   [facility]

## 2025-06-16 NOTE — PROGRESS NOTES
Modified Clinical Test of Sensory Interaction in Balance (mCTSIB):The mCTSIB provides a gross measure of the ability to use somatosensory, visual, and vestibular feedback for postural control. The test was performed with shoes on. The compliant surface used was a pillow.     Overall findings:     Condition Time Sway Characteristics   Non-Compliant Surface with Eyes Open  30    Non-Compliant Surface with Eyes Closed 30    Compliant Surface with Eyes Open 30    Compliant Surface with Eyes Closed 30        Assessment (rationale for performing, application to patient s function & care plan): assessment towards goals  Minutes billed as physical performance test: 8     Functional Gait Assessment (FGA): The FGA assesses postural stability during various walking tasks.     Gait assistive device used: None     Scores of <22 /30 have been correlated with predicting falls in community-dwelling older adults according to Robert & Jaya 2010.   Scores of <18 /30 have been correlated with increased risk for falls in patients with Parkinsons Disease according to Nabil Smith Zhou et al 2014.  Minimal Detectable Change for patients with acute/chronic stroke = 4.2 according to Thieme & Ritschel 2009  Minimal Detectable Change for patients with vestibular disorder = 8 according to Robert & Lake 2010     Assessment (rationale for performing, application to patient s function & care plan): Performed to assess balance with gait. Scored at 30 Pt will benefit from continued 8   (Minutes billed as physical performance test):       Alar Island Pedicle Flap Text: The defect edges were debeveled with a #15c scalpel blade.  Given the location of the defect, shape of the defect and the proximity to the alar rim an island pedicle advancement flap was deemed most appropriate.  Using a sterile surgical marker, an appropriate advancement flap was drawn incorporating the defect, outlining the appropriate donor tissue and placing the expected incisions within the nasal ala running parallel to the alar rim. The area thus outlined was incised with a #15 scalpel blade.  The skin margins were undermined minimally to an appropriate distance in all directions around the primary defect and laterally outward around the island pedicle utilizing iris scissors.  There was minimal undermining beneath the pedicle flap.

## 2025-06-17 ENCOUNTER — PATIENT OUTREACH (OUTPATIENT)
Dept: CARE COORDINATION | Facility: CLINIC | Age: 40
End: 2025-06-17
Payer: COMMERCIAL

## 2025-06-23 ENCOUNTER — MYC MEDICAL ADVICE (OUTPATIENT)
Dept: FAMILY MEDICINE | Facility: CLINIC | Age: 40
End: 2025-06-23
Payer: COMMERCIAL

## 2025-06-23 NOTE — TELEPHONE ENCOUNTER
Triage,   Please see message below and advise.   Patient last saw MP on 3/11/25.  Patient is traveling internationally.  Thanks!  Diane SORIANO

## 2025-07-30 ENCOUNTER — MYC MEDICAL ADVICE (OUTPATIENT)
Dept: FAMILY MEDICINE | Facility: CLINIC | Age: 40
End: 2025-07-30
Payer: COMMERCIAL

## 2025-08-08 PROBLEM — F60.3 BORDERLINE PERSONALITY DISORDER (H): Status: ACTIVE | Noted: 2025-08-08

## 2025-08-08 PROBLEM — H81.13 BENIGN PAROXYSMAL POSITIONAL VERTIGO DUE TO BILATERAL VESTIBULAR DISORDER: Status: ACTIVE | Noted: 2025-08-08

## 2025-08-08 PROBLEM — K21.9 LARYNGOPHARYNGEAL REFLUX: Status: ACTIVE | Noted: 2025-08-08

## 2025-08-08 PROBLEM — J35.1 TONSILLAR HYPERTROPHY: Status: ACTIVE | Noted: 2025-08-08

## 2025-08-08 PROBLEM — F90.0 ATTENTION DEFICIT HYPERACTIVITY DISORDER (ADHD), PREDOMINANTLY INATTENTIVE TYPE: Status: ACTIVE | Noted: 2025-08-08

## 2025-08-08 PROBLEM — F60.9 PERSONALITY DISORDER (H): Status: ACTIVE | Noted: 2025-08-08

## 2025-08-19 ENCOUNTER — MYC MEDICAL ADVICE (OUTPATIENT)
Dept: FAMILY MEDICINE | Facility: CLINIC | Age: 40
End: 2025-08-19
Payer: COMMERCIAL

## 2025-08-19 DIAGNOSIS — L50.9 URTICARIA: Primary | ICD-10-CM

## 2025-08-21 RX ORDER — DOXEPIN HYDROCHLORIDE 75 MG/1
75 CAPSULE ORAL 2 TIMES DAILY
Qty: 60 CAPSULE | Refills: 2 | OUTPATIENT
Start: 2025-08-21

## 2025-08-25 ENCOUNTER — APPOINTMENT (OUTPATIENT)
Dept: PALLIATIVE MEDICINE | Facility: OTHER | Age: 40
End: 2025-08-25
Payer: COMMERCIAL

## 2025-08-26 ENCOUNTER — VIRTUAL VISIT (OUTPATIENT)
Dept: FAMILY MEDICINE | Facility: CLINIC | Age: 40
End: 2025-08-26
Payer: COMMERCIAL

## 2025-08-26 DIAGNOSIS — B00.9 HERPES SIMPLEX VIRUS INFECTION: ICD-10-CM

## 2025-08-26 DIAGNOSIS — K21.00 GASTROESOPHAGEAL REFLUX DISEASE WITH ESOPHAGITIS WITHOUT HEMORRHAGE: ICD-10-CM

## 2025-08-26 DIAGNOSIS — J30.9 ALLERGIC RHINITIS, UNSPECIFIED SEASONALITY, UNSPECIFIED TRIGGER: ICD-10-CM

## 2025-08-26 DIAGNOSIS — F90.9 ATTENTION DEFICIT HYPERACTIVITY DISORDER (ADHD), UNSPECIFIED ADHD TYPE: Primary | ICD-10-CM

## 2025-08-26 DIAGNOSIS — F41.9 ANXIETY: ICD-10-CM

## 2025-08-26 DIAGNOSIS — L50.9 URTICARIA: ICD-10-CM

## 2025-08-26 DIAGNOSIS — F33.1 MAJOR DEPRESSIVE DISORDER, RECURRENT EPISODE, MODERATE (H): ICD-10-CM

## 2025-08-26 DIAGNOSIS — M79.2 NEUROPATHIC PAIN: ICD-10-CM

## 2025-08-26 DIAGNOSIS — N39.0 RECURRENT UTI: ICD-10-CM

## 2025-08-26 PROCEDURE — 1126F AMNT PAIN NOTED NONE PRSNT: CPT | Mod: 95 | Performed by: PHYSICIAN ASSISTANT

## 2025-08-26 PROCEDURE — 98006 SYNCH AUDIO-VIDEO EST MOD 30: CPT | Performed by: PHYSICIAN ASSISTANT

## 2025-08-26 RX ORDER — BUPROPION HYDROCHLORIDE 300 MG/1
300 TABLET ORAL EVERY MORNING
Qty: 90 TABLET | Refills: 1 | Status: SHIPPED | OUTPATIENT
Start: 2025-08-26

## 2025-08-26 RX ORDER — NITROFURANTOIN 25; 75 MG/1; MG/1
100 CAPSULE ORAL
Qty: 10 CAPSULE | Refills: 0 | Status: SHIPPED | OUTPATIENT
Start: 2025-08-26

## 2025-08-26 RX ORDER — FAMOTIDINE 20 MG/1
20 TABLET, FILM COATED ORAL 2 TIMES DAILY
Qty: 180 TABLET | Refills: 1 | Status: SHIPPED | OUTPATIENT
Start: 2025-08-26

## 2025-08-26 RX ORDER — PROPRANOLOL HCL 20 MG
20 TABLET ORAL DAILY PRN
Qty: 30 TABLET | Refills: 11 | Status: SHIPPED | OUTPATIENT
Start: 2025-08-26

## 2025-08-26 RX ORDER — VALACYCLOVIR HYDROCHLORIDE 1 G/1
1000 TABLET, FILM COATED ORAL DAILY
Qty: 90 TABLET | Refills: 1 | Status: SHIPPED | OUTPATIENT
Start: 2025-08-26

## 2025-08-26 RX ORDER — VILAZODONE HYDROCHLORIDE 40 MG/1
40 TABLET ORAL EVERY MORNING
Qty: 90 TABLET | Refills: 1 | Status: SHIPPED | OUTPATIENT
Start: 2025-08-26

## 2025-08-26 RX ORDER — GABAPENTIN 400 MG/1
800 CAPSULE ORAL 3 TIMES DAILY
Qty: 180 CAPSULE | Refills: 5 | Status: SHIPPED | OUTPATIENT
Start: 2025-08-26

## 2025-08-26 RX ORDER — CETIRIZINE HYDROCHLORIDE 10 MG/1
10 TABLET ORAL 2 TIMES DAILY
Qty: 180 TABLET | Refills: 1 | Status: SHIPPED | OUTPATIENT
Start: 2025-08-26

## 2025-08-26 RX ORDER — LISDEXAMFETAMINE DIMESYLATE 30 MG/1
30 CAPSULE ORAL EVERY MORNING
Qty: 30 CAPSULE | Refills: 0 | Status: SHIPPED | OUTPATIENT
Start: 2025-09-26

## 2025-08-26 RX ORDER — FLUTICASONE PROPIONATE 50 MCG
2 SPRAY, SUSPENSION (ML) NASAL 2 TIMES DAILY PRN
Qty: 48 ML | Refills: 5 | Status: SHIPPED | OUTPATIENT
Start: 2025-08-26

## 2025-08-26 RX ORDER — DOXEPIN HYDROCHLORIDE 75 MG/1
75 CAPSULE ORAL 2 TIMES DAILY
Qty: 180 CAPSULE | Refills: 1 | Status: SHIPPED | OUTPATIENT
Start: 2025-08-26

## 2025-08-26 RX ORDER — VALACYCLOVIR HYDROCHLORIDE 500 MG/1
500 TABLET, FILM COATED ORAL 2 TIMES DAILY
Qty: 21 TABLET | Refills: 1 | Status: SHIPPED | OUTPATIENT
Start: 2025-08-26 | End: 2025-08-29

## 2025-08-26 RX ORDER — ATOMOXETINE 40 MG/1
40 CAPSULE ORAL DAILY
Qty: 90 CAPSULE | Refills: 1 | Status: SHIPPED | OUTPATIENT
Start: 2025-08-26

## 2025-08-26 RX ORDER — LISDEXAMFETAMINE DIMESYLATE 30 MG/1
30 CAPSULE ORAL EVERY MORNING
Qty: 30 CAPSULE | Refills: 0 | Status: SHIPPED | OUTPATIENT
Start: 2025-10-26

## 2025-08-26 RX ORDER — LISDEXAMFETAMINE DIMESYLATE 30 MG/1
30 CAPSULE ORAL EVERY MORNING
Qty: 30 CAPSULE | Refills: 0 | Status: SHIPPED | OUTPATIENT
Start: 2025-08-26

## 2025-08-27 ENCOUNTER — VIRTUAL VISIT (OUTPATIENT)
Dept: PHARMACY | Facility: CLINIC | Age: 40
End: 2025-08-27
Attending: INTERNAL MEDICINE
Payer: COMMERCIAL

## 2025-08-27 ENCOUNTER — PATIENT OUTREACH (OUTPATIENT)
Dept: CARE COORDINATION | Facility: CLINIC | Age: 40
End: 2025-08-27
Payer: COMMERCIAL

## 2025-08-27 VITALS — BODY MASS INDEX: 25.09 KG/M2 | WEIGHT: 165 LBS

## 2025-08-27 DIAGNOSIS — K76.0 FATTY LIVER: ICD-10-CM

## 2025-08-27 DIAGNOSIS — E66.3 OVERWEIGHT (BMI 25.0-29.9): Primary | ICD-10-CM

## 2025-08-27 DIAGNOSIS — K59.00 CONSTIPATION, UNSPECIFIED CONSTIPATION TYPE: ICD-10-CM

## 2025-08-27 RX ORDER — SENNOSIDES 8.6 MG
1 TABLET ORAL DAILY
COMMUNITY

## 2025-08-27 ASSESSMENT — PAIN SCALES - GENERAL: PAINLEVEL_OUTOF10: NO PAIN (0)

## 2025-10-31 ENCOUNTER — PRE VISIT (OUTPATIENT)
Dept: UROLOGY | Facility: CLINIC | Age: 40
End: 2025-10-31

## (undated) DEVICE — SUCTION MANIFOLD NEPTUNE 2 SYS 1 PORT 702-025-000

## (undated) DEVICE — PREP CHLORAPREP 26ML TINTED ORANGE  260815

## (undated) DEVICE — PEN MARKING SKIN W/LABELS 31145884

## (undated) DEVICE — PAD CHUX UNDERPAD 30X30"

## (undated) DEVICE — PAD ARMBOARD FOAM EGGCRATE 50676-378

## (undated) DEVICE — SPONGE LAP 18X18" X8435

## (undated) DEVICE — ESU GROUND PAD ADULT W/CORD E7507

## (undated) DEVICE — GLOVE PROTEXIS W/NEU-THERA 7.0  2D73TE70

## (undated) DEVICE — LINEN TOWEL PACK X5 5464

## (undated) DEVICE — DRAPE IOBAN INCISE 23X17" 6650EZ

## (undated) DEVICE — ESU ELEC BLADE HEX-LOCKING 2.5" E1450X

## (undated) DEVICE — SOL NACL 0.9% IRRIG 500ML BOTTLE 2F7123

## (undated) DEVICE — SOL WATER IRRIG 500ML BOTTLE 2F7113

## (undated) DEVICE — SUCTION TIP YANKAUER W/O VENT K86

## (undated) DEVICE — STPL SKIN 35W 059037

## (undated) DEVICE — BLADE KNIFE SURG 10 371110

## (undated) DEVICE — PEN MARKING SKIN W/PAPER RULER 31145785

## (undated) DEVICE — DRSG KERLIX 4 1/2"X4YDS ROLL 6730

## (undated) DEVICE — ESU PENCIL SMOKE EVAC W/ROCKER SWITCH 0703-047-000

## (undated) DEVICE — PACK MINOR CUSTOM ASC

## (undated) DEVICE — DRAPE U SPLIT 74X120" 29440

## (undated) RX ORDER — FENTANYL CITRATE-0.9 % NACL/PF 10 MCG/ML
PLASTIC BAG, INJECTION (ML) INTRAVENOUS
Status: DISPENSED
Start: 2021-08-05

## (undated) RX ORDER — GLYCOPYRROLATE 0.2 MG/ML
INJECTION INTRAMUSCULAR; INTRAVENOUS
Status: DISPENSED
Start: 2021-08-05

## (undated) RX ORDER — GABAPENTIN 300 MG/1
CAPSULE ORAL
Status: DISPENSED
Start: 2021-08-05

## (undated) RX ORDER — HYDROMORPHONE HYDROCHLORIDE 1 MG/ML
INJECTION, SOLUTION INTRAMUSCULAR; INTRAVENOUS; SUBCUTANEOUS
Status: DISPENSED
Start: 2021-08-05

## (undated) RX ORDER — CEFAZOLIN SODIUM 1 G/3ML
INJECTION, POWDER, FOR SOLUTION INTRAMUSCULAR; INTRAVENOUS
Status: DISPENSED
Start: 2021-08-05

## (undated) RX ORDER — PROPOFOL 10 MG/ML
INJECTION, EMULSION INTRAVENOUS
Status: DISPENSED
Start: 2021-08-05

## (undated) RX ORDER — FENTANYL CITRATE 50 UG/ML
INJECTION, SOLUTION INTRAMUSCULAR; INTRAVENOUS
Status: DISPENSED
Start: 2021-08-05

## (undated) RX ORDER — ONDANSETRON 2 MG/ML
INJECTION INTRAMUSCULAR; INTRAVENOUS
Status: DISPENSED
Start: 2021-08-05

## (undated) RX ORDER — LIDOCAINE HYDROCHLORIDE 20 MG/ML
INJECTION, SOLUTION EPIDURAL; INFILTRATION; INTRACAUDAL; PERINEURAL
Status: DISPENSED
Start: 2021-08-05

## (undated) RX ORDER — EPHEDRINE SULFATE 50 MG/ML
INJECTION, SOLUTION INTRAMUSCULAR; INTRAVENOUS; SUBCUTANEOUS
Status: DISPENSED
Start: 2021-08-05

## (undated) RX ORDER — DEXAMETHASONE SODIUM PHOSPHATE 4 MG/ML
INJECTION, SOLUTION INTRA-ARTICULAR; INTRALESIONAL; INTRAMUSCULAR; INTRAVENOUS; SOFT TISSUE
Status: DISPENSED
Start: 2021-08-05

## (undated) RX ORDER — ACETAMINOPHEN 325 MG/1
TABLET ORAL
Status: DISPENSED
Start: 2021-08-05

## (undated) RX ORDER — OXYCODONE HYDROCHLORIDE 5 MG/1
TABLET ORAL
Status: DISPENSED
Start: 2021-08-05